# Patient Record
Sex: MALE | Race: WHITE | Employment: OTHER | URBAN - NONMETROPOLITAN AREA
[De-identification: names, ages, dates, MRNs, and addresses within clinical notes are randomized per-mention and may not be internally consistent; named-entity substitution may affect disease eponyms.]

---

## 2019-06-10 ENCOUNTER — HOSPITAL ENCOUNTER (OUTPATIENT)
Dept: INTERVENTIONAL RADIOLOGY/VASCULAR | Age: 59
Discharge: HOME | End: 2019-06-10
Payer: COMMERCIAL

## 2019-06-10 DIAGNOSIS — R60.9 SWELLING: ICD-10-CM

## 2019-06-10 PROCEDURE — 93971 EXTREMITY STUDY: CPT

## 2020-03-02 ENCOUNTER — HOSPITAL ENCOUNTER (INPATIENT)
Age: 60
LOS: 4 days | Discharge: HOME | DRG: 239 | End: 2020-03-06
Attending: PODIATRIST | Admitting: PODIATRIST
Payer: COMMERCIAL

## 2020-03-02 ENCOUNTER — APPOINTMENT (OUTPATIENT)
Dept: GENERAL RADIOLOGY | Age: 60
DRG: 239 | End: 2020-03-02
Attending: PODIATRIST
Payer: COMMERCIAL

## 2020-03-02 PROBLEM — I96 GANGRENE OF TOE OF LEFT FOOT (HCC): Status: ACTIVE | Noted: 2020-03-02

## 2020-03-02 LAB
ANION GAP SERPL CALCULATED.3IONS-SCNC: 12 MEQ/L (ref 8–16)
BASOPHILS # BLD: 0.8 %
BASOPHILS ABSOLUTE: 0.1 THOU/MM3 (ref 0–0.1)
BUN BLDV-MCNC: 18 MG/DL (ref 7–22)
CALCIUM SERPL-MCNC: 9.1 MG/DL (ref 8.5–10.5)
CHLORIDE BLD-SCNC: 100 MEQ/L (ref 98–111)
CO2: 22 MEQ/L (ref 23–33)
CREAT SERPL-MCNC: 1 MG/DL (ref 0.4–1.2)
EKG ATRIAL RATE: 83 BPM
EKG P AXIS: 71 DEGREES
EKG P-R INTERVAL: 188 MS
EKG Q-T INTERVAL: 382 MS
EKG QRS DURATION: 96 MS
EKG QTC CALCULATION (BAZETT): 448 MS
EKG R AXIS: 29 DEGREES
EKG T AXIS: 77 DEGREES
EKG VENTRICULAR RATE: 83 BPM
EOSINOPHIL # BLD: 6 %
EOSINOPHILS ABSOLUTE: 0.4 THOU/MM3 (ref 0–0.4)
ERYTHROCYTE [DISTWIDTH] IN BLOOD BY AUTOMATED COUNT: 15.4 % (ref 11.5–14.5)
ERYTHROCYTE [DISTWIDTH] IN BLOOD BY AUTOMATED COUNT: 47.8 FL (ref 35–45)
GFR SERPL CREATININE-BSD FRML MDRD: 77 ML/MIN/1.73M2
GLUCOSE BLD-MCNC: 120 MG/DL (ref 70–108)
GLUCOSE BLD-MCNC: 140 MG/DL (ref 70–108)
HCT VFR BLD CALC: 34.8 % (ref 42–52)
HEMOGLOBIN: 11.2 GM/DL (ref 14–18)
IMMATURE GRANS (ABS): 0.04 THOU/MM3 (ref 0–0.07)
IMMATURE GRANULOCYTES: 0.5 %
LYMPHOCYTES # BLD: 20.1 %
LYMPHOCYTES ABSOLUTE: 1.5 THOU/MM3 (ref 1–4.8)
MCH RBC QN AUTO: 27.5 PG (ref 26–33)
MCHC RBC AUTO-ENTMCNC: 32.2 GM/DL (ref 32.2–35.5)
MCV RBC AUTO: 85.5 FL (ref 80–94)
MONOCYTES # BLD: 11 %
MONOCYTES ABSOLUTE: 0.8 THOU/MM3 (ref 0.4–1.3)
NUCLEATED RED BLOOD CELLS: 0 /100 WBC
PLATELET # BLD: 198 THOU/MM3 (ref 130–400)
PMV BLD AUTO: 11.3 FL (ref 9.4–12.4)
POTASSIUM REFLEX MAGNESIUM: 3.9 MEQ/L (ref 3.5–5.2)
RBC # BLD: 4.07 MILL/MM3 (ref 4.7–6.1)
SEG NEUTROPHILS: 61.6 %
SEGMENTED NEUTROPHILS ABSOLUTE COUNT: 4.6 THOU/MM3 (ref 1.8–7.7)
SODIUM BLD-SCNC: 134 MEQ/L (ref 135–145)
WBC # BLD: 7.4 THOU/MM3 (ref 4.8–10.8)

## 2020-03-02 PROCEDURE — 82948 REAGENT STRIP/BLOOD GLUCOSE: CPT

## 2020-03-02 PROCEDURE — 6370000000 HC RX 637 (ALT 250 FOR IP): Performed by: STUDENT IN AN ORGANIZED HEALTH CARE EDUCATION/TRAINING PROGRAM

## 2020-03-02 PROCEDURE — 1200000000 HC SEMI PRIVATE

## 2020-03-02 PROCEDURE — 85025 COMPLETE CBC W/AUTO DIFF WBC: CPT

## 2020-03-02 PROCEDURE — 80048 BASIC METABOLIC PNL TOTAL CA: CPT

## 2020-03-02 PROCEDURE — 71045 X-RAY EXAM CHEST 1 VIEW: CPT

## 2020-03-02 PROCEDURE — 2580000003 HC RX 258: Performed by: STUDENT IN AN ORGANIZED HEALTH CARE EDUCATION/TRAINING PROGRAM

## 2020-03-02 PROCEDURE — 94760 N-INVAS EAR/PLS OXIMETRY 1: CPT

## 2020-03-02 PROCEDURE — 36415 COLL VENOUS BLD VENIPUNCTURE: CPT

## 2020-03-02 PROCEDURE — 93005 ELECTROCARDIOGRAM TRACING: CPT | Performed by: STUDENT IN AN ORGANIZED HEALTH CARE EDUCATION/TRAINING PROGRAM

## 2020-03-02 RX ORDER — ATORVASTATIN CALCIUM 40 MG/1
40 TABLET, FILM COATED ORAL NIGHTLY
Status: DISCONTINUED | OUTPATIENT
Start: 2020-03-02 | End: 2020-03-06 | Stop reason: HOSPADM

## 2020-03-02 RX ORDER — NICOTINE POLACRILEX 4 MG
15 LOZENGE BUCCAL PRN
Status: DISCONTINUED | OUTPATIENT
Start: 2020-03-02 | End: 2020-03-06 | Stop reason: HOSPADM

## 2020-03-02 RX ORDER — ONDANSETRON 2 MG/ML
4 INJECTION INTRAMUSCULAR; INTRAVENOUS EVERY 6 HOURS PRN
Status: DISCONTINUED | OUTPATIENT
Start: 2020-03-02 | End: 2020-03-06 | Stop reason: HOSPADM

## 2020-03-02 RX ORDER — LISINOPRIL 5 MG/1
5 TABLET ORAL DAILY
Status: DISCONTINUED | OUTPATIENT
Start: 2020-03-03 | End: 2020-03-06 | Stop reason: HOSPADM

## 2020-03-02 RX ORDER — DEXTROSE MONOHYDRATE 50 MG/ML
100 INJECTION, SOLUTION INTRAVENOUS PRN
Status: DISCONTINUED | OUTPATIENT
Start: 2020-03-02 | End: 2020-03-06 | Stop reason: HOSPADM

## 2020-03-02 RX ORDER — AMIODARONE HYDROCHLORIDE 200 MG/1
200 TABLET ORAL DAILY
Status: DISCONTINUED | OUTPATIENT
Start: 2020-03-03 | End: 2020-03-06 | Stop reason: HOSPADM

## 2020-03-02 RX ORDER — GABAPENTIN 100 MG/1
100 CAPSULE ORAL 3 TIMES DAILY
Status: DISCONTINUED | OUTPATIENT
Start: 2020-03-02 | End: 2020-03-06 | Stop reason: HOSPADM

## 2020-03-02 RX ORDER — ACETAMINOPHEN 650 MG/1
650 SUPPOSITORY RECTAL EVERY 6 HOURS PRN
Status: DISCONTINUED | OUTPATIENT
Start: 2020-03-02 | End: 2020-03-06 | Stop reason: HOSPADM

## 2020-03-02 RX ORDER — DEXTROSE MONOHYDRATE 25 G/50ML
12.5 INJECTION, SOLUTION INTRAVENOUS PRN
Status: DISCONTINUED | OUTPATIENT
Start: 2020-03-02 | End: 2020-03-06 | Stop reason: HOSPADM

## 2020-03-02 RX ORDER — SODIUM CHLORIDE 0.9 % (FLUSH) 0.9 %
10 SYRINGE (ML) INJECTION PRN
Status: DISCONTINUED | OUTPATIENT
Start: 2020-03-02 | End: 2020-03-06 | Stop reason: HOSPADM

## 2020-03-02 RX ORDER — HYDROCODONE BITARTRATE AND ACETAMINOPHEN 5; 325 MG/1; MG/1
1 TABLET ORAL EVERY 6 HOURS PRN
Status: DISCONTINUED | OUTPATIENT
Start: 2020-03-02 | End: 2020-03-06 | Stop reason: HOSPADM

## 2020-03-02 RX ORDER — SODIUM CHLORIDE 9 MG/ML
INJECTION, SOLUTION INTRAVENOUS CONTINUOUS
Status: DISCONTINUED | OUTPATIENT
Start: 2020-03-02 | End: 2020-03-06 | Stop reason: HOSPADM

## 2020-03-02 RX ORDER — SODIUM CHLORIDE 0.9 % (FLUSH) 0.9 %
10 SYRINGE (ML) INJECTION EVERY 12 HOURS SCHEDULED
Status: DISCONTINUED | OUTPATIENT
Start: 2020-03-02 | End: 2020-03-06 | Stop reason: HOSPADM

## 2020-03-02 RX ORDER — PROMETHAZINE HYDROCHLORIDE 25 MG/1
12.5 TABLET ORAL EVERY 6 HOURS PRN
Status: DISCONTINUED | OUTPATIENT
Start: 2020-03-02 | End: 2020-03-06 | Stop reason: HOSPADM

## 2020-03-02 RX ORDER — ACETAMINOPHEN 325 MG/1
650 TABLET ORAL EVERY 6 HOURS PRN
Status: DISCONTINUED | OUTPATIENT
Start: 2020-03-02 | End: 2020-03-06 | Stop reason: HOSPADM

## 2020-03-02 RX ORDER — POLYETHYLENE GLYCOL 3350 17 G/17G
17 POWDER, FOR SOLUTION ORAL DAILY PRN
Status: DISCONTINUED | OUTPATIENT
Start: 2020-03-02 | End: 2020-03-06 | Stop reason: HOSPADM

## 2020-03-02 RX ADMIN — METOPROLOL TARTRATE 25 MG: 25 TABLET ORAL at 22:10

## 2020-03-02 RX ADMIN — Medication 10 ML: at 22:25

## 2020-03-02 RX ADMIN — HYDROCODONE BITARTRATE AND ACETAMINOPHEN 1 TABLET: 5; 325 TABLET ORAL at 22:10

## 2020-03-02 RX ADMIN — GABAPENTIN 100 MG: 100 CAPSULE ORAL at 22:10

## 2020-03-02 RX ADMIN — SODIUM CHLORIDE: 9 INJECTION, SOLUTION INTRAVENOUS at 22:25

## 2020-03-02 RX ADMIN — ATORVASTATIN CALCIUM 40 MG: 40 TABLET, FILM COATED ORAL at 22:10

## 2020-03-02 ASSESSMENT — ENCOUNTER SYMPTOMS
COUGH: 0
NAUSEA: 0
SHORTNESS OF BREATH: 0
COLOR CHANGE: 1
DIARRHEA: 0

## 2020-03-02 ASSESSMENT — PAIN SCALES - GENERAL
PAINLEVEL_OUTOF10: 6

## 2020-03-02 ASSESSMENT — PAIN DESCRIPTION - PROGRESSION: CLINICAL_PROGRESSION: NOT CHANGED

## 2020-03-02 ASSESSMENT — PAIN DESCRIPTION - DESCRIPTORS: DESCRIPTORS: ACHING

## 2020-03-02 ASSESSMENT — PAIN DESCRIPTION - PAIN TYPE: TYPE: ACUTE PAIN

## 2020-03-02 ASSESSMENT — PAIN DESCRIPTION - ORIENTATION: ORIENTATION: LEFT

## 2020-03-02 ASSESSMENT — PAIN DESCRIPTION - LOCATION: LOCATION: FOOT

## 2020-03-02 ASSESSMENT — PAIN DESCRIPTION - ONSET: ONSET: ON-GOING

## 2020-03-02 ASSESSMENT — PAIN DESCRIPTION - FREQUENCY: FREQUENCY: CONTINUOUS

## 2020-03-02 ASSESSMENT — PAIN - FUNCTIONAL ASSESSMENT: PAIN_FUNCTIONAL_ASSESSMENT: PREVENTS OR INTERFERES SOME ACTIVE ACTIVITIES AND ADLS

## 2020-03-03 ENCOUNTER — ANESTHESIA (OUTPATIENT)
Dept: OPERATING ROOM | Age: 60
DRG: 239 | End: 2020-03-03
Payer: COMMERCIAL

## 2020-03-03 ENCOUNTER — ANESTHESIA EVENT (OUTPATIENT)
Dept: OPERATING ROOM | Age: 60
DRG: 239 | End: 2020-03-03
Payer: COMMERCIAL

## 2020-03-03 VITALS — DIASTOLIC BLOOD PRESSURE: 58 MMHG | SYSTOLIC BLOOD PRESSURE: 121 MMHG | TEMPERATURE: 98.6 F | OXYGEN SATURATION: 100 %

## 2020-03-03 PROBLEM — M14.671 CHARCOT'S JOINT, RIGHT ANKLE AND FOOT: Status: ACTIVE | Noted: 2020-03-03

## 2020-03-03 PROBLEM — S92.253K: Status: ACTIVE | Noted: 2020-03-03

## 2020-03-03 PROBLEM — I25.10 CAD (CORONARY ARTERY DISEASE): Status: ACTIVE | Noted: 2020-03-03

## 2020-03-03 PROBLEM — E11.9 DIABETES MELLITUS (HCC): Status: ACTIVE | Noted: 2020-03-03

## 2020-03-03 PROBLEM — I10 HYPERTENSION: Status: ACTIVE | Noted: 2020-03-03

## 2020-03-03 PROBLEM — E78.5 HYPERLIPIDEMIA: Status: ACTIVE | Noted: 2020-03-03

## 2020-03-03 LAB
GLUCOSE BLD-MCNC: 113 MG/DL (ref 70–108)
GLUCOSE BLD-MCNC: 124 MG/DL (ref 70–108)
GLUCOSE BLD-MCNC: 125 MG/DL (ref 70–108)
GLUCOSE BLD-MCNC: 143 MG/DL (ref 70–108)
GLUCOSE BLD-MCNC: 179 MG/DL (ref 70–108)

## 2020-03-03 PROCEDURE — 6360000002 HC RX W HCPCS: Performed by: REGISTERED NURSE

## 2020-03-03 PROCEDURE — 6370000000 HC RX 637 (ALT 250 FOR IP): Performed by: STUDENT IN AN ORGANIZED HEALTH CARE EDUCATION/TRAINING PROGRAM

## 2020-03-03 PROCEDURE — 88307 TISSUE EXAM BY PATHOLOGIST: CPT

## 2020-03-03 PROCEDURE — 2580000003 HC RX 258: Performed by: STUDENT IN AN ORGANIZED HEALTH CARE EDUCATION/TRAINING PROGRAM

## 2020-03-03 PROCEDURE — 6360000002 HC RX W HCPCS: Performed by: STUDENT IN AN ORGANIZED HEALTH CARE EDUCATION/TRAINING PROGRAM

## 2020-03-03 PROCEDURE — 3600000003 HC SURGERY LEVEL 3 BASE: Performed by: PODIATRIST

## 2020-03-03 PROCEDURE — 0Y6N0ZF DETACHMENT AT LEFT FOOT, PARTIAL 5TH RAY, OPEN APPROACH: ICD-10-PCS | Performed by: PODIATRIST

## 2020-03-03 PROCEDURE — 2709999900 HC NON-CHARGEABLE SUPPLY: Performed by: PODIATRIST

## 2020-03-03 PROCEDURE — 99253 IP/OBS CNSLTJ NEW/EST LOW 45: CPT | Performed by: INTERNAL MEDICINE

## 2020-03-03 PROCEDURE — 3700000000 HC ANESTHESIA ATTENDED CARE: Performed by: PODIATRIST

## 2020-03-03 PROCEDURE — 82948 REAGENT STRIP/BLOOD GLUCOSE: CPT

## 2020-03-03 PROCEDURE — 87205 SMEAR GRAM STAIN: CPT

## 2020-03-03 PROCEDURE — 87075 CULTR BACTERIA EXCEPT BLOOD: CPT

## 2020-03-03 PROCEDURE — 87186 SC STD MICRODIL/AGAR DIL: CPT

## 2020-03-03 PROCEDURE — 93010 ELECTROCARDIOGRAM REPORT: CPT | Performed by: NUCLEAR MEDICINE

## 2020-03-03 PROCEDURE — 87070 CULTURE OTHR SPECIMN AEROBIC: CPT

## 2020-03-03 PROCEDURE — 87077 CULTURE AEROBIC IDENTIFY: CPT

## 2020-03-03 PROCEDURE — 0Y6N0Z9 DETACHMENT AT LEFT FOOT, PARTIAL 1ST RAY, OPEN APPROACH: ICD-10-PCS | Performed by: PODIATRIST

## 2020-03-03 PROCEDURE — 1200000000 HC SEMI PRIVATE

## 2020-03-03 PROCEDURE — 0Y6N0ZD DETACHMENT AT LEFT FOOT, PARTIAL 4TH RAY, OPEN APPROACH: ICD-10-PCS | Performed by: PODIATRIST

## 2020-03-03 PROCEDURE — 0Y6N0ZC DETACHMENT AT LEFT FOOT, PARTIAL 3RD RAY, OPEN APPROACH: ICD-10-PCS | Performed by: PODIATRIST

## 2020-03-03 PROCEDURE — 3700000001 HC ADD 15 MINUTES (ANESTHESIA): Performed by: PODIATRIST

## 2020-03-03 PROCEDURE — 2500000003 HC RX 250 WO HCPCS: Performed by: PODIATRIST

## 2020-03-03 PROCEDURE — 87147 CULTURE TYPE IMMUNOLOGIC: CPT

## 2020-03-03 PROCEDURE — 0Y6N0ZB DETACHMENT AT LEFT FOOT, PARTIAL 2ND RAY, OPEN APPROACH: ICD-10-PCS | Performed by: PODIATRIST

## 2020-03-03 PROCEDURE — 3600000013 HC SURGERY LEVEL 3 ADDTL 15MIN: Performed by: PODIATRIST

## 2020-03-03 PROCEDURE — 6370000000 HC RX 637 (ALT 250 FOR IP): Performed by: INTERNAL MEDICINE

## 2020-03-03 RX ORDER — CLOPIDOGREL BISULFATE 75 MG/1
75 TABLET ORAL DAILY
Status: DISCONTINUED | OUTPATIENT
Start: 2020-03-04 | End: 2020-03-06 | Stop reason: HOSPADM

## 2020-03-03 RX ORDER — INSULIN GLARGINE 100 [IU]/ML
44 INJECTION, SOLUTION SUBCUTANEOUS DAILY
Status: DISCONTINUED | OUTPATIENT
Start: 2020-03-04 | End: 2020-03-06 | Stop reason: HOSPADM

## 2020-03-03 RX ORDER — PROPOFOL 10 MG/ML
INJECTION, EMULSION INTRAVENOUS PRN
Status: DISCONTINUED | OUTPATIENT
Start: 2020-03-03 | End: 2020-03-03 | Stop reason: SDUPTHER

## 2020-03-03 RX ORDER — ASPIRIN 81 MG/1
81 TABLET ORAL DAILY
Status: DISCONTINUED | OUTPATIENT
Start: 2020-03-03 | End: 2020-03-06 | Stop reason: HOSPADM

## 2020-03-03 RX ORDER — FENTANYL CITRATE 50 UG/ML
INJECTION, SOLUTION INTRAMUSCULAR; INTRAVENOUS PRN
Status: DISCONTINUED | OUTPATIENT
Start: 2020-03-03 | End: 2020-03-03 | Stop reason: SDUPTHER

## 2020-03-03 RX ORDER — KETOROLAC TROMETHAMINE 30 MG/ML
INJECTION, SOLUTION INTRAMUSCULAR; INTRAVENOUS PRN
Status: DISCONTINUED | OUTPATIENT
Start: 2020-03-03 | End: 2020-03-03 | Stop reason: SDUPTHER

## 2020-03-03 RX ORDER — BUPIVACAINE HYDROCHLORIDE 5 MG/ML
INJECTION, SOLUTION EPIDURAL; INTRACAUDAL PRN
Status: DISCONTINUED | OUTPATIENT
Start: 2020-03-03 | End: 2020-03-03 | Stop reason: HOSPADM

## 2020-03-03 RX ADMIN — GABAPENTIN 100 MG: 100 CAPSULE ORAL at 20:42

## 2020-03-03 RX ADMIN — PHENYLEPHRINE HYDROCHLORIDE 200 MCG: 10 INJECTION INTRAVENOUS at 18:42

## 2020-03-03 RX ADMIN — VANCOMYCIN HYDROCHLORIDE 1250 MG: 5 INJECTION, POWDER, LYOPHILIZED, FOR SOLUTION INTRAVENOUS at 15:56

## 2020-03-03 RX ADMIN — KETOROLAC TROMETHAMINE 30 MG: 30 INJECTION, SOLUTION INTRAMUSCULAR; INTRAVENOUS at 18:08

## 2020-03-03 RX ADMIN — HYDROCODONE BITARTRATE AND ACETAMINOPHEN 1 TABLET: 5; 325 TABLET ORAL at 04:34

## 2020-03-03 RX ADMIN — ASPIRIN 81 MG: 81 TABLET ORAL at 16:00

## 2020-03-03 RX ADMIN — METOPROLOL TARTRATE 25 MG: 25 TABLET ORAL at 20:42

## 2020-03-03 RX ADMIN — VANCOMYCIN HYDROCHLORIDE 1250 MG: 5 INJECTION, POWDER, LYOPHILIZED, FOR SOLUTION INTRAVENOUS at 00:07

## 2020-03-03 RX ADMIN — LISINOPRIL 5 MG: 5 TABLET ORAL at 08:55

## 2020-03-03 RX ADMIN — AMIODARONE HYDROCHLORIDE 200 MG: 200 TABLET ORAL at 08:55

## 2020-03-03 RX ADMIN — GABAPENTIN 100 MG: 100 CAPSULE ORAL at 08:55

## 2020-03-03 RX ADMIN — PHENYLEPHRINE HYDROCHLORIDE 200 MCG: 10 INJECTION INTRAVENOUS at 18:23

## 2020-03-03 RX ADMIN — HYDROCODONE BITARTRATE AND ACETAMINOPHEN 1 TABLET: 5; 325 TABLET ORAL at 20:42

## 2020-03-03 RX ADMIN — FENTANYL CITRATE 25 MCG: 50 INJECTION, SOLUTION INTRAMUSCULAR; INTRAVENOUS at 18:14

## 2020-03-03 RX ADMIN — VANCOMYCIN HYDROCHLORIDE 1250 MG: 5 INJECTION, POWDER, LYOPHILIZED, FOR SOLUTION INTRAVENOUS at 08:55

## 2020-03-03 RX ADMIN — PROPOFOL 250 MG: 10 INJECTION, EMULSION INTRAVENOUS at 18:07

## 2020-03-03 RX ADMIN — METOPROLOL TARTRATE 25 MG: 25 TABLET ORAL at 08:55

## 2020-03-03 RX ADMIN — PIPERACILLIN AND TAZOBACTAM 3.38 G: 3; .375 INJECTION, POWDER, FOR SOLUTION INTRAVENOUS at 19:07

## 2020-03-03 RX ADMIN — FENTANYL CITRATE 50 MCG: 50 INJECTION, SOLUTION INTRAMUSCULAR; INTRAVENOUS at 18:07

## 2020-03-03 RX ADMIN — PHENYLEPHRINE HYDROCHLORIDE 200 MCG: 10 INJECTION INTRAVENOUS at 18:28

## 2020-03-03 RX ADMIN — PHENYLEPHRINE HYDROCHLORIDE 200 MCG: 10 INJECTION INTRAVENOUS at 18:34

## 2020-03-03 RX ADMIN — SODIUM CHLORIDE: 9 INJECTION, SOLUTION INTRAVENOUS at 18:36

## 2020-03-03 RX ADMIN — PHENYLEPHRINE HYDROCHLORIDE 200 MCG: 10 INJECTION INTRAVENOUS at 18:33

## 2020-03-03 RX ADMIN — FENTANYL CITRATE 25 MCG: 50 INJECTION, SOLUTION INTRAMUSCULAR; INTRAVENOUS at 18:10

## 2020-03-03 RX ADMIN — ATORVASTATIN CALCIUM 40 MG: 40 TABLET, FILM COATED ORAL at 20:42

## 2020-03-03 ASSESSMENT — PAIN DESCRIPTION - LOCATION
LOCATION: FOOT

## 2020-03-03 ASSESSMENT — PAIN SCALES - GENERAL
PAINLEVEL_OUTOF10: 6
PAINLEVEL_OUTOF10: 5
PAINLEVEL_OUTOF10: 5
PAINLEVEL_OUTOF10: 6
PAINLEVEL_OUTOF10: 5
PAINLEVEL_OUTOF10: 6
PAINLEVEL_OUTOF10: 5

## 2020-03-03 ASSESSMENT — PULMONARY FUNCTION TESTS
PIF_VALUE: 0
PIF_VALUE: 1
PIF_VALUE: 0
PIF_VALUE: 1
PIF_VALUE: 0
PIF_VALUE: 0
PIF_VALUE: 1
PIF_VALUE: 0
PIF_VALUE: 1
PIF_VALUE: 2
PIF_VALUE: 0
PIF_VALUE: 0
PIF_VALUE: 1
PIF_VALUE: 0
PIF_VALUE: 1
PIF_VALUE: 1
PIF_VALUE: 0
PIF_VALUE: 1
PIF_VALUE: 0
PIF_VALUE: 1
PIF_VALUE: 0
PIF_VALUE: 1
PIF_VALUE: 0
PIF_VALUE: 1
PIF_VALUE: 0
PIF_VALUE: 0

## 2020-03-03 ASSESSMENT — PAIN DESCRIPTION - PAIN TYPE
TYPE: SURGICAL PAIN
TYPE: CHRONIC PAIN
TYPE: ACUTE PAIN
TYPE: CHRONIC PAIN
TYPE: CHRONIC PAIN
TYPE: ACUTE PAIN

## 2020-03-03 ASSESSMENT — PAIN DESCRIPTION - PROGRESSION
CLINICAL_PROGRESSION: NOT CHANGED

## 2020-03-03 ASSESSMENT — PAIN DESCRIPTION - DESCRIPTORS
DESCRIPTORS: ACHING

## 2020-03-03 ASSESSMENT — PAIN DESCRIPTION - ORIENTATION
ORIENTATION: LEFT

## 2020-03-03 ASSESSMENT — PAIN DESCRIPTION - FREQUENCY
FREQUENCY: CONTINUOUS

## 2020-03-03 ASSESSMENT — PAIN DESCRIPTION - ONSET
ONSET: ON-GOING

## 2020-03-03 ASSESSMENT — PAIN - FUNCTIONAL ASSESSMENT
PAIN_FUNCTIONAL_ASSESSMENT: PREVENTS OR INTERFERES SOME ACTIVE ACTIVITIES AND ADLS

## 2020-03-03 NOTE — CARE COORDINATION
3/3/20, 11:15 AM  DISCHARGE PLANNING EVALUATION:    Jose Juan Fairbanks       Admitted from: Direct from Dr Kathleen Ley Office 3/2/2020/ 2014 Hospital day: 1   Location: [address] [place] [address] Reason for admit: Gangrene of toe of left foot (HCC) [I96]  Gangrene of toe of left foot ([address] [place] Status: inpatient  Admit order signed?: routed to Dr Kathleen Ley  PMH:  has a past medical history of Arthritis, CAD (coronary artery disease), Diabetes mellitus ([address] Hyperlipidemia, Hypertension, and Liver disease. Procedure: Patient scheduled for surgery on 03.03.2020 for I&D with debridement of all non-viable soft tissue and bone of the left foot with amputation of the left 4th toe. Pertinent abnormal Imaging:no  Medications:  Scheduled Meds:   [START ON 3/4/2020] linagliptin  5 mg Oral Daily    [START ON 3/4/2020] insulin glargine  44 Units Subcutaneous Daily    aspirin  81 mg Oral Daily    [START ON 3/4/2020] clopidogrel  75 mg Oral Daily    amiodarone  200 mg Oral Daily    atorvastatin  40 mg Oral Nightly    gabapentin  100 mg Oral TID    lisinopril  5 mg Oral Daily    metoprolol tartrate  25 mg Oral BID    sodium chloride flush  10 mL Intravenous 2 times per day    [Held by provider] enoxaparin  40 mg Subcutaneous Daily    insulin lispro  0-12 Units Subcutaneous TID WC    insulin lispro  0-6 Units Subcutaneous Nightly    vancomycin (VANCOCIN) intermittent dosing (placeholder)   Other RX Placeholder    vancomycin  1,250 mg Intravenous Q8H     Continuous Infusions:   sodium chloride 50 mL/hr at 03/02/20 2225    dextrose        Pertinent Info/Orders/Treatment Plan:  N/V checks. Pain management. I&O. Dr Margoth Petersen for medical clearance and medical management. PT/OT: NWB to bilateral lower extremity, bedrest with bedside commode  ID consult for: Wet gangrene to the left foot, will have I&D and 4th toe amputation on 03.03.2020  Diet: Diet NPO Time Specified   Smoking status:  reports that he quit smoking about 12 years ago.  His

## 2020-03-03 NOTE — ANESTHESIA PRE PROCEDURE
Department of Anesthesiology  Preprocedure Note       Name:  Paula Kim   Age:  63 y.o.  :  1960                                          MRN:  [id number]         Date:  3/3/2020      Surgeon: Shanika Lee):  Johnson Zavala DPM    Procedure: I&D LEFT 4TH TOE AMP (Left Toes)    Medications prior to admission:   Prior to Admission medications    Medication Sig Start Date End Date Taking? Authorizing Provider   amiodarone (CORDARONE) 200 MG tablet Take 200 mg by mouth daily   Yes Historical Provider, MD   aspirin 81 MG tablet Take 81 mg by mouth daily   Yes Historical Provider, MD   atorvastatin (LIPITOR) 40 MG tablet Take 40 mg by mouth nightly   Yes Historical Provider, MD   clopidogrel (PLAVIX) 75 MG tablet Take 75 mg by mouth daily   Yes Historical Provider, MD   gabapentin (NEURONTIN) 100 MG capsule Take 100 mg by mouth 3 times daily. Yes Historical Provider, MD   lisinopril (PRINIVIL;ZESTRIL) 5 MG tablet Take 5 mg by mouth daily   Yes Historical Provider, MD   metoprolol tartrate (LOPRESSOR) 25 MG tablet Take 25 mg by mouth 2 times daily   Yes Historical Provider, MD   Multiple Vitamins-Minerals (MULTIVITAMIN PO) Take by mouth 2 times daily   Yes Historical Provider, MD   HYDROcodone-acetaminophen (NORCO) 5-325 MG per tablet Take 1 tablet by mouth every 6 hours as needed for Pain.    Yes Historical Provider, MD   linagliptin (TRADJENTA) 5 MG tablet Take 5 mg by mouth daily   Yes Historical Provider, MD   Insulin Detemir (LEVEMIR FLEXPEN SC) Inject 44 Units into the skin daily   Yes Historical Provider, MD   insulin lispro (HUMALOG) 100 UNIT/ML injection vial Inject into the skin 2 times daily as needed for High Blood Sugar Per sliding scale    Historical Provider, MD       Current medications:    Current Facility-Administered Medications   Medication Dose Route Frequency Provider Last Rate Last Dose    [START ON 3/4/2020] linagliptin (TRADJENTA) tablet 5 mg  5 mg Oral Daily Julisa George MD [Held by provider] enoxaparin (LOVENOX) injection 40 mg  40 mg Subcutaneous Daily Aylin Tutu, DPM        insulin lispro (HUMALOG) injection vial 0-12 Units  0-12 Units Subcutaneous TID WC Aylin Tutu, DPM   2 Units at 20 1233    insulin lispro (HUMALOG) injection vial 0-6 Units  0-6 Units Subcutaneous Nightly Aylin Tutu, DPM        glucose (GLUTOSE) 40 % oral gel 15 g  15 g Oral PRN Aylin Tutu, DPM        dextrose 50 % IV solution  12.5 g Intravenous PRN Aylin Tutu, DPM        glucagon (rDNA) injection 1 mg  1 mg Intramuscular PRN Aylin Tutu, DPM        dextrose 5 % solution  100 mL/hr Intravenous PRN Aylin Tutu, DPM        vancomycin [facility]) intermittent dosing (placeholder)   Other RX Placeholder Aylin Tutu, DPM        vancomycin (VANCOCIN) 1,250 mg in dextrose 5 % 250 mL IVPB  1,250 mg Intravenous Q8H Aylin Tutu, .7 mL/hr at 20 1556 1,250 mg at 20 1556       Allergies:  No Known Allergies    Problem List:    Patient Active Problem List   Diagnosis Code    Gangrene of toe of left foot ([facility] [address] I96    CAD (coronary artery disease) I25.10    Diabetes mellitus ([address] E11.9    Hyperlipidemia E78.5    Hypertension I10    Charcot's joint, right ankle and foot M14.671    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       Past Medical History:        Diagnosis Date    Arthritis     CAD (coronary artery disease)     Diabetes mellitus ([address]     Hyperlipidemia     Hypertension     Liver disease     HEPITITIS AS A CHILD       Past Surgical History:        Procedure Laterality Date    CARDIAC SURGERY  2019    triple bypass    CYST REMOVAL      RIGHT ANKLE     FOOT SURGERY Right     CYST REMOVED    TONSILLECTOMY         Social History:    Social History     Tobacco Use    Smoking status: Former Smoker     Types: Cigarettes     Last attempt to quit: 2008     Years since quittin.1    Smokeless tobacco: Never Used   Substance Use Topics    Alcohol use: Not Currently                                Counseling given: Not Answered      Vital Signs (Current):   Vitals:    03/03/20 0000 03/03/20 0430 03/03/20 0830 03/03/20 1645   BP: 135/70 124/75 (!) 132/98 (!) 138/96   Pulse: 88 73 73 72   Resp: 14 14 16 18   Temp: 98 °F (36.7 °C) 97.1 °F (36.2 °C) 97.2 °F (36.2 °C) 97.4 °F (36.3 °C)   TempSrc: Oral Oral Oral Oral   SpO2: 93% 98% 98% 98%   Weight:       Height:                                                  BP Readings from Last 3 Encounters:   03/03/20 (!) 138/96       NPO Status:                                                                                 BMI:   Wt Readings from Last 3 Encounters:   03/02/20 (!) 306 lb 1.6 oz (138.8 kg)     Body mass index is 38.26 kg/m².     CBC:   Lab Results   Component Value Date    WBC 7.4 03/02/2020    RBC 4.07 03/02/2020    HGB 11.2 03/02/2020    HCT 34.8 03/02/2020    MCV 85.5 03/02/2020     03/02/2020       CMP:   Lab Results   Component Value Date     03/02/2020    K 3.9 03/02/2020     03/02/2020    CO2 22 03/02/2020    BUN 18 03/02/2020    CREATININE 1.0 03/02/2020    LABGLOM 77 03/02/2020    GLUCOSE 140 03/02/2020    CALCIUM 9.1 03/02/2020       POC Tests:   Recent Labs     03/03/20  1626   POCGLU 125*       Coags: No results found for: PROTIME, INR, APTT    HCG (If Applicable): No results found for: PREGTESTUR, PREGSERUM, HCG, HCGQUANT     ABGs: No results found for: PHART, PO2ART, ARU5QAD, FSW3QVB, BEART, U5UCSIOC     Type & Screen (If Applicable):  No results found for: [facility]    Anesthesia Evaluation  Patient summary reviewed and Nursing notes reviewed no history of anesthetic complications:   Airway: Mallampati: II        Dental:    (+) upper dentures and lower dentures      Pulmonary:   (+) decreased breath sounds,                             Cardiovascular:  Exercise tolerance: poor (<4 METS),   (+) hypertension:, CAD:, CABG/stent:,       ECG reviewed  Rhythm: regular  Rate: normal                    Neuro/Psych:               GI/Hepatic/Renal:   (+) hepatitis:, liver disease:,           Endo/Other:    (+) Diabetespoorly controlled, , .                 Abdominal:   (+) obese,     Abdomen: soft. Vascular:                                        Anesthesia Plan      MAC     ASA 3       Induction: intravenous. Anesthetic plan and risks discussed with patient and spouse. Plan discussed with CRNA.                   [address] [facility],    3/3/2020

## 2020-03-03 NOTE — H&P
Department of Podiatric Surgery  History and Physical        CHIEF COMPLAINT:  Left foot infection    Reason for Admission:  Wet gangrene, left foot     History Obtained From:  patient    HISTORY OF PRESENT ILLNESS:      The patient is a 63 y.o. male with significant past medical history of diabetes, CAD, HTN and hyperlipidemia who presents after direct admission by Dr. Hunter Yancey. Patient has been following with Dr. Hunter Yancey and is scheduled to undergo reconstructive surgery for his right foot on Friday. Patient has neuropathy in his feet and states that he hit his left foot a few days ago and noticed color changes to the left 4th toe. Patient states that he noticed increased drainage and smell from the left foot today which prompted him to be seen in office. Patient states he had flu like symptoms recently and is unsure if it was related to the left foot infection. Patient currently denies any F/V/N/C. Past Medical History:        Diagnosis Date    Arthritis     CAD (coronary artery disease)     Diabetes mellitus ([facility]     Hyperlipidemia     Hypertension     Liver disease     HEPITITIS AS A CHILD       Past Surgical History:        Procedure Laterality Date    CARDIAC SURGERY  11/2019    triple bypass    CYST REMOVAL      RIGHT ANKLE     FOOT SURGERY Right     CYST REMOVED    TONSILLECTOMY                   Medications Prior to Admission:   Medications Prior to Admission: amiodarone (CORDARONE) 200 MG tablet, Take 200 mg by mouth daily  aspirin 81 MG tablet, Take 81 mg by mouth daily  atorvastatin (LIPITOR) 40 MG tablet, Take 40 mg by mouth nightly  clopidogrel (PLAVIX) 75 MG tablet, Take 75 mg by mouth daily  gabapentin (NEURONTIN) 100 MG capsule, Take 100 mg by mouth 3 times daily.   lisinopril (PRINIVIL;ZESTRIL) 5 MG tablet, Take 5 mg by mouth daily  metoprolol tartrate (LOPRESSOR) 25 MG tablet, Take 25 mg by mouth 2 times daily  Multiple Vitamins-Minerals (MULTIVITAMIN PO), Take by mouth 2 times hours.    Invalid input(s): CA  ABG: No results for input(s): PHART, OQH8BCP, PO2ART, CQP1CRU, F4USRNGC in the last 72 hours. LIVER PROFILE No results for input(s): AST, ALT, LIPASE, BILIDIR, BILITOT, ALKPHOS in the last 72 hours. Invalid input(s): AMYLASE,  ALB  INR No results for input(s): INR in the last 72 hours. PTT No results found for: APTT    ASSESSMENT:  Principle  Gangrene of toe of left foot     Chronic  Patient Active Problem List   Diagnosis    Gangrene of toe of left foot (HCC)       PLAN:    Gangrene of toe of left foot   -Patient seen and evaluated   -Labs ordered   -Dressings left intact at this time per patient request   -Patient scheduled for surgical intervention tomorrow   -Patient started on IV antibiotics, pharmacy to dose vancomycin   -ID consulted   -Patient to be NWB to bilateral lower extremity, bedrest with bedside commode   -Case management consulted for discharge planning   -Patient scheduled for surgery on 03.03.2020 around 1930, patient to be NPO after breakfast (no later than 0700), patient to have consent signed, hold any anticoagulants until after surgery     Diabetes  -medium correction sliding scale started   - hospitalist consulted     HTN/Hyperlipidemia   - home medications continued   - hospitalist consulted     CAD   - home medications continued  - hospitalist consulted       DISPO: Patient scheduled for surgery on 03.03.2020 for I&D with debridement of all non-viable soft tissue and bone of the left foot with amputation of the left 4th toe. Thanks for the opportunity to participate in this patient's care. Quique [facility]  3/2/2020 9:10 PM    I saw and evaluated the patient independently bedside. Please refer to resident physicians note for further details. Discussed with resident assessment and findings, I agree with plan as documented.      [address] [place] [address]   Electronically signed by Ana M Brunson DPM on 3/3/2020 at 5:52 PM

## 2020-03-03 NOTE — PROGRESS NOTES
Pharmacy Note  Vancomycin Consult    Zana Mahan is a 63 y.o. male started on Vancomycin for gangrene of toe of left foot; consult received from Dr. Kostas Major to manage therapy. Also receiving the following antibiotics: none at this time. Patient Active Problem List   Diagnosis    Gangrene of toe of left foot (HCC)       Allergies:  Patient has no known allergies. Temp max: 98.9    Recent Labs     03/02/20 2128   BUN 18       Recent Labs     03/02/20 2128   CREATININE 1.0       Recent Labs     03/02/20 2128   WBC 7.4       No intake or output data in the 24 hours ending 03/02/20 2259    Culture Date      Source                       Results  N/A    Ht Readings from Last 1 Encounters:   03/02/20 6' 3\" (1.905 m)        Wt Readings from Last 1 Encounters:   03/02/20 (!) 306 lb 1.6 oz (138.8 kg)         Body mass index is 38.26 kg/m². Estimated Creatinine Clearance: 121 mL/min (based on SCr of 1 mg/dL). Goal Trough Level: 15 mcg/mL    Assessment/Plan:  Will initiate vancomycin 1250 mg IV every 8 hours. Timing of trough level will be determined based on culture results, renal function, and clinical response. Will plan on checking a trough prior to the 4th dose. Not ordered at this time. ID has been consulted and they are also planning for surgical intervention tomorrow. Thank you for the consult. Will continue to follow and monitor closely.   Hillary Hodges, PharmD 3/2/2020 11:02 PM

## 2020-03-03 NOTE — PLAN OF CARE
Neurological  Goal: Maximum potential motor/sensory/cognitive function  Outcome: Ongoing  Note:   Oriented x4. Patient states numbness and tingling to bilateral lower legs as chronic      Problem: Cardiovascular  Goal: No DVT, peripheral vascular complications  Outcome: Ongoing  Note:   Vitals stable. Peripheral vascular within normal limits. Denies calf pain and tenderness. Problem: Respiratory  Goal: No pulmonary complications  Outcome: Ongoing  Note:   Lung sounds clear throughout. Oxygen saturation above 92% on room air. Problem: GI  Goal: No bowel complications  Outcome: Ongoing  Note:   Bowel sounds active. Abdomen soft and non-tender. Passing gas     Problem:   Goal: Adequate urinary output  Outcome: Ongoing  Note:   Patient due to void. Urinal within reach      Problem: Nutrition  Goal: Optimal nutrition therapy  Outcome: Ongoing  Note:   Patient on carb control diet. Blood glucose monitored and managed with insulin      Problem: Skin Integrity/Risk  Goal: No skin breakdown during hospitalization  Outcome: Ongoing  Note:   Left foot toe wounds, dressing in place. Cast to right lower leg. Wound to right great toe, cleaned with betadine and dressing applied. Patient educated on the importance of turning and repositioning every 2 hours to prevent skin breakdown. Head of bed lowered. Heels elevated off bed with pillow support. Low air loss mattress to bed. Problem: Musculor/Skeletal Functional Status  Goal: Highest potential functional level  Outcome: Ongoing  Note:   Patient has strong hand  bilaterally. Moderate pedal push and pull to left foot. Unable to assess pedal push and pull to right foot due to cast in place. Patient on bedrest.      Problem: Discharge Planning:  Goal: Discharged to appropriate level of care  Description  Discharged to appropriate level of care  Outcome: Ongoing  Note:   Discharge planning to home.   on care team      Problem: Serum Glucose Level -

## 2020-03-03 NOTE — CONSULTS
conjunctiva, unicteric sclera, moist oral mucosa. Chest:  Bilateral air entry  Cardiovascular:  RRR ,S1S2, no murmur or gallop. Abdomen:  Soft, non tender to palpation. Extremities: +edema, cast on the right foot  Dressed left foot, he has iodine soaked dressing with foul smelling drainge  Skin:  Warm and dry. CNS:oriented        LABS:     CBC:   Recent Labs     03/02/20 2128   WBC 7.4   HGB 11.2*        BMP:    Recent Labs     03/02/20 2128   *   K 3.9      CO2 22*   BUN 18   CREATININE 1.0   GLUCOSE 140*     Calcium:  Recent Labs     03/02/20 2128   CALCIUM 9.1      Recent Labs     03/02/20  2220 03/03/20  0607 03/03/20  1045   POCGLU 120* 124* 179*       CXR:          Problem list of patient      Patient Active Problem List   Diagnosis Code    Gangrene of toe of left foot ([facility] [address] I96    CAD (coronary artery disease) I25.10    Diabetes mellitus ([facility] [address] E11.9    Hyperlipidemia E78.5    Hypertension I10    Charcot's joint, right ankle and foot M14.671    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 He is on iv vancomycin, will add for gram negative and anerobic coverage     Thank you Carlito Rolle DPM for allowing me to participate in this patient's care.     Bridgette De La Garza MD,FACP 3/3/2020 2:07 PM
recognition software. It may contain minor errors which are inherent in voice recognition technology. **      Final report electronically signed by Dr. Pollo Dunn on 3/3/2020 12:23 AM             EKG:  I have reviewed the EKG with the following interpretation:    Sinus rhythm 85 bpm, no significant ST-T changes, nonspecific T wave abnormality with inversion in only aVL    DVT prophylaxis: Lovenox    Code Status: Full Code    PT/OT Eval Status: Active and ongoing      ASSESSMENT:    Active Hospital Problems    Diagnosis Date Noted    CAD (coronary artery disease) [I25.10] 03/03/2020    Diabetes mellitus (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 [address] Bia Britton 03/02/2020       PLAN:    Perioperative risk stratification: Revised cardiac index scoring    With patient's coronary artery disease and being on insulin for diabetes, moderate cardiac risk, two-point class III, 10% perioperative risk for cardiac MI, death, cardiac arrest, patient understands the risks and wants to go ahead with surgery  -Medically optimized, if possible continue cardiac medications especially antiplatelet agents and monitor for bleeding    Gangrene, with, left fourth toe: Foul-smelling, podiatry on board considering amputation on broad-spectrum antibiotics, vancomycin    Essential hypertension: No pill, metoprolol    Diabetes mellitus type 2, insulin-dependent, peripheral neuropathy, charcoaled arthropathy:  \No results found for: LABA1C  No results found for: EAG  Continue sliding scale insulin, resume Lantus 44 units in AM, takes levemir at home      Coronary artery disease status post CABG: Without angina: Resume aspirin, hold Plavix for today and resume tomorrow if okay with podiatry  Continue metoprolol, statin,

## 2020-03-04 LAB
ANION GAP SERPL CALCULATED.3IONS-SCNC: 15 MEQ/L (ref 8–16)
AVERAGE GLUCOSE: 165 MG/DL (ref 70–126)
BUN BLDV-MCNC: 14 MG/DL (ref 7–22)
CALCIUM SERPL-MCNC: 9.2 MG/DL (ref 8.5–10.5)
CHLORIDE BLD-SCNC: 103 MEQ/L (ref 98–111)
CO2: 22 MEQ/L (ref 23–33)
CREAT SERPL-MCNC: 1 MG/DL (ref 0.4–1.2)
ERYTHROCYTE [DISTWIDTH] IN BLOOD BY AUTOMATED COUNT: 15.1 % (ref 11.5–14.5)
ERYTHROCYTE [DISTWIDTH] IN BLOOD BY AUTOMATED COUNT: 46.7 FL (ref 35–45)
GFR SERPL CREATININE-BSD FRML MDRD: 77 ML/MIN/1.73M2
GLUCOSE BLD-MCNC: 120 MG/DL (ref 70–108)
GLUCOSE BLD-MCNC: 138 MG/DL (ref 70–108)
GLUCOSE BLD-MCNC: 140 MG/DL (ref 70–108)
GLUCOSE BLD-MCNC: 163 MG/DL (ref 70–108)
GLUCOSE BLD-MCNC: 192 MG/DL (ref 70–108)
HBA1C MFR BLD: 7.5 % (ref 4.4–6.4)
HCT VFR BLD CALC: 34.2 % (ref 42–52)
HEMOGLOBIN: 10.8 GM/DL (ref 14–18)
MCH RBC QN AUTO: 26.9 PG (ref 26–33)
MCHC RBC AUTO-ENTMCNC: 31.6 GM/DL (ref 32.2–35.5)
MCV RBC AUTO: 85.1 FL (ref 80–94)
PLATELET # BLD: 220 THOU/MM3 (ref 130–400)
PMV BLD AUTO: 10.2 FL (ref 9.4–12.4)
POTASSIUM SERPL-SCNC: 4.8 MEQ/L (ref 3.5–5.2)
RBC # BLD: 4.02 MILL/MM3 (ref 4.7–6.1)
SODIUM BLD-SCNC: 140 MEQ/L (ref 135–145)
VANCOMYCIN TROUGH: 15.7 UG/ML (ref 5–15)
WBC # BLD: 9.5 THOU/MM3 (ref 4.8–10.8)

## 2020-03-04 PROCEDURE — 82948 REAGENT STRIP/BLOOD GLUCOSE: CPT

## 2020-03-04 PROCEDURE — 6370000000 HC RX 637 (ALT 250 FOR IP): Performed by: STUDENT IN AN ORGANIZED HEALTH CARE EDUCATION/TRAINING PROGRAM

## 2020-03-04 PROCEDURE — 1200000000 HC SEMI PRIVATE

## 2020-03-04 PROCEDURE — 99232 SBSQ HOSP IP/OBS MODERATE 35: CPT | Performed by: PHYSICIAN ASSISTANT

## 2020-03-04 PROCEDURE — 2580000003 HC RX 258: Performed by: STUDENT IN AN ORGANIZED HEALTH CARE EDUCATION/TRAINING PROGRAM

## 2020-03-04 PROCEDURE — 36415 COLL VENOUS BLD VENIPUNCTURE: CPT

## 2020-03-04 PROCEDURE — 97110 THERAPEUTIC EXERCISES: CPT

## 2020-03-04 PROCEDURE — 97166 OT EVAL MOD COMPLEX 45 MIN: CPT

## 2020-03-04 PROCEDURE — 6360000002 HC RX W HCPCS: Performed by: STUDENT IN AN ORGANIZED HEALTH CARE EDUCATION/TRAINING PROGRAM

## 2020-03-04 PROCEDURE — 97162 PT EVAL MOD COMPLEX 30 MIN: CPT

## 2020-03-04 PROCEDURE — 85027 COMPLETE CBC AUTOMATED: CPT

## 2020-03-04 PROCEDURE — 83036 HEMOGLOBIN GLYCOSYLATED A1C: CPT

## 2020-03-04 PROCEDURE — 80202 ASSAY OF VANCOMYCIN: CPT

## 2020-03-04 PROCEDURE — 80048 BASIC METABOLIC PNL TOTAL CA: CPT

## 2020-03-04 RX ORDER — ALOGLIPTIN 25 MG/1
25 TABLET, FILM COATED ORAL DAILY
Status: DISCONTINUED | OUTPATIENT
Start: 2020-03-05 | End: 2020-03-06 | Stop reason: HOSPADM

## 2020-03-04 RX ADMIN — PIPERACILLIN AND TAZOBACTAM 3.38 G: 3; .375 INJECTION, POWDER, FOR SOLUTION INTRAVENOUS at 03:49

## 2020-03-04 RX ADMIN — VANCOMYCIN HYDROCHLORIDE 1250 MG: 5 INJECTION, POWDER, LYOPHILIZED, FOR SOLUTION INTRAVENOUS at 08:47

## 2020-03-04 RX ADMIN — LINAGLIPTIN 5 MG: 5 TABLET, FILM COATED ORAL at 08:46

## 2020-03-04 RX ADMIN — ASPIRIN 81 MG: 81 TABLET ORAL at 08:46

## 2020-03-04 RX ADMIN — POLYETHYLENE GLYCOL 3350 17 G: 17 POWDER, FOR SOLUTION ORAL at 08:47

## 2020-03-04 RX ADMIN — PIPERACILLIN AND TAZOBACTAM 3.38 G: 3; .375 INJECTION, POWDER, FOR SOLUTION INTRAVENOUS at 19:18

## 2020-03-04 RX ADMIN — PIPERACILLIN AND TAZOBACTAM 3.38 G: 3; .375 INJECTION, POWDER, FOR SOLUTION INTRAVENOUS at 13:07

## 2020-03-04 RX ADMIN — HYDROCODONE BITARTRATE AND ACETAMINOPHEN 1 TABLET: 5; 325 TABLET ORAL at 03:49

## 2020-03-04 RX ADMIN — AMIODARONE HYDROCHLORIDE 200 MG: 200 TABLET ORAL at 08:45

## 2020-03-04 RX ADMIN — GABAPENTIN 100 MG: 100 CAPSULE ORAL at 21:25

## 2020-03-04 RX ADMIN — ENOXAPARIN SODIUM 40 MG: 40 INJECTION SUBCUTANEOUS at 08:46

## 2020-03-04 RX ADMIN — ATORVASTATIN CALCIUM 40 MG: 40 TABLET, FILM COATED ORAL at 21:25

## 2020-03-04 RX ADMIN — LISINOPRIL 5 MG: 5 TABLET ORAL at 08:46

## 2020-03-04 RX ADMIN — INSULIN GLARGINE 44 UNITS: 100 INJECTION, SOLUTION SUBCUTANEOUS at 08:47

## 2020-03-04 RX ADMIN — VANCOMYCIN HYDROCHLORIDE 1250 MG: 5 INJECTION, POWDER, LYOPHILIZED, FOR SOLUTION INTRAVENOUS at 17:18

## 2020-03-04 RX ADMIN — GABAPENTIN 100 MG: 100 CAPSULE ORAL at 08:46

## 2020-03-04 RX ADMIN — METOPROLOL TARTRATE 25 MG: 25 TABLET ORAL at 21:25

## 2020-03-04 RX ADMIN — GABAPENTIN 100 MG: 100 CAPSULE ORAL at 15:34

## 2020-03-04 RX ADMIN — HYDROCODONE BITARTRATE AND ACETAMINOPHEN 1 TABLET: 5; 325 TABLET ORAL at 12:27

## 2020-03-04 RX ADMIN — METOPROLOL TARTRATE 25 MG: 25 TABLET ORAL at 08:46

## 2020-03-04 RX ADMIN — INSULIN LISPRO 1 UNITS: 100 INJECTION, SOLUTION INTRAVENOUS; SUBCUTANEOUS at 22:06

## 2020-03-04 RX ADMIN — VANCOMYCIN HYDROCHLORIDE 1250 MG: 5 INJECTION, POWDER, LYOPHILIZED, FOR SOLUTION INTRAVENOUS at 00:25

## 2020-03-04 RX ADMIN — CLOPIDOGREL BISULFATE 75 MG: 75 TABLET ORAL at 08:46

## 2020-03-04 ASSESSMENT — PAIN SCALES - GENERAL
PAINLEVEL_OUTOF10: 4
PAINLEVEL_OUTOF10: 4
PAINLEVEL_OUTOF10: 5
PAINLEVEL_OUTOF10: 6
PAINLEVEL_OUTOF10: 5
PAINLEVEL_OUTOF10: 4
PAINLEVEL_OUTOF10: 5
PAINLEVEL_OUTOF10: 4
PAINLEVEL_OUTOF10: 5
PAINLEVEL_OUTOF10: 4
PAINLEVEL_OUTOF10: 6
PAINLEVEL_OUTOF10: 6
PAINLEVEL_OUTOF10: 4

## 2020-03-04 ASSESSMENT — PAIN DESCRIPTION - PAIN TYPE
TYPE: SURGICAL PAIN

## 2020-03-04 ASSESSMENT — PAIN DESCRIPTION - DESCRIPTORS
DESCRIPTORS: ACHING

## 2020-03-04 ASSESSMENT — PAIN DESCRIPTION - LOCATION
LOCATION: FOOT

## 2020-03-04 ASSESSMENT — PAIN DESCRIPTION - ORIENTATION
ORIENTATION: LEFT

## 2020-03-04 ASSESSMENT — PAIN DESCRIPTION - ONSET
ONSET: ON-GOING

## 2020-03-04 ASSESSMENT — PAIN - FUNCTIONAL ASSESSMENT
PAIN_FUNCTIONAL_ASSESSMENT: PREVENTS OR INTERFERES SOME ACTIVE ACTIVITIES AND ADLS

## 2020-03-04 ASSESSMENT — PAIN DESCRIPTION - PROGRESSION
CLINICAL_PROGRESSION: NOT CHANGED

## 2020-03-04 ASSESSMENT — PAIN DESCRIPTION - FREQUENCY
FREQUENCY: CONTINUOUS

## 2020-03-04 NOTE — PROGRESS NOTES
 vancomycin  1,250 mg Intravenous Q8H     Continuous Infusions:   sodium chloride 50 mL/hr at 03/02/20 2225    dextrose       PRN Medications: HYDROcodone-acetaminophen, sodium chloride flush, acetaminophen **OR** acetaminophen, polyethylene glycol, promethazine **OR** ondansetron, glucose, dextrose, glucagon (rDNA), dextrose    Objective:   Vitals: /80   Pulse 71   Temp 97.5 °F (36.4 °C) (Oral)   Resp 16   Ht 6' 3\" (1.905 m)   Wt (!) 306 lb 1.6 oz (138.8 kg)   SpO2 98%   BMI 38.26 kg/m²   BMI: Body mass index is 38.26 kg/m². CBC:   Recent Labs     03/02/20 2128 03/04/20  0717   WBC 7.4 9.5   HGB 11.2* 10.8*    220     BMP:    Recent Labs     03/02/20 2128   *   K 3.9      CO2 22*   BUN 18   CREATININE 1.0   GLUCOSE 140*     Hepatic: No results for input(s): AST, ALT, ALB, BILITOT, ALKPHOS in the last 72 hours. Troponin: No results for input(s): TROPONINI in the last 72 hours. BNP: No results for input(s): BNP in the last 72 hours. Lipids: No results for input(s): CHOL, HDL in the last 72 hours. Invalid input(s): LDLCALCU  INR: No results for input(s): INR in the last 72 hours. Physical Exam:  General Appearance: Awake, alert, and oriented. In no acute distress. Well nourished. Resting in bed. HEENT: Atraumatic, normocephalic. Hearing grossly intact b/l. Pupils equal and round. Respiratory: Non-labored breathing. Neuro: Normal speech. No focal findings  Abdomen: Soft, non-tender, non-distended. LOWER EXTREMITY   Cast in place to the right lower extremity.      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

## 2020-03-04 NOTE — PLAN OF CARE
Problem: Pain:  Goal: Pain level will decrease  Description  Pain level will decrease  3/3/2020 2326 by Matt Daniel RN  Outcome: Ongoing  Note:   Pt report pain at 6/10 on scale to left foot. Pt states oral medication, rest, repositioning, and elevation helping to achieve pain goal of a 6/10 on scale. Problem: Pain:  Goal: Control of acute pain  Description  Control of acute pain  3/3/2020 2326 by Matt Daniel RN  Outcome: Ongoing  Note:   Pt report pain at 6/10 on scale to left foot. Pt states oral medication, rest, repositioning, and elevation helping to achieve pain goal of a 6/10 on scale. Problem: Pain:  Goal: Control of chronic pain  Description  Control of chronic pain  3/3/2020 2326 by Matt Daniel RN  Outcome: Ongoing  Note:   Pt report pain at 6/10 on scale to left foot. Pt states oral medication, rest, repositioning, and elevation helping to achieve pain goal of a 6/10 on scale. Problem: Falls - Risk of:  Goal: Will remain free from falls  Description  Will remain free from falls  3/3/2020 2326 by Matt Daniel RN  Outcome: Ongoing  Note:   Pt using call light appropriately to call for assistance with ambulation to the bathroom and to chair. Pt is also compliant with use of non-skid slippers. Pt reports understanding of fall prevention when discussed. Problem: Falls - Risk of:  Goal: Absence of physical injury  Description  Absence of physical injury  3/3/2020 2326 by Matt Daniel RN  Outcome: Ongoing  Note:   Pt using call light appropriately to call for assistance with ambulation to the bathroom and to chair. Pt is also compliant with use of non-skid slippers. Pt reports understanding of fall prevention when discussed.       Problem: Pain Control  Goal: Maintain pain level at or below patient's acceptable level (or 5 if patient is unable to determine acceptable level)  3/3/2020 2326 by Matt Daniel RN  Outcome: Ongoing  Flowsheets (Taken 3/3/2020 2326)  Patient's Stated Pain Goal: 6  Note:   Pt report pain at 6/10 on scale to left foot. Pt states oral medication, rest, repositioning, and elevation helping to achieve pain goal of a 6/10 on scale. Problem: Neurological  Goal: Maximum potential motor/sensory/cognitive function  3/3/2020 2326 by Yoandy Rios RN  Outcome: Ongoing  Note:   Patient states chronic numbness and tingling to bilateral lower legs. Oriented x4     Problem: Cardiovascular  Goal: No DVT, peripheral vascular complications  4/5/7797 2096 by Yoandy Rios RN  Outcome: Ongoing  Note:   Vitals stable. Peripheral vascular within normal limits. Denies calf pain and tenderness. Patient on lovenox      Problem: Respiratory  Goal: No pulmonary complications  2/1/5670 7972 by Yoandy Rios RN  Outcome: Ongoing  Note:   Lung sounds clear throughout. Oxygen saturation above 92% on room air. Encouraged to cough and deep breathe      Problem: GI  Goal: No bowel complications  3/6/3987 6294 by Yoandy Rios RN  Outcome: Ongoing  Note:   Bowel sounds hypoactive. Abdomen soft and non-tender. Patient tolerates stool softeners      Problem:   Goal: Adequate urinary output  3/3/2020 2326 by Yoandy Rios RN  Outcome: Ongoing  Note:   Adequate urine output. Urine is yellow and clear      Problem: Nutrition  Goal: Optimal nutrition therapy  3/3/2020 2326 by Yoandy Rios RN  Outcome: Ongoing  Note:   Patient on carb control diet. Blood glucose monitored and managed with insulin      Problem: Skin Integrity/Risk  Goal: No skin breakdown during hospitalization  3/3/2020 2326 by Yoandy Rios RN  Outcome: Ongoing  Note:   Surgical incision to left foot, splint and ace in place. no drainage noted. Cast to right lower leg. Wound to right great toe, dressing in place, no drainage noted. Scar to midline chest and left lower leg. Patient educated on the importance of turning and repositioning every 2 hours to prevent skin breakdown. bed lowered, heels elevated off bed with pillow support. Low air loss and alternating pressure relief mattress to bed. Care plan reviewed with patient. Patient  verbalize understanding of the plan of care and contribute to goal setting.

## 2020-03-04 NOTE — PROGRESS NOTES
Location: Foot  Pain Orientation: Left       Social/Functional History:    Lives With: Alone  Type of Home: House  Home Layout: One level  Home Access: Ramped entrance  Home Equipment: [employer], Rolling walker, Lift chair     Bathroom Equipment: Shower chair  Bathroom Accessibility: Accessible    Receives Help From: Friend(s)        Ambulation Assistance: Independent  Transfer Assistance: Independent       OBJECTIVE:  Range of Motion:  Bilateral Lower Extremity: WFL except shoaib ankles are immobilized    Strength:  Bilateral Lower Extremity: Impaired - grossly 4-/5    Balance:  Static Sitting Balance:  Modified Independent  Dynamic Sitting Balance: Stand By Assistance  Static Standing Balance: Contact Guard Assistance  Dynamic Standing Balance: Contact Guard Assistance    Bed Mobility:  Supine to Sit: Stand By Assistance    Transfers:  Sit to Stand: Contact Guard Assistance  Stand to [employer] Assistance    Ambulation:  Contact Guard Assistance  Distance: 5 ft  Surface: Level Tile  Device:Rolling Walker  Gait Deviations: Forward Flexed Posture, Decreased Step Length on Right, Decreased Gait Speed and Decreased Heel Strike on Right. Able to maintain NWB L LE partially - tended to be more TDWB    Exercise:  Patient was guided in 1 set(s) 10 reps of exercise to both lower extremities. Long arc quads and Seated hip flexion. Exercises were completed for increased independence with functional mobility. Functional Outcome Measures: Completed  AM-PAC Inpatient Mobility without Stair Climbing Raw Score : 16  AM-PAC Inpatient without Stair Climbing T-Scale Score : 45.54    ASSESSMENT:  Activity Tolerance:  Patient tolerance of  treatment: good. Treatment Initiated: Treatment and education initiated within context of evaluation.   Evaluation time included review of current medical information, gathering information related to past medical, social and functional history, completion of standardized testing, formal and informal observation of tasks, assessment of data and development of plan of care and goals. Treatment time included skilled education and facilitation of tasks to increase safety and independence with functional mobility for improved independence and quality of life. Assessment: Body structures, Functions, Activity limitations: Decreased functional mobility , Decreased balance, Decreased endurance, Decreased strength, Decreased safe awareness  Assessment: Pt is a 63 y.o. male that is s/p L transmetatarsal amputation and also has R foot casted. Pt is to be NWB L LE and WBAT on R LE in cast. Pt participated well with mobility. Pt is at [place] [address] with transfers and had some mild difficulty maintaining NWB L LE and was TDWB at times. Pt would benefit from continued skilled PT to address strengthening, gait training, and functional mobility. Prognosis: Good    REQUIRES PT FOLLOW UP: Yes    Discharge Recommendations:  Discharge Recommendations: Continue to assess pending progress, Patient would benefit from continued therapy after discharge    Patient Education:  PT Education: Goals, PT Role, Plan of Care, Precautions, Weight-bearing Education, Home Exercise Program    Equipment Recommendations:   Other: monitor for needs    Plan:  Times per week: 5x O  Times per day: Daily  Specific instructions for Next Treatment: ther ex, ther act, gait trng  Current Treatment Recommendations: Strengthening, Transfer Training, Endurance Training, Patient/Caregiver Education & Training, Balance Training, Gait Training, Functional Mobility Training, Safety Education & Training, Home Exercise Program    Goals:  Patient goals : go home  Short term goals  Time Frame for Short term goals: at discharge  Short term goal 1: Pt to be Mod I for supine <> sit to get in/out of bed  Short term goal 2: Pt to be Mod I for sit <> stand to get up to ambluate  Short term goal 3: Pt to be Supervision to ambulate > 20 ft with AD with

## 2020-03-04 NOTE — PROGRESS NOTES
Debra Blevins 61  INPATIENT OCCUPATIONAL THERAPY  [facility] ORTHOPEDICS 7K  EVALUATION    Time:    Time In: 5633  Time Out: 1534  Timed Code Treatment Minutes: 23 Minutes  Minutes: 38          Date: 3/4/2020  Patient Name: Aretha Jones,   Gender: male      MRN: [id number]  : 1960  (63 y.o.)  Referring Practitioner: August Davis DPM  Diagnosis: gangrene L foot  Additional Pertinent Hx:  Per Dr Kandace Rutledge H&P, patient is a 63 y.o. male with significant past medical history of diabetes, CAD, HTN and hyperlipidemia who presents after direct admission by Dr. Tej Maldonado. Patient has been following with Dr. Tej Maldonado and is scheduled to undergo reconstructive surgery for his right foot on Friday. Patient has neuropathy in his feet and states that he hit his left foot a few days ago and noticed color changes to the left 4th toe. Patient states that he noticed increased drainage and smell from the left foot today which prompted him to be seen in office. Patient states he had flu like symptoms recently and is unsure if it was related to the left foot infection.  Pt is now s/p L transmetatarsal amputation on 3/3/2020 by Dr Tej Maldonado DPM.  hx of CABG 2019    Restrictions/Precautions:  Restrictions/Precautions: Weight Bearing, General Precautions, Contact Precautions, Fall Risk  Right Lower Extremity Weight Bearing: Weight Bearing As Tolerated  Left Lower Extremity Weight Bearing: Non Weight Bearing  Required Braces or Orthoses  Right Lower Extremity Brace: (cast)    Subjective  Chart Reviewed: Yes, Orders, Progress Notes, History and Physical  Patient assessed for rehabilitation services?: Yes  Family / Caregiver Present: No    Subjective: cooperative, talkative    Pain:  Pain Assessment  Patient Currently in Pain: Yes(LLE)  Pain Level: 4    Social/Functional History:  Lives With: Alone  Type of Home: House  Home Layout: One level  Home Access: Ramped entrance  Home Equipment: [employer], Rolling walker, Lift chair   Bathroom Shower/Tub: Walk-in shower  Bathroom Toilet: Handicap height  Bathroom Equipment: Grab bars around toilet, Grab bars in shower, Shower chair  Bathroom Accessibility: Accessible    Receives Help From: Friend(s)  ADL Assistance: Independent  Homemaking Assistance: Needs assistance  Ambulation Assistance: Independent  Transfer Assistance: Independent          Additional Comments: Pt reports using no AD PLOF, has had RLE cast x 9 months. Pt reports he has lots of help lined up at home prn. Cognition/Orientation:     Overall Cognitive Status: WFL    ADL's:  LE Dressing: Maximum assistance(for donning R rubber shoe over cast)       Functional Mobility:  Bed mobility  Supine to Sit: Stand by assistance  Sit to Supine: Stand by assistance    Functional Mobility  Functional - Mobility Device: Rolling Walker  Activity: Other(2 side hops toward HOB)  Assist Level: Contact guard assistance  Functional Mobility Comments: education on technique for maintaining NWB LLE     Balance:  Balance  Sitting Balance: Independent  Standing Balance: Contact guard assistance    Transfers:  Sit to stand: Minimal assistance  Stand to sit: Contact guard assistance  Transfer Comments: education on technique/UE placement       Upper Extremity Assessment:   LUE AROM : WFL  RUE AROM : WFL    LUE Strength  Gross LUE Strength: WFL  RUE Strength  Gross RUE Strength: WFL    Sensation  Overall Sensation Status: WFL       Activity Tolerance: Patient limited by fatigue       Assessment:  Assessment: Pt demo decreased ADL & functional mobility status over PLOF s/p admission with L foot infection requiring transmetarsal amputation on 3/3/2020. Continued OT recommended to educate Pt on adaptative strategies for returning home with NWB status.    Performance deficits / Impairments: Decreased functional mobility , Decreased ADL status, Decreased endurance, Decreased balance, Decreased safe awareness  Prognosis: Good  REQUIRES OT FOLLOW UP: Yes  Decision Making: Medium Complexity  Safety Devices in place: Yes  Type of devices: All fall risk precautions in place, Call light within reach, Gait belt    Treatment Initiated: Treatment and education initiated within context of evaluation. Evaluation time included review of current medical information, gathering information related to past medical, social and functional history, completion of standardized testing, formal and informal observation of tasks, assessment of data and development of plan of care and goals. Treatment time included skilled education and facilitation of tasks to increase safety and independence with ADL's for improved functional independence and quality of life. Discharge Recommendations:  Home with assist PRN, Home with Home health OT    Patient Education:  OT Education: OT Role, Plan of Care, Precautions, ADL Adaptive Strategies, Transfer Training  Barriers to Learning: none    Equipment Recommendations: Other: Had needed AE at home. Plan:  Times per week: 5x  Current Treatment Recommendations: Balance Training, Self-Care / ADL, Functional Mobility Training, Endurance Training, Safety Education & Training    Goals:  Patient goals : go home  Short term goals  Time Frame for Short term goals: until discharge  Short term goal 1: Complete various sit-stand & stand-pivot t/fs with S & 0-1 vcs for safety  Short term goal 2: Complete toileting routine with S & 0-1 vcs for safe technique to maintain NWB status  Short term goal 3: Complete LE dressing with min A & LH AE prn  Long term goals  Time Frame for Long term goals : No LTG set d/t short ELOS  See long-term goal time frame for expected duration of plan of care. If no long-term goals established, a short length of stay is anticipated. Following session, patient left in safe position with all fall risk precautions in place.

## 2020-03-04 NOTE — PROGRESS NOTES
Hospitalist Progress Note      Patient:  Lyndon Abrams    Unit/Bed:7K-09/009-A  YOB: 1960  MRN: [id number]   Acct: [de-identified]   PCP: Sorin Gr MD  Date of Admission: 3/2/2020    Assessment/Plan:    1. IDDM, controlled: Hgb A1c 7.5, BS have been relatively controlled between 120-192. Continue insulin regimen. Due to infection and recent surgery, will continue to keep strict glucose control. 2. CAD s/p CABG: Awaiting records to see if Amio can be discontinued. Continue DAPT. 3. Essential HTN: Continue ACEI & BB.   4. HLD: Statin   5. Right ankle fracture, Charcot foot: Podiatry & ID decided patient will not have surgery on right foot until infection on left foot is clear. 6. Gangrene, left foot s/p TMA: Pt tolerated surgery well. ID consulted. Continue IV ABX. Chief Complaint: Foot Pain     Initial H and P:-    63 y.o. male who we are asked to see/evaluate by Carlito Rolle DPM for medical management of high blood pressure, diabetes and coronary artery disease and perioperative risk stratification.     Patient was admitted to the hospital directly from the office by podiatry for surgery. Approximately on 2/25/2020, patient was not feeling well and he stubb the left foot, fourth toe mainly and he also stated he fell. He did not notice a bruise-like discoloration on the left fourth toe and did dressing change but subsequently did not feel good for the last 4 days and did not do any dressing changes.   He did have upper respiratory symptoms and nausea and thought he had the flu but never got tested and never treated.     Yesterday he got up and noticed that there was foul smell from the wound and discharge and went to the podiatrist office and was admitted to the hospital.  Patient has a cast on the right ankle.      Patient apparently was supposed to have surgery for the right ankle in November and as a part of preop evaluation had 85   Temp 100.8 °F (38.2 °C) (Oral)   Resp 18   Ht 6' 3\" (1.905 m)   Wt (!) 306 lb 1.6 oz (138.8 kg)   SpO2 98%   BMI 38.26 kg/m²   General appearance: No apparent distress, appears stated age and cooperative. HEENT: Pupils equal, round, and reactive to light. Conjunctivae/corneas clear. Neck: Supple, with full range of motion. No jugular venous distention. Trachea midline. Respiratory:  Normal respiratory effort. Clear to auscultation, bilaterally without Rales/Wheezes/Rhonchi. Cardiovascular: Regular rate and rhythm with normal S1/S2 without murmurs, rubs or gallops. Abdomen: Soft, non-tender, non-distended with normal bowel sounds. Musculoskeletal: passive and active ROM x 4 extremities. Left foot cast with walking boot. Right foot wrapped after toe amputation. Skin: Skin color, texture, turgor normal.  No rashes or lesions. Neurologic:  Neurovascularly intact without any focal sensory/motor deficits. Cranial nerves: II-XII intact, grossly non-focal.  Psychiatric: Alert and oriented, thought content appropriate, normal insight  Capillary Refill: Brisk,< 3 seconds   Peripheral Pulses: +2 palpable, equal bilaterally     Labs:   Recent Labs     03/02/20 2128 03/04/20  0717   WBC 7.4 9.5   HGB 11.2* 10.8*   HCT 34.8* 34.2*    220     Recent Labs     03/02/20 2128 03/04/20  0717   * 140   K 3.9 4.8    103   CO2 22* 22*   BUN 18 14   CREATININE 1.0 1.0   CALCIUM 9.1 9.2     Microbiology:    Lab Results   Component Value Date    LABGRAM  03/03/2020     Rare segmented neutrophils observed. Rare epithelial cells observed. Many gram positive cocci occurring singly and in pairs. Moderate gram negative bacilli. Rare gram positive bacilli. Radiology:  XR CHEST PORTABLE   Final Result      No acute cardiopulmonary disease. Status post CABG surgery. **This report has been created using voice recognition software.  It may contain minor errors which are inherent in voice recognition

## 2020-03-04 NOTE — PLAN OF CARE
Problem: Pain:  Goal: Pain level will decrease  Description  Pain level will decrease  Outcome: Ongoing  Note:   Pt report pain at 5 on scale. Pt states oral medication helping to achieve pain goal of a 5 on scale. Problem: Pain:  Goal: Control of acute pain  Description  Control of acute pain  Outcome: Ongoing  Note:   Pt report pain at 5 on scale. Pt states oral medication helping to achieve pain goal of a 5 on scale. Problem: Pain:  Goal: Control of chronic pain  Description  Control of chronic pain  Outcome: Ongoing  Note:   Pt report pain at 5 on scale. Pt states oral medication helping to achieve pain goal of a 5 on scale. Problem: Falls - Risk of:  Goal: Will remain free from falls  Description  Will remain free from falls  Outcome: Ongoing  Note:    Pt reports understanding of fall prevention when discussed. Problem: Falls - Risk of:  Goal: Absence of physical injury  Description  Absence of physical injury  Outcome: Ongoing  Note:    Pt reports understanding of fall prevention when discussed. Problem: Pain Control  Goal: Maintain pain level at or below patient's acceptable level (or 5 if patient is unable to determine acceptable level)  Outcome: Ongoing  Note:   Pt report pain at 5 on scale. Pt states oral medication helping to achieve pain goal of a 5 on scale. Problem: Neurological  Goal: Maximum potential motor/sensory/cognitive function  Outcome: Ongoing  Note:   Denies numbness and tingling. Skin pink warm and dry. Pulses palpable. Neuro checks continue [id number]. Problem: Cardiovascular  Goal: No DVT, peripheral vascular complications  Outcome: Ongoing  Note:   Pt without s/s of DVT. Problem: Respiratory  Goal: No pulmonary complications  Outcome: Ongoing  Note:   Pt sats>90% on room air. No shortness of breath noted. Lungs clear, uses IS, and able to C&DB  as ordered.        Problem: GI  Goal: No bowel complications  Outcome: Ongoing  Note:   Pt with bowel sounds, passing flatus, and without nausea. Taking prescribed medications to assist with BM      Problem:   Goal: Adequate urinary output  Outcome: Ongoing  Note:   Pt voiding adequate amounts without difficulty. Problem: Nutrition  Goal: Optimal nutrition therapy  Outcome: Ongoing  Note:   Eating independently at meals and able to eat adequate amounts. Problem: Skin Integrity/Risk  Goal: No skin breakdown during hospitalization  Outcome: Ongoing  Note:   Pt understands the importance of frequent repositioning in order to prevent any skin breakdown. Problem: Musculor/Skeletal Functional Status  Goal: Highest potential functional level  Outcome: Ongoing  Note:   Pt remains on bed, bedrest. Turn and repositioned q6cwski. Problem: Discharge Planning:  Goal: Discharged to appropriate level of care  Description  Discharged to appropriate level of care  Outcome: Ongoing  Note:   Pt plans ? at discharge. Care manager and social working helping with discharge needs. Problem: Serum Glucose Level - Abnormal:  Goal: Ability to maintain appropriate glucose levels has stabilized  Description  Ability to maintain appropriate glucose levels has stabilized  Outcome: Ongoing  Note:   Chems checked achs. Insulin given as needed. Problem: Infection - Methicillin-Resistant Staphylococcus Aureus Infection:  Goal: Absence of methicillin-resistant Staphylococcus aureus infection  Description  Absence of methicillin-resistant Staphylococcus aureus infection  Outcome: Ongoing  Note:   Contact isolation continues/ maintained. Remains afebrile. Care plan reviewed with patient. Patient verbalize understanding of the plan of care and contribute to goal setting.

## 2020-03-04 NOTE — PROGRESS NOTES
Pt back from surgery via bed. Multiple family remain at pt side. Call light placed within reach. No drains or leslie noted. 0.9 infusing at 50 ml/hr with approx 500 ml to count. See flowsheet for further info.

## 2020-03-04 NOTE — CARE COORDINATION
3/4/20, 1:20 PM    DISCHARGE ONGOING EVALUATION:     Viktoriajay Anne day: 2  Location: -09/009-A Reason for admit: Gangrene of toe of left foot ([facility] [address] [I96]  Gangrene of toe of left foot ([facility] [address] [place]   Treatment Plan of Care: Podiatry, ID, hospitalist following. PT/OT. Cultures pending. Currently on IV vanc and IV zosyn. POD 1 TMA. Patient was interested in switching cardiologist to [facility] as his cardiologist at [facility] is retiring soon. Contact information for [facility] on 2K given to patient, added to AVS. He reports he is also in process of switching his PCP to Dr. Germaine Santos in [place]. Barriers to Discharge: Await cultures and medical clearance ( wet gangrene). Spoke with patient, he is agreeable to [place] and HIS. Prefers to use [facility]-referral called to [place] to check benefits.  Leaning towards [facility], will verify if needed and call referral.   PCP: Arnie Fay MD  Readmission Risk Score: 10%

## 2020-03-04 NOTE — PROGRESS NOTES
140, chemsticks 138-143, hemoglobin 10.8. Pt states he checks blood sugars twice/day before breakfast & dinner. He mentions sugars tend to run 120's in the morning & 140'2-150's before dinner. · Wound Type: (Traumatic wound left toe, (3/3/20) I & D Left Foot Transmetatarsal)  · Current Nutrition Therapies:  · Oral Diet Orders: (carb controlled)   · Oral Diet intake: %  · Oral Nutrition Supplement (ONS) Orders: Wound Healing Oral Supplement  · ONS intake:  new  · Anthropometric Measures:  · Ht: 6' 3\" (190.5 cm)   · Current Body Wt: 306 lb (138.8 kg)  · Admission Body Wt: 306 lb (138.8 kg)((3/2/20) no edema)  · Usual Body Wt: (no previous wts in EMR, pt states 330# ~ 5 months ago, wt. loss intentional)  · % Weight Change:  ,  7.3% wt. loss (24#) in 5 months per pt. report intentional per pt  · Ideal Body Wt: 196 lb (88.9 kg),   · BMI Classification: BMI 35.0 - 39.9 Obese Class II    Nutrition Interventions:   Continue current diet, Start ONS  Continued Inpatient Monitoring, Education declined, Coordination of Care(encouraged pt to order lean protein chocie with vitamin C source each meal to aid with wound healing )    Nutrition Evaluation:   · Evaluation: Goals set   · Goals: Pt. will consume 75% or more at meals durign LOS to aid with wound healing.      · Monitoring: Meal Intake, Supplement Intake, Skin Integrity, Wound Healing, Weight, Pertinent Labs, Monitor Bowel Function      Electronically signed by Jesus Barrientos RD, LD on 3/4/20 at 12:37 PM    Contact Number: [phone]

## 2020-03-04 NOTE — PLAN OF CARE
Problem: Nutrition  Goal: Optimal nutrition therapy  3/4/2020 1237 by Ismael Edouard RD, LD  Outcome: Ongoing   Nutrition Problem: Increased nutrient needs  Intervention: Food and/or Nutrient Delivery: Continue current diet, Start ONS  Nutritional Goals: Pt. will consume 75% or more at meals durign LOS to aid with wound healing.

## 2020-03-04 NOTE — OP NOTE
[address] [id number]                                OPERATIVE REPORT    PATIENT NAME: Peyton Duran                    :        1960  MED REC NO:   [id number]                           ROOM:       0009  ACCOUNT NO:   [de-identified]                           ADMIT DATE: 2020  PROVIDER:     Radha Hodges. Westley Islas D.P.M. DATE OF PROCEDURE:  2020    PREOPERATIVE DIAGNOSIS:  Gas gangrene, left forefoot. POSTOPERATIVE DIAGNOSIS:  Gas gangrene, left forefoot. OPERATION PERFORMED:  Left transmetatarsal amputation. SURGEON:  Radha Hodges. Westley Islas DPM    ASSISTANT:  Audrey Islas, PGY-2. ANESTHESIA:  MAC with addition of 20 mL of 2% lidocaine plain. HEMOSTASIS:  Pressure dressing. ESTIMATED BLOOD LOSS:  Less than 50 mL. MATERIALS USED:  None. INJECTABLES:  None. CONDITION:  Stable. COMPLICATIONS:  None. SPECIMEN:  Soft tissue/skin was obtained and sent to Pathology as well  as an aerobic and anaerobic culture for sensitivity to Microbiology. INDICATIONS FOR PROCEDURE:  The patient is a 61-year-old male who I have  been treating for a Charcot deformity of the right midfoot. We had been  attempting to get him cleared and had been actually scheduled for  surgical intervention back in November. He ended up being having a  triple bypass done. He was cleared by his cardiologist to proceed with  surgery and he was actually scheduled for surgical extraction of his  right midfoot on Friday this week. Unfortunately, he came to the office  yesterday with new-onset gas gangrene to his contralateral left  forefoot. The patient had exposed bone and liquefactive necrosis of the  fourth toe extending to the level of the metatarsophalangeal joint. The  patient was directly admitted, started on IV antibiotics and worked up  preoperatively. The patient was seen preoperatively tonight.   All  questions and

## 2020-03-05 LAB
GLUCOSE BLD-MCNC: 117 MG/DL (ref 70–108)
GLUCOSE BLD-MCNC: 133 MG/DL (ref 70–108)
GLUCOSE BLD-MCNC: 135 MG/DL (ref 70–108)
GLUCOSE BLD-MCNC: 159 MG/DL (ref 70–108)
VANCOMYCIN TROUGH: 22.8 UG/ML (ref 5–15)

## 2020-03-05 PROCEDURE — 6370000000 HC RX 637 (ALT 250 FOR IP): Performed by: STUDENT IN AN ORGANIZED HEALTH CARE EDUCATION/TRAINING PROGRAM

## 2020-03-05 PROCEDURE — 99232 SBSQ HOSP IP/OBS MODERATE 35: CPT | Performed by: PHYSICIAN ASSISTANT

## 2020-03-05 PROCEDURE — 36415 COLL VENOUS BLD VENIPUNCTURE: CPT

## 2020-03-05 PROCEDURE — 6360000002 HC RX W HCPCS: Performed by: STUDENT IN AN ORGANIZED HEALTH CARE EDUCATION/TRAINING PROGRAM

## 2020-03-05 PROCEDURE — 1200000000 HC SEMI PRIVATE

## 2020-03-05 PROCEDURE — 6360000002 HC RX W HCPCS: Performed by: INTERNAL MEDICINE

## 2020-03-05 PROCEDURE — 97535 SELF CARE MNGMENT TRAINING: CPT

## 2020-03-05 PROCEDURE — 80202 ASSAY OF VANCOMYCIN: CPT

## 2020-03-05 PROCEDURE — 94760 N-INVAS EAR/PLS OXIMETRY 1: CPT

## 2020-03-05 PROCEDURE — 2580000003 HC RX 258: Performed by: INTERNAL MEDICINE

## 2020-03-05 PROCEDURE — 2580000003 HC RX 258: Performed by: STUDENT IN AN ORGANIZED HEALTH CARE EDUCATION/TRAINING PROGRAM

## 2020-03-05 PROCEDURE — 6370000000 HC RX 637 (ALT 250 FOR IP): Performed by: INTERNAL MEDICINE

## 2020-03-05 PROCEDURE — 82948 REAGENT STRIP/BLOOD GLUCOSE: CPT

## 2020-03-05 PROCEDURE — 97110 THERAPEUTIC EXERCISES: CPT

## 2020-03-05 PROCEDURE — 97530 THERAPEUTIC ACTIVITIES: CPT

## 2020-03-05 RX ADMIN — GABAPENTIN 100 MG: 100 CAPSULE ORAL at 08:13

## 2020-03-05 RX ADMIN — ENOXAPARIN SODIUM 40 MG: 40 INJECTION SUBCUTANEOUS at 08:13

## 2020-03-05 RX ADMIN — POLYETHYLENE GLYCOL 3350 17 G: 17 POWDER, FOR SOLUTION ORAL at 08:09

## 2020-03-05 RX ADMIN — CLOPIDOGREL BISULFATE 75 MG: 75 TABLET ORAL at 08:13

## 2020-03-05 RX ADMIN — AMIODARONE HYDROCHLORIDE 200 MG: 200 TABLET ORAL at 08:13

## 2020-03-05 RX ADMIN — ACETAMINOPHEN 650 MG: 325 TABLET ORAL at 08:13

## 2020-03-05 RX ADMIN — ASPIRIN 81 MG: 81 TABLET ORAL at 08:13

## 2020-03-05 RX ADMIN — ALOGLIPTIN 25 MG: 25 TABLET, FILM COATED ORAL at 11:59

## 2020-03-05 RX ADMIN — METOPROLOL TARTRATE 25 MG: 25 TABLET ORAL at 21:27

## 2020-03-05 RX ADMIN — ATORVASTATIN CALCIUM 40 MG: 40 TABLET, FILM COATED ORAL at 21:27

## 2020-03-05 RX ADMIN — PIPERACILLIN AND TAZOBACTAM 3.38 G: 3; .375 INJECTION, POWDER, FOR SOLUTION INTRAVENOUS at 18:53

## 2020-03-05 RX ADMIN — SODIUM CHLORIDE: 9 INJECTION, SOLUTION INTRAVENOUS at 00:01

## 2020-03-05 RX ADMIN — LISINOPRIL 5 MG: 5 TABLET ORAL at 08:13

## 2020-03-05 RX ADMIN — PIPERACILLIN AND TAZOBACTAM 3.38 G: 3; .375 INJECTION, POWDER, FOR SOLUTION INTRAVENOUS at 02:48

## 2020-03-05 RX ADMIN — PIPERACILLIN AND TAZOBACTAM 3.38 G: 3; .375 INJECTION, POWDER, FOR SOLUTION INTRAVENOUS at 11:03

## 2020-03-05 RX ADMIN — METOPROLOL TARTRATE 25 MG: 25 TABLET ORAL at 08:13

## 2020-03-05 RX ADMIN — VANCOMYCIN HYDROCHLORIDE 1500 MG: 5 INJECTION, POWDER, LYOPHILIZED, FOR SOLUTION INTRAVENOUS at 15:37

## 2020-03-05 RX ADMIN — GABAPENTIN 100 MG: 100 CAPSULE ORAL at 15:37

## 2020-03-05 RX ADMIN — INSULIN GLARGINE 44 UNITS: 100 INJECTION, SOLUTION SUBCUTANEOUS at 07:46

## 2020-03-05 RX ADMIN — GABAPENTIN 100 MG: 100 CAPSULE ORAL at 21:27

## 2020-03-05 RX ADMIN — VANCOMYCIN HYDROCHLORIDE 1250 MG: 5 INJECTION, POWDER, LYOPHILIZED, FOR SOLUTION INTRAVENOUS at 00:00

## 2020-03-05 RX ADMIN — HYDROCODONE BITARTRATE AND ACETAMINOPHEN 1 TABLET: 5; 325 TABLET ORAL at 00:17

## 2020-03-05 ASSESSMENT — PAIN DESCRIPTION - LOCATION: LOCATION: FOOT

## 2020-03-05 ASSESSMENT — PAIN - FUNCTIONAL ASSESSMENT: PAIN_FUNCTIONAL_ASSESSMENT: PREVENTS OR INTERFERES SOME ACTIVE ACTIVITIES AND ADLS

## 2020-03-05 ASSESSMENT — PAIN SCALES - GENERAL
PAINLEVEL_OUTOF10: 3
PAINLEVEL_OUTOF10: 3
PAINLEVEL_OUTOF10: 4
PAINLEVEL_OUTOF10: 1
PAINLEVEL_OUTOF10: 2
PAINLEVEL_OUTOF10: 0

## 2020-03-05 ASSESSMENT — PAIN DESCRIPTION - DESCRIPTORS: DESCRIPTORS: ACHING

## 2020-03-05 ASSESSMENT — PAIN DESCRIPTION - PROGRESSION: CLINICAL_PROGRESSION: GRADUALLY WORSENING

## 2020-03-05 ASSESSMENT — PAIN DESCRIPTION - ONSET: ONSET: ON-GOING

## 2020-03-05 ASSESSMENT — PAIN DESCRIPTION - PAIN TYPE: TYPE: SURGICAL PAIN

## 2020-03-05 ASSESSMENT — PAIN DESCRIPTION - ORIENTATION: ORIENTATION: LEFT

## 2020-03-05 ASSESSMENT — PAIN DESCRIPTION - FREQUENCY: FREQUENCY: INTERMITTENT

## 2020-03-05 NOTE — PLAN OF CARE
Problem: Falls - Risk of:  Goal: Will remain free from falls  Description  Will remain free from falls  3/5/2020 2004 by Ruy Kat RN  Outcome: Met This Shift  Note:   Pt remained free from falls during shift. Call light remained within reach, bed kept in lowest position, and bed alarm turned on.   3/4/2020 2029 by Emma Sanabria RN  Outcome: Ongoing  Note:   Pt using call light appropriately to call for assistance with ambulation to the bathroom and to chair. Pt is also compliant with use of non-skid slippers. Pt reports understanding of fall prevention when discussed. Goal: Absence of physical injury  Description  Absence of physical injury  3/5/2020 6110 by Ruy Kat RN  Outcome: Met This Shift  3/4/2020 2029 by Emma Sanabria RN  Outcome: Ongoing  Note:   Pt using call light appropriately to call for assistance with ambulation to the bathroom and to chair. Pt is also compliant with use of non-skid slippers. Pt reports understanding of fall prevention when discussed. Problem: Pain Control  Goal: Maintain pain level at or below patient's acceptable level (or 5 if patient is unable to determine acceptable level)  3/5/2020 0633 by Ruy Kat RN  Outcome: Met This Shift  Flowsheets (Taken 3/5/2020 7249)  Patient's Stated Pain Goal: 4  Note:   Pt rating pain around a 3-4 overnight. 3/4/2020 2029 by Emma Sanabria RN  Outcome: Ongoing  Note:   Pt report pain at 5 on scale. Pt states oral medication helping to achieve pain goal of a 4 on scale. Problem:   Goal: Adequate urinary output  3/5/2020 0633 by Ruy Kat RN  Outcome: Met This Shift  Note:   Pt having an adequate output overnight. Meeting at least 30mL/hour of urine  3/4/2020 2029 by Emma Sanabria RN  Outcome: Ongoing  Note:   Pt voiding adequate amounts without difficulty.        Problem: Pain:  Goal: Pain level will decrease  Description  Pain level will decrease  3/5/2020 5124 by Ruy Kat therapy  3/4/2020 2029 by Tyrone Dodd RN  Outcome: Ongoing  Note:   Eating independently at meals and able to eat adequate amounts. Problem: Skin Integrity/Risk  Goal: No skin breakdown during hospitalization  3/5/2020 4076 by Anika Lucas RN  Outcome: Ongoing  Note:   Pt's skin integrity still compromised during shift. Wound on R toe seems to be darkening over time. 3/4/2020 2029 by Tyrone Dodd RN  Outcome: Ongoing  Note:   Pt's surgical incision healing. Dressing is dry and intact and not due to be changed today. No other skin impairments noted. Pt understands the importance of frequent repositioning in order to prevent any skin breakdown. Problem: Musculor/Skeletal Functional Status  Goal: Highest potential functional level  3/4/2020 2029 by Tyrone Dodd RN  Outcome: Ongoing  Note:   Up with 1 assist, walker, and gait belt. Requires assistance to mobilize out of bed, chair and to bathroom. Independent once set up for meals and bathing. Problem: Discharge Planning:  Goal: Discharged to appropriate level of care  Description  Discharged to appropriate level of care  3/4/2020 2029 by Tyrone Dodd RN  Outcome: Ongoing  Note:   Pt plans ? at discharge. Care manager and social working helping with discharge needs. Problem: Serum Glucose Level - Abnormal:  Goal: Ability to maintain appropriate glucose levels has stabilized  Description  Ability to maintain appropriate glucose levels has stabilized  3/4/2020 2029 by Tyrone Dodd RN  Outcome: Ongoing  Note:   Chems ordered achs. Insulin given as ordered. Problem: Infection - Methicillin-Resistant Staphylococcus Aureus Infection:  Goal: Absence of methicillin-resistant Staphylococcus aureus infection  Description  Absence of methicillin-resistant Staphylococcus aureus infection  3/4/2020 2029 by Tyrone Dodd RN  Outcome: Ongoing  Note:   Contact isolation maintained. No tachycardia, hypotension noted.  Pt denies any complaints

## 2020-03-05 NOTE — PROGRESS NOTES
Pharmacy Vancomycin Consult     Vancomycin Day: 3  Current Dosing: Vancomycin 1250mg q8h    Temp max:  100.4    Recent Labs     03/02/20 2128 03/04/20  0717   BUN 18 14       Recent Labs     03/02/20 2128 03/04/20  0717   CREATININE 1.0 1.0       Recent Labs     03/02/20 2128 03/04/20  0717   WBC 7.4 9.5         Intake/Output Summary (Last 24 hours) at 3/5/2020 0749  Last data filed at 3/5/2020 0449  Gross per 24 hour   Intake 2513.17 ml   Output 2350 ml   Net 163.17 ml       Cultures/Sensitivites  Date Source Result   3/3/2020 L foot swab GPC singly/prs, GNB, GPB         Ht Readings from Last 1 Encounters:   03/02/20 6' 3\" (1.905 m)        Wt Readings from Last 1 Encounters:   03/02/20 (!) 306 lb 1.6 oz (138.8 kg)         Body mass index is 38.26 kg/m². Estimated Creatinine Clearance: 121 mL/min (based on SCr of 1 mg/dL).     Trough: 22.8 mcg/mL (drawn ~ 7 hours after last dose)    Assessment/Plan:  Change vancomycin to 1500mg q12h with next dose 1200  UO adequate  Serum creatinine stable  S/p Left transmetatarsal amputation on 3/2/2020

## 2020-03-05 NOTE — PROGRESS NOTES
Progress Note  3/5/2020 8:27 AM  Subjective:   Admit Date: 3/2/2020  PCP: Ethan Oropeza MD    Interval History:   3.5.20  Patient is seen bedside on behalf of Dr. Katie Rosario. Patient is s/p left foot TMA (DOS: 03.03.2020). He relates that his pain is well controlled. He denies any f,c,n,v,sob. Discharge home with [facility] when cultures are final    03.04.20202  Patient seen at bedside on behalf of Dr. Katie Rosario. Patient is s/p left foot TMA (DOS: 03.03.2020). Patient reports he is doing well this AM and denies pain. Patient states he wants to go home as soon as possible because he has trouble sleeping in the hospital. Patient denies any F/V/N/C. No other pedal complaints at this time. 03.03.2020  TMA left foot    HISTORY OF PRESENT ILLNESS:     03.02.2020  The patient is a 63 y.o. male with significant past medical history of diabetes, CAD, HTN and hyperlipidemia who presents after direct admission by Dr. Katie Rosario. Patient has been following with Dr. Katie Rosario and is scheduled to undergo reconstructive surgery for his right foot on Friday. Patient has neuropathy in his feet and states that he hit his left foot a few days ago and noticed color changes to the left 4th toe. Patient states that he noticed increased drainage and smell from the left foot today which prompted him to be seen in office. Patient states he had flu like symptoms recently and is unsure if it was related to the left foot infection. Patient currently denies any F/V/N/C.      Diet: Dietary Nutrition Supplements: Wound Healing Oral Supplement  DIET CARB CONTROL; Cardiac  Medications:   Scheduled Meds:   vancomycin  1,500 mg Intravenous Q12H    alogliptin  25 mg Oral Daily    insulin glargine  44 Units Subcutaneous Daily    aspirin  81 mg Oral Daily    clopidogrel  75 mg Oral Daily    piperacillin-tazobactam  3.375 g Intravenous Q8H    enoxaparin  40 mg Subcutaneous Daily    amiodarone  200 mg Oral Daily    atorvastatin  40 mg Oral Nightly   

## 2020-03-05 NOTE — PROGRESS NOTES
Hospitalist Progress Note      Patient:  Juvenal Osler    Unit/Bed:7K-09/009-A  YOB: 1960  MRN: [id number]   Acct: [de-identified]   PCP: Clau Mendez MD  Date of Admission: 3/2/2020    Assessment/Plan:    1. IDDM, controlled: Hgb A1c 7.5, BS have been relatively controlled between 120-192. Continue insulin regimen. Due to infection and recent surgery, will continue to keep strict glucose control. BS have been under control more between 117-159.   2. CAD s/p CABG: Awaiting records to see if Amio can be discontinued. Continue DAPT. 3. Essential HTN: Continue ACEI & BB.   4. HLD: Statin   5. Right ankle fracture, Charcot foot: Podiatry & ID decided patient will not have surgery on right foot until infection on left foot is clear. 6. Gangrene, left foot s/p TMA: Pt tolerated surgery well. ID consulted. Continue IV ABX. Hospitalist team will sign off at this time. Please feel free to contact us with any questions or concerns. Chief Complaint: Foot Pain     Initial H and P:-    63 y.o. male who we are asked to see/evaluate by Estelle Mitchell DPM for medical management of high blood pressure, diabetes and coronary artery disease and perioperative risk stratification.     Patient was admitted to the hospital directly from the office by podiatry for surgery. Approximately on 2/25/2020, patient was not feeling well and he stubb the left foot, fourth toe mainly and he also stated he fell. He did not notice a bruise-like discoloration on the left fourth toe and did dressing change but subsequently did not feel good for the last 4 days and did not do any dressing changes.   He did have upper respiratory symptoms and nausea and thought he had the flu but never got tested and never treated.     Yesterday he got up and noticed that there was foul smell from the wound and discharge and went to the podiatrist office and was admitted to the hospital. Patient has a cast on the right ankle.      Patient apparently was supposed to have surgery for the right ankle in November and as a part of preop evaluation had abnormal stress test and subsequently cardiac cath and had bypass surgery in November 2019, I believe at [facility].  Will try to get medical records.     Currently denies any chest pain or shortness of breath, no palpitations, no nausea or vomiting, having regular bowel movements. 3/4 - Pt tolerated surgery well. Pt was up in the chair during evaluation. PT/OT. Awaiting cultures. Subjective (past 24 hours):   Pt states that he is feeling better and better everyday. Pt understands the plan. Pt plans to be discharged tomorrow. It was explained the importance to keep his DM under control. Past medical history, family history, social history and allergies reviewed again and is unchanged since admission. ROS Pt admits to foot pain.  Pt denies CP, SOB, HA, abd pain     Medications:  Reviewed    Infusion Medications    sodium chloride 50 mL/hr at 03/05/20 0001    dextrose       Scheduled Medications    vancomycin  1,500 mg Intravenous Q12H    alogliptin  25 mg Oral Daily    insulin glargine  44 Units Subcutaneous Daily    aspirin  81 mg Oral Daily    clopidogrel  75 mg Oral Daily    piperacillin-tazobactam  3.375 g Intravenous Q8H    enoxaparin  40 mg Subcutaneous Daily    amiodarone  200 mg Oral Daily    atorvastatin  40 mg Oral Nightly    gabapentin  100 mg Oral TID    lisinopril  5 mg Oral Daily    metoprolol tartrate  25 mg Oral BID    sodium chloride flush  10 mL Intravenous 2 times per day    insulin lispro  0-12 Units Subcutaneous TID WC    insulin lispro  0-6 Units Subcutaneous Nightly    vancomycin (VANCOCIN) intermittent dosing (placeholder)   Other RX Placeholder     PRN Meds: HYDROcodone-acetaminophen, sodium chloride flush, acetaminophen **OR** acetaminophen, polyethylene glycol, promethazine **OR** Rare epithelial cells observed. Many gram positive cocci occurring singly and in pairs. Moderate gram negative bacilli. Rare gram positive bacilli. Radiology:  XR CHEST PORTABLE   Final Result      No acute cardiopulmonary disease. Status post CABG surgery. **This report has been created using voice recognition software. It may contain minor errors which are inherent in voice recognition technology. **      Final report electronically signed by Dr. Efren Foster on 3/3/2020 12:23 AM        Electronically signed by XOCHILT Rico on 3/5/2020 at 4:56 PM

## 2020-03-05 NOTE — PLAN OF CARE
infection  Outcome: Ongoing  Note:   Contact isolation maintained. No tachycardia, hypotension noted. Pt denies any complaints    Care plan reviewed with patient. Patient verbalize understanding of the plan of care and contribute to goal setting.

## 2020-03-05 NOTE — FLOWSHEET NOTE
received an original copy of Ashwin's Advance Directive documents ([address]) from his RN to be scanned into his file, and copied for his Chart.  made a copy of the original document for his Paper Chart, as well as a copy for his Agent. The original document and copy was returned to Marcelline Bloch.  A copy of the Advance Directive ([facility]) document was faxed to Medical Records to scanned into his file. 03/05/20 1400   Encounter Summary   Services provided to: Patient   Referral/Consult From: Nurse   Continue Visiting Yes  (3/5/2020)   Complexity of Encounter Moderate   Length of Encounter 30 minutes   Advance Care Planning Yes   Spiritual/Islam   Type Spiritual support   Advance Directives (For Healthcare)   Healthcare Directive Yes, patient has an advance directive for healthcare treatment   Type of Healthcare Directive Durable power of  for health care   Copy in Chart Yes, copy in chart  (Placed a Hard Copy in Chart;  Faxed to Medical Records)

## 2020-03-05 NOTE — CARE COORDINATION
3/5/20, 9:50 AM    DISCHARGE ON GOING EVALUATION    Marcus Anne day: 3  Location: 7-09/009-A Reason for admit: Gangrene of toe of left foot ([facility] [address] [I96]  Gangrene of toe of left foot [facility]) [facility] [employer]   Procedure: 3/3/20 surgery by Dr Mary Cash I&D LEFT FOOT, TRANSMETATARSAL AMPUTATION  Treatment Plan of Care: Podiatry, ID, hospitalist following. PT/OT. NWB left leg. Cultures pending. Currently on IV vanc and IV zosyn. Patient was interested in switching cardiologist to [address] [place] [address] providers as his cardiologist at [facility] is retiring soon. Contact information for [address] [place] [address] cardio on 2K given to patient, added to AVS per GRZEGORZ Espinal, yesterday. He reports he is also in process of switching his PCP to Dr. Germaine Santos in [place]. Barriers to Discharge: Cx pending. IV antibiotics thru today. Home tomorrow on oral antibiotics per Dr Nicola Gonzáles. PCP: Arnie Fay MD  Readmission Risk Score: 10%  Patient Goals/Plan/Treatment Preferences: Planning home Friday with friend. Oral antibiotics.  Has home equipment: Wheelchair-manual, Rolling walker, Lift chair

## 2020-03-05 NOTE — PROGRESS NOTES
Pt tolerated night okay. Stated he was getting more sleep than he was yesterday. Pain tolerable and rating it around a 4/10. Pt did not ambulate overnight for RN. Did sit up a few times to use urinal however. Output is good. Blood sugar's were controllable by end of shift. Pt expressed multiple times he is ready to leave hospital. Pt's R toe weeping overnight. Dressed with dry gauze overnight.  Held swabbing in betadine so podiatry can assess better this AM.    Dannielle Galeana, RN

## 2020-03-05 NOTE — PROGRESS NOTES
Progress note: Infectious diseases    Patient - Flory Gilliam,  Age - 63 y.o.    - 1960      Room Number - 4T-40/118-D   N -  [id number]   Acct # - [de-identified]  Date of Admission -  3/2/2020  8:14 PM    SUBJECTIVE:   He had TMA for gangrene  OBJECTIVE   VITALS    height is 6' 3\" (1.905 m) and weight is 306 lb 1.6 oz (138.8 kg) (abnormal). His oral temperature is 98.6 °F (37 °C). His blood pressure is 138/79 and his pulse is 87. His respiration is 18 and oxygen saturation is 95%.        Wt Readings from Last 3 Encounters:   20 (!) 306 lb 1.6 oz (138.8 kg)       I/O (24 Hours)    Intake/Output Summary (Last 24 hours) at 3/5/2020 0817  Last data filed at 3/5/2020 0449  Gross per 24 hour   Intake 2513.17 ml   Output 2350 ml   Net 163.17 ml       General Appearance  Awake, alert, oriented,  not  In acute distress  HEENT - normocephalic, atraumatic, pink conjunctiva,  anicteric sclera  Neck - Supple, no mass  Lungs -  Bilateral good air entry, no rhonchi, no wheeze  Cardiovascular - Heart sounds are normal.     Abdomen - soft, not distended, nontender,   Neurologic -oriented  Skin - No bruising or bleeding  Extremities - No edema, no cyanosis, clubbing   Dressed foot wound  MEDICATIONS:      vancomycin  1,500 mg Intravenous Q12H    alogliptin  25 mg Oral Daily    insulin glargine  44 Units Subcutaneous Daily    aspirin  81 mg Oral Daily    clopidogrel  75 mg Oral Daily    piperacillin-tazobactam  3.375 g Intravenous Q8H    enoxaparin  40 mg Subcutaneous Daily    amiodarone  200 mg Oral Daily    atorvastatin  40 mg Oral Nightly    gabapentin  100 mg Oral TID    lisinopril  5 mg Oral Daily    metoprolol tartrate  25 mg Oral BID    sodium chloride flush  10 mL Intravenous 2 times per day    insulin lispro  0-12 Units Subcutaneous TID WC    insulin lispro  0-6 Units Subcutaneous Nightly    vancomycin (VANCOCIN) intermittent dosing (placeholder)   Other RX Placeholder      sodium chloride 50 mL/hr at 03/05/20 0001    dextrose       HYDROcodone-acetaminophen, sodium chloride flush, acetaminophen **OR** acetaminophen, polyethylene glycol, promethazine **OR** ondansetron, glucose, dextrose, glucagon (rDNA), dextrose      LABS:     CBC:   Recent Labs     03/02/20 2128 03/04/20  0717   WBC 7.4 9.5   HGB 11.2* 10.8*    220     BMP:    Recent Labs     03/02/20 2128 03/04/20  0717   * 140   K 3.9 4.8    103   CO2 22* 22*   BUN 18 14   CREATININE 1.0 1.0   GLUCOSE 140* 140*     Calcium:  Recent Labs     03/04/20  0717   CALCIUM 9.2      Recent Labs     03/04/20  1102 03/04/20  1549 03/04/20  2047   POCGLU 192* 120* 163*     HgbA1C:   Recent Labs     03/04/20  0717   LABA1C 7.5*        CULTURES:   UA: No results for input(s): Annabella Starch, COLORU, CLARITYU, MUCUS, PROTEINU, BLOODU, RBCUA, WBCUA, BACTERIA, NITRU, GLUCOSEU, BILIRUBINUR, UROBILINOGEN, KETUA, LABCAST, LABCASTTY, AMORPHOS in the last 72 hours.     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

## 2020-03-05 NOTE — PROGRESS NOTES
Weight Bearing: Weight Bearing As Tolerated  Left Lower Extremity Weight Bearing: Non Weight Bearing  Required Braces or Orthoses  Right Lower Extremity Brace: (cast)    SUBJECTIVE: RN approved session. Pt resting in bed upon arrival, pleasant and agreeable to therapy. PAIN: 0/10: Denies pain. OBJECTIVE:  Bed Mobility:  Rolling to Left: Independent   Supine to Sit: Independent  Sit to Supine: Independent     Transfers:  Sit to Stand: Contact Guard Assistance. From EOB. Cuing to push up from bed instead of pulling from AD. Compliant with non weight bearing L LE. Stand to [facility]. Pt quickly \"flopping\" back into chair. Max cuing to back all the way up to chair, reach back, and control decent of transfer for safety     Ambulation:  Contact Guard Assistance  Distance: 4 feet x1   Surface: Level Tile  Device:Rolling Walker  Gait Deviations:  Slow Allegra and Min instability but no LOB. Compliant with non weight bearing L LE. Exercise:  Patient was guided in 1 set(s) 15 reps of exercise to both lower extremities. Glut sets, Long arc quads, Hip abduction/adduction, Seated hip flexion, Seated hamstring curls and R toe curls. Exercises were completed for increased independence with functional mobility. Functional Outcome Measures: Completed  -PAC Inpatient Mobility without Stair Climbing Raw Score : 16  AM-PAC Inpatient without Stair Climbing T-Scale Score : 45.54    ASSESSMENT:  Assessment: Patient progressing toward established goals. and Pt tolerated session fairly well. Limited by non weight bearing L LE, decreased strength, decreased mobility. Would benefit from continued therapy at discharge. Activity Tolerance:  Patient tolerance of  treatment: good. Equipment Recommendations: Other: monitor for needs  Discharge Recommendations:  Continue to assess pending progress, Patient would benefit from continued therapy after discharge    Plan: Times per week: 5x O  Times per

## 2020-03-05 NOTE — PROGRESS NOTES
November and as a part of preop evaluation had abnormal stress test and subsequently cardiac cath and had bypass surgery in November 2019, I believe at [facility].  Will try to get medical records.     Currently denies any chest pain or shortness of breath, no palpitations, no nausea or vomiting, having regular bowel movements. 3/4 - Pt tolerated surgery well. Pt was up in the chair during evaluation. PT/OT. Awaiting cultures. Subjective (past 24 hours):   Patient states he is feeling good today. He notes the pain is mild-moderate but manageable. Past medical history, family history, social history and allergies reviewed again and is unchanged since admission. ROS Pt admits to foot pain. Pt denies CP, SOB, HA, abd pain, change in bowel or bladder.      Medications:  Reviewed    Infusion Medications    sodium chloride 50 mL/hr at 03/05/20 0001    dextrose       Scheduled Medications    vancomycin  1,500 mg Intravenous Q12H    alogliptin  25 mg Oral Daily    insulin glargine  44 Units Subcutaneous Daily    aspirin  81 mg Oral Daily    clopidogrel  75 mg Oral Daily    piperacillin-tazobactam  3.375 g Intravenous Q8H    enoxaparin  40 mg Subcutaneous Daily    amiodarone  200 mg Oral Daily    atorvastatin  40 mg Oral Nightly    gabapentin  100 mg Oral TID    lisinopril  5 mg Oral Daily    metoprolol tartrate  25 mg Oral BID    sodium chloride flush  10 mL Intravenous 2 times per day    insulin lispro  0-12 Units Subcutaneous TID WC    insulin lispro  0-6 Units Subcutaneous Nightly    vancomycin (VANCOCIN) intermittent dosing (placeholder)   Other RX Placeholder     PRN Meds: HYDROcodone-acetaminophen, sodium chloride flush, acetaminophen **OR** acetaminophen, polyethylene glycol, promethazine **OR** ondansetron, glucose, dextrose, glucagon (rDNA), dextrose      Intake/Output Summary (Last 24 hours) at 3/5/2020 7664  Last data filed at 3/5/2020 9648  Gross per 24 hour   Intake

## 2020-03-05 NOTE — PLAN OF CARE
Problem: Falls - Risk of:  Goal: Will remain free from falls  Description  Will remain free from falls  3/5/2020 1508 by Aaron Lewis RN  Outcome: Ongoing  Note:   No falls noted this shift, patient uses call light appropriately and waits for staff prior to transferring self. Patient able to voice needs appropriately. Gait is steady with walker / gait belt and staff assistance. Patient is limited / extensive assistance with most ADLs.     3/5/2020 0633 by Yocasta Benedict RN  Outcome: Met This Shift  Note:   Pt remained free from falls during shift. Call light remained within reach, bed kept in lowest position, and bed alarm turned on. Goal: Absence of physical injury  Description  Absence of physical injury  3/5/2020 1508 by Aaron Lewis RN  Outcome: Ongoing  Note:   No new physical injury noted this shift so far.     3/5/2020 0633 by Yocasta Benedict RN  Outcome: Met This Shift     Problem: Pain Control  Goal: Maintain pain level at or below patient's acceptable level (or 5 if patient is unable to determine acceptable level)  3/5/2020 1508 by Aaron Lewis RN  Outcome: Ongoing  Flowsheets (Taken 3/5/2020 0745)  Patient's Stated Pain Goal: 4  Note:   Patient verbalizes pain is able to be controlled with prn pain medications, pain goal of 4 / 10 is achieved. Receiving PO PRN Tylenol. Rest / reposition / ice is also effective for pain control. 3/5/2020 2307 by Yocasta Benedict RN  Outcome: Met This Shift  Flowsheets (Taken 3/5/2020 9914)  Patient's Stated Pain Goal: 4  Note:   Pt rating pain around a 3-4 overnight. Problem: Neurological  Goal: Maximum potential motor/sensory/cognitive function  Outcome: Ongoing  Note:   Patient is A+O x 4, denies numbness and tingling to all extremities, and is able to move all extremities equal and moderately strong.        Problem: Cardiovascular  Goal: No DVT, peripheral vascular complications  Outcome: Ongoing  Note:   Patient denies pain to

## 2020-03-05 NOTE — PROGRESS NOTES
vcs for safety with dynamic sitting balance edge of chair    BED MOBILITY:  Not Tested    TRANSFERS:  Sit to Stand:  Attempted to stand x 2 from recliner; Pt had max difficulty d/t low chair. Pt with tendency to put weight through LLE. Therapist recommended that Pt have +2 A for out of chair when he is ready to get back to bed. ASSESSMENT:     Activity Tolerance:  Patient tolerance of  treatment: fair. Discharge Recommendations: Home with assist PRN, Home with Home health OT  Equipment Recommendations: Other: Had needed AE at home. Plan: Times per week: 5x  Current Treatment Recommendations: Balance Training, Self-Care / ADL, Functional Mobility Training, Endurance Training, Safety Education & Training    Patient Education  Patient Education: see above    Goals  Short term goals  Time Frame for Short term goals: until discharge  Short term goal 1: Complete various sit-stand & stand-pivot t/fs with S & 0-1 vcs for safety  Short term goal 2: Complete toileting routine with S & 0-1 vcs for safe technique to maintain NWB status  Short term goal 3: Complete LE dressing with min A & LH AE prn  Long term goals  Time Frame for Long term goals : No LTG set d/t short ELOS    Following session, patient left in safe position with all fall risk precautions in place.

## 2020-03-06 VITALS
HEIGHT: 75 IN | HEART RATE: 83 BPM | DIASTOLIC BLOOD PRESSURE: 73 MMHG | TEMPERATURE: 97.6 F | WEIGHT: 306.1 LBS | OXYGEN SATURATION: 97 % | SYSTOLIC BLOOD PRESSURE: 149 MMHG | BODY MASS INDEX: 38.06 KG/M2 | RESPIRATION RATE: 16 BRPM

## 2020-03-06 LAB
GLUCOSE BLD-MCNC: 140 MG/DL (ref 70–108)
GLUCOSE BLD-MCNC: 179 MG/DL (ref 70–108)

## 2020-03-06 PROCEDURE — 6370000000 HC RX 637 (ALT 250 FOR IP): Performed by: INTERNAL MEDICINE

## 2020-03-06 PROCEDURE — 6370000000 HC RX 637 (ALT 250 FOR IP): Performed by: STUDENT IN AN ORGANIZED HEALTH CARE EDUCATION/TRAINING PROGRAM

## 2020-03-06 PROCEDURE — 6360000002 HC RX W HCPCS: Performed by: STUDENT IN AN ORGANIZED HEALTH CARE EDUCATION/TRAINING PROGRAM

## 2020-03-06 PROCEDURE — 6360000002 HC RX W HCPCS: Performed by: INTERNAL MEDICINE

## 2020-03-06 PROCEDURE — 2580000003 HC RX 258: Performed by: INTERNAL MEDICINE

## 2020-03-06 PROCEDURE — 2580000003 HC RX 258: Performed by: STUDENT IN AN ORGANIZED HEALTH CARE EDUCATION/TRAINING PROGRAM

## 2020-03-06 PROCEDURE — 82948 REAGENT STRIP/BLOOD GLUCOSE: CPT

## 2020-03-06 RX ORDER — AMOXICILLIN AND CLAVULANATE POTASSIUM 875; 125 MG/1; MG/1
1 TABLET, FILM COATED ORAL 2 TIMES DAILY
Qty: 20 TABLET | Refills: 0 | Status: SHIPPED | OUTPATIENT
Start: 2020-03-06 | End: 2020-03-16

## 2020-03-06 RX ADMIN — VANCOMYCIN HYDROCHLORIDE 1500 MG: 5 INJECTION, POWDER, LYOPHILIZED, FOR SOLUTION INTRAVENOUS at 03:15

## 2020-03-06 RX ADMIN — LISINOPRIL 5 MG: 5 TABLET ORAL at 09:16

## 2020-03-06 RX ADMIN — METOPROLOL TARTRATE 25 MG: 25 TABLET ORAL at 09:16

## 2020-03-06 RX ADMIN — ASPIRIN 81 MG: 81 TABLET ORAL at 09:16

## 2020-03-06 RX ADMIN — INSULIN GLARGINE 44 UNITS: 100 INJECTION, SOLUTION SUBCUTANEOUS at 09:16

## 2020-03-06 RX ADMIN — ALOGLIPTIN 25 MG: 25 TABLET, FILM COATED ORAL at 09:16

## 2020-03-06 RX ADMIN — AMIODARONE HYDROCHLORIDE 200 MG: 200 TABLET ORAL at 09:16

## 2020-03-06 RX ADMIN — CLOPIDOGREL BISULFATE 75 MG: 75 TABLET ORAL at 09:16

## 2020-03-06 RX ADMIN — POLYETHYLENE GLYCOL 3350 17 G: 17 POWDER, FOR SOLUTION ORAL at 09:16

## 2020-03-06 RX ADMIN — ENOXAPARIN SODIUM 40 MG: 40 INJECTION SUBCUTANEOUS at 09:16

## 2020-03-06 RX ADMIN — ACETAMINOPHEN 650 MG: 325 TABLET ORAL at 03:20

## 2020-03-06 RX ADMIN — PIPERACILLIN AND TAZOBACTAM 3.38 G: 3; .375 INJECTION, POWDER, FOR SOLUTION INTRAVENOUS at 06:23

## 2020-03-06 RX ADMIN — GABAPENTIN 100 MG: 100 CAPSULE ORAL at 09:16

## 2020-03-06 ASSESSMENT — PAIN DESCRIPTION - ORIENTATION: ORIENTATION: LEFT

## 2020-03-06 ASSESSMENT — PAIN - FUNCTIONAL ASSESSMENT: PAIN_FUNCTIONAL_ASSESSMENT: PREVENTS OR INTERFERES SOME ACTIVE ACTIVITIES AND ADLS

## 2020-03-06 ASSESSMENT — PAIN DESCRIPTION - ONSET: ONSET: ON-GOING

## 2020-03-06 ASSESSMENT — PAIN DESCRIPTION - PROGRESSION
CLINICAL_PROGRESSION: NOT CHANGED

## 2020-03-06 ASSESSMENT — PAIN SCALES - GENERAL
PAINLEVEL_OUTOF10: 4
PAINLEVEL_OUTOF10: 5
PAINLEVEL_OUTOF10: 0

## 2020-03-06 ASSESSMENT — PAIN DESCRIPTION - LOCATION: LOCATION: FOOT

## 2020-03-06 ASSESSMENT — PAIN DESCRIPTION - DESCRIPTORS: DESCRIPTORS: ACHING;DISCOMFORT

## 2020-03-06 ASSESSMENT — PAIN DESCRIPTION - FREQUENCY: FREQUENCY: INTERMITTENT

## 2020-03-06 ASSESSMENT — PAIN DESCRIPTION - PAIN TYPE: TYPE: SURGICAL PAIN

## 2020-03-06 NOTE — PROGRESS NOTES
glargine  44 Units Subcutaneous Daily    aspirin  81 mg Oral Daily    clopidogrel  75 mg Oral Daily    piperacillin-tazobactam  3.375 g Intravenous Q8H    enoxaparin  40 mg Subcutaneous Daily    amiodarone  200 mg Oral Daily    atorvastatin  40 mg Oral Nightly    gabapentin  100 mg Oral TID    lisinopril  5 mg Oral Daily    metoprolol tartrate  25 mg Oral BID    sodium chloride flush  10 mL Intravenous 2 times per day    insulin lispro  0-12 Units Subcutaneous TID WC    insulin lispro  0-6 Units Subcutaneous Nightly    vancomycin (VANCOCIN) intermittent dosing (placeholder)   Other RX Placeholder     Continuous Infusions:   sodium chloride 50 mL/hr at 03/05/20 0001    dextrose       PRN Medications: HYDROcodone-acetaminophen, sodium chloride flush, acetaminophen **OR** acetaminophen, polyethylene glycol, promethazine **OR** ondansetron, glucose, dextrose, glucagon (rDNA), dextrose    Objective:   Vitals: /74   Pulse 79   Temp 99.3 °F (37.4 °C) (Oral)   Resp 16   Ht 6' 3\" (1.905 m)   Wt (!) 306 lb 1.6 oz (138.8 kg)   SpO2 97%   BMI 38.26 kg/m²   BMI: Body mass index is 38.26 kg/m². CBC:   Recent Labs     03/04/20  0717   WBC 9.5   HGB 10.8*        BMP:    Recent Labs     03/04/20  0717      K 4.8      CO2 22*   BUN 14   CREATININE 1.0   GLUCOSE 140*     Hepatic: No results for input(s): AST, ALT, ALB, BILITOT, ALKPHOS in the last 72 hours. Troponin: No results for input(s): TROPONINI in the last 72 hours. BNP: No results for input(s): BNP in the last 72 hours. Lipids: No results for input(s): CHOL, HDL in the last 72 hours. Invalid input(s): LDLCALCU  INR: No results for input(s): INR in the last 72 hours. Physical Exam:  General Appearance: Awake, alert, and oriented. In no acute distress. Well nourished. Resting in bed. HEENT: Atraumatic, normocephalic. Hearing grossly intact b/l. Pupils equal and round. Respiratory: Non-labored breathing.   Neuro:

## 2020-03-06 NOTE — PROGRESS NOTES
Progress note: Infectious diseases    Patient - Estephanie Shepherd,  Age - 63 y.o.    - 1960      Room Number - 5K-99/187-J   N -  [id number]   [facility]  Date of Admission -  3/2/2020  8:14 PM    SUBJECTIVE:   No new issues. plan for discharge home today. OBJECTIVE   VITALS    height is 6' 3\" (1.905 m) and weight is 306 lb 1.6 oz (138.8 kg) (abnormal). His oral temperature is 97.6 °F (36.4 °C). His blood pressure is 149/73 (abnormal) and his pulse is 83. His respiration is 16 and oxygen saturation is 97%.        Wt Readings from Last 3 Encounters:   20 (!) 306 lb 1.6 oz (138.8 kg)       I/O (24 Hours)    Intake/Output Summary (Last 24 hours) at 3/6/2020 1051  Last data filed at 3/6/2020 0525  Gross per 24 hour   Intake 3330.41 ml   Output 2400 ml   Net 930.41 ml       General Appearance  Awake, alert, oriented,  not  In acute distress  HEENT - normocephalic, atraumatic, pink conjunctiva,  anicteric sclera  Neck - Supple, no mass  Lungs -  Bilateral good air entry, no rhonchi, no wheeze  Cardiovascular - Heart sounds are normal.     Abdomen - soft, not distended, nontender,   Neurologic -oriented  Skin - No bruising or bleeding  Extremities - No edema, no cyanosis, clubbing   Dressed foot wound  MEDICATIONS:      vancomycin  1,500 mg Intravenous Q12H    alogliptin  25 mg Oral Daily    insulin glargine  44 Units Subcutaneous Daily    aspirin  81 mg Oral Daily    clopidogrel  75 mg Oral Daily    piperacillin-tazobactam  3.375 g Intravenous Q8H    enoxaparin  40 mg Subcutaneous Daily    amiodarone  200 mg Oral Daily    atorvastatin  40 mg Oral Nightly    gabapentin  100 mg Oral TID    lisinopril  5 mg Oral Daily    metoprolol tartrate  25 mg Oral BID    sodium chloride flush  10 mL Intravenous 2 times per day    insulin lispro  0-12 Units Subcutaneous TID     insulin lispro  0-6 Units Subcutaneous Nightly    vancomycin (VANCOCIN) intermittent dosing (placeholder)   Other RX Placeholder      sodium chloride 50 mL/hr at 03/05/20 0001    dextrose       HYDROcodone-acetaminophen, sodium chloride flush, acetaminophen **OR** acetaminophen, polyethylene glycol, promethazine **OR** ondansetron, glucose, dextrose, glucagon (rDNA), dextrose      LABS:     CBC:   Recent Labs     03/04/20  0717   WBC 9.5   HGB 10.8*        BMP:    Recent Labs     03/04/20  0717      K 4.8      CO2 22*   BUN 14   CREATININE 1.0   GLUCOSE 140*     Calcium:  Recent Labs     03/04/20  0717   CALCIUM 9.2      Recent Labs     03/05/20  2122 03/06/20  0615 03/06/20  1038   POCGLU 135* 140* 179*     HgbA1C:   Recent Labs     03/04/20  0717   LABA1C 7.5*           Problem list of patient:     Patient Active Problem List   Diagnosis Code    Gangrene of toe of left foot ([facility] [address] I96    CAD (coronary artery disease) I25.10    Diabetes mellitus ([facility] [address] E11.9    Hyperlipidemia E78.5    Hypertension I10    Charcot's joint, right ankle and foot M14.671    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 one wk of oral antibiotic    Jacinto Warren MD, FACP 3/6/2020 10:51 AM

## 2020-03-06 NOTE — DISCHARGE INSTR - DIET

## 2020-03-06 NOTE — PLAN OF CARE
Problem: Falls - Risk of:  Goal: Will remain free from falls  Description  Will remain free from falls  3/6/2020 0119 by Burton Garcia RN  Outcome: Ongoing  Note:   Patient remained free of falls this shift. Fall precautions in place. Items within reach, call light within reach and side rails up x2. Bed alarm is on. Hourly rounding in place. Patient verbalizes understanding of using call light to ask for assistance as needed. Will monitor. Problem: Pain Control  Goal: Maintain pain level at or below patient's acceptable level (or 5 if patient is unable to determine acceptable level)  3/6/2020 0119 by Burton Garcia RN  Outcome: Ongoing  Note:   Patient last denied pain. Patient's pain goal is a 2/10. Rest, repositioning and elevation in use as nonpharmacologic pain relief methods. Will monitor. Problem: Neurological  Goal: Maximum potential motor/sensory/cognitive function  3/6/2020 0119 by Burton Garcia RN  Outcome: Ongoing  Note:   Patient has not been out of bed this shift, alert and oriented x4 and states numbness/tingling in BLE. Will monitor. Problem: Cardiovascular  Goal: No DVT, peripheral vascular complications  9/9/7330 5361 by Burton Garcia RN  Outcome: Ongoing  Note:   Pt without s/s of DVT. Pt able to take prescribed anticoagulants to help prevent development of DVT. Will monitor. Problem: Respiratory  Goal: No pulmonary complications  7/3/3501 1212 by Burton Garcia RN  Outcome: Ongoing  Note:   No supplemental oxygen in use. No adventitious lung sounds. Denies chest pain or SOB. Will monitor. Problem: GI  Goal: No bowel complications  1/3/5920 9353 by Burton Garcia RN  Outcome: Ongoing  Note:   Pt with hypoactive bowel sounds, passing flatus, and without nausea. Taking prescribed medications to assist with BM. Will monitor.       Problem:   Goal: Adequate urinary output  3/6/2020 0119 by Burton Garcia RN  Outcome: Ongoing  Note:   Patient is voiding adequately without difficulty. Will monitor. Problem: Nutrition  Goal: Optimal nutrition therapy  3/6/2020 0119 by Marnie Abraham RN  Outcome: Ongoing  Note:   Encouraged oral intake throughout shift. Will monitor. Problem: Skin Integrity/Risk  Goal: No skin breakdown during hospitalization  3/6/2020 0119 by Marnie Abraham RN  Outcome: Ongoing  Note:   No new skin breakdown noted. Patient able to turn and reposition self. Heels elevated off of bed. Skin check around ace wrap. Toes are pink and warm on right. Will monitor. Problem: Musculor/Skeletal Functional Status  Goal: Highest potential functional level  3/6/2020 0119 by Marnie Abraham RN  Outcome: Ongoing  Note:   Patient has not been up this shift. However, he is to be NWB to left leg and weight bearing as tolerated to right leg. Right leg must have shoe on cast when ambulating. Will monitor. Problem: Discharge Planning:  Goal: Discharged to appropriate level of care  Description  Discharged to appropriate level of care  3/6/2020 0119 by Marnie Abraham RN  Outcome: Ongoing  Note:   Patient is planning home at discharge. Will monitor. Problem: Serum Glucose Level - Abnormal:  Goal: Ability to maintain appropriate glucose levels has stabilized  Description  Ability to maintain appropriate glucose levels has stabilized  3/6/2020 0119 by Marnie Abraham RN  Outcome: Ongoing  Note:   CHEM ACHS. No supplemental insulin needed this evening. Will monitor. Problem: Infection - Methicillin-Resistant Staphylococcus Aureus Infection:  Goal: Absence of methicillin-resistant Staphylococcus aureus infection  Description  Absence of methicillin-resistant Staphylococcus aureus infection  3/6/2020 0119 by Marnie Abraham RN  Outcome: Ongoing  Note:   Remains in isolation for MRSA. Contact precautions in place. Patient being treated with antibiotics. Will monitor.       Problem: Risk for Impaired Skin Integrity  Goal: Tissue integrity - skin and mucous membranes  Description  Structural intactness and normal physiological function of skin and  mucous membranes. 3/6/2020 0119 by Itzel Ferguson RN  Outcome: Ongoing  Note:   No new skin issues noted. Will monitor. Care plan reviewed with patient. Patient  verbalize understanding of the plan of care and contribute to goal setting.

## 2020-03-07 LAB
AEROBIC CULTURE: ABNORMAL
AEROBIC CULTURE: ABNORMAL
ANAEROBIC CULTURE: ABNORMAL
GRAM STAIN RESULT: ABNORMAL
ORGANISM: ABNORMAL

## 2020-03-09 ENCOUNTER — HOSPITAL ENCOUNTER (OUTPATIENT)
Dept: WOUND CARE | Age: 60
Discharge: HOME | End: 2020-03-09
Payer: COMMERCIAL

## 2020-03-09 VITALS
HEART RATE: 77 BPM | RESPIRATION RATE: 18 BRPM | DIASTOLIC BLOOD PRESSURE: 67 MMHG | OXYGEN SATURATION: 97 % | SYSTOLIC BLOOD PRESSURE: 109 MMHG | TEMPERATURE: 97.8 F

## 2020-03-09 PROCEDURE — 2709999900 HC NON-CHARGEABLE SUPPLY

## 2020-03-09 PROCEDURE — 29580 STRAPPING UNNA BOOT: CPT

## 2020-03-09 RX ORDER — HYDROCODONE BITARTRATE AND ACETAMINOPHEN 5; 325 MG/1; MG/1
1 TABLET ORAL EVERY 4 HOURS PRN
Qty: 42 TABLET | Refills: 0 | Status: SHIPPED | OUTPATIENT
Start: 2020-03-09 | End: 2020-03-16

## 2020-03-09 NOTE — PROGRESS NOTES
atorvastatin (LIPITOR) 40 MG tablet Take 40 mg by mouth nightly      clopidogrel (PLAVIX) 75 MG tablet Take 75 mg by mouth daily      gabapentin (NEURONTIN) 100 MG capsule Take 100 mg by mouth 3 times daily.  lisinopril (PRINIVIL;ZESTRIL) 5 MG tablet Take 5 mg by mouth daily      metoprolol tartrate (LOPRESSOR) 25 MG tablet Take 25 mg by mouth 2 times daily      Multiple Vitamins-Minerals (MULTIVITAMIN PO) Take by mouth 2 times daily      HYDROcodone-acetaminophen (NORCO) 5-325 MG per tablet Take 1 tablet by mouth every 6 hours as needed for Pain.  linagliptin (TRADJENTA) 5 MG tablet Take 5 mg by mouth daily      Insulin Detemir (LEVEMIR FLEXPEN SC) Inject 44 Units into the skin daily      insulin lispro (HUMALOG) 100 UNIT/ML injection vial Inject into the skin 2 times daily as needed for High Blood Sugar Per sliding scale       No current facility-administered medications on file prior to encounter. REVIEW OF SYSTEMS    Pertinent items are noted in HPI. Objective:      /67   Pulse 77   Temp 97.8 °F (36.6 °C) (Oral)   Resp 18   SpO2 97%     Wt Readings from Last 3 Encounters:   03/02/20 (!) 306 lb 1.6 oz (138.8 kg)       PHYSICAL EXAM    General Appearance: alert and oriented to person, place and time, well-developed and well-nourished, in no acute distress    Derm:   Right hallux full thickness wound with granular base and macerated margins. No surrounding erythema. No purulence or probe to bone. Left TMA site well coapted with no signs of dehiscence. Staples and sutures intact. Surrounding skin is erythematous and edematous. No purulence noted. No malodor. Superficial bulla noted to dorsal aspect of left foot. MSK: left TMA. Right foot and ankle in rectus position    Vascular: CFT less than 5 seconds to digits right. Pedal pulses palpable left foot.  Edema noted to left foot     Neuro: light touch and protective sensation diminished                 Wound 03/02/20 Toe (Comment  which one) Right (Active)   Wound Traumatic 3/4/2020  9:15 PM   Dressing Status Clean;Dry; Intact; Changed 3/6/2020  9:00 AM   Dressing/Treatment Other (comment) 3/6/2020  9:00 AM   Drainage Amount None 3/6/2020  9:00 AM   Number of days: 6       LABS       CBC:   Lab Results   Component Value Date    WBC 9.5 03/04/2020    HGB 10.8 03/04/2020    HCT 34.2 03/04/2020    MCV 85.1 03/04/2020     03/04/2020     BMP:   Lab Results   Component Value Date     03/04/2020    K 4.8 03/04/2020    K 3.9 03/02/2020     03/04/2020    CO2 22 03/04/2020    BUN 14 03/04/2020    CREATININE 1.0 03/04/2020     PT/INR: No results found for: PROTIME, INR  Prealbumin: No results found for: PREALBUMIN  Albumin:No results found for: Elenor Genin  Sed Rate:No results found for: SEDRATE  CRP: No results found for: CRP  Micro: No results found for: BC   Hemoglobin A1C:   Lab Results   Component Value Date    LABA1C 7.5 03/04/2020       Assessment:     Ulcer Identification:  Ulcer Type: diabetic and pressure  Contributing Factors: diabetes, poor glucose control, chronic pressure and obesity    Wound: incision     Depth of Diabetic/Pressure/Non Pressure Ulcers or Wound:  Wound, full thickness    Patient Active Problem List   Diagnosis Code    Gangrene of toe of left foot ([facility] [address] I96    CAD (coronary artery disease) I25.10    Diabetes mellitus ([facility] [address] E11.9    Hyperlipidemia E78.5    Hypertension I10    Charcot's joint, right ankle and foot M14.671    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         Antibiotics: Yes    Follow up: 1 weeks    Please see attached Discharge Instructions    Written patient dismissal instructions given to patient and signed by patient or POA. Electronically signed by Lenora Zarco DPM on 3/9/2020 at 10:17 AM     I saw and evaluated the patient independently bedside. Please refer to resident physicians note for further details. Discussed with resident assessment and findings, I agree with plan as documented.      [address] [place] [address]   Electronically signed by Sonja Grimaldo DPM on 3/9/2020 at 11:35 AM

## 2020-03-09 NOTE — PLAN OF CARE
Problem: Wound:  Goal: Will show signs of wound healing; wound closure and no evidence of infection  Description: Will show signs of wound healing; wound closure and no evidence of infection  Outcome: Ongoing   Pt. Seen for left foot incision and right great toe wound see AVS for new orders. Follow up in 1 week. Care plan reviewed with patient. Patient verbalize understanding of the plan of care and contribute to goal setting.

## 2020-03-16 ENCOUNTER — HOSPITAL ENCOUNTER (OUTPATIENT)
Dept: WOUND CARE | Age: 60
Discharge: HOME | End: 2020-03-16
Payer: COMMERCIAL

## 2020-03-16 VITALS
SYSTOLIC BLOOD PRESSURE: 148 MMHG | HEART RATE: 73 BPM | RESPIRATION RATE: 16 BRPM | WEIGHT: 306 LBS | DIASTOLIC BLOOD PRESSURE: 76 MMHG | OXYGEN SATURATION: 98 % | TEMPERATURE: 98.3 F | BODY MASS INDEX: 38.05 KG/M2 | HEIGHT: 75 IN

## 2020-03-16 PROCEDURE — 29580 STRAPPING UNNA BOOT: CPT

## 2020-03-16 PROCEDURE — 2709999900 HC NON-CHARGEABLE SUPPLY

## 2020-03-16 RX ORDER — AMOXICILLIN AND CLAVULANATE POTASSIUM 875; 125 MG/1; MG/1
1 TABLET, FILM COATED ORAL 2 TIMES DAILY
Qty: 28 TABLET | Refills: 0 | Status: SHIPPED | OUTPATIENT
Start: 2020-03-16 | End: 2020-03-30

## 2020-03-16 ASSESSMENT — PAIN SCALES - GENERAL: PAINLEVEL_OUTOF10: 0

## 2020-03-16 NOTE — PROGRESS NOTES
[facility]         Consult and Procedure Note      Zana Mahan  MEDICAL RECORD NUMBER:  [id number]  AGE: 63 y.o. GENDER: male  : 1960  EPISODE DATE:  3/16/2020    Subjective:     Chief Complaint   Patient presents with    Wound Check     left foot and right toes         HISTORY of PRESENT ILLNESS HPI     Zana Mahan is a 63 y.o. male who has had a left TMA on 3.3.20. He has also developed a right hallux wound since being in a walking cast to the right foot. Amputation site appears maintained. Continue with unna boot,  to change weekly. Betadine with wet to dry dressing right hallux. follow up as scheduled. All questions/concerns otherwise addressed. PAST MEDICAL HISTORY        Diagnosis Date    Arthritis     CAD (coronary artery disease)     Diabetes mellitus ([facility] [address]     Hyperlipidemia     Hypertension     Liver disease     HEPITITIS AS A CHILD       PAST SURGICAL HISTORY    Past Surgical History:   Procedure Laterality Date    CARDIAC SURGERY  2019    triple bypass    CYST REMOVAL      RIGHT ANKLE     FOOT SURGERY Right     CYST REMOVED    TOE AMPUTATION Left 3/3/2020    I&D LEFT FOOT, TRANSMETATARSAL AMPUTATION performed by Hosea Kanner, DPM at [facility]    Family History   Problem Relation Age of Onset    Diabetes Mother     Heart Disease Father        SOCIAL HISTORY    Social History     Tobacco Use    Smoking status: Former Smoker     Types: Cigarettes     Last attempt to quit: 2008     Years since quittin.2    Smokeless tobacco: Never Used   Substance Use Topics    Alcohol use: Not Currently    Drug use: Never       ALLERGIES    No Known Allergies    MEDICATIONS    Current Outpatient Medications on File Prior to Encounter   Medication Sig Dispense Refill    HYDROcodone-acetaminophen (NORCO) 5-325 MG per tablet Take 1 tablet by mouth every 4 hours as needed for Pain for up to 7 days. INR  Prealbumin: No results found for: PREALBUMIN  Albumin:No results found for: Janice Plump  Sed Rate:No results found for: SEDRATE  CRP: No results found for: CRP  Micro: No results found for: BC   Hemoglobin A1C:   Lab Results   Component Value Date    LABA1C 7.5 03/04/2020       Assessment:     Ulcer Identification:  Ulcer Type: diabetic and pressure  Contributing Factors: diabetes, poor glucose control, chronic pressure and obesity    Wound: incision     Depth of Diabetic/Pressure/Non Pressure Ulcers or Wound:  Wound, full thickness    Patient Active Problem List   Diagnosis Code    Gangrene of toe of left foot ([facility] [address] I96    CAD (coronary artery disease) I25.10    Diabetes mellitus ([facility] [address] E11.9    Hyperlipidemia E78.5    Hypertension I10    Charcot's joint, right ankle and foot M14.671    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 Home health: [facility]- referred today    Wound Location: left foot and right great toe    Dressing orders:     1) Gather wound care supplies and arrange on clean table. 2) Wash your hands with soap and water or use alcohol based hand  for 20 seconds (sing \"Happy Birthday\" twice). 3) Cleanse wounds with normal saline or wound cleanser and gauze. Pat dry with clean gauze. 4) Left leg/foot- Remove old unna boot. Wash leg with warm soap and water. Rinse leg. Pat dry. Apply betadine to the left foot incision and wrap with unna boot and coban, cast padding, splint, ace wrap. Start at base of toes to 2 inches below bend of knee. Home health to change once weekly.     Right great toe- Apply iodoflex to the

## 2020-03-30 ENCOUNTER — HOSPITAL ENCOUNTER (OUTPATIENT)
Dept: WOUND CARE | Age: 60
Discharge: HOME | End: 2020-03-30
Payer: COMMERCIAL

## 2020-03-30 VITALS
TEMPERATURE: 98.4 F | RESPIRATION RATE: 18 BRPM | HEART RATE: 74 BPM | SYSTOLIC BLOOD PRESSURE: 175 MMHG | DIASTOLIC BLOOD PRESSURE: 85 MMHG | OXYGEN SATURATION: 99 %

## 2020-03-30 PROCEDURE — 2709999900 HC NON-CHARGEABLE SUPPLY

## 2020-03-30 PROCEDURE — 6370000000 HC RX 637 (ALT 250 FOR IP): Performed by: PODIATRIST

## 2020-03-30 PROCEDURE — 29580 STRAPPING UNNA BOOT: CPT

## 2020-03-30 PROCEDURE — 99213 OFFICE O/P EST LOW 20 MIN: CPT

## 2020-03-30 PROCEDURE — 17250 CHEM CAUT OF GRANLTJ TISSUE: CPT

## 2020-03-30 RX ORDER — HYDROCODONE BITARTRATE AND ACETAMINOPHEN 5; 325 MG/1; MG/1
1 TABLET ORAL EVERY 4 HOURS PRN
Qty: 42 TABLET | Refills: 0 | Status: SHIPPED | OUTPATIENT
Start: 2020-03-30 | End: 2020-04-06

## 2020-03-30 RX ADMIN — SILVER NITRATE APPLICATORS 2 EACH: 25; 75 STICK TOPICAL at 10:46

## 2020-03-30 NOTE — PLAN OF CARE
Problem: Wound:  Goal: Will show signs of wound healing; wound closure and no evidence of infection  Description: Will show signs of wound healing; wound closure and no evidence of infection  Outcome: Ongoing   Pt. Seen today for left foot and right great toe wound see AVS for new orders. Follow up in 1 week. Care plan reviewed with patient. Patient verbalize understanding of the plan of care and contribute to goal setting.

## 2020-03-30 NOTE — PROGRESS NOTES
Allergies    MEDICATIONS    Current Outpatient Medications on File Prior to Encounter   Medication Sig Dispense Refill    amoxicillin-clavulanate (AUGMENTIN) 875-125 MG per tablet Take 1 tablet by mouth 2 times daily for 14 days 28 tablet 0    amiodarone (CORDARONE) 200 MG tablet Take 200 mg by mouth daily      aspirin 81 MG tablet Take 81 mg by mouth daily      atorvastatin (LIPITOR) 40 MG tablet Take 40 mg by mouth nightly      clopidogrel (PLAVIX) 75 MG tablet Take 75 mg by mouth daily      gabapentin (NEURONTIN) 100 MG capsule Take 100 mg by mouth 3 times daily.  lisinopril (PRINIVIL;ZESTRIL) 5 MG tablet Take 5 mg by mouth daily      metoprolol tartrate (LOPRESSOR) 25 MG tablet Take 25 mg by mouth 2 times daily      Multiple Vitamins-Minerals (MULTIVITAMIN PO) Take by mouth 2 times daily      HYDROcodone-acetaminophen (NORCO) 5-325 MG per tablet Take 1 tablet by mouth every 6 hours as needed for Pain.  linagliptin (TRADJENTA) 5 MG tablet Take 5 mg by mouth daily      Insulin Detemir (LEVEMIR FLEXPEN SC) Inject 44 Units into the skin daily      insulin lispro (HUMALOG) 100 UNIT/ML injection vial Inject into the skin 2 times daily as needed for High Blood Sugar Per sliding scale       No current facility-administered medications on file prior to encounter. REVIEW OF SYSTEMS    A comprehensive review of systems was negative. Objective:      BP (!) 175/85   Pulse 74   Temp 98.4 °F (36.9 °C) (Oral)   Resp 18   SpO2 99%     Wt Readings from Last 3 Encounters:   03/16/20 (!) 306 lb (138.8 kg)   03/02/20 (!) 306 lb 1.6 oz (138.8 kg)       PHYSICAL EXAM    General Appearance: alert and oriented to person, place and time    Physical Exam:   Derm:   Right hallux full thickness wound with hypergranular base and hyperkeratotic borders. No surrounding erythema.  No purulence or probe to bone.      Left TMA site mostly coapted with few areas laterally that are slightly gapped but overall well LABALBU  Sed Rate:No results found for: SEDRATE  CRP: No results found for: CRP  Micro: No results found for: BC   Hemoglobin A1C:   Lab Results   Component Value Date    LABA1C 7.5 03/04/2020       Assessment:     Ulcer Identification:  Ulcer Type: diabetic and pressure  Contributing Factors: diabetes, shear force and obesity    Wound: External surgical dehiscence    Depth of Diabetic/Pressure/Non Pressure Ulcers or Wound:  Wound, full thickness    Patient Active Problem List   Diagnosis Code    Gangrene of toe of left foot ([facility] I96    CAD (coronary artery disease) I25.10    Diabetes mellitus ([facility] E11.9    Hyperlipidemia E78.5    Hypertension I10    Charcot's joint, right ankle and foot M14.671    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       Procedure Note  00647: unna boot compression (left)  85077: chemical cauterization of wound (right)  Chemical Cautery  Performed by: Sia Marinelli DPM  Consent obtained: Yes  Time out taken: Yes  Tissue Type: Hypergranulation  Pain Control:     Method: silver nitrate  Location of Chemical Cautery:   Wound/Ulcer #1     Wound 03/02/20 Toe (Comment  which one) Right (Active)   Wound Image   3/30/2020 10:27 AM   Wound Traumatic 3/4/2020  9:15 PM   Dressing Status Intact; Old drainage 3/30/2020 10:27 AM   Dressing Changed Changed/New 3/30/2020 10:27 AM   Wound Cleansed Rinsed/Irrigated with saline 3/30/2020 10:27 AM   Wound Length (cm) 1.8 cm 3/30/2020 10:27 AM   Wound Width (cm) 2 cm 3/30/2020 10:27 AM   Wound Depth (cm) 0 cm 3/30/2020 10:27 AM   Wound Surface Area (cm^2) 3.6 cm^2 3/30/2020 10:27 AM   Change in Wound Size % (l*w) 25 3/30/2020 10:27 AM   Wound Volume (cm^3) 0 cm^3 3/30/2020 10:27 AM   Wound Healing % 100 3/30/2020 10:27 AM   Wound Assessment Pink;Yellow 3/30/2020 10:27 AM   Drainage Amount Small 3/30/2020 10:27 AM   Drainage Description Serosanguinous 3/30/2020 10:27 AM   Odor None 3/30/2020 10:27 AM   Margins Attached edges 3/30/2020 orders:      1) Gather wound care supplies and arrange on clean table.      2) Wash your hands with soap and water or use alcohol based hand  for 20 seconds (sing \"Happy Birthday\" twice).    3) Cleanse wounds with normal saline or wound cleanser and gauze. Pat dry with clean gauze.    4) Left leg/foot- Remove old unna boot. Wash leg with warm soap and water. Rinse leg. Pat dry. Apply betadine to the left foot incision and wrap with unna boot and coban, cast padding, splint, ace wrap. Start at base of toes to 2 inches below bend of knee. change once weekly.     Right great toe- Apply iodoflex to the wound (remove one side of mesh and apply that side to the wound) cover with gauze and rafa. Change every other day      If unna boot becomes too tight- raise/elevate legs above the level of the heart for 15-20 minutes if swelling does not go down then carefully cut off unna boot and call clinic or go to local ER or family physician. If unna boot becomes wet or starts to roll down then carefully remove unna boot and call clinic.      Keep all dressings clean & dry.     Do not shower, take baths or get wound wet, unless otherwise instructed by your Wound Care doctor.      Follow up visit: 1 week 4/6/2020 at 11:30am      Keep next scheduled appointment. Please give 24 hour notice if unable to keep appointment. [phone]     If you experience any of the following, please call the Wound Care Service during business hours: Monday through Friday 8:00 am - 4:30 pm  ([phone]). *Increase in pain              *Temperature over 101              *Increase in drainage from your wound or a foul odor              *Uncontrolled swelling              *Need for compression bandage changes due to slippage, breakthrough drainage     If you need medical attention outside of business hours, please contact your Primary Care Doctor or go to the nearest emergency room.         Electronically signed by Balbina Barreto SMITH Padron on 3/30/2020 at 10:53 AM     I saw and evaluated the patient independently bedside. Please refer to resident physicians note for further details. Discussed with resident assessment and findings, I agree with plan as documented.      Chris Eric [facility]   Electronically signed by Estelle Mitchell DPM on 3/30/2020 at 11:08 AM

## 2020-03-30 NOTE — PROGRESS NOTES
[facility] Application   Below Knee    NAME:  Jose Fermin  YOB: 1960  MEDICAL RECORD NUMBER:  [id number]  DATE:  3/30/2020    Cydney Rowe boot: Appied primary and secondary dressing as ordered. Applied Unna roll from toes to knee overlapping each time. Applied ace wrap or coban from toes to below the knee. Secured with tape and/or metal clips covered with tape. Instructed patient/caregiver to keep dressing dry and intact. DO NOT REMOVE DRESSING. Instructed pt/family/caregiver to report excessive draining, loose bandage, wet dressing, severe pain or tingling in toes. Applied [facility] dressing below the knee to left lower leg. Unna Boot(s) were applied per  Guidelines.      Electronically signed by Griffin Rodgers RN on 3/30/2020 at 11:44 AM

## 2020-04-06 ENCOUNTER — HOSPITAL ENCOUNTER (OUTPATIENT)
Dept: WOUND CARE | Age: 60
Discharge: HOME | End: 2020-04-06
Payer: COMMERCIAL

## 2020-04-06 ENCOUNTER — TELEPHONE (OUTPATIENT)
Dept: WOUND CARE | Age: 60
End: 2020-04-06

## 2020-04-06 VITALS
BODY MASS INDEX: 38.77 KG/M2 | TEMPERATURE: 97.5 F | DIASTOLIC BLOOD PRESSURE: 86 MMHG | OXYGEN SATURATION: 99 % | WEIGHT: 310.2 LBS | RESPIRATION RATE: 18 BRPM | HEART RATE: 82 BPM | SYSTOLIC BLOOD PRESSURE: 167 MMHG

## 2020-04-06 PROCEDURE — 2709999900 HC NON-CHARGEABLE SUPPLY

## 2020-04-06 PROCEDURE — 17250 CHEM CAUT OF GRANLTJ TISSUE: CPT

## 2020-04-06 PROCEDURE — 29580 STRAPPING UNNA BOOT: CPT

## 2020-04-06 PROCEDURE — 6370000000 HC RX 637 (ALT 250 FOR IP): Performed by: PODIATRIST

## 2020-04-06 PROCEDURE — 97597 DBRDMT OPN WND 1ST 20 CM/<: CPT

## 2020-04-06 RX ADMIN — SILVER NITRATE APPLICATORS 1 EACH: 25; 75 STICK TOPICAL at 11:59

## 2020-04-06 NOTE — PROGRESS NOTES
results found for: LABALBU  Sed Rate:No results found for: SEDRATE  CRP: No results found for: CRP  Micro: No results found for: BC   Hemoglobin A1C:   Lab Results   Component Value Date    LABA1C 7.5 03/04/2020       Assessment:     Ulcer Identification:  Ulcer Type: diabetic and pressure  Contributing Factors: diabetes, shear force and obesity    Wound: External surgical dehiscence    Depth of Diabetic/Pressure/Non Pressure Ulcers or Wound:  Wound, full thickness    Patient Active Problem List   Diagnosis Code    Gangrene of toe of left foot ([facility] I96    CAD (coronary artery disease) I25.10    Diabetes mellitus ([facility] E11.9    Hyperlipidemia E78.5    Hypertension I10    Charcot's joint, right ankle and foot M14.671    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 T42.243L       Procedure Note  90477: unna boot compression (left)  48646: excisional debridement of wound, subq depth <20cm sq   49613: chemical cauterization of wound (right)  Chemical Cautery  Performed by: Maeve Estrada   Consent obtained: Yes  Time out taken: Yes  Tissue Type: Hypergranulation  Pain Control:     Method: silver nitrate  Location of Chemical Cautery:   Wound/Ulcer #1     Wound 03/02/20 Toe (Comment  which one) Right (Active)   Wound Image   4/6/2020 11:50 AM   Dressing Status Intact; Old drainage; Changed 4/6/2020 11:50 AM   Dressing Changed Changed/New 4/6/2020 11:50 AM   Wound Cleansed Rinsed/Irrigated with saline 4/6/2020 11:50 AM   Wound Length (cm) 1.2 cm 4/6/2020 11:50 AM   Wound Width (cm) 1.5 cm 4/6/2020 11:50 AM   Wound Depth (cm) 0.1 cm 4/6/2020 11:50 AM   Wound Surface Area (cm^2) 1.8 cm^2 4/6/2020 11:50 AM   Change in Wound Size % (l*w) 62.5 4/6/2020 11:50 AM   Wound Volume (cm^3) 0.18 cm^3 4/6/2020 11:50 AM   Wound Healing % 62 4/6/2020 11:50 AM   Wound Assessment Pink 4/6/2020 11:50 AM   Drainage Amount Small 4/6/2020 11:50 AM   Drainage Description Serosanguinous 4/6/2020 11:50 AM   Odor None 4/6/2020 11:50 AM Margins Attached edges 4/6/2020 11:50 AM   [place]-wound Assessment Dry;Calloused 4/6/2020 11:50 AM   [place]%Wound Bed 100 4/6/2020 11:50 AM   Red%Wound Bed 70 3/9/2020 12:17 PM   Yellow%Wound Bed 10 3/30/2020 10:27 AM   Number of days: 34     Incision 03/03/20 Foot Anterior; Left (Active)   Wound Image   4/6/2020 11:50 AM   Wound Assessment Intact 3/9/2020 10:04 AM   [place]-wound Assessment Dry;Maceration;Red; White 4/6/2020 11:50 AM   Wound Length (cm) 1.8 cm 4/6/2020 11:50 AM   Wound Width (cm) 14 cm 4/6/2020 11:50 AM   Wound Depth (cm) 1 cm 4/6/2020 11:50 AM   Wound Volume (cm^3) 25.2 cm^3 4/6/2020 11:50 AM   Wound Healing % -729 4/6/2020 11:50 AM   Closure [place]; Sutures 4/6/2020 11:50 AM   Drainage Amount Moderate 4/6/2020 11:50 AM   Drainage Description Serosanguinous; Yellow 4/6/2020 11:50 AM   Odor None 4/6/2020 11:50 AM   Dressing Changed Changed/New 4/6/2020 11:50 AM   Dressing Status Intact; Old drainage 4/6/2020 11:50 AM   Number of days: 33     Procedural Pain: 0  / 10   Post Procedural Pain: 0 / 10   Response to treatment: Well tolerated by patient. Debridement:Excisional Debridement  Using tissue nippers the wound(s) was/were sharply debrided down through and including the removal of epidermis, dermis and subcutaneous tissue. Devitalized Tissue Debrided:  fibrin, biofilm, slough, necrotic/eschar and callus  Pre Debridement Measurements:  Are located in the Wound Documentation Flow Sheet  Wound #: 1   Post  Debridement Measurements:  Wound 03/02/20 Toe (Comment  which one) Right (Active)   Wound Image   4/6/2020 11:50 AM   Dressing Status Intact; Old drainage; Changed 4/6/2020 11:50 AM   Dressing Changed Changed/New 4/6/2020 11:50 AM   Wound Cleansed Rinsed/Irrigated with saline 4/6/2020 11:50 AM   Wound Length (cm) 1.2 cm 4/6/2020 11:50 AM   Wound Width (cm) 1.5 cm 4/6/2020 11:50 AM   Wound Depth (cm) 0.1 cm 4/6/2020 11:50 AM   Wound Surface Area (cm^2) 1.8 cm^2 4/6/2020 11:50 AM   Change in Wound    2) Wash your hands with soap and water or use alcohol based hand  for 20 seconds (sing \"Happy Birthday\" twice).    3) Cleanse wounds with normal saline or wound cleanser and gauze. Pat dry with clean gauze.    4) Left leg/foot- Remove old unna boots to left  lower legs. Wash legs with warm soapy water. Pat dry. Cleanse wound with normal saline or wound cleanser and gauze. Pat dry with clean gauze. Apply silver alginate to wound bed. Apply unna boots then coban to left lower leg from base of toes to 1-2 inches below bend of knee. Home Health to change three times      Right great toe- Apply iodoflex to the wound (remove one side of mesh and apply that side to the wound) cover with gauze and rafa. Change every other day     If unna boot becomes too tight- raise/elevate legs above the level of the heart for 15-20 minutes if swelling does not go down then carefully cut off unna boot and call clinic or go to local ER or family physician. If unna boot becomes wet or starts to roll down then carefully remove unna boot and call clinic.      Keep all dressings clean & dry.     Do not shower, take baths or get wound wet, unless otherwise instructed by your Wound Care doctor.       Follow up visit: 1 week on Monday April 13th at 11:15 am      Keep next scheduled appointment. Please give 24 hour notice if unable to keep appointment. [phone]     If you experience any of the following, please call the Wound Care Service during business hours: Monday through Friday 8:00 am - 4:30 pm  ([phone]). *Increase in pain              *Temperature over 101              *Increase in drainage from your wound or a foul odor              *Uncontrolled swelling              *Need for compression bandage changes due to slippage, breakthrough drainage     If you need medical attention outside of business hours, please contact your Primary Care Doctor or go to the nearest emergency room.       I saw and

## 2020-04-08 ENCOUNTER — OFFICE VISIT (OUTPATIENT)
Dept: CARDIOLOGY CLINIC | Age: 60
End: 2020-04-08
Payer: COMMERCIAL

## 2020-04-08 VITALS
HEIGHT: 74 IN | HEART RATE: 87 BPM | SYSTOLIC BLOOD PRESSURE: 138 MMHG | WEIGHT: 310 LBS | BODY MASS INDEX: 39.78 KG/M2 | DIASTOLIC BLOOD PRESSURE: 80 MMHG

## 2020-04-08 PROCEDURE — 99244 OFF/OP CNSLTJ NEW/EST MOD 40: CPT | Performed by: INTERNAL MEDICINE

## 2020-04-08 PROCEDURE — 93000 ELECTROCARDIOGRAM COMPLETE: CPT | Performed by: INTERNAL MEDICINE

## 2020-04-08 NOTE — PROGRESS NOTES
apnea, cough  Gastrointestinal: Negative for blood in stool, constipation, diarrhea   Endocrine: Negative for cold intolerance, heat intolerance, polydipsia. Genitourinary: Negative for dysuria, enuresis, flank pain and hematuria. Musculoskeletal: Negative for arthralgias, joint swelling and neck pain. Neurological: Negative for numbness and headaches. Psychiatric/Behavioral: Negative for agitation, confusion, decreased concentration and dysphoric mood. Objective: There were no vitals taken for this visit. Wt Readings from Last 3 Encounters:   04/06/20 (!) 310 lb 3.2 oz (140.7 kg)   03/16/20 (!) 306 lb (138.8 kg)   03/02/20 (!) 306 lb 1.6 oz (138.8 kg)     BP Readings from Last 3 Encounters:   04/06/20 (!) 167/86   03/30/20 (!) 175/85   03/16/20 (!) 148/76       Nursing note and vitals reviewed. Physical Exam   Constitutional: Oriented to person, place, and time. Appears well-developed and well-nourished. ENT: Moist mucous membranes. No bleeding. Tongue is midline. Head: Normocephalic and atraumatic. Eyes: EOM are normal. Pupils are equal, round, and reactive to light. Neck: Normal range of motion. Neck supple. No JVD present. Cardiovascular: Normal rate, regular rhythm, murmur, no rubs, and diminished distal pulses. Pulmonary/Chest: Effort normal and breath sounds normal. No respiratory distress. No wheezes. No rales. Abdominal: Soft. Bowel sounds are normal. No distension. There is no tenderness. Musculoskeletal: Normal range of motion. No edema. Cast on right foot. Surgical dressing on left foot. Neurological: Alert and oriented to person, place, and time. No cranial nerve deficit. Coordination normal.   Skin: Skin is warm and dry. Psychiatric: Normal mood and affect.        No results found for: CKTOTAL, CKMB, CKMBINDEX    Lab Results   Component Value Date    WBC 9.5 03/04/2020    RBC 4.02 03/04/2020    HGB 10.8 03/04/2020    HCT 34.2 03/04/2020    MCV 85.1 03/04/2020

## 2020-04-09 ENCOUNTER — TELEPHONE (OUTPATIENT)
Dept: WOUND CARE | Age: 60
End: 2020-04-09

## 2020-04-09 RX ORDER — LISINOPRIL 5 MG/1
5 TABLET ORAL DAILY
Qty: 90 TABLET | Refills: 1 | Status: SHIPPED | OUTPATIENT
Start: 2020-04-09 | End: 2020-08-10 | Stop reason: SDUPTHER

## 2020-04-09 RX ORDER — CLOPIDOGREL BISULFATE 75 MG/1
75 TABLET ORAL DAILY
Qty: 90 TABLET | Refills: 1 | Status: SHIPPED | OUTPATIENT
Start: 2020-04-09 | End: 2021-01-27 | Stop reason: SDUPTHER

## 2020-04-09 RX ORDER — ATORVASTATIN CALCIUM 40 MG/1
40 TABLET, FILM COATED ORAL NIGHTLY
Qty: 90 TABLET | Refills: 1 | Status: SHIPPED | OUTPATIENT
Start: 2020-04-09 | End: 2020-08-10 | Stop reason: SDUPTHER

## 2020-04-09 RX ORDER — AMIODARONE HYDROCHLORIDE 200 MG/1
200 TABLET ORAL DAILY
Qty: 90 TABLET | Refills: 1 | Status: SHIPPED | OUTPATIENT
Start: 2020-04-09 | End: 2020-06-04 | Stop reason: SINTOL

## 2020-04-09 NOTE — TELEPHONE ENCOUNTER
[facility] called regarding patients visit and converting to a virtual visit. They will arrange for nurse to be there Monday between 11-12 am for visit. Patient called and updated and is okay with plan for virtual visit. No other concerns at this time.

## 2020-04-10 ENCOUNTER — HOSPITAL ENCOUNTER (OUTPATIENT)
Dept: INTERVENTIONAL RADIOLOGY/VASCULAR | Age: 60
Discharge: HOME | End: 2020-04-10
Payer: COMMERCIAL

## 2020-04-10 PROCEDURE — 93925 LOWER EXTREMITY STUDY: CPT

## 2020-04-13 ENCOUNTER — TELEPHONE (OUTPATIENT)
Dept: CARDIOLOGY CLINIC | Age: 60
End: 2020-04-13

## 2020-04-13 ENCOUNTER — HOSPITAL ENCOUNTER (OUTPATIENT)
Dept: WOUND CARE | Age: 60
Discharge: HOME | End: 2020-04-13
Payer: COMMERCIAL

## 2020-04-13 PROCEDURE — 99212 OFFICE O/P EST SF 10 MIN: CPT

## 2020-04-13 ASSESSMENT — PAIN SCALES - GENERAL: PAINLEVEL_OUTOF10: 0

## 2020-04-13 NOTE — PROGRESS NOTES
Noe Oneill is a 63 y.o. male being evaluated by a Virtual Visit (video visit) encounter to address concerns as mentioned above. A caregiver was present when appropriate. Due to this being a TeleHealth encounter (During BJB-61 public health emergency), evaluation of the following organ systems was limited: Vitals/Constitutional/EENT/Resp/CV/GI//MS/Neuro/Skin/Heme-Lymph-Imm. Pursuant to the emergency declaration under the [address] [place] [address] and the [employer] and [employer] Act, this Virtual Visit was conducted with patient's (and/or legal guardian's) consent, to reduce the patient's risk of exposure to COVID-19 and provide necessary medical care. The patient (and/or legal guardian) has also been advised to contact this office for worsening conditions or problems, and seek emergency medical treatment and/or call 911 if deemed necessary. Services were provided through a video synchronous discussion virtually to substitute for in-person clinic visit. --Fiona Armstrong RN on 4/13/2020 at 12:44 PM    An electronic signature was used to authenticate this note.
Virtual Visit Wound Care  Clinic Level of Care   NAME:  Leona Laughlin  YOB: 1960 GENDER: male  MEDICAL RECORD NUMBER:  [id number]   DATE:  4/13/2020     Patient Type Points   No documentation completed by nursing staff. []   0   Nursing staff documented in the navigator for an ESTABLISHED patient including Episode, Patient ID, Chief Complaint, Travel Screen, Allergies, Latex Allergy, Home Medication, History, Psychosocial Screen, C-SSRS Screen, Fall Risk, Nutritional Screen, Advanced Directive, Education and Plan of Care, and Discharge Instructions. The Functional Screening tab is only required if the patient's status changes. [x]   1   Nursing staff documented in the navigator for a NEW patient including Patient ID, Chief Complaint, Travel Screen, Allergies, Latex Allergy, Home Medication, History, Psychosocial Screen, C-SSRS Screen, Fall Risk, Nutritional Screen, Advanced Directive, Functional Screen, Education and Plan of Care, and Discharge Instructions. []   2   Nursing staff documented in the navigator for a CONSULT patient including Episode, Patient ID, Chief Complaint, Travel Screen, Allergies, Latex Allergy, Home Medication, History, Psychosocial Screen, C-SSRS Screen, Fall Risk, Nutritional Screen, Advanced Directive, Functional Screen, Education and Plan of Care, and Discharge Instructions. []   2     Wound Description Points   Unable to obtain image of Wound. For example, patient/caregiver is instructed not to remove dressing, is unable to correctly position smart phone, no smart phone is available, patient is unable to maintain connectivity or the patient's wound is healed. []   0   1-3 wound images annotated. Images of the wound(s) is obtained and annotated along with completed description in [address] [place] [address]. [x]   1   4-5 wound images annotated. Images of the wound(s) is obtained and annotated along with completed description in [address] [place] [address]. []   2   Greater than 6 wound images annotated.  Images
aspect right great toe  Charcot neuroarthropathy bilateral        Plan:  Please see attached Discharge Instructions  Based upon this virtual visit it is not required to have an in-person visit of this time. Indicates understanding  Written patient dismissal instructions available to Patient/JASON Díaz AGE: 63 y.o. GENDER: male  : 1960 being evaluated by a Virtual Visit (video visit) encounter to address concerns as mentioned above. A caregiver was present when appropriate. Due to this being a TeleHealth encounter (During UZS-43 public health emergency), evaluation of the following organ systems was limited: Vitals/Constitutional/EENT/Resp/CV/GI//MS/Neuro/Skin/Heme-Lymph-Imm. Pursuant to the emergency declaration under the [address] [place] [address] authority and the [employer] and [employer] Act, this Virtual Visit was conducted with patient's (and/or legal guardian's) consent, to reduce the patient's risk of exposure to COVID-19 and provide necessary medical care. The patient (and/or legal guardian) has also been advised to contact this office for worsening conditions or problems, and seek emergency medical treatment and/or call 911 if deemed necessary. Services were provided through a video synchronous discussion virtually to substitute for in-person clinic visit. Patient and provider were located at their individual homes.     Electronically signed by Lalitha Joseph DPM on 2020 at 12:09 PM    Chris Lao [address] [place] [address]   Electronically signed by Lalitha Joseph DPM on 2020 at 12:11 PM

## 2020-04-14 NOTE — TELEPHONE ENCOUNTER
Talked to [facility] in Vascular Lab and only the left side was scanned. She states they will call the patient to get the right side scheduled. Keep encounter open to track results for Dr. Gilson Robledo.

## 2020-04-17 ENCOUNTER — APPOINTMENT (OUTPATIENT)
Dept: MRI IMAGING | Age: 60
DRG: 854 | End: 2020-04-17
Payer: COMMERCIAL

## 2020-04-17 ENCOUNTER — APPOINTMENT (OUTPATIENT)
Dept: GENERAL RADIOLOGY | Age: 60
DRG: 854 | End: 2020-04-17
Payer: COMMERCIAL

## 2020-04-17 ENCOUNTER — APPOINTMENT (OUTPATIENT)
Dept: CT IMAGING | Age: 60
DRG: 854 | End: 2020-04-17
Payer: COMMERCIAL

## 2020-04-17 ENCOUNTER — HOSPITAL ENCOUNTER (INPATIENT)
Age: 60
LOS: 5 days | Discharge: NURSING, REHAB OR LONG-TERM CARE | DRG: 854 | End: 2020-04-22
Attending: EMERGENCY MEDICINE | Admitting: FAMILY MEDICINE
Payer: COMMERCIAL

## 2020-04-17 PROBLEM — A41.9 SEPSIS (HCC): Status: ACTIVE | Noted: 2020-04-17

## 2020-04-17 LAB
ACINETOBACTER BAUMANNII FILM ARRAY: NOT DETECTED
ALBUMIN SERPL-MCNC: 3.8 G/DL (ref 3.5–5.1)
ALP BLD-CCNC: 56 U/L (ref 38–126)
ALT SERPL-CCNC: 12 U/L (ref 11–66)
ANION GAP SERPL CALCULATED.3IONS-SCNC: 18 MEQ/L (ref 8–16)
APTT: 27.4 SECONDS (ref 22–38)
AST SERPL-CCNC: 21 U/L (ref 5–40)
AVERAGE GLUCOSE: 126 MG/DL (ref 70–126)
BACTERIA: ABNORMAL /HPF
BASOPHILS # BLD: 0.3 %
BASOPHILS ABSOLUTE: 0 THOU/MM3 (ref 0–0.1)
BILIRUB SERPL-MCNC: 0.7 MG/DL (ref 0.3–1.2)
BILIRUBIN URINE: NEGATIVE
BLOOD, URINE: NEGATIVE
BOTTLE TYPE: ABNORMAL
BUN BLDV-MCNC: 16 MG/DL (ref 7–22)
C-REACTIVE PROTEIN: 14.47 MG/DL (ref 0–1)
CALCIUM SERPL-MCNC: 9.1 MG/DL (ref 8.5–10.5)
CANDIDA ALBICANS FILM ARRAY: NOT DETECTED
CANDIDA GLABRATA FILM ARRAY: NOT DETECTED
CANDIDA KRUSEI FILM ARRAY: NOT DETECTED
CANDIDA PARAPSILOSIS FILM ARRAY: NOT DETECTED
CANDIDA TROPICALIS FILM ARRAY: NOT DETECTED
CARBAPENEM RESITANT FILM ARRAY: ABNORMAL
CASTS 2: ABNORMAL /LPF
CASTS UA: ABNORMAL /LPF
CHARACTER, URINE: CLEAR
CHLORIDE BLD-SCNC: 98 MEQ/L (ref 98–111)
CO2: 19 MEQ/L (ref 23–33)
COLOR: YELLOW
CREAT SERPL-MCNC: 0.9 MG/DL (ref 0.4–1.2)
CRYSTALS, UA: ABNORMAL
EKG ATRIAL RATE: 87 BPM
EKG P AXIS: 76 DEGREES
EKG P-R INTERVAL: 186 MS
EKG Q-T INTERVAL: 370 MS
EKG QRS DURATION: 102 MS
EKG QTC CALCULATION (BAZETT): 445 MS
EKG R AXIS: 68 DEGREES
EKG T AXIS: 75 DEGREES
EKG VENTRICULAR RATE: 87 BPM
ENTERBACTER CLOACAE FILM ARRAY: NOT DETECTED
ENTERBACTERIACEAE FILM ARRAY: NOT DETECTED
ENTEROCOCCUS FILM ARRAY: NOT DETECTED
EOSINOPHIL # BLD: 0.1 %
EOSINOPHILS ABSOLUTE: 0 THOU/MM3 (ref 0–0.4)
EPITHELIAL CELLS, UA: ABNORMAL /HPF
ERYTHROCYTE [DISTWIDTH] IN BLOOD BY AUTOMATED COUNT: 15.8 % (ref 11.5–14.5)
ERYTHROCYTE [DISTWIDTH] IN BLOOD BY AUTOMATED COUNT: 48.7 FL (ref 35–45)
ESCHERICHIA COLI FILM ARRAY: NOT DETECTED
GFR SERPL CREATININE-BSD FRML MDRD: 87 ML/MIN/1.73M2
GLUCOSE BLD-MCNC: 168 MG/DL (ref 70–108)
GLUCOSE BLD-MCNC: 182 MG/DL (ref 70–108)
GLUCOSE BLD-MCNC: 190 MG/DL (ref 70–108)
GLUCOSE BLD-MCNC: 213 MG/DL (ref 70–108)
GLUCOSE URINE: NEGATIVE MG/DL
HAEMOPHILUS INFLUENZA FILM ARRAY: NOT DETECTED
HBA1C MFR BLD: 6.2 % (ref 4.4–6.4)
HCT VFR BLD CALC: 34.2 % (ref 42–52)
HEMOGLOBIN: 11.1 GM/DL (ref 14–18)
IMMATURE GRANS (ABS): 0.07 THOU/MM3 (ref 0–0.07)
IMMATURE GRANULOCYTES: 0.5 %
INR BLD: 1.14 (ref 0.85–1.13)
KETONES, URINE: NEGATIVE
KLEBSIELLA OXYTOCA FILM ARRAY: NOT DETECTED
KLEBSIELLA PNEUMONIAE FILM ARRAY: NOT DETECTED
LACTIC ACID: 1.4 MMOL/L (ref 0.5–2.2)
LEUKOCYTE ESTERASE, URINE: NEGATIVE
LISTERIA MONOCYTOGENES FILM ARRAY: NOT DETECTED
LYMPHOCYTES # BLD: 5.2 %
LYMPHOCYTES ABSOLUTE: 0.8 THOU/MM3 (ref 1–4.8)
MCH RBC QN AUTO: 27.5 PG (ref 26–33)
MCHC RBC AUTO-ENTMCNC: 32.5 GM/DL (ref 32.2–35.5)
MCV RBC AUTO: 84.9 FL (ref 80–94)
METHICILLIN RESISTANT FILM ARRAY: NOT DETECTED
MISCELLANEOUS 2: ABNORMAL
MONOCYTES # BLD: 8.5 %
MONOCYTES ABSOLUTE: 1.3 THOU/MM3 (ref 0.4–1.3)
NEISSERIA MENIGITIDIS FILM ARRAY: NOT DETECTED
NITRITE, URINE: NEGATIVE
NUCLEATED RED BLOOD CELLS: 0 /100 WBC
OSMOLALITY CALCULATION: 277.6 MOSMOL/KG (ref 275–300)
PH UA: 6 (ref 5–9)
PLATELET # BLD: 203 THOU/MM3 (ref 130–400)
PMV BLD AUTO: 11.3 FL (ref 9.4–12.4)
POTASSIUM SERPL-SCNC: 4.4 MEQ/L (ref 3.5–5.2)
PROCALCITONIN: 0.13 NG/ML (ref 0.01–0.09)
PROTEIN UA: 30
PROTEUS FILM ARRAY: NOT DETECTED
PSEUDOMONAS AERUGINOSA FILM ARRAY: NOT DETECTED
RBC # BLD: 4.03 MILL/MM3 (ref 4.7–6.1)
RBC URINE: ABNORMAL /HPF
RENAL EPITHELIAL, UA: ABNORMAL
SEDIMENTATION RATE, ERYTHROCYTE: 130 MM/HR (ref 0–10)
SEG NEUTROPHILS: 85.4 %
SEGMENTED NEUTROPHILS ABSOLUTE COUNT: 12.7 THOU/MM3 (ref 1.8–7.7)
SERRATIA MARCESCENS FILM ARRAY: NOT DETECTED
SODIUM BLD-SCNC: 135 MEQ/L (ref 135–145)
SOURCE OF BLOOD CULTURE: ABNORMAL
SPECIFIC GRAVITY, URINE: > 1.03 (ref 1–1.03)
STAPH AUREUS FILM ARRAY: DETECTED
STAPHYLOCOCCUS FILM ARRAY: DETECTED
STREP AGALACTIAE FILM ARRAY: NOT DETECTED
STREP PNEUMONIAE FILM ARRAY: NOT DETECTED
STREP PYOCGENES FILM ARRAY: NOT DETECTED
STREPTOCOCCUS FILM ARRAY: NOT DETECTED
TOTAL CK: 47 U/L (ref 55–170)
TOTAL PROTEIN: 7 G/DL (ref 6.1–8)
TROPONIN T: < 0.01 NG/ML
UROBILINOGEN, URINE: 1 EU/DL (ref 0–1)
VANCOMYCIN RESISTANT FILM ARRAY: ABNORMAL
WBC # BLD: 14.9 THOU/MM3 (ref 4.8–10.8)
WBC UA: ABNORMAL /HPF
YEAST: ABNORMAL

## 2020-04-17 PROCEDURE — 6370000000 HC RX 637 (ALT 250 FOR IP): Performed by: EMERGENCY MEDICINE

## 2020-04-17 PROCEDURE — 6360000002 HC RX W HCPCS: Performed by: EMERGENCY MEDICINE

## 2020-04-17 PROCEDURE — 93005 ELECTROCARDIOGRAM TRACING: CPT | Performed by: EMERGENCY MEDICINE

## 2020-04-17 PROCEDURE — 99285 EMERGENCY DEPT VISIT HI MDM: CPT

## 2020-04-17 PROCEDURE — A9579 GAD-BASE MR CONTRAST NOS,1ML: HCPCS | Performed by: EMERGENCY MEDICINE

## 2020-04-17 PROCEDURE — 99223 1ST HOSP IP/OBS HIGH 75: CPT | Performed by: INTERNAL MEDICINE

## 2020-04-17 PROCEDURE — 87075 CULTR BACTERIA EXCEPT BLOOD: CPT

## 2020-04-17 PROCEDURE — 76376 3D RENDER W/INTRP POSTPROCES: CPT

## 2020-04-17 PROCEDURE — 74177 CT ABD & PELVIS W/CONTRAST: CPT

## 2020-04-17 PROCEDURE — 6360000002 HC RX W HCPCS: Performed by: INTERNAL MEDICINE

## 2020-04-17 PROCEDURE — 82550 ASSAY OF CK (CPK): CPT

## 2020-04-17 PROCEDURE — 2580000003 HC RX 258: Performed by: PHARMACIST

## 2020-04-17 PROCEDURE — 71045 X-RAY EXAM CHEST 1 VIEW: CPT

## 2020-04-17 PROCEDURE — 80053 COMPREHEN METABOLIC PANEL: CPT

## 2020-04-17 PROCEDURE — 72158 MRI LUMBAR SPINE W/O & W/DYE: CPT

## 2020-04-17 PROCEDURE — 96374 THER/PROPH/DIAG INJ IV PUSH: CPT

## 2020-04-17 PROCEDURE — 87205 SMEAR GRAM STAIN: CPT

## 2020-04-17 PROCEDURE — 6360000004 HC RX CONTRAST MEDICATION: Performed by: EMERGENCY MEDICINE

## 2020-04-17 PROCEDURE — 87801 DETECT AGNT MULT DNA AMPLI: CPT

## 2020-04-17 PROCEDURE — 85025 COMPLETE CBC W/AUTO DIFF WBC: CPT

## 2020-04-17 PROCEDURE — 85730 THROMBOPLASTIN TIME PARTIAL: CPT

## 2020-04-17 PROCEDURE — 36415 COLL VENOUS BLD VENIPUNCTURE: CPT

## 2020-04-17 PROCEDURE — 87040 BLOOD CULTURE FOR BACTERIA: CPT

## 2020-04-17 PROCEDURE — 96361 HYDRATE IV INFUSION ADD-ON: CPT

## 2020-04-17 PROCEDURE — 84484 ASSAY OF TROPONIN QUANT: CPT

## 2020-04-17 PROCEDURE — 2580000003 HC RX 258: Performed by: INTERNAL MEDICINE

## 2020-04-17 PROCEDURE — 83605 ASSAY OF LACTIC ACID: CPT

## 2020-04-17 PROCEDURE — 1200000000 HC SEMI PRIVATE

## 2020-04-17 PROCEDURE — 83036 HEMOGLOBIN GLYCOSYLATED A1C: CPT

## 2020-04-17 PROCEDURE — 87070 CULTURE OTHR SPECIMN AEROBIC: CPT

## 2020-04-17 PROCEDURE — 87077 CULTURE AEROBIC IDENTIFY: CPT

## 2020-04-17 PROCEDURE — 85651 RBC SED RATE NONAUTOMATED: CPT

## 2020-04-17 PROCEDURE — 87147 CULTURE TYPE IMMUNOLOGIC: CPT

## 2020-04-17 PROCEDURE — 96375 TX/PRO/DX INJ NEW DRUG ADDON: CPT

## 2020-04-17 PROCEDURE — 2580000003 HC RX 258: Performed by: EMERGENCY MEDICINE

## 2020-04-17 PROCEDURE — 84145 PROCALCITONIN (PCT): CPT

## 2020-04-17 PROCEDURE — 82948 REAGENT STRIP/BLOOD GLUCOSE: CPT

## 2020-04-17 PROCEDURE — 6370000000 HC RX 637 (ALT 250 FOR IP): Performed by: STUDENT IN AN ORGANIZED HEALTH CARE EDUCATION/TRAINING PROGRAM

## 2020-04-17 PROCEDURE — 6370000000 HC RX 637 (ALT 250 FOR IP): Performed by: INTERNAL MEDICINE

## 2020-04-17 PROCEDURE — 81001 URINALYSIS AUTO W/SCOPE: CPT

## 2020-04-17 PROCEDURE — 6360000002 HC RX W HCPCS: Performed by: PHARMACIST

## 2020-04-17 PROCEDURE — 85610 PROTHROMBIN TIME: CPT

## 2020-04-17 PROCEDURE — 2709999900 HC NON-CHARGEABLE SUPPLY

## 2020-04-17 PROCEDURE — 87186 SC STD MICRODIL/AGAR DIL: CPT

## 2020-04-17 PROCEDURE — 73630 X-RAY EXAM OF FOOT: CPT

## 2020-04-17 PROCEDURE — 86140 C-REACTIVE PROTEIN: CPT

## 2020-04-17 RX ORDER — MORPHINE SULFATE 4 MG/ML
3 INJECTION, SOLUTION INTRAMUSCULAR; INTRAVENOUS
Status: DISCONTINUED | OUTPATIENT
Start: 2020-04-17 | End: 2020-04-20

## 2020-04-17 RX ORDER — ONDANSETRON 2 MG/ML
4 INJECTION INTRAMUSCULAR; INTRAVENOUS ONCE
Status: COMPLETED | OUTPATIENT
Start: 2020-04-17 | End: 2020-04-17

## 2020-04-17 RX ORDER — NICOTINE POLACRILEX 4 MG
15 LOZENGE BUCCAL PRN
Status: DISCONTINUED | OUTPATIENT
Start: 2020-04-17 | End: 2020-04-22 | Stop reason: HOSPADM

## 2020-04-17 RX ORDER — INSULIN GLARGINE 100 [IU]/ML
30 INJECTION, SOLUTION SUBCUTANEOUS NIGHTLY
Status: DISCONTINUED | OUTPATIENT
Start: 2020-04-17 | End: 2020-04-18

## 2020-04-17 RX ORDER — HYDROCODONE BITARTRATE AND ACETAMINOPHEN 5; 325 MG/1; MG/1
1 TABLET ORAL EVERY 6 HOURS PRN
Status: DISCONTINUED | OUTPATIENT
Start: 2020-04-17 | End: 2020-04-18

## 2020-04-17 RX ORDER — ACETAMINOPHEN 650 MG/1
650 SUPPOSITORY RECTAL EVERY 6 HOURS PRN
Status: DISCONTINUED | OUTPATIENT
Start: 2020-04-17 | End: 2020-04-22 | Stop reason: HOSPADM

## 2020-04-17 RX ORDER — CLOPIDOGREL BISULFATE 75 MG/1
75 TABLET ORAL DAILY
Status: DISCONTINUED | OUTPATIENT
Start: 2020-04-17 | End: 2020-04-22 | Stop reason: HOSPADM

## 2020-04-17 RX ORDER — ACETAMINOPHEN 325 MG/1
650 TABLET ORAL ONCE
Status: COMPLETED | OUTPATIENT
Start: 2020-04-17 | End: 2020-04-17

## 2020-04-17 RX ORDER — OXYCODONE HYDROCHLORIDE AND ACETAMINOPHEN 5; 325 MG/1; MG/1
1 TABLET ORAL EVERY 6 HOURS PRN
Status: DISCONTINUED | OUTPATIENT
Start: 2020-04-17 | End: 2020-04-17

## 2020-04-17 RX ORDER — POLYETHYLENE GLYCOL 3350 17 G/17G
17 POWDER, FOR SOLUTION ORAL DAILY PRN
Status: DISCONTINUED | OUTPATIENT
Start: 2020-04-17 | End: 2020-04-20

## 2020-04-17 RX ORDER — SENNA AND DOCUSATE SODIUM 50; 8.6 MG/1; MG/1
1 TABLET, FILM COATED ORAL 2 TIMES DAILY
Status: DISCONTINUED | OUTPATIENT
Start: 2020-04-17 | End: 2020-04-22 | Stop reason: HOSPADM

## 2020-04-17 RX ORDER — ONDANSETRON 2 MG/ML
4 INJECTION INTRAMUSCULAR; INTRAVENOUS EVERY 6 HOURS PRN
Status: DISCONTINUED | OUTPATIENT
Start: 2020-04-17 | End: 2020-04-20 | Stop reason: SDUPTHER

## 2020-04-17 RX ORDER — SODIUM CHLORIDE 0.9 % (FLUSH) 0.9 %
10 SYRINGE (ML) INJECTION EVERY 12 HOURS SCHEDULED
Status: DISCONTINUED | OUTPATIENT
Start: 2020-04-17 | End: 2020-04-20 | Stop reason: SDUPTHER

## 2020-04-17 RX ORDER — LIDOCAINE 4 G/G
1 PATCH TOPICAL DAILY
Status: DISCONTINUED | OUTPATIENT
Start: 2020-04-17 | End: 2020-04-19

## 2020-04-17 RX ORDER — ATORVASTATIN CALCIUM 40 MG/1
40 TABLET, FILM COATED ORAL NIGHTLY
Status: DISCONTINUED | OUTPATIENT
Start: 2020-04-17 | End: 2020-04-22 | Stop reason: HOSPADM

## 2020-04-17 RX ORDER — DEXTROSE MONOHYDRATE 25 G/50ML
12.5 INJECTION, SOLUTION INTRAVENOUS PRN
Status: DISCONTINUED | OUTPATIENT
Start: 2020-04-17 | End: 2020-04-22 | Stop reason: HOSPADM

## 2020-04-17 RX ORDER — ALOGLIPTIN 12.5 MG/1
12.5 TABLET, FILM COATED ORAL DAILY
Status: DISCONTINUED | OUTPATIENT
Start: 2020-04-17 | End: 2020-04-22 | Stop reason: HOSPADM

## 2020-04-17 RX ORDER — LISINOPRIL 5 MG/1
5 TABLET ORAL DAILY
Status: DISCONTINUED | OUTPATIENT
Start: 2020-04-17 | End: 2020-04-22 | Stop reason: HOSPADM

## 2020-04-17 RX ORDER — GABAPENTIN 100 MG/1
200 CAPSULE ORAL 3 TIMES DAILY
Status: DISCONTINUED | OUTPATIENT
Start: 2020-04-17 | End: 2020-04-22 | Stop reason: HOSPADM

## 2020-04-17 RX ORDER — 0.9 % SODIUM CHLORIDE 0.9 %
1000 INTRAVENOUS SOLUTION INTRAVENOUS ONCE
Status: COMPLETED | OUTPATIENT
Start: 2020-04-17 | End: 2020-04-17

## 2020-04-17 RX ORDER — SODIUM CHLORIDE 0.9 % (FLUSH) 0.9 %
10 SYRINGE (ML) INJECTION PRN
Status: DISCONTINUED | OUTPATIENT
Start: 2020-04-17 | End: 2020-04-20 | Stop reason: SDUPTHER

## 2020-04-17 RX ORDER — SODIUM HYPOCHLORITE 5 MG/ML
SOLUTION TOPICAL 2 TIMES DAILY
Status: DISCONTINUED | OUTPATIENT
Start: 2020-04-17 | End: 2020-04-20

## 2020-04-17 RX ORDER — MORPHINE SULFATE 4 MG/ML
4 INJECTION, SOLUTION INTRAMUSCULAR; INTRAVENOUS ONCE
Status: COMPLETED | OUTPATIENT
Start: 2020-04-17 | End: 2020-04-17

## 2020-04-17 RX ORDER — GABAPENTIN 100 MG/1
100 CAPSULE ORAL 3 TIMES DAILY
Status: DISCONTINUED | OUTPATIENT
Start: 2020-04-17 | End: 2020-04-17

## 2020-04-17 RX ORDER — AMIODARONE HYDROCHLORIDE 200 MG/1
200 TABLET ORAL DAILY
Status: DISCONTINUED | OUTPATIENT
Start: 2020-04-17 | End: 2020-04-22 | Stop reason: HOSPADM

## 2020-04-17 RX ORDER — ACETAMINOPHEN 325 MG/1
650 TABLET ORAL EVERY 6 HOURS PRN
Status: DISCONTINUED | OUTPATIENT
Start: 2020-04-17 | End: 2020-04-22 | Stop reason: HOSPADM

## 2020-04-17 RX ORDER — DEXTROSE MONOHYDRATE 50 MG/ML
100 INJECTION, SOLUTION INTRAVENOUS PRN
Status: DISCONTINUED | OUTPATIENT
Start: 2020-04-17 | End: 2020-04-22 | Stop reason: HOSPADM

## 2020-04-17 RX ORDER — ASPIRIN 81 MG/1
81 TABLET, CHEWABLE ORAL DAILY
Status: DISCONTINUED | OUTPATIENT
Start: 2020-04-17 | End: 2020-04-22 | Stop reason: HOSPADM

## 2020-04-17 RX ORDER — PROMETHAZINE HYDROCHLORIDE 25 MG/1
12.5 TABLET ORAL EVERY 6 HOURS PRN
Status: DISCONTINUED | OUTPATIENT
Start: 2020-04-17 | End: 2020-04-20 | Stop reason: SDUPTHER

## 2020-04-17 RX ADMIN — METOPROLOL TARTRATE 25 MG: 25 TABLET ORAL at 20:36

## 2020-04-17 RX ADMIN — GABAPENTIN 200 MG: 100 CAPSULE ORAL at 10:39

## 2020-04-17 RX ADMIN — AMIODARONE HYDROCHLORIDE 200 MG: 200 TABLET ORAL at 10:34

## 2020-04-17 RX ADMIN — PIPERACILLIN AND TAZOBACTAM 3.38 G: 3; .375 INJECTION, POWDER, FOR SOLUTION INTRAVENOUS at 16:08

## 2020-04-17 RX ADMIN — SENNOSIDES AND DOCUSATE SODIUM 1 TABLET: 8.6; 5 TABLET ORAL at 10:34

## 2020-04-17 RX ADMIN — MORPHINE SULFATE 3 MG: 4 INJECTION, SOLUTION INTRAMUSCULAR; INTRAVENOUS at 20:35

## 2020-04-17 RX ADMIN — MORPHINE SULFATE 3 MG: 4 INJECTION, SOLUTION INTRAMUSCULAR; INTRAVENOUS at 23:57

## 2020-04-17 RX ADMIN — GADOTERIDOL 20 ML: 279.3 INJECTION, SOLUTION INTRAVENOUS at 12:10

## 2020-04-17 RX ADMIN — GABAPENTIN 200 MG: 100 CAPSULE ORAL at 20:36

## 2020-04-17 RX ADMIN — LISINOPRIL 5 MG: 5 TABLET ORAL at 10:34

## 2020-04-17 RX ADMIN — PIPERACILLIN AND TAZOBACTAM 3.38 G: 3; .375 INJECTION, POWDER, FOR SOLUTION INTRAVENOUS at 09:37

## 2020-04-17 RX ADMIN — VANCOMYCIN HYDROCHLORIDE 1250 MG: 5 INJECTION, POWDER, LYOPHILIZED, FOR SOLUTION INTRAVENOUS at 20:35

## 2020-04-17 RX ADMIN — Medication: at 12:47

## 2020-04-17 RX ADMIN — GABAPENTIN 200 MG: 100 CAPSULE ORAL at 13:04

## 2020-04-17 RX ADMIN — ACETAMINOPHEN 650 MG: 325 TABLET ORAL at 06:29

## 2020-04-17 RX ADMIN — ALOGLIPTIN 12.5 MG: 12.5 TABLET, FILM COATED ORAL at 10:34

## 2020-04-17 RX ADMIN — CLOPIDOGREL BISULFATE 75 MG: 75 TABLET ORAL at 10:34

## 2020-04-17 RX ADMIN — METOPROLOL TARTRATE 25 MG: 25 TABLET ORAL at 10:34

## 2020-04-17 RX ADMIN — ATORVASTATIN CALCIUM 40 MG: 40 TABLET, FILM COATED ORAL at 20:36

## 2020-04-17 RX ADMIN — ONDANSETRON 4 MG: 2 INJECTION INTRAMUSCULAR; INTRAVENOUS at 05:46

## 2020-04-17 RX ADMIN — IOPAMIDOL 80 ML: 755 INJECTION, SOLUTION INTRAVENOUS at 07:25

## 2020-04-17 RX ADMIN — INSULIN GLARGINE 30 UNITS: 100 INJECTION, SOLUTION SUBCUTANEOUS at 20:36

## 2020-04-17 RX ADMIN — SODIUM CHLORIDE, PRESERVATIVE FREE 10 ML: 5 INJECTION INTRAVENOUS at 09:36

## 2020-04-17 RX ADMIN — SENNOSIDES AND DOCUSATE SODIUM 1 TABLET: 8.6; 5 TABLET ORAL at 20:35

## 2020-04-17 RX ADMIN — MORPHINE SULFATE 3 MG: 4 INJECTION, SOLUTION INTRAMUSCULAR; INTRAVENOUS at 09:29

## 2020-04-17 RX ADMIN — INSULIN LISPRO 2 UNITS: 100 INJECTION, SOLUTION INTRAVENOUS; SUBCUTANEOUS at 17:23

## 2020-04-17 RX ADMIN — HYDROCODONE BITARTRATE AND ACETAMINOPHEN 1 TABLET: 5; 325 TABLET ORAL at 13:23

## 2020-04-17 RX ADMIN — SODIUM CHLORIDE 1000 ML: 9 INJECTION, SOLUTION INTRAVENOUS at 05:47

## 2020-04-17 RX ADMIN — INSULIN LISPRO 2 UNITS: 100 INJECTION, SOLUTION INTRAVENOUS; SUBCUTANEOUS at 12:57

## 2020-04-17 RX ADMIN — VANCOMYCIN HYDROCHLORIDE 1250 MG: 5 INJECTION, POWDER, LYOPHILIZED, FOR SOLUTION INTRAVENOUS at 12:47

## 2020-04-17 RX ADMIN — ENOXAPARIN SODIUM 40 MG: 40 INJECTION SUBCUTANEOUS at 10:40

## 2020-04-17 RX ADMIN — MORPHINE SULFATE 3 MG: 4 INJECTION, SOLUTION INTRAMUSCULAR; INTRAVENOUS at 17:29

## 2020-04-17 RX ADMIN — MORPHINE SULFATE 4 MG: 4 INJECTION, SOLUTION INTRAMUSCULAR; INTRAVENOUS at 05:47

## 2020-04-17 RX ADMIN — ASPIRIN 81 MG 81 MG: 81 TABLET ORAL at 10:34

## 2020-04-17 ASSESSMENT — ENCOUNTER SYMPTOMS
PHOTOPHOBIA: 0
EYE REDNESS: 0
SORE THROAT: 0
VOMITING: 0
SHORTNESS OF BREATH: 0
ABDOMINAL DISTENTION: 0
ABDOMINAL PAIN: 0
EYE PAIN: 0
WHEEZING: 0
BACK PAIN: 1
STRIDOR: 0
CONSTIPATION: 0
RHINORRHEA: 0
CHEST TIGHTNESS: 0
DIARRHEA: 0
NAUSEA: 0
COUGH: 0
EYE ITCHING: 0
EYE DISCHARGE: 0

## 2020-04-17 ASSESSMENT — PAIN SCALES - GENERAL
PAINLEVEL_OUTOF10: 0
PAINLEVEL_OUTOF10: 10
PAINLEVEL_OUTOF10: 6
PAINLEVEL_OUTOF10: 7
PAINLEVEL_OUTOF10: 4
PAINLEVEL_OUTOF10: 7
PAINLEVEL_OUTOF10: 5
PAINLEVEL_OUTOF10: 5
PAINLEVEL_OUTOF10: 7
PAINLEVEL_OUTOF10: 8
PAINLEVEL_OUTOF10: 10
PAINLEVEL_OUTOF10: 4
PAINLEVEL_OUTOF10: 7

## 2020-04-17 ASSESSMENT — PAIN DESCRIPTION - PAIN TYPE
TYPE: CHRONIC PAIN;ACUTE PAIN
TYPE: CHRONIC PAIN
TYPE: CHRONIC PAIN;ACUTE PAIN
TYPE: ACUTE PAIN

## 2020-04-17 ASSESSMENT — PAIN DESCRIPTION - PROGRESSION: CLINICAL_PROGRESSION: GRADUALLY WORSENING

## 2020-04-17 ASSESSMENT — PAIN - FUNCTIONAL ASSESSMENT: PAIN_FUNCTIONAL_ASSESSMENT: PREVENTS OR INTERFERES SOME ACTIVE ACTIVITIES AND ADLS

## 2020-04-17 ASSESSMENT — PAIN DESCRIPTION - LOCATION
LOCATION: BACK

## 2020-04-17 ASSESSMENT — PAIN DESCRIPTION - ORIENTATION
ORIENTATION: OTHER (COMMENT)
ORIENTATION: LOWER;LEFT
ORIENTATION: LOWER

## 2020-04-17 ASSESSMENT — PAIN DESCRIPTION - ONSET: ONSET: ON-GOING

## 2020-04-17 ASSESSMENT — PAIN DESCRIPTION - FREQUENCY
FREQUENCY: CONTINUOUS

## 2020-04-17 ASSESSMENT — PAIN DESCRIPTION - DESCRIPTORS
DESCRIPTORS: BURNING;NUMBNESS;TINGLING
DESCRIPTORS: ACHING
DESCRIPTORS: BURNING;NUMBNESS;TINGLING

## 2020-04-17 NOTE — CONSULTS
[facility] [address]156 RECORD NUMBER:  [id number]  AGE: 63 y.o. GENDER: male  : 1960  EPISODE DATE:  2020         Chief Complaint   Patient presents with    Back Pain         HISTORY of PRESENT ILLNESS HPI     Derral Leventhal is a 63 y.o. male who is well known to our practice. Patient has a significant past medical history of DM, CAD, HTN, liver disease, hyperlipidemia. Patient was admitted due to worsening back pain this Cristobal Gray has been following with our practice here in the wound care clinic for bilateral wounds. He currently has a right below knee cast intact and a right hallux wound. He had a left foot TMA on 3/3/2020 and the wound has dehisced. A/A culture taken today. Patient on Vancomycin and Zoysn. WBC 14.9 today. He has been putting betadine on his right hallux and silver alginate on his left TMA site. He denies any pain to bilateral feet and denies any f/c/n/v at this time. Back pain is his only complaint at this time. Will get arterial dopplers of LE's for baseline blood flow. May need surgical debridement of left TMA site.         PAST MEDICAL HISTORY        Diagnosis Date    Arthritis     CAD (coronary artery disease)     Diabetes mellitus ([facility]     Hyperlipidemia     Hypertension     Liver disease     HEPITITIS AS A CHILD       PAST SURGICAL HISTORY    Past Surgical History:   Procedure Laterality Date    CARDIAC SURGERY  2019    triple bypass    CYST REMOVAL      RIGHT ANKLE     FOOT SURGERY Right     CYST REMOVED    TOE AMPUTATION Left 3/3/2020    I&D LEFT FOOT, TRANSMETATARSAL AMPUTATION performed by Katherine Gupta DPM at [facility]    Family History   Problem Relation Age of Onset    Diabetes Mother     Heart Disease Father        SOCIAL HISTORY    Social History     Tobacco Use    Smoking status: Former Smoker     Types: Cigarettes     Last attempt to

## 2020-04-17 NOTE — ED PROVIDER NOTES
Neurological: Positive for dizziness and weakness. Negative for tremors, syncope and headaches. Psychiatric/Behavioral: Negative for agitation, behavioral problems, confusion, self-injury, sleep disturbance and suicidal ideas. PAST MEDICAL HISTORY    has a past medical history of Arthritis, CAD (coronary artery disease), Diabetes mellitus ([facility] [address] Hyperlipidemia, Hypertension, and Liver disease. SURGICAL HISTORY      has a past surgical history that includes Cardiac surgery (2019); Foot surgery (Right); Tonsillectomy; cyst removal; and Toe amputation (Left, 3/3/2020). CURRENT MEDICATIONS       Previous Medications    AMIODARONE (CORDARONE) 200 MG TABLET    Take 1 tablet by mouth daily    ASPIRIN 81 MG TABLET    Take 81 mg by mouth daily    ATORVASTATIN (LIPITOR) 40 MG TABLET    Take 1 tablet by mouth nightly    CLOPIDOGREL (PLAVIX) 75 MG TABLET    Take 1 tablet by mouth daily    GABAPENTIN (NEURONTIN) 100 MG CAPSULE    Take 100 mg by mouth 3 times daily. HYDROCODONE-ACETAMINOPHEN (NORCO) 5-325 MG PER TABLET    Take 1 tablet by mouth every 6 hours as needed for Pain. INSULIN DETEMIR (LEVEMIR FLEXPEN SC)    Inject 44 Units into the skin daily    INSULIN LISPRO (HUMALOG) 100 UNIT/ML INJECTION VIAL    Inject into the skin 2 times daily as needed for High Blood Sugar Per sliding scale    LINAGLIPTIN (TRADJENTA) 5 MG TABLET    Take 5 mg by mouth daily    LISINOPRIL (PRINIVIL;ZESTRIL) 5 MG TABLET    Take 1 tablet by mouth daily    METOPROLOL TARTRATE (LOPRESSOR) 25 MG TABLET    Take 1 tablet by mouth 2 times daily    MULTIPLE VITAMINS-MINERALS (MULTIVITAMIN PO)    Take by mouth 2 times daily       ALLERGIES     has No Known Allergies. FAMILY HISTORY     He indicated that his mother is alive. He indicated that his father is . family history includes Diabetes in his mother; Heart Disease in his father. SOCIAL HISTORY      reports that he quit smoking about 12 years ago.  His smoking use included cigarettes. He has never used smokeless tobacco. He reports previous alcohol use. He reports that he does not use drugs. PHYSICAL EXAM     INITIAL VITALS:  height is 6' 2\" (1.88 m) and weight is 300 lb (136.1 kg). His oral temperature is 100.4 °F (38 °C). His blood pressure is 157/87 (abnormal) and his pulse is 92. His respiration is 16 and oxygen saturation is 99%. Physical Exam  Vitals signs and nursing note reviewed. Constitutional:       Appearance: He is well-developed. He is not diaphoretic. HENT:      Head: Normocephalic and atraumatic. Nose: Nose normal.   Eyes:      General: No scleral icterus. Right eye: No discharge. Left eye: No discharge. Conjunctiva/sclera: Conjunctivae normal.      Pupils: Pupils are equal, round, and reactive to light. Neck:      Musculoskeletal: Normal range of motion and neck supple. Vascular: No JVD. Trachea: No tracheal deviation. Cardiovascular:      Rate and Rhythm: Normal rate and regular rhythm. Heart sounds: Normal heart sounds. No murmur. No friction rub. No gallop. Pulmonary:      Effort: Pulmonary effort is normal. No respiratory distress. Breath sounds: Normal breath sounds. No stridor. No wheezing or rales. Chest:      Chest wall: No tenderness. Abdominal:      General: Bowel sounds are normal. There is no distension. Palpations: Abdomen is soft. There is no mass. Tenderness: There is no abdominal tenderness. There is no guarding or rebound. Hernia: No hernia is present. Musculoskeletal:         General: Tenderness present. No deformity. Comments: RLE is short leg cast.   Left lower leg in ace wraps. Lower back tenderness with both side severe radicular pain during SLR tests. Lymphadenopathy:      Cervical: No cervical adenopathy. Skin:     General: Skin is warm and dry. Capillary Refill: Capillary refill takes less than 2 seconds.       Coloration: Skin is portions of this note were completed with a voice recognition program.  Efforts were made to edit the dictations but occasionally words aremis-transcribed.)    MD Shayna Chen MD  04/17/20 [id number]

## 2020-04-17 NOTE — ED NOTES
Pt arrives to the ED for severe 10/10 back pain that began when he woke up this morning. Pt states he did not fall, twist or move wrong. Pt states he did not do anything to exasperate the issue. Pt states he has never had chronic back pain. Pt right leg is in a cast bad pt left leg has wraps due to toe amputation. Pt is a diabetic. Pt has a low grade fever at this time. Pt vitals are stable. Pt respirations are even and unlabored. Pt has no other complaints of pain, SOB, urinary symptoms, headache, or n/v.       Iam Chaudhary RN  04/17/20 [id number]

## 2020-04-17 NOTE — CONSULTS
Oral, BID  sodium chloride flush 0.9 % injection 10 mL, 10 mL, Intravenous, 2 times per day  sodium chloride flush 0.9 % injection 10 mL, 10 mL, Intravenous, PRN  acetaminophen (TYLENOL) tablet 650 mg, 650 mg, Oral, Q6H PRN **OR** acetaminophen (TYLENOL) suppository 650 mg, 650 mg, Rectal, Q6H PRN  polyethylene glycol (GLYCOLAX) packet 17 g, 17 g, Oral, Daily PRN  promethazine (PHENERGAN) tablet 12.5 mg, 12.5 mg, Oral, Q6H PRN **OR** ondansetron (ZOFRAN) injection 4 mg, 4 mg, Intravenous, Q6H PRN  sennosides-docusate sodium (SENOKOT-S) 8.6-50 MG tablet 1 tablet, 1 tablet, Oral, BID  enoxaparin (LOVENOX) injection 40 mg, 40 mg, Subcutaneous, Q24H  lidocaine 4 % external patch 1 patch, 1 patch, Transdermal, Daily  morphine injection 3 mg, 3 mg, Intravenous, Q3H PRN  oxyCODONE-acetaminophen (PERCOCET) 5-325 MG per tablet 1 tablet, 1 tablet, Oral, Q6H PRN  insulin lispro (HUMALOG) injection vial 0-12 Units, 0-12 Units, Subcutaneous, TID WC  insulin lispro (HUMALOG) injection vial 0-6 Units, 0-6 Units, Subcutaneous, Nightly  gabapentin (NEURONTIN) capsule 200 mg, 200 mg, Oral, TID  cefTRIAXone (ROCEPHIN) 1 g IVPB in 50 mL D5W minibag, 1 g, Intravenous, Q24H  vancomycin (VANCOCIN) intermittent dosing (placeholder), , Other, RX Placeholder  vancomycin (VANCOCIN) 1,250 mg in dextrose 5 % 250 mL IVPB, 1,250 mg, Intravenous, Q8H  sodium hypochloriteth) (DAKINS) external solution, , Irrigation, BID    Allergies:  Patient has no known allergies. Social History:    reports that he quit smoking about 12 years ago. His smoking use included cigarettes. He has never used smokeless tobacco. He reports previous alcohol use. He reports that he does not use drugs. Family History:       Problem Relation Age of Onset    Diabetes Mother     Heart Disease Father        REVIEW OF SYSTEMS:    Constitutional: Negative for fever, chills, and sudden weight loss or gain.   HEENT: Negative for blurry/double vision, ears, nose, or throat pain.  Negative for mass. Respiratory: Negative for shortness of breath or hemoptysis. Cardiovascular: Negative for angina, palpitations, or lower extremity edema. Gastrointestinal: Negative for nausea, vomiting, diarrhea, constipation, or abdominal pain. Genitourinary: Negative for increased urination, burning with urination, or hematuria. Musculoskeletal: See above HPI. Integument: See above HPI. Hematology: Negative for easy bruising or easy bleeding. Physical Examination:  General: Awake, alert, and oriented. In no acute distress. Resting in bed. HEENT: Atraumatic, normocephalic. Respiratory: CTA. No rales, rhonchi, or wheezing. Cardiovascular: RRR. Normal S1, S2.  Abdomen: Soft, non-tender, non-distended. Bowel sounds present x4 quadrants. Focused Lower Extremity Examination:    Vitals:    BP (!) 176/89   Pulse 90   Temp 98.8 °F (37.1 °C) (Oral)   Resp 20   Ht 6' 2\" (1.88 m)   Wt (!) 309 lb 12.8 oz (140.5 kg)   SpO2 100%   BMI 39.78 kg/m²      Dermatologic: full thickness ulcer to the distal aspect of the right hallux with granular base and hyperkeratotic margins. No surrounding erythema or edema. No purulence or malodor. Wound does not probe to bone, track, or undermine. Surgical dehiscence noted to the left TMA site laterally and medially with fibrotic/necrotic base with macerated margins. Surrounding skin is erythematous. No purulence noted. Malodor noted. Wound probes to bone. Vascular: Dorsalis pedis and posterior tibial pulses are non palpable bilaterally. CFT brisk to TMA stump. Neurovascular: light touch and protective sensation absent to bilateral feet     Musculoskeletal: no pain to palpation. Left foot TMA. Right foot and ankle in rectus position. Cast intact.                    STUDIES:    CULTURES:    LABS:   Recent Labs     04/17/20  0600   WBC 14.9*   HGB 11.1*   HCT 34.2*           Recent Labs     04/17/20  0600      K 4.4   CL 98   CO2 19*

## 2020-04-17 NOTE — PROGRESS NOTES
Pharmacy Note  Vancomycin Consult    Jayden Eli is a 63 y.o. male started on Vancomycin for possible epidural abscess; consult received from Dr. Sumaya Martinez to manage therapy. Also receiving the following antibiotics: ceftriaxone. 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 Temp max: 100.4    Recent Labs     04/17/20  0600   BUN 16       Recent Labs     04/17/20  0600   CREATININE 0.9       Recent Labs     04/17/20  0600   WBC 14.9*         Intake/Output Summary (Last 24 hours) at 4/17/2020 1010  Last data filed at 4/17/2020 1005  Gross per 24 hour   Intake --   Output 800 ml   Net -800 ml       Culture Date Source Results   4/17/2020 Blood x 2  pending                         Ht Readings from Last 1 Encounters:   04/17/20 6' 2\" (1.88 m)        Wt Readings from Last 1 Encounters:   04/17/20 (!) 309 lb 12.8 oz (140.5 kg)         Body mass index is 39.78 kg/m². Estimated Creatinine Clearance: 134 mL/min (based on SCr of 0.9 mg/dL). Goal Trough Level: 15-20 mcg/mL    Assessment/Plan:  Will initiate Vancomycin 1250 mg IV every 8 hours. Timing of trough level will be determined based on culture results, renal function, and clinical response. Will plan to check trough level prior to the 4th dose tomorrow at 1145    Thank you for the consult. Will continue to follow.      Alysa Albright, PharmD, BCPS 4/17/2020 10:22 AM

## 2020-04-17 NOTE — H&P
was given morphine 4 mg IV and Tylenol which help with his pain. He did have a low-grade fever of 100.4. Blood cultures were obtained and he was given a one-time dose of Zosyn and vancomycin. Urinalysis was ordered but is pending at this time. CRP is elevated at 14.47, sed rate is 130 and he has a leukocytosis of 14.9, with increased segs. CT of the abdomen pelvis with IV contrast showed no acute abnormalities, CT of the lumbar spine showed severe left and moderate right foraminal stenosis at L5-S1 with diffusely bulging disc, severe spinal canal stenosis at L4-5 with bilateral foraminal stenosis, and moderate to severe spinal canal stenosis at L3-4 with bilateral foraminal stenosis. Past Medical History:          Diagnosis Date    Arthritis     CAD (coronary artery disease)     Diabetes mellitus ([facility] [address]     Hyperlipidemia     Hypertension     Liver disease     HEPITITIS AS A CHILD       Past Surgical History:          Procedure Laterality Date    CARDIAC SURGERY  11/2019    triple bypass    CYST REMOVAL      RIGHT ANKLE     FOOT SURGERY Right     CYST REMOVED    TOE AMPUTATION Left 3/3/2020    I&D LEFT FOOT, TRANSMETATARSAL AMPUTATION performed by Danielle Fernandez DPM at [facility]         Medications Prior to Admission:      Prior to Admission medications    Medication Sig Start Date End Date Taking?  Authorizing Provider   amiodarone (CORDARONE) 200 MG tablet Take 1 tablet by mouth daily 4/9/20  Yes Joselito Yang MD   atorvastatin (LIPITOR) 40 MG tablet Take 1 tablet by mouth nightly 4/9/20  Yes Joselito Yang MD   clopidogrel (PLAVIX) 75 MG tablet Take 1 tablet by mouth daily 4/9/20  Yes Joselito Yang MD   lisinopril (PRINIVIL;ZESTRIL) 5 MG tablet Take 1 tablet by mouth daily 4/9/20  Yes Joselito Yang MD   metoprolol tartrate (LOPRESSOR) 25 MG tablet Take 1 tablet by mouth 2 times daily 4/9/20  Yes Joselito Yang MD   aspirin 81 MG tablet Take 81 mg by mouth daily   Yes process. **This report has been created using voice recognition software. It may contain minor errors which are inherent in voice recognition technology. **      Final report electronically signed by Dr. Kathie Lee on 4/17/2020 8:29 AM      CT ABDOMEN PELVIS W IV CONTRAST Additional Contrast? None   Final Result    No evidence of an acute process in the abdomen or pelvis. **This report has been created using voice recognition software. It may contain minor errors which are inherent in voice recognition technology. **      Final report electronically signed by Dr. Kathie Lee on 4/17/2020 8:00 AM      CT LUMBAR RECONSTRUCTION WO POST PROCESS   Final Result       1. Severe left and moderate severity right foraminal stenosis at the L5-S1 level due to a diffusely bulging disc. 2. Severe spinal canal stenosis at the L4-5 level with bilateral foraminal stenosis. 3. Moderate to severe spinal canal stenosis at the L3-4 level with bilateral foraminal stenosis. **This report has been created using voice recognition software. It may contain minor errors which are inherent in voice recognition technology. **      Final report electronically signed by Dr. Kathie Lee on 4/17/2020 8:08 AM      MRI LUMBAR SPINE W [address]    (Results Pending)            DVT prophylaxis: [x] Lovenox                                 [] SCDs                                 [] SQ Heparin                                 [] Encourage ambulation           [] Already on Anticoagulation    Code Status: Full Code      PT/OT Eval Status: tbd    Disposition:    [] Home       [] TCU       [] Rehab       [] Psych       [] SNF       [] [place]       [] Other-      ASSESSMENT/PLAN:    Active Hospital Problems    Diagnosis Date Noted    Sepsis ([facility] [address] [A41.9] 04/17/2020         1. Sepsis: with leukocytosis, fever, and tachycardia.   Possible source prior foot wound (unable to view at this time) vs epidural abscess (back pain) vs UTI (burning with urination). Lactic acid is not elevated. There is no need for aggressive IVF resuscitation at this time. 1. Podiatry consulted for further recommendations on foot wounds  2. UA ordered, will follow  3. Vancomycin and Zosyn will be continued (received one-time Vanco and Zosyn in the ED) to cover for MRSA, GN and anaerobes (recent wet gangrene left foot s/p surgery). 4. Follow on MRI  5. Follow blood cultures. Wound cultures from left foot wound obtained. 2. Acute Low Back Pain / Herniated Disc: per Radiology report, \"1. Severe left and moderate severity right foraminal stenosis at the L5-S1 level due to a diffusely bulging disc. 2. Severe spinal canal stenosis at the L4-5 level with bilateral foraminal stenosis. 3. Moderate to severe spinal canal stenosis at the L3-4 level with bilateral foraminal stenosis. \" . There is no evidence of cauda-equina, strength is intact, and limited sensory exam is wnl.    1. Rule out abscess/infection with elevated inflammatory markers, low grade fever. Recent surgery on left foot and right toe wound are possible sources. 2. Contacted Ortho-Spine. Will obtain MRI w/wout contrast, they will follow on results. Conservative management likely if no infection and just herniated disc  3. Morphine IV and PO percocet prn. Lidoderm patches. Bowel regimen. Increase Neurontin to 200mg TID  4. Hold off on NSAIDs since he just got contrast  5. Neurochecks q4hr, monitor I/O as evidence of retention  3. Wound Right Hallux, Nonhealing / Recent Left TMA (3/3/20) for wet gangrene left foot. 1. Podiatry consult to assist  2. Continue Unna boot/cast per prior recommendations  3. Wound consult as well. 4. Chronic Problems  1. CAD s/p CABG x3v 11/2019. Continue ASA, plavix, statin, BB. If any surgery is needed, will need to consult cardiology to clear and be off of antiplatelets.    2. Carotid Stenosis (asymptomatic): per Cardiology notes, CEA was planned after CABG? Unable to find any records of CTA neck (apparently done at [facility]). 3. PAD: per arterial dopplers, about 50% stenosis of left Ant TA, however normal CHERYL. May need toe-brachial index vs CTA LE in future if ongoing concern. .   4. DM2: check A1C. Reduce home Lantus to 30 units  (takes Levemir 44 units every morning at home). Medium dose sliding scale, carb controlled diet and hypoglycemia protocol. 5. HTN: Continue home medications and will monitor. 6. HLD: cont statin  7. Charcot Arthropathy: noted        Thank you XOCHILT Christianson for the opportunity to be involved in this patient's care.     Electronically signed by Aashish Hoffman DO on 4/17/2020 at 7:58 AM

## 2020-04-18 ENCOUNTER — APPOINTMENT (OUTPATIENT)
Dept: INTERVENTIONAL RADIOLOGY/VASCULAR | Age: 60
DRG: 854 | End: 2020-04-18
Payer: COMMERCIAL

## 2020-04-18 PROBLEM — Z79.4 TYPE 2 DIABETES MELLITUS WITH INSULIN THERAPY (HCC): Status: ACTIVE | Noted: 2020-03-03

## 2020-04-18 LAB
ANION GAP SERPL CALCULATED.3IONS-SCNC: 13 MEQ/L (ref 8–16)
BUN BLDV-MCNC: 16 MG/DL (ref 7–22)
CALCIUM SERPL-MCNC: 9 MG/DL (ref 8.5–10.5)
CHLORIDE BLD-SCNC: 100 MEQ/L (ref 98–111)
CO2: 22 MEQ/L (ref 23–33)
CREAT SERPL-MCNC: 1 MG/DL (ref 0.4–1.2)
ERYTHROCYTE [DISTWIDTH] IN BLOOD BY AUTOMATED COUNT: 16 % (ref 11.5–14.5)
ERYTHROCYTE [DISTWIDTH] IN BLOOD BY AUTOMATED COUNT: 50.2 FL (ref 35–45)
GFR SERPL CREATININE-BSD FRML MDRD: 77 ML/MIN/1.73M2
GLUCOSE BLD-MCNC: 136 MG/DL (ref 70–108)
GLUCOSE BLD-MCNC: 145 MG/DL (ref 70–108)
GLUCOSE BLD-MCNC: 161 MG/DL (ref 70–108)
GLUCOSE BLD-MCNC: 207 MG/DL (ref 70–108)
GLUCOSE BLD-MCNC: 210 MG/DL (ref 70–108)
HCT VFR BLD CALC: 32.2 % (ref 42–52)
HEMOGLOBIN: 10.4 GM/DL (ref 14–18)
MCH RBC QN AUTO: 27.8 PG (ref 26–33)
MCHC RBC AUTO-ENTMCNC: 32.3 GM/DL (ref 32.2–35.5)
MCV RBC AUTO: 86.1 FL (ref 80–94)
PLATELET # BLD: 206 THOU/MM3 (ref 130–400)
PMV BLD AUTO: 11.2 FL (ref 9.4–12.4)
POTASSIUM REFLEX MAGNESIUM: 4.2 MEQ/L (ref 3.5–5.2)
RBC # BLD: 3.74 MILL/MM3 (ref 4.7–6.1)
SODIUM BLD-SCNC: 135 MEQ/L (ref 135–145)
VANCOMYCIN TROUGH: 13.6 UG/ML (ref 5–15)
WBC # BLD: 15.7 THOU/MM3 (ref 4.8–10.8)

## 2020-04-18 PROCEDURE — 2580000003 HC RX 258: Performed by: PHARMACIST

## 2020-04-18 PROCEDURE — 1200000000 HC SEMI PRIVATE

## 2020-04-18 PROCEDURE — 80202 ASSAY OF VANCOMYCIN: CPT

## 2020-04-18 PROCEDURE — 6360000002 HC RX W HCPCS: Performed by: INTERNAL MEDICINE

## 2020-04-18 PROCEDURE — 2580000003 HC RX 258: Performed by: INTERNAL MEDICINE

## 2020-04-18 PROCEDURE — 2709999900 HC NON-CHARGEABLE SUPPLY

## 2020-04-18 PROCEDURE — 94760 N-INVAS EAR/PLS OXIMETRY 1: CPT

## 2020-04-18 PROCEDURE — 6370000000 HC RX 637 (ALT 250 FOR IP): Performed by: FAMILY MEDICINE

## 2020-04-18 PROCEDURE — 93926 LOWER EXTREMITY STUDY: CPT

## 2020-04-18 PROCEDURE — 82948 REAGENT STRIP/BLOOD GLUCOSE: CPT

## 2020-04-18 PROCEDURE — 80048 BASIC METABOLIC PNL TOTAL CA: CPT

## 2020-04-18 PROCEDURE — 36415 COLL VENOUS BLD VENIPUNCTURE: CPT

## 2020-04-18 PROCEDURE — 6360000002 HC RX W HCPCS: Performed by: PHARMACIST

## 2020-04-18 PROCEDURE — 99233 SBSQ HOSP IP/OBS HIGH 50: CPT | Performed by: FAMILY MEDICINE

## 2020-04-18 PROCEDURE — 93010 ELECTROCARDIOGRAM REPORT: CPT | Performed by: INTERNAL MEDICINE

## 2020-04-18 PROCEDURE — 85027 COMPLETE CBC AUTOMATED: CPT

## 2020-04-18 PROCEDURE — 2580000003 HC RX 258: Performed by: FAMILY MEDICINE

## 2020-04-18 PROCEDURE — 6360000002 HC RX W HCPCS: Performed by: FAMILY MEDICINE

## 2020-04-18 PROCEDURE — 6370000000 HC RX 637 (ALT 250 FOR IP): Performed by: INTERNAL MEDICINE

## 2020-04-18 RX ORDER — CYCLOBENZAPRINE HCL 10 MG
10 TABLET ORAL 3 TIMES DAILY PRN
Status: DISCONTINUED | OUTPATIENT
Start: 2020-04-18 | End: 2020-04-22 | Stop reason: HOSPADM

## 2020-04-18 RX ORDER — HYDROCODONE BITARTRATE AND ACETAMINOPHEN 5; 325 MG/1; MG/1
1 TABLET ORAL EVERY 4 HOURS PRN
Status: DISCONTINUED | OUTPATIENT
Start: 2020-04-18 | End: 2020-04-21

## 2020-04-18 RX ORDER — INSULIN GLARGINE 100 [IU]/ML
44 INJECTION, SOLUTION SUBCUTANEOUS NIGHTLY
Status: DISCONTINUED | OUTPATIENT
Start: 2020-04-18 | End: 2020-04-22 | Stop reason: HOSPADM

## 2020-04-18 RX ORDER — HYDROCODONE BITARTRATE AND ACETAMINOPHEN 5; 325 MG/1; MG/1
2 TABLET ORAL EVERY 4 HOURS PRN
Status: DISCONTINUED | OUTPATIENT
Start: 2020-04-18 | End: 2020-04-21

## 2020-04-18 RX ORDER — CEFAZOLIN SODIUM 1 G/50ML
1 INJECTION, SOLUTION INTRAVENOUS EVERY 8 HOURS
Status: DISCONTINUED | OUTPATIENT
Start: 2020-04-18 | End: 2020-04-18

## 2020-04-18 RX ADMIN — GABAPENTIN 200 MG: 100 CAPSULE ORAL at 15:09

## 2020-04-18 RX ADMIN — ATORVASTATIN CALCIUM 40 MG: 40 TABLET, FILM COATED ORAL at 20:52

## 2020-04-18 RX ADMIN — ENOXAPARIN SODIUM 40 MG: 40 INJECTION SUBCUTANEOUS at 09:05

## 2020-04-18 RX ADMIN — DEXTROSE MONOHYDRATE 2 G: 50 INJECTION, SOLUTION INTRAVENOUS at 22:16

## 2020-04-18 RX ADMIN — ONDANSETRON 4 MG: 2 INJECTION INTRAMUSCULAR; INTRAVENOUS at 02:03

## 2020-04-18 RX ADMIN — MORPHINE SULFATE 3 MG: 4 INJECTION, SOLUTION INTRAMUSCULAR; INTRAVENOUS at 09:05

## 2020-04-18 RX ADMIN — ASPIRIN 81 MG 81 MG: 81 TABLET ORAL at 09:05

## 2020-04-18 RX ADMIN — ALOGLIPTIN 12.5 MG: 12.5 TABLET, FILM COATED ORAL at 09:05

## 2020-04-18 RX ADMIN — INSULIN LISPRO 4 UNITS: 100 INJECTION, SOLUTION INTRAVENOUS; SUBCUTANEOUS at 09:12

## 2020-04-18 RX ADMIN — CLOPIDOGREL BISULFATE 75 MG: 75 TABLET ORAL at 09:05

## 2020-04-18 RX ADMIN — PIPERACILLIN AND TAZOBACTAM 3.38 G: 3; .375 INJECTION, POWDER, FOR SOLUTION INTRAVENOUS at 00:49

## 2020-04-18 RX ADMIN — SENNOSIDES AND DOCUSATE SODIUM 1 TABLET: 8.6; 5 TABLET ORAL at 09:05

## 2020-04-18 RX ADMIN — HYDROCODONE BITARTRATE AND ACETAMINOPHEN 2 TABLET: 5; 325 TABLET ORAL at 22:17

## 2020-04-18 RX ADMIN — INSULIN LISPRO 2 UNITS: 100 INJECTION, SOLUTION INTRAVENOUS; SUBCUTANEOUS at 17:53

## 2020-04-18 RX ADMIN — METOPROLOL TARTRATE 25 MG: 25 TABLET ORAL at 09:06

## 2020-04-18 RX ADMIN — VANCOMYCIN HYDROCHLORIDE 1250 MG: 5 INJECTION, POWDER, LYOPHILIZED, FOR SOLUTION INTRAVENOUS at 04:37

## 2020-04-18 RX ADMIN — GABAPENTIN 200 MG: 100 CAPSULE ORAL at 20:52

## 2020-04-18 RX ADMIN — MORPHINE SULFATE 3 MG: 4 INJECTION, SOLUTION INTRAMUSCULAR; INTRAVENOUS at 13:48

## 2020-04-18 RX ADMIN — AMIODARONE HYDROCHLORIDE 200 MG: 200 TABLET ORAL at 09:05

## 2020-04-18 RX ADMIN — SODIUM CHLORIDE, PRESERVATIVE FREE 10 ML: 5 INJECTION INTRAVENOUS at 20:52

## 2020-04-18 RX ADMIN — HYDROCODONE BITARTRATE AND ACETAMINOPHEN 2 TABLET: 5; 325 TABLET ORAL at 11:28

## 2020-04-18 RX ADMIN — CEFAZOLIN SODIUM 1 G: 1 INJECTION, SOLUTION INTRAVENOUS at 13:49

## 2020-04-18 RX ADMIN — LISINOPRIL 5 MG: 5 TABLET ORAL at 09:05

## 2020-04-18 RX ADMIN — INSULIN GLARGINE 44 UNITS: 100 INJECTION, SOLUTION SUBCUTANEOUS at 20:53

## 2020-04-18 RX ADMIN — HYDROCODONE BITARTRATE AND ACETAMINOPHEN 2 TABLET: 5; 325 TABLET ORAL at 18:07

## 2020-04-18 RX ADMIN — PIPERACILLIN AND TAZOBACTAM 3.38 G: 3; .375 INJECTION, POWDER, FOR SOLUTION INTRAVENOUS at 09:33

## 2020-04-18 RX ADMIN — CYCLOBENZAPRINE 10 MG: 10 TABLET, FILM COATED ORAL at 09:05

## 2020-04-18 RX ADMIN — GABAPENTIN 200 MG: 100 CAPSULE ORAL at 09:05

## 2020-04-18 RX ADMIN — MORPHINE SULFATE 3 MG: 4 INJECTION, SOLUTION INTRAMUSCULAR; INTRAVENOUS at 20:37

## 2020-04-18 RX ADMIN — SODIUM CHLORIDE, PRESERVATIVE FREE 10 ML: 5 INJECTION INTRAVENOUS at 09:39

## 2020-04-18 RX ADMIN — MORPHINE SULFATE 3 MG: 4 INJECTION, SOLUTION INTRAMUSCULAR; INTRAVENOUS at 05:53

## 2020-04-18 RX ADMIN — MORPHINE SULFATE 3 MG: 4 INJECTION, SOLUTION INTRAMUSCULAR; INTRAVENOUS at 17:08

## 2020-04-18 RX ADMIN — INSULIN LISPRO 4 UNITS: 100 INJECTION, SOLUTION INTRAVENOUS; SUBCUTANEOUS at 12:46

## 2020-04-18 RX ADMIN — Medication: at 20:52

## 2020-04-18 RX ADMIN — METOPROLOL TARTRATE 25 MG: 25 TABLET ORAL at 22:22

## 2020-04-18 RX ADMIN — SENNOSIDES AND DOCUSATE SODIUM 1 TABLET: 8.6; 5 TABLET ORAL at 22:17

## 2020-04-18 RX ADMIN — CYCLOBENZAPRINE 10 MG: 10 TABLET, FILM COATED ORAL at 20:52

## 2020-04-18 ASSESSMENT — PAIN SCALES - GENERAL
PAINLEVEL_OUTOF10: 10
PAINLEVEL_OUTOF10: 0
PAINLEVEL_OUTOF10: 7
PAINLEVEL_OUTOF10: 7
PAINLEVEL_OUTOF10: 0
PAINLEVEL_OUTOF10: 4
PAINLEVEL_OUTOF10: 7
PAINLEVEL_OUTOF10: 8
PAINLEVEL_OUTOF10: 6
PAINLEVEL_OUTOF10: 5
PAINLEVEL_OUTOF10: 8

## 2020-04-18 NOTE — PROGRESS NOTES
**This report has been created using voice recognition software. It may contain minor errors which are inherent in voice recognition technology. **      Final report electronically signed by Dr. Clive Colunga on 4/17/2020 8:29 AM      CT ABDOMEN PELVIS W IV CONTRAST Additional Contrast? None   Final Result    No evidence of an acute process in the abdomen or pelvis. **This report has been created using voice recognition software. It may contain minor errors which are inherent in voice recognition technology. **      Final report electronically signed by Dr. Clive Colunga on 4/17/2020 8:00 AM      CT LUMBAR RECONSTRUCTION WO POST PROCESS   Final Result       1. Severe left and moderate severity right foraminal stenosis at the L5-S1 level due to a diffusely bulging disc. 2. Severe spinal canal stenosis at the L4-5 level with bilateral foraminal stenosis. 3. Moderate to severe spinal canal stenosis at the L3-4 level with bilateral foraminal stenosis. **This report has been created using voice recognition software. It may contain minor errors which are inherent in voice recognition technology. **      Final report electronically signed by Dr. Clive Colunga on 4/17/2020 8:08 AM      VL DUP LOWER EXTREMITY ARTERIES BILATERAL    (Results Pending)       Diet: DIET CARB CONTROL; Carb Control: 4 carb choices (60 gms)/meal    Beckett: no    Microbiology:  Blood cx 1 of 2 4/17/2020 = biofire with staph aureus likely MSSA    U/a 4/17 (-)    Left foot wound cx 4/17/2020 = heavy growth coag (+) staph    Tele review last 24 hrs: none    DVT prophylaxis: [x] Lovenox                                 [] SCDs                                 [] SQ Heparin                                 [] Encourage ambulation           [] Already on Anticoagulation     Disposition:    [x] Home??       [] TCU       [] Rehab       [] Psych       [] SNF       [] [place]       [] Other-    Code Status:

## 2020-04-19 LAB
ANION GAP SERPL CALCULATED.3IONS-SCNC: 11 MEQ/L (ref 8–16)
BASOPHILS # BLD: 0.5 %
BASOPHILS ABSOLUTE: 0.1 THOU/MM3 (ref 0–0.1)
BLOOD CULTURE, ROUTINE: ABNORMAL
BUN BLDV-MCNC: 21 MG/DL (ref 7–22)
CALCIUM SERPL-MCNC: 9 MG/DL (ref 8.5–10.5)
CHLORIDE BLD-SCNC: 98 MEQ/L (ref 98–111)
CO2: 24 MEQ/L (ref 23–33)
CREAT SERPL-MCNC: 1.1 MG/DL (ref 0.4–1.2)
EOSINOPHIL # BLD: 2 %
EOSINOPHILS ABSOLUTE: 0.2 THOU/MM3 (ref 0–0.4)
ERYTHROCYTE [DISTWIDTH] IN BLOOD BY AUTOMATED COUNT: 15.9 % (ref 11.5–14.5)
ERYTHROCYTE [DISTWIDTH] IN BLOOD BY AUTOMATED COUNT: 50.7 FL (ref 35–45)
GFR SERPL CREATININE-BSD FRML MDRD: 69 ML/MIN/1.73M2
GLUCOSE BLD-MCNC: 130 MG/DL (ref 70–108)
GLUCOSE BLD-MCNC: 132 MG/DL (ref 70–108)
GLUCOSE BLD-MCNC: 134 MG/DL (ref 70–108)
GLUCOSE BLD-MCNC: 154 MG/DL (ref 70–108)
GLUCOSE BLD-MCNC: 173 MG/DL (ref 70–108)
GLUCOSE BLD-MCNC: 178 MG/DL (ref 70–108)
HCT VFR BLD CALC: 31 % (ref 42–52)
HEMOGLOBIN: 9.7 GM/DL (ref 14–18)
IMMATURE GRANS (ABS): 0.07 THOU/MM3 (ref 0–0.07)
IMMATURE GRANULOCYTES: 0.6 %
LYMPHOCYTES # BLD: 8.9 %
LYMPHOCYTES ABSOLUTE: 1.1 THOU/MM3 (ref 1–4.8)
MCH RBC QN AUTO: 27.4 PG (ref 26–33)
MCHC RBC AUTO-ENTMCNC: 31.3 GM/DL (ref 32.2–35.5)
MCV RBC AUTO: 87.6 FL (ref 80–94)
MONOCYTES # BLD: 11.4 %
MONOCYTES ABSOLUTE: 1.4 THOU/MM3 (ref 0.4–1.3)
NUCLEATED RED BLOOD CELLS: 0 /100 WBC
ORGANISM: ABNORMAL
ORGANISM: ABNORMAL
PLATELET # BLD: 223 THOU/MM3 (ref 130–400)
PMV BLD AUTO: 11.1 FL (ref 9.4–12.4)
POTASSIUM SERPL-SCNC: 4.1 MEQ/L (ref 3.5–5.2)
RBC # BLD: 3.54 MILL/MM3 (ref 4.7–6.1)
SEG NEUTROPHILS: 76.6 %
SEGMENTED NEUTROPHILS ABSOLUTE COUNT: 9.3 THOU/MM3 (ref 1.8–7.7)
SODIUM BLD-SCNC: 133 MEQ/L (ref 135–145)
WBC # BLD: 12.1 THOU/MM3 (ref 4.8–10.8)

## 2020-04-19 PROCEDURE — 6360000002 HC RX W HCPCS: Performed by: FAMILY MEDICINE

## 2020-04-19 PROCEDURE — 1200000000 HC SEMI PRIVATE

## 2020-04-19 PROCEDURE — 6370000000 HC RX 637 (ALT 250 FOR IP): Performed by: INTERNAL MEDICINE

## 2020-04-19 PROCEDURE — 99232 SBSQ HOSP IP/OBS MODERATE 35: CPT | Performed by: FAMILY MEDICINE

## 2020-04-19 PROCEDURE — 6370000000 HC RX 637 (ALT 250 FOR IP): Performed by: FAMILY MEDICINE

## 2020-04-19 PROCEDURE — 6360000002 HC RX W HCPCS: Performed by: INTERNAL MEDICINE

## 2020-04-19 PROCEDURE — 2580000003 HC RX 258: Performed by: INTERNAL MEDICINE

## 2020-04-19 PROCEDURE — 80048 BASIC METABOLIC PNL TOTAL CA: CPT

## 2020-04-19 PROCEDURE — 2709999900 HC NON-CHARGEABLE SUPPLY

## 2020-04-19 PROCEDURE — 36415 COLL VENOUS BLD VENIPUNCTURE: CPT

## 2020-04-19 PROCEDURE — 2580000003 HC RX 258: Performed by: FAMILY MEDICINE

## 2020-04-19 PROCEDURE — 82948 REAGENT STRIP/BLOOD GLUCOSE: CPT

## 2020-04-19 PROCEDURE — 85025 COMPLETE CBC W/AUTO DIFF WBC: CPT

## 2020-04-19 RX ORDER — LIDOCAINE 4 G/G
3 PATCH TOPICAL DAILY
Status: DISCONTINUED | OUTPATIENT
Start: 2020-04-20 | End: 2020-04-22 | Stop reason: HOSPADM

## 2020-04-19 RX ORDER — DOCUSATE SODIUM 100 MG/1
100 CAPSULE, LIQUID FILLED ORAL 2 TIMES DAILY
Status: DISCONTINUED | OUTPATIENT
Start: 2020-04-19 | End: 2020-04-22 | Stop reason: HOSPADM

## 2020-04-19 RX ADMIN — CYCLOBENZAPRINE 10 MG: 10 TABLET, FILM COATED ORAL at 08:47

## 2020-04-19 RX ADMIN — DEXTROSE MONOHYDRATE 2 G: 50 INJECTION, SOLUTION INTRAVENOUS at 03:41

## 2020-04-19 RX ADMIN — DOCUSATE SODIUM 100 MG: 100 CAPSULE, LIQUID FILLED ORAL at 21:00

## 2020-04-19 RX ADMIN — GABAPENTIN 200 MG: 100 CAPSULE ORAL at 08:50

## 2020-04-19 RX ADMIN — SODIUM CHLORIDE, PRESERVATIVE FREE 10 ML: 5 INJECTION INTRAVENOUS at 20:50

## 2020-04-19 RX ADMIN — HYDROCODONE BITARTRATE AND ACETAMINOPHEN 2 TABLET: 5; 325 TABLET ORAL at 12:24

## 2020-04-19 RX ADMIN — MORPHINE SULFATE 3 MG: 4 INJECTION, SOLUTION INTRAMUSCULAR; INTRAVENOUS at 00:58

## 2020-04-19 RX ADMIN — GABAPENTIN 200 MG: 100 CAPSULE ORAL at 13:58

## 2020-04-19 RX ADMIN — HYDROCODONE BITARTRATE AND ACETAMINOPHEN 2 TABLET: 5; 325 TABLET ORAL at 08:47

## 2020-04-19 RX ADMIN — ALOGLIPTIN 12.5 MG: 12.5 TABLET, FILM COATED ORAL at 08:50

## 2020-04-19 RX ADMIN — METOPROLOL TARTRATE 25 MG: 25 TABLET ORAL at 08:50

## 2020-04-19 RX ADMIN — CYCLOBENZAPRINE 10 MG: 10 TABLET, FILM COATED ORAL at 17:00

## 2020-04-19 RX ADMIN — INSULIN GLARGINE 44 UNITS: 100 INJECTION, SOLUTION SUBCUTANEOUS at 21:01

## 2020-04-19 RX ADMIN — Medication: at 21:04

## 2020-04-19 RX ADMIN — METOPROLOL TARTRATE 25 MG: 25 TABLET ORAL at 21:00

## 2020-04-19 RX ADMIN — MORPHINE SULFATE 3 MG: 4 INJECTION, SOLUTION INTRAMUSCULAR; INTRAVENOUS at 19:31

## 2020-04-19 RX ADMIN — HYDROCODONE BITARTRATE AND ACETAMINOPHEN 2 TABLET: 5; 325 TABLET ORAL at 02:17

## 2020-04-19 RX ADMIN — ACETAMINOPHEN 650 MG: 325 TABLET ORAL at 20:59

## 2020-04-19 RX ADMIN — MORPHINE SULFATE 3 MG: 4 INJECTION, SOLUTION INTRAMUSCULAR; INTRAVENOUS at 07:37

## 2020-04-19 RX ADMIN — DOCUSATE SODIUM 100 MG: 100 CAPSULE, LIQUID FILLED ORAL at 08:50

## 2020-04-19 RX ADMIN — AMIODARONE HYDROCHLORIDE 200 MG: 200 TABLET ORAL at 08:50

## 2020-04-19 RX ADMIN — MORPHINE SULFATE 3 MG: 4 INJECTION, SOLUTION INTRAMUSCULAR; INTRAVENOUS at 13:58

## 2020-04-19 RX ADMIN — HYDROCODONE BITARTRATE AND ACETAMINOPHEN 2 TABLET: 5; 325 TABLET ORAL at 18:09

## 2020-04-19 RX ADMIN — DEXTROSE MONOHYDRATE 2 G: 50 INJECTION, SOLUTION INTRAVENOUS at 20:59

## 2020-04-19 RX ADMIN — DEXTROSE MONOHYDRATE 2 G: 50 INJECTION, SOLUTION INTRAVENOUS at 12:47

## 2020-04-19 RX ADMIN — HYDROCODONE BITARTRATE AND ACETAMINOPHEN 2 TABLET: 5; 325 TABLET ORAL at 23:47

## 2020-04-19 RX ADMIN — GABAPENTIN 200 MG: 100 CAPSULE ORAL at 21:00

## 2020-04-19 RX ADMIN — INSULIN LISPRO 2 UNITS: 100 INJECTION, SOLUTION INTRAVENOUS; SUBCUTANEOUS at 12:27

## 2020-04-19 RX ADMIN — ATORVASTATIN CALCIUM 40 MG: 40 TABLET, FILM COATED ORAL at 21:00

## 2020-04-19 RX ADMIN — MORPHINE SULFATE 3 MG: 4 INJECTION, SOLUTION INTRAMUSCULAR; INTRAVENOUS at 03:41

## 2020-04-19 RX ADMIN — LISINOPRIL 5 MG: 5 TABLET ORAL at 08:50

## 2020-04-19 RX ADMIN — ASPIRIN 81 MG 81 MG: 81 TABLET ORAL at 08:50

## 2020-04-19 RX ADMIN — SENNOSIDES AND DOCUSATE SODIUM 1 TABLET: 8.6; 5 TABLET ORAL at 08:50

## 2020-04-19 RX ADMIN — SODIUM CHLORIDE, PRESERVATIVE FREE 10 ML: 5 INJECTION INTRAVENOUS at 08:51

## 2020-04-19 RX ADMIN — SENNOSIDES AND DOCUSATE SODIUM 1 TABLET: 8.6; 5 TABLET ORAL at 21:00

## 2020-04-19 RX ADMIN — MORPHINE SULFATE 3 MG: 4 INJECTION, SOLUTION INTRAMUSCULAR; INTRAVENOUS at 16:49

## 2020-04-19 RX ADMIN — MORPHINE SULFATE 3 MG: 4 INJECTION, SOLUTION INTRAMUSCULAR; INTRAVENOUS at 10:38

## 2020-04-19 RX ADMIN — MORPHINE SULFATE 3 MG: 4 INJECTION, SOLUTION INTRAMUSCULAR; INTRAVENOUS at 22:42

## 2020-04-19 RX ADMIN — CLOPIDOGREL BISULFATE 75 MG: 75 TABLET ORAL at 08:50

## 2020-04-19 ASSESSMENT — PAIN - FUNCTIONAL ASSESSMENT
PAIN_FUNCTIONAL_ASSESSMENT: PREVENTS OR INTERFERES WITH MANY ACTIVE NOT PASSIVE ACTIVITIES
PAIN_FUNCTIONAL_ASSESSMENT: PREVENTS OR INTERFERES SOME ACTIVE ACTIVITIES AND ADLS

## 2020-04-19 ASSESSMENT — PAIN DESCRIPTION - PAIN TYPE
TYPE: ACUTE PAIN
TYPE: CHRONIC PAIN;ACUTE PAIN

## 2020-04-19 ASSESSMENT — PAIN SCALES - GENERAL
PAINLEVEL_OUTOF10: 9
PAINLEVEL_OUTOF10: 7
PAINLEVEL_OUTOF10: 7
PAINLEVEL_OUTOF10: 8
PAINLEVEL_OUTOF10: 10
PAINLEVEL_OUTOF10: 10
PAINLEVEL_OUTOF10: 7
PAINLEVEL_OUTOF10: 7
PAINLEVEL_OUTOF10: 10
PAINLEVEL_OUTOF10: 7
PAINLEVEL_OUTOF10: 7
PAINLEVEL_OUTOF10: 6
PAINLEVEL_OUTOF10: 7
PAINLEVEL_OUTOF10: 0
PAINLEVEL_OUTOF10: 5
PAINLEVEL_OUTOF10: 7
PAINLEVEL_OUTOF10: 7
PAINLEVEL_OUTOF10: 9
PAINLEVEL_OUTOF10: 0

## 2020-04-19 ASSESSMENT — PAIN DESCRIPTION - FREQUENCY
FREQUENCY: CONTINUOUS
FREQUENCY: CONTINUOUS

## 2020-04-19 ASSESSMENT — PAIN DESCRIPTION - LOCATION
LOCATION: BACK
LOCATION: BACK

## 2020-04-19 ASSESSMENT — PAIN DESCRIPTION - ONSET
ONSET: ON-GOING
ONSET: ON-GOING

## 2020-04-19 ASSESSMENT — PAIN DESCRIPTION - DESCRIPTORS
DESCRIPTORS: ACHING
DESCRIPTORS: ACHING

## 2020-04-19 ASSESSMENT — PAIN DESCRIPTION - DIRECTION: RADIATING_TOWARDS: LEFT

## 2020-04-19 ASSESSMENT — PAIN DESCRIPTION - ORIENTATION
ORIENTATION: LOWER
ORIENTATION: LOWER

## 2020-04-19 ASSESSMENT — PAIN DESCRIPTION - PROGRESSION
CLINICAL_PROGRESSION: NOT CHANGED
CLINICAL_PROGRESSION: NOT CHANGED

## 2020-04-19 NOTE — PLAN OF CARE
Problem: Falls - Risk of:  Goal: Will remain free from falls  Description: Will remain free from falls  Outcome: Ongoing  Note: No fall this shift, remains in bed with alarm on and call light in reach. Problem: Pain:  Goal: Pain level will decrease  Description: Pain level will decrease  Outcome: Ongoing  Note: C/O pain 7-8 in back, pain goal 5. Treating with heating pad, prn morphine, prn norco, and prn flexeril. Problem: Discharge Planning:  Goal: Patients continuum of care needs are met  Description: Patients continuum of care needs are met  Outcome: Ongoing  Note: Ongoing     Problem: Mobility - Impaired:  Goal: Mobility will improve  Description: Mobility will improve  Outcome: Ongoing  Note: PT/OT ordered to see. Problem: Blood Glucose:  Goal: Ability to maintain appropriate glucose levels will improve  Description: Ability to maintain appropriate glucose levels will improve  Outcome: Ongoing  Note: Levels WNL, only given insulin once today with lunch. Continues with tradjenta. Problem: Skin Integrity:  Goal: Will show no infection signs and symptoms  Description: Will show no infection signs and symptoms  Outcome: Ongoing  Note: No new skin issues assessed. Patient assists to turn self, requires help with boosting up in bed. Care plan reviewed with patient. Patient verbalizes understanding of the plan of care and contributes to goal setting.

## 2020-04-19 NOTE — PROGRESS NOTES
Hospitalist Progress Note      Patient:  Chris Scott      Unit/Bed:5K-23/023-A    YOB: 1960    MRN: [id number]       Acct: [de-identified]     PCP: [facility] [place]    Date of Admission: 4/17/2020    Assessment/Plan:    1. Sepsis with leukocytosis, fever, bacteremia with staph likely MSSA due to LLE wound infection also with MSSA and enterobacter -- POA -- c/s ID on Monday 4/20 --received 1L IVF in ER only and no further fluids since ER  -- Started on zosyn, vanc on admission 4/17 -> blood cx 4/17 2 of 2 with MSSA --> changed zosyn to ancef 4/18 and stopped vanc 4/18  -- WBC down to 12.1 on 4/19 after trending up 4/18 15.7 From 14.9 on 4/17 (normal 3/4/2020) --> cont monitor cx  -- LA normal on admission 4/17, PCT 0.13 4/17, CXR 4/17 (-), CT abd 4/17 (-) acute process  -- check echo 4/20/2020 with bacteremia and +murmur  --?debridement of left foot  2. Acute intractable lower back pain -- ortho spine c/s (p) since 4/17 -- CT lumbar spine 4/17/2020 = Severe left and moderate severity right foraminal stenosis at the L5-S1 level due to a diffusely bulging disc, Severe spinal canal stenosis at the L4-5 level with bilateral foraminal stenosis. Moderate to severe spinal canal stenosis at the L3-4 level with bilateral foraminal stenosis  -- MRI with and w/o contrast L-spine 4/17/2020 = up to moderate spinal canal stenosis at the L3-4 and L4-5 levels and up to moderate neural foraminal stenosis at the L3-4 and L5-S1 levels  -- c/s PT/OT but pt limited WB on BLE  -- cont prn norco -> increased to 1-2 tabs q 4 hrs prn 4/18; Cont prn morphine 3 mg IV q 3 hrs prn for severe pain;  Pt unable to take percocet;  lidoderm patch; cont neurontin 200 mg tid started 4/17  -- added prn flexeril 4/18, cont ice/heat prn  -- no signs of cauda equina or abscess per MRI  -- ??steroids but will avoid given current infection for now.    -- ?NSAID but will hold for now  -- ?pain mgmt c/s 4/20  3.  Left foot wound dehiscence units nightly 4/18 and BS improving -- cont medium SSI -- monitor and adjust prn  11. PAD -- CHERYL ordered per podiatry 4/17/2020 but just had one 4/10/2020 with Left RAFAL ~ 50% stenosis but with normal CHERYL -> ?able to do with cast/unna boot---> ??need CT angiogram --continue aspirin, Plavix, statin  12. Normocytic anemia -- chronic component - down to 9.7 on 4/19 from 10.4 on 4/18 from 11.1 on admission 4/17/2020 and was 10.8 on 3/4/2020 -- no signs of bleeding -- cont monitor closely  13. Bilateral carotid stenosis -- per [facility] records for CABG 12/2019 in [facility] pt with totally occluded ANNETTE and ~ 70% left ICA per u/s - ? CTA results noted to be done at [facility] -- ASX -- cont med mgmt - ASA, plavix, statin - avoid Hypotension - cont monitor neuro status  14. HLD  -- cont statin -- no recent lipids here  15. Charcot arthropathy -- due to DM - per podiatry  16. Chronic diabetic right great toe wound -- RLE in cast -- per podiatry  17. Debility-- limited mobility due to BLE issues - one in cast and one unna boot with s/p TMA -- c/s PT/OT 4/19  18. Morbid Obesity Body mass index is 39.8 kg/m². Dispo  -- 4/19 --> apprec consultants -- blood cx now 2 of 2 with MSSA -> c/s ID tomorrow for atbx recs given bacteremia and foot wound/infxn also s/p TMA for gangrene and OM. Cont ancef. Check echo tomorrow as +murmur. Still with uncontrolled back pain -> encouraged movement out of bed but pt is min WB on BLE - c/s PT/OT - ?ortho spine consulted -> will check if they have any recs for further pain control - would hold off steroids given infxn currently and DM2. No injections given bacteremia. C/s pain mgmt tomorrow if they are available. ?add NSAID - increase lidoderm patches and cont rest of regimen as above. Cont monitor lytes, BP, renal fxn, CBC and await podiatry recs if ?further debridement of left foot wound. Monitor for BM given pain meds - on senna, miralax, add colace.     -- 4/18 --> apprec consultants -> podiatry managing left foot wound and infxn with staph growing in cx and now also with (+) Blood Cx with probable MSSA -> changed atbx to ancef and stopped zosyn and vanc per [facility] results -- will need ID c/s Monday 4/20 - ??need IV atbx at d/c - ? OM 5th metatarsal per XR - ?need MRI; Await podiatry recs;  Back pain minimally improved -> no abscess on MRI -> ortho spine c/s (p) for any further recs for tx; Will add flexeril tid prn and heat/ice and PT/OT today. Increase norco to 1-2 tabs and cont prn morphine;  ?NSAID;  Cont neurontin and lidoderm patch. ?steroids but with bacteremia will hold for now. Increase to home lantus 44 units, cont SSI. Cont monitor neuro status, BP, lytes, renal fxn, BS, BP. Plan d/w pt and questions answered as able. Chief Complaint: back pain    Hospital Course: Mirna De Los Santos is a 63 y.o. male with CAD with hx 3 vessel CABG 11/2019, DM2, HTN, HLD, PAD with hx recent TMA left foot 3/3/2020 with wound dehiscence, charcot arthropathy, morbid obesity, former smoker presenting with acute onset lower/lower left back pain. Per H&P by Dr. Demarcus Lawrence 4/17/2020 states \"While in the ER, he was given morphine 4 mg IV and Tylenol which help with his pain. He did have a low-grade fever of 100.4. Blood cultures were obtained and he was given a one-time dose of Zosyn and vancomycin. Urinalysis was ordered but is pending at this time. CRP is elevated at 14.47, sed rate is 130 and he has a leukocytosis of 14.9, with increased segs. CT of the abdomen pelvis with IV contrast showed no acute abnormalities, CT of the lumbar spine showed severe left and moderate right foraminal stenosis at L5-S1 with diffusely bulging disc, severe spinal canal stenosis at L4-5 with bilateral foraminal stenosis, and moderate to severe spinal canal stenosis at L3-4 with bilateral foraminal stenosis. \"  -- started on zosyn and vanc for sepsis with source likely his foot --> blood cx done 4/17 2 of 2 with MSSA -> Final report electronically signed by Dr. Clive Colunga on 4/17/2020 8:29 AM      CT ABDOMEN PELVIS W IV CONTRAST Additional Contrast? None   Final Result    No evidence of an acute process in the abdomen or pelvis. **This report has been created using voice recognition software. It may contain minor errors which are inherent in voice recognition technology. **      Final report electronically signed by Dr. Clive Colunga on 4/17/2020 8:00 AM      CT LUMBAR RECONSTRUCTION WO POST PROCESS   Final Result       1. Severe left and moderate severity right foraminal stenosis at the L5-S1 level due to a diffusely bulging disc. 2. Severe spinal canal stenosis at the L4-5 level with bilateral foraminal stenosis. 3. Moderate to severe spinal canal stenosis at the L3-4 level with bilateral foraminal stenosis. **This report has been created using voice recognition software. It may contain minor errors which are inherent in voice recognition technology. **      Final report electronically signed by Dr. Clive Colunga on 4/17/2020 8:08 AM      VL DUP LOWER EXTREMITY ARTERIES RIGHT    (Results Pending)       Diet: DIET CARB CONTROL; Carb Control: 4 carb choices (60 gms)/meal    Beckett: no    Microbiology:  Blood cx 2 of 2 4/17 = MSSA    U/a 4/17 (-)    Left foot wound cx 4/17/2020 = MSSA and Enterococcus species    Tele review last 24 hrs: none    DVT prophylaxis: [x] Lovenox                                 [] SCDs                                 [] SQ Heparin                                 [] Encourage ambulation           [] Already on Anticoagulation     Disposition:    [x] Home??       [] TCU       [] Rehab       [] Psych       [] SNF       [] [facility]       [] Other-    Code Status: Full Code    PT/OT Eval Status: consulted 4/18      Electronically signed by Radha Cisneros MD on 4/19/2020 at 7:45 AM

## 2020-04-19 NOTE — PLAN OF CARE
Problem: Falls - Risk of:  Goal: Will remain free from falls  Description: Will remain free from falls  Outcome: Ongoing  Note: Patient bed in lowest position, wheels locked, 2/4 side rails up and alarm on. Call light and belongings within reach. Pathway clear. Nonskid footwear on. Patient rounded on hourly. Fall risk assessment complete. Patient remains free from falls this shift, will continue to monitor. Patient remained in bed this shift. Turned every 2 hours. Problem: Pain:  Goal: Pain level will decrease  Description: Pain level will decrease  Outcome: Ongoing  Note: Pain goal is 7/10. Pain stays at 10/10. PRN norco and morphine. Told patient it was better to take oral pain meds first but he prefers Iv morphine followed by norco. Pain located in back relatd to bulging discs. Patient utilizes heat and rest. Pain assessment ongoing. Problem: Discharge Planning:  Goal: Patients continuum of care needs are met  Description: Patients continuum of care needs are met  Outcome: Ongoing  Note: Patient plans to return home and follow with wound clinic. May require HH. Discharge planning ongoing. Problem: Physical Regulation:  Goal: Diagnostic test results will improve  Description: Diagnostic test results will improve  Outcome: Ongoing  Note: Blood cultures came back with infection. Patient started on new antibiotics. 2 doses of ancef given this shift. Patient afebrile. Problem: Mobility - Impaired:  Goal: Mobility will improve  Description: Mobility will improve  Outcome: Ongoing  Note: Lucille boot on LLE. Cast on RLE. PT/OT consulted. Patient has limited mobility and remained in bed this shift. Problem: Blood Glucose:  Goal: Ability to maintain appropriate glucose levels will improve  Description: Ability to maintain appropriate glucose levels will improve  Outcome: Ongoing  Note: CHEMS [facility]CHS. Humalog and lantus coverage. Carb control diet.       Problem: Skin Integrity:  Goal: Will show no

## 2020-04-19 NOTE — PROGRESS NOTES
Podiatric Progress Note  Molly Jaeger  Subjective :     [id number]   Patient seen bedside on behalf of Dr. Lana Hackett. He denies any pain to his foot but states his back is still very painful. Discussed plan for possible surgical debridement tomorrow pending the vascular studies. Discussed risks and benefits with patient. All questions and concerns addressed bedside. He has no other complaints at this time.       Current Medications:    Current Facility-Administered Medications: docusate sodium (COLACE) capsule 100 mg, 100 mg, Oral, BID  cyclobenzaprine (FLEXERIL) tablet 10 mg, 10 mg, Oral, TID PRN  HYDROcodone-acetaminophen (NORCO) 5-325 MG per tablet 1 tablet, 1 tablet, Oral, Q4H PRN **OR** HYDROcodone-acetaminophen (NORCO) 5-325 MG per tablet 2 tablet, 2 tablet, Oral, Q4H PRN  ceFAZolin (ANCEF) 2 g in dextrose 5 % 50 mL IVPB, 2 g, Intravenous, Q8H  insulin glargine (LANTUS) injection vial 44 Units, 44 Units, Subcutaneous, Nightly  amiodarone (CORDARONE) tablet 200 mg, 200 mg, Oral, Daily  aspirin chewable tablet 81 mg, 81 mg, Oral, Daily  atorvastatin (LIPITOR) tablet 40 mg, 40 mg, Oral, Nightly  clopidogrel (PLAVIX) tablet 75 mg, 75 mg, Oral, Daily  glucose (GLUTOSE) 40 % oral gel 15 g, 15 g, Oral, PRN  dextrose 50 % IV solution, 12.5 g, Intravenous, PRN  glucagon (rDNA) injection 1 mg, 1 mg, Intramuscular, PRN  dextrose 5 % solution, 100 mL/hr, Intravenous, PRN  alogliptin (NESINA) tablet 12.5 mg, 12.5 mg, Oral, Daily  lisinopril (PRINIVIL;ZESTRIL) tablet 5 mg, 5 mg, Oral, Daily  metoprolol tartrate (LOPRESSOR) tablet 25 mg, 25 mg, Oral, BID  sodium chloride flush 0.9 % injection 10 mL, 10 mL, Intravenous, 2 times per day  sodium chloride flush 0.9 % injection 10 mL, 10 mL, Intravenous, PRN  acetaminophen (TYLENOL) tablet 650 mg, 650 mg, Oral, Q6H PRN **OR** acetaminophen (TYLENOL) suppository 650 mg, 650 mg, Rectal, Q6H PRN  polyethylene glycol (GLYCOLAX) packet 17 g, 17 g, Oral, Daily PRN  promethazine (PHENERGAN) tablet 12.5 mg, 12.5 mg, Oral, Q6H PRN **OR** ondansetron (ZOFRAN) injection 4 mg, 4 mg, Intravenous, Q6H PRN  sennosides-docusate sodium (SENOKOT-S) 8.6-50 MG tablet 1 tablet, 1 tablet, Oral, BID  enoxaparin (LOVENOX) injection 40 mg, 40 mg, Subcutaneous, Q24H  lidocaine 4 % external patch 1 patch, 1 patch, Transdermal, Daily  morphine injection 3 mg, 3 mg, Intravenous, Q3H PRN  insulin lispro (HUMALOG) injection vial 0-12 Units, 0-12 Units, Subcutaneous, TID WC  insulin lispro (HUMALOG) injection vial 0-6 Units, 0-6 Units, Subcutaneous, Nightly  gabapentin (NEURONTIN) capsule 200 mg, 200 mg, Oral, TID  sodium hypochloriteth) (DAKINS) external solution, , Irrigation, BID    Objective     BP (!) 147/71   Pulse 75   Temp 98.5 °F (36.9 °C) (Oral)   Resp 20   Ht 6' 2\" (1.88 m)   Wt (!) 310 lb (140.6 kg)   SpO2 94%   BMI 39.80 kg/m²      I/O:    Intake/Output Summary (Last 24 hours) at 4/19/2020 0944  Last data filed at 4/19/2020 0851  Gross per 24 hour   Intake 1273 ml   Output 200 ml   Net 1073 ml              Wt Readings from Last 3 Encounters:   04/19/20 (!) 310 lb (140.6 kg)   04/08/20 (!) 310 lb (140.6 kg)   04/06/20 (!) 310 lb 3.2 oz (140.7 kg)       LABS:    Recent Labs     04/18/20  0419 04/19/20  0538   WBC 15.7* 12.1*   HGB 10.4* 9.7*   HCT 32.2* 31.0*    223        Recent Labs     04/19/20  0538   *   K 4.1   CL 98   CO2 24   BUN 21   CREATININE 1.1        Recent Labs     04/17/20  0600   PROT 7.0   INR 1.14*   APTT 27.4        Recent Labs     04/17/20  0600   CKTOTAL 47*         Dermatologic: full thickness ulcer to the distal aspect of the right hallux with granular base and hyperkeratotic margins. No surrounding erythema or edema. No purulence or malodor. Wound does not probe to bone, track, or undermine.      Surgical dehiscence noted to the left TMA site laterally and medially with fibrotic/necrotic base with macerated margins. Surrounding skin is erythematous.  No

## 2020-04-20 ENCOUNTER — TELEPHONE (OUTPATIENT)
Dept: WOUND CARE | Age: 60
End: 2020-04-20

## 2020-04-20 ENCOUNTER — APPOINTMENT (OUTPATIENT)
Dept: INTERVENTIONAL RADIOLOGY/VASCULAR | Age: 60
DRG: 854 | End: 2020-04-20
Payer: COMMERCIAL

## 2020-04-20 ENCOUNTER — ANESTHESIA (OUTPATIENT)
Dept: OPERATING ROOM | Age: 60
DRG: 854 | End: 2020-04-20
Payer: COMMERCIAL

## 2020-04-20 ENCOUNTER — ANESTHESIA EVENT (OUTPATIENT)
Dept: OPERATING ROOM | Age: 60
DRG: 854 | End: 2020-04-20
Payer: COMMERCIAL

## 2020-04-20 VITALS — OXYGEN SATURATION: 98 % | SYSTOLIC BLOOD PRESSURE: 165 MMHG | DIASTOLIC BLOOD PRESSURE: 87 MMHG

## 2020-04-20 LAB
ANION GAP SERPL CALCULATED.3IONS-SCNC: 14 MEQ/L (ref 8–16)
BASOPHILS # BLD: 0.5 %
BASOPHILS ABSOLUTE: 0.1 THOU/MM3 (ref 0–0.1)
BUN BLDV-MCNC: 17 MG/DL (ref 7–22)
CALCIUM SERPL-MCNC: 9.4 MG/DL (ref 8.5–10.5)
CHLORIDE BLD-SCNC: 96 MEQ/L (ref 98–111)
CO2: 24 MEQ/L (ref 23–33)
CREAT SERPL-MCNC: 1 MG/DL (ref 0.4–1.2)
EOSINOPHIL # BLD: 1.4 %
EOSINOPHILS ABSOLUTE: 0.2 THOU/MM3 (ref 0–0.4)
ERYTHROCYTE [DISTWIDTH] IN BLOOD BY AUTOMATED COUNT: 15.5 % (ref 11.5–14.5)
ERYTHROCYTE [DISTWIDTH] IN BLOOD BY AUTOMATED COUNT: 48.2 FL (ref 35–45)
GFR SERPL CREATININE-BSD FRML MDRD: 77 ML/MIN/1.73M2
GLUCOSE BLD-MCNC: 138 MG/DL (ref 70–108)
GLUCOSE BLD-MCNC: 138 MG/DL (ref 70–108)
GLUCOSE BLD-MCNC: 162 MG/DL (ref 70–108)
GLUCOSE BLD-MCNC: 184 MG/DL (ref 70–108)
GLUCOSE BLD-MCNC: 195 MG/DL (ref 70–108)
HCT VFR BLD CALC: 35.2 % (ref 42–52)
HEMOGLOBIN: 11.2 GM/DL (ref 14–18)
IMMATURE GRANS (ABS): 0.14 THOU/MM3 (ref 0–0.07)
IMMATURE GRANULOCYTES: 1.1 %
LYMPHOCYTES # BLD: 7.1 %
LYMPHOCYTES ABSOLUTE: 0.9 THOU/MM3 (ref 1–4.8)
MCH RBC QN AUTO: 27.4 PG (ref 26–33)
MCHC RBC AUTO-ENTMCNC: 31.8 GM/DL (ref 32.2–35.5)
MCV RBC AUTO: 86.1 FL (ref 80–94)
MONOCYTES # BLD: 9.7 %
MONOCYTES ABSOLUTE: 1.2 THOU/MM3 (ref 0.4–1.3)
NUCLEATED RED BLOOD CELLS: 0 /100 WBC
PLATELET # BLD: 260 THOU/MM3 (ref 130–400)
PMV BLD AUTO: 10.2 FL (ref 9.4–12.4)
POTASSIUM SERPL-SCNC: 3.4 MEQ/L (ref 3.5–5.2)
RBC # BLD: 4.09 MILL/MM3 (ref 4.7–6.1)
SEG NEUTROPHILS: 80.2 %
SEGMENTED NEUTROPHILS ABSOLUTE COUNT: 10.3 THOU/MM3 (ref 1.8–7.7)
SODIUM BLD-SCNC: 134 MEQ/L (ref 135–145)
TSH SERPL DL<=0.05 MIU/L-ACNC: 1.25 UIU/ML (ref 0.4–4.2)
WBC # BLD: 12.8 THOU/MM3 (ref 4.8–10.8)

## 2020-04-20 PROCEDURE — 2709999900 HC NON-CHARGEABLE SUPPLY: Performed by: PODIATRIST

## 2020-04-20 PROCEDURE — 6360000002 HC RX W HCPCS: Performed by: FAMILY MEDICINE

## 2020-04-20 PROCEDURE — 6370000000 HC RX 637 (ALT 250 FOR IP): Performed by: STUDENT IN AN ORGANIZED HEALTH CARE EDUCATION/TRAINING PROGRAM

## 2020-04-20 PROCEDURE — 94760 N-INVAS EAR/PLS OXIMETRY 1: CPT

## 2020-04-20 PROCEDURE — 6370000000 HC RX 637 (ALT 250 FOR IP): Performed by: INTERNAL MEDICINE

## 2020-04-20 PROCEDURE — 3600000014 HC SURGERY LEVEL 4 ADDTL 15MIN: Performed by: PODIATRIST

## 2020-04-20 PROCEDURE — 2580000003 HC RX 258: Performed by: FAMILY MEDICINE

## 2020-04-20 PROCEDURE — 99233 SBSQ HOSP IP/OBS HIGH 50: CPT | Performed by: FAMILY MEDICINE

## 2020-04-20 PROCEDURE — 3600000004 HC SURGERY LEVEL 4 BASE: Performed by: PODIATRIST

## 2020-04-20 PROCEDURE — 6370000000 HC RX 637 (ALT 250 FOR IP): Performed by: FAMILY MEDICINE

## 2020-04-20 PROCEDURE — 1200000000 HC SEMI PRIVATE

## 2020-04-20 PROCEDURE — 36415 COLL VENOUS BLD VENIPUNCTURE: CPT

## 2020-04-20 PROCEDURE — 2500000003 HC RX 250 WO HCPCS: Performed by: PODIATRIST

## 2020-04-20 PROCEDURE — 6360000002 HC RX W HCPCS: Performed by: NURSE ANESTHETIST, CERTIFIED REGISTERED

## 2020-04-20 PROCEDURE — 6360000002 HC RX W HCPCS: Performed by: STUDENT IN AN ORGANIZED HEALTH CARE EDUCATION/TRAINING PROGRAM

## 2020-04-20 PROCEDURE — 88307 TISSUE EXAM BY PATHOLOGIST: CPT

## 2020-04-20 PROCEDURE — 85025 COMPLETE CBC W/AUTO DIFF WBC: CPT

## 2020-04-20 PROCEDURE — 3700000000 HC ANESTHESIA ATTENDED CARE: Performed by: PODIATRIST

## 2020-04-20 PROCEDURE — 84443 ASSAY THYROID STIM HORMONE: CPT

## 2020-04-20 PROCEDURE — 2580000003 HC RX 258: Performed by: STUDENT IN AN ORGANIZED HEALTH CARE EDUCATION/TRAINING PROGRAM

## 2020-04-20 PROCEDURE — 2709999900 HC NON-CHARGEABLE SUPPLY

## 2020-04-20 PROCEDURE — 0Y6N0Z0 DETACHMENT AT LEFT FOOT, COMPLETE, OPEN APPROACH: ICD-10-PCS | Performed by: PODIATRIST

## 2020-04-20 PROCEDURE — 80048 BASIC METABOLIC PNL TOTAL CA: CPT

## 2020-04-20 PROCEDURE — 2580000003 HC RX 258: Performed by: INTERNAL MEDICINE

## 2020-04-20 PROCEDURE — 6360000002 HC RX W HCPCS: Performed by: INTERNAL MEDICINE

## 2020-04-20 PROCEDURE — 2580000003 HC RX 258: Performed by: NURSE ANESTHETIST, CERTIFIED REGISTERED

## 2020-04-20 PROCEDURE — 3700000001 HC ADD 15 MINUTES (ANESTHESIA): Performed by: PODIATRIST

## 2020-04-20 PROCEDURE — 82948 REAGENT STRIP/BLOOD GLUCOSE: CPT

## 2020-04-20 RX ORDER — POTASSIUM CHLORIDE 20 MEQ/1
40 TABLET, EXTENDED RELEASE ORAL PRN
Status: DISCONTINUED | OUTPATIENT
Start: 2020-04-20 | End: 2020-04-22 | Stop reason: HOSPADM

## 2020-04-20 RX ORDER — OXYCODONE HYDROCHLORIDE AND ACETAMINOPHEN 5; 325 MG/1; MG/1
1 TABLET ORAL EVERY 4 HOURS PRN
Status: DISCONTINUED | OUTPATIENT
Start: 2020-04-20 | End: 2020-04-22 | Stop reason: HOSPADM

## 2020-04-20 RX ORDER — KETOROLAC TROMETHAMINE 30 MG/ML
15 INJECTION, SOLUTION INTRAMUSCULAR; INTRAVENOUS EVERY 6 HOURS PRN
Status: DISCONTINUED | OUTPATIENT
Start: 2020-04-20 | End: 2020-04-22 | Stop reason: HOSPADM

## 2020-04-20 RX ORDER — FENTANYL CITRATE 50 UG/ML
INJECTION, SOLUTION INTRAMUSCULAR; INTRAVENOUS PRN
Status: DISCONTINUED | OUTPATIENT
Start: 2020-04-20 | End: 2020-04-20 | Stop reason: SDUPTHER

## 2020-04-20 RX ORDER — POTASSIUM CHLORIDE 7.45 MG/ML
10 INJECTION INTRAVENOUS PRN
Status: DISCONTINUED | OUTPATIENT
Start: 2020-04-20 | End: 2020-04-22 | Stop reason: HOSPADM

## 2020-04-20 RX ORDER — PROMETHAZINE HYDROCHLORIDE 25 MG/1
12.5 TABLET ORAL EVERY 6 HOURS PRN
Status: DISCONTINUED | OUTPATIENT
Start: 2020-04-20 | End: 2020-04-22 | Stop reason: HOSPADM

## 2020-04-20 RX ORDER — POLYETHYLENE GLYCOL 3350 17 G/17G
17 POWDER, FOR SOLUTION ORAL DAILY
Status: DISCONTINUED | OUTPATIENT
Start: 2020-04-20 | End: 2020-04-22 | Stop reason: HOSPADM

## 2020-04-20 RX ORDER — MIDAZOLAM HYDROCHLORIDE 1 MG/ML
INJECTION INTRAMUSCULAR; INTRAVENOUS PRN
Status: DISCONTINUED | OUTPATIENT
Start: 2020-04-20 | End: 2020-04-20 | Stop reason: SDUPTHER

## 2020-04-20 RX ORDER — BUPIVACAINE HYDROCHLORIDE 5 MG/ML
INJECTION, SOLUTION EPIDURAL; INTRACAUDAL PRN
Status: DISCONTINUED | OUTPATIENT
Start: 2020-04-20 | End: 2020-04-20 | Stop reason: HOSPADM

## 2020-04-20 RX ORDER — OXYCODONE HYDROCHLORIDE AND ACETAMINOPHEN 5; 325 MG/1; MG/1
2 TABLET ORAL EVERY 4 HOURS PRN
Status: DISCONTINUED | OUTPATIENT
Start: 2020-04-20 | End: 2020-04-22 | Stop reason: HOSPADM

## 2020-04-20 RX ORDER — ONDANSETRON 2 MG/ML
4 INJECTION INTRAMUSCULAR; INTRAVENOUS EVERY 6 HOURS PRN
Status: DISCONTINUED | OUTPATIENT
Start: 2020-04-20 | End: 2020-04-22 | Stop reason: HOSPADM

## 2020-04-20 RX ORDER — SODIUM CHLORIDE 9 MG/ML
INJECTION, SOLUTION INTRAVENOUS CONTINUOUS PRN
Status: DISCONTINUED | OUTPATIENT
Start: 2020-04-20 | End: 2020-04-20 | Stop reason: SDUPTHER

## 2020-04-20 RX ORDER — SODIUM CHLORIDE 0.9 % (FLUSH) 0.9 %
10 SYRINGE (ML) INJECTION PRN
Status: DISCONTINUED | OUTPATIENT
Start: 2020-04-20 | End: 2020-04-22 | Stop reason: SDUPTHER

## 2020-04-20 RX ORDER — SODIUM CHLORIDE 0.9 % (FLUSH) 0.9 %
10 SYRINGE (ML) INJECTION EVERY 12 HOURS SCHEDULED
Status: DISCONTINUED | OUTPATIENT
Start: 2020-04-20 | End: 2020-04-22 | Stop reason: SDUPTHER

## 2020-04-20 RX ORDER — PROPOFOL 10 MG/ML
INJECTION, EMULSION INTRAVENOUS PRN
Status: DISCONTINUED | OUTPATIENT
Start: 2020-04-20 | End: 2020-04-20 | Stop reason: SDUPTHER

## 2020-04-20 RX ADMIN — KETOROLAC TROMETHAMINE 15 MG: 30 INJECTION, SOLUTION INTRAMUSCULAR at 23:41

## 2020-04-20 RX ADMIN — DEXTROSE MONOHYDRATE 2 G: 50 INJECTION, SOLUTION INTRAVENOUS at 04:56

## 2020-04-20 RX ADMIN — FENTANYL CITRATE 50 MCG: 50 INJECTION, SOLUTION INTRAMUSCULAR; INTRAVENOUS at 13:48

## 2020-04-20 RX ADMIN — GABAPENTIN 200 MG: 100 CAPSULE ORAL at 20:45

## 2020-04-20 RX ADMIN — FENTANYL CITRATE 50 MCG: 50 INJECTION, SOLUTION INTRAMUSCULAR; INTRAVENOUS at 13:36

## 2020-04-20 RX ADMIN — DOCUSATE SODIUM 100 MG: 100 CAPSULE, LIQUID FILLED ORAL at 20:45

## 2020-04-20 RX ADMIN — ATORVASTATIN CALCIUM 40 MG: 40 TABLET, FILM COATED ORAL at 20:45

## 2020-04-20 RX ADMIN — SODIUM CHLORIDE, PRESERVATIVE FREE 10 ML: 5 INJECTION INTRAVENOUS at 11:12

## 2020-04-20 RX ADMIN — METOPROLOL TARTRATE 25 MG: 25 TABLET ORAL at 11:09

## 2020-04-20 RX ADMIN — HYDROCODONE BITARTRATE AND ACETAMINOPHEN 2 TABLET: 5; 325 TABLET ORAL at 04:57

## 2020-04-20 RX ADMIN — GABAPENTIN 200 MG: 100 CAPSULE ORAL at 11:09

## 2020-04-20 RX ADMIN — METOPROLOL TARTRATE 25 MG: 25 TABLET ORAL at 20:45

## 2020-04-20 RX ADMIN — CYCLOBENZAPRINE 10 MG: 10 TABLET, FILM COATED ORAL at 23:40

## 2020-04-20 RX ADMIN — PROPOFOL 30 MG: 10 INJECTION, EMULSION INTRAVENOUS at 13:36

## 2020-04-20 RX ADMIN — INSULIN GLARGINE 44 UNITS: 100 INJECTION, SOLUTION SUBCUTANEOUS at 20:45

## 2020-04-20 RX ADMIN — KETOROLAC TROMETHAMINE 15 MG: 30 INJECTION, SOLUTION INTRAMUSCULAR at 17:39

## 2020-04-20 RX ADMIN — Medication 10 ML: at 20:45

## 2020-04-20 RX ADMIN — HYDROCODONE BITARTRATE AND ACETAMINOPHEN 2 TABLET: 5; 325 TABLET ORAL at 15:00

## 2020-04-20 RX ADMIN — PROPOFOL 30 MG: 10 INJECTION, EMULSION INTRAVENOUS at 13:53

## 2020-04-20 RX ADMIN — LISINOPRIL 5 MG: 5 TABLET ORAL at 11:09

## 2020-04-20 RX ADMIN — SENNOSIDES AND DOCUSATE SODIUM 1 TABLET: 8.6; 5 TABLET ORAL at 20:45

## 2020-04-20 RX ADMIN — DEXTROSE MONOHYDRATE 2 G: 50 INJECTION, SOLUTION INTRAVENOUS at 13:39

## 2020-04-20 RX ADMIN — CYCLOBENZAPRINE 10 MG: 10 TABLET, FILM COATED ORAL at 15:00

## 2020-04-20 RX ADMIN — ASPIRIN 81 MG 81 MG: 81 TABLET ORAL at 14:59

## 2020-04-20 RX ADMIN — MIDAZOLAM HYDROCHLORIDE 2 MG: 1 INJECTION, SOLUTION INTRAMUSCULAR; INTRAVENOUS at 13:35

## 2020-04-20 RX ADMIN — MORPHINE SULFATE 3 MG: 4 INJECTION, SOLUTION INTRAMUSCULAR; INTRAVENOUS at 02:46

## 2020-04-20 RX ADMIN — MORPHINE SULFATE 3 MG: 4 INJECTION, SOLUTION INTRAMUSCULAR; INTRAVENOUS at 06:44

## 2020-04-20 RX ADMIN — KETOROLAC TROMETHAMINE 15 MG: 30 INJECTION, SOLUTION INTRAMUSCULAR at 10:07

## 2020-04-20 RX ADMIN — AMIODARONE HYDROCHLORIDE 200 MG: 200 TABLET ORAL at 11:09

## 2020-04-20 RX ADMIN — GABAPENTIN 200 MG: 100 CAPSULE ORAL at 14:59

## 2020-04-20 RX ADMIN — CYCLOBENZAPRINE 10 MG: 10 TABLET, FILM COATED ORAL at 05:23

## 2020-04-20 RX ADMIN — DEXTROSE MONOHYDRATE 2 G: 50 INJECTION, SOLUTION INTRAVENOUS at 20:45

## 2020-04-20 RX ADMIN — INSULIN LISPRO 2 UNITS: 100 INJECTION, SOLUTION INTRAVENOUS; SUBCUTANEOUS at 17:42

## 2020-04-20 RX ADMIN — SODIUM CHLORIDE: 9 INJECTION, SOLUTION INTRAVENOUS at 12:45

## 2020-04-20 RX ADMIN — Medication: at 11:11

## 2020-04-20 RX ADMIN — CLOPIDOGREL BISULFATE 75 MG: 75 TABLET ORAL at 15:00

## 2020-04-20 ASSESSMENT — PAIN SCALES - GENERAL
PAINLEVEL_OUTOF10: 6
PAINLEVEL_OUTOF10: 7
PAINLEVEL_OUTOF10: 10
PAINLEVEL_OUTOF10: 10
PAINLEVEL_OUTOF10: 7
PAINLEVEL_OUTOF10: 5
PAINLEVEL_OUTOF10: 8
PAINLEVEL_OUTOF10: 7

## 2020-04-20 ASSESSMENT — PULMONARY FUNCTION TESTS
PIF_VALUE: 0

## 2020-04-20 NOTE — CONSULTS
metoprolol tartrate  25 mg Oral BID    sodium chloride flush  10 mL Intravenous 2 times per day    sennosides-docusate sodium  1 tablet Oral BID    enoxaparin  40 mg Subcutaneous Q24H    insulin lispro  0-12 Units Subcutaneous TID WC    insulin lispro  0-6 Units Subcutaneous Nightly    gabapentin  200 mg Oral TID    sodium hypochloriteth)   Irrigation BID     Continuous Infusions:   dextrose       PRN Meds:potassium chloride **OR** potassium alternative oral replacement **OR** potassium chloride, ketorolac, HYDROmorphone, cyclobenzaprine, HYDROcodone 5 mg - acetaminophen **OR** HYDROcodone 5 mg - acetaminophen, glucose, dextrose, glucagon (rDNA), dextrose, sodium chloride flush, acetaminophen **OR** acetaminophen, promethazine **OR** ondansetron  Allergies:   ALLERGIES:    Patient has no known allergies. SOCIAL HISTORY:     TOBACCO:   reports that he quit smoking about 12 years ago. His smoking use included cigarettes. He has never used smokeless tobacco.     ETOH:   reports previous alcohol use. FAMILY HISTORY:         Problem Relation Age of Onset    Diabetes Mother     Heart Disease Father        REVIEW OF SYSTEMS:     Constitutional: no fever, no night sweats, + fatigue. Head: no head ache , no head injury, no migranes. Eye: no blurring of vision, no double vision.   Ears: no hearing difficulty, no tinnitus  Mouth/throat: no ulceration, dental caries , dysphagia  Lungs: no cough no chest pain  CVS: no palpitation, no chest pain, no shortness of breath  GI: no abdominal pain, no nausea , no vomiting, no constipation  JENNIFER: no dysuria, frequency and urgency, no hematuria, no kidney stones  Musculoskeletal: back pain as noted in HPI  Endocrine: no polyuria, polydipsia, no cold or heat intolerance  Hematology: no anemia, no easy brusing or bleeding, no hx of clotting disorder  Dermatology: no skin rash, no eczema, no pruritis,  Psychiatry: no depression, no anxiety,no panic attacks, no

## 2020-04-20 NOTE — PROGRESS NOTES
podiatry  18. Debility-- limited mobility due to BLE issues - one in cast and one unna boot with s/p TMA -- c/s PT/OT 4/19 19. Morbid Obesity Body mass index is 39.8 kg/m². Dispo  -- 4/20 --> apprec consultants -- c/s ID today for MSSA bacteremia and foot wound and recs for atbx need -- per podiatry plan for debridement of left foot wound today;  Back pain more uncontrolled this am -> very TTP over left SI joint but c/o bilateral foot numbness-> notify ortho spine but pt moving BLE w/o difficulty, no bowel/bladder incontinence, no saddle paresthesia -- added toradol for pain - changed morphine to dilaudid and cont norco (unable to take oxycodone due to intolerance) --  Pain mgmt NOT available for assistance - unable to do injection due to bacteremia - ?need steroids but will hold due to infection/healing issues but may need if ok with ID/podiatry. Cont monitor BP, lytes, renal fxn, h/h, BS, neuro status closely and await consultants recs. No BM since admission -> cont bowel meds and monitor. -- 4/19 --> apprec consultants -- blood cx now 2 of 2 with MSSA -> c/s ID tomorrow for atbx recs given bacteremia and foot wound/infxn also s/p TMA for gangrene and OM. Cont ancef. Check echo tomorrow as +murmur. Still with uncontrolled back pain -> encouraged movement out of bed but pt is min WB on BLE - c/s PT/OT - ?ortho spine consulted -> will check if they have any recs for further pain control - would hold off steroids given infxn currently and DM2. No injections given bacteremia. C/s pain mgmt tomorrow if they are available. ?add NSAID - increase lidoderm patches and cont rest of regimen as above. Cont monitor lytes, BP, renal fxn, CBC and await podiatry recs if ?further debridement of left foot wound. Monitor for BM given pain meds - on senna, miralax, add colace.     -- 4/18 --> apprec consultants -> podiatry managing left foot wound and infxn with staph growing in cx and now also with (+) Blood Cx with canal narrowing with superimposed degenerative changes with areas of up to moderate spinal canal stenosis at the L3-4 and L4-5 levels and up to moderate neural foraminal stenosis at the L3-4 and L5-S1 levels. **This report has been created using voice recognition software. It may contain minor errors which are inherent in voice recognition technology. **      Final report electronically signed by Dr. Matilde Gonzáles MD on 4/17/2020 12:30 PM      XR CHEST PORTABLE   Final Result   Stable radiographic appearance of the chest. No evidence of an acute process. **This report has been created using voice recognition software. It may contain minor errors which are inherent in voice recognition technology. **      Final report electronically signed by Dr. Nani Mendoza on 4/17/2020 8:29 AM      CT ABDOMEN PELVIS W IV CONTRAST Additional Contrast? None   Final Result    No evidence of an acute process in the abdomen or pelvis. **This report has been created using voice recognition software. It may contain minor errors which are inherent in voice recognition technology. **      Final report electronically signed by Dr. Nani Mendoza on 4/17/2020 8:00 AM      CT LUMBAR RECONSTRUCTION WO POST PROCESS   Final Result       1. Severe left and moderate severity right foraminal stenosis at the L5-S1 level due to a diffusely bulging disc. 2. Severe spinal canal stenosis at the L4-5 level with bilateral foraminal stenosis. 3. Moderate to severe spinal canal stenosis at the L3-4 level with bilateral foraminal stenosis. **This report has been created using voice recognition software. It may contain minor errors which are inherent in voice recognition technology. **      Final report electronically signed by Dr. Nani Mendoza on 4/17/2020 8:08 AM          Diet: Diet NPO, After Midnight    Beckett: no    Microbiology:  Blood cx 2 of 2 4/17 = MSSA    U/a 4/17 (-)    Left foot wound cx 4/17/2020 = MSSA and Enterococcus species    Tele review last 24 hrs: none    DVT prophylaxis: [x] Lovenox                                 [] SCDs                                 [] SQ Heparin                                 [] Encourage ambulation           [] Already on Anticoagulation     Disposition:    [x] Home??       [] TCU       [] Rehab       [] Psych       [] SNF       [] [facility]       [] Other-    Code Status: Full Code    PT/OT Eval Status: consulted 4/18      Electronically signed by Jaron Burgos MD on 4/20/2020 at 7:25 AM

## 2020-04-20 NOTE — PROGRESS NOTES
time    Dermatologic:   Full thickness ulcer to the distal aspect of the right hallux with granular base and hyperkeratotic margins. No surrounding erythema or edema. No purulence or malodor. Wound does not probe to bone, track, or undermine. Surgical dehiscence noted to the left TMA site laterally and medially with fibrotic/necrotic base with macerated margins. Surrounding skin is erythematous. No purulence noted. Malodor noted. Wound probes to bone.      Vascular:   Dorsalis pedis and posterior tibial pulses are non palpable bilaterally. CFT brisk to TMA stump.      Neurovascular:   Light touch and protective sensation absent to bilateral feet .     Musculoskeletal:   No pain to palpation. Left foot TMA. Right foot and ankle in rectus position. Cast intact.     Wound 03/02/20 Toe (Comment  which one) Right (Active)   Offloading for Diabetic Foot Ulcers Yes (type) 4/19/2020  2:26 AM   Dressing Status Other (Comment) 4/17/2020  9:15 AM   Dressing Changed Changed/New 4/6/2020 11:50 AM   Wound Cleansed Rinsed/Irrigated with saline 4/6/2020 11:50 AM   Wound Length (cm) 1.2 cm 4/6/2020 11:50 AM   Wound Width (cm) 1.5 cm 4/6/2020 11:50 AM   Wound Depth (cm) 0.1 cm 4/6/2020 11:50 AM   Wound Surface Area (cm^2) 1.8 cm^2 4/6/2020 11:50 AM   Change in Wound Size % (l*w) 62.5 4/6/2020 11:50 AM   Wound Volume (cm^3) 0.18 cm^3 4/6/2020 11:50 AM   Wound Healing % 62 4/6/2020 11:50 AM   Wound Assessment Pink 4/6/2020 11:50 AM   Drainage Amount Small 4/6/2020 11:50 AM   Drainage Description Serosanguinous 4/6/2020 11:50 AM   Odor None 4/6/2020 11:50 AM   Margins Attached edges 4/6/2020 11:50 AM   Sumaya-wound Assessment Dry;Calloused 4/6/2020 11:50 AM   [employer]%Wound Bed 100 4/6/2020 11:50 AM   Yellow%Wound Bed 10 3/30/2020 10:27 AM   Number of days: 48       LABS       CBC:   Lab Results   Component Value Date    WBC 12.8 04/20/2020    HGB 11.2 04/20/2020    HCT 35.2 04/20/2020    MCV 86.1 04/20/2020     04/20/2020     BMP: Lab Results   Component Value Date     04/20/2020    K 3.4 04/20/2020    K 4.2 04/18/2020    CL 96 04/20/2020    CO2 24 04/20/2020    BUN 17 04/20/2020    CREATININE 1.0 04/20/2020     PT/INR:   Lab Results   Component Value Date    INR 1.14 04/17/2020     Prealbumin: No results found for: PREALBUMIN  Albumin:  Lab Results   Component Value Date    LABALBU 3.8 04/17/2020     Sed Rate:  Lab Results   Component Value Date    SEDRATE 130 04/17/2020     CRP:   Lab Results   Component Value Date    CRP 14.47 04/17/2020     Micro:   Lab Results   Component Value Date    BC No growth-preliminary  04/17/2020    BC  04/17/2020     sensitivity done-previous specimen See blood culture [id number] drawn 4- at 0600 forsensitivity results. Hemoglobin A1C:   Lab Results   Component Value Date    LABA1C 6.2 04/17/2020       Assessment:     Wound: External surgical dehiscence    Depth of Diabetic/Pressure/Non Pressure Ulcers or Wound:  Diabetic ulcer, fat layer exposed     Arterial doppler ordered for evaluation of perfusion in LE's bilaterally.                   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    Sepsis ([facility] [address] A41.9    Acute left-sided low back pain with bilateral sciatica M54.42, M54.41    S/P transmetatarsal amputation of foot, left ([facility]) Z89.432    Leukocytosis D72.829    Wound dehiscence, surgical, initial encounter T81.31XA    Postoperative wound infection T79.30ZP    Metabolic acidosis H95.5    Hx of CABG Z95.1    Lumbar stenosis without neurogenic claudication M48.061    CKD (chronic kidney disease), stage II N18.2    Normocytic anemia D64.9    Morbid obesity ([facility]) E66.01   

## 2020-04-20 NOTE — ANESTHESIA POSTPROCEDURE EVALUATION
Department of Anesthesiology  Postprocedure Note    Patient: Diana Dunbar  MRN: [id number]  YOB: 1960  Date of evaluation: 4/20/2020  Time:  2:32 PM     Procedure Summary     Date:  04/20/20 Room / Location:  [address]    Anesthesia Start:  9027 Anesthesia Stop:  5631    Procedure:  LEFT TRANSMETATARSAL AMPUTATION  FOOT INCISION AND DRAINAGE (Left ) Diagnosis:  (WOUND INFECTION/DEHISSENCE)    Surgeon:  Joann Rodgers DPM Responsible Provider:  Donta Gruber MD    Anesthesia Type:  MAC ASA Status:  3          Anesthesia Type: MAC    Fernando Phase I:      Fernando Phase II:      Last vitals: Reviewed and per EMR flowsheets.        Anesthesia Post Evaluation    Patient location during evaluation: bedside  Patient participation: complete - patient participated  Level of consciousness: awake and alert  Airway patency: patent  Nausea & Vomiting: no nausea and no vomiting  Complications: no  Cardiovascular status: hemodynamically stable  Respiratory status: acceptable, room air and spontaneous ventilation  Hydration status: euvolemic

## 2020-04-20 NOTE — PROGRESS NOTES
Called pain management to notify of consult; informed by office staff that pain management is not taking inpatient consults at this time, discontinued the order per Dr. Yudith Ramon

## 2020-04-20 NOTE — PROGRESS NOTES
HYDROcodone-acetaminophen (NORCO) 5-325 MG per tablet 2 tablet, 2 tablet, Oral, Q4H PRN  ceFAZolin (ANCEF) 2 g in dextrose 5 % 50 mL IVPB, 2 g, Intravenous, Q8H  insulin glargine (LANTUS) injection vial 44 Units, 44 Units, Subcutaneous, Nightly  amiodarone (CORDARONE) tablet 200 mg, 200 mg, Oral, Daily  aspirin chewable tablet 81 mg, 81 mg, Oral, Daily  atorvastatin (LIPITOR) tablet 40 mg, 40 mg, Oral, Nightly  clopidogrel (PLAVIX) tablet 75 mg, 75 mg, Oral, Daily  glucose (GLUTOSE) 40 % oral gel 15 g, 15 g, Oral, PRN  dextrose 50 % IV solution, 12.5 g, Intravenous, PRN  glucagon (rDNA) injection 1 mg, 1 mg, Intramuscular, PRN  dextrose 5 % solution, 100 mL/hr, Intravenous, PRN  alogliptin (NESINA) tablet 12.5 mg, 12.5 mg, Oral, Daily  lisinopril (PRINIVIL;ZESTRIL) tablet 5 mg, 5 mg, Oral, Daily  metoprolol tartrate (LOPRESSOR) tablet 25 mg, 25 mg, Oral, BID  acetaminophen (TYLENOL) tablet 650 mg, 650 mg, Oral, Q6H PRN **OR** acetaminophen (TYLENOL) suppository 650 mg, 650 mg, Rectal, Q6H PRN  sennosides-docusate sodium (SENOKOT-S) 8.6-50 MG tablet 1 tablet, 1 tablet, Oral, BID  enoxaparin (LOVENOX) injection 40 mg, 40 mg, Subcutaneous, Q24H  insulin lispro (HUMALOG) injection vial 0-12 Units, 0-12 Units, Subcutaneous, TID WC  insulin lispro (HUMALOG) injection vial 0-6 Units, 0-6 Units, Subcutaneous, Nightly  gabapentin (NEURONTIN) capsule 200 mg, 200 mg, Oral, TID    Objective     BP (!) 175/87   Pulse 81   Temp 98.5 °F (36.9 °C) (Oral)   Resp 18   Ht 6' 2\" (1.88 m)   Wt (!) 310 lb (140.6 kg)   SpO2 96%   BMI 39.80 kg/m²      I/O:    Intake/Output Summary (Last 24 hours) at 4/20/2020 1603  Last data filed at 4/20/2020 1419  Gross per 24 hour   Intake 440 ml   Output 2915 ml   Net -2475 ml              Wt Readings from Last 3 Encounters:   04/19/20 (!) 310 lb (140.6 kg)   04/08/20 (!) 310 lb (140.6 kg)   04/06/20 (!) 310 lb 3.2 oz (140.7 kg)       LABS:    Recent Labs     04/19/20  0538 04/20/20  0539   WBC

## 2020-04-20 NOTE — PLAN OF CARE
Problem: Nutrition  Goal: Optimal nutrition therapy  Outcome: Ongoing   Nutrition Problem: Increased nutrient needs  Intervention: Food and/or Nutrient Delivery: (Advance diet when medically feasible. Will send Enzo BID when diet is advanced.  Recommend a Multivitamin w/minerals daily)  Nutritional Goals: Pt will consume 75% or more of meals duirng LOS to aid in wound healing

## 2020-04-20 NOTE — BRIEF OP NOTE
Brief Postoperative Note      Patient: Reyes Leiter  YOB: 1960  MRN: [id number]    Date of Procedure: 4/20/2020    Pre-Op Diagnosis: LEFT FOOT OSTEOMYELITIS AND WOUND INFECTION/DEHISCENCE    Post-Op Diagnosis: Same       Procedure(s): LEFT FOOT TRANSMETATARSAL AMPUTATION REVISION, INCISION AND DRAINAGE    Surgeon(s):  Cristopher Mays DPM    Assistant:  Resident: Marnie Landaverde DPM-PGY1    Anesthesia: MAC    Estimated Blood Loss (mL): 600     Injectables: 30 cc 1/2% Marcaine Plain    Suture: 0 Vicryl, 2-0 PDS, 3-0 Monocryl, Staples    Complications: None    Specimens:   ID Type Source Tests Collected by Time Destination   A : Left foot Tissue Foot SURGICAL PATHOLOGY Cristopher Mays DPM 4/20/2020 1410        Implants: None      Drains: None    Findings: Same as Pre-Op Diagnosis    Electronically signed by Marnie Landaverde DPM on 4/20/2020 at 2:43 PM

## 2020-04-20 NOTE — ANESTHESIA PRE PROCEDURE
solution  12.5 g Intravenous PRN Lori Carp, DO        glucagon (rDNA) injection 1 mg  1 mg Intramuscular PRN Lori Carp, DO        dextrose 5 % solution  100 mL/hr Intravenous PRN Lori Carp, DO        alogliptin (NESINA) tablet 12.5 mg  12.5 mg Oral Daily Lori Carp, DO   12.5 mg at 04/19/20 0850    lisinopril (PRINIVIL;ZESTRIL) tablet 5 mg  5 mg Oral Daily Lori Carp, DO   5 mg at 04/20/20 1109    metoprolol tartrate (LOPRESSOR) tablet 25 mg  25 mg Oral BID Lori Carp, DO   25 mg at 04/20/20 1109    sodium chloride flush 0.9 % injection 10 mL  10 mL Intravenous 2 times per day Lori Carp, DO   10 mL at 04/20/20 1112    sodium chloride flush 0.9 % injection 10 mL  10 mL Intravenous PRN Lori Carp, DO        acetaminophen (TYLENOL) tablet 650 mg  650 mg Oral Q6H PRN Lori Carp, DO   650 mg at 04/19/20 2059    Or    acetaminophen (TYLENOL) suppository 650 mg  650 mg Rectal Q6H PRN Lori Carp, DO        promethazine (PHENERGAN) tablet 12.5 mg  12.5 mg Oral Q6H PRN Lori Carp, DO        Or    ondansetron [facility]) injection 4 mg  4 mg Intravenous Q6H PRN Lori Carp, DO   4 mg at 04/18/20 0203    sennosides-docusate sodium (SENOKOT-S) 8.6-50 MG tablet 1 tablet  1 tablet Oral BID Lori Carp, DO   Stopped at 04/20/20 1105    enoxaparin (LOVENOX) injection 40 mg  40 mg Subcutaneous Q24H Lori Carp, DO   Stopped at 04/19/20 1038    insulin lispro (HUMALOG) injection vial 0-12 Units  0-12 Units Subcutaneous TID WC Lori Carp, DO   2 Units at 04/19/20 1227    insulin lispro (HUMALOG) injection vial 0-6 Units  0-6 Units Subcutaneous Nightly Lori Carp, DO   1 Units at 04/19/20 2100    gabapentin (NEURONTIN) capsule 200 mg  200 mg Oral TID Lori Carp, DO   200 mg at 04/20/20 1109    sodium hypochloriteth) (DAKINS) external solution   Irrigation BID Jocelyn Sigala DPM           Allergies:  No Known Allergies    Problem List:    Patient Active Problem List   Diagnosis Code    Gangrene of toe of soft.    Vascular:                                          Anesthesia Plan      MAC     ASA 3       Induction: intravenous. Anesthetic plan and risks discussed with patient and spouse.       Plan discussed with CRNA and surgical team.                  Jurgen Gonzalez MD   4/20/2020

## 2020-04-21 LAB
ALBUMIN SERPL-MCNC: 2.7 G/DL (ref 3.5–5.1)
ALP BLD-CCNC: 71 U/L (ref 38–126)
ALT SERPL-CCNC: 34 U/L (ref 11–66)
ANION GAP SERPL CALCULATED.3IONS-SCNC: 14 MEQ/L (ref 8–16)
AST SERPL-CCNC: 40 U/L (ref 5–40)
BASOPHILS # BLD: 0.4 %
BASOPHILS ABSOLUTE: 0 THOU/MM3 (ref 0–0.1)
BILIRUB SERPL-MCNC: 0.3 MG/DL (ref 0.3–1.2)
BUN BLDV-MCNC: 25 MG/DL (ref 7–22)
CALCIUM SERPL-MCNC: 8.8 MG/DL (ref 8.5–10.5)
CHLORIDE BLD-SCNC: 97 MEQ/L (ref 98–111)
CO2: 23 MEQ/L (ref 23–33)
CREAT SERPL-MCNC: 1 MG/DL (ref 0.4–1.2)
EOSINOPHIL # BLD: 1.6 %
EOSINOPHILS ABSOLUTE: 0.2 THOU/MM3 (ref 0–0.4)
ERYTHROCYTE [DISTWIDTH] IN BLOOD BY AUTOMATED COUNT: 15.5 % (ref 11.5–14.5)
ERYTHROCYTE [DISTWIDTH] IN BLOOD BY AUTOMATED COUNT: 47.9 FL (ref 35–45)
GFR SERPL CREATININE-BSD FRML MDRD: 77 ML/MIN/1.73M2
GLUCOSE BLD-MCNC: 137 MG/DL (ref 70–108)
GLUCOSE BLD-MCNC: 137 MG/DL (ref 70–108)
GLUCOSE BLD-MCNC: 169 MG/DL (ref 70–108)
GLUCOSE BLD-MCNC: 173 MG/DL (ref 70–108)
GLUCOSE BLD-MCNC: 183 MG/DL (ref 70–108)
HCT VFR BLD CALC: 29.9 % (ref 42–52)
HEMOGLOBIN: 9.6 GM/DL (ref 14–18)
IMMATURE GRANS (ABS): 0.15 THOU/MM3 (ref 0–0.07)
IMMATURE GRANULOCYTES: 1.3 %
LV EF: 50 %
LVEF MODALITY: NORMAL
LYMPHOCYTES # BLD: 7.8 %
LYMPHOCYTES ABSOLUTE: 0.9 THOU/MM3 (ref 1–4.8)
MAGNESIUM: 2.2 MG/DL (ref 1.6–2.4)
MCH RBC QN AUTO: 27.4 PG (ref 26–33)
MCHC RBC AUTO-ENTMCNC: 32.1 GM/DL (ref 32.2–35.5)
MCV RBC AUTO: 85.4 FL (ref 80–94)
MONOCYTES # BLD: 10.1 %
MONOCYTES ABSOLUTE: 1.2 THOU/MM3 (ref 0.4–1.3)
NUCLEATED RED BLOOD CELLS: 0 /100 WBC
PLATELET # BLD: 265 THOU/MM3 (ref 130–400)
PMV BLD AUTO: 10.6 FL (ref 9.4–12.4)
POTASSIUM SERPL-SCNC: 4.1 MEQ/L (ref 3.5–5.2)
RBC # BLD: 3.5 MILL/MM3 (ref 4.7–6.1)
SEG NEUTROPHILS: 78.8 %
SEGMENTED NEUTROPHILS ABSOLUTE COUNT: 9.5 THOU/MM3 (ref 1.8–7.7)
SODIUM BLD-SCNC: 134 MEQ/L (ref 135–145)
TOTAL PROTEIN: 6.8 G/DL (ref 6.1–8)
WBC # BLD: 12 THOU/MM3 (ref 4.8–10.8)

## 2020-04-21 PROCEDURE — 6370000000 HC RX 637 (ALT 250 FOR IP): Performed by: STUDENT IN AN ORGANIZED HEALTH CARE EDUCATION/TRAINING PROGRAM

## 2020-04-21 PROCEDURE — 97530 THERAPEUTIC ACTIVITIES: CPT

## 2020-04-21 PROCEDURE — 6360000002 HC RX W HCPCS: Performed by: STUDENT IN AN ORGANIZED HEALTH CARE EDUCATION/TRAINING PROGRAM

## 2020-04-21 PROCEDURE — 1200000000 HC SEMI PRIVATE

## 2020-04-21 PROCEDURE — 93320 DOPPLER ECHO COMPLETE: CPT

## 2020-04-21 PROCEDURE — 99232 SBSQ HOSP IP/OBS MODERATE 35: CPT | Performed by: FAMILY MEDICINE

## 2020-04-21 PROCEDURE — 85025 COMPLETE CBC W/AUTO DIFF WBC: CPT

## 2020-04-21 PROCEDURE — 93325 DOPPLER ECHO COLOR FLOW MAPG: CPT

## 2020-04-21 PROCEDURE — 97162 PT EVAL MOD COMPLEX 30 MIN: CPT

## 2020-04-21 PROCEDURE — 82948 REAGENT STRIP/BLOOD GLUCOSE: CPT

## 2020-04-21 PROCEDURE — 2580000003 HC RX 258: Performed by: STUDENT IN AN ORGANIZED HEALTH CARE EDUCATION/TRAINING PROGRAM

## 2020-04-21 PROCEDURE — 97167 OT EVAL HIGH COMPLEX 60 MIN: CPT

## 2020-04-21 PROCEDURE — 87040 BLOOD CULTURE FOR BACTERIA: CPT

## 2020-04-21 PROCEDURE — 93306 TTE W/DOPPLER COMPLETE: CPT

## 2020-04-21 PROCEDURE — 80053 COMPREHEN METABOLIC PANEL: CPT

## 2020-04-21 PROCEDURE — 83735 ASSAY OF MAGNESIUM: CPT

## 2020-04-21 PROCEDURE — 36415 COLL VENOUS BLD VENIPUNCTURE: CPT

## 2020-04-21 RX ADMIN — OXYCODONE HYDROCHLORIDE AND ACETAMINOPHEN 2 TABLET: 5; 325 TABLET ORAL at 17:03

## 2020-04-21 RX ADMIN — KETOROLAC TROMETHAMINE 15 MG: 30 INJECTION, SOLUTION INTRAMUSCULAR at 07:18

## 2020-04-21 RX ADMIN — KETOROLAC TROMETHAMINE 15 MG: 30 INJECTION, SOLUTION INTRAMUSCULAR at 23:04

## 2020-04-21 RX ADMIN — INSULIN LISPRO 2 UNITS: 100 INJECTION, SOLUTION INTRAVENOUS; SUBCUTANEOUS at 14:04

## 2020-04-21 RX ADMIN — ALOGLIPTIN 12.5 MG: 12.5 TABLET, FILM COATED ORAL at 10:08

## 2020-04-21 RX ADMIN — DOCUSATE SODIUM 100 MG: 100 CAPSULE, LIQUID FILLED ORAL at 22:00

## 2020-04-21 RX ADMIN — METOPROLOL TARTRATE 25 MG: 25 TABLET ORAL at 10:08

## 2020-04-21 RX ADMIN — GABAPENTIN 200 MG: 100 CAPSULE ORAL at 22:36

## 2020-04-21 RX ADMIN — POLYETHYLENE GLYCOL 3350 17 G: 17 POWDER, FOR SOLUTION ORAL at 10:08

## 2020-04-21 RX ADMIN — ENOXAPARIN SODIUM 40 MG: 40 INJECTION SUBCUTANEOUS at 10:13

## 2020-04-21 RX ADMIN — CYCLOBENZAPRINE 10 MG: 10 TABLET, FILM COATED ORAL at 10:09

## 2020-04-21 RX ADMIN — OXYCODONE HYDROCHLORIDE AND ACETAMINOPHEN 2 TABLET: 5; 325 TABLET ORAL at 22:35

## 2020-04-21 RX ADMIN — METOPROLOL TARTRATE 25 MG: 25 TABLET ORAL at 22:36

## 2020-04-21 RX ADMIN — CYCLOBENZAPRINE 10 MG: 10 TABLET, FILM COATED ORAL at 22:00

## 2020-04-21 RX ADMIN — AMIODARONE HYDROCHLORIDE 200 MG: 200 TABLET ORAL at 10:08

## 2020-04-21 RX ADMIN — Medication 10 ML: at 10:10

## 2020-04-21 RX ADMIN — OXYCODONE HYDROCHLORIDE AND ACETAMINOPHEN 2 TABLET: 5; 325 TABLET ORAL at 10:09

## 2020-04-21 RX ADMIN — CLOPIDOGREL BISULFATE 75 MG: 75 TABLET ORAL at 10:08

## 2020-04-21 RX ADMIN — INSULIN GLARGINE 44 UNITS: 100 INJECTION, SOLUTION SUBCUTANEOUS at 22:37

## 2020-04-21 RX ADMIN — DEXTROSE MONOHYDRATE 2 G: 50 INJECTION, SOLUTION INTRAVENOUS at 22:36

## 2020-04-21 RX ADMIN — ASPIRIN 81 MG 81 MG: 81 TABLET ORAL at 10:08

## 2020-04-21 RX ADMIN — DOCUSATE SODIUM 100 MG: 100 CAPSULE, LIQUID FILLED ORAL at 10:09

## 2020-04-21 RX ADMIN — HYDROCODONE BITARTRATE AND ACETAMINOPHEN 2 TABLET: 5; 325 TABLET ORAL at 03:56

## 2020-04-21 RX ADMIN — GABAPENTIN 200 MG: 100 CAPSULE ORAL at 17:02

## 2020-04-21 RX ADMIN — CYCLOBENZAPRINE 10 MG: 10 TABLET, FILM COATED ORAL at 18:06

## 2020-04-21 RX ADMIN — INSULIN LISPRO 2 UNITS: 100 INJECTION, SOLUTION INTRAVENOUS; SUBCUTANEOUS at 10:09

## 2020-04-21 RX ADMIN — SENNOSIDES AND DOCUSATE SODIUM 1 TABLET: 8.6; 5 TABLET ORAL at 10:08

## 2020-04-21 RX ADMIN — KETOROLAC TROMETHAMINE 15 MG: 30 INJECTION, SOLUTION INTRAMUSCULAR at 17:02

## 2020-04-21 RX ADMIN — LISINOPRIL 5 MG: 5 TABLET ORAL at 10:08

## 2020-04-21 RX ADMIN — DEXTROSE MONOHYDRATE 2 G: 50 INJECTION, SOLUTION INTRAVENOUS at 14:03

## 2020-04-21 RX ADMIN — DEXTROSE MONOHYDRATE 2 G: 50 INJECTION, SOLUTION INTRAVENOUS at 03:56

## 2020-04-21 RX ADMIN — GABAPENTIN 200 MG: 100 CAPSULE ORAL at 10:08

## 2020-04-21 RX ADMIN — ATORVASTATIN CALCIUM 40 MG: 40 TABLET, FILM COATED ORAL at 22:36

## 2020-04-21 ASSESSMENT — PAIN SCALES - GENERAL
PAINLEVEL_OUTOF10: 7
PAINLEVEL_OUTOF10: 7
PAINLEVEL_OUTOF10: 10
PAINLEVEL_OUTOF10: 10
PAINLEVEL_OUTOF10: 0
PAINLEVEL_OUTOF10: 8
PAINLEVEL_OUTOF10: 10
PAINLEVEL_OUTOF10: 9
PAINLEVEL_OUTOF10: 9

## 2020-04-21 ASSESSMENT — PAIN DESCRIPTION - LOCATION
LOCATION: BACK
LOCATION: BACK

## 2020-04-21 ASSESSMENT — PAIN - FUNCTIONAL ASSESSMENT: PAIN_FUNCTIONAL_ASSESSMENT: PREVENTS OR INTERFERES SOME ACTIVE ACTIVITIES AND ADLS

## 2020-04-21 ASSESSMENT — PAIN DESCRIPTION - PAIN TYPE
TYPE: ACUTE PAIN
TYPE: ACUTE PAIN

## 2020-04-21 ASSESSMENT — PAIN DESCRIPTION - ONSET: ONSET: ON-GOING

## 2020-04-21 ASSESSMENT — PAIN DESCRIPTION - PROGRESSION
CLINICAL_PROGRESSION: NOT CHANGED
CLINICAL_PROGRESSION: RAPIDLY WORSENING

## 2020-04-21 ASSESSMENT — PAIN DESCRIPTION - FREQUENCY
FREQUENCY: CONTINUOUS
FREQUENCY: CONTINUOUS

## 2020-04-21 ASSESSMENT — PAIN DESCRIPTION - DESCRIPTORS: DESCRIPTORS: SPASM;SHOOTING;SHARP

## 2020-04-21 NOTE — PROGRESS NOTES
Results   Component Value Date    BC No growth-preliminary  04/17/2020    [facility]  04/17/2020     sensitivity done-previous specimen See blood culture [id number] drawn 4- at 0600 forsensitivity results. Problem list of patient:     Patient Active Problem List   Diagnosis Code    Gangrene of toe of left foot ([facility] [address] I96    CAD (coronary artery disease) I25.10    Type 2 diabetes mellitus with insulin therapy (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 [address] A41.9    Acute left-sided low back pain with bilateral sciatica M54.42, M54.41    S/P transmetatarsal amputation of foot, left ([facility]) Z89.432    Leukocytosis D72.829    Wound dehiscence, surgical, initial encounter T81.31XA    Postoperative wound infection A83.36SN    Metabolic acidosis S11.6    Hx of CABG Z95.1    Lumbar stenosis without neurogenic claudication M48.061    CKD (chronic kidney disease), stage II N18.2    Normocytic anemia D64.9    Morbid obesity ([facility]) E66.01    Debility R53.81    Carotid stenosis, asymptomatic, bilateral I65.23    Hypokalemia E87.6         ASSESSMENT/PLAN   MSSA bacteremia originating from infected left foot  Dehiscence stump on his left foot status post debridement  Degenerative back disease with severe pain interfering with mobility.   We will repeat blood culture to make sure he is clearing the infection  He will need long-term IV antibiotic      Gali Duckworth MD, FACP 4/21/2020 8:43 AM

## 2020-04-21 NOTE — PROGRESS NOTES
[address] [place] [address]  INPATIENT PHYSICAL THERAPY  EVALUATION  [facility] ONC MED 5K - 5K-04/004-A    Time In: 9604  Time Out: 1226  Timed Code Treatment Minutes: 24 Minutes  Minutes: 37          Date: 2020  Patient Name: Isaias Treviño,  Gender:  male        MRN: [id number]  : 1960  (63 y.o.)      Referring Practitioner: Nicolette Goyal DPM  Diagnosis: Sepsis  Additional Pertinent Hx: Per H&P: Pt presented to [address] [place] [address] with sudden onset back pain starting yesterday. Of note he was recently hospitalized for left TMA by Dr. Jared Diaz  He also has a wound to the right great toe. CT of the lumbar spine showed severe left and moderate right foraminal stenosis at L5-S1 with diffusely bulging disc, severe spinal canal stenosis at L4-5 with bilateral foraminal stenosis, and moderate to severe spinal canal stenosis at L3-4 with bilateral foraminal stenosis (no intervention planned). Pt s/p  LEFT FOOT TRANSMETATARSAL AMPUTATION REVISION, INCISION AND DRAINAGE on  due to infection/wound DEHISCENCE. Restrictions/Precautions:  Restrictions/Precautions: Weight Bearing, General Precautions, Fall Risk  Right Lower Extremity Weight Bearing: Weight Bearing As Tolerated(with cast)  Left Lower Extremity Weight Bearing: Non Weight Bearing    Subjective:  Chart Reviewed: Yes  Patient assessed for rehabilitation services?: Yes  Family / Caregiver Present: No  Subjective: RN approved session, pt is supine in bed and agreeable. General:  Overall Orientation Status: Within Functional Limits  Follows Commands: Within Functional Limits    Vision: Within Functional Limits    Hearing: Within functional limits         Pain:   .      Pre Treatment Pain Screening  Pain at present: 5  Scale Used: Numeric Score  Intervention List: Patient able to continue with treatment    Social/Functional History:    Lives With: Alone  Type of Home: House  Home Layout: One level  Home Access: Ramped entrance  Home precautions in place.

## 2020-04-21 NOTE — TELEPHONE ENCOUNTER
Chart reviewed. The patient is currently admitted to the hospital and is undergoing treatment for Bacteremia and wound infection. Schedule office visit in the next few weeks to evaluate and discuss peripheral angiogram with the patient.

## 2020-04-21 NOTE — OP NOTE
[address] [place] [address]                                OPERATIVE REPORT    PATIENT NAME: Ian Valencia                    :        1960  MED REC NO:   [id number]                           ROOM:       0004  ACCOUNT NO:   [de-identified]                           ADMIT DATE: 2020  PROVIDER:     Marielena Carpenter D.P.M. DATE OF PROCEDURE:  2020    SURGEON:  Sumaya Valiente DPM    ASSISTANT:  Lynnette Pritchard, PGY-1    PREOPERATIVE DIAGNOSES:  Include type 2 diabetes; osteomyelitis, left  foot; as well as surgical wound dehiscence, left foot. POSTOPERATIVE DIAGNOSES:  Include type 2 diabetes; osteomyelitis, left  foot; as well as surgical wound dehiscence, left foot. PROCEDURES PERFORMED:  Revision of a left transmetatarsal amputation  into a left Lisfranc amputation. ANESTHESIA:  MAC with addition of 20 mL of 0.5% Marcaine plain in an  ankle block fashion. HEMOSTASIS:  Pressure and electrocautery. ESTIMATED BLOOD LOSS:  600 mL. MATERIALS USED:  None. INJECTABLES:  None. CONDITION:  Stable. COMPLICATIONS:  None. SPECIMENS:  Obtained, sent to Pathology. INDICATIONS FOR PROCEDURE:  The patient is a 81-year-old male who was  admitted for low back pain. We have been treating him for bilateral  lower extremity pathology. The patient had been scheduled back in  2019 to have a Charcot reconstruction of his right foot. Subsequently, he was meaning to have a triple bypass done, was cleared,  but then subsequently developed gas gangrene of the left forefoot  necessitating need for transmetatarsal amputation. The patient  subsequently ambulated on the operative site, which causes superficial  wound dehiscence with localized infection.   We have been attempting to  treat this conservatively, but due to the nature and location of the  wound as well as exposed bone, need for surgical revision of a  transmetatarsal amputation into a left Lisfranc mid foot disarticulation  was indicated. All questions and concerns regarding medical management  were otherwise addressed. The patient was seen preoperatively. Consent was reviewed and signed. Operative limb was marked. The patient was taken to the operating room,  placed in the supine position, and administered MAC anesthetic. The  patient's foot was prepped with Betadine surgical scrub and administered  Ancef as prophylactic antibiotic as well as MAC anesthetic. PROCEDURE IN DETAIL:  Left Lisfranc amputation/revision of left  transmetatarsal amputation. Attention was directed to the left  forefoot. Semi-elliptical incision was made at the tarsometatarsal  joint of the left forefoot maintaining a long plantar flap. Initial  incision was made full thickness with a 15-scalpel blade followed by  blunt dissection with Metzenbaum scissors and electrocautery Bovie. A  Quinonez periosteal elevator was used to create a full-thickness adequate  fascial flap dorsally, followed by TPS sagittal saw to resect the  metatarsal at the tarsometatarsal joints 1 through 5. Bone was then  disarticulated and placed on the back table, sent to Pathology. Long  plantar flap was then debrided and transposed dorsally and imbricated to  the dorsal flap using 2-0 PDS for retention stitches with 3-0 Vicryl  stitch. The subcutaneous tissue was then reapproximated with 3-0  Monocryl followed by skin stapler for skin. The patient tolerated the  procedure well and was transported to PACU in stable condition. All  questions and concerns regarding postop management were addressed. Nataliia Soto D.P.M.     D: 04/20/2020 14:29:39       T: 04/20/2020 15:33:06     [email or web address]  Job#: [id number]     Doc#: [id number]    CC:

## 2020-04-21 NOTE — PROGRESS NOTES
Hospitalist Progress Note      Patient:  Kinjal Mercado      Unit/Bed:5K-04/004-A    YOB: 1960    MRN: [id number]       Acct: [de-identified]     PCP: Cain Babinski, PA    Date of Admission: 4/17/2020    Assessment/Plan:    1. Sepsis with leukocytosis, fever, bacteremia with staph likely MSSA due to LLE wound infection also with MSSA and enterobacter -- POA -- c/s ID 4/20 --received 1L IVF in ER only and no further fluids since ER -- per ID \"will need long term IV antibiotic\" - per d/w Dr. Vinny Moreau 4/20 likely 4 weeks IV atbx  -- Started on zosyn, vanc on admission 4/17 --> blood cx 4/17 2 of 2 with MSSA --> changed zosyn to ancef 4/18 and stopped vanc 4/18  -- WBC down to 12.1 on 4/19 and stable at 12.0 on 4/21 after trending up 4/18 15.7 From 14.9 on 4/17 (normal 3/4/2020) --> cont monitor cx  -- LA normal on admission 4/17, PCT 0.13 4/17, CXR 4/17 (-), CT abd 4/17 (-) acute process  -- checked echo 4/20/2020 and still (p) as of 4/21/2020  with bacteremia and +murmur  --Per podiatry plan debridement of left foot 4/20  2. Acute intractable lower back pain -- ortho spine c/s (p) since 4/17 -> per [facility] note 4/19 states \"Maria D Janema on unit, updated about ortho surgery consult, stated they are aware of consult, patient is not a good candidate at this time for surgery at this time\"  -- CT lumbar spine 4/17/2020 = Severe left and moderate severity right foraminal stenosis at the L5-S1 level due to a diffusely bulging disc, Severe spinal canal stenosis at the L4-5 level with bilateral foraminal stenosis.  Moderate to severe spinal canal stenosis at the L3-4 level with bilateral foraminal stenosis  -- MRI with and w/o contrast L-spine 4/17/2020 = up to moderate spinal canal stenosis at the L3-4 and L4-5 levels and up to moderate neural foraminal stenosis at the L3-4 and L5-S1 levels  -- c/s PT/OT but pt limited WB on BLE  -- cont prn norco -> discussed percocet further 4/20 --> pt states has had nausea with it in past but is agreeable to try again 4/21 as pain is still NOT controlled 4/21   -- stopped morphine 4/20 due to not improving pain and added dilaudid 1 mg IV q 4 hrs prn  --  Cont lidoderm patches; cont neurontin 200 mg tid started 4/17 -- ?increase neurontin  -- pain uncontrolled 4/20 am -> added toradol 15 mg q 6 hrs prn 4/20 with some improvement in pain  -- added prn flexeril 4/18, cont ice/heat prn  -- no signs of cauda equina or abscess per MRI  -- ??steroids but will avoid given current infection for now.    -- UNABLE to c/s pain mgmt as not seeing inpt c/s  3. Left foot wound dehiscence with infection with MSSA and Enterococcus -- POA -- ?OM of 5th metatarsal on XR -- path from TMA 3/3/2020 with acute osteomyelitis -- apprec podiatry assist -- zosyn, vanc 4/17 --> changed to ancef 4/18 with bacteremia and stopped vanc 4/18   -- per podiatry 4/20 s/p Revision of a left transmetatarsal amputation into a left Lisfranc amputation, I&D  -- ??OM of 5th metatarsal -> CRP elevated 4/17 at 14.4,   -- c/s ID 4/20 apprec --> plan for ~ 4 weeks IV atbx  4. AG metabolic acidosis -- likely due to #1 -- LA however normal 1.4 on 4/17 -- improving and WNL since 4/19  -- monitor  5.  Moderate lumbar stenosis/moderate foraminal stenosis -- per MRI L-spine 4/17/2020 = moderate spinal canal stenosis at the L3-4 and L4-5 levels and up to moderate neural foraminal stenosis at the L3-4 and L5-S1 levels -- ortho spine c/s (p) but per [facility] note 4/19/2020 states XOCHILT Danielle on unit, updated about ortho surgery consult, stated they are aware of consult, patient is not a good candidate at this time for surgery at this time\"  -- c/s PT/OT 4/19 but pt limited WB on BLE  -- cont prn norco -> increased to 1-2 tabs q 4 hrs prn 4/18;   -- Stopped morphine IV 4/20 and changed to IV dilaudid as pain cont uncontrolled/worsened 4/20 and added prn toradol 15 mg q 6 hrs prn 4/20   --  Pt unable to take percocet; carotid stenosis -- per [facility] records for CABG 12/2019 in [place] pt with totally occluded ANNETTE and ~ 70% left ICA per u/s - ? CTA results noted to be done at [facility] -- ASX -- cont med mgmt - ASA, plavix, statin - avoid Hypotension - cont monitor neuro status  15. HLD  -- cont statin -- no recent lipids here  16. Charcot arthropathy -- due to DM - per podiatry -- casted RLE changed q 3 mo at [facility]. Chronic diabetic right great toe wound -- RLE in cast -- per podiatry  18. Debility-- limited mobility due to BLE issues - one in cast and one unna boot with s/p TMA -- c/s PT/OT 4/19  19. Morbid Obesity Body mass index is 39.8 kg/m². Dispo  -- 4/21 --> apprec consultants - pt's pain states is still not controlled - advised him he needs to [address] and get OOB. Pt moves BLE w/o difficulty. Discussed percocet and in past it only caused nausea -> willing to try thus stopped norco and pt to try percocet 1-2 q 4 hrs prn - cont flexeril, toradol prn. Try to avoid any further IV narcotics (hasn't taken in 24 hrs) thus will stop dilaudid. Cont monitor lytes, BP, HR, CBC, renal fxn. D/w SS and CM about d/c plan - will c/s [place] as pt will need \"long term\" atbx per Dr. Kendall uMrphy. Echo still (p). ?need PICC. Also needs wound care and aggressive therapy. Await consultants recs and d/c plan. Monitor for BM - no BM since admission - cont meds.     -- 4/20 --> apprec consultants -- c/s ID today for MSSA bacteremia and foot wound and recs for atbx need -- per podiatry plan for debridement of left foot wound today;  Back pain more uncontrolled this am -> very TTP over left SI joint but c/o bilateral foot numbness-> notify ortho spine but pt moving BLE w/o difficulty, no bowel/bladder incontinence, no saddle paresthesia -- added toradol for pain - changed morphine to dilaudid and cont norco (unable to take oxycodone due to intolerance) --  Pain mgmt NOT available for assistance - unable to do injection due to bacteremia - dysuria or urinary or fecal incontinence. Denies perineal numbness. Same normal leg numbness today - better than yesterday. Now after surgery yesterday his left foot is hurting more. No cp, sob. No abd pain, n/v.  NO BM since admission. Afeb. On RA. PMH, SURGICAL HX, FH, SOCIAL HX reviewed and updated as needed. Medications:  Reviewed    Infusion Medications    dextrose       Scheduled Medications    polyethylene glycol  17 g Oral Daily    sodium chloride flush  10 mL Intravenous 2 times per day    docusate sodium  100 mg Oral BID    lidocaine  3 patch Transdermal Daily    ceFAZolin  2 g Intravenous Q8H    insulin glargine  44 Units Subcutaneous Nightly    amiodarone  200 mg Oral Daily    aspirin  81 mg Oral Daily    atorvastatin  40 mg Oral Nightly    clopidogrel  75 mg Oral Daily    alogliptin  12.5 mg Oral Daily    lisinopril  5 mg Oral Daily    metoprolol tartrate  25 mg Oral BID    sennosides-docusate sodium  1 tablet Oral BID    enoxaparin  40 mg Subcutaneous Q24H    insulin lispro  0-12 Units Subcutaneous TID WC    insulin lispro  0-6 Units Subcutaneous Nightly    gabapentin  200 mg Oral TID     PRN Meds: potassium chloride **OR** potassium alternative oral replacement **OR** potassium chloride, ketorolac, HYDROmorphone, sodium chloride flush, promethazine **OR** ondansetron, oxyCODONE-acetaminophen **OR** oxyCODONE-acetaminophen, cyclobenzaprine, HYDROcodone 5 mg - acetaminophen **OR** HYDROcodone 5 mg - acetaminophen, glucose, dextrose, glucagon (rDNA), dextrose, acetaminophen **OR** acetaminophen      Intake/Output Summary (Last 24 hours) at 4/21/2020 0754  Last data filed at 4/21/2020 0500  Gross per 24 hour   Intake 960 ml   Output 975 ml   Net -15 ml       Diet:  DIET CARB CONTROL;     Exam:  BP (!) 157/75   Pulse 82   Temp 97.6 °F (36.4 °C) (Oral)   Resp 18   Ht 6' 2\" (1.88 m)   Wt (!) 310 lb (140.6 kg)   SpO2 94%   BMI 39.80 kg/m²     General appearance: No acute process. **This report has been created using voice recognition software. It may contain minor errors which are inherent in voice recognition technology. **      Final report electronically signed by Dr. Kathie Lee on 4/17/2020 8:29 AM      CT ABDOMEN PELVIS W IV CONTRAST Additional Contrast? None   Final Result    No evidence of an acute process in the abdomen or pelvis. **This report has been created using voice recognition software. It may contain minor errors which are inherent in voice recognition technology. **      Final report electronically signed by Dr. Kathie Lee on 4/17/2020 8:00 AM      CT LUMBAR RECONSTRUCTION WO POST PROCESS   Final Result       1. Severe left and moderate severity right foraminal stenosis at the L5-S1 level due to a diffusely bulging disc. 2. Severe spinal canal stenosis at the L4-5 level with bilateral foraminal stenosis. 3. Moderate to severe spinal canal stenosis at the L3-4 level with bilateral foraminal stenosis. **This report has been created using voice recognition software. It may contain minor errors which are inherent in voice recognition technology. **      Final report electronically signed by Dr. Kathie Lee on 4/17/2020 8:08 AM          Diet: DIET CARB CONTROL;     Beckett: no    Microbiology:  Blood cx 2 of 2 4/17 = MSSA    U/a 4/17 (-)    Left foot wound cx 4/17/2020 = MSSA and Enterococcus species    Tele review last 24 hrs: none    DVT prophylaxis: [x] Lovenox                                 [] SCDs                                 [] SQ Heparin                                 [] Encourage ambulation           [] Already on Anticoagulation     Disposition:    [x] Home??       [] TCU       [] Rehab       [] Psych       [] SNF       [] [place]       [] Other-    Code Status: Full Code    PT/OT Eval Status: consulted 4/18      Electronically signed by Jaron Burgos MD on 4/21/2020 at 7:54 AM

## 2020-04-22 ENCOUNTER — APPOINTMENT (OUTPATIENT)
Dept: GENERAL RADIOLOGY | Age: 60
DRG: 854 | End: 2020-04-22
Payer: COMMERCIAL

## 2020-04-22 VITALS
BODY MASS INDEX: 39.91 KG/M2 | OXYGEN SATURATION: 99 % | HEIGHT: 74 IN | WEIGHT: 311 LBS | DIASTOLIC BLOOD PRESSURE: 59 MMHG | HEART RATE: 82 BPM | TEMPERATURE: 98.1 F | SYSTOLIC BLOOD PRESSURE: 122 MMHG | RESPIRATION RATE: 18 BRPM

## 2020-04-22 LAB
AEROBIC CULTURE: ABNORMAL
AEROBIC CULTURE: ABNORMAL
ANAEROBIC CULTURE: ABNORMAL
ANION GAP SERPL CALCULATED.3IONS-SCNC: 10 MEQ/L (ref 8–16)
BASOPHILS # BLD: 0.8 %
BASOPHILS ABSOLUTE: 0.1 THOU/MM3 (ref 0–0.1)
BUN BLDV-MCNC: 32 MG/DL (ref 7–22)
CALCIUM SERPL-MCNC: 9 MG/DL (ref 8.5–10.5)
CHLORIDE BLD-SCNC: 100 MEQ/L (ref 98–111)
CO2: 27 MEQ/L (ref 23–33)
CREAT SERPL-MCNC: 1.3 MG/DL (ref 0.4–1.2)
EOSINOPHIL # BLD: 2.9 %
EOSINOPHILS ABSOLUTE: 0.3 THOU/MM3 (ref 0–0.4)
ERYTHROCYTE [DISTWIDTH] IN BLOOD BY AUTOMATED COUNT: 15.4 % (ref 11.5–14.5)
ERYTHROCYTE [DISTWIDTH] IN BLOOD BY AUTOMATED COUNT: 48.7 FL (ref 35–45)
GFR SERPL CREATININE-BSD FRML MDRD: 57 ML/MIN/1.73M2
GLUCOSE BLD-MCNC: 124 MG/DL (ref 70–108)
GLUCOSE BLD-MCNC: 126 MG/DL (ref 70–108)
GLUCOSE BLD-MCNC: 128 MG/DL (ref 70–108)
GLUCOSE BLD-MCNC: 179 MG/DL (ref 70–108)
GRAM STAIN RESULT: ABNORMAL
HCT VFR BLD CALC: 27 % (ref 42–52)
HEMOGLOBIN: 8.7 GM/DL (ref 14–18)
IMMATURE GRANS (ABS): 0.21 THOU/MM3 (ref 0–0.07)
IMMATURE GRANULOCYTES: 2 %
LYMPHOCYTES # BLD: 12.6 %
LYMPHOCYTES ABSOLUTE: 1.3 THOU/MM3 (ref 1–4.8)
MCH RBC QN AUTO: 27.9 PG (ref 26–33)
MCHC RBC AUTO-ENTMCNC: 32.2 GM/DL (ref 32.2–35.5)
MCV RBC AUTO: 86.5 FL (ref 80–94)
MONOCYTES # BLD: 11.3 %
MONOCYTES ABSOLUTE: 1.2 THOU/MM3 (ref 0.4–1.3)
NUCLEATED RED BLOOD CELLS: 0 /100 WBC
ORGANISM: ABNORMAL
PLATELET # BLD: 273 THOU/MM3 (ref 130–400)
PMV BLD AUTO: 10.8 FL (ref 9.4–12.4)
POTASSIUM SERPL-SCNC: 4.8 MEQ/L (ref 3.5–5.2)
RBC # BLD: 3.12 MILL/MM3 (ref 4.7–6.1)
SEG NEUTROPHILS: 70.4 %
SEGMENTED NEUTROPHILS ABSOLUTE COUNT: 7.4 THOU/MM3 (ref 1.8–7.7)
SODIUM BLD-SCNC: 137 MEQ/L (ref 135–145)
WBC # BLD: 10.5 THOU/MM3 (ref 4.8–10.8)

## 2020-04-22 PROCEDURE — 36569 INSJ PICC 5 YR+ W/O IMAGING: CPT

## 2020-04-22 PROCEDURE — 71045 X-RAY EXAM CHEST 1 VIEW: CPT

## 2020-04-22 PROCEDURE — 82948 REAGENT STRIP/BLOOD GLUCOSE: CPT

## 2020-04-22 PROCEDURE — 6370000000 HC RX 637 (ALT 250 FOR IP): Performed by: STUDENT IN AN ORGANIZED HEALTH CARE EDUCATION/TRAINING PROGRAM

## 2020-04-22 PROCEDURE — 99238 HOSP IP/OBS DSCHRG MGMT 30/<: CPT | Performed by: INTERNAL MEDICINE

## 2020-04-22 PROCEDURE — 97530 THERAPEUTIC ACTIVITIES: CPT

## 2020-04-22 PROCEDURE — 94760 N-INVAS EAR/PLS OXIMETRY 1: CPT

## 2020-04-22 PROCEDURE — 97110 THERAPEUTIC EXERCISES: CPT

## 2020-04-22 PROCEDURE — 2709999900 HC NON-CHARGEABLE SUPPLY

## 2020-04-22 PROCEDURE — 36415 COLL VENOUS BLD VENIPUNCTURE: CPT

## 2020-04-22 PROCEDURE — C1751 CATH, INF, PER/CENT/MIDLINE: HCPCS

## 2020-04-22 PROCEDURE — 76937 US GUIDE VASCULAR ACCESS: CPT

## 2020-04-22 PROCEDURE — 2580000003 HC RX 258: Performed by: STUDENT IN AN ORGANIZED HEALTH CARE EDUCATION/TRAINING PROGRAM

## 2020-04-22 PROCEDURE — 80048 BASIC METABOLIC PNL TOTAL CA: CPT

## 2020-04-22 PROCEDURE — 6360000002 HC RX W HCPCS: Performed by: STUDENT IN AN ORGANIZED HEALTH CARE EDUCATION/TRAINING PROGRAM

## 2020-04-22 PROCEDURE — 85025 COMPLETE CBC W/AUTO DIFF WBC: CPT

## 2020-04-22 RX ORDER — LIDOCAINE HYDROCHLORIDE 10 MG/ML
5 INJECTION, SOLUTION EPIDURAL; INFILTRATION; INTRACAUDAL; PERINEURAL ONCE
Status: DISCONTINUED | OUTPATIENT
Start: 2020-04-22 | End: 2020-04-22 | Stop reason: HOSPADM

## 2020-04-22 RX ORDER — SODIUM CHLORIDE 0.9 % (FLUSH) 0.9 %
10 SYRINGE (ML) INJECTION EVERY 12 HOURS SCHEDULED
Status: DISCONTINUED | OUTPATIENT
Start: 2020-04-22 | End: 2020-04-22 | Stop reason: HOSPADM

## 2020-04-22 RX ORDER — SODIUM CHLORIDE 0.9 % (FLUSH) 0.9 %
10 SYRINGE (ML) INJECTION PRN
Status: DISCONTINUED | OUTPATIENT
Start: 2020-04-22 | End: 2020-04-22 | Stop reason: HOSPADM

## 2020-04-22 RX ORDER — CYCLOBENZAPRINE HCL 10 MG
10 TABLET ORAL 3 TIMES DAILY PRN
DISCHARGE
Start: 2020-04-22 | End: 2020-05-02

## 2020-04-22 RX ORDER — PROMETHAZINE HYDROCHLORIDE 12.5 MG/1
12.5 TABLET ORAL EVERY 6 HOURS PRN
DISCHARGE
Start: 2020-04-22 | End: 2020-04-29

## 2020-04-22 RX ORDER — ALOGLIPTIN 12.5 MG/1
12.5 TABLET, FILM COATED ORAL DAILY
Qty: 60 TABLET | Status: ON HOLD | DISCHARGE
Start: 2020-04-23 | End: 2020-07-20

## 2020-04-22 RX ORDER — OXYCODONE HYDROCHLORIDE AND ACETAMINOPHEN 5; 325 MG/1; MG/1
1 TABLET ORAL EVERY 4 HOURS PRN
Refills: 0 | Status: SHIPPED | DISCHARGE
Start: 2020-04-22 | End: 2020-04-25

## 2020-04-22 RX ORDER — ASPIRIN 81 MG/1
81 TABLET, CHEWABLE ORAL DAILY
Qty: 30 TABLET | Refills: 3 | DISCHARGE
Start: 2020-04-23 | End: 2020-08-10 | Stop reason: SDUPTHER

## 2020-04-22 RX ORDER — POLYETHYLENE GLYCOL 3350 17 G/17G
17 POWDER, FOR SOLUTION ORAL DAILY
Qty: 527 G | Refills: 1 | DISCHARGE
Start: 2020-04-23 | End: 2020-05-23

## 2020-04-22 RX ORDER — INSULIN GLARGINE 100 [IU]/ML
44 INJECTION, SOLUTION SUBCUTANEOUS NIGHTLY
Qty: 1 VIAL | Refills: 3 | DISCHARGE
Start: 2020-04-22 | End: 2020-08-10

## 2020-04-22 RX ORDER — PSEUDOEPHEDRINE HCL 30 MG
100 TABLET ORAL 2 TIMES DAILY
DISCHARGE
Start: 2020-04-22 | End: 2020-08-10

## 2020-04-22 RX ORDER — POTASSIUM CHLORIDE 20 MEQ/1
40 TABLET, EXTENDED RELEASE ORAL PRN
Qty: 60 TABLET | Refills: 3 | Status: ON HOLD | DISCHARGE
Start: 2020-04-22 | End: 2020-07-20

## 2020-04-22 RX ORDER — SENNA AND DOCUSATE SODIUM 50; 8.6 MG/1; MG/1
1 TABLET, FILM COATED ORAL 2 TIMES DAILY
DISCHARGE
Start: 2020-04-22 | End: 2020-08-13

## 2020-04-22 RX ORDER — CEFAZOLIN SODIUM 1 G/3ML
2 INJECTION, POWDER, FOR SOLUTION INTRAMUSCULAR; INTRAVENOUS EVERY 8 HOURS
Qty: 1000 MG | Refills: 0 | Status: ON HOLD | DISCHARGE
Start: 2020-04-22 | End: 2020-07-20

## 2020-04-22 RX ORDER — LIDOCAINE 4 G/G
3 PATCH TOPICAL DAILY
Status: ON HOLD | DISCHARGE
Start: 2020-04-23 | End: 2020-07-20

## 2020-04-22 RX ADMIN — CYCLOBENZAPRINE 10 MG: 10 TABLET, FILM COATED ORAL at 14:16

## 2020-04-22 RX ADMIN — ENOXAPARIN SODIUM 40 MG: 40 INJECTION SUBCUTANEOUS at 10:12

## 2020-04-22 RX ADMIN — KETOROLAC TROMETHAMINE 15 MG: 30 INJECTION, SOLUTION INTRAMUSCULAR at 14:16

## 2020-04-22 RX ADMIN — SENNOSIDES AND DOCUSATE SODIUM 1 TABLET: 8.6; 5 TABLET ORAL at 10:12

## 2020-04-22 RX ADMIN — AMIODARONE HYDROCHLORIDE 200 MG: 200 TABLET ORAL at 10:05

## 2020-04-22 RX ADMIN — DEXTROSE MONOHYDRATE 2 G: 50 INJECTION, SOLUTION INTRAVENOUS at 14:23

## 2020-04-22 RX ADMIN — INSULIN LISPRO 2 UNITS: 100 INJECTION, SOLUTION INTRAVENOUS; SUBCUTANEOUS at 13:36

## 2020-04-22 RX ADMIN — POLYETHYLENE GLYCOL 3350 17 G: 17 POWDER, FOR SOLUTION ORAL at 10:12

## 2020-04-22 RX ADMIN — OXYCODONE HYDROCHLORIDE AND ACETAMINOPHEN 1 TABLET: 5; 325 TABLET ORAL at 07:54

## 2020-04-22 RX ADMIN — OXYCODONE HYDROCHLORIDE AND ACETAMINOPHEN 2 TABLET: 5; 325 TABLET ORAL at 16:13

## 2020-04-22 RX ADMIN — OXYCODONE HYDROCHLORIDE AND ACETAMINOPHEN 2 TABLET: 5; 325 TABLET ORAL at 11:58

## 2020-04-22 RX ADMIN — OXYCODONE HYDROCHLORIDE AND ACETAMINOPHEN 2 TABLET: 5; 325 TABLET ORAL at 02:19

## 2020-04-22 RX ADMIN — GABAPENTIN 200 MG: 100 CAPSULE ORAL at 10:04

## 2020-04-22 RX ADMIN — CLOPIDOGREL BISULFATE 75 MG: 75 TABLET ORAL at 10:12

## 2020-04-22 RX ADMIN — CYCLOBENZAPRINE 10 MG: 10 TABLET, FILM COATED ORAL at 06:34

## 2020-04-22 RX ADMIN — ASPIRIN 81 MG 81 MG: 81 TABLET ORAL at 10:12

## 2020-04-22 RX ADMIN — GABAPENTIN 200 MG: 100 CAPSULE ORAL at 14:24

## 2020-04-22 RX ADMIN — DOCUSATE SODIUM 100 MG: 100 CAPSULE, LIQUID FILLED ORAL at 10:12

## 2020-04-22 RX ADMIN — KETOROLAC TROMETHAMINE 15 MG: 30 INJECTION, SOLUTION INTRAMUSCULAR at 06:34

## 2020-04-22 RX ADMIN — DEXTROSE MONOHYDRATE 2 G: 50 INJECTION, SOLUTION INTRAVENOUS at 05:14

## 2020-04-22 RX ADMIN — ALOGLIPTIN 12.5 MG: 12.5 TABLET, FILM COATED ORAL at 10:05

## 2020-04-22 ASSESSMENT — PAIN DESCRIPTION - ORIENTATION: ORIENTATION: LOWER

## 2020-04-22 ASSESSMENT — PAIN SCALES - GENERAL
PAINLEVEL_OUTOF10: 0
PAINLEVEL_OUTOF10: 9
PAINLEVEL_OUTOF10: 7
PAINLEVEL_OUTOF10: 7
PAINLEVEL_OUTOF10: 9
PAINLEVEL_OUTOF10: 5
PAINLEVEL_OUTOF10: 8
PAINLEVEL_OUTOF10: 5

## 2020-04-22 ASSESSMENT — PAIN DESCRIPTION - PAIN TYPE: TYPE: CHRONIC PAIN

## 2020-04-22 ASSESSMENT — PAIN DESCRIPTION - LOCATION: LOCATION: BACK

## 2020-04-22 ASSESSMENT — PAIN DESCRIPTION - FREQUENCY: FREQUENCY: CONTINUOUS

## 2020-04-22 ASSESSMENT — PAIN - FUNCTIONAL ASSESSMENT: PAIN_FUNCTIONAL_ASSESSMENT: PREVENTS OR INTERFERES SOME ACTIVE ACTIVITIES AND ADLS

## 2020-04-22 ASSESSMENT — PAIN DESCRIPTION - DESCRIPTORS: DESCRIPTORS: ACHING

## 2020-04-22 ASSESSMENT — PAIN DESCRIPTION - ONSET: ONSET: ON-GOING

## 2020-04-22 ASSESSMENT — PAIN DESCRIPTION - PROGRESSION: CLINICAL_PROGRESSION: NOT CHANGED

## 2020-04-22 ASSESSMENT — PAIN DESCRIPTION - DIRECTION: RADIATING_TOWARDS: NO

## 2020-04-22 NOTE — PROGRESS NOTES
Patient updated about discharge to [place]. Report called to Matilda Smith RN at [place] all questions answered. All belongings packed.

## 2020-04-22 NOTE — PROGRESS NOTES
PICC Procedure Note    Nini Suarez   Admitted- 4/17/2020  5:05 AM  Admission diagnosis- Sepsis [facility]) [A41.9]      Attending Physician- Anais Cook MD  Ordering Physician-same  Indication for Insertion: Antibiotic Therapy    Catheter Insertion Date- 4/22/2020   Lot Number- [id number]  Gauge-5  Lumen-dual    Insertion Site- WALESKA Basilic  Vein Diameter- 3 mm  Catheter Length- 44 cm  Internal Length- 44 cm  Exposed Catheter Length- 0cm   Upper Arm Circumference- 31cm  Tip Confirmation System Bundle met- Yes  If X-ray required, Tip Location- YES-SVC  Radiologist-   [id number]    PICC insertion successful- Yes  Ultrasound- yes    Okay To Use PICC- Yes    Electronically signed by Sujit Ramires RN on 4/22/2020 at 3:09 PM

## 2020-04-22 NOTE — PROGRESS NOTES
5 mg Oral Daily    metoprolol tartrate  25 mg Oral BID    sennosides-docusate sodium  1 tablet Oral BID    enoxaparin  40 mg Subcutaneous Q24H    insulin lispro  0-12 Units Subcutaneous TID WC    insulin lispro  0-6 Units Subcutaneous Nightly    gabapentin  200 mg Oral TID      dextrose       sodium chloride flush, potassium chloride **OR** potassium alternative oral replacement **OR** potassium chloride, ketorolac, promethazine **OR** ondansetron, oxyCODONE-acetaminophen **OR** oxyCODONE-acetaminophen, cyclobenzaprine, glucose, dextrose, glucagon (rDNA), dextrose, acetaminophen **OR** acetaminophen      LABS:     CBC:   Recent Labs     04/20/20  0539 04/21/20  0508 04/22/20  0538   WBC 12.8* 12.0* 10.5   HGB 11.2* 9.6* 8.7*    265 273     BMP:    Recent Labs     04/20/20  0539 04/21/20  0508 04/22/20  0538   * 134* 137   K 3.4* 4.1 4.8   CL 96* 97* 100   CO2 24 23 27   BUN 17 25* 32*   CREATININE 1.0 1.0 1.3*   GLUCOSE 138* 137* 126*     Calcium:  Recent Labs     04/22/20  0538   CALCIUM 9.0     Ionized Calcium:No results for input(s): IONCA in the last 72 hours. Magnesium:  Recent Labs     04/21/20  0508   MG 2.2     Phosphorus:No results for input(s): PHOS in the last 72 hours. BNP:No results for input(s): BNP in the last 72 hours. Glucose:  Recent Labs     04/21/20 2059 04/22/20  0813 04/22/20  1208   POCGLU 183* 124* 179*     HgbA1C: No results for input(s): LABA1C in the last 72 hours. INR: No results for input(s): INR in the last 72 hours. Hepatic:   Recent Labs     04/21/20  0508   ALKPHOS 71   ALT 34   AST 40   PROT 6.8   BILITOT 0.3   LABALBU 2.7*        CULTURES:   UA: No results for input(s): SPECGRAV, PHUR, COLORU, CLARITYU, MUCUS, PROTEINU, BLOODU, RBCUA, WBCUA, BACTERIA, NITRU, GLUCOSEU, BILIRUBINUR, UROBILINOGEN, KETUA, LABCAST, LABCASTTY, AMORPHOS in the last 72 hours.     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    Sepsis ([facility] A41.9    Acute left-sided low back pain with bilateral sciatica M54.42, M54.41    S/P transmetatarsal amputation of foot, left ([facility]) Z89.432    Leukocytosis D72.829    Wound dehiscence, surgical, initial encounter T81.31XA    Postoperative wound infection F71.33CE    Metabolic acidosis A59.9    Hx of CABG Z95.1    Lumbar stenosis without neurogenic claudication M48.061    CKD (chronic kidney disease), stage II N18.2    Normocytic anemia D64.9    Morbid obesity ([facility]) E66.01    Debility R53.81    Carotid stenosis, asymptomatic, bilateral I65.23    Hypokalemia E87.6         ASSESSMENT/PLAN   MSSA bacteremia originating from infected left foot  Dehiscence stump on his left foot status post debridement  Degenerative back disease with severe pain interfering with mobility. We will repeat blood culture to make sure he is clearing the infection  Had PICC Line.       Armando Marcum MD, [address] 4/22/2020 4:17 PM

## 2020-04-22 NOTE — PROGRESS NOTES
[address]  Occupational Therapy  Daily Note  Time:   Time In: 8219  Time Out: 5808  Timed Code Treatment Minutes: 28 Minutes  Minutes: 28          Date: 2020  Patient Name: Colten Lorenzo,   Gender: male      Room: [facility]004-A  MRN: [id number]  : 1960  (63 y.o.)  Referring Practitioner: Todd Mclaughlin DPM  Diagnosis: sepsis  Additional Pertinent Hx: Per H&P: Pt presented to [facility] with sudden onset back pain starting yesterday. Of note he was recently hospitalized for left TMA by Dr. Jensen Dk  He also has a wound to the right great toe. CT of the lumbar spine showed severe left and moderate right foraminal stenosis at L5-S1 with diffusely bulging disc, severe spinal canal stenosis at L4-5 with bilateral foraminal stenosis, and moderate to severe spinal canal stenosis at L3-4 with bilateral foraminal stenosis (no intervention planned). Pt s/p  LEFT FOOT TRANSMETATARSAL AMPUTATION REVISION, INCISION AND DRAINAGE on  due to infection/wound DEHISCENCE. Restrictions/Precautions:  Restrictions/Precautions: Weight Bearing, General Precautions, Fall Risk  Right Lower Extremity Weight Bearing: Weight Bearing As Tolerated(with cast)  Left Lower Extremity Weight Bearing: Non Weight Bearing    SUBJECTIVE: RN okayed therapy. Pt lying in bed upon arrival. Pt agreeable to OT session    PAIN:Pain in B LE. Did not scale    COGNITION: WFL    ADL:   Feeding: with set-up. Jb Fernandez BALANCE:  Sitting Balance:  SBA. Sitting EOB with BUE support    BED MOBILITY:  Rolling to Left: Contact Guard Assistance, with head of bed raised, with increased time for completion, log rolling technique, used bedrails  Supine to Sit: [address], with head of bed raised, with increased time for completion, used bedrails    TRANSFERS:  Stand Pivot: Minimal Assistance, with increased time for completion, cues for hand placement.  from EOB to w/c on R side, compliant with NWB on

## 2020-04-22 NOTE — DISCHARGE SUMMARY
0-6 Units into the skin nightly             lidocaine 4 % external patch  Place 3 patches onto the skin daily             lisinopril (PRINIVIL;ZESTRIL) 5 MG tablet  Take 1 tablet by mouth daily             metoprolol tartrate (LOPRESSOR) 25 MG tablet  Take 1 tablet by mouth 2 times daily             Multiple Vitamins-Minerals (MULTIVITAMIN PO)  Take by mouth 2 times daily             oxyCODONE-acetaminophen (PERCOCET) 5-325 MG per tablet  Take 1 tablet by mouth every 4 hours as needed for Pain for up to 3 days. polyethylene glycol (GLYCOLAX) packet  Take 17 g by mouth daily             potassium bicarb-citric acid (EFFER-K) 20 MEQ TBEF effervescent tablet  Take 1 tablet by mouth as needed (Per Potassium Replacement Protocol)             potassium chloride (KLOR-CON M) 20 MEQ extended release tablet  Take 2 tablets by mouth as needed (Potassium Replacement)             promethazine (PHENERGAN) 12.5 MG tablet  Take 1 tablet by mouth every 6 hours as needed for Nausea             sennosides-docusate sodium (SENOKOT-S) 8.6-50 MG tablet  Take 1 tablet by mouth 2 times daily                 Time Spent on discharge is more than 30 minutes in the examination, evaluation, counseling and review of medications and discharge plan. Signed: Thank you XOCHILT Grullon for the opportunity to be involved in this patient's care.     Electronically signed by Katina Sanders MD on 4/22/2020 at 5:48 PM

## 2020-04-22 NOTE — PROGRESS NOTES
Assessment and Plan:        1. Osteomyelitis foot- discussed with Dr Jabier Wood- will order picc  2. ckd- stable  3. Diabetes mellitus- monitor; sugars reasonable so far  4. Cad- sp cabg- stable    CC:  Back pain  HPI:  Pt with dm, pad, adm with back pain and underwent foot surgery for infection. Had cabg 11. 19. ROS (12 point review of systems completed. Pertinent positives noted. Otherwise ROS is negative) :     PMH:  Per HPI  SHX:  Lives alone.   nonsmoker  FHX: heart, dm  Allergies: See above    Medications:     dextrose        lidocaine 1 % injection  5 mL Intradermal Once    sodium chloride flush  10 mL Intravenous 2 times per day    polyethylene glycol  17 g Oral Daily    sodium chloride flush  10 mL Intravenous 2 times per day    docusate sodium  100 mg Oral BID    lidocaine  3 patch Transdermal Daily    ceFAZolin  2 g Intravenous Q8H    insulin glargine  44 Units Subcutaneous Nightly    amiodarone  200 mg Oral Daily    aspirin  81 mg Oral Daily    atorvastatin  40 mg Oral Nightly    clopidogrel  75 mg Oral Daily    alogliptin  12.5 mg Oral Daily    lisinopril  5 mg Oral Daily    metoprolol tartrate  25 mg Oral BID    sennosides-docusate sodium  1 tablet Oral BID    enoxaparin  40 mg Subcutaneous Q24H    insulin lispro  0-12 Units Subcutaneous TID WC    insulin lispro  0-6 Units Subcutaneous Nightly    gabapentin  200 mg Oral TID       Vital Signs:   /71   Pulse 80   Temp 98.4 °F (36.9 °C) (Oral)   Resp 16   Ht 6' 2\" (1.88 m)   Wt (!) 311 lb (141.1 kg)   SpO2 96%   BMI 39.93 kg/m²      Intake/Output Summary (Last 24 hours) at 4/22/2020 0859  Last data filed at 4/22/2020 6250  Gross per 24 hour   Intake 1480 ml   Output 500 ml   Net 980 ml        Physical Examination: General appearance - chronically ill appearing  Mental status - alert, oriented to person, place, and time  Neck - supple, no significant adenopathy, no JVD, or carotid bruits  Chest - clear to auscultation, no wheezes, rales or rhonchi, symmetric air entry  Heart - normal rate, regular rhythm, normal S1, S2, no murmurs, rubs, clicks or gallops  Abdomen - soft, nontender, nondistended, no masses or organomegaly  Neurological - alert, oriented, normal speech, no focal findings or movement disorder noted  Musculoskeletal - legs wrapped  Extremities - no pedal edema noted  Skin - normal coloration and turgor, no rashes, no suspicious skin lesions noted    Data: (All radiographs, tracings, PFTs, and imaging are personally viewed and interpreted unless otherwise noted).     Reviewed labs      Electronically signed by Lorri Lima MD on 4/22/2020 at 8:59 AM

## 2020-04-23 NOTE — PROGRESS NOTES
Patient discharged to [facility] via bed with all belongings, AVS, copy of chart and last dose MAR. Chart placed in yellow d/c bin.

## 2020-04-23 NOTE — CARE COORDINATION
4/20/20, 3:36 PM EDT    DISCHARGE ON GOING [address] [place] [address] day: 3  Location: -04/004-A Reason for admit: Sepsis [facility]) [A41.9]   Procedure: 4/20/2020 LEFT TRANSMETATARSAL AMPUTATION  FOOT INCISION AND DRAINAGE (Left   Treatment Plan of Care: Na 134, K 3.4, WBC 12.8. Podiatry and ID following, Ortho consult for back pain with severe stenosis, SS, Dietician, PT/OT, Ancef, DM management, Lovenox, Colace, Glycolax, and Senokot scheduled, prn Tylenol, Flexeril, Norco, Dilaudid, Toradol, Percocet, Phenergan and Zofran, Potassium replacement protocol, CBC, CMP, and Magnesium level in a.m., echocardiogram, daily weights, incentive spirometry, strict I & O, up with assistance, wound care. Barriers to Discharge: Medical stability. PCP: XOCHILT Ross  Readmission Risk Score: 13%  Patient Goals/Plan/Treatment Preferences: Home with HH as PTA, per SS.
DISCHARGE/PLANNING EVALUATION  4/17/20, 12:33 PM EDT    Reason for Referral: current with [facility] (nursing only) SW spoke with Winsome Lopez with [facility] and advised her of patient admission, diagnosis and confirmed services (nursing only for wound care since 3/23/2020). Mental Status: alert and oriented  Decision Making: patient is able to make own decisions. Family/Social/Home Environment: Spoke with patient re: home situation and discharge plans. He states that he resides alone in a 1 story home with ramped entrance. He describes his home as \"well suited for handicapped\". Bathroom has a walk in shower with handrails. However, patient has been doing a sponge bath due to wound on foot. He states that he is able to prepare his own meals. He states that his mother will also assist him with meals and with housekeeping along with some of his friends. Friends also provide transportation. Patient states that he has a shop at his home and there are at least 2 employees there throughout the day who will assist him with anything he needs. His plan is to return home when discharged with continued home health services. Current Services including food security, transportation and housekeeping: no issues identified. Current Equipment: shower seat, elevated toilet seat with handrails, electric scooter, wheelchair, knee scooter. Payment Source: [facility]  Concerns or Barriers to Discharge: none indicated  Post acute provider list with quality measures, geographic area and applicable managed care information provided. Questions regarding selection process answered: N/A-already current    Teach Back Method used with patient regarding care plan   Patient verbalize understanding of the plan of care and contribute to goal setting. Patient goals, treatment preferences and discharge plan: patient will return home with continued services through [facility].      Electronically signed by MACEY Waldrop on 4/17/2020 at 12:33 PM
Giovani Engel was discharged to [place] last evening.   4/23/20, 10:15 AM EDT    Patient goals/plan/ treatment preferences discussed by  and . Patient goals/plan/ treatment preferences reviewed with patient/ family. Patient/ family verbalize understanding of discharge plan and are in agreement with goal/plan/treatment preferences. Understanding was demonstrated using the teach back method. AVS provided by RN at time of discharge, which includes all necessary medical information pertaining to the patients current course of illness, treatment, post-discharge goals of care, and treatment preferences.
Referral to [place] made, spoke to Hal Escalante.  She requests this writer to speak with patient since she is not coming to the hospital. Electronically signed by Richy Hunt RN on 4/21/20 at 12:05 PM EDT
This writer in to speak with Vivian Hermosillo in regards to [facility] referral. Vivian Hermosillo states he is okay for the referral. Will notify [facility] liaison, Carlos Short.  Electronically signed by Lee Garay RN on 4/21/20 at 12:40 PM EDT
Update: client moved from [phone] to [phone]; updated [address], [address] [place] [address]  Electronically signed by Corrine Gaming RN on 4/20/2020 at 10:04 AM
quit smoking about 12 years ago. His smoking use included cigarettes. He has never used smokeless tobacco.   PCP: XOCHILT Cruz  Readmission 30 days or less: none  Readmission Risk Score: 14%      Patient Goals/Plan/Treatment Preferences: spoke with client; denied needs as plans home alone with [facility] (nursing for left foot wound care) as PTA, right [place] continued, has walker, lift chair, WC, scooter  Transportation/Food Security/Housekeeping Addressed:  No issues identified.     Evaluation: yes

## 2020-04-26 LAB
BLOOD CULTURE, ROUTINE: NORMAL
BLOOD CULTURE, ROUTINE: NORMAL

## 2020-05-06 ENCOUNTER — TELEPHONE (OUTPATIENT)
Dept: CARDIOLOGY CLINIC | Age: 60
End: 2020-05-06

## 2020-05-11 LAB
ABSOLUTE BASO #: 0 /CMM (ref 0–200)
ABSOLUTE EOS #: 200 /CMM (ref 0–500)
ABSOLUTE LYMPH #: 800 /CMM (ref 1000–4800)
ABSOLUTE MONO #: 600 /CMM (ref 0–800)
ABSOLUTE NEUT #: 7800 /CMM (ref 1800–7700)
ANION GAP SERPL CALCULATED.3IONS-SCNC: 12 MMOL/L (ref 4–12)
BASOPHILS RELATIVE PERCENT: 0.1 % (ref 0–2)
BUN BLDV-MCNC: 17 MG/DL (ref 7–20)
CALCIUM SERPL-MCNC: 6.9 MG/DL (ref 8.8–10.5)
CHLORIDE BLD-SCNC: 100 MEQ/L (ref 101–111)
CO2: 24 MEQ/L (ref 21–32)
CREAT SERPL-MCNC: 0.89 MG/DL (ref 0.6–1.3)
CREATININE CLEARANCE: >60
EOSINOPHILS RELATIVE PERCENT: 2.2 % (ref 0–6)
GLUCOSE: 135 MG/DL (ref 70–110)
HCT VFR BLD CALC: 30.7 % (ref 40–49)
HEMOGLOBIN: 10.2 GM/DL (ref 13.5–16.5)
LYMPHOCYTES RELATIVE PERCENT: 8.4 % (ref 15–45)
MCH RBC QN AUTO: 27.6 PG (ref 27.5–33)
MCHC RBC AUTO-ENTMCNC: 33.2 GM/DL (ref 33–36)
MCV RBC AUTO: 83.1 CU MIC (ref 80–97)
MONOCYTES RELATIVE PERCENT: 6.8 % (ref 2–10)
NEUTROPHILS RELATIVE PERCENT: 82.5 % (ref 40–70)
NUCLEATED RBCS: 0.1 /100 WBC
PDW BLD-RTO: 16.9 % (ref 12–16)
PLATELET # BLD: 269 TH/CMM (ref 150–400)
POTASSIUM SERPL-SCNC: 5.7 MEQ/L (ref 3.6–5)
RBC # BLD: 3.7 MIL/CMM (ref 4.5–6)
SODIUM BLD-SCNC: 136 MEQ/L (ref 135–145)
WBC # BLD: 9.4 TH/CMM (ref 4.4–10.5)

## 2020-05-18 LAB
ABSOLUTE BASO #: 0 /CMM (ref 0–200)
ABSOLUTE EOS #: 1000 /CMM (ref 0–500)
ABSOLUTE LYMPH #: 1400 /CMM (ref 1000–4800)
ABSOLUTE MONO #: 800 /CMM (ref 0–800)
ABSOLUTE NEUT #: 6800 /CMM (ref 1800–7700)
ANION GAP SERPL CALCULATED.3IONS-SCNC: 9 MMOL/L (ref 4–12)
ANISOCYTOSIS: ABNORMAL
BAND NEUTROPHILS: 3 % (ref 2–6)
BUN BLDV-MCNC: 11 MG/DL (ref 7–20)
CALCIUM SERPL-MCNC: 8.3 MG/DL (ref 8.8–10.5)
CHLORIDE BLD-SCNC: 108 MEQ/L (ref 101–111)
CO2: 22 MEQ/L (ref 21–32)
CREAT SERPL-MCNC: 0.83 MG/DL (ref 0.6–1.3)
CREATININE CLEARANCE: >60
EOSINOPHIL # BLD: 10 % (ref 0–6)
GLUCOSE: 99 MG/DL (ref 70–110)
HCT VFR BLD CALC: 29.8 % (ref 40–49)
HEMOGLOBIN: 10.1 GM/DL (ref 13.5–16.5)
LYMPHOCYTES # BLD: 14 % (ref 15–45)
MCH RBC QN AUTO: 28.2 PG (ref 27.5–33)
MCHC RBC AUTO-ENTMCNC: 33.8 GM/DL (ref 33–36)
MCV RBC AUTO: 83.6 CU MIC (ref 80–97)
METAMYELOCYTES: 3 %
MONOCYTES: 8 % (ref 2–10)
NEUTROPHILS SEGMENTED: 62 % (ref 40–70)
PDW BLD-RTO: 16.5 % (ref 12–16)
PLATELET # BLD: 207 TH/CMM (ref 150–400)
POTASSIUM SERPL-SCNC: 3.9 MEQ/L (ref 3.6–5)
RBC # BLD: 3.57 MIL/CMM (ref 4.5–6)
SODIUM BLD-SCNC: 139 MEQ/L (ref 135–145)
WBC # BLD: 10 TH/CMM (ref 4.4–10.5)

## 2020-05-26 LAB
ABSOLUTE BASO #: 100 /CMM (ref 0–200)
ABSOLUTE EOS #: 700 /CMM (ref 0–500)
ABSOLUTE LYMPH #: 1400 /CMM (ref 1000–4800)
ABSOLUTE MONO #: 500 /CMM (ref 0–800)
ABSOLUTE NEUT #: 5800 /CMM (ref 1800–7700)
ANION GAP SERPL CALCULATED.3IONS-SCNC: 6 MMOL/L (ref 4–12)
BASOPHILS RELATIVE PERCENT: 1.4 % (ref 0–2)
BUN BLDV-MCNC: 16 MG/DL (ref 7–20)
CALCIUM SERPL-MCNC: 9.2 MG/DL (ref 8.8–10.5)
CHLORIDE BLD-SCNC: 105 MEQ/L (ref 101–111)
CO2: 29 MEQ/L (ref 21–32)
CREAT SERPL-MCNC: 0.99 MG/DL (ref 0.6–1.3)
CREATININE CLEARANCE: >60
EOSINOPHILS RELATIVE PERCENT: 7.9 % (ref 0–6)
GLUCOSE: 103 MG/DL (ref 70–110)
HCT VFR BLD CALC: 32.1 % (ref 40–49)
HEMOGLOBIN: 10.6 GM/DL (ref 13.5–16.5)
LYMPHOCYTES RELATIVE PERCENT: 16.3 % (ref 15–45)
MCH RBC QN AUTO: 28 PG (ref 27.5–33)
MCHC RBC AUTO-ENTMCNC: 33.1 GM/DL (ref 33–36)
MCV RBC AUTO: 84.5 CU MIC (ref 80–97)
MONOCYTES RELATIVE PERCENT: 5.8 % (ref 2–10)
NEUTROPHILS RELATIVE PERCENT: 68.6 % (ref 40–70)
NUCLEATED RBCS: 0 /100 WBC
PDW BLD-RTO: 17.7 % (ref 12–16)
PLATELET # BLD: 184 TH/CMM (ref 150–400)
POTASSIUM SERPL-SCNC: 4.4 MEQ/L (ref 3.6–5)
RBC # BLD: 3.8 MIL/CMM (ref 4.5–6)
SODIUM BLD-SCNC: 140 MEQ/L (ref 135–145)
WBC # BLD: 8.4 TH/CMM (ref 4.4–10.5)

## 2020-05-27 ENCOUNTER — HOSPITAL ENCOUNTER (OUTPATIENT)
Dept: WOUND CARE | Age: 60
Discharge: HOME | End: 2020-05-27
Payer: COMMERCIAL

## 2020-05-27 VITALS
SYSTOLIC BLOOD PRESSURE: 111 MMHG | OXYGEN SATURATION: 99 % | HEART RATE: 100 BPM | RESPIRATION RATE: 18 BRPM | TEMPERATURE: 98.2 F | DIASTOLIC BLOOD PRESSURE: 77 MMHG

## 2020-05-27 PROCEDURE — 99212 OFFICE O/P EST SF 10 MIN: CPT

## 2020-05-27 ASSESSMENT — PAIN SCALES - GENERAL: PAINLEVEL_OUTOF10: 0

## 2020-05-27 NOTE — PROGRESS NOTES
distended, nontender, no organomegally,  Neurologic -oriented  Skin - No bruising or bleeding  Extremities -chronic leg swelling he has cast on his right foot and compression therapy with Unna boot on his left foot he has left transmetatarsal amputation    Wound 03/02/20 Toe (Comment  which one) Right (Active)   Dressing Status Intact; Old drainage; Changed 4/6/2020 11:50 AM   Dressing Changed Changed/New 4/6/2020 11:50 AM   Wound Cleansed Rinsed/Irrigated with saline 4/6/2020 11:50 AM   Wound Length (cm) 1.2 cm 4/6/2020 11:50 AM   Wound Width (cm) 1.5 cm 4/6/2020 11:50 AM   Wound Depth (cm) 0.1 cm 4/6/2020 11:50 AM   Wound Surface Area (cm^2) 1.8 cm^2 4/6/2020 11:50 AM   Change in Wound Size % (l*w) 62.5 4/6/2020 11:50 AM   Wound Volume (cm^3) 0.18 cm^3 4/6/2020 11:50 AM   Wound Healing % 62 4/6/2020 11:50 AM   Wound Assessment Pink 4/6/2020 11:50 AM   Drainage Amount Small 4/6/2020 11:50 AM   Drainage Description Serosanguinous 4/6/2020 11:50 AM   Odor None 4/6/2020 11:50 AM   Margins Attached edges 4/6/2020 11:50 AM   [place]-wound Assessment Dry;Calloused 4/6/2020 11:50 AM   [place]%Wound Bed 100 4/6/2020 11:50 AM   Red%Wound Bed 70 3/9/2020 12:17 PM   Yellow%Wound Bed 10 3/30/2020 10:27 AM   Number of days: 85       LABS      Lab Results   Component Value Date    BC No growth-preliminary No growth  04/21/2020       Assessment:   Left foot osteomyelitis status post surgery  Sepsis due to MSSA on IV Ancef he will continue for 3 more days  Diabetes with neuropathy  Follow-up will be scheduled as needed.   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

## 2020-06-04 ENCOUNTER — OFFICE VISIT (OUTPATIENT)
Dept: CARDIOLOGY CLINIC | Age: 60
End: 2020-06-04
Payer: COMMERCIAL

## 2020-06-04 VITALS
DIASTOLIC BLOOD PRESSURE: 74 MMHG | BODY MASS INDEX: 37.22 KG/M2 | HEART RATE: 99 BPM | SYSTOLIC BLOOD PRESSURE: 130 MMHG | WEIGHT: 290 LBS | HEIGHT: 74 IN

## 2020-06-04 PROCEDURE — 93000 ELECTROCARDIOGRAM COMPLETE: CPT | Performed by: INTERNAL MEDICINE

## 2020-06-04 PROCEDURE — 99213 OFFICE O/P EST LOW 20 MIN: CPT | Performed by: INTERNAL MEDICINE

## 2020-06-04 NOTE — PROGRESS NOTES
[facility],[place] [address] [address] [place] [address]  Dept: [phone]  Dept Fax: [phone]  Loc: [phone]    Visit Date: 6/4/2020    Mr. Irma Rodriguez is a 63 y.o. male  who presented for:  CAD  CABG  PAD      HPI:   DARLENE Courtney is a pleasant 63year old male patient who  has a past medical history of Arthritis, CAD (coronary artery disease), CKD (chronic kidney disease), stage II, Diabetes mellitus ([facility] Hyperlipidemia, Hypertension, and Liver disease. He is s/p 3 vessel CABG on 11/2019 (Positive stress test, [facility] revealed MV CAD. 3v CABG 11/2019: SVG to OM1, SVG to Circumflex, LIMA to distal LAD with Dr. Luisa Hatchet at [facility]). CTA of his carotids was done at [facility] prior to CABG and per records CTA revealed 100% occlusion on the right and a 65% on the left. The recommendation was made for proceeding with his CABG and then an interval carotid endarterectomy on the left side once he is recovered from surgery. Other PMH includes known h/o diabetic neuropathy. He has family h/o CAD. The patient was previously admitted to the hospital for diabetic foot, wet gangrene. He underwent trans-metatarsal left foot amputation on 3/3/2020 and discharged home on antibiotics. The patient continued to FU at the wound clinic where left TMA wound dehiscence was noted for which debridement was recommended. He also has h/o a right big toe wound. Bilateral LE doppler US was done and revealed left RAFAL stenosis, right profunda disease. In 04/2020, he was admitted and seen by Podiatry and ID. He underwent revision of a transmetatarsal amputation. He was seen by Dr Minoo Andrade. Wound cultures grew Enterococcus and MSSA; blood cultures grew the MSSA. He was treated with Ancef. An echocardiogram showed no evidence of vegetations. Due to the anticipated need for long term(4-6 weeks) antibiotics [facility] The patient presents to office for follow up.  He Interpreting         Flavia Le MD                                   Physician     Procedure    Type of Study      TTE procedure:ECHOCARDIOGRAM COMPLETE 2D W DOPPLER W COLOR. Procedure Date  Date: 04/21/2020 Start: 08:54 AM    Study Location: Bedside  Technical Quality: Limited visualization due to body habitus. Indications:Coronary artery disease and S/P CABG. Additional Medical History:Ex smoker, diabetic, sepsis, CAD, CABG, chronic  kidney disease, anemia, hyperlipidemia, hypertension, arthritis    Patient Status: Routine    Height: 74.02 inches Weight: 308. 65 pounds BSA: 2.61 m^2 BMI: 39.61 kg/m^2    BP: 202/97 mmHg     Conclusions      Summary   Technically difficult examination. Technically limited study. Left ventricle size is normal.   Ejection fraction is visually estimated at 50%. Unable to determine wall motion abnormalities due to poor image quality. Abnormal (paradoxical) motion consistent with post-operative status. Doppler parameters were consistent with abnormal left ventricular   relaxation (grade 1 diastolic dysfunction). Thickened aortic valve leaflets noted. Aortic valve leaflets are Mildly calcified. Mild aortic stenosis is present. Signature      ----------------------------------------------------------------   Electronically signed by Flavia Le MD (Interpreting   physician) on 04/21/2020 at 11:53 AM   ----------------------------------------------------------------      Findings      Mitral Valve   Structurally normal mitral valve. Trace mitral regurgitation is present. Aortic Valve   Thickened aortic valve leaflets noted. Aortic valve leaflets are Mildly calcified. Mild aortic stenosis is present. Tricuspid Valve   Tricuspid valve is structurally normal.   Trace tricuspid regurgitation. Pulmonic Valve   The pulmonic valve was not well visualized . Left Atrium   Normal size left atrium.       Left Ventricle   Left ventricle size Deceleration     AV VTI: 36.7 cm   Time: 259 msec      AV Area                  TV Peak Gradient: 1.5 mmHg                       (Continuity):1.79 cm^2   TR Velocity:250 cm/s                                                TR Gradient:25 mmHg   MV E' Septal        LVOT VTI: 20.9 cm        PV Peak Velocity: 87.8 cm/s   Velocity: 6.4 cm/s                           PV Peak Gradient: 3.08 mmHg   MV A' Septal   Velocity: 9.2 cm/s   MV E' Lateral       AV DVI (VTI): 0.57AV DVI   Velocity: 4.9 cm/s  (Vmax):0.51   MV A' Lateral   Velocity: 8.5 cm/s   E/E' septal: 9.31   E/E' lateral: 12.16   MR Velocity: 377   cm/s     [email or web address] [id number]        Ekg:   EKG Interpretation:  normal sinus rhythm, nonspecific ST and T waves changes, anteroseptal infarct. Narrative   PROCEDURE: VL DUP LOWER EXTREMITY ARTERIES BILATERAL       CLINICAL INFORMATION: Non-healing wound of amputation stump (HCC)        COMPARISON: No prior study.       TECHNIQUE:  Transverse and longitudinal ultrasound images were obtained through the left lower extremity arterial vasculature. Peak systolic velocities were measured. Ankle-brachial indices were obtained.         FINDINGS:        There is triphasic waveform morphology demonstrated within the left common femoral artery. Peak systolic velocity measures 142 cm/s. No significant drop or elevation in peak systolic velocities demonstrated to suggest significant flow-limiting stenosis    within the left lower extremity arterial vasculature to the level of the popliteal artery. However, the anterior tibial artery demonstrates a relative drop in peak systolic velocity suggesting stenosis of possibly near 50%.  There is biphasic waveform    morphology noted within the left anterior tibial artery.       Ankle brachial indices are within normal limits.       LEFT ARTERY   (PSV cm/sec) no significant drop or elevation of peak systolic velocity. The remaining right lower extremity arterial vasculature demonstrates triphasic waveform    morphology with no significant drop or elevation in peak systolic velocity to suggest significant flow-limiting stenosis.         Impression   1. There is suggested stenosis of near 50% at the proximal aspect of the right profunda femoris artery. However, the remaining right lower extremity arterial vasculature otherwise demonstrates no sonographic evidence of significant flow-limiting    stenosis.             **This report has been created using voice recognition software.  It may contain minor errors which are inherent in voice recognition technology. **           AssessmentPlan:       Derral Leventhal is a pleasant 63year old male patient who  has a past medical history of Arthritis, CAD (coronary artery disease), CKD (chronic kidney disease), stage II, Diabetes mellitus ([address] Hyperlipidemia, Hypertension, and Liver disease. He is s/p 3 vessel CABG on 11/2019 (Positive stress test, [facility] revealed MV CAD. 3v CABG 11/2019: SVG to OM1, SVG to Circumflex, LIMA to distal LAD with Dr. Lauri Ahuja at [facility]). CTA of his carotids was done at [facility] prior to CABG and per records CTA revealed 100% occlusion on the right and a 65% on the left. The recommendation was made for proceeding with his CABG and then an interval carotid endarterectomy on the left side once he is recovered from surgery. Other PMH includes known h/o diabetic neuropathy. He has family h/o CAD. The patient was previously admitted to the hospital for diabetic foot, wet gangrene. He underwent trans-metatarsal left foot amputation on 3/3/2020 and discharged home on antibiotics. The patient continued to FU at the wound clinic where left TMA wound dehiscence was noted for which debridement was recommended. He also has h/o a right big toe wound.  Bilateral LE doppler US was done and revealed left RAFAL stenosis, right AF, on Amiodarone, denies palpitations, in [facility]  · Stop Amiodarone  · Will plan peripheral angiogram after right foot surgery, cast is removed       Above findings and plan of care were discussed with patient in details, patient's questions were answered.      Disposition:  RTC in 2 months             Electronically signed by Katarina An MD, [facility]    6/4/2020 at 12:47 PM EDT

## 2020-07-17 ENCOUNTER — HOSPITAL ENCOUNTER (OUTPATIENT)
Age: 60
Discharge: HOME | End: 2020-07-17
Payer: COMMERCIAL

## 2020-07-17 ENCOUNTER — HOSPITAL ENCOUNTER (OUTPATIENT)
Dept: GENERAL RADIOLOGY | Age: 60
Discharge: HOME | End: 2020-07-17
Payer: COMMERCIAL

## 2020-07-17 LAB
ANION GAP SERPL CALCULATED.3IONS-SCNC: 10 MEQ/L (ref 8–16)
BASOPHILS # BLD: 0.8 %
BASOPHILS ABSOLUTE: 0.1 THOU/MM3 (ref 0–0.1)
BUN BLDV-MCNC: 28 MG/DL (ref 7–22)
CALCIUM SERPL-MCNC: 9.5 MG/DL (ref 8.5–10.5)
CHLORIDE BLD-SCNC: 104 MEQ/L (ref 98–111)
CO2: 24 MEQ/L (ref 23–33)
CREAT SERPL-MCNC: 1.5 MG/DL (ref 0.4–1.2)
EKG ATRIAL RATE: 72 BPM
EKG P AXIS: 43 DEGREES
EKG P-R INTERVAL: 172 MS
EKG Q-T INTERVAL: 372 MS
EKG QRS DURATION: 94 MS
EKG QTC CALCULATION (BAZETT): 407 MS
EKG R AXIS: 56 DEGREES
EKG T AXIS: 44 DEGREES
EKG VENTRICULAR RATE: 72 BPM
EOSINOPHIL # BLD: 4.6 %
EOSINOPHILS ABSOLUTE: 0.5 THOU/MM3 (ref 0–0.4)
ERYTHROCYTE [DISTWIDTH] IN BLOOD BY AUTOMATED COUNT: 14.8 % (ref 11.5–14.5)
ERYTHROCYTE [DISTWIDTH] IN BLOOD BY AUTOMATED COUNT: 47.2 FL (ref 35–45)
GFR SERPL CREATININE-BSD FRML MDRD: 48 ML/MIN/1.73M2
GLUCOSE BLD-MCNC: 98 MG/DL (ref 70–108)
HCT VFR BLD CALC: 35.1 % (ref 42–52)
HEMOGLOBIN: 11.3 GM/DL (ref 14–18)
IMMATURE GRANS (ABS): 0.09 THOU/MM3 (ref 0–0.07)
IMMATURE GRANULOCYTES: 0.8 %
LYMPHOCYTES # BLD: 18.1 %
LYMPHOCYTES ABSOLUTE: 2 THOU/MM3 (ref 1–4.8)
MCH RBC QN AUTO: 28.3 PG (ref 26–33)
MCHC RBC AUTO-ENTMCNC: 32.2 GM/DL (ref 32.2–35.5)
MCV RBC AUTO: 87.8 FL (ref 80–94)
MONOCYTES # BLD: 8.1 %
MONOCYTES ABSOLUTE: 0.9 THOU/MM3 (ref 0.4–1.3)
NUCLEATED RED BLOOD CELLS: 0 /100 WBC
PLATELET # BLD: 231 THOU/MM3 (ref 130–400)
PMV BLD AUTO: 10.6 FL (ref 9.4–12.4)
POTASSIUM SERPL-SCNC: 5.1 MEQ/L (ref 3.5–5.2)
RBC # BLD: 4 MILL/MM3 (ref 4.7–6.1)
SEG NEUTROPHILS: 67.6 %
SEGMENTED NEUTROPHILS ABSOLUTE COUNT: 7.6 THOU/MM3 (ref 1.8–7.7)
SODIUM BLD-SCNC: 138 MEQ/L (ref 135–145)
WBC # BLD: 11.2 THOU/MM3 (ref 4.8–10.8)

## 2020-07-17 PROCEDURE — 93005 ELECTROCARDIOGRAM TRACING: CPT | Performed by: NURSE PRACTITIONER

## 2020-07-17 PROCEDURE — 85025 COMPLETE CBC W/AUTO DIFF WBC: CPT

## 2020-07-17 PROCEDURE — 71046 X-RAY EXAM CHEST 2 VIEWS: CPT

## 2020-07-17 PROCEDURE — 80048 BASIC METABOLIC PNL TOTAL CA: CPT

## 2020-07-17 PROCEDURE — U0002 COVID-19 LAB TEST NON-CDC: HCPCS

## 2020-07-17 PROCEDURE — 93010 ELECTROCARDIOGRAM REPORT: CPT | Performed by: NUCLEAR MEDICINE

## 2020-07-17 PROCEDURE — 36415 COLL VENOUS BLD VENIPUNCTURE: CPT

## 2020-07-20 ENCOUNTER — ANESTHESIA EVENT (OUTPATIENT)
Dept: OPERATING ROOM | Age: 60
End: 2020-07-20
Payer: COMMERCIAL

## 2020-07-20 ENCOUNTER — ANESTHESIA (OUTPATIENT)
Dept: OPERATING ROOM | Age: 60
End: 2020-07-20
Payer: COMMERCIAL

## 2020-07-20 ENCOUNTER — HOSPITAL ENCOUNTER (OUTPATIENT)
Age: 60
Setting detail: OBSERVATION
Discharge: HOME | End: 2020-07-21
Attending: PODIATRIST | Admitting: PODIATRIST
Payer: COMMERCIAL

## 2020-07-20 VITALS
RESPIRATION RATE: 15 BRPM | SYSTOLIC BLOOD PRESSURE: 94 MMHG | OXYGEN SATURATION: 100 % | DIASTOLIC BLOOD PRESSURE: 55 MMHG

## 2020-07-20 PROBLEM — T14.8XXA WOUND, OPEN: Status: ACTIVE | Noted: 2020-07-20

## 2020-07-20 PROBLEM — R58 BLEEDING: Status: ACTIVE | Noted: 2020-07-20

## 2020-07-20 LAB
GLUCOSE BLD-MCNC: 123 MG/DL (ref 70–108)
GLUCOSE BLD-MCNC: 174 MG/DL (ref 70–108)
GLUCOSE BLD-MCNC: 194 MG/DL (ref 70–108)
PERFORMING LAB: NORMAL
POTASSIUM SERPL-SCNC: 4.7 MEQ/L (ref 3.5–5.2)
REPORT: NORMAL
SARS-COV-2: NOT DETECTED

## 2020-07-20 PROCEDURE — 6360000002 HC RX W HCPCS: Performed by: NURSE ANESTHETIST, CERTIFIED REGISTERED

## 2020-07-20 PROCEDURE — 87070 CULTURE OTHR SPECIMN AEROBIC: CPT

## 2020-07-20 PROCEDURE — 6370000000 HC RX 637 (ALT 250 FOR IP): Performed by: PHYSICIAN ASSISTANT

## 2020-07-20 PROCEDURE — 3600000003 HC SURGERY LEVEL 3 BASE: Performed by: PODIATRIST

## 2020-07-20 PROCEDURE — 87075 CULTR BACTERIA EXCEPT BLOOD: CPT

## 2020-07-20 PROCEDURE — 3600000013 HC SURGERY LEVEL 3 ADDTL 15MIN: Performed by: PODIATRIST

## 2020-07-20 PROCEDURE — 3700000001 HC ADD 15 MINUTES (ANESTHESIA): Performed by: PODIATRIST

## 2020-07-20 PROCEDURE — 87077 CULTURE AEROBIC IDENTIFY: CPT

## 2020-07-20 PROCEDURE — 2580000003 HC RX 258: Performed by: PODIATRIST

## 2020-07-20 PROCEDURE — 36415 COLL VENOUS BLD VENIPUNCTURE: CPT

## 2020-07-20 PROCEDURE — G0378 HOSPITAL OBSERVATION PER HR: HCPCS

## 2020-07-20 PROCEDURE — 87205 SMEAR GRAM STAIN: CPT

## 2020-07-20 PROCEDURE — 7100000010 HC PHASE II RECOVERY - FIRST 15 MIN: Performed by: PODIATRIST

## 2020-07-20 PROCEDURE — 87186 SC STD MICRODIL/AGAR DIL: CPT

## 2020-07-20 PROCEDURE — 82948 REAGENT STRIP/BLOOD GLUCOSE: CPT

## 2020-07-20 PROCEDURE — 3700000000 HC ANESTHESIA ATTENDED CARE: Performed by: PODIATRIST

## 2020-07-20 PROCEDURE — 2709999900 HC NON-CHARGEABLE SUPPLY: Performed by: PODIATRIST

## 2020-07-20 PROCEDURE — 7100000011 HC PHASE II RECOVERY - ADDTL 15 MIN: Performed by: PODIATRIST

## 2020-07-20 PROCEDURE — 6360000002 HC RX W HCPCS: Performed by: STUDENT IN AN ORGANIZED HEALTH CARE EDUCATION/TRAINING PROGRAM

## 2020-07-20 PROCEDURE — 6360000002 HC RX W HCPCS: Performed by: PODIATRIST

## 2020-07-20 PROCEDURE — 84132 ASSAY OF SERUM POTASSIUM: CPT

## 2020-07-20 PROCEDURE — 87147 CULTURE TYPE IMMUNOLOGIC: CPT

## 2020-07-20 PROCEDURE — 2580000003 HC RX 258: Performed by: STUDENT IN AN ORGANIZED HEALTH CARE EDUCATION/TRAINING PROGRAM

## 2020-07-20 RX ORDER — SODIUM CHLORIDE 0.9 % (FLUSH) 0.9 %
10 SYRINGE (ML) INJECTION EVERY 12 HOURS SCHEDULED
Status: DISCONTINUED | OUTPATIENT
Start: 2020-07-20 | End: 2020-07-21 | Stop reason: HOSPADM

## 2020-07-20 RX ORDER — HYDROCODONE BITARTRATE AND ACETAMINOPHEN 5; 325 MG/1; MG/1
2 TABLET ORAL EVERY 6 HOURS PRN
Status: DISCONTINUED | OUTPATIENT
Start: 2020-07-20 | End: 2020-07-21 | Stop reason: HOSPADM

## 2020-07-20 RX ORDER — CYCLOBENZAPRINE HCL 5 MG
10 TABLET ORAL 3 TIMES DAILY PRN
Qty: 40 TABLET | Refills: 0 | Status: SHIPPED | OUTPATIENT
Start: 2020-07-20 | End: 2020-07-30

## 2020-07-20 RX ORDER — HYDROCODONE BITARTRATE AND ACETAMINOPHEN 5; 325 MG/1; MG/1
1 TABLET ORAL EVERY 4 HOURS PRN
Qty: 42 TABLET | Refills: 0 | Status: SHIPPED | OUTPATIENT
Start: 2020-07-20 | End: 2020-07-27

## 2020-07-20 RX ORDER — PROPOFOL 10 MG/ML
INJECTION, EMULSION INTRAVENOUS PRN
Status: DISCONTINUED | OUTPATIENT
Start: 2020-07-20 | End: 2020-07-20 | Stop reason: SDUPTHER

## 2020-07-20 RX ORDER — LISINOPRIL 5 MG/1
5 TABLET ORAL DAILY
Status: DISCONTINUED | OUTPATIENT
Start: 2020-07-21 | End: 2020-07-21 | Stop reason: HOSPADM

## 2020-07-20 RX ORDER — HYDROCODONE BITARTRATE AND ACETAMINOPHEN 5; 325 MG/1; MG/1
1 TABLET ORAL EVERY 6 HOURS PRN
Status: DISCONTINUED | OUTPATIENT
Start: 2020-07-20 | End: 2020-07-21 | Stop reason: HOSPADM

## 2020-07-20 RX ORDER — SODIUM CHLORIDE 0.9 % (FLUSH) 0.9 %
10 SYRINGE (ML) INJECTION EVERY 12 HOURS SCHEDULED
Status: DISCONTINUED | OUTPATIENT
Start: 2020-07-20 | End: 2020-07-20 | Stop reason: HOSPADM

## 2020-07-20 RX ORDER — SODIUM CHLORIDE 9 MG/ML
INJECTION, SOLUTION INTRAVENOUS CONTINUOUS
Status: DISCONTINUED | OUTPATIENT
Start: 2020-07-20 | End: 2020-07-21 | Stop reason: HOSPADM

## 2020-07-20 RX ORDER — GABAPENTIN 100 MG/1
100 CAPSULE ORAL 3 TIMES DAILY
Status: DISCONTINUED | OUTPATIENT
Start: 2020-07-20 | End: 2020-07-21 | Stop reason: HOSPADM

## 2020-07-20 RX ORDER — MIDAZOLAM HYDROCHLORIDE 1 MG/ML
INJECTION INTRAMUSCULAR; INTRAVENOUS PRN
Status: DISCONTINUED | OUTPATIENT
Start: 2020-07-20 | End: 2020-07-20 | Stop reason: SDUPTHER

## 2020-07-20 RX ORDER — INSULIN GLARGINE 100 [IU]/ML
44 INJECTION, SOLUTION SUBCUTANEOUS
Status: DISCONTINUED | OUTPATIENT
Start: 2020-07-21 | End: 2020-07-21 | Stop reason: HOSPADM

## 2020-07-20 RX ORDER — SODIUM CHLORIDE 0.9 % (FLUSH) 0.9 %
10 SYRINGE (ML) INJECTION PRN
Status: DISCONTINUED | OUTPATIENT
Start: 2020-07-20 | End: 2020-07-20 | Stop reason: HOSPADM

## 2020-07-20 RX ORDER — FENTANYL CITRATE 50 UG/ML
INJECTION, SOLUTION INTRAMUSCULAR; INTRAVENOUS PRN
Status: DISCONTINUED | OUTPATIENT
Start: 2020-07-20 | End: 2020-07-20 | Stop reason: SDUPTHER

## 2020-07-20 RX ORDER — SODIUM CHLORIDE 0.9 % (FLUSH) 0.9 %
10 SYRINGE (ML) INJECTION PRN
Status: DISCONTINUED | OUTPATIENT
Start: 2020-07-20 | End: 2020-07-21 | Stop reason: HOSPADM

## 2020-07-20 RX ORDER — ATORVASTATIN CALCIUM 40 MG/1
40 TABLET, FILM COATED ORAL NIGHTLY
Status: DISCONTINUED | OUTPATIENT
Start: 2020-07-20 | End: 2020-07-21 | Stop reason: HOSPADM

## 2020-07-20 RX ORDER — DOCUSATE SODIUM 100 MG/1
100 CAPSULE, LIQUID FILLED ORAL 2 TIMES DAILY
Status: DISCONTINUED | OUTPATIENT
Start: 2020-07-20 | End: 2020-07-21 | Stop reason: HOSPADM

## 2020-07-20 RX ORDER — CEFAZOLIN SODIUM 1 G/50ML
1 INJECTION, SOLUTION INTRAVENOUS EVERY 8 HOURS
Status: DISCONTINUED | OUTPATIENT
Start: 2020-07-20 | End: 2020-07-21 | Stop reason: HOSPADM

## 2020-07-20 RX ORDER — ALOGLIPTIN 25 MG/1
25 TABLET, FILM COATED ORAL DAILY
Status: DISCONTINUED | OUTPATIENT
Start: 2020-07-21 | End: 2020-07-21 | Stop reason: HOSPADM

## 2020-07-20 RX ORDER — DOXYCYCLINE HYCLATE 100 MG
100 TABLET ORAL 2 TIMES DAILY
Qty: 20 TABLET | Refills: 0 | Status: SHIPPED | OUTPATIENT
Start: 2020-07-20 | End: 2020-07-30

## 2020-07-20 RX ORDER — SENNA AND DOCUSATE SODIUM 50; 8.6 MG/1; MG/1
1 TABLET, FILM COATED ORAL 2 TIMES DAILY
Status: DISCONTINUED | OUTPATIENT
Start: 2020-07-20 | End: 2020-07-21 | Stop reason: HOSPADM

## 2020-07-20 RX ADMIN — ATORVASTATIN CALCIUM 40 MG: 40 TABLET, FILM COATED ORAL at 21:59

## 2020-07-20 RX ADMIN — SODIUM CHLORIDE: 9 INJECTION, SOLUTION INTRAVENOUS at 12:32

## 2020-07-20 RX ADMIN — METOPROLOL TARTRATE 25 MG: 25 TABLET ORAL at 21:59

## 2020-07-20 RX ADMIN — FENTANYL CITRATE 25 MCG: 50 INJECTION, SOLUTION INTRAMUSCULAR; INTRAVENOUS at 13:01

## 2020-07-20 RX ADMIN — PROPOFOL 40 MG: 10 INJECTION, EMULSION INTRAVENOUS at 13:03

## 2020-07-20 RX ADMIN — CEFAZOLIN SODIUM 1 G: 1 INJECTION, SOLUTION INTRAVENOUS at 21:58

## 2020-07-20 RX ADMIN — FENTANYL CITRATE 25 MCG: 50 INJECTION, SOLUTION INTRAMUSCULAR; INTRAVENOUS at 13:08

## 2020-07-20 RX ADMIN — DOCUSATE SODIUM 50 MG AND SENNOSIDES 8.6 MG 1 TABLET: 8.6; 5 TABLET, FILM COATED ORAL at 21:59

## 2020-07-20 RX ADMIN — FENTANYL CITRATE 50 MCG: 50 INJECTION, SOLUTION INTRAMUSCULAR; INTRAVENOUS at 12:53

## 2020-07-20 RX ADMIN — DOCUSATE SODIUM 100 MG: 100 CAPSULE, LIQUID FILLED ORAL at 21:59

## 2020-07-20 RX ADMIN — Medication 3 G: at 12:54

## 2020-07-20 RX ADMIN — GABAPENTIN 100 MG: 100 CAPSULE ORAL at 21:59

## 2020-07-20 RX ADMIN — PROPOFOL 20 MG: 10 INJECTION, EMULSION INTRAVENOUS at 13:18

## 2020-07-20 RX ADMIN — MIDAZOLAM HYDROCHLORIDE 1 MG: 1 INJECTION, SOLUTION INTRAMUSCULAR; INTRAVENOUS at 12:53

## 2020-07-20 RX ADMIN — SODIUM CHLORIDE: 9 INJECTION, SOLUTION INTRAVENOUS at 21:58

## 2020-07-20 RX ADMIN — MIDAZOLAM HYDROCHLORIDE 1 MG: 1 INJECTION, SOLUTION INTRAMUSCULAR; INTRAVENOUS at 12:56

## 2020-07-20 RX ADMIN — PROPOFOL 20 MG: 10 INJECTION, EMULSION INTRAVENOUS at 13:09

## 2020-07-20 ASSESSMENT — PULMONARY FUNCTION TESTS
PIF_VALUE: 0
PIF_VALUE: 1
PIF_VALUE: 0

## 2020-07-20 ASSESSMENT — PAIN SCALES - GENERAL
PAINLEVEL_OUTOF10: 0

## 2020-07-20 ASSESSMENT — PAIN - FUNCTIONAL ASSESSMENT: PAIN_FUNCTIONAL_ASSESSMENT: 0-10

## 2020-07-20 NOTE — PROGRESS NOTES
Pt returned to [facility] room 12. Vitals and assessment as charted. 0.9 infusing. Pt has tamar and pop. Pt verbalized understanding of discharge criteria and call light use. Call light in reach.

## 2020-07-20 NOTE — ANESTHESIA POSTPROCEDURE EVALUATION
Department of Anesthesiology  Postprocedure Note    Patient: Jb Lyles  MRN: [id number]  YOB: 1960  Date of evaluation: 7/20/2020  Time:  1:51 PM     Procedure Summary     Date:  07/20/20 Room / Location:  [address] [place]    Anesthesia Start:  0313 Anesthesia Stop:  5796    Procedure:  LEFT WOUND DEBRIDEMENT WITH WOUND VAC APPLICATION (Left ) Diagnosis:  (LEFT TMA WOUND DEHISCENCE)    Surgeon:  Robert Salazar DPM Responsible Provider:  Orville Chacon DO    Anesthesia Type:  MAC ASA Status:  3          Anesthesia Type: MAC    Fernando Phase I: Fernando Score: 10    Fernando Phase II: Fernando Score: 10    Last vitals: Reviewed and per EMR flowsheets.        Anesthesia Post Evaluation    Patient location during evaluation: bedside  Patient participation: complete - patient participated  Level of consciousness: awake  Airway patency: patent  Nausea & Vomiting: no nausea and no vomiting  Complications: no  Cardiovascular status: hemodynamically stable  Respiratory status: acceptable  Hydration status: stable

## 2020-07-20 NOTE — PROGRESS NOTES
Called Dr Vicenta Street unable to get ahold. Used perfect serve and left message  About patient output in container.

## 2020-07-20 NOTE — PROGRESS NOTES
[employer] called back said they will deliver extra containers here at hospital but won't arrive until 2-4 hours. It will be arriving by carrier.

## 2020-07-20 NOTE — PROGRESS NOTES
Oriented to sds         . pt was asked and agreed to first name and last initial being put on white boards. fall risks applied. SCD Applied to patient. Warming blanket applied to patient. Pt denies any abuse or thoughts of suicide.

## 2020-07-20 NOTE — PROGRESS NOTES
Dr Jeremy Babb was here and unwrapped and applied new dressing removed WOund Vac. Informed pt will be staying the night. To watch for bleeding.

## 2020-07-20 NOTE — OP NOTE
[facility] [address] [place] [address] [place]    Name Adalid Fragoso                                                             : 1960  MEDICAL RECORD NO. [id number]    DATE: 2020    Surgeon: Lizet Ellis DPM    Assist: Shabnam Herrera, PGY II    Pre-operative Diagnosis:    1. Left Foot ulcer   2. Left foot osteomyelitis    Post-operative Diagnosis:    Same    Procedure:    1. Left Foot wound excision down to and including bone 8x6cm   2. Left metatarsal bone biopsy   3. Application NPWT area <34OT sq    Anesthesia: mac    Hemostasis: Surgicel    Estimated Blood Loss: Less than 100 cc    Materials: Not applicable    Injectables: Not applicable    Condition: Stable    Complications: none     Specimens: Plantar soft tissue bone specimen obtained sent for microbiology aerobic anaerobic cultures. INDICATIONS:  The patient is a 63 y.o. male, well established with me in the outpatient office in surgical setting. Patient suffers from a Charcot breakdown of the right midfoot with concomitant postsurgical wound issues to the plantar lateral aspect left forefoot. We have been treating this with wound debridements in the office and Unna boot compression therapy. She continued to develop a small draining sinus to the 6 o'clock position on his foot. Based on nature location of wound as well as lack of improvement with outpatient intervention need for surgical intervention was indicated. All questions concerns regarding perioperative management including benefits risk complications and alternatives to procedure were discussed in detail. Patient was seen preoperatively consent was reviewed and signed out of the marked patient was taken the operative room placed in supine position administered MAC anesthetic. Patient received 3 g Ancef prophylactic antibiotic and foot was scrubbed with Betadine surgical scrub. Procedure In Detail  1.  Left Foot wound excision down to and including bone 8x6cm  Attention was directed to the plantar lateral aspect of the left forefoot. Using a 15 scalpel blade full excision circumferentially of the wound was performed down to and including the level of bone. Post debridement measurements were approximately 8 x 6 cm. Following excisional debridement of the wound a Versajet hydro-cautery unit was used to denude the hyper granular tissue along the distal lateral aspect of the forefoot. Good granular tissue was noted post debridement. 2. Left metatarsal bone biopsy  Following resection of the soft tissue wound including bone a clean rongeur was used to create a biopsy of exposed lesser metatarsal bone which was sent to microbiology for aerobic anaerobic culture and sensitivity. 3. Application NPWT area <74SP sq  Surgicel was used for localized hemostasis. Followed by use of a [employer] gray new foam kit which was cut to size and applied with transparent tape. Patient was noted to have approximately 125 mmHg continuous seal.  She was then placed in a Betadine wet-to-dry dressing with application short leg posterior splint. Patient was transferred to ambulatory surgery in stable condition. Patient to follow-up x1 week for wound VAC removal and evaluation.     Fantasma Gustafson, [address] [place] [address]   7/20/2020

## 2020-07-20 NOTE — H&P
Department of Surgery  H&P Interval Note  Outpatient History and Physical was reviewed pre-operatively, with no interval changes to note prior to scheduled surgical intervention. Patient was assessed, all questions/concerns regarding alpa-operative management were addressed to patient satisfaction. Operative site was marked prior to transportation to the operative room. Leena Anderson D.P.M.

## 2020-07-20 NOTE — ANESTHESIA PRE PROCEDURE
Department of Anesthesiology  Preprocedure Note       Name:  Sadiq Montes   Age:  63 y.o.  :  1960                                          MRN:  [id number]         Date:  2020      Surgeon: Angelique Craig):  Reji Rocha DPM    Procedure: Procedure(s):  LEFT WOUND DEBRIDEMENT WITH WOUND VAC APPLICATION    Medications prior to admission:   Prior to Admission medications    Medication Sig Start Date End Date Taking?  Authorizing Provider   aspirin 81 MG chewable tablet Take 1 tablet by mouth daily 20   Lydia Chun MD   enoxaparin (LOVENOX) 40 MG/0.4ML injection Inject 0.4 mLs into the skin every 24 hours 20   Lydia Chun MD   insulin glargine (LANTUS) 100 UNIT/ML injection vial Inject 44 Units into the skin nightly 20   Lydia Chun MD   insulin lispro (HUMALOG) 100 UNIT/ML injection vial Inject 0-12 Units into the skin 3 times daily (with meals) 20   Lydia Chun MD   insulin lispro (HUMALOG) 100 UNIT/ML injection vial Inject 0-6 Units into the skin nightly 20   Lydia Chun MD   alogliptin (NESINA) 12.5 MG TABS tablet Take 1 tablet by mouth daily 20   Lydia Chun MD   lidocaine 4 % external patch Place 3 patches onto the skin daily 20   Lydia Chun MD   glucagon, rDNA, 1 MG injection Inject 1 mg into the muscle as needed for Low blood sugar (Blood glucose less than 70 mg/dL and patient NOT ALERT or NPO and does not have IV access.) 20  Lydia Chun MD   docusate sodium (COLACE, DULCOLAX) 100 MG CAPS Take 100 mg by mouth 2 times daily 20   Lydia Chun MD   sennosides-docusate sodium (SENOKOT-S) 8.6-50 MG tablet Take 1 tablet by mouth 2 times daily 20   Lydia Chun MD   potassium chloride (KLOR-CON M) 20 MEQ extended release tablet Take 2 tablets by mouth as needed (Potassium Replacement) 20   Lydia Chun MD   potassium bicarb-citric acid (EFFER-K) 20 MEQ TBEF effervescent tablet Take 1 tablet by mouth as needed (Per Potassium Replacement Protocol) 4/22/20   Gisel Blair MD   ceFAZolin (ANCEF) 1 g injection Inject 2,000 mg into the muscle every 8 hours  Patient taking differently: Infuse 2 g intravenously every 8 hours  4/22/20   Gisel Blair MD   atorvastatin (LIPITOR) 40 MG tablet Take 1 tablet by mouth nightly 4/9/20   Michael Cates MD   clopidogrel (PLAVIX) 75 MG tablet Take 1 tablet by mouth daily 4/9/20   Michael Cates MD   lisinopril (PRINIVIL;ZESTRIL) 5 MG tablet Take 1 tablet by mouth daily 4/9/20   Michael Cates MD   metoprolol tartrate (LOPRESSOR) 25 MG tablet Take 1 tablet by mouth 2 times daily 4/9/20   Michael Cates MD   gabapentin (NEURONTIN) 100 MG capsule Take 100 mg by mouth 3 times daily.     Historical Provider, MD   Multiple Vitamins-Minerals (MULTIVITAMIN PO) Take by mouth 2 times daily    Historical Provider, MD       Current medications:    Current Facility-Administered Medications   Medication Dose Route Frequency Provider Last Rate Last Dose    sodium chloride flush 0.9 % injection 10 mL  10 mL Intravenous 2 times per day Paulo Hart DPM        sodium chloride flush 0.9 % injection 10 mL  10 mL Intravenous PRN Paulo Hart DPM        ceFAZolin (ANCEF) 3 g in dextrose 5 % 100 mL IVPB  3 g Intravenous On Call to [address] [place] [address], [facility]           Allergies:  No Known Allergies    Problem List:    Patient Active Problem List   Diagnosis Code    Gangrene of toe of left foot ([facility] I96    CAD (coronary artery disease) I25.10    Type 2 diabetes mellitus with insulin therapy ([facility] E11.9, Z79.4    Hyperlipidemia E78.5    Hypertension I10    Charcot's joint, right ankle and foot M14.671    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 Abnormal (paradoxical) motion consistent with post-operative status. Doppler parameters were consistent with abnormal left ventricular   relaxation (grade 1 diastolic dysfunction). Thickened aortic valve leaflets noted. Aortic valve leaflets are Mildly calcified. Mild aortic stenosis is present. Neuro/Psych:               GI/Hepatic/Renal:             Endo/Other:                     Abdominal:   (+) obese,         Vascular:                                        Anesthesia Plan      MAC     ASA 3       Induction: intravenous. Anesthetic plan and risks discussed with patient. Use of blood products discussed with patient whom. Plan discussed with CRNA.                   Cassaundra Kanner, DO   7/20/2020

## 2020-07-21 VITALS
DIASTOLIC BLOOD PRESSURE: 75 MMHG | WEIGHT: 285 LBS | RESPIRATION RATE: 16 BRPM | SYSTOLIC BLOOD PRESSURE: 163 MMHG | HEART RATE: 80 BPM | OXYGEN SATURATION: 96 % | TEMPERATURE: 97.8 F | BODY MASS INDEX: 36.57 KG/M2 | HEIGHT: 74 IN

## 2020-07-21 LAB
GLUCOSE BLD-MCNC: 116 MG/DL (ref 70–108)
GLUCOSE BLD-MCNC: 131 MG/DL (ref 70–108)
GLUCOSE BLD-MCNC: 140 MG/DL (ref 70–108)

## 2020-07-21 PROCEDURE — 6370000000 HC RX 637 (ALT 250 FOR IP): Performed by: PHYSICIAN ASSISTANT

## 2020-07-21 PROCEDURE — 82948 REAGENT STRIP/BLOOD GLUCOSE: CPT

## 2020-07-21 PROCEDURE — G0378 HOSPITAL OBSERVATION PER HR: HCPCS

## 2020-07-21 PROCEDURE — 6360000002 HC RX W HCPCS: Performed by: PODIATRIST

## 2020-07-21 RX ADMIN — ALOGLIPTIN 25 MG: 25 TABLET, FILM COATED ORAL at 10:47

## 2020-07-21 RX ADMIN — INSULIN GLARGINE 44 UNITS: 100 INJECTION, SOLUTION SUBCUTANEOUS at 10:47

## 2020-07-21 RX ADMIN — DOCUSATE SODIUM 100 MG: 100 CAPSULE, LIQUID FILLED ORAL at 10:47

## 2020-07-21 RX ADMIN — GABAPENTIN 100 MG: 100 CAPSULE ORAL at 10:47

## 2020-07-21 RX ADMIN — LISINOPRIL 5 MG: 5 TABLET ORAL at 10:47

## 2020-07-21 RX ADMIN — DOCUSATE SODIUM 50 MG AND SENNOSIDES 8.6 MG 1 TABLET: 8.6; 5 TABLET, FILM COATED ORAL at 10:47

## 2020-07-21 RX ADMIN — METOPROLOL TARTRATE 25 MG: 25 TABLET ORAL at 10:47

## 2020-07-21 RX ADMIN — CEFAZOLIN SODIUM 1 G: 1 INJECTION, SOLUTION INTRAVENOUS at 04:17

## 2020-07-21 ASSESSMENT — PAIN SCALES - GENERAL: PAINLEVEL_OUTOF10: 0

## 2020-07-21 NOTE — PROGRESS NOTES
[address] [place] [address]                                 PROGRESS NOTE    PATIENT NAME: Sandra Sosa                    :        1960  MED REC NO:   [id number]                           ROOM:       0004  ACCOUNT NO:   [de-identified]                           ADMIT DATE: 2020  PROVIDER:     Tiarra Lang. Antonieta Singleton D.P.M. CHIEF COMPLAINT:  Left foot wound. HISTORY OF PRESENT ILLNESS:  The patient is a pleasant male, who  underwent a surgical debridement of the left foot with application of  negative pressure wound therapy. I was consulted by Same-Day Surgery. The patient was noted to have a hematoma formation, which had caused a  blockage of his KCI wound VAC. The wound VAC was taken down and a bulky  stent dressing was applied to the left foot with reapplication of his  posterior splint. The patient will be admitted as an observation with  reapplication of KCI wound VAC tomorrow. The patient had failed to  discontinue his Plavix preoperatively and has some postop drainage. I  am going to continue to monitor. The patient otherwise is asymptomatic  and doing well. Pain under control. PHYSICAL EXAMINATION:  CONSTITUTIONAL:  The patient appears alert and oriented x3, in no acute  distress. VASCULAR:  States that DP/PT pulses are palpable. Skin temperature  within normal limits. SKIN:  The patient is noted to have some scant serous drainage from the  site, a large hematoma underlying the [facility] which was removed and replaced  with a dry sterile dressing. NEUROLOGIC:  Epicritic and protopathic sensations are grossly  diminished. MUSCULOSKELETAL:  The patient has a history of transmetatarsal  amputation left forefoot as well as status post excision and debridement  down the level of bone, plantar aspect left forefoot. ASSESSMENT:  Includes postop bleeding complication due to wound VAC.     PLAN:  The patient is a

## 2020-07-21 NOTE — PROGRESS NOTES
Wound ostomy called to room d/t home wound vac blockage alert. Primary nurse states that vac was placed earlier this AM per podiatry. States pt had bleeding last night when wound vac removed and dressing placed prior to podiatry reapplying this AM. Pt vac canister changed, still blockage alarm. Removed ACE wrap over splint to left lower extremity. Strike through bleeding noted to ACE wrap under splint. Took down ACE and kerlix. Large amount of bleeding noted under wound vac drape. Removed vac dressing. Pt has steady bleeding noted to about the 10 oclock location of wound. Held pressure for 2 minutes. Draining through gauze. Applied quick clot gauze, several 4x4 gauze, ABD pad, kerlix, ACE. Applied splint then ACE. Informed Dr. Josefina Armas from podiatry- he will speak with Dr. Jazzmine Ellis as far as patient plan. Will inform primary nurse with plan. Bed in low, call light in reach. Thank you for allowing us to participate in the care of your patient. TIME   Wound/ostomy individual minutes  Time In: 0840  Time Out: 0900  Minutes: 20  Time does not include documentation.

## 2020-07-21 NOTE — DISCHARGE SUMMARY
Physician Discharge Summary     Patient ID:  Devante Horvath  [id number]    Physician: Jeannie Helms DPM     Admission date: 7/20/2020    Discharge date: No discharge date for patient encounter. \    Date of Surgery: 7/20/2020    Admission Diagnoses: Bleeding [R58]  Wound, open [T14. 8XXA]    Discharge Diagnoses: Same    Procedures: 1. Left Foot wound excision down to and including bone 8x6cm              2. Left metatarsal bone biopsy              3. Application NPWT area <12MV sq    History, Physical Exam:     Podiatric Progress Note  Devante Horvath  Subjective :   7/21/20  Patient seen bedside today on behalf of Dr. Kesha Caballero. Patient appeared pleasant, was oriented to person, place and time and in no acute distress. Patient is to return home and has means to stay off of operative limb. Patient denies any N/V/F/C/SOB or CP. Patient has no further pedal concerns at this point in time.   Patient has a follow-up with Dr. Kesha Caballero Friday at [facility]     Current Medications:    Current Facility-Administered Medications: 0.9 % sodium chloride infusion, , Intravenous, Continuous  0.9 % sodium chloride infusion, , Intravenous, Continuous  sodium chloride flush 0.9 % injection 10 mL, 10 mL, Intravenous, 2 times per day  sodium chloride flush 0.9 % injection 10 mL, 10 mL, Intravenous, PRN  ceFAZolin (ANCEF) 1 g in dextrose 5 % 50 mL IVPB (premix), 1 g, Intravenous, Q8H  HYDROcodone-acetaminophen (NORCO) 5-325 MG per tablet 1 tablet, 1 tablet, Oral, Q6H PRN  HYDROcodone-acetaminophen (NORCO) 5-325 MG per tablet 2 tablet, 2 tablet, Oral, Q6H PRN  atorvastatin (LIPITOR) tablet 40 mg, 40 mg, Oral, Nightly  docusate sodium (COLACE) capsule 100 mg, 100 mg, Oral, BID  gabapentin (NEURONTIN) capsule 100 mg, 100 mg, Oral, TID  insulin glargine (LANTUS) injection vial 44 Units, 44 Units, Subcutaneous, QAM AC  lisinopril (PRINIVIL;ZESTRIL) tablet 5 mg, 5 mg, Oral, Daily  metoprolol tartrate (LOPRESSOR) tablet 25 mg, 25 mg, Oral, BID  sennosides-docusate sodium (SENOKOT-S) 8.6-50 MG tablet 1 tablet, 1 tablet, Oral, BID  insulin lispro (HUMALOG) injection vial 0-6 Units, 0-6 Units, Subcutaneous, TID WC  insulin lispro (HUMALOG) injection vial 0-3 Units, 0-3 Units, Subcutaneous, Nightly  alogliptin (NESINA) tablet 25 mg, 25 mg, Oral, Daily    Objective     /69   Pulse 76   Temp 98 °F (36.7 °C) (Oral)   Resp 16   Ht 6' 2\" (1.88 m)   Wt 285 lb (129.3 kg) Comment: unable to put weight on foot unable to stand on scale  SpO2 100%   BMI 36.59 kg/m²      I/O:    Intake/Output Summary (Last 24 hours) at 7/21/2020 0830  Last data filed at 7/21/2020 0418  Gross per 24 hour   Intake 2704.2 ml   Output 1800 ml   Net 904.2 ml              Wt Readings from Last 3 Encounters:   07/20/20 285 lb (129.3 kg)   06/04/20 290 lb (131.5 kg)   04/22/20 (!) 311 lb (141.1 kg)       LABS:  No results for input(s): WBC, HGB, HCT, PLT in the last 72 hours. Recent Labs     07/20/20  1247   K 4.7      No results for input(s): PROT, INR, APTT in the last 72 hours. No results for input(s): CKTOTAL, CKMB, CKMBINDEX, TROPONINI in the last 72 hours. Focused Lower Extremity Examination:    Vitals:    /69   Pulse 76   Temp 98 °F (36.7 °C) (Oral)   Resp 16   Ht 6' 2\" (1.88 m)   Wt 285 lb (129.3 kg) Comment: unable to put weight on foot unable to stand on scale  SpO2 100%   BMI 36.59 kg/m²      Vascular: CFT to stump brisk. Dermatologic: There is a large expansile plantar wound to the left plantar lateral foot. Currently the wound is covered in Surgicel. Wound edges are viable and slightly bleeding. No evidence of malodor or purulence. Neurovascular: Epicritic and protopathic sensation absent    Musculoskeletal: Muscle strength testing deferred. Rectus alignment of the ankle. No pain on palpation to left foot.     ASSESSMENT: Pt. is a 63 y.o. male with:  Principle  Postoperative state  Status post excisional debridement with wound VAC

## 2020-07-23 LAB
AEROBIC CULTURE: ABNORMAL
ANAEROBIC CULTURE: ABNORMAL
GRAM STAIN RESULT: ABNORMAL
ORGANISM: ABNORMAL

## 2020-08-10 ENCOUNTER — OFFICE VISIT (OUTPATIENT)
Dept: FAMILY MEDICINE CLINIC | Age: 60
End: 2020-08-10
Payer: COMMERCIAL

## 2020-08-10 VITALS
DIASTOLIC BLOOD PRESSURE: 88 MMHG | OXYGEN SATURATION: 99 % | HEART RATE: 85 BPM | SYSTOLIC BLOOD PRESSURE: 138 MMHG | TEMPERATURE: 98.1 F

## 2020-08-10 PROCEDURE — 99203 OFFICE O/P NEW LOW 30 MIN: CPT | Performed by: NURSE PRACTITIONER

## 2020-08-10 RX ORDER — INSULIN DETEMIR 100 [IU]/ML
44 INJECTION, SOLUTION SUBCUTANEOUS EVERY MORNING
COMMUNITY
End: 2020-08-10 | Stop reason: SDUPTHER

## 2020-08-10 RX ORDER — GABAPENTIN 300 MG/1
300 CAPSULE ORAL 3 TIMES DAILY
COMMUNITY
Start: 2020-06-10 | End: 2020-08-10 | Stop reason: SDUPTHER

## 2020-08-10 RX ORDER — LISINOPRIL 5 MG/1
5 TABLET ORAL DAILY
Qty: 90 TABLET | Refills: 1 | Status: SHIPPED | OUTPATIENT
Start: 2020-08-10 | End: 2020-12-02 | Stop reason: SDUPTHER

## 2020-08-10 RX ORDER — CYCLOBENZAPRINE HCL 5 MG
5 TABLET ORAL PRN
COMMUNITY
End: 2020-08-13

## 2020-08-10 RX ORDER — GABAPENTIN 300 MG/1
300 CAPSULE ORAL 3 TIMES DAILY
Qty: 90 CAPSULE | Refills: 2 | Status: SHIPPED | OUTPATIENT
Start: 2020-08-10 | End: 2021-05-03

## 2020-08-10 RX ORDER — ATORVASTATIN CALCIUM 40 MG/1
40 TABLET, FILM COATED ORAL NIGHTLY
Qty: 90 TABLET | Refills: 1 | Status: SHIPPED | OUTPATIENT
Start: 2020-08-10 | End: 2021-03-24 | Stop reason: SDUPTHER

## 2020-08-10 RX ORDER — ASPIRIN 81 MG/1
81 TABLET, CHEWABLE ORAL DAILY
Qty: 30 TABLET | Refills: 3 | Status: SHIPPED | OUTPATIENT
Start: 2020-08-10

## 2020-08-10 RX ORDER — HYDROCODONE BITARTRATE AND ACETAMINOPHEN 5; 325 MG/1; MG/1
TABLET ORAL PRN
Status: ON HOLD | COMMUNITY
Start: 2019-12-03 | End: 2020-11-25 | Stop reason: ALTCHOICE

## 2020-08-10 RX ORDER — INSULIN DETEMIR 100 [IU]/ML
44 INJECTION, SOLUTION SUBCUTANEOUS EVERY MORNING
Qty: 5 PEN | Refills: 5 | Status: SHIPPED | OUTPATIENT
Start: 2020-08-10 | End: 2021-02-05 | Stop reason: SDUPTHER

## 2020-08-10 ASSESSMENT — ENCOUNTER SYMPTOMS
EYE REDNESS: 0
TROUBLE SWALLOWING: 0
SHORTNESS OF BREATH: 0
EYE PAIN: 0
CHEST TIGHTNESS: 0
DIARRHEA: 0
SINUS PRESSURE: 0
SORE THROAT: 0
VISUAL CHANGE: 0
SINUS PAIN: 0
EYE ITCHING: 0
VOMITING: 0
BLURRED VISION: 0
ABDOMINAL PAIN: 0
EYE DISCHARGE: 0
ABDOMINAL DISTENTION: 0
RHINORRHEA: 0
NAUSEA: 0
COUGH: 0
CONSTIPATION: 0
BLOOD IN STOOL: 0

## 2020-08-10 ASSESSMENT — PATIENT HEALTH QUESTIONNAIRE - PHQ9
SUM OF ALL RESPONSES TO PHQ QUESTIONS 1-9: 0
SUM OF ALL RESPONSES TO PHQ9 QUESTIONS 1 & 2: 0
2. FEELING DOWN, DEPRESSED OR HOPELESS: 0
1. LITTLE INTEREST OR PLEASURE IN DOING THINGS: 0
SUM OF ALL RESPONSES TO PHQ QUESTIONS 1-9: 0

## 2020-08-10 NOTE — PROGRESS NOTES
Subjective:      Patient ID: Sid Ganser is a 63 y.o. male. Pt comes to the office today to establish primary care. Pt states that he was previously seen by [address]. Pt states that he has had all of his surgeries performed at [address] [place] [address] and would like to keep in the [facility]. Pt lives at home alone. Has many friends and family that comes and help him. Has a motorized wheelchair for around the house. Is able to perform his ADLs at his home. Pt fills his pill box every week and is compliant with medication. Has issues with constipation but is regular with Mirolax. Pt is a diabetic that is being well controlled by his medication. Has had multiple foot surgeries by Dr. Marj Vernon. Is currently being monitored for future surgeries. Pt follows up with Dr. Marj Vernon weekly. Has right foot in a cast and the left foot is dressed with a wound vac. Is going to have a skin graft on his left foot soon. Had a triple bypass in November 2019 by Dr. Deloris Moura. Pt states that he has been feeling much better after his surgery. He no longer has SOB and chest pain. Dr. Davi Barr is pt's cardiologist that he follows up with. Diabetes   He presents for his follow-up diabetic visit. He has type 2 diabetes mellitus. The initial diagnosis of diabetes was made 8 years ago. His disease course has been stable. Pertinent negatives for hypoglycemia include no confusion, dizziness, headaches or nervousness/anxiousness. (3-4 Months ago) Associated symptoms include fatigue, foot paresthesias and foot ulcerations. Pertinent negatives for diabetes include no blurred vision, no chest pain, no polydipsia, no polyphagia, no polyuria, no visual change, no weakness and no weight loss. There are no hypoglycemic complications. Symptoms are stable. Diabetic complications include autonomic neuropathy, heart disease, nephropathy and peripheral neuropathy.  Risk factors for coronary artery disease include diabetes mellitus, family history, obesity, male sex, sedentary lifestyle, tobacco exposure and stress. Current diabetic treatment includes insulin injections and oral agent (monotherapy). He is compliant with treatment most of the time. He is currently taking insulin pre-breakfast and at bedtime. Insulin injections are given by patient. Rotation sites for injection include the abdominal wall and buttocks. His weight is stable. He is following a diabetic diet. When asked about meal planning, he reported none. He has had a previous visit with a dietitian. He rarely participates in exercise. His breakfast blood glucose range is generally 110-130 mg/dl. His bedtime blood glucose range is generally 130-140 mg/dl. An ACE inhibitor/angiotensin II receptor blocker is being taken. He sees a podiatrist.Eye exam is not current. Review of Systems   Constitutional: Positive for fatigue. Negative for activity change, appetite change, chills, diaphoresis, fever, unexpected weight change and weight loss. HENT: Negative for congestion, ear discharge, ear pain, hearing loss, rhinorrhea, sinus pressure, sinus pain, sneezing, sore throat and trouble swallowing. Eyes: Negative for blurred vision, pain, discharge, redness, itching and visual disturbance. Respiratory: Negative for cough, chest tightness and shortness of breath. Cardiovascular: Negative for chest pain, palpitations and leg swelling. Gastrointestinal: Negative for abdominal distention, abdominal pain, blood in stool, constipation, diarrhea, nausea and vomiting. Endocrine: Negative for cold intolerance, heat intolerance, polydipsia, polyphagia and polyuria. Genitourinary: Negative for difficulty urinating, dysuria, flank pain and hematuria. Musculoskeletal: Positive for gait problem. Negative for myalgias. Skin: Positive for wound. Negative for rash. Allergic/Immunologic: Positive for environmental allergies. Negative for food allergies.    Neurological: Positive for numbness. Negative for dizziness, weakness, light-headedness and headaches. Hematological: Negative for adenopathy. Does not bruise/bleed easily. Psychiatric/Behavioral: Negative for agitation, behavioral problems and confusion. The patient is not nervous/anxious. Objective:   Physical Exam  Vitals signs and nursing note reviewed. Constitutional:       General: He is not in acute distress. Appearance: Normal appearance. He is obese. He is not ill-appearing. HENT:      Head: Normocephalic and atraumatic. Right Ear: Tympanic membrane, ear canal and external ear normal.      Left Ear: Tympanic membrane, ear canal and external ear normal.      Nose: Nose normal.      Mouth/Throat:      Mouth: Mucous membranes are moist.      Pharynx: Oropharynx is clear. Eyes:      General:         Right eye: No discharge. Left eye: No discharge. Conjunctiva/sclera: Conjunctivae normal.   Neck:      Musculoskeletal: Normal range of motion and neck supple. Vascular: No carotid bruit. Cardiovascular:      Rate and Rhythm: Normal rate and regular rhythm. Pulses: Normal pulses. Heart sounds: Normal heart sounds. Pulmonary:      Effort: Pulmonary effort is normal.      Breath sounds: Normal breath sounds. Abdominal:      General: Abdomen is flat. Bowel sounds are normal.      Palpations: Abdomen is soft. Musculoskeletal: Normal range of motion. Right lower leg: No edema. Left lower leg: No edema. Comments: Unable to visualize foot wounds due to casting/wound vac     Lymphadenopathy:      Cervical: No cervical adenopathy. Skin:     General: Skin is warm and dry. Capillary Refill: Capillary refill takes 2 to 3 seconds. Findings: No rash. Neurological:      General: No focal deficit present. Mental Status: He is alert and oriented to person, place, and time. Mental status is at baseline. Sensory: Sensory deficit present.       Gait: Gait abnormal.      Deep Tendon Reflexes: Reflexes abnormal.   Psychiatric:         Mood and Affect: Mood normal.         Behavior: Behavior normal.         Thought Content: Thought content normal.         Judgment: Judgment normal.         Assessment:     I have reviewed the patient's medical history in detail and updated the computerized patient record. HPI/ROS per the patient. Complex history with multiple complications. Non healing foot wounds. Lab Results   Component Value Date    LABA1C 6.2 04/17/2020     No results found for: EAG  Will hold the tradjenta for now due to last gfr was in the 40s. Continue to work with insulin for blood sugar control. The patient is advised to follow a low fat, low cholesterol diet, attempt to lose weight, reduce salt in diet and cooking, reduce exposure to stress, continue current medications and continue current healthy lifestyle patterns. 1. Coronary artery disease involving native coronary artery of native heart without angina pectoris    - aspirin 81 MG chewable tablet; Take 1 tablet by mouth daily  Dispense: 30 tablet; Refill: 3    2. Type 2 diabetes mellitus with insulin therapy ([facility])    - insulin detemir (LEVEMIR FLEXTOUCH) 100 UNIT/ML injection pen; Inject 44 Units into the skin every morning  Dispense: 5 pen; Refill: 5    3. Moderate mixed hyperlipidemia not requiring statin therapy    - atorvastatin (LIPITOR) 40 MG tablet; Take 1 tablet by mouth nightly  Dispense: 90 tablet; Refill: 1    4. Essential hypertension    - lisinopril (PRINIVIL;ZESTRIL) 5 MG tablet; Take 1 tablet by mouth daily  Dispense: 90 tablet; Refill: 1  - metoprolol tartrate (LOPRESSOR) 25 MG tablet; Take 1 tablet by mouth 2 times daily  Dispense: 180 tablet; Refill: 1    5. S/P transmetatarsal amputation of foot, left (HCC)    - HYDROcodone-acetaminophen (NORCO) 5-325 MG per tablet; Take by mouth as needed. - cyclobenzaprine (FLEXERIL) 5 MG tablet;  Take 5 mg by mouth as needed for Muscle spasms  - gabapentin (NEURONTIN) 300 MG capsule; Take 1 capsule by mouth 3 times daily for 90 days. Dispense: 90 capsule; Refill: 2    6. Hx of CABG              Plan:      Return in about 3 months (around 11/10/2020).           Chi Noland

## 2020-08-10 NOTE — PATIENT INSTRUCTIONS
Patient Education        Angina: Care Instructions  Your Care Instructions     You have a problem called angina. Angina happens when there is not enough blood flow to your heart muscle. Angina is a sign of coronary artery disease (CAD). CAD occurs when blood vessels that supply the heart become narrowed. Having CAD increases your risk of a heart attack. Chest pain or pressure is the most common symptom of angina. But some people have other symptoms, like:  · Pain, pressure, or a strange feeling in the back, neck, jaw, or upper belly, or in one or both shoulders or arms. · Shortness of breath. · Nausea or vomiting. · Lightheadedness or sudden weakness. · Fast or irregular heartbeat. Women are somewhat more likely than men to have angina symptoms like shortness of breath, nausea, and back or jaw pain. Angina can be dangerous. That's why it is important to pay attention to your symptoms. Know what is typical for you, learn how to control your symptoms, and understand when you need to get treatment. A change in your usual pattern of symptoms is an emergency. It may mean that you are having a heart attack. The doctor has checked you carefully, but problems can develop later. If you notice any problems or new symptoms, get medical treatment right away. Follow-up care is a key part of your treatment and safety. Be sure to make and go to all appointments, and call your doctor if you are having problems. It's also a good idea to know your test results and keep a list of the medicines you take. How can you care for yourself at home? Medicines  · If your doctor has given you nitroglycerin for angina symptoms, keep it with you at all times. If you have symptoms, sit down and rest, and take the first dose of nitroglycerin as directed. If your symptoms get worse or are not getting better within 5 minutes, call 911 right away. Stay on the phone. The emergency  will give you further instructions.   · If your doctor advises it, take 1 low-dose aspirin a day to prevent heart attack. · Be safe with medicines. Take your medicines exactly as prescribed. Call your doctor if you think you are having a problem with your medicine. You will get more details on the specific medicines your doctor prescribes. Lifestyle changes  · Do not smoke. If you need help quitting, talk to your doctor about stop-smoking programs and medicines. These can increase your chances of quitting for good. · Eat a heart-healthy diet that is low in saturated fat and salt, and is high in fiber. Talk to your doctor or a dietitian about healthy eating. · Stay at a healthy weight. Or lose weight if you need to. Activity  · Talk to your doctor about a level of activity that is safe for you. · If an activity causes angina symptoms, stop and rest.  When should you call for help? [id number] anytime you think you may need emergency care. For example, call if:  · You passed out (lost consciousness). · You have symptoms of a heart attack. These may include:  ? Chest pain or pressure, or a strange feeling in the chest.  ? Sweating. ? Shortness of breath. ? Nausea or vomiting. ? Pain, pressure, or a strange feeling in the back, neck, jaw, or upper belly or in one or both shoulders or arms. ? Lightheadedness or sudden weakness. ? A fast or irregular heartbeat. After you call 911, the  may tell you to chew 1 adult-strength or 2 to 4 low-dose aspirin. Wait for an ambulance. Do not try to drive yourself. · You have angina symptoms that do not go away with rest or are not getting better within 5 minutes after you take a dose of nitroglycerin. Call your doctor now if:  · Your angina symptoms seem worse but still follow your typical pattern. You can predict when symptoms will happen, but they may come on sooner, feel worse, or last longer. · You feel dizzy or lightheaded, or you feel like you may faint.   Watch closely for changes in your health, and be sure to contact your doctor if you have any problems. Where can you learn more? Go to https://chpepiceweb.Project Bionic. org and sign in to your [employer] account. Enter H129 in the [facility] box to learn more about \"Angina: Care Instructions. \"     If you do not have an account, please click on the \"Sign Up Now\" link. Current as of: December 16, 2019               Content Version: 12.5  © 2006-2020 Healthwise, [email or web address]. Care instructions adapted under license by [facility] ([facility]). If you have questions about a medical condition or this instruction, always ask your healthcare professional. [address] any warranty or liability for your use of this information. Patient Education        DASH Diet: Care Instructions  Your Care Instructions     The DASH diet is an eating plan that can help lower your blood pressure. DASH stands for Dietary Approaches to Stop Hypertension. Hypertension is high blood pressure. The DASH diet focuses on eating foods that are high in calcium, potassium, and magnesium. These nutrients can lower blood pressure. The foods that are highest in these nutrients are fruits, vegetables, low-fat dairy products, nuts, seeds, and legumes. But taking calcium, potassium, and magnesium supplements instead of eating foods that are high in those nutrients does not have the same effect. The DASH diet also includes whole grains, fish, and poultry. The DASH diet is one of several lifestyle changes your doctor may recommend to lower your high blood pressure. Your doctor may also want you to decrease the amount of sodium in your diet. Lowering sodium while following the DASH diet can lower blood pressure even further than just the DASH diet alone. Follow-up care is a key part of your treatment and safety. Be sure to make and go to all appointments, and call your doctor if you are having problems.  It's also a good idea to know your test results and keep a list of the medicines you take. How can you care for yourself at home? Following the DASH diet  · Eat 4 to 5 servings of fruit each day. A serving is 1 medium-sized piece of fruit, ½ cup chopped or canned fruit, 1/4 cup dried fruit, or 4 ounces (½ cup) of fruit juice. Choose fruit more often than fruit juice. · Eat 4 to 5 servings of vegetables each day. A serving is 1 cup of lettuce or raw leafy vegetables, ½ cup of chopped or cooked vegetables, or 4 ounces (½ cup) of vegetable juice. Choose vegetables more often than vegetable juice. · Get 2 to 3 servings of low-fat and fat-free dairy each day. A serving is 8 ounces of milk, 1 cup of yogurt, or 1 ½ ounces of cheese. · Eat 6 to 8 servings of grains each day. A serving is 1 slice of bread, 1 ounce of dry cereal, or ½ cup of cooked rice, pasta, or cooked cereal. Try to choose whole-grain products as much as possible. · Limit lean meat, poultry, and fish to 2 servings each day. A serving is 3 ounces, about the size of a deck of cards. · Eat 4 to 5 servings of nuts, seeds, and legumes (cooked dried beans, lentils, and split peas) each week. A serving is 1/3 cup of nuts, 2 tablespoons of seeds, or ½ cup of cooked beans or peas. · Limit fats and oils to 2 to 3 servings each day. A serving is 1 teaspoon of vegetable oil or 2 tablespoons of salad dressing. · Limit sweets and added sugars to 5 servings or less a week. A serving is 1 tablespoon jelly or jam, ½ cup sorbet, or 1 cup of lemonade. · Eat less than 2,300 milligrams (mg) of sodium a day. If you limit your sodium to 1,500 mg a day, you can lower your blood pressure even more. Tips for success  · Start small. Do not try to make dramatic changes to your diet all at once. You might feel that you are missing out on your favorite foods and then be more likely to not follow the plan. Make small changes, and stick with them. Once those changes become habit, add a few more changes. · Try some of the following:  ?  Make it a goal to eat a fruit or vegetable at every meal and at snacks. This will make it easy to get the recommended amount of fruits and vegetables each day. ? Try yogurt topped with fruit and nuts for a snack or healthy dessert. ? Add lettuce, tomato, cucumber, and onion to sandwiches. ? Combine a ready-made pizza crust with low-fat mozzarella cheese and lots of vegetable toppings. Try using tomatoes, squash, spinach, broccoli, carrots, cauliflower, and onions. ? Have a variety of cut-up vegetables with a low-fat dip as an appetizer instead of chips and dip. ? Sprinkle sunflower seeds or chopped almonds over salads. Or try adding chopped walnuts or almonds to cooked vegetables. ? Try some vegetarian meals using beans and peas. Add garbanzo or kidney beans to salads. Make burritos and tacos with mashed carson beans or black beans. Where can you learn more? Go to https://Laurus EnergypeLoyalzoo.Grafighters. org and sign in to your [employer] account. Enter B043 in the [employer] box to learn more about \"DASH Diet: Care Instructions. \"     If you do not have an account, please click on the \"Sign Up Now\" link. Current as of: December 16, 2019               Content Version: 12.5  © 9075-6866 [employer]. Care instructions adapted under license by [facility] . If you have questions about a medical condition or this instruction, always ask your healthcare professional. [place] [address] any warranty or liability for your use of this information. Patient Education        Learning About Diabetes Food Guidelines  Your Care Instructions     Meal planning is important to manage diabetes. It helps keep your blood sugar at a target level (which you set with your doctor). You don't have to eat special foods. You can eat what your family eats, including sweets once in a while. But you do have to pay attention to how often you eat and how much you eat of certain foods.   You may want to eat at a meal.  · Try to eat about the same amount of carbs at each meal. Do not \"save up\" your daily allowance of carbs to eat at one meal.  · Proteins have very little or no carbs per serving. Examples of proteins are beef, chicken, turkey, fish, eggs, tofu, cheese, cottage cheese, and peanut butter. A serving size of meat is 3 ounces, which is about the size of a deck of cards. Examples of meat substitute serving sizes (equal to 1 ounce of meat) are 1/4 cup of cottage cheese, 1 egg, 1 tablespoon of peanut butter, and ½ cup of tofu. How can you eat out and still eat healthy? · Learn to estimate the serving sizes of foods that have carbohydrate. If you measure food at home, it will be easier to estimate the amount in a serving of restaurant food. · If the meal you order has too much carbohydrate (such as potatoes, corn, or baked beans), ask to have a low-carbohydrate food instead. Ask for a salad or green vegetables. · If you use insulin, check your blood sugar before and after eating out to help you plan how much to eat in the future. · If you eat more carbohydrate at a meal than you had planned, take a walk or do other exercise. This will help lower your blood sugar. What else should you know? · Limit saturated fat, such as the fat from meat and dairy products. This is a healthy choice because people who have diabetes are at higher risk of heart disease. So choose lean cuts of meat and nonfat or low-fat dairy products. Use olive or canola oil instead of butter or shortening when cooking. · Don't skip meals. Your blood sugar may drop too low if you skip meals and take insulin or certain medicines for diabetes. · Check with your doctor before you drink alcohol. Alcohol can cause your blood sugar to drop too low. Alcohol can also cause a bad reaction if you take certain diabetes medicines. Follow-up care is a key part of your treatment and safety.  Be sure to make and go to all appointments, and call your doctor if you are having problems. It's also a good idea to know your test results and keep a list of the medicines you take. Where can you learn more? Go to https://chpewoodyeweb.Stat. org and sign in to your [employer] account. Enter J586 in the [facility] box to learn more about \"Learning About Diabetes Food Guidelines. \"     If you do not have an account, please click on the \"Sign Up Now\" link. Current as of: December 20, 2019               Content Version: 12.5  © 2843-0019 Healthwise, Incorporated. Care instructions adapted under license by [facility] ([facility]). If you have questions about a medical condition or this instruction, always ask your healthcare professional. [address] any warranty or liability for your use of this information. Patient Education        Learning About Meal Planning for Diabetes  Why plan your meals? Meal planning can be a key part of managing diabetes. Planning meals and snacks with the right balance of carbohydrate, protein, and fat can help you keep your blood sugar at the target level you set with your doctor. You don't have to eat special foods. You can eat what your family eats, including sweets once in a while. But you do have to pay attention to how often you eat and how much you eat of certain foods. You may want to work with a dietitian or a certified diabetes educator. He or she can give you tips and meal ideas and can answer your questions about meal planning. This health professional can also help you reach a healthy weight if that is one of your goals. What plan is right for you? Your dietitian or diabetes educator may suggest that you start with the plate format or carbohydrate counting. The plate format  The plate format is a simple way to help you manage how you eat. You plan meals by learning how much space each food should take on a plate.  Using the plate format helps you spread carbohydrate throughout the day. It can make it easier to keep your blood sugar level within your target range. It also helps you see if you're eating healthy portion sizes. To use the plate format, you put non-starchy vegetables on half your plate. Add meat or meat substitutes on one-quarter of the plate. Put a grain or starchy vegetable (such as brown rice or a potato) on the final quarter of the plate. You can add a small piece of fruit and some low-fat or fat-free milk or yogurt, depending on your carbohydrate goal for each meal.  Here are some tips for using the plate format:  · Make sure that you are not using an oversized plate. A 9-inch plate is best. Many restaurants use larger plates. · Get used to using the plate format at home. Then you can use it when you eat out. · Write down your questions about using the plate format. Talk to your doctor, a dietitian, or a diabetes educator about your concerns. Carbohydrate counting  With carbohydrate counting, you plan meals based on the amount of carbohydrate in each food. Carbohydrate raises blood sugar higher and more quickly than any other nutrient. It is found in desserts, breads and cereals, and fruit. It's also found in starchy vegetables such as potatoes and corn, grains such as rice and pasta, and milk and yogurt. Spreading carbohydrate throughout the day helps keep your blood sugar levels within your target range. Your daily amount depends on several things, including your weight, how active you are, which diabetes medicines you take, and what your goals are for your blood sugar levels. A registered dietitian or diabetes educator can help you plan how much carbohydrate to include in each meal and snack. A guideline for your daily amount of carbohydrate is:  · 45 to 60 grams at each meal. That's about the same as 3 to 4 carbohydrate servings. · 15 to 20 grams at each snack. That's about the same as 1 carbohydrate serving.   The Nutrition Facts label on packaged foods tells you how much carbohydrate is in a serving of the food. First, look at the serving size on the food label. Is that the amount you eat in a serving? All of the nutrition information on a food label is based on that serving size. So if you eat more or less than that, you'll need to adjust the other numbers. Total carbohydrate is the next thing you need to look for on the label. If you count carbohydrate servings, one serving of carbohydrate is 15 grams. For foods that don't come with labels, such as fresh fruits and vegetables, you'll need a guide that lists carbohydrate in these foods. Ask your doctor, dietitian, or diabetes educator about books or other nutrition guides you can use. If you take insulin, you need to know how many grams of carbohydrate are in a meal. This lets you know how much rapid-acting insulin to take before you eat. If you use an insulin pump, you get a constant rate of insulin during the day. So the pump must be programmed at meals to give you extra insulin to cover the rise in blood sugar after meals. When you know how much carbohydrate you will eat, you can take the right amount of insulin. Or, if you always use the same amount of insulin, you need to make sure that you eat the same amount of carbohydrate at meals. If you need more help to understand carbohydrate counting and food labels, ask your doctor, dietitian, or diabetes educator. How do you get started with meal planning? Here are some tips to get started:  · Plan your meals a week at a time. Don't forget to include snacks too. · Use cookbooks or online recipes to plan several main meals. Plan some quick meals for busy nights. You also can double some recipes that freeze well. Then you can save half for other busy nights when you don't have time to cook. · Make sure you have the ingredients you need for your recipes. If you're running low on basic items, put these items on your shopping list too.   · List foods that you use to make breakfasts, lunches, and snacks. List plenty of fruits and vegetables. · Post this list on the refrigerator. Add to it as you think of more things you need. · Take the list to the store to do your weekly shopping. Follow-up care is a key part of your treatment and safety. Be sure to make and go to all appointments, and call your doctor if you are having problems. It's also a good idea to know your test results and keep a list of the medicines you take. Where can you learn more? Go to https://Lloydgoff.compepiceweb.Seesearch. org and sign in to your [employer] account. Enter H023 in the [employer] box to learn more about \"Learning About Meal Planning for Diabetes. \"     If you do not have an account, please click on the \"Sign Up Now\" link. Current as of: December 20, 2019               Content Version: 12.5  © 3167-2025 Healthwise, Incorporated. Care instructions adapted under license by [facility] ([facility]). If you have questions about a medical condition or this instruction, always ask your healthcare professional. [place] [address] any warranty or liability for your use of this information.

## 2020-08-11 ENCOUNTER — HOSPITAL ENCOUNTER (OUTPATIENT)
Age: 60
Discharge: HOME | End: 2020-08-11
Payer: COMMERCIAL

## 2020-08-11 ENCOUNTER — HOSPITAL ENCOUNTER (OUTPATIENT)
Dept: GENERAL RADIOLOGY | Age: 60
Discharge: HOME | End: 2020-08-11
Payer: COMMERCIAL

## 2020-08-11 LAB
ANION GAP SERPL CALCULATED.3IONS-SCNC: 10 MEQ/L (ref 8–16)
BASOPHILS # BLD: 1.3 %
BASOPHILS ABSOLUTE: 0.1 THOU/MM3 (ref 0–0.1)
BUN BLDV-MCNC: 17 MG/DL (ref 7–22)
CALCIUM SERPL-MCNC: 9.4 MG/DL (ref 8.5–10.5)
CHLORIDE BLD-SCNC: 102 MEQ/L (ref 98–111)
CO2: 26 MEQ/L (ref 23–33)
CREAT SERPL-MCNC: 0.9 MG/DL (ref 0.4–1.2)
EKG ATRIAL RATE: 78 BPM
EKG P AXIS: 28 DEGREES
EKG P-R INTERVAL: 164 MS
EKG Q-T INTERVAL: 374 MS
EKG QRS DURATION: 94 MS
EKG QTC CALCULATION (BAZETT): 426 MS
EKG R AXIS: 53 DEGREES
EKG T AXIS: -29 DEGREES
EKG VENTRICULAR RATE: 78 BPM
EOSINOPHIL # BLD: 5.3 %
EOSINOPHILS ABSOLUTE: 0.4 THOU/MM3 (ref 0–0.4)
ERYTHROCYTE [DISTWIDTH] IN BLOOD BY AUTOMATED COUNT: 15.6 % (ref 11.5–14.5)
ERYTHROCYTE [DISTWIDTH] IN BLOOD BY AUTOMATED COUNT: 50.8 FL (ref 35–45)
GFR SERPL CREATININE-BSD FRML MDRD: 86 ML/MIN/1.73M2
GLUCOSE BLD-MCNC: 123 MG/DL (ref 70–108)
HCT VFR BLD CALC: 35.3 % (ref 42–52)
HEMOGLOBIN: 10.9 GM/DL (ref 14–18)
IMMATURE GRANS (ABS): 0.04 THOU/MM3 (ref 0–0.07)
IMMATURE GRANULOCYTES: 0.5 %
LYMPHOCYTES # BLD: 25.1 %
LYMPHOCYTES ABSOLUTE: 2.1 THOU/MM3 (ref 1–4.8)
MCH RBC QN AUTO: 27.7 PG (ref 26–33)
MCHC RBC AUTO-ENTMCNC: 30.9 GM/DL (ref 32.2–35.5)
MCV RBC AUTO: 89.6 FL (ref 80–94)
MONOCYTES # BLD: 8.7 %
MONOCYTES ABSOLUTE: 0.7 THOU/MM3 (ref 0.4–1.3)
NUCLEATED RED BLOOD CELLS: 0 /100 WBC
PLATELET # BLD: 233 THOU/MM3 (ref 130–400)
PMV BLD AUTO: 10.7 FL (ref 9.4–12.4)
POTASSIUM SERPL-SCNC: 4.1 MEQ/L (ref 3.5–5.2)
RBC # BLD: 3.94 MILL/MM3 (ref 4.7–6.1)
SEG NEUTROPHILS: 59.1 %
SEGMENTED NEUTROPHILS ABSOLUTE COUNT: 4.9 THOU/MM3 (ref 1.8–7.7)
SODIUM BLD-SCNC: 138 MEQ/L (ref 135–145)
WBC # BLD: 8.3 THOU/MM3 (ref 4.8–10.8)

## 2020-08-11 PROCEDURE — 80048 BASIC METABOLIC PNL TOTAL CA: CPT

## 2020-08-11 PROCEDURE — 93010 ELECTROCARDIOGRAM REPORT: CPT | Performed by: INTERNAL MEDICINE

## 2020-08-11 PROCEDURE — 36415 COLL VENOUS BLD VENIPUNCTURE: CPT

## 2020-08-11 PROCEDURE — U0002 COVID-19 LAB TEST NON-CDC: HCPCS

## 2020-08-11 PROCEDURE — 85025 COMPLETE CBC W/AUTO DIFF WBC: CPT

## 2020-08-11 PROCEDURE — 93005 ELECTROCARDIOGRAM TRACING: CPT | Performed by: PODIATRIST

## 2020-08-11 PROCEDURE — 71046 X-RAY EXAM CHEST 2 VIEWS: CPT

## 2020-08-12 ENCOUNTER — ANESTHESIA EVENT (OUTPATIENT)
Dept: OPERATING ROOM | Age: 60
End: 2020-08-12
Payer: COMMERCIAL

## 2020-08-12 LAB
PERFORMING LAB: NORMAL
REPORT: NORMAL
SARS-COV-2: NOT DETECTED

## 2020-08-12 NOTE — PROGRESS NOTES
Patient just here for surgery 7/20 instructions only reviewed  Following instructions given to patient, who states understanding:    NPO after midnight  [employer] and 's license  Wear comfortable clean clothing  Do not bring jewelry   Shower night before and morning of surgery with a liquid antibacterial soap  Bring medications in original bottles  Follow all instructions given by your physician   needed at discharge  Call [phone] for any questions  Report to [facility] on 2nd floor  If you would become ill prior to surgery, please call the surgeon  May have only 1 visitor accompany you for surgery  Please bring and wear mask  You will be receiving a phone call one or two days before surgery to review covid screening exposure

## 2020-08-12 NOTE — PROGRESS NOTES
In preparation for their surgical procedure above patient was screened for Obstructive Sleep Apnea (RILEY) using the STOP-Bang Questionnaire by the Pre-Admission Testing department. This is a pre-surgical screening tool for patient safety and serves as a recommendation, this WILL NOT cause cancellation of surgery. STOP-Bang Questionnaire  * Do you currently see a pulmonologist?  No     If yes STOP, do not complete. Patient follows with Dr.     1.  Do you snore loudly (able to be heard in the next room)? No    2. Do you often feel tired or sleepy during the daytime? No       3. Has anyone ever told you that you stop breathing during your sleep? No    4. Do you have or are you being treated for high blood pressure? Yes      5. BMI more than 35? BMI (Calculated): 36.7        Yes    6. Age over 48 years? 63 y.o. Yes    7. Neck Circumference greater than 17 inches for male or 16 inches for female? Measured           (visits only)            Not Applicable    8. Gender Male? Yes      TOTAL SCORE: 4    RILEY - Low Risk : Yes to 0 - 2 questions  RILEY - Intermediate Risk : Yes to 3 - 4 questions  RILEY - High Risk : Yes to 5 - 8 questions    Adapted from:   STOP Questionnaire: A Tool to Screen Patients for Obstructive Sleep Apnea   DENILSON Saha.PJustinC., July Michel M.B.B.S., Elder Eagle M.D., Deshawn Barrow. Hardy Murcia, Ph.D., Tarun Martines M.B.B.S., Hakeem Marcos M.Sc., Carline Peñaloza M.D., Ilia Pinedo. DENILSON Thomas.P.C.    Anesthesiology 2008; 506:115-97 Copyright 2008, the [address] of [employer]est[employer], [place].   ----------------------------------------------------------------------------------------------------------------

## 2020-08-13 ENCOUNTER — OFFICE VISIT (OUTPATIENT)
Dept: CARDIOLOGY CLINIC | Age: 60
End: 2020-08-13
Payer: COMMERCIAL

## 2020-08-13 VITALS
SYSTOLIC BLOOD PRESSURE: 138 MMHG | DIASTOLIC BLOOD PRESSURE: 80 MMHG | BODY MASS INDEX: 36.57 KG/M2 | WEIGHT: 285 LBS | HEART RATE: 88 BPM | HEIGHT: 74 IN

## 2020-08-13 PROCEDURE — 99214 OFFICE O/P EST MOD 30 MIN: CPT | Performed by: INTERNAL MEDICINE

## 2020-08-13 NOTE — PROGRESS NOTES
Patient contacted regarding COVID-19 screen. Following questions asked: In the last month, have you been in contact with someone who was confirmed or suspected to have Coronavirus/COVID-19:  Patient stated NO    Pt was informed only 1  visitor allowed. Please bring masks. Do you or family members have any of the following symptoms:  Cough-no   Muscle pain-no   Shortness of breath-no   Fever-no   Weakness-no  Severe headache-no   Sore throat-no   Respiratory symptoms-no    Have you traveled internationally in the last month?  No     Have you been to the emergency room recently-no

## 2020-08-13 NOTE — PROGRESS NOTES
anticipated need for long term(4-6 weeks) antibiotics he was transferred to [address]. The patient presents to office for follow up. He finished IV Abx Rx. The patient has h/o gout, right foot fracture, in cast for about one year per patient, right foot surgery was planned, continues to be delayed in the past for CAD, CABG, Cellulitis per patient. 07/2020, he noticed a wound on the sole of left foot. He has wound VAC. He finished Abx Rx. Tomorrow, the patient will undergo skin graft left foot. Patient denies chest pain, shortness of breath, dyspnea on exertion, orthopnea, paroxysmal nocturnal dyspnea, palpitations, dizziness, syncope, weight gain or leg swelling. Current Outpatient Medications:     Polyethylene Glycol 3350 (MIRALAX PO), Take by mouth as needed, Disp: , Rfl:     HYDROcodone-acetaminophen (NORCO) 5-325 MG per tablet, Take by mouth as needed. , Disp: , Rfl:     insulin detemir (LEVEMIR FLEXTOUCH) 100 UNIT/ML injection pen, Inject 44 Units into the skin every morning, Disp: 5 pen, Rfl: 5    gabapentin (NEURONTIN) 300 MG capsule, Take 1 capsule by mouth 3 times daily for 90 days. , Disp: 90 capsule, Rfl: 2    aspirin 81 MG chewable tablet, Take 1 tablet by mouth daily, Disp: 30 tablet, Rfl: 3    atorvastatin (LIPITOR) 40 MG tablet, Take 1 tablet by mouth nightly, Disp: 90 tablet, Rfl: 1    lisinopril (PRINIVIL;ZESTRIL) 5 MG tablet, Take 1 tablet by mouth daily, Disp: 90 tablet, Rfl: 1    metoprolol tartrate (LOPRESSOR) 25 MG tablet, Take 1 tablet by mouth 2 times daily, Disp: 180 tablet, Rfl: 1    insulin lispro (HUMALOG) 100 UNIT/ML injection vial, Inject 0-12 Units into the skin 3 times daily (with meals) (Patient taking differently: Inject 0-12 Units into the skin as needed ), Disp: 1 vial, Rfl: 3    clopidogrel (PLAVIX) 75 MG tablet, Take 1 tablet by mouth daily, Disp: 90 tablet, Rfl: 1    Multiple Vitamins-Minerals (MULTIVITAMIN PO), Take 1 tablet by mouth daily , Disp: , Rfl:     Past Objective:     /80   Pulse 88   Ht 6' 2\" (1.88 m)   Wt 285 lb (129.3 kg) Comment: pt states weight  BMI 36.59 kg/m²     Wt Readings from Last 3 Encounters:   08/12/20 285 lb (129.3 kg)   08/13/20 285 lb (129.3 kg)   07/20/20 285 lb (129.3 kg)     BP Readings from Last 3 Encounters:   08/13/20 138/80   08/10/20 138/88   07/21/20 (!) 163/75       Nursing note and vitals reviewed. Physical Exam   Constitutional: Oriented to person, place, and time. Appears well-developed and well-nourished. ENT: Moist mucous membranes. No bleeding. Tongue is midline. Head: Normocephalic and atraumatic. Eyes: EOM are normal. Pupils are equal, round, and reactive to light. Neck: Normal range of motion. Neck supple. No JVD present. Cardiovascular: Normal rate, regular rhythm,  murmur, no rubs, and intact distal pulses. Pulmonary/Chest: Effort normal and breath sounds normal. No respiratory distress. No wheezes. No rales. Abdominal: Soft. Bowel sounds are normal. No distension. There is no tenderness. Musculoskeletal: Normal range of motion. trace edema. Right leg/foot cast still in place. Left foot wound VAC noted    Neurological: Alert and oriented to person, place, and time. No cranial nerve deficit. Coordination normal.   Skin: Skin is warm and dry. Psychiatric: Normal mood and affect.        Lab Results   Component Value Date    CKTOTAL 47 04/17/2020       Lab Results   Component Value Date    WBC 8.3 08/11/2020    RBC 3.94 08/11/2020    HGB 10.9 08/11/2020    HCT 35.3 08/11/2020    MCV 89.6 08/11/2020    MCH 27.7 08/11/2020    MCHC 30.9 08/11/2020    RDW 17.7 05/25/2020     08/11/2020    MPV 10.7 08/11/2020       Lab Results   Component Value Date     08/11/2020    K 4.1 08/11/2020    K 4.2 04/18/2020     08/11/2020    CO2 26 08/11/2020    BUN 17 08/11/2020    LABALBU 2.7 04/21/2020    CREATININE 0.9 08/11/2020    CALCIUM 9.4 08/11/2020    LABGLOM 86 08/11/2020    GLUCOSE 123 08/11/2020    GLUCOSE 103 05/25/2020       Lab Results   Component Value Date    ALKPHOS 71 04/21/2020    ALT 34 04/21/2020    AST 40 04/21/2020    PROT 6.8 04/21/2020    BILITOT 0.3 04/21/2020    LABALBU 2.7 04/21/2020       Lab Results   Component Value Date    MG 2.2 04/21/2020       Lab Results   Component Value Date    INR 1.14 (H) 04/17/2020         Lab Results   Component Value Date    LABA1C 6.2 04/17/2020       No results found for: TRIG, HDL, LDLCALC, LDLDIRECT, LABVLDL    Lab Results   Component Value Date    TSH 1.250 04/20/2020         Testing Reviewed:      I have individually reviewed the cardiac test below:    ECHO:   Results for orders placed during the hospital encounter of 04/17/20   ECHO Complete 2D W Doppler W Color    Narrative Transthoracic Echocardiography Report (TTE)     Demographics      Patient Name    Cristela KENNY Gender               Male      MR #            [id number]       Race                                                       Ethnicity      Account #       [de-identified]       Room Number          0004      Accession       [id number]       Date of Study        04/21/2020   Number      Date of Birth   1960      Referring Physician  XOCHILT Carmichael MD      Age             63 year(s)      Sonographer          Brando Weiner RDCS                                      Interpreting         Jessica Suggs MD                                   Physician     Procedure    Type of Study      TTE procedure:ECHOCARDIOGRAM COMPLETE 2D W DOPPLER W COLOR. Procedure Date  Date: 04/21/2020 Start: 08:54 AM    Study Location: Bedside  Technical Quality: Limited visualization due to body habitus. Indications:Coronary artery disease and S/P CABG.     Additional Medical History:Ex smoker, diabetic, sepsis, CAD, CABG, chronic  kidney disease, anemia, hyperlipidemia, hypertension, arthritis    Patient Status: Routine    Height: 74.02 inches Weight: 308. 65 pounds BSA: 2.61 m^2 BMI: 39.61 kg/m^2    BP: 202/97 mmHg     Conclusions      Summary   Technically difficult examination. Technically limited study. Left ventricle size is normal.   Ejection fraction is visually estimated at 50%. Unable to determine wall motion abnormalities due to poor image quality. Abnormal (paradoxical) motion consistent with post-operative status. Doppler parameters were consistent with abnormal left ventricular   relaxation (grade 1 diastolic dysfunction). Thickened aortic valve leaflets noted. Aortic valve leaflets are Mildly calcified. Mild aortic stenosis is present. Signature      ----------------------------------------------------------------   Electronically signed by Lenora Walsh MD (Interpreting   physician) on 04/21/2020 at 11:53 AM   ----------------------------------------------------------------      Findings      Mitral Valve   Structurally normal mitral valve. Trace mitral regurgitation is present. Aortic Valve   Thickened aortic valve leaflets noted. Aortic valve leaflets are Mildly calcified. Mild aortic stenosis is present. Tricuspid Valve   Tricuspid valve is structurally normal.   Trace tricuspid regurgitation. Pulmonic Valve   The pulmonic valve was not well visualized . Left Atrium   Normal size left atrium. Left Ventricle   Left ventricle size is normal.   No asymmetrical left ventricular hypertrophy was seen. Unable to determine wall motion abnormalities due to poor image quality. Abnormal (paradoxical) motion consistent with post-operative status. Doppler parameters were consistent with abnormal left ventricular   relaxation (grade 1 diastolic dysfunction). Ejection fraction is visually estimated at 50%. Right Atrium   The right atrium is of normal size. Right Ventricle   Normal right ventricular size and function.       Pericardial Effusion   No evidence of any pericardial effusion. Pleural Effusion   No evidence of pleural effusion. Aorta / Great Vessels   The aorta is within normal limits. IVC normal.   Estimated right atrial pressure is 5 mmHg .      M-Mode/2D Measurements & Calculations      LV Diastolic    LV Systolic Dimension: 3.3   AV Cusp Separation: 1.3 cmLA   Dimension: 4.6  cm                           Dimension: 2 cmAO Root   cm              LV Volume Diastolic: 27.5 ml Dimension: 3.3 cm   LV FS:28.3 %    LV Volume Systolic: 95.7 ml   LV PW           LV EDV/LV EDV Index: 55.2   Diastolic: 0.9  AS/81 G^5UT ESV/LV ESV   cm              Index: 44.1 ml/17 m^2        RV Diastolic Dimension: 2.2   Septum          EF Calculated: 54.7 %        cm   Diastolic: 0.8   cm                                           LA/Aorta: 0.61                      LVOT: 2 cm     Doppler Measurements & Calculations      MV Peak E-Wave:     AV Peak Velocity: 223    LVOT Peak Velocity: 113 cm/s   59.6 cm/s           cm/s                     LVOT Mean Velocity: 77.8 cm/s   MV Peak A-Wave: 69  AV Peak Gradient: 19.89  LVOT Peak Gradient: 5   cm/s                mmHg                     mmHgLVOT Mean Gradient: 3   MV E/A Ratio: 0.86  AV Mean Velocity: 155    mmHg   MV Peak Gradient:   cm/s   1.42 mmHg           AV Mean Gradient: 11     TV Peak E-Wave: 61.3 cm/s                       mmHg                     TV Peak A-Wave: 91.7 cm/s   MV Deceleration     AV VTI: 36.7 cm   Time: 259 msec      AV Area                  TV Peak Gradient: 1.5 mmHg                       (Continuity):1.79 cm^2   TR Velocity:250 cm/s                                                TR Gradient:25 mmHg   MV E' Septal        LVOT VTI: 20.9 cm        PV Peak Velocity: 87.8 cm/s   Velocity: 6.4 cm/s                           PV Peak Gradient: 3.08 mmHg   MV A' Septal   Velocity: 9.2 cm/s   MV E' Lateral       AV DVI (VTI): 0.57AV DVI   Velocity: 4.9 cm/s  (Vmax):0.51   MV A' Lateral   Velocity: been created using voice recognition software.  It may contain minor errors which are inherent in voice recognition technology. **             AssessmentPlan:       Kristy Lopez is a pleasant 63year old male patient who  has a past medical history of Arthritis, CAD (coronary artery disease), CKD (chronic kidney disease), stage II, Diabetes mellitus ([address] Hyperlipidemia, Hypertension, and Liver disease. He is s/p 3 vessel CABG on 11/2019 (Positive stress test, C revealed MV CAD. 3v CABG 11/2019: SVG to OM1, SVG to Circumflex, LIMA to distal LAD with Dr. Nino at [facility] of his carotids was done at [facility] prior to CABG and per records CTA revealed 100% occlusion on the right and a 65% on the left. The recommendation was made for proceeding with his CABG and then an interval carotid endarterectomy on the left side once he is recovered from surgery. Other PMH includes known h/o diabetic neuropathy. He has family h/o CAD. The patient was previously admitted to the hospital for diabetic foot, wet gangrene. He underwent trans-metatarsal left foot amputation on 3/3/2020 and discharged home on antibiotics. The patient continued to FU at the wound clinic where left TMA wound dehiscence was noted for which debridement was recommended. He also has h/o a right big toe wound. Bilateral LE doppler US was done and revealed left RAFAL stenosis, right profunda disease. In 04/2020, he was admitted and seen by Podiatry and ID. [facility] underwent revision of a transmetatarsal amputation. He was seen by Dr Radha Lam.  Wound cultures grew Enterococcus and MSSA; blood cultures grew the [place] Sarah was treated with Ancef.  An echocardiogram showed no evidence of vegetations. Due to the anticipated need for long term(4-6 weeks) antibiotics he was transferred to [facility]. The patient presents to office for follow up. He finished IV Abx Rx.  The patient has h/o gout, right foot fracture, in cast for about one year per patient, right foot surgery was planned, continues to be delayed in the past for CAD, CABG, Cellulitis per patient. 07/2020, he noticed a wound on the sole of left foot. He has wound VAC. He finished Abx Rx. Tomorrow, the patient will undergo skin graft left foot. 1. S/P left TMA 3/3/2020, recurrent infection, s/p revision 04/2020, required IV Abx  2. 07/2020, left foot sole infectio/ulcer, pending graft surgery   3. Recurrent cellulitis   4. Right big toe nonhealing wound  5. Right foot fracture, in cast, surgery is planned per patient   6. PAD  7. Carotid stenosis (CTA 11/2019 revealed 100% occlusion on right, 65% stenosis on left - left CEA was planned after CABG)  8. CAD  9. 3 V CABG 11/2019 (Polly: SVG LCX, SVG OM1, LIMA to dLAD)  10. DM  11. HTN  12. Dyslipidemia   13. HFpEF      · Cont ASA, Plavix (held for planned foot surgery/graft. Resume as soon as possible post surgery)   · Denies chest pain or ischemic symptoms  · Hyperlipidemia: on statins, followed periodically. Patient need periodic lipid and liver profile. · BP is well controlled  · The patient was instructed to check the blood pressure at home, and record the readings. Patient will call office with blood pressure readings, will adjust patient's antihypertensive medications as needed accordingly   · Cont metoprolol   · B/L LE Arterial doppler US was noted  · Denies h/o stroke  · Continue medical management for carotid stenosis at this time  · Will plan to repeat neck CTA in 3-6 months   · ? perioperative AF, on Amiodarone, denies palpitations, in SR  · Stopped Amiodarone, in SR  · Patient needs peripheral angiogram, however he still has the right foot cast for foot fracture and surgery is still pending. Also, he has left foot wound VAC, Sx/Graft is planned in AM. I d/w patient the need for the angiogram. We decided to hold off until above issues are taken care of.  Patient will return post above Sx for re-evaluation     RTC in 3 months, sooner if above Sx is completed, patient will

## 2020-08-13 NOTE — PROGRESS NOTES
Pt here for 2 mo office visit    Pt denies chest pain, dizziness, sob, heart palpitations    Pt continues with swelling in bilateral legs

## 2020-08-14 ENCOUNTER — HOSPITAL ENCOUNTER (OUTPATIENT)
Age: 60
Setting detail: OUTPATIENT SURGERY
Discharge: HOME | End: 2020-08-14
Attending: PODIATRIST | Admitting: PODIATRIST
Payer: COMMERCIAL

## 2020-08-14 ENCOUNTER — ANESTHESIA (OUTPATIENT)
Dept: OPERATING ROOM | Age: 60
End: 2020-08-14
Payer: COMMERCIAL

## 2020-08-14 VITALS
BODY MASS INDEX: 36.57 KG/M2 | DIASTOLIC BLOOD PRESSURE: 91 MMHG | TEMPERATURE: 97.5 F | OXYGEN SATURATION: 98 % | WEIGHT: 285 LBS | HEART RATE: 73 BPM | SYSTOLIC BLOOD PRESSURE: 159 MMHG | RESPIRATION RATE: 18 BRPM | HEIGHT: 74 IN

## 2020-08-14 VITALS — OXYGEN SATURATION: 100 % | DIASTOLIC BLOOD PRESSURE: 76 MMHG | SYSTOLIC BLOOD PRESSURE: 125 MMHG

## 2020-08-14 LAB — GLUCOSE BLD-MCNC: 150 MG/DL (ref 70–108)

## 2020-08-14 PROCEDURE — 2500000003 HC RX 250 WO HCPCS: Performed by: PODIATRIST

## 2020-08-14 PROCEDURE — 2580000003 HC RX 258: Performed by: NURSE ANESTHETIST, CERTIFIED REGISTERED

## 2020-08-14 PROCEDURE — 2500000003 HC RX 250 WO HCPCS: Performed by: NURSE ANESTHETIST, CERTIFIED REGISTERED

## 2020-08-14 PROCEDURE — 82948 REAGENT STRIP/BLOOD GLUCOSE: CPT

## 2020-08-14 PROCEDURE — 6360000002 HC RX W HCPCS: Performed by: NURSE ANESTHETIST, CERTIFIED REGISTERED

## 2020-08-14 PROCEDURE — 2709999900 HC NON-CHARGEABLE SUPPLY: Performed by: PODIATRIST

## 2020-08-14 PROCEDURE — 6360000002 HC RX W HCPCS: Performed by: STUDENT IN AN ORGANIZED HEALTH CARE EDUCATION/TRAINING PROGRAM

## 2020-08-14 PROCEDURE — 3600000012 HC SURGERY LEVEL 2 ADDTL 15MIN: Performed by: PODIATRIST

## 2020-08-14 PROCEDURE — 3600000002 HC SURGERY LEVEL 2 BASE: Performed by: PODIATRIST

## 2020-08-14 PROCEDURE — 3700000000 HC ANESTHESIA ATTENDED CARE: Performed by: PODIATRIST

## 2020-08-14 PROCEDURE — 7100000011 HC PHASE II RECOVERY - ADDTL 15 MIN: Performed by: PODIATRIST

## 2020-08-14 PROCEDURE — 2580000003 HC RX 258: Performed by: STUDENT IN AN ORGANIZED HEALTH CARE EDUCATION/TRAINING PROGRAM

## 2020-08-14 PROCEDURE — 7100000010 HC PHASE II RECOVERY - FIRST 15 MIN: Performed by: PODIATRIST

## 2020-08-14 PROCEDURE — 3700000001 HC ADD 15 MINUTES (ANESTHESIA): Performed by: PODIATRIST

## 2020-08-14 RX ORDER — TRAMADOL HYDROCHLORIDE 50 MG/1
50 TABLET ORAL EVERY 6 HOURS PRN
Status: CANCELLED | OUTPATIENT
Start: 2020-08-14

## 2020-08-14 RX ORDER — PROPOFOL 10 MG/ML
INJECTION, EMULSION INTRAVENOUS PRN
Status: DISCONTINUED | OUTPATIENT
Start: 2020-08-14 | End: 2020-08-14 | Stop reason: SDUPTHER

## 2020-08-14 RX ORDER — CYCLOBENZAPRINE HCL 5 MG
10 TABLET ORAL 3 TIMES DAILY PRN
Qty: 40 TABLET | Refills: 0 | Status: SHIPPED | OUTPATIENT
Start: 2020-08-14 | End: 2020-08-24

## 2020-08-14 RX ORDER — SODIUM CHLORIDE 0.9 % (FLUSH) 0.9 %
10 SYRINGE (ML) INJECTION PRN
Status: CANCELLED | OUTPATIENT
Start: 2020-08-14

## 2020-08-14 RX ORDER — PROMETHAZINE HYDROCHLORIDE 25 MG/1
12.5 TABLET ORAL EVERY 6 HOURS PRN
Status: CANCELLED | OUTPATIENT
Start: 2020-08-14

## 2020-08-14 RX ORDER — SODIUM CHLORIDE 9 MG/ML
INJECTION, SOLUTION INTRAVENOUS CONTINUOUS PRN
Status: DISCONTINUED | OUTPATIENT
Start: 2020-08-14 | End: 2020-08-14 | Stop reason: SDUPTHER

## 2020-08-14 RX ORDER — FENTANYL CITRATE 50 UG/ML
INJECTION, SOLUTION INTRAMUSCULAR; INTRAVENOUS PRN
Status: DISCONTINUED | OUTPATIENT
Start: 2020-08-14 | End: 2020-08-14 | Stop reason: SDUPTHER

## 2020-08-14 RX ORDER — MIDAZOLAM HYDROCHLORIDE 1 MG/ML
INJECTION INTRAMUSCULAR; INTRAVENOUS PRN
Status: DISCONTINUED | OUTPATIENT
Start: 2020-08-14 | End: 2020-08-14 | Stop reason: SDUPTHER

## 2020-08-14 RX ORDER — HYDROCODONE BITARTRATE AND ACETAMINOPHEN 5; 325 MG/1; MG/1
1 TABLET ORAL EVERY 4 HOURS PRN
Qty: 42 TABLET | Refills: 0 | Status: SHIPPED | OUTPATIENT
Start: 2020-08-14 | End: 2020-08-21

## 2020-08-14 RX ORDER — SODIUM CHLORIDE 9 MG/ML
INJECTION, SOLUTION INTRAVENOUS CONTINUOUS
Status: DISCONTINUED | OUTPATIENT
Start: 2020-08-14 | End: 2020-08-14 | Stop reason: HOSPADM

## 2020-08-14 RX ORDER — SODIUM CHLORIDE 0.9 % (FLUSH) 0.9 %
10 SYRINGE (ML) INJECTION EVERY 12 HOURS SCHEDULED
Status: CANCELLED | OUTPATIENT
Start: 2020-08-14

## 2020-08-14 RX ORDER — SODIUM CHLORIDE 0.9 % (FLUSH) 0.9 %
10 SYRINGE (ML) INJECTION PRN
Status: DISCONTINUED | OUTPATIENT
Start: 2020-08-14 | End: 2020-08-14 | Stop reason: HOSPADM

## 2020-08-14 RX ORDER — TRAMADOL HYDROCHLORIDE 50 MG/1
100 TABLET ORAL EVERY 6 HOURS PRN
Status: CANCELLED | OUTPATIENT
Start: 2020-08-14

## 2020-08-14 RX ORDER — BUPIVACAINE HYDROCHLORIDE AND EPINEPHRINE 5; 5 MG/ML; UG/ML
INJECTION, SOLUTION EPIDURAL; INTRACAUDAL; PERINEURAL PRN
Status: DISCONTINUED | OUTPATIENT
Start: 2020-08-14 | End: 2020-08-14 | Stop reason: ALTCHOICE

## 2020-08-14 RX ORDER — SODIUM CHLORIDE 0.9 % (FLUSH) 0.9 %
10 SYRINGE (ML) INJECTION EVERY 12 HOURS SCHEDULED
Status: DISCONTINUED | OUTPATIENT
Start: 2020-08-14 | End: 2020-08-14 | Stop reason: HOSPADM

## 2020-08-14 RX ORDER — LIDOCAINE HCL/PF 100 MG/5ML
SYRINGE (ML) INJECTION PRN
Status: DISCONTINUED | OUTPATIENT
Start: 2020-08-14 | End: 2020-08-14 | Stop reason: SDUPTHER

## 2020-08-14 RX ORDER — DOXYCYCLINE HYCLATE 100 MG
100 TABLET ORAL 2 TIMES DAILY
Qty: 28 TABLET | Refills: 0 | Status: SHIPPED | OUTPATIENT
Start: 2020-08-14 | End: 2020-08-28

## 2020-08-14 RX ORDER — BUPIVACAINE HYDROCHLORIDE 5 MG/ML
INJECTION, SOLUTION EPIDURAL; INTRACAUDAL PRN
Status: DISCONTINUED | OUTPATIENT
Start: 2020-08-14 | End: 2020-08-14 | Stop reason: ALTCHOICE

## 2020-08-14 RX ORDER — ONDANSETRON 2 MG/ML
4 INJECTION INTRAMUSCULAR; INTRAVENOUS EVERY 6 HOURS PRN
Status: CANCELLED | OUTPATIENT
Start: 2020-08-14

## 2020-08-14 RX ADMIN — MIDAZOLAM HYDROCHLORIDE 0.5 MG: 1 INJECTION, SOLUTION INTRAMUSCULAR; INTRAVENOUS at 11:43

## 2020-08-14 RX ADMIN — SODIUM CHLORIDE: 9 INJECTION, SOLUTION INTRAVENOUS at 10:11

## 2020-08-14 RX ADMIN — MIDAZOLAM HYDROCHLORIDE 0.5 MG: 1 INJECTION, SOLUTION INTRAMUSCULAR; INTRAVENOUS at 11:40

## 2020-08-14 RX ADMIN — MIDAZOLAM HYDROCHLORIDE 1 MG: 1 INJECTION, SOLUTION INTRAMUSCULAR; INTRAVENOUS at 11:37

## 2020-08-14 RX ADMIN — Medication 3 G: at 11:35

## 2020-08-14 RX ADMIN — Medication 60 MG: at 11:37

## 2020-08-14 RX ADMIN — SODIUM CHLORIDE: 9 INJECTION, SOLUTION INTRAVENOUS at 10:01

## 2020-08-14 RX ADMIN — FENTANYL CITRATE 50 MCG: 50 INJECTION, SOLUTION INTRAMUSCULAR; INTRAVENOUS at 11:37

## 2020-08-14 RX ADMIN — PROPOFOL 50 MG: 10 INJECTION, EMULSION INTRAVENOUS at 11:46

## 2020-08-14 RX ADMIN — FENTANYL CITRATE 50 MCG: 50 INJECTION, SOLUTION INTRAMUSCULAR; INTRAVENOUS at 11:39

## 2020-08-14 ASSESSMENT — PULMONARY FUNCTION TESTS
PIF_VALUE: 0
PIF_VALUE: 0
PIF_VALUE: 1
PIF_VALUE: 1
PIF_VALUE: 0
PIF_VALUE: 0
PIF_VALUE: 1
PIF_VALUE: 0
PIF_VALUE: 1
PIF_VALUE: 0
PIF_VALUE: 1
PIF_VALUE: 0

## 2020-08-14 ASSESSMENT — PAIN SCALES - GENERAL
PAINLEVEL_OUTOF10: 0
PAINLEVEL_OUTOF10: 0

## 2020-08-14 NOTE — BRIEF OP NOTE
Brief Postoperative Note      Patient: Donnie Ramos  YOB: 1960  MRN: [id number]    Date of Procedure: 8/14/2020    Pre-Op Diagnosis: LEFT TIVA DELAYED WOUND CLOSURE, LEFT FOOT ABSCESS    Post-Op Diagnosis: Same       Procedure(s):  LEFT FOOT PREPARATION GRAFT SITE, HAVEST SPLIT THICKNESS SKIN GRAFT WITH APPLICATION, APPLICATION KCI WOUND VAC    Surgeon(s):  Rita Sanchez DPM    Assistant:  Kyle Bess PGY3; Carine Rodney PGY1    Anesthesia: Monitor Anesthesia Care    Estimated Blood Loss (mL): Minimal    Complications: None    Specimens:   * No specimens in log *    Implants:  * No implants in log *      Drains:   [REMOVED] Negative Pressure Wound Therapy Left;Plantar (Removed)   Wound Type Surgical 07/20/20 1414   Dressing Status Clean;Dry; Intact 07/20/20 1414       Findings: consistent with diagnosis     Electronically signed by Kyle Bess DPM on 8/14/2020 at 12:18 PM

## 2020-08-14 NOTE — H&P
Department of Surgery  H&P Interval Note  Outpatient History and Physical was reviewed pre-operatively, with no interval changes to note prior to scheduled surgical intervention. Patient was assessed, all questions/concerns regarding alpa-operative management were addressed to patient satisfaction. Operative site was marked prior to transportation to the operative room. Dallas Aylaa D.P.M.

## 2020-08-14 NOTE — ANESTHESIA POSTPROCEDURE EVALUATION
Department of Anesthesiology  Postprocedure Note    Patient: Sergio Cerrato  MRN: [id number]  YOB: 1960  Date of evaluation: 8/14/2020  Time:  1:00 PM     Procedure Summary     Date:  08/14/20 Room / Location:  [place] [facility]    Anesthesia Start:  8622 Anesthesia Stop:  5179    Procedure:  LEFT FOOT PREPARATION GRAFT SITE, HAVEST SPLIT THICKNESS SKIN GRAFT WITH APPLICATION, APPLICATION KCI WOUND VAC (Left Foot) Diagnosis:  (LEFT TIVA DELAYED WOUND CLOSURE, LEFT FOOT ABSCESS)    Surgeon:  Vidhya William DPM Responsible Provider:  Aleksandra Cheek DO    Anesthesia Type:  MAC ASA Status:  3          Anesthesia Type: MAC    Fernando Phase I: Fernando Score: 9    Fernando Phase II:      Last vitals: Reviewed and per EMR flowsheets. Anesthesia Post Evaluation    Comments: Debra Blevins 61  POST-ANESTHESIA NOTE       Name:  Sergio Cerrato                                         Age:  63 y.o. MRN:  [id number]      Last Vitals:  /81   Pulse 72   Temp 97.5 °F (36.4 °C)   Resp 18   Ht 6' 2\" (1.88 m)   Wt 285 lb (129.3 kg)   SpO2 97%   BMI 36.59 kg/m²   Patient Vitals in the past 4 hrs:  08/14/20 1229, BP:118/81, Temp:97.5 °F (36.4 °C), Pulse:72, Resp:18, SpO2:97 %  08/14/20 0941, BP:117/65, Temp:97.8 °F (36.6 °C), Temp src:Temporal, Pulse:76, Resp:16, SpO2:97 %, Height:6' 2\" (1.88 m), Weight:285 lb (129.3 kg)    Level of Consciousness:  Awake    Respiratory:  Stable    Oxygen Saturation:  Stable    Cardiovascular:  Stable    Hydration:  Adequate    PONV:  Stable    Post-op Pain:  Adequate analgesia    Post-op Assessment:  No apparent anesthetic complications    Additional Follow-Up / Treatment / Comment:  None    Shaneka Flores DO  August 14, 2020   1:00 PM

## 2020-08-14 NOTE — OP NOTE
[address], [address] [place] [address] [place]    Name William Hurd                                                             : 1960  MEDICAL RECORD NO. [id number]    DATE: 2020    Surgeon: Sara Gray DPM    Assist: Jesse Armstrong, PGY III    Pre-operative Diagnosis:   1. Left foot abscess  2. Type 2 diabetes with diabetic polyneuropathy  3. Complex delayed wound closure left    Post-operative Diagnosis:    Same    Procedure:   1. Preparation of graft site measuring 6 x 6 cm down to the level of subcu fat  2. [place] and application split-thickness skin graft to an area measuring 36 cm²  3. application negative pressure wound therapy to an area less than 50 cm²    Anesthesia: mac, 20cc 0.5% marcaine with epinephrine    Hemostasis: pressure    Estimated Blood Loss: < 50cc    Materials: n/a    Injectables: n/a    Condition: stable    Complications: none     Specimens: were not obtained    INDICATIONS:  The patient is a 63 y.o. male who suffers from type 2 diabetes with peripheral vascular disease. Patient undergone a previous transmetatarsal amputation. Patient had clinically healed his incision site but subsequently developed a neurotrophic ulcer to the plantar lateral aspect of his left foot with localized abscess. Patient was initially taken back for a I&D with excisional debridement application of wound VAC. Wound has continued to granulate in allowing us to proceed to secondary surgical intervention in the form of a split-thickness skin graft harvest with preparation of graft site. All questions concerns regarding perioperative management were addressed in detail. Patient was seen pre-operatively. Consent was reviewed and signed, operative limb marked. All questions and concerns regarding the case were addressed. The patient was then transferred to the operative room and place in a supine position. Time out taken, verifying patient and planned procedure.  Patient was administered mac anesthesia. Surgical site  was then prepped using betadine calf/ chlorhexadine thigh and draped using sterile technique. Procedure In Detail  1. Preparation of graft site measuring 6 x 6 cm down to the level of subcu fat  Tissue was brought to the plantar lateral aspect of the left forefoot. Using a combination of bone curette and rongeur all viable and nonviable tissue including biofilm epidermal dermal and subcu fat was excised from the wound down to level of hyper granular tissue. Several areas of localized bleeding were noted hemostasis obtained with the Bovie. 2.[place] and application split-thickness skin graft to an area measuring 36 cm²  Attention was then directed to the anterior lateral aspect of the left thigh. A grid make marking 6 x 6 cm was marked out in the site was infiltrated with 20 cc half percent Marcaine with epinephrine. A dermatome was then set at 0.18 inch thickness and a split-thickness skin graft was harvested. Graft was then placed to the mesher at a 1-1.5 ratio and applied to the wound site plantar lateral aspect left foot. Graft was fixated with a peripheral skin stapler. 3.application negative pressure wound therapy to an area less than 50 cm²  Following harvest and application of skin graft. A [employer] wound VAC was cut to size and applied noting 125 mmHg continuous. Patient tolerated procedure well. Patient was placed in a nonadherent dressing on the anterolateral aspect of the left thigh as well as a Betadine wet-to-dry dressing in general with application of a posterior splint.   Patient was transferred to recovery in stable condition all questions concerns apparent postop management were addressed with friend/family    Jovanna Guadalupe, [address] [place] [address]   8/14/2020

## 2020-08-14 NOTE — ANESTHESIA PRE PROCEDURE
Department of Anesthesiology  Preprocedure Note       Name:  Lauri Christian   Age:  63 y.o.  :  1960                                          MRN:  [id number]         Date:  2020      Surgeon: Gentry Morse):  Fantasma Gustafson DPM    Procedure: Procedure(s):  LEFT FOOT PREPARATION GRAFT SITE, HAVEST SPLIT THICKNESS SKIN GRAFT WITH APPLICATION, APPLICATION [facility]CI WOUND VAC    Medications prior to admission:   Prior to Admission medications    Medication Sig Start Date End Date Taking? Authorizing Provider   Polyethylene Glycol 3350 (MIRALAX PO) Take by mouth as needed   Yes Historical Provider, MD   HYDROcodone-acetaminophen (NORCO) 5-325 MG per tablet Take by mouth as needed. 12/3/19  Yes Historical Provider, MD   insulin detemir (LEVEMIR FLEXTOUCH) 100 UNIT/ML injection pen Inject 44 Units into the skin every morning 8/10/20  Yes EYAD Cottrell CNP   gabapentin (NEURONTIN) 300 MG capsule Take 1 capsule by mouth 3 times daily for 90 days.  8/10/20 11/8/20 Yes EYAD Cottrell CNP   aspirin 81 MG chewable tablet Take 1 tablet by mouth daily 8/10/20  Yes EYAD Cottrell CNP   atorvastatin (LIPITOR) 40 MG tablet Take 1 tablet by mouth nightly 8/10/20  Yes EYAD Cottrell CNP   lisinopril (PRINIVIL;ZESTRIL) 5 MG tablet Take 1 tablet by mouth daily 8/10/20  Yes EYAD Cottrell CNP   metoprolol tartrate (LOPRESSOR) 25 MG tablet Take 1 tablet by mouth 2 times daily 8/10/20  Yes EYAD Cottrell CNP   insulin lispro (HUMALOG) 100 UNIT/ML injection vial Inject 0-12 Units into the skin 3 times daily (with meals)  Patient taking differently: Inject 0-12 Units into the skin as needed  20  Yes Wayne Nair MD   clopidogrel (PLAVIX) 75 MG tablet Take 1 tablet by mouth daily 20  Yes Ella Yancey MD   Multiple Vitamins-Minerals (MULTIVITAMIN PO) Take 1 tablet by mouth daily    Yes Historical Provider, MD       Current medications:    Current Facility-Administered Medications   Medication Dose Route Frequency Provider Last Rate Last Dose    0.9 % sodium chloride infusion   Intravenous Continuous Silver Layne  mL/hr at 08/14/20 1011      sodium chloride flush 0.9 % injection 10 mL  10 mL Intravenous 2 times per day Silver Layne DPM        sodium chloride flush 0.9 % injection 10 mL  10 mL Intravenous PRN Silver Layne DPM        ceFAZolin (ANCEF) 3 g in dextrose 5 % 100 mL IVPB  3 g Intravenous On Call to Shona Bernard DPM           Allergies:  No Known Allergies    Problem List:    Patient Active Problem List   Diagnosis Code    [facility] toe of left foot ([facility] I96    CAD (coronary artery disease) I25.10    Type 2 diabetes mellitus with insulin therapy ([facility] E11.9, Z79.4    Hyperlipidemia E78.5    Hypertension I10    Charcot's joint, right ankle and foot M14.671    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) Z89.432    Leukocytosis D72.829    Wound dehiscence, surgical, initial encounter T81.31XA    Postoperative wound infection R50.38UV    Metabolic acidosis R48.2    Hx of CABG Z95.1    Lumbar stenosis without neurogenic claudication M48.061    CKD (chronic kidney disease), stage II N18.2    Normocytic anemia D64.9    Morbid obesity ([facility]) E66.01    Debility R53.81    Carotid stenosis, asymptomatic, bilateral I65.23    Hypokalemia E87.6    Nonhealing ulcer of left lower extremity ([facility] L97.929    Bleeding R58    Wound, open T14. 8XXA       Past Medical History:        Diagnosis Date    Arthritis     CAD (coronary artery disease)     CKD (chronic kidney disease), stage II     Diabetes mellitus ([facility]     Hyperlipidemia     Hypertension     Liver disease     HEPITITIS AS A CHILD       Past Surgical History: Procedure Laterality Date    CARDIAC SURGERY  2019    triple bypass    CYST REMOVAL      RIGHT ANKLE     FOOT DEBRIDEMENT Left 2020    LEFT TRANSMETATARSAL AMPUTATION  FOOT INCISION AND DRAINAGE performed by Rita Baker DPM at [address] [place] [address] Left 2020    LEFT WOUND DEBRIDEMENT WITH WOUND VAC APPLICATION performed by Rita Baker DPM at [place] [address] Right     CYST REMOVED    TOE AMPUTATION Left 3/3/2020    I&D LEFT FOOT, TRANSMETATARSAL AMPUTATION performed by Rita Baker DPM at [address] [place] [address] History:    Social History     Tobacco Use    Smoking status: Former Smoker     Types: Cigarettes     Last attempt to quit:      Years since quittin.6    Smokeless tobacco: Never Used   Substance Use Topics    Alcohol use: Not Currently                                Counseling given: Not Answered      Vital Signs (Current):   Vitals:    20 1400 20 0941   BP:  117/65   Pulse:  76   Resp:  16   Temp:  97.8 °F (36.6 °C)   TempSrc:  Temporal   SpO2:  97%   Weight: 285 lb (129.3 kg) 285 lb (129.3 kg)   Height: 6' 2\" (1.88 m) 6' 2\" (1.88 m)                                              BP Readings from Last 3 Encounters:   20 117/65   20 138/80   08/10/20 138/88       NPO Status: Time of last liquid consumption:                         Time of last solid consumption:                         Date of last liquid consumption: 20                        Date of last solid food consumption: 20    BMI:   Wt Readings from Last 3 Encounters:   20 285 lb (129.3 kg)   20 285 lb (129.3 kg)   20 285 lb (129.3 kg)     Body mass index is 36.59 kg/m².     CBC:   Lab Results   Component Value Date    WBC 8.3 2020    RBC 3.94 2020    HGB 10.9 2020    HCT 35.3 2020    MCV 89.6 2020    RDW 17.7 2020     2020       CMP:   Lab Results   Component Value Date     08/11/2020    K 4.1 08/11/2020    K 4.2 04/18/2020     08/11/2020    CO2 26 08/11/2020    BUN 17 08/11/2020    CREATININE 0.9 08/11/2020    LABGLOM 86 08/11/2020    GLUCOSE 123 08/11/2020    GLUCOSE 103 05/25/2020    PROT 6.8 04/21/2020    CALCIUM 9.4 08/11/2020    BILITOT 0.3 04/21/2020    ALKPHOS 71 04/21/2020    AST 40 04/21/2020    ALT 34 04/21/2020       POC Tests:   Recent Labs     08/14/20  1013   POCGLU 150*       Coags:   Lab Results   Component Value Date    INR 1.14 04/17/2020    APTT 27.4 04/17/2020       HCG (If Applicable): No results found for: PREGTESTUR, PREGSERUM, HCG, HCGQUANT     ABGs: No results found for: PHART, PO2ART, COQ1EYE, QKQ5GBH, BEART, K0SVITDG     Type & Screen (If Applicable):  No results found for: LABABO, LABRH    Drug/Infectious Status (If Applicable):  No results found for: HIV, HEPCAB    COVID-19 Screening (If Applicable):   Lab Results   Component Value Date    COVID19 NOT DETECTED 08/11/2020         Anesthesia Evaluation  Patient summary reviewed  Airway: Mallampati: III  TM distance: >3 FB   Neck ROM: full  Mouth opening: > = 3 FB Dental:          Pulmonary:                              Cardiovascular:    (+) hypertension:, CAD:, CABG/stent:,       ECG reviewed                        Neuro/Psych:               GI/Hepatic/Renal:   (+) liver disease:,           Endo/Other:    (+) Diabetes, . Abdominal:           Vascular:                                        Anesthesia Plan      MAC     ASA 3       Induction: intravenous. Anesthetic plan and risks discussed with patient. Plan discussed with NEETU. Loree Flores DO   8/14/2020

## 2020-08-14 NOTE — PROGRESS NOTES
Patient admitted to [facility] room 6. Bed in low position side rails up call light in reach. Patient denies questions at this time.

## 2020-11-11 NOTE — PROGRESS NOTES
[address] [place] [address],[place] [address] [address] [place] [address]  Dept: [phone]  Dept Fax: [phone]  Loc: [phone]    Visit Date: 11/12/2020    Mr. Alexandra Mendoza is a 62 y.o. male  who presented for:    CAD  CABG  PAD  Cellulitis  Nonhealing wound/ulcers   HFpEF     HPI:   DARLENE Hodges is a pleasant 62year old male patient who  has a past medical history of Arthritis, CAD (coronary artery disease), CKD (chronic kidney disease), stage II, Diabetes mellitus ([facility] Hyperlipidemia, Hypertension, and Liver disease. He is s/p 3 vessel CABG on 11/2019 (Positive stress test, [facility] revealed MV CAD. 3v CABG 11/2019: SVG to OM1, SVG to Circumflex, LIMA to distal LAD with Dr. Nino at [facility] of his carotids was done at [facility] prior to CABG and per records CTA revealed 100% occlusion on the right and a 65% on the left. The recommendation was made for proceeding with his CABG and then an interval carotid endarterectomy on the left side once he is recovered from surgery. Other PMH includes known h/o diabetic neuropathy. He has family h/o CAD. The patient was previously admitted to the hospital for diabetic foot, wet gangrene. He underwent trans-metatarsal left foot amputation on 3/3/2020 and discharged home on antibiotics. The patient continued to FU at the wound clinic where left TMA wound dehiscence was noted for which debridement was recommended. He also has h/o a right big toe wound. Bilateral LE doppler US was done and revealed left RAFAL stenosis, right profunda disease. In 04/2020, he was admitted and seen by Podiatry and ID. Meg Barrios underwent revision of a transmetatarsal amputation. He was seen by Dr Serenity Valencia.  Wound cultures grew Enterococcus and MSSA; blood cultures grew the Gonsales Connie was treated with Ancef.  An echocardiogram showed no evidence of vegetations.  Due to the anticipated need for long term(4-6 weeks) antibiotics he was transferred to [facility] patient presents to office for follow up. He finished IV Abx Rx. The patient has h/o gout, right foot fracture, in cast for about one year per patient, right foot surgery was planned, continues to be delayed in the past for CAD, CABG, Cellulitis per patient. 07/2020, he noticed a wound on the sole of left foot. He had VAC, Abx Rx. On 8/14/2020, the patient underwent skin graft left foot by Dr Cristopher Hong. The patient continues to FU with Podiatry, continued wound care. He denies fever, discharge. Patient denies chest pain, shortness of breath, dyspnea on exertion, orthopnea, paroxysmal nocturnal dyspnea, palpitations, dizziness, syncope, weight gain or leg swelling. Current Outpatient Medications:     Polyethylene Glycol 3350 (MIRALAX PO), Take by mouth as needed, Disp: , Rfl:     HYDROcodone-acetaminophen (NORCO) 5-325 MG per tablet, Take by mouth as needed. , Disp: , Rfl:     insulin detemir (LEVEMIR FLEXTOUCH) 100 UNIT/ML injection pen, Inject 44 Units into the skin every morning, Disp: 5 pen, Rfl: 5    gabapentin (NEURONTIN) 300 MG capsule, Take 1 capsule by mouth 3 times daily for 90 days. , Disp: 90 capsule, Rfl: 2    aspirin 81 MG chewable tablet, Take 1 tablet by mouth daily, Disp: 30 tablet, Rfl: 3    atorvastatin (LIPITOR) 40 MG tablet, Take 1 tablet by mouth nightly, Disp: 90 tablet, Rfl: 1    lisinopril (PRINIVIL;ZESTRIL) 5 MG tablet, Take 1 tablet by mouth daily, Disp: 90 tablet, Rfl: 1    metoprolol tartrate (LOPRESSOR) 25 MG tablet, Take 1 tablet by mouth 2 times daily, Disp: 180 tablet, Rfl: 1    insulin lispro (HUMALOG) 100 UNIT/ML injection vial, Inject 0-12 Units into the skin 3 times daily (with meals) (Patient taking differently: Inject 0-12 Units into the skin as needed ), Disp: 1 vial, Rfl: 3    clopidogrel (PLAVIX) 75 MG tablet, Take 1 tablet by mouth daily, Disp: 90 tablet, Rfl: 1    Multiple Vitamins-Minerals (MULTIVITAMIN PO), Take 1 tablet by mouth daily , Disp: , Rfl: Past Medical History  Gabriela Blackman  has a past medical history of Arthritis, CAD (coronary artery disease), CKD (chronic kidney disease), stage II, Diabetes mellitus ([address] Hyperlipidemia, Hypertension, and Liver disease. Social History  Gabriela Blackman  reports that he quit smoking about 12 years ago. His smoking use included cigarettes. He has never used smokeless tobacco. He reports previous alcohol use. He reports that he does not use drugs. Family History  Gabriela Blackman family history includes Alzheimer's Disease in his father; Diabetes in his mother; Heart Disease in his father; High Blood Pressure in his father and mother. Past Surgical History   Past Surgical History:   Procedure Laterality Date    CARDIAC SURGERY  11/2019    triple bypass    CYST REMOVAL      RIGHT ANKLE     FOOT DEBRIDEMENT Left 4/20/2020    LEFT TRANSMETATARSAL AMPUTATION  FOOT INCISION AND DRAINAGE performed by Fanny Yang DPM at [address] [place] [address] Left 7/20/2020    LEFT WOUND DEBRIDEMENT WITH WOUND VAC APPLICATION performed by Fanny Yang DPM at [place] [address] Right     CYST REMOVED    FOOT SURGERY Left 8/14/2020    LEFT FOOT PREPARATION GRAFT SITE, HAVEST SPLIT THICKNESS SKIN GRAFT WITH APPLICATION, APPLICATION [facility]CI WOUND VAC performed by Fanny Yang DPM at [facility] [address] Left 3/3/2020    I&D LEFT FOOT, TRANSMETATARSAL AMPUTATION performed by Fanny Yang DPM at [address] [place] [address]         Review of Systems   Constitutional: Negative for chills and fever  HENT: Negative for congestion, sinus pressure, sneezing and sore throat. Eyes: Negative for pain, discharge, redness and itching. Respiratory: Negative for apnea, cough  Gastrointestinal: Negative for blood in stool, constipation, diarrhea   Endocrine: Negative for cold intolerance, heat intolerance, polydipsia. Genitourinary: Negative for dysuria, enuresis, flank pain and hematuria.    Musculoskeletal: Negative for 08/11/2020    BUN 17 08/11/2020    LABALBU 2.7 04/21/2020    CREATININE 0.9 08/11/2020    CALCIUM 9.4 08/11/2020    LABGLOM 86 08/11/2020    GLUCOSE 123 08/11/2020    GLUCOSE 103 05/25/2020       Lab Results   Component Value Date    ALKPHOS 71 04/21/2020    ALT 34 04/21/2020    AST 40 04/21/2020    PROT 6.8 04/21/2020    BILITOT 0.3 04/21/2020    LABALBU 2.7 04/21/2020       Lab Results   Component Value Date    MG 2.2 04/21/2020       Lab Results   Component Value Date    INR 1.14 (H) 04/17/2020         Lab Results   Component Value Date    LABA1C 6.2 04/17/2020       No results found for: TRIG, HDL, LDLCALC, LDLDIRECT, LABVLDL    Lab Results   Component Value Date    TSH 1.250 04/20/2020         Testing Reviewed:      I have individually reviewed the cardiac test below:    ECHO:   Results for orders placed during the hospital encounter of 04/17/20   ECHO Complete 2D W Doppler W Color    Narrative Transthoracic Echocardiography Report (TTE)     Demographics      Patient Name    Cristela KENNY Gender               Male      MR #            [id number]       Race                                                       Ethnicity      Account #       [de-identified]       Room Number          0004      Accession       [id number]       Date of Study        04/21/2020   Number      Date of Birth   1960      Referring Physician  XOCHILT Baca MD      Age             63 year(s)      Sonographer          Deloris Riojas LINA                                      Interpreting         Jeana Malloy MD                                   Physician     Procedure    Type of Study      TTE procedure:ECHOCARDIOGRAM COMPLETE 2D W DOPPLER W COLOR. Procedure Date  Date: 04/21/2020 Start: 08:54 AM    Study Location: Bedside  Technical Quality: Limited visualization due to body habitus. Indications:Coronary artery disease and S/P CABG.     Additional Medical History:Ex smoker, diabetic, sepsis, CAD, CABG, chronic  kidney disease, anemia, hyperlipidemia, hypertension, arthritis    Patient Status: Routine    Height: 74.02 inches Weight: 308. 65 pounds BSA: 2.61 m^2 BMI: 39.61 kg/m^2    BP: 202/97 mmHg     Conclusions      Summary   Technically difficult examination. Technically limited study. Left ventricle size is normal.   Ejection fraction is visually estimated at 50%. Unable to determine wall motion abnormalities due to poor image quality. Abnormal (paradoxical) motion consistent with post-operative status. Doppler parameters were consistent with abnormal left ventricular   relaxation (grade 1 diastolic dysfunction). Thickened aortic valve leaflets noted. Aortic valve leaflets are Mildly calcified. Mild aortic stenosis is present. Signature      ----------------------------------------------------------------   Electronically signed by Eliane Lawrence MD (Interpreting   physician) on 04/21/2020 at 11:53 AM   ----------------------------------------------------------------      Findings      Mitral Valve   Structurally normal mitral valve. Trace mitral regurgitation is present. Aortic Valve   Thickened aortic valve leaflets noted. Aortic valve leaflets are Mildly calcified. Mild aortic stenosis is present. Tricuspid Valve   Tricuspid valve is structurally normal.   Trace tricuspid regurgitation. Pulmonic Valve   The pulmonic valve was not well visualized . Left Atrium   Normal size left atrium. Left Ventricle   Left ventricle size is normal.   No asymmetrical left ventricular hypertrophy was seen. Unable to determine wall motion abnormalities due to poor image quality. Abnormal (paradoxical) motion consistent with post-operative status. Doppler parameters were consistent with abnormal left ventricular   relaxation (grade 1 diastolic dysfunction). Ejection fraction is visually estimated at 50%.       Right Atrium   The right atrium is of normal size. Right Ventricle   Normal right ventricular size and function. Pericardial Effusion   No evidence of any pericardial effusion. Pleural Effusion   No evidence of pleural effusion. Aorta / Great Vessels   The aorta is within normal limits. IVC normal.   Estimated right atrial pressure is 5 mmHg .      M-Mode/2D Measurements & Calculations      LV Diastolic    LV Systolic Dimension: 3.3   AV Cusp Separation: 1.3 cmLA   Dimension: 4.6  cm                           Dimension: 2 cmAO Root   cm              LV Volume Diastolic: 87.6 ml Dimension: 3.3 cm   LV FS:28.3 %    LV Volume Systolic: 91.4 ml   LV PW           LV EDV/LV EDV Index: 38.5   Diastolic: 0.9  PK/91 P^7TH ESV/LV ESV   cm              Index: 44.1 ml/17 m^2        RV Diastolic Dimension: 2.2   Septum          EF Calculated: 54.7 %        cm   Diastolic: 0.8   cm                                           LA/Aorta: 0.61                      LVOT: 2 cm     Doppler Measurements & Calculations      MV Peak E-Wave:     AV Peak Velocity: 223    LVOT Peak Velocity: 113 cm/s   59.6 cm/s           cm/s                     LVOT Mean Velocity: 77.8 cm/s   MV Peak A-Wave: 69  AV Peak Gradient: 19.89  LVOT Peak Gradient: 5   cm/s                mmHg                     mmHgLVOT Mean Gradient: 3   MV E/A Ratio: 0.86  AV Mean Velocity: 155    mmHg   MV Peak Gradient:   cm/s   1.42 mmHg           AV Mean Gradient: 11     TV Peak E-Wave: 61.3 cm/s                       mmHg                     TV Peak A-Wave: 91.7 cm/s   MV Deceleration     AV VTI: 36.7 cm   Time: 259 msec      AV Area                  TV Peak Gradient: 1.5 mmHg                       (Continuity):1.79 cm^2   TR Velocity:250 cm/s                                                TR Gradient:25 mmHg   MV E' Septal        LVOT VTI: 20.9 cm        PV Peak Velocity: 87.8 cm/s   Velocity: 6.4 cm/s                           PV Peak Gradient: 3.08 mmHg   MV A' Septal   Velocity: 9.2 cm/s   MV E' Lateral       AV DVI (VTI): 0.57AV DVI   Velocity: 4.9 cm/s  (Vmax):0.51   MV A' Lateral   Velocity: 8.5 cm/s   E/E' septal: 9.31   E/E' lateral: 12.16   MR Velocity: 377   cm/s     [email or web address] [id number]        PROCEDURE: VL DUP LOWER EXTREMITY ARTERIES BILATERAL       CLINICAL INFORMATION: Non-healing wound of amputation stump (HCC)        COMPARISON: No prior study.       TECHNIQUE:  Transverse and longitudinal ultrasound images were obtained through the left lower extremity arterial vasculature. Peak systolic velocities were measured. Ankle-brachial indices were obtained.         FINDINGS:        There is triphasic waveform morphology demonstrated within the left common femoral artery. Peak systolic velocity measures 142 cm/s. No significant drop or elevation in peak systolic velocities demonstrated to suggest significant flow-limiting stenosis    within the left lower extremity arterial vasculature to the level of the popliteal artery. However, the anterior tibial artery demonstrates a relative drop in peak systolic velocity suggesting stenosis of possibly near 50%. There is biphasic waveform    morphology noted within the left anterior tibial artery.       Ankle brachial indices are within normal limits.       LEFT ARTERY   (PSV cm/sec)   ? ?????????????????????????   CFA ---------------> 142 PSV cm/sec   PROF -------------> 130 PSV cm/sec   SFA PROX ------->125 PSV cm/sec    SFA MID ---------->102 PSV cm/sec   SFA DIST -------->88 PSV cm/sec    POP A PROX --->66 PSV cm/sec    POP A DIST ---->55 PSV cm/sec    PTA --------------->88 PSV cm/sec    PERONEAL ----->68 PSV cm/sec    RAFAL ----------------> 27 PSV cm/sec           CHERYL    LEFT       RAFAL----->0.91   PTA----->1.05           Impression   1.  There is a relative drop in peak systolic velocity within the flow-limiting    stenosis.             **This report has been created using voice recognition software.  It may contain minor errors which are inherent in voice recognition technology. **              AssessmentPlan:   Sanam Brennan is a pleasant 62year old male patient who  has a past medical history of Arthritis, CAD (coronary artery disease), CKD (chronic kidney disease), stage II, Diabetes mellitus ([address] Hyperlipidemia, Hypertension, and Liver disease. He is s/p 3 vessel CABG on 11/2019 (Positive stress test, [facility] revealed MV CAD. 3v CABG 11/2019: SVG to OM1, SVG to Circumflex, LIMA to distal LAD with Dr. Nino at [facility] of his carotids was done at [facility] prior to CABG and per records CTA revealed 100% occlusion on the right and a 65% on the left. The recommendation was made for proceeding with his CABG and then an interval carotid endarterectomy on the left side once he is recovered from surgery. Other PMH includes known h/o diabetic neuropathy. He has family h/o CAD. The patient was previously admitted to the hospital for diabetic foot, wet gangrene. He underwent trans-metatarsal left foot amputation on 3/3/2020 and discharged home on antibiotics. The patient continued to FU at the wound clinic where left TMA wound dehiscence was noted for which debridement was recommended. He also has h/o a right big toe wound. Bilateral LE doppler US was done and revealed left RAFAL stenosis, right profunda disease. In 04/2020, he was admitted and seen by Podiatry and ID. [facility] underwent revision of a transmetatarsal amputation. He was seen by Dr Antonio Larson.  Wound cultures grew Enterococcus and MSSA; blood cultures grew the Virgil Bombard was treated with Ancef.  An echocardiogram showed no evidence of vegetations. Due to the anticipated need for long term(4-6 weeks) antibiotics he was transferred to [address] [place] [address] patient presents to office for follow up. He finished IV Abx Rx.  The patient has h/o gout, right foot fracture, in cast

## 2020-11-13 ENCOUNTER — OFFICE VISIT (OUTPATIENT)
Dept: CARDIOLOGY CLINIC | Age: 60
End: 2020-11-13
Payer: COMMERCIAL

## 2020-11-13 VITALS
SYSTOLIC BLOOD PRESSURE: 131 MMHG | WEIGHT: 290 LBS | HEART RATE: 85 BPM | DIASTOLIC BLOOD PRESSURE: 67 MMHG | HEIGHT: 74 IN | BODY MASS INDEX: 37.22 KG/M2

## 2020-11-13 PROCEDURE — 93000 ELECTROCARDIOGRAM COMPLETE: CPT | Performed by: INTERNAL MEDICINE

## 2020-11-13 PROCEDURE — 99214 OFFICE O/P EST MOD 30 MIN: CPT | Performed by: INTERNAL MEDICINE

## 2020-11-13 NOTE — PROGRESS NOTES
Pt here for a 3 month f/u    EKG done today    Pt denies cp, sob, dizziness, CIPRIANO and palpitations

## 2020-11-17 ENCOUNTER — HOSPITAL ENCOUNTER (OUTPATIENT)
Age: 60
Discharge: HOME | End: 2020-11-17
Payer: COMMERCIAL

## 2020-11-17 PROCEDURE — U0003 INFECTIOUS AGENT DETECTION BY NUCLEIC ACID (DNA OR RNA); SEVERE ACUTE RESPIRATORY SYNDROME CORONAVIRUS 2 (SARS-COV-2) (CORONAVIRUS DISEASE [COVID-19]), AMPLIFIED PROBE TECHNIQUE, MAKING USE OF HIGH THROUGHPUT TECHNOLOGIES AS DESCRIBED BY CMS-2020-01-R: HCPCS

## 2020-11-19 LAB — SARS-COV-2: NOT DETECTED

## 2020-11-24 ENCOUNTER — PREP FOR PROCEDURE (OUTPATIENT)
Dept: CARDIOLOGY | Age: 60
End: 2020-11-24

## 2020-11-24 RX ORDER — SODIUM CHLORIDE 0.9 % (FLUSH) 0.9 %
10 SYRINGE (ML) INJECTION PRN
Status: CANCELLED | OUTPATIENT
Start: 2020-11-24

## 2020-11-24 RX ORDER — ASPIRIN 325 MG
325 TABLET ORAL ONCE
Status: CANCELLED | OUTPATIENT
Start: 2020-11-24 | End: 2020-11-24

## 2020-11-24 RX ORDER — SODIUM CHLORIDE 0.9 % (FLUSH) 0.9 %
10 SYRINGE (ML) INJECTION EVERY 12 HOURS SCHEDULED
Status: CANCELLED | OUTPATIENT
Start: 2020-11-24

## 2020-11-24 RX ORDER — SODIUM CHLORIDE 9 MG/ML
50 INJECTION, SOLUTION INTRAVENOUS CONTINUOUS
Status: CANCELLED | OUTPATIENT
Start: 2020-11-24

## 2020-11-24 RX ORDER — DIPHENHYDRAMINE HYDROCHLORIDE 50 MG/ML
50 INJECTION INTRAMUSCULAR; INTRAVENOUS ONCE
Status: CANCELLED | OUTPATIENT
Start: 2020-11-24 | End: 2020-11-24

## 2020-11-25 ENCOUNTER — HOSPITAL ENCOUNTER (OUTPATIENT)
Dept: INTERVENTIONAL RADIOLOGY/VASCULAR | Age: 60
Discharge: HOME | End: 2020-11-25
Attending: INTERNAL MEDICINE | Admitting: INTERNAL MEDICINE
Payer: COMMERCIAL

## 2020-11-25 VITALS
SYSTOLIC BLOOD PRESSURE: 131 MMHG | HEART RATE: 80 BPM | OXYGEN SATURATION: 92 % | TEMPERATURE: 98.8 F | HEIGHT: 74 IN | DIASTOLIC BLOOD PRESSURE: 71 MMHG | BODY MASS INDEX: 37.22 KG/M2 | RESPIRATION RATE: 20 BRPM | WEIGHT: 290 LBS

## 2020-11-25 LAB
ABO: NORMAL
ALBUMIN SERPL-MCNC: 3.9 G/DL (ref 3.5–5.1)
ALP BLD-CCNC: 74 U/L (ref 38–126)
ALT SERPL-CCNC: 17 U/L (ref 11–66)
ANION GAP SERPL CALCULATED.3IONS-SCNC: 11 MEQ/L (ref 8–16)
ANTIBODY SCREEN: NORMAL
APTT: 31.7 SECONDS (ref 22–38)
AST SERPL-CCNC: 17 U/L (ref 5–40)
BILIRUB SERPL-MCNC: 0.4 MG/DL (ref 0.3–1.2)
BUN BLDV-MCNC: 12 MG/DL (ref 7–22)
CALCIUM SERPL-MCNC: 9.5 MG/DL (ref 8.5–10.5)
CHLORIDE BLD-SCNC: 106 MEQ/L (ref 98–111)
CHOLESTEROL, TOTAL: 129 MG/DL (ref 100–199)
CO2: 24 MEQ/L (ref 23–33)
CREAT SERPL-MCNC: 0.9 MG/DL (ref 0.4–1.2)
ERYTHROCYTE [DISTWIDTH] IN BLOOD BY AUTOMATED COUNT: 14.4 % (ref 11.5–14.5)
ERYTHROCYTE [DISTWIDTH] IN BLOOD BY AUTOMATED COUNT: 43.8 FL (ref 35–45)
GFR SERPL CREATININE-BSD FRML MDRD: 86 ML/MIN/1.73M2
GLUCOSE BLD-MCNC: 139 MG/DL (ref 70–108)
GLUCOSE BLD-MCNC: 99 MG/DL (ref 70–108)
HCT VFR BLD CALC: 40.7 % (ref 42–52)
HDLC SERPL-MCNC: 40 MG/DL
HEMOGLOBIN: 13.6 GM/DL (ref 14–18)
INR BLD: 1.02 (ref 0.85–1.13)
LDL CHOLESTEROL CALCULATED: 66 MG/DL
MCH RBC QN AUTO: 28.4 PG (ref 26–33)
MCHC RBC AUTO-ENTMCNC: 33.4 GM/DL (ref 32.2–35.5)
MCV RBC AUTO: 85 FL (ref 80–94)
PLATELET # BLD: 204 THOU/MM3 (ref 130–400)
PMV BLD AUTO: 11.4 FL (ref 9.4–12.4)
POTASSIUM REFLEX MAGNESIUM: 4.1 MEQ/L (ref 3.5–5.2)
RBC # BLD: 4.79 MILL/MM3 (ref 4.7–6.1)
RH FACTOR: NORMAL
SODIUM BLD-SCNC: 141 MEQ/L (ref 135–145)
TOTAL PROTEIN: 7 G/DL (ref 6.1–8)
TRIGL SERPL-MCNC: 114 MG/DL (ref 0–199)
WBC # BLD: 14.1 THOU/MM3 (ref 4.8–10.8)

## 2020-11-25 PROCEDURE — C1894 INTRO/SHEATH, NON-LASER: HCPCS

## 2020-11-25 PROCEDURE — 2580000003 HC RX 258: Performed by: NURSE PRACTITIONER

## 2020-11-25 PROCEDURE — 80053 COMPREHEN METABOLIC PANEL: CPT

## 2020-11-25 PROCEDURE — 6360000004 HC RX CONTRAST MEDICATION: Performed by: INTERNAL MEDICINE

## 2020-11-25 PROCEDURE — 86901 BLOOD TYPING SEROLOGIC RH(D): CPT

## 2020-11-25 PROCEDURE — 6360000002 HC RX W HCPCS

## 2020-11-25 PROCEDURE — 36246 INS CATH ABD/L-EXT ART 2ND: CPT | Performed by: INTERNAL MEDICINE

## 2020-11-25 PROCEDURE — 80061 LIPID PANEL: CPT

## 2020-11-25 PROCEDURE — 36246 INS CATH ABD/L-EXT ART 2ND: CPT

## 2020-11-25 PROCEDURE — 36415 COLL VENOUS BLD VENIPUNCTURE: CPT

## 2020-11-25 PROCEDURE — 75625 CONTRAST EXAM ABDOMINL AORTA: CPT | Performed by: INTERNAL MEDICINE

## 2020-11-25 PROCEDURE — 6360000002 HC RX W HCPCS: Performed by: INTERNAL MEDICINE

## 2020-11-25 PROCEDURE — 86850 RBC ANTIBODY SCREEN: CPT

## 2020-11-25 PROCEDURE — 85027 COMPLETE CBC AUTOMATED: CPT

## 2020-11-25 PROCEDURE — 75716 ARTERY X-RAYS ARMS/LEGS: CPT

## 2020-11-25 PROCEDURE — 2709999900 HC NON-CHARGEABLE SUPPLY

## 2020-11-25 PROCEDURE — C1769 GUIDE WIRE: HCPCS

## 2020-11-25 PROCEDURE — 85730 THROMBOPLASTIN TIME PARTIAL: CPT

## 2020-11-25 PROCEDURE — 75716 ARTERY X-RAYS ARMS/LEGS: CPT | Performed by: INTERNAL MEDICINE

## 2020-11-25 PROCEDURE — 2500000003 HC RX 250 WO HCPCS

## 2020-11-25 PROCEDURE — 85610 PROTHROMBIN TIME: CPT

## 2020-11-25 PROCEDURE — 86900 BLOOD TYPING SEROLOGIC ABO: CPT

## 2020-11-25 PROCEDURE — 82948 REAGENT STRIP/BLOOD GLUCOSE: CPT

## 2020-11-25 RX ORDER — FENTANYL CITRATE 50 UG/ML
50 INJECTION, SOLUTION INTRAMUSCULAR; INTRAVENOUS ONCE
Status: COMPLETED | OUTPATIENT
Start: 2020-11-25 | End: 2020-11-25

## 2020-11-25 RX ORDER — FENTANYL CITRATE 50 UG/ML
25 INJECTION, SOLUTION INTRAMUSCULAR; INTRAVENOUS ONCE
Status: COMPLETED | OUTPATIENT
Start: 2020-11-25 | End: 2020-11-25

## 2020-11-25 RX ORDER — SODIUM CHLORIDE 0.9 % (FLUSH) 0.9 %
10 SYRINGE (ML) INJECTION PRN
Status: DISCONTINUED | OUTPATIENT
Start: 2020-11-25 | End: 2020-11-25 | Stop reason: HOSPADM

## 2020-11-25 RX ORDER — MIDAZOLAM HYDROCHLORIDE 2 MG/2ML
1 INJECTION, SOLUTION INTRAMUSCULAR; INTRAVENOUS ONCE
Status: COMPLETED | OUTPATIENT
Start: 2020-11-25 | End: 2020-11-25

## 2020-11-25 RX ORDER — ASPIRIN 325 MG
325 TABLET ORAL ONCE
Status: DISCONTINUED | OUTPATIENT
Start: 2020-11-25 | End: 2020-11-25 | Stop reason: HOSPADM

## 2020-11-25 RX ORDER — DIPHENHYDRAMINE HYDROCHLORIDE 50 MG/ML
50 INJECTION INTRAMUSCULAR; INTRAVENOUS ONCE
Status: DISCONTINUED | OUTPATIENT
Start: 2020-11-25 | End: 2020-11-25 | Stop reason: HOSPADM

## 2020-11-25 RX ORDER — SODIUM CHLORIDE 9 MG/ML
50 INJECTION, SOLUTION INTRAVENOUS CONTINUOUS
Status: DISCONTINUED | OUTPATIENT
Start: 2020-11-25 | End: 2020-11-25 | Stop reason: HOSPADM

## 2020-11-25 RX ORDER — SODIUM CHLORIDE 0.9 % (FLUSH) 0.9 %
10 SYRINGE (ML) INJECTION EVERY 12 HOURS SCHEDULED
Status: DISCONTINUED | OUTPATIENT
Start: 2020-11-25 | End: 2020-11-25 | Stop reason: HOSPADM

## 2020-11-25 RX ORDER — ACETAMINOPHEN 325 MG/1
650 TABLET ORAL EVERY 4 HOURS PRN
Status: DISCONTINUED | OUTPATIENT
Start: 2020-11-25 | End: 2020-11-25 | Stop reason: HOSPADM

## 2020-11-25 RX ADMIN — SODIUM CHLORIDE 50 ML/HR: 9 INJECTION, SOLUTION INTRAVENOUS at 08:54

## 2020-11-25 RX ADMIN — FENTANYL CITRATE 50 MCG: 50 INJECTION, SOLUTION INTRAMUSCULAR; INTRAVENOUS at 11:21

## 2020-11-25 RX ADMIN — MIDAZOLAM 1 MG: 1 INJECTION INTRAMUSCULAR; INTRAVENOUS at 11:30

## 2020-11-25 RX ADMIN — IOPAMIDOL 90 ML: 612 INJECTION, SOLUTION INTRAVENOUS at 12:12

## 2020-11-25 RX ADMIN — FENTANYL CITRATE 25 MCG: 50 INJECTION, SOLUTION INTRAMUSCULAR; INTRAVENOUS at 11:31

## 2020-11-25 RX ADMIN — MIDAZOLAM 1 MG: 1 INJECTION INTRAMUSCULAR; INTRAVENOUS at 11:20

## 2020-11-25 ASSESSMENT — PAIN SCALES - GENERAL
PAINLEVEL_OUTOF10: 0
PAINLEVEL_OUTOF10: 0

## 2020-11-25 NOTE — PROGRESS NOTES
6137 Patient admitted to 2E08  Ambulatory for angiogram.  Patient NPO. Patient accompanied by self. Vital signs obtained. Assessment and data collection intiated. Oriented to room. Policies and procedures for 2E explained. All questions answered with no further questions at this time. Fall prevention and safety precautions discussed with patient. Patient states all toes are off on left foot, states wrapped with anat boot and zinc wrap. Splint to Right leg (knee high), states fracture. States skin graft  Taken from left upper thigh for foot repair. States small open area under anat boot, left foot, states area is healing. 2802 Dr Phil Peacre informed of abnormal labs and temp 99.4 orally. 1050 To radiology per bed, stable condition. 1244Care taken over from radiology, LEFT groin stable . Patient reinstructed on bedrest, instructed to keep legs straight, not to cross legs, not to lift head off of pillow, not to laugh hard, if coughs to guard site with hand, voices understanding, taking water without difficulty. 1715 Up in wheelchair,  tolerated activity well.    1700 Discharge instructions given, voices understanding. [phone] Discharged per wheelchair, stable condition.

## 2020-11-25 NOTE — BRIEF OP NOTE
[facility]  Sedation/Analgesia Post Sedation Record    Pt Name: Roxana Ontiveros  Account number: [de-identified]  MRN: [id number]  YOB: 1960  Procedure Performed By: Marii Hooper MD   Primary Care Physician: EYAD Parnell - NICCI  Date: 11/25/2020    POST-PROCEDURE    Physicians/Assistants: Marii Hooper MD     Procedure Performed:Peripheral Angiogram      Sedation/Anesthesia: Versed/ Fentanyl and 2% xylocaine local anesthesia. Estimated Blood Loss: < 50 ml. Specimens Removed: None         Disposition of Specimen: N/A        Complications: No Immediate Complications. Post-procedure Diagnosis/Findings:        Nonobstructive PAD     Recommendations:   Medical management      Above findings and plan of care were discussed with patient, questions were answered, agreeable with plan.        Marii Hooper MD, [place]em[place] Limon   Electronically signed 11/25/2020 at 4:33 PM  Interventional Cardiology

## 2020-11-25 NOTE — PRE SEDATION
patient had no further questions and wished to proceed; the consent form was signed. MEDICAL HISTORY  []ASHD/ANGINA/MI/CHF   []Hypertension  []Diabetes  []Hyperlipidemia  []Smoking  []Family Hx of ASHD  []Additional information:       has a past medical history of Arthritis, CAD (coronary artery disease), CKD (chronic kidney disease), stage II, Diabetes mellitus ([address] Hyperlipidemia, Hypertension, and Liver disease. SURGICAL HISTORY   has a past surgical history that includes Cardiac surgery (11/2019); Foot surgery (Right); Tonsillectomy; cyst removal; Toe amputation (Left, 3/3/2020); Foot Debridement (Left, 4/20/2020); Foot Debridement (Left, 7/20/2020); and Foot surgery (Left, 8/14/2020). Additional information:       ALLERGIES   Allergies as of 11/25/2020    (No Known Allergies)     Additional information:       MEDICATIONS   Aspirin  [x] 81 mg  [] 325 mg  [] None  Antiplatelet drug therapy use last 5 days  []No [x]Yes  Coumadin Use Last 5 Days [x]No []Yes  Other anticoagulant use last 5 days  [x]No []Yes    Current Facility-Administered Medications:     0.9 % sodium chloride infusion, 50 mL/hr, Intravenous, Continuous, EYAD Harrison - CNP, Last Rate: 50 mL/hr at 11/25/20 0854, 50 mL/hr at 11/25/20 0854    aspirin tablet 325 mg, 325 mg, Oral, Once, Leonora Hendrickson APRN - CNP    diphenhydrAMINE (BENADRYL) injection 50 mg, 50 mg, Intravenous, Once, Leonora Hendrickson APRN - CNP    hydrocortisone sodium succinate PF (SOLU-CORTEF) injection 200 mg, 200 mg, Intravenous, Once, EYAD Gomez - CNP    sodium chloride flush 0.9 % injection 10 mL, 10 mL, Intravenous, 2 times per day, EYAD Harrison - CNP    sodium chloride flush 0.9 % injection 10 mL, 10 mL, Intravenous, PRN, EYAD Harrison - NICCI  Prior to Admission medications    Medication Sig Start Date End Date Taking?  Authorizing Provider   Polyethylene Glycol 3350 (MIRALAX PO) Take by mouth as needed   Yes Historical Provider, MD   insulin detemir (LEVEMIR FLEXTOUCH) 100 UNIT/ML injection pen Inject 44 Units into the skin every morning 8/10/20  Yes EYAD Del Angel CNP   gabapentin (NEURONTIN) 300 MG capsule Take 1 capsule by mouth 3 times daily for 90 days. 8/10/20 11/25/20 Yes EYAD Del Angel CNP   aspirin 81 MG chewable tablet Take 1 tablet by mouth daily 8/10/20  Yes EYAD Del Angel CNP   atorvastatin (LIPITOR) 40 MG tablet Take 1 tablet by mouth nightly 8/10/20  Yes EYAD Del Angel CNP   lisinopril (PRINIVIL;ZESTRIL) 5 MG tablet Take 1 tablet by mouth daily 8/10/20  Yes EYAD Del Angel CNP   metoprolol tartrate (LOPRESSOR) 25 MG tablet Take 1 tablet by mouth 2 times daily 8/10/20  Yes EYAD Del Angel CNP   insulin lispro (HUMALOG) 100 UNIT/ML injection vial Inject 0-12 Units into the skin 3 times daily (with meals)  Patient taking differently: Inject 0-12 Units into the skin as needed  4/22/20  Yes Shana Barnes MD   clopidogrel (PLAVIX) 75 MG tablet Take 1 tablet by mouth daily 4/9/20  Yes Guille Cheung MD   Multiple Vitamins-Minerals (MULTIVITAMIN PO) Take 1 tablet by mouth daily    Yes Historical Provider, MD     Additional information:       VITAL SIGNS   There were no vitals filed for this visit. PHYSICAL:   General: No acute distress  HEENT:  Unremarkable for age  Neck: without increased JVD, carotid pulses 2+ bilaterally without bruits  Heart: RRR, S1 & S2 WNL. No murmurs    Lungs: Clear to auscultation    Abdomen: BS present, without HSM, masses, or tenderness    Extremities: Dressing on feet.    Mental Status: Alert & Oriented        PLANNED PROCEDURE   []Cath  []PCI                []Pacemaker/AICD  []ZELDA             []Cardioversion [x]Peripheral angiography/PTA  []Other:      SEDATION  Planned agent:[x]Midazolam []Meperidine []Sublimaze []Morphine  []Diazepam  []Other:     ASA Classification:  []1 [x]2 []3 []4 []5   Class 1: A normal healthy patient  Class 2: Pt with mild to moderate systemic disease  Class 3: Severe systemic disease or disturbance  Class 4: Severe systemic disorders that are already life threatening. Class 5: Moribund pt with little chances of survival, for more than 24 hours. Mallampati I Airway Classification:   []1 []2 [x]3 []4     [x]Pre-procedure diagnostic studies complete and results available. Comment:    [x]Previous sedation/anesthesia experiences assessed. Comment:    [x]The patient is an appropriate candidate to undergo the planned procedure sedation and anesthesia. (Refer to nursing sedation/analgesia documentation record)  [x]Formulation and discussion of sedation/procedure plan, risks, and expectations with patient and/or responsible adult completed. [x]Patient examined immediately prior to the procedure.  (Refer to nursing sedation/analgesia documentation record)      Jensen Marquez MD, Matias Sutherland    Electronically signed 11/25/2020 at 9:26 AM

## 2020-11-25 NOTE — PROGRESS NOTES
1054 Pt in specials radiology for lower extremity angiogram with possible intervention. Explained procedure to pt and pt verbalizes understanding. Consent signed. 1101 Moved to table and attached to monitor. 1104 Left groin prepped and draped. [address] [facility] [address] Gafsia Dr Redell Hodgkins here. 1136 Left leg angiogram complete. 1203 Pressure measurements taken-Ostial right SFA 99/58 and Right common femoral-110/59.  1204 Procedure complete. Prepping for sheath removal.  1210 Sheath in left femoral artery pulled and manual pressure applied per CHRISTINE Arteaga RT. No bleeding noted. 1225 Manual pressure removed. No bleeding noted. 4 x 4 with op-site drssing applied. 1231 Pt positioned in bed for comfort. 1233 Report called to [employer]. 1234 Transferred to  per bed. Stable condition.

## 2020-11-26 NOTE — PROCEDURES
[address] [id number]                            CARDIAC CATHETERIZATION    PATIENT NAME: Tate Reinoso                    :        1960  MED REC NO:   [id number]                           ROOM:       0008  ACCOUNT NO:   [de-identified]                           ADMIT DATE: 2020  PROVIDER:     Kingston Means MD    DATE OF PROCEDURE:  2020    PERIPHERAL ANGIOGRAM REPORT    INDICATIONS:  This is a pleasant 55-year-old male patient with past  medical history that includes history of coronary artery disease,  peripheral arterial disease and coronary artery bypass graft surgery. The patient has had recurrent infections involving his left lower  extremity with nonhealing ulcers. He ended up having recurrent  cellulitis and transmetatarsal amputation. He had abnormal vascular  study. PROCEDURES PERFORMED:  1. Abdominal aortography. 2.  Selective peripheral angiogram of the right and left lower  extremities. DESCRIPTION OF PROCEDURE:  After informed consent, the patient was  brought to the interventional radiology suite. He was prepped and  draped in a sterile fashion. 2% lidocaine was injected in the skin and  subcutaneous tissue overlying the left groin area. Under ultrasound and  fluoroscopy guidance, I was able to obtain access in the left common  femoral artery, 5-Gambian sheath was inserted. Left lower extremity  selective peripheral angiogram was performed. I then advanced the  5-Gambian VCF catheter over the 0.035 Glidewire to the distal abdominal  aorta. Abdominal aortography was performed. I then was able to cross  over to the right side. The VCF catheter was placed in the right common  femoral artery. Right lower extremity peripheral angiogram was  performed.   The catheter was then exchanged to a glide catheter which  was used for systolic blood pressure gradient measurement across the  ostial/proximal part of the right SFA. The systolic blood pressure  gradient was 9 mmHg. The equipment was then pulled back. The 5-Panamanian  sheath was removed with subsequent application of manual compression. Hemostasis was achieved. FINDINGS:  ABDOMINAL AORTOGRAM:  No aneurysmal dilation of the distal abdominal  aorta, which bifurcates into right and left common iliac arteries. Left  common iliac artery is patent with mild luminal irregularities. The  internal iliac and external iliac arteries are both patent on the left  side. The right common iliac artery has 10% stenosis. Right internal  and right external iliac arteries are patent. Left lower extremity selective peripheral angiogram:  Left common  femoral artery has 10%-20% mildly calcified stenosis, bifurcates into  profunda femoris, and SFA profunda femoris is patent. The left SFA is  patent in the proximal segment and has mild 20% stenosis in the mid  segment. Distal SFA is patent. Left popliteal artery is patent with no  significant stenotic lesions. TP trunk has mild diffuse disease. There  is a normal three-vessel runoff to the left foot; however, the dorsalis  pedis artery on the left foot has mild diffuse disease with normal flow. Right lower extremity selective peripheral angiogram:  Right common  femoral artery has 20% stenosis. Right profunda femoris has an ostial  50% stenosis. The ostial/very proximal part of right SFA has a 30%-50%  stenosis; however, there was no significant gradient upon pullback  across the lesion area. Mid SFA has 30%-40% stenosis. Distal SFA is  patent. Right popliteal artery has mild diffuse disease. There is a  normal three-vessel below-knee runoff. MEDICATIONS:  See MAR. COMPLICATIONS:  None. ESTIMATED BLOOD LOSS:  Less than 50 mL. ACCESS:  Left common femoral artery access. IMPRESSION:  Nonobstructive peripheral arterial disease.     RECOMMENDATIONS:  Medical management.         Garth Marin MD    D: 11/25/2020 16:48:17       T: 11/25/2020 16:54:50     DEEPA/CL_Carrie  Job#: [id number]     Doc#: [id number]    CC:

## 2020-11-27 NOTE — PROGRESS NOTES
175 [facility] pt for follow-up arteriogram-Pt states he is doing \"Fine. \" Denies any problems at arteriogram site. Offers no complaints.

## 2020-12-02 RX ORDER — LISINOPRIL 5 MG/1
5 TABLET ORAL DAILY
Qty: 90 TABLET | Refills: 1 | Status: SHIPPED | OUTPATIENT
Start: 2020-12-02 | End: 2021-06-10 | Stop reason: SDUPTHER

## 2020-12-02 NOTE — TELEPHONE ENCOUNTER
Gilma Negrete called requesting a refill on the following medications:  Requested Prescriptions     Pending Prescriptions Disp Refills    lisinopril (PRINIVIL;ZESTRIL) 5 MG tablet 90 tablet 1     Sig: Take 1 tablet by mouth daily     [facility] verified[facility]  . pv    11/25/20  Date of last visit:   Date of next visit (if applicable): Visit date not found

## 2020-12-23 ENCOUNTER — TELEPHONE (OUTPATIENT)
Dept: FAMILY MEDICINE CLINIC | Age: 60
End: 2020-12-23

## 2020-12-23 ENCOUNTER — VIRTUAL VISIT (OUTPATIENT)
Dept: FAMILY MEDICINE CLINIC | Age: 60
End: 2020-12-23
Payer: COMMERCIAL

## 2020-12-23 PROCEDURE — 99441 PR PHYS/QHP TELEPHONE EVALUATION 5-10 MIN: CPT | Performed by: NURSE PRACTITIONER

## 2020-12-23 RX ORDER — AMOXICILLIN AND CLAVULANATE POTASSIUM 875; 125 MG/1; MG/1
1 TABLET, FILM COATED ORAL 2 TIMES DAILY
Qty: 20 TABLET | Refills: 0 | Status: SHIPPED | OUTPATIENT
Start: 2020-12-23 | End: 2021-01-02

## 2020-12-23 RX ORDER — ALBUTEROL SULFATE 90 UG/1
2 AEROSOL, METERED RESPIRATORY (INHALATION) EVERY 6 HOURS PRN
Qty: 1 INHALER | Refills: 3 | Status: SHIPPED | OUTPATIENT
Start: 2020-12-23

## 2020-12-23 RX ORDER — GUAIFENESIN 600 MG/1
1200 TABLET, EXTENDED RELEASE ORAL 2 TIMES DAILY
Qty: 40 TABLET | Refills: 0 | Status: SHIPPED | OUTPATIENT
Start: 2020-12-23 | End: 2021-01-02

## 2020-12-23 RX ORDER — PREDNISONE 20 MG/1
20 TABLET ORAL 2 TIMES DAILY
Qty: 10 TABLET | Refills: 0 | Status: SHIPPED | OUTPATIENT
Start: 2020-12-23 | End: 2020-12-28

## 2020-12-23 RX ORDER — BENZONATATE 200 MG/1
200 CAPSULE ORAL 3 TIMES DAILY PRN
Qty: 21 CAPSULE | Refills: 0 | Status: SHIPPED | OUTPATIENT
Start: 2020-12-23 | End: 2020-12-30

## 2020-12-23 NOTE — PROGRESS NOTES
Madai Pool is a 62 y.o. male evaluated via telephone on 12/23/2020. Consent:  He and/or health care decision maker is aware that that he may receive a bill for this telephone service, depending on his insurance coverage, and has provided verbal consent to proceed: Yes      Documentation:  I communicated with the patient and/or health care decision maker about SUBJECTIVE:   Madai Pool is a 62 y.o. male who complains of congestion, post nasal drip, productive cough, cough described as harsh and productive of yellow and green sputum, bilateral sinus pain and hoarseness for 4 days. He denies a history of chills, dizziness, fatigue, fevers, weakness and wheezing and denies a history of asthma. Patient does not smoke cigarettes. ASSESSMENT:   viral upper respiratory illness; bronchitis    PLAN:  Symptomatic therapy suggested: push fluids, rest, gargle warm salt water, apply heat to sinuses prn, return office visit prn if symptoms persist or worsen. Call or return to clinic prn if these symptoms worsen or fail to improve as anticipated. .   Details of this discussion including any medical advice provided: Will start patient on augmentin and a steroid with inhaler for his bronchitis. Pt has significant heart hx and hx of bronchitis. The patient was advised to drink plenty of fluids. They may take Tylenol for fever or body aches. Take prescribed medication as directed. They may also take OTC antihistamines/ decongestant as needed. Pt is advised to go to ER if symptoms worsen, new symptoms develop, high fever >102, vomiting, breathing difficulty, lethargy, chest pain or shortness of breath Dial 911. The patient or patient's representative is agreeable to the treatment plan they're advised to follow-up with her primary care provider in one week for reevaluation. Return if symptoms worsen or fail to improve. I affirm this is a Patient Initiated Episode with a Patient who has not had a related appointment within my department in the past 7 days or scheduled within the next 24 hours. Patient identification was verified at the start of the visit: Yes    Total Time: minutes: 5-10 minutes  .   Note: not billable if this call serves to triage the patient into an appointment for the relevant concern      Farzana Barton

## 2020-12-23 NOTE — TELEPHONE ENCOUNTER
[facility] received a call from:    Name of Caller: Mio Olmstead    Relationship to patient: Patient    Organization name: N/A     Best contact number:  [phone]    Reason for call: Pt is requesting an appt or medication and states he does not have a smart device for Virtual Visit.  Pt states he is having the following symptoms:    COUGH  CHEST CONGESTION  SHORTNESS OF BREATH

## 2020-12-23 NOTE — TELEPHONE ENCOUNTER
Ann Navarrete CNP said that we can do a Same Day Phone Visit. I put the patient on the schedule for 9:00 am today.

## 2020-12-23 NOTE — PATIENT INSTRUCTIONS
Patient Education        Bronchitis: Care Instructions  Your Care Instructions     Bronchitis is inflammation of the bronchial tubes, which carry air to the lungs. The tubes swell and produce mucus, or phlegm. The mucus and inflamed bronchial tubes make you cough. You may have trouble breathing. Most cases of bronchitis are caused by viruses like those that cause colds. Antibiotics usually do not help and they may be harmful. Bronchitis usually develops rapidly and lasts about 2 to 3 weeks in otherwise healthy people. Follow-up care is a key part of your treatment and safety. Be sure to make and go to all appointments, and call your doctor if you are having problems. It's also a good idea to know your test results and keep a list of the medicines you take. How can you care for yourself at home? · Take all medicines exactly as prescribed. Call your doctor if you think you are having a problem with your medicine. · Get some extra rest.  · Take an over-the-counter pain medicine, such as acetaminophen (Tylenol), ibuprofen (Advil, Motrin), or naproxen (Aleve) to reduce fever and relieve body aches. Read and follow all instructions on the label. · Do not take two or more pain medicines at the same time unless the doctor told you to. Many pain medicines have acetaminophen, which is Tylenol. Too much acetaminophen (Tylenol) can be harmful. · Take an over-the-counter cough medicine that contains dextromethorphan to help quiet a dry, hacking cough so that you can sleep. Avoid cough medicines that have more than one active ingredient. Read and follow all instructions on the label. · Breathe moist air from a humidifier, hot shower, or sink filled with hot water. The heat and moisture will thin mucus so you can cough it out. · Do not smoke. Smoking can make bronchitis worse. If you need help quitting, talk to your doctor about stop-smoking programs and medicines. These can increase your chances of quitting for good. When should you call for help? Call 911 anytime you think you may need emergency care. For example, call if:    · You have severe trouble breathing. Call your doctor now or seek immediate medical care if:    · You have new or worse trouble breathing.     · You cough up dark brown or bloody mucus (sputum).     · You have a new or higher fever.     · You have a new rash. Watch closely for changes in your health, and be sure to contact your doctor if:    · You cough more deeply or more often, especially if you notice more mucus or a change in the color of your mucus.     · You are not getting better as expected. Where can you learn more? Go to [email or web address] org and sign in to your [facility] account. Enter H333 in the [employer] box to learn more about \"Bronchitis: Care Instructions. \"     If you do not have an account, please click on the \"Sign Up Now\" link. Current as of: February 24, 2020               Content Version: 12.6  © 2006-2020 [employer]. Care instructions adapted under license by [facility] ([facility]). If you have questions about a medical condition or this instruction, always ask your healthcare professional. [address] [place] [address] any warranty or liability for your use of this information.

## 2021-01-07 ENCOUNTER — HOSPITAL ENCOUNTER (OUTPATIENT)
Dept: CT IMAGING | Age: 61
Discharge: HOME | End: 2021-01-07
Payer: COMMERCIAL

## 2021-01-07 DIAGNOSIS — I65.23 BILATERAL CAROTID ARTERY STENOSIS: ICD-10-CM

## 2021-01-07 LAB — POC CREATININE WHOLE BLOOD: 1.1 MG/DL (ref 0.5–1.2)

## 2021-01-07 PROCEDURE — 82565 ASSAY OF CREATININE: CPT

## 2021-01-07 PROCEDURE — 70498 CT ANGIOGRAPHY NECK: CPT

## 2021-01-07 PROCEDURE — 6360000004 HC RX CONTRAST MEDICATION: Performed by: INTERNAL MEDICINE

## 2021-01-07 RX ADMIN — IOPAMIDOL 85 ML: 755 INJECTION, SOLUTION INTRAVENOUS at 13:11

## 2021-01-11 ENCOUNTER — TELEPHONE (OUTPATIENT)
Dept: CARDIOLOGY CLINIC | Age: 61
End: 2021-01-11

## 2021-01-11 NOTE — TELEPHONE ENCOUNTER
----- Message from Yi Mcclain MD sent at 1/11/2021  4:18 PM EST -----  Continue current medical management.   Will need repeat Neck CTA, will plan with next visit

## 2021-01-27 RX ORDER — CLOPIDOGREL BISULFATE 75 MG/1
75 TABLET ORAL DAILY
Qty: 90 TABLET | Refills: 0 | Status: SHIPPED | OUTPATIENT
Start: 2021-01-27 | End: 2021-04-29 | Stop reason: SDUPTHER

## 2021-01-27 NOTE — TELEPHONE ENCOUNTER
Marcelle Fisher called requesting a refill on the following medications:  Requested Prescriptions     Pending Prescriptions Disp Refills    clopidogrel (PLAVIX) 75 MG tablet 90 tablet 1     Sig: Take 1 tablet by mouth daily     [facility]  . pv      Date of last visit: 11/13/20  Date of next visit (if applicable): Visit date not found
Impaired

## 2021-02-01 ENCOUNTER — HOSPITAL ENCOUNTER (OUTPATIENT)
Dept: WOUND CARE | Age: 61
Discharge: HOME | End: 2021-02-01
Payer: COMMERCIAL

## 2021-02-01 VITALS
HEART RATE: 99 BPM | TEMPERATURE: 96.7 F | DIASTOLIC BLOOD PRESSURE: 79 MMHG | SYSTOLIC BLOOD PRESSURE: 129 MMHG | OXYGEN SATURATION: 96 % | RESPIRATION RATE: 18 BRPM

## 2021-02-01 PROCEDURE — 17250 CHEM CAUT OF GRANLTJ TISSUE: CPT

## 2021-02-01 PROCEDURE — 6370000000 HC RX 637 (ALT 250 FOR IP): Performed by: NURSE PRACTITIONER

## 2021-02-01 PROCEDURE — 97597 DBRDMT OPN WND 1ST 20 CM/<: CPT

## 2021-02-01 RX ADMIN — SILVER NITRATE APPLICATORS 3 EACH: 25; 75 STICK TOPICAL at 10:00

## 2021-02-01 NOTE — PLAN OF CARE
Problem: Wound:  Goal: Will show signs of wound healing; wound closure and no evidence of infection  Description: Will show signs of wound healing; wound closure and no evidence of infection  Outcome: Ongoing     Patient presents to wound clinic for left foot and left heel wound. No s/s of infection noted at this time. See AVS for discharge instructions. Follow up visit:   1 Week on Monday February 8th at 9:15 am     Care plan reviewed with patient . Patient verbalizes understanding of the plan of care and contribute to goal setting.

## 2021-02-01 NOTE — CONSULTS
[facility]         Consult and Procedure Note      Nelson Kwan  MEDICAL RECORD NUMBER:  [id number]  AGE: 62 y.o. GENDER: male  : 1960  EPISODE DATE:  2021    Subjective:     Chief Complaint   Patient presents with    Wound Check     left foot         HISTORY of PRESENT ILLNESS HPI     Nelson Kwan is a 62 y.o. male Established patient who is well-known to our office who is being seen today for an ongoing left TMA wound. Patient is a type II diabetic with severe peripheral vascular disease. Patient was previously being seen at our [facility] office for this ongoing wound. Previously, we were treating this wound with excisional debridements, silver nitrate, and Unna boot compression therapy. We had initially had success with this but over the last 2 months his wound has stalled out. Because of this, we are going to try and get him approved for skin substitute graft. Wound was again excisionally debrided in office today. After debridement, wound was treated with silver nitrate for chemical cauterization as well as wound promotion. Patient was placed back into Unna boot compression therapy. We will get patient prestarted for skin substitutes today. Patient also noted to have a small left posterior heel wound today. Wound does not probe to bone and there is no proximal streaking. Scant serous drainage with no malodor. Patient currently has a below-knee fiberglass cast to the right lower extremity. Patient  suffers from Charcot neuropathy that he follows up with Dr. Prashant Avila for. Patient's wound dressing today consist of collagen, alginate, and Unna boot. Patient to keep dressing clean, dry, intact until he is seen again by us next week. All other questions or concerns were otherwise addressed at today's visit. 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   Wound/Ulcer Pain Timing/Severity: none  Quality of pain: N/A  Severity:  0 / 10 Modifying Factors: None  Associated Signs/Symptoms: edema, erythema and drainage        PAST MEDICAL HISTORY        Diagnosis Date    Arthritis     CAD (coronary artery disease)     CKD (chronic kidney disease), stage II     Diabetes mellitus ([facility] [address]     Hyperlipidemia     Hypertension     Liver disease     HEPITITIS AS A CHILD       PAST SURGICAL HISTORY    Past Surgical History:   Procedure Laterality Date    CARDIAC SURGERY  2019    triple bypass    CYST REMOVAL      RIGHT ANKLE     FOOT DEBRIDEMENT Left 2020    LEFT TRANSMETATARSAL AMPUTATION  FOOT INCISION AND DRAINAGE performed by Satya Hudson DPM at [address] Left 2020    LEFT WOUND DEBRIDEMENT WITH WOUND VAC APPLICATION performed by Satya Hudson DPM at [facility] Right     CYST REMOVED    FOOT SURGERY Left 2020    LEFT FOOT PREPARATION GRAFT SITE, HAVEST SPLIT THICKNESS SKIN GRAFT WITH APPLICATION, APPLICATION [employer] WOUND VAC performed by Satya Hudson DPM at [address] [place] [address] Left 3/3/2020    I&D LEFT FOOT, TRANSMETATARSAL AMPUTATION performed by Satya Hudson DPM at [facility]    Family History   Problem Relation Age of Onset    Diabetes Mother     High Blood Pressure Mother     Alzheimer's Disease Father          of, 80or 80years old   [place] Heart Disease Father     High Blood Pressure Father     Cancer Neg Hx     Stroke Neg Hx        SOCIAL HISTORY    Social History     Tobacco Use    Smoking status: Former Smoker     Types: Cigarettes     Quit date:      Years since quittin.1    Smokeless tobacco: Never Used   Substance Use Topics    Alcohol use: Not Currently    Drug use: Never       ALLERGIES    No Known Allergies    MEDICATIONS    Current Outpatient Medications on File Prior to Encounter   Medication Sig Dispense Refill    clopidogrel (PLAVIX) 75 MG tablet Take 1 tablet by mouth daily 90 tablet 0    albuterol sulfate  (90 Base) MCG/ACT inhaler Inhale 2 puffs into the lungs every 6 hours as needed for Wheezing 1 Inhaler 3    lisinopril (PRINIVIL;ZESTRIL) 5 MG tablet Take 1 tablet by mouth daily 90 tablet 1    Polyethylene Glycol 3350 (MIRALAX PO) Take by mouth as needed      gabapentin (NEURONTIN) 300 MG capsule Take 1 capsule by mouth 3 times daily for 90 days. 90 capsule 2    aspirin 81 MG chewable tablet Take 1 tablet by mouth daily 30 tablet 3    atorvastatin (LIPITOR) 40 MG tablet Take 1 tablet by mouth nightly 90 tablet 1    metoprolol tartrate (LOPRESSOR) 25 MG tablet Take 1 tablet by mouth 2 times daily 180 tablet 1    insulin lispro (HUMALOG) 100 UNIT/ML injection vial Inject 0-12 Units into the skin 3 times daily (with meals) (Patient taking differently: Inject 0-12 Units into the skin as needed ) 1 vial 3    Multiple Vitamins-Minerals (MULTIVITAMIN PO) Take 1 tablet by mouth daily        No current facility-administered medications on file prior to encounter. REVIEW OF SYSTEMS    Pertinent items are noted in HPI. Objective:      /79   Pulse 99   Temp 96.7 °F (35.9 °C) (Infrared)   Resp 18   SpO2 96%     Wt Readings from Last 3 Encounters:   11/25/20 290 lb (131.5 kg)   11/13/20 290 lb (131.5 kg)   08/14/20 285 lb (129.3 kg)       PHYSICAL EXAM    General Appearance: Alert and oriented to person, place and time, well developed and well- nourished, in no acute distress. Vascular: DP and PT pulses are faintly palpable to the left lower extremity. Cap refill less than 5 seconds. Skin was warm to cool from proximal tibia to distal foot. Mild edema noted. Skin: Patient continues to have a neurotrophic ulcer to the plantar lateral aspect of the left foot. No probe to bone or proximal streaking noted. Scant serous drainage with no malodor. Patient has a previous TMA to the left lower extremity. Patient also noted to have a small left posterior heel wound.   Wound does not probe to bone and there is no proximal streaking. Scant serous drainage noted. No malodor. Neurovascular: Epicritic and protopathic sensations are grossly diminished to bilateral lower extremities. Wound 02/01/21 Foot Left (Active)   Wound Image   02/01/21 0919   Wound Etiology Non-Healing Surgical 02/01/21 0919   Dressing Status Intact; Old drainage noted;New dressing applied 02/01/21 0919   Wound Cleansed Cleansed with saline 02/01/21 0919   Dressing/Treatment Alginate;Collagen;ABD;Coban/self-adherent bandages; Roll gauze 02/01/21 0919   Offloading for Diabetic Foot Ulcers Other (comment) 02/01/21 0919   Wound Length (cm) 3.5 cm 02/01/21 0919   Wound Width (cm) 2.8 cm 02/01/21 0919   Wound Depth (cm) 0.4 cm 02/01/21 0919   Wound Surface Area (cm^2) 9.8 cm^2 02/01/21 0919   Wound Volume (cm^3) 3.92 cm^3 02/01/21 0919   Wound Assessment Pale granulation tissue;Slough 02/01/21 0919   Drainage Amount Moderate 02/01/21 0919   Drainage Description Serosanguinous; Yellow 02/01/21 0919   Odor None 02/01/21 0919   Sumaya-wound Assessment Blanchable erythema; Maceration 02/01/21 0919   Margins Attached edges 02/01/21 0919   Wound Thickness Description not for Pressure Injury Full thickness 02/01/21 0919   Number of days: 6       Wound 02/01/21 Heel Left (Active)   Wound Image   02/01/21 0919   Dressing Status Intact; Old drainage noted;New dressing applied 02/01/21 0919   Wound Cleansed Cleansed with saline 02/01/21 0919   Offloading for Diabetic Foot Ulcers Other (comment) 02/01/21 0919   Wound Length (cm) 0.6 cm 02/01/21 0919   Wound Width (cm) 0.5 cm 02/01/21 0919   Wound Depth (cm) 0.9 cm 02/01/21 0919   Wound Surface Area (cm^2) 0.3 cm^2 02/01/21 0919   Wound Volume (cm^3) 0.27 cm^3 02/01/21 0919   Wound Assessment Slough;Granulation tissue 02/01/21 0919   Drainage Amount Moderate 02/01/21 0919   Drainage Description Serosanguinous; Yellow 02/01/21 0919   Odor None 02/01/21 0919   Sumaya-wound Assessment Blanchable erythema;Dry/flaky 02/01/21 0919   Margins Attached edges 02/01/21 0919   Wound Thickness Description not for Pressure Injury Full thickness 02/01/21 0919   Number of days: 6       LABS       CBC:   Lab Results   Component Value Date    WBC 14.1 11/25/2020    HGB 13.6 11/25/2020    HCT 40.7 11/25/2020    MCV 85.0 11/25/2020     11/25/2020     BMP:   Lab Results   Component Value Date     11/25/2020    K 4.1 11/25/2020     11/25/2020    CO2 24 11/25/2020    BUN 12 11/25/2020    CREATININE 0.9 11/25/2020     PT/INR:   Lab Results   Component Value Date    INR 1.02 11/25/2020     Prealbumin: No results found for: PREALBUMIN  Albumin:  Lab Results   Component Value Date    LABALBU 3.9 11/25/2020     Sed Rate:  Lab Results   Component Value Date    SEDRATE 130 04/17/2020     CRP:   Lab Results   Component Value Date    CRP 14.47 04/17/2020     Micro:   Lab Results   Component Value Date    BC No growth-preliminary No growth  04/21/2020      Hemoglobin A1C:   Lab Results   Component Value Date    LABA1C 6.2 04/17/2020       Assessment:     Ulcer Identification:  Ulcer Type: venous  Contributing Factors: venous stasis    Wound: Dehiscence amputation stump    Depth of Diabetic/Pressure/Non Pressure Ulcers or Wound:  Wound, partial thickness    Patient Active Problem List   Diagnosis Code    Gangrene of toe of left foot ([facility] [address] I96    CAD (coronary artery disease) I25.10    Type 2 diabetes mellitus with insulin therapy ([facility] [address] E11.9, Z79.4    Hyperlipidemia E78.5    Hypertension I10    Charcot's joint, right ankle and foot M14.671    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    Sepsis ([facility] [address] A41.9    Acute left-sided low back pain with bilateral sciatica M54.42, M54.41    S/P transmetatarsal amputation of foot, left (HCC) Z89.432    Leukocytosis D72.829    Wound dehiscence, surgical, initial encounter T81.31XA    Postoperative wound infection T81.49XA    Metabolic acidosis Y37.1    Hx of CABG Z95.1    Lumbar stenosis without neurogenic claudication M48.061    CKD (chronic kidney disease), stage II N18.2    Normocytic anemia D64.9    Morbid obesity (HCC) E66.01    Debility R53.81    Carotid stenosis, asymptomatic, bilateral I65.23    Hypokalemia E87.6    Nonhealing ulcer of left lower extremity ([address] L97.929    Bleeding R58    Wound, open T14. 8XXA    SOB (shortness of breath) on exertion R06.02       Procedure Note  Indications:  Based on my examination of this patient's wound(s)/ulcer(s) today, debridement is required to promote healing and evaluate the extent healing. Performed by: EYAD Tirado CNP  Consent obtained: Yes  Time out taken:  Yes  Pain control: lidocaine 2%     Debridement:Excisional Debridement - 87591  Using curette and tissue nippers the wound(s)/ulcer(s) was/were sharply debrided down through and including the removal of epidermis, dermis and subcutaneous tissue. Devitalized Tissue Debrided:  fibrin, biofilm and slough    Pre Debridement Measurements:  Are located in the Wound/Ulcer Documentation Flow Sheet    Wound/Ulcer #: 1    Post Debridement Measurements:  Wound/Ulcer Descriptions are listed under Physical Exam above. Wound/Ulcer Descriptions are Pre Debridement except measurements  Percent of Wound/Ulcer Debrided: 100%  Total Surface Area Debrided:  9.8 sq cm   Bleeding:  Minimal  Hemostasis Achieved:  by pressure and by silver nitrate stick  Procedural Pain:  0  / 10   Post Procedural Pain:  0 / 10   Response to treatment:  Well tolerated by patient.          Chemical Cautery - [id number]    Performed by: EYAD Tirado CNP  Consent obtained: Yes  Time out taken: Yes  Tissue Type: Hypergranulation  PainControl: N/A  Method: silver nitrate    Location of Chemical Cautery:   Wound/Ulcer #1     Wound 02/01/21 Foot Left (Active)   Wound Image   02/01/21 0919   Wound Etiology Non-Healing Surgical 02/01/21 0919 Dressing Status Intact; Old drainage noted;New dressing applied 02/01/21 0919   Wound Cleansed Cleansed with saline 02/01/21 0919   Dressing/Treatment Alginate;Collagen;ABD;Coban/self-adherent bandages; Roll gauze 02/01/21 0919   Offloading for Diabetic Foot Ulcers Other (comment) 02/01/21 0919   Wound Length (cm) 3.5 cm 02/01/21 0919   Wound Width (cm) 2.8 cm 02/01/21 0919   Wound Depth (cm) 0.4 cm 02/01/21 0919   Wound Surface Area (cm^2) 9.8 cm^2 02/01/21 0919   Wound Volume (cm^3) 3.92 cm^3 02/01/21 0919   Wound Assessment Pale granulation tissue;Slough 02/01/21 0919   Drainage Amount Moderate 02/01/21 0919   Drainage Description Serosanguinous; Yellow 02/01/21 0919   Odor None 02/01/21 0919   Sumaya-wound Assessment Blanchable erythema; Maceration 02/01/21 0919   Margins Attached edges 02/01/21 0919   Wound Thickness Description not for Pressure Injury Full thickness 02/01/21 0919   Number of days: 6       Wound 02/01/21 Heel Left (Active)   Wound Image   02/01/21 0919   Dressing Status Intact; Old drainage noted;New dressing applied 02/01/21 0919   Wound Cleansed Cleansed with saline 02/01/21 0919   Offloading for Diabetic Foot Ulcers Other (comment) 02/01/21 0919   Wound Length (cm) 0.6 cm 02/01/21 0919   Wound Width (cm) 0.5 cm 02/01/21 0919   Wound Depth (cm) 0.9 cm 02/01/21 0919   Wound Surface Area (cm^2) 0.3 cm^2 02/01/21 0919   Wound Volume (cm^3) 0.27 cm^3 02/01/21 0919   Wound Assessment Slough;Granulation tissue 02/01/21 0919   Drainage Amount Moderate 02/01/21 0919   Drainage Description Serosanguinous; Yellow 02/01/21 0919   Odor None 02/01/21 0919   Sumaya-wound Assessment Blanchable erythema;Dry/flaky 02/01/21 0919   Margins Attached edges 02/01/21 0919   Wound Thickness Description not for Pressure Injury Full thickness 02/01/21 0919   Number of days: 6     Incision 07/20/20 Left;Plantar (Active)   Number of days: 203       Incision 08/14/20 Right (Active)   Number of days: 177     Procedural Pain: 0  / 10   Post Procedural Pain: 0 / 10   Response to treatment: Well tolerated by patient. Unna boot compression therapy, left. - 72071  A dual layer Unna boot consisting of a zinc oxide wrap followed by Coban tape was applied to the left lower extremity. Patient tolerated procedure well. Plan:     Patient examined and evaluated on behalf of Dr. Oquendo [place] today. Left TMA wound was excisionally debrided in office today. Wound was then treated with silver nitrate. Wound was dressed with collagen, alginate, and Unna boot. We will get patient precertified for skin substitute grafts today. Patient will follow back up with us again in 1 week. Treatment: No orders of the defined types were placed in this encounter. Antibiotics: No  Follow up: 1 week  Please see attached Discharge Instructions  Written patient dismissal instructions given to patient and signed by patient or POA. Discharge Instructions       Visit Discharge/Physician Orders:  - Will check into graft options for you. - Silver nitrate to left heel   - Debridement to left foot wound. Wound Location: left foot    Dressing orders:     1) Gather wound care supplies and arrange on clean table. 2) Wash your hands with soap and water or use alcohol based hand  for 20 seconds (sing \"Happy Birthday\" twice). 3) Cleanse wounds with normal saline or wound cleanser and gauze. Pat dry with clean gauze. 4) left foot/ left heel- Remove old unna boots to left lower legs. Wash legs with warm soapy water. Pat dry. Cleanse wound with normal saline or wound cleanser and gauze. Pat dry with clean gauze. Apply collagen the alginate to wound bed. Apply unna boots , ABD then kerlix and coban to left lower legs from base of toes to 1-2 inches below bend of knee. Change in wound clinic weekly.     If unna boot becomes too tight- raise/elevate legs above the level of the heart for 15-20 minutes if swelling does not go down then carefully cut off unna boot and call clinic or go to local ER or family physician. If unna boot becomes wet or starts to roll down then carefully remove unna boot and call clinic. Keep all dressings clean & dry. Do not shower, take baths or get wound wet, unless otherwise instructed by your Wound Care doctor. Follow up visit:   1 Week on Monday February 8th at 9:15 am     Keep next scheduled appointment. Please give 24 hour notice if unable to keep appointment. [phone]    If you experience any of the following, please call the Wound Care Service during business hours: Monday through Friday 8:00 am - 4:30 pm  ([phone]). *Increase in pain   *Temperature over 101   *Increase in drainage from your wound or a foul odor   *Uncontrolled swelling   *Need for compression bandage changes due to slippage, breakthrough drainage    If you need medical attention outside of business hours, please contact your Primary Care Doctor or go to the nearest emergency room.            Electronically signed by EYAD Bryan CNP on 2/8/2021 at 7:56 AM

## 2021-02-05 ENCOUNTER — TELEPHONE (OUTPATIENT)
Dept: WOUND CARE | Age: 61
End: 2021-02-05

## 2021-02-05 DIAGNOSIS — E11.9 TYPE 2 DIABETES MELLITUS WITH INSULIN THERAPY (HCC): ICD-10-CM

## 2021-02-05 DIAGNOSIS — Z79.4 TYPE 2 DIABETES MELLITUS WITH INSULIN THERAPY (HCC): ICD-10-CM

## 2021-02-05 RX ORDER — INSULIN DETEMIR 100 [IU]/ML
44 INJECTION, SOLUTION SUBCUTANEOUS EVERY MORNING
Qty: 5 PEN | Refills: 5 | Status: SHIPPED | OUTPATIENT
Start: 2021-02-05 | End: 2021-08-20 | Stop reason: SDUPTHER

## 2021-02-05 NOTE — TELEPHONE ENCOUNTER
Recent Travel Screening and Travel History documentation:     Travel Screening     Question   Response    In the last month, have you been in contact with someone who was confirmed or suspected to have Coronavirus / COVID-19? No / Unsure    Have you had a COVID-19 viral test in the last 14 days? No    Do you have any of the following new or worsening symptoms? None of these    Have you traveled internationally in the last month?   No      Travel History   Travel since 01/05/21     No documented travel since 01/05/21

## 2021-02-05 NOTE — TELEPHONE ENCOUNTER
Sharee Wyatt called requesting a refill on the following medications:  Requested Prescriptions     Pending Prescriptions Disp Refills    insulin detemir (LEVEMIR FLEXTOUCH) 100 UNIT/ML injection pen 5 pen 5     Sig: Inject 44 Units into the skin every morning     Pharmacy verified:  [email or web address]      Date of last visit: 12/23/20  Date of next visit (if applicable): Visit date not found

## 2021-02-05 NOTE — TELEPHONE ENCOUNTER
Lias Gomes needs refill of   Requested Prescriptions     Pending Prescriptions Disp Refills    insulin detemir (LEVEMIR FLEXTOUCH) 100 UNIT/ML injection pen 5 pen 5     Sig: Inject 44 Units into the skin every morning       Last Filled on:  08/10/20    Last Visit Date:  12/23/2020    Next Visit Date:    Visit date not found

## 2021-02-08 ENCOUNTER — HOSPITAL ENCOUNTER (OUTPATIENT)
Dept: WOUND CARE | Age: 61
Discharge: HOME | End: 2021-02-08
Payer: COMMERCIAL

## 2021-02-08 VITALS
OXYGEN SATURATION: 97 % | RESPIRATION RATE: 18 BRPM | DIASTOLIC BLOOD PRESSURE: 87 MMHG | HEART RATE: 83 BPM | TEMPERATURE: 97.3 F | SYSTOLIC BLOOD PRESSURE: 172 MMHG

## 2021-02-08 DIAGNOSIS — L97.922 NONHEALING ULCER OF LEFT LOWER EXTREMITY WITH FAT LAYER EXPOSED (HCC): Primary | ICD-10-CM

## 2021-02-08 PROCEDURE — 17250 CHEM CAUT OF GRANLTJ TISSUE: CPT

## 2021-02-08 PROCEDURE — 11042 DBRDMT SUBQ TIS 1ST 20SQCM/<: CPT

## 2021-02-08 PROCEDURE — 29580 STRAPPING UNNA BOOT: CPT

## 2021-02-08 PROCEDURE — 6370000000 HC RX 637 (ALT 250 FOR IP): Performed by: NURSE PRACTITIONER

## 2021-02-08 RX ADMIN — SILVER NITRATE APPLICATORS 2 EACH: 25; 75 STICK TOPICAL at 10:18

## 2021-02-08 NOTE — PROGRESS NOTES
[id number] [address]         Consult and Procedure Note      Joslyn Schroeder  MEDICAL RECORD NUMBER:  [id number]  AGE: 62 y.o. GENDER: male  : 1960  EPISODE DATE:  2021    Subjective:     Chief Complaint   Patient presents with    Wound Check     Left foot, Left heel         HISTORY of PRESENT ILLNESS HPI     Joslyn Schroeder is a 62 y.o. male Established patient who is well-known to our office who is being seen today for an ongoing left TMA wound. Patient is a type II diabetic with severe peripheral vascular disease. Patient was previously being seen at our [facility] office for this ongoing wound. After debridement, wound was treated with silver nitrate for chemical cauterization as well as wound promotion. Patient was placed back into Unna boot compression therapy. We are still waiting to here back from whether or not he is approved for skin substitute grafts. We are going to have home health come out once weekly on  for unna boot changes. Patient will F/U with us again in 1 week. Patient to keep dressing clean, dry, intact until he is seen again by us next week. All other questions or concerns were otherwise addressed at today's visit. 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   Wound/Ulcer Pain Timing/Severity: none  Quality of pain: N/A  Severity:  0/10   Modifying Factors: None  Associated Signs/Symptoms: edema, erythema and drainage        PAST MEDICAL HISTORY        Diagnosis Date    Arthritis     CAD (coronary artery disease)     CKD (chronic kidney disease), stage II     Diabetes mellitus ([facility] [address]     Hyperlipidemia     Hypertension     Liver disease     HEPITITIS AS A CHILD       PAST SURGICAL HISTORY    Past Surgical History:   Procedure Laterality Date    CARDIAC SURGERY  2019    triple bypass    CYST REMOVAL      RIGHT ANKLE     FOOT DEBRIDEMENT Left 2020    LEFT TRANSMETATARSAL AMPUTATION  FOOT INCISION AND DRAINAGE performed by Tu Kingsley DPM at [address] [facility] Left 2020    LEFT WOUND DEBRIDEMENT WITH WOUND VAC APPLICATION performed by Tu Kingsley DPM at [facility] Right     CYST REMOVED    FOOT SURGERY Left 2020    LEFT FOOT PREPARATION GRAFT SITE, HAVEST SPLIT THICKNESS SKIN GRAFT WITH APPLICATION, APPLICATION [employer] WOUND VAC performed by Tu Kingsley DPM at [address] TOE AMPUTATION Left 3/3/2020    I&D LEFT FOOT, TRANSMETATARSAL AMPUTATION performed by Tu Kingsley DPM at [facility]    Family History   Problem Relation Age of Onset    Diabetes Mother     High Blood Pressure Mother     Alzheimer's Disease Father          of, 80or 80years old   [facility] Heart Disease Father     High Blood Pressure Father     Cancer Neg Hx     Stroke Neg Hx        SOCIAL HISTORY    Social History     Tobacco Use    Smoking status: Former Smoker     Types: Cigarettes     Quit date:      Years since quittin.1    Smokeless tobacco: Never Used   Substance Use Topics    Alcohol use: Not Currently    Drug use: Never       ALLERGIES    No Known Allergies    MEDICATIONS    Current Outpatient Medications on File Prior to Encounter   Medication Sig Dispense Refill    insulin detemir (LEVEMIR FLEXTOUCH) 100 UNIT/ML injection pen Inject 44 Units into the skin every morning 5 pen 5    clopidogrel (PLAVIX) 75 MG tablet Take 1 tablet by mouth daily 90 tablet 0    lisinopril (PRINIVIL;ZESTRIL) 5 MG tablet Take 1 tablet by mouth daily 90 tablet 1    gabapentin (NEURONTIN) 300 MG capsule Take 1 capsule by mouth 3 times daily for 90 days.  90 capsule 2    aspirin 81 MG chewable tablet Take 1 tablet by mouth daily 30 tablet 3    atorvastatin (LIPITOR) 40 MG tablet Take 1 tablet by mouth nightly 90 tablet 1    metoprolol tartrate (LOPRESSOR) 25 MG tablet Take 1 tablet by mouth 2 times daily 180 tablet 1    insulin lispro (HUMALOG) 100 UNIT/ML injection vial Inject 0-12 Units into the skin 3 times daily (with meals) (Patient taking differently: Inject 0-12 Units into the skin as needed ) 1 vial 3    Multiple Vitamins-Minerals (MULTIVITAMIN PO) Take 1 tablet by mouth daily       albuterol sulfate  (90 Base) MCG/ACT inhaler Inhale 2 puffs into the lungs every 6 hours as needed for Wheezing 1 Inhaler 3    Polyethylene Glycol 3350 (MIRALAX PO) Take by mouth as needed       No current facility-administered medications on file prior to encounter. REVIEW OF SYSTEMS    Pertinent items are noted in HPI. Objective:      BP (!) 172/87   Pulse 83   Temp 97.3 °F (36.3 °C)   Resp 18   SpO2 97%     Wt Readings from Last 3 Encounters:   11/25/20 290 lb (131.5 kg)   11/13/20 290 lb (131.5 kg)   08/14/20 285 lb (129.3 kg)       PHYSICAL EXAM    General Appearance: Alert and oriented to person, place   and time, well developed and well- nourished, in no acute distress. Vascular: DP and PT pulses are faintly palpable to the left lower extremity. Cap refill less than 5 seconds. Skin was warm to cool from proximal tibia to distal foot. Mild edema noted. Skin: Patient continues to have a neurotrophic ulcer to the plantar lateral aspect of the left foot. No probe to bone or proximal streaking noted. Scant serous drainage with no malodor. Patient has a previous TMA to the left lower extremity. Patient also noted to have a small left posterior heel wound. Wound does not probe to bone and there is no proximal streaking. Scant serous drainage noted. No malodor. Neurovascular: Epicritic and protopathic sensations are grossly diminished to bilateral lower extremities. Wound 02/01/21 Foot Left (Active)   Wound Image   02/15/21 1020   Wound Etiology Non-Healing Surgical 02/15/21 1020   Dressing Status Intact; New dressing applied 02/15/21 1020   Wound Cleansed Cleansed with saline 02/15/21 1020   Dressing/Treatment Collagen;Alginate;ABD;Roll gauze;Coban/self-adherent bandages 02/15/21 1020   Offloading for Diabetic Foot Ulcers Other (comment) 02/01/21 0919   Wound Length (cm) 3 cm 02/15/21 1020   Wound Width (cm) 3 cm 02/15/21 1020   Wound Depth (cm) 0.3 cm 02/15/21 1020   Wound Surface Area (cm^2) 9 cm^2 02/15/21 1020   Change in Wound Size % (l*w) 8.16 02/15/21 1020   Wound Volume (cm^3) 2.7 cm^3 02/15/21 1020   Wound Healing % 31 02/15/21 1020   Wound Assessment Granulation tissue;Slough 02/15/21 1020   Drainage Amount Large 02/15/21 1020   Drainage Description Serosanguinous; Yellow 02/15/21 1020   Odor Mild 02/15/21 1020   Sumaya-wound Assessment Maceration; Hyperkeratosis (callous) 02/15/21 1020   Margins Attached edges 02/15/21 1020   Wound Thickness Description not for Pressure Injury Full thickness 02/15/21 1020   Number of days: 14       Wound 02/01/21 Heel Left (Active)   Wound Image   02/15/21 1020   Dressing Status Intact; New dressing applied 02/15/21 1020   Wound Cleansed Cleansed with saline 02/15/21 1020   Dressing/Treatment Packing;ABD 02/15/21 1020   Offloading for Diabetic Foot Ulcers Other (comment) 02/01/21 0919   Wound Length (cm) 0.5 cm 02/15/21 1020   Wound Width (cm) 0.5 cm 02/15/21 1020   Wound Depth (cm) 0.7 cm 02/15/21 1020   Wound Surface Area (cm^2) 0.25 cm^2 02/15/21 1020   Change in Wound Size % (l*w) 16.67 02/15/21 1020   Wound Volume (cm^3) 0.18 cm^3 02/15/21 1020   Wound Healing % 33 02/15/21 1020   Undermining Starts ___ O'Clock 12 02/15/21 1020   Undermining Ends___ O'Clock 12 02/15/21 1020   Undermining Maxium Distance (cm) 0.7 02/15/21 1020   Wound Assessment Granulation tissue;Slough 02/15/21 1020   Drainage Amount Moderate 02/15/21 1020   Drainage Description Serosanguinous; Yellow 02/15/21 1020   Odor None 02/15/21 1020   Sumaya-wound Assessment Dry/flaky 02/08/21 0925   Margins Unattached edges 02/15/21 1020   Wound Thickness Description not for Pressure Injury Full thickness 02/15/21 1020 Number of days: 14       LABS       CBC:   Lab Results   Component Value Date    WBC 14.1 11/25/2020    HGB 13.6 11/25/2020    HCT 40.7 11/25/2020    MCV 85.0 11/25/2020     11/25/2020     BMP:   Lab Results   Component Value Date     11/25/2020    K 4.1 11/25/2020     11/25/2020    CO2 24 11/25/2020    BUN 12 11/25/2020    CREATININE 0.9 11/25/2020     PT/INR:   Lab Results   Component Value Date    INR 1.02 11/25/2020     Prealbumin: No results found for: PREALBUMIN  Albumin:  Lab Results   Component Value Date    LABALBU 3.9 11/25/2020     Sed Rate:  Lab Results   Component Value Date    SEDRATE 130 04/17/2020     CRP:   Lab Results   Component Value Date    CRP 14.47 04/17/2020     Micro:   Lab Results   Component Value Date    BC No growth-preliminary No growth  04/21/2020      Hemoglobin A1C:   Lab Results   Component Value Date    LABA1C 6.2 04/17/2020       Assessment:     Ulcer Identification:  Ulcer Type: venous  Contributing Factors: venous stasis    Wound: Dehiscence amputation stump    Depth of Diabetic/Pressure/Non Pressure Ulcers or Wound:  Wound, partial thickness    Patient Active Problem List   Diagnosis Code    Gangrene of toe of left foot ([facility] [address] I96    CAD (coronary artery disease) I25.10    Type 2 diabetes mellitus with insulin therapy ([facility] [address] E11.9, Z79.4    Hyperlipidemia E78.5    Hypertension I10    Charcot's joint, right ankle and foot M14.671    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 [address] E66.01    Debility R53.81    Carotid stenosis, asymptomatic, bilateral I65.23    Hypokalemia E87.6    Nonhealing ulcer of left lower extremity ([facility] [address] L97.929    Bleeding R58    Wound, open T14. 8XXA    SOB (shortness of breath) on exertion R06.02       Procedure Note  Indications:  Based on my examination of this patient's wound(s)/ulcer(s) today, debridement is required to promote healing and evaluate the extent healing. Performed by: EYAD Wesley CNP  Consent obtained: Yes  Time out taken:  Yes  Pain control: lidocaine 2%     Debridement:Excisional Debridement - 16279  Using curette and tissue nippers the wound(s)/ulcer(s) was/were sharply debrided down through and including the removal of epidermis, dermis and subcutaneous tissue. Devitalized Tissue Debrided:  fibrin, biofilm and slough    Pre Debridement Measurements:  Are located in the Wound/Ulcer Documentation Flow Sheet    Wound/Ulcer #: 1    Post Debridement Measurements:  Wound/Ulcer Descriptions are listed under Physical Exam above. Wound/Ulcer Descriptions are Pre Debridement except measurements  Percent of Wound/Ulcer Debrided: 100%  Total Surface Area Debrided:  9 sq cm   Bleeding:  Minimal  Hemostasis Achieved:  by pressure and by silver nitrate stick  Procedural Pain:  0 /10   Post Procedural Pain:  0/10   Response to treatment:  Well tolerated by patient. Chemical Cautery - [id number]    Performed by: EYAD Wesley CNP  Consent obtained: Yes  Time out taken: Yes  Tissue Type: Hypergranulation  PainControl: N/A  Method: silver nitrate    Location of Chemical Cautery:   Wound/Ulcer #1     Wound 02/01/21 Foot Left (Active)   Wound Image   02/15/21 1020   Wound Etiology Non-Healing Surgical 02/15/21 1020   Dressing Status Intact; New dressing applied 02/15/21 1020   Wound Cleansed Cleansed with saline 02/15/21 1020   Dressing/Treatment Collagen;Alginate;ABD;Roll gauze;Coban/self-adherent bandages 02/15/21 1020   Offloading for Diabetic Foot Ulcers Other (comment) 02/01/21 0919   Wound Length (cm) 3 cm 02/15/21 1020   Wound Width (cm) 3 cm 02/15/21 1020   Wound Depth (cm) 0.3 cm 02/15/21 1020   Wound Surface Area (cm^2) 9 cm^2 02/15/21 1020   Change in Wound Size % (l*w) 8.16 02/15/21 1020   Wound Volume (cm^3) 2.7 cm^3 02/15/21 1020   Wound Healing % 31 02/15/21 1020   Wound Assessment Granulation tissue;Slough 02/15/21 1020   Drainage Amount Large 02/15/21 1020   Drainage Description Serosanguinous; Yellow 02/15/21 1020   Odor Mild 02/15/21 1020   Sumaya-wound Assessment Maceration; Hyperkeratosis (callous) 02/15/21 1020   Margins Attached edges 02/15/21 1020   Wound Thickness Description not for Pressure Injury Full thickness 02/15/21 1020   Number of days: 14       Wound 02/01/21 Heel Left (Active)   Wound Image   02/15/21 1020   Dressing Status Intact; New dressing applied 02/15/21 1020   Wound Cleansed Cleansed with saline 02/15/21 1020   Dressing/Treatment Packing;ABD 02/15/21 1020   Offloading for Diabetic Foot Ulcers Other (comment) 02/01/21 0919   Wound Length (cm) 0.5 cm 02/15/21 1020   Wound Width (cm) 0.5 cm 02/15/21 1020   Wound Depth (cm) 0.7 cm 02/15/21 1020   Wound Surface Area (cm^2) 0.25 cm^2 02/15/21 1020   Change in Wound Size % (l*w) 16.67 02/15/21 1020   Wound Volume (cm^3) 0.18 cm^3 02/15/21 1020   Wound Healing % 33 02/15/21 1020   Undermining Starts ___ O'Clock 12 02/15/21 1020   Undermining Ends___ O'Clock 12 02/15/21 1020   Undermining Maxium Distance (cm) 0.7 02/15/21 1020   Wound Assessment Granulation tissue;Slough 02/15/21 1020   Drainage Amount Moderate 02/15/21 1020   Drainage Description Serosanguinous; Yellow 02/15/21 1020   Odor None 02/15/21 1020   Sumaya-wound Assessment Dry/flaky 02/08/21 0925   Margins Unattached edges 02/15/21 1020   Wound Thickness Description not for Pressure Injury Full thickness 02/15/21 1020   Number of days: 14     Incision 07/20/20 Left;Plantar (Active)   Number of days: 211       Incision 08/14/20 Right (Active)   Number of days: 185     Procedural Pain: 0  / 10   Post Procedural Pain: 0 / 10   Response to treatment: Well tolerated by patient. Unna boot compression therapy, left. - 47731  A dual layer Unna boot consisting of a zinc oxide wrap followed by Coban tape was applied to the left lower extremity. Patient tolerated procedure well. Plan:     Patient examined and evaluated today. Left TMA wound was excisionally debrided in office again today. Wound was then treated with silver nitrate. Wound was dressed with collagen, alginate, and Unna boot. Heel wound was packed with collagen. Still waiting to hear back from the skin substitute grafts. Patient will follow back up with us again in 1 week. Treatment:   Orders Placed This Encounter   Procedures   [address] [place] [address]. [facility]uf[facility]'s     Referral Priority:   Routine     Referral Type:   Home Health Care     Referral Reason:   Specialty Services Required     Referral Location:   [facility]     Requested Specialty:   [address]     Number of Visits Requested:   1       Antibiotics: No  Follow up: 1 week  Please see attached Discharge Instructions  Written patient dismissal instructions given to patient and signed by patient or POA. Discharge Instructions       Visit Discharge/Physician Orders:  - Will check into graft options for you. - Silver nitrate to left heel   - Debridement to left foot wound. Wound Location: left foot    Dressing orders:     1) Gather wound care supplies and arrange on clean table. 2) Wash your hands with soap and water or use alcohol based hand  for 20 seconds (sing \"Happy Birthday\" twice). 3) Cleanse wounds with normal saline or wound cleanser and gauze. Pat dry with clean gauze. 4) left foot/ left heel- Remove old unna boots to left lower legs. Wash legs with warm soapy water. Pat dry.  Cleanse wound with normal saline or wound cleanser and gauze. Pat dry with clean gauze. Apply collagen the alginate to wound bed. Apply unna boots , ABD then kerlix and coban to left lower legs from base of toes to 1-2 inches below bend of knee. Change in wound clinic weekly. If unna boot becomes too tight- raise/elevate legs above the level of the heart for 15-20 minutes if swelling does not go down then carefully cut off unna boot and call clinic or go to local ER or family physician. If unna boot becomes wet or starts to roll down then carefully remove unna boot and call clinic. Keep all dressings clean & dry. Do not shower, take baths or get wound wet, unless otherwise instructed by your Wound Care doctor. Follow up visit:   1 Week on Monday February 8th at 9:15 am     Keep next scheduled appointment. Please give 24 hour notice if unable to keep appointment. [phone]    If you experience any of the following, please call the Wound Care Service during business hours: Monday through Friday 8:00 am - 4:30 pm  ([phone]). *Increase in pain   *Temperature over 101   *Increase in drainage from your wound or a foul odor   *Uncontrolled swelling   *Need for compression bandage changes due to slippage, breakthrough drainage    If you need medical attention outside of business hours, please contact your Primary Care Doctor or go to the nearest emergency room.          Electronically signed by EYAD Quinn CNP on 2/16/2021 at 8:09 AM

## 2021-02-08 NOTE — PROGRESS NOTES
Psychiatric Application   Below Knee    NAME:  Mansi Saucedo  YOB: 1960  MEDICAL RECORD NUMBER:  [id number]  DATE:  2/8/2021    Dennisbrenda Magaña boot: Appied primary and secondary dressing as ordered. Applied Unna roll from toes to knee overlapping each time. Applied ace wrap or coban from toes to below the knee. Secured with tape and/or metal clips covered with tape. Instructed patient/caregiver to keep dressing dry and intact. DO NOT REMOVE DRESSING. Instructed pt/family/caregiver to report excessive draining, loose bandage, wet dressing, severe pain or tingling in toes. Applied Psychiatric dressing below the knee to left lower leg. Unna Boot(s) were applied per  Guidelines.      Electronically signed by Daryl Scheuermann, RN on 2/8/2021 at 11:10 AM

## 2021-02-08 NOTE — PLAN OF CARE
Problem: Wound:  Goal: Will show signs of wound healing; wound closure and no evidence of infection  Description: Will show signs of wound healing; wound closure and no evidence of infection  Outcome: Ongoing   Patient presents to wound clinic for follow up of left heel and left foot wounds. Wounds measure smaller in size. No s/s of infection. Patient afebrile. Awaiting insurance coverage information for graft. Debridement to left foot wound and silver nitrate applied. [facility]erral to [facility] today, start of care for Thursday. Left foot/Left heel- Apply collagen then alginate to foot wound. Apply alginate to heel wound and cover with bordered foam dressing. Apply unna boots, ABD then kerlix and coban to left lower legs from base of toes to 1-2 inches below bend of knee. Home Health to change weekly on Thursdays and Appointments in [address] [place] [address] on Mondays. Follow up visit:  1 Week on Monday February 15th at 9:45 am.    Care plan reviewed with patient. Patient verbalize understanding of the plan of care and contribute to goal setting.

## 2021-02-09 NOTE — TELEPHONE ENCOUNTER
Patient is calling in regards to his medication refill for [facility] patient still have yet to get medication refilled at pharmacy please advise

## 2021-02-09 NOTE — TELEPHONE ENCOUNTER
Called [facility] and spoke with [facility]. The prescription is there. It is a refill to soon and can be picked up on Friday 2/12/2021. Notified patient. He voiced understanding.

## 2021-02-12 ENCOUNTER — TELEPHONE (OUTPATIENT)
Dept: WOUND CARE | Age: 61
End: 2021-02-12

## 2021-02-15 ENCOUNTER — HOSPITAL ENCOUNTER (OUTPATIENT)
Dept: WOUND CARE | Age: 61
Discharge: HOME | End: 2021-02-15
Payer: COMMERCIAL

## 2021-02-15 VITALS
HEART RATE: 83 BPM | DIASTOLIC BLOOD PRESSURE: 77 MMHG | TEMPERATURE: 97.6 F | OXYGEN SATURATION: 98 % | SYSTOLIC BLOOD PRESSURE: 140 MMHG | RESPIRATION RATE: 18 BRPM

## 2021-02-15 DIAGNOSIS — T14.8XXA WOUND, OPEN: Primary | ICD-10-CM

## 2021-02-15 DIAGNOSIS — L97.922 NONHEALING ULCER OF LEFT LOWER EXTREMITY WITH FAT LAYER EXPOSED (HCC): ICD-10-CM

## 2021-02-15 PROCEDURE — 11042 DBRDMT SUBQ TIS 1ST 20SQCM/<: CPT

## 2021-02-15 PROCEDURE — 6370000000 HC RX 637 (ALT 250 FOR IP): Performed by: NURSE PRACTITIONER

## 2021-02-15 PROCEDURE — 29580 STRAPPING UNNA BOOT: CPT

## 2021-02-15 PROCEDURE — 17250 CHEM CAUT OF GRANLTJ TISSUE: CPT

## 2021-02-15 RX ADMIN — SILVER NITRATE APPLICATORS 2 EACH: 25; 75 STICK TOPICAL at 10:58

## 2021-02-15 NOTE — PROGRESS NOTES
Laura Cam DPM at [address] Left 2020    LEFT WOUND DEBRIDEMENT WITH WOUND VAC APPLICATION performed by Yohana Beaulieu DPM at [facility] Right     CYST REMOVED    FOOT SURGERY Left 2020    LEFT FOOT PREPARATION GRAFT SITE, HAVEST SPLIT THICKNESS SKIN GRAFT WITH APPLICATION, APPLICATION [employer] WOUND VAC performed by Yohana Beaulieu DPM at [address] [facility] TOE AMPUTATION Left 3/3/2020    I&D LEFT FOOT, TRANSMETATARSAL AMPUTATION performed by Yohana Beaulieu DPM at [facility]    Family History   Problem Relation Age of Onset    Diabetes Mother     High Blood Pressure Mother     Alzheimer's Disease Father          of, 80or 80years old    Heart Disease Father     High Blood Pressure Father     Cancer Neg Hx     Stroke Neg Hx        SOCIAL HISTORY    Social History     Tobacco Use    Smoking status: Former Smoker     Types: Cigarettes     Quit date:      Years since quittin.1    Smokeless tobacco: Never Used   Substance Use Topics    Alcohol use: Not Currently    Drug use: Never       ALLERGIES    No Known Allergies    MEDICATIONS    Current Outpatient Medications on File Prior to Encounter   Medication Sig Dispense Refill    insulin detemir (LEVEMIR FLEXTOUCH) 100 UNIT/ML injection pen Inject 44 Units into the skin every morning 5 pen 5    clopidogrel (PLAVIX) 75 MG tablet Take 1 tablet by mouth daily 90 tablet 0    lisinopril (PRINIVIL;ZESTRIL) 5 MG tablet Take 1 tablet by mouth daily 90 tablet 1    aspirin 81 MG chewable tablet Take 1 tablet by mouth daily 30 tablet 3    atorvastatin (LIPITOR) 40 MG tablet Take 1 tablet by mouth nightly 90 tablet 1    metoprolol tartrate (LOPRESSOR) 25 MG tablet Take 1 tablet by mouth 2 times daily 180 tablet 1    insulin lispro (HUMALOG) 100 UNIT/ML injection vial Inject 0-12 Units into the skin 3 times daily (with meals) (Patient taking differently: Inject 0-12 Units into the skin as needed ) 1 vial 3    Multiple Vitamins-Minerals (MULTIVITAMIN PO) Take 1 tablet by mouth daily       albuterol sulfate  (90 Base) MCG/ACT inhaler Inhale 2 puffs into the lungs every 6 hours as needed for Wheezing 1 Inhaler 3    Polyethylene Glycol 3350 (MIRALAX PO) Take by mouth as needed      gabapentin (NEURONTIN) 300 MG capsule Take 1 capsule by mouth 3 times daily for 90 days. 90 capsule 2     No current facility-administered medications on file prior to encounter. REVIEW OF SYSTEMS    Pertinent items are noted in HPI. Objective:      BP (!) 140/77   Pulse 83   Temp 97.6 °F (36.4 °C) (Infrared)   Resp 18   SpO2 98%     Wt Readings from Last 3 Encounters:   11/25/20 290 lb (131.5 kg)   11/13/20 290 lb (131.5 kg)   08/14/20 285 lb (129.3 kg)       PHYSICAL EXAM    General Appearance: Alert and oriented to person, place   and time, well developed and well- nourished, in no acute distress. Vascular: DP and PT pulses are faintly palpable to the left lower extremity. Cap refill less than 5 seconds. Skin was warm to cool from proximal tibia to distal foot. Mild edema noted. Skin: Patient continues to have a neurotrophic ulcer to the plantar lateral aspect of the left foot. No probe to bone or proximal streaking noted. Scant serous drainage with no malodor. Patient has a previous TMA to the left lower extremity. Patient also continues to have a small left posterior heel wound. Wound does not probe to bone and there is no proximal streaking. Scant serous drainage noted. No malodor. Neurovascular: Epicritic and protopathic sensations are grossly diminished to bilateral lower extremities. Wound 02/01/21 Foot Left (Active)   Wound Image   02/15/21 1020   Wound Etiology Non-Healing Surgical 02/15/21 1020   Dressing Status Intact; New dressing applied 02/15/21 1020   Wound Cleansed Cleansed with saline 02/15/21 1020   Dressing/Treatment Collagen;Alginate;ABD;Roll gauze;Coban/self-adherent bandages 02/15/21 1020   Offloading for Diabetic Foot Ulcers Other (comment) 02/01/21 0919   Wound Length (cm) 3 cm 02/15/21 1020   Wound Width (cm) 3 cm 02/15/21 1020   Wound Depth (cm) 0.3 cm 02/15/21 1020   Wound Surface Area (cm^2) 9 cm^2 02/15/21 1020   Change in Wound Size % (l*w) 8.16 02/15/21 1020   Wound Volume (cm^3) 2.7 cm^3 02/15/21 1020   Wound Healing % 31 02/15/21 1020   Wound Assessment Granulation tissue;Slough 02/15/21 1020   Drainage Amount Large 02/15/21 1020   Drainage Description Serosanguinous; Yellow 02/15/21 1020   Odor Mild 02/15/21 1020   Sumaya-wound Assessment Maceration; Hyperkeratosis (callous) 02/15/21 1020   Margins Attached edges 02/15/21 1020   Wound Thickness Description not for Pressure Injury Full thickness 02/15/21 1020   Number of days: 20       Wound 02/01/21 Heel Left (Active)   Wound Image   02/15/21 1020   Dressing Status Intact; New dressing applied 02/15/21 1020   Wound Cleansed Cleansed with saline 02/15/21 1020   Dressing/Treatment Packing;ABD 02/15/21 1020   Offloading for Diabetic Foot Ulcers Other (comment) 02/01/21 0919   Wound Length (cm) 0.5 cm 02/15/21 1020   Wound Width (cm) 0.5 cm 02/15/21 1020   Wound Depth (cm) 0.7 cm 02/15/21 1020   Wound Surface Area (cm^2) 0.25 cm^2 02/15/21 1020   Change in Wound Size % (l*w) 16.67 02/15/21 1020   Wound Volume (cm^3) 0.18 cm^3 02/15/21 1020   Wound Healing % 33 02/15/21 1020   Undermining Starts ___ O'Clock 12 02/15/21 1020   Undermining Ends___ O'Clock 12 02/15/21 1020   Undermining Maxium Distance (cm) 0.7 02/15/21 1020   Wound Assessment Granulation tissue;Slough 02/15/21 1020   Drainage Amount Moderate 02/15/21 1020   Drainage Description Serosanguinous; Yellow 02/15/21 1020   Odor None 02/15/21 1020   Sumaya-wound Assessment Dry/flaky 02/08/21 0925   Margins Unattached edges 02/15/21 1020   Wound Thickness Description not for Pressure Injury Full thickness 02/15/21 1020 Number of days: 20       LABS       CBC:   Lab Results   Component Value Date    WBC 14.1 11/25/2020    HGB 13.6 11/25/2020    HCT 40.7 11/25/2020    MCV 85.0 11/25/2020     11/25/2020     BMP:   Lab Results   Component Value Date     11/25/2020    K 4.1 11/25/2020     11/25/2020    CO2 24 11/25/2020    BUN 12 11/25/2020    CREATININE 0.9 11/25/2020     PT/INR:   Lab Results   Component Value Date    INR 1.02 11/25/2020     Prealbumin: No results found for: PREALBUMIN  Albumin:  Lab Results   Component Value Date    LABALBU 3.9 11/25/2020     Sed Rate:  Lab Results   Component Value Date    SEDRATE 130 04/17/2020     CRP:   Lab Results   Component Value Date    CRP 14.47 04/17/2020     Micro:   Lab Results   Component Value Date    BC No growth-preliminary No growth  04/21/2020      Hemoglobin A1C:   Lab Results   Component Value Date    LABA1C 6.2 04/17/2020       Assessment:     Ulcer Identification:  Ulcer Type: venous  Contributing Factors: venous stasis    Wound: Dehiscence amputation stump    Depth of Diabetic/Pressure/Non Pressure Ulcers or Wound:  Wound, partial thickness    Patient Active Problem List   Diagnosis Code    Gangrene of toe of left foot ([facility] [address] I96    CAD (coronary artery disease) I25.10    Type 2 diabetes mellitus with insulin therapy ([facility] [address] E11.9, Z79.4    Hyperlipidemia E78.5    Hypertension I10    Charcot's joint, right ankle and foot M14.671    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    Sepsis ([facility] [address] A41.9    Acute left-sided low back pain with bilateral sciatica M54.42, M54.41    S/P transmetatarsal amputation of foot, left (HCC) Z89.432    Leukocytosis D72.829    Wound dehiscence, surgical, initial encounter T81.31XA    Postoperative wound infection Z72.83LO    Metabolic acidosis C34.7    Hx of CABG Z95.1    Lumbar stenosis without neurogenic claudication M48.061    CKD (chronic kidney disease), stage II N18.2    Normocytic anemia D64.9    Morbid obesity ([facility] [address] E66.01    Debility R53.81    Carotid stenosis, asymptomatic, bilateral I65.23    Hypokalemia E87.6    Nonhealing ulcer of left lower extremity ([facility] [address] L97.929    Bleeding R58    Wound, open T14. 8XXA    SOB (shortness of breath) on exertion R06.02       Procedure Note    Chemical Cautery - 96530    Performed by: EYAD Freitas CNP  Consent obtained: Yes  Time out taken: Yes  Tissue Type: Hypergranulation  PainControl: N/A  Method: silver nitrate    Location of Chemical Cautery:   Wound/Ulcer #1     Wound 02/01/21 Foot Left (Active)   Wound Image   02/15/21 1020   Wound Etiology Non-Healing Surgical 02/15/21 1020   Dressing Status Intact; New dressing applied 02/15/21 1020   Wound Cleansed Cleansed with saline 02/15/21 1020   Dressing/Treatment Collagen;Alginate;ABD;Roll gauze;Coban/self-adherent bandages 02/15/21 1020   Offloading for Diabetic Foot Ulcers Other (comment) 02/01/21 0919   Wound Length (cm) 3 cm 02/15/21 1020   Wound Width (cm) 3 cm 02/15/21 1020   Wound Depth (cm) 0.3 cm 02/15/21 1020   Wound Surface Area (cm^2) 9 cm^2 02/15/21 1020   Change in Wound Size % (l*w) 8.16 02/15/21 1020   Wound Volume (cm^3) 2.7 cm^3 02/15/21 1020   Wound Healing % 31 02/15/21 1020   Wound Assessment Granulation tissue;Slough 02/15/21 1020   Drainage Amount Large 02/15/21 1020   Drainage Description Serosanguinous; Yellow 02/15/21 1020   Odor Mild 02/15/21 1020   Sumaya-wound Assessment Maceration; Hyperkeratosis (callous) 02/15/21 1020   Margins Attached edges 02/15/21 1020   Wound Thickness Description not for Pressure Injury Full thickness 02/15/21 1020   Number of days: 20       Wound 02/01/21 Heel Left (Active)   Wound Image   02/15/21 1020   Dressing Status Intact; New dressing applied 02/15/21 1020   Wound Cleansed Cleansed with saline 02/15/21 1020   Dressing/Treatment Packing;ABD 02/15/21 1020   Offloading for Diabetic Foot Ulcers Other (comment) 02/01/21 0919   Wound Length (cm) 0.5 cm 02/15/21 1020   Wound Width (cm) 0.5 cm 02/15/21 1020   Wound Depth (cm) 0.7 cm 02/15/21 1020   Wound Surface Area (cm^2) 0.25 cm^2 02/15/21 1020   Change in Wound Size % (l*w) 16.67 02/15/21 1020   Wound Volume (cm^3) 0.18 cm^3 02/15/21 1020   Wound Healing % 33 02/15/21 1020   Undermining Starts ___ O'Clock 12 02/15/21 1020   Undermining Ends___ O'Clock 12 02/15/21 1020   Undermining Maxium Distance (cm) 0.7 02/15/21 1020   Wound Assessment Granulation tissue;Slough 02/15/21 1020   Drainage Amount Moderate 02/15/21 1020   Drainage Description Serosanguinous; Yellow 02/15/21 1020   Odor None 02/15/21 1020   Sumaya-wound Assessment Dry/flaky 02/08/21 0925   Margins Unattached edges 02/15/21 1020   Wound Thickness Description not for Pressure Injury Full thickness 02/15/21 1020   Number of days: 20     Incision 07/20/20 Left;Plantar (Active)   Number of days: 217       Incision 08/14/20 Right (Active)   Number of days: 191     Procedural Pain: 0  / 10   Post Procedural Pain: 0 / 10   Response to treatment: Well tolerated by patient. Unna boot compression therapy, left. - 16472  A dual layer Unna boot consisting of a zinc oxide wrap followed by Coban tape was applied to the left lower extremity. Patient tolerated procedure well. Plan:     Patient examined and evaluated today. Left TMA wound was again treated with silver nitrate. Wound was dressed with collagen, alginate, and Unna boot. Heel wound was packed with mesalt ribbon and covered with an ABD pad. Patient has been approved for Apligraf skin substitutes. Patient will have his right lower extremity cast exchanged tomorrow at [facility]. Patient will follow back up with us again in 1 week. Treatment:   No orders of the defined types were placed in this encounter. Antibiotics: No  Follow up: 1 week  Please see attached Discharge Instructions  Written patient dismissal instructions given to patient and signed by patient or POA. Discharge Instructions       Visit Discharge/Physician Orders:  - Will check into graft options for you. - Silver nitrate to left heel   - Debridement to left foot wound. Wound Location: left foot    Dressing orders:     1) Gather wound care supplies and arrange on clean table. 2) Wash your hands with soap and water or use alcohol based hand  for 20 seconds (sing \"Happy Birthday\" twice). 3) Cleanse wounds with normal saline or wound cleanser and gauze. Pat dry with clean gauze. 4) left foot/ left heel- Remove old unna boots to left lower legs. Wash legs with warm soapy water. Pat dry. Cleanse wound with normal saline or wound cleanser and gauze. Pat dry with clean gauze. Apply collagen the alginate to wound bed. Apply unna boots , ABD then kerlix and coban to left lower legs from base of toes to 1-2 inches below bend of knee. Change in wound clinic weekly. If unna boot becomes too tight- raise/elevate legs above the level of the heart for 15-20 minutes if swelling does not go down then carefully cut off unna boot and call clinic or go to local ER or family physician. If unna boot becomes wet or starts to roll down then carefully remove unna boot and call clinic. Keep all dressings clean & dry. Do not shower, take baths or get wound wet, unless otherwise instructed by your Wound Care doctor. Follow up visit:   1 Week on Monday February 8th at 9:15 am     Keep next scheduled appointment. Please give 24 hour notice if unable to keep appointment. [phone]    If you experience any of the following, please call the Wound Care Service during business hours: Monday through Friday 8:00 am - 4:30 pm  ([phone]).    *Increase in pain   *Temperature over 101   *Increase in drainage from your wound or a foul odor   *Uncontrolled swelling   *Need for compression bandage changes due to slippage, breakthrough drainage    If you need medical attention outside of business hours, please contact your Primary Care Doctor or go to the nearest emergency room.          Electronically signed by Georgeanne Libman, APRN - CNP on 2/22/2021 at 7:55 AM

## 2021-02-15 NOTE — PLAN OF CARE
Problem: Wound:  Goal: Will show signs of wound healing; wound closure and no evidence of infection  Description: Will show signs of wound healing; wound closure and no evidence of infection  Outcome: Ongoing   Patient presents to wound clinic for follow up of left foot and heel wounds. No s/s of infection. Patient afebrile. Debridement to left foot wound and silver nitrate applied today. Discharge instructions/dressing orders faxed to [facility]. Left foot/Left heel- Apply collagen then alginate to foot wound. Pack heel wound with mesalt ribbon and cover with ABD pad. Apply unna boots, ABD over foot wound, then kerlix and coban to left lower legs from base of toes to 1-2 inches below bend of knee. Home Health to change twice weekly on Wednesdays and Fridays and Appointments in [address] [place] [address] on Mondays. Follow up visit:  1 Week on Monday February 22nd at 8:30 am.    Care plan reviewed with patient. Patient verbalize understanding of the plan of care and contribute to goal setting.

## 2021-02-15 NOTE — PROGRESS NOTES
[facility] Application   Below Knee    NAME:  Garry Bolden  YOB: 1960  MEDICAL RECORD NUMBER:  [id number]  DATE:  2/15/2021    Aaliyah Baker boot: Appied primary and secondary dressing as ordered. Applied Unna roll from toes to knee overlapping each time. Applied ace wrap or coban from toes to below the knee. Secured with tape and/or metal clips covered with tape. Instructed patient/caregiver to keep dressing dry and intact. DO NOT REMOVE DRESSING. Instructed pt/family/caregiver to report excessive draining, loose bandage, wet dressing, severe pain or tingling in toes. Applied [facility] dressing below the knee to left lower leg. Unna Boot(s) were applied per  Guidelines.      Electronically signed by Lyla Ferguson RN on 2/15/2021 at 1:01 PM

## 2021-02-19 ENCOUNTER — TELEPHONE (OUTPATIENT)
Dept: WOUND CARE | Age: 61
End: 2021-02-19

## 2021-02-19 NOTE — TELEPHONE ENCOUNTER

## 2021-02-22 ENCOUNTER — HOSPITAL ENCOUNTER (OUTPATIENT)
Dept: WOUND CARE | Age: 61
Discharge: HOME | End: 2021-02-22
Payer: COMMERCIAL

## 2021-02-22 VITALS
SYSTOLIC BLOOD PRESSURE: 141 MMHG | TEMPERATURE: 97.6 F | RESPIRATION RATE: 18 BRPM | OXYGEN SATURATION: 98 % | HEART RATE: 79 BPM | DIASTOLIC BLOOD PRESSURE: 78 MMHG

## 2021-02-22 PROCEDURE — 6370000000 HC RX 637 (ALT 250 FOR IP): Performed by: NURSE PRACTITIONER

## 2021-02-22 PROCEDURE — 17250 CHEM CAUT OF GRANLTJ TISSUE: CPT

## 2021-02-22 PROCEDURE — 29580 STRAPPING UNNA BOOT: CPT

## 2021-02-22 PROCEDURE — 11042 DBRDMT SUBQ TIS 1ST 20SQCM/<: CPT

## 2021-02-22 RX ORDER — SODIUM HYPOCHLORITE 2.5 MG/ML
SOLUTION TOPICAL
Qty: 1 BOTTLE | Refills: 1 | Status: SHIPPED | OUTPATIENT
Start: 2021-02-22 | End: 2021-03-01

## 2021-02-22 RX ADMIN — SILVER NITRATE APPLICATORS 2 EACH: 25; 75 STICK TOPICAL at 09:06

## 2021-02-22 NOTE — PLAN OF CARE
Problem: Wound:  Goal: Will show signs of wound healing; wound closure and no evidence of infection  Description: Will show signs of wound healing; wound closure and no evidence of infection  2/22/2021 1323 by Gregorio Lundberg RN  Outcome: Ongoing  2/22/2021 1307 by Gregorio Lundberg RN  Outcome: Ongoing  Note: Follow up for 1 week for possible graft placement. Will order graft   Wounds debrided, follow up 1 week for graft placement will order graft. Care plan reviewed with patient. Patient verbalize understanding of the plan of care and contribute to goal setting.

## 2021-02-22 NOTE — PROGRESS NOTES
[address] [place] [id number]         Consult and Procedure Note      Diamante Jones  MEDICAL RECORD NUMBER:  [id number]  AGE: 62 y.o. GENDER: male  : 1960  EPISODE DATE:  2021    Subjective:     Chief Complaint   Patient presents with    Wound Check     Left foot, left heel         HISTORY of PRESENT ILLNESS HPI     Diamante Jones is a 62 y.o. male Established patient who is well-known to our office who is being seen today for an ongoing left TMA wound. Patient is a type II diabetic with severe peripheral vascular disease. Patient was previously being seen at our [facility] office for this ongoing wound. Left lower extremity wound was in office today. After debridement, wound was treated with silver nitrate. Patient tolerated procedures well. Patient was then placed back into Unna boot compression therapy. We will continue to have home health come out once weekly for Unna boot dressing change. We will get Apligraf skin substitutes ordered today. We will apply our first Apligraf skin substitute next week. Dakin solution was sent in for patient today. Patient to bring this with him next visit. Patient's left heel wound was again treated with Mesalt ribbon. Patient will F/U with us again in 1 week. Patient to keep dressing clean, dry, intact until he is seen again by us next week. All other questions or concerns were otherwise addressed at today's visit. 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   Wound/Ulcer Pain Timing/Severity: none  Quality of pain: N/A  Severity:  0/10   Modifying Factors: None  Associated Signs/Symptoms: edema, erythema and drainage        PAST MEDICAL HISTORY        Diagnosis Date    Arthritis     CAD (coronary artery disease)     CKD (chronic kidney disease), stage II     Diabetes mellitus ([facility] [address]     Hyperlipidemia     Hypertension     Liver disease     HEPITITIS AS A CHILD       PAST SURGICAL HISTORY    Past Surgical History: Procedure Laterality Date    CARDIAC SURGERY  2019    triple bypass    CYST REMOVAL      RIGHT ANKLE     FOOT DEBRIDEMENT Left 2020    LEFT TRANSMETATARSAL AMPUTATION  FOOT INCISION AND DRAINAGE [facility]    LEFT WOUND DEBRIDEMENT WITH WOUND VAC APPLICATION performed by Sheila Green DPM at [facility] Right     CYST REMOVED    FOOT SURGERY Left 2020    LEFT FOOT PREPARATION GRAFT SITE, HAVEST SPLIT THICKNESS SKIN GRAFT WITH APPLICATION, APPLICATION [employer] WOUND VAC performed by Sheila Green DPM at [address] [place] [address] TOE AMPUTATION Left 3/3/2020    I&D LEFT FOOT, TRANSMETATARSAL AMPUTATION performed by Sheila Green DPM at [facility]    Family History   Problem Relation Age of Onset    Diabetes Mother     High Blood Pressure Mother     Alzheimer's Disease Father          of, 80or 80years old   Rose Zaman Heart Disease Father     High Blood Pressure Father     Cancer Neg Hx     Stroke Neg Hx        SOCIAL HISTORY    Social History     Tobacco Use    Smoking status: Former Smoker     Types: Cigarettes     Quit date:      Years since quittin.1    Smokeless tobacco: Never Used   Substance Use Topics    Alcohol use: Not Currently    Drug use: Never       ALLERGIES    No Known Allergies    MEDICATIONS    Current Outpatient Medications on File Prior to Encounter   Medication Sig Dispense Refill    insulin detemir (LEVEMIR FLEXTOUCH) 100 UNIT/ML injection pen Inject 44 Units into the skin every morning 5 pen 5    clopidogrel (PLAVIX) 75 MG tablet Take 1 tablet by mouth daily 90 tablet 0    lisinopril (PRINIVIL;ZESTRIL) 5 MG tablet Take 1 tablet by mouth daily 90 tablet 1    Polyethylene Glycol 3350 (MIRALAX PO) Take by mouth as needed      gabapentin (NEURONTIN) 300 MG capsule Take 1 capsule by mouth 3 times daily for 90 days.  90 capsule 2    aspirin 81 MG chewable tablet Take 1 tablet by mouth daily 30 tablet 3    atorvastatin (LIPITOR) 40 MG tablet Take 1 tablet by mouth nightly 90 tablet 1    metoprolol tartrate (LOPRESSOR) 25 MG tablet Take 1 tablet by mouth 2 times daily 180 tablet 1    insulin lispro (HUMALOG) 100 UNIT/ML injection vial Inject 0-12 Units into the skin 3 times daily (with meals) (Patient taking differently: Inject 0-12 Units into the skin as needed ) 1 vial 3    Multiple Vitamins-Minerals (MULTIVITAMIN PO) Take 1 tablet by mouth daily       albuterol sulfate  (90 Base) MCG/ACT inhaler Inhale 2 puffs into the lungs every 6 hours as needed for Wheezing 1 Inhaler 3     No current facility-administered medications on file prior to encounter. REVIEW OF SYSTEMS    Pertinent items are noted in HPI. Objective:      BP (!) 141/78   Pulse 79   Temp 97.6 °F (36.4 °C) (Infrared)   Resp 18   SpO2 98%     Wt Readings from Last 3 Encounters:   11/25/20 290 lb (131.5 kg)   11/13/20 290 lb (131.5 kg)   08/14/20 285 lb (129.3 kg)       PHYSICAL EXAM    General Appearance: Alert and oriented to person, place   and time, well developed and well- nourished, in no acute distress. Vascular: DP and PT pulses are faintly palpable to the left lower extremity. Cap refill less than 5 seconds. Skin was warm to cool from proximal tibia to distal foot. Mild edema noted. Skin: Patient continues to have a neurotrophic ulcer to the plantar lateral aspect of the left foot. Wound was excisionally debrided in office. No probe to bone or proximal streaking noted. Scant serous drainage with no malodor. Patient has a previous TMA to the left lower extremity. Patient also continues to have a small left posterior heel wound. Wound does not probe to bone and there is no proximal streaking. Scant serous drainage noted. No malodor. Wound was again packed with Mesalt ribbon.     Neurovascular: Epicritic and protopathic sensations are grossly diminished to bilateral lower extremities. Wound 02/01/21 Foot Left (Active)   Wound Image   02/15/21 1020   Wound Etiology Non-Healing Surgical 02/15/21 1020   Dressing Status Intact; New dressing applied 02/15/21 1020   Wound Cleansed Cleansed with saline 02/15/21 1020   Dressing/Treatment Collagen;Alginate;ABD;Roll gauze;Coban/self-adherent bandages 02/15/21 1020   Offloading for Diabetic Foot Ulcers Other (comment) 02/01/21 0919   Wound Length (cm) 3 cm 02/15/21 1020   Wound Width (cm) 3 cm 02/15/21 1020   Wound Depth (cm) 0.3 cm 02/15/21 1020   Wound Surface Area (cm^2) 9 cm^2 02/15/21 1020   Change in Wound Size % (l*w) 8.16 02/15/21 1020   Wound Volume (cm^3) 2.7 cm^3 02/15/21 1020   Wound Healing % 31 02/15/21 1020   Wound Assessment Granulation tissue;Slough 02/15/21 1020   Drainage Amount Large 02/15/21 1020   Drainage Description Serosanguinous; Yellow 02/15/21 1020   Odor Mild 02/15/21 1020   Sumaya-wound Assessment Maceration; Hyperkeratosis (callous) 02/15/21 1020   Margins Attached edges 02/15/21 1020   Wound Thickness Description not for Pressure Injury Full thickness 02/15/21 1020   Number of days: 20       Wound 02/01/21 Heel Left (Active)   Wound Image   02/15/21 1020   Dressing Status Intact; New dressing applied 02/15/21 1020   Wound Cleansed Cleansed with saline 02/15/21 1020   Dressing/Treatment Packing;ABD 02/15/21 1020   Offloading for Diabetic Foot Ulcers Other (comment) 02/01/21 0919   Wound Length (cm) 0.5 cm 02/15/21 1020   Wound Width (cm) 0.5 cm 02/15/21 1020   Wound Depth (cm) 0.7 cm 02/15/21 1020   Wound Surface Area (cm^2) 0.25 cm^2 02/15/21 1020   Change in Wound Size % (l*w) 16.67 02/15/21 1020   Wound Volume (cm^3) 0.18 cm^3 02/15/21 1020   Wound Healing % 33 02/15/21 1020   Undermining Starts ___ O'Clock 12 02/15/21 1020   Undermining Ends___ O'Clock 12 02/15/21 1020   Undermining Maxium Distance (cm) 0.7 02/15/21 1020   Wound Assessment Granulation tissue;Slough 02/15/21 1020   Drainage Amount Moderate 02/15/21 1020   Drainage Description Serosanguinous; Yellow 02/15/21 1020   Odor None 02/15/21 1020   Sumaya-wound Assessment Dry/flaky 02/08/21 0925   Margins Unattached edges 02/15/21 1020   Wound Thickness Description not for Pressure Injury Full thickness 02/15/21 1020   Number of days: 20       LABS       CBC:   Lab Results   Component Value Date    WBC 14.1 11/25/2020    HGB 13.6 11/25/2020    HCT 40.7 11/25/2020    MCV 85.0 11/25/2020     11/25/2020     BMP:   Lab Results   Component Value Date     11/25/2020    K 4.1 11/25/2020     11/25/2020    CO2 24 11/25/2020    BUN 12 11/25/2020    CREATININE 0.9 11/25/2020     PT/INR:   Lab Results   Component Value Date    INR 1.02 11/25/2020     Prealbumin: No results found for: PREALBUMIN  Albumin:  Lab Results   Component Value Date    LABALBU 3.9 11/25/2020     Sed Rate:  Lab Results   Component Value Date    SEDRATE 130 04/17/2020     CRP:   Lab Results   Component Value Date    CRP 14.47 04/17/2020     Micro:   Lab Results   Component Value Date    BC No growth-preliminary No growth  04/21/2020      Hemoglobin A1C:   Lab Results   Component Value Date    LABA1C 6.2 04/17/2020       Assessment:     Ulcer Identification:  Ulcer Type: venous  Contributing Factors: venous stasis    Wound: Dehiscence amputation stump    Depth of Diabetic/Pressure/Non Pressure Ulcers or Wound:  Wound, partial thickness    Patient Active Problem List   Diagnosis Code    Gangrene of toe of left foot ([facility] [address] I96    CAD (coronary artery disease) I25.10    Type 2 diabetes mellitus with insulin therapy ([facility] [address] E11.9, Z79.4    Hyperlipidemia E78.5    Hypertension I10    Charcot's joint, right ankle and foot M14.671    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01    Debility R53.81    Carotid stenosis, asymptomatic, bilateral I65.23    Hypokalemia E87.6    Nonhealing ulcer of left lower extremity ([facility] [address] L97.929    Bleeding R58    Wound, open T14. 8XXA    SOB (shortness of breath) on exertion R06.02       Procedure Note    Procedure Note  Indications:  Based on my examination of this patient's wound(s)/ulcer(s) today, debridement is required to promote healing and evaluate the wound base. Performed by: Georgeanne Libman, APRN - CNP  Consent obtained:  Yes  Time out taken:  Yes  Pain Control: None    Debridement: Excisional Debridement - 19870    Using curette and tissue nippers the wound(s)/ulcer(s) was/were debrided down through and including the removal of epidermis, dermis and subcutaneous tissue. Devitalized Tissue Debrided:  fibrin, biofilm, slough and callus    Pre Debridement Measurements:  Are located in the [place]  Documentation Flow Sheet    Wound/Ulcer #: 1    Post Debridement Measurements:  Wound/Ulcer Descriptions are Pre Debridement except measurements:    Wound 02/01/21 Foot Left (Active)   Wound Image   02/22/21 0848   Wound Etiology Non-Healing Surgical 02/22/21 0848   Dressing Status Old drainage noted; Intact; New dressing applied 02/22/21 0848   Wound Cleansed Cleansed with saline 02/22/21 0848   Dressing/Treatment Collagen;Alginate;ABD;Roll gauze;Coban/self-adherent bandages 02/15/21 1020   Offloading for Diabetic Foot Ulcers Other (comment) 02/01/21 0919   Wound Length (cm) 2.7 cm 02/22/21 0848   Wound Width (cm) 3.5 cm 02/22/21 0848   Wound Depth (cm) 0.6 cm 02/22/21 0848   Wound Surface Area (cm^2) 9.45 cm^2 02/22/21 0848   Change in Wound Size % (l*w) 3.57 02/22/21 0848   Wound Volume (cm^3) 5.67 cm^3 02/22/21 0848   Wound Healing % -45 02/22/21 0848   Undermining Starts ___ O'Clock 9 02/22/21 0848   Undermining Ends___ O'Clock 12 02/22/21 0848   Undermining Maxium Distance (cm) 0.3 02/22/21 0848   Wound Assessment Granulation tissue;Pale granulation tissue; Epithelialization 02/22/21 0848   Drainage Amount Moderate 02/22/21 0848   Drainage Description Serosanguinous; Yellow 02/22/21 0848   Odor None 02/22/21 0848   Sumaya-wound Assessment Dry/flaky; Hyperkeratosis (callous) 02/22/21 0848   Margins Attached edges; Unattached edges 02/22/21 0848   Wound Thickness Description not for Pressure Injury Full thickness 02/22/21 0848   Number of days: 20       Wound 02/01/21 Heel Left (Active)   Wound Image   02/22/21 0848   Dressing Status Intact; Old drainage noted;New dressing applied 02/22/21 0848   Wound Cleansed Cleansed with saline 02/22/21 0848   Dressing/Treatment Packing;ABD 02/15/21 1020   Offloading for Diabetic Foot Ulcers Other (comment) 02/01/21 0919   Wound Length (cm) 0.3 cm 02/22/21 0848   Wound Width (cm) 0.3 cm 02/22/21 0848   Wound Depth (cm) 0.9 cm 02/22/21 0848   Wound Surface Area (cm^2) 0.09 cm^2 02/22/21 0848   Change in Wound Size % (l*w) 70 02/22/21 0848   Wound Volume (cm^3) 0.08 cm^3 02/22/21 0848   Wound Healing % 70 02/22/21 0848   Undermining Starts ___ O'Clock 12 02/22/21 0848   Undermining Ends___ O'Clock 12 02/22/21 0848   Undermining Maxium Distance (cm) 0.4 02/22/21 0848   Wound Assessment Granulation tissue 02/22/21 0848   Drainage Amount Small 02/22/21 0848   Drainage Description Serosanguinous 02/22/21 0848   Odor None 02/22/21 0848   Sumaya-wound Assessment Dry/flaky; Hyperkeratosis (callous) 02/22/21 0848   Margins Unattached edges 02/22/21 0848   Wound Thickness Description not for Pressure Injury Full thickness 02/22/21 0848   Number of days: 20     Incision 07/20/20 Left;Plantar (Active)   Number of days: 217       Incision 08/14/20 Right (Active)   Number of days: 191     Percent of Wound(s)/Ulcer(s) Debrided: 100%  Total Surface Area Debrided:  9.45 sq cm   Diabetic/Pressure/Non Pressure Ulcers only:  Ulcer: Diabetic ulcer, fat layer exposed   Estimated Blood Loss:  Minimal  Hemostasis Achieved:  by pressure and by silver nitrate stick  Procedural Pain:  0  / 10   Post Procedural Pain:  0 / 10   Response to treatment:  Well tolerated by patient. Chemical Cautery - [id number]    Performed by: EYAD Quinn CNP  Consent obtained: Yes  Time out taken: Yes  Tissue Type: Hypergranulation  PainControl: N/A  Method: silver nitrate    Location of Chemical Cautery:   Wound/Ulcer #1     Wound 02/01/21 Foot Left (Active)   Wound Image   02/15/21 1020   Wound Etiology Non-Healing Surgical 02/15/21 1020   Dressing Status Intact; New dressing applied 02/15/21 1020   Wound Cleansed Cleansed with saline 02/15/21 1020   Dressing/Treatment Collagen;Alginate;ABD;Roll gauze;Coban/self-adherent bandages 02/15/21 1020   Offloading for Diabetic Foot Ulcers Other (comment) 02/01/21 0919   Wound Length (cm) 3 cm 02/15/21 1020   Wound Width (cm) 3 cm 02/15/21 1020   Wound Depth (cm) 0.3 cm 02/15/21 1020   Wound Surface Area (cm^2) 9 cm^2 02/15/21 1020   Change in Wound Size % (l*w) 8.16 02/15/21 1020   Wound Volume (cm^3) 2.7 cm^3 02/15/21 1020   Wound Healing % 31 02/15/21 1020   Wound Assessment Granulation tissue;Slough 02/15/21 1020   Drainage Amount Large 02/15/21 1020   Drainage Description Serosanguinous; Yellow 02/15/21 1020   Odor Mild 02/15/21 1020   Sumaya-wound Assessment Maceration; Hyperkeratosis (callous) 02/15/21 1020   Margins Attached edges 02/15/21 1020   Wound Thickness Description not for Pressure Injury Full thickness 02/15/21 1020   Number of days: 20       Wound 02/01/21 Heel Left (Active)   Wound Image   02/15/21 1020   Dressing Status Intact; New dressing applied 02/15/21 1020   Wound Cleansed Cleansed with saline 02/15/21 1020   Dressing/Treatment Packing;ABD 02/15/21 1020   Offloading for Diabetic Foot Ulcers Other (comment) 02/01/21 0919   Wound Length (cm) 0.5 cm 02/15/21 1020   Wound Width (cm) 0.5 cm 02/15/21 1020   Wound Depth (cm) 0.7 cm 02/15/21 1020   Wound Surface Area (cm^2) 0.25 cm^2 02/15/21 1020   Change in Wound Size % (l*w) 16.67 02/15/21 1020   Wound Volume (cm^3) 0.18 cm^3 02/15/21 1020   Wound Healing % 33 02/15/21 1020   Undermining Starts ___ O'Clock 12 02/15/21 1020   Undermining Ends___ O'Clock 12 02/15/21 1020   Undermining Maxium Distance (cm) 0.7 02/15/21 1020   Wound Assessment Granulation tissue;Slough 02/15/21 1020   Drainage Amount Moderate 02/15/21 1020   Drainage Description Serosanguinous; Yellow 02/15/21 1020   Odor None 02/15/21 1020   Sumaya-wound Assessment Dry/flaky 02/08/21 0925   Margins Unattached edges 02/15/21 1020   Wound Thickness Description not for Pressure Injury Full thickness 02/15/21 1020   Number of days: 20     Incision 07/20/20 Left;Plantar (Active)   Number of days: 217       Incision 08/14/20 Right (Active)   Number of days: 191     Procedural Pain: 0 /10   Post Procedural Pain: 0/10   Response to treatment: Well tolerated by patient. Unna boot compression therapy, left. - 90851  A dual layer Unna boot consisting of a zinc oxide wrap followed by Coban tape was applied to the left lower extremity. Patient tolerated procedure well. Plan:     Patient examined and evaluated today. Left TMA wound was excisionally debrided in office and then was treated with silver nitrate. Wound was dressed with collagen, alginate, and Unna boot. Heel wound was packed with mesalt ribbon and covered with an ABD pad. We will order Apligraf skin substitutes today. Will apply our first Apligraf skin substitute graft next Monday. Dakin's solution was called in for patient today. Patient to bring this with him next visit.   Patient will follow back up with us again in 1 week. Treatment:   No orders of the defined types were placed in this encounter. Antibiotics: No  Follow up: 1 week  Please see attached Discharge Instructions  Written patient dismissal instructions given to patient and signed by patient or POA. Discharge Instructions       Visit Discharge/Physician Orders:  - Will check into graft options for you. - Silver nitrate to left heel   - Debridement to left foot wound. Wound Location: left foot    Dressing orders:     1) Gather wound care supplies and arrange on clean table. 2) Wash your hands with soap and water or use alcohol based hand  for 20 seconds (sing \"Happy Birthday\" twice). 3) Cleanse wounds with normal saline or wound cleanser and gauze. Pat dry with clean gauze. 4) left foot/ left heel- Remove old unna boots to left lower legs. Wash legs with warm soapy water. Pat dry. Cleanse wound with normal saline or wound cleanser and gauze. Pat dry with clean gauze. Apply collagen the alginate to wound bed. Apply unna boots , ABD then kerlix and coban to left lower legs from base of toes to 1-2 inches below bend of knee. Change in wound clinic weekly. If unna boot becomes too tight- raise/elevate legs above the level of the heart for 15-20 minutes if swelling does not go down then carefully cut off unna boot and call clinic or go to local ER or family physician. If unna boot becomes wet or starts to roll down then carefully remove unna boot and call clinic. Keep all dressings clean & dry. Do not shower, take baths or get wound wet, unless otherwise instructed by your Wound Care doctor. Follow up visit:   1 Week on Monday February 8th at 9:15 am     Keep next scheduled appointment. Please give 24 hour notice if unable to keep appointment.  [phone]    If you experience any of the following, please call the Wound Care Service during business hours: Monday through Friday 8:00 am - 4:30 pm  ([phone]). *Increase in pain   *Temperature over 101   *Increase in drainage from your wound or a foul odor   *Uncontrolled swelling   *Need for compression bandage changes due to slippage, breakthrough drainage    If you need medical attention outside of business hours, please contact your Primary Care Doctor or go to the nearest emergency room.          Electronically signed by EYAD Bryan CNP on 2/22/2021 at 8:47 AM

## 2021-02-22 NOTE — PROGRESS NOTES
[facility] Application   Below Knee    NAME:  Margarita Romero  YOB: 1960  MEDICAL RECORD NUMBER:  [id number]  DATE:  2/22/2021    Germaine Carvalho boot: Appied primary and secondary dressing as ordered. Applied Unna roll from toes to knee overlapping each time. Applied ace wrap or coban from toes to below the knee. Secured with tape and/or metal clips covered with tape. Instructed patient/caregiver to keep dressing dry and intact. DO NOT REMOVE DRESSING. Instructed pt/family/caregiver to report excessive draining, loose bandage, wet dressing, severe pain or tingling in toes. Applied [facility] dressing below the knee to left lower leg. Unna Boot(s) were applied per  Guidelines.      Electronically signed by Philly Olson RN on 2/22/2021 at 1:15 PM

## 2021-02-22 NOTE — PLAN OF CARE
Problem: Wound:  Goal: Will show signs of wound healing; wound closure and no evidence of infection  Description: Will show signs of wound healing; wound closure and no evidence of infection  Outcome: Ongoing  Note: Follow up for 1 week for possible graft placement. Will order graft   Patient wound debrided. Graft ordered for placement in 1 week. See AVS for discharge instructions.

## 2021-03-01 ENCOUNTER — HOSPITAL ENCOUNTER (OUTPATIENT)
Dept: WOUND CARE | Age: 61
Discharge: HOME | End: 2021-03-01
Payer: COMMERCIAL

## 2021-03-01 VITALS
TEMPERATURE: 96.3 F | HEART RATE: 91 BPM | SYSTOLIC BLOOD PRESSURE: 138 MMHG | RESPIRATION RATE: 16 BRPM | OXYGEN SATURATION: 97 % | DIASTOLIC BLOOD PRESSURE: 82 MMHG

## 2021-03-01 PROCEDURE — 97597 DBRDMT OPN WND 1ST 20 CM/<: CPT

## 2021-03-01 PROCEDURE — 15275 SKIN SUB GRAFT FACE/NK/HF/G: CPT

## 2021-03-01 ASSESSMENT — PAIN SCALES - GENERAL: PAINLEVEL_OUTOF10: 0

## 2021-03-01 NOTE — PLAN OF CARE
Problem: Wound:  Goal: Will show signs of wound healing; wound closure and no evidence of infection  Description: Will show signs of wound healing; wound closure and no evidence of infection  Outcome: Ongoing     Patient presents to wound clinic for foot wound. No s/s of infection noted. See AVS for discharge instructions. Apligraf #1 applied today. LOT# [id number] expiration 03-. Follow up visit:   1 Week on Monday March 8th at 0830 am     Care plan reviewed with patient. Patient verbalizes understanding of the plan of care and contribute to goal setting.

## 2021-03-01 NOTE — PROGRESS NOTES
triple bypass    CYST REMOVAL      RIGHT ANKLE     FOOT DEBRIDEMENT Left 2020    LEFT TRANSMETATARSAL AMPUTATION  FOOT INCISION AND DRAINAGE performed by Manjeet Leon DPM at [facility] 115 Left 2020    LEFT WOUND DEBRIDEMENT WITH WOUND VAC APPLICATION performed by Manjeet Leon DPM at [facility] 35 Right     CYST REMOVED    FOOT SURGERY Left 2020    LEFT FOOT PREPARATION GRAFT SITE, HAVEST SPLIT THICKNESS SKIN GRAFT WITH APPLICATION, APPLICATION [employer] WOUND VAC performed by Manjeet Leon DPM at [address] [place] [address] TOE AMPUTATION Left 3/3/2020    I&D LEFT FOOT, TRANSMETATARSAL AMPUTATION performed by Manjeet Leon DPM at [facility] 104    Family History   Problem Relation Age of Onset    Diabetes Mother     High Blood Pressure Mother     Alzheimer's Disease Father          of, 80or 80years old   [facility] Heart Disease Father     High Blood Pressure Father     Cancer Neg Hx     Stroke Neg Hx        SOCIAL HISTORY    Social History     Tobacco Use    Smoking status: Former Smoker     Types: Cigarettes     Quit date:      Years since quittin.1    Smokeless tobacco: Never Used   Substance Use Topics    Alcohol use: Not Currently    Drug use: Never       ALLERGIES    No Known Allergies    MEDICATIONS    Current Outpatient Medications on File Prior to Encounter   Medication Sig Dispense Refill    sodium hypochlorite (DAKINS) 0.25 % external solution Apply topically once.  1 Bottle 1    insulin detemir (LEVEMIR FLEXTOUCH) 100 UNIT/ML injection pen Inject 44 Units into the skin every morning 5 pen 5    clopidogrel (PLAVIX) 75 MG tablet Take 1 tablet by mouth daily 90 tablet 0    albuterol sulfate  (90 Base) MCG/ACT inhaler Inhale 2 puffs into the lungs every 6 hours as needed for Wheezing 1 Inhaler 3    lisinopril (PRINIVIL;ZESTRIL) 5 MG tablet Take 1 tablet by mouth daily 90 tablet 1    Polyethylene Glycol 3350 (MIRALAX PO) Take by mouth as needed      gabapentin (NEURONTIN) 300 MG capsule Take 1 capsule by mouth 3 times daily for 90 days. 90 capsule 2    aspirin 81 MG chewable tablet Take 1 tablet by mouth daily 30 tablet 3    atorvastatin (LIPITOR) 40 MG tablet Take 1 tablet by mouth nightly 90 tablet 1    metoprolol tartrate (LOPRESSOR) 25 MG tablet Take 1 tablet by mouth 2 times daily 180 tablet 1    insulin lispro (HUMALOG) 100 UNIT/ML injection vial Inject 0-12 Units into the skin 3 times daily (with meals) (Patient taking differently: Inject 0-12 Units into the skin as needed ) 1 vial 3    Multiple Vitamins-Minerals (MULTIVITAMIN PO) Take 1 tablet by mouth daily        No current facility-administered medications on file prior to encounter. REVIEW OF SYSTEMS    Pertinent items are noted in HPI. Objective:      /82   Pulse 91   Temp 96.3 °F (35.7 °C) (Infrared)   Resp 16   SpO2 97%     Wt Readings from Last 3 Encounters:   11/25/20 290 lb (131.5 kg)   11/13/20 290 lb (131.5 kg)   08/14/20 285 lb (129.3 kg)       PHYSICAL EXAM    General Appearance: Alert and oriented to person, place   and time, well developed and well- nourished, in no acute distress. Vascular: DP and PT pulses are faintly palpable to the left lower extremity. Cap refill less than 5 seconds. Skin was warm to cool from proximal tibia to distal foot. Mild edema noted. Skin: Patient continues to have a neurotrophic ulcer to the plantar lateral aspect of the left foot. Wound was excisionally debrided in office. No probe to bone or proximal streaking noted. Scant serous drainage with no malodor. Patient has a previous TMA to the left lower extremity. Patient also continues to have a small left posterior heel wound. Wound does not probe to bone and there is no proximal streaking. Scant serous drainage noted. No malodor.   Neurovascular: Epicritic and protopathic sensations are grossly diminished to bilateral lower extremities. Wound 02/01/21 Foot Left (Active)   Wound Image   03/01/21 0909   Wound Etiology Non-Healing Surgical 03/01/21 0909   Dressing Status Intact; Old drainage noted;New dressing applied 03/01/21 0909   Wound Cleansed Cleansed with saline 03/01/21 0909   Dressing/Treatment Collagen;Alginate;ABD;Roll gauze;Coban/self-adherent bandages 02/22/21 0848   Offloading for Diabetic Foot Ulcers Other (comment) 03/01/21 0909   Wound Length (cm) 2.7 cm 03/01/21 0909   Wound Width (cm) 2 cm 03/01/21 0909   Wound Depth (cm) 0.4 cm 03/01/21 0909   Wound Surface Area (cm^2) 5.4 cm^2 03/01/21 0909   Change in Wound Size % (l*w) 44.9 03/01/21 0909   Wound Volume (cm^3) 2.16 cm^3 03/01/21 0909   Wound Healing % 45 03/01/21 0909   Undermining Starts ___ O'Clock 6 03/01/21 0909   Undermining Ends___ O'Clock 10 03/01/21 0909   Undermining Maxium Distance (cm) 0.3 03/01/21 0909   Wound Assessment Granulation tissue;Pale granulation tissue 03/01/21 0909   Drainage Amount Moderate 03/01/21 0909   Drainage Description Serosanguinous 03/01/21 0909   Odor None 03/01/21 0909   Sumaya-wound Assessment Dry/flaky; Hyperkeratosis (callous); Maceration 03/01/21 0909   Margins Attached edges; Unattached edges 03/01/21 0909   Wound Thickness Description not for Pressure Injury Full thickness 02/22/21 0848   Number of days: 28       Wound 02/01/21 Heel Left (Active)   Wound Image   03/01/21 0909   Dressing Status Intact; Old drainage noted;New dressing applied 03/01/21 0909   Wound Cleansed Cleansed with saline 03/01/21 0909   Dressing/Treatment Packing;ABD 02/15/21 1020   Offloading for Diabetic Foot Ulcers Other (comment) 03/01/21 0909   Wound Length (cm) 0.4 cm 03/01/21 0909   Wound Width (cm) 0.3 cm 03/01/21 0909   Wound Depth (cm) 0.8 cm 03/01/21 0909   Wound Surface Area (cm^2) 0.12 cm^2 03/01/21 0909   Change in Wound Size % (l*w) 60 03/01/21 0909   Wound Volume (cm^3) 0.1 cm^3 03/01/21 0909   Wound Healing % 63 03/01/21 0909 Undermining Starts ___ O'Clock 12 03/01/21 0909   Undermining Ends___ O'Clock 12 03/01/21 0909   Undermining Maxium Distance (cm) 0.7 03/01/21 0909   Wound Assessment Granulation tissue;Slough 03/01/21 0909   Drainage Amount Moderate 03/01/21 0909   Drainage Description Green;Yellow 03/01/21 0909   Odor None 03/01/21 0909   Sumaya-wound Assessment Maceration 03/01/21 0909   Margins Unattached edges 03/01/21 0909   Wound Thickness Description not for Pressure Injury Full thickness 03/01/21 0909   Number of days: 28       LABS       CBC:   Lab Results   Component Value Date    WBC 14.1 11/25/2020    HGB 13.6 11/25/2020    HCT 40.7 11/25/2020    MCV 85.0 11/25/2020     11/25/2020     BMP:   Lab Results   Component Value Date     11/25/2020    K 4.1 11/25/2020     11/25/2020    CO2 24 11/25/2020    BUN 12 11/25/2020    CREATININE 0.9 11/25/2020     PT/INR:   Lab Results   Component Value Date    INR 1.02 11/25/2020     Prealbumin: No results found for: PREALBUMIN  Albumin:  Lab Results   Component Value Date    LABALBU 3.9 11/25/2020     Sed Rate:  Lab Results   Component Value Date    SEDRATE 130 04/17/2020     CRP:   Lab Results   Component Value Date    CRP 14.47 04/17/2020     Micro:   Lab Results   Component Value Date    BC No growth-preliminary No growth  04/21/2020      Hemoglobin A1C:   Lab Results   Component Value Date    LABA1C 6.2 04/17/2020       Assessment:     Ulcer Identification:  Ulcer Type: venous  Contributing Factors: venous stasis    Wound: Dehiscence amputation stump    Depth of Diabetic/Pressure/Non Pressure Ulcers or Wound:  Wound, partial thickness    Patient Active Problem List   Diagnosis Code    Gangrene of toe of left foot ([facility] I96    CAD (coronary artery disease) I25.10    Type 2 diabetes mellitus with insulin therapy ([facility] E11.9, Z79.4    Hyperlipidemia E78.5    Hypertension I10    Charcot's joint, right ankle and foot M14.671    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    Sepsis ([facility] A41.9    Acute left-sided low back pain with bilateral sciatica M54.42, M54.41    S/P transmetatarsal amputation of foot, left ([facility]) Z89.432    Leukocytosis D72.829    Wound dehiscence, surgical, initial encounter T81.31XA    Postoperative wound infection G44.17RN    Metabolic acidosis Y93.6    Hx of CABG Z95.1    Lumbar stenosis without neurogenic claudication M48.061    CKD (chronic kidney disease), stage II N18.2    Normocytic anemia D64.9    Morbid obesity ([facility]) E66.01    Debility R53.81    Carotid stenosis, asymptomatic, bilateral I65.23    Hypokalemia E87.6    Nonhealing ulcer of left lower extremity ([facility] L97.929    Bleeding R58    Wound, open T14. 8XXA    SOB (shortness of breath) on exertion R06.02       Procedure Note    Procedure: Skin Substitute Application - 20135    Performed by: EYAD Mtz CNP  Ulcer Type: venous, diabetic and non-healing surgical  Consent obtained: Yes  Time out taken: Yes    Product Utilized:  Apligraf 44 sq/cm  Fenestrated: Yes  Mesher Utilized: No  Instrument(s) curette    Skin Substitute was Applied to Ulcer Number(s):    Ulcer #: 1    Wound 02/01/21 Foot Left (Active)   Wound Image   03/01/21 0909   Wound Etiology Non-Healing Surgical 03/01/21 0909   Dressing Status Intact; Old drainage noted;New dressing applied 03/01/21 0909   Wound Cleansed Cleansed with saline 03/01/21 0909   Dressing/Treatment Collagen;Alginate;ABD;Roll gauze;Coban/self-adherent bandages 02/22/21 0848   Offloading for Diabetic Foot Ulcers Other (comment) 03/01/21 0909   Wound Length (cm) 2.7 cm 03/01/21 0909   Wound Width (cm) 2 cm 03/01/21 0909   Wound Depth (cm) 0.4 cm 03/01/21 0909   Wound Surface Area (cm^2) 5.4 cm^2 03/01/21 0909   Change in Wound Size % (l*w) 44.9 03/01/21 0909   Wound Volume (cm^3) 2.16 cm^3 03/01/21 0909   Wound Healing % 45 03/01/21 0909   Undermining Starts ___ O'Clock 6 03/01/21 0909   Undermining Ends___ O'Clock 10 03/01/21 0909   Undermining Maxium Distance (cm) 0.3 03/01/21 0909   Wound Assessment Granulation tissue;Pale granulation tissue 03/01/21 0909   Drainage Amount Moderate 03/01/21 0909   Drainage Description Serosanguinous 03/01/21 0909   Odor None 03/01/21 0909   Sumaya-wound Assessment Dry/flaky; Hyperkeratosis (callous); Maceration 03/01/21 0909   Margins Attached edges; Unattached edges 03/01/21 0909   Wound Thickness Description not for Pressure Injury Full thickness 02/22/21 0848   Number of days: 28       Wound 02/01/21 Heel Left (Active)   Wound Image   03/01/21 0909   Dressing Status Intact; Old drainage noted;New dressing applied 03/01/21 0909   Wound Cleansed Cleansed with saline 03/01/21 0909   Dressing/Treatment Packing;ABD 02/15/21 1020   Offloading for Diabetic Foot Ulcers Other (comment) 03/01/21 0909   Wound Length (cm) 0.4 cm 03/01/21 0909   Wound Width (cm) 0.3 cm 03/01/21 0909   Wound Depth (cm) 0.8 cm 03/01/21 0909   Wound Surface Area (cm^2) 0.12 cm^2 03/01/21 0909   Change in Wound Size % (l*w) 60 03/01/21 0909   Wound Volume (cm^3) 0.1 cm^3 03/01/21 0909   Wound Healing % 63 03/01/21 0909   Undermining Starts ___ O'Clock 12 03/01/21 0909   Undermining Ends___ O'Clock 12 03/01/21 0909   Undermining Maxium Distance (cm) 0.7 03/01/21 0909   Wound Assessment Granulation tissue;Slough 03/01/21 0909   Drainage Amount Moderate 03/01/21 0909   Drainage Description Green;Yellow 03/01/21 0909   Odor None 03/01/21 0909   Sumaya-wound Assessment Maceration 03/01/21 0909   Margins Unattached edges 03/01/21 0909   Wound Thickness Description not for Pressure Injury Full thickness 03/01/21 0909   Number of days: 28     Incision 07/20/20 Left;Plantar (Active)   Number of days: 224       Incision 08/14/20 Right (Active)   Number of days: 199     Diabetic/Pressure/Non Pressure Ulcers:  Ulcer:   Diabetic ulcer, fat layer exposed  Total Surface Area of Ulcer(s) Covered 5.4 sq/cm  Was the Product Layered  No Amount of Product Applied 6 sq/cm   Amount of Product Wasted 38 sq/cm   Reason for Waste: The size of the wound is smaller than the product utilized  Surgically Fixated: No  Secured With: Steri Strips and Silicone Dressing   Procedural Pain: 0/10   Post Procedural Pain: 0/10  Response to Treatment: Well tolerated by patient. Unna boot compression therapy, left. - 38325  A dual layer Unna boot consisting of a zinc oxide wrap followed by Coban tape was applied to the left lower extremity. Patient tolerated procedure well. Plan:     Patient examined and evaluated today. Apligraf skin substitute graft was applied to the left TMA wound in office today. Graft was secured with a wound veil and Steri-Strips. Alginate and 4 x 4 was also applied to the graft area. Patient was placed back in Unna boot compression therapy. Home health to continue to come out once weekly for dressing change. Home health not to change wound veil and Steri-Strips. We will order another Apligraf to be available for next week or the following week. Patient will follow back up with us again in 1 week. Treatment:   No orders of the defined types were placed in this encounter. Antibiotics: No  Follow up: 1 week  Please see attached Discharge Instructions  Written patient dismissal instructions given to patient and signed by patient or POA. Discharge Instructions       Visit Discharge/Physician Orders:  - Will check into graft options for you. - Silver nitrate to left heel   - Debridement to left foot wound. Wound Location: left foot    Dressing orders:     1) Gather wound care supplies and arrange on clean table. 2) Wash your hands with soap and water or use alcohol based hand  for 20 seconds (sing \"Happy Birthday\" twice). 3) Cleanse wounds with normal saline or wound cleanser and gauze. Pat dry with clean gauze. 4) left foot/ left heel- Remove old unna boots to left lower legs.  Wash legs with warm soapy water. Pat dry. Cleanse wound with normal saline or wound cleanser and gauze. Pat dry with clean gauze. Apply collagen the alginate to wound bed. Apply unna boots , ABD then kerlix and coban to left lower legs from base of toes to 1-2 inches below bend of knee. Change in wound clinic weekly. If unna boot becomes too tight- raise/elevate legs above the level of the heart for 15-20 minutes if swelling does not go down then carefully cut off unna boot and call clinic or go to local ER or family physician. If unna boot becomes wet or starts to roll down then carefully remove unna boot and call clinic. Keep all dressings clean & dry. Do not shower, take baths or get wound wet, unless otherwise instructed by your Wound Care doctor. Follow up visit:   1 Week on Monday February 8th at 9:15 am     Keep next scheduled appointment. Please give 24 hour notice if unable to keep appointment. [phone]    If you experience any of the following, please call the Wound Care Service during business hours: Monday through Friday 8:00 am - 4:30 pm  ([phone]). *Increase in pain   *Temperature over 101   *Increase in drainage from your wound or a foul odor   *Uncontrolled swelling   *Need for compression bandage changes due to slippage, breakthrough drainage    If you need medical attention outside of business hours, please contact your Primary Care Doctor or go to the nearest emergency room.          Electronically signed by EYAD Palacios CNP on 3/1/2021 at 11:22 AM

## 2021-03-08 ENCOUNTER — HOSPITAL ENCOUNTER (OUTPATIENT)
Dept: WOUND CARE | Age: 61
Discharge: HOME | End: 2021-03-08
Payer: COMMERCIAL

## 2021-03-08 VITALS
BODY MASS INDEX: 38.54 KG/M2 | OXYGEN SATURATION: 98 % | SYSTOLIC BLOOD PRESSURE: 134 MMHG | WEIGHT: 310 LBS | DIASTOLIC BLOOD PRESSURE: 78 MMHG | TEMPERATURE: 94.5 F | HEIGHT: 75 IN | RESPIRATION RATE: 20 BRPM | HEART RATE: 70 BPM

## 2021-03-08 DIAGNOSIS — T14.8XXA WOUND, OPEN: Primary | ICD-10-CM

## 2021-03-08 PROCEDURE — 6370000000 HC RX 637 (ALT 250 FOR IP): Performed by: NURSE PRACTITIONER

## 2021-03-08 PROCEDURE — 99213 OFFICE O/P EST LOW 20 MIN: CPT

## 2021-03-08 PROCEDURE — 29580 STRAPPING UNNA BOOT: CPT

## 2021-03-08 PROCEDURE — 17250 CHEM CAUT OF GRANLTJ TISSUE: CPT

## 2021-03-08 RX ADMIN — SILVER NITRATE APPLICATORS 1 EACH: 25; 75 STICK TOPICAL at 09:37

## 2021-03-08 NOTE — PROGRESS NOTES
[facility] Application   Below Knee    NAME:  Mansi Saucedo  YOB: 1960  MEDICAL RECORD NUMBER:  [id number]  DATE:  3/8/2021    Dennis Magaña boot: Applied moisturizing agent to dry skin as needed. Appied primary and secondary dressing as ordered. Applied Unna roll from toes to knee overlapping each time. Applied ace wrap or coban from toes to below the knee. Secured with tape and/or metal clips covered with tape. Instructed patient/caregiver to keep dressing dry and intact. DO NOT REMOVE DRESSING. Instructed pt/family/caregiver to report excessive draining, loose bandage, wet dressing, severe pain or tingling in toes. Applied [facility] dressing below the knee to left lower leg. Unna Boot(s) were applied per  Guidelines.      Electronically signed by Jesi Lora LPN on 2/0/2392 at 11:16 AM

## 2021-03-08 NOTE — DISCHARGE INSTR - COC
Continuity of Care Form    Patient Name: Liana Cabezas   :  1960  MRN:  [id number]    Admit date:  3/1/2021  Discharge date:  ***    Code Status Order: Prior   Advance Directives:   [facility] Documentation     Date/Time Healthcare Directive Type of Healthcare Directive Copy in [facility] [address] Agent's Name Healthcare Agent's Phone Number    [phone]  Yes, patient has an advance directive for healthcare treatment  Durable power of  for health care  No, copy requested from family  [facility] power of   --  --          Admitting Physician:  No admitting provider for patient encounter. PCP: EYAD Sawyer CNP    Discharging Nurse: [facility] Unit/Room#: No information available for this encounter.   Discharging Unit Phone Number: ***    Emergency Contact:   Extended Emergency Contact Information  Primary Emergency Contact: Kelli Mandel, [address] [address] Phone: [phone]  Mobile Phone: [phone]  Relation: Parent    Past Surgical History:  Past Surgical History:   Procedure Laterality Date    CARDIAC SURGERY  2019    triple bypass    CYST REMOVAL      RIGHT ANKLE     FOOT DEBRIDEMENT Left 2020    LEFT TRANSMETATARSAL AMPUTATION  FOOT INCISION AND DRAINAGE performed by Clive Fernandes DPM at [address] [place] [address] Left 2020    LEFT WOUND DEBRIDEMENT WITH WOUND VAC APPLICATION performed by Clive Fernandes DPM at [address] [place] [address] Right     CYST REMOVED    FOOT SURGERY Left 2020    LEFT FOOT PREPARATION GRAFT SITE, HAVEST SPLIT THICKNESS SKIN GRAFT WITH APPLICATION, APPLICATION [facility] WOUND VAC performed by Clive Fernandes DPM at [facility] Left 3/3/2020    I&D LEFT FOOT, TRANSMETATARSAL AMPUTATION performed by Clive Fernandes DPM at [place] [address]         Immunization History:   Immunization History   Administered Date(s) Administered    Influenza Virus Wt Readings from Last 1 Encounters:   03/08/21 (!) 310 lb (140.6 kg)     Mental Status:  {IP PT MENTAL STATUS:20030}    IV Access:  { COLTEN IV ACCESS:996047310}    Nursing Mobility/ADLs:  Walking   {CHP DME LGIR:600654771}  Transfer  {CHP DME SQCT:959874838}  Bathing  {CHP DME FNVF:062012036}  Dressing  {CHP DME GWQH:099059160}  Toileting  {CHP DME PSVA:853476488}  Feeding  {CHP DME DZDI:838929674}  Med Admin  {P DME DPMX:393735471}  Med Delivery   { COLTEN MED Delivery:512239445}    Wound Care Documentation and Therapy:  Wound 02/01/21 Foot Left (Active)   Wound Image   03/01/21 0909   Wound Etiology Non-Healing Surgical 03/01/21 0909   Dressing Status Intact; Old drainage noted 03/08/21 0853   Wound Cleansed Cleansed with saline 03/08/21 0853   Dressing/Treatment Alginate;ABD;Coban/self-adherent bandages; Roll gauze; Other (comment) 03/01/21 0909   Offloading for Diabetic Foot Ulcers Other (comment) 03/01/21 0909   Wound Length (cm) 2.8 cm 03/08/21 0853   Wound Width (cm) 2.4 cm 03/08/21 0853   Wound Depth (cm) 0.5 cm 03/08/21 0853   Wound Surface Area (cm^2) 6.72 cm^2 03/08/21 0853   Change in Wound Size % (l*w) 31.43 03/08/21 0853   Wound Volume (cm^3) 3.36 cm^3 03/08/21 0853   Wound Healing % 14 03/08/21 0853   Undermining Starts ___ O'Clock 7 03/08/21 0853   Undermining Ends___ O'Clock 9 03/08/21 0853   Undermining Maxium Distance (cm) 0.1 03/08/21 0853   Wound Assessment Granulation tissue 03/08/21 0853   Drainage Amount Moderate 03/08/21 0853   Drainage Description Serosanguinous 03/08/21 0853   Odor None 03/08/21 0853   Sumaya-wound Assessment Dry/flaky; Maceration 03/08/21 0853   Margins Attached edges; Unattached edges 03/08/21 0853   Wound Thickness Description not for Pressure Injury Full thickness 02/22/21 0848   Number of days: 34       Wound 02/01/21 Heel Left (Active)   Wound Image   03/01/21 0909   Dressing Status Intact; Old drainage noted;New dressing applied 03/01/21 0909   Wound Cleansed Cleansed with saline 03/01/21 0909   Dressing/Treatment Alginate;ABD;Coban/self-adherent bandages; Roll gauze 03/01/21 0909   Offloading for Diabetic Foot Ulcers Other (comment) 03/01/21 0909   Wound Length (cm) 0.4 cm 03/01/21 0909   Wound Width (cm) 0.3 cm 03/01/21 0909   Wound Depth (cm) 0.8 cm 03/01/21 0909   Wound Surface Area (cm^2) 0.12 cm^2 03/01/21 0909   Change in Wound Size % (l*w) 60 03/01/21 0909   Wound Volume (cm^3) 0.1 cm^3 03/01/21 0909   Wound Healing % 63 03/01/21 0909   Undermining Starts ___ O'Clock 12 03/01/21 0909   Undermining Ends___ O'Clock 12 03/01/21 0909   Undermining Maxium Distance (cm) 0.7 03/01/21 0909   Wound Assessment Granulation tissue;Slough 03/01/21 0909   Drainage Amount Moderate 03/01/21 0909   Drainage Description Green;Yellow 03/01/21 0909   Odor None 03/01/21 0909   Sumaya-wound Assessment Maceration 03/01/21 0909   Margins Unattached edges 03/01/21 0909   Wound Thickness Description not for Pressure Injury Full thickness 03/01/21 0909   Number of days: 34        Elimination:  Continence:   · Bowel: {YES / PV:57025}  · Bladder: {YES / FJ:41497}  Urinary Catheter: {Urinary Catheter:143221114}   Colostomy/Ileostomy/Ileal Conduit: {YES / QB:36948}       Date of Last BM: ***  No intake or output data in the 24 hours ending 03/08/21 0906  No intake/output data recorded.     Safety Concerns:     [address] [employer] Safety Concerns:616294227}    Impairments/Disabilities:      [address] [employer] Impairments/Disabilities:836094418}    Nutrition Therapy:  Current Nutrition Therapy:   [address] [employer] Diet List:438590190}    Routes of Feeding: {CHP DME Other Feedings:805748096}  Liquids: {Slp liquid thickness:35132}  Daily Fluid Restriction: {CHP DME Yes amt example:248439748}  Last Modified Barium Swallow with Video (Video Swallowing Test): {Done Not Done AAKM:924274439}    Treatments at the Time of Hospital Discharge:   Respiratory Treatments: ***  Oxygen Therapy:  {Therapy; copd oxygen:52849}  Ventilator:    { CC Vent HN:622973566}    Rehab Therapies: {THERAPEUTIC INTERVENTION:8468439646}  Weight Bearing Status/Restrictions: [address] English CC Weight Bearin}  Other Medical Equipment (for information only, NOT a DME order):  {EQUIPMENT:225086214}  Other Treatments: ***    Patient's personal belongings (please select all that are sent with patient):  {CHP DME Belongings:603195208}    RN SIGNATURE:  {Esignature:239556471}    CASE MANAGEMENT/SOCIAL WORK SECTION    Inpatient Status Date: ***    Readmission Risk Assessment Score:  Readmission Risk              Risk of Unplanned Readmission:        0           Discharging to Facility/ Agency   · Name:   · Address:  · Phone:  · Fax:    Dialysis Facility (if applicable)   · Name:  · Address:  · Dialysis Schedule:  · Phone:  · Fax:    / signature: {Esignature:006092425}    PHYSICIAN SECTION    Prognosis: {Prognosis:5854773513}    Condition at Discharge: [address] Patient Condition:661327461}    Rehab Potential (if transferring to Rehab): {Prognosis:9497030060}    Recommended Labs or Other Treatments After Discharge: ***    Physician Certification: I certify the above information and transfer of Nehemiah Solis  is necessary for the continuing treatment of the diagnosis listed and that he requires {Admit to Appropriate Level of Care:45105} for {GREATER/LESS:826797432} 30 days.      Update Admission H&P: {CHP DME Changes in RKEBV:687448311}    PHYSICIAN SIGNATURE:  {Esignature:785208301}

## 2021-03-08 NOTE — PROGRESS NOTES
[facility] MAURISIO has reviewed and agrees with Davey Butts LPN's shift   Assessment.     Naga Albright  3/8/2021

## 2021-03-08 NOTE — PLAN OF CARE
Problem: Wound:  Goal: Will show signs of wound healing; wound closure and no evidence of infection  Description: Will show signs of wound healing; wound closure and no evidence of infection  Outcome: Ongoing   Patient seen for left foot and heel wounds. Wounds are showing signs of proper closure and healing. No s/s of infection noted. Silver applied today to foot wound bed. Pataient tolerated well. See AVS fo order changes. Care plan reviewed with patient. Patient verbalize understanding of the plan of care and contribute to goal setting.

## 2021-03-12 ENCOUNTER — OFFICE VISIT (OUTPATIENT)
Dept: CARDIOLOGY CLINIC | Age: 61
End: 2021-03-12
Payer: COMMERCIAL

## 2021-03-12 VITALS — HEART RATE: 82 BPM | SYSTOLIC BLOOD PRESSURE: 119 MMHG | DIASTOLIC BLOOD PRESSURE: 65 MMHG

## 2021-03-12 DIAGNOSIS — I25.10 CAD IN NATIVE ARTERY: Primary | ICD-10-CM

## 2021-03-12 PROCEDURE — 99214 OFFICE O/P EST MOD 30 MIN: CPT | Performed by: INTERNAL MEDICINE

## 2021-03-12 NOTE — PROGRESS NOTES
[facility]  [address] [address]  Dept: [phone]  Dept Fax: [phone]: [phone]    Visit Date: 3/12/2021    Mr. Marielena Flores is a 62 y.o. male  who presented for:    CAD  CABG  PAD  Carotid stenosis   HFpEF     HPI:   DARLENE Bernardo Meagan is a pleasant 62year old male patient who  has a past medical history of Arthritis, CAD (coronary artery disease), CKD (chronic kidney disease), stage II, Diabetes mellitus ([address] Hyperlipidemia, Hypertension, and Liver disease. He is s/p 3 vessel CABG on 11/2019 (Positive stress test, [facility] revealed MV CAD. 3v CABG 11/2019: SVG to OM1, SVG to Circumflex, LIMA to distal LAD with Dr. Nino at [facility] of his carotids was done at [facility] prior to CABG and per records CTA revealed 100% occlusion on the right and a 65% on the left. The recommendation was made for proceeding with his CABG and then an interval carotid endarterectomy on the left side once he is recovered from surgery. He continues to follow up with Podiatry medicine. Peripheral angiogram 11/2020 was c/w nonobstructive PAD. Patient denies chest pain, shortness of breath, dyspnea on exertion, orthopnea, paroxysmal nocturnal dyspnea, palpitations, dizziness, syncope, weight gain or leg swelling. Repeat CTA 01/2021 revealed occluded right ICA and 75% stenosis of left ICA. He denies any prior h/o CVA.        Current Outpatient Medications:     insulin detemir (LEVEMIR FLEXTOUCH) 100 UNIT/ML injection pen, Inject 44 Units into the skin every morning, Disp: 5 pen, Rfl: 5    clopidogrel (PLAVIX) 75 MG tablet, Take 1 tablet by mouth daily, Disp: 90 tablet, Rfl: 0    albuterol sulfate  (90 Base) MCG/ACT inhaler, Inhale 2 puffs into the lungs every 6 hours as needed for Wheezing, Disp: 1 Inhaler, Rfl: 3    lisinopril (PRINIVIL;ZESTRIL) 5 MG tablet, Take 1 tablet by mouth daily, Disp: 90 tablet, Rfl: 1    Polyethylene Glycol 3350 (MIRALAX PO), Take by mouth as needed, Disp: , Rfl:     gabapentin (NEURONTIN) 300 MG capsule, Take 1 capsule by mouth 3 times daily for 90 days. , Disp: 90 capsule, Rfl: 2    aspirin 81 MG chewable tablet, Take 1 tablet by mouth daily, Disp: 30 tablet, Rfl: 3    atorvastatin (LIPITOR) 40 MG tablet, Take 1 tablet by mouth nightly, Disp: 90 tablet, Rfl: 1    metoprolol tartrate (LOPRESSOR) 25 MG tablet, Take 1 tablet by mouth 2 times daily, Disp: 180 tablet, Rfl: 1    insulin lispro (HUMALOG) 100 UNIT/ML injection vial, Inject 0-12 Units into the skin 3 times daily (with meals) (Patient taking differently: Inject 0-12 Units into the skin as needed ), Disp: 1 vial, Rfl: 3    Multiple Vitamins-Minerals (MULTIVITAMIN PO), Take 1 tablet by mouth daily , Disp: , Rfl:     Past Medical History  Esthela Dominguez  has a past medical history of Arthritis, CAD (coronary artery disease), CKD (chronic kidney disease), stage II, Diabetes mellitus ([facility] [address] Hyperlipidemia, Hypertension, and Liver disease. Social History  Esthela Dominguez  reports that he quit smoking about 13 years ago. His smoking use included cigarettes. He has never used smokeless tobacco. He reports previous alcohol use. He reports that he does not use drugs. Family History  Esthela Dominguez family history includes Alzheimer's Disease in his father; Diabetes in his mother; Heart Disease in his father; High Blood Pressure in his father and mother.     Past Surgical History   Past Surgical History:   Procedure Laterality Date    CARDIAC SURGERY  11/2019    triple bypass    CYST REMOVAL      RIGHT ANKLE     FOOT DEBRIDEMENT Left 4/20/2020    LEFT TRANSMETATARSAL AMPUTATION  FOOT INCISION AND DRAINAGE performed by Miladys FriSMITH headley at [address] Left 7/20/2020    LEFT WOUND DEBRIDEMENT WITH WOUND VAC APPLICATION performed by Miladys FridaySMITH at [address] Right     CYST REMOVED    FOOT SURGERY Left 8/14/2020    LEFT FOOT PREPARATION GRAFT [place] SPLIT THICKNESS SKIN GRAFT WITH APPLICATION, APPLICATION [employer]CI WOUND VAC performed by Judy Grady DPM at [facility] [address] Left 3/3/2020    I&D LEFT FOOT, TRANSMETATARSAL AMPUTATION performed by Judy Grady DPM at [facility]         Review of Systems   Constitutional: Negative for chills and fever  HENT: Negative for congestion, sinus pressure, sneezing and sore throat. Eyes: Negative for pain, discharge, redness and itching. Respiratory: Negative for apnea, cough  Gastrointestinal: Negative for blood in stool, constipation, diarrhea   Endocrine: Negative for cold intolerance, heat intolerance, polydipsia. Genitourinary: Negative for dysuria, enuresis, flank pain and hematuria. Musculoskeletal: Negative for arthralgias, joint swelling and neck pain. Neurological: Negative for numbness and headaches. Psychiatric/Behavioral: Negative for agitation, confusion, decreased concentration and dysphoric mood. Objective: There were no vitals taken for this visit. Wt Readings from Last 3 Encounters:   03/08/21 (!) 310 lb (140.6 kg)   11/25/20 290 lb (131.5 kg)   11/13/20 290 lb (131.5 kg)     BP Readings from Last 3 Encounters:   03/08/21 134/78   03/01/21 138/82   02/22/21 (!) 141/78       Nursing note and vitals reviewed. Physical Exam   Constitutional: Oriented to person, place, and time. Appears well-developed and well-nourished. ENT: Moist mucous membranes. No bleeding. Tongue is midline. Head: Normocephalic and atraumatic. Eyes: EOM are normal. Pupils are equal, round, and reactive to light. Neck: Normal range of motion. Neck supple. No JVD present. Cardiovascular: Normal rate, regular rhythm, murmur, no rubs, and intact distal pulses. Pulmonary/Chest: Effort normal and breath sounds normal. No respiratory distress. No wheezes. No rales. Abdominal: Soft. Bowel sounds are normal. No distension. There is no tenderness.    Musculoskeletal: Normal range of motion. No edema. Right foot still in cast. Left foot dressing/stocking    Neurological: Alert and oriented to person, place, and time. No cranial nerve deficit. Coordination normal.   Skin: Skin is warm and dry. Psychiatric: Normal mood and affect.        Lab Results   Component Value Date    CKTOTAL 47 04/17/2020       Lab Results   Component Value Date    WBC 14.1 11/25/2020    RBC 4.79 11/25/2020    HGB 13.6 11/25/2020    HCT 40.7 11/25/2020    MCV 85.0 11/25/2020    MCH 28.4 11/25/2020    MCHC 33.4 11/25/2020    RDW 17.7 05/25/2020     11/25/2020    MPV 11.4 11/25/2020       Lab Results   Component Value Date     11/25/2020    K 4.1 11/25/2020     11/25/2020    CO2 24 11/25/2020    BUN 12 11/25/2020    LABALBU 3.9 11/25/2020    CREATININE 0.9 11/25/2020    CALCIUM 9.5 11/25/2020    LABGLOM 86 11/25/2020    GLUCOSE 139 11/25/2020    GLUCOSE 103 05/25/2020       Lab Results   Component Value Date    ALKPHOS 74 11/25/2020    ALT 17 11/25/2020    AST 17 11/25/2020    PROT 7.0 11/25/2020    BILITOT 0.4 11/25/2020    LABALBU 3.9 11/25/2020       Lab Results   Component Value Date    MG 2.2 04/21/2020       Lab Results   Component Value Date    INR 1.02 11/25/2020    INR 1.14 (H) 04/17/2020         Lab Results   Component Value Date    LABA1C 6.2 04/17/2020       Lab Results   Component Value Date    TRIG 114 11/25/2020    HDL 40 11/25/2020    LDLCALC 66 11/25/2020       Lab Results   Component Value Date    TSH 1.250 04/20/2020         Testing Reviewed:      I have individually reviewed the cardiac test below:    ECHO:   Results for orders placed during the hospital encounter of 04/17/20   ECHO Complete 2D W Doppler W Color    Narrative Transthoracic Echocardiography Report (TTE)     Demographics      Patient Name    Cristela Navas T Gender               Male      MR #            [id number]       Race                                                       Ethnicity      Account # [id number]       Room Number          0004      Accession       [id number]       Date of Study        04/21/2020   Number      Date of Birth   1960      Referring Physician  Ivan Vivar MD                                                        [facility] [place], [place]      Age             63 year(s)      Sonographer          Kaur Corley MD                                   Physician     Procedure    Type of Study      TTE procedure:ECHOCARDIOGRAM COMPLETE 2D W DOPPLER W COLOR. Procedure Date  Date: 04/21/2020 Start: 08:54 AM    Study Location: Bedside  Technical Quality: Limited visualization due to body habitus. Indications:Coronary artery disease and S/P CABG. Additional Medical History:Ex smoker, diabetic, sepsis, CAD, CABG, chronic  kidney disease, anemia, hyperlipidemia, hypertension, arthritis    Patient Status: Routine    Height: 74.02 inches Weight: 308. 65 pounds BSA: 2.61 m^2 BMI: 39.61 kg/m^2    BP: 202/97 mmHg     Conclusions      Summary   Technically difficult examination. Technically limited study. Left ventricle size is normal.   Ejection fraction is visually estimated at 50%. Unable to determine wall motion abnormalities due to poor image quality. Abnormal (paradoxical) motion consistent with post-operative status. Doppler parameters were consistent with abnormal left ventricular   relaxation (grade 1 diastolic dysfunction). Thickened aortic valve leaflets noted. Aortic valve leaflets are Mildly calcified. Mild aortic stenosis is present. Signature      ----------------------------------------------------------------   Electronically signed by Liliana Berry MD (Interpreting   physician) on 04/21/2020 at 11:53 AM   ----------------------------------------------------------------      Findings      Mitral Valve   Structurally normal mitral valve. Trace mitral regurgitation is present. Aortic Valve   Thickened aortic valve leaflets noted. Aortic valve leaflets are Mildly calcified. Mild aortic stenosis is present. Tricuspid Valve   Tricuspid valve is structurally normal.   Trace tricuspid regurgitation. Pulmonic Valve   The pulmonic valve was not well visualized . Left Atrium   Normal size left atrium. Left Ventricle   Left ventricle size is normal.   No asymmetrical left ventricular hypertrophy was seen. Unable to determine wall motion abnormalities due to poor image quality. Abnormal (paradoxical) motion consistent with post-operative status. Doppler parameters were consistent with abnormal left ventricular   relaxation (grade 1 diastolic dysfunction). Ejection fraction is visually estimated at 50%. Right Atrium   The right atrium is of normal size. Right Ventricle   Normal right ventricular size and function. Pericardial Effusion   No evidence of any pericardial effusion. Pleural Effusion   No evidence of pleural effusion. Aorta / Great Vessels   The aorta is within normal limits. IVC normal.   Estimated right atrial pressure is 5 mmHg .      M-Mode/2D Measurements & Calculations      LV Diastolic    LV Systolic Dimension: 3.3   AV Cusp Separation: 1.3 cmLA   Dimension: 4.6  cm                           Dimension: 2 cmAO Root   cm              LV Volume Diastolic: 16.7 ml Dimension: 3.3 cm   LV FS:28.3 %    LV Volume Systolic: 70.7 ml   LV PW           LV EDV/LV EDV Index: 62.3   Diastolic: 0.9  JU/83 I^1KR ESV/LV ESV   cm              Index: 44.1 ml/17 m^2        RV Diastolic Dimension: 2.2   Septum          EF Calculated: 54.7 %        cm   Diastolic: 0.8   cm                                           LA/Aorta: 0.61                      LVOT: 2 cm     Doppler Measurements & Calculations      MV Peak E-Wave:     AV Peak Velocity: 223    LVOT Peak Velocity: 113 cm/s   59.6 cm/s           cm/s                     LVOT Mean Velocity: 77.8 cm/s   MV Peak A-Wave: 69  AV Peak Gradient: 19.89  LVOT Peak Gradient: 5   cm/s                mmHg                     mmHgLVOT Mean Gradient: 3   MV E/A Ratio: 0.86  AV Mean Velocity: 155    mmHg   MV Peak Gradient:   cm/s   1.42 mmHg           AV Mean Gradient: 11     TV Peak E-Wave: 61.3 cm/s                       mmHg                     TV Peak A-Wave: 91.7 cm/s   MV Deceleration     AV VTI: 36.7 cm   Time: 259 msec      AV Area                  TV Peak Gradient: 1.5 mmHg                       (Continuity):1.79 cm^2   TR Velocity:250 cm/s                                                TR Gradient:25 mmHg   MV E' Septal        LVOT VTI: 20.9 cm        PV Peak Velocity: 87.8 cm/s   Velocity: 6.4 cm/s                           PV Peak Gradient: 3.08 mmHg   MV A' Septal   Velocity: 9.2 cm/s   MV E' Lateral       AV DVI (VTI): 0.57AV DVI   Velocity: 4.9 cm/s  (Vmax):0.51   MV A' Lateral   Velocity: 8.5 cm/s   E/E' septal: 9.31   E/E' lateral: 12.16   MR Velocity: 377   cm/s     [id number]        PROCEDURE: VL DUP LOWER EXTREMITY ARTERIES BILATERAL       CLINICAL INFORMATION: Non-healing wound of amputation stump (HCC)        COMPARISON: No prior study.       TECHNIQUE:  Transverse and longitudinal ultrasound images were obtained through the left lower extremity arterial vasculature. Peak systolic velocities were measured. Ankle-brachial indices were obtained.         FINDINGS:        There is triphasic waveform morphology demonstrated within the left common femoral artery. Peak systolic velocity measures 142 cm/s. No significant drop or elevation in peak systolic velocities demonstrated to suggest significant flow-limiting stenosis    within the left lower extremity arterial vasculature to the level of the popliteal artery.  However, the anterior tibial artery demonstrates a relative drop in peak systolic velocity suggesting stenosis of possibly near 50%. There is biphasic waveform    morphology noted within the left anterior tibial artery.       Ankle brachial indices are within normal limits.       LEFT ARTERY   (PSV cm/sec)   ? ?????????????????????????   CFA ---------------> 142 PSV cm/sec   PROF -------------> 130 PSV cm/sec   SFA PROX ------->125 PSV cm/sec    SFA MID ---------->102 PSV cm/sec   SFA DIST -------->88 PSV cm/sec    POP A PROX --->66 PSV cm/sec    POP A DIST ---->55 PSV cm/sec    PTA --------------->88 PSV cm/sec    PERONEAL ----->68 PSV cm/sec    RAFAL ----------------> 27 PSV cm/sec           CHERYL    LEFT       RAFAL----->0.91   PTA----->1.05           Impression   1. There is a relative drop in peak systolic velocity within the left anterior tibial artery suggesting possible stenosis of near 50%. However, the ankle brachial indices are within normal limits. Further evaluation could be obtained with CT angiography    with runoff if clinically warranted.          Narrative   PROCEDURE: VL DUP LOWER EXTREMITY ARTERIES RIGHT       CLINICAL INFORMATION: Non-healing ulcer of lower extremity, left, with unspecified severity ([address]        COMPARISON: No prior study.       TECHNIQUE:  Grayscale and color flow ultrasound images were obtained through the right lower extremity arterial vasculature. Peak systolic velocities were measured.  Examination was limited due to presence of right lower extremity cast.       FINDINGS:        RIGHT ARTERY   (PSV cm/sec)       CFA ---------------> 134 PSV cm/sec   PROF -------------> 382 PSV cm/sec   SFA PROX ------->143 PSV cm/sec    SFA MID ---------->82 PSV cm/sec   SFA DIST -------->150 PSV cm/sec    POP A PROX --->77 PSV cm/sec    POP A DIST ---->80 PSV cm/sec    PTA --------------->78 PSV cm/sec    RAFAL ----------------> 106 PSV cm/sec           Right common femoral artery demonstrates triphasic waveform body with a peak systolic velocity of 525 cm second. There is elevated peak systolic velocity at the proximal aspect of the right profunda femoris artery which may suggest narrowing of near 50%    at this location. However, the right proximal SFA demonstrates triphasic waveform morphology and no significant drop or elevation of peak systolic velocity. The remaining right lower extremity arterial vasculature demonstrates triphasic waveform    morphology with no significant drop or elevation in peak systolic velocity to suggest significant flow-limiting stenosis.         Impression   1. There is suggested stenosis of near 50% at the proximal aspect of the right profunda femoris artery. However, the remaining right lower extremity arterial vasculature otherwise demonstrates no sonographic evidence of significant flow-limiting    stenosis.             **This report has been created using voice recognition software.  It may contain minor errors which are inherent in voice recognition technology. **            Peripheral angiogram 11/2020:  IMPRESSION:  Nonobstructive peripheral arterial disease.     RECOMMENDATIONS:  Medical management.       Narrative   PROCEDURE: CTA NECK W WO CONTRAST       CLINICAL INFORMATION: Bilateral carotid artery stenosis. Preop. Bilateral carotid stenosis. CABG in November 2020.       COMPARISON: No prior study.       TECHNIQUE: 1 mm axial images were obtained through the neck after the fast bolus administration of contrast. A noncontrast localizer was obtained. 3-D reconstructions were performed on a dedicated 3-D workstation. These include multiplanar MPR images,    multiplanar MIP images and centerline reconstructions. Isovue intravenous contrast was given.       All CT scans at this facility use dose modulation, iterative reconstruction, and/or weight-based dosing when appropriate to reduce radiation dose to as low as reasonably achievable.       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               Impression       1. Occluded right internal carotid artery. 2. 75% stenosis at the origin of the left internal carotid artery. 3. Moderate stenosis at the origin the right vertebral artery.                   **This report has been created using voice recognition software. It may contain minor errors which are inherent in voice recognition technology. **       Final report electronically signed by Dr. Isamar Wade on 1/7/2021 4:20 PM             AssessmentPlan:   Emelia Robin is a pleasant 62year old male patient who  has a past medical history of Arthritis, CAD (coronary artery disease), CKD (chronic kidney disease), stage II, Diabetes mellitus ([address] Hyperlipidemia, Hypertension, and Liver disease. He is s/p 3 vessel CABG on 11/2019 (Positive stress test, [facility] revealed MV CAD. 3v CABG 11/2019: SVG to OM1, SVG to Circumflex, LIMA to distal LAD with Dr. Nino at [facility] of his carotids was done at [facility] prior to CABG and per records CTA revealed 100% occlusion on the right and a 65% on the left. The recommendation was made for proceeding with his CABG and then an interval carotid endarterectomy on the left side once he is recovered from surgery. He continues to follow up with Podiatry medicine. Peripheral angiogram 11/2020 was c/w nonobstructive PAD. Patient denies chest pain, shortness of breath, dyspnea on exertion, orthopnea, paroxysmal nocturnal dyspnea, palpitations, dizziness, syncope, weight gain or leg swelling. Repeat CTA 01/2021 revealed occluded right ICA and 75% stenosis of left ICA. He denies any prior h/o CVA. 1. S/P left TMA 3/3/2020, recurrent infection, s/p revision 04/2020, required IV Abx/Wound VAC  2. PAD  3. Carotid stenosis (CTA 11/2019 revealed 100% occlusion on right, 65% stenosis on left - left CEA was planned after CABG)  4. Asymptomatic 75% left ICA stenosis noted on most recent CTA 01/2021  5. CAD  6. 3 V CABG 11/2019 ([facility]: SVG LCX, SVG OM1, LIMA to dLAD)  7. DM  8. HTN  9. Dyslipidemia   10. HFpEF      No significant PAD on peripheral angiogram   Followed by Podiatry    No signs of volume overload   Denies chest pain   Cont ASA, Plavix, Lipitor   75% stenosis at the origin of the left internal carotid artery   I discussed with patient the findings, progression and stroke risk     He remains asymptomatic at this time   He denies h/o CVA/TIA   Will need continued medical management for carotid stenosis with close surveillance. Will need reassessment in the next 3-6 months due to the noted progression, subsequent consideration for CEA Vs VICTOR MANUEL     Above findings and plan of care were discussed with patient in details, patient's questions were answered.      Disposition:  RTC in 3 months             Electronically signed by Nelia Montiel MD, [facility]    3/12/2021 at 9:19 AM EST

## 2021-03-15 ENCOUNTER — HOSPITAL ENCOUNTER (OUTPATIENT)
Dept: WOUND CARE | Age: 61
Discharge: HOME | End: 2021-03-15
Payer: COMMERCIAL

## 2021-03-15 VITALS
OXYGEN SATURATION: 99 % | BODY MASS INDEX: 38.54 KG/M2 | DIASTOLIC BLOOD PRESSURE: 66 MMHG | RESPIRATION RATE: 18 BRPM | SYSTOLIC BLOOD PRESSURE: 122 MMHG | HEART RATE: 82 BPM | WEIGHT: 310 LBS | HEIGHT: 75 IN | TEMPERATURE: 98.3 F

## 2021-03-15 PROCEDURE — 15271 SKIN SUB GRAFT TRNK/ARM/LEG: CPT

## 2021-03-15 NOTE — PROGRESS NOTES
[address] [address]         Consult and Procedure Note      Margarita Romero  MEDICAL RECORD NUMBER:  [id number]  AGE: 62 y.o. GENDER: male  : 1960  EPISODE DATE:  3/8/2021    Subjective:     Chief Complaint   Patient presents with    Wound Check     left foot         HISTORY of PRESENT ILLNESS HPI     Margarita Romero is a 62 y.o. male Established patient who is well-known to our office who is being seen today for an ongoing left TMA wound. Patient is a type II diabetic with severe peripheral vascular disease. Patient was previously being seen at our [facility] office for this ongoing wound. Due to failed treatment with excisional debridements, patient was approved for Apligraf skin substitute. Patient's first Apligraf skin substitute was applied last week. Graft appears to be well-maintained and intact at this time. No debridement was indicated today. Wound was treated with silver nitrate. We will plan for another Apligraf next week. This will be Apligraf 2/5. Patient was placed back into Unna boot compression therapy. Patient will follow back up with us again in 1 week. 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   Wound/Ulcer Pain Timing/Severity: none  Quality of pain: N/A  Severity:  0/10   Modifying Factors: None  Associated Signs/Symptoms: edema, erythema and drainage        PAST MEDICAL HISTORY        Diagnosis Date    Arthritis     CAD (coronary artery disease)     CKD (chronic kidney disease), stage II     Diabetes mellitus ([facility] [address]     Hyperlipidemia     Hypertension     Liver disease     HEPITITIS AS A CHILD       PAST SURGICAL HISTORY    Past Surgical History:   Procedure Laterality Date    CARDIAC SURGERY  2019    triple bypass    CYST REMOVAL      RIGHT ANKLE     FOOT DEBRIDEMENT Left 2020    LEFT TRANSMETATARSAL AMPUTATION  FOOT INCISION AND DRAINAGE performed by Radu Oseguera DPM at [address] Left 2020 LEFT WOUND DEBRIDEMENT WITH WOUND VAC APPLICATION performed by Clive Fernandes DPM at [facility] Right     CYST REMOVED    FOOT SURGERY Left 2020    LEFT FOOT PREPARATION GRAFT SITE, HAVEST SPLIT THICKNESS SKIN GRAFT WITH APPLICATION, APPLICATION [facility] WOUND VAC performed by Clive Fernandes DPM at  [facility] TOE AMPUTATION Left 3/3/2020    I&D LEFT FOOT, TRANSMETATARSAL AMPUTATION performed by Clive Fernandes DPM at [facility]    Family History   Problem Relation Age of Onset    Diabetes Mother     High Blood Pressure Mother     Alzheimer's Disease Father          of, 80or 80years old    Heart Disease Father     High Blood Pressure Father     Cancer Neg Hx     Stroke Neg Hx        SOCIAL HISTORY    Social History     Tobacco Use    Smoking status: Former Smoker     Types: Cigarettes     Quit date:      Years since quittin.2    Smokeless tobacco: Never Used   Substance Use Topics    Alcohol use: Not Currently    Drug use: Never       ALLERGIES    No Known Allergies    MEDICATIONS    Current Outpatient Medications on File Prior to Encounter   Medication Sig Dispense Refill    insulin detemir (LEVEMIR FLEXTOUCH) 100 UNIT/ML injection pen Inject 44 Units into the skin every morning 5 pen 5    clopidogrel (PLAVIX) 75 MG tablet Take 1 tablet by mouth daily 90 tablet 0    albuterol sulfate  (90 Base) MCG/ACT inhaler Inhale 2 puffs into the lungs every 6 hours as needed for Wheezing 1 Inhaler 3    lisinopril (PRINIVIL;ZESTRIL) 5 MG tablet Take 1 tablet by mouth daily 90 tablet 1    Polyethylene Glycol 3350 (MIRALAX PO) Take by mouth as needed      aspirin 81 MG chewable tablet Take 1 tablet by mouth daily 30 tablet 3    atorvastatin (LIPITOR) 40 MG tablet Take 1 tablet by mouth nightly 90 tablet 1    metoprolol tartrate (LOPRESSOR) 25 MG tablet Take 1 tablet by mouth 2 times daily 180 tablet 1    insulin lispro (HUMALOG) 03/08/21 0853   Wound Cleansed Cleansed with saline 03/08/21 0853   Dressing/Treatment ABD; Alginate;Collagen with Ag;Roll gauze;Coban/self-adherent bandages 03/08/21 0853   Offloading for Diabetic Foot Ulcers Other (comment) 03/01/21 0909   Wound Length (cm) 2.8 cm 03/08/21 0853   Wound Width (cm) 2.4 cm 03/08/21 0853   Wound Depth (cm) 0.5 cm 03/08/21 0853   Wound Surface Area (cm^2) 6.72 cm^2 03/08/21 0853   Change in Wound Size % (l*w) 31.43 03/08/21 0853   Wound Volume (cm^3) 3.36 cm^3 03/08/21 0853   Wound Healing % 14 03/08/21 0853   Undermining Starts ___ O'Clock 7 03/08/21 0853   Undermining Ends___ O'Clock 9 03/08/21 0853   Undermining Maxium Distance (cm) 0.1 03/08/21 0853   Wound Assessment Granulation tissue 03/08/21 0853   Drainage Amount Moderate 03/08/21 0853   Drainage Description Serosanguinous 03/08/21 0853   Odor None 03/08/21 0853   Sumaya-wound Assessment Dry/flaky; Maceration 03/08/21 0853   Margins Attached edges; Unattached edges 03/08/21 0853   Wound Thickness Description not for Pressure Injury Full thickness 02/22/21 0848   Number of days: 41       Wound 02/01/21 Heel Left (Active)   Wound Image   03/01/21 0909   Dressing Status Dry; Old drainage noted 03/08/21 0853   Wound Cleansed Cleansed with saline 03/08/21 0853   Dressing/Treatment Steri-strips;ABD;Coban/self-adherent bandages; Roll gauze 03/08/21 0853   Offloading for Diabetic Foot Ulcers Other (comment) 03/01/21 0909   Wound Length (cm) 0.4 cm 03/08/21 0853   Wound Width (cm) 0.3 cm 03/08/21 0853   Wound Depth (cm) 0.6 cm 03/08/21 0853   Wound Surface Area (cm^2) 0.12 cm^2 03/08/21 0853   Change in Wound Size % (l*w) 60 03/08/21 0853   Wound Volume (cm^3) 0.07 cm^3 03/08/21 0853   Wound Healing % 74 03/08/21 0853   Undermining Starts ___ O'Clock 12 03/01/21 0909   Undermining Ends___ O'Clock 12 03/01/21 0909   Undermining Maxium Distance (cm) 0.7 03/01/21 0909   Wound Assessment Graft 03/08/21 0853   Drainage Amount Scant 03/08/21 0853 dehiscence, surgical, initial encounter T81.31XA    Postoperative wound infection L83.33AE    Metabolic acidosis Z78.8    Hx of CABG Z95.1    Lumbar stenosis without neurogenic claudication M48.061    CKD (chronic kidney disease), stage II N18.2    Normocytic anemia D64.9    Morbid obesity (HCC) E66.01    Debility R53.81    Carotid stenosis, asymptomatic, bilateral I65.23    Hypokalemia E87.6    Nonhealing ulcer of left lower extremity ([address] L97.929    Bleeding R58    Wound, open T14. 8XXA    SOB (shortness of breath) on exertion R06.02       Procedure Note      Chemical Cautery - 47021    Performed by: EYAD Freitas CNP  Consent obtained: Yes  Time out taken: Yes  Tissue Type: Hypergranulation  Pain Control: N/A  Method: silver nitrate    Location of Chemical Cautery:   Wound/Ulcer #1     Wound 02/01/21 Foot Left (Active)   Wound Image   03/01/21 0909   Wound Etiology Non-Healing Surgical 03/01/21 0909   Dressing Status Intact; Old drainage noted 03/08/21 0853   Wound Cleansed Cleansed with saline 03/08/21 0853   Dressing/Treatment ABD; Alginate;Collagen with Ag;Roll gauze;Coban/self-adherent bandages 03/08/21 0853   Offloading for Diabetic Foot Ulcers Other (comment) 03/01/21 0909   Wound Length (cm) 2.8 cm 03/08/21 0853   Wound Width (cm) 2.4 cm 03/08/21 0853   Wound Depth (cm) 0.5 cm 03/08/21 0853   Wound Surface Area (cm^2) 6.72 cm^2 03/08/21 0853   Change in Wound Size % (l*w) 31.43 03/08/21 0853   Wound Volume (cm^3) 3.36 cm^3 03/08/21 0853   Wound Healing % 14 03/08/21 0853   Undermining Starts ___ O'Clock 7 03/08/21 0853   Undermining Ends___ O'Clock 9 03/08/21 0853   Undermining Maxium Distance (cm) 0.1 03/08/21 0853   Wound Assessment Granulation tissue 03/08/21 0853   Drainage Amount Moderate 03/08/21 0853   Drainage Description Serosanguinous 03/08/21 0853   Odor None 03/08/21 0853   Sumaya-wound Assessment Dry/flaky; Maceration 03/08/21 0853   Margins Attached edges; Unattached edges 03/08/21 0853   Wound Thickness Description not for Pressure Injury Full thickness 02/22/21 0848   Number of days: 41       Wound 02/01/21 Heel Left (Active)   Wound Image   03/01/21 0909   Dressing Status Dry; Old drainage noted 03/08/21 0853   Wound Cleansed Cleansed with saline 03/08/21 0853   Dressing/Treatment Steri-strips;ABD;Coban/self-adherent bandages; Roll gauze 03/08/21 0853   Offloading for Diabetic Foot Ulcers Other (comment) 03/01/21 0909   Wound Length (cm) 0.4 cm 03/08/21 0853   Wound Width (cm) 0.3 cm 03/08/21 0853   Wound Depth (cm) 0.6 cm 03/08/21 0853   Wound Surface Area (cm^2) 0.12 cm^2 03/08/21 0853   Change in Wound Size % (l*w) 60 03/08/21 0853   Wound Volume (cm^3) 0.07 cm^3 03/08/21 0853   Wound Healing % 74 03/08/21 0853   Undermining Starts ___ O'Clock 12 03/01/21 0909   Undermining Ends___ O'Clock 12 03/01/21 0909   Undermining Maxium Distance (cm) 0.7 03/01/21 0909   Wound Assessment Graft 03/08/21 0853   Drainage Amount Scant 03/08/21 0853   Drainage Description Serosanguinous 03/08/21 0853   Odor None 03/08/21 0853   Sumaya-wound Assessment Blanchable erythema; Intact;Dry/flaky 03/08/21 0853   Margins Attached edges 03/08/21 0853   Wound Thickness Description not for Pressure Injury Full thickness 03/08/21 0853   Number of days: 41     Incision 07/20/20 Left;Plantar (Active)   Number of days: 238       Incision 08/14/20 Right (Active)   Number of days: 212     Procedural Pain: 0  / 10   Post Procedural Pain: 0 / 10   Response to treatment: Well tolerated by patient. Unna boot compression therapy, left. - 37087  A dual layer Unna boot consisting of a zinc oxide wrap followed by Coban tape was applied to the left lower extremity. Patient tolerated procedure well. Plan:     Patient examined and evaluated today. Apligraf skin substitute appears well-maintained and intact to left TMA wound. Debridement was indicated today. Wound site was treated with silver nitrate.   Patient is placed back in Unna boot compression therapy. Patient will follow back up with us again in 1 week for reapplication of another Apligraf skin substitute graft which will be graft 2/5. Treatment:   No orders of the defined types were placed in this encounter. Antibiotics: No  Follow up: 1 week  Please see attached Discharge Instructions  Written patient dismissal instructions given to patient and signed by patient or POA. Discharge Instructions       Visit Discharge/Physician Orders:  - Will check into graft options for you. - Silver nitrate to left heel   - Debridement to left foot wound. Wound Location: left foot    Dressing orders:     1) Gather wound care supplies and arrange on clean table. 2) Wash your hands with soap and water or use alcohol based hand  for 20 seconds (sing \"Happy Birthday\" twice). 3) Cleanse wounds with normal saline or wound cleanser and gauze. Pat dry with clean gauze. 4) left foot/ left heel- Remove old unna boots to left lower legs. Wash legs with warm soapy water. Pat dry. Cleanse wound with normal saline or wound cleanser and gauze. Pat dry with clean gauze. Apply collagen the alginate to wound bed. Apply unna boots , ABD then kerlix and coban to left lower legs from base of toes to 1-2 inches below bend of knee. Change in wound clinic weekly. If unna boot becomes too tight- raise/elevate legs above the level of the heart for 15-20 minutes if swelling does not go down then carefully cut off unna boot and call clinic or go to local ER or family physician. If unna boot becomes wet or starts to roll down then carefully remove unna boot and call clinic. Keep all dressings clean & dry. Do not shower, take baths or get wound wet, unless otherwise instructed by your Wound Care doctor. Follow up visit:   1 Week on Monday March 15th. Keep next scheduled appointment. Please give 24 hour notice if unable to keep appointment. [phone]    If you experience any of the following, please call the Wound Care Service during business hours: Monday through Friday 8:00 am - 4:30 pm  ([phone]). *Increase in pain   *Temperature over 101   *Increase in drainage from your wound or a foul odor   *Uncontrolled swelling   *Need for compression bandage changes due to slippage, breakthrough drainage    If you need medical attention outside of business hours, please contact your Primary Care Doctor or go to the nearest emergency room.          Electronically signed by EYAD Alexander CNP on 3/15/2021 at 7:56 AM

## 2021-03-15 NOTE — PLAN OF CARE
Problem: Wound:  Goal: Will show signs of wound healing; wound closure and no evidence of infection  Description: Will show signs of wound healing; wound closure and no evidence of infection  Outcome: Ongoing   Patient seen for left foot and heel wounds. Wounds are showing signs of proepr closure and healing. No s/s of infection noted. Graft applied to planter wound. Apligraf Lot #[id number], Exp. 03/16/2021, [id number]. Patient tolerated graft application well. Follow up in 1 week. See AVS for order changes. Care plan reviewed with patient. Patient verbalize understanding of the plan of care and contribute to goal setting.

## 2021-03-15 NOTE — PROGRESS NOTES
Procedure:  Skin Substitute Application    Performed by: Jakob uRff LPN    Ulcer Type: non-healing surgical    Consent obtained: Yes    Time out taken: Yes    Product Utilized:    Apligraf 44 sq/cm     Fenestrated: Yes    Mesher Utilized: No    Instrument(s) mechanical    Skin Substitute was Applied to Ulcer Number(s):    Ulcer #: 1    Wound 02/01/21 Foot Left (Active)   Wound Image   03/15/21 0833   Wound Etiology Non-Healing Surgical 03/01/21 0909   Dressing Status Intact; Old drainage noted 03/15/21 0833   Wound Cleansed Cleansed with saline 03/15/21 0833   Dressing/Treatment ABD; Alginate;Collagen with Ag;Roll gauze;Coban/self-adherent bandages 03/08/21 0853   Offloading for Diabetic Foot Ulcers Other (comment) 03/01/21 0909   Wound Length (cm) 2.7 cm 03/15/21 0833   Wound Width (cm) 2.5 cm 03/15/21 0833   Wound Depth (cm) 0.4 cm 03/15/21 0833   Wound Surface Area (cm^2) 6.75 cm^2 03/15/21 0833   Change in Wound Size % (l*w) 31.12 03/15/21 0833   Wound Volume (cm^3) 2.7 cm^3 03/15/21 0833   Wound Healing % 31 03/15/21 0833   Undermining Starts ___ O'Clock 10 03/15/21 0833   Undermining Ends___ O'Clock 11 03/15/21 0833   Undermining Maxium Distance (cm) 0.4 03/15/21 0833   Wound Assessment Granulation tissue 03/15/21 0833   Drainage Amount Moderate 03/15/21 0833   Drainage Description Serosanguinous 03/15/21 0833   Odor None 03/15/21 0833   Sumaya-wound Assessment Dry/flaky; Maceration 03/15/21 0833   Margins Attached edges; Unattached edges 03/15/21 0833   Wound Thickness Description not for Pressure Injury Full thickness 02/22/21 0848   Number of days: 41       Wound 02/01/21 Heel Left (Active)   Wound Image   03/01/21 0909   Dressing Status Dry; Intact 03/15/21 0833   Wound Cleansed Cleansed with saline 03/15/21 0833   Dressing/Treatment Steri-strips;ABD;Coban/self-adherent bandages; Roll gauze 03/08/21 0853   Offloading for Diabetic Foot Ulcers Other (comment) 03/01/21 0909   Wound Length (cm) 0.4 cm 03/15/21 0918 Wound Width (cm) 0.4 cm 03/15/21 0833   Wound Depth (cm) 0 cm 03/15/21 0833   Wound Surface Area (cm^2) 0.16 cm^2 03/15/21 0833   Change in Wound Size % (l*w) 46.67 03/15/21 0833   Wound Volume (cm^3) 0 cm^3 03/15/21 0833   Wound Healing % 100 03/15/21 0833   Undermining Starts ___ O'Clock 12 03/01/21 0909   Undermining Ends___ O'Clock 12 03/01/21 0909   Undermining Maxium Distance (cm) 0.7 03/01/21 0909   Wound Assessment Graft 03/15/21 0833   Drainage Amount Scant 03/15/21 0833   Drainage Description Serosanguinous 03/15/21 0833   Odor None 03/15/21 0833   Sumaya-wound Assessment Blanchable erythema; Intact;Dry/flaky 03/15/21 0833   Margins Attached edges 03/15/21 0833   Wound Thickness Description not for Pressure Injury Full thickness 03/15/21 1078   Number of days: 41     Incision 07/20/20 Left;Plantar (Active)   Number of days: 238       Incision 08/14/20 Right (Active)   Number of days: 212       Diabetic/Pressure/Non Pressure Ulcers:  Ulcer:   Non-Pressure ulcer, fat layer exposed      Total Surface Area of Ulcer(s) Covered 6.75 sq/cm    Was the Product Layered  No     Amount of Product Applied *** sq/cm     Amount of Product Wasted *** sq/cm     Reason for Waste: {Reason for Waste Skin Sub:76334}     Surgically Fixated: {yes no:850687::\"Yes\"}    Secured With: {Secured DMED:83983}    Procedural Pain: {NUMBERS 0-10:61699}/10     Post Procedural Pain: {NUMBERS 0-10:02572} / 10    Response to Treatment: [address] [place], [place] [address] TOLERATED:173604}

## 2021-03-15 NOTE — PROGRESS NOTES
Parker FORDE has reviewed and agrees with Yassine [facility]'s shift   Assessment.     Carolyn Park  3/15/2021

## 2021-03-15 NOTE — PROGRESS NOTES
[facility]         Consult and Procedure Note      Tori Chavis  MEDICAL RECORD NUMBER:  [id number]  AGE: 62 y.o. GENDER: male  : 1960  EPISODE DATE:  3/15/2021    Subjective:     Chief Complaint   Patient presents with    Wound Check     left foot and heel         HISTORY of PRESENT ILLNESS HPI     Tori Chavis is a 62 y.o. male Established patient who is well-known to our office who is being seen today for an ongoing left TMA wound. Patient is a type II diabetic with severe peripheral vascular disease. Patient was previously being seen at our [facility] office for this ongoing wound. Due to failed treatment with excisional debridements, patient was approved for Apligraf skin substitute. Patient's first Apligraf skin substitute was applied 2 weeks ago. Apligraf skin substitute 2/5 was applied to the left TMA wound in office today. Graft was secured with a wound veil and Steri-Strips. Alginate and 4 x 4 was also applied to the wound. Patient was placed back into Unna boot compression therapy. We will continue to have home health come out once weekly for Unna boot dressing change. Home health is not to change the Steri-Strips or wound veil overlying the Apligraf. Patient will F/U with us again in 1 week. All other questions or concerns were otherwise addressed at today's visit. 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   Wound/Ulcer Pain Timing/Severity: none  Quality of pain: N/A  Severity:  0/10   Modifying Factors: None  Associated Signs/Symptoms: edema, erythema and drainage        PAST MEDICAL HISTORY        Diagnosis Date    Arthritis     CAD (coronary artery disease)     CKD (chronic kidney disease), stage II     Diabetes mellitus ([facility] [address]     Hyperlipidemia     Hypertension     Liver disease     HEPITITIS AS A CHILD       PAST SURGICAL HISTORY    Past Surgical History:   Procedure Laterality Date    CARDIAC SURGERY  2019    triple bypass    CYST REMOVAL      RIGHT ANKLE     FOOT DEBRIDEMENT Left 2020    LEFT TRANSMETATARSAL AMPUTATION  FOOT INCISION AND DRAINAGE performed by Miladys FridaySMITH at [address] Left 2020    LEFT WOUND DEBRIDEMENT WITH WOUND VAC APPLICATION performed by Miladys FridaySMITH at [facility] Right     CYST REMOVED    FOOT SURGERY Left 2020    LEFT FOOT PREPARATION GRAFT SITE, HAVEST SPLIT THICKNESS SKIN GRAFT WITH APPLICATION, APPLICATION [employer] WOUND VAC performed by Miladys FriSMITH headley at [address] [place] [address] TOE AMPUTATION Left 3/3/2020    I&D LEFT FOOT, TRANSMETATARSAL AMPUTATION performed by Miladys FridaySMITH at [facility]    Family History   Problem Relation Age of Onset    Diabetes Mother     High Blood Pressure Mother     Alzheimer's Disease Father          of, 80or 80years old   Mervat Carmelina Heart Disease Father     High Blood Pressure Father     Cancer Neg Hx     Stroke Neg Hx        SOCIAL HISTORY    Social History     Tobacco Use    Smoking status: Former Smoker     Types: Cigarettes     Quit date:      Years since quittin.2    Smokeless tobacco: Never Used   Substance Use Topics    Alcohol use: Not Currently    Drug use: Never       ALLERGIES    No Known Allergies    MEDICATIONS    Current Outpatient Medications on File Prior to Encounter   Medication Sig Dispense Refill    insulin detemir (LEVEMIR FLEXTOUCH) 100 UNIT/ML injection pen Inject 44 Units into the skin every morning 5 pen 5    clopidogrel (PLAVIX) 75 MG tablet Take 1 tablet by mouth daily 90 tablet 0    albuterol sulfate  (90 Base) MCG/ACT inhaler Inhale 2 puffs into the lungs every 6 hours as needed for Wheezing 1 Inhaler 3    lisinopril (PRINIVIL;ZESTRIL) 5 MG tablet Take 1 tablet by mouth daily 90 tablet 1    Polyethylene Glycol 3350 (MIRALAX PO) Take by mouth as needed      aspirin 81 MG chewable tablet Take 1 tablet by mouth daily 30 tablet 3    atorvastatin (LIPITOR) 40 MG tablet Take 1 tablet by mouth nightly 90 tablet 1    metoprolol tartrate (LOPRESSOR) 25 MG tablet Take 1 tablet by mouth 2 times daily 180 tablet 1    insulin lispro (HUMALOG) 100 UNIT/ML injection vial Inject 0-12 Units into the skin 3 times daily (with meals) (Patient taking differently: Inject 0-12 Units into the skin as needed ) 1 vial 3    Multiple Vitamins-Minerals (MULTIVITAMIN PO) Take 1 tablet by mouth daily       gabapentin (NEURONTIN) 300 MG capsule Take 1 capsule by mouth 3 times daily for 90 days. 90 capsule 2     No current facility-administered medications on file prior to encounter. REVIEW OF SYSTEMS    Pertinent items are noted in HPI. Objective:      /66   Pulse 82   Temp 98.3 °F (36.8 °C) (Infrared)   Resp 18   Ht 6' 3\" (1.905 m)   Wt (!) 310 lb (140.6 kg)   SpO2 99%   BMI 38.75 kg/m²     Wt Readings from Last 3 Encounters:   03/15/21 (!) 310 lb (140.6 kg)   03/08/21 (!) 310 lb (140.6 kg)   11/25/20 290 lb (131.5 kg)       PHYSICAL EXAM    General Appearance: Alert and oriented to person, place   and time, well developed and well- nourished, in no acute distress. Vascular: DP and PT pulses are faintly palpable to the left lower extremity. Cap refill less than 5 seconds. Skin was warm to cool from proximal tibia to distal foot. Mild edema noted. Skin: Patient continues to have a neurotrophic ulcer to the plantar lateral aspect of the left foot. Previous Apligraf skin substitute graft appears well-maintained and intact. Wound was not debrided today. No probe to bone or proximal streaking noted. Scant serous drainage with no malodor. Patient has a previous TMA to the left lower extremity. Patient also continues to have a small left posterior heel wound. Wound does not probe to bone and there is no proximal streaking. Scant serous drainage noted. No malodor.     Neurovascular: Epicritic and protopathic sensations are grossly diminished to bilateral lower extremities. Wound 02/01/21 Foot Left (Active)   Wound Image   03/15/21 0833   Wound Etiology Non-Healing Surgical 03/01/21 0909   Dressing Status Intact; Old drainage noted 03/15/21 0833   Wound Cleansed Cleansed with saline 03/15/21 0833   Dressing/Treatment ABD; Cast padding;Coban/self-adherent bandages; Roll gauze;Steri-strips 03/15/21 0833   Offloading for Diabetic Foot Ulcers No offloading required 03/15/21 0833   Wound Length (cm) 2.7 cm 03/15/21 0833   Wound Width (cm) 2.5 cm 03/15/21 0833   Wound Depth (cm) 0.4 cm 03/15/21 0833   Wound Surface Area (cm^2) 6.75 cm^2 03/15/21 0833   Change in Wound Size % (l*w) 31.12 03/15/21 0833   Wound Volume (cm^3) 2.7 cm^3 03/15/21 0833   Wound Healing % 31 03/15/21 0833   Undermining Starts ___ O'Clock 10 03/15/21 0833   Undermining Ends___ O'Clock 11 03/15/21 0833   Undermining Maxium Distance (cm) 0.4 03/15/21 0833   Wound Assessment Granulation tissue 03/15/21 0833   Drainage Amount Moderate 03/15/21 0833   Drainage Description Serosanguinous 03/15/21 0833   Odor None 03/15/21 0833   Sumaya-wound Assessment Dry/flaky; Maceration 03/15/21 0833   Margins Attached edges; Unattached edges 03/15/21 0833   Wound Thickness Description not for Pressure Injury Full thickness 02/22/21 0848   Number of days: 48       Wound 02/01/21 Heel Left (Active)   Wound Image   03/01/21 0909   Dressing Status Dry; Intact 03/15/21 0833   Wound Cleansed Cleansed with saline 03/15/21 8006   Dressing/Treatment Alginate;ABD;Cast padding;Coban/self-adherent bandages; Roll gauze 03/15/21 0833   Offloading for Diabetic Foot Ulcers No offloading required 03/15/21 0833   Wound Length (cm) 0.4 cm 03/15/21 0833   Wound Width (cm) 0.4 cm 03/15/21 0833   Wound Depth (cm) 0 cm 03/15/21 0833   Wound Surface Area (cm^2) 0.16 cm^2 03/15/21 0833   Change in Wound Size % (l*w) 46.67 03/15/21 0833   Wound Volume (cm^3) 0 cm^3 03/15/21 0833   Wound Healing % 100 03/15/21 0833   Undermining Starts ___ O'Clock 12 03/01/21 0909   Undermining Ends___ O'Clock 12 03/01/21 0909   Undermining Maxium Distance (cm) 0.7 03/01/21 0909   Wound Assessment Graft 03/15/21 0833   Drainage Amount Scant 03/15/21 0833   Drainage Description Serosanguinous 03/15/21 0833   Odor None 03/15/21 0833   Sumaya-wound Assessment Blanchable erythema; Intact;Dry/flaky 03/15/21 0833   Margins Attached edges 03/15/21 0833   Wound Thickness Description not for Pressure Injury Full thickness 03/15/21 5603   Number of days: 48       LABS       CBC:   Lab Results   Component Value Date    WBC 14.1 11/25/2020    HGB 13.6 11/25/2020    HCT 40.7 11/25/2020    MCV 85.0 11/25/2020     11/25/2020     BMP:   Lab Results   Component Value Date     11/25/2020    K 4.1 11/25/2020     11/25/2020    CO2 24 11/25/2020    BUN 12 11/25/2020    CREATININE 0.9 11/25/2020     PT/INR:   Lab Results   Component Value Date    INR 1.02 11/25/2020     Prealbumin: No results found for: PREALBUMIN  Albumin:  Lab Results   Component Value Date    LABALBU 3.9 11/25/2020     Sed Rate:  Lab Results   Component Value Date    SEDRATE 130 04/17/2020     CRP:   Lab Results   Component Value Date    CRP 14.47 04/17/2020     Micro:   Lab Results   Component Value Date    BC No growth-preliminary No growth  04/21/2020      Hemoglobin A1C:   Lab Results   Component Value Date    LABA1C 6.2 04/17/2020       Assessment:     Ulcer Identification:  Ulcer Type: venous  Contributing Factors: venous stasis    Wound: Dehiscence amputation stump    Depth of Diabetic/Pressure/Non Pressure Ulcers or Wound:  Wound, partial thickness    Patient Active Problem List   Diagnosis Code    Gangrene of toe of left foot ([facility] [address] I96    CAD (coronary artery disease) I25.10    Type 2 diabetes mellitus with insulin therapy ([facility] [address] E11.9, Z79.4    Hyperlipidemia E78.5    Hypertension I10    Charcot's joint, right ankle and foot M14.671    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    Sepsis ([facility] [address] A41.9    Acute left-sided low back pain with bilateral sciatica M54.42, M54.41    S/P transmetatarsal amputation of foot, left ([facility]) Z89.432    Leukocytosis D72.829    Wound dehiscence, surgical, initial encounter T81.31XA    Postoperative wound infection I73.59IL    Metabolic acidosis H98.8    Hx of CABG Z95.1    Lumbar stenosis without neurogenic claudication M48.061    CKD (chronic kidney disease), stage II N18.2    Normocytic anemia D64.9    Morbid obesity ([facility]) E66.01    Debility R53.81    Carotid stenosis, asymptomatic, bilateral I65.23    Hypokalemia E87.6    Nonhealing ulcer of left lower extremity ([facility] [address] L97.929    Bleeding R58    Wound, open T14. 8XXA    SOB (shortness of breath) on exertion R06.02       Procedure Note      Procedure:  Skin Substitute Application    Performed by: EYAD Bejarano CNP  Ulcer Type: venous, diabetic and non-healing surgical  Consent obtained: Yes  Time out taken: Yes    Product Utilized:  Apligraf 44 sq/cm  Fenestrated: Yes  Msher Utilized: No  Instrument(s) curette    Skin Substitute was Applied to Ulcer Number(s):    Ulcer #: 1    Wound 02/01/21 Foot Left (Active)   Wound Image   03/15/21 0833   Wound Etiology Non-Healing Surgical 03/01/21 0909   Dressing Status Intact; Old drainage noted 03/15/21 0833   Wound Cleansed Cleansed with saline 03/15/21 0833   Dressing/Treatment ABD; Cast padding;Coban/self-adherent bandages; Roll gauze;Steri-strips 03/15/21 0833   Offloading for Diabetic Foot Ulcers No offloading required 03/15/21 0833   Wound Length (cm) 2.7 cm 03/15/21 0833   Wound Width (cm) 2.5 cm 03/15/21 0833   Wound Depth (cm) 0.4 cm 03/15/21 0833   Wound Surface Area (cm^2) 6.75 cm^2 03/15/21 0833   Change in Wound Size % (l*w) 31.12 03/15/21 0833   Wound Volume (cm^3) 2.7 cm^3 03/15/21 0833   Wound Healing % 31 03/15/21 0833   Undermining Starts ___ O'Clock 10 03/15/21 5935

## 2021-03-22 ENCOUNTER — HOSPITAL ENCOUNTER (OUTPATIENT)
Dept: WOUND CARE | Age: 61
Discharge: HOME | End: 2021-03-22
Payer: COMMERCIAL

## 2021-03-22 VITALS
RESPIRATION RATE: 18 BRPM | SYSTOLIC BLOOD PRESSURE: 118 MMHG | OXYGEN SATURATION: 99 % | HEART RATE: 83 BPM | DIASTOLIC BLOOD PRESSURE: 63 MMHG | TEMPERATURE: 96.3 F

## 2021-03-22 PROCEDURE — 99213 OFFICE O/P EST LOW 20 MIN: CPT

## 2021-03-22 PROCEDURE — 29580 STRAPPING UNNA BOOT: CPT

## 2021-03-22 PROCEDURE — 97597 DBRDMT OPN WND 1ST 20 CM/<: CPT

## 2021-03-22 NOTE — PROGRESS NOTES
INCISION AND DRAINAGE performed by Felicity Godfrey DPM at [facility] Left 2020    LEFT WOUND DEBRIDEMENT WITH WOUND VAC APPLICATION performed by Felicity Godfrey DPM at [facility] Right     CYST REMOVED    FOOT SURGERY Left 2020    LEFT FOOT PREPARATION GRAFT SITE, HAVEST SPLIT THICKNESS SKIN GRAFT WITH APPLICATION, APPLICATION [employer] WOUND VAC performed by Felicity Godfrey DPM at [address] [place] [address] TOE AMPUTATION Left 3/3/2020    I&D LEFT FOOT, TRANSMETATARSAL AMPUTATION performed by Felicity Godfrey DPM at [place] [address]    Family History   Problem Relation Age of Onset    Diabetes Mother     High Blood Pressure Mother     Alzheimer's Disease Father          of, 80or 80years old    Heart Disease Father     High Blood Pressure Father     Cancer Neg Hx     Stroke Neg Hx        SOCIAL HISTORY    Social History     Tobacco Use    Smoking status: Former Smoker     Types: Cigarettes     Quit date:      Years since quittin.2    Smokeless tobacco: Never Used   Substance Use Topics    Alcohol use: Not Currently    Drug use: Never       ALLERGIES    No Known Allergies    MEDICATIONS    Current Outpatient Medications on File Prior to Encounter   Medication Sig Dispense Refill    insulin detemir (LEVEMIR FLEXTOUCH) 100 UNIT/ML injection pen Inject 44 Units into the skin every morning 5 pen 5    clopidogrel (PLAVIX) 75 MG tablet Take 1 tablet by mouth daily 90 tablet 0    albuterol sulfate  (90 Base) MCG/ACT inhaler Inhale 2 puffs into the lungs every 6 hours as needed for Wheezing 1 Inhaler 3    lisinopril (PRINIVIL;ZESTRIL) 5 MG tablet Take 1 tablet by mouth daily 90 tablet 1    Polyethylene Glycol 3350 (MIRALAX PO) Take by mouth as needed      aspirin 81 MG chewable tablet Take 1 tablet by mouth daily 30 tablet 3    atorvastatin (LIPITOR) 40 MG tablet Take 1 tablet by mouth nightly 90 tablet 1    metoprolol tartrate (LOPRESSOR) 25 MG tablet Take 1 tablet by mouth 2 times daily 180 tablet 1    insulin lispro (HUMALOG) 100 UNIT/ML injection vial Inject 0-12 Units into the skin 3 times daily (with meals) (Patient taking differently: Inject 0-12 Units into the skin as needed ) 1 vial 3    Multiple Vitamins-Minerals (MULTIVITAMIN PO) Take 1 tablet by mouth daily       gabapentin (NEURONTIN) 300 MG capsule Take 1 capsule by mouth 3 times daily for 90 days. 90 capsule 2     No current facility-administered medications on file prior to encounter. REVIEW OF SYSTEMS    Pertinent items are noted in HPI. Objective:      /63   Pulse 83   Temp 96.3 °F (35.7 °C) (Oral)   Resp 18   SpO2 99%     Wt Readings from Last 3 Encounters:   03/15/21 (!) 310 lb (140.6 kg)   03/08/21 (!) 310 lb (140.6 kg)   11/25/20 290 lb (131.5 kg)       PHYSICAL EXAM    General Appearance: Alert and oriented to person, place   and time, well developed and well- nourished, in no acute distress. Vascular: DP and PT pulses are faintly palpable to the left lower extremity. Cap refill less than 5 seconds. Skin was warm to cool from proximal tibia to distal foot. Mild edema noted. Skin: Patient continues to have a neurotrophic ulcer to the plantar lateral aspect of the left foot. Previous Apligraf skin substitute graft appears well-maintained and intact. Wound was not debrided today. No probe to bone or proximal streaking noted. Scant serous drainage with no malodor. Patient has a previous TMA to the left lower extremity. Patient also continues to have a small left posterior heel wound. Wound does not probe to bone and there is no proximal streaking. Scant serous drainage noted. No malodor. Neurovascular: Epicritic and protopathic sensations are grossly diminished to bilateral lower extremities.                     Wound 02/01/21 Foot Left (Active)   Wound Image   03/22/21 0840   Wound Etiology Non-Healing Surgical 03/01/21 0909   Dressing Status Intact; Old drainage noted 03/22/21 0840   Wound Cleansed Cleansed with saline 03/22/21 0840   Dressing/Treatment ABD; Cast padding;Coban/self-adherent bandages; Roll gauze;Steri-strips 03/15/21 0833   Offloading for Diabetic Foot Ulcers No offloading required 03/22/21 0840   Wound Length (cm) 1.9 cm 03/22/21 0840   Wound Width (cm) 2.4 cm 03/22/21 0840   Wound Depth (cm) 0.2 cm 03/22/21 0840   Wound Surface Area (cm^2) 4.56 cm^2 03/22/21 0840   Change in Wound Size % (l*w) 53.47 03/22/21 0840   Wound Volume (cm^3) 0.91 cm^3 03/22/21 0840   Wound Healing % 77 03/22/21 0840   Undermining Starts ___ O'Clock 8 03/22/21 0840   Undermining Ends___ O'Clock 9 03/22/21 0840   Undermining Maxium Distance (cm) 0.3 03/22/21 0840   Wound Assessment Granulation tissue;Pale granulation tissue 03/22/21 0840   Drainage Amount Moderate 03/22/21 0840   Drainage Description Serosanguinous; Yellow 03/22/21 0840   Odor None 03/22/21 0840   Sumaya-wound Assessment Intact; Maceration; Hyperkeratosis (callous) 03/22/21 0840   Margins Attached edges; Unattached edges 03/22/21 0840   Wound Thickness Description not for Pressure Injury Full thickness 03/22/21 0840   Number of days: 48       Wound 02/01/21 Heel Left (Active)   Wound Image   03/22/21 0840   Dressing Status Dry; Intact; Old drainage noted 03/22/21 0840   Wound Cleansed Cleansed with saline 03/22/21 0840   Dressing/Treatment Alginate;ABD;Cast padding;Coban/self-adherent bandages; Roll gauze 03/15/21 0833   Offloading for Diabetic Foot Ulcers No offloading required 03/22/21 0840   Wound Length (cm) 0.2 cm 03/22/21 0840   Wound Width (cm) 0.3 cm 03/22/21 0840   Wound Depth (cm) 1 cm 03/22/21 0840   Wound Surface Area (cm^2) 0.06 cm^2 03/22/21 0840   Change in Wound Size % (l*w) 80 03/22/21 0840   Wound Volume (cm^3) 0.06 cm^3 03/22/21 0840   Wound Healing % 78 03/22/21 0840   Undermining Starts ___ O'Clock 12 03/01/21 0909   Undermining Ends___ O'Clock 12 03/01/21 0909   Undermining Maxium Distance (cm) 0.7 03/01/21 0909   Wound Assessment Granulation tissue 03/22/21 0840   Drainage Amount Small 03/22/21 0840   Drainage Description Serosanguinous 03/22/21 0840   Odor None 03/22/21 0840   Sumaya-wound Assessment Dry/flaky; Hyperkeratosis (callous) 03/22/21 0840   Margins Attached edges 03/22/21 0840   Wound Thickness Description not for Pressure Injury Full thickness 03/22/21 0840   Number of days: 48       LABS       CBC:   Lab Results   Component Value Date    WBC 14.1 11/25/2020    HGB 13.6 11/25/2020    HCT 40.7 11/25/2020    MCV 85.0 11/25/2020     11/25/2020     BMP:   Lab Results   Component Value Date     11/25/2020    K 4.1 11/25/2020     11/25/2020    CO2 24 11/25/2020    BUN 12 11/25/2020    CREATININE 0.9 11/25/2020     PT/INR:   Lab Results   Component Value Date    INR 1.02 11/25/2020     Prealbumin: No results found for: PREALBUMIN  Albumin:  Lab Results   Component Value Date    LABALBU 3.9 11/25/2020     Sed Rate:  Lab Results   Component Value Date    SEDRATE 130 04/17/2020     CRP:   Lab Results   Component Value Date    CRP 14.47 04/17/2020     Micro:   Lab Results   Component Value Date    BC No growth-preliminary No growth  04/21/2020      Hemoglobin A1C:   Lab Results   Component Value Date    LABA1C 6.2 04/17/2020       Assessment:     Ulcer Identification:  Ulcer Type: venous  Contributing Factors: venous stasis    Wound: Dehiscence amputation stump    Depth of Diabetic/Pressure/Non Pressure Ulcers or Wound:  Wound, partial thickness    Patient Active Problem List   Diagnosis Code    Gangrene of toe of left foot ([facility] [address] I96    CAD (coronary artery disease) I25.10    Type 2 diabetes mellitus with insulin therapy ([facility] [address] E11.9, Z79.4    Hyperlipidemia E78.5    Hypertension I10    Charcot's joint, right ankle and foot M14.671    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 3) Cleanse wounds with normal saline or wound cleanser and gauze. Pat dry with clean gauze. 4) left foot/ left heel- Remove old unna boots to left lower legs. Wash legs with warm soapy water. Pat dry. Cleanse wound with normal saline or wound cleanser and gauze. Pat dry with clean gauze. Apply collagen the alginate to wound bed. Apply unna boots , ABD then kerlix and coban to left lower legs from base of toes to 1-2 inches below bend of knee. Change in wound clinic weekly. If unna boot becomes too tight- raise/elevate legs above the level of the heart for 15-20 minutes if swelling does not go down then carefully cut off unna boot and call clinic or go to local ER or family physician. If unna boot becomes wet or starts to roll down then carefully remove unna boot and call clinic. Keep all dressings clean & dry. Do not shower, take baths or get wound wet, unless otherwise instructed by your Wound Care doctor. Follow up visit:   1 Week on Monday March 15th. Keep next scheduled appointment. Please give 24 hour notice if unable to keep appointment. [phone]    If you experience any of the following, please call the Wound Care Service during business hours: Monday through Friday 8:00 am - 4:30 pm  ([phone]). *Increase in pain   *Temperature over 101   *Increase in drainage from your wound or a foul odor   *Uncontrolled swelling   *Need for compression bandage changes due to slippage, breakthrough drainage    If you need medical attention outside of business hours, please contact your Primary Care Doctor or go to the nearest emergency room.          Electronically signed by EYAD Ren CNP on 3/22/2021 at 9:31 AM

## 2021-03-22 NOTE — PLAN OF CARE
Problem: Wound:  Goal: Will show signs of wound healing; wound closure and no evidence of infection  Description: Will show signs of wound healing; wound closure and no evidence of infection  Outcome: Ongoing  Note: Callous left foot debrided today. Possible graft application in 2 weeks. See avs for discharge instructions. Follow up in 1 week. Patient to bring dakins to appointment    Care plan reviewed with patient. Patient verbalize understanding of the plan of care and contribute to goal setting.

## 2021-03-22 NOTE — PROGRESS NOTES
[employer] Application   Below Knee    NAME:  Margarita Romero  YOB: 1960  MEDICAL RECORD NUMBER:  [id number]  DATE:  3/22/2021    Germaine Carvalho boot: Appied primary and secondary dressing as ordered. Applied Unna roll from toes to knee overlapping each time. Applied ace wrap or coban from toes to below the knee. Secured with tape and/or metal clips covered with tape. Instructed patient/caregiver to keep dressing dry and intact. DO NOT REMOVE DRESSING. Instructed pt/family/caregiver to report excessive draining, loose bandage, wet dressing, severe pain or tingling in toes. Applied [employer] dressing below the knee to left lower leg. Unna Boot(s) were applied per  Guidelines.      Electronically signed by Philly Olson RN on 3/22/2021 at 9:45 AM

## 2021-03-22 NOTE — DISCHARGE INSTR - COC
Continuity of Care Form    Patient Name: Calin Gallardo   :  1960  MRN:  [id number]    Admit date:  3/22/2021  Discharge date:  ***    Code Status Order: Prior   Advance Directives:   [facility] Documentation     Date/Time Healthcare Directive Type of Healthcare Directive Copy in [address] [facility] [address] Agent's Name Healthcare Agent's Phone Number    [phone]  Yes, patient has an advance directive for healthcare treatment  Durable power of  for health care  No, copy requested from family  Healthcare power of   --  --          Admitting Physician:  No admitting provider for patient encounter. PCP: EYAD Mccarty CNP    Discharging Nurse: [facility] Unit/Room#: No information available for this encounter.   Discharging Unit Phone Number: ***    Emergency Contact:   Extended Emergency Contact Information  Primary Emergency Contact: Mesfin De La Torre, [address] [address] Phone: [phone]  Mobile Phone: [phone]  Relation: Parent    Past Surgical History:  Past Surgical History:   Procedure Laterality Date    CARDIAC SURGERY  2019    triple bypass    CYST REMOVAL      RIGHT ANKLE     FOOT DEBRIDEMENT Left 2020    LEFT TRANSMETATARSAL AMPUTATION  FOOT INCISION AND DRAINAGE [facility]    LEFT WOUND DEBRIDEMENT WITH WOUND VAC APPLICATION performed by Kristie Harper DPM at [facility] Right     CYST REMOVED    FOOT SURGERY Left 2020    LEFT FOOT PREPARATION GRAFT SITE, HAVEST SPLIT THICKNESS SKIN GRAFT WITH APPLICATION, APPLICATION [facility] WOUND VAC performed by Kristie Harper DPM at [address] Left 3/3/2020    I&D LEFT FOOT, TRANSMETATARSAL AMPUTATION performed by Kristie Harper DPM at [address] [facility] [place]         Immunization History:   Immunization History   Administered Date(s) Administered    Influenza Virus Vaccine 12/02/2019    Pneumococcal Polysaccharide ([id number]) 12/02/2019       Active Problems:  Patient Active Problem List   Diagnosis Code    Gangrene of toe of left foot ([facility] I96    CAD (coronary artery disease) I25.10    Type 2 diabetes mellitus with insulin therapy ([facility] E11.9, Z79.4    Hyperlipidemia E78.5    Hypertension I10    Charcot's joint, right ankle and foot M14.671    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    Sepsis ([facility] A41.9    Acute left-sided low back pain with bilateral sciatica M54.42, M54.41    S/P transmetatarsal amputation of foot, left ([facility]) Z89.432    Leukocytosis D72.829    Wound dehiscence, surgical, initial encounter T81.31XA    Postoperative wound infection K41.19JX    Metabolic acidosis O70.7    Hx of CABG Z95.1    Lumbar stenosis without neurogenic claudication M48.061    CKD (chronic kidney disease), stage II N18.2    Normocytic anemia D64.9    Morbid obesity ([facility]) E66.01    Debility R53.81    Carotid stenosis, asymptomatic, bilateral I65.23    Hypokalemia E87.6    Nonhealing ulcer of left lower extremity ([facility] L97.929    Bleeding R58    Wound, open T14. 8XXA    SOB (shortness of breath) on exertion R06.02       Isolation/Infection:   Isolation          No Isolation        Patient Infection Status     Infection Onset Added Last Indicated Last Indicated By Review Planned Expiration Resolved Resolved By    None active    Resolved    COVID-19 Rule Out 11/17/20 11/17/20 11/17/20 Covid-19 Ambulatory (Ordered)   11/19/20 Rule-Out Test Resulted    COVID-19 Rule Out 08/11/20 08/11/20 08/11/20 COVID-19 (Ordered)   08/12/20 Rule-Out Test Resulted    COVID-19 Rule Out 07/17/20 07/17/20 07/17/20 COVID-19 (Ordered)   07/20/20 Rule-Out Test Resulted          Nurse Assessment:  Last Vital Signs: /63   Pulse 83   Temp 96.3 °F (35.7 °C) (Oral)   Resp 18   SpO2 99%     Last documented pain score (0-10 scale):    Last Weight:   Wt Readings from Last 1 Encounters:   03/15/21 (!) 310 lb (140.6 kg)     Mental Status:  {IP PT MENTAL STATUS:}    IV Access:  { COLTEN IV ACCESS:382986706}    Nursing Mobility/ADLs:  Walking   {CHP DME HSJJ:518696702}  Transfer  {CHP DME JQZS:789044202}  Bathing  {CHP DME GPDW:136952144}  Dressing  {CHP DME BSKI:551099480}  Toileting  {CHP DME YSX}  Feeding  {CHP DME ABYS:515597403}  Med Admin  {P DME MMMB:051055208}  Med Delivery   { COLTEN MED Delivery:351611261}    Wound Care Documentation and Therapy:  Wound 21 Foot Left (Active)   Wound Image   03/15/21 0833   Wound Etiology Non-Healing Surgical 21 0909   Dressing Status Intact; Old drainage noted 03/15/21 0833   Wound Cleansed Cleansed with saline 03/15/21 0833   Dressing/Treatment ABD; Cast padding;Coban/self-adherent bandages; Roll gauze;Steri-strips 03/15/21 0833   Offloading for Diabetic Foot Ulcers No offloading required 03/15/21 0833   Wound Length (cm) 2.7 cm 03/15/21 0833   Wound Width (cm) 2.5 cm 03/15/21 0833   Wound Depth (cm) 0.4 cm 03/15/21 0833   Wound Surface Area (cm^2) 6.75 cm^2 03/15/21 0833   Change in Wound Size % (l*w) 31.12 03/15/21 0833   Wound Volume (cm^3) 2.7 cm^3 03/15/21 0833   Wound Healing % 31 03/15/21 0833   Undermining Starts ___ O'Clock 10 03/15/21 0833   Undermining Ends___ O'Clock 11 03/15/21 0833   Undermining Maxium Distance (cm) 0.4 03/15/21 0833   Wound Assessment Granulation tissue 03/15/21 0833   Drainage Amount Moderate 03/15/21 0833   Drainage Description Serosanguinous 03/15/21 0833   Odor None 03/15/21 0833   Sumaya-wound Assessment Dry/flaky; Maceration 03/15/21 0833   Margins Attached edges; Unattached edges 03/15/21 0833   Wound Thickness Description not for Pressure Injury Full thickness 21 0848   Number of days: 48       Wound 21 Heel Left (Active)   Wound Image   21 0909   Dressing Status Dry; Intact 03/15/21 0833   Wound Cleansed Cleansed with saline 03/15/21 4803 Dressing/Treatment Alginate;ABD;Cast padding;Coban/self-adherent bandages; Roll gauze 03/15/21 0833   Offloading for Diabetic Foot Ulcers No offloading required 03/15/21 0833   Wound Length (cm) 0.4 cm 03/15/21 0833   Wound Width (cm) 0.4 cm 03/15/21 0833   Wound Depth (cm) 0 cm 03/15/21 0833   Wound Surface Area (cm^2) 0.16 cm^2 03/15/21 0833   Change in Wound Size % (l*w) 46.67 03/15/21 0833   Wound Volume (cm^3) 0 cm^3 03/15/21 0833   Wound Healing % 100 03/15/21 0833   Undermining Starts ___ O'Clock 12 03/01/21 0909   Undermining Ends___ O'Clock 12 03/01/21 0909   Undermining Maxium Distance (cm) 0.7 03/01/21 0909   Wound Assessment Graft 03/15/21 0833   Drainage Amount Scant 03/15/21 0833   Drainage Description Serosanguinous 03/15/21 0833   Odor None 03/15/21 0833   Sumaya-wound Assessment Blanchable erythema; Intact;Dry/flaky 03/15/21 0833   Margins Attached edges 03/15/21 0833   Wound Thickness Description not for Pressure Injury Full thickness 03/15/21 0833   Number of days: 48        Elimination:  Continence:   · Bowel: {YES / DF:50852}  · Bladder: {YES / HW:32550}  Urinary Catheter: {Urinary Catheter:733548961}   Colostomy/Ileostomy/Ileal Conduit: {YES / NK:13140}       Date of Last BM: ***  No intake or output data in the 24 hours ending 03/22/21 0839  No intake/output data recorded.     Safety Concerns:     [address] Safety Concerns:167257795}    Impairments/Disabilities:      [address] Impairments/Disabilities:898307882}    Nutrition Therapy:  Current Nutrition Therapy:   [address] Diet List:349909056}    Routes of Feeding: {CHP DME Other Feedings:919051693}  Liquids: {Slp liquid thickness:31809}  Daily Fluid Restriction: {CHP DME Yes amt example:990908407}  Last Modified Barium Swallow with Video (Video Swallowing Test): {Done Not Done ZQKJ:236038900}    Treatments at the Time of Hospital Discharge:   Respiratory Treatments: ***  Oxygen Therapy:  {Therapy; copd oxygen:54759}  Ventilator:    { CC Vent FVEI:960522193}    Rehab Therapies: {THERAPEUTIC INTERVENTION:7059593617}  Weight Bearing Status/Restrictions: [facility] Weight Bearin}  Other Medical Equipment (for information only, NOT a DME order):  {EQUIPMENT:610974514}  Other Treatments: ***    Patient's personal belongings (please select all that are sent with patient):  {CHP DME Belongings:600746061}    RN SIGNATURE:  {Esignature:672073575}    CASE MANAGEMENT/SOCIAL WORK SECTION    Inpatient Status Date: ***    Readmission Risk Assessment Score:  Readmission Risk              Risk of Unplanned Readmission:        0           Discharging to Facility/ Agency   · Name:   · Address:  · Phone:  · Fax:    Dialysis Facility (if applicable)   · Name:  · Address:  · Dialysis Schedule:  · Phone:  · Fax:

## 2021-03-24 DIAGNOSIS — I10 ESSENTIAL HYPERTENSION: ICD-10-CM

## 2021-03-24 DIAGNOSIS — E78.2 MODERATE MIXED HYPERLIPIDEMIA NOT REQUIRING STATIN THERAPY: ICD-10-CM

## 2021-03-24 RX ORDER — ATORVASTATIN CALCIUM 40 MG/1
40 TABLET, FILM COATED ORAL NIGHTLY
Qty: 90 TABLET | Refills: 1 | Status: SHIPPED | OUTPATIENT
Start: 2021-03-24 | End: 2021-06-24

## 2021-03-24 NOTE — TELEPHONE ENCOUNTER
Lisa Gomes called requesting a refill on the following medications:  Requested Prescriptions     Pending Prescriptions Disp Refills    metoprolol tartrate (LOPRESSOR) 25 MG tablet 180 tablet 1     Sig: Take 1 tablet by mouth 2 times daily    atorvastatin (LIPITOR) 40 MG tablet 90 tablet 1     Sig: Take 1 tablet by mouth nightly     Pharmacy verified:  .[email or web address]      Date of last visit:12/23/20   Date of next visit (if applicable): Visit date not found

## 2021-03-25 NOTE — TELEPHONE ENCOUNTER
LM to r/s no show appt. No show letter sent via mail. Nostril Rim Text: The closure involved the nostril rim.

## 2021-03-29 ENCOUNTER — HOSPITAL ENCOUNTER (OUTPATIENT)
Dept: WOUND CARE | Age: 61
Discharge: HOME | End: 2021-03-29
Payer: COMMERCIAL

## 2021-03-29 VITALS
HEART RATE: 90 BPM | SYSTOLIC BLOOD PRESSURE: 143 MMHG | DIASTOLIC BLOOD PRESSURE: 83 MMHG | TEMPERATURE: 97.8 F | RESPIRATION RATE: 18 BRPM | OXYGEN SATURATION: 97 %

## 2021-03-29 DIAGNOSIS — Z79.4 TYPE 2 DIABETES MELLITUS WITH INSULIN THERAPY (HCC): ICD-10-CM

## 2021-03-29 DIAGNOSIS — L97.929 NON-HEALING ULCER OF LOWER EXTREMITY, LEFT, WITH UNSPECIFIED SEVERITY (HCC): Primary | ICD-10-CM

## 2021-03-29 DIAGNOSIS — Z89.432 S/P TRANSMETATARSAL AMPUTATION OF FOOT, LEFT (HCC): ICD-10-CM

## 2021-03-29 DIAGNOSIS — E11.9 TYPE 2 DIABETES MELLITUS WITH INSULIN THERAPY (HCC): ICD-10-CM

## 2021-03-29 PROCEDURE — 6370000000 HC RX 637 (ALT 250 FOR IP): Performed by: NURSE PRACTITIONER

## 2021-03-29 PROCEDURE — 29580 STRAPPING UNNA BOOT: CPT

## 2021-03-29 PROCEDURE — 97597 DBRDMT OPN WND 1ST 20 CM/<: CPT

## 2021-03-29 PROCEDURE — 17250 CHEM CAUT OF GRANLTJ TISSUE: CPT

## 2021-03-29 RX ORDER — CLOBETASOL PROPIONATE 0.5 MG/G
OINTMENT TOPICAL ONCE
Status: CANCELLED | OUTPATIENT
Start: 2021-03-29 | End: 2021-03-29

## 2021-03-29 RX ORDER — BACITRACIN, NEOMYCIN, POLYMYXIN B 400; 3.5; 5 [USP'U]/G; MG/G; [USP'U]/G
OINTMENT TOPICAL ONCE
Status: CANCELLED | OUTPATIENT
Start: 2021-03-29 | End: 2021-03-29

## 2021-03-29 RX ORDER — LIDOCAINE 50 MG/G
OINTMENT TOPICAL ONCE
Status: CANCELLED | OUTPATIENT
Start: 2021-03-29 | End: 2021-03-29

## 2021-03-29 RX ORDER — GENTAMICIN SULFATE 1 MG/G
OINTMENT TOPICAL ONCE
Status: CANCELLED | OUTPATIENT
Start: 2021-03-29 | End: 2021-03-29

## 2021-03-29 RX ORDER — LIDOCAINE 40 MG/G
CREAM TOPICAL ONCE
Status: CANCELLED | OUTPATIENT
Start: 2021-03-29 | End: 2021-03-29

## 2021-03-29 RX ORDER — LIDOCAINE HYDROCHLORIDE 20 MG/ML
JELLY TOPICAL ONCE
Status: CANCELLED | OUTPATIENT
Start: 2021-03-29 | End: 2021-03-29

## 2021-03-29 RX ORDER — BETAMETHASONE DIPROPIONATE 0.05 %
OINTMENT (GRAM) TOPICAL ONCE
Status: CANCELLED | OUTPATIENT
Start: 2021-03-29 | End: 2021-03-29

## 2021-03-29 RX ORDER — BACITRACIN ZINC AND POLYMYXIN B SULFATE 500; 1000 [USP'U]/G; [USP'U]/G
OINTMENT TOPICAL ONCE
Status: CANCELLED | OUTPATIENT
Start: 2021-03-29 | End: 2021-03-29

## 2021-03-29 RX ORDER — GINSENG 100 MG
CAPSULE ORAL ONCE
Status: CANCELLED | OUTPATIENT
Start: 2021-03-29 | End: 2021-03-29

## 2021-03-29 RX ORDER — LIDOCAINE HYDROCHLORIDE 40 MG/ML
SOLUTION TOPICAL ONCE
Status: CANCELLED | OUTPATIENT
Start: 2021-03-29 | End: 2021-03-29

## 2021-03-29 RX ADMIN — SILVER NITRATE APPLICATORS 2 EACH: 25; 75 STICK TOPICAL at 10:44

## 2021-03-29 NOTE — PROGRESS NOTES
[facility]         Consult and Procedure Note      Joslyn Schroeder  MEDICAL RECORD NUMBER:  [id number]  AGE: 62 y.o. GENDER: male  : 1960  EPISODE DATE:  3/29/2021    Subjective:     Chief Complaint   Patient presents with    Wound Check     left foot         HISTORY of PRESENT ILLNESS HPI     Joslyn Schroeder is a 62 y.o. male Established patient who is well-known to our office who is being seen today for an ongoing left TMA wound. Patient is a type II diabetic with severe peripheral vascular disease. Patient was previously being seen at our [facility] office for this ongoing wound. Due to failed treatment with excisional debridements, patient was approved for Apligraf skin substitute. Patient's first Apligraf skin substitute was applied 4 weeks ago and second Apligraf skin substitute was applied 2 weeks ago. Wound was excisionally debrided in office today. Patient tolerated debridement well. After debridement wound was treated with silver nitrate for chemical cauterization as well as wound promotion. Patient was placed back in Unna boot compression therapy. Patient continues to deny pain to the periwound area. We did order another Apligraf for patient today, this is graft 3/. Patient instructed to bring his Dakin's solution with him next visit. Patient still noted to have a small left heel wound that has scant serous drainage. We will continue to pack this wound with Mesalt and cover with alginate. Patient will follow back up with us again in 1 week. 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   Wound/Ulcer Pain Timing/Severity: none  Quality of pain: N/A  Severity:  0/10   Modifying Factors: None  Associated Signs/Symptoms: edema, erythema and drainage        PAST MEDICAL HISTORY        Diagnosis Date    Arthritis     CAD (coronary artery disease)     CKD (chronic kidney disease), stage II     Diabetes mellitus ([facility] [address]     Hyperlipidemia     Hypertension     Liver disease     HEPITITIS AS A CHILD       PAST SURGICAL HISTORY    Past Surgical History:   Procedure Laterality Date    CARDIAC SURGERY  2019    triple bypass    CYST REMOVAL      RIGHT ANKLE     FOOT DEBRIDEMENT Left 2020    LEFT TRANSMETATARSAL AMPUTATION  FOOT INCISION AND DRAINAGE performed by Terrence Jackson DPM at [address] Left 2020    LEFT WOUND DEBRIDEMENT WITH WOUND VAC APPLICATION performed by Terrence Jackson DPM at [facility] [address] Right     CYST REMOVED    FOOT SURGERY Left 2020    LEFT FOOT PREPARATION GRAFT SITE, HAVEST SPLIT THICKNESS SKIN GRAFT WITH APPLICATION, APPLICATION [employer] WOUND VAC performed by Terrence Jackson DPM at [address] [place] [address] TOE AMPUTATION Left 3/3/2020    I&D LEFT FOOT, TRANSMETATARSAL AMPUTATION performed by Terrence Jackson DPM at [facility]    Family History   Problem Relation Age of Onset    Diabetes Mother     High Blood Pressure Mother     Alzheimer's Disease Father          of, 80or 80years old   [facility] Heart Disease Father     High Blood Pressure Father     Cancer Neg Hx     Stroke Neg Hx        SOCIAL HISTORY    Social History     Tobacco Use    Smoking status: Former Smoker     Types: Cigarettes     Quit date:      Years since quittin.2    Smokeless tobacco: Never Used   Substance Use Topics    Alcohol use: Not Currently    Drug use: Never       ALLERGIES    No Known Allergies    MEDICATIONS    Current Outpatient Medications on File Prior to Encounter   Medication Sig Dispense Refill    metoprolol tartrate (LOPRESSOR) 25 MG tablet Take 1 tablet by mouth 2 times daily 180 tablet 1    atorvastatin (LIPITOR) 40 MG tablet Take 1 tablet by mouth nightly 90 tablet 1    insulin detemir (LEVEMIR FLEXTOUCH) 100 UNIT/ML injection pen Inject 44 Units into the skin every morning 5 pen 5    clopidogrel (PLAVIX) 75 MG tablet Take 1 tablet by mouth daily 90 tablet 0    albuterol sulfate  (90 Base) MCG/ACT inhaler Inhale 2 puffs into the lungs every 6 hours as needed for Wheezing 1 Inhaler 3    lisinopril (PRINIVIL;ZESTRIL) 5 MG tablet Take 1 tablet by mouth daily 90 tablet 1    Polyethylene Glycol 3350 (MIRALAX PO) Take by mouth as needed      aspirin 81 MG chewable tablet Take 1 tablet by mouth daily 30 tablet 3    insulin lispro (HUMALOG) 100 UNIT/ML injection vial Inject 0-12 Units into the skin 3 times daily (with meals) (Patient taking differently: Inject 0-12 Units into the skin as needed ) 1 vial 3    Multiple Vitamins-Minerals (MULTIVITAMIN PO) Take 1 tablet by mouth daily       gabapentin (NEURONTIN) 300 MG capsule Take 1 capsule by mouth 3 times daily for 90 days. 90 capsule 2     No current facility-administered medications on file prior to encounter. REVIEW OF SYSTEMS    Pertinent items are noted in HPI. Objective:      BP (!) 143/83   Pulse 90   Temp 97.8 °F (36.6 °C) (Infrared)   Resp 18   SpO2 97%     Wt Readings from Last 3 Encounters:   03/15/21 (!) 310 lb (140.6 kg)   03/08/21 (!) 310 lb (140.6 kg)   11/25/20 290 lb (131.5 kg)       PHYSICAL EXAM    General Appearance: Alert and oriented to person, place   and time, well developed and well- nourished, in no acute distress. Vascular: DP and PT pulses are faintly palpable to the left lower extremity. Cap refill less than 5 seconds. Skin was warm to cool from proximal tibia to distal foot. Mild edema noted. Skin: Patient continues to have a neurotrophic ulcer to the plantar lateral aspect of the left foot. Wound bed is hyper granular nature with minimal slough and callus buildup. Wound was excisionally debrided today. No probe to bone or proximal streaking noted. Scant serous drainage with no malodor. Patient has a previous TMA to the left lower extremity. Patient also continues to have a small left posterior heel wound.   Wound does not probe to bone and there is no proximal streaking. Scant serous drainage noted. No malodor. Neurovascular: Epicritic and protopathic sensations are grossly diminished to bilateral lower extremities. Wound 02/01/21 Foot Left (Active)   Wound Image   03/22/21 0840   Wound Etiology Non-Healing Surgical 03/29/21 1012   Dressing Status Intact; Old drainage noted 03/29/21 1012   Wound Cleansed Cleansed with saline 03/29/21 1012   Dressing/Treatment ABD; Alginate;Coban/self-adherent bandages; Roll gauze 03/22/21 0840   Offloading for Diabetic Foot Ulcers No offloading required 03/29/21 1012   Wound Length (cm) 1.8 cm 03/29/21 1012   Wound Width (cm) 2.3 cm 03/29/21 1012   Wound Depth (cm) 0.2 cm 03/29/21 1012   Wound Surface Area (cm^2) 4.14 cm^2 03/29/21 1012   Change in Wound Size % (l*w) 57.76 03/29/21 1012   Wound Volume (cm^3) 0.83 cm^3 03/29/21 1012   Wound Healing % 79 03/29/21 1012   Undermining Starts ___ O'Clock 8 03/29/21 1012   Undermining Ends___ O'Clock 10 03/29/21 1012   Undermining Maxium Distance (cm) .3 03/29/21 1012   Wound Assessment Granulation tissue 03/29/21 1012   Drainage Amount Moderate 03/29/21 1012   Drainage Description Serosanguinous 03/29/21 1012   Odor None 03/29/21 1012   Sumaya-wound Assessment Intact; Maceration;Dry/flaky 03/29/21 1012   Margins Attached edges; Unattached edges 03/29/21 1012   Wound Thickness Description not for Pressure Injury Full thickness 03/29/21 1012   Number of days: 56       Wound 02/01/21 Heel Left (Active)   Wound Image   03/29/21 1012   Wound Etiology Pressure Unstageable 03/29/21 1012   Dressing Status Dry; Intact; Old drainage noted 03/29/21 1012   Wound Cleansed Cleansed with saline 03/29/21 1012   Dressing/Treatment Alginate;ABD;Coban/self-adherent bandages; Roll gauze;Packing 03/22/21 0840   Offloading for Diabetic Foot Ulcers No offloading required 03/29/21 1012   Wound Length (cm) 0.5 cm 03/29/21 1012   Wound Width (cm) 0.3 cm 03/29/21 1012   Wound Depth (cm) 0.9 cm 03/29/21 1012   Wound Surface Area (cm^2) 0.15 cm^2 03/29/21 1012   Change in Wound Size % (l*w) 50 03/29/21 1012   Wound Volume (cm^3) 0.14 cm^3 03/29/21 1012   Wound Healing % 48 03/29/21 1012   Undermining Starts ___ O'Clock 12 03/29/21 1012   Undermining Ends___ O'Clock 12 03/29/21 1012   Undermining Maxium Distance (cm) .3 03/29/21 1012   Wound Assessment Granulation tissue 03/29/21 1012   Drainage Amount Small 03/29/21 1012   Drainage Description Yellow 03/29/21 1012   Odor None 03/29/21 1012   Sumaya-wound Assessment Hyperkeratosis (callous); Intact 03/29/21 1012   Margins Unattached edges 03/29/21 1012   Wound Thickness Description not for Pressure Injury Full thickness 03/29/21 1012   Number of days: 56       LABS       CBC:   Lab Results   Component Value Date    WBC 14.1 11/25/2020    HGB 13.6 11/25/2020    HCT 40.7 11/25/2020    MCV 85.0 11/25/2020     11/25/2020     BMP:   Lab Results   Component Value Date     11/25/2020    K 4.1 11/25/2020     11/25/2020    CO2 24 11/25/2020    BUN 12 11/25/2020    CREATININE 0.9 11/25/2020     PT/INR:   Lab Results   Component Value Date    INR 1.02 11/25/2020     Prealbumin: No results found for: PREALBUMIN  Albumin:  Lab Results   Component Value Date    LABALBU 3.9 11/25/2020     Sed Rate:  Lab Results   Component Value Date    SEDRATE 130 04/17/2020     CRP:   Lab Results   Component Value Date    CRP 14.47 04/17/2020     Micro:   Lab Results   Component Value Date    BC No growth-preliminary No growth  04/21/2020      Hemoglobin A1C:   Lab Results   Component Value Date    LABA1C 6.2 04/17/2020       Assessment:     Ulcer Identification:  Ulcer Type: venous  Contributing Factors: venous stasis    Wound: Dehiscence amputation stump    Depth of Diabetic/Pressure/Non Pressure Ulcers or Wound:  Wound, partial thickness    Patient Active Problem List   Diagnosis Code    Gangrene of toe of left foot ([address] I96    CAD (coronary artery disease) I25.10    Type 2 diabetes mellitus with insulin therapy ([facility] [address] E11.9, Z79.4    Hyperlipidemia E78.5    Hypertension I10    Charcot's joint, right ankle and foot M14.671    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    Sepsis ([facility] [address] A41.9    Acute left-sided low back pain with bilateral sciatica M54.42, M54.41    S/P transmetatarsal amputation of foot, left ([facility]) Z89.432    Leukocytosis D72.829    Wound dehiscence, surgical, initial encounter T81.31XA    Postoperative wound infection T82.60LZ    Metabolic acidosis U27.2    Hx of CABG Z95.1    Lumbar stenosis without neurogenic claudication M48.061    CKD (chronic kidney disease), stage II N18.2    Normocytic anemia D64.9    Morbid obesity ([facility]) E66.01    Debility R53.81    Carotid stenosis, asymptomatic, bilateral I65.23    Hypokalemia E87.6    Nonhealing ulcer of left lower extremity ([facility] [address] L97.929    Bleeding R58    Wound, open T14. 8XXA    SOB (shortness of breath) on exertion R06.02       Procedure Note    Indications:  Based on my examination of this patient's wound(s)/ulcer(s) today, debridement is required to promote healing and evaluate the wound base. Performed by: EYAD Mtz CNP  Consent obtained:  Yes  Time out taken:  Yes      Debridement: Excisional Debridement - 26202    Using curette and tissue nippers the wound(s)/ulcer(s) was/were debrided down through and including the removal of epidermis and dermis. Devitalized Tissue Debrided:  fibrin, slough and callus  Pre Debridement Measurements:  Are located in the [place]  Documentation Flow Sheet  Wound/Ulcer #: 1  Post Debridement Measurements:  Wound/Ulcer Descriptions are Pre Debridement except measurements:    Wound 02/01/21 Foot Left (Active)   Wound Image   03/22/21 0840   Wound Etiology Non-Healing Surgical 03/29/21 1012   Dressing Status Intact; Old drainage noted 03/29/21 1012   Wound Cleansed Cleansed with saline 03/29/21 1012 Dressing/Treatment ABD; Alginate;Coban/self-adherent bandages; Roll gauze 03/22/21 0840   Offloading for Diabetic Foot Ulcers No offloading required 03/29/21 1012   Wound Length (cm) 1.8 cm 03/29/21 1012   Wound Width (cm) 2.3 cm 03/29/21 1012   Wound Depth (cm) 0.2 cm 03/29/21 1012   Wound Surface Area (cm^2) 4.14 cm^2 03/29/21 1012   Change in Wound Size % (l*w) 57.76 03/29/21 1012   Wound Volume (cm^3) 0.83 cm^3 03/29/21 1012   Wound Healing % 79 03/29/21 1012   Undermining Starts ___ O'Clock 8 03/29/21 1012   Undermining Ends___ O'Clock 10 03/29/21 1012   Undermining Maxium Distance (cm) .3 03/29/21 1012   Wound Assessment Granulation tissue 03/29/21 1012   Drainage Amount Moderate 03/29/21 1012   Drainage Description Serosanguinous 03/29/21 1012   Odor None 03/29/21 1012   Sumaya-wound Assessment Intact; Maceration;Dry/flaky 03/29/21 1012   Margins Attached edges; Unattached edges 03/29/21 1012   Wound Thickness Description not for Pressure Injury Full thickness 03/29/21 1012   Number of days: 62       Wound 02/01/21 Heel Left (Active)   Wound Image   03/29/21 1012   Wound Etiology Pressure Unstageable 03/29/21 1012   Dressing Status Dry; Intact; Old drainage noted 03/29/21 1012   Wound Cleansed Cleansed with saline 03/29/21 1012   Dressing/Treatment Alginate;ABD;Coban/self-adherent bandages; Roll gauze;Packing 03/22/21 0840   Offloading for Diabetic Foot Ulcers No offloading required 03/29/21 1012   Wound Length (cm) 0.5 cm 03/29/21 1012   Wound Width (cm) 0.3 cm 03/29/21 1012   Wound Depth (cm) 0.9 cm 03/29/21 1012   Wound Surface Area (cm^2) 0.15 cm^2 03/29/21 1012   Change in Wound Size % (l*w) 50 03/29/21 1012   Wound Volume (cm^3) 0.14 cm^3 03/29/21 1012   Wound Healing % 48 03/29/21 1012   Undermining Starts ___ O'Clock 12 03/29/21 1012   Undermining Ends___ O'Clock 12 03/29/21 1012   Undermining Maxium Distance (cm) .3 03/29/21 1012   Wound Assessment Granulation tissue 03/29/21 1012   Drainage Amount Small 03/29/21 1012   Drainage Description Yellow 03/29/21 1012   Odor None 03/29/21 1012   Sumaya-wound Assessment Hyperkeratosis (callous); Intact 03/29/21 1012   Margins Unattached edges 03/29/21 1012   Wound Thickness Description not for Pressure Injury Full thickness 03/29/21 1012   Number of days: 62     Incision 07/20/20 Left;Plantar (Active)   Number of days: 259       Incision 08/14/20 Right (Active)   Number of days: 233     Percent of Wound(s)/Ulcer(s) Debrided: 100%  Total Surface Area Debrided:  57.76 sq cm   Diabetic/Pressure/Non Pressure Ulcers only:  Ulcer: Diabetic ulcer, fat layer exposed   Estimated Blood Loss:  Minimal  Hemostasis Achieved:  by pressure and by silver nitrate stick  Procedural Pain:  0  / 10   Post Procedural Pain:  0 / 10   Response to treatment:  Well tolerated by patient. Chemical Cautery - [id number]    Performed by: EYAD Cooper CNP  Consent obtained: Yes  Time out taken: Yes  Tissue Type: Hypergranulation  Method: silver nitrate    Location of Chemical Cautery:   Wound/Ulcer #1     Wound 02/01/21 Foot Left (Active)   Wound Image   03/22/21 0840   Wound Etiology Non-Healing Surgical 03/29/21 1012   Dressing Status Intact; Old drainage noted 03/29/21 1012   Wound Cleansed Cleansed with saline 03/29/21 1012   Dressing/Treatment ABD; Alginate;Coban/self-adherent bandages; Roll gauze 03/22/21 0840   Offloading for Diabetic Foot Ulcers No offloading required 03/29/21 1012   Wound Length (cm) 1.8 cm 03/29/21 1012   Wound Width (cm) 2.3 cm 03/29/21 1012   Wound Depth (cm) 0.2 cm 03/29/21 1012   Wound Surface Area (cm^2) 4.14 cm^2 03/29/21 1012   Change in Wound Size % (l*w) 57.76 03/29/21 1012   Wound Volume (cm^3) 0.83 cm^3 03/29/21 1012   Wound Healing % 79 03/29/21 1012   Undermining Starts ___ O'Clock 8 03/29/21 1012   Undermining Ends___ O'Clock 10 03/29/21 1012   Undermining Maxium Distance (cm) .3 03/29/21 1012   Wound Assessment Granulation tissue 03/29/21 1012   Drainage Amount Moderate 03/29/21 1012   Drainage Description Serosanguinous 03/29/21 1012   Odor None 03/29/21 1012   Sumaya-wound Assessment Intact; Maceration;Dry/flaky 03/29/21 1012   Margins Attached edges; Unattached edges 03/29/21 1012   Wound Thickness Description not for Pressure Injury Full thickness 03/29/21 1012   Number of days: 62       Wound 02/01/21 Heel Left (Active)   Wound Image   03/29/21 1012   Wound Etiology Pressure Unstageable 03/29/21 1012   Dressing Status Dry; Intact; Old drainage noted 03/29/21 1012   Wound Cleansed Cleansed with saline 03/29/21 1012   Dressing/Treatment Alginate;ABD;Coban/self-adherent bandages; Roll gauze;Packing 03/22/21 0840   Offloading for Diabetic Foot Ulcers No offloading required 03/29/21 1012   Wound Length (cm) 0.5 cm 03/29/21 1012   Wound Width (cm) 0.3 cm 03/29/21 1012   Wound Depth (cm) 0.9 cm 03/29/21 1012   Wound Surface Area (cm^2) 0.15 cm^2 03/29/21 1012   Change in Wound Size % (l*w) 50 03/29/21 1012   Wound Volume (cm^3) 0.14 cm^3 03/29/21 1012   Wound Healing % 48 03/29/21 1012   Undermining Starts ___ O'Clock 12 03/29/21 1012   Undermining Ends___ O'Clock 12 03/29/21 1012   Undermining Maxium Distance (cm) .3 03/29/21 1012   Wound Assessment Granulation tissue 03/29/21 1012   Drainage Amount Small 03/29/21 1012   Drainage Description Yellow 03/29/21 1012   Odor None 03/29/21 1012   Sumaya-wound Assessment Hyperkeratosis (callous); Intact 03/29/21 1012   Margins Unattached edges 03/29/21 1012   Wound Thickness Description not for Pressure Injury Full thickness 03/29/21 1012   Number of days: 62     Incision 07/20/20 Left;Plantar (Active)   Number of days: 259       Incision 08/14/20 Right (Active)   Number of days: 233     Procedural Pain: 0  / 10   Post Procedural Pain: 0 / 10   Response to treatment: Well tolerated by patient. Unna boot compression therapy, left.  - 79743  A dual layer Unna boot consisting of a zinc oxide wrap followed by Coban tape was applied to the left lower extremity. Patient tolerated procedure well. Plan:     Patient examined and evaluated today. Left TMA wound was excision debrided in office today. Silver nitrate was applied to the entire left TMA wound. Alginate and 4 x 4 was also applied to the graft area with Unna boot. Left heel wound was packed with Mesalt and covered with alginate. Home health to continue to change dressing once weekly. Patient encouraged to bring his Dakin's solution with him next visit. We did order another Apligraf for patient today, this is graft 3/5. Patient will follow back up with us again in 1 week. Treatment:   No orders of the defined types were placed in this encounter. Antibiotics: No  Follow up: 1 week  Please see attached Discharge Instructions  Written patient dismissal instructions given to patient and signed by patient or POA. Discharge Instructions       Visit Discharge/Physician Orders:  - Will check into graft options for you. - Silver nitrate to left heel   - Debridement to left foot wound. Wound Location: left foot    Dressing orders:     1) Gather wound care supplies and arrange on clean table. 2) Wash your hands with soap and water or use alcohol based hand  for 20 seconds (sing \"Happy Birthday\" twice). 3) Cleanse wounds with normal saline or wound cleanser and gauze. Pat dry with clean gauze. 4) left foot/ left heel- Remove old unna boots to left lower legs. Wash legs with warm soapy water. Pat dry. Cleanse wound with normal saline or wound cleanser and gauze. Pat dry with clean gauze. Apply collagen the alginate to wound bed. Apply unna boots , ABD then kerlix and coban to left lower legs from base of toes to 1-2 inches below bend of knee. Change in wound clinic weekly.     If unna boot becomes too tight- raise/elevate legs above the level of the heart for 15-20 minutes if swelling does not go down then carefully cut off unna boot and call clinic or go to local ER or family physician. If unna boot becomes wet or starts to roll down then carefully remove unna boot and call clinic. Keep all dressings clean & dry. Do not shower, take baths or get wound wet, unless otherwise instructed by your Wound Care doctor. Follow up visit:   1 Week on Monday March 15th. Keep next scheduled appointment. Please give 24 hour notice if unable to keep appointment. [phone]    If you experience any of the following, please call the Wound Care Service during business hours: Monday through Friday 8:00 am - 4:30 pm  ([phone]). *Increase in pain   *Temperature over 101   *Increase in drainage from your wound or a foul odor   *Uncontrolled swelling   *Need for compression bandage changes due to slippage, breakthrough drainage    If you need medical attention outside of business hours, please contact your Primary Care Doctor or go to the nearest emergency room.          Electronically signed by EYAD Bryan CNP on 3/29/2021 at 11:02 AM

## 2021-03-29 NOTE — PLAN OF CARE
Problem: Wound:  Goal: Will show signs of wound healing; wound closure and no evidence of infection  Description: Will show signs of wound healing; wound closure and no evidence of infection  Outcome: Ongoing   Pt. Seen today for left leg wounds see AVS for new orders. Follow up in 1 week and plan for graft application. Care plan reviewed with patient. Patient verbalize understanding of the plan of care and contribute to goal setting.

## 2021-03-29 NOTE — PROGRESS NOTES
[employer] Application   Below Knee    NAME:  Roxanna Mendiola  YOB: 1960  MEDICAL RECORD NUMBER:  [id number]  DATE:  3/29/2021    Lali Ventura boot: Appied primary and secondary dressing as ordered. Applied Unna roll from toes to knee overlapping each time. Applied ace wrap or coban from toes to below the knee. Secured with tape and/or metal clips covered with tape. Instructed patient/caregiver to keep dressing dry and intact. DO NOT REMOVE DRESSING. Instructed pt/family/caregiver to report excessive draining, loose bandage, wet dressing, severe pain or tingling in toes. Applied [employer] dressing below the knee to left lower leg. Unna Boot(s) were applied per  Guidelines.      Electronically signed by Antonella Cedillo RN on 3/29/2021 at 12:54 PM

## 2021-04-05 ENCOUNTER — HOSPITAL ENCOUNTER (OUTPATIENT)
Dept: WOUND CARE | Age: 61
Discharge: HOME | End: 2021-04-05
Payer: COMMERCIAL

## 2021-04-05 VITALS
SYSTOLIC BLOOD PRESSURE: 122 MMHG | RESPIRATION RATE: 16 BRPM | TEMPERATURE: 97.7 F | OXYGEN SATURATION: 97 % | HEART RATE: 76 BPM | DIASTOLIC BLOOD PRESSURE: 56 MMHG

## 2021-04-05 DIAGNOSIS — Z89.432 S/P TRANSMETATARSAL AMPUTATION OF FOOT, LEFT (HCC): ICD-10-CM

## 2021-04-05 DIAGNOSIS — Z79.4 TYPE 2 DIABETES MELLITUS WITH INSULIN THERAPY (HCC): ICD-10-CM

## 2021-04-05 DIAGNOSIS — E11.9 TYPE 2 DIABETES MELLITUS WITH INSULIN THERAPY (HCC): ICD-10-CM

## 2021-04-05 DIAGNOSIS — L97.929 NON-HEALING ULCER OF LOWER EXTREMITY, LEFT, WITH UNSPECIFIED SEVERITY (HCC): Primary | ICD-10-CM

## 2021-04-05 PROCEDURE — 15275 SKIN SUB GRAFT FACE/NK/HF/G: CPT

## 2021-04-05 PROCEDURE — 99213 OFFICE O/P EST LOW 20 MIN: CPT

## 2021-04-05 PROCEDURE — 15271 SKIN SUB GRAFT TRNK/ARM/LEG: CPT

## 2021-04-05 RX ORDER — GENTAMICIN SULFATE 1 MG/G
OINTMENT TOPICAL ONCE
Status: CANCELLED | OUTPATIENT
Start: 2021-04-05 | End: 2021-04-05

## 2021-04-05 RX ORDER — LIDOCAINE HYDROCHLORIDE 20 MG/ML
JELLY TOPICAL ONCE
Status: CANCELLED | OUTPATIENT
Start: 2021-04-05 | End: 2021-04-05

## 2021-04-05 RX ORDER — CLOBETASOL PROPIONATE 0.5 MG/G
OINTMENT TOPICAL ONCE
Status: CANCELLED | OUTPATIENT
Start: 2021-04-05 | End: 2021-04-05

## 2021-04-05 RX ORDER — LIDOCAINE 50 MG/G
OINTMENT TOPICAL ONCE
Status: CANCELLED | OUTPATIENT
Start: 2021-04-05 | End: 2021-04-05

## 2021-04-05 RX ORDER — LIDOCAINE HYDROCHLORIDE 40 MG/ML
SOLUTION TOPICAL ONCE
Status: CANCELLED | OUTPATIENT
Start: 2021-04-05 | End: 2021-04-05

## 2021-04-05 RX ORDER — LIDOCAINE 40 MG/G
CREAM TOPICAL ONCE
Status: CANCELLED | OUTPATIENT
Start: 2021-04-05 | End: 2021-04-05

## 2021-04-05 RX ORDER — BETAMETHASONE DIPROPIONATE 0.05 %
OINTMENT (GRAM) TOPICAL ONCE
Status: CANCELLED | OUTPATIENT
Start: 2021-04-05 | End: 2021-04-05

## 2021-04-05 RX ORDER — BACITRACIN, NEOMYCIN, POLYMYXIN B 400; 3.5; 5 [USP'U]/G; MG/G; [USP'U]/G
OINTMENT TOPICAL ONCE
Status: CANCELLED | OUTPATIENT
Start: 2021-04-05 | End: 2021-04-05

## 2021-04-05 RX ORDER — GINSENG 100 MG
CAPSULE ORAL ONCE
Status: CANCELLED | OUTPATIENT
Start: 2021-04-05 | End: 2021-04-05

## 2021-04-05 RX ORDER — BACITRACIN ZINC AND POLYMYXIN B SULFATE 500; 1000 [USP'U]/G; [USP'U]/G
OINTMENT TOPICAL ONCE
Status: CANCELLED | OUTPATIENT
Start: 2021-04-05 | End: 2021-04-05

## 2021-04-05 NOTE — PROGRESS NOTES
Procedure:  Skin Substitute Application    Performed by: Mseeret Colon RN    Ulcer Type: non-healing surgical    Consent obtained: Yes    Time out taken: Yes    Product Utilized:    Apligraf 44 sq/cm     Fenestrated: Yes    Mesher Utilized: No    Instrument(s) #15 blade scalpel and tissue nippers    Skin Substitute was Applied to Ulcer Number(s):    Ulcer #: 1    Wound 02/01/21 Foot Left (Active)   Wound Image   04/05/21 1100   Wound Etiology Non-Healing Surgical 04/05/21 1100   Dressing Status Intact; Old drainage noted 04/05/21 1100   Wound Cleansed Cleansed with saline 04/05/21 1100   Dressing/Treatment ABD; Alginate;Coban/self-adherent bandages; Roll gauze 03/22/21 0840   Offloading for Diabetic Foot Ulcers No offloading required 04/05/21 1100   Wound Length (cm) 2.3 cm 04/05/21 1100   Wound Width (cm) 2.5 cm 04/05/21 1100   Wound Depth (cm) 0.2 cm 04/05/21 1100   Wound Surface Area (cm^2) 5.75 cm^2 04/05/21 1100   Change in Wound Size % (l*w) 41.33 04/05/21 1100   Wound Volume (cm^3) 1.15 cm^3 04/05/21 1100   Wound Healing % 71 04/05/21 1100   Undermining Starts ___ O'Clock 8 04/05/21 1100   Undermining Ends___ O'Clock 10 04/05/21 1100   Undermining Maxium Distance (cm) 0.7 04/05/21 1100   Wound Assessment Granulation tissue;Slough 04/05/21 1100   Drainage Amount Moderate 04/05/21 1100   Drainage Description Serosanguinous;Brown 04/05/21 1100   Odor None 04/05/21 1100   Sumaya-wound Assessment Intact; Maceration;Dry/flaky 04/05/21 1100   Margins Attached edges; Unattached edges 04/05/21 1100   Wound Thickness Description not for Pressure Injury Full thickness 04/05/21 1100   Number of days: 63       Wound 02/01/21 Heel Left (Active)   Wound Image   04/05/21 1100   Wound Etiology Pressure Unstageable 04/05/21 1100   Dressing Status Dry; Intact; Old drainage noted 04/05/21 1100   Wound Cleansed Cleansed with saline 04/05/21 1100   Dressing/Treatment Alginate;ABD;Coban/self-adherent bandages; Roll gauze;Packing 03/22/21 0840   Offloading for Diabetic Foot Ulcers No offloading required 04/05/21 1100   Wound Length (cm) 0.5 cm 04/05/21 1100   Wound Width (cm) 0.5 cm 04/05/21 1100   Wound Depth (cm) 1.2 cm 04/05/21 1100   Wound Surface Area (cm^2) 0.25 cm^2 04/05/21 1100   Change in Wound Size % (l*w) 16.67 04/05/21 1100   Wound Volume (cm^3) 0.3 cm^3 04/05/21 1100   Wound Healing % -11 04/05/21 1100   Undermining Starts ___ O'Clock 12 03/29/21 1012   Undermining Ends___ O'Clock 12 03/29/21 1012   Undermining Maxium Distance (cm) .3 03/29/21 1012   Wound Assessment Slough 04/05/21 1100   Drainage Amount Small 04/05/21 1100   Drainage Description Yellow 04/05/21 1100   Odor None 04/05/21 1100   Sumaya-wound Assessment Blanchable erythema 04/05/21 1100   Margins Unattached edges 04/05/21 1100   Wound Thickness Description not for Pressure Injury Full thickness 04/05/21 1100   Number of days: 63     Incision 07/20/20 Left;Plantar (Active)   Number of days: 259       Incision 08/14/20 Right (Active)   Number of days: 026       Diabetic/Pressure/Non Pressure Ulcers:  Ulcer: Other: non healing surgical       Total Surface Area of Ulcer(s) Covered 5.75 sq/cm    Was the Product Layered  No     Amount of Product Applied 44 sq/cm     Amount of Product Wasted 0 sq/cm     Reason for Waste: N/A     Surgically Fixated: No:    Secured With: Steri Strips    Procedural Pain: 0/10     Post Procedural Pain: 0 / 10    Response to Treatment: Well tolerated by patient.

## 2021-04-05 NOTE — PROGRESS NOTES
[facility]         Consult and Procedure Note      Malorie Hall  MEDICAL RECORD NUMBER:  [id number]  AGE: 62 y.o. GENDER: male  : 1960  EPISODE DATE:  2021    Subjective:     Chief Complaint   Patient presents with    Wound Check         HISTORY of PRESENT ILLNESS HPI     Malorie Hall is a 62 y.o. male Established patient who is well-known to our office who is being seen today for an ongoing left TMA wound. Patient is a type II diabetic with severe peripheral vascular disease. Patient was previously being seen at our [address] [place] [address] office for this ongoing wound. Due to failed treatment with excisional debridements, patient was approved for Apligraf skin substitute. Patient's first Apligraf skin substitute was applied 5 weeks ago and second Apligraf skin substitute was applied 3 weeks ago. Third Apligraf skin substitute was applied in office today. Was secured with Steri-Strips and wound veil. Prior to application of skin graft wound was cleansed with Dakin's solution. Patient tolerated skin graft application well. Patient was placed back in Unna boot compression therapy. Patient continues to deny pain to the periwound area. Patient still noted to have a small left heel wound that has scant serous drainage. We will continue to pack this wound with Mesalt and cover with alginate. Patient will follow back up with us again in 1 week. 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   Wound/Ulcer Pain Timing/Severity: none  Quality of pain: N/A  Severity:  0/10   Modifying Factors: None  Associated Signs/Symptoms: edema, erythema and drainage        PAST MEDICAL HISTORY        Diagnosis Date    Arthritis     CAD (coronary artery disease)     CKD (chronic kidney disease), stage II     Diabetes mellitus ([facility] [address]     Hyperlipidemia     Hypertension     Liver disease     HEPITITIS AS A CHILD       PAST SURGICAL HISTORY    Past Surgical History: Procedure Laterality Date    CARDIAC SURGERY  2019    triple bypass    CYST REMOVAL      RIGHT ANKLE     FOOT DEBRIDEMENT Left 2020    LEFT TRANSMETATARSAL AMPUTATION  FOOT INCISION AND DRAINAGE performed by Godfrey Matias DPM at [address] [place] [address] Left 2020    LEFT WOUND DEBRIDEMENT WITH WOUND VAC APPLICATION performed by Godfrey Matias DPM at [place] [address] Right     CYST REMOVED    FOOT SURGERY Left 2020    LEFT FOOT PREPARATION GRAFT SITE, HAVEST SPLIT THICKNESS SKIN GRAFT WITH APPLICATION, APPLICATION [employer] WOUND VAC performed by Godfrey Matias DPM at [address] [place] [address] TOE AMPUTATION Left 3/3/2020    I&D LEFT FOOT, TRANSMETATARSAL AMPUTATION performed by Godfrey Matias DPM at [facility]    Family History   Problem Relation Age of Onset    Diabetes Mother     High Blood Pressure Mother     Alzheimer's Disease Father          of, 80or 80years old    Heart Disease Father     High Blood Pressure Father     Cancer Neg Hx     Stroke Neg Hx        SOCIAL HISTORY    Social History     Tobacco Use    Smoking status: Former Smoker     Types: Cigarettes     Quit date:      Years since quittin.2    Smokeless tobacco: Never Used   Substance Use Topics    Alcohol use: Not Currently    Drug use: Never       ALLERGIES    No Known Allergies    MEDICATIONS    Current Outpatient Medications on File Prior to Encounter   Medication Sig Dispense Refill    metoprolol tartrate (LOPRESSOR) 25 MG tablet Take 1 tablet by mouth 2 times daily 180 tablet 1    atorvastatin (LIPITOR) 40 MG tablet Take 1 tablet by mouth nightly 90 tablet 1    insulin detemir (LEVEMIR FLEXTOUCH) 100 UNIT/ML injection pen Inject 44 Units into the skin every morning 5 pen 5    clopidogrel (PLAVIX) 75 MG tablet Take 1 tablet by mouth daily 90 tablet 0    albuterol sulfate  (90 Base) MCG/ACT inhaler Inhale 2 puffs into the lungs every 6 hours as needed for Wheezing 1 Inhaler 3    lisinopril (PRINIVIL;ZESTRIL) 5 MG tablet Take 1 tablet by mouth daily 90 tablet 1    Polyethylene Glycol 3350 (MIRALAX PO) Take by mouth as needed      aspirin 81 MG chewable tablet Take 1 tablet by mouth daily 30 tablet 3    insulin lispro (HUMALOG) 100 UNIT/ML injection vial Inject 0-12 Units into the skin 3 times daily (with meals) (Patient taking differently: Inject 0-12 Units into the skin as needed ) 1 vial 3    Multiple Vitamins-Minerals (MULTIVITAMIN PO) Take 1 tablet by mouth daily       gabapentin (NEURONTIN) 300 MG capsule Take 1 capsule by mouth 3 times daily for 90 days. 90 capsule 2     No current facility-administered medications on file prior to encounter. REVIEW OF SYSTEMS    Pertinent items are noted in HPI. Objective:      BP (!) 122/56   Pulse 76   Temp 97.7 °F (36.5 °C) (Infrared)   Resp 16   SpO2 97%     Wt Readings from Last 3 Encounters:   03/15/21 (!) 310 lb (140.6 kg)   03/08/21 (!) 310 lb (140.6 kg)   11/25/20 290 lb (131.5 kg)       PHYSICAL EXAM    General Appearance: Alert and oriented to person, place   and time, well developed and well- nourished, in no acute distress. Vascular: DP and PT pulses are faintly palpable to the left lower extremity. Cap refill less than 5 seconds. Skin was warm to cool from proximal tibia to distal foot. Mild edema noted. Skin: Patient continues to have a neurotrophic ulcer to the plantar lateral aspect of the left foot. Wound bed is hyper granular nature with minimal slough and callus buildup. Wound was excisionally debrided today. No probe to bone or proximal streaking noted. Scant serous drainage with no malodor. Patient has a previous TMA to the left lower extremity. Patient also continues to have a small left posterior heel wound. Wound does not probe to bone and there is no proximal streaking. Scant serous drainage noted. No malodor.     Neurovascular: Epicritic and protopathic sensations are grossly diminished to bilateral lower extremities. Wound 02/01/21 Foot Left (Active)   Wound Image   03/22/21 0840   Wound Etiology Non-Healing Surgical 03/29/21 1012   Dressing Status Intact; Old drainage noted 03/29/21 1012   Wound Cleansed Cleansed with saline 03/29/21 1012   Dressing/Treatment ABD; Alginate;Coban/self-adherent bandages; Roll gauze 03/22/21 0840   Offloading for Diabetic Foot Ulcers No offloading required 03/29/21 1012   Wound Length (cm) 1.8 cm 03/29/21 1012   Wound Width (cm) 2.3 cm 03/29/21 1012   Wound Depth (cm) 0.2 cm 03/29/21 1012   Wound Surface Area (cm^2) 4.14 cm^2 03/29/21 1012   Change in Wound Size % (l*w) 57.76 03/29/21 1012   Wound Volume (cm^3) 0.83 cm^3 03/29/21 1012   Wound Healing % 79 03/29/21 1012   Undermining Starts ___ O'Clock 8 03/29/21 1012   Undermining Ends___ O'Clock 10 03/29/21 1012   Undermining Maxium Distance (cm) .3 03/29/21 1012   Wound Assessment Granulation tissue 03/29/21 1012   Drainage Amount Moderate 03/29/21 1012   Drainage Description Serosanguinous 03/29/21 1012   Odor None 03/29/21 1012   Sumaya-wound Assessment Intact; Maceration;Dry/flaky 03/29/21 1012   Margins Attached edges; Unattached edges 03/29/21 1012   Wound Thickness Description not for Pressure Injury Full thickness 03/29/21 1012   Number of days: 63       Wound 02/01/21 Heel Left (Active)   Wound Image   03/29/21 1012   Wound Etiology Pressure Unstageable 03/29/21 1012   Dressing Status Dry; Intact; Old drainage noted 03/29/21 1012   Wound Cleansed Cleansed with saline 03/29/21 1012   Dressing/Treatment Alginate;ABD;Coban/self-adherent bandages; Roll gauze;Packing 03/22/21 0840   Offloading for Diabetic Foot Ulcers No offloading required 03/29/21 1012   Wound Length (cm) 0.5 cm 03/29/21 1012   Wound Width (cm) 0.3 cm 03/29/21 1012   Wound Depth (cm) 0.9 cm 03/29/21 1012   Wound Surface Area (cm^2) 0.15 cm^2 03/29/21 1012   Change in Wound Size % (l*w) 50 03/29/21 1012   Wound Volume (cm^3) 0.14 cm^3 03/29/21 1012   Wound Healing % 48 03/29/21 1012   Undermining Starts ___ O'Clock 12 03/29/21 1012   Undermining Ends___ O'Clock 12 03/29/21 1012   Undermining Maxium Distance (cm) .3 03/29/21 1012   Wound Assessment Granulation tissue 03/29/21 1012   Drainage Amount Small 03/29/21 1012   Drainage Description Yellow 03/29/21 1012   Odor None 03/29/21 1012   Sumaya-wound Assessment Hyperkeratosis (callous); Intact 03/29/21 1012   Margins Unattached edges 03/29/21 1012   Wound Thickness Description not for Pressure Injury Full thickness 03/29/21 1012   Number of days: 63       LABS       CBC:   Lab Results   Component Value Date    WBC 14.1 11/25/2020    HGB 13.6 11/25/2020    HCT 40.7 11/25/2020    MCV 85.0 11/25/2020     11/25/2020     BMP:   Lab Results   Component Value Date     11/25/2020    K 4.1 11/25/2020     11/25/2020    CO2 24 11/25/2020    BUN 12 11/25/2020    CREATININE 0.9 11/25/2020     PT/INR:   Lab Results   Component Value Date    INR 1.02 11/25/2020     Prealbumin: No results found for: PREALBUMIN  Albumin:  Lab Results   Component Value Date    LABALBU 3.9 11/25/2020     Sed Rate:  Lab Results   Component Value Date    SEDRATE 130 04/17/2020     CRP:   Lab Results   Component Value Date    CRP 14.47 04/17/2020     Micro:   Lab Results   Component Value Date    BC No growth-preliminary No growth  04/21/2020      Hemoglobin A1C:   Lab Results   Component Value Date    LABA1C 6.2 04/17/2020       Assessment:     Ulcer Identification:  Ulcer Type: venous  Contributing Factors: venous stasis    Wound: Dehiscence amputation stump    Depth of Diabetic/Pressure/Non Pressure Ulcers or Wound:  Wound, partial thickness    Patient Active Problem List   Diagnosis Code    Gangrene of toe of left foot ([facility] I96    CAD (coronary artery disease) I25.10    Type 2 diabetes mellitus with insulin therapy ([facility] E11.9, Z79.4    Hyperlipidemia E78.5    Hypertension I10    Charcot's joint, right ankle and foot M14.671    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    Sepsis ([facility]) A41.9    Acute left-sided low back pain with bilateral sciatica M54.42, M54.41    S/P transmetatarsal amputation of foot, left ([facility]) Z89.432    Leukocytosis D72.829    Wound dehiscence, surgical, initial encounter T81.31XA    Postoperative wound infection S80.79JQ    Metabolic acidosis S63.1    Hx of CABG Z95.1    Lumbar stenosis without neurogenic claudication M48.061    CKD (chronic kidney disease), stage II N18.2    Normocytic anemia D64.9    Morbid obesity ([facility]) E66.01    Debility R53.81    Carotid stenosis, asymptomatic, bilateral I65.23    Hypokalemia E87.6    Nonhealing ulcer of left lower extremity ([address] L97.929    Bleeding R58    Wound, open T14. 8XXA    SOB (shortness of breath) on exertion R06.02       Procedure Note    Procedure:  Skin Substitute Application - 91337    Performed by: EYAD Joshi CNP  Ulcer Type: diabetic  Consent obtained: Yes  Time out taken: Yes    Product Utilized:  Apligraf 44 sq/cm  Fenestrated: Yes  Mesher Utilized: No  Instrument(s) curette and tissue nippers    Skin Substitute was Applied to Ulcer Number(s):    Ulcer #: 1    Wound 02/01/21 Foot Left (Active)   Wound Image   04/12/21 0853   Wound Etiology Non-Healing Surgical 04/12/21 0853   Dressing Status Intact; Old drainage noted 04/12/21 0853   Wound Cleansed Cleansed with saline 04/12/21 0853   Dressing/Treatment ABD;Collagen;Coban/self-adherent bandages; Alginate with Ag 04/12/21 0853   Offloading for Diabetic Foot Ulcers No offloading required 04/12/21 0853   Wound Length (cm) 2.4 cm 04/12/21 0853   Wound Width (cm) 2.5 cm 04/12/21 0853   Wound Depth (cm) 0.6 cm 04/12/21 0853   Wound Surface Area (cm^2) 6 cm^2 04/12/21 0853   Change in Wound Size % (l*w) 38.78 04/12/21 0853   Wound Volume (cm^3) 3.6 cm^3 04/12/21 0853   Wound Healing % 8 04/12/21 0853 Undermining Starts ___ O'Clock 9 04/12/21 0853   Undermining Ends___ O'Clock 12 04/12/21 0853   Undermining Maxium Distance (cm) .2 04/12/21 0853   Wound Assessment Granulation tissue 04/12/21 0853   Drainage Amount Moderate 04/12/21 0853   Drainage Description Serosanguinous 04/12/21 0853   Odor None 04/12/21 0853   Sumaya-wound Assessment Intact; Maceration;Dry/flaky 04/12/21 0853   Margins Attached edges; Unattached edges 04/12/21 0853   Wound Thickness Description not for Pressure Injury Full thickness 04/12/21 0853   Number of days: 74       Wound 02/01/21 Heel Left (Active)   Wound Image   04/12/21 0853   Wound Etiology Pressure Unstageable 04/12/21 0853   Dressing Status Intact; Old drainage noted 04/12/21 0853   Wound Cleansed Cleansed with saline 04/12/21 0853   Dressing/Treatment Alginate with Ag;Coban/self-adherent bandages; Collagen 04/12/21 0853   Offloading for Diabetic Foot Ulcers No offloading required 04/12/21 0853   Wound Length (cm) 0.3 cm 04/12/21 0853   Wound Width (cm) 0.3 cm 04/12/21 0853   Wound Depth (cm) 1.1 cm 04/12/21 0853   Wound Surface Area (cm^2) 0.09 cm^2 04/12/21 0853   Change in Wound Size % (l*w) 70 04/12/21 0853   Wound Volume (cm^3) 0.1 cm^3 04/12/21 0853   Wound Healing % 63 04/12/21 0853   Undermining Starts ___ O'Clock 0 04/12/21 0853   Undermining Ends___ O'Clock 0 04/12/21 0853   Undermining Maxium Distance (cm) 0 04/12/21 0853   Wound Assessment Slough 04/12/21 0853   Drainage Amount Small 04/12/21 0853   Drainage Description Serosanguinous 04/12/21 0853   Odor None 04/12/21 0853   Sumaya-wound Assessment Blanchable erythema; Intact;Dry/flaky 04/12/21 0853   Margins Attached edges 04/12/21 0853   Wound Thickness Description not for Pressure Injury Full thickness 04/12/21 0853   Number of days: 74     Incision 07/20/20 Left;Plantar (Active)   Number of days: 271       Incision 08/14/20 Right (Active)   Number of days: 245       Diabetic/Pressure/Non Pressure Ulcers:  Ulcer: Diabetic ulcer, fat layer exposed      Total Surface Area of Ulcer(s) Covered 5.75 sq/cm  Was the Product Layered  No   Amount of Product Applied 6 sq/cm   Amount of Product Wasted 38 sq/cm   Reason for Waste: The size of the wound is smaller than the product utilized  Surgically Fixated: No  Secured With: Steri Strips and Silicone Dressing   Procedural Pain: 0/10   Post Procedural Pain: 0 / 10  Response to Treatment: Well tolerated by patient. Unna boot compression therapy, left. - 96838  A dual layer Unna boot consisting of a zinc oxide wrap followed by Coban tape was applied to the left lower extremity. Patient tolerated procedure well. Plan:     Patient examined and evaluated today. Apligraf 3/5 was applied in office today. Graft was secured with Steri-Strips and wound veil. Wound was cleansed with Dakin solution prior to reapplication of skin graft. Patient was placed back into Unna boot compression therapy. Left heel wound was packed with Mesalt and covered with alginate. Home health to continue to change dressing once weekly. Patient will follow back up with us again in 1 week. Treatment:   No orders of the defined types were placed in this encounter. Antibiotics: No  Follow up: 1 week  Please see attached Discharge Instructions  Written patient dismissal instructions given to patient and signed by patient or POA. Discharge Instructions       Visit Discharge/Physician Orders:  - Will check into graft options for you. - Silver nitrate to left heel   - Debridement to left foot wound. Wound Location: left foot    Dressing orders:     1) Gather wound care supplies and arrange on clean table. 2) Wash your hands with soap and water or use alcohol based hand  for 20 seconds (sing \"Happy Birthday\" twice). 3) Cleanse wounds with normal saline or wound cleanser and gauze. Pat dry with clean gauze.     4) left foot/ left heel- Remove old unna boots to left lower legs. Wash legs with warm soapy water. Pat dry. Cleanse wound with normal saline or wound cleanser and gauze. Pat dry with clean gauze. Apply collagen the alginate to wound bed. Apply unna boots , ABD then kerlix and coban to left lower legs from base of toes to 1-2 inches below bend of knee. Change in wound clinic weekly. If unna boot becomes too tight- raise/elevate legs above the level of the heart for 15-20 minutes if swelling does not go down then carefully cut off unna boot and call clinic or go to local ER or family physician. If unna boot becomes wet or starts to roll down then carefully remove unna boot and call clinic. Keep all dressings clean & dry. Do not shower, take baths or get wound wet, unless otherwise instructed by your Wound Care doctor. Follow up visit:   1 Week on Monday March 15th. Keep next scheduled appointment. Please give 24 hour notice if unable to keep appointment. [phone]    If you experience any of the following, please call the Wound Care Service during business hours: Monday through Friday 8:00 am - 4:30 pm  ([phone]). *Increase in pain   *Temperature over 101   *Increase in drainage from your wound or a foul odor   *Uncontrolled swelling   *Need for compression bandage changes due to slippage, breakthrough drainage    If you need medical attention outside of business hours, please contact your Primary Care Doctor or go to the nearest emergency room.          Electronically signed by EYAD Sepulveda CNP on 4/5/2021 at 11:00 AM

## 2021-04-05 NOTE — PLAN OF CARE
Problem: Wound:  Goal: Will show signs of wound healing; wound closure and no evidence of infection  Description: Will show signs of wound healing; wound closure and no evidence of infection  Outcome: Ongoing  Note: Patient seen today for left foot wound and left heel wound. #3 Apligraf applied to left foot wound. Lot # [id number] Exp. 4/14/21  See AVS for discharge instructions. Follow up in 1 week. Appointment made. Care plan reviewed with patient. Patient verbalize understanding of the plan of care and contribute to goal setting.

## 2021-04-12 ENCOUNTER — HOSPITAL ENCOUNTER (OUTPATIENT)
Dept: WOUND CARE | Age: 61
Discharge: HOME | End: 2021-04-12
Payer: COMMERCIAL

## 2021-04-12 VITALS
RESPIRATION RATE: 16 BRPM | SYSTOLIC BLOOD PRESSURE: 142 MMHG | HEART RATE: 82 BPM | TEMPERATURE: 96.8 F | OXYGEN SATURATION: 97 % | DIASTOLIC BLOOD PRESSURE: 81 MMHG

## 2021-04-12 DIAGNOSIS — E11.9 TYPE 2 DIABETES MELLITUS WITH INSULIN THERAPY (HCC): ICD-10-CM

## 2021-04-12 DIAGNOSIS — L97.922 NONHEALING ULCER OF LEFT LOWER EXTREMITY WITH FAT LAYER EXPOSED (HCC): ICD-10-CM

## 2021-04-12 DIAGNOSIS — L97.929 NON-HEALING ULCER OF LOWER EXTREMITY, LEFT, WITH UNSPECIFIED SEVERITY (HCC): Primary | ICD-10-CM

## 2021-04-12 DIAGNOSIS — Z89.432 S/P TRANSMETATARSAL AMPUTATION OF FOOT, LEFT (HCC): ICD-10-CM

## 2021-04-12 DIAGNOSIS — Z79.4 TYPE 2 DIABETES MELLITUS WITH INSULIN THERAPY (HCC): ICD-10-CM

## 2021-04-12 PROCEDURE — 6370000000 HC RX 637 (ALT 250 FOR IP): Performed by: NURSE PRACTITIONER

## 2021-04-12 PROCEDURE — 17250 CHEM CAUT OF GRANLTJ TISSUE: CPT

## 2021-04-12 PROCEDURE — 29580 STRAPPING UNNA BOOT: CPT

## 2021-04-12 PROCEDURE — 97597 DBRDMT OPN WND 1ST 20 CM/<: CPT

## 2021-04-12 RX ORDER — LIDOCAINE HYDROCHLORIDE 20 MG/ML
JELLY TOPICAL ONCE
Status: CANCELLED | OUTPATIENT
Start: 2021-04-12 | End: 2021-04-12

## 2021-04-12 RX ORDER — LIDOCAINE 50 MG/G
OINTMENT TOPICAL ONCE
Status: CANCELLED | OUTPATIENT
Start: 2021-04-12 | End: 2021-04-12

## 2021-04-12 RX ORDER — GINSENG 100 MG
CAPSULE ORAL ONCE
Status: CANCELLED | OUTPATIENT
Start: 2021-04-12 | End: 2021-04-12

## 2021-04-12 RX ORDER — LIDOCAINE 40 MG/G
CREAM TOPICAL ONCE
Status: CANCELLED | OUTPATIENT
Start: 2021-04-12 | End: 2021-04-12

## 2021-04-12 RX ORDER — CLOBETASOL PROPIONATE 0.5 MG/G
OINTMENT TOPICAL ONCE
Status: CANCELLED | OUTPATIENT
Start: 2021-04-12 | End: 2021-04-12

## 2021-04-12 RX ORDER — GENTAMICIN SULFATE 1 MG/G
OINTMENT TOPICAL ONCE
Status: CANCELLED | OUTPATIENT
Start: 2021-04-12 | End: 2021-04-12

## 2021-04-12 RX ORDER — BACITRACIN, NEOMYCIN, POLYMYXIN B 400; 3.5; 5 [USP'U]/G; MG/G; [USP'U]/G
OINTMENT TOPICAL ONCE
Status: CANCELLED | OUTPATIENT
Start: 2021-04-12 | End: 2021-04-12

## 2021-04-12 RX ORDER — BETAMETHASONE DIPROPIONATE 0.05 %
OINTMENT (GRAM) TOPICAL ONCE
Status: CANCELLED | OUTPATIENT
Start: 2021-04-12 | End: 2021-04-12

## 2021-04-12 RX ORDER — BACITRACIN ZINC AND POLYMYXIN B SULFATE 500; 1000 [USP'U]/G; [USP'U]/G
OINTMENT TOPICAL ONCE
Status: CANCELLED | OUTPATIENT
Start: 2021-04-12 | End: 2021-04-12

## 2021-04-12 RX ORDER — LIDOCAINE HYDROCHLORIDE 40 MG/ML
SOLUTION TOPICAL ONCE
Status: CANCELLED | OUTPATIENT
Start: 2021-04-12 | End: 2021-04-12

## 2021-04-12 RX ADMIN — SILVER NITRATE APPLICATORS 2 EACH: 25; 75 STICK TOPICAL at 09:26

## 2021-04-12 ASSESSMENT — PAIN DESCRIPTION - LOCATION: LOCATION: EAR

## 2021-04-12 ASSESSMENT — PAIN DESCRIPTION - ORIENTATION: ORIENTATION: RIGHT

## 2021-04-12 NOTE — PROGRESS NOTES
[address] [place] [address]         Consult and Procedure Note      Jonh Leslie  MEDICAL RECORD NUMBER:  [id number]  AGE: 62 y.o. GENDER: male  : 1960  EPISODE DATE:  2021    Subjective:     Chief Complaint   Patient presents with    Wound Check     left foot         HISTORY of PRESENT ILLNESS HPI     Jonh Leslie is a 62 y.o. male Established patient who is well-known to our office who is being seen today for an ongoing left TMA wound. Patient is a type II diabetic with severe peripheral vascular disease. Patient was previously being seen at our [facility] office for this ongoing wound. Due to failed treatment with excisional debridements, patient was approved for Apligraf skin substitute. Patient's 3/5 Apligraf skin substitute was applied last week. Graft was noted to have slid down and is no longer covering the wound bed. Discussed with patient today that we will more than likely suture in the next graft to ensure it stays in place. Wound was excisionally debrided in office today. Patient tolerated debridement well. After debridement wound was treated with silver nitrate for chemical cauterization as well as wound promotion. Patient was placed back in Unna boot compression therapy. Patient continues to deny pain to the periwound area. We did order another Apligraf for patient today, this is graft 4/5. Patient instructed to bring his Dakin's solution with him next visit. Patient still noted to have a small left heel wound that has scant serous drainage. We will continue to pack this wound with Mesalt and cover with alginate. Patient will follow back up with us again in 1 week. 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   Wound/Ulcer Pain Timing/Severity: none  Quality of pain: N/A  Severity:  0/10   Modifying Factors: None  Associated Signs/Symptoms: edema, erythema and drainage        PAST MEDICAL HISTORY        Diagnosis Date    Arthritis     CAD (coronary artery disease)     CKD (chronic kidney disease), stage II     Diabetes mellitus ([facility] [address]     Hyperlipidemia     Hypertension     Liver disease     HEPITITIS AS A CHILD       PAST SURGICAL HISTORY    Past Surgical History:   Procedure Laterality Date    CARDIAC SURGERY  2019    triple bypass    CYST REMOVAL      RIGHT ANKLE     FOOT DEBRIDEMENT Left 2020    LEFT TRANSMETATARSAL AMPUTATION  FOOT INCISION AND DRAINAGE [facility]    LEFT WOUND DEBRIDEMENT WITH WOUND VAC APPLICATION performed by Josie Zee DPM at [facility] 35 Right     CYST REMOVED    FOOT SURGERY Left 2020    LEFT FOOT PREPARATION GRAFT SITE, HAVEST SPLIT THICKNESS SKIN GRAFT WITH APPLICATION, APPLICATION [employer]CI WOUND VAC performed by Josie Zee DPM at [address] [place] [address] TOE AMPUTATION Left 3/3/2020    I&D LEFT FOOT, TRANSMETATARSAL AMPUTATION performed by Josie Zee DPM at [facility] 104    Family History   Problem Relation Age of Onset    Diabetes Mother     High Blood Pressure Mother     Alzheimer's Disease Father          of, 80or 80years old   [place] Heart Disease Father     High Blood Pressure Father     Cancer Neg Hx     Stroke Neg Hx        SOCIAL HISTORY    Social History     Tobacco Use    Smoking status: Former Smoker     Types: Cigarettes     Quit date:      Years since quittin.3    Smokeless tobacco: Never Used   Substance Use Topics    Alcohol use: Not Currently    Drug use: Never       ALLERGIES    No Known Allergies    MEDICATIONS    Current Outpatient Medications on File Prior to Encounter   Medication Sig Dispense Refill    metoprolol tartrate (LOPRESSOR) 25 MG tablet Take 1 tablet by mouth 2 times daily 180 tablet 1    atorvastatin (LIPITOR) 40 MG tablet Take 1 tablet by mouth nightly 90 tablet 1    insulin detemir (LEVEMIR FLEXTOUCH) 100 UNIT/ML injection lower extremity. Patient also continues to have a small left posterior heel wound. Wound does not probe to bone and there is no proximal streaking. Scant serous drainage noted. No malodor. Neurovascular: Epicritic and protopathic sensations are grossly diminished to bilateral lower extremities. Wound 02/01/21 Foot Left (Active)   Wound Image   04/12/21 0853   Wound Etiology Non-Healing Surgical 04/12/21 0853   Dressing Status Intact; Old drainage noted 04/12/21 0853   Wound Cleansed Cleansed with saline 04/12/21 0853   Dressing/Treatment ABD;Collagen;Coban/self-adherent bandages; Alginate with Ag 04/12/21 0853   Offloading for Diabetic Foot Ulcers No offloading required 04/12/21 0853   Wound Length (cm) 2.4 cm 04/12/21 0853   Wound Width (cm) 2.5 cm 04/12/21 0853   Wound Depth (cm) 0.6 cm 04/12/21 0853   Wound Surface Area (cm^2) 6 cm^2 04/12/21 0853   Change in Wound Size % (l*w) 38.78 04/12/21 0853   Wound Volume (cm^3) 3.6 cm^3 04/12/21 0853   Wound Healing % 8 04/12/21 0853   Undermining Starts ___ O'Clock 9 04/12/21 0853   Undermining Ends___ O'Clock 12 04/12/21 0853   Undermining Maxium Distance (cm) .2 04/12/21 0853   Wound Assessment Granulation tissue 04/12/21 0853   Drainage Amount Moderate 04/12/21 0853   Drainage Description Serosanguinous 04/12/21 0853   Odor None 04/12/21 0853   Sumaya-wound Assessment Intact; Maceration;Dry/flaky 04/12/21 0853   Margins Attached edges; Unattached edges 04/12/21 0853   Wound Thickness Description not for Pressure Injury Full thickness 04/12/21 0853   Number of days: 76       Wound 02/01/21 Heel Left (Active)   Wound Image   04/12/21 0853   Wound Etiology Pressure Unstageable 04/12/21 0853   Dressing Status Intact; Old drainage noted 04/12/21 0853   Wound Cleansed Cleansed with saline 04/12/21 0853   Dressing/Treatment Alginate with Ag;Coban/self-adherent bandages; Collagen 04/12/21 0880   Offloading for Diabetic Foot Ulcers No offloading required 04/12/21 0853   Wound Length (cm) 0.3 cm 04/12/21 0853   Wound Width (cm) 0.3 cm 04/12/21 0853   Wound Depth (cm) 1.1 cm 04/12/21 0853   Wound Surface Area (cm^2) 0.09 cm^2 04/12/21 0853   Change in Wound Size % (l*w) 70 04/12/21 0853   Wound Volume (cm^3) 0.1 cm^3 04/12/21 0853   Wound Healing % 63 04/12/21 0853   Undermining Starts ___ O'Clock 0 04/12/21 0853   Undermining Ends___ O'Clock 0 04/12/21 0853   Undermining Maxium Distance (cm) 0 04/12/21 0853   Wound Assessment Slough 04/12/21 0853   Drainage Amount Small 04/12/21 0853   Drainage Description Serosanguinous 04/12/21 0853   Odor None 04/12/21 0853   Sumaya-wound Assessment Blanchable erythema; Intact;Dry/flaky 04/12/21 0853   Margins Attached edges 04/12/21 0853   Wound Thickness Description not for Pressure Injury Full thickness 04/12/21 0853   Number of days: 76       LABS       CBC:   Lab Results   Component Value Date    WBC 14.1 11/25/2020    HGB 13.6 11/25/2020    HCT 40.7 11/25/2020    MCV 85.0 11/25/2020     11/25/2020     BMP:   Lab Results   Component Value Date     11/25/2020    K 4.1 11/25/2020     11/25/2020    CO2 24 11/25/2020    BUN 12 11/25/2020    CREATININE 0.9 11/25/2020     PT/INR:   Lab Results   Component Value Date    INR 1.02 11/25/2020     Prealbumin: No results found for: PREALBUMIN  Albumin:  Lab Results   Component Value Date    LABALBU 3.9 11/25/2020     Sed Rate:  Lab Results   Component Value Date    SEDRATE 130 04/17/2020     CRP:   Lab Results   Component Value Date    CRP 14.47 04/17/2020     Micro:   Lab Results   Component Value Date    BC No growth-preliminary No growth  04/21/2020      Hemoglobin A1C:   Lab Results   Component Value Date    LABA1C 6.2 04/17/2020       Assessment:     Ulcer Identification:  Ulcer Type: venous  Contributing Factors: venous stasis    Wound: Dehiscence amputation stump    Depth of Diabetic/Pressure/Non Pressure Ulcers or Wound:  Wound, partial thickness    Patient Active Intact; Old drainage noted 04/12/21 0853   Wound Cleansed Cleansed with saline 04/12/21 0853   Dressing/Treatment ABD;Collagen;Coban/self-adherent bandages; Alginate with Ag 04/12/21 0853   Offloading for Diabetic Foot Ulcers No offloading required 04/12/21 0853   Wound Length (cm) 2.4 cm 04/12/21 0853   Wound Width (cm) 2.5 cm 04/12/21 0853   Wound Depth (cm) 0.6 cm 04/12/21 0853   Wound Surface Area (cm^2) 6 cm^2 04/12/21 0853   Change in Wound Size % (l*w) 38.78 04/12/21 0853   Wound Volume (cm^3) 3.6 cm^3 04/12/21 0853   Wound Healing % 8 04/12/21 0853   Undermining Starts ___ O'Clock 9 04/12/21 0853   Undermining Ends___ O'Clock 12 04/12/21 0853   Undermining Maxium Distance (cm) .2 04/12/21 0853   Wound Assessment Granulation tissue 04/12/21 0853   Drainage Amount Moderate 04/12/21 0853   Drainage Description Serosanguinous 04/12/21 0853   Odor None 04/12/21 0853   Sumaya-wound Assessment Intact; Maceration;Dry/flaky 04/12/21 0853   Margins Attached edges; Unattached edges 04/12/21 0853   Wound Thickness Description not for Pressure Injury Full thickness 04/12/21 0853   Number of days: 76       Wound 02/01/21 Heel Left (Active)   Wound Image   04/12/21 0853   Wound Etiology Pressure Unstageable 04/12/21 0853   Dressing Status Intact; Old drainage noted 04/12/21 0853   Wound Cleansed Cleansed with saline 04/12/21 0853   Dressing/Treatment Alginate with Ag;Coban/self-adherent bandages; Collagen 04/12/21 0853   Offloading for Diabetic Foot Ulcers No offloading required 04/12/21 0853   Wound Length (cm) 0.3 cm 04/12/21 0853   Wound Width (cm) 0.3 cm 04/12/21 0853   Wound Depth (cm) 1.1 cm 04/12/21 0853   Wound Surface Area (cm^2) 0.09 cm^2 04/12/21 0853   Change in Wound Size % (l*w) 70 04/12/21 0853   Wound Volume (cm^3) 0.1 cm^3 04/12/21 0853   Wound Healing % 63 04/12/21 0853   Undermining Starts ___ O'Clock 0 04/12/21 0853   Undermining Ends___ O'Clock 0 04/12/21 0853   Undermining Maxium Distance (cm) 0 04/12/21 4353 Wound Assessment [facility] 04/12/21 0853   Drainage Amount Small 04/12/21 0853   Drainage Description Serosanguinous 04/12/21 0853   Odor None 04/12/21 0853   Sumaya-wound Assessment Blanchable erythema; Intact;Dry/flaky 04/12/21 0853   Margins Attached edges 04/12/21 0853   Wound Thickness Description not for Pressure Injury Full thickness 04/12/21 0853   Number of days: 76     Incision 07/20/20 Left;Plantar (Active)   Number of days: 273       Incision 08/14/20 Right (Active)   Number of days: 247     Percent of Wound(s)/Ulcer(s) Debrided: 100%  Total Surface Area Debrided:  6 sq cm   Diabetic/Pressure/Non Pressure Ulcers only:  Ulcer: Diabetic ulcer, fat layer exposed   Estimated Blood Loss:  Minimal  Hemostasis Achieved:  by pressure and by silver nitrate stick  Procedural Pain:  0  / 10   Post Procedural Pain:  0 / 10   Response to treatment:  Well tolerated by patient. Chemical Cautery - [id number]    Performed by: EYAD Marmolejo CNP  Consent obtained: Yes  Time out taken: Yes  Tissue Type: Hypergranulation  Method: silver nitrate    Location of Chemical Cautery:   Wound/Ulcer #1     Wound 02/01/21 Foot Left (Active)   Wound Image   04/12/21 0853   Wound Etiology Non-Healing Surgical 04/12/21 0853   Dressing Status Intact; Old drainage noted 04/12/21 0853   Wound Cleansed Cleansed with saline 04/12/21 0853   Dressing/Treatment ABD;Collagen;Coban/self-adherent bandages; Alginate with Ag 04/12/21 0853   Offloading for Diabetic Foot Ulcers No offloading required 04/12/21 0853   Wound Length (cm) 2.4 cm 04/12/21 0853   Wound Width (cm) 2.5 cm 04/12/21 0853   Wound Depth (cm) 0.6 cm 04/12/21 0853   Wound Surface Area (cm^2) 6 cm^2 04/12/21 0853   Change in Wound Size % (l*w) 38.78 04/12/21 0853   Wound Volume (cm^3) 3.6 cm^3 04/12/21 0853   Wound Healing % 8 04/12/21 0853   Undermining Starts ___ O'Clock 9 04/12/21 0853   Undermining Ends___ O'Clock 12 04/12/21 0853   Undermining Maxium Distance (cm) .2 04/12/21 0572   Wound Assessment Granulation tissue 04/12/21 0853   Drainage Amount Moderate 04/12/21 0853   Drainage Description Serosanguinous 04/12/21 0853   Odor None 04/12/21 0853   Sumaya-wound Assessment Intact; Maceration;Dry/flaky 04/12/21 0853   Margins Attached edges; Unattached edges 04/12/21 0853   Wound Thickness Description not for Pressure Injury Full thickness 04/12/21 0853   Number of days: 76       Wound 02/01/21 Heel Left (Active)   Wound Image   04/12/21 0853   Wound Etiology Pressure Unstageable 04/12/21 0853   Dressing Status Intact; Old drainage noted 04/12/21 0853   Wound Cleansed Cleansed with saline 04/12/21 0853   Dressing/Treatment Alginate with Ag;Coban/self-adherent bandages; Collagen 04/12/21 0853   Offloading for Diabetic Foot Ulcers No offloading required 04/12/21 0853   Wound Length (cm) 0.3 cm 04/12/21 0853   Wound Width (cm) 0.3 cm 04/12/21 0853   Wound Depth (cm) 1.1 cm 04/12/21 0853   Wound Surface Area (cm^2) 0.09 cm^2 04/12/21 0853   Change in Wound Size % (l*w) 70 04/12/21 0853   Wound Volume (cm^3) 0.1 cm^3 04/12/21 0853   Wound Healing % 63 04/12/21 0853   Undermining Starts ___ O'Clock 0 04/12/21 0853   Undermining Ends___ O'Clock 0 04/12/21 0853   Undermining Maxium Distance (cm) 0 04/12/21 0853   Wound Assessment Slough 04/12/21 0853   Drainage Amount Small 04/12/21 0853   Drainage Description Serosanguinous 04/12/21 0853   Odor None 04/12/21 0853   Sumaya-wound Assessment Blanchable erythema; Intact;Dry/flaky 04/12/21 0853   Margins Attached edges 04/12/21 0853   Wound Thickness Description not for Pressure Injury Full thickness 04/12/21 0853   Number of days: 76     Incision 07/20/20 Left;Plantar (Active)   Number of days: 273       Incision 08/14/20 Right (Active)   Number of days: 247     Procedural Pain: 0  / 10   Post Procedural Pain: 0 / 10   Response to treatment: Well tolerated by patient. Unna boot compression therapy, left.  - 66237  A dual layer Unna boot consisting of a zinc oxide wrap followed by Coban tape was applied to the left lower extremity. Patient tolerated procedure well. Plan:     Patient examined and evaluated today. Left TMA wound was excision debrided in office today. Silver nitrate was applied to the entire left TMA wound. Alginate and 4 x 4 was also applied to the graft area with Unna boot. Left heel wound was packed with collagen and covered with alginate. Home health to continue to change dressing once weekly. Patient encouraged to bring his Dakin's solution with him next visit. We did order another Apligraf for patient today, this is graft 4/5. Patient will follow back up with us again in 1 week. Treatment:   Orders Placed This Encounter   Procedures    Misc nursing order (specify)     Scheduling Instructions:      Visit Discharge/Physician Orders:      - Application #1 applied 25/28/6113      - Application #2 applied 75/38/7098      - Application #3 applied 1/5/3686 Lot [id number] EXP 4/14/21      -left foot debrided today. Callous trimmed away      - bring Dakins to appointments      - will order a graft for next Monday and plan to suture in place             Home Care: [address] [place] [address]             Wound Location: left foot             Dressing orders:              1) Gather wound care supplies and arrange on clean table.              2) Wash your hands with soap and water or use alcohol based hand  for 20 seconds (sing \"Happy Birthday\" twice).        3) Cleanse wounds with normal saline or wound cleanser and gauze. Pat dry with clean gauze.        4) Wash legs with warm soapy water. Pat dry. Cleanse wound with normal saline or wound cleanser and gauze. Pat dry with clean gauze. Apply collagen then silver alginate to the wound bed and cover with an ABD pad. *Left heel- Pack with collagen  and cover with silver alginate.   Apply unna boot , then kerlix and coban to left lower legs from base of toes to normal saline or wound cleanser and gauze. Pat dry with clean gauze. 4) left foot/ left heel- Remove old unna boots to left lower legs. Wash legs with warm soapy water. Pat dry. Cleanse wound with normal saline or wound cleanser and gauze. Pat dry with clean gauze. Apply collagen the alginate to wound bed. Apply unna boots , ABD then kerlix and coban to left lower legs from base of toes to 1-2 inches below bend of knee. Change in wound clinic weekly. If unna boot becomes too tight- raise/elevate legs above the level of the heart for 15-20 minutes if swelling does not go down then carefully cut off unna boot and call clinic or go to local ER or family physician. If unna boot becomes wet or starts to roll down then carefully remove unna boot and call clinic. Keep all dressings clean & dry. Do not shower, take baths or get wound wet, unless otherwise instructed by your Wound Care doctor. Follow up visit:   1 Week on Monday March 15th. Keep next scheduled appointment. Please give 24 hour notice if unable to keep appointment. [phone]    If you experience any of the following, please call the Wound Care Service during business hours: Monday through Friday 8:00 am - 4:30 pm  ([phone]). *Increase in pain   *Temperature over 101   *Increase in drainage from your wound or a foul odor   *Uncontrolled swelling   *Need for compression bandage changes due to slippage, breakthrough drainage    If you need medical attention outside of business hours, please contact your Primary Care Doctor or go to the nearest emergency room.          Electronically signed by EYAD Akhtar CNP on 4/19/2021 at 8:18 AM

## 2021-04-13 ENCOUNTER — OFFICE VISIT (OUTPATIENT)
Dept: FAMILY MEDICINE CLINIC | Age: 61
End: 2021-04-13
Payer: COMMERCIAL

## 2021-04-13 VITALS — DIASTOLIC BLOOD PRESSURE: 82 MMHG | SYSTOLIC BLOOD PRESSURE: 136 MMHG | HEART RATE: 68 BPM | RESPIRATION RATE: 22 BRPM

## 2021-04-13 DIAGNOSIS — H60.391: Primary | ICD-10-CM

## 2021-04-13 PROCEDURE — 99213 OFFICE O/P EST LOW 20 MIN: CPT | Performed by: NURSE PRACTITIONER

## 2021-04-13 ASSESSMENT — PATIENT HEALTH QUESTIONNAIRE - PHQ9
SUM OF ALL RESPONSES TO PHQ QUESTIONS 1-9: 0
SUM OF ALL RESPONSES TO PHQ9 QUESTIONS 1 & 2: 0
SUM OF ALL RESPONSES TO PHQ QUESTIONS 1-9: 0
SUM OF ALL RESPONSES TO PHQ QUESTIONS 1-9: 0

## 2021-04-13 NOTE — PATIENT INSTRUCTIONS
Patient Education        Swimmer's Ear: Care Instructions  Your Care Instructions     Swimmer's ear (otitis externa) is inflammation or infection of the ear canal. This is the passage that leads from the outer ear to the eardrum. Any water, sand, or other debris that gets into the ear canal and stays there can cause swimmer's ear. Putting cotton swabs or other items in the ear to clean it can also cause this problem. Swimmer's ear can be very painful. But you can treat the pain and infection with medicines. You should feel better in a few days. Follow-up care is a key part of your treatment and safety. Be sure to make and go to all appointments, and call your doctor if you are having problems. It's also a good idea to know your test results and keep a list of the medicines you take. How can you care for yourself at home? Cleaning and care  · Use antibiotic drops as your doctor directs. · Do not insert ear drops (other than the antibiotic ear drops) or anything else into the ear unless your doctor has told you to. · Avoid getting water in the ear until the problem clears up. Use cotton lightly coated with petroleum jelly as an earplug. Do not use plastic earplugs. · Use a hair dryer set on low to carefully dry the ear after you shower. · To ease ear pain, hold a warm washcloth against your ear. · Take pain medicines exactly as directed. ? If the doctor gave you a prescription medicine for pain, take it as prescribed. ? If you are not taking a prescription pain medicine, ask your doctor if you can take an over-the-counter medicine. Inserting ear drops  · Warm the drops to body temperature by rolling the container in your hands. Or you can place it in a cup of warm water for a few minutes. · Lie down, with your ear facing up. · Place drops inside the ear. Follow your doctor's instructions (or the directions on the label) for how many drops to use.  Gently wiggle the outer ear or pull the ear up and back to help the drops get into the ear. · It's important to keep the liquid in the ear canal for 3 to 5 minutes. When should you call for help? Call your doctor now or seek immediate medical care if:    · You have a new or higher fever.     · You have new or worse pain, swelling, warmth, or redness around or behind your ear.     · You have new or increasing pus or blood draining from your ear. Watch closely for changes in your health, and be sure to contact your doctor if:    · You are not getting better after 2 days (48 hours). Where can you learn more? Go to [email or web address] and sign in to your [employer]est[employer] account. Enter C706 in the Epoxy box to learn more about \"Swimmer's Ear: Care Instructions. \"     If you do not have an account, please click on the \"Sign Up Now\" link. Current as of: December 2, 2020               Content Version: 12.8  © 2006-2021 [employer]wise, Incorporated. Care instructions adapted under license by [facility] ([facility]). If you have questions about a medical condition or this instruction, always ask your healthcare professional. [place] [address] any warranty or liability for your use of this information.

## 2021-04-13 NOTE — PROGRESS NOTES
Jovan Leger (:  1960) is a 62 y.o. male,Established patient, here for evaluation of the following chief complaint(s):  Otalgia (rt x 4 days )      ASSESSMENT/PLAN:  1. Acute bacterial inflammation of right external ear  - Acute  - OTC tylenol as needed  - neomycin-polymyxin-hydrocortisone (CORTISPORIN) 3.5-71939-7 otic solution; Place 3 drops into both ears 4 times daily for 7 days, Disp-1 Bottle, R-0Normal      Return if symptoms worsen or fail to improve. SUBJECTIVE/OBJECTIVE:  Otalgia   There is pain in the right ear. This is a new problem. The current episode started in the past 7 days. The problem occurs every few minutes. The problem has been unchanged. There has been no fever. The pain is moderate. Pertinent negatives include no ear discharge or hearing loss. There is no history of hearing loss. Review of Systems   Constitutional: Negative for fever. HENT: Positive for ear pain. Negative for ear discharge and hearing loss. Physical Exam  Vitals signs and nursing note reviewed. Constitutional:       Appearance: He is well-developed. HENT:      Head: Normocephalic and atraumatic. Right Ear: Hearing, tympanic membrane and external ear normal. Tenderness present. There is no impacted cerumen. Left Ear: Hearing, tympanic membrane, ear canal and external ear normal. There is no impacted cerumen. Nose:      Right Sinus: No maxillary sinus tenderness or frontal sinus tenderness. Left Sinus: No maxillary sinus tenderness or frontal sinus tenderness. Eyes:      General:         Right eye: No discharge. Left eye: No discharge. Conjunctiva/sclera: Conjunctivae normal.      Pupils: Pupils are equal, round, and reactive to light. Neck:      Musculoskeletal: Normal range of motion. Vascular: No JVD. Cardiovascular:      Rate and Rhythm: Normal rate and regular rhythm. Heart sounds: Normal heart sounds. No murmur.    Pulmonary:      Effort: Pulmonary effort is normal. No tachypnea. Breath sounds: Normal breath sounds. No stridor. No wheezing. Abdominal:      General: There is no distension. Tenderness: There is no abdominal tenderness. Musculoskeletal:         General: No deformity. Comments: ROM in all extremities WNL. No deformities. Lymphadenopathy:      Head:      Right side of head: No submental or submandibular adenopathy. Left side of head: No submental or submandibular adenopathy. Skin:     General: Skin is warm and dry. Capillary Refill: Capillary refill takes less than 2 seconds. Findings: No rash. Neurological:      Mental Status: He is alert and oriented to person, place, and time. Coordination: Coordination normal.   Psychiatric:         Mood and Affect: Mood normal.         Behavior: Behavior normal.         Thought Content: Thought content normal.         Judgment: Judgment normal.                 An electronic signature was used to authenticate this note.     --EYAD Myers - CNP

## 2021-04-26 ENCOUNTER — HOSPITAL ENCOUNTER (OUTPATIENT)
Dept: WOUND CARE | Age: 61
Discharge: HOME | End: 2021-04-26
Payer: COMMERCIAL

## 2021-04-26 VITALS
HEART RATE: 73 BPM | TEMPERATURE: 97.3 F | OXYGEN SATURATION: 96 % | RESPIRATION RATE: 18 BRPM | SYSTOLIC BLOOD PRESSURE: 168 MMHG | DIASTOLIC BLOOD PRESSURE: 87 MMHG

## 2021-04-26 DIAGNOSIS — L97.929 NON-HEALING ULCER OF LOWER EXTREMITY, LEFT, WITH UNSPECIFIED SEVERITY (HCC): Primary | ICD-10-CM

## 2021-04-26 DIAGNOSIS — L97.922 NONHEALING ULCER OF LEFT LOWER EXTREMITY WITH FAT LAYER EXPOSED (HCC): ICD-10-CM

## 2021-04-26 DIAGNOSIS — Z89.432 S/P TRANSMETATARSAL AMPUTATION OF FOOT, LEFT (HCC): ICD-10-CM

## 2021-04-26 DIAGNOSIS — E11.9 TYPE 2 DIABETES MELLITUS WITH INSULIN THERAPY (HCC): ICD-10-CM

## 2021-04-26 DIAGNOSIS — Z79.4 TYPE 2 DIABETES MELLITUS WITH INSULIN THERAPY (HCC): ICD-10-CM

## 2021-04-26 PROCEDURE — 29580 STRAPPING UNNA BOOT: CPT

## 2021-04-26 PROCEDURE — 15275 SKIN SUB GRAFT FACE/NK/HF/G: CPT

## 2021-04-26 RX ORDER — LIDOCAINE HYDROCHLORIDE 20 MG/ML
JELLY TOPICAL ONCE
Status: CANCELLED | OUTPATIENT
Start: 2021-04-26 | End: 2021-04-26

## 2021-04-26 RX ORDER — CLOBETASOL PROPIONATE 0.5 MG/G
OINTMENT TOPICAL ONCE
Status: CANCELLED | OUTPATIENT
Start: 2021-04-26 | End: 2021-04-26

## 2021-04-26 RX ORDER — BETAMETHASONE DIPROPIONATE 0.05 %
OINTMENT (GRAM) TOPICAL ONCE
Status: CANCELLED | OUTPATIENT
Start: 2021-04-26 | End: 2021-04-26

## 2021-04-26 RX ORDER — LIDOCAINE HYDROCHLORIDE 40 MG/ML
SOLUTION TOPICAL ONCE
Status: CANCELLED | OUTPATIENT
Start: 2021-04-26 | End: 2021-04-26

## 2021-04-26 RX ORDER — GINSENG 100 MG
CAPSULE ORAL ONCE
Status: CANCELLED | OUTPATIENT
Start: 2021-04-26 | End: 2021-04-26

## 2021-04-26 RX ORDER — BACITRACIN, NEOMYCIN, POLYMYXIN B 400; 3.5; 5 [USP'U]/G; MG/G; [USP'U]/G
OINTMENT TOPICAL ONCE
Status: CANCELLED | OUTPATIENT
Start: 2021-04-26 | End: 2021-04-26

## 2021-04-26 RX ORDER — GABAPENTIN 600 MG/1
600 TABLET ORAL 2 TIMES DAILY
Qty: 180 TABLET | Refills: 1 | Status: SHIPPED | OUTPATIENT
Start: 2021-04-26 | End: 2022-08-24 | Stop reason: SDUPTHER

## 2021-04-26 RX ORDER — LIDOCAINE 50 MG/G
OINTMENT TOPICAL ONCE
Status: CANCELLED | OUTPATIENT
Start: 2021-04-26 | End: 2021-04-26

## 2021-04-26 RX ORDER — GENTAMICIN SULFATE 1 MG/G
OINTMENT TOPICAL ONCE
Status: CANCELLED | OUTPATIENT
Start: 2021-04-26 | End: 2021-04-26

## 2021-04-26 RX ORDER — LIDOCAINE 40 MG/G
CREAM TOPICAL ONCE
Status: CANCELLED | OUTPATIENT
Start: 2021-04-26 | End: 2021-04-26

## 2021-04-26 RX ORDER — BACITRACIN ZINC AND POLYMYXIN B SULFATE 500; 1000 [USP'U]/G; [USP'U]/G
OINTMENT TOPICAL ONCE
Status: CANCELLED | OUTPATIENT
Start: 2021-04-26 | End: 2021-04-26

## 2021-04-26 ASSESSMENT — PAIN DESCRIPTION - LOCATION: LOCATION: FOOT

## 2021-04-26 ASSESSMENT — PAIN DESCRIPTION - PAIN TYPE: TYPE: CHRONIC PAIN

## 2021-04-26 NOTE — PLAN OF CARE
Problem: Wound:  Goal: Will show signs of wound healing; wound closure and no evidence of infection  Description: Will show signs of wound healing; wound closure and no evidence of infection  Outcome: Ongoing     Patient presents to wound clinic for follow up of left foot wound. Wound debrided and Apligraf application #4 applied today, 4/26/2021 per CNP. Lot# [id number], Expiration date: 4/29/2021. Left foot debrided today. Discharge instructions/dressing orders faxed to [facility]. Left foot- Leave graft, wound veil and steri strips intact. Cover with alginate. Wrap with unna boot, place ABD over wound area, then wrap with kerlix and coban to left lower legs from base of toes to 1-2 inches below bend of knee. Home Health to change three times weekly. Left heel- Pack with collagen and cover with silver alginate. Home Health to change three times weekly. Follow up visit: 1 Week on Monday 5/3/2021 at 8:30 am     Care plan reviewed with patient. Patient verbalize understanding of the plan of care and contribute to goal setting.

## 2021-04-26 NOTE — PROGRESS NOTES
[facility] Application   Below Knee    NAME:  Jovan Leger  YOB: 1960  MEDICAL RECORD NUMBER:  [id number]  DATE:  4/26/2021    Pavel Aquino boot: Appied primary and secondary dressing as ordered. Applied Unna roll from toes to knee overlapping each time. Applied ace wrap or coban from toes to below the knee. Secured with tape and/or metal clips covered with tape. Instructed patient/caregiver to keep dressing dry and intact. DO NOT REMOVE DRESSING. Instructed pt/family/caregiver to report excessive draining, loose bandage, wet dressing, severe pain or tingling in toes. Applied [facility] dressing below the knee to left lower leg. Unna Boot(s) were applied per  Guidelines.      Electronically signed by Freida Espinosa RN on 4/26/2021 at 10:36 AM

## 2021-04-26 NOTE — PROGRESS NOTES
extremities. Wound 02/01/21 Foot Left (Active)   Wound Image   04/26/21 0852   Wound Etiology Non-Healing Surgical 04/26/21 0852   Dressing Status Intact; Old drainage noted 04/26/21 0852   Wound Cleansed Cleansed with saline 04/26/21 0852   Dressing/Treatment Alginate;Roll gauze;ABD;Coban/self-adherent bandages 04/26/21 0852   Offloading for Diabetic Foot Ulcers Yes (type) 04/26/21 0852   Wound Length (cm) 2.3 cm 04/26/21 0852   Wound Width (cm) 2.5 cm 04/26/21 0852   Wound Depth (cm) 0.3 cm 04/26/21 0852   Wound Surface Area (cm^2) 5.75 cm^2 04/26/21 0852   Change in Wound Size % (l*w) 41.33 04/26/21 0852   Wound Volume (cm^3) 1.72 cm^3 04/26/21 0852   Wound Healing % 56 04/26/21 0852   Undermining Starts ___ O'Clock 4 04/26/21 0852   Undermining Ends___ O'Clock 12 04/26/21 0852   Undermining Maxium Distance (cm) 0.3 04/26/21 0852   Wound Assessment Granulation tissue;Slough 04/26/21 0852   Drainage Amount Moderate 04/26/21 0852   Drainage Description Serosanguinous; Yellow 04/26/21 0852   Odor None 04/26/21 0852   Sumaya-wound Assessment Intact; Maceration;Dry/flaky 04/26/21 9797   Margins Attached edges; Unattached edges 04/26/21 0852   Wound Thickness Description not for Pressure Injury Full thickness 04/26/21 0852   Number of days: 90       Wound 02/01/21 Heel Left (Active)   Wound Image   04/26/21 0852   Wound Etiology Pressure Unstageable 04/26/21 0852   Dressing Status Intact; Old drainage noted 04/26/21 0852   Wound Cleansed Cleansed with saline 04/26/21 0852   Dressing/Treatment Collagen;Alginate with Ag;Roll gauze;Coban/self-adherent bandages 04/26/21 0852   Offloading for Diabetic Foot Ulcers Yes (type) 04/26/21 0852   Wound Length (cm) 0.3 cm 04/26/21 0852   Wound Width (cm) 0.1 cm 04/26/21 0852   Wound Depth (cm) 0.7 cm 04/26/21 0852   Wound Surface Area (cm^2) 0.03 cm^2 04/26/21 0852   Change in Wound Size % (l*w) 90 04/26/21 0852   Wound Volume (cm^3) 0.02 cm^3 04/26/21 0852   Wound Healing % 93 04/26/21 0852   Undermining Starts ___ O'Clock 0 04/12/21 0853   Undermining Ends___ O'Clock 0 04/12/21 0853   Undermining Maxium Distance (cm) 0 04/12/21 0853   Wound Assessment Pink/red 04/26/21 0852   Drainage Amount Scant 04/26/21 0852   Drainage Description Yellow 04/26/21 0852   Odor None 04/26/21 0852   Sumaya-wound Assessment Maceration 04/26/21 0852   Margins Attached edges 04/26/21 0852   Wound Thickness Description not for Pressure Injury Full thickness 04/26/21 0852   Number of days: 90       LABS       CBC:   Lab Results   Component Value Date    WBC 14.1 11/25/2020    HGB 13.6 11/25/2020    HCT 40.7 11/25/2020    MCV 85.0 11/25/2020     11/25/2020     BMP:   Lab Results   Component Value Date     11/25/2020    K 4.1 11/25/2020     11/25/2020    CO2 24 11/25/2020    BUN 12 11/25/2020    CREATININE 0.9 11/25/2020     PT/INR:   Lab Results   Component Value Date    INR 1.02 11/25/2020     Prealbumin: No results found for: PREALBUMIN  Albumin:  Lab Results   Component Value Date    LABALBU 3.9 11/25/2020     Sed Rate:  Lab Results   Component Value Date    SEDRATE 130 04/17/2020     CRP:   Lab Results   Component Value Date    CRP 14.47 04/17/2020     Micro:   Lab Results   Component Value Date    BC No growth-preliminary No growth  04/21/2020      Hemoglobin A1C:   Lab Results   Component Value Date    LABA1C 6.2 04/17/2020       Assessment:     Ulcer Identification:  Ulcer Type: venous  Contributing Factors: venous stasis    Wound: Dehiscence amputation stump    Depth of Diabetic/Pressure/Non Pressure Ulcers or Wound:  Wound, partial thickness    Patient Active Problem List   Diagnosis Code    Gangrene of toe of left foot ([facility] I96    CAD (coronary artery disease) I25.10    Type 2 diabetes mellitus with insulin therapy ([facility] E11.9, Z79.4    Hyperlipidemia E78.5    Hypertension I10    Charcot's joint, right ankle and foot M14.671    Fracture of navicular bone of foot Distance (cm) 0.3 04/26/21 0852   Wound Assessment Granulation tissue;Slough 04/26/21 0852   Drainage Amount Moderate 04/26/21 0852   Drainage Description Serosanguinous; Yellow 04/26/21 0852   Odor None 04/26/21 0852   Sumaya-wound Assessment Intact; Maceration;Dry/flaky 04/26/21 5184   Margins Attached edges; Unattached edges 04/26/21 0852   Wound Thickness Description not for Pressure Injury Full thickness 04/26/21 0852   Number of days: 90       Wound 02/01/21 Heel Left (Active)   Wound Image   04/26/21 0852   Wound Etiology Pressure Unstageable 04/26/21 0852   Dressing Status Intact; Old drainage noted 04/26/21 0852   Wound Cleansed Cleansed with saline 04/26/21 0852   Dressing/Treatment Collagen;Alginate with Ag;Roll gauze;Coban/self-adherent bandages 04/26/21 0852   Offloading for Diabetic Foot Ulcers Yes (type) 04/26/21 0852   Wound Length (cm) 0.3 cm 04/26/21 0852   Wound Width (cm) 0.1 cm 04/26/21 0852   Wound Depth (cm) 0.7 cm 04/26/21 0852   Wound Surface Area (cm^2) 0.03 cm^2 04/26/21 0852   Change in Wound Size % (l*w) 90 04/26/21 0852   Wound Volume (cm^3) 0.02 cm^3 04/26/21 0852   Wound Healing % 93 04/26/21 0852   Undermining Starts ___ O'Clock 0 04/12/21 0853   Undermining Ends___ O'Clock 0 04/12/21 0853   Undermining Maxium Distance (cm) 0 04/12/21 0853   Wound Assessment Pink/red 04/26/21 0852   Drainage Amount Scant 04/26/21 0852   Drainage Description Yellow 04/26/21 0852   Odor None 04/26/21 0852   Sumaya-wound Assessment Maceration 04/26/21 0852   Margins Attached edges 04/26/21 0852   Wound Thickness Description not for Pressure Injury Full thickness 04/26/21 0852   Number of days: 90     Incision 07/20/20 Left;Plantar (Active)   Number of days: 287       Incision 08/14/20 Right (Active)   Number of days: 261       Diabetic/Pressure/Non Pressure Ulcers:  Ulcer:   Diabetic ulcer, fat layer exposed      Total Surface Area of Ulcer(s) Covered 5.75 sq/cm  Was the Product Layered  No   Amount of Product Applied 6 sq/cm   Amount of Product Wasted 38 sq/cm   Reason for Waste: The size of the wound is smaller than the product utilized  Surgically Fixated: Sutured in place  Secured With: Steri Strips and Silicone Dressing and sutures. Procedural Pain: 0/10   Post Procedural Pain: 0 / 10  Response to Treatment: Well tolerated by patient. Unna boot compression therapy, left. - 86339  A dual layer Unna boot consisting of a zinc oxide wrap followed by Coban tape was applied to the left lower extremity. Patient tolerated procedure well. Plan:     Patient examined and evaluated today. Apligraf 4/5 was applied in office today. Graft was secured with 3 sutures, Steri-Strips and wound veil. Wound was cleansed with Dakin solution prior to reapplication of skin graft. Patient was placed back into Unna boot compression therapy. Left heel wound was packed with Mesalt and covered with alginate. Home health to continue to change dressing once weekly. Gabapentin was refilled for patient today. Patient will follow back up with us again in 1 week. Treatment:   No orders of the defined types were placed in this encounter. Antibiotics: No  Follow up: 1 week  Please see attached Discharge Instructions  Written patient dismissal instructions given to patient and signed by patient or POA. Discharge Instructions       Visit Discharge/Physician Orders:  - Will check into graft options for you. - Silver nitrate to left heel   - Debridement to left foot wound. Wound Location: left foot    Dressing orders:     1) Gather wound care supplies and arrange on clean table. 2) Wash your hands with soap and water or use alcohol based hand  for 20 seconds (sing \"Happy Birthday\" twice). 3) Cleanse wounds with normal saline or wound cleanser and gauze. Pat dry with clean gauze. 4) left foot/ left heel- Remove old unna boots to left lower legs. Wash legs with warm soapy water. Pat dry.

## 2021-04-29 RX ORDER — CLOPIDOGREL BISULFATE 75 MG/1
75 TABLET ORAL DAILY
Qty: 90 TABLET | Refills: 2 | Status: SHIPPED | OUTPATIENT
Start: 2021-04-29 | End: 2021-09-13 | Stop reason: SDUPTHER

## 2021-04-29 NOTE — TELEPHONE ENCOUNTER
Kayla Blevins called requesting a refill on the following medications:  Requested Prescriptions     Pending Prescriptions Disp Refills    clopidogrel (PLAVIX) 75 MG tablet 90 tablet 0     Sig: Take 1 tablet by mouth daily     Pharmacy verified: [facility] in [address] [place] [address]  . pv      Date of last visit: 3/12/2021  Date of next visit (if applicable): Visit date not found

## 2021-05-03 ENCOUNTER — HOSPITAL ENCOUNTER (OUTPATIENT)
Dept: WOUND CARE | Age: 61
Discharge: HOME | End: 2021-05-03
Payer: COMMERCIAL

## 2021-05-03 VITALS
DIASTOLIC BLOOD PRESSURE: 95 MMHG | OXYGEN SATURATION: 96 % | RESPIRATION RATE: 18 BRPM | SYSTOLIC BLOOD PRESSURE: 166 MMHG | TEMPERATURE: 98.3 F | HEART RATE: 77 BPM

## 2021-05-03 DIAGNOSIS — Z79.4 TYPE 2 DIABETES MELLITUS WITH INSULIN THERAPY (HCC): ICD-10-CM

## 2021-05-03 DIAGNOSIS — L97.929 NON-HEALING ULCER OF LOWER EXTREMITY, LEFT, WITH UNSPECIFIED SEVERITY (HCC): ICD-10-CM

## 2021-05-03 DIAGNOSIS — L97.922 NONHEALING ULCER OF LEFT LOWER EXTREMITY WITH FAT LAYER EXPOSED (HCC): Primary | ICD-10-CM

## 2021-05-03 DIAGNOSIS — Z89.432 S/P TRANSMETATARSAL AMPUTATION OF FOOT, LEFT (HCC): ICD-10-CM

## 2021-05-03 DIAGNOSIS — E11.9 TYPE 2 DIABETES MELLITUS WITH INSULIN THERAPY (HCC): ICD-10-CM

## 2021-05-03 PROCEDURE — 87070 CULTURE OTHR SPECIMN AEROBIC: CPT

## 2021-05-03 PROCEDURE — 87205 SMEAR GRAM STAIN: CPT

## 2021-05-03 PROCEDURE — 29445 APPL RIGID TOT CNTC LEG CAST: CPT

## 2021-05-03 RX ORDER — LIDOCAINE HYDROCHLORIDE 20 MG/ML
JELLY TOPICAL ONCE
Status: CANCELLED | OUTPATIENT
Start: 2021-05-03 | End: 2021-05-03

## 2021-05-03 RX ORDER — LIDOCAINE HYDROCHLORIDE 40 MG/ML
SOLUTION TOPICAL ONCE
Status: CANCELLED | OUTPATIENT
Start: 2021-05-03 | End: 2021-05-03

## 2021-05-03 RX ORDER — LIDOCAINE 50 MG/G
OINTMENT TOPICAL ONCE
Status: CANCELLED | OUTPATIENT
Start: 2021-05-03 | End: 2021-05-03

## 2021-05-03 RX ORDER — GINSENG 100 MG
CAPSULE ORAL ONCE
Status: CANCELLED | OUTPATIENT
Start: 2021-05-03 | End: 2021-05-03

## 2021-05-03 RX ORDER — CLOBETASOL PROPIONATE 0.5 MG/G
OINTMENT TOPICAL ONCE
Status: CANCELLED | OUTPATIENT
Start: 2021-05-03 | End: 2021-05-03

## 2021-05-03 RX ORDER — BACITRACIN, NEOMYCIN, POLYMYXIN B 400; 3.5; 5 [USP'U]/G; MG/G; [USP'U]/G
OINTMENT TOPICAL ONCE
Status: CANCELLED | OUTPATIENT
Start: 2021-05-03 | End: 2021-05-03

## 2021-05-03 RX ORDER — LIDOCAINE 40 MG/G
CREAM TOPICAL ONCE
Status: CANCELLED | OUTPATIENT
Start: 2021-05-03 | End: 2021-05-03

## 2021-05-03 RX ORDER — GENTAMICIN SULFATE 1 MG/G
OINTMENT TOPICAL ONCE
Status: CANCELLED | OUTPATIENT
Start: 2021-05-03 | End: 2021-05-03

## 2021-05-03 RX ORDER — BETAMETHASONE DIPROPIONATE 0.05 %
OINTMENT (GRAM) TOPICAL ONCE
Status: CANCELLED | OUTPATIENT
Start: 2021-05-03 | End: 2021-05-03

## 2021-05-03 RX ORDER — BACITRACIN ZINC AND POLYMYXIN B SULFATE 500; 1000 [USP'U]/G; [USP'U]/G
OINTMENT TOPICAL ONCE
Status: CANCELLED | OUTPATIENT
Start: 2021-05-03 | End: 2021-05-03

## 2021-05-03 ASSESSMENT — PAIN SCALES - GENERAL: PAINLEVEL_OUTOF10: 0

## 2021-05-03 NOTE — PLAN OF CARE
Problem: Wound:  Goal: Will show signs of wound healing; wound closure and no evidence of infection  Description: Will show signs of wound healing; wound closure and no evidence of infection  Outcome: Ongoing   Patient presents to wound clinic for follow up of left heel and left foot wounds. Discharge instructions/dressing orders faxed to [address] [place] [address]- no visit this week, will re-evaluate at appt next week. Left foot- Covered with silver alginate, ABD pad, wrapped with kerlix then total contact cast applied. Leave intact until follow up appointment next week. Left heel- Packed with mesalt ribbon and covered with silver alginate then ABD pad, wrapped with kerlix then total contact applied. Leave intact until follow up appointment next week. Left heel wound culture taken today. Follow up visit: 1 Week on Monday 5/10/2021 at 8:15 am.    Care plan reviewed with patient. Patient verbalize understanding of the plan of care and contribute to goal setting.

## 2021-05-03 NOTE — PROGRESS NOTES
[address] [address]         Consult and Procedure Note      Olga Deng  MEDICAL RECORD NUMBER:  [id number]  AGE: 62 y.o. GENDER: male  : 1960  EPISODE DATE:  5/3/2021    Subjective:     No chief complaint on file. HISTORY of PRESENT ILLNESS HPI     Olga Deng is a 62 y.o. male Established patient who is well-known to our office who is being seen today for an ongoing left TMA wound. Patient is a type II diabetic with severe peripheral vascular disease. Patient was previously being seen at our [facility] office for this ongoing wound. Due to failed treatment with excisional debridements, patient was approved for Apligraf skin substitute.  Apligraf skin substitute was applied in office last week. Graft was secured with sutures, Steri-Strips and wound veil but wound graft was noted to be off the wound this morning. Patient states that when he sleeps he digs his feet into the couch resulting in friction shearing which more likely is causing his graft to continue to fall off. Because of this, we decided to put patient into a total contact cast to see if we can try to offload this wound in this fashion as well. Patient tolerated total contact casting well. Patient encouraged to continue to offload this is much as possible. Patient continues to deny pain to the periwound area. Patient still noted to have a small left heel wound that has scant serous drainage. We will continue to pack this wound with Mesalt and cover with alginate. Patient will follow back up with us again in 1 week. 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   Wound/Ulcer Pain Timing/Severity: none  Quality of pain: N/A  Severity:  0/10   Modifying Factors: None  Associated Signs/Symptoms: edema, erythema and drainage        PAST MEDICAL HISTORY        Diagnosis Date    Arthritis     CAD (coronary artery disease)     CKD (chronic kidney disease), stage II     Diabetes mellitus ([facility]     Hyperlipidemia     Hypertension     Liver disease     HEPITITIS AS A CHILD       PAST SURGICAL HISTORY    Past Surgical History:   Procedure Laterality Date    CARDIAC SURGERY  2019    triple bypass    CYST REMOVAL      RIGHT ANKLE     FOOT DEBRIDEMENT Left 2020    LEFT TRANSMETATARSAL AMPUTATION  FOOT INCISION AND DRAINAGE performed by Derian Amezcua DPM at [facility] Left 2020    LEFT WOUND DEBRIDEMENT WITH WOUND VAC APPLICATION performed by Derian Amezcua DPM at [address] Right     CYST REMOVED    FOOT SURGERY Left 2020    LEFT FOOT PREPARATION GRAFT SITE, HAVEST SPLIT THICKNESS SKIN GRAFT WITH APPLICATION, APPLICATION [facility] WOUND VAC performed by Derian Amezcua DPM at [address] TOE AMPUTATION Left 3/3/2020    I&D LEFT FOOT, TRANSMETATARSAL AMPUTATION performed by Derian Amezcua DPM at [facility]    Family History   Problem Relation Age of Onset    Diabetes Mother     High Blood Pressure Mother     Alzheimer's Disease Father          of, 80or 80years old   Pennie Lillie Heart Disease Father     High Blood Pressure Father     Cancer Neg Hx     Stroke Neg Hx        SOCIAL HISTORY    Social History     Tobacco Use    Smoking status: Former Smoker     Types: Cigarettes     Quit date:      Years since quittin.3    Smokeless tobacco: Never Used   Substance Use Topics    Alcohol use: Not Currently    Drug use: Never       ALLERGIES    No Known Allergies    MEDICATIONS    Current Outpatient Medications on File Prior to Encounter   Medication Sig Dispense Refill    clopidogrel (PLAVIX) 75 MG tablet Take 1 tablet by mouth daily 90 tablet 2    gabapentin (NEURONTIN) 600 MG tablet Take 1 tablet by mouth 2 times daily for 180 days.  180 tablet 1    metoprolol tartrate (LOPRESSOR) 25 MG tablet Take 1 tablet by mouth 2 times daily 180 tablet 1    atorvastatin (LIPITOR) 40 MG tablet Take 1 tablet by mouth nightly 90 tablet 1    insulin detemir (LEVEMIR FLEXTOUCH) 100 UNIT/ML injection pen Inject 44 Units into the skin every morning 5 pen 5    albuterol sulfate  (90 Base) MCG/ACT inhaler Inhale 2 puffs into the lungs every 6 hours as needed for Wheezing 1 Inhaler 3    lisinopril (PRINIVIL;ZESTRIL) 5 MG tablet Take 1 tablet by mouth daily 90 tablet 1    Polyethylene Glycol 3350 (MIRALAX PO) Take by mouth as needed      gabapentin (NEURONTIN) 300 MG capsule Take 1 capsule by mouth 3 times daily for 90 days. 90 capsule 2    aspirin 81 MG chewable tablet Take 1 tablet by mouth daily 30 tablet 3    insulin lispro (HUMALOG) 100 UNIT/ML injection vial Inject 0-12 Units into the skin 3 times daily (with meals) (Patient taking differently: Inject 0-12 Units into the skin as needed ) 1 vial 3    Multiple Vitamins-Minerals (MULTIVITAMIN PO) Take 1 tablet by mouth daily        No current facility-administered medications on file prior to encounter. REVIEW OF SYSTEMS    Pertinent items are noted in HPI. Objective: There were no vitals taken for this visit. Wt Readings from Last 3 Encounters:   03/15/21 (!) 310 lb (140.6 kg)   03/08/21 (!) 310 lb (140.6 kg)   11/25/20 290 lb (131.5 kg)       PHYSICAL EXAM    General Appearance: Alert and oriented to person, place   and time, well developed and well- nourished, in no acute distress. Vascular: DP and PT pulses are faintly palpable to the left lower extremity. Cap refill less than 5 seconds. Skin was warm to cool from proximal tibia to distal foot. Mild edema noted. Skin: Patient continues to have a neurotrophic ulcer to the plantar lateral aspect of the left foot. Wound bed is hyper granular nature with minimal slough and callus buildup. No probe to bone or proximal streaking noted. Scant serous drainage with no malodor. Patient has a previous TMA to the left lower extremity.   Patient also continues to have a small left posterior heel wound. Wound does not probe to bone and there is no proximal streaking. Scant serous drainage noted. No malodor. Neurovascular: Epicritic and protopathic sensations are grossly diminished to bilateral lower extremities. Wound 02/01/21 Foot Left (Active)   Wound Image   04/26/21 0852   Wound Etiology Non-Healing Surgical 04/26/21 0852   Dressing Status Intact; Old drainage noted 04/26/21 0852   Wound Cleansed Cleansed with saline 04/26/21 0852   Dressing/Treatment Alginate;Roll gauze;ABD;Coban/self-adherent bandages 04/26/21 0852   Offloading for Diabetic Foot Ulcers Yes (type) 04/26/21 0852   Wound Length (cm) 2.3 cm 04/26/21 0852   Wound Width (cm) 2.5 cm 04/26/21 0852   Wound Depth (cm) 0.3 cm 04/26/21 0852   Wound Surface Area (cm^2) 5.75 cm^2 04/26/21 0852   Change in Wound Size % (l*w) 41.33 04/26/21 0852   Wound Volume (cm^3) 1.72 cm^3 04/26/21 0852   Wound Healing % 56 04/26/21 0852   Undermining Starts ___ O'Clock 4 04/26/21 0852   Undermining Ends___ O'Clock 12 04/26/21 0852   Undermining Maxium Distance (cm) 0.3 04/26/21 0852   Wound Assessment Granulation tissue;Slough 04/26/21 0852   Drainage Amount Moderate 04/26/21 0852   Drainage Description Serosanguinous; Yellow 04/26/21 0852   Odor None 04/26/21 0852   Sumaya-wound Assessment Intact; Maceration;Dry/flaky 04/26/21 4458   Margins Attached edges; Unattached edges 04/26/21 0852   Wound Thickness Description not for Pressure Injury Full thickness 04/26/21 0852   Number of days: 90       Wound 02/01/21 Heel Left (Active)   Wound Image   04/26/21 0852   Wound Etiology Pressure Unstageable 04/26/21 0852   Dressing Status Intact; Old drainage noted 04/26/21 0852   Wound Cleansed Cleansed with saline 04/26/21 0852   Dressing/Treatment Collagen;Alginate with Ag;Roll gauze;Coban/self-adherent bandages 04/26/21 0852   Offloading for Diabetic Foot Ulcers Yes (type) 04/26/21 0852   Wound Length (cm) 0.3 cm 04/26/21 0852   Wound Width patient and signed by patient or POA. Discharge Instructions       Visit Discharge/Physician Orders:  - Will check into graft options for you. - Silver nitrate to left heel   - Debridement to left foot wound. Wound Location: left foot    Dressing orders:     1) Gather wound care supplies and arrange on clean table. 2) Wash your hands with soap and water or use alcohol based hand  for 20 seconds (sing \"Happy Birthday\" twice). 3) Cleanse wounds with normal saline or wound cleanser and gauze. Pat dry with clean gauze. 4) left foot/ left heel- Remove old unna boots to left lower legs. Wash legs with warm soapy water. Pat dry. Cleanse wound with normal saline or wound cleanser and gauze. Pat dry with clean gauze. Apply collagen the alginate to wound bed. Apply unna boots , ABD then kerlix and coban to left lower legs from base of toes to 1-2 inches below bend of knee. Change in wound clinic weekly. If unna boot becomes too tight- raise/elevate legs above the level of the heart for 15-20 minutes if swelling does not go down then carefully cut off unna boot and call clinic or go to local ER or family physician. If unna boot becomes wet or starts to roll down then carefully remove unna boot and call clinic. Keep all dressings clean & dry. Do not shower, take baths or get wound wet, unless otherwise instructed by your Wound Care doctor. Follow up visit:   1 Week on Monday March 15th. Keep next scheduled appointment. Please give 24 hour notice if unable to keep appointment. [phone]    If you experience any of the following, please call the Wound Care Service during business hours: Monday through Friday 8:00 am - 4:30 pm  ([phone]).    *Increase in pain   *Temperature over 101   *Increase in drainage from your wound or a foul odor   *Uncontrolled swelling   *Need for compression bandage changes due to slippage, breakthrough drainage    If you need medical attention outside of business hours, please contact your Primary Care Doctor or go to the nearest emergency room.          Electronically signed by EYAD Marmolejo CNP on 5/3/2021 at 8:28 AM

## 2021-05-04 LAB
AEROBIC CULTURE: NORMAL
GRAM STAIN RESULT: NORMAL

## 2021-05-10 ENCOUNTER — HOSPITAL ENCOUNTER (OUTPATIENT)
Dept: WOUND CARE | Age: 61
Discharge: HOME | End: 2021-05-10
Payer: COMMERCIAL

## 2021-05-10 VITALS
DIASTOLIC BLOOD PRESSURE: 77 MMHG | TEMPERATURE: 97.2 F | HEART RATE: 81 BPM | OXYGEN SATURATION: 98 % | SYSTOLIC BLOOD PRESSURE: 150 MMHG | RESPIRATION RATE: 18 BRPM

## 2021-05-10 PROCEDURE — 29445 APPL RIGID TOT CNTC LEG CAST: CPT

## 2021-05-10 PROCEDURE — 17250 CHEM CAUT OF GRANLTJ TISSUE: CPT

## 2021-05-10 PROCEDURE — 6370000000 HC RX 637 (ALT 250 FOR IP): Performed by: NURSE PRACTITIONER

## 2021-05-10 RX ORDER — DOXYCYCLINE HYCLATE 100 MG
100 TABLET ORAL 2 TIMES DAILY
Qty: 28 TABLET | Refills: 0 | Status: SHIPPED | OUTPATIENT
Start: 2021-05-10 | End: 2021-05-24

## 2021-05-10 RX ADMIN — SILVER NITRATE APPLICATORS 2 EACH: 25; 75 STICK TOPICAL at 08:48

## 2021-05-10 NOTE — PLAN OF CARE
Problem: Wound:  Goal: Will show signs of wound healing; wound closure and no evidence of infection  Description: Will show signs of wound healing; wound closure and no evidence of infection  Outcome: Ongoing   Pt. Seen today for left foot wound see AVS for orders. Follow up 5/13/21 for cast change and next Monday 5/17/21. Care plan reviewed with patient. Patient verbalize understanding of the plan of care and contribute to goal setting.

## 2021-05-10 NOTE — PROGRESS NOTES
[id number] [place] [address]         Consult and Procedure Note      Luis Nance  MEDICAL RECORD NUMBER:  [id number]  AGE: 62 y.o. GENDER: male  : 1960  EPISODE DATE:  5/10/2021    Subjective:     Chief Complaint   Patient presents with    Wound Check     left foot, heel         HISTORY of PRESENT ILLNESS HPI     Luis Nance is a 62 y.o. male Established patient who is well-known to our office who is being seen today for an ongoing left TMA wound. Patient is a type II diabetic with severe peripheral vascular disease. Patient was previously being seen at our [facility] office for this ongoing wound. Due to failed treatment with excisional debridements, patient was approved for Apligraf skin substitute.  Apligraf skin substitute was applied in office to weeks ago. Graft was secured with sutures, Steri-Strips and wound veil but wound graft was noted to be off the wound this morning. Patient states that when he sleeps he digs his feet into the couch resulting in friction shearing which more likely is causing his graft to continue to fall off. Because of this, we decided to put patient into into a total contact cast which he was in this previous week as well. Patient tolerated the casting well. Patient encouraged to continue to offload this is much as possible. Patient continues to deny pain to the periwound area. Patient still noted to have a small left heel wound that has scant serous drainage. Culture was taken of this wound last week which came back with a mixed growth but no sensitivity was performed. We will get patient started on doxycycline 1 mg twice daily for the next 2 weeks for broad-spectrum coverage. We will continue to pack this wound with Mesalt and cover with alginate. Patient will follow back up with us again in 1 week. 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   Wound/Ulcer Pain Timing/Severity: none  Quality of pain: N/A  Severity:  0/10 Modifying Factors: None  Associated Signs/Symptoms: edema, erythema and drainage        PAST MEDICAL HISTORY        Diagnosis Date    Arthritis     CAD (coronary artery disease)     CKD (chronic kidney disease), stage II     Diabetes mellitus ([facility]     Hyperlipidemia     Hypertension     Liver disease     HEPITITIS AS A CHILD       PAST SURGICAL HISTORY    Past Surgical History:   Procedure Laterality Date    CARDIAC SURGERY  2019    triple bypass    CYST REMOVAL      RIGHT ANKLE     FOOT DEBRIDEMENT Left 2020    LEFT TRANSMETATARSAL AMPUTATION  FOOT INCISION AND DRAINAGE performed by Alice Titus DPM at [address] Left 2020    LEFT WOUND DEBRIDEMENT WITH WOUND VAC APPLICATION performed by Alice Titus DPM at [address] [place] [address] Right     CYST REMOVED    FOOT SURGERY Left 2020    LEFT FOOT PREPARATION GRAFT SITE, HAVEST SPLIT THICKNESS SKIN GRAFT WITH APPLICATION, APPLICATION [employer] WOUND VAC performed by Alice Titus DPM at [address] Left 3/3/2020    I&D LEFT FOOT, TRANSMETATARSAL AMPUTATION performed by Alice Titus DPM at [address]  [facility]    Family History   Problem Relation Age of Onset    Diabetes Mother     High Blood Pressure Mother     Alzheimer's Disease Father          of, 80or 80years old   Patsynichelle [facility] Heart Disease Father     High Blood Pressure Father     Cancer Neg Hx     Stroke Neg Hx        SOCIAL HISTORY    Social History     Tobacco Use    Smoking status: Former Smoker     Types: Cigarettes     Quit date:      Years since quittin.3    Smokeless tobacco: Never Used   Substance Use Topics    Alcohol use: Not Currently    Drug use: Never       ALLERGIES    No Known Allergies    MEDICATIONS    Current Outpatient Medications on File Prior to Encounter   Medication Sig Dispense Refill    clopidogrel (PLAVIX) 75 MG tablet Take 1 tablet by mouth daily 90 tablet 2    gabapentin (NEURONTIN) 600 MG tablet Take 1 tablet by mouth 2 times daily for 180 days. 180 tablet 1    metoprolol tartrate (LOPRESSOR) 25 MG tablet Take 1 tablet by mouth 2 times daily 180 tablet 1    atorvastatin (LIPITOR) 40 MG tablet Take 1 tablet by mouth nightly 90 tablet 1    insulin detemir (LEVEMIR FLEXTOUCH) 100 UNIT/ML injection pen Inject 44 Units into the skin every morning 5 pen 5    albuterol sulfate  (90 Base) MCG/ACT inhaler Inhale 2 puffs into the lungs every 6 hours as needed for Wheezing 1 Inhaler 3    lisinopril (PRINIVIL;ZESTRIL) 5 MG tablet Take 1 tablet by mouth daily 90 tablet 1    Polyethylene Glycol 3350 (MIRALAX PO) Take by mouth as needed      aspirin 81 MG chewable tablet Take 1 tablet by mouth daily 30 tablet 3    insulin lispro (HUMALOG) 100 UNIT/ML injection vial Inject 0-12 Units into the skin 3 times daily (with meals) (Patient taking differently: Inject 0-12 Units into the skin as needed ) 1 vial 3    Multiple Vitamins-Minerals (MULTIVITAMIN PO) Take 1 tablet by mouth daily        No current facility-administered medications on file prior to encounter. REVIEW OF SYSTEMS    Pertinent items are noted in HPI. Objective:      BP (!) 150/77   Pulse 81   Temp 97.2 °F (36.2 °C) (Infrared)   Resp 18   SpO2 98%     Wt Readings from Last 3 Encounters:   03/15/21 (!) 310 lb (140.6 kg)   03/08/21 (!) 310 lb (140.6 kg)   11/25/20 290 lb (131.5 kg)       PHYSICAL EXAM    General Appearance: Alert and oriented to person, place   and time, well developed and well- nourished, in no acute distress. Vascular: DP and PT pulses are faintly palpable to the left lower extremity. Cap refill less than 5 seconds. Skin was warm to cool from proximal tibia to distal foot. Mild edema noted. Skin: Patient continues to have a neurotrophic ulcer to the plantar lateral aspect of the left foot. Wound bed is hyper granular nature with minimal slough and callus buildup.   No probe to bone or proximal streaking noted. Scant serous drainage with no malodor. Patient has a previous TMA to the left lower extremity. Patient also continues to have a small left posterior heel wound. Wound does not probe to bone and there is no proximal streaking. Scant serous drainage noted. No malodor. Neurovascular: Epicritic and protopathic sensations are grossly diminished to bilateral lower extremities. Wound 02/01/21 Foot Left (Active)   Wound Image   05/13/21 1418   Wound Etiology Non-Healing Surgical 05/13/21 1418   Dressing Status Intact; Old drainage noted 05/13/21 1418   Wound Cleansed Cleansed with saline 05/13/21 1418   Dressing/Treatment Alginate with Ag;ABD;Roll gauze; Cast padding;Cast 05/13/21 1418   Offloading for Diabetic Foot Ulcers Yes (type); Total contact cast 05/13/21 1418   Wound Length (cm) 2 cm 05/13/21 1418   Wound Width (cm) 2.5 cm 05/13/21 1418   Wound Depth (cm) 0.2 cm 05/13/21 1418   Wound Surface Area (cm^2) 5 cm^2 05/13/21 1418   Change in Wound Size % (l*w) 48.98 05/13/21 1418   Wound Volume (cm^3) 1 cm^3 05/13/21 1418   Wound Healing % 74 05/13/21 1418   Undermining Starts ___ O'Clock 0 05/10/21 0828   Undermining Ends___ O'Clock 0 05/10/21 0828   Undermining Maxium Distance (cm) 0 05/10/21 0828   Wound Assessment Slough;Granulation tissue 05/13/21 1418   Drainage Amount Large 05/13/21 1418   Drainage Description Green;Serosanguinous 05/13/21 1418   Odor None 05/13/21 1418   Sumaya-wound Assessment Maceration;Dry/flaky; Hyperkeratosis (callous) 05/13/21 1418   Margins Attached edges 05/13/21 1418   Wound Thickness Description not for Pressure Injury Full thickness 05/13/21 1418   Number of days: 101       Wound 02/01/21 Heel Left (Active)   Wound Image   05/13/21 1418   Wound Etiology Pressure Unstageable 05/13/21 1418   Dressing Status Intact; Old drainage noted 05/10/21 0828   Wound Cleansed Cleansed with saline 05/13/21 1418   Dressing/Treatment Alginate with Ag;ABD;Roll gauze; Cast padding;Cast 05/13/21 1418   Offloading for Diabetic Foot Ulcers Total contact cast;Yes (type) 05/13/21 1418   Wound Length (cm) 1.2 cm 05/13/21 1418   Wound Width (cm) 1 cm 05/13/21 1418   Wound Depth (cm) 0 cm 05/13/21 1418   Wound Surface Area (cm^2) 1.2 cm^2 05/13/21 1418   Change in Wound Size % (l*w) -300 05/13/21 1418   Wound Volume (cm^3) 0 cm^3 05/13/21 1418   Wound Healing % 100 05/13/21 1418   Undermining Starts ___ O'Clock 0 04/12/21 0853   Undermining Ends___ O'Clock 0 04/12/21 0853   Undermining Maxium Distance (cm) 0 04/12/21 0853   Wound Assessment Purple/maroon;Fluid filled blister 05/13/21 1418   Drainage Amount None 05/13/21 1418   Drainage Description Yellow; Thick 05/03/21 0837   Odor None 05/03/21 0837   Sumaya-wound Assessment Dry/flaky; Hyperkeratosis (callous); Blanchable erythema 05/03/21 0837   Margins Attached edges 05/03/21 0837   Wound Thickness Description not for Pressure Injury Full thickness 05/03/21 0837   Number of days: 101       LABS       CBC:   Lab Results   Component Value Date    WBC 14.1 11/25/2020    HGB 13.6 11/25/2020    HCT 40.7 11/25/2020    MCV 85.0 11/25/2020     11/25/2020     BMP:   Lab Results   Component Value Date     11/25/2020    K 4.1 11/25/2020     11/25/2020    CO2 24 11/25/2020    BUN 12 11/25/2020    CREATININE 0.9 11/25/2020     PT/INR:   Lab Results   Component Value Date    INR 1.02 11/25/2020     Prealbumin: No results found for: PREALBUMIN  Albumin:  Lab Results   Component Value Date    LABALBU 3.9 11/25/2020     Sed Rate:  Lab Results   Component Value Date    SEDRATE 130 04/17/2020     CRP:   Lab Results   Component Value Date    CRP 14.47 04/17/2020     Micro:   Lab Results   Component Value Date    BC No growth-preliminary No growth  04/21/2020      Hemoglobin A1C:   Lab Results   Component Value Date    LABA1C 6.2 04/17/2020       Assessment:     Ulcer Identification:  Ulcer Type: venous  Contributing Factors: venous stasis    Wound: Dehiscence amputation stump    Depth of Diabetic/Pressure/Non Pressure Ulcers or Wound:  Wound, partial thickness    Patient Active Problem List   Diagnosis Code    Gangrene of toe of left foot ([facility] I96    CAD (coronary artery disease) I25.10    Type 2 diabetes mellitus with insulin therapy ([facility] E11.9, Z79.4    Hyperlipidemia E78.5    Hypertension I10    Charcot's joint, right ankle and foot M14.671    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    Sepsis ([facility] A41.9    Acute left-sided low back pain with bilateral sciatica M54.42, M54.41    S/P transmetatarsal amputation of foot, left ([facility]) Z89.432    Leukocytosis D72.829    Wound dehiscence, surgical, initial encounter T81.31XA    Postoperative wound infection C48.54TR    Metabolic acidosis U37.0    Hx of CABG Z95.1    Lumbar stenosis without neurogenic claudication M48.061    CKD (chronic kidney disease), stage II N18.2    Normocytic anemia D64.9    Morbid obesity ([facility]) E66.01    Debility R53.81    Carotid stenosis, asymptomatic, bilateral I65.23    Hypokalemia E87.6    Nonhealing ulcer of left lower extremity ([facility] L97.929    Bleeding R58    Wound, open T14. 8XXA    SOB (shortness of breath) on exertion R06.02       Procedure Note    Chemical Cautery - 29714    Performed by: EYAD Durham CNP  Consent obtained: Yes  Time out taken: Yes  Tissue Type: Hypergranulation  Method: silver nitrate    Location of Chemical Cautery:   Wound/Ulcer #1     Wound 02/01/21 Foot Left (Active)   Wound Image   05/13/21 1418   Wound Etiology Non-Healing Surgical 05/13/21 1418   Dressing Status Intact; Old drainage noted 05/13/21 1418   Wound Cleansed Cleansed with saline 05/13/21 1418   Dressing/Treatment Alginate with Ag;ABD;Roll gauze; Cast padding;Cast 05/13/21 1418   Offloading for Diabetic Foot Ulcers Yes (type); Total contact cast 05/13/21 1418   Wound Length (cm) 2 cm 05/13/21 1418   Wound Width (cm) 2.5 cm 05/13/21 1418   Wound Depth (cm) 0.2 cm 05/13/21 1418   Wound Surface Area (cm^2) 5 cm^2 05/13/21 1418   Change in Wound Size % (l*w) 48.98 05/13/21 1418   Wound Volume (cm^3) 1 cm^3 05/13/21 1418   Wound Healing % 74 05/13/21 1418   Undermining Starts ___ O'Clock 0 05/10/21 0828   Undermining Ends___ O'Clock 0 05/10/21 0828   Undermining Maxium Distance (cm) 0 05/10/21 0828   Wound Assessment Slough;Granulation tissue 05/13/21 1418   Drainage Amount Large 05/13/21 1418   Drainage Description Green;Serosanguinous 05/13/21 1418   Odor None 05/13/21 1418   Sumaya-wound Assessment Maceration;Dry/flaky; Hyperkeratosis (callous) 05/13/21 1418   Margins Attached edges 05/13/21 1418   Wound Thickness Description not for Pressure Injury Full thickness 05/13/21 1418   Number of days: 101       Wound 02/01/21 Heel Left (Active)   Wound Image   05/13/21 1418   Wound Etiology Pressure Unstageable 05/13/21 1418   Dressing Status Intact; Old drainage noted 05/10/21 0828   Wound Cleansed Cleansed with saline 05/13/21 1418   Dressing/Treatment Alginate with Ag;ABD;Roll gauze; Cast padding;Cast 05/13/21 1418   Offloading for Diabetic Foot Ulcers Total contact cast;Yes (type) 05/13/21 1418   Wound Length (cm) 1.2 cm 05/13/21 1418   Wound Width (cm) 1 cm 05/13/21 1418   Wound Depth (cm) 0 cm 05/13/21 1418   Wound Surface Area (cm^2) 1.2 cm^2 05/13/21 1418   Change in Wound Size % (l*w) -300 05/13/21 1418   Wound Volume (cm^3) 0 cm^3 05/13/21 1418   Wound Healing % 100 05/13/21 1418   Undermining Starts ___ O'Clock 0 04/12/21 0853   Undermining Ends___ O'Clock 0 04/12/21 0853   Undermining Maxium Distance (cm) 0 04/12/21 0853   Wound Assessment Purple/maroon;Fluid filled blister 05/13/21 1418   Drainage Amount None 05/13/21 1418   Drainage Description Yellow; Thick 05/03/21 0837   Odor None 05/03/21 0837   Sumaya-wound Assessment Dry/flaky; Hyperkeratosis (callous); Blanchable erythema 05/03/21 0837   Margins Attached edges 05/03/21 0837   Wound Thickness Description not for Pressure Injury Full thickness 05/03/21 0837   Number of days: 101     Incision 07/20/20 Left;Plantar (Active)   Number of days: 297       Incision 08/14/20 Right (Active)   Number of days: 272     Procedural Pain: 0  / 10   Post Procedural Pain: 0 / 10   Response to treatment: Well tolerated by patient. Total Contact Cast - [id number]  Left lower extremity was well-padded and a stockinette was used to help hold padding in place. After the left lower extremity was properly padded a plaster style total contact cast was applied to the left lower extremity for offloading purposes. Patient tolerated casting well. Plan:     Patient examined and evaluated today. Wound was treated with silver nitrate today. Patient continues to have chronic pressure to this existing wound to the distal aspect of this left TMA site. We are going to hold off on graft at this point. Because of this chronic pressure and friction shearing to the wound patient was placed into a total contact cast for offloading purposes. Left heel wound was packed with Mesalt and covered with alginate. Patient's left heel culture from last week came back mixed growth consisting of Enterococcus species, Viridans streptococcus species, and swarming Proteus species, there is no sensitivity ran. Patient was started on doxycycline 100 g twice daily x14 days for broad spectrum coverage. Patient will follow back up with us again in 1 week. Treatment:   No orders of the defined types were placed in this encounter. Antibiotics: No  Follow up: 1 week  Please see attached Discharge Instructions  Written patient dismissal instructions given to patient and signed by patient or POA. Discharge Instructions       Visit Discharge/Physician Orders:  - Will check into graft options for you. - Silver nitrate to left heel   - Debridement to left foot wound.      Wound Location: left foot    Dressing orders:     1) Gather wound care supplies and arrange on clean table. 2) Wash your hands with soap and water or use alcohol based hand  for 20 seconds (sing \"Happy Birthday\" twice). 3) Cleanse wounds with normal saline or wound cleanser and gauze. Pat dry with clean gauze. 4) left foot/ left heel- Remove old unna boots to left lower legs. Wash legs with warm soapy water. Pat dry. Cleanse wound with normal saline or wound cleanser and gauze. Pat dry with clean gauze. Apply collagen the alginate to wound bed. Apply unna boots , ABD then kerlix and coban to left lower legs from base of toes to 1-2 inches below bend of knee. Change in wound clinic weekly. If unna boot becomes too tight- raise/elevate legs above the level of the heart for 15-20 minutes if swelling does not go down then carefully cut off unna boot and call clinic or go to local ER or family physician. If unna boot becomes wet or starts to roll down then carefully remove unna boot and call clinic. Keep all dressings clean & dry. Do not shower, take baths or get wound wet, unless otherwise instructed by your Wound Care doctor. Follow up visit:   1 Week on Monday March 15th. Keep next scheduled appointment. Please give 24 hour notice if unable to keep appointment. [phone]    If you experience any of the following, please call the Wound Care Service during business hours: Monday through Friday 8:00 am - 4:30 pm  ([phone]). *Increase in pain   *Temperature over 101   *Increase in drainage from your wound or a foul odor   *Uncontrolled swelling   *Need for compression bandage changes due to slippage, breakthrough drainage    If you need medical attention outside of business hours, please contact your Primary Care Doctor or go to the nearest emergency room.          Electronically signed by EYAD James CNP on 5/13/2021 at 3:16 PM

## 2021-05-13 ENCOUNTER — HOSPITAL ENCOUNTER (OUTPATIENT)
Dept: WOUND CARE | Age: 61
Discharge: HOME | End: 2021-05-13
Payer: COMMERCIAL

## 2021-05-13 VITALS
HEART RATE: 83 BPM | TEMPERATURE: 98.1 F | OXYGEN SATURATION: 98 % | RESPIRATION RATE: 18 BRPM | SYSTOLIC BLOOD PRESSURE: 134 MMHG | DIASTOLIC BLOOD PRESSURE: 65 MMHG

## 2021-05-13 PROCEDURE — 29445 APPL RIGID TOT CNTC LEG CAST: CPT

## 2021-05-13 NOTE — PLAN OF CARE
Problem: Wound:  Goal: Will show signs of wound healing; wound closure and no evidence of infection  Description: Will show signs of wound healing; wound closure and no evidence of infection  Outcome: Ongoing     Patient presents to wound clinic for left foot wound. No s/s of infection noted. See AVS for discharge instructions. Follow up visit: Monday 5/17/2021 at 8:45 am     Care plan reviewed with patient. Patient verbalize understanding of the plan of care and contribute to goal setting.

## 2021-05-13 NOTE — PROGRESS NOTES
[address] [place] [address]         Consult and Procedure Note      Yvonne Marsh  MEDICAL RECORD NUMBER:  [id number]  AGE: 62 y.o. GENDER: male  : 1960  EPISODE DATE:  2021    Subjective:     Chief Complaint   Patient presents with    Wound Check     left foot         HISTORY of PRESENT ILLNESS HPI     Yvonne Marsh is a 62 y.o. male Established patient who is well-known to our office who is being seen today for an ongoing left TMA wound. Patient is a type II diabetic with severe peripheral vascular disease. Patient was previously being seen at our [facility] office for this ongoing wound. Due to failed treatment with excisional debridements, patient was approved for Apligraf skin substitute. 4 of the 5 Apligraf's have been used thus far. Unfortunately, patient continues to have chronic pressure and rubbing to this site which she is essentially rubbed off all of the graft that we have put on previously. The last graft was sutured in place and still was ripped off within 1 week. Because of this, patient has been in a total contact cast for the last week and a half to try and help offload this wound. Upon assessment today wound remains hyper granular nature. Wound is measuring smaller than last week. There is very little serous drainage noted today. No malodor noted. Patient was started on doxycycline 2 days ago for ongoing drainage from this left heel wound. There was some mucopurulent drainage noted again at today's visit. Encourage patient to continue taking his doxycycline as prescribed for the next 2 weeks. Patient was placed back into a total contact cast at today's visit. Patient will follow back up with us again on Monday for a cast change. No medications were needed today. Patient was encouraged to try and offload this left lower extremity much as possible. Patient follow back up with us again this Monday.         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    Skin: Patient continues to have a neurotrophic ulcer to the plantar lateral aspect of the left foot. Wound bed is hyper granular nature with minimal slough and callus buildup. No probe to bone or proximal streaking noted. Scant serous drainage with no malodor. Patient has a previous TMA to the left lower extremity. Patient also continues to have a small left posterior heel wound. Wound does not probe to bone and there is no proximal streaking. There was some mucopurulent drainage noted today when wound was expressed. Patient is current on doxycycline which has been taking for approximate 1 day. Neurovascular: Epicritic and protopathic sensations are grossly diminished to bilateral lower extremities. Wound 02/01/21 Foot Left (Active)   Wound Image   05/13/21 1418   Wound Etiology Non-Healing Surgical 05/13/21 1418   Dressing Status Intact; Old drainage noted 05/13/21 1418   Wound Cleansed Cleansed with saline 05/13/21 1418   Dressing/Treatment Alginate with Ag;ABD;Roll gauze; Cast padding;Cast 05/13/21 1418   Offloading for Diabetic Foot Ulcers Yes (type); Total contact cast 05/13/21 1418   Wound Length (cm) 2 cm 05/13/21 1418   Wound Width (cm) 2.5 cm 05/13/21 1418   Wound Depth (cm) 0.2 cm 05/13/21 1418   Wound Surface Area (cm^2) 5 cm^2 05/13/21 1418   Change in Wound Size % (l*w) 48.98 05/13/21 1418   Wound Volume (cm^3) 1 cm^3 05/13/21 1418   Wound Healing % 74 05/13/21 1418   Undermining Starts ___ O'Clock 0 05/10/21 0828   Undermining Ends___ O'Clock 0 05/10/21 0828   Undermining Maxium Distance (cm) 0 05/10/21 0828   Wound Assessment Slough;Granulation tissue 05/13/21 1418   Drainage Amount Large 05/13/21 1418   Drainage Description Green;Serosanguinous 05/13/21 1418   Odor None 05/13/21 1418   Sumaya-wound Assessment Maceration;Dry/flaky; Hyperkeratosis (callous) 05/13/21 1418   Margins Attached edges 05/13/21 1418   Wound Thickness Description not for Pressure Injury Full thickness 05/13/21 1418   Number of days: 101       Wound 02/01/21 Heel Left (Active)   Wound Image   05/13/21 1418   Wound Etiology Pressure Unstageable 05/13/21 1418   Dressing Status Intact; Old drainage noted 05/10/21 0828   Wound Cleansed Cleansed with saline 05/13/21 1418   Dressing/Treatment Alginate with Ag;ABD;Roll gauze; Cast padding;Cast 05/13/21 1418   Offloading for Diabetic Foot Ulcers Total contact cast;Yes (type) 05/13/21 1418   Wound Length (cm) 1.2 cm 05/13/21 1418   Wound Width (cm) 1 cm 05/13/21 1418   Wound Depth (cm) 0 cm 05/13/21 1418   Wound Surface Area (cm^2) 1.2 cm^2 05/13/21 1418   Change in Wound Size % (l*w) -300 05/13/21 1418   Wound Volume (cm^3) 0 cm^3 05/13/21 1418   Wound Healing % 100 05/13/21 1418   Undermining Starts ___ O'Clock 0 04/12/21 0853   Undermining Ends___ O'Clock 0 04/12/21 0853   Undermining Maxium Distance (cm) 0 04/12/21 0853   Wound Assessment Purple/maroon;Fluid filled blister 05/13/21 1418   Drainage Amount None 05/13/21 1418   Drainage Description Yellow; Thick 05/03/21 0837   Odor None 05/03/21 0837   Sumaya-wound Assessment Dry/flaky; Hyperkeratosis (callous); Blanchable erythema 05/03/21 0837   Margins Attached edges 05/03/21 0837   Wound Thickness Description not for Pressure Injury Full thickness 05/03/21 0837   Number of days: 101       LABS       CBC:   Lab Results   Component Value Date    WBC 14.1 11/25/2020    HGB 13.6 11/25/2020    HCT 40.7 11/25/2020    MCV 85.0 11/25/2020     11/25/2020     BMP:   Lab Results   Component Value Date     11/25/2020    K 4.1 11/25/2020     11/25/2020    CO2 24 11/25/2020    BUN 12 11/25/2020    CREATININE 0.9 11/25/2020     PT/INR:   Lab Results   Component Value Date    INR 1.02 11/25/2020     Prealbumin: No results found for: PREALBUMIN  Albumin:  Lab Results   Component Value Date    LABALBU 3.9 11/25/2020     Sed Rate:  Lab Results   Component Value Date    SEDRATE 130 04/17/2020     CRP:   Lab Results Component Value Date    CRP 14.47 04/17/2020     Micro:   Lab Results   Component Value Date    BC No growth-preliminary No growth  04/21/2020      Hemoglobin A1C:   Lab Results   Component Value Date    LABA1C 6.2 04/17/2020       Assessment:     Ulcer Identification:  Ulcer Type: venous  Contributing Factors: venous stasis    Wound: Dehiscence amputation stump    Depth of Diabetic/Pressure/Non Pressure Ulcers or Wound:  Wound, partial thickness    Patient Active Problem List   Diagnosis Code    Gangrene of toe of left foot ([facility] I96    CAD (coronary artery disease) I25.10    Type 2 diabetes mellitus with insulin therapy ([facility] E11.9, Z79.4    Hyperlipidemia E78.5    Hypertension I10    Charcot's joint, right ankle and foot M14.671    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    Sepsis ([facility] A41.9    Acute left-sided low back pain with bilateral sciatica M54.42, M54.41    S/P transmetatarsal amputation of foot, left ([facility]) Z89.432    Leukocytosis D72.829    Wound dehiscence, surgical, initial encounter T81.31XA    Postoperative wound infection C61.88FU    Metabolic acidosis A05.6    Hx of CABG Z95.1    Lumbar stenosis without neurogenic claudication M48.061    CKD (chronic kidney disease), stage II N18.2    Normocytic anemia D64.9    Morbid obesity ([facility]) E66.01    Debility R53.81    Carotid stenosis, asymptomatic, bilateral I65.23    Hypokalemia E87.6    Nonhealing ulcer of left lower extremity ([facility] L97.929    Bleeding R58    Wound, open T14. 8XXA    SOB (shortness of breath) on exertion R06.02       Procedure Note    Total Contact Cast - [id number]  Left lower extremity was well-padded and a stockinette was used to help hold padding in place. After the left lower extremity was properly padded a plaster style total contact cast was applied to the left lower extremity for offloading purposes. Patient tolerated casting well.       Plan:     Patient examined and evaluated dry.    Do not shower, take baths or get wound wet, unless otherwise instructed by your Wound Care doctor. Follow up visit:   1 Week on Monday March 15th. Keep next scheduled appointment. Please give 24 hour notice if unable to keep appointment. [phone]    If you experience any of the following, please call the Wound Care Service during business hours: Monday through Friday 8:00 am - 4:30 pm  ([phone]). *Increase in pain   *Temperature over 101   *Increase in drainage from your wound or a foul odor   *Uncontrolled swelling   *Need for compression bandage changes due to slippage, breakthrough drainage    If you need medical attention outside of business hours, please contact your Primary Care Doctor or go to the nearest emergency room.          Electronically signed by EYAD Bui CNP on 5/13/2021 at 3:29 PM

## 2021-05-17 ENCOUNTER — HOSPITAL ENCOUNTER (OUTPATIENT)
Dept: WOUND CARE | Age: 61
Discharge: HOME | End: 2021-05-17
Payer: COMMERCIAL

## 2021-05-17 VITALS
WEIGHT: 310 LBS | TEMPERATURE: 96 F | BODY MASS INDEX: 38.54 KG/M2 | DIASTOLIC BLOOD PRESSURE: 83 MMHG | SYSTOLIC BLOOD PRESSURE: 146 MMHG | HEART RATE: 72 BPM | HEIGHT: 75 IN | OXYGEN SATURATION: 97 % | RESPIRATION RATE: 16 BRPM

## 2021-05-17 DIAGNOSIS — E11.9 TYPE 2 DIABETES MELLITUS WITH INSULIN THERAPY (HCC): ICD-10-CM

## 2021-05-17 DIAGNOSIS — Z79.4 TYPE 2 DIABETES MELLITUS WITH INSULIN THERAPY (HCC): ICD-10-CM

## 2021-05-17 DIAGNOSIS — Z89.432 S/P TRANSMETATARSAL AMPUTATION OF FOOT, LEFT (HCC): ICD-10-CM

## 2021-05-17 DIAGNOSIS — L97.929 NON-HEALING ULCER OF LOWER EXTREMITY, LEFT, WITH UNSPECIFIED SEVERITY (HCC): Primary | ICD-10-CM

## 2021-05-17 PROCEDURE — 11055 PARING/CUTG B9 HYPRKER LES 1: CPT

## 2021-05-17 PROCEDURE — 6370000000 HC RX 637 (ALT 250 FOR IP): Performed by: NURSE PRACTITIONER

## 2021-05-17 PROCEDURE — 29445 APPL RIGID TOT CNTC LEG CAST: CPT

## 2021-05-17 RX ORDER — GINSENG 100 MG
CAPSULE ORAL ONCE
Status: CANCELLED | OUTPATIENT
Start: 2021-05-17 | End: 2021-05-17

## 2021-05-17 RX ORDER — LIDOCAINE 40 MG/G
CREAM TOPICAL ONCE
Status: CANCELLED | OUTPATIENT
Start: 2021-05-17 | End: 2021-05-17

## 2021-05-17 RX ORDER — LIDOCAINE HYDROCHLORIDE 20 MG/ML
JELLY TOPICAL ONCE
Status: CANCELLED | OUTPATIENT
Start: 2021-05-17 | End: 2021-05-17

## 2021-05-17 RX ORDER — LIDOCAINE HYDROCHLORIDE 40 MG/ML
SOLUTION TOPICAL ONCE
Status: CANCELLED | OUTPATIENT
Start: 2021-05-17 | End: 2021-05-17

## 2021-05-17 RX ORDER — BETAMETHASONE DIPROPIONATE 0.05 %
OINTMENT (GRAM) TOPICAL ONCE
Status: CANCELLED | OUTPATIENT
Start: 2021-05-17 | End: 2021-05-17

## 2021-05-17 RX ORDER — BACITRACIN, NEOMYCIN, POLYMYXIN B 400; 3.5; 5 [USP'U]/G; MG/G; [USP'U]/G
OINTMENT TOPICAL ONCE
Status: CANCELLED | OUTPATIENT
Start: 2021-05-17 | End: 2021-05-17

## 2021-05-17 RX ORDER — LIDOCAINE 50 MG/G
OINTMENT TOPICAL ONCE
Status: CANCELLED | OUTPATIENT
Start: 2021-05-17 | End: 2021-05-17

## 2021-05-17 RX ORDER — BACITRACIN ZINC AND POLYMYXIN B SULFATE 500; 1000 [USP'U]/G; [USP'U]/G
OINTMENT TOPICAL ONCE
Status: CANCELLED | OUTPATIENT
Start: 2021-05-17 | End: 2021-05-17

## 2021-05-17 RX ORDER — CLOBETASOL PROPIONATE 0.5 MG/G
OINTMENT TOPICAL ONCE
Status: CANCELLED | OUTPATIENT
Start: 2021-05-17 | End: 2021-05-17

## 2021-05-17 RX ORDER — GENTAMICIN SULFATE 1 MG/G
OINTMENT TOPICAL ONCE
Status: CANCELLED | OUTPATIENT
Start: 2021-05-17 | End: 2021-05-17

## 2021-05-17 RX ADMIN — SILVER NITRATE APPLICATORS 1 EACH: 25; 75 STICK TOPICAL at 10:14

## 2021-05-17 NOTE — PROGRESS NOTES
[address] [place] [address]         Consult and Procedure Note      Chloe Kemp  MEDICAL RECORD NUMBER:  [id number]  AGE: 62 y.o. GENDER: male  : 1960  EPISODE DATE:  2021    Subjective:     No chief complaint on file. HISTORY of PRESENT ILLNESS HPI     Chloe Kemp is a 62 y.o. male Established patient who is well-known to our office who is being seen today for an ongoing left TMA wound. Patient is a type II diabetic with severe peripheral vascular disease. Patient was previously being seen at our [facility] office for this ongoing wound. Due to failed treatment with excisional debridements, patient was approved for Apligraf skin substitute. 4 of the 5 Apligraf's have been used thus far. Unfortunately, patient continues to have chronic pressure and rubbing to this site which she is essentially rubbed off all of the graft that we have put on previously. The last graft was sutured in place and still was ripped off within 1 week. Because of this, patient has been in a total contact cast for the last week 2 weeks to try and help offload this wound. Upon assessment today wound remains hyper granular nature. Wound is measuring smaller than last week. There is excessive callus buildup noted around the wound bed. Callus was removed in office today. Patient tolerated callus removal well. There is very little serous drainage noted today. No malodor noted. Patient is still taking doxycycline for this draining left heel wound. Left heel wound is improved from last week and there is very little drainage at this point. No malodor noted. Patient was placed back into a total contact cast at today's visit. Patient will follow back up with us again on Monday for a cast change. No medications were needed today. Patient was encouraged to try and offload this left lower extremity much as possible. 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   Wound/Ulcer Pain Timing/Severity: none  Quality of pain: N/A  Severity:  0/10   Modifying Factors: None  Associated Signs/Symptoms: edema, erythema and drainage        PAST MEDICAL HISTORY        Diagnosis Date    Arthritis     CAD (coronary artery disease)     CKD (chronic kidney disease), stage II     Diabetes mellitus ([facility]     Hyperlipidemia     Hypertension     Liver disease     HEPITITIS AS A CHILD       PAST SURGICAL HISTORY    Past Surgical History:   Procedure Laterality Date    CARDIAC SURGERY  2019    triple bypass    CYST REMOVAL      RIGHT ANKLE     FOOT DEBRIDEMENT Left 2020    LEFT TRANSMETATARSAL AMPUTATION  FOOT INCISION AND DRAINAGE performed by Tarah Baker DPM at [address] [place] [address] Left 2020    LEFT WOUND DEBRIDEMENT WITH WOUND VAC APPLICATION performed by Tarah Baker DPM at [address] [place] [address] Right     CYST REMOVED    FOOT SURGERY Left 2020    LEFT FOOT PREPARATION GRAFT SITE, HAVEST SPLIT THICKNESS SKIN GRAFT WITH APPLICATION, APPLICATION [facility]CI WOUND VAC performed by Tarah Baker DPM at [address] [place] [address] Left 3/3/2020    I&D LEFT FOOT, TRANSMETATARSAL AMPUTATION performed by Tarah Baker DPM at [place] [address]    Family History   Problem Relation Age of Onset    Diabetes Mother     High Blood Pressure Mother     Alzheimer's Disease Father          of, 80or 80years old    Heart Disease Father     High Blood Pressure Father     Cancer Neg Hx     Stroke Neg Hx        SOCIAL HISTORY    Social History     Tobacco Use    Smoking status: Former Smoker     Types: Cigarettes     Quit date:      Years since quittin.3    Smokeless tobacco: Never Used   Vaping Use    Vaping Use: Never used   Substance Use Topics    Alcohol use: Not Currently    Drug use: Never       ALLERGIES    No Known Allergies    MEDICATIONS    Current Outpatient Medications on File Prior to Encounter   Medication Sig Dispense Refill    doxycycline hyclate (VIBRA-TABS) 100 MG tablet Take 1 tablet by mouth 2 times daily for 14 days 28 tablet 0    clopidogrel (PLAVIX) 75 MG tablet Take 1 tablet by mouth daily 90 tablet 2    gabapentin (NEURONTIN) 600 MG tablet Take 1 tablet by mouth 2 times daily for 180 days. 180 tablet 1    metoprolol tartrate (LOPRESSOR) 25 MG tablet Take 1 tablet by mouth 2 times daily 180 tablet 1    atorvastatin (LIPITOR) 40 MG tablet Take 1 tablet by mouth nightly 90 tablet 1    insulin detemir (LEVEMIR FLEXTOUCH) 100 UNIT/ML injection pen Inject 44 Units into the skin every morning 5 pen 5    albuterol sulfate  (90 Base) MCG/ACT inhaler Inhale 2 puffs into the lungs every 6 hours as needed for Wheezing 1 Inhaler 3    lisinopril (PRINIVIL;ZESTRIL) 5 MG tablet Take 1 tablet by mouth daily 90 tablet 1    Polyethylene Glycol 3350 (MIRALAX PO) Take by mouth as needed      aspirin 81 MG chewable tablet Take 1 tablet by mouth daily 30 tablet 3    insulin lispro (HUMALOG) 100 UNIT/ML injection vial Inject 0-12 Units into the skin 3 times daily (with meals) (Patient taking differently: Inject 0-12 Units into the skin as needed ) 1 vial 3    Multiple Vitamins-Minerals (MULTIVITAMIN PO) Take 1 tablet by mouth daily        No current facility-administered medications on file prior to encounter. REVIEW OF SYSTEMS    Pertinent items are noted in HPI. Objective: There were no vitals taken for this visit. Wt Readings from Last 3 Encounters:   03/15/21 (!) 310 lb (140.6 kg)   03/08/21 (!) 310 lb (140.6 kg)   11/25/20 290 lb (131.5 kg)       PHYSICAL EXAM    General Appearance: Alert and oriented to person, place   and time, well developed and well- nourished, in no acute distress. Vascular: DP and PT pulses are faintly palpable to the left lower extremity. Cap refill less than 5 seconds. Skin was warm to cool from proximal tibia to distal foot. Mild edema noted.     Skin: Patient continues to have a neurotrophic ulcer to the plantar lateral aspect of the left foot. Wound bed is hyper granular nature with excessive callus buildup around the wound. Callus was removed in office today with a tissue nipper. No probe to bone or proximal streaking noted. Scant serous drainage with no malodor. Patient has a previous TMA to the left lower extremity. Patient also continues to have a small left posterior heel wound. Wound does not probe to bone and there is no proximal streaking. This wound is improved from last week and there is very little drainage at this point. No malodor noted. Patient is current on doxycycline which has been taking for approximate 1 week. Neurovascular: Epicritic and protopathic sensations are grossly diminished to bilateral lower extremities. Wound 02/01/21 Foot Left (Active)   Wound Image   05/13/21 1418   Wound Etiology Non-Healing Surgical 05/13/21 1418   Dressing Status Intact; Old drainage noted 05/13/21 1418   Wound Cleansed Cleansed with saline 05/13/21 1418   Dressing/Treatment Alginate with Ag;ABD;Roll gauze; Cast padding;Cast 05/13/21 1418   Offloading for Diabetic Foot Ulcers Yes (type); Total contact cast 05/13/21 1418   Wound Length (cm) 2 cm 05/13/21 1418   Wound Width (cm) 2.5 cm 05/13/21 1418   Wound Depth (cm) 0.2 cm 05/13/21 1418   Wound Surface Area (cm^2) 5 cm^2 05/13/21 1418   Change in Wound Size % (l*w) 48.98 05/13/21 1418   Wound Volume (cm^3) 1 cm^3 05/13/21 1418   Wound Healing % 74 05/13/21 1418   Undermining Starts ___ O'Clock 0 05/10/21 0828   Undermining Ends___ O'Clock 0 05/10/21 0828   Undermining Maxium Distance (cm) 0 05/10/21 0828   Wound Assessment Slough;Granulation tissue 05/13/21 1418   Drainage Amount Large 05/13/21 1418   Drainage Description Green;Serosanguinous 05/13/21 1418   Odor None 05/13/21 1418   Sumaya-wound Assessment Maceration;Dry/flaky; Hyperkeratosis (callous) 05/13/21 1418   Margins Attached edges properly padded a plaster style total contact cast was applied to the left lower extremity for offloading purposes. Patient tolerated casting well. Plan:     Patient examined and evaluated today. Callus was removed from left TMA wound site. Patient tolerated callus removal well. Wound was cleansed with Dakin's solution today. Total contact cast was removed and a new total contact cast was applied in office today. Patient is still taking doxycycline for a draining wound to the left heel. This wound overall appears improved from last week's visit. Very minimal drainage noted today. No new medications are needed today. He was encouraged to try and offload this left lower extremity much as possible. Patient will follow back up with us again this Monday for cast change. Treatment:   No orders of the defined types were placed in this encounter. Antibiotics: No  Follow up: 5/24/2021    Please see attached Discharge Instructions  Written patient dismissal instructions given to patient and signed by patient or POA. Discharge Instructions       Visit Discharge/Physician Orders:  - Will check into graft options for you. - Silver nitrate to left heel   - Debridement to left foot wound. Wound Location: left foot    Dressing orders:     1) Gather wound care supplies and arrange on clean table. 2) Wash your hands with soap and water or use alcohol based hand  for 20 seconds (sing \"Happy Birthday\" twice). 3) Cleanse wounds with normal saline or wound cleanser and gauze. Pat dry with clean gauze. 4) left foot/ left heel- Remove old unna boots to left lower legs. Wash legs with warm soapy water. Pat dry. Cleanse wound with normal saline or wound cleanser and gauze. Pat dry with clean gauze. Apply collagen the alginate to wound bed. Apply unna boots , ABD then kerlix and coban to left lower legs from base of toes to 1-2 inches below bend of knee. Change in wound clinic weekly.     If unna boot becomes too tight- raise/elevate legs above the level of the heart for 15-20 minutes if swelling does not go down then carefully cut off unna boot and call clinic or go to local ER or family physician. If unna boot becomes wet or starts to roll down then carefully remove unna boot and call clinic. Keep all dressings clean & dry. Do not shower, take baths or get wound wet, unless otherwise instructed by your Wound Care doctor. Follow up visit:   1 Week on Monday March 15th. Keep next scheduled appointment. Please give 24 hour notice if unable to keep appointment. [phone]    If you experience any of the following, please call the Wound Care Service during business hours: Monday through Friday 8:00 am - 4:30 pm  ([phone]). *Increase in pain   *Temperature over 101   *Increase in drainage from your wound or a foul odor   *Uncontrolled swelling   *Need for compression bandage changes due to slippage, breakthrough drainage    If you need medical attention outside of business hours, please contact your Primary Care Doctor or go to the nearest emergency room.          Electronically signed by EYAD Jones CNP on 5/17/2021 at 8:50 AM

## 2021-05-17 NOTE — PLAN OF CARE
Problem: Wound:  Goal: Will show signs of wound healing; wound closure and no evidence of infection  Description: Will show signs of wound healing; wound closure and no evidence of infection  Outcome: Ongoing   Patient seen for left leg wound. Wound shows signs of proper closure and healing. No s/s of infection noted. Cast applied today patient tolerated well. Follow up in 1 weeks. See AVS for order changes. Care plan reviewed with patient. Patient verbalize understanding of the plan of care and contribute to goal setting.

## 2021-05-17 NOTE — PROGRESS NOTES
Ross Engel R.N. has reviewed and agrees with Vicente Berger LPN's shift   Assessment.     Marilee Morales RN  5/17/2021

## 2021-05-24 ENCOUNTER — HOSPITAL ENCOUNTER (OUTPATIENT)
Dept: WOUND CARE | Age: 61
Discharge: HOME | End: 2021-05-24
Payer: COMMERCIAL

## 2021-05-24 VITALS
TEMPERATURE: 97.7 F | DIASTOLIC BLOOD PRESSURE: 87 MMHG | RESPIRATION RATE: 16 BRPM | HEART RATE: 77 BPM | OXYGEN SATURATION: 97 % | SYSTOLIC BLOOD PRESSURE: 149 MMHG

## 2021-05-24 DIAGNOSIS — Z89.432 S/P TRANSMETATARSAL AMPUTATION OF FOOT, LEFT (HCC): ICD-10-CM

## 2021-05-24 DIAGNOSIS — L97.929 NON-HEALING ULCER OF LOWER EXTREMITY, LEFT, WITH UNSPECIFIED SEVERITY (HCC): Primary | ICD-10-CM

## 2021-05-24 DIAGNOSIS — Z79.4 TYPE 2 DIABETES MELLITUS WITH INSULIN THERAPY (HCC): ICD-10-CM

## 2021-05-24 DIAGNOSIS — L97.922 NONHEALING ULCER OF LEFT LOWER EXTREMITY WITH FAT LAYER EXPOSED (HCC): ICD-10-CM

## 2021-05-24 DIAGNOSIS — T14.8XXA WOUND, OPEN: ICD-10-CM

## 2021-05-24 DIAGNOSIS — E11.9 TYPE 2 DIABETES MELLITUS WITH INSULIN THERAPY (HCC): ICD-10-CM

## 2021-05-24 PROCEDURE — 29445 APPL RIGID TOT CNTC LEG CAST: CPT

## 2021-05-24 PROCEDURE — 17250 CHEM CAUT OF GRANLTJ TISSUE: CPT

## 2021-05-24 PROCEDURE — 6370000000 HC RX 637 (ALT 250 FOR IP): Performed by: NURSE PRACTITIONER

## 2021-05-24 RX ORDER — LIDOCAINE HYDROCHLORIDE 20 MG/ML
JELLY TOPICAL ONCE
Status: CANCELLED | OUTPATIENT
Start: 2021-05-24 | End: 2021-05-24

## 2021-05-24 RX ORDER — CLOBETASOL PROPIONATE 0.5 MG/G
OINTMENT TOPICAL ONCE
Status: CANCELLED | OUTPATIENT
Start: 2021-05-24 | End: 2021-05-24

## 2021-05-24 RX ORDER — LIDOCAINE 50 MG/G
OINTMENT TOPICAL ONCE
Status: CANCELLED | OUTPATIENT
Start: 2021-05-24 | End: 2021-05-24

## 2021-05-24 RX ORDER — LIDOCAINE HYDROCHLORIDE 40 MG/ML
SOLUTION TOPICAL ONCE
Status: CANCELLED | OUTPATIENT
Start: 2021-05-24 | End: 2021-05-24

## 2021-05-24 RX ORDER — GENTAMICIN SULFATE 1 MG/G
OINTMENT TOPICAL ONCE
Status: CANCELLED | OUTPATIENT
Start: 2021-05-24 | End: 2021-05-24

## 2021-05-24 RX ORDER — BETAMETHASONE DIPROPIONATE 0.05 %
OINTMENT (GRAM) TOPICAL ONCE
Status: CANCELLED | OUTPATIENT
Start: 2021-05-24 | End: 2021-05-24

## 2021-05-24 RX ORDER — BACITRACIN, NEOMYCIN, POLYMYXIN B 400; 3.5; 5 [USP'U]/G; MG/G; [USP'U]/G
OINTMENT TOPICAL ONCE
Status: CANCELLED | OUTPATIENT
Start: 2021-05-24 | End: 2021-05-24

## 2021-05-24 RX ORDER — LIDOCAINE 40 MG/G
CREAM TOPICAL ONCE
Status: CANCELLED | OUTPATIENT
Start: 2021-05-24 | End: 2021-05-24

## 2021-05-24 RX ORDER — BACITRACIN ZINC AND POLYMYXIN B SULFATE 500; 1000 [USP'U]/G; [USP'U]/G
OINTMENT TOPICAL ONCE
Status: CANCELLED | OUTPATIENT
Start: 2021-05-24 | End: 2021-05-24

## 2021-05-24 RX ORDER — GINSENG 100 MG
CAPSULE ORAL ONCE
Status: CANCELLED | OUTPATIENT
Start: 2021-05-24 | End: 2021-05-24

## 2021-05-24 RX ADMIN — SILVER NITRATE APPLICATORS 1 EACH: 25; 75 STICK TOPICAL at 08:58

## 2021-05-24 ASSESSMENT — PAIN SCALES - GENERAL: PAINLEVEL_OUTOF10: 0

## 2021-05-24 NOTE — PLAN OF CARE
Problem: Wound:  Goal: Will show signs of wound healing; wound closure and no evidence of infection  Description: Will show signs of wound healing; wound closure and no evidence of infection  Outcome: Ongoing   Patient presents to wound clinic for follow up of left heel and foot wounds. Apligraf Application #1 applied 26/96/0452. Graft will be ordered for application in 2 weeks. HH on hold. Left foot- Collagen applied to wound. Covered with silver alginate, ABD pad, wrapped with kerlix then TCC-EZ cast applied. Leave intact until follow up appointment next week. Left heel- Covered with silver alginate then ABD pad, wrapped with kerlix then TCC-EZ cast applied. Leave intact until follow up appointment next week. Follow up visit: 1 week on Tuesday June 1st at [facility] will call patient with time for appointment. 2 weeks on Tuesday June 7th at 9:30 am.    Care plan reviewed with patient. Patient verbalize understanding of the plan of care and contribute to goal setting.

## 2021-05-24 NOTE — PROGRESS NOTES
[id number] [id number]         Consult and Procedure Note      Sriram Galeana  MEDICAL RECORD NUMBER:  [id number]  AGE: 62 y.o. GENDER: male  : 1960  EPISODE DATE:  2021    Subjective:     Chief Complaint   Patient presents with    Wound Check     Left leg/foot         HISTORY of PRESENT ILLNESS HPI     Sriram Galeana is a 62 y.o. male Established patient who is well-known to our office who is being seen today for an ongoing left TMA wound. Patient is a type II diabetic with severe peripheral vascular disease. Patient was previously being seen at our [facility] office for this ongoing wound. Due to failed treatment with excisional debridements, patient was approved for Apligraf skin substitute. 4 of the 5 Apligraf's have been used thus far. Unfortunately, patient continues to have chronic pressure and rubbing to this site which she is essentially rubbed off all of the graft that we have put on previously. The last graft was sutured in place and still was ripped off within 1 week. Because of this, patient has been in a total contact cast for the last week 3 weeks to try and help offload this wound. Upon assessment today wound remains hyper granular nature. Wound is measuring smaller than last week. Wound was cleansed with saline and a 4 x 4 gauze bed. After cleansing wound was treated with silver nitrate. Patient tolerated silver nitrate well. There is very little serous drainage noted today. No malodor noted. Patient is still taking doxycycline for this draining left heel wound. Left heel wound is improved from last week and there is very little drainage at this point. No malodor noted. Patient was placed back into a total contact cast at today's visit. Patient will follow back up with us again on Tuesday at [facility] due to the holiday on Monday. No medications were needed today. We are going to order patient's last Apligraf today.   We will hopefully apply this to patient in 2 weeks. Patient was encouraged to try and offload this left lower extremity much as possible. 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   Wound/Ulcer Pain Timing/Severity: none  Quality of pain: N/A  Severity:  0/10   Modifying Factors: None  Associated Signs/Symptoms: edema, erythema and drainage        PAST MEDICAL HISTORY        Diagnosis Date    Arthritis     CAD (coronary artery disease)     CKD (chronic kidney disease), stage II     Diabetes mellitus ([facility] [address]     Hyperlipidemia     Hypertension     Liver disease     HEPITITIS AS A CHILD       PAST SURGICAL HISTORY    Past Surgical History:   Procedure Laterality Date    CARDIAC SURGERY  2019    triple bypass    CYST REMOVAL      RIGHT ANKLE     FOOT DEBRIDEMENT Left 2020    LEFT TRANSMETATARSAL AMPUTATION  FOOT INCISION AND DRAINAGE performed by Francis Faye DPM at [address] Left 2020    LEFT WOUND DEBRIDEMENT WITH WOUND VAC APPLICATION performed by Francis Faye DPM at [facility] Right     CYST REMOVED    FOOT SURGERY Left 2020    LEFT FOOT PREPARATION GRAFT SITE, HAVEST SPLIT THICKNESS SKIN GRAFT WITH APPLICATION, APPLICATION [facility] WOUND VAC performed by Francis Faye DPM at [facility] Left 3/3/2020    I&D LEFT FOOT, TRANSMETATARSAL AMPUTATION performed by Francis Faye DPM at [facility]    Family History   Problem Relation Age of Onset    Diabetes Mother     High Blood Pressure Mother     Alzheimer's Disease Father          of, 80or 80years old    Heart Disease Father     High Blood Pressure Father     Cancer Neg Hx     Stroke Neg Hx        SOCIAL HISTORY    Social History     Tobacco Use    Smoking status: Former Smoker     Types: Cigarettes     Quit date:      Years since quittin.4    Smokeless tobacco: Never Used   Vaping Use    Vaping Use: Never used   Substance Use Topics    Alcohol use: Not Currently    Drug use: Never       ALLERGIES    No Known Allergies    MEDICATIONS    Current Outpatient Medications on File Prior to Encounter   Medication Sig Dispense Refill    doxycycline hyclate (VIBRA-TABS) 100 MG tablet Take 1 tablet by mouth 2 times daily for 14 days 28 tablet 0    clopidogrel (PLAVIX) 75 MG tablet Take 1 tablet by mouth daily 90 tablet 2    gabapentin (NEURONTIN) 600 MG tablet Take 1 tablet by mouth 2 times daily for 180 days. 180 tablet 1    metoprolol tartrate (LOPRESSOR) 25 MG tablet Take 1 tablet by mouth 2 times daily 180 tablet 1    atorvastatin (LIPITOR) 40 MG tablet Take 1 tablet by mouth nightly 90 tablet 1    insulin detemir (LEVEMIR FLEXTOUCH) 100 UNIT/ML injection pen Inject 44 Units into the skin every morning 5 pen 5    albuterol sulfate  (90 Base) MCG/ACT inhaler Inhale 2 puffs into the lungs every 6 hours as needed for Wheezing 1 Inhaler 3    Polyethylene Glycol 3350 (MIRALAX PO) Take by mouth as needed      aspirin 81 MG chewable tablet Take 1 tablet by mouth daily 30 tablet 3    insulin lispro (HUMALOG) 100 UNIT/ML injection vial Inject 0-12 Units into the skin 3 times daily (with meals) (Patient taking differently: Inject 0-12 Units into the skin as needed ) 1 vial 3    Multiple Vitamins-Minerals (MULTIVITAMIN PO) Take 1 tablet by mouth daily       lisinopril (PRINIVIL;ZESTRIL) 5 MG tablet Take 1 tablet by mouth daily 90 tablet 1     No current facility-administered medications on file prior to encounter. REVIEW OF SYSTEMS    Pertinent items are noted in HPI.     Objective:      BP (!) 149/87   Pulse 77   Temp 97.7 °F (36.5 °C) (Infrared)   Resp 16   SpO2 97%     Wt Readings from Last 3 Encounters:   05/17/21 (!) 310 lb (140.6 kg)   03/15/21 (!) 310 lb (140.6 kg)   03/08/21 (!) 310 lb (140.6 kg)       PHYSICAL EXAM    General Appearance: Alert and oriented to person, place   and time, well developed and well- nourished, in no acute distress. Vascular: DP and PT pulses are faintly palpable to the left lower extremity. Cap refill less than 5 seconds. Skin was warm to cool from proximal tibia to distal foot. Mild edema noted. Skin: Patient continues to have a neurotrophic ulcer to the plantar lateral aspect of the left foot. Wound bed is hyper granular nature with scant serous drainage noted. No malodor noted. Wound continues to measure smaller weekly. Previous undermining to this wound is no longer present. Patient has a previous TMA to the left lower extremity. Patient also continues to have a small left posterior heel wound. Wound does not probe to bone and there is no proximal streaking. This wound is improved from last week and there is very little drainage at this point. No malodor noted. Patient is current on doxycycline which has been taking for approximately 2 weeks. Neurovascular: Epicritic and protopathic sensations are grossly diminished to bilateral lower extremities. Wound 02/01/21 Foot Left (Active)   Wound Image   05/24/21 0840   Wound Etiology Non-Healing Surgical 05/24/21 0840   Dressing Status Intact; Old drainage noted 05/24/21 0840   Wound Cleansed Cleansed with saline 05/24/21 0840   Dressing/Treatment Collagen;Alginate with Ag;ABD;Roll gauze 05/24/21 0840   Offloading for Diabetic Foot Ulcers Total contact cast;Yes (type) 05/24/21 0840   Wound Length (cm) 2 cm 05/24/21 0840   Wound Width (cm) 2.5 cm 05/24/21 0840   Wound Depth (cm) 0.2 cm 05/24/21 0840   Wound Surface Area (cm^2) 5 cm^2 05/24/21 0840   Change in Wound Size % (l*w) 48.98 05/24/21 0840   Wound Volume (cm^3) 1 cm^3 05/24/21 0840   Wound Healing % 74 05/24/21 0840   Undermining Starts ___ O'Clock 7 05/17/21 0856   Undermining Ends___ O'Clock 11 05/17/21 0856   Undermining Maxium Distance (cm) 0.5 05/17/21 0856   Wound Assessment Granulation tissue 05/24/21 0840   Drainage Amount Large 05/24/21 0840   Drainage Prealbumin: No results found for: PREALBUMIN  Albumin:  Lab Results   Component Value Date    LABALBU 3.9 11/25/2020     Sed Rate:  Lab Results   Component Value Date    SEDRATE 130 04/17/2020     CRP:   Lab Results   Component Value Date    CRP 14.47 04/17/2020     Micro:   Lab Results   Component Value Date    BC No growth-preliminary No growth  04/21/2020      Hemoglobin A1C:   Lab Results   Component Value Date    LABA1C 6.2 04/17/2020       Assessment:     Ulcer Identification:  Ulcer Type: venous  Contributing Factors: venous stasis    Wound: Dehiscence amputation stump    Depth of Diabetic/Pressure/Non Pressure Ulcers or Wound:  Wound, partial thickness    Patient Active Problem List   Diagnosis Code    Gangrene of toe of left foot ([facility] [address] I96    CAD (coronary artery disease) I25.10    Type 2 diabetes mellitus with insulin therapy ([facility] [address] E11.9, Z79.4    Hyperlipidemia E78.5    Hypertension I10    Charcot's joint, right ankle and foot M14.671    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    Sepsis ([facility] [address] A41.9    Acute left-sided low back pain with bilateral sciatica M54.42, M54.41    S/P transmetatarsal amputation of foot, left ([facility]) Z89.432    Leukocytosis D72.829    Wound dehiscence, surgical, initial encounter T81.31XA    Postoperative wound infection R66.20ED    Metabolic acidosis D26.3    Hx of CABG Z95.1    Lumbar stenosis without neurogenic claudication M48.061    CKD (chronic kidney disease), stage II N18.2    Normocytic anemia D64.9    Morbid obesity ([facility]) E66.01    Debility R53.81    Carotid stenosis, asymptomatic, bilateral I65.23    Hypokalemia E87.6    Nonhealing ulcer of left lower extremity ([facility] [address] L97.929    Bleeding R58    Wound, open T14. 8XXA    SOB (shortness of breath) on exertion R06.02       Procedure Note    Chemical Cautery - 51796    Performed by: EYAD Luong CNP  Consent obtained: Yes  Time out taken: Yes  Tissue Type: Hypergranulation  Method: silver nitrate    Location of Chemical Cautery:   Wound/Ulcer #1     Wound 02/01/21 Foot Left (Active)   Wound Image   05/24/21 0840   Wound Etiology Non-Healing Surgical 05/24/21 0840   Dressing Status Intact; Old drainage noted 05/24/21 0840   Wound Cleansed Cleansed with saline 05/24/21 0840   Dressing/Treatment Collagen;Alginate with Ag;ABD;Roll gauze 05/24/21 0840   Offloading for Diabetic Foot Ulcers Total contact cast;Yes (type) 05/24/21 0840   Wound Length (cm) 2 cm 05/24/21 0840   Wound Width (cm) 2.5 cm 05/24/21 0840   Wound Depth (cm) 0.2 cm 05/24/21 0840   Wound Surface Area (cm^2) 5 cm^2 05/24/21 0840   Change in Wound Size % (l*w) 48.98 05/24/21 0840   Wound Volume (cm^3) 1 cm^3 05/24/21 0840   Wound Healing % 74 05/24/21 0840   Undermining Starts ___ O'Clock 7 05/17/21 0856   Undermining Ends___ O'Clock 11 05/17/21 0856   Undermining Maxium Distance (cm) 0.5 05/17/21 0856   Wound Assessment Granulation tissue 05/24/21 0840   Drainage Amount Large 05/24/21 0840   Drainage Description Serosanguinous;Brown 05/24/21 0840   Odor None 05/24/21 0840   Sumaya-wound Assessment Maceration;Dry/flaky 05/24/21 0840   Margins Attached edges 05/24/21 0840   Wound Thickness Description not for Pressure Injury Full thickness 05/24/21 0840   Number of days: 111       Wound 02/01/21 Heel Left (Active)   Wound Image   05/24/21 0840   Wound Etiology Pressure Unstageable 05/24/21 0840   Dressing Status Intact; Old drainage noted 05/24/21 0840   Wound Cleansed Cleansed with saline 05/24/21 0840   Dressing/Treatment Alginate with Ag;ABD;Roll gauze 05/24/21 0840   Offloading for Diabetic Foot Ulcers Yes (type); Total contact cast 05/24/21 0840   Wound Length (cm) 1.5 cm 05/24/21 0840   Wound Width (cm) 1.7 cm 05/24/21 0840   Wound Depth (cm) 0.2 cm 05/24/21 0840   Wound Surface Area (cm^2) 2.55 cm^2 05/24/21 0840   Change in Wound Size % (l*w) -750 05/24/21 0840   Wound Volume (cm^3) 0.51 cm^3 05/24/21 0840   Wound Healing % -89 05/24/21 0840   Undermining Starts ___ O'Clock 0 04/12/21 0853   Undermining Ends___ O'Clock 0 04/12/21 0853   Undermining Maxium Distance (cm) 0 04/12/21 0853   Wound Assessment Granulation tissue;Slough; Devitalized tissue 05/24/21 0840   Drainage Amount Moderate 05/24/21 0840   Drainage Description Serosanguinous 05/24/21 0840   Odor None 05/24/21 0840   Sumaya-wound Assessment Dry/flaky; Intact; Blanchable erythema 05/24/21 0840   Margins Attached edges 05/24/21 0840   Wound Thickness Description not for Pressure Injury Full thickness 05/24/21 0840   Number of days: 111     Incision 07/20/20 Left;Plantar (Active)   Number of days: 308       Incision 08/14/20 Right (Active)   Number of days: 282     Procedural Pain: 0  / 10   Post Procedural Pain: 0 / 10   Response to treatment: Well tolerated by patient. Total Contact Cast - [id number]  Left lower extremity was well-padded and a stockinette was used to help hold padding in place. After the left lower extremity was properly padded a plaster style total contact cast was applied to the left lower extremity for offloading purposes. Patient tolerated casting well. Plan:     Patient examined and evaluated today. Wound was cleansed with saline and a 4 x 4 gauze pad. Wound was then treated with silver nitrate for chemical cauterization as well as wound promotion. Patient tolerated silver nitrate well. A new total contact cast was reapplied in office today. Patient tolerated casting well. We are going to get patient's final Apligraf ordered for reapplication in 2 weeks. Patient states he is still taking his doxycycline and believes he has approximately 2 days left. No new medications are needed today. He was encouraged to try and offload this left lower extremity much as possible. Patient will follow back up with us again this Monday for cast change.     Treatment:   Orders Placed This Encounter   Procedures    Initiate Outpatient Wound Care Protocol     Cleanse wound with saline    If wound contains bioburden or contamination cleanse with wound cleanser or antimicrobial solution     For normal periwound tissue without irritation nor maceration, apply topical skin protectant    For periwound tissue with irritation and/or maceration, apply zinc based product, topical steroid cream/ointment, or equivalent     For wounds with dry firm black eschar and/or without exudate, apply betadine and leave open to air      For wounds with scant/small to no exudate or drainage, apply wound gel, hydrocolloid, polymer, or equivalent and cover with secondary dressing/foam      For wounds with moderate/large exudate or drainage, apply alginate, hydrofiber, polymer, or equivalent and cover with secondary dressing/foam    For wounds with nonviable tissue requiring removal, apply chemical or mechanical debrider and cover with secondary dressing/foam    For wounds with tunneling, dead space, or cavity, fill or pack with strip/gauze/kerlex to fit and cover with secondary dressing/foam    For wounds with adequate granulation or epithelization, apply wound gel, hydrocolloid, polymer, collagen, or transparent film, and cover secondary dry dressing/foam    For wounds that need additional secondary dressing to help pad or control additional drainage/exudates, add foam, absorbent pad or hydrocolloid    For wounds with suspected or known infection, apply antimicrobial mesh and/or antimicrobial alginate/hydrofiber, or antimicrobial solution moistened gauze/kerlex, or equivalent and cover with secondary dressing/foam    Compression Management needed for edema control, apply multilayer compression or tubular garment or equivalent    Offloading Management needed for pressure relief, apply offloading shoe/boot or equivalent     Standing Status:   Standing     Number of Occurrences:   1       Antibiotics: No  Follow up: 5/24/2021    Please see attached Discharge slippage, breakthrough drainage    If you need medical attention outside of business hours, please contact your Primary Care Doctor or go to the nearest emergency room.          Electronically signed by EYAD Salinas CNP on 5/24/2021 at 9:46 AM

## 2021-06-07 ENCOUNTER — HOSPITAL ENCOUNTER (OUTPATIENT)
Dept: WOUND CARE | Age: 61
Discharge: HOME | End: 2021-06-07
Payer: COMMERCIAL

## 2021-06-07 VITALS
RESPIRATION RATE: 18 BRPM | TEMPERATURE: 97.2 F | DIASTOLIC BLOOD PRESSURE: 76 MMHG | SYSTOLIC BLOOD PRESSURE: 122 MMHG | OXYGEN SATURATION: 97 % | HEART RATE: 75 BPM

## 2021-06-07 DIAGNOSIS — Z79.4 TYPE 2 DIABETES MELLITUS WITH INSULIN THERAPY (HCC): ICD-10-CM

## 2021-06-07 DIAGNOSIS — Z89.432 S/P TRANSMETATARSAL AMPUTATION OF FOOT, LEFT (HCC): ICD-10-CM

## 2021-06-07 DIAGNOSIS — L97.929 NON-HEALING ULCER OF LOWER EXTREMITY, LEFT, WITH UNSPECIFIED SEVERITY (HCC): Primary | ICD-10-CM

## 2021-06-07 DIAGNOSIS — E11.9 TYPE 2 DIABETES MELLITUS WITH INSULIN THERAPY (HCC): ICD-10-CM

## 2021-06-07 PROCEDURE — 15275 SKIN SUB GRAFT FACE/NK/HF/G: CPT

## 2021-06-07 PROCEDURE — 29445 APPL RIGID TOT CNTC LEG CAST: CPT

## 2021-06-07 RX ORDER — LIDOCAINE HYDROCHLORIDE 20 MG/ML
JELLY TOPICAL ONCE
Status: CANCELLED | OUTPATIENT
Start: 2021-06-07 | End: 2021-06-07

## 2021-06-07 RX ORDER — CLOBETASOL PROPIONATE 0.5 MG/G
OINTMENT TOPICAL ONCE
Status: CANCELLED | OUTPATIENT
Start: 2021-06-07 | End: 2021-06-07

## 2021-06-07 RX ORDER — BACITRACIN ZINC AND POLYMYXIN B SULFATE 500; 1000 [USP'U]/G; [USP'U]/G
OINTMENT TOPICAL ONCE
Status: CANCELLED | OUTPATIENT
Start: 2021-06-07 | End: 2021-06-07

## 2021-06-07 RX ORDER — GENTAMICIN SULFATE 1 MG/G
OINTMENT TOPICAL ONCE
Status: CANCELLED | OUTPATIENT
Start: 2021-06-07 | End: 2021-06-07

## 2021-06-07 RX ORDER — LIDOCAINE 50 MG/G
OINTMENT TOPICAL ONCE
Status: CANCELLED | OUTPATIENT
Start: 2021-06-07 | End: 2021-06-07

## 2021-06-07 RX ORDER — GINSENG 100 MG
CAPSULE ORAL ONCE
Status: CANCELLED | OUTPATIENT
Start: 2021-06-07 | End: 2021-06-07

## 2021-06-07 RX ORDER — BACITRACIN, NEOMYCIN, POLYMYXIN B 400; 3.5; 5 [USP'U]/G; MG/G; [USP'U]/G
OINTMENT TOPICAL ONCE
Status: CANCELLED | OUTPATIENT
Start: 2021-06-07 | End: 2021-06-07

## 2021-06-07 RX ORDER — BETAMETHASONE DIPROPIONATE 0.05 %
OINTMENT (GRAM) TOPICAL ONCE
Status: CANCELLED | OUTPATIENT
Start: 2021-06-07 | End: 2021-06-07

## 2021-06-07 RX ORDER — LIDOCAINE HYDROCHLORIDE 40 MG/ML
SOLUTION TOPICAL ONCE
Status: CANCELLED | OUTPATIENT
Start: 2021-06-07 | End: 2021-06-07

## 2021-06-07 RX ORDER — LIDOCAINE 40 MG/G
CREAM TOPICAL ONCE
Status: CANCELLED | OUTPATIENT
Start: 2021-06-07 | End: 2021-06-07

## 2021-06-07 NOTE — PROGRESS NOTES
[address] [address]         Consult and Procedure Note      Ernst Perez  MEDICAL RECORD NUMBER:  [id number]  AGE: 62 y.o. GENDER: male  : 1960  EPISODE DATE:  2021    Subjective:     Chief Complaint   Patient presents with    Wound Check         HISTORY of PRESENT ILLNESS HPI     Ernst Perez is a 62 y.o. male Established patient who is well-known to our office who is being seen today for an ongoing left TMA wound. Patient is a type II diabetic with severe peripheral vascular disease. Patient was previously being seen at our [facility] office for this ongoing wound. Due to failed treatment with excisional debridements, patient was approved for Apligraf skin substitute. Wound continues to measure smaller weekly. Patient still has an ongoing wound to that left heel as well. Apligraf 5/5 was applied at today's visit. Prior to application wound was cleansed with Betadine and a gauze pad. Graft was secured in place with wound veil and Steri-Strips. Alginate and a dry dressing was placed over wound gel. Patient was placed back into a total contact cast to help offload this wound and to keep graft in place. Patient encouraged to try and stay off this left lower extremity is much as possible. Patient will follow back up with us again in 1 week. 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   Wound/Ulcer Pain Timing/Severity: none  Quality of pain: N/A  Severity:  0/10   Modifying Factors: None  Associated Signs/Symptoms: edema, erythema and drainage        PAST MEDICAL HISTORY        Diagnosis Date    Arthritis     CAD (coronary artery disease)     CKD (chronic kidney disease), stage II     Diabetes mellitus ([facility] [address]     Hyperlipidemia     Hypertension     Liver disease     HEPITITIS AS A CHILD       PAST SURGICAL HISTORY    Past Surgical History:   Procedure Laterality Date    CARDIAC SURGERY  2019    triple bypass    CYST REMOVAL      RIGHT ANKLE  FOOT DEBRIDEMENT Left 2020    LEFT TRANSMETATARSAL AMPUTATION  FOOT INCISION AND DRAINAGE [facility]    LEFT WOUND DEBRIDEMENT WITH WOUND VAC APPLICATION performed by Lloyd Abreu DPM at [facility] Right     CYST REMOVED    FOOT SURGERY Left 2020    LEFT FOOT PREPARATION GRAFT SITE, HAVEST SPLIT THICKNESS SKIN GRAFT WITH APPLICATION, APPLICATION [employer] WOUND VAC performed by Lloyd Abreu DPM at [address] [place] [address] TOE AMPUTATION Left 3/3/2020    I&D LEFT FOOT, TRANSMETATARSAL AMPUTATION performed by Lloyd Abreu DPM at [facility]    Family History   Problem Relation Age of Onset    Diabetes Mother     High Blood Pressure Mother     Alzheimer's Disease Father          of, 80or 80years old   Briana Nakai Heart Disease Father     High Blood Pressure Father     Cancer Neg Hx     Stroke Neg Hx        SOCIAL HISTORY    Social History     Tobacco Use    Smoking status: Former Smoker     Types: Cigarettes     Quit date:      Years since quittin.4    Smokeless tobacco: Never Used   Vaping Use    Vaping Use: Never used   Substance Use Topics    Alcohol use: Not Currently    Drug use: Never       ALLERGIES    No Known Allergies    MEDICATIONS    Current Outpatient Medications on File Prior to Encounter   Medication Sig Dispense Refill    clopidogrel (PLAVIX) 75 MG tablet Take 1 tablet by mouth daily 90 tablet 2    gabapentin (NEURONTIN) 600 MG tablet Take 1 tablet by mouth 2 times daily for 180 days.  180 tablet 1    metoprolol tartrate (LOPRESSOR) 25 MG tablet Take 1 tablet by mouth 2 times daily 180 tablet 1    atorvastatin (LIPITOR) 40 MG tablet Take 1 tablet by mouth nightly 90 tablet 1    insulin detemir (LEVEMIR FLEXTOUCH) 100 UNIT/ML injection pen Inject 44 Units into the skin every morning 5 pen 5    albuterol sulfate  (90 Base) MCG/ACT inhaler Inhale 2 puffs into the lungs every 6 hours as needed for Wheezing 1 Inhaler 3    lisinopril (PRINIVIL;ZESTRIL) 5 MG tablet Take 1 tablet by mouth daily 90 tablet 1    Polyethylene Glycol 3350 (MIRALAX PO) Take by mouth as needed      aspirin 81 MG chewable tablet Take 1 tablet by mouth daily 30 tablet 3    insulin lispro (HUMALOG) 100 UNIT/ML injection vial Inject 0-12 Units into the skin 3 times daily (with meals) (Patient taking differently: Inject 0-12 Units into the skin as needed ) 1 vial 3    Multiple Vitamins-Minerals (MULTIVITAMIN PO) Take 1 tablet by mouth daily        No current facility-administered medications on file prior to encounter. REVIEW OF SYSTEMS    Pertinent items are noted in HPI. Objective:      /76   Pulse 75   Temp 97.2 °F (36.2 °C) (Infrared)   Resp 18   SpO2 97%     Wt Readings from Last 3 Encounters:   05/17/21 (!) 310 lb (140.6 kg)   03/15/21 (!) 310 lb (140.6 kg)   03/08/21 (!) 310 lb (140.6 kg)       PHYSICAL EXAM    General Appearance: Alert and oriented to person, place   and time, well developed and well- nourished, in no acute distress. Vascular: DP and PT pulses are faintly palpable to the left lower extremity. Cap refill less than 5 seconds. Skin was warm to cool from proximal tibia to distal foot. Mild edema noted. Skin: Patient continues to have a neurotrophic ulcer to the plantar lateral aspect of the left foot. Wound bed is hyper granular nature with scant serous drainage noted. No malodor noted. Wound continues to measure smaller weekly. Previous undermining to this wound is no longer present. Patient has a previous TMA to the left lower extremity. Patient also continues to have a small left posterior heel wound. Wound does not probe to bone and there is no proximal streaking. This wound is improved from last week and there is very little drainage at this point. No malodor noted.      Neurovascular: Epicritic and protopathic sensations are grossly diminished to bilateral lower extremities. Wound 02/01/21 Foot Left (Active)   Wound Image   06/07/21 0952   Wound Etiology Non-Healing Surgical 06/07/21 0952   Dressing Status Intact; Old drainage noted 06/07/21 0952   Wound Cleansed Cleansed with saline 06/07/21 0952   Dressing/Treatment Collagen;Alginate with Ag;ABD;Roll gauze 05/24/21 0840   Offloading for Diabetic Foot Ulcers Total contact cast;Yes (type) 06/07/21 0952   Wound Length (cm) 1.7 cm 06/07/21 0952   Wound Width (cm) 2 cm 06/07/21 0952   Wound Depth (cm) 0.1 cm 06/07/21 0952   Wound Surface Area (cm^2) 3.4 cm^2 06/07/21 0952   Change in Wound Size % (l*w) 65.31 06/07/21 0952   Wound Volume (cm^3) 0.34 cm^3 06/07/21 0952   Wound Healing % 91 06/07/21 0952   Undermining Starts ___ O'Clock 7 05/17/21 0856   Undermining Ends___ O'Clock 11 05/17/21 0856   Undermining Maxium Distance (cm) 0.5 05/17/21 0856   Wound Assessment Slough;Granulation tissue; Epithelialization 06/07/21 0952   Drainage Amount Moderate 06/07/21 0952   Drainage Description Serosanguinous; Yellow 06/07/21 0952   Odor None 06/07/21 0952   Sumaya-wound Assessment Maceration;Dry/flaky 06/07/21 0952   Margins Attached edges 06/07/21 0952   Wound Thickness Description not for Pressure Injury Full thickness 06/07/21 0952   Number of days: 126       Wound 02/01/21 Heel Left (Active)   Wound Image   06/07/21 0952   Wound Etiology Pressure Unstageable 06/07/21 0952   Dressing Status Intact; Old drainage noted 06/07/21 0952   Wound Cleansed Cleansed with saline 06/07/21 0952   Dressing/Treatment Alginate with Ag;ABD;Roll gauze 05/24/21 0840   Offloading for Diabetic Foot Ulcers Yes (type); Total contact cast 06/07/21 0952   Wound Length (cm) 0.3 cm 06/07/21 0952   Wound Width (cm) 0.3 cm 06/07/21 0952   Wound Depth (cm) 1 cm 06/07/21 0952   Wound Surface Area (cm^2) 0.09 cm^2 06/07/21 0952   Change in Wound Size % (l*w) 70 06/07/21 0952   Wound Volume (cm^3) 0.09 cm^3 06/07/21 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    Sepsis ([facility] [address] A41.9    Acute left-sided low back pain with bilateral sciatica M54.42, M54.41    S/P transmetatarsal amputation of foot, left ([facility]) Z89.432    Leukocytosis D72.829    Wound dehiscence, surgical, initial encounter T81.31XA    Postoperative wound infection M28.42ZU    Metabolic acidosis J77.1    Hx of CABG Z95.1    Lumbar stenosis without neurogenic claudication M48.061    CKD (chronic kidney disease), stage II N18.2    Normocytic anemia D64.9    Morbid obesity ([facility]) E66.01    Debility R53.81    Carotid stenosis, asymptomatic, bilateral I65.23    Hypokalemia E87.6    Nonhealing ulcer of left lower extremity ([facility] [address] L97.929    Bleeding R58    Wound, open T14. 8XXA    SOB (shortness of breath) on exertion R06.02       Procedure Note    Procedure:  Skin Substitute Application - 22479    Performed by: EYAD Lafleur CNP  Ulcer Type: diabetic and neuropathic  Consent obtained: Yes  Time out taken: Yes    Product Utilized:  Apligraf 44 sq/cm  Fenestrated: Yes  Mesher Utilized: No  Instrument(s) curette    Skin Substitute was Applied to Ulcer Number(s):    Ulcer #: 1    Wound 02/01/21 Foot Left (Active)   Wound Image   06/07/21 0952   Wound Etiology Non-Healing Surgical 06/07/21 0952   Dressing Status Intact; Old drainage noted 06/07/21 0952   Wound Cleansed Cleansed with saline 06/07/21 0952   Dressing/Treatment Collagen;Alginate with Ag;ABD;Roll gauze 05/24/21 0840   Offloading for Diabetic Foot Ulcers Total contact cast;Yes (type) 06/07/21 0952   Wound Length (cm) 1.7 cm 06/07/21 0952   Wound Width (cm) 2 cm 06/07/21 0952   Wound Depth (cm) 0.1 cm 06/07/21 0952   Wound Surface Area (cm^2) 3.4 cm^2 06/07/21 0952   Change in Wound Size % (l*w) 65.31 06/07/21 0952   Wound Volume (cm^3) 0.34 cm^3 06/07/21 0952   Wound Healing % 91 06/07/21 0952   Undermining Starts ___ O'Clock 7 05/17/21 0856   Undermining Ends___ O'Clock 11 05/17/21 0856 Undermining Maxium Distance (cm) 0.5 05/17/21 0856   Wound Assessment Slough;Granulation tissue; Epithelialization 06/07/21 0952   Drainage Amount Moderate 06/07/21 0952   Drainage Description Serosanguinous; Yellow 06/07/21 0952   Odor None 06/07/21 0952   Sumaya-wound Assessment Maceration;Dry/flaky 06/07/21 0952   Margins Attached edges 06/07/21 0952   Wound Thickness Description not for Pressure Injury Full thickness 06/07/21 0952   Number of days: 126       Wound 02/01/21 Heel Left (Active)   Wound Image   06/07/21 0952   Wound Etiology Pressure Unstageable 06/07/21 0952   Dressing Status Intact; Old drainage noted 06/07/21 0952   Wound Cleansed Cleansed with saline 06/07/21 0952   Dressing/Treatment Alginate with Ag;ABD;Roll gauze 05/24/21 0840   Offloading for Diabetic Foot Ulcers Yes (type); Total contact cast 06/07/21 0952   Wound Length (cm) 0.3 cm 06/07/21 0952   Wound Width (cm) 0.3 cm 06/07/21 0952   Wound Depth (cm) 1 cm 06/07/21 0952   Wound Surface Area (cm^2) 0.09 cm^2 06/07/21 0952   Change in Wound Size % (l*w) 70 06/07/21 0952   Wound Volume (cm^3) 0.09 cm^3 06/07/21 0952   Wound Healing % 67 06/07/21 0952   Undermining Starts ___ O'Clock 0 04/12/21 0853   Undermining Ends___ O'Clock 0 04/12/21 0853   Undermining Maxium Distance (cm) 0 04/12/21 0853   Wound Assessment Slough 06/07/21 0952   Drainage Amount Small 06/07/21 0952   Drainage Description Purulent 06/07/21 0952   Odor None 06/07/21 0952   Sumaya-wound Assessment Dry/flaky; Intact 06/07/21 0952   Margins Attached edges 06/07/21 0952   Wound Thickness Description not for Pressure Injury Full thickness 06/07/21 7259   Number of days: 126     Incision 07/20/20 Left;Plantar (Active)   Number of days: 322       Incision 08/14/20 Right (Active)   Number of days: 296     Diabetic/Pressure/Non Pressure Ulcers:  Ulcer:   Diabetic ulcer, limited to breakdown of skin  Total Surface Area of Ulcer(s) Covered 3.4 sq/cm  Was the Product Layered  No   Amount of Product Applied 5 sq/cm   Amount of Product Wasted 39 sq/cm   Reason for Waste: The size of the wound is smaller than the product utilized  Surgically Fixated: No: Secured with wound veil and Steri-Strips. Secured With: Steri Strips and Silicone Dressing   Procedural Pain: 0/10   Post Procedural Pain: 0 / 10  Response to Treatment: Well tolerated by patient. Total Contact Cast - [id number]  Left lower extremity was well-padded and a stockinette was used to help hold padding in place. After the left lower extremity was properly padded a plaster style total contact cast was applied to the left lower extremity for offloading purposes. Patient tolerated casting well. Plan:     Patient examined and evaluated today. .  Wound was cleansed with Dakin's today. After cleansing Apligraf 5/5 was applied to the left foot wound. Graft was secured in place with wound veil and Steri-Strips. Alginate and a dry dressing was placed over wound veil. A new total contact cast was reapplied in office today. Patient tolerated casting well. No new medications are needed today. He was encouraged to try and offload this left lower extremity much as possible. Patient will follow back up with us again in 1 week. Treatment:   No orders of the defined types were placed in this encounter. Antibiotics: No  Follow up: 1 week    Please see attached Discharge Instructions  Written patient dismissal instructions given to patient and signed by patient or POA. Discharge Instructions       Visit Discharge/Physician Orders:  - Will check into graft options for you. - Silver nitrate to left heel   - Debridement to left foot wound. Wound Location: left foot    Dressing orders:     1) Gather wound care supplies and arrange on clean table. 2) Wash your hands with soap and water or use alcohol based hand  for 20 seconds (sing \"Happy Birthday\" twice).     3) Cleanse wounds with normal saline or wound cleanser and gauze. Pat dry with clean gauze. 4) left foot/ left heel- Remove old unna boots to left lower legs. Wash legs with warm soapy water. Pat dry. Cleanse wound with normal saline or wound cleanser and gauze. Pat dry with clean gauze. Apply collagen the alginate to wound bed. Apply unna boots , ABD then kerlix and coban to left lower legs from base of toes to 1-2 inches below bend of knee. Change in wound clinic weekly. If unna boot becomes too tight- raise/elevate legs above the level of the heart for 15-20 minutes if swelling does not go down then carefully cut off unna boot and call clinic or go to local ER or family physician. If unna boot becomes wet or starts to roll down then carefully remove unna boot and call clinic. Keep all dressings clean & dry. Do not shower, take baths or get wound wet, unless otherwise instructed by your Wound Care doctor. Follow up visit:   1 Week on Monday March 15th. Keep next scheduled appointment. Please give 24 hour notice if unable to keep appointment. [phone]    If you experience any of the following, please call the Wound Care Service during business hours: Monday through Friday 8:00 am - 4:30 pm  ([phone]). *Increase in pain   *Temperature over 101   *Increase in drainage from your wound or a foul odor   *Uncontrolled swelling   *Need for compression bandage changes due to slippage, breakthrough drainage    If you need medical attention outside of business hours, please contact your Primary Care Doctor or go to the nearest emergency room.          Electronically signed by EYAD Hobbs CNP on 6/7/2021 at 11:45 AM

## 2021-06-07 NOTE — PROGRESS NOTES
Contact Cast    NAME:  Jovan Leger  YOB: 1960  MEDICAL RECORD NUMBER:  [id number]  DATE:  6/7/2021    Goal:  Patient will maintain integrity of cast, avoid mobility hazards, and report complications that may occur (foul odor, pain, numbness, cracked cast). Removed old contact cast if indicated and wash extremity with soap and water  Applied ordered dressing over wound(s)   Applied cast padding  Applied foam padding  Applied per [facility] CNP  Total Contact Cast was applied in the [address] [place] [address] Road to left lower leg  Applied cast material fiberglass  Allow cast to dry   Instructed patient to report to health care provider, including wound care center, any back pain, hip pain, or leg pain, numbness of toes, or any odor coming from the cast.   Instructed patient not to stick any foreign objects down into cast.  Instructed patient to utilize assistive devices(crutches, cane or walker) as ordered. Instructed patient to continue offloading as directed.      Applied cast per  Guidelines    Electronically signed by Liliana Lucio RN on 6/7/2021 at 2:51 PM

## 2021-06-07 NOTE — PLAN OF CARE
Problem: Wound:  Goal: Will show signs of wound healing; wound closure and no evidence of infection  Description: Will show signs of wound healing; wound closure and no evidence of infection  Outcome: Ongoing     Patient presents to wound clinic for left foot wound. No s/s of infection noted. See AVS for discharge instructions. Follow up visit: 1 week on Monday June 14th at 9:15 am    Care plan reviewed with patient . Patient verbalize understanding of the plan of care and contribute to goal setting.

## 2021-06-07 NOTE — PROGRESS NOTES
Apligraf Treatment Note    NAME:  Kae Oropeza  YOB: 1960  MEDICAL RECORD NUMBER:  [id number]  DATE:  6/7/2021    Goal: Patient will receive safe and proper application of skin substitute. Patient will comply with caring for dressing, offloading and reporting complications. Expiration date and pH of Apligraf checked immediately prior to use. Package intact prior to use and no damage noted. Transport temperature controlled and acceptable. Apligraf was removed from protective sterile packaging by physician and applied to prepared wound bed. Apligraf was applied to left foot and affixed with steri-strips by the provider. Apligraf was covered with non-adherent ulcer dressing. Applied steri strips over non-adherent. Applied dry gauze and/or roll gauze. Patient/caregiver was instructed not to remove dressing and to keep it clean and dry. Pt/family/caregiver was instructed on signs and symptoms of complications to report such as draining through dressing, dressing falling down/slipping, getting wet, or severe pain or tingling in toes   Pt/family/caregiver was instructed on need for offloading and elevation of affected extremity and on use of prescribed offloading device. Apligraf may be applied a total of 5 times per wound over a 12 week period. Date of first application of Apligraf for this current wound is March 1, 2021.        Guidelines followed    Electronically signed by Kam Greer RN on 6/7/2021 at 2:49 PM

## 2021-06-10 ENCOUNTER — HOSPITAL ENCOUNTER (OUTPATIENT)
Dept: GENERAL RADIOLOGY | Age: 61
Discharge: HOME | End: 2021-06-10
Payer: COMMERCIAL

## 2021-06-10 ENCOUNTER — HOSPITAL ENCOUNTER (OUTPATIENT)
Age: 61
Discharge: HOME | End: 2021-06-10
Payer: COMMERCIAL

## 2021-06-10 DIAGNOSIS — I10 ESSENTIAL HYPERTENSION: ICD-10-CM

## 2021-06-10 DIAGNOSIS — I73.9 PVD (PERIPHERAL VASCULAR DISEASE) (HCC): ICD-10-CM

## 2021-06-10 DIAGNOSIS — E11.610 CHARCOT FOOT DUE TO DIABETES MELLITUS (HCC): ICD-10-CM

## 2021-06-10 LAB
ANION GAP SERPL CALCULATED.3IONS-SCNC: 12 MEQ/L (ref 8–16)
BASOPHILS # BLD: 1 %
BASOPHILS ABSOLUTE: 0.1 THOU/MM3 (ref 0–0.1)
BUN BLDV-MCNC: 14 MG/DL (ref 7–22)
CALCIUM SERPL-MCNC: 9.5 MG/DL (ref 8.5–10.5)
CHLORIDE BLD-SCNC: 102 MEQ/L (ref 98–111)
CO2: 27 MEQ/L (ref 23–33)
CREAT SERPL-MCNC: 1.1 MG/DL (ref 0.4–1.2)
EKG ATRIAL RATE: 80 BPM
EKG P AXIS: 62 DEGREES
EKG P-R INTERVAL: 174 MS
EKG Q-T INTERVAL: 370 MS
EKG QRS DURATION: 96 MS
EKG QTC CALCULATION (BAZETT): 426 MS
EKG R AXIS: 50 DEGREES
EKG T AXIS: 54 DEGREES
EKG VENTRICULAR RATE: 80 BPM
EOSINOPHIL # BLD: 5.9 %
EOSINOPHILS ABSOLUTE: 0.5 THOU/MM3 (ref 0–0.4)
ERYTHROCYTE [DISTWIDTH] IN BLOOD BY AUTOMATED COUNT: 13.8 % (ref 11.5–14.5)
ERYTHROCYTE [DISTWIDTH] IN BLOOD BY AUTOMATED COUNT: 44.4 FL (ref 35–45)
GFR SERPL CREATININE-BSD FRML MDRD: 68 ML/MIN/1.73M2
GLUCOSE BLD-MCNC: 148 MG/DL (ref 70–108)
HCT VFR BLD CALC: 43.7 % (ref 42–52)
HEMOGLOBIN: 14.3 GM/DL (ref 14–18)
IMMATURE GRANS (ABS): 0.04 THOU/MM3 (ref 0–0.07)
IMMATURE GRANULOCYTES: 0.5 %
LYMPHOCYTES # BLD: 24.1 %
LYMPHOCYTES ABSOLUTE: 1.9 THOU/MM3 (ref 1–4.8)
MCH RBC QN AUTO: 29.5 PG (ref 26–33)
MCHC RBC AUTO-ENTMCNC: 32.7 GM/DL (ref 32.2–35.5)
MCV RBC AUTO: 90.1 FL (ref 80–94)
MONOCYTES # BLD: 8.1 %
MONOCYTES ABSOLUTE: 0.6 THOU/MM3 (ref 0.4–1.3)
NUCLEATED RED BLOOD CELLS: 0 /100 WBC
PLATELET # BLD: 183 THOU/MM3 (ref 130–400)
PMV BLD AUTO: 12 FL (ref 9.4–12.4)
POTASSIUM SERPL-SCNC: 3.9 MEQ/L (ref 3.5–5.2)
RBC # BLD: 4.85 MILL/MM3 (ref 4.7–6.1)
SEG NEUTROPHILS: 60.4 %
SEGMENTED NEUTROPHILS ABSOLUTE COUNT: 4.8 THOU/MM3 (ref 1.8–7.7)
SODIUM BLD-SCNC: 141 MEQ/L (ref 135–145)
WBC # BLD: 8 THOU/MM3 (ref 4.8–10.8)

## 2021-06-10 PROCEDURE — 71046 X-RAY EXAM CHEST 2 VIEWS: CPT

## 2021-06-10 PROCEDURE — 36415 COLL VENOUS BLD VENIPUNCTURE: CPT

## 2021-06-10 PROCEDURE — 80048 BASIC METABOLIC PNL TOTAL CA: CPT

## 2021-06-10 PROCEDURE — 85025 COMPLETE CBC W/AUTO DIFF WBC: CPT

## 2021-06-10 PROCEDURE — 93010 ELECTROCARDIOGRAM REPORT: CPT | Performed by: NUCLEAR MEDICINE

## 2021-06-10 PROCEDURE — 93005 ELECTROCARDIOGRAM TRACING: CPT | Performed by: PODIATRIST

## 2021-06-10 RX ORDER — LISINOPRIL 5 MG/1
5 TABLET ORAL DAILY
Qty: 90 TABLET | Refills: 3 | Status: SHIPPED | OUTPATIENT
Start: 2021-06-10 | End: 2021-06-24

## 2021-06-10 NOTE — TELEPHONE ENCOUNTER
Kade Chacko called requesting a refill on the following medications:  Requested Prescriptions     Pending Prescriptions Disp Refills    lisinopril (PRINIVIL;ZESTRIL) 5 MG tablet 90 tablet 1     Sig: Take 1 tablet by mouth daily     [facility] [address] [place] [address]    Date of last visit: 03/12/2021  Date of next visit (if applicable): 78/50/0549

## 2021-06-14 ENCOUNTER — HOSPITAL ENCOUNTER (OUTPATIENT)
Dept: WOUND CARE | Age: 61
Discharge: HOME | End: 2021-06-14
Payer: COMMERCIAL

## 2021-06-14 VITALS
OXYGEN SATURATION: 97 % | SYSTOLIC BLOOD PRESSURE: 136 MMHG | TEMPERATURE: 96.7 F | HEART RATE: 81 BPM | DIASTOLIC BLOOD PRESSURE: 77 MMHG | RESPIRATION RATE: 18 BRPM

## 2021-06-14 DIAGNOSIS — L97.929 NON-HEALING ULCER OF LOWER EXTREMITY, LEFT, WITH UNSPECIFIED SEVERITY (HCC): Primary | ICD-10-CM

## 2021-06-14 DIAGNOSIS — E11.9 TYPE 2 DIABETES MELLITUS WITH INSULIN THERAPY (HCC): ICD-10-CM

## 2021-06-14 DIAGNOSIS — Z89.432 S/P TRANSMETATARSAL AMPUTATION OF FOOT, LEFT (HCC): ICD-10-CM

## 2021-06-14 DIAGNOSIS — Z79.4 TYPE 2 DIABETES MELLITUS WITH INSULIN THERAPY (HCC): ICD-10-CM

## 2021-06-14 PROCEDURE — 29580 STRAPPING UNNA BOOT: CPT

## 2021-06-14 RX ORDER — LIDOCAINE HYDROCHLORIDE 40 MG/ML
SOLUTION TOPICAL ONCE
Status: CANCELLED | OUTPATIENT
Start: 2021-06-14 | End: 2021-06-14

## 2021-06-14 RX ORDER — BACITRACIN, NEOMYCIN, POLYMYXIN B 400; 3.5; 5 [USP'U]/G; MG/G; [USP'U]/G
OINTMENT TOPICAL ONCE
Status: CANCELLED | OUTPATIENT
Start: 2021-06-14 | End: 2021-06-14

## 2021-06-14 RX ORDER — GINSENG 100 MG
CAPSULE ORAL ONCE
Status: CANCELLED | OUTPATIENT
Start: 2021-06-14 | End: 2021-06-14

## 2021-06-14 RX ORDER — GENTAMICIN SULFATE 1 MG/G
OINTMENT TOPICAL ONCE
Status: CANCELLED | OUTPATIENT
Start: 2021-06-14 | End: 2021-06-14

## 2021-06-14 RX ORDER — LIDOCAINE HYDROCHLORIDE 20 MG/ML
JELLY TOPICAL ONCE
Status: CANCELLED | OUTPATIENT
Start: 2021-06-14 | End: 2021-06-14

## 2021-06-14 RX ORDER — CLOBETASOL PROPIONATE 0.5 MG/G
OINTMENT TOPICAL ONCE
Status: CANCELLED | OUTPATIENT
Start: 2021-06-14 | End: 2021-06-14

## 2021-06-14 RX ORDER — LIDOCAINE 40 MG/G
CREAM TOPICAL ONCE
Status: CANCELLED | OUTPATIENT
Start: 2021-06-14 | End: 2021-06-14

## 2021-06-14 RX ORDER — BETAMETHASONE DIPROPIONATE 0.05 %
OINTMENT (GRAM) TOPICAL ONCE
Status: CANCELLED | OUTPATIENT
Start: 2021-06-14 | End: 2021-06-14

## 2021-06-14 RX ORDER — LIDOCAINE 50 MG/G
OINTMENT TOPICAL ONCE
Status: CANCELLED | OUTPATIENT
Start: 2021-06-14 | End: 2021-06-14

## 2021-06-14 RX ORDER — BACITRACIN ZINC AND POLYMYXIN B SULFATE 500; 1000 [USP'U]/G; [USP'U]/G
OINTMENT TOPICAL ONCE
Status: CANCELLED | OUTPATIENT
Start: 2021-06-14 | End: 2021-06-14

## 2021-06-14 ASSESSMENT — PAIN SCALES - GENERAL: PAINLEVEL_OUTOF10: 0

## 2021-06-14 NOTE — PROGRESS NOTES
[facility] Application   Below Knee    NAME:  El Rios  YOB: 1960  MEDICAL RECORD NUMBER:  [id number]  DATE:  6/14/2021    Margoth Peeling boot: Appied primary and secondary dressing as ordered. Applied Unna roll from toes to knee overlapping each time. Applied ace wrap or coban from toes to below the knee. Secured with tape and/or metal clips covered with tape. Instructed patient/caregiver to keep dressing dry and intact. DO NOT REMOVE DRESSING. Instructed pt/family/caregiver to report excessive draining, loose bandage, wet dressing, severe pain or tingling in toes. Applied [facility] dressing below the knee to left lower leg. Unna Boot(s) were applied per  Guidelines.      Electronically signed by Eric Mandujano RN on 6/14/2021 at 11:09 AM

## 2021-06-14 NOTE — PROGRESS NOTES
[address] [address]         Consult and Procedure Note      Ernst Perez  MEDICAL RECORD NUMBER:  [id number]  AGE: 62 y.o. GENDER: male  : 1960  EPISODE DATE:  2021    Subjective:     Chief Complaint   Patient presents with    Wound Check     Left foot, Left heel         HISTORY of PRESENT ILLNESS HPI     Ernst Perez is a 62 y.o. male Established patient who is well-known to our office who is being seen today for an ongoing left TMA wound. Patient is a type II diabetic with severe peripheral vascular disease. Patient was previously being seen at our [facility] office for this ongoing wound. Due to failed treatment with excisional debridements, patient was approved for Apligraf skin substitute. Wound continues to measure smaller weekly. Patient still has an ongoing wound to that left heel as well. Apligraf 5/5 was applied at last week's visit. Graft appears well-maintained and intact today. Graft was scored and kept in place today. Patient is set to have surgery with Dr. Brooklynn Zendejas this Friday for a tendon transfer and reconstruction to this left lower extremity. Because of this, patient was placed into Unna boot compression therapy to control swelling to this left lower extremity prior to surgery. Patient continues to ambulate on the right lower extremity with fracture boot in place. Patient has a history of Charcot to the right lower extremity. Going forward, patient will follow up with us at [facility] for postop care. 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   Wound/Ulcer Pain Timing/Severity: none  Quality of pain: N/A  Severity:  0/10   Modifying Factors: None  Associated Signs/Symptoms: edema, erythema and drainage        PAST MEDICAL HISTORY        Diagnosis Date    Arthritis     CAD (coronary artery disease)     CKD (chronic kidney disease), stage II     Diabetes mellitus ([facility]     Hyperlipidemia     Hypertension     Liver disease HEPITITIS AS A CHILD       PAST SURGICAL HISTORY    Past Surgical History:   Procedure Laterality Date    CARDIAC SURGERY  2019    triple bypass    CYST REMOVAL      RIGHT ANKLE     FOOT DEBRIDEMENT Left 2020    LEFT TRANSMETATARSAL AMPUTATION  FOOT INCISION AND DRAINAGE performed by Cesia Lara DPM at [address] Left 2020    LEFT WOUND DEBRIDEMENT WITH WOUND VAC APPLICATION performed by Cesia Lara DPM at [address] Right     CYST REMOVED    FOOT SURGERY Left 2020    LEFT FOOT PREPARATION GRAFT SITE, HAVEST SPLIT THICKNESS SKIN GRAFT WITH APPLICATION, APPLICATION [employer] WOUND VAC performed by Cesia Lara DPM at [address] [place] [address] TOE AMPUTATION Left 3/3/2020    I&D LEFT FOOT, TRANSMETATARSAL AMPUTATION performed by Cesia Lara DPM at [facility]    Family History   Problem Relation Age of Onset    Diabetes Mother     High Blood Pressure Mother     Alzheimer's Disease Father          of, 80or 80years old   Sania Mons Heart Disease Father     High Blood Pressure Father     Cancer Neg Hx     Stroke Neg Hx        SOCIAL HISTORY    Social History     Tobacco Use    Smoking status: Former Smoker     Types: Cigarettes     Quit date:      Years since quittin.4    Smokeless tobacco: Never Used   Vaping Use    Vaping Use: Never used   Substance Use Topics    Alcohol use: Not Currently    Drug use: Never       ALLERGIES    No Known Allergies    MEDICATIONS    Current Outpatient Medications on File Prior to Encounter   Medication Sig Dispense Refill    lisinopril (PRINIVIL;ZESTRIL) 5 MG tablet Take 1 tablet by mouth daily 90 tablet 3    clopidogrel (PLAVIX) 75 MG tablet Take 1 tablet by mouth daily 90 tablet 2    gabapentin (NEURONTIN) 600 MG tablet Take 1 tablet by mouth 2 times daily for 180 days.  180 tablet 1    metoprolol tartrate (LOPRESSOR) 25 MG tablet Take 1 tablet by mouth 2 times daily 180 tablet 1    atorvastatin (LIPITOR) 40 MG tablet Take 1 tablet by mouth nightly 90 tablet 1    insulin detemir (LEVEMIR FLEXTOUCH) 100 UNIT/ML injection pen Inject 44 Units into the skin every morning 5 pen 5    albuterol sulfate  (90 Base) MCG/ACT inhaler Inhale 2 puffs into the lungs every 6 hours as needed for Wheezing 1 Inhaler 3    aspirin 81 MG chewable tablet Take 1 tablet by mouth daily 30 tablet 3    insulin lispro (HUMALOG) 100 UNIT/ML injection vial Inject 0-12 Units into the skin 3 times daily (with meals) (Patient taking differently: Inject 0-12 Units into the skin as needed ) 1 vial 3    Multiple Vitamins-Minerals (MULTIVITAMIN PO) Take 1 tablet by mouth daily       Polyethylene Glycol 3350 (MIRALAX PO) Take by mouth as needed       No current facility-administered medications on file prior to encounter. REVIEW OF SYSTEMS    Pertinent items are noted in HPI. Objective:      /77   Pulse 81   Temp 96.7 °F (35.9 °C) (Infrared)   Resp 18   SpO2 97%     Wt Readings from Last 3 Encounters:   05/17/21 (!) 310 lb (140.6 kg)   03/15/21 (!) 310 lb (140.6 kg)   03/08/21 (!) 310 lb (140.6 kg)       PHYSICAL EXAM    General Appearance: Alert and oriented to person, place   and time, well developed and well- nourished, in no acute distress. Vascular: DP and PT pulses are faintly palpable to the left lower extremity. Cap refill less than 5 seconds. Skin was warm to cool from proximal tibia to distal foot. Mild edema noted. Skin: Patient continues to have a neurotrophic ulcer to the plantar lateral aspect of the left foot. Wound bed is hyper granular nature with scant serous drainage noted. No malodor noted. Wound continues to measure smaller weekly. Apligraf that was a placed last week appears well-maintained and intact. Graft was scored today. Patient has a previous TMA to the left lower extremity. Patient also continues to have a small left posterior heel wound. Wound does not probe to bone and there is no proximal streaking. This wound is improved from last week and there is very little drainage at this point. No malodor noted. Neurovascular: Epicritic and protopathic sensations are grossly diminished to bilateral lower extremities. Wound 02/01/21 Foot Left (Active)   Wound Image   06/14/21 0929   Wound Etiology Non-Healing Surgical 06/14/21 0929   Dressing Status Intact; Old drainage noted 06/14/21 0929   Wound Cleansed Cleansed with saline 06/14/21 0929   Dressing/Treatment Alginate with Ag;ABD;Roll gauze;Coban/self-adherent bandages 06/14/21 0929   Offloading for Diabetic Foot Ulcers Yes (type); Other (comment) 06/14/21 0929   Wound Length (cm) 1.1 cm 06/14/21 0929   Wound Width (cm) 1.8 cm 06/14/21 0929   Wound Depth (cm) 0.1 cm 06/14/21 0929   Wound Surface Area (cm^2) 1.98 cm^2 06/14/21 0929   Change in Wound Size % (l*w) 79.8 06/14/21 0929   Wound Volume (cm^3) 0.2 cm^3 06/14/21 0929   Wound Healing % 95 06/14/21 0929   Undermining Starts ___ O'Clock 7 06/14/21 0929   Undermining Ends___ O'Clock 9 06/14/21 0929   Undermining Maxium Distance (cm) 0.3 06/14/21 0929   Wound Assessment Pale granulation tissue;Slough 06/14/21 0929   Drainage Amount Moderate 06/14/21 0929   Drainage Description Serosanguinous 06/14/21 0929   Odor None 06/14/21 0929   Sumaya-wound Assessment Maceration; Hyperkeratosis (callous) 06/14/21 0929   Margins Attached edges; Unattached edges 06/14/21 0929   Wound Thickness Description not for Pressure Injury Full thickness 06/14/21 0929   Number of days: 139       Wound 02/01/21 Heel Left (Active)   Wound Image   06/14/21 0929   Wound Etiology Pressure Unstageable 06/14/21 0929   Dressing Status Intact; Old drainage noted 06/14/21 0929   Wound Cleansed Cleansed with saline 06/14/21 0929   Dressing/Treatment Alginate with Ag;ABD;Roll gauze;Coban/self-adherent bandages 06/14/21 0929   Offloading for Diabetic Foot Ulcers Yes (type); Other (comment) 06/14/21 0929   Wound Length (cm) 0.2 cm 06/14/21 0929   Wound Width (cm) 0.2 cm 06/14/21 0929   Wound Depth (cm) 1.4 cm 06/14/21 0929   Wound Surface Area (cm^2) 0.04 cm^2 06/14/21 0929   Change in Wound Size % (l*w) 86.67 06/14/21 0929   Wound Volume (cm^3) 0.06 cm^3 06/14/21 0929   Wound Healing % 78 06/14/21 0929   Undermining Starts ___ O'Clock 0 04/12/21 0853   Undermining Ends___ O'Clock 0 04/12/21 0853   Undermining Maxium Distance (cm) 0 04/12/21 0853   Wound Assessment Pale granulation tissue 06/14/21 0929   Drainage Amount Small 06/14/21 0929   Drainage Description Purulent;Yellow;Green; Thick 06/14/21 0929   Odor None 06/14/21 0929   Sumaya-wound Assessment Dry/flaky; Intact; Hyperkeratosis (callous) 06/14/21 0929   Margins Attached edges 06/14/21 0929   Wound Thickness Description not for Pressure Injury Full thickness 06/14/21 0929   Number of days: 139       LABS       CBC:   Lab Results   Component Value Date    WBC 8.0 06/10/2021    HGB 14.3 06/10/2021    HCT 43.7 06/10/2021    MCV 90.1 06/10/2021     06/10/2021     BMP:   Lab Results   Component Value Date     06/10/2021    K 3.9 06/10/2021    K 4.1 11/25/2020     06/10/2021    CO2 27 06/10/2021    BUN 14 06/10/2021    CREATININE 1.1 06/10/2021     PT/INR:   Lab Results   Component Value Date    INR 1.02 11/25/2020     Prealbumin: No results found for: PREALBUMIN  Albumin:  Lab Results   Component Value Date    LABALBU 3.9 11/25/2020     Sed Rate:  Lab Results   Component Value Date    SEDRATE 130 04/17/2020     CRP:   Lab Results   Component Value Date    CRP 14.47 04/17/2020     Micro:   Lab Results   Component Value Date    BC No growth-preliminary No growth  04/21/2020      Hemoglobin A1C:   Lab Results   Component Value Date    LABA1C 6.2 04/17/2020       Assessment:     Ulcer Identification:  Ulcer Type: venous  Contributing Factors: venous stasis    Wound: Dehiscence amputation stump    Depth of Diabetic/Pressure/Non Pressure Ulcers or Wound:  Wound, partial thickness    Patient Active Problem List   Diagnosis Code    [facility] toe of left foot ([facility] I96    CAD (coronary artery disease) I25.10    Type 2 diabetes mellitus with insulin therapy ([facility] E11.9, Z79.4    Hyperlipidemia E78.5    Hypertension I10    Charcot's joint, right ankle and foot M14.671    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    Sepsis ([facility] [address] [facility] A41.9    Acute left-sided low back pain with bilateral sciatica M54.42, M54.41    S/P transmetatarsal amputation of foot, left ([facility]) Z89.432    Leukocytosis D72.829    Wound dehiscence, surgical, initial encounter T81.31XA    Postoperative wound infection P06.30RI    Metabolic acidosis Y35.0    Hx of CABG Z95.1    Lumbar stenosis without neurogenic claudication M48.061    CKD (chronic kidney disease), stage II N18.2    Normocytic anemia D64.9    Morbid obesity ([facility]) E66.01    Debility R53.81    Carotid stenosis, asymptomatic, bilateral I65.23    Hypokalemia E87.6    Nonhealing ulcer of left lower extremity ([facility] L97.929    Bleeding R58    Wound, open T14. 8XXA    SOB (shortness of breath) on exertion R06.02       Procedure Note  42570: Unna boot compression therapy, left. A dual layer Unna boot consisting of a zinc oxide wrap followed by Coban tape was applied to the left lower extremity. Patient tolerated procedure well. Plan:     Patient examined and evaluated today. Apligraf appears well-maintained intact at today's visit. Graft was scored and kept in place. Patient is set to have surgery with Dr. Epi Oliva this coming Friday. Because of that, patient was placed in Unna boot compression therapy to help control swelling prior to surgery. Patient to continue ambulating to the right lower extremity with fracture boot in place. Going forward, patient will follow up with us at [facility] for postop care.         Treatment:   No orders of the defined types were placed in this encounter. Antibiotics: No  Follow up: 1 week    Please see attached Discharge Instructions  Written patient dismissal instructions given to patient and signed by patient or POA. Discharge Instructions       Visit Discharge/Physician Orders:  - Will check into graft options for you. - Silver nitrate to left heel   - Debridement to left foot wound. Wound Location: left foot    Dressing orders:     1) Gather wound care supplies and arrange on clean table. 2) Wash your hands with soap and water or use alcohol based hand  for 20 seconds (sing \"Happy Birthday\" twice). 3) Cleanse wounds with normal saline or wound cleanser and gauze. Pat dry with clean gauze. 4) left foot/ left heel- Remove old unna boots to left lower legs. Wash legs with warm soapy water. Pat dry. Cleanse wound with normal saline or wound cleanser and gauze. Pat dry with clean gauze. Apply collagen the alginate to wound bed. Apply unna boots , ABD then kerlix and coban to left lower legs from base of toes to 1-2 inches below bend of knee. Change in wound clinic weekly. If unna boot becomes too tight- raise/elevate legs above the level of the heart for 15-20 minutes if swelling does not go down then carefully cut off unna boot and call clinic or go to local ER or family physician. If unna boot becomes wet or starts to roll down then carefully remove unna boot and call clinic. Keep all dressings clean & dry. Do not shower, take baths or get wound wet, unless otherwise instructed by your Wound Care doctor. Follow up visit:   At [facility] for postop care    Keep next scheduled appointment. Please give 24 hour notice if unable to keep appointment. [phone]    If you experience any of the following, please call the Wound Care Service during business hours: Monday through Friday 8:00 am - 4:30 pm  ([phone]).    *Increase in pain   *Temperature over 101   *Increase in drainage from your wound or a foul odor   *Uncontrolled swelling   *Need for compression bandage changes due to slippage, breakthrough drainage    If you need medical attention outside of business hours, please contact your Primary Care Doctor or go to the nearest emergency room.          Electronically signed by EYAD Bui CNP on 6/21/2021 at 7:57 AM

## 2021-06-21 ENCOUNTER — HOSPITAL ENCOUNTER (EMERGENCY)
Age: 61
Discharge: HOME | End: 2021-06-21
Attending: EMERGENCY MEDICINE
Payer: COMMERCIAL

## 2021-06-21 ENCOUNTER — APPOINTMENT (OUTPATIENT)
Dept: GENERAL RADIOLOGY | Age: 61
End: 2021-06-21
Payer: COMMERCIAL

## 2021-06-21 VITALS
WEIGHT: 310 LBS | OXYGEN SATURATION: 96 % | TEMPERATURE: 99.6 F | DIASTOLIC BLOOD PRESSURE: 73 MMHG | RESPIRATION RATE: 20 BRPM | BODY MASS INDEX: 38.54 KG/M2 | SYSTOLIC BLOOD PRESSURE: 140 MMHG | HEIGHT: 75 IN | HEART RATE: 73 BPM

## 2021-06-21 DIAGNOSIS — R07.9 CHEST PAIN, UNSPECIFIED TYPE: Primary | ICD-10-CM

## 2021-06-21 LAB
ALBUMIN SERPL-MCNC: 3.5 G/DL (ref 3.5–5.1)
ALP BLD-CCNC: 69 U/L (ref 38–126)
ALT SERPL-CCNC: 12 U/L (ref 11–66)
ANION GAP SERPL CALCULATED.3IONS-SCNC: 11 MEQ/L (ref 8–16)
AST SERPL-CCNC: 19 U/L (ref 5–40)
BASOPHILS # BLD: 0.4 %
BASOPHILS ABSOLUTE: 0 THOU/MM3 (ref 0–0.1)
BILIRUB SERPL-MCNC: 0.7 MG/DL (ref 0.3–1.2)
BILIRUBIN DIRECT: < 0.2 MG/DL (ref 0–0.3)
BUN BLDV-MCNC: 13 MG/DL (ref 7–22)
CALCIUM SERPL-MCNC: 9 MG/DL (ref 8.5–10.5)
CHLORIDE BLD-SCNC: 98 MEQ/L (ref 98–111)
CO2: 25 MEQ/L (ref 23–33)
CREAT SERPL-MCNC: 0.8 MG/DL (ref 0.4–1.2)
EOSINOPHIL # BLD: 3.4 %
EOSINOPHILS ABSOLUTE: 0.4 THOU/MM3 (ref 0–0.4)
ERYTHROCYTE [DISTWIDTH] IN BLOOD BY AUTOMATED COUNT: 13.6 % (ref 11.5–14.5)
ERYTHROCYTE [DISTWIDTH] IN BLOOD BY AUTOMATED COUNT: 43.7 FL (ref 35–45)
GFR SERPL CREATININE-BSD FRML MDRD: > 90 ML/MIN/1.73M2
GLUCOSE BLD-MCNC: 178 MG/DL (ref 70–108)
HCT VFR BLD CALC: 38.1 % (ref 42–52)
HEMOGLOBIN: 12.7 GM/DL (ref 14–18)
IMMATURE GRANS (ABS): 0.06 THOU/MM3 (ref 0–0.07)
IMMATURE GRANULOCYTES: 0.5 %
LIPASE: 15.3 U/L (ref 5.6–51.3)
LYMPHOCYTES # BLD: 14.5 %
LYMPHOCYTES ABSOLUTE: 1.7 THOU/MM3 (ref 1–4.8)
MCH RBC QN AUTO: 29.3 PG (ref 26–33)
MCHC RBC AUTO-ENTMCNC: 33.3 GM/DL (ref 32.2–35.5)
MCV RBC AUTO: 87.8 FL (ref 80–94)
MONOCYTES # BLD: 7.9 %
MONOCYTES ABSOLUTE: 0.9 THOU/MM3 (ref 0.4–1.3)
NUCLEATED RED BLOOD CELLS: 0 /100 WBC
OSMOLALITY CALCULATION: 272.8 MOSMOL/KG (ref 275–300)
PLATELET # BLD: 194 THOU/MM3 (ref 130–400)
PMV BLD AUTO: 11.2 FL (ref 9.4–12.4)
POTASSIUM REFLEX MAGNESIUM: 3.8 MEQ/L (ref 3.5–5.2)
PRO-BNP: 1644 PG/ML (ref 0–900)
RBC # BLD: 4.34 MILL/MM3 (ref 4.7–6.1)
SEG NEUTROPHILS: 73.3 %
SEGMENTED NEUTROPHILS ABSOLUTE COUNT: 8.6 THOU/MM3 (ref 1.8–7.7)
SODIUM BLD-SCNC: 134 MEQ/L (ref 135–145)
TOTAL PROTEIN: 6.6 G/DL (ref 6.1–8)
TROPONIN T: < 0.01 NG/ML
TROPONIN T: < 0.01 NG/ML
WBC # BLD: 11.8 THOU/MM3 (ref 4.8–10.8)

## 2021-06-21 PROCEDURE — 83690 ASSAY OF LIPASE: CPT

## 2021-06-21 PROCEDURE — 80076 HEPATIC FUNCTION PANEL: CPT

## 2021-06-21 PROCEDURE — 84484 ASSAY OF TROPONIN QUANT: CPT

## 2021-06-21 PROCEDURE — 6360000002 HC RX W HCPCS: Performed by: STUDENT IN AN ORGANIZED HEALTH CARE EDUCATION/TRAINING PROGRAM

## 2021-06-21 PROCEDURE — 6370000000 HC RX 637 (ALT 250 FOR IP): Performed by: STUDENT IN AN ORGANIZED HEALTH CARE EDUCATION/TRAINING PROGRAM

## 2021-06-21 PROCEDURE — 80048 BASIC METABOLIC PNL TOTAL CA: CPT

## 2021-06-21 PROCEDURE — 83880 ASSAY OF NATRIURETIC PEPTIDE: CPT

## 2021-06-21 PROCEDURE — 85025 COMPLETE CBC W/AUTO DIFF WBC: CPT

## 2021-06-21 PROCEDURE — 96374 THER/PROPH/DIAG INJ IV PUSH: CPT

## 2021-06-21 PROCEDURE — 2500000003 HC RX 250 WO HCPCS: Performed by: STUDENT IN AN ORGANIZED HEALTH CARE EDUCATION/TRAINING PROGRAM

## 2021-06-21 PROCEDURE — 93005 ELECTROCARDIOGRAM TRACING: CPT | Performed by: STUDENT IN AN ORGANIZED HEALTH CARE EDUCATION/TRAINING PROGRAM

## 2021-06-21 PROCEDURE — 99285 EMERGENCY DEPT VISIT HI MDM: CPT

## 2021-06-21 PROCEDURE — 36415 COLL VENOUS BLD VENIPUNCTURE: CPT

## 2021-06-21 PROCEDURE — 96375 TX/PRO/DX INJ NEW DRUG ADDON: CPT

## 2021-06-21 PROCEDURE — 71045 X-RAY EXAM CHEST 1 VIEW: CPT

## 2021-06-21 RX ORDER — ONDANSETRON 2 MG/ML
4 INJECTION INTRAMUSCULAR; INTRAVENOUS ONCE
Status: COMPLETED | OUTPATIENT
Start: 2021-06-21 | End: 2021-06-21

## 2021-06-21 RX ORDER — MORPHINE SULFATE 10 MG/ML
5 INJECTION, SOLUTION INTRAMUSCULAR; INTRAVENOUS ONCE
Status: COMPLETED | OUTPATIENT
Start: 2021-06-21 | End: 2021-06-21

## 2021-06-21 RX ORDER — ASPIRIN 81 MG/1
324 TABLET, CHEWABLE ORAL ONCE
Status: COMPLETED | OUTPATIENT
Start: 2021-06-21 | End: 2021-06-21

## 2021-06-21 RX ADMIN — LIDOCAINE HYDROCHLORIDE: 20 SOLUTION ORAL; TOPICAL at 17:52

## 2021-06-21 RX ADMIN — ASPIRIN 324 MG: 81 TABLET, CHEWABLE ORAL at 17:52

## 2021-06-21 RX ADMIN — ONDANSETRON 4 MG: 2 INJECTION INTRAMUSCULAR; INTRAVENOUS at 17:52

## 2021-06-21 RX ADMIN — FAMOTIDINE 20 MG: 10 INJECTION, SOLUTION INTRAVENOUS at 17:52

## 2021-06-21 RX ADMIN — MORPHINE SULFATE 5 MG: 10 INJECTION, SOLUTION INTRAMUSCULAR; INTRAVENOUS at 17:52

## 2021-06-21 ASSESSMENT — ENCOUNTER SYMPTOMS
VOMITING: 1
CHEST TIGHTNESS: 1
SORE THROAT: 0
COUGH: 0
CONSTIPATION: 0
WHEEZING: 0
PHOTOPHOBIA: 0
ABDOMINAL PAIN: 1
NAUSEA: 1
ABDOMINAL DISTENTION: 0
CHOKING: 0
RHINORRHEA: 0
DIARRHEA: 0
SHORTNESS OF BREATH: 1
STRIDOR: 0
BACK PAIN: 0

## 2021-06-21 ASSESSMENT — PAIN SCALES - GENERAL
PAINLEVEL_OUTOF10: 3
PAINLEVEL_OUTOF10: 1

## 2021-06-21 NOTE — ED NOTES
Pt. Resting in bed with even and unlabored respirations. Pt. States pain is a 1/10 at this time. Pt. Updated about plan of care and treatment. Family at bedside. Pt. Has no further concerns, questions or needs at this time. Call light within reach.         Marcelino Vasques RN  06/21/21 0331

## 2021-06-21 NOTE — ED PROVIDER NOTES
[place] ENCOUNTER          Pt Name: Osiel Clements  MRN: [id number]  Birthdate 1960  Date of evaluation: 6/21/2021  Treating Resident Physician: Keke Saldana MD  Supervising Physician: Nico Ya COMPLAINT       Chief Complaint   Patient presents with    Chest Pain    Abdominal Pain    Shortness of Breath     History obtained from the patient. HISTORY OF PRESENT ILLNESS    HPI  Osiel Clements is a 62 y.o. male who presents to the emergency department for evaluation of pain and shortness of breath. Patient his antiplatelet therapy due to procedure on left foot on Friday. Restarted his medication yesterday. Yesterday he began to have abdominal pain, nausea, nonbloody nonbilious emesis, pressure-like chest pain, shortness of breath. This morning he did not take his medications. Patient states that pain is currently a 2 out of 10, burning and pressure-like, associated with nausea, denying any shortness of breath except with exertion at this time. Pain is not worsened or improved with any exertion position changes or breathing. The patient has no other acute complaints at this time. REVIEW OF SYSTEMS   Review of Systems   Constitutional: Negative for chills, diaphoresis, fatigue and fever. HENT: Negative for nosebleeds, postnasal drip, rhinorrhea, sneezing, sore throat and tinnitus. Eyes: Negative for photophobia and visual disturbance. Respiratory: Positive for chest tightness and shortness of breath. Negative for cough, choking, wheezing and stridor. Cardiovascular: Positive for chest pain. Negative for palpitations and leg swelling. Gastrointestinal: Positive for abdominal pain, nausea and vomiting. Negative for abdominal distention, constipation and diarrhea. Genitourinary: Negative for dysuria, frequency, hematuria and urgency.    Musculoskeletal: Positive for gait problem (Recent transmetatarsal amputation). Negative for back pain and joint swelling. Neurological: Negative for dizziness, facial asymmetry and headaches. PAST MEDICAL AND SURGICAL HISTORY     Past Medical History:   Diagnosis Date    Arthritis     CAD (coronary artery disease)     CKD (chronic kidney disease), stage II     Diabetes mellitus ([facility]     Hyperlipidemia     Hypertension     Liver disease     HEPITITIS AS A CHILD     Past Surgical History:   Procedure Laterality Date    CARDIAC SURGERY  11/2019    triple bypass    CYST REMOVAL      RIGHT ANKLE     FOOT DEBRIDEMENT Left 4/20/2020    LEFT TRANSMETATARSAL AMPUTATION  FOOT INCISION AND DRAINAGE performed by Diandra Aguila DPM at [address] Left 7/20/2020    LEFT WOUND DEBRIDEMENT WITH WOUND VAC APPLICATION performed by Diandra Aguila DPM at [address] Right     CYST REMOVED    FOOT SURGERY Left 8/14/2020    LEFT FOOT PREPARATION GRAFT SITE, HAVEST SPLIT THICKNESS SKIN GRAFT WITH APPLICATION, APPLICATION [employer] WOUND VAC performed by Diandra Aguila DPM at [address] Left 3/3/2020    I&D LEFT FOOT, TRANSMETATARSAL AMPUTATION performed by Diandra Aguila DPM at [facility]   No current facility-administered medications for this encounter. Current Outpatient Medications:     lisinopril (PRINIVIL;ZESTRIL) 5 MG tablet, Take 1 tablet by mouth daily, Disp: 90 tablet, Rfl: 3    clopidogrel (PLAVIX) 75 MG tablet, Take 1 tablet by mouth daily, Disp: 90 tablet, Rfl: 2    gabapentin (NEURONTIN) 600 MG tablet, Take 1 tablet by mouth 2 times daily for 180 days. , Disp: 180 tablet, Rfl: 1    metoprolol tartrate (LOPRESSOR) 25 MG tablet, Take 1 tablet by mouth 2 times daily, Disp: 180 tablet, Rfl: 1    atorvastatin (LIPITOR) 40 MG tablet, Take 1 tablet by mouth nightly, Disp: 90 tablet, Rfl: 1    insulin detemir (LEVEMIR FLEXTOUCH) 100 UNIT/ML injection pen, Inject 44 Units into the 06/21/21 2100   BP: (!) 140/73   Pulse: 73   Resp: 20   Temp:    SpO2: 96%       Physical Exam  Constitutional:       Appearance: He is well-developed. He is obese. He is ill-appearing. HENT:      Head: Normocephalic and atraumatic. Mouth/Throat:      Pharynx: Oropharynx is clear. Eyes:      Extraocular Movements: Extraocular movements intact. Pupils: Pupils are equal, round, and reactive to light. Cardiovascular:      Rate and Rhythm: Normal rate and regular rhythm. Heart sounds: Normal heart sounds. No murmur heard. No gallop. Pulmonary:      Effort: Pulmonary effort is normal. No respiratory distress. Breath sounds: Normal breath sounds. No wheezing, rhonchi or rales. Chest:      Chest wall: No tenderness. Abdominal:      Tenderness: There is abdominal tenderness in the epigastric area. There is no right CVA tenderness, left CVA tenderness, guarding or rebound. Negative signs include Garcia's sign, Rovsing's sign, McBurney's sign, psoas sign and obturator sign. Skin:     General: Skin is warm and dry. Capillary Refill: Capillary refill takes less than 2 seconds. Coloration: Skin is not cyanotic or mottled. Findings: No erythema. Neurological:      General: No focal deficit present. Mental Status: He is alert and oriented to person, place, and time. MEDICAL DECISION MAKING   Initial Assessment:   3 50-year-old male, significant vasculopathy history, recently off his antiplatelet medications due to surgery on Friday, presenting with chest pain, shortness of breath, nausea, vomiting. Physical exam significant for hypertension, ill-appearing.    Plan:    CBC, CMP, troponin, EKG, lipase, BNP, chest x-ray, morphine, GI cocktail, Pepcid   Pain improved with morphine GI cocktail Pepcid   Labs significant for mild leukocytosis, elevated glucose, elevated BNP   Troponin x2 is negative   Discharged home with strict return precautions, follow-up with cardiology scheduled        ED RESULTS   Laboratory results:  Labs Reviewed   CBC WITH AUTO DIFFERENTIAL - Abnormal; Notable for the following components:       Result Value    WBC 11.8 (*)     RBC 4.34 (*)     Hemoglobin 12.7 (*)     Hematocrit 38.1 (*)     Segs Absolute 8.6 (*)     All other components within normal limits   BASIC METABOLIC PANEL W/ REFLEX TO MG FOR LOW K - Abnormal; Notable for the following components:    Sodium 134 (*)     Glucose 178 (*)     All other components within normal limits   BRAIN NATRIURETIC PEPTIDE - Abnormal; Notable for the following components:    Pro-BNP 1644.0 (*)     All other components within normal limits   OSMOLALITY - Abnormal; Notable for the following components:    Osmolality Calc 272.8 (*)     All other components within normal limits   HEPATIC FUNCTION PANEL   LIPASE   TROPONIN   ANION GAP   GLOMERULAR FILTRATION RATE, ESTIMATED   TROPONIN       Radiologic studies results:  XR CHEST PORTABLE   Final Result   Impression:   1. Normal heart and lungs. This document has been electronically signed by: Elaine Stokes MD on 06/21/2021 05:54 PM          ED Medications administered this visit:   Medications   morphine (PF) injection 5 mg (5 mg Intravenous Given 6/21/21 1752)   ondansetron (ZOFRAN) injection 4 mg (4 mg Intravenous Given 6/21/21 1752)   aspirin chewable tablet 324 mg (324 mg Oral Given 6/21/21 1752)   aluminum & magnesium hydroxide-simethicone (MAALOX) 30 mL, lidocaine viscous hcl (XYLOCAINE) 5 mL (GI COCKTAIL) ( Oral Given 6/21/21 1752)   famotidine (PEPCID) injection 20 mg (20 mg Intravenous Given 6/21/21 1752)         ED COURSE         Strict return precautions and follow up instructions were discussed with the patient prior to discharge, with which the patient agrees.       MEDICATION CHANGES     Discharge Medication List as of 6/21/2021 10:04 PM            FINAL DISPOSITION     Final diagnoses:   Chest pain, unspecified type Condition: condition: good  Dispo: Discharge to home      This transcription was electronically signed. Parts of this transcriptions may have been dictated by use of voice recognition software and electronically transcribed, and parts may have been transcribed with the assistance of an ED scribe. The transcription may contain errors not detected in proofreading. Please refer to my supervising physician's documentation if my documentation differs.     Electronically Signed: Lorenza Terry MD, 06/21/21, 10:50 PM          Lorenza Terry MD  Resident  06/21/21 0682

## 2021-06-22 NOTE — ED NOTES
Pt. Resting in bed with even and unlabored respirations. Pt. States pain is a 2/10 at this time. Pt. Updated about plan for repeat troponin. Family at bedside. Pt. Has no further concerns, questions or needs at this time. Call light within reach.         Eladio Garner RN  06/21/21 2125       Eladio Garner RN  06/21/21 2126

## 2021-06-22 NOTE — ED NOTES
Pt. Resting in bed with even and unlabored respirations. Pt. States pain is a 2/10 at this time. pt repositioned in bed. Pt. Updated about plan of care and treatment. Family at bedside. Pt. Has no further concerns, questions or needs at this time. Call light within reach.         Killian Laguna RN  06/21/21 2798

## 2021-06-22 NOTE — ED NOTES
Discharge instructions and new medications discussed and explained with Pt. Pt. Verbalized understanding and has no further questions or needs at this time.         Dennis Navarrete RN  06/21/21 3964

## 2021-06-23 LAB
EKG ATRIAL RATE: 84 BPM
EKG P AXIS: 77 DEGREES
EKG P-R INTERVAL: 164 MS
EKG Q-T INTERVAL: 362 MS
EKG QRS DURATION: 104 MS
EKG QTC CALCULATION (BAZETT): 427 MS
EKG R AXIS: 21 DEGREES
EKG T AXIS: 68 DEGREES
EKG VENTRICULAR RATE: 84 BPM

## 2021-06-23 PROCEDURE — 93010 ELECTROCARDIOGRAM REPORT: CPT | Performed by: INTERNAL MEDICINE

## 2021-06-24 ENCOUNTER — OFFICE VISIT (OUTPATIENT)
Dept: CARDIOLOGY CLINIC | Age: 61
End: 2021-06-24
Payer: COMMERCIAL

## 2021-06-24 VITALS
SYSTOLIC BLOOD PRESSURE: 168 MMHG | DIASTOLIC BLOOD PRESSURE: 88 MMHG | WEIGHT: 278 LBS | BODY MASS INDEX: 34.57 KG/M2 | HEIGHT: 75 IN | HEART RATE: 76 BPM

## 2021-06-24 DIAGNOSIS — I10 ESSENTIAL HYPERTENSION: ICD-10-CM

## 2021-06-24 PROCEDURE — 99214 OFFICE O/P EST MOD 30 MIN: CPT | Performed by: INTERNAL MEDICINE

## 2021-06-24 RX ORDER — HYDROCODONE BITARTRATE AND ACETAMINOPHEN 5; 325 MG/1; MG/1
1 TABLET ORAL EVERY 6 HOURS PRN
COMMUNITY

## 2021-06-24 RX ORDER — ATORVASTATIN CALCIUM 10 MG/1
10 TABLET, FILM COATED ORAL DAILY
COMMUNITY
End: 2021-07-23 | Stop reason: ALTCHOICE

## 2021-06-24 RX ORDER — LISINOPRIL 10 MG/1
10 TABLET ORAL DAILY
Qty: 60 TABLET | Refills: 3 | Status: SHIPPED | OUTPATIENT
Start: 2021-06-24 | End: 2021-09-13 | Stop reason: SDUPTHER

## 2021-06-24 RX ORDER — CEFADROXIL 500 MG/1
500 CAPSULE ORAL 2 TIMES DAILY
COMMUNITY
End: 2021-09-13 | Stop reason: ALTCHOICE

## 2021-06-24 RX ORDER — ASPIRIN/CALCIUM/MAG/ALUMINUM 325 MG
TABLET ORAL DAILY
Status: ON HOLD | COMMUNITY
End: 2022-07-01

## 2021-06-24 RX ORDER — CYCLOBENZAPRINE HCL 10 MG
10 TABLET ORAL 3 TIMES DAILY PRN
COMMUNITY
End: 2021-09-13 | Stop reason: ALTCHOICE

## 2021-06-24 NOTE — PROGRESS NOTES
[facility] [address] [facility] [address]  Dept: [phone]  Dept Fax: [phone]  Loc: [phone]    Visit Date: 6/24/2021    Mr. Preethi De La Rosa is a 62 y.o. male  who presented for:  Chief Complaint   Patient presents with    Check-Up    Coronary Artery Disease    Shortness of Breath       HPI:   63 yo c hx of CAD s/p CABG x 3 11/2019, HTN, HLD , CKD, % Rt Carotid stenosis is here chest pains. He recently underwent foot surgery last Friday. As per patient the chest pain was midsternal, pressure like, nonradiating, no aggravating or alleviating factors, associated shortness of breath. Trops x 2 negative, proBNP is 1644. Echo from 4/2020 showed EF 50%, mild AS, G1DD. EKG without significant findings. BP in the ER visit was high. Today his BP is 171/100. Current Outpatient Medications:     Aspirin Buf,UwOac-KnJhc-IqFrh, (BUFFERED ASPIRIN) 325 MG TABS, Take by mouth daily, Disp: , Rfl:     atorvastatin (LIPITOR) 10 MG tablet, Take 10 mg by mouth daily, Disp: , Rfl:     cefadroxil (DURICEF) 500 MG capsule, Take 500 mg by mouth 2 times daily, Disp: , Rfl:     cyclobenzaprine (FLEXERIL) 10 MG tablet, Take 10 mg by mouth 3 times daily as needed for Muscle spasms, Disp: , Rfl:     HYDROcodone-acetaminophen (NORCO) 5-325 MG per tablet, Take 1 tablet by mouth every 6 hours as needed for Pain., Disp: , Rfl:     lisinopril (PRINIVIL;ZESTRIL) 5 MG tablet, Take 1 tablet by mouth daily, Disp: 90 tablet, Rfl: 3    clopidogrel (PLAVIX) 75 MG tablet, Take 1 tablet by mouth daily, Disp: 90 tablet, Rfl: 2    gabapentin (NEURONTIN) 600 MG tablet, Take 1 tablet by mouth 2 times daily for 180 days. , Disp: 180 tablet, Rfl: 1    metoprolol tartrate (LOPRESSOR) 25 MG tablet, Take 1 tablet by mouth 2 times daily, Disp: 180 tablet, Rfl: 1    insulin detemir (LEVEMIR FLEXTOUCH) 100 UNIT/ML injection pen, Inject 44 Units into the skin every morning, Disp: 5 pen, Rfl: 5    albuterol sulfate  (90 Base) MCG/ACT inhaler, Inhale 2 puffs into the lungs every 6 hours as needed for Wheezing, Disp: 1 Inhaler, Rfl: 3    Polyethylene Glycol 3350 (MIRALAX PO), Take by mouth as needed, Disp: , Rfl:     insulin lispro (HUMALOG) 100 UNIT/ML injection vial, Inject 0-12 Units into the skin 3 times daily (with meals) (Patient taking differently: Inject 0-12 Units into the skin as needed ), Disp: 1 vial, Rfl: 3    Multiple Vitamins-Minerals (MULTIVITAMIN PO), Take 1 tablet by mouth daily , Disp: , Rfl:     aspirin 81 MG chewable tablet, Take 1 tablet by mouth daily (Patient not taking: Reported on 6/24/2021), Disp: 30 tablet, Rfl: 3    Past Medical History  Mariah King  has a past medical history of Arthritis, CAD (coronary artery disease), CKD (chronic kidney disease), stage II, Diabetes mellitus ([facility] [address] Hyperlipidemia, Hypertension, and Liver disease. Social History  Mariah King  reports that he quit smoking about 13 years ago. His smoking use included cigarettes. He has never used smokeless tobacco. He reports previous alcohol use. He reports that he does not use drugs. Family History  Mariah King family history includes Alzheimer's Disease in his father; Diabetes in his mother; Heart Disease in his father; High Blood Pressure in his father and mother.     Past Surgical History   Past Surgical History:   Procedure Laterality Date    CARDIAC SURGERY  11/2019    triple bypass    CYST REMOVAL      RIGHT ANKLE     FOOT DEBRIDEMENT Left 4/20/2020    LEFT TRANSMETATARSAL AMPUTATION  FOOT INCISION AND DRAINAGE performed by Lloyd Abreu DPM at [address] Left 7/20/2020    LEFT WOUND DEBRIDEMENT WITH WOUND VAC APPLICATION performed by Llyod Abreu DPM at [address] Right     CYST REMOVED    FOOT SURGERY Left 8/14/2020    LEFT FOOT PREPARATION GRAFT SITE, HAVEST SPLIT THICKNESS SKIN GRAFT WITH APPLICATION, APPLICATION KCI WOUND VAC performed by Ashleigh Cabezas DPM at [address] [place] [address] TOE AMPUTATION Left 3/3/2020    I&D LEFT FOOT, TRANSMETATARSAL AMPUTATION performed by Ashleigh Cabezas DPM at [facility]:     REVIEW OF SYSTEMS  Constitutional: denies sweats, chills and fever  HENT: denies  congestion, sinus pressure, sneezing and sore throat. Eyes: denies  pain, discharge, redness and itching. Respiratory: denies apnea, cough  Gastrointestinal: denies blood in stool, constipation, diarrhea   Endocrine: denies cold intolerance, heat intolerance, polydipsia. Genitourinary: denies dysuria, enuresis, flank pain and hematuria. Musculoskeletal: denies arthralgias, joint swelling and neck pain. Neurological: denies numbness and headaches. Psychiatric/Behavioral: denies agitation, confusion, decreased concentration and dysphoric mood    All others reviewed and are negative. Objective:     BP (!) 172/100   Pulse 76   Ht 6' 3\" (1.905 m)   Wt 278 lb (126.1 kg)   BMI 34.75 kg/m²     Wt Readings from Last 3 Encounters:   06/24/21 278 lb (126.1 kg)   06/21/21 (!) 310 lb (140.6 kg)   05/17/21 (!) 310 lb (140.6 kg)     BP Readings from Last 3 Encounters:   06/24/21 (!) 172/100   06/21/21 (!) 140/73   06/14/21 136/77       PHYSICAL EXAM  Constitutional: Oriented to person, place, and time. Appears well-developed and well-nourished. HENT:   Head: Normocephalic and atraumatic. Eyes: EOM are normal. Pupils are equal, round, and reactive to light. Neck: Normal range of motion. Neck supple. No JVD present. Cardiovascular: Normal rate , normal heart sounds and intact distal pulses. Pulmonary/Chest: Effort normal and breath sounds normal. No respiratory distress. No wheezes. No rales. Abdominal: Soft. Bowel sounds are normal. No distension. There is no tenderness. Musculoskeletal: Normal range of motion. No edema.    Neurological: Alert and oriented to person, place, and time. No cranial nerve deficit. Coordination normal.   Skin: Skin is warm and dry. Psychiatric: Normal mood and affect.        Lab Results   Component Value Date    CKTOTAL 47 04/17/2020       Lab Results   Component Value Date    WBC 11.8 06/21/2021    RBC 4.34 06/21/2021    HGB 12.7 06/21/2021    HCT 38.1 06/21/2021    MCV 87.8 06/21/2021    MCH 29.3 06/21/2021    MCHC 33.3 06/21/2021    RDW 17.7 05/25/2020     06/21/2021    MPV 11.2 06/21/2021       Lab Results   Component Value Date     06/21/2021    K 3.8 06/21/2021    CL 98 06/21/2021    CO2 25 06/21/2021    BUN 13 06/21/2021    LABALBU 3.5 06/21/2021    CREATININE 0.8 06/21/2021    CALCIUM 9.0 06/21/2021    LABGLOM >90 06/21/2021    GLUCOSE 178 06/21/2021    GLUCOSE 103 05/25/2020       Lab Results   Component Value Date    ALKPHOS 69 06/21/2021    ALT 12 06/21/2021    AST 19 06/21/2021    PROT 6.6 06/21/2021    BILITOT 0.7 06/21/2021    BILIDIR <0.2 06/21/2021    LABALBU 3.5 06/21/2021       Lab Results   Component Value Date    MG 2.2 04/21/2020       Lab Results   Component Value Date    INR 1.02 11/25/2020    INR 1.14 (H) 04/17/2020         Lab Results   Component Value Date    LABA1C 6.2 04/17/2020       Lab Results   Component Value Date    TRIG 114 11/25/2020    HDL 40 11/25/2020    LDLCALC 66 11/25/2020       Lab Results   Component Value Date    TSH 1.250 04/20/2020         Testing Reviewed:      I haveindividually reviewed the below cardiac tests    EKG:    ECHO: Results for orders placed during the hospital encounter of 04/17/20    ECHO Complete 2D W Doppler W Color    Narrative  Transthoracic Echocardiography Report (TTE)    Demographics    Patient Name    Cristela Navas T Gender               Male    MR #            [id number]       Race                     Ethnicity    Account #       [de-identified]       Room Number          0004    Accession       [id number]       Date of Study valve was not well visualized . Left Atrium  Normal size left atrium. Left Ventricle  Left ventricle size is normal.  No asymmetrical left ventricular hypertrophy was seen. Unable to determine wall motion abnormalities due to poor image quality. Abnormal (paradoxical) motion consistent with post-operative status. Doppler parameters were consistent with abnormal left ventricular  relaxation (grade 1 diastolic dysfunction). Ejection fraction is visually estimated at 50%. Right Atrium  The right atrium is of normal size. Right Ventricle  Normal right ventricular size and function. Pericardial Effusion  No evidence of any pericardial effusion. Pleural Effusion  No evidence of pleural effusion. Aorta / Great Vessels  The aorta is within normal limits. IVC normal.  Estimated right atrial pressure is 5 mmHg .     M-Mode/2D Measurements & Calculations    LV Diastolic    LV Systolic Dimension: 3.3   AV Cusp Separation: 1.3 cmLA  Dimension: 4.6  cm                           Dimension: 2 cmAO Root  cm              LV Volume Diastolic: 92.9 ml Dimension: 3.3 cm  LV FS:28.3 %    LV Volume Systolic: 46.7 ml  LV PW           LV EDV/LV EDV Index: 98.6  Diastolic: 0.9  DE/40 G^2DV ESV/LV ESV  cm              Index: 44.1 ml/17 m^2        RV Diastolic Dimension: 2.2  Septum          EF Calculated: 54.7 %        cm  Diastolic: 0.8  cm                                           LA/Aorta: 0.61    LVOT: 2 cm    Doppler Measurements & Calculations    MV Peak E-Wave:     AV Peak Velocity: 223    LVOT Peak Velocity: 113 cm/s  59.6 cm/s           cm/s                     LVOT Mean Velocity: 77.8 cm/s  MV Peak A-Wave: 69  AV Peak Gradient: 19.89  LVOT Peak Gradient: 5  cm/s                mmHg                     mmHgLVOT Mean Gradient: 3  MV E/A Ratio: 0.86  AV Mean Velocity: 155    mmHg  MV Peak Gradient:   cm/s  1.42 mmHg           AV Mean Gradient: 11     TV Peak E-Wave: 61.3 cm/s  mmHg                     TV Peak A-Wave: 91.7 cm/s  MV Deceleration     AV VTI: 36.7 cm  Time: 259 msec      AV Area                  TV Peak Gradient: 1.5 mmHg  (Continuity):1.79 cm^2   TR Velocity:250 cm/s  TR Gradient:25 mmHg  MV E' Septal        LVOT VTI: 20.9 cm        PV Peak Velocity: 87.8 cm/s  Velocity: 6.4 cm/s                           PV Peak Gradient: 3.08 mmHg  MV A' Septal  Velocity: 9.2 cm/s  MV E' Lateral       AV DVI (VTI): 0.57AV DVI  Velocity: 4.9 cm/s  (Vmax):0.51  MV A' Lateral  Velocity: 8.5 cm/s  E/E' septal: 9.31  E/E' lateral: 12.16  MR Velocity: 377  cm/s    [id number]      STRESS:    CATH:    Assessment/Plan       Diagnosis Orders   1. Essential hypertension         HTN--uncontrolled  DM  CAD s/p CABG 2019  HLD  Morbid obesity    Will increase lisinopril 10mg dialy  Continue Aspirin, statin, metoprolol  Monitor BP  Reviewed prior workup  He is asymptomatic  Follow up with Dr. Kendrick Esquivel in 4 weeks  The patient is asked to make an attempt to improve diet and exercise patterns to aid in medical management of this problem. Advised more plant based nutrition/meditarrean diet   Advised patient to call office or seek immediate medical attention if there is any new onset of  any chest pain, sob, palpitations, lightheadedness, dizziness, orthopnea, PND or pedal edema. All medication side effects were discussed in details. Thank youfor allowing me to participate in the care of this patient. Please do not hesitate to contact me for any further questions. Return if symptoms worsen or fail to improve, for Review testing, Regular follow up.        Electronically signed by Enedelia Kee MD [facility]  6/24/2021 at 10:20 AM EDT

## 2021-07-23 ENCOUNTER — OFFICE VISIT (OUTPATIENT)
Dept: CARDIOLOGY CLINIC | Age: 61
End: 2021-07-23
Payer: COMMERCIAL

## 2021-07-23 VITALS — HEART RATE: 91 BPM | SYSTOLIC BLOOD PRESSURE: 144 MMHG | DIASTOLIC BLOOD PRESSURE: 94 MMHG

## 2021-07-23 DIAGNOSIS — I73.9 PAD (PERIPHERAL ARTERY DISEASE) (HCC): ICD-10-CM

## 2021-07-23 DIAGNOSIS — I25.10 CAD IN NATIVE ARTERY: Primary | ICD-10-CM

## 2021-07-23 PROCEDURE — 99214 OFFICE O/P EST MOD 30 MIN: CPT | Performed by: INTERNAL MEDICINE

## 2021-07-23 RX ORDER — ATORVASTATIN CALCIUM 20 MG/1
20 TABLET, FILM COATED ORAL DAILY
Qty: 30 TABLET | Refills: 3 | Status: SHIPPED | OUTPATIENT
Start: 2021-07-23 | End: 2021-09-13 | Stop reason: SDUPTHER

## 2021-07-23 NOTE — PROGRESS NOTES
[facility]  [address] [address]  Dept: [phone]  Dept Fax: [phone]  Loc: [phone]    Visit Date: 7/23/2021    Mr. Suellen Sandhoff is a 62 y.o. male  who presented for:    SOB  HTN  CAD  CABG  PAD  Carotid stenosis   HFpEF     HPI:   HPI   Ned Barr is a pleasant 62year old male patient who  has a past medical history of Arthritis, CAD (coronary artery disease), CKD (chronic kidney disease), stage II, Diabetes mellitus ([address] Hyperlipidemia, Hypertension, and Liver disease. He is s/p 3 vessel CABG on 11/2019 (Positive stress test, [facility] revealed MV CAD. 3v CABG 11/2019: SVG to OM1, SVG to Circumflex, LIMA to distal LAD with Dr. Nino at [facility] of his carotids was done at [facility] prior to CABG and per records CTA revealed 100% occlusion on the right and a 65% on the left. The recommendation was made for proceeding with his CABG and then an interval carotid endarterectomy on the left side once he is recovered from surgery. Peripheral angiogram 11/2020 was c/w nonobstructive PAD. Repeat CTA 01/2021 revealed occluded right ICA and 75% stenosis of left ICA. He denies any prior h/o CVA. The patient was seen in office after an ER visit for SOB. HTN was noted to be inadequately controlled, Lisinopril dose was increased to 10 mg po daily. Patient denies chest pain, shortness of breath, dyspnea on exertion, orthopnea, paroxysmal nocturnal dyspnea, palpitations, dizziness, syncope, weight gain or leg swelling.        Current Outpatient Medications:     Aspirin Buf,DuRru-CeXjw-WpXos, (BUFFERED ASPIRIN) 325 MG TABS, Take by mouth daily, Disp: , Rfl:     atorvastatin (LIPITOR) 10 MG tablet, Take 10 mg by mouth daily, Disp: , Rfl:     cefadroxil (DURICEF) 500 MG capsule, Take 500 mg by mouth 2 times daily, Disp: , Rfl:     cyclobenzaprine (FLEXERIL) 10 MG tablet, Take 10 mg by mouth 3 times daily as needed for Muscle spasms, Disp: , Rfl:    HYDROcodone-acetaminophen (NORCO) 5-325 MG per tablet, Take 1 tablet by mouth every 6 hours as needed for Pain., Disp: , Rfl:     lisinopril (PRINIVIL;ZESTRIL) 10 MG tablet, Take 1 tablet by mouth daily, Disp: 60 tablet, Rfl: 3    clopidogrel (PLAVIX) 75 MG tablet, Take 1 tablet by mouth daily, Disp: 90 tablet, Rfl: 2    gabapentin (NEURONTIN) 600 MG tablet, Take 1 tablet by mouth 2 times daily for 180 days. , Disp: 180 tablet, Rfl: 1    metoprolol tartrate (LOPRESSOR) 25 MG tablet, Take 1 tablet by mouth 2 times daily, Disp: 180 tablet, Rfl: 1    insulin detemir (LEVEMIR FLEXTOUCH) 100 UNIT/ML injection pen, Inject 44 Units into the skin every morning, Disp: 5 pen, Rfl: 5    albuterol sulfate  (90 Base) MCG/ACT inhaler, Inhale 2 puffs into the lungs every 6 hours as needed for Wheezing, Disp: 1 Inhaler, Rfl: 3    Polyethylene Glycol 3350 (MIRALAX PO), Take by mouth as needed, Disp: , Rfl:     aspirin 81 MG chewable tablet, Take 1 tablet by mouth daily (Patient not taking: Reported on 6/24/2021), Disp: 30 tablet, Rfl: 3    insulin lispro (HUMALOG) 100 UNIT/ML injection vial, Inject 0-12 Units into the skin 3 times daily (with meals) (Patient taking differently: Inject 0-12 Units into the skin as needed ), Disp: 1 vial, Rfl: 3    Multiple Vitamins-Minerals (MULTIVITAMIN PO), Take 1 tablet by mouth daily , Disp: , Rfl:     Past Medical History  Tiffanie Mario  has a past medical history of Arthritis, CAD (coronary artery disease), CKD (chronic kidney disease), stage II, Diabetes mellitus ([facility] [address] Hyperlipidemia, Hypertension, and Liver disease. Social History  Tiffanie Mario  reports that he quit smoking about 13 years ago. His smoking use included cigarettes. He has never used smokeless tobacco. He reports previous alcohol use. He reports that he does not use drugs.     Family History  Tiffanie Mario family history includes Alzheimer's Disease in his father; Diabetes in his mother; Heart Disease in his father; High Blood Pressure in his father and mother. Past Surgical History   Past Surgical History:   Procedure Laterality Date    CARDIAC SURGERY  11/2019    triple bypass    CYST REMOVAL      RIGHT ANKLE     FOOT DEBRIDEMENT Left 4/20/2020    LEFT TRANSMETATARSAL AMPUTATION  FOOT INCISION AND DRAINAGE performed by Jonathon Lindsay DPM at [address] [place] [address] Left 7/20/2020    LEFT WOUND DEBRIDEMENT WITH WOUND VAC APPLICATION performed by Jonathon Lindsay DPM at [facility] Right     CYST REMOVED    FOOT SURGERY Left 8/14/2020    LEFT FOOT PREPARATION GRAFT SITE, HAVEST SPLIT THICKNESS SKIN GRAFT WITH APPLICATION, APPLICATION KCI WOUND VAC performed by Jonathon Lindsay DPM at [address] [place] [address] TOE AMPUTATION Left 3/3/2020    I&D LEFT FOOT, TRANSMETATARSAL AMPUTATION performed by Jonathon Lindsay DPM at [facility] [place]         Review of Systems   Constitutional: Negative for chills and fever  HENT: Negative for congestion, sinus pressure, sneezing and sore throat. Eyes: Negative for pain, discharge, redness and itching. Respiratory: Negative for apnea, cough  Gastrointestinal: Negative for blood in stool, constipation, diarrhea   Endocrine: Negative for cold intolerance, heat intolerance, polydipsia. Genitourinary: Negative for dysuria, enuresis, flank pain and hematuria. Musculoskeletal: Negative for arthralgias, joint swelling and neck pain. Neurological: Negative for numbness and headaches. Psychiatric/Behavioral: Negative for agitation, confusion, decreased concentration and dysphoric mood. Objective: There were no vitals taken for this visit. Wt Readings from Last 3 Encounters:   06/24/21 278 lb (126.1 kg)   06/21/21 (!) 310 lb (140.6 kg)   05/17/21 (!) 310 lb (140.6 kg)     BP Readings from Last 3 Encounters:   06/24/21 (!) 168/88   06/21/21 (!) 140/73   06/14/21 136/77       Nursing note and vitals reviewed.     Physical Exam   Constitutional: Oriented to person, place, and time. Appears well-developed and well-nourished. ENT: Moist mucous membranes. No bleeding. Tongue is midline. Head: Normocephalic and atraumatic. Eyes: EOM are normal. Pupils are equal, round, and reactive to light. Neck: Normal range of motion. Neck supple. No JVD present. Cardiovascular: Normal rate, regular rhythm, murmur, no rubs, and intact distal pulses. Pulmonary/Chest: Effort normal and breath sounds normal. No respiratory distress. No wheezes. No rales. Abdominal: Soft. Bowel sounds are normal. No distension. There is no tenderness. Musculoskeletal: Normal range of motion. no edema. Neurological: Alert and oriented to person, place, and time. No cranial nerve deficit. Coordination normal.   Skin: Skin is warm and dry. Psychiatric: Normal mood and affect.        Lab Results   Component Value Date    CKTOTAL 47 04/17/2020       Lab Results   Component Value Date    WBC 11.8 06/21/2021    RBC 4.34 06/21/2021    HGB 12.7 06/21/2021    HCT 38.1 06/21/2021    MCV 87.8 06/21/2021    MCH 29.3 06/21/2021    MCHC 33.3 06/21/2021    RDW 17.7 05/25/2020     06/21/2021    MPV 11.2 06/21/2021       Lab Results   Component Value Date     06/21/2021    K 3.8 06/21/2021    CL 98 06/21/2021    CO2 25 06/21/2021    BUN 13 06/21/2021    LABALBU 3.5 06/21/2021    CREATININE 0.8 06/21/2021    CALCIUM 9.0 06/21/2021    LABGLOM >90 06/21/2021    GLUCOSE 178 06/21/2021    GLUCOSE 103 05/25/2020       Lab Results   Component Value Date    ALKPHOS 69 06/21/2021    ALT 12 06/21/2021    AST 19 06/21/2021    PROT 6.6 06/21/2021    BILITOT 0.7 06/21/2021    BILIDIR <0.2 06/21/2021    LABALBU 3.5 06/21/2021       Lab Results   Component Value Date    MG 2.2 04/21/2020       Lab Results   Component Value Date    INR 1.02 11/25/2020    INR 1.14 (H) 04/17/2020         Lab Results   Component Value Date    LABA1C 6.2 04/17/2020       Lab Results   Component Value Date    TRIG 114 11/25/2020    HDL 40 11/25/2020    LDLCALC 66 11/25/2020       Lab Results   Component Value Date    TSH 1.250 04/20/2020         Testing Reviewed:      I have individually reviewed the cardiac test below:    ECHO: Results for orders placed during the hospital encounter of 04/17/20    ECHO Complete 2D W Doppler W Color    Narrative  Transthoracic Echocardiography Report (TTE)    Demographics    Patient Name    Rizwan KENNY Gender               Male    MR #            [id number]       Race                     Ethnicity    Account #       [de-identified]       Room Number          0004    Accession       [id number]       Date of Study        04/21/2020  Number    Date of Birth   1960      Referring Physician  XOCHILT Rubi MD    Age             63 year(s)      Sonographer          Luis Mccain MD  Physician    Procedure    Type of Study    TTE procedure:ECHOCARDIOGRAM COMPLETE 2D W DOPPLER W COLOR. Procedure Date  Date: 04/21/2020 Start: 08:54 AM    Study Location: Bedside  Technical Quality: Limited visualization due to body habitus. Indications:Coronary artery disease and S/P CABG. Additional Medical History:Ex smoker, diabetic, sepsis, CAD, CABG, chronic  kidney disease, anemia, hyperlipidemia, hypertension, arthritis    Patient Status: Routine    Height: 74.02 inches Weight: 308. 65 pounds BSA: 2.61 m^2 BMI: 39.61 kg/m^2    BP: 202/97 mmHg    Conclusions    Summary  Technically difficult examination. Technically limited study. Left ventricle size is normal.  Ejection fraction is visually estimated at 50%. Unable to determine wall motion abnormalities due to poor image quality. Abnormal (paradoxical) motion consistent with post-operative status. Doppler parameters were consistent with abnormal left ventricular  relaxation (grade 1 diastolic dysfunction). Thickened aortic valve leaflets noted.   Aortic valve leaflets are Mildly calcified. Mild aortic stenosis is present. Signature    ----------------------------------------------------------------  Electronically signed by Arslan Mack MD (Interpreting  physician) on 04/21/2020 at 11:53 AM  ----------------------------------------------------------------    Findings    Mitral Valve  Structurally normal mitral valve. Trace mitral regurgitation is present. Aortic Valve  Thickened aortic valve leaflets noted. Aortic valve leaflets are Mildly calcified. Mild aortic stenosis is present. Tricuspid Valve  Tricuspid valve is structurally normal.  Trace tricuspid regurgitation. Pulmonic Valve  The pulmonic valve was not well visualized . Left Atrium  Normal size left atrium. Left Ventricle  Left ventricle size is normal.  No asymmetrical left ventricular hypertrophy was seen. Unable to determine wall motion abnormalities due to poor image quality. Abnormal (paradoxical) motion consistent with post-operative status. Doppler parameters were consistent with abnormal left ventricular  relaxation (grade 1 diastolic dysfunction). Ejection fraction is visually estimated at 50%. Right Atrium  The right atrium is of normal size. Right Ventricle  Normal right ventricular size and function. Pericardial Effusion  No evidence of any pericardial effusion. Pleural Effusion  No evidence of pleural effusion. Aorta / Great Vessels  The aorta is within normal limits. IVC normal.  Estimated right atrial pressure is 5 mmHg .     M-Mode/2D Measurements & Calculations    LV Diastolic    LV Systolic Dimension: 3.3   AV Cusp Separation: 1.3 cmLA  Dimension: 4.6  cm                           Dimension: 2 cmAO Root  cm              LV Volume Diastolic: 28.4 ml Dimension: 3.3 cm  LV FS:28.3 %    LV Volume Systolic: 07.4 ml  LV PW           LV EDV/LV EDV Index: 51.9  Diastolic: 0.9  ZX/59 U^7ST ESV/LV ESV  cm              Index: 44.1 ml/17 m^2        RV Diastolic Dimension: 2.2  Septum          EF Calculated: 54.7 %        cm  Diastolic: 0.8  cm                                           LA/Aorta: 0.61    LVOT: 2 cm    Doppler Measurements & Calculations    MV Peak E-Wave:     AV Peak Velocity: 223    LVOT Peak Velocity: 113 cm/s  59.6 cm/s           cm/s                     LVOT Mean Velocity: 77.8 cm/s  MV Peak A-Wave: 69  AV Peak Gradient: 19.89  LVOT Peak Gradient: 5  cm/s                mmHg                     mmHgLVOT Mean Gradient: 3  MV E/A Ratio: 0.86  AV Mean Velocity: 155    mmHg  MV Peak Gradient:   cm/s  1.42 mmHg           AV Mean Gradient: 11     TV Peak E-Wave: 61.3 cm/s  mmHg                     TV Peak A-Wave: 91.7 cm/s  MV Deceleration     AV VTI: 36.7 cm  Time: 259 msec      AV Area                  TV Peak Gradient: 1.5 mmHg  (Continuity):1.79 cm^2   TR Velocity:250 cm/s  TR Gradient:25 mmHg  MV E' Septal        LVOT VTI: 20.9 cm        PV Peak Velocity: 87.8 cm/s  Velocity: 6.4 cm/s                           PV Peak Gradient: 3.08 mmHg  MV A' Septal  Velocity: 9.2 cm/s  MV E' Lateral       AV DVI (VTI): 0.57AV DVI  Velocity: 4.9 cm/s  (Vmax):0.51  MV A' Lateral  Velocity: 8.5 cm/s  E/E' septal: 9.31  E/E' lateral: 12.16  MR Velocity: 377  cm/s    [email or web address] [id number]       Peripheral angiogram 11/2020  IMPRESSION:  Nonobstructive peripheral arterial disease.     RECOMMENDATIONS:  Medical management. Narrative   PROCEDURE: CTA NECK W WO CONTRAST       CLINICAL INFORMATION: Bilateral carotid artery stenosis. Preop. Bilateral carotid stenosis. CABG in November 2020.       COMPARISON: No prior study.       TECHNIQUE: 1 mm axial images were obtained through the neck after the fast bolus administration of contrast. A noncontrast localizer was obtained. 3-D reconstructions were performed on a dedicated 3-D workstation.  These include multiplanar MPR images,    multiplanar MIP images and centerline reconstructions. Isovue intravenous contrast was given.       All CT scans at this facility use dose modulation, iterative reconstruction, and/or weight-based dosing when appropriate to reduce radiation dose to as low as reasonably achievable.       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 There is no stenosis at the origin innominate artery. There is no stenosis at the origin left common carotid artery. There is some atherosclerosis at the origin. There is no stenosis of    either subclavian artery. There is some atherosclerosis.       Right common carotid artery/ICA: The right common carotid artery is within acceptable limits. There is heavy calcified atherosclerosis of the right carotid bulb. The right carotid bulb is occluded. The right internal carotid artery is occluded. The right    external carotid artery origin is widely patent.       Left common carotid artery/ICA: The left common carotid artery is normal. There is calcified and noncalcified atherosclerosis of the level the left carotid bulb. There is stenosis at the origin of the left internal carotid artery. Using NASCET criteria    and the distal left internal carotid artery as the reference, there is a 75% stenosis at the origin of the left internal carotid artery. There is no distal stenosis. The vessel is tortuous and somewhat redundant.       Vertebral arteries: There is a moderate stenosis at the origin of the right vertebral artery. There is no stenosis of the left vertebral artery. The vertebral arteries are relatively codominant.       Intracranial cerebral circulation: The cavernous segment of the right internal carotid artery is occluded. There is heavy calcified atherosclerosis of the cavernous segment of the left internal carotid artery. There is a moderate stenosis in its    midportion. The vessel is patent.  The anterior cerebral arteries and exertion, orthopnea, paroxysmal nocturnal dyspnea, palpitations, dizziness, syncope, weight gain or leg swelling. 1. S/P left TMA 3/3/2020, recurrent infection, s/p revision 04/2020, required IV Abx/Wound VAC  2. PAD  3. Carotid stenosis (CTA 11/2019 revealed 100% occlusion on right, 65% stenosis on left - left CEA was planned after CABG)  4. Asymptomatic (progressive) 75% left ICA stenosis noted on most recent CTA 01/2021  5. CAD  6. 3 V CABG 11/2019 (Polly: SVG LCX, SVG OM1, LIMA to dLAD)  7. DM  8. HTN  9. Dyslipidemia   10. HFpEF       /94   ASA   Plavix   Lisinopril    Metoprolol    Lipitor 10 mg po daily   LDL 66 in 11/2020   Increase Lipitor to 20 mg po daily    Check lipid panel, LFT in 1-2 months    I discussed the findings of neck CTA with patient. CTA 11/2019 revealed 100% occlusion on right, 65% stenosis on left - left CEA was planned after CABG). Asymptomatic, no h/o CVA. Despite medical therapy, Carotid stenosis has progressed,  75% left ICA stenosis noted on most recent CTA 01/2021. D/w patient continued medical therapy with close surveillance Vs revascularization. Revascularization options, stenting Vs CEA, were d/w patient. Patient wishes [facility], he is scheduled to see on 8/17/2021    Above findings and plan of care were discussed with patient in details, patient's questions were answered.      Disposition:  RTC in 12 months, sooner as needed            Electronically signed by Tracee Stanley MD, [facility] [place]    7/23/2021 at 8:59 AM EDT

## 2021-08-18 ENCOUNTER — TELEPHONE (OUTPATIENT)
Dept: FAMILY MEDICINE CLINIC | Age: 61
End: 2021-08-18

## 2021-08-18 DIAGNOSIS — E11.9 TYPE 2 DIABETES MELLITUS WITH INSULIN THERAPY (HCC): ICD-10-CM

## 2021-08-18 DIAGNOSIS — I10 ESSENTIAL HYPERTENSION: Primary | ICD-10-CM

## 2021-08-18 DIAGNOSIS — Z79.4 TYPE 2 DIABETES MELLITUS WITH INSULIN THERAPY (HCC): ICD-10-CM

## 2021-08-18 NOTE — TELEPHONE ENCOUNTER
----- Message from Anthony Jimenez sent at 8/18/2021  3:10 PM EDT -----  Subject: Message to Provider    QUESTIONS  Information for Provider? Patient wanted to know pcp's thoughts on Covid   booster? Please call to advise.  ---------------------------------------------------------------------------  --------------  CALL BACK INFO  What is the best way for the office to contact you? OK to leave message on   voicemail  Preferred Call Back Phone Number? [phone]  ---------------------------------------------------------------------------  --------------  SCRIPT ANSWERS  Relationship to Patient?  Self

## 2021-08-20 RX ORDER — INSULIN DETEMIR 100 [IU]/ML
44 INJECTION, SOLUTION SUBCUTANEOUS EVERY MORNING
Qty: 5 PEN | Refills: 5 | Status: SHIPPED | OUTPATIENT
Start: 2021-08-20 | End: 2021-09-13 | Stop reason: SDUPTHER

## 2021-08-20 NOTE — TELEPHONE ENCOUNTER
I called and spoke to the patient. I let him know Theresa's response regarding the COVID booster. The patient asked how he gets one and if he needs a prescription for it. I told him I was not sure. [facility] so he is going to give them a call.

## 2021-08-20 NOTE — TELEPHONE ENCOUNTER
Pt would meet criteria for a booster shot with his chronic conditions. It is 8 months after the 2nd shot was administered.

## 2021-08-20 NOTE — TELEPHONE ENCOUNTER
I called and spoke to the patient. I let him know Theresa's response regarding his refill request.  The patient scheduled an annual exam with Harvinder Felton for 08- at 1:30 pm.  I let him know to have this labs done on Monday (08-), the lab order will be at the [facility] for him, and to fast 8-10 hours. The patient voiced understanding.

## 2021-08-26 ENCOUNTER — NURSE ONLY (OUTPATIENT)
Dept: LAB | Age: 61
End: 2021-08-26

## 2021-08-26 DIAGNOSIS — E11.9 TYPE 2 DIABETES MELLITUS WITH INSULIN THERAPY (HCC): ICD-10-CM

## 2021-08-26 DIAGNOSIS — Z79.4 TYPE 2 DIABETES MELLITUS WITH INSULIN THERAPY (HCC): ICD-10-CM

## 2021-08-26 DIAGNOSIS — I10 ESSENTIAL HYPERTENSION: ICD-10-CM

## 2021-08-26 LAB
AVERAGE GLUCOSE: 234 MG/DL (ref 70–126)
HBA1C MFR BLD: 9.8 % (ref 4.4–6.4)
TSH SERPL DL<=0.05 MIU/L-ACNC: 0.86 UIU/ML (ref 0.4–4.2)
VITAMIN D 25-HYDROXY: 32 NG/ML (ref 30–100)

## 2021-09-13 ENCOUNTER — OFFICE VISIT (OUTPATIENT)
Dept: FAMILY MEDICINE CLINIC | Age: 61
End: 2021-09-13
Payer: COMMERCIAL

## 2021-09-13 VITALS
HEART RATE: 84 BPM | SYSTOLIC BLOOD PRESSURE: 158 MMHG | OXYGEN SATURATION: 98 % | TEMPERATURE: 97.5 F | DIASTOLIC BLOOD PRESSURE: 92 MMHG

## 2021-09-13 DIAGNOSIS — E66.01 MORBID OBESITY (HCC): ICD-10-CM

## 2021-09-13 DIAGNOSIS — I10 ESSENTIAL HYPERTENSION: ICD-10-CM

## 2021-09-13 DIAGNOSIS — E11.9 TYPE 2 DIABETES MELLITUS WITH INSULIN THERAPY (HCC): ICD-10-CM

## 2021-09-13 DIAGNOSIS — N18.2 CKD (CHRONIC KIDNEY DISEASE), STAGE II: ICD-10-CM

## 2021-09-13 DIAGNOSIS — Z00.01 ENCOUNTER FOR WELL ADULT EXAM WITH ABNORMAL FINDINGS: Primary | ICD-10-CM

## 2021-09-13 DIAGNOSIS — Z79.4 TYPE 2 DIABETES MELLITUS WITH INSULIN THERAPY (HCC): ICD-10-CM

## 2021-09-13 DIAGNOSIS — I25.10 CORONARY ARTERY DISEASE INVOLVING NATIVE CORONARY ARTERY OF NATIVE HEART WITHOUT ANGINA PECTORIS: ICD-10-CM

## 2021-09-13 DIAGNOSIS — E78.2 MODERATE MIXED HYPERLIPIDEMIA NOT REQUIRING STATIN THERAPY: ICD-10-CM

## 2021-09-13 PROCEDURE — 99396 PREV VISIT EST AGE 40-64: CPT | Performed by: NURSE PRACTITIONER

## 2021-09-13 RX ORDER — CLOPIDOGREL BISULFATE 75 MG/1
75 TABLET ORAL DAILY
Qty: 90 TABLET | Refills: 3 | Status: SHIPPED | OUTPATIENT
Start: 2021-09-13 | End: 2022-08-24 | Stop reason: SDUPTHER

## 2021-09-13 RX ORDER — LISINOPRIL 10 MG/1
10 TABLET ORAL DAILY
Qty: 90 TABLET | Refills: 3 | Status: SHIPPED | OUTPATIENT
Start: 2021-09-13 | End: 2021-09-13

## 2021-09-13 RX ORDER — ATORVASTATIN CALCIUM 20 MG/1
20 TABLET, FILM COATED ORAL DAILY
Qty: 90 TABLET | Refills: 1 | Status: SHIPPED | OUTPATIENT
Start: 2021-09-13 | End: 2021-10-04

## 2021-09-13 RX ORDER — LISINOPRIL 20 MG/1
20 TABLET ORAL DAILY
Qty: 90 TABLET | Refills: 1 | Status: SHIPPED | OUTPATIENT
Start: 2021-09-13 | End: 2022-03-22 | Stop reason: SDUPTHER

## 2021-09-13 RX ORDER — INSULIN DETEMIR 100 [IU]/ML
50 INJECTION, SOLUTION SUBCUTANEOUS EVERY MORNING
Qty: 45 ML | Refills: 1 | Status: SHIPPED | OUTPATIENT
Start: 2021-09-13 | End: 2021-10-04

## 2021-09-13 ASSESSMENT — ENCOUNTER SYMPTOMS
EYE REDNESS: 0
SINUS PRESSURE: 0
SHORTNESS OF BREATH: 0
CONSTIPATION: 0
DIARRHEA: 0
SINUS PAIN: 0
VOMITING: 0
EYE ITCHING: 0
TROUBLE SWALLOWING: 0
CHEST TIGHTNESS: 0
ABDOMINAL PAIN: 0
ABDOMINAL DISTENTION: 0
COUGH: 0
BLOOD IN STOOL: 0
RHINORRHEA: 0
SORE THROAT: 0
NAUSEA: 0
EYE PAIN: 0
EYE DISCHARGE: 0

## 2021-09-13 NOTE — PATIENT INSTRUCTIONS
grains, fruits, milk and yogurt, and starchy vegetables like potatoes, beans, and corn. It also avoids foods and drinks that have added sugar. Instead, low-carb diets include foods that are high in protein and fat. Why might you follow a low-carb diet? Low-carb diets may be used for a variety of reasons, such as for weight loss. People who have diabetes may use a low-carb diet to help manage their blood sugar levels. What should you do before you start the diet? Talk to your doctor before you try any diet. This is even more important if you have health problems like kidney disease, heart disease, or diabetes. Your doctor may suggest that you meet with a registered dietitian. A dietitian can help you make an eating plan that works for you. What foods do you eat on a low-carb diet? On a low-carb diet, you choose foods that are high in protein and fat. Examples of these are:  · Meat, poultry, and fish. · Eggs. · Nuts, such as walnuts, pecans, almonds, and peanuts. · Peanut butter and other nut butters. · Tofu. · Avocado. · Eckert Coil. · Non-starchy vegetables like broccoli, cauliflower, green beans, mushrooms, peppers, lettuce, and spinach. · Unsweetened non-dairy milks like almond milk and coconut milk. · Cheese, cottage cheese, and cream cheese. Current as of: December 17, 2020               Content Version: 12.9  © 2006-2021 [facility]. Care instructions adapted under license by [facility] ([facility]). If you have questions about a medical condition or this instruction, always ask your healthcare professional. [place] [address] any warranty or liability for your use of this information.

## 2021-09-13 NOTE — PROGRESS NOTES
[address] [facility]  Dept: [phone]          Eunice Purcell (:  1960) is a 62 y.o. male,Established patient, here for evaluation of the following chief complaint(s): Annual Exam      ASSESSMENT/PLAN:  I have reviewed the patient's medical history in detail and updated the computerized patient record. HPI/ROS per the patient and caregiver. Overall non toxic in appearance. Answers questions appropriately. Conditions discussed and addressed this visit include:     Controlled substances monitoring: possible medication side effects, risk of tolerance and/or dependence, and alternative treatments discussed and OARRS report reviewed today- activity consistent with treatment plan. Discussed all clinical findings and labs wit the patient. a1c up to 9. Will increase the levemir to 50 units per day. Continue sliding scale every meal  Continue range of motion activities and use of bands to maintain strenght  Consider a PT referral once able to be ambulatory  The patient is advised to quit smoking, follow a low fat, low cholesterol diet, attempt to lose weight, reduce salt in diet and cooking, reduce exposure to stress, improve dietary compliance, continue current medications, continue current healthy lifestyle patterns and return for routine annual checkups. 1. Encounter for well adult exam with abnormal findings  2. Coronary artery disease involving native coronary artery of native heart without angina pectoris  3. Essential hypertension  -     lisinopril (PRINIVIL;ZESTRIL) 20 MG tablet; Take 1 tablet by mouth daily, Disp-90 tablet, R-1Cancel the 10mg order please. Normal  4. Type 2 diabetes mellitus with insulin therapy (HCC)  -     insulin detemir (LEVEMIR FLEXTOUCH) 100 UNIT/ML injection pen; Inject 50 Units into the skin every morning, Disp-45 mL, R-1Normal  -     Hemoglobin A1C; Future  5. CKD (chronic kidney disease), stage II  6.  Moderate mixed hyperlipidemia not requiring statin therapy  7. Morbid obesity ([facility] [address]      Return in about 3 months (around 12/13/2021) for Results review, Routine follow up. SUBJECTIVE/OBJECTIVE:  HPI   Here for annual physical   Overall doing well per his thoughts   a1c is up    muultiple left foot surgeries, now with transmetatarsal amputation   Has not been consistent with his diet, currently working towards correcting that.    Review of Systems   Constitutional: Positive for activity change, appetite change and fatigue. Negative for chills, diaphoresis, fever and unexpected weight change. HENT: Negative for congestion, ear discharge, ear pain, hearing loss, rhinorrhea, sinus pressure, sinus pain, sneezing, sore throat and trouble swallowing. Eyes: Negative for pain, discharge, redness, itching and visual disturbance. Respiratory: Negative for cough, chest tightness and shortness of breath. Cardiovascular: Positive for leg swelling. Negative for chest pain and palpitations. Gastrointestinal: Negative for abdominal distention, abdominal pain, blood in stool, constipation, diarrhea, nausea and vomiting. Endocrine: Negative for cold intolerance, heat intolerance, polydipsia, polyphagia and polyuria. Genitourinary: Negative for difficulty urinating, dysuria, flank pain and hematuria. Musculoskeletal: Positive for arthralgias, gait problem and myalgias. Skin: Positive for wound. Negative for rash. Allergic/Immunologic: Positive for environmental allergies. Negative for food allergies. Neurological: Positive for numbness. Negative for dizziness, weakness, light-headedness and headaches. Hematological: Negative for adenopathy. Does not bruise/bleed easily. Psychiatric/Behavioral: Negative for agitation, behavioral problems and confusion. The patient is not nervous/anxious. Physical Exam  Vitals and nursing note reviewed. Constitutional:       General: He is not in acute distress.      Appearance: Normal appearance. He is obese. He is not ill-appearing. HENT:      Head: Normocephalic and atraumatic. Right Ear: Tympanic membrane, ear canal and external ear normal.      Left Ear: Tympanic membrane, ear canal and external ear normal.      Nose: Nose normal.      Mouth/Throat:      Mouth: Mucous membranes are moist.      Pharynx: Oropharynx is clear. Eyes:      General:         Right eye: No discharge. Left eye: No discharge. Conjunctiva/sclera: Conjunctivae normal.   Neck:      Vascular: Carotid bruit present. Cardiovascular:      Rate and Rhythm: Normal rate and regular rhythm. Pulses: Normal pulses. Heart sounds: Normal heart sounds. Pulmonary:      Effort: Pulmonary effort is normal.      Breath sounds: Normal breath sounds. Abdominal:      General: Abdomen is flat. Bowel sounds are normal.      Palpations: Abdomen is soft. Musculoskeletal:         General: Deformity ( transmetarsal amputation left foot. ) present. Normal range of motion. Cervical back: Normal range of motion and neck supple. Right lower leg: No edema. Left lower leg: No edema. Comments: Unable to visualize foot wounds due to casting/wound vac     Lymphadenopathy:      Cervical: No cervical adenopathy. Skin:     General: Skin is warm and dry. Capillary Refill: Capillary refill takes 2 to 3 seconds. Findings: No rash. Neurological:      General: No focal deficit present. Mental Status: He is alert and oriented to person, place, and time. Mental status is at baseline. Sensory: Sensory deficit present. Deep Tendon Reflexes: Reflexes abnormal.   Psychiatric:         Mood and Affect: Mood normal.         Behavior: Behavior normal.         Thought Content: Thought content normal.         Judgment: Judgment normal.                 An electronic signature was used to authenticate this note.     --EYAD Morales - CNP

## 2022-01-21 ENCOUNTER — TELEPHONE (OUTPATIENT)
Dept: WOUND CARE | Age: 62
End: 2022-01-21

## 2022-03-22 DIAGNOSIS — I10 ESSENTIAL HYPERTENSION: ICD-10-CM

## 2022-03-23 RX ORDER — LISINOPRIL 20 MG/1
20 TABLET ORAL DAILY
Qty: 90 TABLET | Refills: 3 | Status: SHIPPED | OUTPATIENT
Start: 2022-03-23

## 2022-03-30 ENCOUNTER — TELEPHONE (OUTPATIENT)
Dept: FAMILY MEDICINE CLINIC | Age: 62
End: 2022-03-30

## 2022-03-30 DIAGNOSIS — J01.90 ACUTE SINUSITIS, RECURRENCE NOT SPECIFIED, UNSPECIFIED LOCATION: Primary | ICD-10-CM

## 2022-03-30 RX ORDER — METHYLPREDNISOLONE 4 MG/1
TABLET ORAL
Qty: 1 KIT | Refills: 0 | Status: SHIPPED | OUTPATIENT
Start: 2022-03-30 | End: 2022-04-04 | Stop reason: ALTCHOICE

## 2022-03-30 RX ORDER — CEFDINIR 300 MG/1
300 CAPSULE ORAL 2 TIMES DAILY
Qty: 14 CAPSULE | Refills: 0 | Status: SHIPPED | OUTPATIENT
Start: 2022-03-30 | End: 2022-04-04 | Stop reason: ALTCHOICE

## 2022-03-30 NOTE — TELEPHONE ENCOUNTER
I called and spoke to the patient. I let him know Theresa's response and that the prescriptions were sent to [facility] [address] [facility] in [facility]. The patient voiced understanding.

## 2022-03-30 NOTE — TELEPHONE ENCOUNTER
----- Message from Irena Neal sent at 3/30/2022  9:39 AM EDT -----  Subject: Message to Provider    QUESTIONS  Information for Provider? pt called to see if pcp could prescribe   antibiotics for having congestion, runny nose, yellow flehm and believe it   to be a sinus infection. please reach out to pt to advise.  ---------------------------------------------------------------------------  --------------  CALL BACK INFO  What is the best way for the office to contact you? OK to leave message on   voicemail  Preferred Call Back Phone Number? [phone]  ---------------------------------------------------------------------------  --------------  SCRIPT ANSWERS  Relationship to Patient?  Self

## 2022-04-04 ENCOUNTER — OFFICE VISIT (OUTPATIENT)
Dept: FAMILY MEDICINE CLINIC | Age: 62
End: 2022-04-04
Payer: COMMERCIAL

## 2022-04-04 ENCOUNTER — NURSE ONLY (OUTPATIENT)
Dept: LAB | Age: 62
End: 2022-04-04

## 2022-04-04 ENCOUNTER — TELEPHONE (OUTPATIENT)
Dept: FAMILY MEDICINE CLINIC | Age: 62
End: 2022-04-04

## 2022-04-04 VITALS
SYSTOLIC BLOOD PRESSURE: 118 MMHG | TEMPERATURE: 96.8 F | OXYGEN SATURATION: 99 % | BODY MASS INDEX: 38.92 KG/M2 | WEIGHT: 311.4 LBS | HEART RATE: 87 BPM | DIASTOLIC BLOOD PRESSURE: 78 MMHG

## 2022-04-04 DIAGNOSIS — E78.2 MODERATE MIXED HYPERLIPIDEMIA NOT REQUIRING STATIN THERAPY: ICD-10-CM

## 2022-04-04 DIAGNOSIS — Z79.4 TYPE 2 DIABETES MELLITUS WITH INSULIN THERAPY (HCC): ICD-10-CM

## 2022-04-04 DIAGNOSIS — N18.2 CKD (CHRONIC KIDNEY DISEASE), STAGE II: ICD-10-CM

## 2022-04-04 DIAGNOSIS — J40 BRONCHITIS: Primary | ICD-10-CM

## 2022-04-04 DIAGNOSIS — E11.9 TYPE 2 DIABETES MELLITUS WITH INSULIN THERAPY (HCC): ICD-10-CM

## 2022-04-04 LAB
ALBUMIN SERPL-MCNC: 3.7 G/DL (ref 3.5–5.1)
ALP BLD-CCNC: 95 U/L (ref 38–126)
ALT SERPL-CCNC: 18 U/L (ref 11–66)
ANION GAP SERPL CALCULATED.3IONS-SCNC: 15 MEQ/L (ref 8–16)
AST SERPL-CCNC: 19 U/L (ref 5–40)
AVERAGE GLUCOSE: 318 MG/DL (ref 70–126)
BILIRUB SERPL-MCNC: 0.4 MG/DL (ref 0.3–1.2)
BUN BLDV-MCNC: 18 MG/DL (ref 7–22)
CALCIUM SERPL-MCNC: 10 MG/DL (ref 8.5–10.5)
CHLORIDE BLD-SCNC: 98 MEQ/L (ref 98–111)
CHOLESTEROL, TOTAL: 193 MG/DL (ref 100–199)
CO2: 26 MEQ/L (ref 23–33)
CREAT SERPL-MCNC: 1.4 MG/DL (ref 0.4–1.2)
GFR SERPL CREATININE-BSD FRML MDRD: 52 ML/MIN/1.73M2
GLUCOSE BLD-MCNC: 233 MG/DL (ref 70–108)
HBA1C MFR BLD: 12.6 % (ref 4.4–6.4)
HDLC SERPL-MCNC: 45 MG/DL
LDL CHOLESTEROL CALCULATED: 95 MG/DL
POTASSIUM SERPL-SCNC: 4.1 MEQ/L (ref 3.5–5.2)
SODIUM BLD-SCNC: 139 MEQ/L (ref 135–145)
TOTAL PROTEIN: 6.8 G/DL (ref 6.1–8)
TRIGL SERPL-MCNC: 264 MG/DL (ref 0–199)

## 2022-04-04 PROCEDURE — 99214 OFFICE O/P EST MOD 30 MIN: CPT | Performed by: NURSE PRACTITIONER

## 2022-04-04 RX ORDER — SULFAMETHOXAZOLE AND TRIMETHOPRIM 800; 160 MG/1; MG/1
1 TABLET ORAL 2 TIMES DAILY
Qty: 20 TABLET | Refills: 0 | Status: SHIPPED | OUTPATIENT
Start: 2022-04-04 | End: 2022-04-14

## 2022-04-04 RX ORDER — DEXTROMETHORPHAN HYDROBROMIDE AND PROMETHAZINE HYDROCHLORIDE 15; 6.25 MG/5ML; MG/5ML
5 SYRUP ORAL 4 TIMES DAILY PRN
Qty: 150 ML | Refills: 0 | Status: SHIPPED | OUTPATIENT
Start: 2022-04-04 | End: 2022-04-11

## 2022-04-04 RX ORDER — METHYLPREDNISOLONE 4 MG/1
TABLET ORAL
Qty: 1 KIT | Refills: 0 | Status: SHIPPED | OUTPATIENT
Start: 2022-04-04 | End: 2022-04-10

## 2022-04-04 RX ORDER — ALBUTEROL SULFATE 90 UG/1
2 AEROSOL, METERED RESPIRATORY (INHALATION) 4 TIMES DAILY PRN
Qty: 54 G | Refills: 1 | Status: SHIPPED | OUTPATIENT
Start: 2022-04-04 | End: 2022-08-24 | Stop reason: SDUPTHER

## 2022-04-04 SDOH — ECONOMIC STABILITY: FOOD INSECURITY: WITHIN THE PAST 12 MONTHS, THE FOOD YOU BOUGHT JUST DIDN'T LAST AND YOU DIDN'T HAVE MONEY TO GET MORE.: NEVER TRUE

## 2022-04-04 SDOH — ECONOMIC STABILITY: FOOD INSECURITY: WITHIN THE PAST 12 MONTHS, YOU WORRIED THAT YOUR FOOD WOULD RUN OUT BEFORE YOU GOT MONEY TO BUY MORE.: NEVER TRUE

## 2022-04-04 ASSESSMENT — ENCOUNTER SYMPTOMS
CHOKING: 0
GASTROINTESTINAL NEGATIVE: 1
EYES NEGATIVE: 1
VOMITING: 0
NAUSEA: 0
CHEST TIGHTNESS: 0
SHORTNESS OF BREATH: 1
BACK PAIN: 1
TROUBLE SWALLOWING: 1
VOICE CHANGE: 1
SINUS PAIN: 0
WHEEZING: 0
VISUAL CHANGE: 0
COUGH: 1
SORE THROAT: 1
SWOLLEN GLANDS: 1
SINUS PRESSURE: 0
RHINORRHEA: 1

## 2022-04-04 ASSESSMENT — SOCIAL DETERMINANTS OF HEALTH (SDOH): HOW HARD IS IT FOR YOU TO PAY FOR THE VERY BASICS LIKE FOOD, HOUSING, MEDICAL CARE, AND HEATING?: NOT HARD AT ALL

## 2022-04-04 NOTE — TELEPHONE ENCOUNTER
I called and spoke to the patient. I let him know that we had an opening at 9:30 am today for him to be seen in the office.

## 2022-04-04 NOTE — TELEPHONE ENCOUNTER
----- Message from [address]  sent at 4/4/2022  7:45 AM EDT -----  Subject: Appointment Request    Reason for Call: Urgent Cough Cold    QUESTIONS  Type of Appointment? Established Patient  Reason for appointment request? Available appointments did not meet   patient need  Additional Information for Provider? Patient called to schedule an   appointment due to continued sore throat. Patient has been on antibiotics   for 5 days and has seen improvement with sinuses but is still experiencing   extreme sore throat. No appointments shown except virtual appointments and   patient does not have access to a camera option. Please contact patient   with all available appointment options.  ---------------------------------------------------------------------------  --------------  CALL BACK INFO  What is the best way for the office to contact you? OK to leave message on   voicemail  Preferred Call Back Phone Number? [phone]  ---------------------------------------------------------------------------  --------------  SCRIPT ANSWERS  Relationship to Patient? Self  Are you currently unable to finish sentences due to any difficulty   breathing? No  Are you unable to swallow liquids? No  Are you having fevers (100.4 or greater), chills, or sweats? No  Do you have COPD, asthma or a chronic lung condition? No  Have your symptoms been present for more than 5 days? Yes   Have you been diagnosed with, awaiting test results for, or told that you   are suspected of having COVID-19 (Coronavirus)? (If patient has tested   negative or was tested as a requirement for work, school, or travel and   not based on symptoms, answer no)? No  Within the past 10 days have you developed any of the following symptoms   (answer no if symptoms have been present longer than 10 days or began   more than 10 days ago)?  Fever or Chills, Cough, Shortness of breath or   difficulty breathing, Loss of taste or smell, Sore throat, Nasal   congestion, Sneezing or runny nose, Fatigue or generalized body aches   (answer no if pain is specific to a body part e.g. back pain), Diarrhea,   Headache?  Yes

## 2022-04-04 NOTE — PROGRESS NOTES
[address] [facility]  Dept: [phone]          Maureen Reaves (:  1960) is a 62 y.o. male,Established patient, here for evaluation of the following chief complaint(s):  Pharyngitis      ASSESSMENT/PLAN:  I have reviewed the patient's medical history in detail and updated the computerized patient record. HPI/ROS per the patient and caregiver. Overall non toxic in appearance. Answers questions appropriately. Conditions discussed and addressed this visit include:     Pt with acute bronchitis. 2 week hx of illness, no covid or flu test at this time. Continue to push fluids  Repeat steroid pack and change to bactrim due to bloody colored phlegm expectorated this morning   Afebrile, continue to monitor  Labs today for medication management  Sugars per pt have been stable in the 140s. Left foot wound slowly healing per pt and Ortho notes. See back in office by end of week if no better or go to ER for further evaluation. Pt agreeable to plan of care. 1. Bronchitis  -     methylPREDNISolone (MEDROL, MINE,) 4 MG tablet; Take by mouth., Disp-1 kit, R-0Normal  -     promethazine-dextromethorphan (PROMETHAZINE-DM) 6.25-15 MG/5ML syrup; Take 5 mLs by mouth 4 times daily as needed for Cough, Disp-150 mL, R-0Normal  2. Type [facility] mellitus with [facility]  -     Lipid Panel; Future  -     Comprehensive Metabolic Panel; Future  -     Hemoglobin A1C; Future  3. Moderate mixed hyperlipidemia not requiring statin therapy  -     Lipid Panel; Future  -     Comprehensive Metabolic Panel; Future  -     Hemoglobin A1C; Future  4. CKD (chronic kidney disease), stage II  -     Comprehensive Metabolic Panel; Future      Return in about 3 months (around 2022) for Medication evaluation, Results review, Routine follow up, Annual check up. SUBJECTIVE/OBJECTIVE:  Pt here for sore throat for 2 weeks now. Did take the cefdinir and medrol. This has not helped.    Has developed productive cough over the last 3 days  No fever  Some chills at night. No other family members have been ill. 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 Has left foot open ulcer. Pharyngitis  This is a recurrent problem. The current episode started 1 to 4 weeks ago. The problem occurs constantly. The problem has been gradually worsening. Associated symptoms include arthralgias, chills, congestion, coughing, fatigue, headaches, a sore throat and swollen glands. Pertinent negatives include no fever, joint swelling, myalgias, nausea, neck pain, vertigo, visual change or vomiting. The symptoms are aggravated by eating, drinking and swallowing. He has tried acetaminophen, drinking, NSAIDs, rest, sleep and relaxation for the symptoms. The treatment provided no relief.    Review of Systems   Constitutional: Positive for activity change, chills and fatigue. Negative for fever. HENT: Positive for congestion, postnasal drip, rhinorrhea, sore throat, trouble swallowing and voice change. Negative for sinus pressure and sinus pain. Eyes: Negative. Respiratory: Positive for cough and shortness of breath ( with exertion). Negative for choking, chest tightness and wheezing. Cardiovascular: Positive for leg swelling. Gastrointestinal: Negative. Negative for nausea and vomiting. Endocrine: Negative for cold intolerance and heat intolerance. Genitourinary: Negative. Musculoskeletal: Positive for arthralgias, back pain and gait problem. Negative for joint swelling, myalgias and neck pain. Skin: Positive for wound ( bilateral feet). Allergic/Immunologic: Positive for environmental allergies. Neurological: Positive for headaches. Negative for vertigo. Hematological: Negative for adenopathy. Does not bruise/bleed easily. Psychiatric/Behavioral: Negative. Physical Exam  Vitals and nursing note reviewed. Constitutional:       General: He is not in acute distress.      Appearance: Normal appearance. He is obese. He is ill-appearing. HENT:      Head: Normocephalic and atraumatic. Right Ear: Ear canal and external ear normal. Tympanic membrane is bulging. Left Ear: Ear canal and external ear normal. Tympanic membrane is injected, erythematous and bulging. Nose: Mucosal edema and congestion present. Right Turbinates: Swollen. Left Turbinates: Swollen. Mouth/Throat:      Lips: Pink. Mouth: Mucous membranes are dry. Pharynx: Oropharynx is clear. Posterior oropharyngeal erythema ( yellow green pnd present) present. No oropharyngeal exudate. Tonsils: No tonsillar exudate. Eyes:      General:         Right eye: No discharge. Left eye: No discharge. Conjunctiva/sclera: Conjunctivae normal.   Neck:      Vascular: No carotid bruit. Cardiovascular:      Rate and Rhythm: Normal rate and regular rhythm. Pulses: Normal pulses. Heart sounds: Normal heart sounds. Pulmonary:      Effort: Pulmonary effort is normal.      Breath sounds: Wheezing (anterio and posterior upper lobes) and rhonchi ( bilateral upper airway rhonchi) present. Abdominal:      General: Abdomen is flat. Bowel sounds are normal.      Palpations: Abdomen is soft. Musculoskeletal:         General: Deformity ( transmetarsal amputation left foot. ) present. Normal range of motion. Cervical back: Normal range of motion and neck supple. Tenderness present. Right lower leg: No edema. Left lower leg: No edema. Comments:      Lymphadenopathy:      Cervical: No cervical adenopathy. Skin:     General: Skin is warm and dry. Capillary Refill: Capillary refill takes 2 to 3 seconds. Findings: No rash. Neurological:      General: No focal deficit present. Mental Status: He is alert and oriented to person, place, and time. Mental status is at baseline.    Psychiatric:         Mood and Affect: Mood normal.         Behavior: Behavior normal. Thought Content: Thought content normal.         Judgment: Judgment normal.                 An electronic signature was used to authenticate this note.     --Todd Rubin, EYAD - CNP

## 2022-04-04 NOTE — PATIENT INSTRUCTIONS
Patient Education        Bronchitis: Care Instructions  Your Care Instructions     Bronchitis is inflammation of the bronchial tubes, which carry air to the lungs. The tubes swell and produce mucus, or phlegm. The mucus and inflamedbronchial tubes make you cough. You may have trouble breathing. Most cases of bronchitis are caused by viruses like those that cause colds. Antibiotics usually do not help and they may be harmful. Bronchitis usually develops rapidly and lasts about 2 to 3 weeks in otherwisehealthy people. Follow-up care is a key part of your treatment and safety. Be sure to make and go to all appointments, and call your doctor if you are having problems. It's also a good idea to know your test results and keep alist of the medicines you take. How can you care for yourself at home?  Take all medicines exactly as prescribed. Call your doctor if you think you are having a problem with your medicine.  Get some extra rest.   Take an over-the-counter pain medicine, such as acetaminophen (Tylenol), ibuprofen (Advil, Motrin), or naproxen (Aleve) to reduce fever and relieve body aches. Read and follow all instructions on the label.  Do not take two or more pain medicines at the same time unless the doctor told you to. Many pain medicines have acetaminophen, which is Tylenol. Too much acetaminophen (Tylenol) can be harmful.  Take an over-the-counter cough medicine to help quiet a dry, hacking cough so that you can sleep. Avoid cough medicines that have more than one active ingredient. Read and follow all instructions on the label.  Do not smoke. Smoking can make bronchitis worse. If you need help quitting, talk to your doctor about stop-smoking programs and medicines. These can increase your chances of quitting for good. When should you call for help? Call 911 anytime you think you may need emergency care. For example, call if:     You have severe trouble breathing.    Call your doctor now or seek immediate medical care if:     You have new or worse trouble breathing.      You cough up dark brown or bloody mucus (sputum).      You have a new or higher fever.      You have a new rash. Watch closely for changes in your health, and be sure to contact your doctor if:     You cough more deeply or more often, especially if you notice more mucus or a change in the color of your mucus.      You are not getting better as expected. Where can you learn more? Go to https://NeoPath Networks.Sumavision. org and sign in to your [employer] account. Enter H333 in the [employer] box to learn more about \"Bronchitis: Care Instructions. \"     If you do not have an account, please click on the \"Sign Up Now\" link. Current as of: July 6, 2021               Content Version: 13.2  © 2006-2022 [email or web address]. Care instructions adapted under license by [facility] ([facility]). If you have questions about a medical condition or this instruction, always ask your healthcare professional. [place] [address] any warranty or liability for your use of this information. Patient Education        Sinusitis: Care Instructions  Your Care Instructions     Sinusitis is an infection of the lining of the sinus cavities in your head. Sinusitis often follows a cold. It causes pain and pressure in your head andface. In most cases, sinusitis gets better on its own in 1 to 2 weeks. But some mildsymptoms may last for several weeks. Sometimes antibiotics are needed. Follow-up care is a key part of your treatment and safety. Be sure to make and go to all appointments, and call your doctor if you are having problems. It's also a good idea to know your test results and keep alist of the medicines you take. How can you care for yourself at home?  Take an over-the-counter pain medicine, such as acetaminophen (Tylenol), ibuprofen (Advil, Motrin), or naproxen (Aleve).  Read and follow all instructions on the label.   If the doctor prescribed antibiotics, take them as directed. Do not stop taking them just because you feel better. You need to take the full course of antibiotics.  Be careful when taking over-the-counter cold or flu medicines and Tylenol at the same time. Many of these medicines have acetaminophen, which is Tylenol. Read the labels to make sure that you are not taking more than the recommended dose. Too much acetaminophen (Tylenol) can be harmful.  Breathe warm, moist air from a steamy shower, a hot bath, or a sink filled with hot water. Avoid cold, dry air. Using a humidifier in your home may help. Follow the directions for cleaning the machine.  Use saline (saltwater) nasal washes. This can help keep your nasal passages open and wash out mucus and bacteria. You can buy saline nose drops at a grocery store or drugstore. Or you can make your own at home by adding 1 teaspoon of salt and 1 teaspoon of baking soda to 2 cups of distilled water. If you make your own, fill a bulb syringe with the solution, insert the tip into your nostril, and squeeze gently. [place] your nose.  Put a hot, wet towel or a warm gel pack on your face 3 or 4 times a day for 5 to 10 minutes each time.  Try a decongestant nasal spray like oxymetazoline (Afrin). Do not use it for more than 3 days in a row. Using it for more than 3 days can make your congestion worse. When should you call for help? Call your doctor now or seek immediate medical care if:     You have new or worse swelling or redness in your face or around your eyes.      You have a new or higher fever. Watch closely for changes in your health, and be sure to contact your doctor if:     You have new or worse facial pain.      The mucus from your nose becomes thicker (like pus) or has new blood in it.      You are not getting better as expected. Where can you learn more? Go to https://tita.Zerply. org and sign in to your TrackingPoint account. Enter U678 in the [facility] box to learn more about \"Sinusitis: Care Instructions. \"     If you do not have an account, please click on the \"Sign Up Now\" link. Current as of: September 8, 2021               Content Version: 13.2  © 2767-3539 Healthwise, Incorporated. Care instructions adapted under license by [facility] ([facility]). If you have questions about a medical condition or this instruction, always ask your healthcare professional. [address] any warranty or liability for your use of this information.

## 2022-04-05 ENCOUNTER — TELEPHONE (OUTPATIENT)
Dept: FAMILY MEDICINE CLINIC | Age: 62
End: 2022-04-05

## 2022-04-05 DIAGNOSIS — R73.09 HEMOGLOBIN A1C GREATER THAN 9%, INDICATING POOR DIABETIC CONTROL: Primary | ICD-10-CM

## 2022-04-05 DIAGNOSIS — Z79.4 TYPE 2 DIABETES MELLITUS WITH INSULIN THERAPY (HCC): ICD-10-CM

## 2022-04-05 DIAGNOSIS — E11.9 TYPE 2 DIABETES MELLITUS WITH INSULIN THERAPY (HCC): ICD-10-CM

## 2022-04-05 RX ORDER — INSULIN DETEMIR 100 [IU]/ML
INJECTION, SOLUTION SUBCUTANEOUS
Qty: 21 ML | Refills: 3 | Status: SHIPPED | OUTPATIENT
Start: 2022-04-05 | End: 2022-08-24 | Stop reason: SDUPTHER

## 2022-04-05 NOTE — TELEPHONE ENCOUNTER
----- Message from EYAD Bal CNP sent at 2022  9:12 AM EDT -----  A1c is not at goal. Up to 12. 6. increase levemir to 30 units in the morning, and 30 units in the evening. Increase sliding scale coverage to the followin-199- 5 units; 200-249- 7 units, 250-299- 10 units, 300-349 12 units, and > 350 15 units per meal.   Recheck labs in 3 months.

## 2022-04-05 NOTE — TELEPHONE ENCOUNTER
I called and spoke to the patient. I let him know Theresa's response regarding his lab results and that I will mail the lab orders to him. The patient wrote down the information.

## 2022-04-05 NOTE — TELEPHONE ENCOUNTER
I mailed the lab orders to the patient today written on top of the orders to have them done on or around 07- and hi-lited it.

## 2022-04-18 ENCOUNTER — TELEPHONE (OUTPATIENT)
Dept: FAMILY MEDICINE CLINIC | Age: 62
End: 2022-04-18

## 2022-04-18 DIAGNOSIS — E78.2 MODERATE MIXED HYPERLIPIDEMIA NOT REQUIRING STATIN THERAPY: ICD-10-CM

## 2022-04-18 RX ORDER — ATORVASTATIN CALCIUM 40 MG/1
40 TABLET, FILM COATED ORAL NIGHTLY
Qty: 90 TABLET | Refills: 1 | Status: SHIPPED | OUTPATIENT
Start: 2022-04-18 | End: 2022-08-19 | Stop reason: ALTCHOICE

## 2022-04-18 NOTE — TELEPHONE ENCOUNTER
The patient called to get a refill on his atorvastatin 40 mg, takes it nightly sent to [facility] [address] [facility] in [place], [place] with a 90 day supply.

## 2022-06-28 NOTE — PROGRESS NOTES
PAT call attempted patient unavailable left message with instructions. NPO after midnight  [employer] and drivers license  Wear clean comfortable clothing  Do not bring jewelry  Shower night before and morning of surgery with a liquid antibacterial soap. Bring list of medication with dosage and how often taken  Follow all instructions given by your physician   needed at discharge may stay in car only allowed once visitor at a time  Call [phone] for any questions.

## 2022-06-29 ENCOUNTER — HOSPITAL ENCOUNTER (OUTPATIENT)
Age: 62
Discharge: HOME | End: 2022-06-29
Payer: COMMERCIAL

## 2022-06-29 ENCOUNTER — HOSPITAL ENCOUNTER (OUTPATIENT)
Dept: GENERAL RADIOLOGY | Age: 62
Discharge: HOME | End: 2022-06-29
Payer: COMMERCIAL

## 2022-06-29 DIAGNOSIS — E11.42 TYPE 2 DIABETES MELLITUS WITH POLYNEUROPATHY (HCC): ICD-10-CM

## 2022-06-29 DIAGNOSIS — L02.612 ABSCESS OF LEFT FOOT: ICD-10-CM

## 2022-06-29 DIAGNOSIS — Z01.818 PREOP TESTING: ICD-10-CM

## 2022-06-29 LAB
ANION GAP SERPL CALCULATED.3IONS-SCNC: 15 MEQ/L (ref 8–16)
BASOPHILS # BLD: 0.8 %
BASOPHILS ABSOLUTE: 0.1 THOU/MM3 (ref 0–0.1)
BUN BLDV-MCNC: 29 MG/DL (ref 7–22)
CALCIUM SERPL-MCNC: 9.4 MG/DL (ref 8.5–10.5)
CHLORIDE BLD-SCNC: 96 MEQ/L (ref 98–111)
CO2: 24 MEQ/L (ref 23–33)
CREAT SERPL-MCNC: 1.5 MG/DL (ref 0.4–1.2)
EKG ATRIAL RATE: 90 BPM
EKG P AXIS: 34 DEGREES
EKG P-R INTERVAL: 150 MS
EKG Q-T INTERVAL: 340 MS
EKG QRS DURATION: 98 MS
EKG QTC CALCULATION (BAZETT): 415 MS
EKG R AXIS: 54 DEGREES
EKG T AXIS: -10 DEGREES
EKG VENTRICULAR RATE: 90 BPM
EOSINOPHIL # BLD: 6 %
EOSINOPHILS ABSOLUTE: 0.6 THOU/MM3 (ref 0–0.4)
ERYTHROCYTE [DISTWIDTH] IN BLOOD BY AUTOMATED COUNT: 13.9 % (ref 11.5–14.5)
ERYTHROCYTE [DISTWIDTH] IN BLOOD BY AUTOMATED COUNT: 44 FL (ref 35–45)
GFR SERPL CREATININE-BSD FRML MDRD: 48 ML/MIN/1.73M2
GLUCOSE BLD-MCNC: 383 MG/DL (ref 70–108)
HCT VFR BLD CALC: 33.9 % (ref 42–52)
HEMOGLOBIN: 10.8 GM/DL (ref 14–18)
IMMATURE GRANS (ABS): 0.08 THOU/MM3 (ref 0–0.07)
IMMATURE GRANULOCYTES: 0.8 %
LYMPHOCYTES # BLD: 15.6 %
LYMPHOCYTES ABSOLUTE: 1.5 THOU/MM3 (ref 1–4.8)
MCH RBC QN AUTO: 27.8 PG (ref 26–33)
MCHC RBC AUTO-ENTMCNC: 31.9 GM/DL (ref 32.2–35.5)
MCV RBC AUTO: 87.4 FL (ref 80–94)
MONOCYTES # BLD: 8.3 %
MONOCYTES ABSOLUTE: 0.8 THOU/MM3 (ref 0.4–1.3)
NUCLEATED RED BLOOD CELLS: 0 /100 WBC
PLATELET # BLD: 287 THOU/MM3 (ref 130–400)
PMV BLD AUTO: 11.2 FL (ref 9.4–12.4)
POTASSIUM SERPL-SCNC: 4.5 MEQ/L (ref 3.5–5.2)
RBC # BLD: 3.88 MILL/MM3 (ref 4.7–6.1)
SEG NEUTROPHILS: 68.5 %
SEGMENTED NEUTROPHILS ABSOLUTE COUNT: 6.6 THOU/MM3 (ref 1.8–7.7)
SODIUM BLD-SCNC: 135 MEQ/L (ref 135–145)
WBC # BLD: 9.7 THOU/MM3 (ref 4.8–10.8)

## 2022-06-29 PROCEDURE — 80048 BASIC METABOLIC PNL TOTAL CA: CPT

## 2022-06-29 PROCEDURE — 36415 COLL VENOUS BLD VENIPUNCTURE: CPT

## 2022-06-29 PROCEDURE — 85025 COMPLETE CBC W/AUTO DIFF WBC: CPT

## 2022-06-29 PROCEDURE — 93005 ELECTROCARDIOGRAM TRACING: CPT | Performed by: PODIATRIST

## 2022-06-29 PROCEDURE — 71046 X-RAY EXAM CHEST 2 VIEWS: CPT

## 2022-06-29 PROCEDURE — 93010 ELECTROCARDIOGRAM REPORT: CPT | Performed by: INTERNAL MEDICINE

## 2022-06-29 NOTE — PROGRESS NOTES
JULES with Francesco Frazier in Medical Records at [facility] to fax PAT the pre op testing, consent, h&p, and orders.

## 2022-07-01 ENCOUNTER — HOSPITAL ENCOUNTER (OUTPATIENT)
Age: 62
Setting detail: OUTPATIENT SURGERY
Discharge: HOME | End: 2022-07-01
Attending: PODIATRIST | Admitting: PODIATRIST
Payer: COMMERCIAL

## 2022-07-01 ENCOUNTER — ANESTHESIA (OUTPATIENT)
Dept: OPERATING ROOM | Age: 62
End: 2022-07-01
Payer: COMMERCIAL

## 2022-07-01 ENCOUNTER — ANESTHESIA EVENT (OUTPATIENT)
Dept: OPERATING ROOM | Age: 62
End: 2022-07-01
Payer: COMMERCIAL

## 2022-07-01 VITALS
HEIGHT: 75 IN | HEART RATE: 87 BPM | BODY MASS INDEX: 38.67 KG/M2 | OXYGEN SATURATION: 100 % | TEMPERATURE: 98.3 F | RESPIRATION RATE: 16 BRPM | DIASTOLIC BLOOD PRESSURE: 76 MMHG | WEIGHT: 311 LBS | SYSTOLIC BLOOD PRESSURE: 134 MMHG

## 2022-07-01 DIAGNOSIS — L02.612 ABSCESS OF LEFT FOOT: ICD-10-CM

## 2022-07-01 DIAGNOSIS — T14.8XXD DELAYED WOUND HEALING: ICD-10-CM

## 2022-07-01 LAB — GLUCOSE BLD-MCNC: 249 MG/DL (ref 70–108)

## 2022-07-01 PROCEDURE — 7100000011 HC PHASE II RECOVERY - ADDTL 15 MIN: Performed by: PODIATRIST

## 2022-07-01 PROCEDURE — 3600000013 HC SURGERY LEVEL 3 ADDTL 15MIN: Performed by: PODIATRIST

## 2022-07-01 PROCEDURE — 2709999900 HC NON-CHARGEABLE SUPPLY: Performed by: PODIATRIST

## 2022-07-01 PROCEDURE — 3700000000 HC ANESTHESIA ATTENDED CARE: Performed by: PODIATRIST

## 2022-07-01 PROCEDURE — 7100000010 HC PHASE II RECOVERY - FIRST 15 MIN: Performed by: PODIATRIST

## 2022-07-01 PROCEDURE — 82948 REAGENT STRIP/BLOOD GLUCOSE: CPT

## 2022-07-01 PROCEDURE — 6360000002 HC RX W HCPCS: Performed by: ANESTHESIOLOGY

## 2022-07-01 PROCEDURE — 88307 TISSUE EXAM BY PATHOLOGIST: CPT

## 2022-07-01 PROCEDURE — 88305 TISSUE EXAM BY PATHOLOGIST: CPT

## 2022-07-01 PROCEDURE — 6370000000 HC RX 637 (ALT 250 FOR IP)

## 2022-07-01 PROCEDURE — 2580000003 HC RX 258: Performed by: PODIATRIST

## 2022-07-01 PROCEDURE — 3600000003 HC SURGERY LEVEL 3 BASE: Performed by: PODIATRIST

## 2022-07-01 PROCEDURE — 3700000001 HC ADD 15 MINUTES (ANESTHESIA): Performed by: PODIATRIST

## 2022-07-01 PROCEDURE — 6360000002 HC RX W HCPCS: Performed by: PODIATRIST

## 2022-07-01 DEVICE — INTEGRA® MESHED BILAYER WOUND MATRIX 4 IN*5 IN (10 CM*12.5 CM)
Type: IMPLANTABLE DEVICE | Status: FUNCTIONAL
Brand: INTEGRA®

## 2022-07-01 DEVICE — INTEGRA® MATRIX WOUND DRESSING 4 IN X 5 IN (10 CM X 12.5 CM)
Type: IMPLANTABLE DEVICE | Status: FUNCTIONAL
Brand: INTEGRA® DERMAL REGENERATION TEMPLATE

## 2022-07-01 RX ORDER — SODIUM CHLORIDE 9 MG/ML
INJECTION, SOLUTION INTRAVENOUS CONTINUOUS
Status: DISCONTINUED | OUTPATIENT
Start: 2022-07-01 | End: 2022-07-01 | Stop reason: HOSPADM

## 2022-07-01 RX ORDER — LIDOCAINE HYDROCHLORIDE 20 MG/ML
INJECTION, SOLUTION INTRAVENOUS PRN
Status: DISCONTINUED | OUTPATIENT
Start: 2022-07-01 | End: 2022-07-01 | Stop reason: SDUPTHER

## 2022-07-01 RX ORDER — MIDAZOLAM HYDROCHLORIDE 1 MG/ML
INJECTION INTRAMUSCULAR; INTRAVENOUS PRN
Status: DISCONTINUED | OUTPATIENT
Start: 2022-07-01 | End: 2022-07-01 | Stop reason: SDUPTHER

## 2022-07-01 RX ORDER — SODIUM CHLORIDE 0.9 % (FLUSH) 0.9 %
5-40 SYRINGE (ML) INJECTION EVERY 12 HOURS SCHEDULED
Status: DISCONTINUED | OUTPATIENT
Start: 2022-07-01 | End: 2022-07-01 | Stop reason: HOSPADM

## 2022-07-01 RX ORDER — SODIUM CHLORIDE 0.9 % (FLUSH) 0.9 %
5-40 SYRINGE (ML) INJECTION EVERY 12 HOURS SCHEDULED
Status: CANCELLED | OUTPATIENT
Start: 2022-07-01

## 2022-07-01 RX ORDER — SODIUM CHLORIDE 0.9 % (FLUSH) 0.9 %
5-40 SYRINGE (ML) INJECTION PRN
Status: DISCONTINUED | OUTPATIENT
Start: 2022-07-01 | End: 2022-07-01 | Stop reason: HOSPADM

## 2022-07-01 RX ORDER — PROPOFOL 10 MG/ML
INJECTION, EMULSION INTRAVENOUS PRN
Status: DISCONTINUED | OUTPATIENT
Start: 2022-07-01 | End: 2022-07-01 | Stop reason: SDUPTHER

## 2022-07-01 RX ORDER — ONDANSETRON 2 MG/ML
4 INJECTION INTRAMUSCULAR; INTRAVENOUS EVERY 6 HOURS PRN
Status: CANCELLED | OUTPATIENT
Start: 2022-07-01

## 2022-07-01 RX ORDER — OXYCODONE HYDROCHLORIDE 5 MG/1
5 TABLET ORAL EVERY 4 HOURS PRN
Status: CANCELLED | OUTPATIENT
Start: 2022-07-01

## 2022-07-01 RX ORDER — SODIUM CHLORIDE 0.9 % (FLUSH) 0.9 %
5-40 SYRINGE (ML) INJECTION PRN
Status: CANCELLED | OUTPATIENT
Start: 2022-07-01

## 2022-07-01 RX ORDER — SODIUM CHLORIDE 9 MG/ML
INJECTION, SOLUTION INTRAVENOUS PRN
Status: CANCELLED | OUTPATIENT
Start: 2022-07-01

## 2022-07-01 RX ORDER — ONDANSETRON 4 MG/1
4 TABLET, ORALLY DISINTEGRATING ORAL EVERY 8 HOURS PRN
Status: CANCELLED | OUTPATIENT
Start: 2022-07-01

## 2022-07-01 RX ORDER — SODIUM CHLORIDE 9 MG/ML
INJECTION, SOLUTION INTRAVENOUS CONTINUOUS
Status: CANCELLED | OUTPATIENT
Start: 2022-07-01

## 2022-07-01 RX ORDER — SODIUM CHLORIDE 9 MG/ML
INJECTION, SOLUTION INTRAVENOUS PRN
Status: DISCONTINUED | OUTPATIENT
Start: 2022-07-01 | End: 2022-07-01 | Stop reason: HOSPADM

## 2022-07-01 RX ORDER — ACETAMINOPHEN 325 MG/1
650 TABLET ORAL EVERY 4 HOURS PRN
Status: CANCELLED | OUTPATIENT
Start: 2022-07-01

## 2022-07-01 RX ORDER — OXYCODONE HYDROCHLORIDE 5 MG/1
10 TABLET ORAL EVERY 4 HOURS PRN
Status: CANCELLED | OUTPATIENT
Start: 2022-07-01

## 2022-07-01 RX ADMIN — Medication 3000 MG: at 13:03

## 2022-07-01 RX ADMIN — SODIUM CHLORIDE: 9 INJECTION, SOLUTION INTRAVENOUS at 12:09

## 2022-07-01 RX ADMIN — PROPOFOL 40 MG: 10 INJECTION, EMULSION INTRAVENOUS at 13:11

## 2022-07-01 RX ADMIN — PROPOFOL 40 MG: 10 INJECTION, EMULSION INTRAVENOUS at 13:22

## 2022-07-01 RX ADMIN — LIDOCAINE HYDROCHLORIDE 100 MG: 20 INJECTION INTRAVENOUS at 12:51

## 2022-07-01 RX ADMIN — MIDAZOLAM 2 MG: 1 INJECTION INTRAMUSCULAR; INTRAVENOUS at 12:48

## 2022-07-01 RX ADMIN — Medication 4 UNITS: at 12:38

## 2022-07-01 RX ADMIN — PROPOFOL 40 MG: 10 INJECTION, EMULSION INTRAVENOUS at 12:57

## 2022-07-01 ASSESSMENT — PAIN SCALES - GENERAL
PAINLEVEL_OUTOF10: 0

## 2022-07-01 NOTE — ANESTHESIA PRE PROCEDURE
Department of Anesthesiology  Preprocedure Note       Name:  Arsalan Torres   Age:  62 y.o.  :  1960                                          MRN:  [id number]         Date:  2022      Surgeon: Emeli Salinas):  Elian Avendano DPM    Procedure: LEFT FOOT EXCISONAL WOUND DEBRIDEMENT, APPLICATION SKIN SUBSTITUTE GRAFT, APPLICATION KCI WOUND VAC (Left )    Medications prior to admission:   Prior to Admission medications    Medication Sig Start Date End Date Taking? Authorizing Provider   atorvastatin (LIPITOR) 40 MG tablet Take 1 tablet by mouth nightly 22   EYAD Whittington CNP   insulin detemir (LEVEMIR FLEXTOUCH) 100 UNIT/ML injection pen 30 units in the morning, and 30 units in the evening. 22   EYAD Whittington CNP   insulin lispro (HUMALOG) 100 UNIT/ML injection vial Inject 5-15 Units into the skin 3 times daily (with meals) 22   EYAD Whittington CNP   albuterol sulfate HFA (VENTOLIN HFA) 108 (90 Base) MCG/ACT inhaler Inhale 2 puffs into the lungs 4 times daily as needed for Wheezing 22   EYAD Whittington CNP   lisinopril (PRINIVIL;ZESTRIL) 20 MG tablet Take 1 tablet by mouth daily 3/23/22   Mynor Estrada MD   metoprolol tartrate (LOPRESSOR) 25 MG tablet Take 1 tablet by mouth twice daily 22   EYAD Whittington CNP   clopidogrel (PLAVIX) 75 MG tablet Take 1 tablet by mouth daily 21   EYAD Whittington CNP   HYDROcodone-acetaminophen (NORCO) 5-325 MG per tablet Take 1 tablet by mouth every 6 hours as needed for Pain. Historical Provider, MD   gabapentin (NEURONTIN) 600 MG tablet Take 1 tablet by mouth 2 times daily for 180 days.  21  EYAD Crenshaw CNP   albuterol sulfate  (90 Base) MCG/ACT inhaler Inhale 2 puffs into the lungs every 6 hours as needed for Wheezing 20   EYAD Whittington CNP   Polyethylene Glycol 3350 (MIRALAX PO) Take by mouth as needed    Historical Provider, MD   aspirin 81 MG chewable tablet Take 1 tablet by mouth daily 8/10/20   Hany Nail, APRN - CNP   Multiple Vitamins-Minerals (MULTIVITAMIN PO) Take 1 tablet by mouth daily     Historical Provider, MD       Current medications:    No current facility-administered medications for this visit. No current outpatient medications on file.      Facility-Administered Medications Ordered in Other Visits   Medication Dose Route Frequency Provider Last Rate Last Admin    0.9 % sodium chloride infusion   IntraVENous Continuous Arlester Needs,  mL/hr at 07/01/22 1209 New Bag at 07/01/22 1209    sodium chloride flush 0.9 % injection 5-40 mL  5-40 mL IntraVENous 2 times per day Arlester Needs, DPM        sodium chloride flush 0.9 % injection 5-40 mL  5-40 mL IntraVENous PRN Arlester Needs, DPM        0.9 % sodium chloride infusion   IntraVENous PRN Arlester Needs, DPM        ceFAZolin (ANCEF) 3000 mg in dextrose 5 % 100 mL IVPB  3,000 mg IntraVENous On Call to [facility], DPM        0.9 % sodium chloride infusion   IntraVENous Continuous Will Badder, DPM           Allergies:  No Known Allergies    Problem List:    Patient Active Problem List   Diagnosis Code    Gangrene of toe of left foot ([facility] I96    CAD (coronary artery disease) I25.10    Type 2 diabetes mellitus with insulin therapy ([facility] E11.9, Z79.4    Hyperlipidemia E78.5    Hypertension I10    Charcot's joint, right ankle and foot M14.671    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    Sepsis ([facility] A41.9    Acute left-sided low back pain with bilateral sciatica M54.42, M54.41    S/P transmetatarsal amputation of foot, left (HCC) Z89.432    Leukocytosis D72.829    Wound dehiscence, surgical, initial encounter T81.31XA    Postoperative wound infection H45.65OG    Metabolic acidosis G45.8    Hx of CABG Z95.1    Lumbar stenosis without neurogenic claudication M48.061    CKD (chronic kidney disease), stage II N18.2    Normocytic anemia D64.9    Morbid obesity (HCC) E66.01    Debility R53.81    Carotid stenosis, asymptomatic, bilateral I65.23    Hypokalemia E87.6    Nonhealing ulcer of left lower extremity ([facility] L97.929    Bleeding R58    Wound, open T14. 8XXA    SOB (shortness of breath) on exertion R06.02       Past Medical History:        Diagnosis Date    Arthritis     CAD (coronary artery disease)     CKD (chronic kidney disease), stage II     Diabetes mellitus ([facility]     Hyperlipidemia     Hypertension     Liver disease     HEPITITIS AS A CHILD       Past Surgical History:        Procedure Laterality Date    CARDIAC SURGERY  2019    triple bypass    CYST REMOVAL      RIGHT ANKLE     FOOT DEBRIDEMENT Left 2020    LEFT TRANSMETATARSAL AMPUTATION  FOOT INCISION AND DRAINAGE performed by Rody Martin DPM at [address] [place] [address] Road Left 2020    LEFT WOUND DEBRIDEMENT WITH WOUND VAC APPLICATION performed by Rody Martin DPM at [facility] Right     CYST REMOVED    FOOT SURGERY Left 2020    LEFT FOOT PREPARATION GRAFT SITE, HAVEST SPLIT THICKNESS SKIN GRAFT WITH APPLICATION, APPLICATION KCI WOUND VAC performed by Rody Martin DPM at [facility]    TOE AMPUTATION Left 3/3/2020    I&D LEFT FOOT, TRANSMETATARSAL AMPUTATION performed by Rody Martin DPM at [address] [place] [address] History:    Social History     Tobacco Use    Smoking status: Former Smoker     Types: Cigarettes     Quit date:      Years since quittin.5    Smokeless tobacco: Never Used   Substance Use Topics    Alcohol use: Not Currently                                Counseling given: Not Answered      Vital Signs (Current): There were no vitals filed for this visit.                                            BP Readings from Last 3 Encounters:   22 138/73   22 118/78   21 (!) 158/92       NPO Status:                                                                                 BMI:   Wt Readings from Last 3 Encounters:   07/01/22 (!) 311 lb (141.1 kg)   04/04/22 (!) 311 lb 6.4 oz (141.3 kg)   06/24/21 278 lb (126.1 kg)     There is no height or weight on file to calculate BMI.    CBC:   Lab Results   Component Value Date/Time    WBC 9.7 06/29/2022 01:09 PM    RBC 3.88 06/29/2022 01:09 PM    HGB 10.8 06/29/2022 01:09 PM    HCT 33.9 06/29/2022 01:09 PM    MCV 87.4 06/29/2022 01:09 PM    RDW 17.7 05/25/2020 01:45 PM     06/29/2022 01:09 PM       CMP:   Lab Results   Component Value Date/Time     06/29/2022 01:09 PM    K 4.5 06/29/2022 01:09 PM    K 3.8 06/21/2021 06:48 PM    CL 96 06/29/2022 01:09 PM    CO2 24 06/29/2022 01:09 PM    BUN 29 06/29/2022 01:09 PM    CREATININE 1.5 06/29/2022 01:09 PM    LABGLOM 48 06/29/2022 01:09 PM    GLUCOSE 383 06/29/2022 01:09 PM    GLUCOSE 103 05/25/2020 01:45 PM    PROT 6.8 04/04/2022 10:16 AM    CALCIUM 9.4 06/29/2022 01:09 PM    BILITOT 0.4 04/04/2022 10:16 AM    ALKPHOS 95 04/04/2022 10:16 AM    AST 19 04/04/2022 10:16 AM    ALT 18 04/04/2022 10:16 AM       POC Tests:   No results for input(s): POCGLU, POCNA, POCK, POCCL, POCBUN, POCHEMO, POCHCT in the last 72 hours.     Coags:   Lab Results   Component Value Date/Time    INR 1.02 11/25/2020 08:34 AM    APTT 31.7 11/25/2020 08:34 AM       HCG (If Applicable): No results found for: PREGTESTUR, PREGSERUM, HCG, HCGQUANT     ABGs: No results found for: PHART, PO2ART, PVO1NMD, TKC1JHK, BEART, X4CLKYJL     Type & Screen (If Applicable):  Lab Results   Component Value Date    [address] POS 11/25/2020       Anesthesia Evaluation  Patient summary reviewed and Nursing notes reviewed no history of anesthetic complications:   Airway: Mallampati: II  TM distance: >3 FB   Neck ROM: full  Mouth opening: > = 3 FB   Dental:    (+) upper dentures and lower dentures      Pulmonary:   (+) decreased breath sounds                            Cardiovascular:  Exercise tolerance: poor (<4 METS),   (+) hypertension:, CAD:, CABG/stent:,       ECG reviewed  Rhythm: regular  Rate: normal                    Neuro/Psych:               GI/Hepatic/Renal:   (+) hepatitis:, liver disease:,           Endo/Other:    (+) Diabetespoorly controlled, , .                 Abdominal:   (+) obese,           Vascular: Other Findings:             Anesthesia Plan      MAC     ASA 4       Induction: intravenous. MIPS: prophylactic pharmacologic antiemetic agents not administered perioperatively for documented reasons. Anesthetic plan and risks discussed with patient.       Plan discussed with CRNA and surgical team.                    Yadiel Florence MD   7/1/2022

## 2022-07-01 NOTE — H&P
Department of Surgery  H&P Interval Note  Outpatient History and Physical was reviewed pre-operatively. Patient noted to have low level fever preop, otherwise asymptomatic with no interval changes to note prior to scheduled surgical intervention. Patient was assessed, all questions/concerns regarding alpa-operative management were addressed to patient satisfaction. Operative site was marked prior to transportation to the operative room. Karen Hayden D.P.M.

## 2022-07-01 NOTE — PROGRESS NOTES
Discharge instructions given to patient. verbalized understanding.  Patient discharged via wheelchair

## 2022-07-01 NOTE — PROGRESS NOTES
Back to SDS from PACU Alert Jello and ice water given. Side rails up bed in low position.  Call light in reach

## 2022-07-01 NOTE — OP NOTE
[facility]    [place], [place]  RECORD OF OPERATION    Name Aki Ferrera                                                             : 1960  MEDICAL RECORD NO. [id number]    DATE: 2022    Surgeon: Christi Bonilla DPM, DPM    Assistant: Alisa Ruvalcaba, PGY I    Pre-operative Diagnosis: 1. Neurotrophic ulcer, left foot  2. Direct extension osteomyelitis left foot  3. Complex delayed wound closure left foot    Post-operative Diagnosis:    Same    Procedure:   1. Excisional debridement of wound down to and including the level of bone, 8 x 5.5 cm left foot  2. Partial tarsal excision bone left foot  3. Application skin substitute graft area measuring greater than 25 cm² (8x5.5), left foot  4. Application negative pressure wound therapy to an area less than 50 cm²     Anesthesia: MAC anesthetic, supplemented with ankle block consisting of 20 cc half percent bupivacaine plain    Hemostasis: Pressure dressing, hydrogen peroxide    Estimated Blood Loss: Less than 20 cc    Materials: 5 x 4 inch Integra bilayer collagen graft, 5 x 4 inch Integra single-layer collagen graft    Injectables: Not applicable    Condition: Stable    Complications: none     Specimens: Bone specimen obtained sent to her biology for aerobic anaerobic culture and sensitivity. INDICATIONS:  The patient is a 62 y.o. male, well-known to my practice. Patient had actually presented with general complaint of a Charcot deformity on his contralateral right forefoot. Patient has been scheduled for Charcot reconstruction subsequently suffered a massive MI and had a triple bypass performed. Patient in the interval had developed a neurotrophic ulcer on the left forefoot which we have been treating for over a year. Patient is undergone multiple wound debridements most recently a large excisional debridement due to digression of skin envelope.   Patient's wound has progressed over the last several weeks with interval use of a wound VAC.  Patient's wound is now stable enough to proceed with excisional debridement of wound with application skin substitute grafting. All questions concerns regarding perioperative management including benefits risks complications and alternatives to procedure were discussed in detail. Patient was seen pre-operatively. Consent was reviewed and signed, operative limb marked. All questions and concerns regarding the case were addressed. The patient was then transferred to the operative room and place in a supine position. Time out taken, verifying patient and planned procedure. Patient was administered MAC anesthesia. Surgical site  was then prepped using Betadine and draped using sterile technique. Procedure In Detail  1. Excisional debridement of wound down to and including the level of bone, 8 x 5.5 cm left foot  Attention was directed to the left lower extremity. Using a [employer] all viable and nonviable epidermal, dermal, subcu as well as exposed bone was excised from the plantar aspect of the foot with postdebridement measurements of 8 x 5.5 cm. Localized hemostasis obtained with pressure as well as hydrogen peroxide. 2. Partial tarsal excision bone left foot  Patient was noted to have visible remnants of likely what would be his cuboid bone on the plantar aspect of his foot. Using a fresh large rongeur bone specimen was obtained and debrided from the site to allow for plantar planing of the forefoot. Bone particulate was obtained and sent to microbiology for aerobic anaerobic culture and sensitivity. 3. Application skin substitute graft area measuring greater than 25 cm² (8x5.5), left foot  Following debridement of the wound bed to the level of bone and Integra bilayer graft consisting of a 4 x 5 inch mesh graft was obtained and fixated to the periphery using a skin stapler.   A secondary single layer of collagen was placed deep to this and secured to the wound borders using a 3-0 Monocryl stitch. Patient was noted to have adequate coverage of wound bed status post application skin substitute graft. 4. Application negative pressure wound therapy to an area less than 50 cm²   A large I wound VAC was cut to size and applied to the wound bed. A Adaptic petroleum interface was placed between the skin substitute graft and the wound VAC to prevent adherence. Patient is noted to have adequate suction at 125 mmHg continuous. Patient was then placed in a bulky dry sterile dressing with application short leg posterior splint. Patient tolerated procedure well was transferred back to same-day surgery in stable condition. All questions concerns regarding postop management addressed.   Patient will follow-up in our office Tuesday, July 5 at [facility] [address], DPM, [address] [facility]   7/1/2022

## 2022-07-01 NOTE — ANESTHESIA POSTPROCEDURE EVALUATION
Department of Anesthesiology  Postprocedure Note    Patient: Kianna Beck  MRN: [id number]  YOB: 1960  Date of evaluation: 7/1/2022      Procedure Summary     Date: 07/01/22 Room / Location: [address] [place]    Anesthesia Start: 9113 Anesthesia Stop: 0227    Procedure: LEFT FOOT [address], APPLICATION SKIN SUBSTITUTE GRAFT, APPLICATION KCI WOUND VAC (Left ) Diagnosis:       Abscess of left foot      Delayed wound healing      (Abscess of left foot [L02.612])      (Delayed wound healing [T14. 8XXD])    Surgeons: Estela Paulino DPM Responsible Provider: Shell Rudolph MD    Anesthesia Type: MAC ASA Status: 4          Anesthesia Type: No value filed. Fernando Phase I: Fernando Score: 10    Fernando Phase II:        Anesthesia Post Evaluation    Patient location during evaluation: bedside  Patient participation: complete - patient cannot participate  Level of consciousness: awake and alert  Airway patency: patent  Nausea & Vomiting: no nausea and no vomiting  Complications: no  Cardiovascular status: hemodynamically stable  Respiratory status: acceptable  Hydration status: euvolemic      [facility] [place]  POST-ANESTHESIA NOTE       Name:  Kianna Beck                                         Age:  62 y.o.   MRN:  [id number]      Last Vitals:  /73   Pulse 94   Temp (!) 100.7 °F (38.2 °C)   Resp 18   Ht 6' 3\" (1.905 m)   Wt (!) 311 lb (141.1 kg)   SpO2 98%   BMI 38.87 kg/m²   Patient Vitals for the past 4 hrs:   BP Temp Pulse Resp SpO2 Height Weight   07/01/22 1130      6' 3\" (1.905 m) (!) 311 lb (141.1 kg)   07/01/22 1127 138/73 (!) 100.7 °F (38.2 °C) 94 18 98 %         Level of Consciousness:  Awake    Respiratory:  Stable    Oxygen Saturation:  Stable    Cardiovascular:  Stable    Hydration:  Adequate    PONV:  Stable    Post-op Pain:  Adequate analgesia    Post-op Assessment:  No apparent anesthetic complications    Additional Follow-Up / Treatment /

## 2022-07-01 NOTE — PROGRESS NOTES
ADMITTED TO \[facility]\"" AND ORIENTED TO UNIT. SCDS ON. FALL  BANDS ON. PT VERBALIZED APPROVAL FOR FIRST NAME, LAST INITIAL AND PHYSICIAN NAME ON UNIT WHITEBOARD.

## 2022-07-01 NOTE — BRIEF OP NOTE
Brief Postoperative Note      Patient: Jeb Shipman  YOB: 1960  MRN: [id number]    Date of Procedure: 7/1/2022    Pre-Op Diagnosis: Abscess of left foot [L02.612]  Delayed wound healing [T14. 8XXD]    Post-Op Diagnosis: Same       Procedure(s):  LEFT FOOT EXCISONAL WOUND DEBRIDEMENT, APPLICATION SKIN SUBSTITUTE GRAFT, APPLICATION KCI WOUND VAC    Surgeon(s):  Celine Albarado DPM    Assistant:  * No surgical staff found *    Anesthesia: Monitor Anesthesia Care    Estimated Blood Loss (mL): Minimal    Complications: None    Specimens:   ID Type Source Tests Collected by Time Destination   A : Left Foot Bone Tissue Foot SURGICAL PATHOLOGY Celine Albarado DPM 7/1/2022 1337        Implants:  Implant Name Type Inv.  Item Serial No.  Lot No. LRB No. Used Action   DRESSING WND 4X5IN MTRX BOV CLLGN SGL - [id number]  DRESSING WND 4X5IN MTRX BOV CLLGN SGL  [employer] ARLENE[employer]WD [id number] Left 1 Implanted   DRESSING BIO [id number] BOV TEND CLLGN GLYCOSAMINOGLYCAN - [id number]  DRESSING BIO [id number] BOV TEND CLLGN GLYCOSAMINOGLYCAN  [employer]RLENE[employer]WD [id number] Left 1 Implanted         Drains: * No LDAs found *    Findings: Consistent with diagnosis    Hemostasis: None    Injectables: 20cc 0.5% marcaine plain    Materials: Stainless steel staples, 3-0 Monocryl    Electronically signed by Vivian Linder DPM on 7/1/2022 at 1:49 PM

## 2022-08-16 ENCOUNTER — TELEPHONE (OUTPATIENT)
Dept: FAMILY MEDICINE CLINIC | Age: 62
End: 2022-08-16

## 2022-08-16 DIAGNOSIS — I10 ESSENTIAL HYPERTENSION: ICD-10-CM

## 2022-08-16 NOTE — TELEPHONE ENCOUNTER
Patient called requesting refill for his Metoprolol 25 mg 1 PO BID.  Would like a 90 day supply if possible, and would like it to be [facility] into [facility] in [place]

## 2022-08-18 ENCOUNTER — NURSE ONLY (OUTPATIENT)
Dept: LAB | Age: 62
End: 2022-08-18

## 2022-08-18 DIAGNOSIS — R73.09 HEMOGLOBIN A1C GREATER THAN 9%, INDICATING POOR DIABETIC CONTROL: ICD-10-CM

## 2022-08-18 DIAGNOSIS — E11.9 TYPE 2 DIABETES MELLITUS WITH INSULIN THERAPY (HCC): ICD-10-CM

## 2022-08-18 DIAGNOSIS — Z79.4 TYPE 2 DIABETES MELLITUS WITH INSULIN THERAPY (HCC): ICD-10-CM

## 2022-08-18 LAB
ALBUMIN SERPL-MCNC: 3.7 G/DL (ref 3.5–5.1)
ALP BLD-CCNC: 89 U/L (ref 38–126)
ALT SERPL-CCNC: 12 U/L (ref 11–66)
ANION GAP SERPL CALCULATED.3IONS-SCNC: 14 MEQ/L (ref 8–16)
AST SERPL-CCNC: 14 U/L (ref 5–40)
AVERAGE GLUCOSE: 294 MG/DL (ref 70–126)
BILIRUB SERPL-MCNC: 0.4 MG/DL (ref 0.3–1.2)
BUN BLDV-MCNC: 20 MG/DL (ref 7–22)
CALCIUM SERPL-MCNC: 9.7 MG/DL (ref 8.5–10.5)
CHLORIDE BLD-SCNC: 100 MEQ/L (ref 98–111)
CO2: 24 MEQ/L (ref 23–33)
CREAT SERPL-MCNC: 1.1 MG/DL (ref 0.4–1.2)
GFR SERPL CREATININE-BSD FRML MDRD: 68 ML/MIN/1.73M2
GLUCOSE BLD-MCNC: 349 MG/DL (ref 70–108)
HBA1C MFR BLD: 11.8 % (ref 4.4–6.4)
POTASSIUM SERPL-SCNC: 4.8 MEQ/L (ref 3.5–5.2)
SODIUM BLD-SCNC: 138 MEQ/L (ref 135–145)
TOTAL PROTEIN: 6.4 G/DL (ref 6.1–8)

## 2022-08-19 ENCOUNTER — TELEPHONE (OUTPATIENT)
Dept: FAMILY MEDICINE CLINIC | Age: 62
End: 2022-08-19

## 2022-08-19 ENCOUNTER — OFFICE VISIT (OUTPATIENT)
Dept: CARDIOLOGY CLINIC | Age: 62
End: 2022-08-19
Payer: COMMERCIAL

## 2022-08-19 VITALS — DIASTOLIC BLOOD PRESSURE: 84 MMHG | HEART RATE: 84 BPM | SYSTOLIC BLOOD PRESSURE: 148 MMHG

## 2022-08-19 DIAGNOSIS — Z95.1 HX OF CABG: ICD-10-CM

## 2022-08-19 DIAGNOSIS — R06.09 DOE (DYSPNEA ON EXERTION): ICD-10-CM

## 2022-08-19 DIAGNOSIS — I25.10 CAD IN NATIVE ARTERY: ICD-10-CM

## 2022-08-19 DIAGNOSIS — I73.9 PAD (PERIPHERAL ARTERY DISEASE) (HCC): Primary | ICD-10-CM

## 2022-08-19 PROCEDURE — 99214 OFFICE O/P EST MOD 30 MIN: CPT | Performed by: INTERNAL MEDICINE

## 2022-08-19 RX ORDER — ATORVASTATIN CALCIUM 80 MG/1
80 TABLET, FILM COATED ORAL DAILY
Qty: 30 TABLET | Refills: 3 | Status: SHIPPED | OUTPATIENT
Start: 2022-08-19

## 2022-08-19 NOTE — TELEPHONE ENCOUNTER
----- Message from EYAD Martinez CNP sent at 8/19/2022 12:30 PM EDT -----  Blood work revealed elevated a1c. Down slightly from 12.6 to 11.8. Recommend follow up appt to address uncontrolled diabetes. Likely will need increase in levemir dosing.

## 2022-08-19 NOTE — PROGRESS NOTES
[facility]  [address] [address]  Dept: [phone]  Dept Fax: [phone]  Loc: [phone]    Visit Date: 8/19/2022    Mr. Gaston Su is a 62 y.o. male  who presented for:    HTN  CAD  CABG  PAD  Carotid stenosis   HFpEF   Left foot wound    HPI:   DARLENE Hartmann is a pleasant 62year old male patient who  has a past medical history of Arthritis, CAD (coronary artery disease), CKD (chronic kidney disease), stage II, Diabetes mellitus ([facility] [address] Hyperlipidemia, Hypertension, and Liver disease. He is s/p 3 vessel CABG on 11/2019 (Positive stress test, [facility] revealed MV CAD. 3v CABG 11/2019: SVG to OM1, SVG to Circumflex, LIMA to distal LAD with Dr. Earline De Los Santos at [facility]). CTA of his carotids was done at [facility] prior to CABG and per records CTA revealed 100% occlusion on the right and a 65% on the left. The recommendation was made for proceeding with his CABG and then an interval carotid endarterectomy on the left side once he is recovered from surgery. Peripheral angiogram 11/2020 was c/w nonobstructive PAD. Repeat CTA 01/2021 revealed occluded right ICA and 75% stenosis of left ICA. Per his wishes, the patient has been followed by vascular surgery at [facility] in regards of his carotid artery disease. On 7/2022, patient underwent left foot wound debridement by Dr Carmita Cerrato, has wound vac. Has some pain in his left foot. Uses scooter for ambulation. Has dyspnea on exertion. Patient denies chest pain, orthopnea, paroxysmal nocturnal dyspnea, palpitations, dizziness, syncope, weight gain or leg swelling.        Current Outpatient Medications:     metoprolol tartrate (LOPRESSOR) 25 MG tablet, Take 1 tablet by mouth twice daily, Disp: 180 tablet, Rfl: 1    atorvastatin (LIPITOR) 40 MG tablet, Take 1 tablet by mouth nightly, Disp: 90 tablet, Rfl: 1    insulin detemir (LEVEMIR FLEXTOUCH) 100 UNIT/ML injection pen, 30 units in the morning, and 30 units in the evening., Disp: 21 mL, Rfl: 3    insulin lispro (HUMALOG) 100 UNIT/ML injection vial, Inject 5-15 Units into the skin 3 times daily (with meals), Disp: 15 mL, Rfl: 3    albuterol sulfate HFA (VENTOLIN HFA) 108 (90 Base) MCG/ACT inhaler, Inhale 2 puffs into the lungs 4 times daily as needed for Wheezing, Disp: 54 g, Rfl: 1    lisinopril (PRINIVIL;ZESTRIL) 20 MG tablet, Take 1 tablet by mouth daily, Disp: 90 tablet, Rfl: 3    clopidogrel (PLAVIX) 75 MG tablet, Take 1 tablet by mouth daily, Disp: 90 tablet, Rfl: 3    HYDROcodone-acetaminophen (NORCO) 5-325 MG per tablet, Take 1 tablet by mouth every 6 hours as needed for Pain., Disp: , Rfl:     gabapentin (NEURONTIN) 600 MG tablet, Take 1 tablet by mouth 2 times daily for 180 days. , Disp: 180 tablet, Rfl: 1    albuterol sulfate  (90 Base) MCG/ACT inhaler, Inhale 2 puffs into the lungs every 6 hours as needed for Wheezing, Disp: 1 Inhaler, Rfl: 3    Polyethylene Glycol 3350 (MIRALAX PO), Take by mouth as needed, Disp: , Rfl:     aspirin 81 MG chewable tablet, Take 1 tablet by mouth daily, Disp: 30 tablet, Rfl: 3    Multiple Vitamins-Minerals (MULTIVITAMIN PO), Take 1 tablet by mouth daily , Disp: , Rfl:     Past Medical History  Zarina Shultz  has a past medical history of Arthritis, CAD (coronary artery disease), CKD (chronic kidney disease), stage II, Diabetes mellitus ([facility] [address] Hyperlipidemia, Hypertension, and Liver disease. Social History  Zarina Shultz  reports that he quit smoking about 14 years ago. His smoking use included cigarettes. He has never used smokeless tobacco. He reports that he does not currently use alcohol. He reports that he does not use drugs. Family History  Zarina Shultz family history includes Alzheimer's Disease in his father; Diabetes in his mother; Heart Disease in his father; High Blood Pressure in his father and mother.     Past Surgical History   Past Surgical History:   Procedure Laterality Date    CARDIAC SURGERY  11/2019    triple bypass    CYST REMOVAL      RIGHT ANKLE     FOOT DEBRIDEMENT Left 4/20/2020    LEFT TRANSMETATARSAL AMPUTATION  FOOT INCISION AND DRAINAGE performed by Sagrario Keller DPM at [address] [employer] Left 7/20/2020    LEFT WOUND DEBRIDEMENT WITH WOUND VAC APPLICATION performed by Sagrario Keller DPM at [address] [employer] Left 7/1/2022    LEFT FOOT [address], APPLICATION SKIN SUBSTITUTE GRAFT, APPLICATION [employer] WOUND VAC performed by Sagrario Keller DPM at [facility] Right     CYST REMOVED    FOOT SURGERY Left 8/14/2020    LEFT FOOT PREPARATION GRAFT SITE, HAVEST SPLIT THICKNESS SKIN GRAFT WITH APPLICATION, APPLICATION [employer] WOUND VAC performed by Sagrario Keller DPM at [address] [facility]    TOE AMPUTATION Left 3/3/2020    I&D LEFT FOOT, TRANSMETATARSAL AMPUTATION performed by Sagrario Keller DPM at [address] [facility]         Review of Systems   Constitutional: Negative for chills and fever  HENT: Negative for congestion, sinus pressure, sneezing and sore throat. Eyes: Negative for pain, discharge, redness and itching. Respiratory: Negative for apnea, cough  Gastrointestinal: Negative for blood in stool, constipation, diarrhea   Endocrine: Negative for cold intolerance, heat intolerance, polydipsia. Genitourinary: Negative for dysuria, enuresis, flank pain and hematuria. Musculoskeletal: Negative for arthralgias, joint swelling and neck pain. Neurological: Negative for numbness and headaches. Psychiatric/Behavioral: Negative for agitation, confusion, decreased concentration and dysphoric mood. Objective: There were no vitals taken for this visit. Wt Readings from Last 3 Encounters:   07/01/22 (!) 311 lb (141.1 kg)   04/04/22 (!) 311 lb 6.4 oz (141.3 kg)   06/24/21 278 lb (126.1 kg)     BP Readings from Last 3 Encounters:   07/01/22 134/76   04/04/22 118/78   09/13/21 (!) 158/92       Nursing note and vitals reviewed.     Physical Exam Constitutional: Oriented to person, place, and time. Appears well-developed and well-nourished. ENT: Moist mucous membranes. No bleeding. Tongue is midline. Head: Normocephalic and atraumatic. Eyes: EOM are normal. Pupils are equal, round, and reactive to light. Neck: Normal range of motion. Neck supple. No JVD present. Cardiovascular: Normal rate, regular rhythm, murmur, no rubs, and intact distal pulses. Pulmonary/Chest: Effort normal and breath sounds normal. No respiratory distress. No wheezes. No rales. Abdominal: Soft. Bowel sounds are normal. No distension. There is no tenderness. Musculoskeletal: Normal range of motion. no edema. S/p left TM amputation, wound  VAC  Neurological: Alert and oriented to person, place, and time. No cranial nerve deficit. Coordination normal.   Skin: Skin is warm and dry. Psychiatric: Normal mood and affect.        Lab Results   Component Value Date/Time    CKTOTAL 47 04/17/2020 06:00 AM       Lab Results   Component Value Date/Time    WBC 9.7 06/29/2022 01:09 PM    RBC 3.88 06/29/2022 01:09 PM    HGB 10.8 06/29/2022 01:09 PM    HCT 33.9 06/29/2022 01:09 PM    MCV 87.4 06/29/2022 01:09 PM    MCH 27.8 06/29/2022 01:09 PM    MCHC 31.9 06/29/2022 01:09 PM    RDW 17.7 05/25/2020 01:45 PM     06/29/2022 01:09 PM    MPV 11.2 06/29/2022 01:09 PM       Lab Results   Component Value Date/Time     08/18/2022 08:41 AM    K 4.8 08/18/2022 08:41 AM    K 3.8 06/21/2021 06:48 PM     08/18/2022 08:41 AM    CO2 24 08/18/2022 08:41 AM    BUN 20 08/18/2022 08:41 AM    LABALBU 3.7 08/18/2022 08:41 AM    CREATININE 1.1 08/18/2022 08:41 AM    CALCIUM 9.7 08/18/2022 08:41 AM    LABGLOM 68 08/18/2022 08:41 AM    GLUCOSE 349 08/18/2022 08:41 AM    GLUCOSE 103 05/25/2020 01:45 PM       Lab Results   Component Value Date/Time    ALKPHOS 89 08/18/2022 08:41 AM    ALT 12 08/18/2022 08:41 AM    AST 14 08/18/2022 08:41 AM    PROT 6.4 08/18/2022 08:41 AM    BILITOT 0.4 08/18/2022 08:41 AM    BILIDIR <0.2 06/21/2021 06:48 PM    LABALBU 3.7 08/18/2022 08:41 AM       Lab Results   Component Value Date/Time    MG 2.2 04/21/2020 05:08 AM       Lab Results   Component Value Date    INR 1.02 11/25/2020    INR 1.14 (H) 04/17/2020         Lab Results   Component Value Date/Time    LABA1C 11.8 08/18/2022 08:41 AM       Lab Results   Component Value Date/Time    TRIG 264 04/04/2022 10:16 AM    HDL 45 04/04/2022 10:16 AM    LDLCALC 95 04/04/2022 10:16 AM       Lab Results   Component Value Date/Time    TSH 0.862 08/26/2021 10:52 AM         Testing Reviewed:      I have individually reviewed the cardiac test below:    ECHO: Results for orders placed during the hospital encounter of 04/17/20    ECHO Complete 2D W Doppler W Color    Narrative  Transthoracic Echocardiography Report (TTE)    Demographics    Patient Name    Cristela Navas T Gender               Male    MR #            [id number]       Race                     Ethnicity    Account #       [de-identified]       Room Number          0004    Accession       [id number]       Date of Study        04/21/2020  Number    Date of Birth   1960      Referring Physician  David Hurtado MD  [facility]    Age             63 year(s)      Sonographer          [facility]    Interpreting         Gloria Barber MD  Physician    Procedure    Type of Study    TTE procedure:ECHOCARDIOGRAM COMPLETE 2D W DOPPLER W COLOR. Procedure Date  Date: 04/21/2020 Start: 08:54 AM    Study Location: Bedside  Technical Quality: Limited visualization due to body habitus. Indications:Coronary artery disease and S/P CABG. Additional Medical History:Ex smoker, diabetic, sepsis, CAD, CABG, chronic  kidney disease, anemia, hyperlipidemia, hypertension, arthritis    Patient Status: Routine    Height: 74.02 inches Weight: 308. 65 pounds BSA: 2.61 m^2 BMI: 39.61 kg/m^2    BP: 202/97 mmHg    Conclusions    Summary  Technically difficult examination. Technically limited study. Left ventricle size is normal.  Ejection fraction is visually estimated at 50%. Unable to determine wall motion abnormalities due to poor image quality. Abnormal (paradoxical) motion consistent with post-operative status. Doppler parameters were consistent with abnormal left ventricular  relaxation (grade 1 diastolic dysfunction). Thickened aortic valve leaflets noted. Aortic valve leaflets are Mildly calcified. Mild aortic stenosis is present. Signature    ----------------------------------------------------------------  Electronically signed by Ann Thacker MD (Interpreting  physician) on 04/21/2020 at 11:53 AM  ----------------------------------------------------------------    Findings    Mitral Valve  Structurally normal mitral valve. Trace mitral regurgitation is present. Aortic Valve  Thickened aortic valve leaflets noted. Aortic valve leaflets are Mildly calcified. Mild aortic stenosis is present. Tricuspid Valve  Tricuspid valve is structurally normal.  Trace tricuspid regurgitation. Pulmonic Valve  The pulmonic valve was not well visualized . Left Atrium  Normal size left atrium. Left Ventricle  Left ventricle size is normal.  No asymmetrical left ventricular hypertrophy was seen. Unable to determine wall motion abnormalities due to poor image quality. Abnormal (paradoxical) motion consistent with post-operative status. Doppler parameters were consistent with abnormal left ventricular  relaxation (grade 1 diastolic dysfunction). Ejection fraction is visually estimated at 50%. Right Atrium  The right atrium is of normal size. Right Ventricle  Normal right ventricular size and function. Pericardial Effusion  No evidence of any pericardial effusion. Pleural Effusion  No evidence of pleural effusion. Aorta / Great Vessels  The aorta is within normal limits.   IVC normal.  Estimated right atrial pressure is 5 mmHg .    M-Mode/2D Measurements & Calculations    LV Diastolic    LV Systolic Dimension: 3.3   AV Cusp Separation: 1.3 cmLA  Dimension: 4.6  cm                           Dimension: 2 cmAO Root  cm              LV Volume Diastolic: 47.4 ml Dimension: 3.3 cm  LV FS:28.3 %    LV Volume Systolic: 85.1 ml  LV PW           LV EDV/LV EDV Index: 59.6  Diastolic: 0.9  ZV/49 V^8RF ESV/LV ESV  cm              Index: 44.1 ml/17 m^2        RV Diastolic Dimension: 2.2  Septum          EF Calculated: 54.7 %        cm  Diastolic: 0.8  cm                                           LA/Aorta: 0.61    LVOT: 2 cm    Doppler Measurements & Calculations    MV Peak E-Wave:     AV Peak Velocity: 223    LVOT Peak Velocity: 113 cm/s  59.6 cm/s           cm/s                     LVOT Mean Velocity: 77.8 cm/s  MV Peak A-Wave: 69  AV Peak Gradient: 19.89  LVOT Peak Gradient: 5  cm/s                mmHg                     mmHgLVOT Mean Gradient: 3  MV E/A Ratio: 0.86  AV Mean Velocity: 155    mmHg  MV Peak Gradient:   cm/s  1.42 mmHg           AV Mean Gradient: 11     TV Peak E-Wave: 61.3 cm/s  mmHg                     TV Peak A-Wave: 91.7 cm/s  MV Deceleration     AV VTI: 36.7 cm  Time: 259 msec      AV Area                  TV Peak Gradient: 1.5 mmHg  (Continuity):1.79 cm^2   TR Velocity:250 cm/s  TR Gradient:25 mmHg  MV E' Septal        LVOT VTI: 20.9 cm        PV Peak Velocity: 87.8 cm/s  Velocity: 6.4 cm/s                           PV Peak Gradient: 3.08 mmHg  MV A' Septal  Velocity: 9.2 cm/s  MV E' Lateral       AV DVI (VTI): 0.57AV DVI  Velocity: 4.9 cm/s  (Vmax):0.51  MV A' Lateral  Velocity: 8.5 cm/s  E/E' septal: 9.31  E/E' lateral: 12.16  MR Velocity: 377  cm/s    [id number]     Peripheral angiogram 11/2020  IMPRESSION:  Nonobstructive peripheral arterial disease. RECOMMENDATIONS:  Medical management. Narrative   PROCEDURE: CTA NECK W WO CONTRAST       CLINICAL INFORMATION: Bilateral carotid artery stenosis. Preop. Bilateral carotid stenosis. CABG in November 2020. COMPARISON: No prior study. TECHNIQUE: 1 mm axial images were obtained through the neck after the fast bolus administration of contrast. A noncontrast localizer was obtained. 3-D reconstructions were performed on a dedicated 3-D workstation. These include multiplanar MPR images,   multiplanar MIP images and centerline reconstructions. Isovue intravenous contrast was given. All CT scans at this facility use dose modulation, iterative reconstruction, and/or weight-based dosing when appropriate to reduce radiation dose to as low as reasonably achievable. 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 There is no stenosis at the origin innominate artery. There is no stenosis at the origin left common carotid artery. There is some atherosclerosis at the origin. There is no stenosis of    either subclavian artery. There is some atherosclerosis. Right common carotid artery/ICA: The right common carotid artery is within acceptable limits. There is heavy calcified atherosclerosis of the right carotid bulb. The right carotid bulb is occluded. The right internal carotid artery is occluded. The right    external carotid artery origin is widely patent. Left common carotid artery/ICA: The left common carotid artery is normal. There is calcified and noncalcified atherosclerosis of the level the left carotid bulb. There is stenosis at the origin of the left internal carotid artery. Using NASCET criteria   and the distal left internal carotid artery as the reference, there is a 75% stenosis at the origin of the left internal carotid artery. There is no distal stenosis. The vessel is tortuous and somewhat redundant. Vertebral arteries:  There is a moderate stenosis at the origin of the right vertebral artery. There is no stenosis of the left vertebral artery. The vertebral arteries are relatively codominant. Intracranial cerebral circulation: The cavernous segment of the right internal carotid artery is occluded. There is heavy calcified atherosclerosis of the cavernous segment of the left internal carotid artery. There is a moderate stenosis in its   midportion. The vessel is patent. The anterior cerebral arteries and middle cerebral arteries are patent. The basilar artery is patent. Axial source data: There is no cervical adenopathy. The patient has had a prior median sternotomy. There are calcified left hilar lymph nodes. There are no suspicious findings in the lung apices. There are degenerative changes in the cervical spine. There is a defect in the membranous portion of the nasal septum. Impression       1. Occluded right internal carotid artery. 2. 75% stenosis at the origin of the left internal carotid artery. 3. Moderate stenosis at the origin the right vertebral artery. **This report has been created using voice recognition software. It may contain minor errors which are inherent in voice recognition technology. **       Final report electronically signed by Dr. Sophai Cali on 1/7/2021 4:20 PM           AssessmentPlan:   Mariel Vargas is a pleasant 62year old male patient who  has a past medical history of Arthritis, CAD (coronary artery disease), CKD (chronic kidney disease), stage II, Diabetes mellitus ([address] Hyperlipidemia, Hypertension, and Liver disease. He is s/p 3 vessel CABG on 11/2019 (Positive stress test, [facility] revealed MV CAD. 3v CABG 11/2019: SVG to OM1, SVG to Circumflex, LIMA to distal LAD with Dr. Vianey Jaramillo at [facility]). CTA of his carotids was done at [facility] prior to CABG and per records CTA revealed 100% occlusion on the right and a 65% on the left.  The recommendation was made for proceeding with his CABG and then an interval carotid endarterectomy on the left side once he is recovered from surgery. Peripheral angiogram 11/2020 was c/w nonobstructive PAD. Repeat CTA 01/2021 revealed occluded right ICA and 75% stenosis of left ICA. Per his wishes, the patient has been followed by vascular surgery at [facility] in regards of his carotid artery disease. On 7/2022, patient underwent left foot wound debridement by Dr Joel Matias, has wound vac. Has some pain in his left foot. Uses scooter for ambulation. Has dyspnea on exertion. Patient denies chest pain, orthopnea, paroxysmal nocturnal dyspnea, palpitations, dizziness, syncope, weight gain or leg swelling. CAD  3 V CABG 11/2019 [facility]: SVG LCX, SVG OM1, LIMA to dLAD)   Left LE wound. S/P left TMA 3/3/2020, recurrent infection, s/p revision 04/2020, required IV Abx/Wound VAC. Debridement 7/2022  PAD  Carotid stenosis (CTA 11/2019 revealed 100% occlusion on right, 65% stenosis on left - left CEA was planned after CABG)  Asymptomatic (progressive) 75% left ICA stenosis noted on most recent CTA 01/2021  DM  HTN  Dyslipidemia   HFpEF     Per his wishes, the patient has been followed by vascular surgery at [facility] in regards of his carotid artery disease. Remains asymptomatic. Cont medical management. Patient states he was seen lately, CTA was ordered  He is s/p 3 vessel CABG on 11/2019 (Positive stress test, [facility] revealed MV CAD. 3v CABG 11/2019: SVG to OM1, SVG to Circumflex, LIMA to distal LAD with Dr. Dennison Other at [facility])  No ischemic evaluation since surgery. Echo 4/2020 revealed an EF of 50%  Aggressive medical therapy and risk factor modification for CAD is indicated   METOPROLOL, LISINOPRIL  ASA  Plavix  On Lipitor 40 mg po daily   LDL 95 on 4/2022, not at goal   Increase lipitor to 80 mg po daily  Lipid panel in 3 months  The patient is advised to attempt to lose weight  Known PAD, left foot wound. Recently required debridement. Peripheral angiogram 11/2020 was c/w nonobstructive PAD. some leg pain.  Check CHERYL Has FERRERA  Abnormal EKG, TW changes   Had CABG, no chest pain prior to CABG  Based on patient's risk factors and clinical presentation, stress test is indicated to investigate for possible underlying ischemic heart disease. Patient  agrees with that work up and its risks and benefits and potential need for additional testing if stress test is abnormal such as cardiac catheterization    Above findings and plan of care were discussed with patient in details, patient's questions were answered.      Disposition:  RTC in 6 months             Electronically signed by Buckley Sacks, MD, [facility]    8/19/2022 at 7:54 AM EDT

## 2022-08-19 NOTE — TELEPHONE ENCOUNTER
I called and spoke to the patient. I asked him to schedule and appointment to see Shayy Lane in the office to go over his lab results.  He scheduled to see Shayy Lane on Wednesday, 08- at 9:00 am.

## 2022-08-24 ENCOUNTER — OFFICE VISIT (OUTPATIENT)
Dept: FAMILY MEDICINE CLINIC | Age: 62
End: 2022-08-24
Payer: COMMERCIAL

## 2022-08-24 VITALS
HEART RATE: 88 BPM | DIASTOLIC BLOOD PRESSURE: 72 MMHG | OXYGEN SATURATION: 99 % | WEIGHT: 303.2 LBS | SYSTOLIC BLOOD PRESSURE: 102 MMHG | BODY MASS INDEX: 37.9 KG/M2

## 2022-08-24 DIAGNOSIS — I10 PRIMARY HYPERTENSION: Primary | ICD-10-CM

## 2022-08-24 DIAGNOSIS — N18.2 CKD (CHRONIC KIDNEY DISEASE), STAGE II: ICD-10-CM

## 2022-08-24 DIAGNOSIS — E66.01 MORBID OBESITY (HCC): ICD-10-CM

## 2022-08-24 DIAGNOSIS — R73.09 HEMOGLOBIN A1C GREATER THAN 9%, INDICATING POOR DIABETIC CONTROL: ICD-10-CM

## 2022-08-24 DIAGNOSIS — Z79.4 TYPE 2 DIABETES MELLITUS WITH INSULIN THERAPY (HCC): ICD-10-CM

## 2022-08-24 DIAGNOSIS — E11.9 TYPE 2 DIABETES MELLITUS WITH INSULIN THERAPY (HCC): ICD-10-CM

## 2022-08-24 PROCEDURE — 3046F HEMOGLOBIN A1C LEVEL >9.0%: CPT | Performed by: NURSE PRACTITIONER

## 2022-08-24 PROCEDURE — 99214 OFFICE O/P EST MOD 30 MIN: CPT | Performed by: NURSE PRACTITIONER

## 2022-08-24 RX ORDER — ALBUTEROL SULFATE 90 UG/1
2 AEROSOL, METERED RESPIRATORY (INHALATION) 4 TIMES DAILY PRN
Qty: 54 G | Refills: 1 | Status: SHIPPED | OUTPATIENT
Start: 2022-08-24

## 2022-08-24 RX ORDER — METFORMIN HYDROCHLORIDE 750 MG/1
750 TABLET, EXTENDED RELEASE ORAL
Qty: 90 TABLET | Refills: 1 | Status: SHIPPED | OUTPATIENT
Start: 2022-08-24

## 2022-08-24 RX ORDER — INSULIN DETEMIR 100 [IU]/ML
INJECTION, SOLUTION SUBCUTANEOUS
Qty: 21 ML | Refills: 3 | Status: SHIPPED | OUTPATIENT
Start: 2022-08-24

## 2022-08-24 RX ORDER — GABAPENTIN 600 MG/1
600 TABLET ORAL 2 TIMES DAILY
Qty: 180 TABLET | Refills: 1 | Status: SHIPPED | OUTPATIENT
Start: 2022-08-24 | End: 2023-02-20

## 2022-08-24 RX ORDER — CLOPIDOGREL BISULFATE 75 MG/1
75 TABLET ORAL DAILY
Qty: 90 TABLET | Refills: 3 | Status: SHIPPED | OUTPATIENT
Start: 2022-08-24

## 2022-08-24 ASSESSMENT — ENCOUNTER SYMPTOMS
COUGH: 0
VOICE CHANGE: 0
BACK PAIN: 1
RHINORRHEA: 0
VOMITING: 0
SORE THROAT: 0
SINUS PRESSURE: 0
CHEST TIGHTNESS: 0
GASTROINTESTINAL NEGATIVE: 1
SHORTNESS OF BREATH: 1
NAUSEA: 0
WHEEZING: 0
EYES NEGATIVE: 1
TROUBLE SWALLOWING: 0
CHOKING: 0
SINUS PAIN: 0

## 2022-08-24 ASSESSMENT — PATIENT HEALTH QUESTIONNAIRE - PHQ9
SUM OF ALL RESPONSES TO PHQ QUESTIONS 1-9: 0
2. FEELING DOWN, DEPRESSED OR HOPELESS: 0
SUM OF ALL RESPONSES TO PHQ QUESTIONS 1-9: 0
SUM OF ALL RESPONSES TO PHQ QUESTIONS 1-9: 0
1. LITTLE INTEREST OR PLEASURE IN DOING THINGS: 0
SUM OF ALL RESPONSES TO PHQ QUESTIONS 1-9: 0
SUM OF ALL RESPONSES TO PHQ9 QUESTIONS 1 & 2: 0

## 2022-08-24 NOTE — PROGRESS NOTES
[address] [facility]  Dept: [phone]          Lavinia Meeks (:  1960) is a 62 y.o. male,Established patient, here for evaluation of the following chief complaint(s):  Diabetes      ASSESSMENT/PLAN:  I have reviewed the patient's medical history in detail and updated the computerized patient record. HPI/ROS per the patient and caregiver. Overall non toxic in appearance. Answers questions appropriately. Conditions discussed and addressed this visit include:   Overall unchanged  Diabetes not at goal.   Continue tid-qid humalog dosing based on blood sugar  Continue levemir  Add back metformin xr, and jardiance if pt can afford. May consider changing to lantus if no improvement in a1c in 3 months  Diabetes education provided today:    Metabolic syndrome: association of diabetes with dyslipidemia, HTN and obesity. Foot care: advised to wash feet daily, pat dry and apply lotion at night, avoiding between toes. Need to look at feet daily and report to a physician any signs of inflammation or skin damage. Discussed diabetes shoes and socks. Diabetic nephropathy: Kidney function, microalbumin as a sign of diabetic nephropathy. Stages of kidney disease. Carbs: good carbs and bad carbs, importance of carb counting, incorporation of protein with each meal to reduce Glycemic index, importance of portions, Carb/insulin ratio. Fats: Good fats and bad fats, meal planning and supplements. Different diabetic medications. Managing high and low sugar readings. 1. Primary hypertension  2. Type 2 diabetes mellitus with insulin therapy (HCC)  -     insulin detemir (LEVEMIR FLEXTOUCH) 100 UNIT/ML injection pen; 30 units in the morning, and 30 units in the evening., Disp-21 mL, R-3Normal  -     insulin lispro (HUMALOG) 100 UNIT/ML injection vial; Inject 5-15 Units into the skin 3 times daily (with meals), Disp-15 mL, R-3Normal  3. CKD (chronic kidney disease), stage II  4.  Morbid NURSING ADMISSION NOTE      Patient admitted via Ambulance  Oriented to room. Safety precautions initiated. Bed in low position. Call light in reach. Pt admission navigator completed. Pt updated on plan of care and verbalizes understanding.  Pt re obesity ([address]  5. Hemoglobin A1C greater than 9%, indicating poor diabetic control  -     insulin detemir (LEVEMIR FLEXTOUCH) 100 UNIT/ML injection pen; 30 units in the morning, and 30 units in the evening., Disp-21 mL, R-3Normal  -     insulin lispro (HUMALOG) 100 UNIT/ML injection vial; Inject 5-15 Units into the skin 3 times daily (with meals), Disp-15 mL, R-3Normal    Return in about 3 months (around 11/24/2022) for Results review, Routine follow up. SUBJECTIVE/OBJECTIVE:  HPI  Here for follow up on his diabetes  Labs completed last week  Recent cardiology visit, increase lipitor  A1c still greater than 9  Denies any hypoglycemia  Recent revision of left foot amputation  Has no new issues or concerns. No chest pain or sob  Review of Systems   Constitutional:  Positive for fatigue. Negative for activity change, chills and fever. HENT:  Negative for congestion, postnasal drip, rhinorrhea, sinus pressure, sinus pain, sore throat, trouble swallowing and voice change. Eyes: Negative. Respiratory:  Positive for shortness of breath (with exertion). Negative for cough, choking, chest tightness and wheezing. Cardiovascular:  Negative for leg swelling. Gastrointestinal: Negative. Negative for nausea and vomiting. Endocrine: Negative for cold intolerance and heat intolerance. Genitourinary: Negative. Musculoskeletal:  Positive for arthralgias, back pain, gait problem and myalgias. Negative for joint swelling and neck pain. Skin:  Positive for wound (left foot, wound vac in place). Allergic/Immunologic: Positive for environmental allergies. Neurological:  Negative for headaches. Hematological:  Negative for adenopathy. Does not bruise/bleed easily. Psychiatric/Behavioral: Negative. Physical Exam  Vitals and nursing note reviewed. Constitutional:       General: He is not in acute distress. Appearance: Normal appearance. He is obese. He is not ill-appearing.    HENT:      Head: Normocephalic and atraumatic. Right Ear: Ear canal and external ear normal. Tympanic membrane is bulging. Left Ear: Ear canal and external ear normal. Tympanic membrane is injected, erythematous and bulging. Nose: Mucosal edema present. No congestion. Right Turbinates: Swollen. Left Turbinates: Swollen. Mouth/Throat:      Lips: Pink. Mouth: Mucous membranes are dry. Pharynx: Oropharynx is clear. No oropharyngeal exudate or posterior oropharyngeal erythema. Tonsils: No tonsillar exudate. Eyes:      General:         Right eye: No discharge. Left eye: No discharge. Conjunctiva/sclera: Conjunctivae normal.   Neck:      Vascular: No carotid bruit. Cardiovascular:      Rate and Rhythm: Normal rate and regular rhythm. Pulses: Normal pulses. Heart sounds: Normal heart sounds. Pulmonary:      Effort: Pulmonary effort is normal.      Breath sounds: No wheezing or rhonchi. Abdominal:      General: Abdomen is flat. Bowel sounds are normal.      Palpations: Abdomen is soft. Musculoskeletal:         General: Deformity ( transmetarsal amputation left foot. ) present. Normal range of motion. Cervical back: Normal range of motion and neck supple. No tenderness. Right lower leg: No edema. Left lower leg: No edema. Comments:      Lymphadenopathy:      Cervical: No cervical adenopathy. Skin:     General: Skin is warm and dry. Capillary Refill: Capillary refill takes less than 2 seconds. Findings: No bruising, lesion or rash. Neurological:      General: No focal deficit present. Mental Status: He is alert and oriented to person, place, and time. Mental status is at baseline. Psychiatric:         Mood and Affect: Mood normal.         Behavior: Behavior normal.         Thought Content:  Thought content normal.         Judgment: Judgment normal.               An electronic signature was used to authenticate this note.    --Adela Flor, EYAD - CNP

## 2022-08-24 NOTE — PROGRESS NOTES
After obtaining consent, and per orders of Belkis Jimenez CNP, injection of Prevnar 13 given in Right deltoid by Klaus Rice (SARA). Patient instructed to remain in clinic for 20 minutes afterwards, and to report any adverse reaction to me immediately.

## 2022-08-29 ENCOUNTER — HOSPITAL ENCOUNTER (OUTPATIENT)
Dept: INTERVENTIONAL RADIOLOGY/VASCULAR | Age: 62
Discharge: HOME | End: 2022-08-29
Payer: COMMERCIAL

## 2022-08-29 ENCOUNTER — HOSPITAL ENCOUNTER (OUTPATIENT)
Dept: NON INVASIVE DIAGNOSTICS | Age: 62
Discharge: HOME | End: 2022-08-29
Payer: COMMERCIAL

## 2022-08-29 DIAGNOSIS — I73.9 PAD (PERIPHERAL ARTERY DISEASE) (HCC): ICD-10-CM

## 2022-08-29 DIAGNOSIS — I25.10 CAD IN NATIVE ARTERY: ICD-10-CM

## 2022-08-29 DIAGNOSIS — Z95.1 HX OF CABG: ICD-10-CM

## 2022-08-29 DIAGNOSIS — R06.09 DOE (DYSPNEA ON EXERTION): ICD-10-CM

## 2022-08-29 PROCEDURE — 3430000000 HC RX DIAGNOSTIC RADIOPHARMACEUTICAL: Performed by: INTERNAL MEDICINE

## 2022-08-29 PROCEDURE — 6360000002 HC RX W HCPCS

## 2022-08-29 PROCEDURE — 93922 UPR/L XTREMITY ART 2 LEVELS: CPT

## 2022-08-29 PROCEDURE — 78452 HT MUSCLE IMAGE SPECT MULT: CPT

## 2022-08-29 PROCEDURE — A9500 TC99M SESTAMIBI: HCPCS | Performed by: INTERNAL MEDICINE

## 2022-08-29 PROCEDURE — 93017 CV STRESS TEST TRACING ONLY: CPT | Performed by: INTERNAL MEDICINE

## 2022-08-29 RX ADMIN — Medication 8.7 MILLICURIE: at 09:35

## 2022-08-29 RX ADMIN — Medication 32.8 MILLICURIE: at 10:25

## 2022-08-30 ENCOUNTER — TELEPHONE (OUTPATIENT)
Dept: CARDIOLOGY CLINIC | Age: 62
End: 2022-08-30

## 2022-08-30 NOTE — TELEPHONE ENCOUNTER
----- Message from Madonna Zazueta MD sent at 8/30/2022  7:50 AM EDT -----  PAD \"circulation disease in the legs\"  Known diffuse disease, below knee disease from angiogram 2020  Known left foot wounds    Cont medical therapy  Cont to follow up with podiatry  No intervention

## 2022-08-30 NOTE — TELEPHONE ENCOUNTER
Pt notified. Pt also asked that this be faxed to Dr Mohan Dixon at [facility].

## 2022-10-20 ENCOUNTER — HOSPITAL ENCOUNTER (OUTPATIENT)
Age: 62
Discharge: HOME | End: 2022-10-20
Payer: COMMERCIAL

## 2022-10-20 LAB
ANION GAP SERPL CALCULATED.3IONS-SCNC: 14 MEQ/L (ref 8–16)
APTT: 32.5 SECONDS (ref 22–38)
AVERAGE GLUCOSE: 216 MG/DL (ref 70–126)
BASOPHILS # BLD: 1 %
BASOPHILS ABSOLUTE: 0.1 THOU/MM3 (ref 0–0.1)
BUN BLDV-MCNC: 19 MG/DL (ref 7–22)
CHLORIDE BLD-SCNC: 104 MEQ/L (ref 98–111)
CO2: 24 MEQ/L (ref 23–33)
CREAT SERPL-MCNC: 1.1 MG/DL (ref 0.4–1.2)
EKG ATRIAL RATE: 86 BPM
EKG P AXIS: 64 DEGREES
EKG P-R INTERVAL: 182 MS
EKG Q-T INTERVAL: 366 MS
EKG QRS DURATION: 98 MS
EKG QTC CALCULATION (BAZETT): 437 MS
EKG R AXIS: 36 DEGREES
EKG T AXIS: 73 DEGREES
EKG VENTRICULAR RATE: 86 BPM
EOSINOPHIL # BLD: 3.2 %
EOSINOPHILS ABSOLUTE: 0.3 THOU/MM3 (ref 0–0.4)
ERYTHROCYTE [DISTWIDTH] IN BLOOD BY AUTOMATED COUNT: 16.3 % (ref 11.5–14.5)
ERYTHROCYTE [DISTWIDTH] IN BLOOD BY AUTOMATED COUNT: 50.9 FL (ref 35–45)
GFR SERPL CREATININE-BSD FRML MDRD: > 60 ML/MIN/1.73M2
GLUCOSE BLD-MCNC: 257 MG/DL (ref 70–108)
HBA1C MFR BLD: 9.2 % (ref 4.4–6.4)
HCT VFR BLD CALC: 32.5 % (ref 42–52)
HEMOGLOBIN: 9.7 GM/DL (ref 14–18)
IMMATURE GRANS (ABS): 0.07 THOU/MM3 (ref 0–0.07)
IMMATURE GRANULOCYTES: 0.8 %
INR BLD: 1.01 (ref 0.85–1.13)
LYMPHOCYTES # BLD: 16.5 %
LYMPHOCYTES ABSOLUTE: 1.4 THOU/MM3 (ref 1–4.8)
MCH RBC QN AUTO: 25.9 PG (ref 26–33)
MCHC RBC AUTO-ENTMCNC: 29.8 GM/DL (ref 32.2–35.5)
MCV RBC AUTO: 86.7 FL (ref 80–94)
MONOCYTES # BLD: 8.3 %
MONOCYTES ABSOLUTE: 0.7 THOU/MM3 (ref 0.4–1.3)
NUCLEATED RED BLOOD CELLS: 0 /100 WBC
PLATELET # BLD: 254 THOU/MM3 (ref 130–400)
PMV BLD AUTO: 10.2 FL (ref 9.4–12.4)
POTASSIUM SERPL-SCNC: 4 MEQ/L (ref 3.5–5.2)
RBC # BLD: 3.75 MILL/MM3 (ref 4.7–6.1)
SEG NEUTROPHILS: 70.2 %
SEGMENTED NEUTROPHILS ABSOLUTE COUNT: 5.9 THOU/MM3 (ref 1.8–7.7)
SODIUM BLD-SCNC: 142 MEQ/L (ref 135–145)
WBC # BLD: 8.4 THOU/MM3 (ref 4.8–10.8)

## 2022-10-20 PROCEDURE — 80051 ELECTROLYTE PANEL: CPT

## 2022-10-20 PROCEDURE — 83036 HEMOGLOBIN GLYCOSYLATED A1C: CPT

## 2022-10-20 PROCEDURE — 82947 ASSAY GLUCOSE BLOOD QUANT: CPT

## 2022-10-20 PROCEDURE — 85610 PROTHROMBIN TIME: CPT

## 2022-10-20 PROCEDURE — 93005 ELECTROCARDIOGRAM TRACING: CPT | Performed by: SURGERY

## 2022-10-20 PROCEDURE — 85730 THROMBOPLASTIN TIME PARTIAL: CPT

## 2022-10-20 PROCEDURE — 84520 ASSAY OF UREA NITROGEN: CPT

## 2022-10-20 PROCEDURE — 36415 COLL VENOUS BLD VENIPUNCTURE: CPT

## 2022-10-20 PROCEDURE — 85025 COMPLETE CBC W/AUTO DIFF WBC: CPT

## 2022-10-20 PROCEDURE — 82565 ASSAY OF CREATININE: CPT

## 2022-10-20 PROCEDURE — 93010 ELECTROCARDIOGRAM REPORT: CPT | Performed by: INTERNAL MEDICINE

## 2022-11-08 ENCOUNTER — OFFICE VISIT (OUTPATIENT)
Dept: CARDIOLOGY CLINIC | Age: 62
End: 2022-11-08
Payer: COMMERCIAL

## 2022-11-08 VITALS
DIASTOLIC BLOOD PRESSURE: 72 MMHG | HEART RATE: 82 BPM | SYSTOLIC BLOOD PRESSURE: 134 MMHG | BODY MASS INDEX: 38.93 KG/M2 | HEIGHT: 74 IN

## 2022-11-08 DIAGNOSIS — I73.9 PAD (PERIPHERAL ARTERY DISEASE) (HCC): ICD-10-CM

## 2022-11-08 DIAGNOSIS — R06.02 SOB (SHORTNESS OF BREATH) ON EXERTION: ICD-10-CM

## 2022-11-08 DIAGNOSIS — I25.10 CAD IN NATIVE ARTERY: Primary | ICD-10-CM

## 2022-11-08 DIAGNOSIS — I65.23 BILATERAL CAROTID ARTERY STENOSIS: ICD-10-CM

## 2022-11-08 PROCEDURE — 99214 OFFICE O/P EST MOD 30 MIN: CPT | Performed by: INTERNAL MEDICINE

## 2022-11-08 PROCEDURE — 93000 ELECTROCARDIOGRAM COMPLETE: CPT | Performed by: INTERNAL MEDICINE

## 2022-11-08 PROCEDURE — 3078F DIAST BP <80 MM HG: CPT | Performed by: INTERNAL MEDICINE

## 2022-11-08 PROCEDURE — 3074F SYST BP LT 130 MM HG: CPT | Performed by: INTERNAL MEDICINE

## 2022-11-08 RX ORDER — CARVEDILOL 12.5 MG/1
12.5 TABLET ORAL 2 TIMES DAILY WITH MEALS
Status: ON HOLD | COMMUNITY
End: 2022-11-24 | Stop reason: HOSPADM

## 2022-11-08 NOTE — PROGRESS NOTES
Pt here for cardiac clearance - Dr. Duyen Huber - carotid surgery    EKG done 10-20-22    Pt denies all symptoms

## 2022-11-08 NOTE — PROGRESS NOTES
[address] [place] [address]  Dept: [phone]  Dept Fax: [phone]  Loc: [phone]    Visit Date: 11/8/2022    Mr. Jericho Daniels is a 65 y.o. male  who presented for:  CAD, CABG  PAD  CHF  Preoperative cardiac risk assessment   HPI:   DARLENE West is a pleasant 65year old male patient who  has a past medical history of Arthritis, CAD (coronary artery disease), CKD (chronic kidney disease), stage II, Diabetes mellitus ([facility] Hyperlipidemia, Hypertension, and Liver disease. He is s/p 3 vessel CABG on 11/2019 (Positive stress test, [facility] revealed MV CAD. 3v CABG 11/2019: SVG to OM1, SVG to Circumflex, LIMA to distal LAD with Dr. Yossi Golden at [facility]). CTA 01/2021 revealed occluded right ICA and 75% stenosis of left ICA (followed by vascular surgery at [facility]). Peripheral angiogram 11/2020 was c/w nonobstructive PAD. On 7/2022, patient underwent left foot wound debridement by Dr Segundo Villa, wound vac. Nuclear stress test on 8/30/2022 was negative for ischemia. On 11/2022, patient was admitted to OSH for planned CEA, but it was canceled after he was found to have LBBB, uncontrolled HTN. ACS was ruled out and Echo revealed a preserved EF. Patient denies chest pain. Current Outpatient Medications:     metFORMIN (GLUCOPHAGE XR) 750 MG extended release tablet, Take 1 tablet by mouth daily (with breakfast), Disp: 90 tablet, Rfl: 1    empagliflozin (JARDIANCE) 25 MG tablet, Take 1 tablet by mouth daily, Disp: 90 tablet, Rfl: 1    clopidogrel (PLAVIX) 75 MG tablet, Take 1 tablet by mouth daily, Disp: 90 tablet, Rfl: 3    gabapentin (NEURONTIN) 600 MG tablet, Take 1 tablet by mouth 2 times daily for 180 days. , Disp: 180 tablet, Rfl: 1    insulin detemir (LEVEMIR FLEXTOUCH) 100 UNIT/ML injection pen, 30 units in the morning, and 30 units in the evening., Disp: 21 mL, Rfl: 3    insulin lispro (HUMALOG) 100 UNIT/ML injection vial, Inject 5-15 Units into the skin 3 times daily (with meals), Disp: 15 mL, Rfl: 3    albuterol sulfate HFA (VENTOLIN HFA) 108 (90 Base) MCG/ACT inhaler, Inhale 2 puffs into the lungs 4 times daily as needed for Wheezing, Disp: 54 g, Rfl: 1    atorvastatin (LIPITOR) 80 MG tablet, Take 1 tablet by mouth daily, Disp: 30 tablet, Rfl: 3    metoprolol tartrate (LOPRESSOR) 25 MG tablet, Take 1 tablet by mouth twice daily, Disp: 180 tablet, Rfl: 1    lisinopril (PRINIVIL;ZESTRIL) 20 MG tablet, Take 1 tablet by mouth daily, Disp: 90 tablet, Rfl: 3    HYDROcodone-acetaminophen (NORCO) 5-325 MG per tablet, Take 1 tablet by mouth every 6 hours as needed for Pain., Disp: , Rfl:     albuterol sulfate  (90 Base) MCG/ACT inhaler, Inhale 2 puffs into the lungs every 6 hours as needed for Wheezing, Disp: 1 Inhaler, Rfl: 3    Polyethylene Glycol 3350 (MIRALAX PO), Take by mouth as needed, Disp: , Rfl:     aspirin 81 MG chewable tablet, Take 1 tablet by mouth daily, Disp: 30 tablet, Rfl: 3    Multiple Vitamins-Minerals (MULTIVITAMIN PO), Take 1 tablet by mouth daily , Disp: , Rfl:     Past Medical History  Karmen Álvarez  has a past medical history of Arthritis, CAD (coronary artery disease), CKD (chronic kidney disease), stage II, Diabetes mellitus ([facility] [address] Hyperlipidemia, Hypertension, and Liver disease. Social History  Karmen Álvarez  reports that he quit smoking about 14 years ago. His smoking use included cigarettes. He has never used smokeless tobacco. He reports that he does not currently use alcohol. He reports that he does not use drugs. Family History  Karmen Álvarez family history includes Alzheimer's Disease in his father; Diabetes in his mother; Heart Disease in his father; High Blood Pressure in his father and mother.     Past Surgical History   Past Surgical History:   Procedure Laterality Date    CARDIAC SURGERY  11/2019    triple bypass    CYST REMOVAL      RIGHT ANKLE     FOOT DEBRIDEMENT Left 4/20/2020    LEFT TRANSMETATARSAL AMPUTATION FOOT INCISION AND DRAINAGE performed by Tameak Hernandez DPM at [address] [employer] Left 7/20/2020    LEFT WOUND DEBRIDEMENT WITH WOUND VAC APPLICATION performed by Tameka Hernandez DPM at [address] [employer] Left 7/1/2022    LEFT FOOT [address], APPLICATION SKIN SUBSTITUTE GRAFT, APPLICATION [employer] WOUND VAC performed by Tameka Hernandez DPM at [facility] Right     CYST REMOVED    FOOT SURGERY Left 8/14/2020    LEFT FOOT PREPARATION GRAFT SITE, HAVEST SPLIT THICKNESS SKIN GRAFT WITH APPLICATION, APPLICATION [employer] WOUND VAC performed by Tameka Hernandez DPM at [address]    TOE AMPUTATION Left 3/3/2020    I&D LEFT FOOT, TRANSMETATARSAL AMPUTATION performed by Tameka Hernandez DPM at [address] [facility]         Review of Systems   Constitutional: Negative for chills and fever  HENT: Negative for congestion, sinus pressure, sneezing and sore throat. Eyes: Negative for pain, discharge, redness and itching. Respiratory: Negative for apnea, cough  Gastrointestinal: Negative for blood in stool, constipation, diarrhea   Endocrine: Negative for cold intolerance, heat intolerance, polydipsia. Genitourinary: Negative for dysuria, enuresis, flank pain and hematuria. Musculoskeletal: Negative for arthralgias, joint swelling and neck pain. Neurological: Negative for numbness and headaches. Psychiatric/Behavioral: Negative for agitation, confusion, decreased concentration and dysphoric mood. Objective: There were no vitals taken for this visit. Wt Readings from Last 3 Encounters:   08/24/22 (!) 303 lb 3.2 oz (137.5 kg)   07/01/22 (!) 311 lb (141.1 kg)   04/04/22 (!) 311 lb 6.4 oz (141.3 kg)     BP Readings from Last 3 Encounters:   08/24/22 102/72   08/19/22 (!) 148/84   07/01/22 134/76       Nursing note and vitals reviewed. Physical Exam   Constitutional: Oriented to person, place, and time.  Appears well-developed and well-nourished. ENT: Moist mucous membranes. No bleeding. Tongue is midline. Head: Normocephalic and atraumatic. Eyes: EOM are normal. Pupils are equal, round, and reactive to light. Neck: Normal range of motion. Neck supple. No JVD present. Cardiovascular: Normal rate, regular rhythm, no murmur, no rubs, and intact distal pulses. Pulmonary/Chest: Effort normal and breath sounds normal. No respiratory distress. No wheezes. No rales. Abdominal: Soft. Bowel sounds are normal. No distension. There is no tenderness. Musculoskeletal: Normal range of motion. no edema. Neurological: Alert and oriented to person, place, and time. No cranial nerve deficit. Coordination normal.   Skin: Skin is warm and dry. Psychiatric: Normal mood and affect.        Lab Results   Component Value Date/Time    CKTOTAL 47 04/17/2020 06:00 AM       Lab Results   Component Value Date/Time    WBC 8.4 10/20/2022 12:04 PM    RBC 3.75 10/20/2022 12:04 PM    HGB 9.7 10/20/2022 12:04 PM    HCT 32.5 10/20/2022 12:04 PM    MCV 86.7 10/20/2022 12:04 PM    MCH 25.9 10/20/2022 12:04 PM    MCHC 29.8 10/20/2022 12:04 PM    RDW 17.7 05/25/2020 01:45 PM     10/20/2022 12:04 PM    MPV 10.2 10/20/2022 12:04 PM       Lab Results   Component Value Date/Time     10/20/2022 12:04 PM    K 4.0 10/20/2022 12:04 PM    K 3.8 06/21/2021 06:48 PM     10/20/2022 12:04 PM    CO2 24 10/20/2022 12:04 PM    BUN 19 10/20/2022 12:04 PM    LABALBU 3.7 08/18/2022 08:41 AM    CREATININE 1.1 10/20/2022 12:04 PM    CALCIUM 9.7 08/18/2022 08:41 AM    LABGLOM >60 10/20/2022 12:04 PM    GLUCOSE 257 10/20/2022 12:04 PM    GLUCOSE 103 05/25/2020 01:45 PM       Lab Results   Component Value Date/Time    ALKPHOS 89 08/18/2022 08:41 AM    ALT 12 08/18/2022 08:41 AM    AST 14 08/18/2022 08:41 AM    PROT 6.4 08/18/2022 08:41 AM    BILITOT 0.4 08/18/2022 08:41 AM    BILIDIR <0.2 06/21/2021 06:48 PM    LABALBU 3.7 08/18/2022 08:41 AM       Lab Results Component Value Date/Time    MG 2.2 04/21/2020 05:08 AM       Lab Results   Component Value Date    INR 1.01 10/20/2022    INR 1.02 11/25/2020    INR 1.14 (H) 04/17/2020         Lab Results   Component Value Date/Time    LABA1C 9.2 10/20/2022 12:04 PM       Lab Results   Component Value Date/Time    TRIG 264 04/04/2022 10:16 AM    HDL 45 04/04/2022 10:16 AM    LDLCALC 95 04/04/2022 10:16 AM       Lab Results   Component Value Date/Time    TSH 0.862 08/26/2021 10:52 AM         Testing Reviewed:      I have individually reviewed the cardiac test below:    ECHO: No results found for this or any previous visit. Peripheral angiogram 11/2020  IMPRESSION:  Nonobstructive peripheral arterial disease. RECOMMENDATIONS:  Medical management. Narrative   PROCEDURE: CTA NECK W WO CONTRAST       CLINICAL INFORMATION: Bilateral carotid artery stenosis. Preop. Bilateral carotid stenosis. CABG in November 2020. COMPARISON: No prior study. TECHNIQUE: 1 mm axial images were obtained through the neck after the fast bolus administration of contrast. A noncontrast localizer was obtained. 3-D reconstructions were performed on a dedicated 3-D workstation. These include multiplanar MPR images,   multiplanar MIP images and centerline reconstructions. Isovue intravenous contrast was given. All CT scans at this facility use dose modulation, iterative reconstruction, and/or weight-based dosing when appropriate to reduce radiation dose to as low as reasonably achievable. 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 There is no stenosis at the origin innominate artery. There is no stenosis at the origin left common carotid artery. There is some atherosclerosis at the origin. There is no stenosis of    either subclavian artery. There is some atherosclerosis. Right common carotid artery/ICA: The right common carotid artery is within acceptable limits.  There is heavy calcified atherosclerosis of the right carotid bulb. The right carotid bulb is occluded. The right internal carotid artery is occluded. The right    external carotid artery origin is widely patent. Left common carotid artery/ICA: The left common carotid artery is normal. There is calcified and noncalcified atherosclerosis of the level the left carotid bulb. There is stenosis at the origin of the left internal carotid artery. Using NASCET criteria   and the distal left internal carotid artery as the reference, there is a 75% stenosis at the origin of the left internal carotid artery. There is no distal stenosis. The vessel is tortuous and somewhat redundant. Vertebral arteries: There is a moderate stenosis at the origin of the right vertebral artery. There is no stenosis of the left vertebral artery. The vertebral arteries are relatively codominant. Intracranial cerebral circulation: The cavernous segment of the right internal carotid artery is occluded. There is heavy calcified atherosclerosis of the cavernous segment of the left internal carotid artery. There is a moderate stenosis in its   midportion. The vessel is patent. The anterior cerebral arteries and middle cerebral arteries are patent. The basilar artery is patent. Axial source data: There is no cervical adenopathy. The patient has had a prior median sternotomy. There are calcified left hilar lymph nodes. There are no suspicious findings in the lung apices. There are degenerative changes in the cervical spine. There is a defect in the membranous portion of the nasal septum. Impression       1. Occluded right internal carotid artery. 2. 75% stenosis at the origin of the left internal carotid artery. 3. Moderate stenosis at the origin the right vertebral artery. **This report has been created using voice recognition software.  It may contain minor errors which are inherent in voice recognition technology. **       Final report electronically signed by Dr. Blake Fontaine on 1/7/2021 4:20 PM         Stress Test:8/30/2022   Summary   There is mild attenuation artifact noted in the inferolateral wall seems   to be related to bowel artifact. This study was negative for ischemia. Recommendation   Clinical correlation is recommended. Signatures      ----------------------------------------------------------------   Electronically signed by Darleen Villalpando MD (Interpreting   Cardiologist) on 08/30/2022 at 07:27   ----------------------------------------------------------------    AssessmentPlan:     Baljinder West is a pleasant 65year old male patient who  has a past medical history of Arthritis, CAD (coronary artery disease), CKD (chronic kidney disease), stage II, Diabetes mellitus ([address] Hyperlipidemia, Hypertension, and Liver disease. He is s/p 3 vessel CABG on 11/2019 (Positive stress test, [facility] revealed MV CAD. 3v CABG 11/2019: SVG to OM1, SVG to Circumflex, LIMA to distal LAD with Dr. Yossi Golden at [facility]). CTA 01/2021 revealed occluded right ICA and 75% stenosis of left ICA (followed by vascular surgery at [facility]). Peripheral angiogram 11/2020 was c/w nonobstructive PAD. On 7/2022, patient underwent left foot wound debridement by Dr Segundo Villa, wound vac. Nuclear stress test on 8/30/2022 was negative for ischemia. On 11/2022, patient was admitted to OS for planned CEA, but it was canceled after he was found to have LBBB, uncontrolled HTN. ACS was ruled out and Echo revealed a preserved EF. Patient denies chest pain. POST ADMISSION AT OSH  CAD. S/p 3 V CABG 11/2019 [facility]: SVG LCX, SVG OM1, LIMA to dLAD)   Left LE wound. S/P left TMA 3/3/2020, recurrent infection, s/p revision 04/2020, required IV Abx/Wound VAC.  Debridement 7/2022  PAD  Carotid stenosis - Asymptomatic, 75% left ICA stenosis noted on CTA 01/2021  DM  HTN  Dyslipidemia   Chronic HFpEF     CAD, h/o CABG  Aggressive medical therapy and risk factor modification for CAD is indicated   ASA  Plavix  No bleeding complaints   Lipitor   Denies chest pain   Nuclear stress test on 8/30/2022 was negative for ischemia   On 11/2022, patient was admitted to OSH with LBBB, uncontrolled HTN. ACS was ruled out and Echo revealed a preserved EF   BP is better controlled   The patient was instructed to check the blood pressure at home, and record the readings. Patient will call office with blood pressure readings, will adjust patient's antihypertensive medications as needed accordingly   Repeat EKG today  No chest pain  Patient wishes to change his vascular surgery to local surgeon in [facility]. He had his CABG with Dr Bayard Duverney and wishes to follow up with him, will refer     Above findings and plan of care were discussed with patient in details, patient's questions were answered.      Disposition:  RTC in 6 months             Electronically signed by Reji Colunga MD, [facility], [place]    11/7/2022 at 8:25 PM EST

## 2022-11-15 ENCOUNTER — TELEPHONE (OUTPATIENT)
Dept: FAMILY MEDICINE CLINIC | Age: 62
End: 2022-11-15

## 2022-11-15 NOTE — TELEPHONE ENCOUNTER
Roxann from [facility] for Dr. Jose [place] called stating the patient is not feeling to well, has SOB, fatigued, patient fell on Friday. Roxann wanted to know does the patient need seen?

## 2022-11-15 NOTE — TELEPHONE ENCOUNTER
Called patient to let him know that Maggie Koch said he can either come in to the office on 11/16/2022 or go to the urgent care. Patient wants to be seen in the office and made appointment to see Maggie Koch on 11/16/2022!

## 2022-11-16 ENCOUNTER — OFFICE VISIT (OUTPATIENT)
Dept: FAMILY MEDICINE CLINIC | Age: 62
End: 2022-11-16
Payer: COMMERCIAL

## 2022-11-16 ENCOUNTER — NURSE ONLY (OUTPATIENT)
Dept: LAB | Age: 62
End: 2022-11-16

## 2022-11-16 ENCOUNTER — TELEPHONE (OUTPATIENT)
Dept: FAMILY MEDICINE CLINIC | Age: 62
End: 2022-11-16

## 2022-11-16 VITALS
SYSTOLIC BLOOD PRESSURE: 144 MMHG | HEART RATE: 81 BPM | TEMPERATURE: 98.4 F | OXYGEN SATURATION: 100 % | DIASTOLIC BLOOD PRESSURE: 80 MMHG

## 2022-11-16 DIAGNOSIS — R53.83 MALAISE AND FATIGUE: ICD-10-CM

## 2022-11-16 DIAGNOSIS — I25.10 CAD IN NATIVE ARTERY: ICD-10-CM

## 2022-11-16 DIAGNOSIS — N18.2 CKD (CHRONIC KIDNEY DISEASE), STAGE II: ICD-10-CM

## 2022-11-16 DIAGNOSIS — Z95.1 HX OF CABG: ICD-10-CM

## 2022-11-16 DIAGNOSIS — I25.10 CORONARY ARTERY DISEASE INVOLVING NATIVE CORONARY ARTERY OF NATIVE HEART WITHOUT ANGINA PECTORIS: ICD-10-CM

## 2022-11-16 DIAGNOSIS — R53.81 MALAISE AND FATIGUE: ICD-10-CM

## 2022-11-16 DIAGNOSIS — Z91.89 AT RISK FOR FLUID VOLUME OVERLOAD: ICD-10-CM

## 2022-11-16 DIAGNOSIS — D64.9 ANEMIA, UNSPECIFIED TYPE: Primary | ICD-10-CM

## 2022-11-16 DIAGNOSIS — I10 PRIMARY HYPERTENSION: ICD-10-CM

## 2022-11-16 DIAGNOSIS — E78.2 MODERATE MIXED HYPERLIPIDEMIA NOT REQUIRING STATIN THERAPY: ICD-10-CM

## 2022-11-16 DIAGNOSIS — R06.09 DOE (DYSPNEA ON EXERTION): ICD-10-CM

## 2022-11-16 DIAGNOSIS — Z23 ENCOUNTER FOR IMMUNIZATION: ICD-10-CM

## 2022-11-16 DIAGNOSIS — E11.9 TYPE 2 DIABETES MELLITUS WITH INSULIN THERAPY (HCC): ICD-10-CM

## 2022-11-16 DIAGNOSIS — Z79.4 TYPE 2 DIABETES MELLITUS WITH INSULIN THERAPY (HCC): ICD-10-CM

## 2022-11-16 DIAGNOSIS — I10 PRIMARY HYPERTENSION: Primary | ICD-10-CM

## 2022-11-16 DIAGNOSIS — I73.9 PAD (PERIPHERAL ARTERY DISEASE) (HCC): ICD-10-CM

## 2022-11-16 LAB
ALBUMIN SERPL-MCNC: 2.6 G/DL (ref 3.5–5.1)
ALP BLD-CCNC: 120 U/L (ref 38–126)
ALT SERPL-CCNC: 84 U/L (ref 11–66)
ANION GAP SERPL CALCULATED.3IONS-SCNC: 12 MEQ/L (ref 8–16)
AST SERPL-CCNC: 62 U/L (ref 5–40)
AVERAGE GLUCOSE: 210 MG/DL (ref 70–126)
BACTERIA: ABNORMAL /HPF
BASOPHILS # BLD: 0.6 %
BASOPHILS ABSOLUTE: 0.1 THOU/MM3 (ref 0–0.1)
BILIRUB SERPL-MCNC: 0.2 MG/DL (ref 0.3–1.2)
BILIRUBIN URINE: NEGATIVE
BLOOD, URINE: ABNORMAL
BUN BLDV-MCNC: 13 MG/DL (ref 7–22)
CALCIUM SERPL-MCNC: 8.7 MG/DL (ref 8.5–10.5)
CASTS 2: ABNORMAL /LPF
CASTS UA: ABNORMAL /LPF
CHARACTER, URINE: CLEAR
CHLORIDE BLD-SCNC: 102 MEQ/L (ref 98–111)
CHOLESTEROL, TOTAL: 116 MG/DL (ref 100–199)
CO2: 26 MEQ/L (ref 23–33)
COLOR: YELLOW
CREAT SERPL-MCNC: 1.1 MG/DL (ref 0.4–1.2)
CRYSTALS, UA: ABNORMAL
EOSINOPHIL # BLD: 1.8 %
EOSINOPHILS ABSOLUTE: 0.2 THOU/MM3 (ref 0–0.4)
EPITHELIAL CELLS, UA: ABNORMAL /HPF
ERYTHROCYTE [DISTWIDTH] IN BLOOD BY AUTOMATED COUNT: 16.1 % (ref 11.5–14.5)
ERYTHROCYTE [DISTWIDTH] IN BLOOD BY AUTOMATED COUNT: 49.8 FL (ref 35–45)
GFR SERPL CREATININE-BSD FRML MDRD: > 60 ML/MIN/1.73M2
GLUCOSE BLD-MCNC: 65 MG/DL (ref 70–108)
GLUCOSE URINE: >= 1000 MG/DL
HBA1C MFR BLD: 9 % (ref 4.4–6.4)
HCT VFR BLD CALC: 27 % (ref 42–52)
HDLC SERPL-MCNC: 39 MG/DL
HEMOGLOBIN: 8 GM/DL (ref 14–18)
IMMATURE GRANS (ABS): 0.08 THOU/MM3 (ref 0–0.07)
IMMATURE GRANULOCYTES: 0.9 %
IRON SATURATION: 17 % (ref 20–50)
IRON: 25 UG/DL (ref 65–195)
IRON: 26 UG/DL (ref 65–195)
KETONES, URINE: NEGATIVE
LDL CHOLESTEROL CALCULATED: 67 MG/DL
LEUKOCYTE ESTERASE, URINE: NEGATIVE
LYMPHOCYTES # BLD: 12.2 %
LYMPHOCYTES ABSOLUTE: 1 THOU/MM3 (ref 1–4.8)
MAGNESIUM: 2.1 MG/DL (ref 1.6–2.4)
MCH RBC QN AUTO: 24.9 PG (ref 26–33)
MCHC RBC AUTO-ENTMCNC: 29.6 GM/DL (ref 32.2–35.5)
MCV RBC AUTO: 84.1 FL (ref 80–94)
MISCELLANEOUS 2: ABNORMAL
MONOCYTES # BLD: 9 %
MONOCYTES ABSOLUTE: 0.8 THOU/MM3 (ref 0.4–1.3)
NITRITE, URINE: NEGATIVE
NUCLEATED RED BLOOD CELLS: 0 /100 WBC
PH UA: 6.5 (ref 5–9)
PLATELET # BLD: 422 THOU/MM3 (ref 130–400)
PMV BLD AUTO: 10.6 FL (ref 9.4–12.4)
POTASSIUM SERPL-SCNC: 3.3 MEQ/L (ref 3.5–5.2)
PRO-BNP: 2178 PG/ML (ref 0–900)
PROTEIN UA: 300
RBC # BLD: 3.21 MILL/MM3 (ref 4.7–6.1)
RBC URINE: ABNORMAL /HPF
RENAL EPITHELIAL, UA: ABNORMAL
SEG NEUTROPHILS: 75.5 %
SEGMENTED NEUTROPHILS ABSOLUTE COUNT: 6.4 THOU/MM3 (ref 1.8–7.7)
SODIUM BLD-SCNC: 140 MEQ/L (ref 135–145)
SPECIFIC GRAVITY, URINE: 1.03 (ref 1–1.03)
TOTAL IRON BINDING CAPACITY: 146 UG/DL (ref 171–450)
TOTAL PROTEIN: 5.9 G/DL (ref 6.1–8)
TRIGL SERPL-MCNC: 51 MG/DL (ref 0–199)
TSH SERPL DL<=0.05 MIU/L-ACNC: 1.35 UIU/ML (ref 0.4–4.2)
UROBILINOGEN, URINE: 1 EU/DL (ref 0–1)
WBC # BLD: 8.5 THOU/MM3 (ref 4.8–10.8)
WBC UA: ABNORMAL /HPF
YEAST: ABNORMAL

## 2022-11-16 PROCEDURE — 3074F SYST BP LT 130 MM HG: CPT | Performed by: NURSE PRACTITIONER

## 2022-11-16 PROCEDURE — 3046F HEMOGLOBIN A1C LEVEL >9.0%: CPT | Performed by: NURSE PRACTITIONER

## 2022-11-16 PROCEDURE — 99214 OFFICE O/P EST MOD 30 MIN: CPT | Performed by: NURSE PRACTITIONER

## 2022-11-16 PROCEDURE — 90694 VACC AIIV4 NO PRSRV 0.5ML IM: CPT | Performed by: NURSE PRACTITIONER

## 2022-11-16 PROCEDURE — 90471 IMMUNIZATION ADMIN: CPT | Performed by: NURSE PRACTITIONER

## 2022-11-16 PROCEDURE — 3078F DIAST BP <80 MM HG: CPT | Performed by: NURSE PRACTITIONER

## 2022-11-16 RX ORDER — FUROSEMIDE 40 MG/1
40 TABLET ORAL DAILY
Qty: 90 TABLET | Refills: 1 | Status: SHIPPED | OUTPATIENT
Start: 2022-11-16

## 2022-11-16 RX ORDER — POTASSIUM CHLORIDE 20 MEQ/1
20 TABLET, EXTENDED RELEASE ORAL DAILY
Qty: 30 TABLET | Refills: 5 | Status: SHIPPED | OUTPATIENT
Start: 2022-11-16

## 2022-11-16 RX ORDER — FERROUS SULFATE 325(65) MG
325 TABLET ORAL 2 TIMES DAILY
Qty: 180 TABLET | Refills: 1 | Status: SHIPPED | OUTPATIENT
Start: 2022-11-16

## 2022-11-16 ASSESSMENT — ENCOUNTER SYMPTOMS
EYE ITCHING: 0
RHINORRHEA: 0
ORTHOPNEA: 1
SINUS PAIN: 0
SPUTUM PRODUCTION: 0
SORE THROAT: 0
GASTROINTESTINAL NEGATIVE: 1
TROUBLE SWALLOWING: 0
EYE REDNESS: 0
COUGH: 0
CHEST TIGHTNESS: 0
WHEEZING: 0
EYE PAIN: 0
VOICE CHANGE: 0
SHORTNESS OF BREATH: 1

## 2022-11-16 NOTE — PROGRESS NOTES
[address] [facility]  Dept: [phone]          Leonard Mariano (:  1960) is a 65 y.o. male,Established patient, here for evaluation of the following chief complaint(s):  Fatigue, Shortness of Breath, and Fall (Over the cat on Friday)      ASSESSMENT/PLAN:  I have reviewed the patient's medical history in detail and updated the computerized patient record. HPI/ROS per the patient and caregiver. Overall non toxic in appearance. Answers questions appropriately. Conditions discussed and addressed this visit include:   Here for increasing weakness and fatigue. Recently seen by cardiology no changes. Still dealing with foot/ looking at possible amputation lower leg.   + anemia, + low K at [facility]. Denies any signs of bleeding. Not on anticoagulation  Hemoglobin A1C   Date Value Ref Range Status   10/20/2022 9.2 (H) 4.4 - 6.4 % Final     Lab Results   Component Value Date    WBC 8.4 10/20/2022    HGB 9.7 (L) 10/20/2022    HCT 32.5 (L) 10/20/2022    MCV 86.7 10/20/2022     10/20/2022     Consider Kidney referral  Iron replacement  Consider lantus vs levemir  Consider trulicity  Did not tolerate the metformin. Labs today. May need further K replacement. 1. Primary hypertension  -     CBC with Auto Differential; Future  -     Comprehensive Metabolic Panel; Future  -     TSH with Reflex; Future  -     Iron; Future  -     Iron Binding Capacity; Future  -     Iron Saturation; Future  -     Urinalysis with Reflex to Culture; Future  2. Type 2 diabetes mellitus with insulin therapy ([facility] [address]  -     CBC with Auto Differential; Future  -     Comprehensive Metabolic Panel; Future  -     TSH with Reflex; Future  -     Iron; Future  -     Iron Binding Capacity; Future  -     Iron Saturation; Future  -     Urinalysis with Reflex to Culture; Future  -     Hemoglobin A1C; Future  3.  CKD (chronic kidney disease), stage II  -     CBC with Auto Differential; Future  -     Comprehensive Metabolic Panel; Future  -     TSH with Reflex; Future  -     Iron; Future  -     Iron Binding Capacity; Future  -     Iron Saturation; Future  -     Urinalysis with Reflex to Culture; Future  -     Magnesium; Future  -     Brain Natriuretic Peptide; Future  4. Moderate mixed hyperlipidemia not requiring statin therapy  -     CBC with Auto Differential; Future  -     Comprehensive Metabolic Panel; Future  -     TSH with Reflex; Future  -     Iron; Future  -     Iron Binding Capacity; Future  -     Iron Saturation; Future  -     Urinalysis with Reflex to Culture; Future  5. Coronary artery disease involving native coronary artery of native heart without angina pectoris  -     CBC with Auto Differential; Future  -     Comprehensive Metabolic Panel; Future  -     TSH with Reflex; Future  -     Iron; Future  -     Iron Binding Capacity; Future  -     Iron Saturation; Future  -     Urinalysis with Reflex to Culture; Future  -     Brain Natriuretic Peptide; Future  6. Malaise and fatigue  -     CBC with Auto Differential; Future  -     Comprehensive Metabolic Panel; Future  -     TSH with Reflex; Future  -     Iron; Future  -     Iron Binding Capacity; Future  -     Iron Saturation; Future  -     Urinalysis with Reflex to Culture; Future  -     Magnesium; Future  7. Encounter for immunization  -     Influenza, FLUAD, (age 72 y+), IM, Preservative Free, 0.5 mL    Return in about 3 months (around 2/16/2023) for Medication evaluation, Results review, Routine follow up. SUBJECTIVE/OBJECTIVE:  Fatigue  This is a new problem. The current episode started 1 to 4 weeks ago. The problem has been gradually worsening. Associated symptoms include arthralgias, fatigue, myalgias and weakness. Pertinent negatives include no anorexia, chest pain, chills, congestion, coughing, headaches, neck pain, numbness or sore throat. Nothing aggravates the symptoms. He has tried nothing for the symptoms. The treatment provided no relief. Shortness of Breath  This is a new problem. The current episode started 1 to 4 weeks ago. The problem occurs every few minutes. The problem has been unchanged. Associated symptoms include leg swelling and orthopnea. Pertinent negatives include no chest pain, coryza, headaches, neck pain, PND, rhinorrhea, sore throat, sputum production or wheezing. Fall  The accident occurred 3 to 5 days ago. The fall occurred while walking. He fell from a height of 1 to 2 ft. He landed on [facility]. There was no blood loss. The point of impact was the left shoulder, left knee and left hip (tripped over cat). The pain is present in the left knee and right shoulder. The pain is at a severity of 3/10. The pain is mild. The symptoms are aggravated by flexion, movement and extension. Pertinent negatives include no headaches, loss of consciousness, numbness or tingling. He has tried acetaminophen and rest for the symptoms. The treatment provided moderate relief. Here for acute fatigue, not feeling well  Charyl Decent over the cat last Friday. Denies fever or chills. No nausea, vomiting or diarrhea. Review of Systems   Constitutional:  Positive for activity change, appetite change and fatigue. Negative for chills and unexpected weight change. HENT:  Negative for congestion, postnasal drip, rhinorrhea, sinus pain, sore throat, trouble swallowing and voice change. Eyes:  Negative for pain, redness and itching. Respiratory:  Positive for shortness of breath. Negative for cough, sputum production, chest tightness and wheezing. Cardiovascular:  Positive for palpitations (at times.), orthopnea and leg swelling. Negative for chest pain and PND. Gastrointestinal: Negative. Negative for anorexia. Endocrine: Negative for cold intolerance and heat intolerance. Genitourinary: Negative. Musculoskeletal:  Positive for arthralgias and myalgias. Negative for neck pain and neck stiffness. Skin:  Positive for wound (left foot). Allergic/Immunologic: Positive for environmental allergies. Neurological:  Positive for weakness and light-headedness. Negative for dizziness, tingling, tremors, loss of consciousness, numbness and headaches. Hematological:  Negative for adenopathy. Does not bruise/bleed easily. Psychiatric/Behavioral: Negative. Physical Exam  Vitals and nursing note reviewed. Constitutional:       Appearance: Normal appearance. He is obese. He is ill-appearing. HENT:      Head: Normocephalic and atraumatic. Right Ear: Tympanic membrane, ear canal and external ear normal.      Left Ear: Tympanic membrane, ear canal and external ear normal.      Nose: No congestion. Mouth/Throat:      Mouth: Mucous membranes are dry. Pharynx: No posterior oropharyngeal erythema. Eyes:      General:         Right eye: No discharge. Left eye: No discharge. Conjunctiva/sclera: Conjunctivae normal.   Neck:      Vascular: Carotid bruit (left side) present. Cardiovascular:      Rate and Rhythm: Normal rate and regular rhythm. Pulses: Normal pulses. Heart sounds: Normal heart sounds. Pulmonary:      Effort: Pulmonary effort is normal.      Breath sounds: Normal breath sounds. No wheezing or rales. Abdominal:      General: Abdomen is flat. Bowel sounds are normal.      Palpations: Abdomen is soft. Musculoskeletal:         General: Swelling and tenderness present. Normal range of motion. Cervical back: No rigidity or tenderness. Right lower leg: Edema present. Left lower leg: Left lower leg edema: +1 pitting bilateral lower legs. Lymphadenopathy:      Cervical: No cervical adenopathy. Skin:     General: Skin is warm and dry. Capillary Refill: Capillary refill takes 2 to 3 seconds. Coloration: Skin is pale. Findings: No rash. Neurological:      General: No focal deficit present. Mental Status: He is alert and oriented to person, place, and time.  Mental status is at baseline. Motor: Weakness present. Coordination: Coordination normal.      Gait: Gait abnormal.   Psychiatric:         Mood and Affect: Mood normal.         Behavior: Behavior normal.         Thought Content: Thought content normal.               An electronic signature was used to authenticate this note.     --EYAD Smalls - CNP

## 2022-11-16 NOTE — TELEPHONE ENCOUNTER
I wrote on the patient's lab orders to have them done on or around 11- and hi lited it. I then gave it to Malorie to mail to the patient today.

## 2022-11-16 NOTE — TELEPHONE ENCOUNTER
I called and spoke to the patient. I let him know Theresa's response regarding his lab results, that the medication has been sent to the pharmacy. He voiced understanding and scheduled an appointment to see Judy Vazquez in the office on Wednesday, 11- at 1:30 pm.    Judy Vazquez, do you want the patient to have the labs done a day before he sees you or when he comes in for his appointment with you?

## 2022-11-16 NOTE — PROGRESS NOTES
After obtaining consent, and per orders of Granados Heal CNP, injection of Flu Vaccine given in Right Deltoid by Raina Chino ([facility]). Patient instructed to remain in clinic for 20 minutes afterwards, and to report any adverse reaction to me immediately.

## 2022-11-16 NOTE — TELEPHONE ENCOUNTER
----- Message from EYAD Valderrama CNP sent at 11/16/2022  3:01 PM EST -----  Labs show iron deficiency, and anemia. Will supplement with iron tablets twice per day for 6 months for now. Can use stool softener or miralax for constipation if that occurs. Also made referral to hematology/oncology for their recommendations as well due to possibility of upcoming leg surgery. Labs also show fluid volume overload. Will start 5 days of lasix to be taken once per day. Also added potassium to be taken every day for now. Labs again in 2 weeks. Office visit in 2 weeks.

## 2022-11-17 ENCOUNTER — TELEPHONE (OUTPATIENT)
Dept: FAMILY MEDICINE CLINIC | Age: 62
End: 2022-11-17

## 2022-11-17 ENCOUNTER — APPOINTMENT (OUTPATIENT)
Dept: GENERAL RADIOLOGY | Age: 62
DRG: 304 | End: 2022-11-17
Payer: COMMERCIAL

## 2022-11-17 ENCOUNTER — HOSPITAL ENCOUNTER (INPATIENT)
Age: 62
LOS: 4 days | Discharge: HOME | DRG: 304 | End: 2022-11-24
Attending: STUDENT IN AN ORGANIZED HEALTH CARE EDUCATION/TRAINING PROGRAM | Admitting: STUDENT IN AN ORGANIZED HEALTH CARE EDUCATION/TRAINING PROGRAM
Payer: COMMERCIAL

## 2022-11-17 DIAGNOSIS — I10 ESSENTIAL HYPERTENSION: ICD-10-CM

## 2022-11-17 DIAGNOSIS — I50.9 ACUTE ON CHRONIC CONGESTIVE HEART FAILURE, UNSPECIFIED HEART FAILURE TYPE (HCC): Primary | ICD-10-CM

## 2022-11-17 DIAGNOSIS — R77.8 ELEVATED TROPONIN: ICD-10-CM

## 2022-11-17 DIAGNOSIS — J96.01 ACUTE RESPIRATORY FAILURE WITH HYPOXIA (HCC): ICD-10-CM

## 2022-11-17 PROBLEM — I16.1 HYPERTENSIVE EMERGENCY: Status: ACTIVE | Noted: 2022-11-17

## 2022-11-17 LAB
ALBUMIN SERPL-MCNC: 2.6 G/DL (ref 3.5–5.1)
ALP BLD-CCNC: 105 U/L (ref 38–126)
ALT SERPL-CCNC: 63 U/L (ref 11–66)
ANION GAP SERPL CALCULATED.3IONS-SCNC: 11 MEQ/L (ref 8–16)
AST SERPL-CCNC: 39 U/L (ref 5–40)
BASOPHILS # BLD: 0.4 %
BASOPHILS ABSOLUTE: 0 THOU/MM3 (ref 0–0.1)
BILIRUB SERPL-MCNC: 0.2 MG/DL (ref 0.3–1.2)
BILIRUBIN DIRECT: < 0.2 MG/DL (ref 0–0.3)
BUN BLDV-MCNC: 11 MG/DL (ref 7–22)
CALCIUM SERPL-MCNC: 8.4 MG/DL (ref 8.5–10.5)
CHLORIDE BLD-SCNC: 102 MEQ/L (ref 98–111)
CO2: 26 MEQ/L (ref 23–33)
CREAT SERPL-MCNC: 1 MG/DL (ref 0.4–1.2)
EOSINOPHIL # BLD: 1.7 %
EOSINOPHILS ABSOLUTE: 0.2 THOU/MM3 (ref 0–0.4)
ERYTHROCYTE [DISTWIDTH] IN BLOOD BY AUTOMATED COUNT: 16.3 % (ref 11.5–14.5)
ERYTHROCYTE [DISTWIDTH] IN BLOOD BY AUTOMATED COUNT: 49.2 FL (ref 35–45)
GFR SERPL CREATININE-BSD FRML MDRD: > 60 ML/MIN/1.73M2
GLUCOSE BLD-MCNC: 209 MG/DL (ref 70–108)
HCT VFR BLD CALC: 25.6 % (ref 42–52)
HEMOGLOBIN: 7.7 GM/DL (ref 14–18)
IMMATURE GRANS (ABS): 0.11 THOU/MM3 (ref 0–0.07)
IMMATURE GRANULOCYTES: 1.1 %
LYMPHOCYTES # BLD: 10.7 %
LYMPHOCYTES ABSOLUTE: 1.1 THOU/MM3 (ref 1–4.8)
MCH RBC QN AUTO: 25 PG (ref 26–33)
MCHC RBC AUTO-ENTMCNC: 30.1 GM/DL (ref 32.2–35.5)
MCV RBC AUTO: 83.1 FL (ref 80–94)
MONOCYTES # BLD: 10.6 %
MONOCYTES ABSOLUTE: 1 THOU/MM3 (ref 0.4–1.3)
NUCLEATED RED BLOOD CELLS: 0 /100 WBC
OSMOLALITY CALCULATION: 283.1 MOSMOL/KG (ref 275–300)
PLATELET # BLD: 367 THOU/MM3 (ref 130–400)
PMV BLD AUTO: 9.8 FL (ref 9.4–12.4)
POTASSIUM SERPL-SCNC: 3.6 MEQ/L (ref 3.5–5.2)
PRO-BNP: 2665 PG/ML (ref 0–900)
RBC # BLD: 3.08 MILL/MM3 (ref 4.7–6.1)
SEG NEUTROPHILS: 75.5 %
SEGMENTED NEUTROPHILS ABSOLUTE COUNT: 7.5 THOU/MM3 (ref 1.8–7.7)
SODIUM BLD-SCNC: 139 MEQ/L (ref 135–145)
TOTAL PROTEIN: 6.3 G/DL (ref 6.1–8)
TROPONIN T: 0.04 NG/ML
WBC # BLD: 9.9 THOU/MM3 (ref 4.8–10.8)

## 2022-11-17 PROCEDURE — 99285 EMERGENCY DEPT VISIT HI MDM: CPT

## 2022-11-17 PROCEDURE — 6370000000 HC RX 637 (ALT 250 FOR IP): Performed by: STUDENT IN AN ORGANIZED HEALTH CARE EDUCATION/TRAINING PROGRAM

## 2022-11-17 PROCEDURE — 83880 ASSAY OF NATRIURETIC PEPTIDE: CPT

## 2022-11-17 PROCEDURE — 36415 COLL VENOUS BLD VENIPUNCTURE: CPT

## 2022-11-17 PROCEDURE — 71045 X-RAY EXAM CHEST 1 VIEW: CPT

## 2022-11-17 PROCEDURE — 94660 CPAP INITIATION&MGMT: CPT

## 2022-11-17 PROCEDURE — 82248 BILIRUBIN DIRECT: CPT

## 2022-11-17 PROCEDURE — G0378 HOSPITAL OBSERVATION PER HR: HCPCS

## 2022-11-17 PROCEDURE — 85025 COMPLETE CBC W/AUTO DIFF WBC: CPT

## 2022-11-17 PROCEDURE — 80053 COMPREHEN METABOLIC PANEL: CPT

## 2022-11-17 PROCEDURE — 2500000003 HC RX 250 WO HCPCS: Performed by: STUDENT IN AN ORGANIZED HEALTH CARE EDUCATION/TRAINING PROGRAM

## 2022-11-17 PROCEDURE — 99220 PR INITIAL OBSERVATION CARE/DAY 70 MINUTES: CPT | Performed by: STUDENT IN AN ORGANIZED HEALTH CARE EDUCATION/TRAINING PROGRAM

## 2022-11-17 PROCEDURE — 93005 ELECTROCARDIOGRAM TRACING: CPT | Performed by: STUDENT IN AN ORGANIZED HEALTH CARE EDUCATION/TRAINING PROGRAM

## 2022-11-17 PROCEDURE — 84484 ASSAY OF TROPONIN QUANT: CPT

## 2022-11-17 PROCEDURE — 96374 THER/PROPH/DIAG INJ IV PUSH: CPT

## 2022-11-17 RX ORDER — MAGNESIUM SULFATE IN WATER 40 MG/ML
2000 INJECTION, SOLUTION INTRAVENOUS PRN
Status: DISCONTINUED | OUTPATIENT
Start: 2022-11-17 | End: 2022-11-24 | Stop reason: HOSPADM

## 2022-11-17 RX ORDER — POTASSIUM CHLORIDE 20 MEQ/1
40 TABLET, EXTENDED RELEASE ORAL PRN
Status: DISCONTINUED | OUTPATIENT
Start: 2022-11-17 | End: 2022-11-24 | Stop reason: HOSPADM

## 2022-11-17 RX ORDER — SODIUM CHLORIDE 0.9 % (FLUSH) 0.9 %
5-40 SYRINGE (ML) INJECTION EVERY 12 HOURS SCHEDULED
Status: DISCONTINUED | OUTPATIENT
Start: 2022-11-17 | End: 2022-11-24 | Stop reason: HOSPADM

## 2022-11-17 RX ORDER — POLYETHYLENE GLYCOL 3350 17 G/17G
17 POWDER, FOR SOLUTION ORAL DAILY PRN
Status: DISCONTINUED | OUTPATIENT
Start: 2022-11-17 | End: 2022-11-24 | Stop reason: HOSPADM

## 2022-11-17 RX ORDER — POTASSIUM CHLORIDE 7.45 MG/ML
10 INJECTION INTRAVENOUS PRN
Status: DISCONTINUED | OUTPATIENT
Start: 2022-11-17 | End: 2022-11-24 | Stop reason: HOSPADM

## 2022-11-17 RX ORDER — NITROGLYCERIN 20 MG/100ML
5-200 INJECTION INTRAVENOUS CONTINUOUS
Status: DISCONTINUED | OUTPATIENT
Start: 2022-11-17 | End: 2022-11-20

## 2022-11-17 RX ORDER — ACETAMINOPHEN 650 MG/1
650 SUPPOSITORY RECTAL EVERY 6 HOURS PRN
Status: DISCONTINUED | OUTPATIENT
Start: 2022-11-17 | End: 2022-11-24 | Stop reason: HOSPADM

## 2022-11-17 RX ORDER — ONDANSETRON 4 MG/1
4 TABLET, ORALLY DISINTEGRATING ORAL EVERY 8 HOURS PRN
Status: DISCONTINUED | OUTPATIENT
Start: 2022-11-17 | End: 2022-11-24 | Stop reason: HOSPADM

## 2022-11-17 RX ORDER — SENNA AND DOCUSATE SODIUM 50; 8.6 MG/1; MG/1
1 TABLET, FILM COATED ORAL 2 TIMES DAILY
Status: DISCONTINUED | OUTPATIENT
Start: 2022-11-18 | End: 2022-11-24 | Stop reason: HOSPADM

## 2022-11-17 RX ORDER — ASPIRIN 81 MG/1
324 TABLET, CHEWABLE ORAL ONCE
Status: COMPLETED | OUTPATIENT
Start: 2022-11-17 | End: 2022-11-17

## 2022-11-17 RX ORDER — ENOXAPARIN SODIUM 100 MG/ML
40 INJECTION SUBCUTANEOUS DAILY
Status: DISCONTINUED | OUTPATIENT
Start: 2022-11-18 | End: 2022-11-24 | Stop reason: HOSPADM

## 2022-11-17 RX ORDER — ONDANSETRON 2 MG/ML
4 INJECTION INTRAMUSCULAR; INTRAVENOUS EVERY 6 HOURS PRN
Status: DISCONTINUED | OUTPATIENT
Start: 2022-11-17 | End: 2022-11-24 | Stop reason: HOSPADM

## 2022-11-17 RX ORDER — SODIUM CHLORIDE 9 MG/ML
INJECTION, SOLUTION INTRAVENOUS PRN
Status: DISCONTINUED | OUTPATIENT
Start: 2022-11-17 | End: 2022-11-24 | Stop reason: HOSPADM

## 2022-11-17 RX ORDER — SODIUM CHLORIDE 0.9 % (FLUSH) 0.9 %
5-40 SYRINGE (ML) INJECTION PRN
Status: DISCONTINUED | OUTPATIENT
Start: 2022-11-17 | End: 2022-11-24 | Stop reason: HOSPADM

## 2022-11-17 RX ORDER — ACETAMINOPHEN 325 MG/1
650 TABLET ORAL EVERY 6 HOURS PRN
Status: DISCONTINUED | OUTPATIENT
Start: 2022-11-17 | End: 2022-11-24 | Stop reason: HOSPADM

## 2022-11-17 RX ADMIN — NITROGLYCERIN 100 MCG/MIN: 20 INJECTION INTRAVENOUS at 19:48

## 2022-11-17 RX ADMIN — ASPIRIN 81 MG 243 MG: 81 TABLET ORAL at 19:44

## 2022-11-17 ASSESSMENT — PAIN - FUNCTIONAL ASSESSMENT: PAIN_FUNCTIONAL_ASSESSMENT: 0-10

## 2022-11-17 ASSESSMENT — PAIN DESCRIPTION - LOCATION: LOCATION: CHEST

## 2022-11-17 ASSESSMENT — PAIN DESCRIPTION - PAIN TYPE: TYPE: ACUTE PAIN

## 2022-11-17 ASSESSMENT — PAIN SCALES - GENERAL: PAINLEVEL_OUTOF10: 7

## 2022-11-17 ASSESSMENT — PAIN DESCRIPTION - DESCRIPTORS: DESCRIPTORS: TIGHTNESS

## 2022-11-17 NOTE — Clinical Note
Discharge Plan[de-identified] Other/[facility])   Telemetry/Cardiac Monitoring Required?: Yes

## 2022-11-17 NOTE — TELEPHONE ENCOUNTER
Patient called, was in the office yesterday 11/16/2022 and was told he is anemic. Patient states he believes he may have an ulcer and was up all night not feeling well, states he is having black stools, say's he has a little pain in the abdomen but it gets better after he eats. Would like to know what he should do?

## 2022-11-18 PROBLEM — I50.9 ACUTE ON CHRONIC CONGESTIVE HEART FAILURE (HCC): Status: ACTIVE | Noted: 2022-11-18

## 2022-11-18 LAB
ABO: NORMAL
ABSOLUTE RETIC #: 72 THOU/MM3 (ref 20–115)
ANION GAP SERPL CALCULATED.3IONS-SCNC: 10 MEQ/L (ref 8–16)
ANTIBODY SCREEN: NORMAL
BASOPHILS # BLD: 0.7 %
BASOPHILS ABSOLUTE: 0.1 THOU/MM3 (ref 0–0.1)
BUN BLDV-MCNC: 11 MG/DL (ref 7–22)
CALCIUM SERPL-MCNC: 8.3 MG/DL (ref 8.5–10.5)
CHLORIDE BLD-SCNC: 104 MEQ/L (ref 98–111)
CO2: 28 MEQ/L (ref 23–33)
CREAT SERPL-MCNC: 1.1 MG/DL (ref 0.4–1.2)
EKG ATRIAL RATE: 76 BPM
EKG ATRIAL RATE: 92 BPM
EKG P AXIS: 47 DEGREES
EKG P AXIS: 73 DEGREES
EKG P-R INTERVAL: 166 MS
EKG P-R INTERVAL: 186 MS
EKG Q-T INTERVAL: 412 MS
EKG Q-T INTERVAL: 446 MS
EKG QRS DURATION: 140 MS
EKG QRS DURATION: 142 MS
EKG QTC CALCULATION (BAZETT): 501 MS
EKG QTC CALCULATION (BAZETT): 509 MS
EKG R AXIS: 16 DEGREES
EKG R AXIS: 3 DEGREES
EKG T AXIS: 40 DEGREES
EKG T AXIS: 43 DEGREES
EKG VENTRICULAR RATE: 76 BPM
EKG VENTRICULAR RATE: 92 BPM
EOSINOPHIL # BLD: 2.5 %
EOSINOPHILS ABSOLUTE: 0.2 THOU/MM3 (ref 0–0.4)
ERYTHROCYTE [DISTWIDTH] IN BLOOD BY AUTOMATED COUNT: 16.3 % (ref 11.5–14.5)
ERYTHROCYTE [DISTWIDTH] IN BLOOD BY AUTOMATED COUNT: 49.2 FL (ref 35–45)
FERRITIN: 130 NG/ML (ref 22–322)
GFR SERPL CREATININE-BSD FRML MDRD: > 60 ML/MIN/1.73M2
GLUCOSE BLD-MCNC: 165 MG/DL (ref 70–108)
GLUCOSE BLD-MCNC: 174 MG/DL (ref 70–108)
GLUCOSE BLD-MCNC: 207 MG/DL (ref 70–108)
GLUCOSE BLD-MCNC: 220 MG/DL (ref 70–108)
GLUCOSE BLD-MCNC: 231 MG/DL (ref 70–108)
GLUCOSE BLD-MCNC: 268 MG/DL (ref 70–108)
HCT VFR BLD CALC: 23 % (ref 42–52)
HCT VFR BLD CALC: 24.3 % (ref 42–52)
HEMOGLOBIN: 6.7 GM/DL (ref 14–18)
HEMOGLOBIN: 7.2 GM/DL (ref 14–18)
IMMATURE GRANS (ABS): 0.06 THOU/MM3 (ref 0–0.07)
IMMATURE GRANULOCYTES: 0.8 %
IMMATURE RETIC FRACT: 34.7 % (ref 2.3–13.4)
LYMPHOCYTES # BLD: 12.9 %
LYMPHOCYTES ABSOLUTE: 1 THOU/MM3 (ref 1–4.8)
MAGNESIUM: 1.9 MG/DL (ref 1.6–2.4)
MCH RBC QN AUTO: 24.8 PG (ref 26–33)
MCHC RBC AUTO-ENTMCNC: 29.6 GM/DL (ref 32.2–35.5)
MCV RBC AUTO: 83.8 FL (ref 80–94)
MONOCYTES # BLD: 9.7 %
MONOCYTES ABSOLUTE: 0.7 THOU/MM3 (ref 0.4–1.3)
MRSA NASAL SCREEN RT-PCR: NEGATIVE
NUCLEATED RED BLOOD CELLS: 0 /100 WBC
PLATELET # BLD: 352 THOU/MM3 (ref 130–400)
PMV BLD AUTO: 10.2 FL (ref 9.4–12.4)
POTASSIUM SERPL-SCNC: 3.4 MEQ/L (ref 3.5–5.2)
PROCALCITONIN: 0.18 NG/ML (ref 0.01–0.09)
RBC # BLD: 2.9 MILL/MM3 (ref 4.7–6.1)
RETIC HEMOGLOBIN: 21.3 PG (ref 28.2–35.7)
RETICULOCYTE ABSOLUTE COUNT: 2.5 % (ref 0.5–2)
RH FACTOR: NORMAL
SEG NEUTROPHILS: 73.4 %
SEGMENTED NEUTROPHILS ABSOLUTE COUNT: 5.7 THOU/MM3 (ref 1.8–7.7)
SODIUM BLD-SCNC: 142 MEQ/L (ref 135–145)
STAPH AUREUS SCREEN RT-PCR: NEGATIVE
TROPONIN T: 0.04 NG/ML
WBC # BLD: 7.7 THOU/MM3 (ref 4.8–10.8)

## 2022-11-18 PROCEDURE — 82948 REAGENT STRIP/BLOOD GLUCOSE: CPT

## 2022-11-18 PROCEDURE — 99222 1ST HOSP IP/OBS MODERATE 55: CPT | Performed by: NUCLEAR MEDICINE

## 2022-11-18 PROCEDURE — 80048 BASIC METABOLIC PNL TOTAL CA: CPT

## 2022-11-18 PROCEDURE — 6370000000 HC RX 637 (ALT 250 FOR IP): Performed by: STUDENT IN AN ORGANIZED HEALTH CARE EDUCATION/TRAINING PROGRAM

## 2022-11-18 PROCEDURE — 83735 ASSAY OF MAGNESIUM: CPT

## 2022-11-18 PROCEDURE — P9016 RBC LEUKOCYTES REDUCED: HCPCS

## 2022-11-18 PROCEDURE — 85025 COMPLETE CBC W/AUTO DIFF WBC: CPT

## 2022-11-18 PROCEDURE — 96376 TX/PRO/DX INJ SAME DRUG ADON: CPT

## 2022-11-18 PROCEDURE — 96372 THER/PROPH/DIAG INJ SC/IM: CPT

## 2022-11-18 PROCEDURE — 85014 HEMATOCRIT: CPT

## 2022-11-18 PROCEDURE — 6370000000 HC RX 637 (ALT 250 FOR IP): Performed by: NURSE PRACTITIONER

## 2022-11-18 PROCEDURE — 6360000002 HC RX W HCPCS: Performed by: STUDENT IN AN ORGANIZED HEALTH CARE EDUCATION/TRAINING PROGRAM

## 2022-11-18 PROCEDURE — 84145 PROCALCITONIN (PCT): CPT

## 2022-11-18 PROCEDURE — 86923 COMPATIBILITY TEST ELECTRIC: CPT

## 2022-11-18 PROCEDURE — 86901 BLOOD TYPING SEROLOGIC RH(D): CPT

## 2022-11-18 PROCEDURE — 84484 ASSAY OF TROPONIN QUANT: CPT

## 2022-11-18 PROCEDURE — 6370000000 HC RX 637 (ALT 250 FOR IP)

## 2022-11-18 PROCEDURE — 82728 ASSAY OF FERRITIN: CPT

## 2022-11-18 PROCEDURE — 86850 RBC ANTIBODY SCREEN: CPT

## 2022-11-18 PROCEDURE — 86900 BLOOD TYPING SEROLOGIC ABO: CPT

## 2022-11-18 PROCEDURE — 99226 PR SBSQ OBSERVATION CARE/DAY 35 MINUTES: CPT | Performed by: NURSE PRACTITIONER

## 2022-11-18 PROCEDURE — 85018 HEMOGLOBIN: CPT

## 2022-11-18 PROCEDURE — 93005 ELECTROCARDIOGRAM TRACING: CPT | Performed by: STUDENT IN AN ORGANIZED HEALTH CARE EDUCATION/TRAINING PROGRAM

## 2022-11-18 PROCEDURE — G0378 HOSPITAL OBSERVATION PER HR: HCPCS

## 2022-11-18 PROCEDURE — 87640 STAPH A DNA AMP PROBE: CPT

## 2022-11-18 PROCEDURE — 85046 RETICYTE/HGB CONCENTRATE: CPT

## 2022-11-18 PROCEDURE — 87641 MR-STAPH DNA AMP PROBE: CPT

## 2022-11-18 PROCEDURE — 36415 COLL VENOUS BLD VENIPUNCTURE: CPT

## 2022-11-18 PROCEDURE — 96375 TX/PRO/DX INJ NEW DRUG ADDON: CPT

## 2022-11-18 PROCEDURE — 93010 ELECTROCARDIOGRAM REPORT: CPT | Performed by: INTERNAL MEDICINE

## 2022-11-18 PROCEDURE — 2500000003 HC RX 250 WO HCPCS: Performed by: STUDENT IN AN ORGANIZED HEALTH CARE EDUCATION/TRAINING PROGRAM

## 2022-11-18 PROCEDURE — 2580000003 HC RX 258: Performed by: STUDENT IN AN ORGANIZED HEALTH CARE EDUCATION/TRAINING PROGRAM

## 2022-11-18 RX ORDER — ASPIRIN 81 MG/1
81 TABLET, CHEWABLE ORAL DAILY
Status: DISCONTINUED | OUTPATIENT
Start: 2022-11-18 | End: 2022-11-24 | Stop reason: HOSPADM

## 2022-11-18 RX ORDER — AMLODIPINE BESYLATE 5 MG/1
5 TABLET ORAL DAILY
Status: DISCONTINUED | OUTPATIENT
Start: 2022-11-18 | End: 2022-11-20

## 2022-11-18 RX ORDER — ATORVASTATIN CALCIUM 80 MG/1
80 TABLET, FILM COATED ORAL DAILY
Status: DISCONTINUED | OUTPATIENT
Start: 2022-11-18 | End: 2022-11-24 | Stop reason: HOSPADM

## 2022-11-18 RX ORDER — MULTIVITAMIN WITH IRON
1 TABLET ORAL DAILY
Status: DISCONTINUED | OUTPATIENT
Start: 2022-11-18 | End: 2022-11-24 | Stop reason: HOSPADM

## 2022-11-18 RX ORDER — LORAZEPAM 2 MG/ML
0.5 INJECTION INTRAMUSCULAR EVERY 6 HOURS PRN
Status: DISCONTINUED | OUTPATIENT
Start: 2022-11-18 | End: 2022-11-24 | Stop reason: HOSPADM

## 2022-11-18 RX ORDER — VALSARTAN 320 MG/1
320 TABLET ORAL DAILY
Status: DISCONTINUED | OUTPATIENT
Start: 2022-11-19 | End: 2022-11-23

## 2022-11-18 RX ORDER — INSULIN LISPRO 100 [IU]/ML
0-4 INJECTION, SOLUTION INTRAVENOUS; SUBCUTANEOUS NIGHTLY
Status: DISCONTINUED | OUTPATIENT
Start: 2022-11-18 | End: 2022-11-24 | Stop reason: HOSPADM

## 2022-11-18 RX ORDER — CLOPIDOGREL BISULFATE 75 MG/1
75 TABLET ORAL DAILY
Status: DISCONTINUED | OUTPATIENT
Start: 2022-11-18 | End: 2022-11-24 | Stop reason: HOSPADM

## 2022-11-18 RX ORDER — DEXTROSE MONOHYDRATE 100 MG/ML
INJECTION, SOLUTION INTRAVENOUS CONTINUOUS PRN
Status: DISCONTINUED | OUTPATIENT
Start: 2022-11-18 | End: 2022-11-24 | Stop reason: HOSPADM

## 2022-11-18 RX ORDER — ALBUTEROL SULFATE 90 UG/1
2 AEROSOL, METERED RESPIRATORY (INHALATION) 4 TIMES DAILY PRN
Status: DISCONTINUED | OUTPATIENT
Start: 2022-11-18 | End: 2022-11-24 | Stop reason: HOSPADM

## 2022-11-18 RX ORDER — INSULIN GLARGINE 100 [IU]/ML
30 INJECTION, SOLUTION SUBCUTANEOUS ONCE
Status: COMPLETED | OUTPATIENT
Start: 2022-11-18 | End: 2022-11-18

## 2022-11-18 RX ORDER — GABAPENTIN 600 MG/1
600 TABLET ORAL 2 TIMES DAILY
Status: DISCONTINUED | OUTPATIENT
Start: 2022-11-18 | End: 2022-11-24 | Stop reason: HOSPADM

## 2022-11-18 RX ORDER — SODIUM CHLORIDE 9 MG/ML
INJECTION, SOLUTION INTRAVENOUS PRN
Status: DISCONTINUED | OUTPATIENT
Start: 2022-11-18 | End: 2022-11-19

## 2022-11-18 RX ORDER — FERROUS SULFATE 325(65) MG
325 TABLET ORAL EVERY OTHER DAY
Status: DISCONTINUED | OUTPATIENT
Start: 2022-11-18 | End: 2022-11-18

## 2022-11-18 RX ORDER — INSULIN GLARGINE 100 [IU]/ML
30 INJECTION, SOLUTION SUBCUTANEOUS DAILY
Status: DISCONTINUED | OUTPATIENT
Start: 2022-11-18 | End: 2022-11-22

## 2022-11-18 RX ORDER — FERROUS SULFATE 325(65) MG
325 TABLET ORAL 2 TIMES DAILY
Status: DISCONTINUED | OUTPATIENT
Start: 2022-11-18 | End: 2022-11-18

## 2022-11-18 RX ORDER — LISINOPRIL 20 MG/1
40 TABLET ORAL DAILY
Status: DISCONTINUED | OUTPATIENT
Start: 2022-11-18 | End: 2022-11-18

## 2022-11-18 RX ORDER — FERROUS SULFATE 325(65) MG
325 TABLET ORAL 2 TIMES DAILY WITH MEALS
Status: DISCONTINUED | OUTPATIENT
Start: 2022-11-18 | End: 2022-11-24 | Stop reason: HOSPADM

## 2022-11-18 RX ORDER — HYDRALAZINE HYDROCHLORIDE 25 MG/1
25 TABLET, FILM COATED ORAL EVERY 8 HOURS SCHEDULED
Status: DISCONTINUED | OUTPATIENT
Start: 2022-11-18 | End: 2022-11-19

## 2022-11-18 RX ORDER — INSULIN LISPRO 100 [IU]/ML
0-8 INJECTION, SOLUTION INTRAVENOUS; SUBCUTANEOUS
Status: DISCONTINUED | OUTPATIENT
Start: 2022-11-18 | End: 2022-11-24 | Stop reason: HOSPADM

## 2022-11-18 RX ORDER — FUROSEMIDE 40 MG/1
40 TABLET ORAL DAILY
Status: DISCONTINUED | OUTPATIENT
Start: 2022-11-18 | End: 2022-11-24 | Stop reason: HOSPADM

## 2022-11-18 RX ORDER — CARVEDILOL 6.25 MG/1
12.5 TABLET ORAL 2 TIMES DAILY WITH MEALS
Status: DISCONTINUED | OUTPATIENT
Start: 2022-11-18 | End: 2022-11-18

## 2022-11-18 RX ADMIN — SENNOSIDES AND DOCUSATE SODIUM 1 TABLET: 50; 8.6 TABLET ORAL at 20:21

## 2022-11-18 RX ADMIN — ENOXAPARIN SODIUM 40 MG: 100 INJECTION SUBCUTANEOUS at 11:51

## 2022-11-18 RX ADMIN — SODIUM CHLORIDE, PRESERVATIVE FREE 10 ML: 5 INJECTION INTRAVENOUS at 20:11

## 2022-11-18 RX ADMIN — INSULIN LISPRO 4 UNITS: 100 INJECTION, SOLUTION INTRAVENOUS; SUBCUTANEOUS at 11:52

## 2022-11-18 RX ADMIN — Medication 325 MG: at 08:37

## 2022-11-18 RX ADMIN — ONDANSETRON 4 MG: 2 INJECTION INTRAMUSCULAR; INTRAVENOUS at 04:52

## 2022-11-18 RX ADMIN — CLOPIDOGREL BISULFATE 75 MG: 75 TABLET, FILM COATED ORAL at 08:36

## 2022-11-18 RX ADMIN — NITROGLYCERIN 35 MCG/MIN: 20 INJECTION INTRAVENOUS at 18:01

## 2022-11-18 RX ADMIN — INSULIN GLARGINE 30 UNITS: 100 INJECTION, SOLUTION SUBCUTANEOUS at 11:52

## 2022-11-18 RX ADMIN — METOPROLOL TARTRATE 25 MG: 25 TABLET, FILM COATED ORAL at 20:09

## 2022-11-18 RX ADMIN — CARVEDILOL 12.5 MG: 6.25 TABLET, FILM COATED ORAL at 08:36

## 2022-11-18 RX ADMIN — GABAPENTIN 600 MG: 600 TABLET ORAL at 20:09

## 2022-11-18 RX ADMIN — INSULIN LISPRO 2 UNITS: 100 INJECTION, SOLUTION INTRAVENOUS; SUBCUTANEOUS at 17:28

## 2022-11-18 RX ADMIN — METOPROLOL TARTRATE 25 MG: 25 TABLET, FILM COATED ORAL at 14:07

## 2022-11-18 RX ADMIN — ASPIRIN 81 MG: 81 TABLET, CHEWABLE ORAL at 08:35

## 2022-11-18 RX ADMIN — ATORVASTATIN CALCIUM 80 MG: 80 TABLET, FILM COATED ORAL at 08:35

## 2022-11-18 RX ADMIN — INSULIN LISPRO 2 UNITS: 100 INJECTION, SOLUTION INTRAVENOUS; SUBCUTANEOUS at 08:42

## 2022-11-18 RX ADMIN — HYDRALAZINE HYDROCHLORIDE 25 MG: 25 TABLET, FILM COATED ORAL at 15:20

## 2022-11-18 RX ADMIN — LISINOPRIL 40 MG: 20 TABLET ORAL at 08:38

## 2022-11-18 RX ADMIN — SENNOSIDES AND DOCUSATE SODIUM 1 TABLET: 50; 8.6 TABLET ORAL at 08:39

## 2022-11-18 RX ADMIN — INSULIN GLARGINE 30 UNITS: 100 INJECTION, SOLUTION SUBCUTANEOUS at 04:56

## 2022-11-18 RX ADMIN — NITROGLYCERIN 80 MCG/MIN: 20 INJECTION INTRAVENOUS at 05:01

## 2022-11-18 RX ADMIN — Medication 1 TABLET: at 08:38

## 2022-11-18 RX ADMIN — AMLODIPINE BESYLATE 5 MG: 5 TABLET ORAL at 16:07

## 2022-11-18 RX ADMIN — GABAPENTIN 600 MG: 600 TABLET ORAL at 08:37

## 2022-11-18 RX ADMIN — FUROSEMIDE 40 MG: 40 TABLET ORAL at 08:37

## 2022-11-18 RX ADMIN — Medication 325 MG: at 17:41

## 2022-11-18 ASSESSMENT — PAIN SCALES - GENERAL
PAINLEVEL_OUTOF10: 0

## 2022-11-18 NOTE — RT PROTOCOL NOTE
RT Inhaler-Nebulizer Bronchodilator Protocol Note    There is a bronchodilator order in the chart from a provider indicating to follow the RT Bronchodilator Protocol and there is an Initiate RT Inhaler-Nebulizer Bronchodilator Protocol order as well (see protocol at bottom of note). CXR Findings:  XR CHEST PORTABLE    Result Date: 11/17/2022  Impression: 1. Interstitial prominence appear similar to prior. Mildly increased density at the right lung base could represent atelectasis or pneumonia. This document has been electronically signed by: Ailin Chinchilla MD on 11/17/2022 08:09 PM      The findings from the last RT Protocol Assessment were as follows:   History Pulmonary Disease: Smoker 15 pack years or more  Respiratory Pattern: Regular pattern and RR 12-20 bpm  Breath Sounds: Slightly diminished and/or crackles  Cough: Strong, spontaneous, non-productive  Indication for Bronchodilator Therapy: Decreased or absent breath sounds, On home bronchodilators  Bronchodilator Assessment Score: 3    Aerosolized bronchodilator medication orders have been revised according to the RT Inhaler-Nebulizer Bronchodilator Protocol below. Respiratory Therapist to perform RT Therapy Protocol Assessment initially then follow the protocol. Repeat RT Therapy Protocol Assessment PRN for score 0-3 or on second treatment, BID, and PRN for scores above 3. No Indications - adjust the frequency to every 6 hours PRN wheezing or bronchospasm, if no treatments needed after 48 hours then discontinue using Per Protocol order mode. If indication present, adjust the RT bronchodilator orders based on the Bronchodilator Assessment Score as indicated below.   Use Inhaler orders unless patient has one or more of the following: on home nebulizer, not able to hold breath for 10 seconds, is not alert and oriented, cannot activate and use MDI correctly, or respiratory rate 25 breaths per minute or more, then use the equivalent nebulizer order(s) with same Frequency and PRN reasons based on the score. If a patient is on this medication at home then do not decrease Frequency below that used at home. 0-3 - enter or revise RT bronchodilator order(s) to equivalent RT Bronchodilator order with Frequency of every 4 hours PRN for wheezing or increased work of breathing using Per Protocol order mode. 4-6 - enter or revise RT Bronchodilator order(s) to two equivalent RT bronchodilator orders with one order with BID Frequency and one order with Frequency of every 4 hours PRN wheezing or increased work of breathing using Per Protocol order mode. 7-10 - enter or revise RT Bronchodilator order(s) to two equivalent RT bronchodilator orders with one order with TID Frequency and one order with Frequency of every 4 hours PRN wheezing or increased work of breathing using Per Protocol order mode. 11-13 - enter or revise RT Bronchodilator order(s) to one equivalent RT bronchodilator order with QID Frequency and an Albuterol order with Frequency of every 4 hours PRN wheezing or increased work of breathing using Per Protocol order mode. Greater than 13 - enter or revise RT Bronchodilator order(s) to one equivalent RT bronchodilator order with every 4 hours Frequency and an Albuterol order with Frequency of every 2 hours PRN wheezing or increased work of breathing using Per Protocol order mode. RT to enter RT Home Evaluation for COPD & MDI Assessment order using Per Protocol order mode.     Electronically signed by Kiara Dumont RCP on 11/18/2022 at 9:29 AM

## 2022-11-18 NOTE — CARE COORDINATION
Case Management Assessment  Initial Evaluation    Date/Time of Evaluation: 11/18/2022 12:35 PM  Assessment Completed by: Benigno Morejon RN    If patient is discharged prior to next notation, then this note serves as note for discharge by case management. Patient Name: Danya Garza                   YOB: 1960  Diagnosis: Hypertensive emergency [I16.1]                   Date / Time: 11/17/2022  6:51 PM    Patient Admission Status: Observation     Current PCP: EYAD Birmingham CNP  PCP verified by CM? Yes    Chart Reviewed: Yes      Patient Orientation: Alert and Oriented    Patient Cognition: Alert  History Provided by: Patient    Hospitalization in the last 30 days (Readmission):  No    If yes, Readmission Assessment in  Navigator will be completed. Advance Directives:     Code Status: Full Code     Primary Decision Maker: Carmen Hernandez - Brother/Sister - [phone]    Discharge Planning  Patient lives with: Alone Type of Home: House   Primary Caregiver: Self  Patient Support Systems include: Family Members, Friends/Neighbors   Current Financial resources:    Current community resources:    Current services prior to admission: Durable Medical Equipment   Type of Home Care services:  None    ADLS  Prior functional level: Independent in ADLs/IADLs  Current functional level: Independent in ADLs/IADLs      Family can provide assistance at DC: Yes  Would you like Case Management to discuss the discharge plan with any other family members/significant others, and if so, who? No  Plans to Return to Present Housing: Yes  Other Identified Issues/Barriers to RETURNING to current housing: None  Potential Assistance needed at discharge:  Outpatient PT/OT  Patient expects to discharge to: House  Plan for transportation at discharge: Friends    Financial  Payor: BCBS / Plan: Chloe Cardona PPO / Product Type: *No Product type* /     Does insurance require precert for SNF: Yes    Potential assistance Purchasing Medications: No  Meds-to-Beds request: Yes      [address] La Rosa [address] [place] [address] [address] [facility]  [address] [place] [address]  Phone: [phone] Fax: [phone]      Factors facilitating achievement of predicted outcomes: Family support, Friend support, Motivated, Cooperative, and Pleasant    Barriers to discharge: Medication managment    Additional Case Management Notes: Pt admitted through ER with c/o CP and shortness of breath and worsening edema of bilat LE. NTG gtt-wean,  on room air, tele. Procedure: 11/17: CXR: Interstitial prominence appear similar to prior. Mildly increased density at the right lung base could represent atelectasis or pneumonia. The Plan for Transition of Care is related to the following treatment goals of Hypertensive emergency [I16.1]    Patient Goals/Plan/Treatment Preferences: Spoke with pt and sister. He lives home alone and has multiple DME from previous surgeries. Pt has strong support system with friends and family and plans to return home with their assistance for transportation, etc. Verified insurance and PCP. Will continue to follow for home going needs. Transportation/Food Security/Housekeeping Addressed: No issues identified.      Wing Rodriguez RN  Case Management Department

## 2022-11-18 NOTE — ED NOTES
Verbal order from Dr Soraida Smith to give an 800mcg bolus of Nitro at this time. Verified order with read back. Bolus pulled from Nitro bottle as IV pump was unable to be programmed to give that bolus. RT at bedside for BiPap placement. Pt remains A&Ox4, resps easy and unlabored. IV initiated with blood drawn and sent to lab. Monitor in place and VS as noted. Dr Latasha Nix at bedside with ultrasound.      Report given to Levy daley, 44 Kristal Diaz RN  11/17/22 2000

## 2022-11-18 NOTE — H&P
Hospitalist - History & Physical      Patient: Daria Pelaez    Unit/Bed:16/016A  YOB: 1960  MRN: [id number]   Acct: [de-identified]   PCP: EYAD Soto CNP    Date of Service: Pt seen/examined on 11/17/22  and Admitted to Outpatient with expected LOS less than two midnights due to medical therapy. Chief Complaint:  SOB     History Of Present Illness:      80-year-old gentleman who is severely obese with past medical history of type 2 diabetes, hypertension, HLD, CAD status post CABG in 11/2019, PAD, lupus? who presents with acute onset of progressively worsening dyspnea for 1 week duration. He also has associated productive cough with clear white sputum, chills, and pleuritic chest pain also for the past 1 week. Reports no chronic cough at baseline. Has chronic leg swelling which he reports is at his baseline with no acute worsening. Denies PND and orthopnea. Remote history of smoking 2 packs/day for 25 years, quit 15 years ago. Recent admission at [facility] for carotid artery stenosis, discharged on 11/3, where he presented admitted for new LBBB noted on EKG and also severely uncontrolled hypertension with blood pressure of 200s over 100s according to discharge summary. He was started on heparin infusion for ACS. They performed echo which showed EF of 55 to 60% but no regional wall motion abnormalities. His troponins were normal as well therefore no further ischemic work-up was pursued. The patient remained severely hypertensive during his admission with blood pressure in the 180s which was being managed, however eventually patient elected to leave AMA. ED Course:  Initial vitals in ED: 37.4, 199/93, 91, 22, 99% on room air    Lab work-up: BMP essentially normal with normal kidney function, creatinine at 1.0 which is at his baseline of 1.0-1.1. CBC showed hemoglobin of 7.7 with MCV of 83.1, WBC 9.9, platelets 503.     Cardiac work-up including troponin was elevated at 0.040 and proBNP elevated at 2600. EKG showed RBBB which may be new. CXR hows interstitial prominence and mildly increased density at the right lung base which could represent atelectasis or pneumonia. There were no recorded desaturations in the EMR however due to patient's increased work of breathing he was started on BiPAP in the ED. He was also administered aspirin 325mg for chest pain, started on nitro drip in ED for hypertensive emergency, and admitted to internal medicine for further management. Assessment and Plan:    Hypertensive emergency  -BP on arrival 199/93  -Home meds: lisinopril 20 mg daily    Plan:   -Cont nitro gtt, wean   -Increased home lisinopril to 40mg daily from 20mg, start in am.     Acute on chronic normocytic anemia  -Hemoglobin on admission 7.7 with MCV of 83.1 and baseline hemoglobin is 9.7-12. Last month in 10/2022 his hemoglobin was 9.7. Unclear etiology at this time. -Iron profile done on 11/16 by PCP showed iron level of 26, iron saturation 17, TIBC 146. Plan:   -Resume home iron supplementation, change dose to every other day  -Ferritin and retic ordered with am labs   -Cont to monitor for s/sx of bleeding and q12h Hb for now     Type 2 diabetes  -BG elevated at 209 on admission   -Home insulin regimen: detemir 30 units qam and humalog 5-15 units TIDAC, jardiance  -Cont home insulin regimen while inpatient  -Jardiance held for now, can resume in am.     CAD s/p CABG   PAD  Carotid Stenosis?    -Cont home asn/plavix and statin     Past Medical History:        Diagnosis Date    Arthritis     CAD (coronary artery disease)     CKD (chronic kidney disease), stage II     Diabetes mellitus ([facility] [address]     Hyperlipidemia     Hypertension     Liver disease     HEPITITIS AS A CHILD       Past Surgical History:        Procedure Laterality Date    CARDIAC SURGERY  11/2019    triple bypass    CYST REMOVAL      RIGHT ANKLE     FOOT DEBRIDEMENT Left 4/20/2020    LEFT TRANSMETATARSAL AMPUTATION  FOOT INCISION AND DRAINAGE performed by Eren Smith DPM at [address] Left 7/20/2020    LEFT WOUND DEBRIDEMENT WITH WOUND VAC APPLICATION performed by Eren Smith DPM at [address] Left 7/1/2022    LEFT FOOT [address], APPLICATION SKIN SUBSTITUTE GRAFT, APPLICATION [facility] WOUND VAC performed by Eren Smith DPM at [facility] Right     CYST REMOVED    FOOT SURGERY Left 8/14/2020    LEFT FOOT PREPARATION GRAFT SITE, HAVEST SPLIT THICKNESS SKIN GRAFT WITH APPLICATION, APPLICATION [employer]CI WOUND VAC performed by Eren Smith DPM at [address] [place] [address]    TOE AMPUTATION Left 3/3/2020    I&D LEFT FOOT, TRANSMETATARSAL AMPUTATION performed by Eren Smith DPM at [facility] Medications:   No current facility-administered medications on file prior to encounter. Current Outpatient Medications on File Prior to Encounter   Medication Sig Dispense Refill    ferrous sulfate (IRON 325) 325 (65 Fe) MG tablet Take 1 tablet by mouth 2 times daily 180 tablet 1    furosemide (LASIX) 40 MG tablet Take 1 tablet by mouth daily 90 tablet 1    potassium chloride (KLOR-CON M) 20 MEQ extended release tablet Take 1 tablet by mouth daily 30 tablet 5    carvedilol (COREG) 12.5 MG tablet Take 12.5 mg by mouth 2 times daily (with meals)      empagliflozin (JARDIANCE) 25 MG tablet Take 1 tablet by mouth daily 90 tablet 1    clopidogrel (PLAVIX) 75 MG tablet Take 1 tablet by mouth daily 90 tablet 3    gabapentin (NEURONTIN) 600 MG tablet Take 1 tablet by mouth 2 times daily for 180 days. 180 tablet 1    insulin detemir (LEVEMIR FLEXTOUCH) 100 UNIT/ML injection pen 30 units in the morning, and 30 units in the evening.  21 mL 3    insulin lispro (HUMALOG) 100 UNIT/ML injection vial Inject 5-15 Units into the skin 3 times daily (with meals) 15 mL 3    albuterol sulfate HFA (VENTOLIN HFA) 108 (90 Base) MCG/ACT inhaler Inhale 2 puffs into the lungs 4 times daily as needed for Wheezing 54 g 1    atorvastatin (LIPITOR) 80 MG tablet Take 1 tablet by mouth daily 30 tablet 3    metoprolol tartrate (LOPRESSOR) 25 MG tablet Take 1 tablet by mouth twice daily 180 tablet 1    lisinopril (PRINIVIL;ZESTRIL) 20 MG tablet Take 1 tablet by mouth daily 90 tablet 3    Polyethylene Glycol 3350 (MIRALAX PO) Take by mouth as needed      aspirin 81 MG chewable tablet Take 1 tablet by mouth daily 30 tablet 3    Multiple Vitamins-Minerals (MULTIVITAMIN PO) Take 1 tablet by mouth daily          Allergies:    Patient has no known allergies. Social History:    reports that he quit smoking about 14 years ago. His smoking use included cigarettes. He has never used smokeless tobacco. He reports that he does not currently use alcohol. He reports that he does not use drugs. Family History:       Problem Relation Age of Onset    Diabetes Mother     High Blood Pressure Mother     Alzheimer's Disease Father          of, 80or 80years old    Heart Disease Father     High Blood Pressure Father     Cancer Neg Hx     Stroke Neg Hx        Diet:  No diet orders on file    Review of systems:   Pertinent positives as noted in the HPI. All other systems reviewed and negative. PHYSICAL EXAM:  BP (!) 145/70   Pulse 80   Temp 99.3 °F (37.4 °C) (Oral)   Resp 21   Ht 6' 2\" (1.88 m)   Wt (!) 310 lb (140.6 kg)   SpO2 99%   BMI 39.80 kg/m²       General: obese gentleman laying in bed on his side with bipap on; awake and alert  HEENT: Pupils equal, round, and reactive to light; bipap mask in place over mouth and nose  Neck: Supple, with full range of motion.  Large neck circumference   Cardiac: Regular rate and rhythm, S1 and S2 auscultated; no murmurs, gallops, or rubs; peripheral pulses 2+   Respiratory: labored breathing on bipap; good effort; no wheezing, rhonchi, or crackles bilaterally   GI: Abdomen is protuberant but soft, non-tender, and non-distended; bowel sounds are difficult to hear due to body habitus  : No suprapubic or pelvic tenderness   Neuro: ANOx3; CN2-12 are grossly intact; no tremors; no FNDs  MSK: Normal muscle tone and bulk, no joint effusions   Skin: Skin color, texture, turgor normal.  No rashes or lesions. Psychiatric: Alert and oriented, thought content appropriate, normal insight    Labs:   Recent Labs     11/16/22 0928 11/17/22 1940   WBC 8.5 9.9   HGB 8.0* 7.7*   HCT 27.0* 25.6*   * 367     Recent Labs     11/16/22 0928 11/17/22 1940    139   K 3.3* 3.6    102   CO2 26 26   BUN 13 11   CREATININE 1.1 1.0   CALCIUM 8.7 8.4*     Recent Labs     11/16/22 0928 11/17/22 1940   AST 62* 39   ALT 84* 63   BILIDIR  --  <0.2   BILITOT 0.2* 0.2*   ALKPHOS 120 105     No results for input(s): INR in the last 72 hours. No results for input(s): Cleatrice Corona in the last 72 hours. Urinalysis:    Lab Results   Component Value Date/Time    NITRU NEGATIVE 11/16/2022 01:52 PM    WBCUA 0-2 11/16/2022 01:52 PM    BACTERIA NONE SEEN 11/16/2022 01:52 PM    RBCUA 15-25 11/16/2022 01:52 PM    BLOODU MODERATE 11/16/2022 01:52 PM    GLUCOSEU >= 1000 11/16/2022 01:52 PM       Radiology:   XR CHEST PORTABLE   Final Result   Impression:   1. Interstitial prominence appear similar to prior. Mildly increased    density at the right lung base could represent atelectasis or pneumonia. This document has been electronically signed by: Jono Mack MD on    11/17/2022 08:09 PM        XR CHEST PORTABLE    Result Date: 11/17/2022  Exam: 1 View of the chest Comparison: 6/29/2022 Findings: Prior median sternotomy Cardiac silhouette is not enlarged. No pneumothorax. No pleural fluid collection. Prominence to the interstitium appear similar to the prior exam. Mildly increased density at the right lung base could indicate atelectasis or pneumonia     Impression: 1.  Interstitial prominence appear similar to prior. Mildly increased density at the right lung base could represent atelectasis or pneumonia.  This document has been electronically signed by: Jono Mack MD on 11/17/2022 08:09 PM    Electronically signed by Alisson Guillory MD on 11/17/2022 at 10:52 PM

## 2022-11-18 NOTE — ED PROVIDER NOTES
[facility]    EMERGENCY MEDICINE     Pt Name: Sebastián Feliciano  MRN: [id number]  Birthdate 1960  Date of evaluation: 11/17/2022  Provider: Sixto Leal MD,     [address] [place] [address]       Chief Complaint   Patient presents with    Chest Pain    Shortness of Breath       HISTORY OF PRESENT ILLNESS    Sebastián Feliciano is a pleasant 65 y.o. male   Presents to the emergency department from home   With chief complaint of shortness of breath that has been gradually worsening over the past 24 to 36 hours associated with dyspnea on exertion and worsening bilateral lower extremity pitting edema. He denies chest pain. He reports orthopnea and a nonproductive cough. He denies fevers.         PAST MEDICAL HISTORY     Past Medical History:   Diagnosis Date    Arthritis     CAD (coronary artery disease)     CKD (chronic kidney disease), stage II     Diabetes mellitus ([facility] [address]     Hyperlipidemia     Hypertension     Liver disease     HEPITITIS AS A CHILD       SURGICAL HISTORY       Past Surgical History:   Procedure Laterality Date    CARDIAC SURGERY  11/2019    triple bypass    CYST REMOVAL      RIGHT ANKLE     FOOT DEBRIDEMENT Left 4/20/2020    LEFT TRANSMETATARSAL AMPUTATION  FOOT INCISION AND DRAINAGE performed by Ramona Snyder DPM at [facility] Left 7/20/2020    LEFT WOUND DEBRIDEMENT WITH WOUND VAC APPLICATION performed by Ramona Snyder DPM at [facility] HOMEOSTASIS LABS Left 7/1/2022    LEFT FOOT [facility], [address] [place] [address], [facility] WOUND VAC performed by Ramona Snyder DPM at [facility] Right     CYST REMOVED    FOOT SURGERY Left 8/14/2020    LEFT FOOT PREPARATION GRAFT SITE, HAVEST SPLIT THICKNESS SKIN GRAFT WITH APPLICATION, APPLICATION [employer]CI WOUND VAC performed by Ramona Snyder DPM at [address] [place], [place] [address]e E Left 3/3/2020    I&D LEFT FOOT, TRANSMETATARSAL AMPUTATION performed by Ramona Snyder DPM at [facility]       Current Discharge Medication List        CONTINUE these medications which have NOT CHANGED    Details   ferrous sulfate (IRON 325) 325 (65 Fe) MG tablet Take 1 tablet by mouth 2 times daily  Qty: 180 tablet, Refills: 1    Associated Diagnoses: Anemia, unspecified type      furosemide (LASIX) 40 MG tablet Take 1 tablet by mouth daily  Qty: 90 tablet, Refills: 1    Associated Diagnoses: At risk for fluid volume overload      potassium chloride (KLOR-CON M) 20 MEQ extended release tablet Take 1 tablet by mouth daily  Qty: 30 tablet, Refills: 5    Associated Diagnoses: At risk for fluid volume overload      carvedilol (COREG) 12.5 MG tablet Take 12.5 mg by mouth 2 times daily (with meals)      empagliflozin (JARDIANCE) 25 MG tablet Take 1 tablet by mouth daily  Qty: 90 tablet, Refills: 1      clopidogrel (PLAVIX) 75 MG tablet Take 1 tablet by mouth daily  Qty: 90 tablet, Refills: 3      gabapentin (NEURONTIN) 600 MG tablet Take 1 tablet by mouth 2 times daily for 180 days. Qty: 180 tablet, Refills: 1      insulin detemir (LEVEMIR FLEXTOUCH) 100 UNIT/ML injection pen 30 units in the morning, and 30 units in the evening. Qty: 21 mL, Refills: 3    Associated Diagnoses: Type 2 diabetes mellitus with insulin therapy ([facility] [address] Hemoglobin A1C greater than 9%, indicating poor diabetic control      insulin lispro (HUMALOG) 100 UNIT/ML injection vial Inject 5-15 Units into the skin 3 times daily (with meals)  Qty: 15 mL, Refills: 3    Associated Diagnoses: Type 2 diabetes mellitus with insulin therapy ([facility] [address] Hemoglobin A1C greater than 9%, indicating poor diabetic control      albuterol sulfate HFA (VENTOLIN HFA) 108 (90 Base) MCG/ACT inhaler Inhale 2 puffs into the lungs 4 times daily as needed for Wheezing  Qty: 54 g, Refills: 1    Comments:  Whichever albuterol the pt insurance will cover      atorvastatin (LIPITOR) 80 MG tablet Take 1 tablet by mouth daily  Qty: 30 tablet, Refills: 3      metoprolol tartrate (LOPRESSOR) 25 MG tablet Take 1 tablet by mouth twice daily  Qty: 180 tablet, Refills: 1    Associated Diagnoses: Essential hypertension      lisinopril (PRINIVIL;ZESTRIL) 20 MG tablet Take 1 tablet by mouth daily  Qty: 90 tablet, Refills: 3    Associated Diagnoses: Essential hypertension      Polyethylene Glycol 3350 (MIRALAX PO) Take by mouth as needed      aspirin 81 MG chewable tablet Take 1 tablet by mouth daily  Qty: 30 tablet, Refills: 3    Associated Diagnoses: Coronary artery disease involving native coronary artery of native heart without angina pectoris      Multiple Vitamins-Minerals (MULTIVITAMIN PO) Take 1 tablet by mouth daily              ALLERGIES     Patient has no known allergies. FAMILY HISTORY       Family History   Problem Relation Age of Onset    Diabetes Mother     High Blood Pressure Mother     Alzheimer's Disease Father          of, 80or 80years old    Heart Disease Father     High Blood Pressure Father     Cancer Neg Hx     Stroke Neg Hx         SOCIAL HISTORY       Social History     Socioeconomic History    Marital status: Single   Tobacco Use    Smoking status: Former     Types: Cigarettes     Quit date:      Years since quittin.8    Smokeless tobacco: Never   Vaping Use    Vaping Use: Never used   Substance and Sexual Activity    Alcohol use: Not Currently    Drug use: Never     Social Determinants of Health     Financial Resource Strain: Low Risk     Difficulty of Paying Living Expenses: Not hard at all   Food Insecurity: No Food Insecurity    Worried About Running Out of Food in the Last Year: Never true    Ran Out of Food in the Last Year: Never true       REVIEW OF SYSTEMS     Review of Systems  - CONSTITUTIONAL: Denies weight loss, fever and chills. - HEENT: Denies changes in vision and hearing.    -   RESPIRATORY: As per HPI      - CV:  As per HPI      - GI: Denies abdominal pain, nausea, vomiting and diarrhea.      - : Denies dysuria and urinary frequency. - MSK: Denies myalgia and joint pain. - SKIN: Denies rash and pruritus.    -   NEUROLOGICAL: Denies headache and syncope. - PSYCHIATRIC: Denies recent changes in mood. Denies anxiety and depression. Except as noted above the remainder of the review of systems was reviewed and is. PHYSICAL EXAM    (up to 7 for level 4, 8 or more for level 5)     ED Triage Vitals [11/17/22 1904]   BP Temp Temp Source Heart Rate Resp SpO2 Height Weight   (!) 199/93 99.3 °F (37.4 °C) Oral 91 22 99 % 6' 2\" (1.88 m) (!) 310 lb (140.6 kg)       Physical Exam  Constitutional: Respiratory distress  Eyes:  conjunctiva normal   HENT: No obvious JVD  Respiratory: Diffuse rales, increased work of breathing  Cardiovascular:  Normal rate, normal rhythm, no murmurs, no gallops, no rubs   GI:  Soft, nondistended, nontender  Musculoskeletal: 2+ bilateral lower extremity pitting edema  Integument:  Well hydrated, no rash   Neurologic:  Alert & oriented x 3,  no focal deficits noted   Psychiatric:  Speech and behavior appropriate  DIAGNOSTIC RESULTS     RADIOLOGY: (none if blank)   Interpretation per the Radiologistbelow, if available at the time of this note:    XR CHEST PORTABLE   Final Result   Impression:   1. Interstitial prominence appear similar to prior. Mildly increased    density at the right lung base could represent atelectasis or pneumonia.       This document has been electronically signed by: Vinay Loja MD on    11/17/2022 08:09 PM          LABS:  Labs Reviewed   CBC WITH AUTO DIFFERENTIAL - Abnormal; Notable for the following components:       Result Value    RBC 3.08 (*)     Hemoglobin 7.7 (*)     Hematocrit 25.6 (*)     MCH 25.0 (*)     MCHC 30.1 (*)     RDW-CV 16.3 (*)     RDW-SD 49.2 (*)     Immature Grans (Abs) 0.11 (*)     All other components within normal limits   BASIC METABOLIC PANEL - Abnormal; Notable for the following components:    Glucose 209 (*)     Calcium 8.4 (*)     All other components within normal limits   HEPATIC FUNCTION PANEL - Abnormal; Notable for the following components:    Albumin 2.6 (*)     Total Bilirubin 0.2 (*)     All other components within normal limits   TROPONIN - Abnormal; Notable for the following components:    Troponin T 0.040 (*)     All other components within normal limits   BRAIN NATRIURETIC PEPTIDE - Abnormal; Notable for the following components:    Pro-BNP 2665.0 (*)     All other components within normal limits   GLOMERULAR FILTRATION RATE, ESTIMATED   ANION GAP   OSMOLALITY   BASIC METABOLIC PANEL   MAGNESIUM       (Any cultures that may have been sent were not resulted at the time of this patient visit)    [address] [place] [address] and Medical Decision Making:     MDM  /     The Patient was seen and examined. Pertinent previous visit data was reviewed. Extensive Differential Diagnosis was formulated to include but not limited to: ACS, CHF, flash pulmonary edema, pulmonary embolism  Appropriate Diagnostic Testing was performed and results reviewed with the patient. Shared Decision-making was done with the patient/Family. Differential Diagnosis Includes (but not limited to):  ACS, CHF, flash pulmonary edema, pulmonary embolism, aortic dissection among others      My Imaging interpretation: (none if blank)      My EKG Interpretation: (none if blank) ACS, CHF, flash pulmonary edema, pulmonary embolism    Decision Rules/Clinical Scores utilized in MDM:       Shared Decision-Making: With Patient/Family     Goals of Care: Full code      Presentation consistent with acute on chronic CHF with pulmonary edema. B-lines noted on point-of-care ultrasound. Patient was initiated on BiPAP and started on a nitroglycerin gtt. at 100 mcg/min. 800 mcg bolus of nitroglycerin was given. Shortly after initiation of treatment the patient's condition turned around and improved significantly.   We dropped his blood pressure by around 20%. His work of breathing resolved and he was able to speak in full sentences. His laboratory eval is consistent with a CHF exacerbation. His troponin is slightly elevated for which she was given full dose aspirin. Suspect demand ischemia and no heparin was initiated at this time.   Admit to medicine      ED Medications administered this visit:    Medications   nitroGLYCERIN 50 mg in dextrose 5% 250 mL infusion (70 mcg/min IntraVENous Rate/Dose Change 11/18/22 0047)   sodium chloride flush 0.9 % injection 5-40 mL (has no administration in time range)   sodium chloride flush 0.9 % injection 5-40 mL (has no administration in time range)   0.9 % sodium chloride infusion (has no administration in time range)   ondansetron (ZOFRAN-ODT) disintegrating tablet 4 mg (has no administration in time range)     Or   ondansetron (ZOFRAN) injection 4 mg (has no administration in time range)   polyethylene glycol (GLYCOLAX) packet 17 g (has no administration in time range)   acetaminophen (TYLENOL) tablet 650 mg (has no administration in time range)     Or   acetaminophen (TYLENOL) suppository 650 mg (has no administration in time range)   enoxaparin (LOVENOX) injection 40 mg (has no administration in time range)   potassium chloride (KLOR-CON M) extended release tablet 40 mEq (has no administration in time range)     Or   potassium bicarb-citric acid (EFFER-K) effervescent tablet 40 mEq (has no administration in time range)     Or   potassium chloride 10 mEq/100 mL IVPB (Peripheral Line) (has no administration in time range)   potassium chloride 10 mEq/100 mL IVPB (Peripheral Line) (has no administration in time range)   magnesium sulfate 2000 mg in 50 mL IVPB premix (has no administration in time range)   sennosides-docusate sodium (SENOKOT-S) 8.6-50 MG tablet 1 tablet (has no administration in time range)   aspirin chewable tablet 324 mg (243 mg Oral Given 11/17/22 1944)         Procedures: (None if blank)    Critical Care Procedure Note  Authorized and Performed by: Sydney Horan MD  Total critical care time: Approximately 36 minutes  Due to a high probability of clinically significant, life threatening deterioration, the patient required my highest level of preparedness to intervene emergently and I personally spent this critical care time directly and personally managing the patient. This critical care time included obtaining a history; examining the patient; pulse oximetry; ordering and review of studies; arranging urgent treatment with development of a management plan; evaluation of patient's response to treatment; frequent reassessment; and, discussions with other providers. This critical care time was performed to assess and manage the high probability of imminent, life-threatening deterioration that could result in multi-organ failure. It was exclusive of separately billable procedures and treating other patients and teaching time. Please see MDM section and the rest of the note for further information on patient assessment and treatment. CLINICAL IMPRESSION     1. Acute on chronic congestive heart failure, unspecified heart failure type (HCC)    2. Elevated troponin    3. Acute respiratory failure with hypoxia [facility])          DISPOSITION/PLAN   DISPOSITION Admitted 11/17/2022 11:15:09 PM        (Portions of this note were completed with a voice recognition program.  Efforts were made to edit the dictations but occasionally words are mis-transcribed. )      Sydney Horan MD, electronically signed)  Attending Physician, Emergency Department         Sydney Horan MD  11/18/22 5243

## 2022-11-18 NOTE — CONSENT
Informed Consent for Blood Component Transfusion Note    I have discussed with the patient the rationale for blood component transfusion; its benefits in treating or preventing fatigue, organ damage, or death; and its risk which includes mild transfusion reactions, rare risk of blood borne infection, or more serious but rare reactions. I have discussed the alternatives to transfusion, including the risk and consequences of not receiving transfusion. The patient had an opportunity to ask questions and had agreed to proceed with transfusion of blood components.     Electronically signed by EYAD Oliver CNP on 11/18/22 at 1:10 PM EST

## 2022-11-18 NOTE — PLAN OF CARE
Problem: Discharge Planning  Goal: Discharge to home or other facility with appropriate resources  Outcome: Progressing  Flowsheets (Taken 11/18/2022 0835)  Discharge to home or other facility with appropriate resources: Identify barriers to discharge with patient and caregiver     Problem: Skin/Tissue Integrity  Goal: Absence of new skin breakdown  Description: 1. Monitor for areas of redness and/or skin breakdown  2. Assess vascular access sites hourly  3. Every 4-6 hours minimum:  Change oxygen saturation probe site  4. Every 4-6 hours:  If on nasal continuous positive airway pressure, respiratory therapy assess nares and determine need for appliance change or resting period.   Outcome: Progressing     Problem: Safety - Adult  Goal: Free from fall injury  Outcome: Progressing  Flowsheets (Taken 11/18/2022 0835)  Free From Fall Injury: Instruct family/caregiver on patient safety     Problem: Chronic Conditions and Co-morbidities  Goal: Patient's chronic conditions and co-morbidity symptoms are monitored and maintained or improved  Outcome: Progressing  Flowsheets (Taken 11/18/2022 0835)  Care Plan - Patient's Chronic Conditions and Co-Morbidity Symptoms are Monitored and Maintained or Improved:   Monitor and assess patient's chronic conditions and comorbid symptoms for stability, deterioration, or improvement   Collaborate with multidisciplinary team to address chronic and comorbid conditions and prevent exacerbation or deterioration   Update acute care plan with appropriate goals if chronic or comorbid symptoms are exacerbated and prevent overall improvement and discharge     Problem: Pain  Goal: Verbalizes/displays adequate comfort level or baseline comfort level  Outcome: Progressing

## 2022-11-18 NOTE — PROGRESS NOTES
Hospitalist Progress Note    Patient:  Kade Moore      Unit/Bed:8B-21/021-A    YOB: 1960    MRN: [id number]       Acct: [de-identified]     PCP: EYAD Terrell CNP    Date of Admission: 11/17/2022    Assessment/Plan:    Hypertensive urgency--on lisinopril 40 mg daily, will add Coreg 12.5 mg twice a day (home med) and attempt to wean off nitroglycerin drip; monitor closely  Acute on chronic normocytic anemia--hemoglobin down 0.8 g since November 16, 2022, no obvious signs of bleeding; monitor  Possible acute on chronic diastolic heart failure--echo from November 3, 2022 revealed an EF 55-60% with grade 2 diastolic dysfunction; resume beta-blocker, BNP at 2665  Hypokalemia--replace per protocol, monitor; magnesium at 1.9  Diabetes mellitus type 2, uncontrolled--on Lantus 30 units daily; change sliding scale to medium dose before meals and at bedtime; hemoglobin A1c on November 16, 2022 was noted to be 9.0; change diet to ADA  Mild troponin elevation--trending down slightly, lipids noted from November 16, 2022 with LDL at 67; no EKG changes; likely demand mismatch with #1 and #3  Essential hypertension, uncontrolled--on lisinopril; takes Coreg 12.5 mg twice a day at home and not resumed here so we will resume; monitor closely  PAD--statin, aspirin, Plavix  CAD status post CABG November 2019--on statin, aspirin, Plavix  Morbid obesity with BMI 42.81    Expected discharge date: Pending clinical course    Disposition:    [x] Home       [] TCU       [] Rehab       [] Psych       [] SNF       [] [facility]       [] Other-    Chief Complaint: Shortness of breath    Hospital Course: Per H&P done 11/17/2022: \"65year-old gentleman who is severely obese with past medical history of type 2 diabetes, hypertension, HLD, CAD status post CABG in 11/2019, PAD, lupus? who presents with acute onset of progressively worsening dyspnea for 1 week duration.   He also has associated productive cough with clear white sputum, chills, and pleuritic chest pain also for the past 1 week. Reports no chronic cough at baseline. Has chronic leg swelling which he reports is at his baseline with no acute worsening. Denies PND and orthopnea. Remote history of smoking 2 packs/day for 25 years, quit 15 years ago. Recent admission at [facility] for carotid artery stenosis, discharged on 11/3, where he presented admitted for new LBBB noted on EKG and also severely uncontrolled hypertension with blood pressure of 200s over 100s according to discharge summary. He was started on heparin infusion for ACS. They performed echo which showed EF of 55 to 60% but no regional wall motion abnormalities. His troponins were normal as well therefore no further ischemic work-up was pursued. The patient remained severely hypertensive during his admission with blood pressure in the 180s which was being managed, however eventually patient elected to leave AMA. ED Course:  Initial vitals in ED: 37.4, 199/93, 91, 22, 99% on room air     Lab work-up: BMP essentially normal with normal kidney function, creatinine at 1.0 which is at his baseline of 1.0-1.1. CBC showed hemoglobin of 7.7 with MCV of 83.1, WBC 9.9, platelets 821. Cardiac work-up including troponin was elevated at 0.040 and proBNP elevated at 2600. EKG showed RBBB which may be new. CXR hows interstitial prominence and mildly increased density at the right lung base which could represent atelectasis or pneumonia. There were no recorded desaturations in the EMR however due to patient's increased work of breathing he was started on BiPAP in the ED. He was also administered aspirin 325mg for chest pain, started on nitro drip in ED for hypertensive emergency, and admitted to internal medicine for further management. \"    11/18--> blood pressure was up to 182/86 this morning, adjusting medications, potassium a bit low, BNP at 2665, troponin T trending down     Subjective (past 24 hours): Patient relates to feeling much better, he denies any pain, denies any shortness of breath, states he is able to lay down and breathe better, he denies any change in his weight, he related to dyspnea on exertion, patient states for the last 4 to 5 days he has had short of breath, he lives alone, he does have an Unna boot to his left leg, he has toes of his left foot amputated, he does live alone    Medications:  Reviewed    Infusion Medications    dextrose      nitroGLYCERIN 75 mcg/min (11/18/22 0502)    sodium chloride       Scheduled Medications    aspirin  81 mg Oral Daily    atorvastatin  80 mg Oral Daily    clopidogrel  75 mg Oral Daily    [Held by provider] furosemide  40 mg Oral Daily    gabapentin  600 mg Oral BID    insulin lispro  5-15 Units SubCUTAneous TID WC    lisinopril  40 mg Oral Daily    multivitamin  1 tablet Oral Daily    [START ON 11/19/2022] insulin detemir  30 Units SubCUTAneous QAM    ferrous sulfate  325 mg Oral Every Other Day    sodium chloride flush  5-40 mL IntraVENous 2 times per day    enoxaparin  40 mg SubCUTAneous Daily    sennosides-docusate sodium  1 tablet Oral BID     PRN Meds: albuterol sulfate HFA, glucose, dextrose bolus **OR** dextrose bolus, glucagon (rDNA), dextrose, LORazepam, sodium chloride flush, sodium chloride, ondansetron **OR** ondansetron, polyethylene glycol, acetaminophen **OR** acetaminophen, potassium chloride **OR** potassium alternative oral replacement **OR** potassium chloride, potassium chloride, magnesium sulfate      Intake/Output Summary (Last 24 hours) at 11/18/2022 9983  Last data filed at 11/18/2022 0526  Gross per 24 hour   Intake --   Output 750 ml   Net -750 ml       Diet:  ADULT DIET; Regular;  Low Sodium (2 gm)    Exam:  BP (!) 144/73   Pulse 75   Temp 98.1 °F (36.7 °C) (Oral)   Resp 24   Ht 6' 2\" (1.88 m)   Wt (!) 333 lb 7 oz (151.2 kg)   SpO2 100%   BMI 42.81 kg/m²     General appearance: No apparent distress, appears stated age and cooperative. HEENT: Pupils equal, round, and reactive to light. Conjunctivae/corneas clear. Neck: Supple, with full range of motion. No jugular venous distention. Trachea midline. Respiratory:  Normal respiratory effort. Diminished to auscultation, bilaterally without Rales/Wheezes/Rhonchi. Cardiovascular: Regular rate and rhythm with normal S1/S2 without murmurs, rubs or gallops. Abdomen: Soft, non-tender, non-distended with normal bowel sounds. Obese  Musculoskeletal: passive and active ROM x 4 extremities. Skin: Skin color, texture, turgor normal.    Neurologic:  Neurovascularly intact without any focal; sensory/motor deficits. Cranial nerves: II-XII intact, grossly non-focal.  All 5 toes amputated from left foot, Unna boot noted as well  Psychiatric: Alert and oriented, thought content appropriate  Capillary Refill: Brisk,< 3 seconds   Peripheral Pulses: +2 palpable, equal bilaterally       Labs:   Recent Labs     11/16/22 0928 11/17/22 1940 11/18/22  0306   WBC 8.5 9.9 7.7   HGB 8.0* 7.7* 7.2*   HCT 27.0* 25.6* 24.3*   * 367 352     Recent Labs     11/16/22 0928 11/17/22 1940 11/18/22  0306    139 142   K 3.3* 3.6 3.4*    102 104   CO2 26 26 28   BUN 13 11 11   CREATININE 1.1 1.0 1.1   CALCIUM 8.7 8.4* 8.3*     Recent Labs     11/16/22 0928 11/17/22 1940   AST 62* 39   ALT 84* 63   BILIDIR  --  <0.2   BILITOT 0.2* 0.2*   ALKPHOS 120 105     Microbiology:    None    Urinalysis:      Lab Results   Component Value Date/Time    NITRU NEGATIVE 11/16/2022 01:52 PM    WBCUA 0-2 11/16/2022 01:52 PM    BACTERIA NONE SEEN 11/16/2022 01:52 PM    RBCUA 15-25 11/16/2022 01:52 PM    BLOODU MODERATE 11/16/2022 01:52 PM    GLUCOSEU >= 1000 11/16/2022 01:52 PM       Radiology:  XR CHEST PORTABLE    Result Date: 11/17/2022  Exam: 1 View of the chest Comparison: 6/29/2022 Findings: Prior median sternotomy Cardiac silhouette is not enlarged.  No pneumothorax. No pleural fluid collection. Prominence to the interstitium appear similar to the prior exam. Mildly increased density at the right lung base could indicate atelectasis or pneumonia     Impression: 1. Interstitial prominence appear similar to prior. Mildly increased density at the right lung base could represent atelectasis or pneumonia.  This document has been electronically signed by: Lilia Stringer MD on 11/17/2022 08:09 PM      DVT prophylaxis: [x] Lovenox                                 [] SCDs                                 [] SQ Heparin                                 [] Encourage ambulation           [] Already on Anticoagulation     Code Status: Full Code    PT/OT Eval Status: Monitor    Tele:   [x] Yes sinus rhythm heart rate 85             [] no    Active Hospital Problems    Diagnosis Date Noted    Hypertensive emergency [I16.1] 11/17/2022     Priority: Medium       Electronically signed by EYAD Keating CNP on 11/18/2022 at 6:52 AM

## 2022-11-18 NOTE — FLOWSHEET NOTE
11/18/22 0340   Treatment Team Notification   Reason for Communication Evaluate   Team Member Name Corie Prasad   Treatment Team Role Physician Assistant   Method of Communication Secure Message   Response Waiting for response   Patient pulling at bipap mask, and asking for something for anxiety. Corie LEMON notified for orders.

## 2022-11-18 NOTE — ED NOTES
ED to inpatient nurses report    Chief Complaint   Patient presents with    Chest Pain    Shortness of Breath      Present to ED from Home  LOC: alert and orientated to name, place, date  Vital signs   Vitals:    11/17/22 2135 11/17/22 2150 11/17/22 2205 11/17/22 2335   BP: (!) 142/68 (!) 146/67 (!) 145/70 (!) 162/88   Pulse: 83 79 80 76   Resp: 20 20 21 22   Temp:       TempSrc:       SpO2: 100% 100% 99% 100%   Weight:       Height:          Oxygen Baseline RA    Current needs required Bi-pap Bipap/Cpap Yes  LDAs:   Peripheral IV 11/17/22 Right Antecubital (Active)     Mobility: Independent  Pending ED orders: No  Present condition: Stable    C-SSRS Risk of Suicide: No Risk  Swallow Screening    Preferred Language: [place]     Electronically signed by Mandy Dunn RN on 11/17/2022 at 11:45 PM       [facility]  11/17/22 3421

## 2022-11-18 NOTE — CONSULTS
(downtrending, baseline Hgb around 10-11)    EK22 - NSR, RBBB (present on previous EKG 22)    ECHO: 20 - EF 47%, Grade 1 diastolic dysfunction    Cath: 20 - Nonobstructive PAD. Also had a previous CABG in 2019. Stress test 22 negative for acute ischemia.     All labs, EKG's, diagnostic testing and images as well as cardiac cath, stress testing were reviewed during this encounter    Past Medical History:   Diagnosis Date    Arthritis     CAD (coronary artery disease)     CKD (chronic kidney disease), stage II     Diabetes mellitus ([facility] [address]     Hyperlipidemia     Hypertension     Liver disease     HEPITITIS AS A CHILD     Past Surgical History:   Procedure Laterality Date    CARDIAC SURGERY  2019    triple bypass    CYST REMOVAL      RIGHT ANKLE     FOOT DEBRIDEMENT Left 2020    LEFT TRANSMETATARSAL AMPUTATION  FOOT INCISION AND DRAINAGE performed by Sunita Brooks DPM at [address] Left 2020    LEFT WOUND DEBRIDEMENT WITH WOUND VAC APPLICATION performed by Sunita Brooks DPM at [address] Left 2022    LEFT FOOT [address], [address], APPLICATION [address] performed by Sunita Brooks DPM at [address] [facility] Left 2020    LEFT FOOT PREPARATION GRAFT SITE, HAVEST SPLIT THICKNESS SKIN GRAFT WITH APPLICATION, APPLICATION [facility] WOUND VAC performed by Sunita Brooks DPM at [address] Left 3/3/2020    I&D LEFT FOOT, TRANSMETATARSAL AMPUTATION performed by Sunita Brooks DPM at [address] [facility] Facility-Administered Medications   Medication Dose Route Frequency Provider Last Rate Last Admin    albuterol sulfate HFA (PROVENTIL;VENTOLIN;PROAIR) 108 (90 Base) MCG/ACT inhaler 2 puff  2 puff Inhalation 4x Daily PRN Nano Burrows MD        aspirin chewable tablet 81 mg  81 mg Oral Daily Celine Pearson MD   81 mg at 11/18/22 0835    atorvastatin (LIPITOR) tablet 80 mg  80 mg Oral Daily Celine Pearson MD   80 mg at 11/18/22 0835    clopidogrel (PLAVIX) tablet 75 mg  75 mg Oral Daily Celine Pearson MD   75 mg at 11/18/22 0836    furosemide (LASIX) tablet 40 mg  40 mg Oral Daily Celine Pearson MD   40 mg at 11/18/22 1898    gabapentin (NEURONTIN) tablet 600 mg  600 mg Oral BID Celine Pearson MD   600 mg at 11/18/22 0837    lisinopril (PRINIVIL;ZESTRIL) tablet 40 mg  40 mg Oral Daily Celine Pearson MD   40 mg at 11/18/22 6383    multivitamin 1 tablet  1 tablet Oral Daily Celine Pearson MD   1 tablet at 11/18/22 9984    glucose chewable tablet 16 g  4 tablet Oral PRN Celine Pearson MD        dextrose bolus 10% 125 mL  125 mL IntraVENous PRN Celine Pearson MD        Or    dextrose bolus 10% 250 mL  250 mL IntraVENous PRN Celine Pearson MD        glucagon (rDNA) injection 1 mg  1 mg SubCUTAneous PRN Celine Pearson MD        dextrose 10 % infusion   IntraVENous Continuous PRN Celine Pearson MD        LORazepam (ATIVAN) injection 0.5 mg  0.5 mg IntraVENous Q6H PRN Negro Duarte PA-C        insulin lispro (HUMALOG) injection vial 0-8 Units  0-8 Units SubCUTAneous TID  EYAD Powers CNP   4 Units at 11/18/22 1152    insulin lispro (HUMALOG) injection vial 0-4 Units  0-4 Units SubCUTAneous Nightly EYAD Powers CNP        insulin glargine (LANTUS) injection vial 30 Units  30 Units SubCUTAneous Daily Celine Pearson MD   30 Units at 11/18/22 1152    ferrous sulfate (IRON 325) tablet 325 mg  325 mg Oral BID  EYAD Powers CNP        metoprolol tartrate (LOPRESSOR) tablet 25 mg  25 mg Oral BID Linward FormEYAD - CNP   25 mg at 11/18/22 1407    hydrALAZINE (APRESOLINE) tablet 25 mg  25 mg Oral 3 times per day Linward FormEYAD CNP        nitroGLYCERIN 50 mg in dextrose 5% 250 mL infusion  5-200 mcg/min IntraVENous Continuous Celine Pearson MD 22.5 mL/hr at 11/18/22 1156 75 mcg/min at 11/18/22 1156 sodium chloride flush 0.9 % injection 5-40 mL  5-40 mL IntraVENous 2 times per day Colton French MD        sodium chloride flush 0.9 % injection 5-40 mL  5-40 mL IntraVENous PRN Colton French MD        0.9 % sodium chloride infusion   IntraVENous PRN Colton French MD        ondansetron (ZOFRAN-ODT) disintegrating tablet 4 mg  4 mg Oral Q8H PRN Colton French MD        Or    ondansetron [facility]) injection 4 mg  4 mg IntraVENous Q6H PRN Colton French MD   4 mg at 11/18/22 0452    polyethylene glycol (GLYCOLAX) packet 17 g  17 g Oral Daily PRN Colton French MD        acetaminophen (TYLENOL) tablet 650 mg  650 mg Oral Q6H PRN Colton French MD        Or    acetaminophen (TYLENOL) suppository 650 mg  650 mg Rectal Q6H PRN Colton French MD        [facility] [place] by provider] enoxaparin (LOVENOX) injection 40 mg  40 mg SubCUTAneous Daily Colton French MD   40 mg at 11/18/22 1151    potassium chloride (KLOR-CON M) extended release tablet 40 mEq  40 mEq Oral PRN Colton French MD        Or    potassium bicarb-citric acid (EFFER-K) effervescent tablet 40 mEq  40 mEq Oral PRN Colton French MD        Or    potassium chloride 10 mEq/100 mL IVPB (Peripheral Line)  10 mEq IntraVENous PRN Colton French MD        potassium chloride 10 mEq/100 mL IVPB (Peripheral Line)  10 mEq IntraVENous PRN Colton French MD        magnesium sulfate 2000 mg in 50 mL IVPB premix  2,000 mg IntraVENous PRN Colton French MD        sennosides-docusate sodium (SENOKOT-S) 8.6-50 MG tablet 1 tablet  1 tablet Oral BID Colton French MD   1 tablet at 11/18/22 0978     Prior to Admission medications    Medication Sig Start Date End Date Taking?  Authorizing Provider   ferrous sulfate (IRON 325) 325 (65 Fe) MG tablet Take 1 tablet by mouth 2 times daily 11/16/22   EYAD Han - CNP   furosemide (LASIX) 40 MG tablet Take 1 tablet by mouth daily 11/16/22   EYAD Han CNP   potassium chloride (KLOR-CON M) 20 MEQ extended release tablet Take 1 tablet by mouth daily 11/16/22   [facility], APRN - CNP   carvedilol (COREG) 12.5 MG tablet Take 12.5 mg by mouth 2 times daily (with meals)    Historical Provider, MD   empagliflozin (JARDIANCE) 25 MG tablet Take 1 tablet by mouth daily 8/24/22   [facility], EYAD - CNP   clopidogrel (PLAVIX) 75 MG tablet Take 1 tablet by mouth daily 8/24/22   [facility], EYAD - CNP   gabapentin (NEURONTIN) 600 MG tablet Take 1 tablet by mouth 2 times daily for 180 days.  8/24/22 2/20/23  [facility]   insulin detemir (LEVEMIR FLEXTOUCH) 100 UNIT/ML injection pen 30 units in the morning, and 30 units in the evening. 8/24/22   [facility]   insulin lispro (HUMALOG) 100 UNIT/ML injection vial Inject 5-15 Units into the skin 3 times daily (with meals) 8/24/22   [facility], APRN - CNP   albuterol sulfate HFA (VENTOLIN HFA) 108 (90 Base) MCG/ACT inhaler Inhale 2 puffs into the lungs 4 times daily as needed for Wheezing 8/24/22   [facility], EYAD Hopkins CNP   atorvastatin (LIPITOR) 80 MG tablet Take 1 tablet by mouth daily 8/19/22   Jarred Talamantes MD   metoprolol tartrate (LOPRESSOR) 25 MG tablet Take 1 tablet by mouth twice daily 8/16/22   [facility], APRN - CNP   lisinopril (PRINIVIL;ZESTRIL) 20 MG tablet Take 1 tablet by mouth daily 3/23/22   Jarred Talamantes MD   Polyethylene Glycol 3350 (MIRALAX PO) Take by mouth as needed    Historical Provider, MD   aspirin 81 MG chewable tablet Take 1 tablet by mouth daily 8/10/20   [facility], APRN - CNP   Multiple Vitamins-Minerals (MULTIVITAMIN PO) Take 1 tablet by mouth daily     Historical Provider, MD   Scheduled Meds:   aspirin  81 mg Oral Daily    atorvastatin  80 mg Oral Daily    clopidogrel  75 mg Oral Daily    furosemide  40 mg Oral Daily    gabapentin  600 mg Oral BID    lisinopril  40 mg Oral Daily    multivitamin  1 tablet Oral Daily    insulin lispro  0-8 Units SubCUTAneous TID WC    insulin lispro  0-4 Units SubCUTAneous Nightly    insulin glargine  30 Units SubCUTAneous Daily    ferrous sulfate  325 mg Oral BID WC    metoprolol tartrate  25 mg Oral BID    hydrALAZINE  25 mg Oral 3 times per day    sodium chloride flush  5-40 mL IntraVENous 2 times per day    [Held by provider] enoxaparin  40 mg SubCUTAneous Daily    sennosides-docusate sodium  1 tablet Oral BID     Continuous Infusions:   dextrose      nitroGLYCERIN 75 mcg/min (22 1156)    sodium chloride       PRN Meds:.albuterol sulfate HFA, glucose, dextrose bolus **OR** dextrose bolus, glucagon (rDNA), dextrose, LORazepam, sodium chloride flush, sodium chloride, ondansetron **OR** ondansetron, polyethylene glycol, acetaminophen **OR** acetaminophen, potassium chloride **OR** potassium alternative oral replacement **OR** potassium chloride, potassium chloride, magnesium sulfate    No Known Allergies  Family History   Problem Relation Age of Onset    Diabetes Mother     High Blood Pressure Mother     Alzheimer's Disease Father          of, 80or 80years old    Heart Disease Father     High Blood Pressure Father     Cancer Neg Hx     Stroke Neg Hx      Social History     Socioeconomic History    Marital status: Single     Spouse name: Not on file    Number of children: Not on file    Years of education: Not on file    Highest education level: Not on file   Occupational History    Not on file   Tobacco Use    Smoking status: Former     Types: Cigarettes     Quit date:      Years since quittin.8    Smokeless tobacco: Never   Vaping Use    Vaping Use: Never used   Substance and Sexual Activity    Alcohol use: Not Currently    Drug use: Never    Sexual activity: Not on file   Other Topics Concern    Not on file   Social History Narrative    Not on file     Social Determinants of Health     Financial Resource Strain: Low Risk     Difficulty of Paying Living Expenses: Not hard at all   Food Insecurity: No Food Insecurity    Worried About Running Out of Food in the Last Year: Never true    Ran Out of Food in the Last Year: Never true   Transportation Needs: Not on file   Physical Activity: Not on file   Stress: Not on file   Social Connections: Not on file   Intimate Partner Violence: Not on file   Housing Stability: Not on file     ROS:   Constitutional: Denies any recent wt change. Eyes:  Denies any blurring or double vision, no glaucoma  Ears/Nose/Mouth/Throat:  Denies any chronic sinus/rhinitis, bleeding gums  Cardiovascular:  As described above. Respiratory:  Denies any frequent cough, wheezing or coughing up blood  Genitourinary:  Denies difficulty with urination and kidney stones  Gastrointestinal:  Denies any chronic problems with abdominal pain, nausea, vomiting or diarrhea  Musculoskeletal:  Reports chronic leg swelling. Denies any joint pain, back pain, or difficulty walking  Integumentary:  Denies any rash  Neurological:  Reports mild, improving headache. No numbness or tingling  Endocrine:  Denies any polydipsia. Hematologic/Lymphatic:  Denies any hemorrhage or lymphatic drainage problems. Labs:  CBC:   Recent Labs     11/16/22  0928 11/17/22 1940 11/18/22  0306   WBC 8.5 9.9 7.7   HGB 8.0* 7.7* 7.2*   HCT 27.0* 25.6* 24.3*   MCV 84.1 83.1 83.8   * 367 352     BMP:   Recent Labs     11/16/22  0928 11/17/22 1940 11/18/22  0306    139 142   K 3.3* 3.6 3.4*    102 104   CO2 26 26 28   BUN 13 11 11   CREATININE 1.1 1.0 1.1   MG 2.1  --  1.9     Accucheck Glucoses:   Recent Labs     11/18/22  0452 11/18/22  0837 11/18/22  1140   POCGLU 174* 231* 268*     Cardiac Enzymes: No results for input(s): CKTOTAL, CKMB, CKMBINDEX, TROPONINI in the last 72 hours. PT/INR: No results for input(s): PROTIME, INR in the last 72 hours. APTT: No results for input(s): APTT in the last 72 hours.   Liver Profile:  Lab Results   Component Value Date/Time    AST 39 11/17/2022 07:40 PM    ALT 63 11/17/2022 07:40 PM    BILIDIR <0.2 11/17/2022 07:40 PM    BILITOT 0.2 11/17/2022 07:40 PM    ALKPHOS 105 11/17/2022 07:40 PM     Lab Results   Component Value Date/Time    CHOL 116 11/16/2022 09:28 AM    HDL 39 11/16/2022 09:28 AM    TRIG 51 11/16/2022 09:28 AM     TSH:   Lab Results   Component Value Date/Time    TSH 1.350 11/16/2022 09:28 AM     UA:   Lab Results   Component Value Date/Time    COLORU YELLOW 11/16/2022 01:52 PM    PHUR 6.5 11/16/2022 01:52 PM    WBCUA 0-2 11/16/2022 01:52 PM    RBCUA 15-25 11/16/2022 01:52 PM    YEAST NONE SEEN 11/16/2022 01:52 PM    BACTERIA NONE SEEN 11/16/2022 01:52 PM    LEUKOCYTESUR NEGATIVE 11/16/2022 01:52 PM    UROBILINOGEN 1.0 11/16/2022 01:52 PM    BILIRUBINUR NEGATIVE 11/16/2022 01:52 PM    BLOODU MODERATE 11/16/2022 01:52 PM    GLUCOSEU >= 1000 11/16/2022 01:52 PM         Physical Exam:  Vitals:    11/18/22 1407   BP: (!) 145/90   Pulse: 90   Resp:    Temp:    SpO2:       Intake/Output Summary (Last 24 hours) at 11/18/2022 1416  Last data filed at 11/18/2022 1020  Gross per 24 hour   Intake --   Output 1500 ml   Net -1500 ml      General:  No acute distress  Neck: Supple, no JVD, no carotid bruits  Heart: Regular rhythm normal S1 and S2, no rubs, murmurs or gallops  Lungs: clear to ascultation no rales, wheezes, or rhonchi  Abdomen: positive bowel sounds, soft, non-tender, non-distended, no bruits, no masses  Extremities:no clubbing, cyanosis. 1+ pitting edema in bilateral LE's (chronic), feet are wrapped in bandages and protective boots  Neurologic: alert and oriented x 3, cranial nerves 2-12 grossly intact, motor and sensory intact, moving all extremities  Skin: No rashes  Psych: AO x 3, no depression/peyman, no pressured speech, normal affect  Lymph: No obvious LAD      Assessment:  Hypertensive Emergency, Improving but still elevated  Acute on Chronic Normocytic Anemia.  This is the suspected reason for his initial symptomatology, especially in the context of his previous CABG  Elevated Troponins 2/2 Demand Ischemia from uncontrolled HTN and anemia as described above  Acute HFpEF Exacerbation 2/2 anemia  CAD s/p CABG 11/2019  PAD with surgical removal of toes noted  HTN  HLD    Plan: Will change Lisinopril to Valsartan 320 mg daily  Will transition patient from IV nitro drip to oral amlodipine 5 mg daily  Anemia workup and management per discretion of primary team  Recommend GI consult for evaluation of anemia  Cardiac cath is currently not indicated until patient's anemia has stabilized. We will continue to follow. Thank you for allowing us to participate in the care of this patient. Please do not hesitate to call us with questions. Electronically signed by Ashley Rosales DO on 11/18/2022 at 2:16 PM    Interventional Cardiology - [facility]     Patient is seen and examined  Discussed with the team on rounds  Severe CAD and high risk patient   CHF is likely triggered by severe anemia   Needs work up for possible GI bleed as he is not going to be a candidate for any invasive evaluation until anemia is cleared  Medical RX for now  BP control   I am off service starting tomorrow  My team will be following   Thank you for allowing me to participate in the care of your patient. Please don't hesitate to contact me regarding any further issues related to the patient care.   Bard Edelmira MD

## 2022-11-19 LAB
ANION GAP SERPL CALCULATED.3IONS-SCNC: 11 MEQ/L (ref 8–16)
BASOPHILS # BLD: 0.4 %
BASOPHILS ABSOLUTE: 0 THOU/MM3 (ref 0–0.1)
BUN BLDV-MCNC: 12 MG/DL (ref 7–22)
CALCIUM SERPL-MCNC: 8.4 MG/DL (ref 8.5–10.5)
CHLORIDE BLD-SCNC: 102 MEQ/L (ref 98–111)
CO2: 27 MEQ/L (ref 23–33)
CREAT SERPL-MCNC: 1.2 MG/DL (ref 0.4–1.2)
EOSINOPHIL # BLD: 2.2 %
EOSINOPHILS ABSOLUTE: 0.2 THOU/MM3 (ref 0–0.4)
ERYTHROCYTE [DISTWIDTH] IN BLOOD BY AUTOMATED COUNT: 16.2 % (ref 11.5–14.5)
ERYTHROCYTE [DISTWIDTH] IN BLOOD BY AUTOMATED COUNT: 49.4 FL (ref 35–45)
GFR SERPL CREATININE-BSD FRML MDRD: > 60 ML/MIN/1.73M2
GLUCOSE BLD-MCNC: 133 MG/DL (ref 70–108)
GLUCOSE BLD-MCNC: 138 MG/DL (ref 70–108)
GLUCOSE BLD-MCNC: 148 MG/DL (ref 70–108)
GLUCOSE BLD-MCNC: 170 MG/DL (ref 70–108)
GLUCOSE BLD-MCNC: 202 MG/DL (ref 70–108)
HAV AB SERPL IA-ACNC: REACTIVE
HBV SURFACE AB TITR SER: NEGATIVE {TITER}
HCT VFR BLD CALC: 27.1 % (ref 42–52)
HEMOGLOBIN: 8.1 GM/DL (ref 14–18)
HEPATITIS B SURFACE ANTIGEN: NEGATIVE
HEPATITIS C ANTIBODY: NEGATIVE
IMMATURE GRANS (ABS): 0.08 THOU/MM3 (ref 0–0.07)
IMMATURE GRANULOCYTES: 0.9 %
LYMPHOCYTES # BLD: 13.6 %
LYMPHOCYTES ABSOLUTE: 1.2 THOU/MM3 (ref 1–4.8)
MCH RBC QN AUTO: 25.1 PG (ref 26–33)
MCHC RBC AUTO-ENTMCNC: 29.9 GM/DL (ref 32.2–35.5)
MCV RBC AUTO: 83.9 FL (ref 80–94)
MONOCYTES # BLD: 7.4 %
MONOCYTES ABSOLUTE: 0.7 THOU/MM3 (ref 0.4–1.3)
NUCLEATED RED BLOOD CELLS: 0 /100 WBC
PLATELET # BLD: 350 THOU/MM3 (ref 130–400)
PMV BLD AUTO: 10.2 FL (ref 9.4–12.4)
POTASSIUM SERPL-SCNC: 3.6 MEQ/L (ref 3.5–5.2)
RBC # BLD: 3.23 MILL/MM3 (ref 4.7–6.1)
SEG NEUTROPHILS: 75.5 %
SEGMENTED NEUTROPHILS ABSOLUTE COUNT: 6.9 THOU/MM3 (ref 1.8–7.7)
SODIUM BLD-SCNC: 140 MEQ/L (ref 135–145)
WBC # BLD: 9.1 THOU/MM3 (ref 4.8–10.8)

## 2022-11-19 PROCEDURE — 86706 HEP B SURFACE ANTIBODY: CPT

## 2022-11-19 PROCEDURE — 80048 BASIC METABOLIC PNL TOTAL CA: CPT

## 2022-11-19 PROCEDURE — G0378 HOSPITAL OBSERVATION PER HR: HCPCS

## 2022-11-19 PROCEDURE — 6360000002 HC RX W HCPCS: Performed by: NURSE PRACTITIONER

## 2022-11-19 PROCEDURE — 82948 REAGENT STRIP/BLOOD GLUCOSE: CPT

## 2022-11-19 PROCEDURE — 96375 TX/PRO/DX INJ NEW DRUG ADDON: CPT

## 2022-11-19 PROCEDURE — C9113 INJ PANTOPRAZOLE SODIUM, VIA: HCPCS | Performed by: NURSE PRACTITIONER

## 2022-11-19 PROCEDURE — 6370000000 HC RX 637 (ALT 250 FOR IP): Performed by: NURSE PRACTITIONER

## 2022-11-19 PROCEDURE — 6370000000 HC RX 637 (ALT 250 FOR IP)

## 2022-11-19 PROCEDURE — 2580000003 HC RX 258: Performed by: STUDENT IN AN ORGANIZED HEALTH CARE EDUCATION/TRAINING PROGRAM

## 2022-11-19 PROCEDURE — 99233 SBSQ HOSP IP/OBS HIGH 50: CPT | Performed by: NURSE PRACTITIONER

## 2022-11-19 PROCEDURE — 6370000000 HC RX 637 (ALT 250 FOR IP): Performed by: STUDENT IN AN ORGANIZED HEALTH CARE EDUCATION/TRAINING PROGRAM

## 2022-11-19 PROCEDURE — 87340 HEPATITIS B SURFACE AG IA: CPT

## 2022-11-19 PROCEDURE — 36415 COLL VENOUS BLD VENIPUNCTURE: CPT

## 2022-11-19 PROCEDURE — 86708 HEPATITIS A ANTIBODY: CPT

## 2022-11-19 PROCEDURE — 86709 HEPATITIS A IGM ANTIBODY: CPT

## 2022-11-19 PROCEDURE — 85025 COMPLETE CBC W/AUTO DIFF WBC: CPT

## 2022-11-19 PROCEDURE — 86803 HEPATITIS C AB TEST: CPT

## 2022-11-19 RX ORDER — HYDRALAZINE HYDROCHLORIDE 50 MG/1
50 TABLET, FILM COATED ORAL EVERY 8 HOURS SCHEDULED
Status: DISCONTINUED | OUTPATIENT
Start: 2022-11-19 | End: 2022-11-23

## 2022-11-19 RX ORDER — LANOLIN ALCOHOL/MO/W.PET/CERES
5 CREAM (GRAM) TOPICAL NIGHTLY PRN
Status: DISCONTINUED | OUTPATIENT
Start: 2022-11-19 | End: 2022-11-24 | Stop reason: HOSPADM

## 2022-11-19 RX ORDER — PANTOPRAZOLE SODIUM 40 MG/10ML
40 INJECTION, POWDER, LYOPHILIZED, FOR SOLUTION INTRAVENOUS 2 TIMES DAILY
Status: DISCONTINUED | OUTPATIENT
Start: 2022-11-19 | End: 2022-11-24 | Stop reason: HOSPADM

## 2022-11-19 RX ADMIN — METOPROLOL TARTRATE 25 MG: 25 TABLET, FILM COATED ORAL at 08:12

## 2022-11-19 RX ADMIN — PANTOPRAZOLE SODIUM 40 MG: 40 INJECTION, POWDER, FOR SOLUTION INTRAVENOUS at 09:57

## 2022-11-19 RX ADMIN — HYDRALAZINE HYDROCHLORIDE 25 MG: 25 TABLET, FILM COATED ORAL at 00:09

## 2022-11-19 RX ADMIN — VALSARTAN 320 MG: 320 TABLET, FILM COATED ORAL at 08:12

## 2022-11-19 RX ADMIN — HYDRALAZINE HYDROCHLORIDE 25 MG: 25 TABLET, FILM COATED ORAL at 06:51

## 2022-11-19 RX ADMIN — METOPROLOL TARTRATE 25 MG: 25 TABLET, FILM COATED ORAL at 20:21

## 2022-11-19 RX ADMIN — FUROSEMIDE 40 MG: 40 TABLET ORAL at 08:12

## 2022-11-19 RX ADMIN — ATORVASTATIN CALCIUM 80 MG: 80 TABLET, FILM COATED ORAL at 08:12

## 2022-11-19 RX ADMIN — Medication 10 ML: at 09:57

## 2022-11-19 RX ADMIN — SENNOSIDES AND DOCUSATE SODIUM 1 TABLET: 50; 8.6 TABLET ORAL at 08:12

## 2022-11-19 RX ADMIN — GABAPENTIN 600 MG: 600 TABLET ORAL at 20:21

## 2022-11-19 RX ADMIN — AMLODIPINE BESYLATE 5 MG: 5 TABLET ORAL at 08:12

## 2022-11-19 RX ADMIN — Medication 325 MG: at 08:12

## 2022-11-19 RX ADMIN — SODIUM CHLORIDE, PRESERVATIVE FREE 10 ML: 5 INJECTION INTRAVENOUS at 20:19

## 2022-11-19 RX ADMIN — INSULIN LISPRO 2 UNITS: 100 INJECTION, SOLUTION INTRAVENOUS; SUBCUTANEOUS at 17:56

## 2022-11-19 RX ADMIN — PANTOPRAZOLE SODIUM 40 MG: 40 INJECTION, POWDER, FOR SOLUTION INTRAVENOUS at 20:19

## 2022-11-19 RX ADMIN — Medication 325 MG: at 17:57

## 2022-11-19 RX ADMIN — HYDRALAZINE HYDROCHLORIDE 50 MG: 50 TABLET, FILM COATED ORAL at 12:15

## 2022-11-19 RX ADMIN — INSULIN GLARGINE 30 UNITS: 100 INJECTION, SOLUTION SUBCUTANEOUS at 09:56

## 2022-11-19 RX ADMIN — GABAPENTIN 600 MG: 600 TABLET ORAL at 08:12

## 2022-11-19 RX ADMIN — HYDRALAZINE HYDROCHLORIDE 50 MG: 50 TABLET, FILM COATED ORAL at 20:20

## 2022-11-19 RX ADMIN — Medication 1 TABLET: at 08:12

## 2022-11-19 RX ADMIN — SENNOSIDES AND DOCUSATE SODIUM 1 TABLET: 50; 8.6 TABLET ORAL at 20:20

## 2022-11-19 ASSESSMENT — PAIN SCALES - GENERAL: PAINLEVEL_OUTOF10: 0

## 2022-11-19 NOTE — PROGRESS NOTES
Hospitalist Progress Note    Patient:  Ced Godfrey      Unit/Bed:8B-21/021-A    YOB: 1960    MRN: [id number]       Acct: [de-identified]     PCP: EYAD Fernández CNP    Date of Admission: 11/17/2022    Assessment/Plan:    Hypertensive urgency--on lisinopril 40 mg daily, on Lopressor 25 mg twice a day (home med); etiology started Norvasc 5 mg daily, I added hydralazine 25 mg 3 times a day on 11/18, weaning off nitroglycerin drip; monitor closely  Acute on chronic normocytic anemia--hemoglobin down to 6.7 on 11/18 so was transfused with 1 unit packed red blood cells with improvement to 8.1, no obvious signs of bleeding; GI was consulted on 11/18; monitor  Possible acute on chronic diastolic heart failure--echo from November 3, 2022 revealed an EF 55-60% with grade 2 diastolic dysfunction; on beta-blocker, on Lasix 40 mg daily, BNP at 2665; voided 2 L past 24 hours  Hypokalemia--replace per protocol, monitor; magnesium at 1.9  Diabetes mellitus type 2, uncontrolled--on Lantus 30 units daily; sliding scale medium dose before meals and at bedtime; hemoglobin A1c on November 16, 2022 was noted to be 9.0; change diet to ADA  Mild troponin elevation--trending down slightly, lipids noted from November 16, 2022 with LDL at 67; no EKG changes; likely demand mismatch with #1 and #3; appreciate cardiology input  Essential hypertension, uncontrolled--see #1   PAD--statin, aspirin, Plavix  CAD status post CABG November 2019--on statin, aspirin, Plavix  Morbid obesity with BMI 42.81    Expected discharge date: Pending clinical course    Disposition:    [x] Home       [] TCU       [] Rehab       [] Psych       [] SNF       [] [place]       [] Other-    Chief Complaint: Shortness of breath    Hospital Course: Per H&P done 11/17/2022: \"65year-old gentleman who is severely obese with past medical history of type 2 diabetes, hypertension, HLD, CAD status post CABG in 11/2019, PAD, lupus? who presents with acute onset of progressively worsening dyspnea for 1 week duration. He also has associated productive cough with clear white sputum, chills, and pleuritic chest pain also for the past 1 week. Reports no chronic cough at baseline. Has chronic leg swelling which he reports is at his baseline with no acute worsening. Denies PND and orthopnea. Remote history of smoking 2 packs/day for 25 years, quit 15 years ago. Recent admission at [facility] for carotid artery stenosis, discharged on 11/3, where he presented admitted for new LBBB noted on EKG and also severely uncontrolled hypertension with blood pressure of 200s over 100s according to discharge summary. He was started on heparin infusion for ACS. They performed echo which showed EF of 55 to 60% but no regional wall motion abnormalities. His troponins were normal as well therefore no further ischemic work-up was pursued. The patient remained severely hypertensive during his admission with blood pressure in the 180s which was being managed, however eventually patient elected to leave AMA. ED Course:  Initial vitals in ED: 37.4, 199/93, 91, 22, 99% on room air     Lab work-up: BMP essentially normal with normal kidney function, creatinine at 1.0 which is at his baseline of 1.0-1.1. CBC showed hemoglobin of 7.7 with MCV of 83.1, WBC 9.9, platelets 442. Cardiac work-up including troponin was elevated at 0.040 and proBNP elevated at 2600. EKG showed RBBB which may be new. CXR hows interstitial prominence and mildly increased density at the right lung base which could represent atelectasis or pneumonia. There were no recorded desaturations in the EMR however due to patient's increased work of breathing he was started on BiPAP in the ED.  He was also administered aspirin 325mg for chest pain, started on nitro drip in ED for hypertensive emergency, and admitted to internal medicine for further management. \"    11/18--> blood pressure was up to 182/86 this morning, adjusting medications, potassium a bit low, BNP at 2665, troponin T trending down    11/19--> still hypertensive, hemoglobin dropped to 6.7 last night so received 1 unit packed red blood cells with improvement of his hemoglobin; appreciate cardiology input, nitroglycerin drip was weaned and was started on Norvasc, he was also started on hydralazine 25 mg every 8 hours, holding aspirin and Plavix with anemia; awaiting GI input     Subjective (past 24 hours): Patient relates to feeling much better, he denies any pain, has some slight shortness of breath morning, he lives alone, he does have an Unna boot to his left leg, he has toes of his left foot amputated, he does live alone    Medications:  Reviewed    Infusion Medications    dextrose      sodium chloride      nitroGLYCERIN 20 mcg/min (11/18/22 2001)    sodium chloride       Scheduled Medications    aspirin  81 mg Oral Daily    atorvastatin  80 mg Oral Daily    clopidogrel  75 mg Oral Daily    furosemide  40 mg Oral Daily    gabapentin  600 mg Oral BID    multivitamin  1 tablet Oral Daily    insulin lispro  0-8 Units SubCUTAneous TID WC    insulin lispro  0-4 Units SubCUTAneous Nightly    insulin glargine  30 Units SubCUTAneous Daily    ferrous sulfate  325 mg Oral BID WC    metoprolol tartrate  25 mg Oral BID    hydrALAZINE  25 mg Oral 3 times per day    amLODIPine  5 mg Oral Daily    valsartan  320 mg Oral Daily    sodium chloride flush  5-40 mL IntraVENous 2 times per day    [Held by provider] enoxaparin  40 mg SubCUTAneous Daily    sennosides-docusate sodium  1 tablet Oral BID     PRN Meds: melatonin, albuterol sulfate HFA, glucose, dextrose bolus **OR** dextrose bolus, glucagon (rDNA), dextrose, LORazepam, sodium chloride, sodium chloride flush, sodium chloride, ondansetron **OR** ondansetron, polyethylene glycol, acetaminophen **OR** acetaminophen, potassium chloride **OR** potassium alternative oral replacement **OR** potassium chloride, potassium chloride, magnesium sulfate      Intake/Output Summary (Last 24 hours) at 11/19/2022 9013  Last data filed at 11/19/2022 0507  Gross per 24 hour   Intake --   Output 2000 ml   Net -2000 ml         Diet:  ADULT DIET; Regular; 4 carb choices (60 gm/meal); Low Sodium (2 gm)    Exam:  BP (!) 174/82   Pulse 94   Temp 98.1 °F (36.7 °C) (Oral)   Resp 20   Ht 6' 2\" (1.88 m)   Wt (!) 333 lb 7 oz (151.2 kg)   SpO2 98%   BMI 42.81 kg/m²     General appearance: No apparent distress, appears stated age and cooperative. HEENT: Pupils equal, round, and reactive to light. Conjunctivae/corneas clear. Neck: Supple, with full range of motion. No jugular venous distention. Trachea midline. Respiratory:  Normal respiratory effort. Diminished to auscultation, bilaterally without Rales/Wheezes/Rhonchi. Up in chair, speaks in complete sentences  Cardiovascular: Regular rate and rhythm with normal S1/S2 without murmurs, rubs or gallops. Abdomen: Soft, non-tender, non-distended with normal bowel sounds. Obese  Musculoskeletal: passive and active ROM x 4 extremities. Skin: Skin color, texture, turgor normal.    Neurologic:  Neurovascularly intact without any focal; sensory/motor deficits.  Cranial nerves: II-XII intact, grossly non-focal.  All 5 toes amputated from left foot, Unna boot noted as well  Psychiatric: Alert and oriented, thought content appropriate  Capillary Refill: Brisk,< 3 seconds   Peripheral Pulses: +2 palpable, equal bilaterally       Labs:   Recent Labs     11/17/22 1940 11/18/22 0306 11/18/22  1849 11/19/22  0612   WBC 9.9 7.7  --  9.1   HGB 7.7* 7.2* 6.7* 8.1*   HCT 25.6* 24.3* 23.0* 27.1*    352  --  350       Recent Labs     11/17/22 1940 11/18/22 0306 11/19/22  0612    142 140   K 3.6 3.4* 3.6    104 102   CO2 26 28 27   BUN 11 11 12   CREATININE 1.0 1.1 1.2   CALCIUM 8.4* 8.3* 8.4*       Recent Labs 11/16/22 0928 11/17/22 1940   AST 62* 39   ALT 84* 63   BILIDIR  --  <0.2   BILITOT 0.2* 0.2*   ALKPHOS 120 105       Microbiology:    None    Urinalysis:      Lab Results   Component Value Date/Time    NITRU NEGATIVE 11/16/2022 01:52 PM    WBCUA 0-2 11/16/2022 01:52 PM    BACTERIA NONE SEEN 11/16/2022 01:52 PM    RBCUA 15-25 11/16/2022 01:52 PM    BLOODU MODERATE 11/16/2022 01:52 PM    GLUCOSEU >= 1000 11/16/2022 01:52 PM       Radiology:  XR CHEST PORTABLE    Result Date: 11/17/2022  Exam: 1 View of the chest Comparison: 6/29/2022 Findings: Prior median sternotomy Cardiac silhouette is not enlarged. No pneumothorax. No pleural fluid collection. Prominence to the interstitium appear similar to the prior exam. Mildly increased density at the right lung base could indicate atelectasis or pneumonia     Impression: 1. Interstitial prominence appear similar to prior. Mildly increased density at the right lung base could represent atelectasis or pneumonia.  This document has been electronically signed by: Basilio Malloy MD on 11/17/2022 08:09 PM      DVT prophylaxis: [] Lovenox                                 [x] SCDs                                 [] SQ Heparin                                 [] Encourage ambulation           [] Already on Anticoagulation     Code Status: Full Code    PT/OT Eval Status: Monitor    Tele:   [x] Yes sinus rhythm heart rate 80             [] no    Active Hospital Problems    Diagnosis Date Noted    Acute on chronic congestive heart failure ([facility] [address] [I50.9] 11/18/2022     Priority: High    Hypertensive emergency [I16.1] 11/17/2022     Priority: Medium       Electronically signed by EYAD Oliver CNP on 11/19/2022 at 8:07 AM

## 2022-11-19 NOTE — PROGRESS NOTES
2328 Vitals /68, HR 79, temp 98.1, RR 18, 02 93% Lung sounds diminished    Blood verified with two RNs Lexy Farr RN (primary) and Michelle Salamanca RN. Verena RN remained with patient for the first 15 minutes. Blood reached vein at 2358. Vitals /61, HR 82, Temp 98.1, RR 18, 02 91% Lung sounds diminished    0012 Vitals: /63, HR 80, Temp 97.8, RR16, 02 94% Lung sounds diminished  500mLs instilled. No signs of transfusion reaction.

## 2022-11-19 NOTE — PROGRESS NOTES
Nutrition Education    Educated on heart healthy and diabetic nutrition recommendations. Family present and demonstrate support for patient on his overall health and wellbeing. Discussed at length sodium, fat, carbohydrates and how to balance his diet to take care of his chronic conditions. Encouraged smaller portions that are balanced with macronutrients. Provided written education material and answered questions. Pt denied any changes in appetite and intake or sudden unexpected weight loss. Learners: Patient and Family  Readiness: Eager  Method: Explanation, Handout, and Teachback  Response: Verbalizes Understanding  Contact name and number provided.     Henrry Sutherland RD  Contact Number: [phone]

## 2022-11-19 NOTE — PLAN OF CARE
Problem: Discharge Planning  Goal: Discharge to home or other facility with appropriate resources  Outcome: Progressing  Flowsheets (Taken 11/18/2022 0835 by Beltran Nelson RN)  Discharge to home or other facility with appropriate resources: Identify barriers to discharge with patient and caregiver  Note: Plan to go home. Pending BP     Problem: Skin/Tissue Integrity  Goal: Absence of new skin breakdown  Description: 1. Monitor for areas of redness and/or skin breakdown  2. Assess vascular access sites hourly  3. Every 4-6 hours minimum:  Change oxygen saturation probe site  4. Every 4-6 hours:  If on nasal continuous positive airway pressure, respiratory therapy assess nares and determine need for appliance change or resting period. Outcome: Progressing  Note: See assessment     Problem: Safety - Adult  Goal: Free from fall injury  Outcome: Progressing  Flowsheets (Taken 11/18/2022 0835 by Beltran Nelson RN)  Free From Fall Injury: Instruct family/caregiver on patient safety  Note: Free from falls. Bed alarm is on. Problem: Chronic Conditions and Co-morbidities  Goal: Patient's chronic conditions and co-morbidity symptoms are monitored and maintained or improved  Outcome: Progressing  Flowsheets (Taken 11/18/2022 0835 by Beltran Nelson, RN)  Care Plan - Patient's Chronic Conditions and Co-Morbidity Symptoms are Monitored and Maintained or Improved:   Monitor and assess patient's chronic conditions and comorbid symptoms for stability, deterioration, or improvement   Collaborate with multidisciplinary team to address chronic and comorbid conditions and prevent exacerbation or deterioration   Update acute care plan with appropriate goals if chronic or comorbid symptoms are exacerbated and prevent overall improvement and discharge  Note: IV nitro.       Problem: Pain  Goal: Verbalizes/displays adequate comfort level or baseline comfort level  Outcome: Progressing  Flowsheets (Taken 11/19/2022 1032)  Verbalizes/displays adequate comfort level or baseline comfort level: Encourage patient to monitor pain and request assistance  Note: Denies pain  Care plan reviewed with patient. Patient verbalize understanding of the plan of care and contribute to goal setting.

## 2022-11-19 NOTE — CONSULTS
Chief Complaint:  Anemia, need for cardiac catheterization    History of present Illness: Gautam Diaz is a 65 y.o. male, new to the practice, admitted to [address] [place] [address] on 11/17/22 with complaints of shortness of breath. Admission labs noted severe anemia with Hgb 6.7. The patient received one unit of PRBC yesterday and Hgb this am was 8.1. The patient has never seen gastroenterology before; has never had EGD or colonoscopy. He reports \"stomach ache\" a week or two ago and utilized Pepto-Bismol with resolution of his stomach ache. He states his stools were \"dark\" with the use of Pepto but have been \"normal since. \"  He denies any bright red rectal bleeding. He denies GERD symptoms. Denies dysphagia or odynophagia. Denies early satiety. Denies unintentional weight loss. Describes \"slight\" change in bowel habits with occasional constipation. States his stools \"look nodular sometimes. \"  States he was experiencing diarrhea Roosvelt Creamer couple of months ago, but I stopped the metformin and the diarrhea stopped. \"  He reports his niece (sister's daughter) as having Crohn's disease. He denies family history of GI cancers. The patient has chronic cardiac co-morbidities with CAD; has had cardiac catheterizations with stents and CABG in the past.  He was taking Aspirin and Plavix prior to admission; these medications are currently on hold. Past Medical History:  has a past medical history of Arthritis, CAD (coronary artery disease), CKD (chronic kidney disease), stage II, Diabetes mellitus ([facility] [address] Hyperlipidemia, Hypertension, and Liver disease.     Past Surgical History:   Past Surgical History:   Procedure Laterality Date    CARDIAC SURGERY  11/2019    triple bypass    CYST REMOVAL      RIGHT ANKLE     FOOT DEBRIDEMENT Left 4/20/2020    LEFT TRANSMETATARSAL AMPUTATION  FOOT INCISION AND DRAINAGE performed by Eren Smith DPM at [address] Left 7/20/2020    LEFT WOUND DEBRIDEMENT WITH WOUND VAC APPLICATION performed by Eren Smith DPM at [facility] Left 7/1/2022    LEFT FOOT [address], APPLICATION SKIN SUBSTITUTE GRAFT, APPLICATION [facility] WOUND VAC performed by Eren Smith DPM at [facility] Right     CYST REMOVED    FOOT SURGERY Left 8/14/2020    LEFT FOOT PREPARATION GRAFT SITE, HAVEST SPLIT THICKNESS SKIN GRAFT WITH APPLICATION, APPLICATION [facility] WOUND VAC performed [facility] DPM [facility]    TOE AMPUTATION Left 3/3/2020    I&D LEFT FOOT, TRANSMETATARSAL AMPUTATION performed by Eren Smith DPM at [facility] History:  reports that he quit smoking about 14 years ago. His smoking use included cigarettes. He has never used smokeless tobacco. He reports that he does not currently use alcohol. He reports that he does not use drugs. Family History: family history includes Alzheimer's Disease in his father; Diabetes in his mother; Heart Disease in his father; High Blood Pressure in his father and mother.     Review of Systems:   -History obtained from chart review and the patient  General ROS: positive for  - chills, fatigue, and sleep disturbance  negative for - fever, hot flashes, malaise, night sweats, weight gain, or weight loss  Psychological ROS: negative  ENT ROS: negative  Hematological and Lymphatic ROS: positive for - blood transfusions, fatigue, and anemia  negative for - bleeding problems, blood clots, bruising, jaundice, night sweats, pallor, swollen lymph nodes, or weight loss  Endocrine ROS: positive for - DM  negative for - breast changes, galactorrhea, hair pattern changes, hot flashes, malaise/lethargy, mood swings, palpitations, polydipsia/polyuria, skin changes, temperature intolerance, or unexpected weight changes  Respiratory ROS: positive for - shortness of breath and wheezing  negative for - cough, hemoptysis, orthopnea, pleuritic pain, sputum changes, stridor, or tachypnea  Cardiovascular ROS: positive for - chest pain, dyspnea on exertion, edema, murmur, and shortness of breath  negative for - irregular heartbeat, orthopnea, palpitations, or rapid heart rate  Gastrointestinal ROS: positive for - change in bowel habits, constipation, and diarrhea  negative for - abdominal pain, appetite loss, blood in stools, gas/bloating, heartburn, hematemesis, melena, nausea/vomiting, stool incontinence, or swallowing difficulty/pain  Genito-Urinary ROS: negative  Musculoskeletal ROS: positive for - hx of left toes/metatarsal amputation  Neurological ROS: negative  Dermatological ROS: negative    Allergies: Patient has no known allergies. Home Meds:   Prior to Admission medications    Medication Sig Start Date End Date Taking? Authorizing Provider   ferrous sulfate (IRON 325) 325 (65 Fe) MG tablet Take 1 tablet by mouth 2 times daily 11/16/22   Mya Fuchs, APRN - CNP   furosemide (LASIX) 40 MG tablet Take 1 tablet by mouth daily 11/16/22   Mya , EYAD - CNP   potassium chloride (KLOR-CON M) 20 MEQ extended release tablet Take 1 tablet by mouth daily 11/16/22   Mya Most, APRN - CNP   carvedilol (COREG) 12.5 MG tablet Take 12.5 mg by mouth 2 times daily (with meals)    Historical Provider, MD   empagliflozin (JARDIANCE) 25 MG tablet Take 1 tablet by mouth daily 8/24/22   Myaponcho Fuchs, EYAD - CNP   clopidogrel (PLAVIX) 75 MG tablet Take 1 tablet by mouth daily 8/24/22   Cranks , EYAD - CNP   gabapentin (NEURONTIN) 600 MG tablet Take 1 tablet by mouth 2 times daily for 180 days.  8/24/22 2/20/23  EYAD Birmingham CNP   insulin detemir (LEVEMIR FLEXTOUCH) 100 UNIT/ML injection pen 30 units in the morning, and 30 units in the evening. 8/24/22   EYAD Birmingham - CNP   insulin lispro (HUMALOG) 100 UNIT/ML injection vial Inject 5-15 Units into the skin 3 times daily (with meals) 8/24/22 EYAD Espinoza CNP   albuterol sulfate HFA (VENTOLIN HFA) 108 (90 Base) MCG/ACT inhaler Inhale 2 puffs into the lungs 4 times daily as needed for Wheezing 8/24/22   EYAD Espinoza CNP   atorvastatin (LIPITOR) 80 MG tablet Take 1 tablet by mouth daily 8/19/22   Belle Mcgarry MD   metoprolol tartrate (LOPRESSOR) 25 MG tablet Take 1 tablet by mouth twice daily 8/16/22   EYAD Espinoza CNP   lisinopril (PRINIVIL;ZESTRIL) 20 MG tablet Take 1 tablet by mouth daily 3/23/22   Belle Mcgarry MD   Polyethylene Glycol 3350 (MIRALAX PO) Take by mouth as needed    Historical Provider, MD   aspirin 81 MG chewable tablet Take 1 tablet by mouth daily 8/10/20   EYAD Espinoza CNP   Multiple Vitamins-Minerals (MULTIVITAMIN PO) Take 1 tablet by mouth daily     Historical Provider, MD       Current Meds:  Current Facility-Administered Medications:     melatonin tablet 4.5 mg, 4.5 mg, Oral, Nightly PRN, Roxann Garcia PA-C    albuterol sulfate HFA (PROVENTIL;VENTOLIN;PROAIR) 108 (90 Base) MCG/ACT inhaler 2 puff, 2 puff, Inhalation, 4x Daily PRN, Sultana Manning MD    [facility] by provider] aspirin chewable tablet 81 mg, 81 mg, Oral, Daily, Sultana Manning MD, 81 mg at 11/18/22 0835    atorvastatin (LIPITOR) tablet 80 mg, 80 mg, Oral, Daily, Sultana Manning MD, 80 mg at 11/19/22 9723    [Held by provider] clopidogrel (PLAVIX) tablet 75 mg, 75 mg, Oral, Daily, Sultana Manning MD, 75 mg at 11/18/22 0836    furosemide (LASIX) tablet 40 mg, 40 mg, Oral, Daily, Sultana Manning MD, 40 mg at 11/19/22 3906    gabapentin (NEURONTIN) tablet 600 mg, 600 mg, Oral, BID, Sultana Manning MD, 600 mg at 11/19/22 0207    multivitamin 1 tablet, 1 tablet, Oral, Daily, Sultana Manning MD, 1 tablet at 11/19/22 0812    glucose chewable tablet 16 g, 4 tablet, Oral, PRN, Sultana Manning MD    dextrose bolus 10% 125 mL, 125 mL, IntraVENous, PRN **OR** dextrose bolus 10% 250 mL, 250 mL, IntraVENous, PRN, Sultana Manning MD    glucagon (rDNA) injection 1 mg, 1 mg, SubCUTAneous, PRN, Roshni Montoya MD    dextrose 10 % infusion, , IntraVENous, Continuous PRN, Roshni Montoya MD    LORazepam (ATIVAN) injection 0.5 mg, 0.5 mg, IntraVENous, Q6H PRN, Roxann Garcia PA-C    insulin lispro (HUMALOG) injection vial 0-8 Units, 0-8 Units, SubCUTAneous, TID WC, EYAD Powers - CNP, 2 Units at 11/18/22 1728    insulin lispro (HUMALOG) injection vial 0-4 Units, 0-4 Units, SubCUTAneous, Nightly, EYAD Poewrs - CNP    insulin glargine (LANTUS) injection vial 30 Units, 30 Units, SubCUTAneous, Daily, Roshni Montoya MD, 30 Units at 11/18/22 1152    ferrous sulfate (IRON 325) tablet 325 mg, 325 mg, Oral, BID , EYAD Powers - CNP, 325 mg at 11/19/22 9630    metoprolol tartrate (LOPRESSOR) tablet 25 mg, 25 mg, Oral, BID, Katia Crews APRN - CNP, 25 mg at 11/19/22 0035    hydrALAZINE (APRESOLINE) tablet 25 mg, 25 mg, Oral, 3 times per day, Irena Ngo APRN - CNP, 25 mg at 11/19/22 0651    amLODIPine (NORVASC) tablet 5 mg, 5 mg, Oral, Daily, Berta Gretchen, DO, 5 mg at 11/19/22 9868    valsartan (DIOVAN) tablet 320 mg, 320 mg, Oral, Daily, Berta Gretchen, DO, 320 mg at 11/19/22 0812    0.9 % sodium chloride infusion, , IntraVENous, PRN, Erna Newsome PA-C    nitroGLYCERIN 50 mg in dextrose 5% 250 mL infusion, 5-200 mcg/min, IntraVENous, Continuous, Roshni Montoya MD, Last Rate: 6 mL/hr at 11/18/22 2001, 20 mcg/min at 11/18/22 2001    sodium chloride flush 0.9 % injection 5-40 mL, 5-40 mL, IntraVENous, 2 times per day, Roshni Montoya MD, 10 mL at 11/18/22 2011    sodium chloride flush 0.9 % injection 5-40 mL, 5-40 mL, IntraVENous, PRN, Roshni Montoya MD    0.9 % sodium chloride infusion, , IntraVENous, PRN, Roshni Montoya MD    ondansetron (ZOFRAN-ODT) disintegrating tablet 4 mg, 4 mg, Oral, Q8H PRN **OR** ondansetron (ZOFRAN) injection 4 mg, 4 mg, IntraVENous, Q6H PRN, Roshni Montoya MD, 4 mg at 11/18/22 0452    polyethylene glycol (GLYCOLAX) packet 17 g, 17 g, Oral, Daily PRN, Enedelia Angel MD    acetaminophen (TYLENOL) tablet 650 mg, 650 mg, Oral, Q6H PRN **OR** acetaminophen (TYLENOL) suppository 650 mg, 650 mg, Rectal, Q6H PRN, Enedelia Angel MD    [Held by provider] enoxaparin (LOVENOX) injection 40 mg, 40 mg, SubCUTAneous, Daily, Enedelia Angel MD, 40 mg at 11/18/22 1151    potassium chloride (KLOR-CON M) extended release tablet 40 mEq, 40 mEq, Oral, PRN **OR** potassium bicarb-citric acid (EFFER-K) effervescent tablet 40 mEq, 40 mEq, Oral, PRN **OR** potassium chloride 10 mEq/100 mL IVPB (Peripheral Line), 10 mEq, IntraVENous, PRN, Enedelia Angel MD    potassium chloride 10 mEq/100 mL IVPB (Peripheral Line), 10 mEq, IntraVENous, PRN, Enedelia Angel MD    magnesium sulfate 2000 mg in 50 mL IVPB premix, 2,000 mg, IntraVENous, PRN, Enedelia Angel MD    sennosides-docusate sodium (SENOKOT-S) 8.6-50 MG tablet 1 tablet, 1 tablet, Oral, BID, Enedelia Angel MD, 1 tablet at 11/19/22 0342    Vital Signs:  Vitals:    11/19/22 0745   BP: (!) 174/82   Pulse: 94   Resp: 20   Temp: 98.1 °F (36.7 °C)   SpO2: 98%       Weight:  Wt Readings from Last 3 Encounters:   11/18/22 (!) 333 lb 7 oz (151.2 kg)   08/24/22 (!) 303 lb 3.2 oz (137.5 kg)   07/01/22 (!) 311 lb (141.1 kg)       Physical Exam:  General Appearance:  awake, alert, oriented, in no acute distress, well developed, well nourished, and obese  male. Pt is sitting comfortably in bedside chair. Mildly dyspneic. HEENT: Atraumatic, Pupils reactive, No pallor/icterus. Oral mucosa moist/No thrush  Neck: No thyroid enlargement, No cervical or supraclavicular lymphadenopathy  CVS: Regular rate and rhythm. Faint murmur. No rubs or gallops  RS: Good b/l air entry, Clear to auscultation b/l  Abd: soft, non-tender, non-distended, no visible veins, scars, No hepatosplenomegaly or palpable masses, bowel sounds active. Ext: No clubbing, cyanosis, edema. Ace wrap to left lower extremity.     CNS: alert, oriented, no gross focal motor deficits    Labs:   Lab Results   Component Value Date/Time    WBC 9.1 11/19/2022 06:12 AM    HGB 8.1 11/19/2022 06:12 AM    HCT 27.1 11/19/2022 06:12 AM    MCV 83.9 11/19/2022 06:12 AM     11/19/2022 06:12 AM     Lab Results   Component Value Date/Time     11/19/2022 06:12 AM    K 3.6 11/19/2022 06:12 AM    K 3.8 06/21/2021 06:48 PM     11/19/2022 06:12 AM    CO2 27 11/19/2022 06:12 AM    BUN 12 11/19/2022 06:12 AM    CREATININE 1.2 11/19/2022 06:12 AM    GLUCOSE 133 11/19/2022 06:12 AM    GLUCOSE 103 05/25/2020 01:45 PM    CALCIUM 8.4 11/19/2022 06:12 AM     Lab Results   Component Value Date/Time    ALKPHOS 105 11/17/2022 07:40 PM    ALT 63 11/17/2022 07:40 PM    AST 39 11/17/2022 07:40 PM    PROT 6.3 11/17/2022 07:40 PM    BILITOT 0.2 11/17/2022 07:40 PM    BILIDIR <0.2 11/17/2022 07:40 PM    LABALBU 2.6 11/17/2022 07:40 PM     Lab Results   Component Value Date/Time    LACTA 1.4 04/17/2020 06:00 AM     No results found for: AMYLASE  Lab Results   Component Value Date/Time    LIPASE 15.3 06/21/2021 06:48 PM     Lab Results   Component Value Date/Time    INR 1.01 10/20/2022 12:04 PM       Radiology:  Exam: 1 View of the chest       Comparison: 6/29/2022       Findings:   Prior median sternotomy   Cardiac silhouette is not enlarged. No pneumothorax. No pleural fluid collection. Prominence to the interstitium appear similar to the prior exam.   Mildly increased density at the right lung base could indicate atelectasis    or pneumonia           Impression   Impression:   1. Interstitial prominence appear similar to prior. Mildly increased    density at the right lung base could represent atelectasis or pneumonia. This document has been electronically signed by: Wali Conteh MD on    11/17/2022 08:09 PM       ASSESSMENT AND PLAN:   Anemia of unknown etiology, acute on chronic - obtain stool for occult blood. Start IV push PPI.   Trend H&H and transfuse prn to keep Hgb > 8.0 d/t cardiac co-morbidity. Plan for EGD, possible colonoscopy on Monday due to Endoscopy availability unless pt has hemodynamic instability or yuridia GI bleeding over the weekend. Hypertensive emergency with chest pain - NTG per Attending. Cardiology also following. Cardiology requiring GI clearance prior to cardiac catheterization. CAD s/p CABG - was on DAPT PTA - on hold d/t #1. Hx of DM  Hx of HLD  Hx of hepatitis as a child - ? HAV. LFTs not elevated on admission. Obtain serology with next lab draw. Thank You PIERRE Little  for allowing me to participate in the care of this patient. Assessment and POC were discussed with Dr Mihir Caban.     Time In Room:  0815  Time Out Room: 0830  Total Dictation Time:  25 min (including chart review)    EYAD Amaral CNP  11/19/2022  8:38 AM     Will need  EGD omn Monday , later colonoscopy     Patient is seen independently from the nurse practitioner and all  the pertinent data along with physical examination and assessment and plans are all obtained by my self and  Laboratory data, Radiology results, medications all are reviewed by my self and care is discussed extensively with the patient  and the patients nurse and all agree with plan and in addition see orders and plans    Electronically signed by Colten Moreno MD on 11/19/2022 at 1:52 PM

## 2022-11-19 NOTE — PROGRESS NOTES
Message sent to [employer]	Shackelford PA at 2023: Patient admitted on the 17th  for hypertension. Patient had a critical hemoglobin come back at 6.7. Vitals are 159/84(109) HR 68, 97% on RA. Patient is also requesting something for sleep. THank You      Message sent to [employer]	Shackelford PA at 0012: Patient requesting something for sleep. Patient states he takes a form of melatonin at home.

## 2022-11-20 PROBLEM — I16.0 HYPERTENSIVE URGENCY: Status: ACTIVE | Noted: 2022-11-20

## 2022-11-20 LAB
ANION GAP SERPL CALCULATED.3IONS-SCNC: 10 MEQ/L (ref 8–16)
ANION GAP SERPL CALCULATED.3IONS-SCNC: 11 MEQ/L (ref 8–16)
BASOPHILS # BLD: 0.5 %
BASOPHILS # BLD: 0.6 %
BASOPHILS ABSOLUTE: 0 THOU/MM3 (ref 0–0.1)
BASOPHILS ABSOLUTE: 0.1 THOU/MM3 (ref 0–0.1)
BUN BLDV-MCNC: 12 MG/DL (ref 7–22)
BUN BLDV-MCNC: 14 MG/DL (ref 7–22)
CALCIUM IONIZED: 1.12 MMOL/L (ref 1.12–1.32)
CALCIUM SERPL-MCNC: 8.1 MG/DL (ref 8.5–10.5)
CALCIUM SERPL-MCNC: 8.4 MG/DL (ref 8.5–10.5)
CHLORIDE BLD-SCNC: 101 MEQ/L (ref 98–111)
CHLORIDE BLD-SCNC: 102 MEQ/L (ref 98–111)
CO2: 27 MEQ/L (ref 23–33)
CO2: 27 MEQ/L (ref 23–33)
CREAT SERPL-MCNC: 1.1 MG/DL (ref 0.4–1.2)
CREAT SERPL-MCNC: 1.1 MG/DL (ref 0.4–1.2)
EOSINOPHIL # BLD: 2.5 %
EOSINOPHIL # BLD: 2.6 %
EOSINOPHILS ABSOLUTE: 0.2 THOU/MM3 (ref 0–0.4)
EOSINOPHILS ABSOLUTE: 0.2 THOU/MM3 (ref 0–0.4)
ERYTHROCYTE [DISTWIDTH] IN BLOOD BY AUTOMATED COUNT: 16.3 % (ref 11.5–14.5)
ERYTHROCYTE [DISTWIDTH] IN BLOOD BY AUTOMATED COUNT: 16.7 % (ref 11.5–14.5)
ERYTHROCYTE [DISTWIDTH] IN BLOOD BY AUTOMATED COUNT: 49.5 FL (ref 35–45)
ERYTHROCYTE [DISTWIDTH] IN BLOOD BY AUTOMATED COUNT: 50.8 FL (ref 35–45)
GFR SERPL CREATININE-BSD FRML MDRD: > 60 ML/MIN/1.73M2
GFR SERPL CREATININE-BSD FRML MDRD: > 60 ML/MIN/1.73M2
GLUCOSE BLD-MCNC: 119 MG/DL (ref 70–108)
GLUCOSE BLD-MCNC: 120 MG/DL (ref 70–108)
GLUCOSE BLD-MCNC: 143 MG/DL (ref 70–108)
GLUCOSE BLD-MCNC: 159 MG/DL (ref 70–108)
GLUCOSE BLD-MCNC: 163 MG/DL (ref 70–108)
GLUCOSE BLD-MCNC: 163 MG/DL (ref 70–108)
HAV IGM SER IA-ACNC: NEGATIVE
HCT VFR BLD CALC: 28.7 % (ref 42–52)
HCT VFR BLD CALC: 29.8 % (ref 42–52)
HEMOGLOBIN: 8.7 GM/DL (ref 14–18)
HEMOGLOBIN: 9 GM/DL (ref 14–18)
IMMATURE GRANS (ABS): 0.05 THOU/MM3 (ref 0–0.07)
IMMATURE GRANS (ABS): 0.08 THOU/MM3 (ref 0–0.07)
IMMATURE GRANULOCYTES: 0.6 %
IMMATURE GRANULOCYTES: 0.9 %
LYMPHOCYTES # BLD: 10.5 %
LYMPHOCYTES # BLD: 11.6 %
LYMPHOCYTES ABSOLUTE: 0.9 THOU/MM3 (ref 1–4.8)
LYMPHOCYTES ABSOLUTE: 1 THOU/MM3 (ref 1–4.8)
MAGNESIUM: 1.9 MG/DL (ref 1.6–2.4)
MCH RBC QN AUTO: 25.4 PG (ref 26–33)
MCH RBC QN AUTO: 25.4 PG (ref 26–33)
MCHC RBC AUTO-ENTMCNC: 30.2 GM/DL (ref 32.2–35.5)
MCHC RBC AUTO-ENTMCNC: 30.3 GM/DL (ref 32.2–35.5)
MCV RBC AUTO: 83.9 FL (ref 80–94)
MCV RBC AUTO: 83.9 FL (ref 80–94)
MONOCYTES # BLD: 7.2 %
MONOCYTES # BLD: 8.2 %
MONOCYTES ABSOLUTE: 0.6 THOU/MM3 (ref 0.4–1.3)
MONOCYTES ABSOLUTE: 0.7 THOU/MM3 (ref 0.4–1.3)
NUCLEATED RED BLOOD CELLS: 0 /100 WBC
NUCLEATED RED BLOOD CELLS: 0 /100 WBC
PHOSPHORUS: 3.9 MG/DL (ref 2.4–4.7)
PLATELET # BLD: 373 THOU/MM3 (ref 130–400)
PLATELET # BLD: 386 THOU/MM3 (ref 130–400)
PMV BLD AUTO: 10 FL (ref 9.4–12.4)
PMV BLD AUTO: 9.9 FL (ref 9.4–12.4)
POTASSIUM SERPL-SCNC: 3.7 MEQ/L (ref 3.5–5.2)
POTASSIUM SERPL-SCNC: 3.8 MEQ/L (ref 3.5–5.2)
RBC # BLD: 3.42 MILL/MM3 (ref 4.7–6.1)
RBC # BLD: 3.55 MILL/MM3 (ref 4.7–6.1)
SEG NEUTROPHILS: 77.2 %
SEG NEUTROPHILS: 77.6 %
SEGMENTED NEUTROPHILS ABSOLUTE COUNT: 6.5 THOU/MM3 (ref 1.8–7.7)
SEGMENTED NEUTROPHILS ABSOLUTE COUNT: 6.5 THOU/MM3 (ref 1.8–7.7)
SODIUM BLD-SCNC: 138 MEQ/L (ref 135–145)
SODIUM BLD-SCNC: 140 MEQ/L (ref 135–145)
TROPONIN T: 0.03 NG/ML
WBC # BLD: 8.4 THOU/MM3 (ref 4.8–10.8)
WBC # BLD: 8.4 THOU/MM3 (ref 4.8–10.8)

## 2022-11-20 PROCEDURE — 96375 TX/PRO/DX INJ NEW DRUG ADDON: CPT

## 2022-11-20 PROCEDURE — 6360000002 HC RX W HCPCS: Performed by: PHYSICIAN ASSISTANT

## 2022-11-20 PROCEDURE — 80048 BASIC METABOLIC PNL TOTAL CA: CPT

## 2022-11-20 PROCEDURE — 6370000000 HC RX 637 (ALT 250 FOR IP)

## 2022-11-20 PROCEDURE — 6360000002 HC RX W HCPCS: Performed by: NURSE PRACTITIONER

## 2022-11-20 PROCEDURE — 85025 COMPLETE CBC W/AUTO DIFF WBC: CPT

## 2022-11-20 PROCEDURE — 2580000003 HC RX 258: Performed by: STUDENT IN AN ORGANIZED HEALTH CARE EDUCATION/TRAINING PROGRAM

## 2022-11-20 PROCEDURE — 84100 ASSAY OF PHOSPHORUS: CPT

## 2022-11-20 PROCEDURE — 6370000000 HC RX 637 (ALT 250 FOR IP): Performed by: NURSE PRACTITIONER

## 2022-11-20 PROCEDURE — 93005 ELECTROCARDIOGRAM TRACING: CPT | Performed by: NUCLEAR MEDICINE

## 2022-11-20 PROCEDURE — 82948 REAGENT STRIP/BLOOD GLUCOSE: CPT

## 2022-11-20 PROCEDURE — 82330 ASSAY OF CALCIUM: CPT

## 2022-11-20 PROCEDURE — 96376 TX/PRO/DX INJ SAME DRUG ADON: CPT

## 2022-11-20 PROCEDURE — G0378 HOSPITAL OBSERVATION PER HR: HCPCS

## 2022-11-20 PROCEDURE — 6370000000 HC RX 637 (ALT 250 FOR IP): Performed by: PHYSICIAN ASSISTANT

## 2022-11-20 PROCEDURE — 6370000000 HC RX 637 (ALT 250 FOR IP): Performed by: STUDENT IN AN ORGANIZED HEALTH CARE EDUCATION/TRAINING PROGRAM

## 2022-11-20 PROCEDURE — C9113 INJ PANTOPRAZOLE SODIUM, VIA: HCPCS | Performed by: NURSE PRACTITIONER

## 2022-11-20 PROCEDURE — 83735 ASSAY OF MAGNESIUM: CPT

## 2022-11-20 PROCEDURE — 36415 COLL VENOUS BLD VENIPUNCTURE: CPT

## 2022-11-20 PROCEDURE — 84484 ASSAY OF TROPONIN QUANT: CPT

## 2022-11-20 PROCEDURE — 99233 SBSQ HOSP IP/OBS HIGH 50: CPT | Performed by: NURSE PRACTITIONER

## 2022-11-20 PROCEDURE — 1200000003 HC TELEMETRY R&B

## 2022-11-20 RX ORDER — METOPROLOL TARTRATE 50 MG/1
50 TABLET, FILM COATED ORAL 2 TIMES DAILY
Status: DISCONTINUED | OUTPATIENT
Start: 2022-11-20 | End: 2022-11-24 | Stop reason: HOSPADM

## 2022-11-20 RX ORDER — HYDRALAZINE HYDROCHLORIDE 20 MG/ML
10 INJECTION INTRAMUSCULAR; INTRAVENOUS EVERY 6 HOURS PRN
Status: DISCONTINUED | OUTPATIENT
Start: 2022-11-20 | End: 2022-11-24 | Stop reason: HOSPADM

## 2022-11-20 RX ORDER — AMLODIPINE BESYLATE 10 MG/1
10 TABLET ORAL DAILY
Status: DISCONTINUED | OUTPATIENT
Start: 2022-11-20 | End: 2022-11-24 | Stop reason: HOSPADM

## 2022-11-20 RX ADMIN — HYDRALAZINE HYDROCHLORIDE 50 MG: 50 TABLET, FILM COATED ORAL at 13:07

## 2022-11-20 RX ADMIN — LORAZEPAM 0.5 MG: 2 INJECTION INTRAMUSCULAR; INTRAVENOUS at 03:21

## 2022-11-20 RX ADMIN — PANTOPRAZOLE SODIUM 40 MG: 40 INJECTION, POWDER, FOR SOLUTION INTRAVENOUS at 20:36

## 2022-11-20 RX ADMIN — PANTOPRAZOLE SODIUM 40 MG: 40 INJECTION, POWDER, FOR SOLUTION INTRAVENOUS at 09:36

## 2022-11-20 RX ADMIN — HYDRALAZINE HYDROCHLORIDE 50 MG: 50 TABLET, FILM COATED ORAL at 03:21

## 2022-11-20 RX ADMIN — Medication 4.5 MG: at 02:23

## 2022-11-20 RX ADMIN — METOPROLOL TARTRATE 50 MG: 50 TABLET, FILM COATED ORAL at 09:28

## 2022-11-20 RX ADMIN — FUROSEMIDE 40 MG: 40 TABLET ORAL at 09:28

## 2022-11-20 RX ADMIN — Medication 325 MG: at 09:28

## 2022-11-20 RX ADMIN — SODIUM CHLORIDE, PRESERVATIVE FREE 10 ML: 5 INJECTION INTRAVENOUS at 09:33

## 2022-11-20 RX ADMIN — SENNOSIDES AND DOCUSATE SODIUM 1 TABLET: 50; 8.6 TABLET ORAL at 20:36

## 2022-11-20 RX ADMIN — GABAPENTIN 600 MG: 600 TABLET ORAL at 09:28

## 2022-11-20 RX ADMIN — SENNOSIDES AND DOCUSATE SODIUM 1 TABLET: 50; 8.6 TABLET ORAL at 09:28

## 2022-11-20 RX ADMIN — Medication 4.5 MG: at 20:36

## 2022-11-20 RX ADMIN — AMLODIPINE BESYLATE 10 MG: 10 TABLET ORAL at 09:32

## 2022-11-20 RX ADMIN — GABAPENTIN 600 MG: 600 TABLET ORAL at 20:37

## 2022-11-20 RX ADMIN — ACETAMINOPHEN 650 MG: 325 TABLET ORAL at 09:29

## 2022-11-20 RX ADMIN — Medication 1 TABLET: at 09:28

## 2022-11-20 RX ADMIN — LORAZEPAM 0.5 MG: 2 INJECTION INTRAMUSCULAR; INTRAVENOUS at 20:44

## 2022-11-20 RX ADMIN — HYDRALAZINE HYDROCHLORIDE 50 MG: 50 TABLET, FILM COATED ORAL at 20:40

## 2022-11-20 RX ADMIN — INSULIN GLARGINE 30 UNITS: 100 INJECTION, SOLUTION SUBCUTANEOUS at 09:32

## 2022-11-20 RX ADMIN — SODIUM CHLORIDE, PRESERVATIVE FREE 10 ML: 5 INJECTION INTRAVENOUS at 20:37

## 2022-11-20 RX ADMIN — VALSARTAN 320 MG: 320 TABLET, FILM COATED ORAL at 09:32

## 2022-11-20 RX ADMIN — METOPROLOL TARTRATE 50 MG: 50 TABLET, FILM COATED ORAL at 20:37

## 2022-11-20 RX ADMIN — Medication 325 MG: at 18:02

## 2022-11-20 RX ADMIN — ATORVASTATIN CALCIUM 80 MG: 80 TABLET, FILM COATED ORAL at 09:28

## 2022-11-20 ASSESSMENT — PAIN - FUNCTIONAL ASSESSMENT: PAIN_FUNCTIONAL_ASSESSMENT: ACTIVITIES ARE NOT PREVENTED

## 2022-11-20 ASSESSMENT — PAIN SCALES - GENERAL
PAINLEVEL_OUTOF10: 2
PAINLEVEL_OUTOF10: 0

## 2022-11-20 ASSESSMENT — PAIN DESCRIPTION - ORIENTATION: ORIENTATION: ANTERIOR

## 2022-11-20 ASSESSMENT — PAIN DESCRIPTION - LOCATION: LOCATION: HEAD

## 2022-11-20 ASSESSMENT — PAIN DESCRIPTION - DESCRIPTORS: DESCRIPTORS: ACHING

## 2022-11-20 NOTE — PROGRESS NOTES
Units SubCUTAneous Nightly    insulin glargine  30 Units SubCUTAneous Daily    ferrous sulfate  325 mg Oral BID     valsartan  320 mg Oral Daily    sodium chloride flush  5-40 mL IntraVENous 2 times per day    [Held by provider] enoxaparin  40 mg SubCUTAneous Daily    sennosides-docusate sodium  1 tablet Oral BID     Continuous Infusions:    dextrose      nitroGLYCERIN 20 mcg/min (11/18/22 2001)    sodium chloride         CBC:   Recent Labs     11/18/22  0306 11/18/22  1849 11/19/22  0612 11/20/22  0634   WBC 7.7  --  9.1 8.4   HGB 7.2* 6.7* 8.1* 8.7*     --  350 373     BMP:    Recent Labs     11/18/22  0306 11/19/22  0612 11/20/22  0634    140 140   K 3.4* 3.6 3.8    102 102   CO2 28 27 27   BUN 11 12 12   CREATININE 1.1 1.2 1.1   GLUCOSE 165* 133* 120*     Hepatic:   Recent Labs     11/17/22  1940   AST 39   ALT 63   BILITOT 0.2*   ALKPHOS 105     INR: No results for input(s): INR in the last 72 hours. Imaging:  No results found for this or any previous visit. Results for orders placed during the hospital encounter of 04/17/20    CT ABDOMEN PELVIS W IV CONTRAST Additional Contrast? None    Narrative  PROCEDURE: CT ABDOMEN PELVIS W IV CONTRAST    CLINICAL INFORMATION: Left lower back pain . Past medical history of diabetes and coronary artery disease. Morbid obesity. Diabetic feet. 12 hour history of left lower back pain which radiates to the left lower extremity. Low grade fever. COMPARISON: None. TECHNIQUE: Axial 5 mm CT images were obtained through the abdomen and pelvis after the administration of intravenous contrast. Coronal and sagittal reconstructions were obtained. All CT scans at this facility use dose modulation, iterative reconstruction, and/or weight-based dosing when appropriate to reduce radiation dose to as low as reasonably achievable. FINDINGS:      The visualized aspects of the lung bases are clear.    The base of the heart is within appropriate limits. The liver is normal. The spleen is normal. The adrenals and pancreas are normal. The gallbladder is normal.   There is fat stranding surrounding both kidneys. This is symmetric. There are no stones. No contour deforming renal masses are noted. There is  no hydronephrosis on either side. There are no stones along the course of either ureter. No abnormalities of the small bowel loops are noted. The IVC and aorta are of normal caliber. There is atherosclerosis of the aorta. There is no adenopathy. The urinary bladder is normal. There is no pelvic free fluid. There is some stool throughout the colon. There is no colonic inflammation. There is no adenopathy. There are scattered vascular calcifications in the pelvis. No suspicious osseous lesions  are identified. Impression  No evidence of an acute process in the abdomen or pelvis. **This report has been created using voice recognition software. It may contain minor errors which are inherent in voice recognition technology. **    Final report electronically signed by Dr. Deon Mujica on 4/17/2020 8:00 AM    No results found for this or any previous visit. No results found for this or any previous visit.       Endoscopy Finding:      Objective:   Vitals: BP (!) 149/75   Pulse 88   Temp 98.3 °F (36.8 °C) (Oral)   Resp 18   Ht 6' 2\" (1.88 m)   Wt (!) 333 lb 7 oz (151.2 kg)   SpO2 96%   BMI 42.81 kg/m²     Intake/Output Summary (Last 24 hours) at 11/20/2022 1140  Last data filed at 11/20/2022 1118  Gross per 24 hour   Intake 480 ml   Output 3385 ml   Net -2905 ml     General appearance: alert and cooperative with exam  Lungs: clear to auscultation bilaterally  Heart: regular rate and rhythm, S1, S2 normal, no murmur, click, rub or gallop  Abdomen: soft, non-tender; bowel sounds normal; no masses,  no organomegaly  Extremities: extremities normal, atraumatic, no cyanosis or edema    Assessment and Plan:     ASSESSMENT AND PLAN: Anemia of unknown etiology, acute on chronic -iron low started on oral replacement. IV push PPI. FOBT negative. Trend H&H and transfuse prn to keep Hgb > 8.0 d/t cardiac co-morbidity. Plan for EGD on hold due to cardiology   Hypertensive emergency with chest pain - NTG per Attending. Cardiology also following. Cardiology did not approve cardiac clearance EGD on hold. CAD s/p CABG - was on DAPT PTA - on hold d/t #1.     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     Sepsis ([address]     Acute left-sided low back pain with bilateral sciatica     S/P transmetatarsal amputation of foot, left (HCC)     Leukocytosis     Wound dehiscence, surgical, initial encounter     Postoperative wound infection     Metabolic acidosis     Hx of CABG     Lumbar stenosis without neurogenic claudication     CKD (chronic kidney disease), stage II     Normocytic anemia     Morbid obesity ([address]     Debility     Carotid stenosis, asymptomatic, bilateral     Hypokalemia     Nonhealing ulcer of left lower extremity (HCC)     Bleeding     Wound, open     SOB (shortness of breath) on exertion     Hemoglobin A1C greater than 9%, indicating poor diabetic control     Hypertensive emergency     Acute on chronic congestive heart failure ([address]     Hypertensive urgency      Electronically signed by Анна Fisher MD on 11/20/2022 at 11:40 AM

## 2022-11-20 NOTE — PROGRESS NOTES
Hospitalist Progress Note    Patient:  Alicia Gamez      Unit/Bed:8B-21/021-A    YOB: 1960    MRN: [id number]       Acct: [de-identified]     PCP: EYAD Hyde CNP    Date of Admission: 11/17/2022    Assessment/Plan:    Hypertensive urgency--on lisinopril 40 mg daily, on Lopressor 25 mg twice a day~increase to 50 mg today; cardiology started Norvasc 5 mg daily~increase to 10 mg today, added hydralazine 25 mg 3 times a day on 11/18~increase to 50 mg yesterday, also on Lasix 40 mg daily; weaning off nitroglycerin drip; monitor closely  Acute on chronic normocytic anemia--transfused with 1 unit packed red blood cells with improvement to 8.7 no obvious signs of bleeding; appreciate GI input; monitor  Possible acute on chronic diastolic heart failure--echo from November 3, 2022 revealed an EF 55-60% with grade 2 diastolic dysfunction; on beta-blocker, on Lasix 40 mg daily, BNP at 2665; voided 3.4L past 24 hours  Hypokalemia--replaced per protocol, monitor; magnesium at 1.9  Diabetes mellitus type 2, uncontrolled--on Lantus 30 units daily; sliding scale medium dose before meals and at bedtime; hemoglobin A1c on November 16, 2022 was noted to be 9.0; change diet to ADA  Mild troponin elevation--trending down slightly, lipids noted from November 16, 2022 with LDL at 67; no EKG changes; likely demand mismatch with #1 and #3; appreciate cardiology input  Essential hypertension, uncontrolled--see #1   PAD--statin, aspirin, Plavix  CAD status post CABG November 2019--on statin, aspirin, Plavix  Morbid obesity with BMI 42.81    Expected discharge date: Pending clinical course    Disposition:    [x] Home       [] TCU       [] Rehab       [] Psych       [] SNF       [] [place]       [] Other-    Chief Complaint: Shortness of breath    Hospital Course: Per H&P done 11/17/2022: \"65year-old gentleman who is severely obese with past medical history of type 2 diabetes, hypertension, HLD, CAD status post CABG in 11/2019, PAD, lupus? who presents with acute onset of progressively worsening dyspnea for 1 week duration. He also has associated productive cough with clear white sputum, chills, and pleuritic chest pain also for the past 1 week. Reports no chronic cough at baseline. Has chronic leg swelling which he reports is at his baseline with no acute worsening. Denies PND and orthopnea. Remote history of smoking 2 packs/day for 25 years, quit 15 years ago. Recent admission at [facility] for carotid artery stenosis, discharged on 11/3, where he presented admitted for new LBBB noted on EKG and also severely uncontrolled hypertension with blood pressure of 200s over 100s according to discharge summary. He was started on heparin infusion for ACS. They performed echo which showed EF of 55 to 60% but no regional wall motion abnormalities. His troponins were normal as well therefore no further ischemic work-up was pursued. The patient remained severely hypertensive during his admission with blood pressure in the 180s which was being managed, however eventually patient elected to leave A. ED Course:  Initial vitals in ED: 37.4, 199/93, 91, 22, 99% on room air     Lab work-up: BMP essentially normal with normal kidney function, creatinine at 1.0 which is at his baseline of 1.0-1.1. CBC showed hemoglobin of 7.7 with MCV of 83.1, WBC 9.9, platelets 714. Cardiac work-up including troponin was elevated at 0.040 and proBNP elevated at 2600. EKG showed RBBB which may be new. CXR hows interstitial prominence and mildly increased density at the right lung base which could represent atelectasis or pneumonia. There were no recorded desaturations in the EMR however due to patient's increased work of breathing he was started on BiPAP in the ED.  He was also administered aspirin 325mg for chest pain, started on nitro drip in ED for hypertensive emergency, and admitted to internal medicine for further management. \"    11/18--> blood pressure was up to 182/86 this morning, adjusting medications, potassium a bit low, BNP at 2665, troponin T trending down    11/19--> still hypertensive, hemoglobin dropped to 6.7 last night so received 1 unit packed red blood cells with improvement of his hemoglobin; appreciate cardiology input, nitroglycerin drip was weaned and was started on Norvasc, he was also started on hydralazine 25 mg every 8 hours, holding aspirin and Plavix with anemia; awaiting GI input    11/20--> still hypertensive so Norvasc, Lopressor and hydralazine were all increased; voiding well; still on the nitroglycerin drip at 20 mics per minute;      Subjective (past 24 hours): Patient relates to feeling much better, he denies any pain, states he has a nonproductive cough otherwise no complaints, states he had a normal bowel movement yesterday    Medications:  Reviewed    Infusion Medications    dextrose      nitroGLYCERIN 20 mcg/min (11/18/22 2001)    sodium chloride       Scheduled Medications    pantoprazole  40 mg IntraVENous BID    hydrALAZINE  50 mg Oral 3 times per day    [Held by provider] aspirin  81 mg Oral Daily    atorvastatin  80 mg Oral Daily    [Held by provider] clopidogrel  75 mg Oral Daily    furosemide  40 mg Oral Daily    gabapentin  600 mg Oral BID    multivitamin  1 tablet Oral Daily    insulin lispro  0-8 Units SubCUTAneous TID WC    insulin lispro  0-4 Units SubCUTAneous Nightly    insulin glargine  30 Units SubCUTAneous Daily    ferrous sulfate  325 mg Oral BID     metoprolol tartrate  25 mg Oral BID    amLODIPine  5 mg Oral Daily    valsartan  320 mg Oral Daily    sodium chloride flush  5-40 mL IntraVENous 2 times per day    [Held by provider] enoxaparin  40 mg SubCUTAneous Daily    sennosides-docusate sodium  1 tablet Oral BID     PRN Meds: melatonin, albuterol sulfate HFA, glucose, dextrose bolus **OR** dextrose bolus, glucagon (rDNA), dextrose, LORazepam, sodium chloride flush, sodium chloride, ondansetron **OR** ondansetron, polyethylene glycol, acetaminophen **OR** acetaminophen, potassium chloride **OR** potassium alternative oral replacement **OR** potassium chloride, potassium chloride, magnesium sulfate      Intake/Output Summary (Last 24 hours) at 11/20/2022 0754  Last data filed at 11/19/2022 2148  Gross per 24 hour   Intake 670 ml   Output 3385 ml   Net -2715 ml         Diet:  ADULT DIET; Regular; 4 carb choices (60 gm/meal); Low Sodium (2 gm)    Exam:  BP (!) 187/80   Pulse 88   Temp 97.9 °F (36.6 °C) (Oral)   Resp 20   Ht 6' 2\" (1.88 m)   Wt (!) 333 lb 7 oz (151.2 kg)   SpO2 95%   BMI 42.81 kg/m²     General appearance: No apparent distress, appears stated age and cooperative. HEENT: Pupils equal, round, and reactive to light. Conjunctivae/corneas clear. Neck: Supple, with full range of motion. No jugular venous distention. Trachea midline. Respiratory:  Normal respiratory effort. Diminished to auscultation, bilaterally without Rales/Wheezes/Rhonchi. Up in chair, speaks in complete sentences  Cardiovascular: Regular rate and rhythm with normal S1/S2 without murmurs, rubs or gallops. Abdomen: Soft, non-tender, non-distended with normal bowel sounds. Obese  Musculoskeletal: passive and active ROM x 4 extremities. Skin: Skin color, texture, turgor normal.    Neurologic:  Neurovascularly intact without any focal; sensory/motor deficits.  Cranial nerves: II-XII intact, grossly non-focal.  All 5 toes amputated from left foot, Unna boot noted as well  Psychiatric: Alert and oriented, thought content appropriate  Capillary Refill: Brisk,< 3 seconds   Peripheral Pulses: +2 palpable, equal bilaterally       Labs:   Recent Labs     11/18/22  0306 11/18/22  1849 11/19/22  0612 11/20/22  0634   WBC 7.7  --  9.1 8.4   HGB 7.2* 6.7* 8.1* 8.7*   HCT 24.3* 23.0* 27.1* 28.7*     --  350 373       Recent Labs     11/18/22  0306 11/19/22  0653 11/20/22  0634    140 140   K 3.4* 3.6 3.8    102 102   CO2 28 27 27   BUN 11 12 12   CREATININE 1.1 1.2 1.1   CALCIUM 8.3* 8.4* 8.4*       Recent Labs     11/17/22 1940   AST 39   ALT 63   BILIDIR <0.2   BILITOT 0.2*   ALKPHOS 105       Microbiology:    None    Urinalysis:      Lab Results   Component Value Date/Time    NITRU NEGATIVE 11/16/2022 01:52 PM    WBCUA 0-2 11/16/2022 01:52 PM    BACTERIA NONE SEEN 11/16/2022 01:52 PM    RBCUA 15-25 11/16/2022 01:52 PM    BLOODU MODERATE 11/16/2022 01:52 PM    GLUCOSEU >= 1000 11/16/2022 01:52 PM       Radiology:  XR CHEST PORTABLE    Result Date: 11/17/2022  Exam: 1 View of the chest Comparison: 6/29/2022 Findings: Prior median sternotomy Cardiac silhouette is not enlarged. No pneumothorax. No pleural fluid collection. Prominence to the interstitium appear similar to the prior exam. Mildly increased density at the right lung base could indicate atelectasis or pneumonia     Impression: 1. Interstitial prominence appear similar to prior. Mildly increased density at the right lung base could represent atelectasis or pneumonia.  This document has been electronically signed by: Jamey Young MD on 11/17/2022 08:09 PM      DVT prophylaxis: [] Lovenox                                 [x] SCDs                                 [] SQ Heparin                                 [] Encourage ambulation           [] Already on Anticoagulation     Code Status: Full Code    PT/OT Eval Status: Monitor    Tele:   [x] Yes sinus rhythm heart rate 87             [] no    Active Hospital Problems    Diagnosis Date Noted    Acute on chronic congestive heart failure ([facility] [address] [I50.9] 11/18/2022     Priority: High    Hypertensive emergency [I16.1] 11/17/2022     Priority: Medium       Electronically signed by EYAD Linares CNP on 11/20/2022 at 7:54 AM

## 2022-11-21 ENCOUNTER — APPOINTMENT (OUTPATIENT)
Dept: ULTRASOUND IMAGING | Age: 62
DRG: 304 | End: 2022-11-21
Payer: COMMERCIAL

## 2022-11-21 LAB
ANION GAP SERPL CALCULATED.3IONS-SCNC: 13 MEQ/L (ref 8–16)
BASOPHILS # BLD: 0.4 %
BASOPHILS ABSOLUTE: 0 THOU/MM3 (ref 0–0.1)
BUN BLDV-MCNC: 14 MG/DL (ref 7–22)
CALCIUM SERPL-MCNC: 8.8 MG/DL (ref 8.5–10.5)
CHLORIDE BLD-SCNC: 103 MEQ/L (ref 98–111)
CO2: 26 MEQ/L (ref 23–33)
CREAT SERPL-MCNC: 1.1 MG/DL (ref 0.4–1.2)
EKG ATRIAL RATE: 89 BPM
EKG ATRIAL RATE: 95 BPM
EKG P AXIS: 68 DEGREES
EKG P AXIS: 72 DEGREES
EKG P-R INTERVAL: 178 MS
EKG P-R INTERVAL: 184 MS
EKG Q-T INTERVAL: 412 MS
EKG Q-T INTERVAL: 430 MS
EKG QRS DURATION: 138 MS
EKG QRS DURATION: 144 MS
EKG QTC CALCULATION (BAZETT): 517 MS
EKG QTC CALCULATION (BAZETT): 523 MS
EKG R AXIS: 27 DEGREES
EKG R AXIS: 37 DEGREES
EKG T AXIS: 59 DEGREES
EKG T AXIS: 71 DEGREES
EKG VENTRICULAR RATE: 89 BPM
EKG VENTRICULAR RATE: 95 BPM
EOSINOPHIL # BLD: 2.6 %
EOSINOPHILS ABSOLUTE: 0.2 THOU/MM3 (ref 0–0.4)
ERYTHROCYTE [DISTWIDTH] IN BLOOD BY AUTOMATED COUNT: 16.6 % (ref 11.5–14.5)
ERYTHROCYTE [DISTWIDTH] IN BLOOD BY AUTOMATED COUNT: 50.1 FL (ref 35–45)
GFR SERPL CREATININE-BSD FRML MDRD: > 60 ML/MIN/1.73M2
GLUCOSE BLD-MCNC: 136 MG/DL (ref 70–108)
GLUCOSE BLD-MCNC: 150 MG/DL (ref 70–108)
GLUCOSE BLD-MCNC: 152 MG/DL (ref 70–108)
GLUCOSE BLD-MCNC: 237 MG/DL (ref 70–108)
GLUCOSE BLD-MCNC: 258 MG/DL (ref 70–108)
HCT VFR BLD CALC: 29.8 % (ref 42–52)
HEMOCCULT STL QL: NEGATIVE
HEMOGLOBIN: 8.8 GM/DL (ref 14–18)
IMMATURE GRANS (ABS): 0.05 THOU/MM3 (ref 0–0.07)
IMMATURE GRANULOCYTES: 0.7 %
LYMPHOCYTES # BLD: 8 %
LYMPHOCYTES ABSOLUTE: 0.6 THOU/MM3 (ref 1–4.8)
MCH RBC QN AUTO: 24.6 PG (ref 26–33)
MCHC RBC AUTO-ENTMCNC: 29.5 GM/DL (ref 32.2–35.5)
MCV RBC AUTO: 83.2 FL (ref 80–94)
MONOCYTES # BLD: 9.3 %
MONOCYTES ABSOLUTE: 0.7 THOU/MM3 (ref 0.4–1.3)
NUCLEATED RED BLOOD CELLS: 0 /100 WBC
PLATELET # BLD: 399 THOU/MM3 (ref 130–400)
PMV BLD AUTO: 10 FL (ref 9.4–12.4)
POTASSIUM SERPL-SCNC: 3.8 MEQ/L (ref 3.5–5.2)
RBC # BLD: 3.58 MILL/MM3 (ref 4.7–6.1)
SEG NEUTROPHILS: 79 %
SEGMENTED NEUTROPHILS ABSOLUTE COUNT: 6.1 THOU/MM3 (ref 1.8–7.7)
SODIUM BLD-SCNC: 142 MEQ/L (ref 135–145)
WBC # BLD: 7.7 THOU/MM3 (ref 4.8–10.8)

## 2022-11-21 PROCEDURE — 6370000000 HC RX 637 (ALT 250 FOR IP): Performed by: STUDENT IN AN ORGANIZED HEALTH CARE EDUCATION/TRAINING PROGRAM

## 2022-11-21 PROCEDURE — 93010 ELECTROCARDIOGRAM REPORT: CPT | Performed by: INTERNAL MEDICINE

## 2022-11-21 PROCEDURE — 99222 1ST HOSP IP/OBS MODERATE 55: CPT | Performed by: INTERNAL MEDICINE

## 2022-11-21 PROCEDURE — 93005 ELECTROCARDIOGRAM TRACING: CPT | Performed by: PHYSICIAN ASSISTANT

## 2022-11-21 PROCEDURE — 6360000002 HC RX W HCPCS

## 2022-11-21 PROCEDURE — 82272 OCCULT BLD FECES 1-3 TESTS: CPT

## 2022-11-21 PROCEDURE — 85025 COMPLETE CBC W/AUTO DIFF WBC: CPT

## 2022-11-21 PROCEDURE — 82948 REAGENT STRIP/BLOOD GLUCOSE: CPT

## 2022-11-21 PROCEDURE — 2580000003 HC RX 258: Performed by: PHYSICIAN ASSISTANT

## 2022-11-21 PROCEDURE — 6370000000 HC RX 637 (ALT 250 FOR IP): Performed by: PHYSICIAN ASSISTANT

## 2022-11-21 PROCEDURE — 6360000002 HC RX W HCPCS: Performed by: NURSE PRACTITIONER

## 2022-11-21 PROCEDURE — 76770 US EXAM ABDO BACK WALL COMP: CPT

## 2022-11-21 PROCEDURE — 6370000000 HC RX 637 (ALT 250 FOR IP)

## 2022-11-21 PROCEDURE — 6370000000 HC RX 637 (ALT 250 FOR IP): Performed by: NURSE PRACTITIONER

## 2022-11-21 PROCEDURE — 2580000003 HC RX 258: Performed by: STUDENT IN AN ORGANIZED HEALTH CARE EDUCATION/TRAINING PROGRAM

## 2022-11-21 PROCEDURE — 80048 BASIC METABOLIC PNL TOTAL CA: CPT

## 2022-11-21 PROCEDURE — 1200000003 HC TELEMETRY R&B

## 2022-11-21 PROCEDURE — C9113 INJ PANTOPRAZOLE SODIUM, VIA: HCPCS | Performed by: NURSE PRACTITIONER

## 2022-11-21 PROCEDURE — 99233 SBSQ HOSP IP/OBS HIGH 50: CPT | Performed by: PHYSICIAN ASSISTANT

## 2022-11-21 PROCEDURE — 36415 COLL VENOUS BLD VENIPUNCTURE: CPT

## 2022-11-21 PROCEDURE — 93975 VASCULAR STUDY: CPT

## 2022-11-21 RX ORDER — 0.9 % SODIUM CHLORIDE 0.9 %
500 INTRAVENOUS SOLUTION INTRAVENOUS ONCE
Status: COMPLETED | OUTPATIENT
Start: 2022-11-21 | End: 2022-11-22

## 2022-11-21 RX ORDER — MINOXIDIL 2.5 MG/1
5 TABLET ORAL DAILY
Status: DISCONTINUED | OUTPATIENT
Start: 2022-11-21 | End: 2022-11-23

## 2022-11-21 RX ADMIN — FUROSEMIDE 40 MG: 40 TABLET ORAL at 09:49

## 2022-11-21 RX ADMIN — HYDRALAZINE HYDROCHLORIDE 50 MG: 50 TABLET, FILM COATED ORAL at 05:43

## 2022-11-21 RX ADMIN — SENNOSIDES AND DOCUSATE SODIUM 1 TABLET: 50; 8.6 TABLET ORAL at 21:06

## 2022-11-21 RX ADMIN — Medication 1 TABLET: at 09:49

## 2022-11-21 RX ADMIN — ATORVASTATIN CALCIUM 80 MG: 80 TABLET, FILM COATED ORAL at 09:49

## 2022-11-21 RX ADMIN — SODIUM CHLORIDE, PRESERVATIVE FREE 10 ML: 5 INJECTION INTRAVENOUS at 09:51

## 2022-11-21 RX ADMIN — GABAPENTIN 600 MG: 600 TABLET ORAL at 09:49

## 2022-11-21 RX ADMIN — SODIUM CHLORIDE, PRESERVATIVE FREE 10 ML: 5 INJECTION INTRAVENOUS at 21:07

## 2022-11-21 RX ADMIN — INSULIN LISPRO 4 UNITS: 100 INJECTION, SOLUTION INTRAVENOUS; SUBCUTANEOUS at 17:26

## 2022-11-21 RX ADMIN — Medication 4.5 MG: at 21:06

## 2022-11-21 RX ADMIN — METOPROLOL TARTRATE 50 MG: 50 TABLET, FILM COATED ORAL at 09:49

## 2022-11-21 RX ADMIN — MINOXIDIL 5 MG: 2.5 TABLET ORAL at 13:09

## 2022-11-21 RX ADMIN — METOPROLOL TARTRATE 50 MG: 50 TABLET, FILM COATED ORAL at 21:08

## 2022-11-21 RX ADMIN — AMLODIPINE BESYLATE 10 MG: 10 TABLET ORAL at 09:49

## 2022-11-21 RX ADMIN — VALSARTAN 320 MG: 320 TABLET, FILM COATED ORAL at 09:49

## 2022-11-21 RX ADMIN — SODIUM CHLORIDE 500 ML: 9 INJECTION, SOLUTION INTRAVENOUS at 23:10

## 2022-11-21 RX ADMIN — HYDRALAZINE HYDROCHLORIDE 10 MG: 20 INJECTION INTRAMUSCULAR; INTRAVENOUS at 02:06

## 2022-11-21 RX ADMIN — Medication 325 MG: at 09:49

## 2022-11-21 RX ADMIN — GABAPENTIN 600 MG: 600 TABLET ORAL at 21:07

## 2022-11-21 RX ADMIN — ACETAMINOPHEN 650 MG: 325 TABLET ORAL at 21:03

## 2022-11-21 RX ADMIN — SENNOSIDES AND DOCUSATE SODIUM 1 TABLET: 50; 8.6 TABLET ORAL at 09:50

## 2022-11-21 RX ADMIN — PANTOPRAZOLE SODIUM 40 MG: 40 INJECTION, POWDER, FOR SOLUTION INTRAVENOUS at 21:12

## 2022-11-21 RX ADMIN — Medication 325 MG: at 17:27

## 2022-11-21 RX ADMIN — PANTOPRAZOLE SODIUM 40 MG: 40 INJECTION, POWDER, FOR SOLUTION INTRAVENOUS at 09:55

## 2022-11-21 RX ADMIN — HYDRALAZINE HYDROCHLORIDE 10 MG: 20 INJECTION INTRAMUSCULAR; INTRAVENOUS at 09:51

## 2022-11-21 ASSESSMENT — PAIN SCALES - GENERAL
PAINLEVEL_OUTOF10: 0

## 2022-11-21 NOTE — PROGRESS NOTES
Gastroenterology  Progress Note    11/21/2022 4:11 PM  Subjective:   Admit Date: 11/17/2022    Interval History:    Valentino Baptist is a 65 y.o. male, new to the practice, admitted to [facility] on 11/17/22 with complaints of shortness of breath. Admission labs noted severe anemia with Hgb 6.7. The patient received one unit of PRBC yesterday and Hgb this am was 8.1. The patient has never seen gastroenterology before; has never had EGD or colonoscopy. He reports \"stomach ache\" a week or two ago and utilized Pepto-Bismol with resolution of his stomach ache. He states his stools were \"dark\" with the use of Pepto but have been \"normal since. \"  He denies any bright red rectal bleeding. He denies GERD symptoms. Denies dysphagia or odynophagia. Denies early satiety. Denies unintentional weight loss. Describes \"slight\" change in bowel habits with occasional constipation. States his stools \"look nodular sometimes. \"  States he was experiencing diarrhea Spencer Fujita couple of months ago, but I stopped the metformin and the diarrhea stopped. \"  He reports his niece (sister's daughter) as having Crohn's disease. He denies family history of GI cancers. The patient has chronic cardiac co-morbidities with CAD; has had cardiac catheterizations with stents and CABG in the past.  He was taking Aspirin and Plavix prior to admission; these medications are currently on hold. 11/21/22: EGD planned today on hold per cardiology due to elevated troponin and ongoing HTN. Patient tolerating regular diet. FOBT negative. Iron levels are low. Patient started on oral iron. 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     Sepsis ([address] Acute left-sided low back pain with bilateral sciatica     S/P transmetatarsal amputation of foot, left (HCC)     Leukocytosis     Wound dehiscence, surgical, initial encounter     Postoperative wound infection     Metabolic acidosis     Hx of CABG     Lumbar stenosis without neurogenic claudication     CKD (chronic kidney disease), stage II     Normocytic anemia     Morbid obesity ([facility]     Debility     Carotid stenosis, asymptomatic, bilateral     Hypokalemia     Nonhealing ulcer of left lower extremity (HCC)     Bleeding     Wound, open     SOB (shortness of breath) on exertion     Hemoglobin A1C greater than 9%, indicating poor diabetic control     Hypertensive emergency     Acute on chronic congestive heart failure ([facility]     Hypertensive urgency        Medications:   Scheduled Meds:   minoxidil  5 mg Oral Daily    metoprolol tartrate  50 mg Oral BID    amLODIPine  10 mg Oral Daily    pantoprazole  40 mg IntraVENous BID    [Held by provider] hydrALAZINE  50 mg Oral 3 times per day    [Held by provider] aspirin  81 mg Oral Daily    atorvastatin  80 mg Oral Daily    [Held by provider] clopidogrel  75 mg Oral Daily    furosemide  40 mg Oral Daily    gabapentin  600 mg Oral BID    multivitamin  1 tablet Oral Daily    insulin lispro  0-8 Units SubCUTAneous TID WC    insulin lispro  0-4 Units SubCUTAneous Nightly    insulin glargine  30 Units SubCUTAneous Daily    ferrous sulfate  325 mg Oral BID WC    valsartan  320 mg Oral Daily    sodium chloride flush  5-40 mL IntraVENous 2 times per day    [Held by provider] enoxaparin  40 mg SubCUTAneous Daily    sennosides-docusate sodium  1 tablet Oral BID     Continuous Infusions:   dextrose      sodium chloride       CBC:   Recent Labs     11/20/22  0634 11/20/22  2312 11/21/22  0614   WBC 8.4 8.4 7.7   HGB 8.7* 9.0* 8.8*    386 399     BMP:    Recent Labs     11/20/22  0634 11/20/22  2312 11/21/22  0614    138 142   K 3.8 3.7 3.8    101 103   CO2 27 27 26   BUN 12 14 14   CREATININE 1.1 1.1 1.1   GLUCOSE 120* 163* 150*     Hepatic: No results for input(s): AST, ALT, ALB, BILITOT, ALKPHOS in the last 72 hours. INR: No results for input(s): INR in the last 72 hours. Xray:   Exam: 1 View of the chest       Comparison: 6/29/2022       Findings:   Prior median sternotomy   Cardiac silhouette is not enlarged. No pneumothorax. No pleural fluid collection. Prominence to the interstitium appear similar to the prior exam.   Mildly increased density at the right lung base could indicate atelectasis    or pneumonia           Impression   Impression:   1. Interstitial prominence appear similar to prior. Mildly increased    density at the right lung base could represent atelectasis or pneumonia.        This document has been electronically signed by: Vinay Loja MD on    11/17/2022 08:09 PM        Endoscopy Finding:  N/A    Objective:   Vitals: BP (!) 171/71   Pulse (!) 104   Temp 99 °F (37.2 °C) (Oral)   Resp 20   Ht 6' 2\" (1.88 m)   Wt (!) 333 lb 7 oz (151.2 kg)   SpO2 94%   BMI 42.81 kg/m²     Intake/Output Summary (Last 24 hours) at 11/21/2022 1611  Last data filed at 11/21/2022 0951  Gross per 24 hour   Intake 20 ml   Output 1000 ml   Net -980 ml     Weight:  Wt Readings from Last 3 Encounters:   11/18/22 (!) 333 lb 7 oz (151.2 kg)   08/24/22 (!) 303 lb 3.2 oz (137.5 kg)   07/01/22 (!) 311 lb (141.1 kg)     General appearance: alert and cooperative with exam  Lungs: clear to auscultation bilaterally  Heart: regular rate and rhythm, S1, S2 normal, no murmur, click, rub or gallop  Abdomen: soft, non-tender; bowel sounds normal; no masses,  no organomegaly  Extremities: extremities normal, atraumatic, no cyanosis or edema    Assessment and Plan:     IntraVENous, PRN, Margie Lance MD    ondansetron (ZOFRAN-ODT) disintegrating tablet 4 mg, 4 mg, Oral, Q8H PRN **OR** ondansetron (ZOFRAN) injection 4 mg, 4 mg, IntraVENous, Q6H PRN, Margie Lance MD, 4 mg at 11/18/22 0452    polyethylene glycol (GLYCOLAX) packet 17 g, 17 g, Oral, Daily PRN, Prosper Paulino MD    acetaminophen (TYLENOL) tablet 650 mg, 650 mg, Oral, Q6H PRN **OR** acetaminophen (TYLENOL) suppository 650 mg, 650 mg, Rectal, Q6H PRN, Prosper Paulino MD    [Held by provider] enoxaparin (LOVENOX) injection 40 mg, 40 mg, SubCUTAneous, Daily, Prosper Paulino MD, 40 mg at 11/18/22 1151    potassium chloride (KLOR-CON M) extended release tablet 40 mEq, 40 mEq, Oral, PRN **OR** potassium bicarb-citric acid (EFFER-K) effervescent tablet 40 mEq, 40 mEq, Oral, PRN **OR** potassium chloride 10 mEq/100 mL IVPB (Peripheral Line), 10 mEq, IntraVENous, PRN, Prosper Paulino MD    potassium chloride 10 mEq/100 mL IVPB (Peripheral Line), 10 mEq, IntraVENous, PRN, Prosper Paulino MD    magnesium sulfate 2000 mg in 50 mL IVPB premix, 2,000 mg, IntraVENous, PRN, Prosper Paulino MD    sennosides-docusate sodium (SENOKOT-S) 8.6-50 MG tablet 1 tablet, 1 tablet, Oral, BID, Prosper Paulino MD, 1 tablet at 11/19/22 0812        Vital Signs:      Vitals:     11/19/22 0745   BP: (!) 174/82   Pulse: 94   Resp: 20   Temp: 98.1 °F (36.7 °C)   SpO2: 98%         Weight:      Wt Readings from Last 3 Encounters:   11/18/22 (!) 333 lb 7 oz (151.2 kg)   08/24/22 (!) 303 lb 3.2 oz (137.5 kg)   07/01/22 (!) 311 lb (141.1 kg)         Physical Exam:  General Appearance:  awake, alert, oriented, in no acute distress, well developed, well nourished, and obese  Pt is sitting comfortably in bedside chair. Mildly dyspneic. HEENT: Atraumatic, Pupils reactive, No pallor/icterus. Oral mucosa moist/No thrush  Neck: No thyroid enlargement, No cervical or supraclavicular lymphadenopathy  CVS: Regular rate and rhythm. Faint murmur. No rubs or gallops  RS: Good b/l air entry, Clear to auscultation b/l  Abd: soft, non-tender, non-distended, no visible veins, scars, No hepatosplenomegaly or palpable masses, bowel sounds active. Ext: No clubbing, cyanosis, edema.   Ace wrap to left lower extremity. CNS: alert, oriented, no gross focal motor deficits     Labs:         Lab Results   Component Value Date/Time     WBC 9.1 11/19/2022 06:12 AM     HGB 8.1 11/19/2022 06:12 AM     HCT 27.1 11/19/2022 06:12 AM     MCV 83.9 11/19/2022 06:12 AM      11/19/2022 06:12 AM            Lab Results   Component Value Date/Time      11/19/2022 06:12 AM     K 3.6 11/19/2022 06:12 AM     K 3.8 06/21/2021 06:48 PM      11/19/2022 06:12 AM     CO2 27 11/19/2022 06:12 AM     BUN 12 11/19/2022 06:12 AM     CREATININE 1.2 11/19/2022 06:12 AM     GLUCOSE 133 11/19/2022 06:12 AM     GLUCOSE 103 05/25/2020 01:45 PM     CALCIUM 8.4 11/19/2022 06:12 AM            Lab Results   Component Value Date/Time     ALKPHOS 105 11/17/2022 07:40 PM     ALT 63 11/17/2022 07:40 PM     AST 39 11/17/2022 07:40 PM     PROT 6.3 11/17/2022 07:40 PM     BILITOT 0.2 11/17/2022 07:40 PM     BILIDIR <0.2 11/17/2022 07:40 PM     LABALBU 2.6 11/17/2022 07:40 PM            Lab Results   Component Value Date/Time     LACTA 1.4 04/17/2020 06:00 AM      No results found for: AMYLASE        Lab Results   Component Value Date/Time     LIPASE 15.3 06/21/2021 06:48 PM            Lab Results   Component Value Date/Time     INR 1.01 10/20/2022 12:04 PM         Radiology:  Exam: 1 View of the chest       Comparison: 6/29/2022       Findings:   Prior median sternotomy   Cardiac silhouette is not enlarged. No pneumothorax. No pleural fluid collection. Prominence to the interstitium appear similar to the prior exam.   Mildly increased density at the right lung base could indicate atelectasis    or pneumonia           Impression   Impression:   1. Interstitial prominence appear similar to prior. Mildly increased    density at the right lung base could represent atelectasis or pneumonia.        This document has been electronically signed by: Que Golden MD on    11/17/2022 08:09 PM         ASSESSMENT AND PLAN:   Anemia of unknown etiology, acute on chronic -iron low started on oral replacement. IV push PPI. FOBT negative. Trend H&H and transfuse prn to keep Hgb > 8.0 d/t cardiac co-morbidity. Plan for EGD on hold due to cardiology   Hypertensive emergency with chest pain - NTG per Attending. Cardiology also following. Cardiology did not approve cardiac clearance EGD on hold. CAD s/p CABG - was on DAPT PTA - on hold d/t #1.     Hx of DM  Hx of HLD  Hx of hepatitis as a child            Follow up in [facility] after discharge in 2 week(s)        Chapo Burgess, APRN - CNP  11/21/2022  4:11 PM     Patient is seen independently from the nurse practitioner and all  the pertinent data along with physical examination and assessment and plans are all obtained by my self and  Laboratory data, Radiology results, medications all are reviewed by my self and care is discussed extensively with the patient  and the patients nurse and all agree with plan and in addition see orders and plans    Electronically signed by Adrián Arellano MD

## 2022-11-21 NOTE — CONSULTS
Podiatric Surgery Consult    Reason for Consult:  Left foot wound  Requesting Physician:  Norah Hillman PA-C    CHIEF COMPLAINT:  HTN    HISTORY OF PRESENT ILLNESS:                The patient is a 65 y.o. male with significant past medical history of DM, CAD, left foot amputations who being seen at bedside on behalf of Dr. Lola Muñiz. Patient was admitted for Hypertensive urgency and is currently being worked up for cardiac arrhythmia. Patient states he has had wounds to his left foot for over 3 years, and has been following up regularly with Dr. Lola Muñiz in the office. He states he was last seen last week on Tuesday. He states he has been ambulating to his heel on his left side with an Unna boot and Posterior splint. He denies any pain to bilateral feet and denies any f/c/n/v at this time. He has no other complaints.     Past Medical History:        Diagnosis Date    Arthritis     CAD (coronary artery disease)     CKD (chronic kidney disease), stage II     Diabetes mellitus ([facility] [address]     Hyperlipidemia     Hypertension     Liver disease     HEPITITIS AS A CHILD     Past Surgical History:        Procedure Laterality Date    CARDIAC SURGERY  11/2019    triple bypass    CYST REMOVAL      RIGHT ANKLE     FOOT DEBRIDEMENT Left 4/20/2020    LEFT TRANSMETATARSAL AMPUTATION  FOOT INCISION AND DRAINAGE performed by Minerva Yang DPM at [address] Left 7/20/2020    LEFT WOUND DEBRIDEMENT WITH WOUND VAC APPLICATION performed by Minerva Yang DPM at [address] Left 7/1/2022    LEFT FOOT One [facility], [address] [facility], APPLICATION [facility] WOUND VAC performed by Minerva Yang DPM at [address] [place] [address] Right     CYST REMOVED    FOOT SURGERY Left 8/14/2020    LEFT FOOT PREPARATION GRAFT SITE, HAVEST SPLIT THICKNESS SKIN GRAFT WITH APPLICATION, APPLICATION [employer] WOUND VAC performed by Minerva Yang DPM at [address] Left 3/3/2020    I&D LEFT FOOT, TRANSMETATARSAL AMPUTATION performed by Kathy Brock DPM at [address] [facility]       Current Medications:    Current Facility-Administered Medications: minoxidil (LONITEN) tablet 5 mg, 5 mg, Oral, Daily  metoprolol tartrate (LOPRESSOR) tablet 50 mg, 50 mg, Oral, BID  amLODIPine (NORVASC) tablet 10 mg, 10 mg, Oral, Daily  hydrALAZINE (APRESOLINE) injection 10 mg, 10 mg, IntraVENous, Q6H PRN  melatonin tablet 4.5 mg, 4.5 mg, Oral, Nightly PRN  pantoprazole (PROTONIX) injection 40 mg, 40 mg, IntraVENous, BID  [Held by provider] hydrALAZINE (APRESOLINE) tablet 50 mg, 50 mg, Oral, 3 times per day  albuterol sulfate HFA (PROVENTIL;VENTOLIN;PROAIR) 108 (90 Base) MCG/ACT inhaler 2 puff, 2 puff, Inhalation, 4x Daily PRN  [Held by provider] aspirin chewable tablet 81 mg, 81 mg, Oral, Daily  atorvastatin (LIPITOR) tablet 80 mg, 80 mg, Oral, Daily  [Held by provider] clopidogrel (PLAVIX) tablet 75 mg, 75 mg, Oral, Daily  furosemide (LASIX) tablet 40 mg, 40 mg, Oral, Daily  gabapentin (NEURONTIN) tablet 600 mg, 600 mg, Oral, BID  multivitamin 1 tablet, 1 tablet, Oral, Daily  glucose chewable tablet 16 g, 4 tablet, Oral, PRN  dextrose bolus 10% 125 mL, 125 mL, IntraVENous, PRN **OR** dextrose bolus 10% 250 mL, 250 mL, IntraVENous, PRN  glucagon (rDNA) injection 1 mg, 1 mg, SubCUTAneous, PRN  dextrose 10 % infusion, , IntraVENous, Continuous PRN  LORazepam (ATIVAN) injection 0.5 mg, 0.5 mg, IntraVENous, Q6H PRN  insulin lispro (HUMALOG) injection vial 0-8 Units, 0-8 Units, SubCUTAneous, TID WC  insulin lispro (HUMALOG) injection vial 0-4 Units, 0-4 Units, SubCUTAneous, Nightly  insulin glargine (LANTUS) injection vial 30 Units, 30 Units, SubCUTAneous, Daily  ferrous sulfate (IRON 325) tablet 325 mg, 325 mg, Oral, BID WC  valsartan (DIOVAN) tablet 320 mg, 320 mg, Oral, Daily  sodium chloride flush 0.9 % injection 5-40 mL, 5-40 mL, IntraVENous, 2 times per day  sodium chloride flush 0.9 % injection 5-40 mL, 5-40 mL, IntraVENous, PRN  0.9 % sodium chloride infusion, , IntraVENous, PRN  ondansetron (ZOFRAN-ODT) disintegrating tablet 4 mg, 4 mg, Oral, Q8H PRN **OR** ondansetron (ZOFRAN) injection 4 mg, 4 mg, IntraVENous, Q6H PRN  polyethylene glycol (GLYCOLAX) packet 17 g, 17 g, Oral, Daily PRN  acetaminophen (TYLENOL) tablet 650 mg, 650 mg, Oral, Q6H PRN **OR** acetaminophen (TYLENOL) suppository 650 mg, 650 mg, Rectal, Q6H PRN  [Held by provider] enoxaparin (LOVENOX) injection 40 mg, 40 mg, SubCUTAneous, Daily  potassium chloride (KLOR-CON M) extended release tablet 40 mEq, 40 mEq, Oral, PRN **OR** potassium bicarb-citric acid (EFFER-K) effervescent tablet 40 mEq, 40 mEq, Oral, PRN **OR** potassium chloride 10 mEq/100 mL IVPB (Peripheral Line), 10 mEq, IntraVENous, PRN  potassium chloride 10 mEq/100 mL IVPB (Peripheral Line), 10 mEq, IntraVENous, PRN  magnesium sulfate 2000 mg in 50 mL IVPB premix, 2,000 mg, IntraVENous, PRN  sennosides-docusate sodium (SENOKOT-S) 8.6-50 MG tablet 1 tablet, 1 tablet, Oral, BID  Allergies:  Patient has no known allergies. Social History:    reports that he quit smoking about 12 years ago. His smoking use included cigarettes. He has never used smokeless tobacco. He reports previous alcohol use. He reports that he does not use drugs. Family History:       Problem Relation Age of Onset    Diabetes Mother     High Blood Pressure Mother     Alzheimer's Disease Father          of, 80or 80years old    Heart Disease Father     High Blood Pressure Father     Cancer Neg Hx     Stroke Neg Hx      REVIEW OF SYSTEMS:    Pertinent items are listed in HPI; all other systems reviewed are negative.    PHYSICAL EXAM:        Vitals:    BP (!) 185/67   Pulse 94   Temp 99.3 °F (37.4 °C) (Oral)   Resp 22   Ht 6' 2\" (1.88 m)   Wt (!) 333 lb 7 oz (151.2 kg)   SpO2 94%   BMI 42.81 kg/m²     Results for orders placed or performed during the hospital encounter of 22   CBC with Auto Differential   Result Value Ref Range    WBC 9.9 4.8 - 10.8 thou/mm3    RBC 3.08 (L) 4.70 - 6.10 mill/mm3    Hemoglobin 7.7 (L) 14.0 - 18.0 gm/dl    Hematocrit 25.6 (L) 42.0 - 52.0 %    MCV 83.1 80.0 - 94.0 fL    MCH 25.0 (L) 26.0 - 33.0 pg    MCHC 30.1 (L) 32.2 - 35.5 gm/dl    RDW-CV 16.3 (H) 11.5 - 14.5 %    RDW-SD 49.2 (H) 35.0 - 45.0 fL    Platelets 838 704 - 321 thou/mm3    MPV 9.8 9.4 - 12.4 fL    Seg Neutrophils 75.5 %    Lymphocytes 10.7 %    Monocytes 10.6 %    Eosinophils 1.7 %    Basophils 0.4 %    Immature Granulocytes 1.1 %    Segs Absolute 7.5 1.8 - 7.7 thou/mm3    Lymphocytes Absolute 1.1 1.0 - 4.8 thou/mm3    Monocytes Absolute 1.0 0.4 - 1.3 thou/mm3    Eosinophils Absolute 0.2 0.0 - 0.4 thou/mm3    Basophils Absolute 0.0 0.0 - 0.1 thou/mm3    Immature Grans (Abs) 0.11 (H) 0.00 - 0.07 thou/mm3    nRBC 0 /100 wbc   BMP   Result Value Ref Range    Sodium 139 135 - 145 meq/L    Potassium 3.6 3.5 - 5.2 meq/L    Chloride 102 98 - 111 meq/L    CO2 26 23 - 33 meq/L    Glucose 209 (H) 70 - 108 mg/dL    BUN 11 7 - 22 mg/dL    Creatinine 1.0 0.4 - 1.2 mg/dL    Calcium 8.4 (L) 8.5 - 10.5 mg/dL   Hepatic Function Panel   Result Value Ref Range    Albumin 2.6 (L) 3.5 - 5.1 g/dL    Total Bilirubin 0.2 (L) 0.3 - 1.2 mg/dL    Bilirubin, Direct <0.2 0.0 - 0.3 mg/dL    Alkaline Phosphatase 105 38 - 126 U/L    AST 39 5 - 40 U/L    ALT 63 11 - 66 U/L    Total Protein 6.3 6.1 - 8.0 g/dL   Troponin   Result Value Ref Range    Troponin T 0.040 (A) ng/ml   Brain Natriuretic Peptide   Result Value Ref Range    Pro-BNP 2665.0 (H) 0.0 - 900.0 pg/mL   Glomerular Filtration Rate, Estimated   Result Value Ref Range    Est, Glom Filt Rate >60 >60 ml/min/1.73m2   Anion Gap   Result Value Ref Range    Anion Gap 11.0 8.0 - 16.0 meq/L   Osmolality   Result Value Ref Range    Osmolality Calc 283.1 275.0 - 300.0 mOsmol/kg   Procalcitonin   Result Value Ref Range    Procalcitonin 0.18 (H) 0.01 - 0.09 ng/mL   Ferritin   Result 18.0 gm/dl    Hematocrit 23.0 (L) 42.0 - 52.0 %   Basic Metabolic Panel   Result Value Ref Range    Sodium 140 135 - 145 meq/L    Potassium 3.6 3.5 - 5.2 meq/L    Chloride 102 98 - 111 meq/L    CO2 27 23 - 33 meq/L    Glucose 133 (H) 70 - 108 mg/dL    BUN 12 7 - 22 mg/dL    Creatinine 1.2 0.4 - 1.2 mg/dL    Calcium 8.4 (L) 8.5 - 10.5 mg/dL   CBC with Auto Differential   Result Value Ref Range    WBC 9.1 4.8 - 10.8 thou/mm3    RBC 3.23 (L) 4.70 - 6.10 mill/mm3    Hemoglobin 8.1 (L) 14.0 - 18.0 gm/dl    Hematocrit 27.1 (L) 42.0 - 52.0 %    MCV 83.9 80.0 - 94.0 fL    MCH 25.1 (L) 26.0 - 33.0 pg    MCHC 29.9 (L) 32.2 - 35.5 gm/dl    RDW-CV 16.2 (H) 11.5 - 14.5 %    RDW-SD 49.4 (H) 35.0 - 45.0 fL    Platelets 051 194 - 593 thou/mm3    MPV 10.2 9.4 - 12.4 fL    Seg Neutrophils 75.5 %    Lymphocytes 13.6 %    Monocytes 7.4 %    Eosinophils 2.2 %    Basophils 0.4 %    Immature Granulocytes 0.9 %    Segs Absolute 6.9 1.8 - 7.7 thou/mm3    Lymphocytes Absolute 1.2 1.0 - 4.8 thou/mm3    Monocytes Absolute 0.7 0.4 - 1.3 thou/mm3    Eosinophils Absolute 0.2 0.0 - 0.4 thou/mm3    Basophils Absolute 0.0 0.0 - 0.1 thou/mm3    Immature Grans (Abs) 0.08 (H) 0.00 - 0.07 thou/mm3    nRBC 0 /100 wbc   Glomerular Filtration Rate, Estimated   Result Value Ref Range    Est, Glom Filt Rate >60 >60 ml/min/1.73m2   Anion Gap   Result Value Ref Range    Anion Gap 11.0 8.0 - 16.0 meq/L   Blood occult stool screen #1   Result Value Ref Range    Occult Blood Fecal Negative    Hepatitis B Surface Antibody   Result Value Ref Range    Hep B S Ab Negative    Hepatitis C Antibody   Result Value Ref Range    Hepatitis C Ab Negative    Hepatitis B Surface Antigen   Result Value Ref Range    Hepatitis B Surface Ag Negative    Hepatitis A Antibody, Total   Result Value Ref Range    Hep A Total Ab REACTIVE (A) NR   Basic Metabolic Panel   Result Value Ref Range    Sodium 140 135 - 145 meq/L    Potassium 3.8 3.5 - 5.2 meq/L    Chloride 102 98 - 111 meq/L CO2 27 23 - 33 meq/L    Glucose 120 (H) 70 - 108 mg/dL    BUN 12 7 - 22 mg/dL    Creatinine 1.1 0.4 - 1.2 mg/dL    Calcium 8.4 (L) 8.5 - 10.5 mg/dL   CBC with Auto Differential   Result Value Ref Range    WBC 8.4 4.8 - 10.8 thou/mm3    RBC 3.42 (L) 4.70 - 6.10 mill/mm3    Hemoglobin 8.7 (L) 14.0 - 18.0 gm/dl    Hematocrit 28.7 (L) 42.0 - 52.0 %    MCV 83.9 80.0 - 94.0 fL    MCH 25.4 (L) 26.0 - 33.0 pg    MCHC 30.3 (L) 32.2 - 35.5 gm/dl    RDW-CV 16.3 (H) 11.5 - 14.5 %    RDW-SD 49.5 (H) 35.0 - 45.0 fL    Platelets 287 302 - 657 thou/mm3    MPV 10.0 9.4 - 12.4 fL    Seg Neutrophils 77.2 %    Lymphocytes 11.6 %    Monocytes 7.2 %    Eosinophils 2.5 %    Basophils 0.6 %    Immature Granulocytes 0.9 %    Segs Absolute 6.5 1.8 - 7.7 thou/mm3    Lymphocytes Absolute 1.0 1.0 - 4.8 thou/mm3    Monocytes Absolute 0.6 0.4 - 1.3 thou/mm3    Eosinophils Absolute 0.2 0.0 - 0.4 thou/mm3    Basophils Absolute 0.1 0.0 - 0.1 thou/mm3    Immature Grans (Abs) 0.08 (H) 0.00 - 0.07 thou/mm3    nRBC 0 /100 wbc   Glomerular Filtration Rate, Estimated   Result Value Ref Range    Est, Glom Filt Rate >60 >60 ml/min/1.73m2   Anion Gap   Result Value Ref Range    Anion Gap 11.0 8.0 - 16.0 meq/L   Hepatitis A Antibody, IgM   Result Value Ref Range    Hep A IgM Negative    Basic Metabolic Panel   Result Value Ref Range    Sodium 142 135 - 145 meq/L    Potassium 3.8 3.5 - 5.2 meq/L    Chloride 103 98 - 111 meq/L    CO2 26 23 - 33 meq/L    Glucose 150 (H) 70 - 108 mg/dL    BUN 14 7 - 22 mg/dL    Creatinine 1.1 0.4 - 1.2 mg/dL    Calcium 8.8 8.5 - 10.5 mg/dL   CBC with Auto Differential   Result Value Ref Range    WBC 7.7 4.8 - 10.8 thou/mm3    RBC 3.58 (L) 4.70 - 6.10 mill/mm3    Hemoglobin 8.8 (L) 14.0 - 18.0 gm/dl    Hematocrit 29.8 (L) 42.0 - 52.0 %    MCV 83.2 80.0 - 94.0 fL    MCH 24.6 (L) 26.0 - 33.0 pg    MCHC 29.5 (L) 32.2 - 35.5 gm/dl    RDW-CV 16.6 (H) 11.5 - 14.5 %    RDW-SD 50.1 (H) 35.0 - 45.0 fL    Platelets 579 206 - 517 thou/mm3 MPV 10.0 9.4 - 12.4 fL    Seg Neutrophils 79.0 %    Lymphocytes 8.0 %    Monocytes 9.3 %    Eosinophils 2.6 %    Basophils 0.4 %    Immature Granulocytes 0.7 %    Segs Absolute 6.1 1.8 - 7.7 thou/mm3    Lymphocytes Absolute 0.6 (L) 1.0 - 4.8 thou/mm3    Monocytes Absolute 0.7 0.4 - 1.3 thou/mm3    Eosinophils Absolute 0.2 0.0 - 0.4 thou/mm3    Basophils Absolute 0.0 0.0 - 0.1 thou/mm3    Immature Grans (Abs) 0.05 0.00 - 0.07 thou/mm3    nRBC 0 /100 wbc   Glomerular Filtration Rate, Estimated   Result Value Ref Range    Est, Glom Filt Rate >60 >60 ml/min/1.73m2   Calcium, Ionized   Result Value Ref Range    Calcium, Ionized 1.12 1.12 - 1.32 mmol/L   Phosphorus   Result Value Ref Range    Phosphorus 3.9 2.4 - 4.7 mg/dL   Magnesium   Result Value Ref Range    Magnesium 1.9 1.6 - 2.4 mg/dL   Basic Metabolic Panel   Result Value Ref Range    Sodium 138 135 - 145 meq/L    Potassium 3.7 3.5 - 5.2 meq/L    Chloride 101 98 - 111 meq/L    CO2 27 23 - 33 meq/L    Glucose 163 (H) 70 - 108 mg/dL    BUN 14 7 - 22 mg/dL    Creatinine 1.1 0.4 - 1.2 mg/dL    Calcium 8.1 (L) 8.5 - 10.5 mg/dL   CBC with Auto Differential   Result Value Ref Range    WBC 8.4 4.8 - 10.8 thou/mm3    RBC 3.55 (L) 4.70 - 6.10 mill/mm3    Hemoglobin 9.0 (L) 14.0 - 18.0 gm/dl    Hematocrit 29.8 (L) 42.0 - 52.0 %    MCV 83.9 80.0 - 94.0 fL    MCH 25.4 (L) 26.0 - 33.0 pg    MCHC 30.2 (L) 32.2 - 35.5 gm/dl    RDW-CV 16.7 (H) 11.5 - 14.5 %    RDW-SD 50.8 (H) 35.0 - 45.0 fL    Platelets 244 328 - 743 thou/mm3    MPV 9.9 9.4 - 12.4 fL    Seg Neutrophils 77.6 %    Lymphocytes 10.5 %    Monocytes 8.2 %    Eosinophils 2.6 %    Basophils 0.5 %    Immature Granulocytes 0.6 %    Segs Absolute 6.5 1.8 - 7.7 thou/mm3    Lymphocytes Absolute 0.9 (L) 1.0 - 4.8 thou/mm3    Monocytes Absolute 0.7 0.4 - 1.3 thou/mm3    Eosinophils Absolute 0.2 0.0 - 0.4 thou/mm3    Basophils Absolute 0.0 0.0 - 0.1 thou/mm3    Immature Grans (Abs) 0.05 0.00 - 0.07 thou/mm3    nRBC 0 /100 wbc Glomerular Filtration Rate, Estimated   Result Value Ref Range    Est, Glom Filt Rate >60 >60 ml/min/1.73m2   Troponin   Result Value Ref Range    Troponin T 0.031 (A) ng/ml   Anion Gap   Result Value Ref Range    Anion Gap 10.0 8.0 - 16.0 meq/L   Anion Gap   Result Value Ref Range    Anion Gap 13.0 8.0 - 16.0 meq/L   POCT glucose   Result Value Ref Range    POC Glucose 174 (H) 70 - 108 mg/dl   POCT glucose   Result Value Ref Range    POC Glucose 231 (H) 70 - 108 mg/dl   POCT glucose   Result Value Ref Range    POC Glucose 268 (H) 70 - 108 mg/dl   POCT glucose   Result Value Ref Range    POC Glucose 207 (H) 70 - 108 mg/dl   POCT glucose   Result Value Ref Range    POC Glucose 220 (H) 70 - 108 mg/dl   POCT glucose   Result Value Ref Range    POC Glucose 138 (H) 70 - 108 mg/dl   POCT glucose   Result Value Ref Range    POC Glucose 170 (H) 70 - 108 mg/dl   POCT glucose   Result Value Ref Range    POC Glucose 202 (H) 70 - 108 mg/dl   POCT glucose   Result Value Ref Range    POC Glucose 148 (H) 70 - 108 mg/dl   POCT glucose   Result Value Ref Range    POC Glucose 119 (H) 70 - 108 mg/dl   POCT glucose   Result Value Ref Range    POC Glucose 163 (H) 70 - 108 mg/dl   POCT glucose   Result Value Ref Range    POC Glucose 143 (H) 70 - 108 mg/dl   POCT glucose   Result Value Ref Range    POC Glucose 159 (H) 70 - 108 mg/dl   POCT glucose   Result Value Ref Range    POC Glucose 152 (H) 70 - 108 mg/dl   POCT glucose   Result Value Ref Range    POC Glucose 136 (H) 70 - 108 mg/dl   EKG 12 Lead   Result Value Ref Range    Ventricular Rate 92 BPM    Atrial Rate 92 BPM    P-R Interval 186 ms    QRS Duration 140 ms    Q-T Interval 412 ms    QTc Calculation (Bazett) 509 ms    P Axis 73 degrees    R Axis 16 degrees    T Axis 43 degrees   EKG 12 Lead   Result Value Ref Range    Ventricular Rate 76 BPM    Atrial Rate 76 BPM    P-R Interval 166 ms    QRS Duration 142 ms    Q-T Interval 446 ms    QTc Calculation (Bazett) 501 ms    P Axis 47 degrees    R Axis 3 degrees    T Axis 40 degrees   EKG 12 Lead   Result Value Ref Range    Ventricular Rate 89 BPM    Atrial Rate 89 BPM    P-R Interval 184 ms    QRS Duration 144 ms    Q-T Interval 430 ms    QTc Calculation (Bazett) 523 ms    P Axis 68 degrees    R Axis 37 degrees    T Axis 71 degrees   EKG 12 Lead   Result Value Ref Range    Ventricular Rate 95 BPM    Atrial Rate 95 BPM    P-R Interval 178 ms    QRS Duration 138 ms    Q-T Interval 412 ms    QTc Calculation (Bazett) 517 ms    P Axis 72 degrees    R Axis 27 degrees    T Axis 59 degrees   TYPE AND SCREEN   Result Value Ref Range    ABO O     Rh Factor POS     Antibody Screen NEG        Exam:   Posterior splint dressing soiled and dirty to LLE. Dermatologic: full thickness ulcer to the plantar aspect of left foot. Base is fibro-granular with some slough and a hyperkeratotic rim noted. No surrounding erythema or edema. No purulence or malodor. Wound does not probe to bone, track, or undermine. Vascular: Dorsalis pedis and posterior tibial pulses are non palpable bilaterally. CFT brisk to TMA stump. Neurovascular: light touch and protective sensation absent to bilateral feet      Musculoskeletal: no pain to palpation. Left foot TMA. Right foot and ankle in rectus position. XRAY:  XR CHEST PORTABLE    Result Date: 11/17/2022  Exam: 1 View of the chest Comparison: 6/29/2022 Findings: Prior median sternotomy Cardiac silhouette is not enlarged. No pneumothorax. No pleural fluid collection. Prominence to the interstitium appear similar to the prior exam. Mildly increased density at the right lung base could indicate atelectasis or pneumonia     Impression: 1. Interstitial prominence appear similar to prior. Mildly increased density at the right lung base could represent atelectasis or pneumonia.  This document has been electronically signed by: Aaliyah Curry MD on 11/17/2022 08:09 PM     LABS:   Results for orders placed or performed during the hospital encounter of 11/17/22   CBC with Auto Differential   Result Value Ref Range    WBC 9.9 4.8 - 10.8 thou/mm3    RBC 3.08 (L) 4.70 - 6.10 mill/mm3    Hemoglobin 7.7 (L) 14.0 - 18.0 gm/dl    Hematocrit 25.6 (L) 42.0 - 52.0 %    MCV 83.1 80.0 - 94.0 fL    MCH 25.0 (L) 26.0 - 33.0 pg    MCHC 30.1 (L) 32.2 - 35.5 gm/dl    RDW-CV 16.3 (H) 11.5 - 14.5 %    RDW-SD 49.2 (H) 35.0 - 45.0 fL    Platelets 514 637 - 659 thou/mm3    MPV 9.8 9.4 - 12.4 fL    Seg Neutrophils 75.5 %    Lymphocytes 10.7 %    Monocytes 10.6 %    Eosinophils 1.7 %    Basophils 0.4 %    Immature Granulocytes 1.1 %    Segs Absolute 7.5 1.8 - 7.7 thou/mm3    Lymphocytes Absolute 1.1 1.0 - 4.8 thou/mm3    Monocytes Absolute 1.0 0.4 - 1.3 thou/mm3    Eosinophils Absolute 0.2 0.0 - 0.4 thou/mm3    Basophils Absolute 0.0 0.0 - 0.1 thou/mm3    Immature Grans (Abs) 0.11 (H) 0.00 - 0.07 thou/mm3    nRBC 0 /100 wbc   BMP   Result Value Ref Range    Sodium 139 135 - 145 meq/L    Potassium 3.6 3.5 - 5.2 meq/L    Chloride 102 98 - 111 meq/L    CO2 26 23 - 33 meq/L    Glucose 209 (H) 70 - 108 mg/dL    BUN 11 7 - 22 mg/dL    Creatinine 1.0 0.4 - 1.2 mg/dL    Calcium 8.4 (L) 8.5 - 10.5 mg/dL   Hepatic Function Panel   Result Value Ref Range    Albumin 2.6 (L) 3.5 - 5.1 g/dL    Total Bilirubin 0.2 (L) 0.3 - 1.2 mg/dL    Bilirubin, Direct <0.2 0.0 - 0.3 mg/dL    Alkaline Phosphatase 105 38 - 126 U/L    AST 39 5 - 40 U/L    ALT 63 11 - 66 U/L    Total Protein 6.3 6.1 - 8.0 g/dL   Troponin   Result Value Ref Range    Troponin T 0.040 (A) ng/ml   Brain Natriuretic Peptide   Result Value Ref Range    Pro-BNP 2665.0 (H) 0.0 - 900.0 pg/mL   Glomerular Filtration Rate, Estimated   Result Value Ref Range    Est, Glom Filt Rate >60 >60 ml/min/1.73m2   Anion Gap   Result Value Ref Range    Anion Gap 11.0 8.0 - 16.0 meq/L   Osmolality   Result Value Ref Range    Osmolality Calc 283.1 275.0 - 300.0 mOsmol/kg   Procalcitonin   Result Value Ref Range Procalcitonin 0.18 (H) 0.01 - 0.09 ng/mL   Ferritin   Result Value Ref Range    Ferritin 130 22 - 322 ng/mL   Reticulocytes   Result Value Ref Range    Retic Ct Abs 2.5 (H) 0.5 - 2.0 %    Absolute Retic # 72.0 20.0 - 115.0 thou/mm3    Immature Retic Fract 34.7 (H) 2.3 - 13.4 %    Retic Hemoglobin 21.3 (L) 28.2 - 35.7 pg   CBC with Auto Differential   Result Value Ref Range    WBC 7.7 4.8 - 10.8 thou/mm3    RBC 2.90 (L) 4.70 - 6.10 mill/mm3    Hemoglobin 7.2 (L) 14.0 - 18.0 gm/dl    Hematocrit 24.3 (L) 42.0 - 52.0 %    MCV 83.8 80.0 - 94.0 fL    MCH 24.8 (L) 26.0 - 33.0 pg    MCHC 29.6 (L) 32.2 - 35.5 gm/dl    RDW-CV 16.3 (H) 11.5 - 14.5 %    RDW-SD 49.2 (H) 35.0 - 45.0 fL    Platelets 653 967 - 272 thou/mm3    MPV 10.2 9.4 - 12.4 fL    Seg Neutrophils 73.4 %    Lymphocytes 12.9 %    Monocytes 9.7 %    Eosinophils 2.5 %    Basophils 0.7 %    Immature Granulocytes 0.8 %    Segs Absolute 5.7 1.8 - 7.7 thou/mm3    Lymphocytes Absolute 1.0 1.0 - 4.8 thou/mm3    Monocytes Absolute 0.7 0.4 - 1.3 thou/mm3    Eosinophils Absolute 0.2 0.0 - 0.4 thou/mm3    Basophils Absolute 0.1 0.0 - 0.1 thou/mm3    Immature Grans (Abs) 0.06 0.00 - 0.07 thou/mm3    nRBC 0 /100 wbc   Troponin   Result Value Ref Range    Troponin T 0.035 (A) ng/ml   Magnesium   Result Value Ref Range    Magnesium 1.9 1.6 - 2.4 mg/dL   Basic Metabolic Panel   Result Value Ref Range    Sodium 142 135 - 145 meq/L    Potassium 3.4 (L) 3.5 - 5.2 meq/L    Chloride 104 98 - 111 meq/L    CO2 28 23 - 33 meq/L    Glucose 165 (H) 70 - 108 mg/dL    BUN 11 7 - 22 mg/dL    Creatinine 1.1 0.4 - 1.2 mg/dL    Calcium 8.3 (L) 8.5 - 10.5 mg/dL   Glomerular Filtration Rate, Estimated   Result Value Ref Range    Est, Glom Filt Rate >60 >60 ml/min/1.73m2   Anion Gap   Result Value Ref Range    Anion Gap 10.0 8.0 - 16.0 meq/L   Nasal Complete Screen RT-PCR   Result Value Ref Range    MRSA Nasal Screen RT-PCR NEGATIVE     Staph Aureus Screen RT-PCR NEGATIVE    Hemoglobin and Hematocrit   Result Value Ref Range    Hemoglobin 6.7 (LL) 14.0 - 18.0 gm/dl    Hematocrit 23.0 (L) 42.0 - 52.0 %   Basic Metabolic Panel   Result Value Ref Range    Sodium 140 135 - 145 meq/L    Potassium 3.6 3.5 - 5.2 meq/L    Chloride 102 98 - 111 meq/L    CO2 27 23 - 33 meq/L    Glucose 133 (H) 70 - 108 mg/dL    BUN 12 7 - 22 mg/dL    Creatinine 1.2 0.4 - 1.2 mg/dL    Calcium 8.4 (L) 8.5 - 10.5 mg/dL   CBC with Auto Differential   Result Value Ref Range    WBC 9.1 4.8 - 10.8 thou/mm3    RBC 3.23 (L) 4.70 - 6.10 mill/mm3    Hemoglobin 8.1 (L) 14.0 - 18.0 gm/dl    Hematocrit 27.1 (L) 42.0 - 52.0 %    MCV 83.9 80.0 - 94.0 fL    MCH 25.1 (L) 26.0 - 33.0 pg    MCHC 29.9 (L) 32.2 - 35.5 gm/dl    RDW-CV 16.2 (H) 11.5 - 14.5 %    RDW-SD 49.4 (H) 35.0 - 45.0 fL    Platelets 101 412 - 574 thou/mm3    MPV 10.2 9.4 - 12.4 fL    Seg Neutrophils 75.5 %    Lymphocytes 13.6 %    Monocytes 7.4 %    Eosinophils 2.2 %    Basophils 0.4 %    Immature Granulocytes 0.9 %    Segs Absolute 6.9 1.8 - 7.7 thou/mm3    Lymphocytes Absolute 1.2 1.0 - 4.8 thou/mm3    Monocytes Absolute 0.7 0.4 - 1.3 thou/mm3    Eosinophils Absolute 0.2 0.0 - 0.4 thou/mm3    Basophils Absolute 0.0 0.0 - 0.1 thou/mm3    Immature Grans (Abs) 0.08 (H) 0.00 - 0.07 thou/mm3    nRBC 0 /100 wbc   Glomerular Filtration Rate, Estimated   Result Value Ref Range    Est, Glom Filt Rate >60 >60 ml/min/1.73m2   Anion Gap   Result Value Ref Range    Anion Gap 11.0 8.0 - 16.0 meq/L   Blood occult stool screen #1   Result Value Ref Range    Occult Blood Fecal Negative    Hepatitis B Surface Antibody   Result Value Ref Range    Hep B S Ab Negative    Hepatitis C Antibody   Result Value Ref Range    Hepatitis C Ab Negative    Hepatitis B Surface Antigen   Result Value Ref Range    Hepatitis B Surface Ag Negative    Hepatitis A Antibody, Total   Result Value Ref Range    Hep A Total Ab REACTIVE (A) NR   Basic Metabolic Panel   Result Value Ref Range    Sodium 140 135 - 145 meq/L Potassium 3.8 3.5 - 5.2 meq/L    Chloride 102 98 - 111 meq/L    CO2 27 23 - 33 meq/L    Glucose 120 (H) 70 - 108 mg/dL    BUN 12 7 - 22 mg/dL    Creatinine 1.1 0.4 - 1.2 mg/dL    Calcium 8.4 (L) 8.5 - 10.5 mg/dL   CBC with Auto Differential   Result Value Ref Range    WBC 8.4 4.8 - 10.8 thou/mm3    RBC 3.42 (L) 4.70 - 6.10 mill/mm3    Hemoglobin 8.7 (L) 14.0 - 18.0 gm/dl    Hematocrit 28.7 (L) 42.0 - 52.0 %    MCV 83.9 80.0 - 94.0 fL    MCH 25.4 (L) 26.0 - 33.0 pg    MCHC 30.3 (L) 32.2 - 35.5 gm/dl    RDW-CV 16.3 (H) 11.5 - 14.5 %    RDW-SD 49.5 (H) 35.0 - 45.0 fL    Platelets 998 640 - 116 thou/mm3    MPV 10.0 9.4 - 12.4 fL    Seg Neutrophils 77.2 %    Lymphocytes 11.6 %    Monocytes 7.2 %    Eosinophils 2.5 %    Basophils 0.6 %    Immature Granulocytes 0.9 %    Segs Absolute 6.5 1.8 - 7.7 thou/mm3    Lymphocytes Absolute 1.0 1.0 - 4.8 thou/mm3    Monocytes Absolute 0.6 0.4 - 1.3 thou/mm3    Eosinophils Absolute 0.2 0.0 - 0.4 thou/mm3    Basophils Absolute 0.1 0.0 - 0.1 thou/mm3    Immature Grans (Abs) 0.08 (H) 0.00 - 0.07 thou/mm3    nRBC 0 /100 wbc   Glomerular Filtration Rate, Estimated   Result Value Ref Range    Est, Glom Filt Rate >60 >60 ml/min/1.73m2   Anion Gap   Result Value Ref Range    Anion Gap 11.0 8.0 - 16.0 meq/L   Hepatitis A Antibody, IgM   Result Value Ref Range    Hep A IgM Negative    Basic Metabolic Panel   Result Value Ref Range    Sodium 142 135 - 145 meq/L    Potassium 3.8 3.5 - 5.2 meq/L    Chloride 103 98 - 111 meq/L    CO2 26 23 - 33 meq/L    Glucose 150 (H) 70 - 108 mg/dL    BUN 14 7 - 22 mg/dL    Creatinine 1.1 0.4 - 1.2 mg/dL    Calcium 8.8 8.5 - 10.5 mg/dL   CBC with Auto Differential   Result Value Ref Range    WBC 7.7 4.8 - 10.8 thou/mm3    RBC 3.58 (L) 4.70 - 6.10 mill/mm3    Hemoglobin 8.8 (L) 14.0 - 18.0 gm/dl    Hematocrit 29.8 (L) 42.0 - 52.0 %    MCV 83.2 80.0 - 94.0 fL    MCH 24.6 (L) 26.0 - 33.0 pg    MCHC 29.5 (L) 32.2 - 35.5 gm/dl    RDW-CV 16.6 (H) 11.5 - 14.5 % Immature Grans (Abs) 0.05 0.00 - 0.07 thou/mm3    nRBC 0 /100 wbc   Glomerular Filtration Rate, Estimated   Result Value Ref Range    Est, Glom Filt Rate >60 >60 ml/min/1.73m2   Troponin   Result Value Ref Range    Troponin T 0.031 (A) ng/ml   Anion Gap   Result Value Ref Range    Anion Gap 10.0 8.0 - 16.0 meq/L   Anion Gap   Result Value Ref Range    Anion Gap 13.0 8.0 - 16.0 meq/L   POCT glucose   Result Value Ref Range    POC Glucose 174 (H) 70 - 108 mg/dl   POCT glucose   Result Value Ref Range    POC Glucose 231 (H) 70 - 108 mg/dl   POCT glucose   Result Value Ref Range    POC Glucose 268 (H) 70 - 108 mg/dl   POCT glucose   Result Value Ref Range    POC Glucose 207 (H) 70 - 108 mg/dl   POCT glucose   Result Value Ref Range    POC Glucose 220 (H) 70 - 108 mg/dl   POCT glucose   Result Value Ref Range    POC Glucose 138 (H) 70 - 108 mg/dl   POCT glucose   Result Value Ref Range    POC Glucose 170 (H) 70 - 108 mg/dl   POCT glucose   Result Value Ref Range    POC Glucose 202 (H) 70 - 108 mg/dl   POCT glucose   Result Value Ref Range    POC Glucose 148 (H) 70 - 108 mg/dl   POCT glucose   Result Value Ref Range    POC Glucose 119 (H) 70 - 108 mg/dl   POCT glucose   Result Value Ref Range    POC Glucose 163 (H) 70 - 108 mg/dl   POCT glucose   Result Value Ref Range    POC Glucose 143 (H) 70 - 108 mg/dl   POCT glucose   Result Value Ref Range    POC Glucose 159 (H) 70 - 108 mg/dl   POCT glucose   Result Value Ref Range    POC Glucose 152 (H) 70 - 108 mg/dl   POCT glucose   Result Value Ref Range    POC Glucose 136 (H) 70 - 108 mg/dl   EKG 12 Lead   Result Value Ref Range    Ventricular Rate 92 BPM    Atrial Rate 92 BPM    P-R Interval 186 ms    QRS Duration 140 ms    Q-T Interval 412 ms    QTc Calculation (Bazett) 509 ms    P Axis 73 degrees    R Axis 16 degrees    T Axis 43 degrees   EKG 12 Lead   Result Value Ref Range    Ventricular Rate 76 BPM    Atrial Rate 76 BPM    P-R Interval 166 ms    QRS Duration 142 ms Q-T Interval 446 ms    QTc Calculation (Bazett) 501 ms    P Axis 47 degrees    R Axis 3 degrees    T Axis 40 degrees   EKG 12 Lead   Result Value Ref Range    Ventricular Rate 89 BPM    Atrial Rate 89 BPM    P-R Interval 184 ms    QRS Duration 144 ms    Q-T Interval 430 ms    QTc Calculation (Bazett) 523 ms    P Axis 68 degrees    R Axis 37 degrees    T Axis 71 degrees   EKG 12 Lead   Result Value Ref Range    Ventricular Rate 95 BPM    Atrial Rate 95 BPM    P-R Interval 178 ms    QRS Duration 138 ms    Q-T Interval 412 ms    QTc Calculation (Bazett) 517 ms    P Axis 72 degrees    R Axis 27 degrees    T Axis 59 degrees   TYPE AND SCREEN   Result Value Ref Range    ABO O     Rh Factor POS     Antibody Screen NEG            Assessment  Principle  Non-healing wound, LLE  S/P Left TMA      Plan  Patient initially examined and evaluated  WBC 7.7; patient afebrile  Wound care provided with betadine, gauze, ABD pads, Kerlix to LLE  Instructed patient to bear weight as tolerated to LLE on heel only  No antibiotic therapy indicated at this time  Instructed patient to keep LLE elevated whenever possible  Podiatry will continue to follow patient    DISPO: Continue conservative wound care    Dr. Padmini Ngo thank you for the consultation, allowing podiatry to assist in the medical welfare of this patient. Podiatry will continue to follow this patient throughout the duration of hospitalization.    HAFSA [facility]  11/21/2022 3:22 PM

## 2022-11-21 NOTE — PROGRESS NOTES
Echo canceled, patient had one at [address] [place] [address] on 11-3-22. Report is under care everywhere.

## 2022-11-21 NOTE — PROGRESS NOTES
Cardiology Progress Note      Patient:  Vamshi Caceres  YOB: 1960  MRN: [id number]   Acct: [de-identified]  Admit Date:  11/17/2022  Primary Cardiologist: Pee Rich MD  Per Dr Calin Fisher note:  \" CHIEF COMPLAINT: Uncontrolled HTN     HPI: This is a pleasant 65 y.o. male presents with uncontrolled HTN. PMH is significant for CAD s/p CABG in 11/2019, PAD with multiple toes amputated, HFpEF, Type 2 DM, HTN, HLD, carotid artery stenosis. Cardiology was consulted for uncontrolled HTN and newly elevated troponin     The patient states that he first noticed severely elevated BP with SBP > 200 while he was at [address] [place] [address] about 1.5-2 weeks ago, where he was supposed to get surgery for carotid artery stenosis. The surgery was delayed because of his severely elevated BP. No ischemic workup was performed at [address] [place] [address] due to negative troponins, although he left AMA with SBP in the 180s before his HTN could be fully treated. He presented to [facility] for this severe HTN and also because of progressively worsening SOB for 1 week which was associated with clear white sputum, chills, and pleuritic chest pain. This SOB and chest pain improved significantly with 12 hours of BIPAP, after which he denies any chest pain or SOB at this time. The patient states that he has a mild headache that has improved with control of his BP. He denies any current chest pain, SOB, nausea, vomiting, dizziness, worsening of his chronic leg swelling, or any other symptoms. He insists that he has been compliant with all of his medications. He quit smoking 15 years ago, though he admits to smoking 2-3 PPD for 25 years. He denies drinking or illicit drug use. \"    Subjective (Events in last 24 hours):   Pt awake, alert. NAD. Some chest tightness occ. Some sob at times, usually in the morning. Usually resolves after lasix dosing. C/o some intermittent fatigue at times.      Was alerted by nursing staff to resistant HTN, still running 180s and to now having bradycardia episodes. Telemetry was reviewed and appears to be 2nd degree type 2 AVB. Telemetry reviewed with Cecile Carrillo, who agrees.      Objective:   BP (!) 185/67   Pulse 94   Temp 99.3 °F (37.4 °C) (Oral)   Resp 22   Ht 6' 2\" (1.88 m)   Wt (!) 333 lb 7 oz (151.2 kg)   SpO2 94%   BMI 42.81 kg/m²      vss  TELEMETRY: nsr with intermittent 2nd degree AVB     Physical Exam:  General Appearance: alert and oriented to person, place and time, in no acute distress  Cardiovascular: normal rate, irregular rhythm, normal S1 and S2, no murmurs, rubs, clicks, or gallops, distal pulses intact, no carotid bruits, no JVD  Pulmonary/Chest: clear to auscultation bilaterally- no wheezes, rales or rhonchi, normal air movement, no respiratory distress  Abdomen: soft, non-tender, non-distended, normal bowel sounds, no masses   Extremities: no cyanosis, clubbing, mild bilat LE edema, pulse   Skin: warm and dry, no rash or erythema  Neurological: alert, oriented, normal speech, no focal findings or movement disorder noted    Medications:    minoxidil  5 mg Oral Daily    [Held by provider] metoprolol tartrate  50 mg Oral BID    amLODIPine  10 mg Oral Daily    pantoprazole  40 mg IntraVENous BID    [Held by provider] hydrALAZINE  50 mg Oral 3 times per day    [Held by provider] aspirin  81 mg Oral Daily    atorvastatin  80 mg Oral Daily    [Held by provider] clopidogrel  75 mg Oral Daily    furosemide  40 mg Oral Daily    gabapentin  600 mg Oral BID    multivitamin  1 tablet Oral Daily    insulin lispro  0-8 Units SubCUTAneous TID     insulin lispro  0-4 Units SubCUTAneous Nightly    insulin glargine  30 Units SubCUTAneous Daily    ferrous sulfate  325 mg Oral BID WC    valsartan  320 mg Oral Daily    sodium chloride flush  5-40 mL IntraVENous 2 times per day    [Held by provider] enoxaparin  40 mg SubCUTAneous Daily    sennosides-docusate sodium  1 tablet Oral BID      dextrose      sodium chloride       hydrALAZINE, 10 mg, Q6H PRN  melatonin, 4.5 mg, Nightly PRN  albuterol sulfate HFA, 2 puff, 4x Daily PRN  glucose, 4 tablet, PRN  dextrose bolus, 125 mL, PRN   Or  dextrose bolus, 250 mL, PRN  glucagon (rDNA), 1 mg, PRN  dextrose, , Continuous PRN  LORazepam, 0.5 mg, Q6H PRN  sodium chloride flush, 5-40 mL, PRN  sodium chloride, , PRN  ondansetron, 4 mg, Q8H PRN   Or  ondansetron, 4 mg, Q6H PRN  polyethylene glycol, 17 g, Daily PRN  acetaminophen, 650 mg, Q6H PRN   Or  acetaminophen, 650 mg, Q6H PRN  potassium chloride, 40 mEq, PRN   Or  potassium alternative oral replacement, 40 mEq, PRN   Or  potassium chloride, 10 mEq, PRN  potassium chloride, 10 mEq, PRN  magnesium sulfate, 2,000 mg, PRN        Diagnostics:  EKG:     Echo:     Stress:   Imaging Results:Calculated gated LVEF 47 %. The T.I.D. ratio was 0.89 . There is mild attenuation artifact noted in the inferolateral wall seems to  be related to bowel artifact. This study was negative for ischemia. Conclusions      Summary   There is mild attenuation artifact noted in the inferolateral wall seems   to be related to bowel artifact. This study was negative for ischemia. Recommendation   Clinical correlation is recommended. Signatures      ----------------------------------------------------------------   Electronically signed by Sharmaine Oropeza MD (Interpreting   Cardiologist) on 08/30/2022   Left Heart Cath:     Lab Data:    Cardiac Enzymes:  No results for input(s): CKTOTAL, CKMB, CKMBINDEX, TROPONINI in the last 72 hours.     CBC:   Lab Results   Component Value Date/Time    WBC 7.7 11/21/2022 06:14 AM    RBC 3.58 11/21/2022 06:14 AM    HGB 8.8 11/21/2022 06:14 AM    HCT 29.8 11/21/2022 06:14 AM     11/21/2022 06:14 AM       CMP:    Lab Results   Component Value Date/Time     11/21/2022 06:14 AM    K 3.8 11/21/2022 06:14 AM    K 3.8 06/21/2021 06:48 PM     11/21/2022 06:14 AM    CO2 26 11/21/2022 06:14 AM    BUN 14 11/21/2022 06:14 AM    CREATININE 1.1 11/21/2022 06:14 AM    LABGLOM >60 11/20/2022 10:20 PM    GLUCOSE 150 11/21/2022 06:14 AM    GLUCOSE 103 05/25/2020 01:45 PM    CALCIUM 8.8 11/21/2022 06:14 AM       Hepatic Function Panel:    Lab Results   Component Value Date/Time    ALKPHOS 105 11/17/2022 07:40 PM    ALT 63 11/17/2022 07:40 PM    AST 39 11/17/2022 07:40 PM    PROT 6.3 11/17/2022 07:40 PM    BILITOT 0.2 11/17/2022 07:40 PM    BILIDIR <0.2 11/17/2022 07:40 PM    LABALBU 2.6 11/17/2022 07:40 PM       Magnesium:    Lab Results   Component Value Date/Time    MG 1.9 11/20/2022 11:12 PM       PT/INR:    Lab Results   Component Value Date/Time    INR 1.01 10/20/2022 12:04 PM       HgBA1c:    Lab Results   Component Value Date/Time    LABA1C 9.0 11/16/2022 09:28 AM       FLP:    Lab Results   Component Value Date/Time    TRIG 51 11/16/2022 09:28 AM    HDL 39 11/16/2022 09:28 AM    LDLCALC 67 11/16/2022 09:28 AM       TSH:    Lab Results   Component Value Date/Time    TSH 1.350 11/16/2022 09:28 AM         Assessment:  HTN urgency-ongoing, still running high  2nd degree heart block, type 2 most likely--EP consult  Hx of CABG 2019  Negative stress test 08/2022  Anemia  Elevated trop-likely HTN/anemia and demand related  PAD s/p toe amputation  HLD    Plan:  Patient has been on norvasc 10 mg daily, metoprolol 50 mg BID, lasix 40 daily, valsartan 320 daily. Hydralazine being held for trial of minoxidil. Can consider clonidine and/or imdur as alternatives. Agree with renal US to check for CARY. Also, given patient's new intermittent 2nd degree heart block, hold metoprolol. EP consult for consideration/need for a possible pacemaker. This appears to be new for the patient, not documented. Patient has recent negative stress test. No need for further ischemia workup. Case was discussed with DR Juan Henderson. Follow for EP recommendations and HTN management.       Electronically signed by Radu Benson PA-C on 11/21/2022 at 1:54 PM

## 2022-11-21 NOTE — PLAN OF CARE
Problem: Discharge Planning  Goal: Discharge to home or other facility with appropriate resources  Outcome: Progressing  Note: Discharge to home when BP stable under control      Problem: Skin/Tissue Integrity  Goal: Absence of new skin breakdown  Description: 1. Monitor for areas of redness and/or skin breakdown  2. Assess vascular access sites hourly  3. Every 4-6 hours minimum:  Change oxygen saturation probe site  4. Every 4-6 hours:  If on nasal continuous positive airway pressure, respiratory therapy assess nares and determine need for appliance change or resting period. Outcome: Progressing  Note: Skin intact     Problem: Safety - Adult  Goal: Free from fall injury  Outcome: Progressing  Note: Call light is with in reach      Problem: Chronic Conditions and Co-morbidities  Goal: Patient's chronic conditions and co-morbidity symptoms are monitored and maintained or improved  Outcome: Progressing  Note: ACHS  BP medications increased     Problem: Pain  Goal: Verbalizes/displays adequate comfort level or baseline comfort level  Outcome: Progressing  Note: Denies pain     Care plan reviewed with patient. Patient verbalize understanding of the plan of care and contribute to goal setting.

## 2022-11-21 NOTE — CARE COORDINATION
11/21/22, 2:57 PM EST    DISCHARGE ON GOING EVALUATION    Jose Luna day: 1  Location: -21/021-A Reason for admit: Hypertensive emergency [I16.1]  Hypertensive urgency [I16.0]   Procedure: 11/17: CXR: Interstitial prominence appear similar to prior. Mildly increased density at the right lung base could represent atelectasis or pneumonia. Barriers to Discharge: Hgb today 8.8. EGD this afternoon. T 99.3 late morning. BP elevated in 180's today. Norvasc and Lopressor, Diovan. PCP: EYAD Soto CNP  Readmission Risk Score: 16.4%  Patient Goals/Plan/Treatment Preferences: Plans return home alone with supportive family.

## 2022-11-21 NOTE — CONSULTS
[address] ([place]) [id number] [facility]  Dept: [phone]  Cardiac Electrophysiology: Inpatient Consultation Note  Patient's demographics:  Date:   11/21/2022  Patient name:              Aaron Gonsales  YOB: 1960  Sex:    male   MRN:   [id number]    Primary Care Physician:  Liliana Ospina APRN - CNP    Referring Physician:  Andrez Marroquin MD    Reason for Consultation:  Arrhythmia. Clinical Summary:  62/M presented to the emergency room on 11/18/2022 with 1 day history of progressive shortness of Breath and chest tightness. He was noted to have expiratory hypertension (199/93 mmHg), mild troponin elevation (flat) and worsening anemia (Hb 8.1). He was started on IV nitroglycerine, antihypertensive optimized and received IV lasix. Incidentally, he was noted to have short pauses on telemetry. Feeling better. Chest pain and shortness of breath has resolved. Blood pressure has improved. No syncope or dysrhythmia in past. Medical Hx: CAD/CABG x3 (2019), chronic bifascicular block (RBBB and LPFB), carotid artery stenosis planned to have left carotid endarterectomy, HTN, HPL, obesity-III, complicated DM2, chronic normocytic anemia, hx left foot injury and toe amputations and DJD. Review of systems:  Constitutional: Negative for chills and fever  HENT: Negative for congestion, sinus pressure, sneezing and sore throat. Eyes: Negative for pain, discharge, redness and itching. Respiratory: Negative for apnea, cough  Gastrointestinal: Negative for blood in stool, constipation, diarrhea   Endocrine: Negative for cold intolerance, heat intolerance, polydipsia. Genitourinary: Negative for dysuria, enuresis, flank pain and hematuria. Musculoskeletal: Negative for arthralgias, joint swelling and neck pain. Neurological: Negative for numbness and headaches.    Psychiatric/Behavioral: Negative for agitation, confusion, decreased concentration and dysphoric mood.       Past Medical History[de-identified]  Past Medical History:   Diagnosis Date    Arthritis     CAD (coronary artery disease)     CKD (chronic kidney disease), stage II     Diabetes mellitus ([facility] [address]     Hyperlipidemia     Hypertension     Liver disease     HEPITITIS AS A CHILD       Past Surgical History:  Past Surgical History:   Procedure Laterality Date    CARDIAC SURGERY  2019    triple bypass    CYST REMOVAL      RIGHT ANKLE     FOOT DEBRIDEMENT Left 2020    LEFT TRANSMETATARSAL AMPUTATION  FOOT INCISION AND DRAINAGE performed by Krihsan Cobian DPM at [address] [address] Left 2020    LEFT WOUND DEBRIDEMENT WITH WOUND VAC APPLICATION performed by Krishan Cobian DPM at [address] Left 2022    LEFT FOOT [address], APPLICATION SKIN SUBSTITUTE GRAFT, APPLICATION [facility] WOUND VAC performed by Krishan Cobian DPM at [facility] Right     CYST REMOVED    FOOT SURGERY Left 2020    LEFT FOOT PREPARATION GRAFT SITE, HAVEST SPLIT THICKNESS SKIN GRAFT WITH APPLICATION, APPLICATION [facility] WOUND VAC performed by Krishan Cobian DPM at [address]    TOE AMPUTATION Left 3/3/2020    I&D LEFT FOOT, TRANSMETATARSAL AMPUTATION performed by Krishan Cobian DPM at [address]         Family History:  Family History   Problem Relation Age of Onset    Diabetes Mother     High Blood Pressure Mother     Alzheimer's Disease Father          of, 80or 80years old    Heart Disease Father     High Blood Pressure Father     Cancer Neg Hx     Stroke Neg Hx         Social History:  Social History     Socioeconomic History    Marital status: Single   Tobacco Use    Smoking status: Former     Types: Cigarettes     Quit date:      Years since quittin.8    Smokeless tobacco: Never   Vaping Use    Vaping Use: Never used   Substance and Sexual Activity    Alcohol use: Not Currently    Drug use: Never     Social Determinants of Health     Financial Resource Strain: Low Risk     Difficulty of Paying Living Expenses: Not hard at all   Food Insecurity: No Food Insecurity    Worried About [facility] in the Last Year: Never true    [facility] in the Last Year: Never true        Allergies:  No Known Allergies     Medications:  Current Facility-Administered Medications   Medication Dose Route Frequency Provider Last Rate Last Admin    minoxidil (LONITEN) tablet 5 mg  5 mg Oral Daily Becca Mitchell PA-C   5 mg at 11/21/22 1309    [Held by provider] metoprolol tartrate (LOPRESSOR) tablet 50 mg  50 mg Oral BID Delsvetlanaa Mandeep, APRN - CNP   50 mg at 11/21/22 0949    amLODIPine (NORVASC) tablet 10 mg  10 mg Oral Daily Delsvetlanaa Mandeep, APRN - CNP   10 mg at 11/21/22 0949    hydrALAZINE (APRESOLINE) injection 10 mg  10 mg IntraVENous Q6H PRN Katiuska Zaman APRN - CNP   10 mg at 11/21/22 0360    melatonin tablet 4.5 mg  4.5 mg Oral Nightly PRN Brady Atkins PA-C   4.5 mg at 11/20/22 2036    pantoprazole (PROTONIX) injection 40 mg  40 mg IntraVENous BID Mike Armstrong, APRN - CNP   40 mg at 11/21/22 0955    [Held by provider] hydrALAZINE (APRESOLINE) tablet 50 mg  50 mg Oral 3 times per day Jonathana Mandeep, APRN - CNP   50 mg at 11/21/22 0543    albuterol sulfate HFA (PROVENTIL;VENTOLIN;PROAIR) 108 (90 Base) MCG/ACT inhaler 2 puff  2 puff Inhalation 4x Daily PRN Gaetano Carlos MD        [facility]] aspirin chewable tablet 81 mg  81 mg Oral Daily Gaetano Carlos MD   81 mg at 11/18/22 0835    atorvastatin (LIPITOR) tablet 80 mg  80 mg Oral Daily Gaetano Carlos MD   80 mg at 11/21/22 0949    [Held by provider] clopidogrel (PLAVIX) tablet 75 mg  75 mg Oral Daily Gaetano Carlos MD   75 mg at 11/18/22 0836    furosemide (LASIX) tablet 40 mg  40 mg Oral Daily Gaetano Carlos MD   40 mg at 11/21/22 0949    gabapentin (NEURONTIN) tablet 600 mg  600 mg Oral BID Gaetano Carlos MD   600 mg at 11/21/22 8642 multivitamin 1 tablet  1 tablet Oral Daily Jacky Zamorano MD   1 tablet at 11/21/22 0949    glucose chewable tablet 16 g  4 tablet Oral PRN Jacky Zamorano MD        dextrose bolus 10% 125 mL  125 mL IntraVENous PRN Jacky Zamorano MD        Or    dextrose bolus 10% 250 mL  250 mL IntraVENous PRN Jacky Zamorano MD        glucagon (rDNA) injection 1 mg  1 mg SubCUTAneous PRN Jacky Zamorano MD        dextrose 10 % infusion   IntraVENous Continuous PRN Jacky Zamorano MD        LORazepam (ATIVAN) injection 0.5 mg  0.5 mg IntraVENous Q6H PRN Antonette Mason PA-C   0.5 mg at 11/20/22 2044    insulin lispro (HUMALOG) injection vial 0-8 Units  0-8 Units SubCUTAneous TID  EYAD Powers CNP   2 Units at 11/19/22 1756    insulin lispro (HUMALOG) injection vial 0-4 Units  0-4 Units SubCUTAneous Nightly EYAD Powers CNP        insulin glargine (LANTUS) injection vial 30 Units  30 Units SubCUTAneous Daily Jacky Zamorano MD   30 Units at 11/20/22 0932    ferrous sulfate (IRON 325) tablet 325 mg  325 mg Oral BID  EYAD Powers CNP   325 mg at 11/21/22 0949    valsartan (DIOVAN) tablet 320 mg  320 mg Oral Daily Opal Mejía DO   320 mg at 11/21/22 0949    sodium chloride flush 0.9 % injection 5-40 mL  5-40 mL IntraVENous 2 times per day Jacky Zamorano MD   10 mL at 11/21/22 0951    sodium chloride flush 0.9 % injection 5-40 mL  5-40 mL IntraVENous PRN Jacky Zamorano MD   10 mL at 11/19/22 0957    0.9 % sodium chloride infusion   IntraVENous PRN Jacky Zamorano MD        ondansetron (ZOFRAN-ODT) disintegrating tablet 4 mg  4 mg Oral Q8H PRN Jacky Zamorano MD        Or    ondansetron [facility]) injection 4 mg  4 mg IntraVENous Q6H PRN Jacky Zamorano MD   4 mg at 11/18/22 0452    polyethylene glycol (GLYCOLAX) packet 17 g  17 g Oral Daily PRN Jacky Zamorano MD        acetaminophen (TYLENOL) tablet 650 mg  650 mg Oral Q6H PRN Jacky Zamorano MD   650 mg at 11/20/22 4716    Or    acetaminophen (TYLENOL) suppository 650 mg  650 mg Rectal Q6H PRN Osman Hampton MD        [facility]] enoxaparin (LOVENOX) injection 40 mg  40 mg SubCUTAneous Daily Osman Hampton MD   40 mg at 11/18/22 1151    potassium chloride (KLOR-CON M) extended release tablet 40 mEq  40 mEq Oral PRN Osman Hampton MD        Or    potassium bicarb-citric acid (EFFER-K) effervescent tablet 40 mEq  40 mEq Oral PRN Osman Hampton MD        Or    potassium chloride 10 mEq/100 mL IVPB (Peripheral Line)  10 mEq IntraVENous PRN Osman Hampton MD        potassium chloride 10 mEq/100 mL IVPB (Peripheral Line)  10 mEq IntraVENous PRN Osman Hampton MD        magnesium sulfate 2000 mg in 50 mL IVPB premix  2,000 mg IntraVENous PRN Osman Hampton MD        sennosides-docusate sodium (SENOKOT-S) 8.6-50 MG tablet 1 tablet  1 tablet Oral BID Osman Hampton MD   1 tablet at 11/21/22 0950       Physical Examination:  BP (!) 185/67   Pulse 94   Temp 99.3 °F (37.4 °C) (Oral)   Resp 22   Ht 6' 2\" (1.88 m)   Wt (!) 333 lb 7 oz (151.2 kg)   SpO2 94%   BMI 42.81 kg/m²     Intake/Output Summary (Last 24 hours) at 11/21/2022 1347  Last data filed at 11/21/2022 0951  Gross per 24 hour   Intake 220 ml   Output 1000 ml   Net -780 ml     Patient Vitals for the past 96 hrs (Last 3 readings):   Weight   11/18/22 0016 (!) 333 lb 7 oz (151.2 kg)   11/17/22 1904 (!) 310 lb (140.6 kg)     GENERAL: Alert and oriented. No distress. EYES: No pallor or icterus. ENT: No cyanosis. No thyromegaly or cervical LAP. VESSELS: No jugular venous distension or carotid bruits. HEART: Normal S1/S2. No murmur, rub or gallop. LUNGS: Clear to auscultation. ABDOMEN: Soft and non-tender. EXTREMITIES: No lower extremity edema. Feet are warm.    NEUROLOGICAL: Grossly normal.     Laboratory And Diagnostic Data  I have personally reviewed and interpreted the results of the following diagnostic testing    Lab Results   Component Value Date    WBC 7.7 11/21/2022    HGB 8.8 (L) 11/21/2022    HCT 29.8 (L) 11/21/2022     11/21/2022    CHOL 116 11/16/2022    TRIG 51 11/16/2022    HDL 39 11/16/2022    ALT 63 11/17/2022    AST 39 11/17/2022     11/21/2022    K 3.8 11/21/2022     11/21/2022    CREATININE 1.1 11/21/2022    BUN 14 11/21/2022    CO2 26 11/21/2022    TSH 1.350 11/16/2022    INR 1.01 10/20/2022    LABA1C 9.0 (H) 11/16/2022     Echocardiogram 4/17/22: LVEF 50%, LVWT 0.9 cm, LAD/JAKE NA. No significant valvular abnormalities. ECG Sinus rhythm and bifascicular block (RBBB and LPFB). Coronary angiogram NA  MPI stress test 6/29/22: Mild inferolateral attenuation (? Bowel defect). Telemetry : Sinus rhythm frequent non-conducted PACs and runs of atrial tachycardia. Impression:  Non conducted PACs. .  Chronic bifascicular block (RBBB and LPFB). Runs of atrial tachycardia. Accelerated HTN. Chest pain and troponin elevation. CAD/CABG x3 (2019). On DAPT (held). Anemia under evaluation. HPL, obesity and complicated DM2. Assessment And Recommendations:  Restart metoprolol 50 mg bid. 30 day event monitor at discharge. Follow up in EP clinic in 2 weeks after discharge. Thank you for allowing me to participate in the care of your patient. Please call me if you have any questions. **This report has been created using voice recognition software. It may contain minor errors which are inherent in voice recognition technology. **       Electronically signed by Florencia Hussein MD, [facility], Hetal Pagan on 11/21/2022 at 1:47 PM

## 2022-11-21 NOTE — PROGRESS NOTES
Hospitalist Progress Note    Patient:  Leonard Mariano      Unit/Bed:8B-21/021-A    YOB: 1960    MRN: [id number]       Acct: [de-identified]     PCP: EYAD Breen CNP    Date of Admission: 11/17/2022    Assessment/Plan:    Hypertensive urgency--  on Lopressor 25 mg twice a day~increased to 50 mg cardiology   started Norvasc 5 mg daily~increase to 10 mg 11/20   also on Lasix 40 mg daily, hydralazine (currently on hold)  Last echo 11/2- unremarkable for any contributing cause   Order renal artery US   Stress test 8/19/2022 negative   Cardiology following- changed lisinopril to valsartan 320mg- appreciate further recs  Trial of minoxidil- start 5mg once daily and can titrate - will hold hydralazine during this trial as Do not want to over vasodilate  Acute on chronic normocytic anemia--  transfused with 1 unit packed red blood cells with improvement to 8.7 no obvious signs of bleeding  Currently stable in 8s   GI following and planning for  EGD for today -  Possible acute on chronic diastolic heart failure-   on beta-blocker, on Lasix 40 mg daily, BNP at 2665;   Output of 2715 11/21  Last echo 11/3 with normal EF - done at [facility]   Hypokalemia- resolved -  Diabetes mellitus type 2, uncontrolled--on Lantus 30 units daily; sliding scale medium dose before meals and at bedtime; hemoglobin A1c on  11/16/2022 was noted to be 9.0; change diet to ADA  Mild troponin elevation--trending down slightly, lipids noted from November 16, 2022 with LDL at 67; no EKG changes; likely demand mismatch with #1 and #3; Cardiology following- no ischemic workup at this time   Essential hypertension, uncontrolled--see #1   PAD--statin, aspirin, Plavix  CAD status post CABG November 2019--on statin, aspirin, Plavix  Morbid obesity with BMI 42.81  Chronic left foot wound-  Notified Dr Gonzales Drivers of admission- due for wound dressing change tomorrow     Expected discharge date: Pending clinical course    Disposition:    [x] Home       [] TCU       [] Rehab       [] Psych       [] SNF       [] [place]       [] Other-    Chief Complaint: Shortness of breath    Hospital Course: Per H&P done 11/17/2022: \"65year-old gentleman who is severely obese with past medical history of type 2 diabetes, hypertension, HLD, CAD status post CABG in 11/2019, PAD, lupus? who presents with acute onset of progressively worsening dyspnea for 1 week duration. He also has associated productive cough with clear white sputum, chills, and pleuritic chest pain also for the past 1 week. Reports no chronic cough at baseline. Has chronic leg swelling which he reports is at his baseline with no acute worsening. Denies PND and orthopnea. Remote history of smoking 2 packs/day for 25 years, quit 15 years ago. Recent admission at [facility] for carotid artery stenosis, discharged on 11/3, where he presented admitted for new LBBB noted on EKG and also severely uncontrolled hypertension with blood pressure of 200s over 100s according to discharge summary. He was started on heparin infusion for ACS. They performed echo which showed EF of 55 to 60% but no regional wall motion abnormalities. His troponins were normal as well therefore no further ischemic work-up was pursued. The patient remained severely hypertensive during his admission with blood pressure in the 180s which was being managed, however eventually patient elected to leave A. ED Course:  Initial vitals in ED: 37.4, 199/93, 91, 22, 99% on room air     Lab work-up: BMP essentially normal with normal kidney function, creatinine at 1.0 which is at his baseline of 1.0-1.1. CBC showed hemoglobin of 7.7 with MCV of 83.1, WBC 9.9, platelets 412. Cardiac work-up including troponin was elevated at 0.040 and proBNP elevated at 2600. EKG showed RBBB which may be new.    CXR hows interstitial prominence and mildly increased density at the right lung base which could represent atelectasis or pneumonia. There were no recorded desaturations in the EMR however due to patient's increased work of breathing he was started on BiPAP in the ED. He was also administered aspirin 325mg for chest pain, started on nitro drip in ED for hypertensive emergency, and admitted to internal medicine for further management. \"    11/18--> blood pressure was up to 182/86 this morning, adjusting medications, potassium a bit low, BNP at 2665, troponin T trending down    11/19--> still hypertensive, hemoglobin dropped to 6.7 last night so received 1 unit packed red blood cells with improvement of his hemoglobin; appreciate cardiology input, nitroglycerin drip was weaned and was started on Norvasc, he was also started on hydralazine 25 mg every 8 hours, holding aspirin and Plavix with anemia; awaiting GI input    11/20--> still hypertensive so Norvasc, Lopressor and hydralazine were all increased; voiding well; still on the nitroglycerin drip at 20 mics per minute;     11/21- Off nitro drip. BP still in 180s/80s. Order Renal artery doppler and renal US. GI completing EGD today. Trial of minoxidil today- hydralazine on hold        Subjective (past 24 hours): Patient states that his chest felt somewhat tight and he had a headache when his blood pressure spiked into 200s earlier today. States it has resolved with improvement in BP.  Denies any back pain, visual changes, issues urinating     Medications:  Reviewed    Infusion Medications    dextrose      sodium chloride       Scheduled Medications    metoprolol tartrate  50 mg Oral BID    amLODIPine  10 mg Oral Daily    pantoprazole  40 mg IntraVENous BID    hydrALAZINE  50 mg Oral 3 times per day    [Held by provider] aspirin  81 mg Oral Daily    atorvastatin  80 mg Oral Daily    [Held by provider] clopidogrel  75 mg Oral Daily    furosemide  40 mg Oral Daily    gabapentin  600 mg Oral BID    multivitamin  1 tablet Oral Daily    insulin lispro  0-8 Units SubCUTAneous TID     insulin lispro  0-4 Units SubCUTAneous Nightly    insulin glargine  30 Units SubCUTAneous Daily    ferrous sulfate  325 mg Oral BID     valsartan  320 mg Oral Daily    sodium chloride flush  5-40 mL IntraVENous 2 times per day    [Held by provider] enoxaparin  40 mg SubCUTAneous Daily    sennosides-docusate sodium  1 tablet Oral BID     PRN Meds: hydrALAZINE, melatonin, albuterol sulfate HFA, glucose, dextrose bolus **OR** dextrose bolus, glucagon (rDNA), dextrose, LORazepam, sodium chloride flush, sodium chloride, ondansetron **OR** ondansetron, polyethylene glycol, acetaminophen **OR** acetaminophen, potassium chloride **OR** potassium alternative oral replacement **OR** potassium chloride, potassium chloride, magnesium sulfate      Intake/Output Summary (Last 24 hours) at 11/21/2022 0931  Last data filed at 11/21/2022 0236  Gross per 24 hour   Intake 440 ml   Output 1750 ml   Net -1310 ml         Diet:  Diet NPO    Exam:  BP (!) 187/84   Pulse 86   Temp 98.3 °F (36.8 °C) (Oral)   Resp 22   Ht 6' 2\" (1.88 m)   Wt (!) 333 lb 7 oz (151.2 kg)   SpO2 93%   BMI 42.81 kg/m²     General appearance: No apparent distress, appears stated age and cooperative. Morbidly obese  HEENT: Pupils equal, round, and reactive to light. Conjunctivae/corneas clear. Neck: Supple, with full range of motion. No jugular venous distention. Trachea midline. Respiratory:  Normal respiratory effort. Diminished to auscultation, bilaterally without Rales/Wheezes/Rhonchi. Cardiovascular: Regular rate and with irregular rhythm- frequent pauses noted on Tele. with normal S1/S2 without murmurs, rubs or gallops. 2+ edema bilaterally   Abdomen: Soft, non-tender, non-distended with normal bowel sounds. Obese  Musculoskeletal: passive and active ROM x 4 extremities. Skin: Skin color, texture, turgor normal.    Neurologic:  Neurovascularly intact without any focal; sensory/motor deficits.  Cranial nerves: II-XII intact, grossly non-focal.  All 5 toes amputated from left foot, Unna boot noted as well Charcot Rekha tooth deformity right foot   Psychiatric: Alert and oriented, thought content appropriate  Capillary Refill: Brisk,< 3 seconds   Peripheral Pulses: +2 palpable, equal bilaterally       Labs:   Recent Labs     11/20/22  0634 11/20/22  2312 11/21/22  0614   WBC 8.4 8.4 7.7   HGB 8.7* 9.0* 8.8*   HCT 28.7* 29.8* 29.8*    386 399       Recent Labs     11/20/22  0634 11/20/22  2312 11/21/22  0614    138 142   K 3.8 3.7 3.8    101 103   CO2 27 27 26   BUN 12 14 14   CREATININE 1.1 1.1 1.1   CALCIUM 8.4* 8.1* 8.8   PHOS  --  3.9  --        No results for input(s): AST, ALT, BILIDIR, BILITOT, ALKPHOS in the last 72 hours. Microbiology:    None    Urinalysis:      Lab Results   Component Value Date/Time    NITRU NEGATIVE 11/16/2022 01:52 PM    [address] 11/16/2022 01:52 PM    BACTERIA NONE SEEN 11/16/2022 01:52 PM    RBCUA 15-25 11/16/2022 01:52 PM    BLOODU MODERATE 11/16/2022 01:52 PM    GLUCOSEU >= 1000 11/16/2022 01:52 PM       Radiology:  XR CHEST PORTABLE    Result Date: 11/17/2022  Exam: 1 View of the chest Comparison: 6/29/2022 Findings: Prior median sternotomy Cardiac silhouette is not enlarged. No pneumothorax. No pleural fluid collection. Prominence to the interstitium appear similar to the prior exam. Mildly increased density at the right lung base could indicate atelectasis or pneumonia     Impression: 1. Interstitial prominence appear similar to prior. Mildly increased density at the right lung base could represent atelectasis or pneumonia.  This document has been electronically signed by: Magdaleno Short MD on 11/17/2022 08:09 PM      DVT prophylaxis: [] Lovenox                                 [x] SCDs                                 [] SQ Heparin                                 [] Encourage ambulation           [] Already on Anticoagulation     Code Status: Full Code    PT/OT Eval Status: Monitor    Tele:   [x] Liliana Peoples             [] no    Active Hospital Problems    Diagnosis Date Noted    [facility] [I50.9] 11/18/2022     Priority: High    Hypertensive urgency [I16.0] 11/20/2022     Priority: Medium    Hypertensive emergency [I16.1] 11/17/2022     Priority: Medium       Electronically signed by Ignacio Donato PA-C on 11/21/2022 at 9:31 AM

## 2022-11-22 ENCOUNTER — APPOINTMENT (OUTPATIENT)
Dept: GENERAL RADIOLOGY | Age: 62
DRG: 304 | End: 2022-11-22
Payer: COMMERCIAL

## 2022-11-22 LAB
ANION GAP SERPL CALCULATED.3IONS-SCNC: 12 MEQ/L (ref 8–16)
BUN BLDV-MCNC: 16 MG/DL (ref 7–22)
CALCIUM SERPL-MCNC: 7.8 MG/DL (ref 8.5–10.5)
CHLORIDE BLD-SCNC: 98 MEQ/L (ref 98–111)
CO2: 24 MEQ/L (ref 23–33)
CREAT SERPL-MCNC: 1.3 MG/DL (ref 0.4–1.2)
ERYTHROCYTE [DISTWIDTH] IN BLOOD BY AUTOMATED COUNT: 17.2 % (ref 11.5–14.5)
ERYTHROCYTE [DISTWIDTH] IN BLOOD BY AUTOMATED COUNT: 52.4 FL (ref 35–45)
GFR SERPL CREATININE-BSD FRML MDRD: > 60 ML/MIN/1.73M2
GLUCOSE BLD-MCNC: 158 MG/DL (ref 70–108)
GLUCOSE BLD-MCNC: 176 MG/DL (ref 70–108)
GLUCOSE BLD-MCNC: 179 MG/DL (ref 70–108)
GLUCOSE BLD-MCNC: 221 MG/DL (ref 70–108)
GLUCOSE BLD-MCNC: 239 MG/DL (ref 70–108)
GLUCOSE BLD-MCNC: 249 MG/DL (ref 70–108)
HCT VFR BLD CALC: 28.3 % (ref 42–52)
HEMOGLOBIN: 8.5 GM/DL (ref 14–18)
MCH RBC QN AUTO: 25.2 PG (ref 26–33)
MCHC RBC AUTO-ENTMCNC: 30 GM/DL (ref 32.2–35.5)
MCV RBC AUTO: 84 FL (ref 80–94)
PLATELET # BLD: 338 THOU/MM3 (ref 130–400)
PMV BLD AUTO: 10.1 FL (ref 9.4–12.4)
POTASSIUM SERPL-SCNC: 3.9 MEQ/L (ref 3.5–5.2)
RBC # BLD: 3.37 MILL/MM3 (ref 4.7–6.1)
SODIUM BLD-SCNC: 134 MEQ/L (ref 135–145)
WBC # BLD: 4.3 THOU/MM3 (ref 4.8–10.8)

## 2022-11-22 PROCEDURE — 6360000002 HC RX W HCPCS: Performed by: NURSE PRACTITIONER

## 2022-11-22 PROCEDURE — 80048 BASIC METABOLIC PNL TOTAL CA: CPT

## 2022-11-22 PROCEDURE — 6370000000 HC RX 637 (ALT 250 FOR IP)

## 2022-11-22 PROCEDURE — C9113 INJ PANTOPRAZOLE SODIUM, VIA: HCPCS | Performed by: NURSE PRACTITIONER

## 2022-11-22 PROCEDURE — 2580000003 HC RX 258

## 2022-11-22 PROCEDURE — 1200000003 HC TELEMETRY R&B

## 2022-11-22 PROCEDURE — 99233 SBSQ HOSP IP/OBS HIGH 50: CPT | Performed by: PHYSICIAN ASSISTANT

## 2022-11-22 PROCEDURE — 82948 REAGENT STRIP/BLOOD GLUCOSE: CPT

## 2022-11-22 PROCEDURE — 99232 SBSQ HOSP IP/OBS MODERATE 35: CPT | Performed by: NURSE PRACTITIONER

## 2022-11-22 PROCEDURE — 2580000003 HC RX 258: Performed by: STUDENT IN AN ORGANIZED HEALTH CARE EDUCATION/TRAINING PROGRAM

## 2022-11-22 PROCEDURE — 6370000000 HC RX 637 (ALT 250 FOR IP): Performed by: STUDENT IN AN ORGANIZED HEALTH CARE EDUCATION/TRAINING PROGRAM

## 2022-11-22 PROCEDURE — 85027 COMPLETE CBC AUTOMATED: CPT

## 2022-11-22 PROCEDURE — 6370000000 HC RX 637 (ALT 250 FOR IP): Performed by: PHYSICIAN ASSISTANT

## 2022-11-22 PROCEDURE — 87040 BLOOD CULTURE FOR BACTERIA: CPT

## 2022-11-22 PROCEDURE — 36415 COLL VENOUS BLD VENIPUNCTURE: CPT

## 2022-11-22 PROCEDURE — 73630 X-RAY EXAM OF FOOT: CPT

## 2022-11-22 PROCEDURE — 6370000000 HC RX 637 (ALT 250 FOR IP): Performed by: NURSE PRACTITIONER

## 2022-11-22 RX ORDER — SODIUM CHLORIDE 9 MG/ML
25 INJECTION, SOLUTION INTRAVENOUS PRN
Status: DISCONTINUED | OUTPATIENT
Start: 2022-11-22 | End: 2022-11-24 | Stop reason: HOSPADM

## 2022-11-22 RX ORDER — SODIUM CHLORIDE 0.9 % (FLUSH) 0.9 %
5-40 SYRINGE (ML) INJECTION PRN
Status: DISCONTINUED | OUTPATIENT
Start: 2022-11-22 | End: 2022-11-24 | Stop reason: HOSPADM

## 2022-11-22 RX ORDER — INSULIN GLARGINE 100 [IU]/ML
34 INJECTION, SOLUTION SUBCUTANEOUS DAILY
Status: DISCONTINUED | OUTPATIENT
Start: 2022-11-23 | End: 2022-11-24 | Stop reason: HOSPADM

## 2022-11-22 RX ORDER — MINOXIDIL 2.5 MG/1
5 TABLET ORAL ONCE
Status: COMPLETED | OUTPATIENT
Start: 2022-11-22 | End: 2022-11-22

## 2022-11-22 RX ORDER — SODIUM CHLORIDE 0.9 % (FLUSH) 0.9 %
5-40 SYRINGE (ML) INJECTION EVERY 12 HOURS SCHEDULED
Status: DISCONTINUED | OUTPATIENT
Start: 2022-11-22 | End: 2022-11-24 | Stop reason: HOSPADM

## 2022-11-22 RX ADMIN — SENNOSIDES AND DOCUSATE SODIUM 1 TABLET: 50; 8.6 TABLET ORAL at 21:13

## 2022-11-22 RX ADMIN — GABAPENTIN 600 MG: 600 TABLET ORAL at 21:13

## 2022-11-22 RX ADMIN — FUROSEMIDE 40 MG: 40 TABLET ORAL at 08:46

## 2022-11-22 RX ADMIN — SODIUM CHLORIDE, PRESERVATIVE FREE 10 ML: 5 INJECTION INTRAVENOUS at 21:13

## 2022-11-22 RX ADMIN — INSULIN GLARGINE 30 UNITS: 100 INJECTION, SOLUTION SUBCUTANEOUS at 08:47

## 2022-11-22 RX ADMIN — GABAPENTIN 600 MG: 600 TABLET ORAL at 08:46

## 2022-11-22 RX ADMIN — AMLODIPINE BESYLATE 10 MG: 10 TABLET ORAL at 08:45

## 2022-11-22 RX ADMIN — METOPROLOL TARTRATE 50 MG: 50 TABLET, FILM COATED ORAL at 08:45

## 2022-11-22 RX ADMIN — PANTOPRAZOLE SODIUM 40 MG: 40 INJECTION, POWDER, FOR SOLUTION INTRAVENOUS at 21:13

## 2022-11-22 RX ADMIN — SODIUM CHLORIDE, PRESERVATIVE FREE 10 ML: 5 INJECTION INTRAVENOUS at 08:45

## 2022-11-22 RX ADMIN — ACETAMINOPHEN 650 MG: 325 TABLET ORAL at 03:21

## 2022-11-22 RX ADMIN — ACETAMINOPHEN 650 MG: 325 TABLET ORAL at 21:13

## 2022-11-22 RX ADMIN — ATORVASTATIN CALCIUM 80 MG: 80 TABLET, FILM COATED ORAL at 08:46

## 2022-11-22 RX ADMIN — VALSARTAN 320 MG: 320 TABLET, FILM COATED ORAL at 08:45

## 2022-11-22 RX ADMIN — SENNOSIDES AND DOCUSATE SODIUM 1 TABLET: 50; 8.6 TABLET ORAL at 08:45

## 2022-11-22 RX ADMIN — Medication 1 TABLET: at 08:46

## 2022-11-22 RX ADMIN — MINOXIDIL 5 MG: 2.5 TABLET ORAL at 13:09

## 2022-11-22 RX ADMIN — INSULIN LISPRO 2 UNITS: 100 INJECTION, SOLUTION INTRAVENOUS; SUBCUTANEOUS at 08:48

## 2022-11-22 RX ADMIN — ACETAMINOPHEN 650 MG: 325 TABLET ORAL at 13:08

## 2022-11-22 RX ADMIN — MINOXIDIL 5 MG: 2.5 TABLET ORAL at 08:45

## 2022-11-22 RX ADMIN — PANTOPRAZOLE SODIUM 40 MG: 40 INJECTION, POWDER, FOR SOLUTION INTRAVENOUS at 08:45

## 2022-11-22 RX ADMIN — Medication 325 MG: at 08:46

## 2022-11-22 ASSESSMENT — PAIN SCALES - GENERAL
PAINLEVEL_OUTOF10: 0
PAINLEVEL_OUTOF10: 0
PAINLEVEL_OUTOF10: 2

## 2022-11-22 ASSESSMENT — PAIN DESCRIPTION - ORIENTATION: ORIENTATION: LOWER

## 2022-11-22 ASSESSMENT — PAIN DESCRIPTION - LOCATION: LOCATION: BACK

## 2022-11-22 ASSESSMENT — PAIN DESCRIPTION - DESCRIPTORS: DESCRIPTORS: ACHING

## 2022-11-22 ASSESSMENT — PAIN - FUNCTIONAL ASSESSMENT: PAIN_FUNCTIONAL_ASSESSMENT: ACTIVITIES ARE NOT PREVENTED

## 2022-11-22 NOTE — PROGRESS NOTES
Hospitalist Progress Note    Patient:   Cancer      Unit/Bed:8B-21/021-A    YOB: 1960    MRN: [id number]       Acct: [de-identified]     PCP: EYAD Berry - CNP    Date of Admission: 11/17/2022    Assessment/Plan:    Hypertensive urgency--  on Lopressor 25 mg twice a day~increased to 50 mg by cardiology   started Norvasc 5 mg daily~increase to 10 mg 11/20   also on Lasix 40 mg daily  Last echo 11/2- unremarkable for any contributing cause   Renal artery US negative for CARY  Stress test 8/19/2022 negative   Cardiology following- changed lisinopril to valsartan 320mg- appreciate further recs  Trial of minoxidil- start 5mg once daily 11/21- BPS are improved. Additional 5mg later today if SBP > 170- recommend increase scheduled dosage tomorrow to 10mg if indicated. Acute on chronic normocytic anemia--  transfused with 1 unit packed red blood cells with improvement to 8.7 no obvious signs of bleeding  Currently stable in 8s   GI following- EGD was planned for 11/21 but was cancelled given new 2nd degree heart block   Non conducted [facility] with Mickeal Bridjay Estrada with recommendations for 30 day event monitor on discharge and follow up 2 weeks as OP   Chronic diastolic heart failure-   on beta-blocker, on Lasix 40 mg daily, BNP at 2665;  Net output of 6490 for admission  Last echo 11/3 with normal EF - done at [facility]   Hypokalemia- resolved -  Diabetes mellitus type 2, uncontrolled--  on Lantus 30 units daily- increased to 34 units 11/22 as Fbg elevated.     sliding scale medium dose before meals and at bedtime;   hemoglobin A1c on  11/16/2022 was noted to be 9.0; change diet to ADA  Mild troponin elevation--trending down slightly, lipids noted from November 16, 2022 with LDL at 67; no EKG changes; likely demand mismatch with #1 and #3; Cardiology following- no ischemic workup at this time   Essential hypertension, uncontrolled--see #1   PAD--statin, aspirin, Plavix  CAD status post CABG November 2019--on statin, aspirin, Plavix  Morbid obesity with BMI 42.81  Chronic left foot wound-  Notified Dr Lola Muñiz of admission- podiatry team is following  Xrays ordered with concerns for chronic osteomyelitis - appreciate recs from podiatry   ID consulted   Blood cultures ordered    Expected discharge date: Pending clinical course    Disposition:    [x] Home       [] TCU       [] Rehab       [] Psych       [] SNF       [] [place]       [] Other-    Chief Complaint: Shortness of breath    Hospital Course: Per H&P done 11/17/2022: \"65year-old gentleman who is severely obese with past medical history of type 2 diabetes, hypertension, HLD, CAD status post CABG in 11/2019, PAD, lupus? who presents with acute onset of progressively worsening dyspnea for 1 week duration. He also has associated productive cough with clear white sputum, chills, and pleuritic chest pain also for the past 1 week. Reports no chronic cough at baseline. Has chronic leg swelling which he reports is at his baseline with no acute worsening. Denies PND and orthopnea. Remote history of smoking 2 packs/day for 25 years, quit 15 years ago. Recent admission at [address] [place] [address] for carotid artery stenosis, discharged on 11/3, where he presented admitted for new LBBB noted on EKG and also severely uncontrolled hypertension with blood pressure of 200s over 100s according to discharge summary. He was started on heparin infusion for ACS. They performed echo which showed EF of 55 to 60% but no regional wall motion abnormalities. His troponins were normal as well therefore no further ischemic work-up was pursued. The patient remained severely hypertensive during his admission with blood pressure in the 180s which was being managed, however eventually patient elected to leave A.       ED Course:  Initial vitals in ED: 37.4, 199/93, 91, 22, 99% on room air     Lab work-up: BMP essentially normal with chronic osteomyelitis. Podiatry following and ID consulted. Subjective (past 24 hours): Patient states he is feeling better today. Can tell his BP has improved. He states he did feel funny yesterday when his heart rate was all over the place. Denies any abdominal pain or issues defecating or urinating.    Denies any visual changes, chest pain, or shortness of breath   Medications:  Reviewed    Infusion Medications    dextrose      sodium chloride       Scheduled Medications    minoxidil  5 mg Oral Daily    metoprolol tartrate  50 mg Oral BID    amLODIPine  10 mg Oral Daily    pantoprazole  40 mg IntraVENous BID    [Held by provider] hydrALAZINE  50 mg Oral 3 times per day    [Held by provider] aspirin  81 mg Oral Daily    atorvastatin  80 mg Oral Daily    [Held by provider] clopidogrel  75 mg Oral Daily    furosemide  40 mg Oral Daily    gabapentin  600 mg Oral BID    multivitamin  1 tablet Oral Daily    insulin lispro  0-8 Units SubCUTAneous TID WC    insulin lispro  0-4 Units SubCUTAneous Nightly    insulin glargine  30 Units SubCUTAneous Daily    ferrous sulfate  325 mg Oral BID WC    valsartan  320 mg Oral Daily    sodium chloride flush  5-40 mL IntraVENous 2 times per day    [Held by provider] enoxaparin  40 mg SubCUTAneous Daily    sennosides-docusate sodium  1 tablet Oral BID     PRN Meds: hydrALAZINE, melatonin, albuterol sulfate HFA, glucose, dextrose bolus **OR** dextrose bolus, glucagon (rDNA), dextrose, LORazepam, sodium chloride flush, sodium chloride, ondansetron **OR** ondansetron, polyethylene glycol, acetaminophen **OR** acetaminophen, potassium chloride **OR** potassium alternative oral replacement **OR** potassium chloride, potassium chloride, magnesium sulfate      Intake/Output Summary (Last 24 hours) at 11/22/2022 0916  Last data filed at 11/22/2022 0845  Gross per 24 hour   Intake 1710 ml   Output 1425 ml   Net 285 ml         Diet:  Diet NPO    Exam:  BP (!) 172/97   Pulse 85 MODERATE 11/16/2022 01:52 PM    GLUCOSEU >= 1000 11/16/2022 01:52 PM       Radiology:  XR CHEST PORTABLE    Result Date: 11/17/2022  Exam: 1 View of the chest Comparison: 6/29/2022 Findings: Prior median sternotomy Cardiac silhouette is not enlarged. No pneumothorax. No pleural fluid collection. Prominence to the interstitium appear similar to the prior exam. Mildly increased density at the right lung base could indicate atelectasis or pneumonia     Impression: 1. Interstitial prominence appear similar to prior. Mildly increased density at the right lung base could represent atelectasis or pneumonia.  This document has been electronically signed by: Barber Longoria MD on 11/17/2022 08:09 PM      DVT prophylaxis: [] Lovenox                                 [x] SCDs                                 [] SQ Heparin                                 [] Encourage ambulation           [] Already on Anticoagulation     Code Status: Full Code    PT/OT Eval Status: Monitor    Tele:   [x] Yes             [] no    Active Hospital Problems    Diagnosis Date Noted    Acute on chronic congestive heart failure ([facility] [address] [I50.9] 11/18/2022     Priority: High    Hypertensive urgency [I16.0] 11/20/2022     Priority: Medium    Hypertensive emergency [I16.1] 11/17/2022     Priority: Medium       Electronically signed by Sherren Lands, PA-C on 11/22/2022 at 9:16 AM

## 2022-11-22 NOTE — PLAN OF CARE
Problem: Discharge Planning  Goal: Discharge to home or other facility with appropriate resources  Outcome: Progressing  Flowsheets (Taken 11/21/2022 2045)  Discharge to home or other facility with appropriate resources: Identify barriers to discharge with patient and caregiver     Problem: Skin/Tissue Integrity  Goal: Absence of new skin breakdown  Description: 1. Monitor for areas of redness and/or skin breakdown  2. Assess vascular access sites hourly  3. Every 4-6 hours minimum:  Change oxygen saturation probe site  4. Every 4-6 hours:  If on nasal continuous positive airway pressure, respiratory therapy assess nares and determine need for appliance change or resting period. Outcome: Progressing     Problem: Safety - Adult  Goal: Free from fall injury  Outcome: Progressing     Problem: Chronic Conditions and Co-morbidities  Goal: Patient's chronic conditions and co-morbidity symptoms are monitored and maintained or improved  Outcome: Progressing  Flowsheets (Taken 11/21/2022 2045)  Care Plan - Patient's Chronic Conditions and Co-Morbidity Symptoms are Monitored and Maintained or Improved: Monitor and assess patient's chronic conditions and comorbid symptoms for stability, deterioration, or improvement     Problem: Pain  Goal: Verbalizes/displays adequate comfort level or baseline comfort level  Outcome: Progressing  Flowsheets (Taken 11/21/2022 2045)  Verbalizes/displays adequate comfort level or baseline comfort level: Encourage patient to monitor pain and request assistance     Care plan reviewed with patient. Patient verbalize understanding of the plan of care and contribute to goal setting.

## 2022-11-22 NOTE — FLOWSHEET NOTE
Transport here to get patient for EGD. Transporter went to get patient up to transfer to [facility]. Patient unable to sit up on his own and fell back. Transporter got nurse. Nurse went to assess patient. Patient was very pale, diaphoretic, unable to sit up with assistance. Primary nurse got another nurse to help out. Vitals obtained. See MAR. On tele patient's HR dropped to 39 and appeared to be in a heart block. Chem checked. Patient was made comfortable in bed and was more alert and talking. Attending called. EGD on hold. Metoprolol parameters placed. Dr. Eduard Juarez. Cardiology messaged. Tele strips sent to Cardiology per resource nurse. Patient resting in bed alert and oriented. HR 60 SR at this time.

## 2022-11-22 NOTE — PLAN OF CARE
Problem: Discharge Planning  Goal: Discharge to home or other facility with appropriate resources  11/22/2022 0918 by Elena Ball RN  Outcome: [address] (Taken 11/21/2022 2045 by Jordy Meza, RN)  Discharge to home or other facility with appropriate resources: Identify barriers to discharge with patient and caregiver  Note: Discharge to home pending     Problem: Skin/Tissue Integrity  Goal: Absence of new skin breakdown  Description: 1. Monitor for areas of redness and/or skin breakdown  2. Assess vascular access sites hourly  3. Every 4-6 hours minimum:  Change oxygen saturation probe site  4. Every 4-6 hours:  If on nasal continuous positive airway pressure, respiratory therapy assess nares and determine need for appliance change or resting period. 11/22/2022 0918 by Elena Ball RN  Outcome: Progressing  Note: See assessment     Problem: Safety - Adult  Goal: Free from fall injury  11/22/2022 0918 by Elena Ball RN  Outcome: Progressing  Note: Bed alarm is on. Call light is with in reach. Problem: Chronic Conditions and Co-morbidities  Goal: Patient's chronic conditions and co-morbidity symptoms are monitored and maintained or improved  11/22/2022 0918 by Elena Ball RN  Outcome: [address] (Taken 11/21/2022 2045 by Jordy Meza, RN)  Care Plan - Patient's Chronic Conditions and Co-Morbidity Symptoms are Monitored and Maintained or Improved: Monitor and assess patient's chronic conditions and comorbid symptoms for stability, deterioration, or improvement  Note: EGD today 330   Care plan reviewed with patient. Patient verbalize understanding of the plan of care and contribute to goal setting.

## 2022-11-22 NOTE — PROGRESS NOTES
Cardiology Progress Note      Patient:  Dedrick Kirby  YOB: 1960  MRN: [id number]   Acct: [de-identified]  Admit Date:  11/17/2022  Primary Cardiologist:Primary Cardiologist: Shima Atkins MD  Per Dr Sukhjinder Summers note:  \" CHIEF COMPLAINT: Uncontrolled HTN     HPI: This is a pleasant 65 y.o. male presents with uncontrolled HTN. PMH is significant for CAD s/p CABG in 11/2019, PAD with multiple toes amputated, HFpEF, Type 2 DM, HTN, HLD, carotid artery stenosis. Cardiology was consulted for uncontrolled HTN and newly elevated troponin     The patient states that he first noticed severely elevated BP with SBP > 200 while he was at [address] [place] [address] about 1.5-2 weeks ago, where he was supposed to get surgery for carotid artery stenosis. The surgery was delayed because of his severely elevated BP. No ischemic workup was performed at [address] [place] [address] due to negative troponins, although he left AMA with SBP in the 180s before his HTN could be fully treated. He presented to [address] [place] [address] for this severe HTN and also because of progressively worsening SOB for 1 week which was associated with clear white sputum, chills, and pleuritic chest pain. This SOB and chest pain improved significantly with 12 hours of BIPAP, after which he denies any chest pain or SOB at this time. The patient states that he has a mild headache that has improved with control of his BP. He denies any current chest pain, SOB, nausea, vomiting, dizziness, worsening of his chronic leg swelling, or any other symptoms. He insists that he has been compliant with all of his medications. He quit smoking 15 years ago, though he admits to smoking 2-3 PPD for 25 years. He denies drinking or illicit drug use. \"    Subjective (Events in last 24 hours):   Pt up in chair eating lunch   He has no cardiac c/o     Renal US pending     Tele w/out ectopy or arrhythmia     Objective:   BP (!) 172/97   Pulse 85   Temp 98.5 °F (36.9 °C) (Oral)   Resp 21   Ht 6' 2\" (1.88 m)   Wt (!) 334 lb 9.6 oz (151.8 kg)   SpO2 93%   BMI 42.96 kg/m²        TELEMETRY: SR no ectopy     Physical Exam:  General Appearance: alert and oriented to person, place and time, in no acute distress  Cardiovascular: normal rate, regular rhythm, normal S1 and S2, no murmurs, rubs, clicks, or gallops, distal pulses intact,   Pulmonary/Chest: clear to auscultation bilaterally- no wheezes, rales or rhonchi, normal air movement, no respiratory distress  Abdomen: soft, non-tender, non-distended, normal bowel sounds, no masses Extremities: no cyanosis, clubbing or edema, pulses faint  Skin: warm and dry, no rash or erythema  Musculoskeletal: normal range of motion, no joint swelling, deformity or tenderness  Neurological: alert, oriented, normal speech, no focal findings or movement disorder noted    Medications:    [START ON 11/23/2022] insulin glargine  34 Units SubCUTAneous Daily    minoxidil  5 mg Oral Daily    metoprolol tartrate  50 mg Oral BID    amLODIPine  10 mg Oral Daily    pantoprazole  40 mg IntraVENous BID    [Held by provider] hydrALAZINE  50 mg Oral 3 times per day    [Held by provider] aspirin  81 mg Oral Daily    atorvastatin  80 mg Oral Daily    [Held by provider] clopidogrel  75 mg Oral Daily    furosemide  40 mg Oral Daily    gabapentin  600 mg Oral BID    multivitamin  1 tablet Oral Daily    insulin lispro  0-8 Units SubCUTAneous TID WC    insulin lispro  0-4 Units SubCUTAneous Nightly    ferrous sulfate  325 mg Oral BID WC    valsartan  320 mg Oral Daily    sodium chloride flush  5-40 mL IntraVENous 2 times per day    [Held by provider] enoxaparin  40 mg SubCUTAneous Daily    sennosides-docusate sodium  1 tablet Oral BID      dextrose      sodium chloride       hydrALAZINE, 10 mg, Q6H PRN  melatonin, 4.5 mg, Nightly PRN  albuterol sulfate HFA, 2 puff, 4x Daily PRN  glucose, 4 tablet, PRN  dextrose bolus, 125 mL, PRN   Or  dextrose bolus, 250 mL, PRN  glucagon (rDNA), 1 mg, PRN  dextrose, , Continuous PRN  LORazepam, 0.5 mg, Q6H PRN  sodium chloride flush, 5-40 mL, PRN  sodium chloride, , PRN  ondansetron, 4 mg, Q8H PRN   Or  ondansetron, 4 mg, Q6H PRN  polyethylene glycol, 17 g, Daily PRN  acetaminophen, 650 mg, Q6H PRN   Or  acetaminophen, 650 mg, Q6H PRN  potassium chloride, 40 mEq, PRN   Or  potassium alternative oral replacement, 40 mEq, PRN   Or  potassium chloride, 10 mEq, PRN  potassium chloride, 10 mEq, PRN  magnesium sulfate, 2,000 mg, PRN        Diagnostics:      stress   Conclusions      Summary   There is mild attenuation artifact noted in the inferolateral wall seems   to be related to bowel artifact. This study was negative for ischemia. Recommendation   Clinical correlation is recommended. Signatures      ----------------------------------------------------------------   Electronically signed by Sam Camargo MD (Interpreting   Cardiologist) on 08/30/2022 at 07:27   ----------------------------------------------------------------       Echo   osu  Left Ventricle   Chamber size is enlarged. Increased wall thickness. Concentric remodeling is present. Normal global wall motion. Regional wall motion is normal. Abnormal septal motion consistent with post-operative state. Ejection fraction is normal (55 - 60%). Diastolic function is consistent with pseudonormalization (grade II). Right Ventricle   Chamber size is normal. Systolic function is normal. Fractional area change equals 34.4%. Left Atrium   Chamber size is mildly enlarged. Right Atrium   Chamber size is normal.     IVC/SVC   The inferior vena cava structure has a diameter <21 mm and decreases >50% during inspiration. Mitral Valve   Normal appearing leaflets. Leaflet mobility is normal. Mild anterior and posterior annular calcification. Trace regurgitation. No valve stenosis. Tricuspid Valve   Normal leaflets. Leaflet mobility is normal. Trace regurgitation. No stenosis.  Pulmonary artery systolic pressure (PASP) is unable to be estimated. Aortic Valve   Trileaflet valve. Mild leaflet calcification of the non-coronary cusp ([place]). Leaflet excursion is mildly decreased. Trace regurgitation. Mild stenosis. Mean gradient: 18 mmHg. Dimensionless Index by VTI: 0.43. Valve area continuity VMAX: 1.70 cm2. Pulmonic Valve   Normal structure. No regurgitation. No stenosis. Pericardium   No pericardial effusion. Septum   The atrial septum is normal.     Aorta   No dilation to extent seen. SOV: 3.52 cm. STJ: 3.25 cm. Ascending: 3.38 cm. Left Heart Cath:     Lab Data:    Cardiac Enzymes:  No results for input(s): CKTOTAL, CKMB, CKMBINDEX, TROPONINI in the last 72 hours.     CBC:   Lab Results   Component Value Date/Time    WBC 4.3 11/22/2022 05:39 AM    RBC 3.37 11/22/2022 05:39 AM    HGB 8.5 11/22/2022 05:39 AM    HCT 28.3 11/22/2022 05:39 AM     11/22/2022 05:39 AM       CMP:    Lab Results   Component Value Date/Time     11/22/2022 05:39 AM    K 3.9 11/22/2022 05:39 AM    K 3.8 06/21/2021 06:48 PM    CL 98 11/22/2022 05:39 AM    CO2 24 11/22/2022 05:39 AM    BUN 16 11/22/2022 05:39 AM    CREATININE 1.3 11/22/2022 05:39 AM    LABGLOM >60 11/21/2022 06:02 PM    GLUCOSE 249 11/22/2022 05:39 AM    GLUCOSE 103 05/25/2020 01:45 PM    CALCIUM 7.8 11/22/2022 05:39 AM       Hepatic Function Panel:    Lab Results   Component Value Date/Time    ALKPHOS 105 11/17/2022 07:40 PM    ALT 63 11/17/2022 07:40 PM    AST 39 11/17/2022 07:40 PM    PROT 6.3 11/17/2022 07:40 PM    BILITOT 0.2 11/17/2022 07:40 PM    BILIDIR <0.2 11/17/2022 07:40 PM    LABALBU 2.6 11/17/2022 07:40 PM       Magnesium:    Lab Results   Component Value Date/Time    MG 1.9 11/20/2022 11:12 PM       PT/INR:    Lab Results   Component Value Date/Time    INR 1.01 10/20/2022 12:04 PM       HgBA1c:    Lab Results   Component Value Date/Time    LABA1C 9.0 11/16/2022 09:28 AM       FLP:    Lab Results   Component Value Date/Time    TRIG 51 11/16/2022 09:28 AM    HDL 39 11/16/2022 09:28 AM    LDLCALC 67 11/16/2022 09:28 AM       TSH:    Lab Results   Component Value Date/Time    TSH 1.350 11/16/2022 09:28 AM         Assessment:    Uncontrolled HTN -- chronic     Possible 2 degree HB-- followed by EP - on metoprolol - 30 day event at [facility]     Nonobstructive peripheral arterial disease by angiogram 11/2020 / hx toe amputation-- per pt needs AKA    Hx carotid stenosis - needs surgery      Hx CABG 11/2019 [facility] ([facility])    DM II  HLP    Acute anemia -- followed by GI   Sp PRBC       Plan:  Going for EGD today   Continue medication changes   Awaiting US renal          Electronically signed by EYAD Santizo CNP on 11/22/2022 at 11:07 AM

## 2022-11-22 NOTE — CARE COORDINATION
11/22/22, 8:48 AM EST    DISCHARGE ON GOING EVALUATION    Germania Orourke day: 2  Location: -21/021-A Reason for admit: Hypertensive emergency [I16.1]  Hypertensive urgency [I16.0]   Procedure: 11/17: CXR: Interstitial prominence appear similar to prior. Mildly increased density at the right lung base could represent atelectasis or pneumonia. Barriers to Discharge: Ran temp last night up to 102.5. This am 98.5. Elevated 's-180's. Oral iron replacement. Hgb 8.5 today. Planning eventual EGD but on hold at present due to 2nd degree block noted. Cardiology following. Podiatry following for left foot wound. Betadine, gauze ABD, Kerlix. PCP: EYAD Can CNP  Readmission Risk Score: 19.7%  Patient Goals/Plan/Treatment Preferences: Plans return home alone with supportive family.

## 2022-11-22 NOTE — PROGRESS NOTES
11/22/22 1330   Encounter Summary   Encounter Overview/Reason  Initial Encounter   Service Provided For: Patient and family together   Referral/Consult From: [address] Parent; Family members  (sister)   Last Encounter  11/22/22   Complexity of Encounter Moderate   Begin Time 1330   End Time  1347   Total Time Calculated 17 min   Encounter    Type Initial Screen/Assessment   Spiritual/Emotional needs   Type Spiritual Support   Assessment/Intervention/Outcome   Assessment Calm   Intervention Active listening;Nurtured Hope;Prayer (assurance of)/[place]   Outcome Acceptance   [facility] PROGRESS NOTE      Patient: Brittany Kemp  Room #: 4Y-23/162-X            YOB: 1960  Age: 65 y.o. Gender: male            Admit Date & Time: 11/17/2022  6:51 PM    Assessment:  \"Bill\" as he likes to be called is in bed awaiting tests for heart and other stuff. His mother and his sister are in the room with him. He is open to having a  come and see him. We had conversations about people we both know. Interventions: Active listening, in our conversation there was anxiety as to what the tests will reveal or not reveal.  Prayer for wisdom and communication with our staff was given. Words of encouragement and comfort also given. Outcomes:  Christy Tadeo and his family are grateful for the spiritual care contact. Plan: 1. Care Plan:  Continue spiritual and emotional care for patient and family. Including prayers.       Electronically signed by Clement Ngo, on 11/22/2022 at 4:33 PM.  [facility]  [phone]

## 2022-11-23 ENCOUNTER — ANESTHESIA (OUTPATIENT)
Dept: ENDOSCOPY | Age: 62
DRG: 304 | End: 2022-11-23
Payer: COMMERCIAL

## 2022-11-23 ENCOUNTER — ANESTHESIA EVENT (OUTPATIENT)
Dept: ENDOSCOPY | Age: 62
DRG: 304 | End: 2022-11-23
Payer: COMMERCIAL

## 2022-11-23 LAB
ANION GAP SERPL CALCULATED.3IONS-SCNC: 13 MEQ/L (ref 8–16)
BUN BLDV-MCNC: 21 MG/DL (ref 7–22)
CALCIUM SERPL-MCNC: 8.1 MG/DL (ref 8.5–10.5)
CHLORIDE BLD-SCNC: 99 MEQ/L (ref 98–111)
CO2: 24 MEQ/L (ref 23–33)
CREAT SERPL-MCNC: 1.8 MG/DL (ref 0.4–1.2)
ERYTHROCYTE [DISTWIDTH] IN BLOOD BY AUTOMATED COUNT: 17 % (ref 11.5–14.5)
ERYTHROCYTE [DISTWIDTH] IN BLOOD BY AUTOMATED COUNT: 52.5 FL (ref 35–45)
GFR SERPL CREATININE-BSD FRML MDRD: 42 ML/MIN/1.73M2
GLUCOSE BLD-MCNC: 151 MG/DL (ref 70–108)
GLUCOSE BLD-MCNC: 173 MG/DL (ref 70–108)
GLUCOSE BLD-MCNC: 195 MG/DL (ref 70–108)
GLUCOSE BLD-MCNC: 199 MG/DL (ref 70–108)
GLUCOSE BLD-MCNC: 209 MG/DL (ref 70–108)
HCT VFR BLD CALC: 28.5 % (ref 42–52)
HEMOGLOBIN: 8.3 GM/DL (ref 14–18)
MCH RBC QN AUTO: 24.6 PG (ref 26–33)
MCHC RBC AUTO-ENTMCNC: 29.1 GM/DL (ref 32.2–35.5)
MCV RBC AUTO: 84.3 FL (ref 80–94)
PLATELET # BLD: 316 THOU/MM3 (ref 130–400)
PMV BLD AUTO: 10.3 FL (ref 9.4–12.4)
POTASSIUM SERPL-SCNC: 4.1 MEQ/L (ref 3.5–5.2)
RBC # BLD: 3.38 MILL/MM3 (ref 4.7–6.1)
SODIUM BLD-SCNC: 136 MEQ/L (ref 135–145)
WBC # BLD: 4.6 THOU/MM3 (ref 4.8–10.8)

## 2022-11-23 PROCEDURE — 2500000003 HC RX 250 WO HCPCS: Performed by: NURSE ANESTHETIST, CERTIFIED REGISTERED

## 2022-11-23 PROCEDURE — 0W3P8ZZ CONTROL BLEEDING IN GASTROINTESTINAL TRACT, VIA NATURAL OR ARTIFICIAL OPENING ENDOSCOPIC: ICD-10-PCS | Performed by: INTERNAL MEDICINE

## 2022-11-23 PROCEDURE — 3700000001 HC ADD 15 MINUTES (ANESTHESIA): Performed by: INTERNAL MEDICINE

## 2022-11-23 PROCEDURE — 36415 COLL VENOUS BLD VENIPUNCTURE: CPT

## 2022-11-23 PROCEDURE — 6370000000 HC RX 637 (ALT 250 FOR IP): Performed by: NURSE PRACTITIONER

## 2022-11-23 PROCEDURE — 2720000010 HC SURG SUPPLY STERILE: Performed by: INTERNAL MEDICINE

## 2022-11-23 PROCEDURE — 2580000003 HC RX 258: Performed by: PHYSICIAN ASSISTANT

## 2022-11-23 PROCEDURE — C9113 INJ PANTOPRAZOLE SODIUM, VIA: HCPCS | Performed by: NURSE PRACTITIONER

## 2022-11-23 PROCEDURE — 6370000000 HC RX 637 (ALT 250 FOR IP)

## 2022-11-23 PROCEDURE — 1200000000 HC SEMI PRIVATE

## 2022-11-23 PROCEDURE — 80048 BASIC METABOLIC PNL TOTAL CA: CPT

## 2022-11-23 PROCEDURE — 99232 SBSQ HOSP IP/OBS MODERATE 35: CPT | Performed by: PHYSICIAN ASSISTANT

## 2022-11-23 PROCEDURE — 2580000003 HC RX 258

## 2022-11-23 PROCEDURE — 82948 REAGENT STRIP/BLOOD GLUCOSE: CPT

## 2022-11-23 PROCEDURE — 6370000000 HC RX 637 (ALT 250 FOR IP): Performed by: PHYSICIAN ASSISTANT

## 2022-11-23 PROCEDURE — 3700000000 HC ANESTHESIA ATTENDED CARE: Performed by: INTERNAL MEDICINE

## 2022-11-23 PROCEDURE — 6360000002 HC RX W HCPCS: Performed by: NURSE ANESTHETIST, CERTIFIED REGISTERED

## 2022-11-23 PROCEDURE — 7100000010 HC PHASE II RECOVERY - FIRST 15 MIN: Performed by: INTERNAL MEDICINE

## 2022-11-23 PROCEDURE — 3609017100 HC EGD: Performed by: INTERNAL MEDICINE

## 2022-11-23 PROCEDURE — 6360000002 HC RX W HCPCS: Performed by: NURSE PRACTITIONER

## 2022-11-23 PROCEDURE — 99232 SBSQ HOSP IP/OBS MODERATE 35: CPT | Performed by: NURSE PRACTITIONER

## 2022-11-23 PROCEDURE — 85027 COMPLETE CBC AUTOMATED: CPT

## 2022-11-23 RX ORDER — LIDOCAINE HYDROCHLORIDE 20 MG/ML
INJECTION, SOLUTION EPIDURAL; INFILTRATION; INTRACAUDAL; PERINEURAL PRN
Status: DISCONTINUED | OUTPATIENT
Start: 2022-11-23 | End: 2022-11-23 | Stop reason: SDUPTHER

## 2022-11-23 RX ORDER — MINOXIDIL 2.5 MG/1
10 TABLET ORAL DAILY
Status: DISCONTINUED | OUTPATIENT
Start: 2022-11-24 | End: 2022-11-24 | Stop reason: HOSPADM

## 2022-11-23 RX ORDER — SODIUM CHLORIDE, SODIUM LACTATE, POTASSIUM CHLORIDE, CALCIUM CHLORIDE 600; 310; 30; 20 MG/100ML; MG/100ML; MG/100ML; MG/100ML
INJECTION, SOLUTION INTRAVENOUS CONTINUOUS
Status: DISCONTINUED | OUTPATIENT
Start: 2022-11-23 | End: 2022-11-24 | Stop reason: HOSPADM

## 2022-11-23 RX ORDER — VALSARTAN 160 MG/1
160 TABLET ORAL DAILY
Status: DISCONTINUED | OUTPATIENT
Start: 2022-11-24 | End: 2022-11-24 | Stop reason: HOSPADM

## 2022-11-23 RX ORDER — PROPOFOL 10 MG/ML
INJECTION, EMULSION INTRAVENOUS PRN
Status: DISCONTINUED | OUTPATIENT
Start: 2022-11-23 | End: 2022-11-23 | Stop reason: SDUPTHER

## 2022-11-23 RX ADMIN — MINOXIDIL 5 MG: 2.5 TABLET ORAL at 08:02

## 2022-11-23 RX ADMIN — GABAPENTIN 600 MG: 600 TABLET ORAL at 08:06

## 2022-11-23 RX ADMIN — SODIUM CHLORIDE, PRESERVATIVE FREE 10 ML: 5 INJECTION INTRAVENOUS at 20:29

## 2022-11-23 RX ADMIN — PROPOFOL 100 MG: 10 INJECTION, EMULSION INTRAVENOUS at 15:46

## 2022-11-23 RX ADMIN — ATORVASTATIN CALCIUM 80 MG: 80 TABLET, FILM COATED ORAL at 08:07

## 2022-11-23 RX ADMIN — Medication 325 MG: at 16:47

## 2022-11-23 RX ADMIN — LIDOCAINE HYDROCHLORIDE 100 MG: 20 INJECTION, SOLUTION EPIDURAL; INFILTRATION; INTRACAUDAL; PERINEURAL at 15:46

## 2022-11-23 RX ADMIN — METOPROLOL TARTRATE 50 MG: 50 TABLET, FILM COATED ORAL at 20:28

## 2022-11-23 RX ADMIN — GABAPENTIN 600 MG: 600 TABLET ORAL at 23:49

## 2022-11-23 RX ADMIN — AMLODIPINE BESYLATE 10 MG: 10 TABLET ORAL at 08:00

## 2022-11-23 RX ADMIN — PANTOPRAZOLE SODIUM 40 MG: 40 INJECTION, POWDER, FOR SOLUTION INTRAVENOUS at 07:59

## 2022-11-23 RX ADMIN — VALSARTAN 320 MG: 320 TABLET, FILM COATED ORAL at 08:02

## 2022-11-23 RX ADMIN — SODIUM CHLORIDE, PRESERVATIVE FREE 10 ML: 5 INJECTION INTRAVENOUS at 07:59

## 2022-11-23 RX ADMIN — PANTOPRAZOLE SODIUM 40 MG: 40 INJECTION, POWDER, FOR SOLUTION INTRAVENOUS at 20:29

## 2022-11-23 RX ADMIN — Medication 1 TABLET: at 08:07

## 2022-11-23 RX ADMIN — SODIUM CHLORIDE, POTASSIUM CHLORIDE, SODIUM LACTATE AND CALCIUM CHLORIDE: 600; 310; 30; 20 INJECTION, SOLUTION INTRAVENOUS at 13:54

## 2022-11-23 RX ADMIN — Medication 325 MG: at 08:06

## 2022-11-23 ASSESSMENT — PAIN SCALES - GENERAL
PAINLEVEL_OUTOF10: 0

## 2022-11-23 ASSESSMENT — ENCOUNTER SYMPTOMS: SHORTNESS OF BREATH: 1

## 2022-11-23 NOTE — PROGRESS NOTES
Pt in phase 2 of recovery. Arouses to name. On 4 L of 02. Report called to 8B nurse. Dr. Medardo Burgos in to speak with patient and family at bedside.

## 2022-11-23 NOTE — PROGRESS NOTES
EGD completed, Pictures taken. APC stomach settings used. Grounding pad applied and removed, skin intact. Pt tolerated well.        Scope  used

## 2022-11-23 NOTE — ANESTHESIA PRE PROCEDURE
Department of Anesthesiology  Preprocedure Note       Name:  Tenisha Strauss   Age:  65 y.o.  :  1960                                          MRN:  [id number]         Date:  2022      Surgeon: Mayda Tan):  Homa Rivera MD    Procedure: EGD    Medications prior to admission:   Prior to Admission medications    Medication Sig Start Date End Date Taking? Authorizing Provider   ferrous sulfate (IRON 325) 325 (65 Fe) MG tablet Take 1 tablet by mouth 2 times daily 22   EYAD Cummings CNP   furosemide (LASIX) 40 MG tablet Take 1 tablet by mouth daily 22   EYAD Cummings CNP   potassium chloride (KLOR-CON M) 20 MEQ extended release tablet Take 1 tablet by mouth daily 22   EYAD Cummings CNP   carvedilol (COREG) 12.5 MG tablet Take 12.5 mg by mouth 2 times daily (with meals)    Historical Provider, MD   empagliflozin (JARDIANCE) 25 MG tablet Take 1 tablet by mouth daily 22   EYAD Cummings CNP   clopidogrel (PLAVIX) 75 MG tablet Take 1 tablet by mouth daily 22   EYAD Cummings CNP   gabapentin (NEURONTIN) 600 MG tablet Take 1 tablet by mouth 2 times daily for 180 days.  22  EYAD Cummings CNP   insulin detemir (LEVEMIR FLEXTOUCH) 100 UNIT/ML injection pen 30 units in the morning, and 30 units in the evening. 22   EYAD Cummings CNP   insulin lispro (HUMALOG) 100 UNIT/ML injection vial Inject 5-15 Units into the skin 3 times daily (with meals) 22   EYAD Cummings CNP   albuterol sulfate HFA (VENTOLIN HFA) 108 (90 Base) MCG/ACT inhaler Inhale 2 puffs into the lungs 4 times daily as needed for Wheezing 22   EYAD Cummings CNP   atorvastatin (LIPITOR) 80 MG tablet Take 1 tablet by mouth daily 22   Halina Garsia MD   metoprolol tartrate (LOPRESSOR) 25 MG tablet Take 1 tablet by mouth twice daily 22 EYAD Finnegan CNP   lisinopril (PRINIVIL;ZESTRIL) 20 MG tablet Take 1 tablet by mouth daily 3/23/22   Rogelio Blakely MD   Polyethylene Glycol 3350 (MIRALAX PO) Take by mouth as needed    Historical Provider, MD   aspirin 81 MG chewable tablet Take 1 tablet by mouth daily 8/10/20   EYAD Finnegan CNP   Multiple Vitamins-Minerals (MULTIVITAMIN PO) Take 1 tablet by mouth daily     Historical Provider, MD       Current medications:    No current facility-administered medications for this visit. No current outpatient medications on file.      Facility-Administered Medications Ordered in Other Visits   Medication Dose Route Frequency Provider Last Rate Last Admin    [START ON 11/24/2022] valsartan (DIOVAN) tablet 160 mg  160 mg Oral Daily EYAD Ge CNP        [START ON 11/24/2022] minoxidil (LONITEN) tablet 10 mg  10 mg Oral Daily EYAD Ge - CNP        lactated ringers infusion   IntraVENous Continuous XOCHILT Lion 50 mL/hr at 11/23/22 1354 New Bag at 11/23/22 1354    sodium chloride flush 0.9 % injection 5-40 mL  5-40 mL IntraVENous 2 times per day MarixaEYAD Patel - CNP   10 mL at 11/23/22 0759    sodium chloride flush 0.9 % injection 5-40 mL  5-40 mL IntraVENous PRN EYAD Lozada - CNP        0.9 % sodium chloride infusion  25 mL IntraVENous PRN Surprise EYAD Johnson - CNP        insulin glargine (LANTUS) injection vial 34 Units  34 Units SubCUTAneous Daily Becca Mitchell PA-C        metoprolol tartrate (LOPRESSOR) tablet 50 mg  50 mg Oral BID Becca Mitchell PA-C   50 mg at 11/22/22 0845    amLODIPine (NORVASC) tablet 10 mg  10 mg Oral Daily EYAD Gonzáles CNP   10 mg at 11/23/22 0800    [Held by provider] hydrALAZINE (APRESOLINE) injection 10 mg  10 mg IntraVENous Q6H PRN EYAD Lou CNP   10 mg at 11/21/22 0951    melatonin tablet 4.5 mg  4.5 mg Oral Nightly PRN Roxann Garcia PA-C   4.5 mg at 11/21/22 2106    pantoprazole (PROTONIX) injection 40 mg  40 mg IntraVENous BID EYAD Miller CNP   40 mg at 11/23/22 0759    albuterol sulfate HFA (PROVENTIL;VENTOLIN;PROAIR) 108 (90 Base) MCG/ACT inhaler 2 puff  2 puff Inhalation 4x Daily PRN MD [facility]] aspirin chewable tablet 81 mg  81 mg Oral Daily Binta Hayward MD   81 mg at 11/18/22 0835    atorvastatin (LIPITOR) tablet 80 mg  80 mg Oral Daily Binta Hayward MD   80 mg at 11/23/22 1428    [Held by provider] clopidogrel (PLAVIX) tablet 75 mg  75 mg Oral Daily Binta Hayward MD   75 mg at 11/18/22 0836    [Held by provider] furosemide (LASIX) tablet 40 mg  40 mg Oral Daily Binta Hayward MD   40 mg at 11/22/22 0846    gabapentin (NEURONTIN) tablet 600 mg  600 mg Oral BID Binta Hayward MD   600 mg at 11/23/22 5471    multivitamin 1 tablet  1 tablet Oral Daily Binta Hayward MD   1 tablet at 11/23/22 7451    glucose chewable tablet 16 g  4 tablet Oral PRN Binta Hayward MD        dextrose bolus 10% 125 mL  125 mL IntraVENous PRN Binta Hayward MD        Or    dextrose bolus 10% 250 mL  250 mL IntraVENous PRN Binta Hayward MD        glucagon (rDNA) injection 1 mg  1 mg SubCUTAneous PRN Binta Hayward MD        dextrose 10 % infusion   IntraVENous Continuous PRN Binta Hayward MD        LORazepam (ATIVAN) injection 0.5 mg  0.5 mg IntraVENous Q6H PRN Carlos Duke PA-C   0.5 mg at 11/20/22 2044    insulin lispro (HUMALOG) injection vial 0-8 Units  0-8 Units SubCUTAneous TID  EYAD Powers CNP   2 Units at 11/22/22 0848    insulin lispro (HUMALOG) injection vial 0-4 Units  0-4 Units SubCUTAneous Nightly EYAD Powers CNP        ferrous sulfate (IRON 325) tablet 325 mg  325 mg Oral BID  EYAD Caraballo CNP   325 mg at 11/23/22 0806    sodium chloride flush 0.9 % injection 5-40 mL  5-40 mL IntraVENous 2 times per day Binta Hayward MD   10 mL at 11/22/22 0845    sodium chloride flush 0.9 % injection 5-40 mL 5-40 mL IntraVENous PRN Honey Aguillon MD   10 mL at 11/19/22 0957    0.9 % sodium chloride infusion   IntraVENous PRN Honey Aguillon MD        ondansetron (ZOFRAN-ODT) disintegrating tablet 4 mg  4 mg Oral Q8H PRN Honey Aguillon MD        Or    ondansetron [facility]) injection 4 mg  4 mg IntraVENous Q6H PRN Honey Aguillon MD   4 mg at 11/18/22 1701    polyethylene glycol (GLYCOLAX) packet 17 g  17 g Oral Daily PRN Honey Aguillon MD        acetaminophen (TYLENOL) tablet 650 mg  650 mg Oral Q6H PRN Honey Aguillon MD   650 mg at 11/22/22 2113    Or    acetaminophen (TYLENOL) suppository 650 mg  650 mg Rectal Q6H PRN Honey Aguillon MD        [Held by provider] enoxaparin (LOVENOX) injection 40 mg  40 mg SubCUTAneous Daily Honey Aguillon MD   40 mg at 11/18/22 1151    potassium chloride (KLOR-CON M) extended release tablet 40 mEq  40 mEq Oral PRN Honey Aguillon MD        Or    potassium bicarb-citric acid (EFFER-K) effervescent tablet 40 mEq  40 mEq Oral PRN Honey Aguillon MD        Or    potassium chloride 10 mEq/100 mL IVPB (Peripheral Line)  10 mEq IntraVENous PRN Honey Aguillon MD        potassium chloride 10 mEq/100 mL IVPB (Peripheral Line)  10 mEq IntraVENous PRN Honey Aguillon MD        magnesium sulfate 2000 mg in 50 mL IVPB premix  2,000 mg IntraVENous PRN Honey Aguillon MD        sennosides-docusate sodium (SENOKOT-S) 8.6-50 MG tablet 1 tablet  1 tablet Oral BID Honey Aguillon MD   1 tablet at 11/22/22 2113       Allergies:  No Known Allergies    Problem List:    Patient Active Problem List   Diagnosis Code    [facility] I96    CAD (coronary artery disease) I25.10    Type 2 diabetes mellitus with insulin therapy ([facility] E11.9, Z79.4    Hyperlipidemia E78.5    Hypertension I10    Charcot's joint, right ankle and foot M14.671    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    Sepsis ([facility] A41.9    Acute left-sided low back pain with bilateral sciatica M54.42, M54.41    S/P transmetatarsal amputation of foot, left ([facility]) Z89.432    Leukocytosis D72.829    Wound dehiscence, surgical, initial encounter T81.31XA    Postoperative wound infection N09.96AB    Metabolic acidosis R84.97    Hx of CABG Z95.1    Lumbar stenosis without neurogenic claudication M48.061    CKD (chronic kidney disease), stage II N18.2    Normocytic anemia D64.9    Morbid obesity ([facility]) E66.01    Debility R53.81    Carotid stenosis, asymptomatic, bilateral I65.23    Hypokalemia E87.6    Nonhealing ulcer of left lower extremity ([facility] L97.929    Bleeding R58    Wound, open T14. 8XXA    SOB (shortness of breath) on exertion R06.02    Hemoglobin A1C greater than 9%, indicating poor diabetic control R73.09    Hypertensive emergency I16.1    Acute on chronic congestive heart failure ([facility]) I50.9    Hypertensive urgency I16.0       Past Medical History:        Diagnosis Date    Arthritis     CAD (coronary artery disease)     CKD (chronic kidney disease), stage II     Diabetes mellitus ([facility]     Hyperlipidemia     Hypertension     Liver disease     HEPITITIS AS A CHILD       Past Surgical History:        Procedure Laterality Date    CARDIAC SURGERY  11/2019    triple bypass    CYST REMOVAL      RIGHT ANKLE     FOOT DEBRIDEMENT Left 4/20/2020    LEFT TRANSMETATARSAL AMPUTATION  FOOT INCISION AND DRAINAGE performed by Kathy Brock DPM at [address] Left 7/20/2020    LEFT WOUND DEBRIDEMENT WITH WOUND VAC APPLICATION performed by Kathy Brock DPM at [address] Left 7/1/2022    LEFT FOOT [facility], [address] [place] [address], APPLICATION [employer] WOUND VAC performed by Kathy Brock DPM at [place] [address] Right     CYST REMOVED    FOOT SURGERY Left 8/14/2020    LEFT FOOT PREPARATION GRAFT SITE, HAVEST SPLIT THICKNESS SKIN GRAFT WITH APPLICATION, APPLICATION [employer] WOUND VAC performed by Kathy Brock DPM at [place] [address]  TOE AMPUTATION Left 3/3/2020    I&D LEFT FOOT, TRANSMETATARSAL AMPUTATION performed by Ronaldo Rodríguez DPM at [address] [facility] History:    Social History     Tobacco Use    Smoking status: Former     Types: Cigarettes     Quit date:      Years since quittin.9    Smokeless tobacco: Never   Substance Use Topics    Alcohol use: Not Currently                                Counseling given: Not Answered      Vital Signs (Current): There were no vitals filed for this visit.                                            BP Readings from Last 3 Encounters:   22 (!) 137/47   22 (!) 144/80   22 134/72       NPO Status:                                                                                 BMI:   Wt Readings from Last 3 Encounters:   22 (!) 329 lb (149.2 kg)   22 (!) 303 lb 3.2 oz (137.5 kg)   22 (!) 311 lb (141.1 kg)     There is no height or weight on file to calculate BMI.    CBC:   Lab Results   Component Value Date/Time    WBC 4.6 2022 06:22 AM    RBC 3.38 2022 06:22 AM    HGB 8.3 2022 06:22 AM    HCT 28.5 2022 06:22 AM    MCV 84.3 2022 06:22 AM    RDW 17.7 2020 01:45 PM     2022 06:22 AM       CMP:   Lab Results   Component Value Date/Time     2022 06:22 AM    K 4.1 2022 06:22 AM    K 3.8 2021 06:48 PM    CL 99 2022 06:22 AM    CO2 24 2022 06:22 AM    BUN 21 2022 06:22 AM    CREATININE 1.8 2022 06:22 AM    LABGLOM 42 2022 06:02 PM    GLUCOSE 209 2022 06:22 AM    GLUCOSE 103 2020 01:45 PM    PROT 6.3 2022 07:40 PM    CALCIUM 8.1 2022 06:22 AM    BILITOT 0.2 2022 07:40 PM    ALKPHOS 105 2022 07:40 PM    AST 39 2022 07:40 PM    ALT 63 2022 07:40 PM       POC Tests:   Recent Labs     22  1223   POCGLU 173*       Coags:   Lab Results   Component Value Date/Time    INR 1.01 10/20/2022

## 2022-11-23 NOTE — ANESTHESIA POSTPROCEDURE EVALUATION
Department of Anesthesiology  Postprocedure Note    Patient: Gautam Diaz  MRN: [id number]  YOB: 1960  Date of evaluation: 11/23/2022      Procedure Summary     Date: 11/23/22 Room / Location: [address] [place] [address]    Anesthesia Start: 5089 Anesthesia Stop: 0673    Procedure: EGD Diagnosis:       Anemia, unspecified type      Gastrointestinal hemorrhage, unspecified gastrointestinal hemorrhage type      (Shortness of breath [R06.02])    Surgeons: Adrián Arellano MD Responsible Provider: Cheryl Frank MD    Anesthesia Type: MAC ASA Status: 4          Anesthesia Type: No value filed.     Fernando Phase I: Fernando Score: 10    Fernando Phase II:        Anesthesia Post Evaluation    Patient location during evaluation: bedside  Patient participation: complete - patient participated  Level of consciousness: awake and alert  Airway patency: patent  Nausea & Vomiting: no nausea and no vomiting  Complications: no  Cardiovascular status: hemodynamically stable  Respiratory status: spontaneous ventilation and acceptable  Hydration status: euvolemic

## 2022-11-23 NOTE — PROGRESS NOTES
Cardiology Progress Note      Patient:  Kirsten Blackburn  YOB: 1960  MRN: [id number]   Acct: [de-identified]  Admit Date:  11/17/2022  Primary Cardiologist:Primary Cardiologist: Mireille Hyde MD  Per Dr Claudell Ha note:  \" CHIEF COMPLAINT: Uncontrolled HTN     HPI: This is a pleasant 65 y.o. male presents with uncontrolled HTN. PMH is significant for CAD s/p CABG in 11/2019, PAD with multiple toes amputated, HFpEF, Type 2 DM, HTN, HLD, carotid artery stenosis. Cardiology was consulted for uncontrolled HTN and newly elevated troponin     The patient states that he first noticed severely elevated BP with SBP > 200 while he was at [address] [place] [address] about 1.5-2 weeks ago, where he was supposed to get surgery for carotid artery stenosis. The surgery was delayed because of his severely elevated BP. No ischemic workup was performed at [address] [place] [address] due to negative troponins, although he left AMA with SBP in the 180s before his HTN could be fully treated. He presented to [facility] for this severe HTN and also because of progressively worsening SOB for 1 week which was associated with clear white sputum, chills, and pleuritic chest pain. This SOB and chest pain improved significantly with 12 hours of BIPAP, after which he denies any chest pain or SOB at this time. The patient states that he has a mild headache that has improved with control of his BP. He denies any current chest pain, SOB, nausea, vomiting, dizziness, worsening of his chronic leg swelling, or any other symptoms. He insists that he has been compliant with all of his medications. He quit smoking 15 years ago, though he admits to smoking 2-3 PPD for 25 years. He denies drinking or illicit drug use. \"    Subjective (Events in last 24 hours):   Pt up in chair eating lunch   He has no cardiac c/o     Renal US pending     Tele w/out ectopy or arrhythmia       11/23/22  Pt in bed   No cardiac c/o    For EGD today     No noted AVB on tele review - noted SR with PACs  BP improved - pt without headaches or dizziness         Objective:   BP (!) 134/91   Pulse 61   Temp 98.8 °F (37.1 °C) (Oral)   Resp 18   Ht 6' 2\" (1.88 m)   Wt (!) 329 lb (149.2 kg)   SpO2 92%   BMI 42.24 kg/m²        TELEMETRY: SR - noted PACs    Physical Exam:  General Appearance: alert and oriented to person, place and time, in no acute distress  Cardiovascular: normal rate, regular rhythm, normal S1 and S2, no murmurs, rubs, clicks, or gallops, distal pulses intact,   Pulmonary/Chest: clear to auscultation bilaterally- no wheezes, rales or rhonchi, normal air movement, no respiratory distress  Abdomen: soft, non-tender, non-distended, normal bowel sounds, no masses Extremities: no cyanosis, clubbing or edema, pulses faint  Skin: warm and dry, no rash or erythema  Musculoskeletal: normal range of motion, no joint swelling, deformity or tenderness  Neurological: alert, oriented, normal speech, no focal findings or movement disorder noted    Medications:    sodium chloride flush  5-40 mL IntraVENous 2 times per day    insulin glargine  34 Units SubCUTAneous Daily    minoxidil  5 mg Oral Daily    metoprolol tartrate  50 mg Oral BID    amLODIPine  10 mg Oral Daily    pantoprazole  40 mg IntraVENous BID    [Held by provider] hydrALAZINE  50 mg Oral 3 times per day    [Held by provider] aspirin  81 mg Oral Daily    atorvastatin  80 mg Oral Daily    [Held by provider] clopidogrel  75 mg Oral Daily    furosemide  40 mg Oral Daily    gabapentin  600 mg Oral BID    multivitamin  1 tablet Oral Daily    insulin lispro  0-8 Units SubCUTAneous TID     insulin lispro  0-4 Units SubCUTAneous Nightly    ferrous sulfate  325 mg Oral BID     valsartan  320 mg Oral Daily    sodium chloride flush  5-40 mL IntraVENous 2 times per day    [Held by provider] enoxaparin  40 mg SubCUTAneous Daily    sennosides-docusate sodium  1 tablet Oral BID      sodium chloride      dextrose      sodium chloride       sodium chloride flush, 5-40 mL, PRN  sodium chloride, 25 mL, PRN  [Held by provider] hydrALAZINE, 10 mg, Q6H PRN  melatonin, 4.5 mg, Nightly PRN  albuterol sulfate HFA, 2 puff, 4x Daily PRN  glucose, 4 tablet, PRN  dextrose bolus, 125 mL, PRN   Or  dextrose bolus, 250 mL, PRN  glucagon (rDNA), 1 mg, PRN  dextrose, , Continuous PRN  LORazepam, 0.5 mg, Q6H PRN  sodium chloride flush, 5-40 mL, PRN  sodium chloride, , PRN  ondansetron, 4 mg, Q8H PRN   Or  ondansetron, 4 mg, Q6H PRN  polyethylene glycol, 17 g, Daily PRN  acetaminophen, 650 mg, Q6H PRN   Or  acetaminophen, 650 mg, Q6H PRN  potassium chloride, 40 mEq, PRN   Or  potassium alternative oral replacement, 40 mEq, PRN   Or  potassium chloride, 10 mEq, PRN  potassium chloride, 10 mEq, PRN  magnesium sulfate, 2,000 mg, PRN      Diagnostics:      stress   Conclusions      Summary   There is mild attenuation artifact noted in the inferolateral wall seems   to be related to bowel artifact. This study was negative for ischemia. Recommendation   Clinical correlation is recommended. Signatures      ----------------------------------------------------------------   Electronically signed by Angie Jacobo MD (Interpreting   Cardiologist) on 08/30/2022 at 07:27   ----------------------------------------------------------------       Echo   osu  Left Ventricle   Chamber size is enlarged. Increased wall thickness. Concentric remodeling is present. Normal global wall motion. Regional wall motion is normal. Abnormal septal motion consistent with post-operative state. Ejection fraction is normal (55 - 60%). Diastolic function is consistent with pseudonormalization (grade II). Right Ventricle   Chamber size is normal. Systolic function is normal. Fractional area change equals 34.4%. Left Atrium   Chamber size is mildly enlarged. Right Atrium   Chamber size is normal.     IVC/SVC   The inferior vena cava structure has a diameter <21 mm and decreases >50% during inspiration.      Mitral Valve   Normal appearing leaflets. Leaflet mobility is normal. Mild anterior and posterior annular calcification. Trace regurgitation. No valve stenosis. Tricuspid Valve   Normal leaflets. Leaflet mobility is normal. Trace regurgitation. No stenosis. Pulmonary artery systolic pressure (PASP) is unable to be estimated. Aortic Valve   Trileaflet valve. Mild leaflet calcification of the non-coronary cusp ([place]). Leaflet excursion is mildly decreased. Trace regurgitation. Mild stenosis. Mean gradient: 18 mmHg. Dimensionless Index by VTI: 0.43. Valve area continuity VMAX: 1.70 cm2. Pulmonic Valve   Normal structure. No regurgitation. No stenosis. Pericardium   No pericardial effusion. Septum   The atrial septum is normal.     Aorta   No dilation to extent seen. SOV: 3.52 cm. STJ: 3.25 cm. Ascending: 3.38 cm. Left Heart Cath:     Lab Data:    Cardiac Enzymes:  No results for input(s): CKTOTAL, CKMB, CKMBINDEX, TROPONINI in the last 72 hours.     CBC:   Lab Results   Component Value Date/Time    WBC 4.6 11/23/2022 06:22 AM    RBC 3.38 11/23/2022 06:22 AM    HGB 8.3 11/23/2022 06:22 AM    HCT 28.5 11/23/2022 06:22 AM     11/23/2022 06:22 AM       CMP:    Lab Results   Component Value Date/Time     11/23/2022 06:22 AM    K 4.1 11/23/2022 06:22 AM    K 3.8 06/21/2021 06:48 PM    CL 99 11/23/2022 06:22 AM    CO2 24 11/23/2022 06:22 AM    BUN 21 11/23/2022 06:22 AM    CREATININE 1.8 11/23/2022 06:22 AM    LABGLOM 42 11/22/2022 06:02 PM    GLUCOSE 209 11/23/2022 06:22 AM    GLUCOSE 103 05/25/2020 01:45 PM    CALCIUM 8.1 11/23/2022 06:22 AM       Hepatic Function Panel:    Lab Results   Component Value Date/Time    ALKPHOS 105 11/17/2022 07:40 PM    ALT 63 11/17/2022 07:40 PM    AST 39 11/17/2022 07:40 PM    PROT 6.3 11/17/2022 07:40 PM    BILITOT 0.2 11/17/2022 07:40 PM    BILIDIR <0.2 11/17/2022 07:40 PM    LABALBU 2.6 11/17/2022 07:40 PM       Magnesium:    Lab Results   Component Value Date/Time    MG 1.9 11/20/2022 11:12 PM       PT/INR:    Lab Results   Component Value Date/Time    INR 1.01 10/20/2022 12:04 PM       HgBA1c:    Lab Results   Component Value Date/Time    LABA1C 9.0 11/16/2022 09:28 AM       FLP:    Lab Results   Component Value Date/Time    TRIG 51 11/16/2022 09:28 AM    HDL 39 11/16/2022 09:28 AM    LDLCALC 67 11/16/2022 09:28 AM       TSH:    Lab Results   Component Value Date/Time    TSH 1.350 11/16/2022 09:28 AM         Assessment:    Uncontrolled HTN -- improving     Renal US negative       Possible 2 degree HB - none noted last 48 hours -- followed by EP - on metoprolol - 30 day event at [facility]     Nonobstructive peripheral arterial disease by angiogram 11/2020 / hx toe amputation-- per pt needs AKA    Hx carotid stenosis - needs surgery      Hx CABG 11/2019 [facility]    DM II  HLP    Acute anemia -- followed by GI   Sp PRBC       Plan:  Going for EGD today   BP  improving -- creat creeping up - diovan decreased - minoxidil increased   Follow as OP         Electronically signed by EYAD Babin - CNP on 11/23/2022 at 9:49 AM

## 2022-11-23 NOTE — BRIEF OP NOTE
Brief Postoperative Note      Patient: Margi Anderson  YOB: 1960  MRN: [id number]    Date of Procedure: 11/23/2022    Pre-Op Diagnosis: Shortness of breath [R06.02], stool positive for occult blood anemia    Post-Op Diagnosis: Gastritis in the fundus, AVM in the body treated with argon plasma coagulator       Procedure(s):  EGD    Surgeon(s):  Анна Fisher MD    Assistant:  * No surgical staff found *    Anesthesia: Monitor Anesthesia Care    Estimated Blood Loss (mL): none    Complications: None    Specimens:   * No specimens in log *    Implants:  * No implants in log *      Drains: * No LDAs found *    Findings:  Gastritis in the fundus, AVM in the body treated with argon plasma coagulator    Electronically signed by Анна Fisher MD on 11/23/2022 at 4:03 PM

## 2022-11-23 NOTE — CONSULTS
CONSULTATION NOTE :ID       Patient - Baljinder West,  Age - 65 y.o.    - 1960      Room Number - 8B-21/021-A   N -  [id number]   [facility]  Date of Admission -  2022  6:51 PM  Patient's PCP: EYAD Tafoya CNP     Requesting Physician: XOCHILT Carlson    REASON FOR CONSULTATION   Chronic osteomyelitis of left foot  CHIEF COMPLAINT   Left foot chronic ulcer    HISTORY OF PRESENT ILLNESS       This is a very pleasant 65 y.o. male, past medical history of coronary artery disease CABG peripheral artery disease, uncontrolled diabetes mellitus, poorly controlled hypertension, hypertensive urgency, obesity, anemia who was admitted to the hospital for hypertensive urgency. Patient has long history of peripheral vascular disease including left side TMA and left side nonhealing chronic wound for 3 years. Patient's wounds ulcer chronic will is lasting for 3 years, podiatry following and discussed with the patient regarding higher amputation such as BKA. Currently patient denies fever chills headaches dizziness lightheadedness productive cough chest pain abdominal pain dysuria. Patient complains of mild dull pain on the left foot which is his baseline and no change in the pain intensity or duration. Lab updates, blood cultures 1 and 2 sent. Noted moderate anemia and decreasing white blood count.     PAST MEDICAL  HISTORY       Past Medical History:   Diagnosis Date    Arthritis     CAD (coronary artery disease)     CKD (chronic kidney disease), stage II     Diabetes mellitus ([facility] [address]     Hyperlipidemia     Hypertension     Liver disease     HEPITITIS AS A CHILD       PAST SURGICAL HISTORY     Past Surgical History:   Procedure Laterality Date    CARDIAC SURGERY  2019    triple bypass    CYST REMOVAL      RIGHT ANKLE     FOOT DEBRIDEMENT Left 2020    LEFT TRANSMETATARSAL AMPUTATION  FOOT INCISION AND DRAINAGE performed by Mariel KENNY Chayito Franks at [address] [place] [address] Left 7/20/2020    LEFT WOUND DEBRIDEMENT WITH WOUND VAC APPLICATION performed by Tiffanie Caballero DPM at [address] [place] [address] Left 7/1/2022    LEFT FOOT EXCISONAL WOUND DEBRIDEMENT, APPLICATION SKIN SUBSTITUTE GRAFT, APPLICATION [facility]CI WOUND VAC performed by Tiffanie Caballero DPM at [facility] Right     CYST REMOVED    FOOT SURGERY Left 8/14/2020    LEFT FOOT PREPARATION GRAFT SITE, HAVEST SPLIT THICKNESS SKIN GRAFT WITH APPLICATION, APPLICATION [facility]CI WOUND VAC performed by Tiffanie Caballero DPM at [address] Left 3/3/2020    I&D LEFT FOOT, TRANSMETATARSAL AMPUTATION performed by Tiffanie Caballero DPM at [address]:       Scheduled Meds:   sodium chloride flush  5-40 mL IntraVENous 2 times per day    insulin glargine  34 Units SubCUTAneous Daily    minoxidil  5 mg Oral Daily    metoprolol tartrate  50 mg Oral BID    amLODIPine  10 mg Oral Daily    pantoprazole  40 mg IntraVENous BID    [Held by provider] hydrALAZINE  50 mg Oral 3 times per day    [Held by provider] aspirin  81 mg Oral Daily    atorvastatin  80 mg Oral Daily    [Held by provider] clopidogrel  75 mg Oral Daily    furosemide  40 mg Oral Daily    gabapentin  600 mg Oral BID    multivitamin  1 tablet Oral Daily    insulin lispro  0-8 Units SubCUTAneous TID WC    insulin lispro  0-4 Units SubCUTAneous Nightly    ferrous sulfate  325 mg Oral BID WC    valsartan  320 mg Oral Daily    sodium chloride flush  5-40 mL IntraVENous 2 times per day    [Held by provider] enoxaparin  40 mg SubCUTAneous Daily    sennosides-docusate sodium  1 tablet Oral BID     Continuous Infusions:   sodium chloride      dextrose      sodium chloride       PRN Meds:sodium chloride flush, sodium chloride, [Held by provider] hydrALAZINE, melatonin, albuterol sulfate HFA, glucose, dextrose bolus **OR** dextrose bolus, glucagon (rDNA), dextrose, LORazepam, sodium chloride flush, sodium chloride, ondansetron **OR** ondansetron, polyethylene glycol, acetaminophen **OR** acetaminophen, potassium chloride **OR** potassium alternative oral replacement **OR** potassium chloride, potassium chloride, magnesium sulfate  Allergies:   ALLERGIES:    Patient has no known allergies. SOCIAL HISTORY:     TOBACCO:   reports that he quit smoking about 14 years ago. His smoking use included cigarettes. He has never used smokeless tobacco.     ETOH:   reports that he does not currently use alcohol. Patient currently lives alone      FAMILY HISTORY:         Problem Relation Age of Onset    Diabetes Mother     High Blood Pressure Mother     Alzheimer's Disease Father          of, 80or 80years old    Heart Disease Father     High Blood Pressure Father     Cancer Neg Hx     Stroke Neg Hx        REVIEW OF SYSTEMS:     Constitutional: no fever, no night sweats, no fatigue, no weight loss. Head: no head ache , no head injury, no migranes. Eye: no eye discharge, blurring of vision, no double vision,no eye pain. Ears: no hearing difficulty, no tinnitus  Mouth/throat: no ulceration, dental caries , dysphagia, no hoarseness and voice change  Respiratory: no cough no chest pain,no shortness of breath,no wheezing. CVS: no palpitation, no chest pain,   GI: no abdominal pain, no nausea , no vomiting, no constipation,no diarrhea. JENNIFER: no dysuria, frequency and urgency, no hematuria, no kidney stones  Musculoskeletal: no joint pain, swelling , stiffness. Endocrine: no polyuria, polydipsia, no cold or heat intolerance  Hematology: no anemia, no easy brusing or bleeding, no hx of clotting disorder  Neurological:no headaches,no dizziness, no seizure, no numbness.   No gaze palsy, no facial drop noted  Psychiatry: no depression, no anxiety,no panic attacks, no suicide ideation    PHYSICAL EXAM:     BP (!) 134/91   Pulse 61   Temp 98.8 °F (37.1 °C) (Oral)   Resp 18   Ht 6' 2\" (1.88 m)   Wt (!) 329 lb (149.2 kg)   SpO2 92%   BMI 42.24 kg/m²   Limited exam due to patient's lack of cooperation and morbid obesity    General apperance:  Awake, alert, not in distress. Chronically ill looking, obese appearance  HEENT: pink conjunctiva, unicteric sclera, moist oral mucosa. Chest:  On chest noted midline well-healed old surgical scar for CABG. Breath sounds bilaterally diminished bibasilar without wheezing/crackles  Cardiovascular:  RRR , diminished S1-S2 with a distant heart sounds, systolic murmur of 3/6 on all heart points  Abdomen:  Soft, non tender to palpation. Extremities: Left side foot is dressed that, dressing is dry no bleeding/discharges noted, TMA. Skin:  Warm and dry. CNS: No facial drop noted no gaze palsy noted alert and oriented to time person and place          LABS:     CBC:   Recent Labs     11/21/22  0614 11/22/22  0539 11/23/22  0622   WBC 7.7 4.3* 4.6*   HGB 8.8* 8.5* 8.3*    338 316     BMP:    Recent Labs     11/21/22  0614 11/22/22  0539 11/23/22  0622    134* 136   K 3.8 3.9 4.1    98 99   CO2 26 24 24   BUN 14 16 21   CREATININE 1.1 1.3* 1.8*   GLUCOSE 150* 249* 209*     Calcium:  Recent Labs     11/23/22  0622   CALCIUM 8.1*     Ionized Calcium:No results for input(s): IONCA in the last 72 hours. Magnesium:  Recent Labs     11/20/22  2312   MG 1.9     Phosphorus:  Recent Labs     11/20/22  2312   PHOS 3.9     BNP:No results for input(s): BNP in the last 72 hours.   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    Sepsis ([facility] [address] A41.9    Acute left-sided low back pain with bilateral sciatica M54.42, M54.41    S/P transmetatarsal amputation of foot, left ([facility]) Z89.432    Leukocytosis D72.829    Wound dehiscence, surgical, initial encounter T81.31XA    Postoperative wound infection W64.94QP    Metabolic acidosis S11.82    Hx of CABG Z95.1    Lumbar stenosis without neurogenic claudication M48.061    CKD (chronic kidney disease), stage II N18.2    Normocytic anemia D64.9    Morbid obesity ([facility]) E66.01    Debility R53.81    Carotid stenosis, asymptomatic, bilateral I65.23    Hypokalemia E87.6    Nonhealing ulcer of left lower extremity ([facility] [address] L97.929    Bleeding R58    Wound, open T14. 8XXA    SOB (shortness of breath) on exertion R06.02    Hemoglobin A1C greater than 9%, indicating poor diabetic control R73.09    Hypertensive emergency I16.1    Acute on chronic congestive heart failure (HCC) I50.9    Hypertensive urgency I16.0           Impression and Recommendation:   Lower left leg chronic nonhealing wound, s/p TMA. Due to dressing, limited physical exam, and discussed with the podiatry and the patient at same time in patient's room. Per primary podiatry service, BKA's plan at and wound dressing is scheduled to change once or weekly, last time yesterday wound clinic, changed the dressing.   -no signs of  systemic infectious disease  -Podiatry on the board  -Recommend aggressive risk modification including control of diabetes mellitus and hypertension and statins high intensity. -Awaiting blood cultures 1 and 2.  -We will sign off for now and will follow-up as needed  Primary hypertension poorly controlled. Hypertensive urgency on the POA. CAD. History of CABG  CHF HFpEF  Cardiac arrhythmia. Bifascicular block with a PAC  Moderate normocytic anemia  Uncontrolled diabetes mellitus  PAD  Obesity.   BMI 43  Suspected acute kidney injury    Infection Control:   Universal precautions  Discharge Planning (Coordination of out patient care: Thank you XOCHILT Kim for allowing me to participate in this patient's care. Xin Markham DO, PGY-2 11/23/2022 8:17 AM Case discussed with Dr. Eugenio Frank MD  Patient was seen and examined face-to-face by me  The chart, progress notes, labs and radiographs were reviewed. Case discussed with the staff Questions and concerns were addressed. I agree with the progress note.

## 2022-11-23 NOTE — PROGRESS NOTES
Podiatric Progress Note  Rip Settler  Subjective :   11/23/2022  Patient seen at bedside this morning on behalf of Dr. Ryder Portillo. Patient appeared pleasant, was oriented to person, place and time and in no acute distress. Patient currently denies any pain to the left foot. He also denies any N/V/F/C/SOB or CP. Patient has no further pedal concerns at this point in time. HISTORY OF PRESENT ILLNESS:    The patient is a 65 y.o. male with significant past medical history of DM, CAD, left foot amputations who being seen at bedside on behalf of Dr. Ryder Portillo. Patient was admitted for Hypertensive urgency and is currently being worked up for cardiac arrhythmia. Patient states he has had wounds to his left foot for over 3 years, and has been following up regularly with Dr. Ryder Portillo in the office. He states he was last seen last week on Tuesday. He states he has been ambulating to his heel on his left side with an Unna boot and Posterior splint. He denies any pain to bilateral feet and denies any f/c/n/v at this time. He has no other complaints.     Current Medications:    Current Facility-Administered Medications: sodium chloride flush 0.9 % injection 5-40 mL, 5-40 mL, IntraVENous, 2 times per day  sodium chloride flush 0.9 % injection 5-40 mL, 5-40 mL, IntraVENous, PRN  0.9 % sodium chloride infusion, 25 mL, IntraVENous, PRN  insulin glargine (LANTUS) injection vial 34 Units, 34 Units, SubCUTAneous, Daily  minoxidil (LONITEN) tablet 5 mg, 5 mg, Oral, Daily  metoprolol tartrate (LOPRESSOR) tablet 50 mg, 50 mg, Oral, BID  amLODIPine (NORVASC) tablet 10 mg, 10 mg, Oral, Daily  [Held by provider] hydrALAZINE (APRESOLINE) injection 10 mg, 10 mg, IntraVENous, Q6H PRN  melatonin tablet 4.5 mg, 4.5 mg, Oral, Nightly PRN  pantoprazole (PROTONIX) injection 40 mg, 40 mg, IntraVENous, BID  [Held by provider] hydrALAZINE (APRESOLINE) tablet 50 mg, 50 mg, Oral, 3 times per day  albuterol sulfate HFA (PROVENTIL;VENTOLIN;PROAIR) 108 (90 Base) MCG/ACT inhaler 2 puff, 2 puff, Inhalation, 4x Daily PRN  [Held by provider] aspirin chewable tablet 81 mg, 81 mg, Oral, Daily  atorvastatin (LIPITOR) tablet 80 mg, 80 mg, Oral, Daily  [Held by provider] clopidogrel (PLAVIX) tablet 75 mg, 75 mg, Oral, Daily  furosemide (LASIX) tablet 40 mg, 40 mg, Oral, Daily  gabapentin (NEURONTIN) tablet 600 mg, 600 mg, Oral, BID  multivitamin 1 tablet, 1 tablet, Oral, Daily  glucose chewable tablet 16 g, 4 tablet, Oral, PRN  dextrose bolus 10% 125 mL, 125 mL, IntraVENous, PRN **OR** dextrose bolus 10% 250 mL, 250 mL, IntraVENous, PRN  glucagon (rDNA) injection 1 mg, 1 mg, SubCUTAneous, PRN  dextrose 10 % infusion, , IntraVENous, Continuous PRN  LORazepam (ATIVAN) injection 0.5 mg, 0.5 mg, IntraVENous, Q6H PRN  insulin lispro (HUMALOG) injection vial 0-8 Units, 0-8 Units, SubCUTAneous, TID WC  insulin lispro (HUMALOG) injection vial 0-4 Units, 0-4 Units, SubCUTAneous, Nightly  ferrous sulfate (IRON 325) tablet 325 mg, 325 mg, Oral, BID WC  valsartan (DIOVAN) tablet 320 mg, 320 mg, Oral, Daily  sodium chloride flush 0.9 % injection 5-40 mL, 5-40 mL, IntraVENous, 2 times per day  sodium chloride flush 0.9 % injection 5-40 mL, 5-40 mL, IntraVENous, PRN  0.9 % sodium chloride infusion, , IntraVENous, PRN  ondansetron (ZOFRAN-ODT) disintegrating tablet 4 mg, 4 mg, Oral, Q8H PRN **OR** ondansetron (ZOFRAN) injection 4 mg, 4 mg, IntraVENous, Q6H PRN  polyethylene glycol (GLYCOLAX) packet 17 g, 17 g, Oral, Daily PRN  acetaminophen (TYLENOL) tablet 650 mg, 650 mg, Oral, Q6H PRN **OR** acetaminophen (TYLENOL) suppository 650 mg, 650 mg, Rectal, Q6H PRN  [Held by provider] enoxaparin (LOVENOX) injection 40 mg, 40 mg, SubCUTAneous, Daily  potassium chloride (KLOR-CON M) extended release tablet 40 mEq, 40 mEq, Oral, PRN **OR** potassium bicarb-citric acid (EFFER-K) effervescent tablet 40 mEq, 40 mEq, Oral, PRN **OR** potassium chloride 10 mEq/100 mL IVPB (Peripheral Line), 10 mEq, IntraVENous, PRN  potassium chloride 10 mEq/100 mL IVPB (Peripheral Line), 10 mEq, IntraVENous, PRN  magnesium sulfate 2000 mg in 50 mL IVPB premix, 2,000 mg, IntraVENous, PRN  sennosides-docusate sodium (SENOKOT-S) 8.6-50 MG tablet 1 tablet, 1 tablet, Oral, BID    Objective     BP (!) 134/91   Pulse 61   Temp 98.8 °F (37.1 °C) (Oral)   Resp 18   Ht 6' 2\" (1.88 m)   Wt (!) 329 lb (149.2 kg)   SpO2 92%   BMI 42.24 kg/m²      I/O:  Intake/Output Summary (Last 24 hours) at 11/23/2022 0927  Last data filed at 11/23/2022 0600  Gross per 24 hour   Intake 1840 ml   Output 900 ml   Net 940 ml              Wt Readings from Last 3 Encounters:   11/23/22 (!) 329 lb (149.2 kg)   08/24/22 (!) 303 lb 3.2 oz (137.5 kg)   07/01/22 (!) 311 lb (141.1 kg)       LABS:    Recent Labs     11/22/22  0539 11/23/22  0622   WBC 4.3* 4.6*   HGB 8.5* 8.3*   HCT 28.3* 28.5*    316        Recent Labs     11/20/22  2312 11/21/22  0614 11/23/22  0622      < > 136   K 3.7   < > 4.1      < > 99   CO2 27   < > 24   PHOS 3.9  --   --    BUN 14   < > 21   CREATININE 1.1   < > 1.8*    < > = values in this interval not displayed. No results for input(s): PROT, INR, APTT in the last 72 hours. No results for input(s): CKTOTAL, CKMB, CKMBINDEX, TROPONINI in the last 72 hours. Focused Lower Extremity Examination:    Vitals:    BP (!) 134/91   Pulse 61   Temp 98.8 °F (37.1 °C) (Oral)   Resp 18   Ht 6' 2\" (1.88 m)   Wt (!) 329 lb (149.2 kg)   SpO2 92%   BMI 42.24 kg/m²      Dermatologic: Dressings (Unna boot, posterior splint, and Coban wrap) to LLE clean, dry, and intact. Dressings left intact during today's visit. Physical exam findings from 11/21/2022: Full thickness ulcer to the plantar aspect of left foot. Base is fibro-granular with some slough and a hyperkeratotic rim noted. No surrounding erythema or edema. No purulence or malodor. Wound does not probe to bone, track, or undermine.          Vascular: Dorsalis pedis and posterior tibial pulses are non palpable bilaterally. CFT brisk to TMA stump. Neurovascular: light touch and protective sensation absent to bilateral feet      Musculoskeletal: no pain to palpation. Left foot TMA. Right foot and ankle in rectus position. ASSESSMENT: Pt. is a 65 y.o. 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    Sepsis ([address]    Acute left-sided low back pain with bilateral sciatica    S/P transmetatarsal amputation of foot, left (HCC)    Leukocytosis    Wound dehiscence, surgical, initial encounter    Postoperative wound infection    Metabolic acidosis    Hx of CABG    Lumbar stenosis without neurogenic claudication    CKD (chronic kidney disease), stage II    Normocytic anemia    Morbid obesity ([address]    Debility    Carotid stenosis, asymptomatic, bilateral    Hypokalemia    Nonhealing ulcer of left lower extremity (HCC)    Bleeding    Wound, open    SOB (shortness of breath) on exertion    Hemoglobin A1C greater than 9%, indicating poor diabetic control    Hypertensive emergency    Acute on chronic congestive heart failure ([address]    Hypertensive urgency       PLAN:   Non-healing wound, LLE  S/P Left TMA  -Patient examined and evaluated  -WBC 4.6; patient afebrile  -Reviewed labs  -Dressings to LLE kept intact at today's visit  -Instructed patient to bear weight as tolerated to LLE on heel only  -No antibiotic therapy indicated at this time  -Instructed patient to keep LLE elevated whenever possible  -Discussed with patient at length the options for the left wound (conservative versus surgical).   Patient relates that since this wound has been nonhealing for approximately 3 years, he would prefer to go forward with a LLE BKA as soon as he is cleared from a cardiac standpoint.  -Podiatry will continue to follow patient    Felicity Estes DPM, PGY-3  Podiatric Surgical Resident  11/23/2022   9:27 AM

## 2022-11-23 NOTE — PROGRESS NOTES
Hospitalist Progress Note      Patient:  Jordy Galaviz    Unit/Bed:[facility] ENDO POOL RM/NONE  YOB: 1960  MRN: [id number]   Acct: [de-identified]   PCP: EYAD Hoffman CNP  Date of Admission: 11/17/2022    Assessment/Plan:    Hypertensive urgency: BP controlled. Cardiology was following; Valsartan decreased and increased Minoxidil due to GISELA. Continue BP medications- Norvasc, BB. Acute on chronic normocytic anemia--  Transfused with 1 unit packed red blood cells with improvement to 8.7 no obvious signs of bleeding  Currently stable in 8s   GI following- EGD showed \"Gastritis in the fundus, AVM in the body treated with argon plasma coagulator\"   GISELA: Creatinine today 1.8, baseline ~1. Cardiology lowered Valsartan due to GISELA. BMP in the AM. IVF. Non conducted [facility] with Nanda Jay with recommendations for 30 day event monitor on discharge and follow up 2 weeks as OP   Chronic diastolic heart failure-  Beta-blocker, on Lasix 40 mg daily, BNP at 2665;  Net output of 6490 for admission  Last echo 11/3 with normal EF - done at [facility]   Hypokalemia- resolved -  Diabetes mellitus type 2, uncontrolled--  on Lantus 30 units daily- increased to 34 units 11/22 as Fbg elevated. sliding scale medium dose before meals and at bedtime;   hemoglobin A1c on  11/16/2022 was noted to be 9.0; change diet to ADA  Mild troponin elevation--trending down slightly, lipids noted from November 16, 2022 with LDL at 67; no EKG changes; likely demand mismatch with #1 and #3; Cardiology following- no ischemic workup at this time   Essential hypertension, uncontrolled--see #1   PAD--statin, aspirin, Plavix  CAD status post CABG November 2019--on statin, aspirin, Plavix- holding due to EGD.    Morbid obesity with BMI 42.81  Chronic left foot wound-  Notified Dr He Calles of admission- podiatry team is following  Xrays ordered with concerns for chronic osteomyelitis - appreciate recs from podiatry - pt will need AKA. ID consulted   Blood cultures ordered    Chief Complaint: Shortness of breath    Initial H and P:-    Per H&P done 11/17/2022: \"65year-old gentleman who is severely obese with past medical history of type 2 diabetes, hypertension, HLD, CAD status post CABG in 11/2019, PAD, lupus? who presents with acute onset of progressively worsening dyspnea for 1 week duration. He also has associated productive cough with clear white sputum, chills, and pleuritic chest pain also for the past 1 week. Reports no chronic cough at baseline. Has chronic leg swelling which he reports is at his baseline with no acute worsening. Denies PND and orthopnea. Remote history of smoking 2 packs/day for 25 years, quit 15 years ago. Recent admission at [facility] for carotid artery stenosis, discharged on 11/3, where he presented admitted for new LBBB noted on EKG and also severely uncontrolled hypertension with blood pressure of 200s over 100s according to discharge summary. He was started on heparin infusion for ACS. They performed echo which showed EF of 55 to 60% but no regional wall motion abnormalities. His troponins were normal as well therefore no further ischemic work-up was pursued. The patient remained severely hypertensive during his admission with blood pressure in the 180s which was being managed, however eventually patient elected to leave AMA. ED Course:  Initial vitals in ED: 37.4, 199/93, 91, 22, 99% on room air     Lab work-up: BMP essentially normal with normal kidney function, creatinine at 1.0 which is at his baseline of 1.0-1.1. CBC showed hemoglobin of 7.7 with MCV of 83.1, WBC 9.9, platelets 149. Cardiac work-up including troponin was elevated at 0.040 and proBNP elevated at 2600. EKG showed RBBB which may be new.    CXR hows interstitial prominence and mildly increased density at the right lung base which could represent atelectasis or pneumonia. There were no recorded desaturations in the EMR however due to patient's increased work of breathing he was started on BiPAP in the ED. He was also administered aspirin 325mg for chest pain, started on nitro drip in ED for hypertensive emergency, and admitted to internal medicine for further management. \"     11/18--> blood pressure was up to 182/86 this morning, adjusting medications, potassium a bit low, BNP at 2665, troponin T trending down     11/19--> still hypertensive, hemoglobin dropped to 6.7 last night so received 1 unit packed red blood cells with improvement of his hemoglobin; appreciate cardiology input, nitroglycerin drip was weaned and was started on Norvasc, he was also started on hydralazine 25 mg every 8 hours, holding aspirin and Plavix with anemia; awaiting GI input     11/20--> still hypertensive so Norvasc, Lopressor and hydralazine were all increased; voiding well; still on the nitroglycerin drip at 20 mics per minute;      11/21- Off nitro drip. BP still in 180s/80s. Order Renal artery doppler and renal US. GI completing EGD today. Trial of minoxidil today- hydralazine on hold      11/22- Bps improved with minoxidil- continue. Renal US negative for any contributing findings. EGD was cancelled yesterday due to heart rate changes. Dr Trudi Ascencio consulted and recommends continuing with current medications and 30 days holter on discharge. Patient with unexplained fevers overnight- afebrile since- no significant change in WBC . Blood cultures ordered. Xrays of left foot with concerns for chronic osteomyelitis. Podiatry following and ID consulted. Subjective (past 24 hours):   No acute events overnight. Pt will have EGD today. Pt not complaining of pain. No new issues. Past medical history, family history, social history and allergies reviewed again and is unchanged since admission. ROS (All review of systems completed. Pertinent positives noted.  Otherwise All other systems reviewed and negative.)     Medications:  Reviewed    Infusion Medications    lactated ringers 50 mL/hr at 11/23/22 1354    sodium chloride      dextrose      sodium chloride       Scheduled Medications    [START ON 11/24/2022] valsartan  160 mg Oral Daily    [START ON 11/24/2022] minoxidil  10 mg Oral Daily    sodium chloride flush  5-40 mL IntraVENous 2 times per day    insulin glargine  34 Units SubCUTAneous Daily    metoprolol tartrate  50 mg Oral BID    amLODIPine  10 mg Oral Daily    pantoprazole  40 mg IntraVENous BID    [Held by provider] aspirin  81 mg Oral Daily    atorvastatin  80 mg Oral Daily    [Held by provider] clopidogrel  75 mg Oral Daily    [Held by provider] furosemide  40 mg Oral Daily    gabapentin  600 mg Oral BID    multivitamin  1 tablet Oral Daily    insulin lispro  0-8 Units SubCUTAneous TID WC    insulin lispro  0-4 Units SubCUTAneous Nightly    ferrous sulfate  325 mg Oral BID WC    sodium chloride flush  5-40 mL IntraVENous 2 times per day    [Held by provider] enoxaparin  40 mg SubCUTAneous Daily    sennosides-docusate sodium  1 tablet Oral BID     PRN Meds: sodium chloride flush, sodium chloride, [Held by provider] hydrALAZINE, melatonin, albuterol sulfate HFA, glucose, dextrose bolus **OR** dextrose bolus, glucagon (rDNA), dextrose, LORazepam, sodium chloride flush, sodium chloride, ondansetron **OR** ondansetron, polyethylene glycol, acetaminophen **OR** acetaminophen, potassium chloride **OR** potassium alternative oral replacement **OR** potassium chloride, potassium chloride, magnesium sulfate      Intake/Output Summary (Last 24 hours) at 11/23/2022 1615  Last data filed at 11/23/2022 1559  Gross per 24 hour   Intake 1680 ml   Output 650 ml   Net 1030 ml       Diet:  ADULT DIET;  Regular; Low Fat/Low Chol/High Fiber/AYAH    Physical Exam:  BP (!) 140/65   Pulse (!) 47   Temp 98.5 °F (36.9 °C) (Oral)   Resp 20   Ht 6' 2\" (1.88 m)   Wt (!) 329 lb (149.2 kg)   SpO2 93%   BMI 42.24 kg/m²   General appearance: No apparent distress, appears stated age and cooperative. HEENT: Pupils equal, round, and reactive to light. Conjunctivae/corneas clear. Neck: Supple, with full range of motion. No jugular venous distention. Trachea midline. Respiratory:  Normal respiratory effort. Clear to auscultation, bilaterally without Rales/Wheezes/Rhonchi. Cardiovascular: Regular rate and rhythm with normal S1/S2   Abdomen: Soft, non-tender, non-distended with normal bowel sounds. Musculoskeletal: passive and active ROM x 4 extremities. LLE wrapped ACE. Skin: Skin color, texture, turgor normal.  No rashes or lesions. Neurologic:  Neurovascularly intact without any focal sensory/motor deficits. Cranial nerves: II-XII intact, grossly non-focal.  Psychiatric: Alert and oriented, thought content appropriate, normal insight  Capillary Refill: Brisk,< 3 seconds   Peripheral Pulses: +2 palpable, equal bilaterally     Labs:   Recent Labs     11/21/22  0614 11/22/22  0539 11/23/22  0622   WBC 7.7 4.3* 4.6*   HGB 8.8* 8.5* 8.3*   HCT 29.8* 28.3* 28.5*    338 316     Recent Labs     11/20/22  2312 11/21/22  0614 11/22/22  0539 11/23/22  0622    142 134* 136   K 3.7 3.8 3.9 4.1    103 98 99   CO2 27 26 24 24   BUN 14 14 16 21   CREATININE 1.1 1.1 1.3* 1.8*   CALCIUM 8.1* 8.8 7.8* 8.1*   PHOS 3.9  --   --   --      No results for input(s): AST, ALT, BILIDIR, BILITOT, ALKPHOS in the last 72 hours. No results for input(s): INR in the last 72 hours. No results for input(s): Edward Franklyn in the last 72 hours.     Microbiology:    Blood culture #1:   Lab Results   Component Value Date/Time    BC No growth-preliminary No growth 04/21/2020 09:15 AM       Blood culture #2:No results found for: Yadi Ramon    Organism:  Lab Results   Component Value Date/Time    ORG Enterococcus faecalis - (Group D) 07/20/2020 01:30 PM         Lab Results   Component Value Date/Time    LABGRAM 05/03/2021 08:57 AM     Rare segmented neutrophils observed. No epithelial cells observed. Few gram positive cocci occurring singly and in pairs. MRSA culture only:No results found for: [facility]    Urine culture: No results found for: LABURIN    Respiratory culture: No results found for: CULTRESP    Aerobic and Anaerobic :  Lab Results   Component Value Date/Time    LABAERO  05/03/2021 08:57 AM     Culture yielded heavy mixed growth consisting of Enterococcus species, Viridans streptococcus species, and swarming Proteus species. If a true mixed infection is suspected, then broad spectrum empiric antibiotic therapy is indicated. Lab Results   Component Value Date/Time    LABANAE  07/20/2020 01:30 PM     Culture yielded moderate mixed growth which included anaerobic gram positive cocci. If a true mixed aerobic and anaerobic infection is suspected, then broad spectrum empiric antibiotic therapy is indicated and should include coverage for anaerobic organisms. Urinalysis:      Lab Results   Component Value Date/Time    NITRU NEGATIVE 11/16/2022 01:52 PM    WBCUA 0-2 11/16/2022 01:52 PM    BACTERIA NONE SEEN 11/16/2022 01:52 PM    RBCUA 15-25 11/16/2022 01:52 PM    BLOODU MODERATE 11/16/2022 01:52 PM    GLUCOSEU >= 1000 11/16/2022 01:52 PM       Radiology:  XR FOOT LEFT (MIN 3 VIEWS)   Final Result   Marked irregularity of the cortex of the remaining cuboid and lateral and middle cuneiforms adjacent to the area of soft tissue ulceration concerning for chronic osteomyelitis. **This report has been created using voice recognition software. It may contain minor errors which are inherent in voice recognition technology. **      Final report electronically signed by Dr. Gabriel Duarte on 11/22/2022 3:20 PM      US RENAL COMPLETE   Final Result   Impression:   1. Normal-appearing kidneys demonstrating intrinsic blood flow.  Resistive    indices and renal artery velocities are within the range of normal. This document has been electronically signed by: Jorje Cushing, MD on    11/21/2022 09:41 PM      US DUP ABD PEL RETRO [address]   Final Result   1. Renal aortic ratios are within normal limits. No evidence of renal artery stenosis. 2. There are no suspicious findings involving the kidneys. **This report has been created using voice recognition software. It may contain minor errors which are inherent in voice recognition technology. **      Final report electronically signed by Dr Crow Jain on 11/23/2022 8:29 AM      XR CHEST PORTABLE   Final Result   Impression:   1. Interstitial prominence appear similar to prior. Mildly increased    density at the right lung base could represent atelectasis or pneumonia.       This document has been electronically signed by: Wali Conteh MD on    11/17/2022 08:09 PM        Electronically signed by XOCHILT Cisneros on 11/23/2022 at 4:15 PM

## 2022-11-23 NOTE — ANESTHESIA POSTPROCEDURE EVALUATION
Department of Anesthesiology  Postprocedure Note    Patient: Roger Pierce  MRN: [id number]  YOB: 1960  Date of evaluation: 11/23/2022      Procedure Summary     Date: 11/23/22 Room / Location: [address] [place] [address] 11 / 138 [facility]    Anesthesia Start:  Anesthesia Stop:     Procedure: EGD Diagnosis:       Anemia, unspecified type      Gastrointestinal hemorrhage, unspecified gastrointestinal hemorrhage type      (Shortness of breath [R06.02])    Surgeons: Sheila Ganser, MD Responsible Provider:     Anesthesia Type: MAC ASA Status: 4          Anesthesia Type: No value filed.     Fernando Phase I: Fernando Score: 10    Fernando Phase II:        Anesthesia Post Evaluation    Patient location during evaluation: bedside  Patient participation: complete - patient participated  Level of consciousness: awake  Pain score: 0  Airway patency: patent  Nausea & Vomiting: no nausea and no vomiting  Complications: no  Cardiovascular status: blood pressure returned to baseline  Respiratory status: acceptable  Hydration status: euvolemic

## 2022-11-23 NOTE — PRE SEDATION
[address] [address]  Sedation/Analgesia History & Physical    Patient: Ismael Hollow: 1960  Med Rec#: [id number] Acc#: [id number]   Provider Performing Procedure: Richard Rice MD  Primary Care Physician: EYAD Tafoya CNP    PRE-PROCEDURE   Full CODE [x]Yes  DNR-CCA/DNR-CC []Yes   Brief History/Pre-Procedure Diagnosis:none cardiac chest pain, FOBT positive anemia         MEDICAL HISTORY  []CAD/Valve  []Liver Disease  []Lung Disease []Diabetes  []Hypertension []Renal Disease  []Additional information:       has a past medical history of Arthritis, CAD (coronary artery disease), CKD (chronic kidney disease), stage II, Diabetes mellitus ([facility] [address] Hyperlipidemia, Hypertension, and Liver disease. SURGICAL HISTORY   has a past surgical history that includes Cardiac surgery (11/2019); Foot surgery (Right); Tonsillectomy; cyst removal; Toe amputation (Left, 3/3/2020); Foot Debridement (Left, 4/20/2020); Foot Debridement (Left, 7/20/2020); Foot surgery (Left, 8/14/2020); Foot surgery; and Foot Debridement (Left, 7/1/2022).   Additional information:       ALLERGIES   Allergies as of 11/17/2022    (No Known Allergies)     Additional information:       MEDICATIONS   Coumadin Use Last 7 Days [x]No []Yes  Antiplatelet drug therapy use last 7 days  [x]No []Yes  Other anticoagulant use last 7 days  [x]No []Yes    Current Facility-Administered Medications:     [START ON 11/24/2022] valsartan (DIOVAN) tablet 160 mg, 160 mg, Oral, Daily, EYAD Gomez CNP    [START ON 11/24/2022] minoxidil (LONITEN) tablet 10 mg, 10 mg, Oral, Daily, EYAD Grimaldo CNP    lactated ringers infusion, , IntraVENous, Continuous, Anthony Lino, Last Rate: 50 mL/hr at 11/23/22 1354, New Bag at 11/23/22 1354    sodium chloride flush 0.9 % injection 5-40 mL, 5-40 mL, IntraVENous, 2 times per day, EYAD Valenzuela CNP, 10 mL at 11/23/22 0759    sodium chloride flush 0.9 % injection 5-40 mL, 5-40 mL, IntraVENous, PRN, Chapo Burgess, APRN - CNP    0.9 % sodium chloride infusion, 25 mL, IntraVENous, PRN, [facility], APRN - CNP    insulin glargine (LANTUS) injection vial 34 Units, 34 Units, SubCUTAneous, Daily, Becca Mitchell PA-C    metoprolol tartrate (LOPRESSOR) tablet 50 mg, 50 mg, Oral, BID, Becca Mitchell PA-C, 50 mg at 11/22/22 0845    amLODIPine (NORVASC) tablet 10 mg, 10 mg, Oral, Daily, Katia Crews, APRN - CNP, 10 mg at 11/23/22 0800    [Held by provider] hydrALAZINE (APRESOLINE) injection 10 mg, 10 mg, IntraVENous, Q6H PRN, Janes Danielson APRN - CNP, 10 mg at 11/21/22 0951    melatonin tablet 4.5 mg, 4.5 mg, Oral, Nightly PRN, [employer], PA-C, 4.5 mg at 11/21/22 2106    pantoprazole (PROTONIX) injection 40 mg, 40 mg, IntraVENous, BID, Mahoganyaltaf Parks, APRN - CNP, 40 mg at 11/23/22 0759    albuterol sulfate HFA (PROVENTIL;VENTOLIN;PROAIR) 108 (90 Base) MCG/ACT inhaler 2 puff, 2 puff, Inhalation, 4x Daily PRN, Chacha Turner MD    [facility] by provider] aspirin chewable tablet 81 mg, 81 mg, Oral, Daily, Chacha Turner MD, 81 mg at 11/18/22 0835    atorvastatin (LIPITOR) tablet 80 mg, 80 mg, Oral, Daily, Chacha Turner MD, 80 mg at 11/23/22 8485    [Held by provider] clopidogrel (PLAVIX) tablet 75 mg, 75 mg, Oral, Daily, Chacha Turner MD, 75 mg at 11/18/22 0836    [Held by provider] furosemide (LASIX) tablet 40 mg, 40 mg, Oral, Daily, Chacha Turner MD, 40 mg at 11/22/22 0846    gabapentin (NEURONTIN) tablet 600 mg, 600 mg, Oral, BID, Chacha Turner MD, 600 mg at 11/23/22 1683    multivitamin 1 tablet, 1 tablet, Oral, Daily, Chacha Turner MD, 1 tablet at 11/23/22 0807    glucose chewable tablet 16 g, 4 tablet, Oral, PRN, Chacha Turner MD    dextrose bolus 10% 125 mL, 125 mL, IntraVENous, PRN **OR** dextrose bolus 10% 250 mL, 250 mL, IntraVENous, PRN, Chacha Turner MD    glucagon (rDNA) injection 1 mg, 1 mg, SubCUTAneous, PRN, Chacha Turner MD    dextrose 10 % infusion, , IntraVENous, Continuous PRN, Sultana Manning MD    LORazepam (ATIVAN) injection 0.5 mg, 0.5 mg, IntraVENous, Q6H PRN, Jania Morgan PA-C, 0.5 mg at 11/20/22 2044    insulin lispro (HUMALOG) injection vial 0-8 Units, 0-8 Units, SubCUTAneous, TID , EYAD Powers - CNP, 2 Units at 11/22/22 0848    insulin lispro (HUMALOG) injection vial 0-4 Units, 0-4 Units, SubCUTAneous, Nightly, Katia Crews APRN - CNP    ferrous sulfate (IRON 325) tablet 325 mg, 325 mg, Oral, BID , EYAD Powers CNP, 325 mg at 11/23/22 0806    sodium chloride flush 0.9 % injection 5-40 mL, 5-40 mL, IntraVENous, 2 times per day, Sultana Manning MD, 10 mL at 11/22/22 0845    sodium chloride flush 0.9 % injection 5-40 mL, 5-40 mL, IntraVENous, PRN, Sultana Manning MD, 10 mL at 11/19/22 0957    0.9 % sodium chloride infusion, , IntraVENous, PRN, Sultana Manning MD    ondansetron (ZOFRAN-ODT) disintegrating tablet 4 mg, 4 mg, Oral, Q8H PRN **OR** ondansetron (ZOFRAN) injection 4 mg, 4 mg, IntraVENous, Q6H PRN, Sultana Manning MD, 4 mg at 11/18/22 0452    polyethylene glycol (GLYCOLAX) packet 17 g, 17 g, Oral, Daily PRN, Sultana Manning MD    acetaminophen (TYLENOL) tablet 650 mg, 650 mg, Oral, Q6H PRN, 650 mg at 11/22/22 2113 **OR** acetaminophen (TYLENOL) suppository 650 mg, 650 mg, Rectal, Q6H PRN, Sultana Manning MD    [Held by provider] enoxaparin (LOVENOX) injection 40 mg, 40 mg, SubCUTAneous, Daily, Sultana Manning MD, 40 mg at 11/18/22 1151    potassium chloride (KLOR-CON M) extended release tablet 40 mEq, 40 mEq, Oral, PRN **OR** potassium bicarb-citric acid (EFFER-K) effervescent tablet 40 mEq, 40 mEq, Oral, PRN **OR** potassium chloride 10 mEq/100 mL IVPB (Peripheral Line), 10 mEq, IntraVENous, PRN, Sultana Manning MD    potassium chloride 10 mEq/100 mL IVPB (Peripheral Line), 10 mEq, IntraVENous, PRN, Sultana Manning MD    magnesium sulfate 2000 mg in 50 mL IVPB premix, 2,000 mg, IntraVENous, PRN, Sultana Manning MD    sennosides-docusate sodium (SENOKOT-S) 8.6-50 MG tablet 1 tablet, 1 tablet, Oral, BID, Osman Hampton MD, 1 tablet at 11/22/22 2113  Prior to Admission medications    Medication Sig Start Date End Date Taking? Authorizing Provider   ferrous sulfate (IRON 325) 325 (65 Fe) MG tablet Take 1 tablet by mouth 2 times daily 11/16/22   Ozzy Nadia, APRN - CNP   furosemide (LASIX) 40 MG tablet Take 1 tablet by mouth daily 11/16/22   Ozzy Nadia, APRN - CNP   potassium chloride (KLOR-CON M) 20 MEQ extended release tablet Take 1 tablet by mouth daily 11/16/22   Ozzy Nadia, APRN - CNP   carvedilol (COREG) 12.5 MG tablet Take 12.5 mg by mouth 2 times daily (with meals)    Historical Provider, MD   empagliflozin (JARDIANCE) 25 MG tablet Take 1 tablet by mouth daily 8/24/22   Ozzy Nadia, APRN - CNP   clopidogrel (PLAVIX) 75 MG tablet Take 1 tablet by mouth daily 8/24/22   Ozzy Nadia APRN - CNP   gabapentin (NEURONTIN) 600 MG tablet Take 1 tablet by mouth 2 times daily for 180 days.  8/24/22 2/20/23  Ozzy Nadia, APRN - CNP   insulin detemir (LEVEMIR FLEXTOUCH) 100 UNIT/ML injection pen 30 units in the morning, and 30 units in the evening. 8/24/22   Ozzy Nadia, APRN - CNP   insulin lispro (HUMALOG) 100 UNIT/ML injection vial Inject 5-15 Units into the skin 3 times daily (with meals) 8/24/22   Ozzy Nadia, APRN - CNP   albuterol sulfate HFA (VENTOLIN HFA) 108 (90 Base) MCG/ACT inhaler Inhale 2 puffs into the lungs 4 times daily as needed for Wheezing 8/24/22   Ozzy Nadia, APRN - CNP   atorvastatin (LIPITOR) 80 MG tablet Take 1 tablet by mouth daily 8/19/22   Reji Colunga MD   metoprolol tartrate (LOPRESSOR) 25 MG tablet Take 1 tablet by mouth twice daily 8/16/22   Ozzy Nadia, APRN - CNP   lisinopril (PRINIVIL;ZESTRIL) 20 MG tablet Take 1 tablet by mouth daily 3/23/22   Reji Colunga MD   Polyethylene Glycol 3350 (MIRALAX PO) Take by mouth as needed    Historical Provider, MD   aspirin 81 MG chewable tablet Take 1 tablet by mouth daily 8/10/20   Granados William, APRN - CNP   Multiple Vitamins-Minerals (MULTIVITAMIN PO) Take 1 tablet by mouth daily     Historical Provider, MD     Additional information:       PHYSICAL:   Heart:  [x]Regular rate and rhythm  []Other:    Lungs:  [x]Clear    []Other:    Abdomen: [x]Soft    []Other:    Mental Status: [x]Alert & Oriented  []Other:      VITAL SIGNS   Patient Vitals for the past 24 hrs:   BP Temp Temp src Pulse Resp SpO2 Weight   11/23/22 1437 (!) 169/74 -- -- 63 -- 91 % --   11/23/22 1111 (!) 137/47 98.5 °F (36.9 °C) Oral (!) 107 19 92 % --   11/23/22 0759 -- -- -- 61 -- -- --   11/23/22 0757 (!) 134/91 98.8 °F (37.1 °C) Oral 87 18 92 % --   11/23/22 0600 -- -- -- -- -- -- (!) 329 lb (149.2 kg)   11/23/22 0330 (!) 154/60 98.8 °F (37.1 °C) Oral 97 18 92 % --   11/22/22 2330 (!) 106/90 99.5 °F (37.5 °C) Oral 73 18 92 % --   11/22/22 2115 -- -- -- 51 -- -- --   11/22/22 2038 (!) 184/70 99.4 °F (37.4 °C) Oral 51 16 92 % --   11/22/22 1628 123/82 97.9 °F (36.6 °C) Oral 58 20 94 % --       PLANNED PROCEDURE   [x]EGD  []Colonoscopy []Flex Sigmoid  []ERCP []EUS   []Cystoscopy  [] CATH [] BRONCH   Consent: I have discussed with the patient and/or the patient representative the indication, alternatives, and the possible risks and/or complications of the planned procedure and the anesthesia methods. The patient and/or patient representative appear to understand and agree to proceed.   SEDATION  Planned agent:[x]Midazolam []Meperidine [x]Sublimaze []Morphine  []Diazepam  []Other:     ASA Classification: Class 3 - A patient with severe systemic disease that limits activity but is not incapacitating    Airway Assessment: Mallampati Class II - (soft palate, fauces & uvula are visible)    Monitoring and Safety: The patient will be placed on a cardiac monitor and vital signs, pulse oximetry and level of consciousness will be continuously evaluated throughout the procedure. The patient will be closely monitored until recovery from the medications is complete and the patient has returned to baseline status. Respiratory therapy will be on standby during the procedure. [x]Pre-procedure diagnostic studies complete and results available. Comment:    [x]Previous sedation/anesthesia experiences assessed. Comment:    [x]The patient is an appropriate candidate to undergo the planned procedure sedation and anesthesia. (Refer to nursing sedation/analgesia documentation record)  [x]Formulation and discussion of sedation/procedure plan, risks, and expectations with patient and/or responsible adult completed. [x]Patient examined immediately prior to the procedure.  (Refer to nursing sedation/analgesia documentation record)    Colten Moreno MD, MD   Electronically signed 11/23/2022 at 3:29 PM

## 2022-11-23 NOTE — DISCHARGE INSTRUCTIONS
Follow with Dr. Alesha Hess 2-4 weeks   30 day event at DC     Follow with Dr. Heaven Ramírez 6-8 weeks - HTN

## 2022-11-24 VITALS
RESPIRATION RATE: 20 BRPM | WEIGHT: 315 LBS | HEART RATE: 76 BPM | HEIGHT: 74 IN | OXYGEN SATURATION: 97 % | SYSTOLIC BLOOD PRESSURE: 124 MMHG | TEMPERATURE: 98.7 F | BODY MASS INDEX: 40.43 KG/M2 | DIASTOLIC BLOOD PRESSURE: 77 MMHG

## 2022-11-24 LAB
ANION GAP SERPL CALCULATED.3IONS-SCNC: 10 MEQ/L (ref 8–16)
BUN BLDV-MCNC: 20 MG/DL (ref 7–22)
CALCIUM SERPL-MCNC: 7.9 MG/DL (ref 8.5–10.5)
CHLORIDE BLD-SCNC: 100 MEQ/L (ref 98–111)
CO2: 25 MEQ/L (ref 23–33)
CREAT SERPL-MCNC: 1.5 MG/DL (ref 0.4–1.2)
ERYTHROCYTE [DISTWIDTH] IN BLOOD BY AUTOMATED COUNT: 16.9 % (ref 11.5–14.5)
ERYTHROCYTE [DISTWIDTH] IN BLOOD BY AUTOMATED COUNT: 52.1 FL (ref 35–45)
GFR SERPL CREATININE-BSD FRML MDRD: 53 ML/MIN/1.73M2
GLUCOSE BLD-MCNC: 202 MG/DL (ref 70–108)
GLUCOSE BLD-MCNC: 225 MG/DL (ref 70–108)
GLUCOSE BLD-MCNC: 258 MG/DL (ref 70–108)
HCT VFR BLD CALC: 27.5 % (ref 42–52)
HEMOGLOBIN: 8.1 GM/DL (ref 14–18)
MCH RBC QN AUTO: 24.8 PG (ref 26–33)
MCHC RBC AUTO-ENTMCNC: 29.5 GM/DL (ref 32.2–35.5)
MCV RBC AUTO: 84.4 FL (ref 80–94)
PLATELET # BLD: 271 THOU/MM3 (ref 130–400)
PMV BLD AUTO: 10.1 FL (ref 9.4–12.4)
POTASSIUM SERPL-SCNC: 3.9 MEQ/L (ref 3.5–5.2)
RBC # BLD: 3.26 MILL/MM3 (ref 4.7–6.1)
SODIUM BLD-SCNC: 135 MEQ/L (ref 135–145)
WBC # BLD: 3.7 THOU/MM3 (ref 4.8–10.8)

## 2022-11-24 PROCEDURE — 6370000000 HC RX 637 (ALT 250 FOR IP): Performed by: PHYSICIAN ASSISTANT

## 2022-11-24 PROCEDURE — 6370000000 HC RX 637 (ALT 250 FOR IP): Performed by: NURSE PRACTITIONER

## 2022-11-24 PROCEDURE — 99239 HOSP IP/OBS DSCHRG MGMT >30: CPT | Performed by: PHYSICIAN ASSISTANT

## 2022-11-24 PROCEDURE — 85027 COMPLETE CBC AUTOMATED: CPT

## 2022-11-24 PROCEDURE — 36415 COLL VENOUS BLD VENIPUNCTURE: CPT

## 2022-11-24 PROCEDURE — 2580000003 HC RX 258: Performed by: PHYSICIAN ASSISTANT

## 2022-11-24 PROCEDURE — C9113 INJ PANTOPRAZOLE SODIUM, VIA: HCPCS | Performed by: NURSE PRACTITIONER

## 2022-11-24 PROCEDURE — 93270 REMOTE 30 DAY ECG REV/REPORT: CPT

## 2022-11-24 PROCEDURE — 6360000002 HC RX W HCPCS: Performed by: NURSE PRACTITIONER

## 2022-11-24 PROCEDURE — 82948 REAGENT STRIP/BLOOD GLUCOSE: CPT

## 2022-11-24 PROCEDURE — 80048 BASIC METABOLIC PNL TOTAL CA: CPT

## 2022-11-24 RX ORDER — VALSARTAN 160 MG/1
160 TABLET ORAL DAILY
Qty: 30 TABLET | Refills: 3 | Status: SHIPPED | OUTPATIENT
Start: 2022-11-25

## 2022-11-24 RX ORDER — MINOXIDIL 10 MG/1
10 TABLET ORAL DAILY
Qty: 30 TABLET | Refills: 3 | Status: SHIPPED | OUTPATIENT
Start: 2022-11-25

## 2022-11-24 RX ORDER — AMLODIPINE BESYLATE 10 MG/1
10 TABLET ORAL DAILY
Qty: 30 TABLET | Refills: 3 | Status: SHIPPED | OUTPATIENT
Start: 2022-11-25

## 2022-11-24 RX ORDER — METOPROLOL TARTRATE 50 MG/1
50 TABLET, FILM COATED ORAL 2 TIMES DAILY
Qty: 60 TABLET | Refills: 3 | Status: SHIPPED | OUTPATIENT
Start: 2022-11-24

## 2022-11-24 RX ORDER — MINOXIDIL 10 MG/1
10 TABLET ORAL DAILY
Qty: 30 TABLET | Refills: 3 | Status: SHIPPED | OUTPATIENT
Start: 2022-11-25 | End: 2022-11-24 | Stop reason: SDUPTHER

## 2022-11-24 RX ORDER — AMLODIPINE BESYLATE 10 MG/1
10 TABLET ORAL DAILY
Qty: 30 TABLET | Refills: 3 | Status: SHIPPED | OUTPATIENT
Start: 2022-11-25 | End: 2022-11-24 | Stop reason: SDUPTHER

## 2022-11-24 RX ORDER — VALSARTAN 160 MG/1
160 TABLET ORAL DAILY
Qty: 30 TABLET | Refills: 3 | Status: SHIPPED | OUTPATIENT
Start: 2022-11-25 | End: 2022-11-24 | Stop reason: SDUPTHER

## 2022-11-24 RX ADMIN — INSULIN LISPRO 4 UNITS: 100 INJECTION, SOLUTION INTRAVENOUS; SUBCUTANEOUS at 09:18

## 2022-11-24 RX ADMIN — SODIUM CHLORIDE, POTASSIUM CHLORIDE, SODIUM LACTATE AND CALCIUM CHLORIDE: 600; 310; 30; 20 INJECTION, SOLUTION INTRAVENOUS at 04:36

## 2022-11-24 RX ADMIN — INSULIN LISPRO 2 UNITS: 100 INJECTION, SOLUTION INTRAVENOUS; SUBCUTANEOUS at 13:09

## 2022-11-24 RX ADMIN — PANTOPRAZOLE SODIUM 40 MG: 40 INJECTION, POWDER, FOR SOLUTION INTRAVENOUS at 09:30

## 2022-11-24 RX ADMIN — AMLODIPINE BESYLATE 10 MG: 10 TABLET ORAL at 09:27

## 2022-11-24 RX ADMIN — MINOXIDIL 10 MG: 2.5 TABLET ORAL at 09:23

## 2022-11-24 RX ADMIN — VALSARTAN 160 MG: 160 TABLET, FILM COATED ORAL at 09:24

## 2022-11-24 RX ADMIN — INSULIN GLARGINE 34 UNITS: 100 INJECTION, SOLUTION SUBCUTANEOUS at 09:20

## 2022-11-24 NOTE — PROGRESS NOTES
Discharge teaching and instructions for diagnosis/procedure of hypertensive emergency completed with patient using teachback method. AVS reviewed. Prescriptions sent to pharmacy. Patient voiced understanding regarding prescriptions, follow up appointments, and care of self at home.  Discharged in a wheelchair to  home with support per family

## 2022-11-24 NOTE — PROCEDURES
30 Day Cardiac Event Monitor was applied to patient. Instructions were given and skin/monitor prep and application was demonstrated. Patient was instructed to remove monitor on 12/24 and mail back to [employer].

## 2022-11-24 NOTE — PLAN OF CARE
Problem: Skin/Tissue Integrity  Goal: Absence of new skin breakdown  Description: 1. Monitor for areas of redness and/or skin breakdown  2. Assess vascular access sites hourly  3. Every 4-6 hours minimum:  Change oxygen saturation probe site  4. Every 4-6 hours:  If on nasal continuous positive airway pressure, respiratory therapy assess nares and determine need for appliance change or resting period.   Outcome: Not Progressing  Note: Pt needs a left amputation but unable to have this done until he is able to get a left carotid endarectomy

## 2022-11-24 NOTE — PLAN OF CARE
Problem: Discharge Planning  Goal: Discharge to home or other facility with appropriate resources  11/24/2022 1048 by Jazzmine Magaña RN  Outcome: Completed  Flowsheets (Taken 11/24/2022 1048)  Discharge to home or other facility with appropriate resources: Identify barriers to discharge with patient and caregiver  Note: Patient expected to be discharged to home     Problem: Skin/Tissue Integrity  Goal: Absence of new skin breakdown  Description: 1. Monitor for areas of redness and/or skin breakdown  2. Assess vascular access sites hourly  3. Every 4-6 hours minimum:  Change oxygen saturation probe site  4. Every 4-6 hours:  If on nasal continuous positive airway pressure, respiratory therapy assess nares and determine need for appliance change or resting period. 11/24/2022 1048 by Jazzmine Magaña RN  Outcome: Completed  Note: No new skin breakdown noted at this time. Patient repositions self in bed. Problem: Safety - Adult  Goal: Free from fall injury  11/24/2022 1048 by Jazzmine Magaña RN  Outcome: Completed  Flowsheets (Taken 11/24/2022 1048)  Free From Fall Injury: Instruct family/caregiver on patient safety  Note: Educated on fall prevention. Call light in reach. Fall sign posted. Bed wheels locked. Bed alarm on. Patient voices understanding to education provided. Problem: Chronic Conditions and Co-morbidities  Goal: Patient's chronic conditions and co-morbidity symptoms are monitored and maintained or improved  11/24/2022 1048 by Jazzmine Magaña RN  Outcome: Completed  Flowsheets (Taken 11/24/2022 1048)  Care Plan - Patient's Chronic Conditions and Co-Morbidity Symptoms are Monitored and Maintained or Improved: Monitor and assess patient's chronic conditions and comorbid symptoms for stability, deterioration, or improvement  Note: Monitoring patient's chronic conditions. New or worsening s/s will be reported to provider.       Problem: Pain  Goal: Verbalizes/displays adequate comfort level or baseline comfort level  11/24/2022 1048 by Danyelle Kaye RN  Outcome: Completed  Flowsheets (Taken 11/24/2022 1048)  Verbalizes/displays adequate comfort level or baseline comfort level:   Encourage patient to monitor pain and request assistance   Assess pain using appropriate pain scale  Note: Educated on pain rating scale. Patient denies pain at this time. Pain goal is to have no pain. Problem: ABCDS Injury Assessment  Goal: Absence of physical injury  11/24/2022 1048 by Danyelle Kaye RN  Outcome: Completed  Flowsheets (Taken 11/24/2022 1048)  Absence of Physical Injury: Implement safety measures based on patient assessment  Note: No injuries noted at this time. Call light in reach. Fall sign posted. Bed wheels locked. Bed alarm on. Patient voices understanding to education provided. Care plan reviewed with patient. Patient verbalize understanding of the plan of care and contribute to goal setting.

## 2022-11-24 NOTE — PLAN OF CARE
Problem: Discharge Planning  Goal: Discharge to home or other facility with appropriate resources  11/24/2022 1012 by Bryanna Juarez RN  Outcome: Adequate for Discharge  11/24/2022 0043 by Adriel Piña RN  Outcome: Progressing  Flowsheets (Taken 11/23/2022 2015)  Discharge to home or other facility with appropriate resources:   Identify barriers to discharge with patient and caregiver   Arrange for needed discharge resources and transportation as appropriate     Problem: Skin/Tissue Integrity  Goal: Absence of new skin breakdown  Description: 1. Monitor for areas of redness and/or skin breakdown  2. Assess vascular access sites hourly  3. Every 4-6 hours minimum:  Change oxygen saturation probe site  4. Every 4-6 hours:  If on nasal continuous positive airway pressure, respiratory therapy assess nares and determine need for appliance change or resting period.   11/24/2022 1012 by Bryanna Juarez RN  Outcome: Adequate for Discharge  11/24/2022 0043 by Adriel Piña RN  Outcome: Not Progressing  Note: Pt needs a left amputation but unable to have this done until he is able to get a left carotid endarectomy     Problem: Safety - Adult  Goal: Free from fall injury  11/24/2022 1012 by Bryanna Juarez RN  Outcome: Adequate for Discharge  11/24/2022 0043 by Adriel Piña RN  Outcome: Progressing  [address] (Taken 11/23/2022 2015)  Free From Fall Injury: Instruct family/caregiver on patient safety     Problem: Chronic Conditions and Co-morbidities  Goal: Patient's chronic conditions and co-morbidity symptoms are monitored and maintained or improved  11/24/2022 1012 by Bryanna Juarez RN  Outcome: Adequate for Discharge  11/24/2022 0043 by Adriel Piña RN  Outcome: Progressing  [address] (Taken 11/23/2022 2015)  Care Plan - Patient's Chronic Conditions and Co-Morbidity Symptoms are Monitored and Maintained or Improved: Monitor and assess patient's chronic conditions and comorbid symptoms

## 2022-11-24 NOTE — OP NOTE
[address] [place] [address]                                OPERATIVE REPORT    PATIENT NAME: Danna Nelson                    :        1960  MED REC NO:   [id number]                           ROOM:       0021  ACCOUNT NO:   [de-identified]                           ADMIT DATE: 2022  PROVIDER:     DENIS Montoya Rhted OF PROCEDURE:  2022    INDICATIONS:  The patient with history anemia, abdominal  discomfort, chest discomfort, possibly noncardiac. Plan today for EGD  to evaluate. ASA CLASSIFICATION:  IV.    SURGEON:  Arianna Gaitan MD    ESTIMATED BLOOD LOSS:  None    DESCRIPTION OF PROCEDURE:  The patient was brought to the GI lab. Consent was obtained. Risks involved with the procedure were explained  to the patient. Informed consent was obtained. The patient was  monitored during the procedure with pulse oximetry, blood pressure  monitoring, and oxygen by nasal cannula. Sedation by incremental doses  of IV propofol given by the anesthesia service to achieve monitored  anesthesia care. For ASA classification and medications given during  the procedure, please see Anesthesia note. PROCEDURE PERFORMED:  EGD to control the bleeding using argon plasma  coagulator. Standard video upper scope 190 Olympus advanced under direct vision from  the oral cavity to the duodenum. Esophagus, no erosions or ulcers seen. Scope was advanced to the stomach. Significant gastritis the fundus,  localized area, could be related to nausea, vomiting and trauma. Significant in the body. There was active bleeding, treated with  argon plasma coagulator to control the bleeding. The antrum appeared  normal.  Pylorus appeared normal.  Duodenum appeared normal.  Scope was  withdrawn with no immediate complication. IMPRESSION:  1. Significant gastritis of the fundus, upper part of the body.   2.  AVM in the body, treated with argon plasma coagulator. PLAN:  1. Continue PPI. 2.  Avoid NSAID. 3.  Elective colonoscopy as an outpatient.       Melony Adams M.D.    D: 11/23/2022 16:17:42       T: 11/23/2022 21:31:28     [email or web address]  Job#: [id number]     Doc#: [id number]    CC:

## 2022-11-24 NOTE — DISCHARGE SUMMARY
Hospitalist Discharge Summary        Patient: Gavi Alvarado  YOB: 1960  MRN: [id number]   Acct: [de-identified]    Primary Care Physician: EYAD Epstein - CNP    Admit date  11/17/2022    Discharge date: 11/24/2022    Chief Complaint on presentation :-  Shortness of breath    Discharge Assessment and Plan:-   Hypertensive urgency: BP controlled. Cardiology was following; Valsartan decreased and increased Minoxidil due to GISELA. Continue BP medications- Norvasc, BB. All new BP medications were prescribed as an OP and sent to [facility] [address] due to holiday and it being open. Acute on chronic normocytic anemia--  Transfused with 1 unit packed red blood cells with improvement to 8.7 no obvious signs of bleeding  Currently stable in 8s   GI following- EGD showed \"Gastritis in the fundus, AVM in the body treated with argon plasma coagulator\"   GISELA: Creatinine trended down from 1.8 to 1.5, baseline ~1.2. Cardiology lowered Valsartan due to GISELA. Non conducted [facility] with Franco Hess with recommendations for 30 day event monitor on discharge and follow up 2 weeks as OP   Chronic diastolic heart failure-  Beta-blocker, on Lasix 40 mg daily, BNP at 2665;  Net output of 6136 for admission  Last echo 11/3 with normal EF - done at [facility]   Hypokalemia- resolved -  Diabetes mellitus type 2, uncontrolled--  on Lantus 30 units daily- increased to 34 units 11/22 as Fbg elevated. sliding scale medium dose before meals and at bedtime;   hemoglobin A1c on  11/16/2022 was noted to be 9.0; change diet to ADA  Mild troponin elevation--trending down slightly, lipids noted from November 16, 2022 with LDL at 67; no EKG changes; likely demand mismatch with #1 and #3; Cardiology recommended- no ischemic workup at this time   Essential hypertension, uncontrolled--see #1   PAD--statin, aspirin, Plavix  CAD status post CABG November 2019--on statin, aspirin, Plavix- holding due to EGD. Morbid obesity with BMI 42.81  Chronic left foot wound-  Notified Dr Rob Zapata of admission- podiatry team is following. Spoke with podiatry about DC plan; pt will follow up with Dr. Barger Amen office. Initial H and P and Hospital course:-  Per H&P done 11/17/2022: \"65year-old gentleman who is severely obese with past medical history of type 2 diabetes, hypertension, HLD, CAD status post CABG in 11/2019, PAD, lupus? who presents with acute onset of progressively worsening dyspnea for 1 week duration. He also has associated productive cough with clear white sputum, chills, and pleuritic chest pain also for the past 1 week. Reports no chronic cough at baseline. Has chronic leg swelling which he reports is at his baseline with no acute worsening. Denies PND and orthopnea. Remote history of smoking 2 packs/day for 25 years, quit 15 years ago. Recent admission at [facility] for carotid artery stenosis, discharged on 11/3, where he presented admitted for new LBBB noted on EKG and also severely uncontrolled hypertension with blood pressure of 200s over 100s according to discharge summary. He was started on heparin infusion for ACS. They performed echo which showed EF of 55 to 60% but no regional wall motion abnormalities. His troponins were normal as well therefore no further ischemic work-up was pursued. The patient remained severely hypertensive during his admission with blood pressure in the 180s which was being managed, however eventually patient elected to leave AMA. ED Course:  Initial vitals in ED: 37.4, 199/93, 91, 22, 99% on room air     Lab work-up: BMP essentially normal with normal kidney function, creatinine at 1.0 which is at his baseline of 1.0-1.1. CBC showed hemoglobin of 7.7 with MCV of 83.1, WBC 9.9, platelets 843. Cardiac work-up including troponin was elevated at 0.040 and proBNP elevated at 2600. EKG showed RBBB which may be new.    CXR hows interstitial prominence and mildly increased density at the right lung base which could represent atelectasis or pneumonia. There were no recorded desaturations in the EMR however due to patient's increased work of breathing he was started on BiPAP in the ED. He was also administered aspirin 325mg for chest pain, started on nitro drip in ED for hypertensive emergency, and admitted to internal medicine for further management. \"     11/18--> blood pressure was up to 182/86 this morning, adjusting medications, potassium a bit low, BNP at 2665, troponin T trending down     11/19--> still hypertensive, hemoglobin dropped to 6.7 last night so received 1 unit packed red blood cells with improvement of his hemoglobin; appreciate cardiology input, nitroglycerin drip was weaned and was started on Norvasc, he was also started on hydralazine 25 mg every 8 hours, holding aspirin and Plavix with anemia; awaiting GI input     11/20--> still hypertensive so Norvasc, Lopressor and hydralazine were all increased; voiding well; still on the nitroglycerin drip at 20 mics per minute;      11/21- Off nitro drip. BP still in 180s/80s. Order Renal artery doppler and renal US. GI completing EGD today. Trial of minoxidil today- hydralazine on hold      11/22- Bps improved with minoxidil- continue. Renal US negative for any contributing findings. EGD was cancelled yesterday due to heart rate changes. Dr Leah Clarke consulted and recommends continuing with current medications and 30 days holter on discharge. Patient with unexplained fevers overnight- afebrile since- no significant change in WBC . Blood cultures ordered. Xrays of left foot with concerns for chronic osteomyelitis. Podiatry following and ID consulted. 11/23: EGD completed; pt was found to have AVM treated with argon plasma coagulator. Pt tolerated it well.      On the day of discharge, it was explained to the patient that it was very important to follow up with his PCP and GI, Cardiology to have continued care. Appointments were made and information was given. Physical Exam:-  General appearance: No apparent distress, appears stated age and cooperative. HEENT: Pupils equal, round, and reactive to light. Conjunctivae/corneas clear. Neck: Supple, with full range of motion. No jugular venous distention. Trachea midline. Respiratory:  Normal respiratory effort. Clear to auscultation, bilaterally without Rales/Wheezes/Rhonchi. Cardiovascular: Regular rate and rhythm with normal S1/S2   Abdomen: Soft, non-tender, non-distended with normal bowel sounds. Musculoskeletal: passive and active ROM x 4 extremities. LLE wrapped ACE. Skin: Skin color, texture, turgor normal.  No rashes or lesions. Neurologic:  Neurovascularly intact without any focal sensory/motor deficits.  Cranial nerves: II-XII intact, grossly non-focal.  Psychiatric: Alert and oriented, thought content appropriate, normal insight  Capillary Refill: Brisk,< 3 seconds   Peripheral Pulses: +2 palpable, equal bilaterally     Vitals:   Patient Vitals for the past 24 hrs:   BP Temp Temp src Pulse Resp SpO2   11/24/22 0914 124/77 -- -- 76 -- --   11/24/22 0830 124/77 98.7 °F (37.1 °C) Oral 76 20 97 %   11/24/22 0429 -- -- -- -- -- 94 %   11/24/22 0428 (!) 159/65 98.6 °F (37 °C) Oral 74 20 91 %   11/23/22 2319 (!) 164/64 98.2 °F (36.8 °C) Oral (!) 48 20 96 %   11/23/22 2015 (!) 151/57 98.1 °F (36.7 °C) Oral 95 21 95 %   11/23/22 1643 (!) 130/59 98.5 °F (36.9 °C) Oral 50 20 98 %   11/23/22 1602 (!) 140/65 -- -- (!) 47 20 93 %   11/23/22 1437 (!) 169/74 -- -- 63 -- 91 %     Weight:   Weight: (!) 329 lb (149.2 kg)   24 hour intake/output:     Intake/Output Summary (Last 24 hours) at 11/24/2022 1155  Last data filed at 11/24/2022 0630  Gross per 24 hour   Intake 663.31 ml   Output 900 ml   Net -236.69 ml     Discharge Medications:-      Medication List        START taking these medications      amLODIPine 10 MG tablet  Commonly known as: NORVASC  Take 1 tablet by mouth daily  Start taking on: November 25, 2022     minoxidil 10 MG tablet  Commonly known as: LONITEN  Take 1 tablet by mouth daily  Start taking on: November 25, 2022     valsartan 160 MG tablet  Commonly known as: DIOVAN  Take 1 tablet by mouth daily  Start taking on: November 25, 2022            Garrett Mejía taking these medications      albuterol sulfate  (90 Base) MCG/ACT inhaler  Commonly known as: Ventolin HFA  Inhale 2 puffs into the lungs 4 times daily as needed for Wheezing     aspirin 81 MG chewable tablet  Take 1 tablet by mouth daily     atorvastatin 80 MG tablet  Commonly known as: Lipitor  Take 1 tablet by mouth daily     clopidogrel 75 MG tablet  Commonly known as: PLAVIX  Take 1 tablet by mouth daily     empagliflozin 25 MG tablet  Commonly known as: Jardiance  Take 1 tablet by mouth daily     ferrous sulfate 325 (65 Fe) MG tablet  Commonly known as: IRON 325  Take 1 tablet by mouth 2 times daily     furosemide 40 MG tablet  Commonly known as: LASIX  Take 1 tablet by mouth daily     gabapentin 600 MG tablet  Commonly known as: Neurontin  Take 1 tablet by mouth 2 times daily for 180 days. insulin lispro 100 UNIT/ML injection vial  Commonly known as: HUMALOG  Inject 5-15 Units into the skin 3 times daily (with meals)     Levemir FlexTouch 100 UNIT/ML injection pen  Generic drug: insulin detemir  30 units in the morning, and 30 units in the evening.      metoprolol tartrate 25 MG tablet  Commonly known as: LOPRESSOR  Take 1 tablet by mouth twice daily     MIRALAX PO     MULTIVITAMIN PO     potassium chloride 20 MEQ extended release tablet  Commonly known as: KLOR-CON M  Take 1 tablet by mouth daily            STOP taking these medications      carvedilol 12.5 MG tablet  Commonly known as: COREG     lisinopril 20 MG tablet  Commonly known as: PRINIVIL;ZESTRIL               Where to Get Your Medications        These medications were sent to [place] [id number] - [facility], [address] [place] [address],[address] - P [phone] - F [phone]  [address] [place] [address]      Phone: [phone]   amLODIPine 10 MG tablet  minoxidil 10 MG tablet  valsartan 160 MG tablet          Labs :-  Recent Results (from the past 72 hour(s))   EKG 12 Lead    Collection Time: 11/21/22 12:47 PM   Result Value Ref Range    Ventricular Rate 95 BPM    Atrial Rate 95 BPM    P-R Interval 178 ms    QRS Duration 138 ms    Q-T Interval 412 ms    QTc Calculation (Bazett) 517 ms    P Axis 72 degrees    R Axis 27 degrees    T Axis 59 degrees   POCT glucose    Collection Time: 11/21/22  4:38 PM   Result Value Ref Range    POC Glucose 258 (H) 70 - 108 mg/dl   Glomerular Filtration Rate, Estimated    Collection Time: 11/21/22  6:02 PM   Result Value Ref Range    Est, Glom Filt Rate >60 >60 ml/min/1.73m2   POCT glucose    Collection Time: 11/21/22  7:32 PM   Result Value Ref Range    POC Glucose 237 (H) 70 - 108 mg/dl   Basic Metabolic Panel    Collection Time: 11/22/22  5:39 AM   Result Value Ref Range    Sodium 134 (L) 135 - 145 meq/L    Potassium 3.9 3.5 - 5.2 meq/L    Chloride 98 98 - 111 meq/L    CO2 24 23 - 33 meq/L    Glucose 249 (H) 70 - 108 mg/dL    BUN 16 7 - 22 mg/dL    Creatinine 1.3 (H) 0.4 - 1.2 mg/dL    Calcium 7.8 (L) 8.5 - 10.5 mg/dL   CBC    Collection Time: 11/22/22  5:39 AM   Result Value Ref Range    WBC 4.3 (L) 4.8 - 10.8 thou/mm3    RBC 3.37 (L) 4.70 - 6.10 mill/mm3    Hemoglobin 8.5 (L) 14.0 - 18.0 gm/dl    Hematocrit 28.3 (L) 42.0 - 52.0 %    MCV 84.0 80.0 - 94.0 fL    MCH 25.2 (L) 26.0 - 33.0 pg    MCHC 30.0 (L) 32.2 - 35.5 gm/dl    RDW-CV 17.2 (H) 11.5 - 14.5 %    RDW-SD 52.4 (H) 35.0 - 45.0 fL    Platelets 759 097 - 139 thou/mm3    MPV 10.1 9.4 - 12.4 fL   Anion Gap    Collection Time: 11/22/22  5:39 AM   Result Value Ref Range    Anion Gap 12.0 8.0 - 16.0 meq/L   POCT glucose    Collection Time: 11/22/22  7:50 AM   Result Value Ref Range    POC Glucose 239 (H) 70 - 108 mg/dl   POCT glucose    Collection Time: 11/22/22 12:28 PM   Result Value Ref Range    POC Glucose 179 (H) 70 - 108 mg/dl   POCT glucose    Collection Time: 11/22/22  2:42 PM   Result Value Ref Range    POC Glucose 176 (H) 70 - 108 mg/dl   POCT Glucose    Collection Time: 11/22/22  4:31 PM   Result Value Ref Range    POC Glucose 158 (H) 70 - 108 mg/dl   Culture, Blood 1    Collection Time: 11/22/22  5:11 PM    Specimen: Blood   Result Value Ref Range    Blood Culture, Routine No growth 24 hours. Culture, Blood 2    Collection Time: 11/22/22  5:12 PM    Specimen: Blood   Result Value Ref Range    Blood Culture, Routine No growth 24 hours.     Glomerular Filtration Rate, Estimated    Collection Time: 11/22/22  6:02 PM   Result Value Ref Range    Est, Glom Filt Rate 42 (A) >60 ml/min/1.73m2   POCT glucose    Collection Time: 11/22/22  7:29 PM   Result Value Ref Range    POC Glucose 221 (H) 70 - 108 mg/dl   CBC    Collection Time: 11/23/22  6:22 AM   Result Value Ref Range    WBC 4.6 (L) 4.8 - 10.8 thou/mm3    RBC 3.38 (L) 4.70 - 6.10 mill/mm3    Hemoglobin 8.3 (L) 14.0 - 18.0 gm/dl    Hematocrit 28.5 (L) 42.0 - 52.0 %    MCV 84.3 80.0 - 94.0 fL    MCH 24.6 (L) 26.0 - 33.0 pg    MCHC 29.1 (L) 32.2 - 35.5 gm/dl    RDW-CV 17.0 (H) 11.5 - 14.5 %    RDW-SD 52.5 (H) 35.0 - 45.0 fL    Platelets 403 006 - 874 thou/mm3    MPV 10.3 9.4 - 12.4 fL   Basic Metabolic Panel    Collection Time: 11/23/22  6:22 AM   Result Value Ref Range    Sodium 136 135 - 145 meq/L    Potassium 4.1 3.5 - 5.2 meq/L    Chloride 99 98 - 111 meq/L    CO2 24 23 - 33 meq/L    Glucose 209 (H) 70 - 108 mg/dL    BUN 21 7 - 22 mg/dL    Creatinine 1.8 (H) 0.4 - 1.2 mg/dL    Calcium 8.1 (L) 8.5 - 10.5 mg/dL   Anion Gap    Collection Time: 11/23/22  6:22 AM   Result Value Ref Range    Anion Gap 13.0 8.0 - 16.0 meq/L   POCT glucose    Collection Time: 11/23/22  8:19 AM   Result Value Ref Range    POC Glucose 195 (H) 70 - 108 mg/dl   POCT glucose    Collection Time: 11/23/22 12:23 PM   Result Value Ref Range    POC Glucose 173 (H) 70 - 108 mg/dl   POCT glucose    Collection Time: 11/23/22  4:45 PM   Result Value Ref Range    POC Glucose 151 (H) 70 - 108 mg/dl   Glomerular Filtration Rate, Estimated    Collection Time: 11/23/22  6:02 PM   Result Value Ref Range    Est, Glom Filt Rate 53 (A) >60 ml/min/1.73m2   POCT glucose    Collection Time: 11/23/22  8:27 PM   Result Value Ref Range    POC Glucose 199 (H) 70 - 108 mg/dl   CBC    Collection Time: 11/24/22  5:52 AM   Result Value Ref Range    WBC 3.7 (L) 4.8 - 10.8 thou/mm3    RBC 3.26 (L) 4.70 - 6.10 mill/mm3    Hemoglobin 8.1 (L) 14.0 - 18.0 gm/dl    Hematocrit 27.5 (L) 42.0 - 52.0 %    MCV 84.4 80.0 - 94.0 fL    MCH 24.8 (L) 26.0 - 33.0 pg    MCHC 29.5 (L) 32.2 - 35.5 gm/dl    RDW-CV 16.9 (H) 11.5 - 14.5 %    RDW-SD 52.1 (H) 35.0 - 45.0 fL    Platelets 349 720 - 885 thou/mm3    MPV 10.1 9.4 - 12.4 fL   Basic Metabolic Panel    Collection Time: 11/24/22  5:52 AM   Result Value Ref Range    Sodium 135 135 - 145 meq/L    Potassium 3.9 3.5 - 5.2 meq/L    Chloride 100 98 - 111 meq/L    CO2 25 23 - 33 meq/L    Glucose 225 (H) 70 - 108 mg/dL    BUN 20 7 - 22 mg/dL    Creatinine 1.5 (H) 0.4 - 1.2 mg/dL    Calcium 7.9 (L) 8.5 - 10.5 mg/dL   Anion Gap    Collection Time: 11/24/22  5:52 AM   Result Value Ref Range    Anion Gap 10.0 8.0 - 16.0 meq/L   POCT glucose    Collection Time: 11/24/22  7:51 AM   Result Value Ref Range    POC Glucose 258 (H) 70 - 108 mg/dl        Microbiology:    Blood culture #1:   Lab Results   Component Value Date/Time    BC No growth 24 hours. 11/22/2022 05:12 PM       Blood culture #2:No results found for: BLOODCULT2    Organism:    Lab Results   Component Value Date/Time    LABGRAM  05/03/2021 08:57 AM     Rare segmented neutrophils observed. No epithelial cells observed. Few gram positive cocci occurring singly and in pairs.        MRSA culture only:No results found for: [facility]    Urine culture: No results found for: Jimenez Burks  Lab Results Component Value Date/Time    ORG Enterococcus faecalis - (Group D) 07/20/2020 01:30 PM        Respiratory culture: No results found for: CULTRESP    Aerobic and Anaerobic :  Lab Results   Component Value Date/Time    LABAERO  05/03/2021 08:57 AM     Culture yielded heavy mixed growth consisting of Enterococcus species, Viridans streptococcus species, and swarming Proteus species. If a true mixed infection is suspected, then broad spectrum empiric antibiotic therapy is indicated. Lab Results   Component Value Date/Time    LABANAE  07/20/2020 01:30 PM     Culture yielded moderate mixed growth which included anaerobic gram positive cocci. If a true mixed aerobic and anaerobic infection is suspected, then broad spectrum empiric antibiotic therapy is indicated and should include coverage for anaerobic organisms. Urinalysis:      Lab Results   Component Value Date/Time    NITRU NEGATIVE 11/16/2022 01:52 PM    WBCUA 0-2 11/16/2022 01:52 PM    BACTERIA NONE SEEN 11/16/2022 01:52 PM    RBCUA 15-25 11/16/2022 01:52 PM    BLOODU MODERATE 11/16/2022 01:52 PM    GLUCOSEU >= 1000 11/16/2022 01:52 PM       Radiology:-  XR CHEST PORTABLE    Result Date: 11/17/2022  Exam: 1 View of the chest Comparison: 6/29/2022 Findings: Prior median sternotomy Cardiac silhouette is not enlarged. No pneumothorax. No pleural fluid collection. Prominence to the interstitium appear similar to the prior exam. Mildly increased density at the right lung base could indicate atelectasis or pneumonia     Impression: 1. Interstitial prominence appear similar to prior. Mildly increased density at the right lung base could represent atelectasis or pneumonia.  This document has been electronically signed by: Fabio Yanes MD on 11/17/2022 08:09 PM       Follow-up scheduled after discharge :-    in the next few days with EYAD Head - CNP  in the next few weeks with Cardiology     Consultations during this hospital stay:-  [] NONE [x] Cardiology  [] Nephrology  [] Hemo onco   [] GI   [] ID  [] Endocrine  [] Pulm    [] Neuro    [] Psych   [] Urology  [] ENT   [] G SURGERY   []Ortho    []CV surg    [] Palliative  [] Hospice [] Pain management   []    []TCU   [] PT/OT  OTHERS:-    Disposition: home  Condition at Discharge: Stable    Time Spent:- 40 minutes    Electronically signed by XOCHILT Hsu on 11/24/22 at 11:55 AM EST   Discharging Hospitalist

## 2022-11-25 ENCOUNTER — CARE COORDINATION (OUTPATIENT)
Dept: CASE MANAGEMENT | Age: 62
End: 2022-11-25

## 2022-11-25 ENCOUNTER — APPOINTMENT (OUTPATIENT)
Dept: GENERAL RADIOLOGY | Age: 62
End: 2022-11-25
Payer: COMMERCIAL

## 2022-11-25 ENCOUNTER — HOSPITAL ENCOUNTER (EMERGENCY)
Age: 62
Discharge: HOME | End: 2022-11-25
Attending: EMERGENCY MEDICINE
Payer: COMMERCIAL

## 2022-11-25 VITALS
RESPIRATION RATE: 19 BRPM | WEIGHT: 315 LBS | HEART RATE: 97 BPM | HEIGHT: 74 IN | SYSTOLIC BLOOD PRESSURE: 157 MMHG | DIASTOLIC BLOOD PRESSURE: 88 MMHG | BODY MASS INDEX: 40.43 KG/M2 | OXYGEN SATURATION: 97 % | TEMPERATURE: 98.8 F

## 2022-11-25 DIAGNOSIS — R06.02 SHORTNESS OF BREATH: Primary | ICD-10-CM

## 2022-11-25 DIAGNOSIS — U07.1 COVID-19: ICD-10-CM

## 2022-11-25 DIAGNOSIS — U07.1 COVID-19 VIRUS INFECTION: ICD-10-CM

## 2022-11-25 DIAGNOSIS — I16.1 HYPERTENSIVE EMERGENCY: Primary | ICD-10-CM

## 2022-11-25 DIAGNOSIS — R00.2 PALPITATIONS: ICD-10-CM

## 2022-11-25 LAB
ALBUMIN SERPL-MCNC: 2.6 G/DL (ref 3.5–5.1)
ALLEN TEST: POSITIVE
ALP BLD-CCNC: 87 U/L (ref 38–126)
ALT SERPL-CCNC: 59 U/L (ref 11–66)
ANION GAP SERPL CALCULATED.3IONS-SCNC: 13 MEQ/L (ref 8–16)
APTT: 34.1 SECONDS (ref 22–38)
AST SERPL-CCNC: 75 U/L (ref 5–40)
BASE EXCESS (CALCULATED): -0.6 MMOL/L (ref -2.5–2.5)
BASOPHILS # BLD: 0.1 %
BASOPHILS ABSOLUTE: 0 THOU/MM3 (ref 0–0.1)
BILIRUB SERPL-MCNC: 0.4 MG/DL (ref 0.3–1.2)
BILIRUBIN DIRECT: < 0.2 MG/DL (ref 0–0.3)
BUN BLDV-MCNC: 19 MG/DL (ref 7–22)
CALCIUM SERPL-MCNC: 8.3 MG/DL (ref 8.5–10.5)
CHLORIDE BLD-SCNC: 98 MEQ/L (ref 98–111)
CO2: 23 MEQ/L (ref 23–33)
COLLECTED BY:: NORMAL
CREAT SERPL-MCNC: 1.3 MG/DL (ref 0.4–1.2)
DEVICE: NORMAL
EOSINOPHIL # BLD: 0.9 %
EOSINOPHILS ABSOLUTE: 0.1 THOU/MM3 (ref 0–0.4)
ERYTHROCYTE [DISTWIDTH] IN BLOOD BY AUTOMATED COUNT: 16.5 % (ref 11.5–14.5)
ERYTHROCYTE [DISTWIDTH] IN BLOOD BY AUTOMATED COUNT: 49.8 FL (ref 35–45)
GFR SERPL CREATININE-BSD FRML MDRD: > 60 ML/MIN/1.73M2
GLUCOSE BLD-MCNC: 234 MG/DL (ref 70–108)
HCO3: 24 MMOL/L (ref 23–28)
HCT VFR BLD CALC: 30.9 % (ref 42–52)
HEMOGLOBIN: 9.2 GM/DL (ref 14–18)
IFIO2: 2
IMMATURE GRANS (ABS): 0.02 THOU/MM3 (ref 0–0.07)
IMMATURE GRANULOCYTES: 0.3 %
INFLUENZA A: NOT DETECTED
INFLUENZA B: NOT DETECTED
INR BLD: 1.03 (ref 0.85–1.13)
LYMPHOCYTES # BLD: 8.5 %
LYMPHOCYTES ABSOLUTE: 0.6 THOU/MM3 (ref 1–4.8)
MAGNESIUM: 1.9 MG/DL (ref 1.6–2.4)
MCH RBC QN AUTO: 24.5 PG (ref 26–33)
MCHC RBC AUTO-ENTMCNC: 29.8 GM/DL (ref 32.2–35.5)
MCV RBC AUTO: 82.4 FL (ref 80–94)
MONOCYTES # BLD: 6.2 %
MONOCYTES ABSOLUTE: 0.5 THOU/MM3 (ref 0.4–1.3)
NUCLEATED RED BLOOD CELLS: 0 /100 WBC
O2 SATURATION: 97 %
OSMOLALITY CALCULATION: 278 MOSMOL/KG (ref 275–300)
PCO2: 35 MMHG (ref 35–45)
PH BLOOD GAS: 7.43 (ref 7.35–7.45)
PLATELET # BLD: 300 THOU/MM3 (ref 130–400)
PMV BLD AUTO: 10.2 FL (ref 9.4–12.4)
PO2: 84 MMHG (ref 71–104)
POTASSIUM SERPL-SCNC: 4 MEQ/L (ref 3.5–5.2)
PRO-BNP: 887.1 PG/ML (ref 0–900)
PROCALCITONIN: 0.1 NG/ML (ref 0.01–0.09)
RBC # BLD: 3.75 MILL/MM3 (ref 4.7–6.1)
SARS-COV-2 RNA, RT PCR: DETECTED
SEG NEUTROPHILS: 84 %
SEGMENTED NEUTROPHILS ABSOLUTE COUNT: 6.3 THOU/MM3 (ref 1.8–7.7)
SODIUM BLD-SCNC: 134 MEQ/L (ref 135–145)
SOURCE, BLOOD GAS: NORMAL
TOTAL PROTEIN: 6.4 G/DL (ref 6.1–8)
TROPONIN T: 0.02 NG/ML
TROPONIN T: 0.04 NG/ML
WBC # BLD: 7.5 THOU/MM3 (ref 4.8–10.8)

## 2022-11-25 PROCEDURE — 94761 N-INVAS EAR/PLS OXIMETRY MLT: CPT

## 2022-11-25 PROCEDURE — 85610 PROTHROMBIN TIME: CPT

## 2022-11-25 PROCEDURE — 87636 SARSCOV2 & INF A&B AMP PRB: CPT

## 2022-11-25 PROCEDURE — 6360000002 HC RX W HCPCS: Performed by: EMERGENCY MEDICINE

## 2022-11-25 PROCEDURE — 83735 ASSAY OF MAGNESIUM: CPT

## 2022-11-25 PROCEDURE — 93005 ELECTROCARDIOGRAM TRACING: CPT | Performed by: EMERGENCY MEDICINE

## 2022-11-25 PROCEDURE — 99285 EMERGENCY DEPT VISIT HI MDM: CPT

## 2022-11-25 PROCEDURE — 85025 COMPLETE CBC W/AUTO DIFF WBC: CPT

## 2022-11-25 PROCEDURE — 2700000000 HC OXYGEN THERAPY PER DAY

## 2022-11-25 PROCEDURE — 83880 ASSAY OF NATRIURETIC PEPTIDE: CPT

## 2022-11-25 PROCEDURE — 36600 WITHDRAWAL OF ARTERIAL BLOOD: CPT

## 2022-11-25 PROCEDURE — 36415 COLL VENOUS BLD VENIPUNCTURE: CPT

## 2022-11-25 PROCEDURE — 80053 COMPREHEN METABOLIC PANEL: CPT

## 2022-11-25 PROCEDURE — 84484 ASSAY OF TROPONIN QUANT: CPT

## 2022-11-25 PROCEDURE — 71045 X-RAY EXAM CHEST 1 VIEW: CPT

## 2022-11-25 PROCEDURE — 82248 BILIRUBIN DIRECT: CPT

## 2022-11-25 PROCEDURE — 96365 THER/PROPH/DIAG IV INF INIT: CPT

## 2022-11-25 PROCEDURE — 82803 BLOOD GASES ANY COMBINATION: CPT

## 2022-11-25 PROCEDURE — 84145 PROCALCITONIN (PCT): CPT

## 2022-11-25 PROCEDURE — 85730 THROMBOPLASTIN TIME PARTIAL: CPT

## 2022-11-25 RX ORDER — NIRMATRELVIR AND RITONAVIR 150-100 MG
KIT ORAL
Qty: 20 TABLET | Refills: 0 | Status: SHIPPED | OUTPATIENT
Start: 2022-11-25 | End: 2022-11-30 | Stop reason: ALTCHOICE

## 2022-11-25 RX ORDER — MAGNESIUM SULFATE IN WATER 40 MG/ML
2000 INJECTION, SOLUTION INTRAVENOUS ONCE
Status: COMPLETED | OUTPATIENT
Start: 2022-11-25 | End: 2022-11-25

## 2022-11-25 RX ADMIN — MAGNESIUM SULFATE HEPTAHYDRATE 2000 MG: 40 INJECTION, SOLUTION INTRAVENOUS at 02:29

## 2022-11-25 ASSESSMENT — PAIN - FUNCTIONAL ASSESSMENT
PAIN_FUNCTIONAL_ASSESSMENT: NONE - DENIES PAIN
PAIN_FUNCTIONAL_ASSESSMENT: 0-10
PAIN_FUNCTIONAL_ASSESSMENT: 0-10

## 2022-11-25 ASSESSMENT — PAIN DESCRIPTION - DESCRIPTORS: DESCRIPTORS: ACHING

## 2022-11-25 ASSESSMENT — PAIN SCALES - GENERAL
PAINLEVEL_OUTOF10: 3
PAINLEVEL_OUTOF10: 3

## 2022-11-25 ASSESSMENT — PAIN DESCRIPTION - LOCATION
LOCATION: CHEST
LOCATION: CHEST

## 2022-11-25 NOTE — ED PROVIDER NOTES
[address] ENCOUNTER      PATIENT NAME: Km Ayala  MRN: [id number]  : 1960  FERRERA: 2022  PROVIDER: Zohaib Jimenez MD      CHIEF COMPLAINT       Chief Complaint   Patient presents with    Hypertension     Pt feels like his heart is racing with some sob. Started 3 hours PTA       Patient is seen and evaluated in a timely fashion. Nurses Notes are reviewed and I agree except as noted in the HPI. HISTORY OF PRESENT ILLNESS    Km Ayala is a 65 y.o. male who presents to Emergency Department with Hypertension (Pt feels like his heart is racing with some sob. Started 3 hours PTA)     Patient is brought in for evaluation of palpitation and shortness of breath over last 3 hours duration. Past medical history remarkable for hypertensive urgency, acute on chronic normocytic anemia, GISELA, nonconducted PAC with bifascicular block (now on 30-day event monitor per EP), chronic diastolic heart failure, hypokalemia, uncontrolled diabetes type 2, mild troponin elevation, CAD status post CABG in 2019, morbid obesity, and chronic left foot wound. He was discharged from [facility] yesterday after 7 days of hospitalization (Admitted on 22 due to acute on chronic CHF exacerbation with pulm edema). He has no fever or chills. He currently rates his chest pain as at 3/10. Chest pain is from retrosternal area. Pain does not radiate. He is not on home oxygen. He has no nausea or vomiting. No diaphoresis. No diarrhea. No urinary symptoms. He has chronic bilateral lower extremity 2+ pitting edema. He was administered aspirin 324 mg En route by EMS. Lives alone at home. This HPI was provided by patient.      REVIEW OF SYSTEMS   Ten-point review of systems is negative except those documented in above HPI including constitutional, HEENT, respiratory, cardiovascular, gastrointestinal, genitourinary, musculoskeletal, skin, neurological, hematological and behavioral.      PAST MEDICAL HISTORY    has a past medical history of Arthritis, CAD (coronary artery disease), CKD (chronic kidney disease), stage II, Diabetes mellitus ([facility] [address] Hyperlipidemia, Hypertension, and Liver disease. 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 CURRENT MEDICATIONS       Previous Medications    ALBUTEROL SULFATE HFA (VENTOLIN HFA) 108 (90 BASE) MCG/ACT INHALER    Inhale 2 puffs into the lungs 4 times daily as needed for Wheezing    AMLODIPINE (NORVASC) 10 MG TABLET    Take 1 tablet by mouth daily    ASPIRIN 81 MG CHEWABLE TABLET    Take 1 tablet by mouth daily    ATORVASTATIN (LIPITOR) 80 MG TABLET    Take 1 tablet by mouth daily    CLOPIDOGREL (PLAVIX) 75 MG TABLET    Take 1 tablet by mouth daily    EMPAGLIFLOZIN (JARDIANCE) 25 MG TABLET    Take 1 tablet by mouth daily    FERROUS SULFATE (IRON 325) 325 (65 FE) MG TABLET    Take 1 tablet by mouth 2 times daily    FUROSEMIDE (LASIX) 40 MG TABLET    Take 1 tablet by mouth daily    GABAPENTIN (NEURONTIN) 600 MG TABLET    Take 1 tablet by mouth 2 times daily for 180 days. INSULIN DETEMIR (LEVEMIR FLEXTOUCH) 100 UNIT/ML INJECTION PEN    30 units in the morning, and 30 units in the evening.     INSULIN LISPRO (HUMALOG) 100 UNIT/ML INJECTION VIAL    Inject 5-15 Units into the skin 3 times daily (with meals)    METOPROLOL TARTRATE (LOPRESSOR) 50 MG TABLET    Take 1 tablet by mouth 2 times daily    MINOXIDIL (LONITEN) 10 MG TABLET    Take 1 tablet by mouth daily    MULTIPLE VITAMINS-MINERALS (MULTIVITAMIN PO)    Take 1 tablet by mouth daily     POLYETHYLENE GLYCOL 3350 (MIRALAX PO)    Take by mouth as needed    POTASSIUM CHLORIDE (KLOR-CON M) 20 MEQ EXTENDED RELEASE TABLET    Take 1 tablet by mouth daily    VALSARTAN (DIOVAN) 160 MG TABLET    Take 1 tablet by mouth daily       ALLERGIES     has No Known Allergies. FAMILY HISTORY     He indicated that his mother is alive. He indicated that his father is . He indicated that both of his sisters are alive. He indicated that his brother is alive. He indicated that the status of his neg hx is unknown.   family history includes Alzheimer's Disease in his father; Diabetes in his mother; Heart Disease in his father; High Blood Pressure in his father and mother. SOCIAL HISTORY      reports that he quit smoking about 14 years ago. His smoking use included cigarettes. He has never used smokeless tobacco. He reports that he does not currently use alcohol. He reports that he does not use drugs. PHYSICAL EXAM      height is 6' 2\" (1.88 m) and weight is 320 lb (145.2 kg) (abnormal). His oral temperature is 98.8 °F (37.1 °C). His blood pressure is 157/88 (abnormal) and his pulse is 97. His respiration is 19 and oxygen saturation is 97%. Physical Exam  Vitals and nursing note reviewed. Constitutional:       Appearance: He is well-developed. He is not diaphoretic. HENT:      Head: Normocephalic and atraumatic. Nose: Nose normal.   Eyes:      General: No scleral icterus. Right eye: No discharge. Left eye: No discharge. Conjunctiva/sclera: Conjunctivae normal.      Pupils: Pupils are equal, round, and reactive to light. Neck:      Vascular: No JVD. Trachea: No tracheal deviation. Cardiovascular:      Rate and Rhythm: Normal rate and regular rhythm. Heart sounds: Normal heart sounds. No murmur heard. No friction rub. No gallop. Pulmonary:      Effort: No respiratory distress. Breath sounds: No stridor. Rhonchi and rales present. No wheezing. Comments: He has tachypnea. He has crackles on both basilar area. Chest:      Chest wall: No tenderness.    Abdominal: General: Bowel sounds are normal. There is no distension. Palpations: Abdomen is soft. There is no mass. Tenderness: There is no abdominal tenderness. There is no guarding or rebound. Hernia: No hernia is present. Musculoskeletal:         General: No tenderness or deformity. Cervical back: Normal range of motion and neck supple. Right lower leg: Edema present. Left lower leg: Edema present. Comments: Left foot status post TMA changes in Ace wraps. Bilateral lower extremity 2+ pitting edema. Lymphadenopathy:      Cervical: No cervical adenopathy. Skin:     General: Skin is warm and dry. Capillary Refill: Capillary refill takes less than 2 seconds. Coloration: Skin is not pale. Findings: No erythema or rash. Neurological:      Mental Status: He is alert and oriented to person, place, and time. Cranial Nerves: No cranial nerve deficit. Sensory: No sensory deficit. Motor: No abnormal muscle tone. Coordination: Coordination normal.      Deep Tendon Reflexes: Reflexes normal.   Psychiatric:         Behavior: Behavior normal.         Thought Content: Thought content normal.         Judgment: Judgment normal.     ANCILLARY TEST RESULTS   EKG: Interpreted by me  Normal sinus rhythm, ventricular rate 99 bpm, AR interval 178 ms, QRS duration 112 ms,  ms, no ST elevation or QT wave, prolonged QT    Repeat EKG at 4:21 shows normal sinus rhythm, ventricular rate 83 bpm, AR interval 166 ms, QRS duration 136 ms,  ms, no ST elevation or acute T wave.   QTc prolongation resolved    LAB RESULTS:  Results for orders placed or performed during the hospital encounter of 11/25/22   COVID-19 & Influenza Combo    Specimen: Nasopharyngeal Swab   Result Value Ref Range    SARS-CoV-2 RNA, RT PCR DETECTED (AA) NOT DETECTED    INFLUENZA A NOT DETECTED NOT DETECTED    INFLUENZA B NOT DETECTED NOT DETECTED   CBC with Auto Differential   Result Value Ref Range    WBC 7.5 4.8 - 10.8 thou/mm3    RBC 3.75 (L) 4.70 - 6.10 mill/mm3    Hemoglobin 9.2 (L) 14.0 - 18.0 gm/dl    Hematocrit 30.9 (L) 42.0 - 52.0 %    MCV 82.4 80.0 - 94.0 fL    MCH 24.5 (L) 26.0 - 33.0 pg    MCHC 29.8 (L) 32.2 - 35.5 gm/dl    RDW-CV 16.5 (H) 11.5 - 14.5 %    RDW-SD 49.8 (H) 35.0 - 45.0 fL    Platelets 336 003 - 306 thou/mm3    MPV 10.2 9.4 - 12.4 fL    Seg Neutrophils 84.0 %    Lymphocytes 8.5 %    Monocytes 6.2 %    Eosinophils 0.9 %    Basophils 0.1 %    Immature Granulocytes 0.3 %    Segs Absolute 6.3 1.8 - 7.7 thou/mm3    Lymphocytes Absolute 0.6 (L) 1.0 - 4.8 thou/mm3    Monocytes Absolute 0.5 0.4 - 1.3 thou/mm3    Eosinophils Absolute 0.1 0.0 - 0.4 thou/mm3    Basophils Absolute 0.0 0.0 - 0.1 thou/mm3    Immature Grans (Abs) 0.02 0.00 - 0.07 thou/mm3    nRBC 0 /100 wbc   Hepatic Function Panel   Result Value Ref Range    Albumin 2.6 (L) 3.5 - 5.1 g/dL    Total Bilirubin 0.4 0.3 - 1.2 mg/dL    Bilirubin, Direct <0.2 0.0 - 0.3 mg/dL    Alkaline Phosphatase 87 38 - 126 U/L    AST 75 (H) 5 - 40 U/L    ALT 59 11 - 66 U/L    Total Protein 6.4 6.1 - 8.0 g/dL   Basic Metabolic Panel   Result Value Ref Range    Sodium 134 (L) 135 - 145 meq/L    Potassium 4.0 3.5 - 5.2 meq/L    Chloride 98 98 - 111 meq/L    CO2 23 23 - 33 meq/L    Glucose 234 (H) 70 - 108 mg/dL    BUN 19 7 - 22 mg/dL    Creatinine 1.3 (H) 0.4 - 1.2 mg/dL    Calcium 8.3 (L) 8.5 - 10.5 mg/dL   Brain Natriuretic Peptide   Result Value Ref Range    Pro-.1 0.0 - 900.0 pg/mL   Troponin   Result Value Ref Range    Troponin T 0.035 (A) ng/ml   Magnesium   Result Value Ref Range    Magnesium 1.9 1.6 - 2.4 mg/dL   APTT   Result Value Ref Range    aPTT 34.1 22.0 - 38.0 seconds   Protime-INR   Result Value Ref Range    INR 1.03 0.85 - 1.13   Procalcitonin   Result Value Ref Range    Procalcitonin 0.10 (H) 0.01 - 0.09 ng/mL   Blood Gas, Arterial   Result Value Ref Range    pH, Blood Gas 7.43 7.35 - 7.45    PCO2 35 35 - 45 mmhg    PO2 84 71 - 104 mmhg    HCO3 24 23 - 28 mmol/l    Base Excess (Calculated) -0.6 -2.5 - 2.5 mmol/l    O2 Sat 97 %    IFIO2 2     DEVICE Cannula     Leon Test Positive     Source: L Radial     COLLECTED BY: [id number]    Glomerular Filtration Rate, Estimated   Result Value Ref Range    Est, Glom Filt Rate >60 >60 ml/min/1.73m2   Anion Gap   Result Value Ref Range    Anion Gap 13.0 8.0 - 16.0 meq/L   Osmolality   Result Value Ref Range    Osmolality Calc 278.0 275.0 - 300.0 mOsmol/kg   Troponin   Result Value Ref Range    Troponin T 0.023 (A) ng/ml   EKG 12 Lead   Result Value Ref Range    Ventricular Rate 99 BPM    Atrial Rate 99 BPM    P-R Interval 178 ms    QRS Duration 112 ms    Q-T Interval 380 ms    QTc Calculation (Bazett) 487 ms    P Axis 69 degrees    R Axis 21 degrees    T Axis 53 degrees   EKG Emergency   Result Value Ref Range    Ventricular Rate 83 BPM    Atrial Rate 83 BPM    P-R Interval 166 ms    QRS Duration 136 ms    Q-T Interval 440 ms    QTc Calculation (Bazett) 517 ms    P Axis 48 degrees    R Axis 15 degrees    T Axis 50 degrees       RADIOLOGY REPORTS  XR CHEST PORTABLE   Final Result   Impression:   Minimal bilateral atelectasis. This document has been electronically signed by: Kiran Johnson MD on    11/25/2022 04:38 AM          ED COURSE AND MEDICAL DECISION MAKING (MDM)     DDx: Includes but not limited to: Pneumonia, bronchitis, CHF, PE, COPD, asthma, pulmonary edema, pleural effusion, AMI, URI, influenza, sinusitis, allergies, anxiety    PLAN: Large-bore IV, basic labs, chest x-ray,    ED Course as of 11/25/22 0616   Fri Nov 25, 2022   0138 Patient was seen and evaluated. Screening test orders are placed. [CARMELITA]   0214 ABG RA is normal.  [CARMELITA]   0606 SaO2 90% RA during ambulation. Discharged home. [CARMELITA]      ED Course User Index  [CARMELITA] Maria E Johnson MD     MDM:     Patient was noticed to be slightly hypertensive with /78. Otherwise vital signs stable. No fever.   Patient's lab results are at his baselines: Patient has chronic troponin elevation, 2 sets troponin in ED trending down from 0.035 to 0.02s. ABG is normal.  Hemoglobin 9.2 at his baseline. WBC 7.5. Glucose 234, patient longtime has poorly controlled diabetes. Patient initial EKG shows slight prolonged QT, magnesium 2 g IV are given. Repeat EKG shows no prolonged QT. Chest x-ray is no acute abnormality. Patient has no clinical findings testing hypertensive emergency. Patient's ZAZIT-83 is positive, but he has no hypoxemia room air in ED, oxygen saturation during ambulation 90% (patient states he rarely walks). I do not feel patient requires admission at this point. He is discussed all ED work-ups. I discussed with him nursing home placement but he declined. Paxilovid is prescribed. Discharged in stable conditions. Medications   magnesium sulfate 2000 mg in 50 mL IVPB premix (0 mg IntraVENous Stopped 11/25/22 0300)       Vitals:    11/25/22 0210 11/25/22 0302 11/25/22 0412 11/25/22 0534   BP:  (!) 160/81 (!) 157/88    Pulse:  86 86 97   Resp:    19   Temp:       TempSrc:       SpO2: 96% 96% 97% 97%   Weight:       Height:           PROCEDURE   None    CONSULT   None    CRITICAL CARE TIME   None    FINAL IMPRESSION AND DISPOSITION      1. Shortness of breath    2. Palpitations    3. COVID-19 virus infection        DISPOSITION Decision To Discharge 11/25/2022 05:45:13 AM    PATIENT REFERRED TO:  Bouchra Carmichael, EYAD - [facility]  [address] [phone]    In 3 days  ED discharge follow up  DISCHARGE MEDICATIONS:  New Prescriptions    NIRMATRELVIR/RITONAVIR (PAXLOVID, 150/100,) 10 X 150 MG & 10 X 100MG TBPK    Take 2 tablets (one 150 mg nirmatrelvir and one 100 mg ritonavir tablets) by mouth every 12 hours for 5 days.      (Please note that portions of this note were completed with a voice recognition program.  Efforts were made to edit the dictations but occasionally words aremis-transcribed.)  Cirilo Zamudio, MD Cesar Mars MD  11/25/22 7519

## 2022-11-25 NOTE — CARE COORDINATION
[facility] Care Transitions Initial Follow Up Call    Call within 2 business days of discharge: Yes    Care Transition Nurse contacted the patient by telephone to perform post hospital discharge assessment. Verified name and  with patient as identifiers. Provided introduction to self, and explanation of the Care Transition Nurse role. Patient: Aaron Gonsales Patient : 1960   MRN: [id number]  Reason for Admission: HTN/Covid-19  Discharge Date: 22 RARS: Readmission Risk Score: 18.9      Last Discharge [address]       Date Complaint Diagnosis Description Type Department Provider    22 Hypertension Shortness of breath . .. ED (DISCHARGE) [facility] ED Lyndsay Barnes MD              Challenges to be reviewed by the provider   Additional needs identified to be addressed with provider: No  none               Method of communication with provider: none. Spoke with Michael Styles, said he is ok, went to ED late last night and found he has Covid. He speaks very fast but in complete sentences. Does endorse FERRERA. Denies fever, chills, chest pain, cough, n/v/d. Reviewed medications/changes, new meds will be ready today and he has someone to  for him. He monitors his wt and FBS, no wt gain,  this morning. He has a chronic left foot wound and will have a BKA in the future. Appetite and fluid intake is good. Encouraged him to do deep breathing exercises every hr to help his lungs. Said he is mostly sitting on the couch today. B&B normal.  Was discharged with a 30 day monitor but was taken off when in ED, hasn't put back on but will sometime today. Needs to see Dr Lou Corona and Dr Honey Arcos, office is to call him for appts. Also needs to f/u with Dr Delmar Barrett but hasn't made appt yet. Denies any needs. No other questions or concerns at this time. Will continue to follow. Care Transition Nurse reviewed discharge instructions, medical action plan, and red flags with patient who verbalized understanding.  The patient was given an opportunity to ask questions and does not have any further questions or concerns at this time. Were discharge instructions available to patient? Yes. Reviewed appropriate site of care based on symptoms and resources available to patient including: PCP  Specialist  Urgent care clinics  When to call 911. The patient agrees to contact the PCP office for questions related to their healthcare. Advance Care Planning:   Does patient have an Advance Directive: reviewed and current. Medication reconciliation was performed with patient, who verbalizes understanding of administration of home medications.  Medications reviewed, 1111F entered: yes    Was patient discharged with a pulse oximeter? no    Non-face-to-face services provided:  Scheduled appointment with PCP-11/30  Scheduled appointment with Specialist-12/8, cardio office to call pt  Obtained and reviewed discharge summary and/or continuity of care documents    Offered patient enrollment in the Remote Patient Monitoring (RPM) program for in-home monitoring: NA.    Care Transitions 24 Hour Call    Schedule Follow Up Appointment with PCP: Completed  Do you have a copy of your discharge instructions?: Yes  Do you have all of your prescriptions and are they filled?: No  Have you been contacted by a Yaron Ramey Pharmacist?: No  Have you scheduled your follow up appointment?: Yes  How are you going to get to your appointment?: Car - family or friend to transport  Do you feel like you have everything you need to keep you well at home?: Yes  Care Transitions Interventions     Transportation Support: Declined            Follow Up  Future Appointments   Date Time Provider Marissa Alvarez   11/30/2022  1:30 PM EYAD Douglass CNP [facility] [address] [place] [address] Road   12/8/2022  2:00 PM EYAD Delgadillo CNP N Oncology [facility] - [address]   5/26/2023 11:45 AM Frederick Dozier MD [address] Transition Nurse provided contact information. Plan for follow-up call in 3-5 days based on severity of symptoms and risk factors. Plan for next call: symptom management-Covid  follow-up appointment-cardio call for f/u? Dr Anabel Holliday 2-4 wks & Dr Eleazar Hilario 4 wks, Dr Hao England f/u? ?     Geena Bower RN

## 2022-11-25 NOTE — ED NOTES
Pt feels like his heart is racing with some sob.  Started 3 hours PTA     April Banda, [address] [facility]  11/25/22 6960

## 2022-11-26 LAB
EKG ATRIAL RATE: 83 BPM
EKG ATRIAL RATE: 99 BPM
EKG P AXIS: 48 DEGREES
EKG P AXIS: 69 DEGREES
EKG P-R INTERVAL: 166 MS
EKG P-R INTERVAL: 178 MS
EKG Q-T INTERVAL: 380 MS
EKG Q-T INTERVAL: 440 MS
EKG QRS DURATION: 112 MS
EKG QRS DURATION: 136 MS
EKG QTC CALCULATION (BAZETT): 487 MS
EKG QTC CALCULATION (BAZETT): 517 MS
EKG R AXIS: 15 DEGREES
EKG R AXIS: 21 DEGREES
EKG T AXIS: 50 DEGREES
EKG T AXIS: 53 DEGREES
EKG VENTRICULAR RATE: 83 BPM
EKG VENTRICULAR RATE: 99 BPM

## 2022-11-26 PROCEDURE — 93010 ELECTROCARDIOGRAM REPORT: CPT | Performed by: INTERNAL MEDICINE

## 2022-11-27 LAB
BLOOD CULTURE, ROUTINE: NORMAL
BLOOD CULTURE, ROUTINE: NORMAL

## 2022-11-28 ENCOUNTER — TELEPHONE (OUTPATIENT)
Dept: FAMILY MEDICINE CLINIC | Age: 62
End: 2022-11-28

## 2022-11-29 ENCOUNTER — CARE COORDINATION (OUTPATIENT)
Dept: CASE MANAGEMENT | Age: 62
End: 2022-11-29

## 2022-11-29 NOTE — CARE COORDINATION
[facility] Care Transitions Follow Up Call    Care Transition Nurse contacted the patient by telephone to follow up after admission on 22; ED 22. Verified name and  with patient as identifiers. Patient: Danya Garza  Patient : 1960   MRN: [id number]  Reason for Admission: Acute CHF, COVID-19  Discharge Date: 22 RARS: Readmission Risk Score: 18.9      Needs to be reviewed by the provider   Additional needs identified to be addressed with provider: Yes  Holter monitor not working, needs appt with Dr Landy Mota of communication with provider: phone. Spoke to Gerhardt Pu for transitions f/u call. Pt stated he is improving since ED visit on 22 Pt was diagnosed with COVID-19. Stated first few days home were \"rough\", couldn't think or see straight. Pt is monitoring weight, BP, sugars. Did not provide exact numbers but stated vitals are wnl. Pt is completing final dose of Paxlovid today. Stated he has not been wearing holter monitor, is supposed to wear 30 days. Stated he couldn't get it to work properly so he put it back in box. Has not scheduled appt with Dr Trudi Ascencio yet, has not heard from [facility] office. Writer called Dr Trudi Ascencio office kareem number for holter lab. Scheduled appt with Dr Trudi Ascencio for 23 at 8 am. Informed pt is to cancel if he does not wear monitor as instructed. Stated he still as occ wheezing, FERRERA. Advised deep breathing exercises, ambulation, monitor sats, return to ED for severe symptoms. Writer called pt back, gave contact number for holter lab and appt information. Pt will charge monitor up tonight and call in the morning. Pt has transportation to PCP appt tomorrow. Addressed changes since last contact:  DME-provided holter lab number for asst.  Discussed follow-up appointments.      Follow Up  Future Appointments   Date Time Provider Marissa Alvarez   2022  1:30 PM Mya Fuchs, APRN - CNP [facility] [place]   12/8/2022  2:00 PM Freedom Loredo, APRN - CNP N Oncology P - [place]   1/18/2023  8:00  [address], MD N SRPX [facility]   5/26/2023 11:45 AM Nader Michael MD N [address] Transition Nurse reviewed discharge instructions with patient and discussed any barriers to care and/or understanding of plan of care after discharge. Discussed appropriate site of care based on symptoms and resources available to patient including: PCP  Specialist  When to call [address] The patient agrees to contact the PCP office for questions related to their healthcare. Patients top risk factors for readmission: medical condition-CHF, DM2, COVID-19  Interventions to address risk factors: Scheduled appointment with Specialist-Dr Montoya and Obtained and reviewed discharge summary and/or continuity of care documents    Offered patient enrollment in the Remote Patient Monitoring (RPM) program for in-home monitoring: NA.     Care Transitions Subsequent and Final Call    Subsequent and Final Calls  Do you have any ongoing symptoms?: No  Have your medications changed?: No  Do you have any questions related to your medications?: No  Do you currently have any active services?: No  Do you have any needs or concerns that I can assist you with?: No  Identified Barriers: None  Care Transitions Interventions     Transportation Support: Declined   Other Interventions:             Care Transition Nurse provided contact information for future needs. Plan for follow-up call in 5-7 days based on severity of symptoms and risk factors. Plan for next call: symptom management-holter monitor on?  Weight, BP, sugars, sats  follow-up appointment-review PCP appt from 11/30    Marci Lubin RN

## 2022-11-30 ENCOUNTER — OFFICE VISIT (OUTPATIENT)
Dept: FAMILY MEDICINE CLINIC | Age: 62
End: 2022-11-30
Payer: COMMERCIAL

## 2022-11-30 VITALS
HEART RATE: 88 BPM | DIASTOLIC BLOOD PRESSURE: 98 MMHG | TEMPERATURE: 97.6 F | SYSTOLIC BLOOD PRESSURE: 180 MMHG | OXYGEN SATURATION: 99 %

## 2022-11-30 DIAGNOSIS — U07.1 COVID-19: ICD-10-CM

## 2022-11-30 DIAGNOSIS — J45.40 MODERATE PERSISTENT ASTHMA, UNSPECIFIED WHETHER COMPLICATED: ICD-10-CM

## 2022-11-30 DIAGNOSIS — R73.09 HEMOGLOBIN A1C GREATER THAN 9%, INDICATING POOR DIABETIC CONTROL: ICD-10-CM

## 2022-11-30 DIAGNOSIS — I16.1 HYPERTENSIVE EMERGENCY: ICD-10-CM

## 2022-11-30 DIAGNOSIS — Z09 HOSPITAL DISCHARGE FOLLOW-UP: ICD-10-CM

## 2022-11-30 DIAGNOSIS — I50.33 ACUTE ON CHRONIC DIASTOLIC CONGESTIVE HEART FAILURE (HCC): Primary | ICD-10-CM

## 2022-11-30 DIAGNOSIS — I25.10 CORONARY ARTERY DISEASE INVOLVING NATIVE CORONARY ARTERY OF NATIVE HEART, UNSPECIFIED WHETHER ANGINA PRESENT: ICD-10-CM

## 2022-11-30 PROCEDURE — 3074F SYST BP LT 130 MM HG: CPT | Performed by: NURSE PRACTITIONER

## 2022-11-30 PROCEDURE — 3078F DIAST BP <80 MM HG: CPT | Performed by: NURSE PRACTITIONER

## 2022-11-30 PROCEDURE — 1111F DSCHRG MED/CURRENT MED MERGE: CPT | Performed by: NURSE PRACTITIONER

## 2022-11-30 PROCEDURE — 99214 OFFICE O/P EST MOD 30 MIN: CPT | Performed by: NURSE PRACTITIONER

## 2022-11-30 RX ORDER — PREDNISONE 20 MG/1
20 TABLET ORAL 2 TIMES DAILY
Qty: 10 TABLET | Refills: 0 | Status: SHIPPED | OUTPATIENT
Start: 2022-11-30 | End: 2022-12-05

## 2022-11-30 RX ORDER — AZITHROMYCIN 250 MG/1
250 TABLET, FILM COATED ORAL SEE ADMIN INSTRUCTIONS
Qty: 6 TABLET | Refills: 0 | Status: SHIPPED | OUTPATIENT
Start: 2022-11-30 | End: 2022-12-05

## 2022-11-30 RX ORDER — BENZONATATE 200 MG/1
200 CAPSULE ORAL 3 TIMES DAILY PRN
Qty: 30 CAPSULE | Refills: 0 | Status: SHIPPED | OUTPATIENT
Start: 2022-11-30

## 2022-11-30 RX ORDER — CLONIDINE HYDROCHLORIDE 0.1 MG/1
0.1 TABLET ORAL 2 TIMES DAILY
Qty: 60 TABLET | Refills: 3 | Status: SHIPPED | OUTPATIENT
Start: 2022-11-30

## 2022-11-30 ASSESSMENT — ENCOUNTER SYMPTOMS
SHORTNESS OF BREATH: 1
CHEST TIGHTNESS: 1
SORE THROAT: 0
EYE REDNESS: 0
SINUS PAIN: 0
WHEEZING: 0
COUGH: 1
TROUBLE SWALLOWING: 0
EYE ITCHING: 0
VOICE CHANGE: 0
RHINORRHEA: 0
EYE PAIN: 0
GASTROINTESTINAL NEGATIVE: 1

## 2022-11-30 NOTE — PROGRESS NOTES
[address]  Dept: [phone]          Kirsten Blackburn (:  1960) is a 65 y.o. male,Established patient, here for evaluation of the following chief complaint(s):  Hypertension and Follow-Up from SUSI CASEY ([facility] Follow Up)      ASSESSMENT/PLAN:  I have reviewed the patient's medical history in detail and updated the computerized patient record. HPI/ROS per the patient and caregiver. Overall non toxic in appearance. Answers questions appropriately. Conditions discussed and addressed this visit include:   Here for hospital follow up  BP did not improve during visit  Appears quite uncomfortable. + edema, + orthopnea  + rales bll  Last chest xray shows infiltrate RLL  Attempted to contact cardiology office x 2; unable to speak with staff. Pt has been instructed to go to ER if condition deteriorates. Start clonidine 0.1 mg bid for now  Continue other meds as ordered  Continue furosemide  Discussed the r/b/se of adding steroid with fluid overload  Add spiriva, prednisone and azithromycin  No salt diet  Continue to monitor weights   Go to ER if condition deteriorates. 1. Acute on chronic diastolic congestive heart failure ([facility] [address]  2. Hypertensive emergency  3. Coronary artery disease involving native coronary artery of native heart, unspecified whether angina present  4. COVID-19  -     tiotropium (SPIRIVA RESPIMAT) 2.5 MCG/ACT AERS inhaler; Inhale 2 puffs into the lungs daily, Disp-4 g, R-0Normal  -     predniSONE (DELTASONE) 20 MG tablet; Take 1 tablet by mouth 2 times daily for 5 days, Disp-10 tablet, R-0Normal  -     benzonatate (TESSALON) 200 MG capsule; Take 1 capsule by mouth 3 times daily as needed for Cough, Disp-30 capsule, R-0Normal  -     azithromycin (ZITHROMAX) 250 MG tablet; Take 1 tablet by mouth See Admin Instructions for 5 days 500mg on day 1 followed by 250mg on days 2 - 5, Disp-6 tablet, R-0Normal  5.  Hemoglobin A1C greater than 9%, indicating poor diabetic control  6. Hospital discharge follow-up  -     [place] DISCHARGE MEDS RECONCILED W/ CURRENT OUTPATIENT MED LIST  7. Moderate persistent asthma, unspecified whether complicated    Return in about 3 months (around 2/28/2023) for Results review, Routine follow up. SUBJECTIVE/OBJECTIVE:  HPI  Here for hospital and ER follow up  SUSI CASEY 11/17-11/24 and then returned to Er on 11/25 due to severe sob, extreme fatigue and very poor activity tolerance  Uncontrolled BP  Anemia of unknown source at this time  BP remains high  He is very uncomfortable  Arms and legs are with 2+ edema  Sob at rest.   Has been using albuterol with some relief  No fever  Tolerating fluids  Has not checked sugar  Has been unable to complete his event monitor due to sob/fatigue    Review of Systems   Constitutional:  Positive for activity change, appetite change and fatigue. Negative for chills and unexpected weight change. HENT:  Negative for congestion, postnasal drip, rhinorrhea, sinus pain, sore throat, trouble swallowing and voice change. Eyes:  Negative for pain, redness and itching. Respiratory:  Positive for cough, chest tightness and shortness of breath. Negative for wheezing. Cardiovascular:  Positive for palpitations (at times.) and leg swelling. Negative for chest pain. Gastrointestinal: Negative. Endocrine: Negative for cold intolerance and heat intolerance. Genitourinary: Negative. Musculoskeletal:  Positive for arthralgias, gait problem and myalgias. Negative for neck pain and neck stiffness. Skin:  Positive for wound (left foot). Allergic/Immunologic: Positive for environmental allergies. Neurological:  Positive for weakness and light-headedness. Negative for dizziness, tremors, numbness and headaches. Hematological:  Negative for adenopathy. Does not bruise/bleed easily. Psychiatric/Behavioral: Negative. Physical Exam  Vitals and nursing note reviewed. Constitutional:       Appearance: Normal appearance. He is obese. He is ill-appearing. HENT:      Head: Normocephalic and atraumatic. Right Ear: Tympanic membrane, ear canal and external ear normal.      Left Ear: Tympanic membrane, ear canal and external ear normal.      Nose: No congestion. Mouth/Throat:      Mouth: Mucous membranes are dry. Pharynx: No posterior oropharyngeal erythema. Eyes:      General:         Right eye: No discharge. Left eye: No discharge. Conjunctiva/sclera: Conjunctivae normal.   Neck:      Vascular: Carotid bruit (left side) present. Cardiovascular:      Rate and Rhythm: Regular rhythm. Tachycardia present. Pulses: Normal pulses. Heart sounds: Normal heart sounds. No murmur heard. Comments: Unable to assess for JVD  Pulmonary:      Effort: Pulmonary effort is normal.      Breath sounds: Rhonchi (right upper lobe) and rales (bilateral lower lobes. ) present. No wheezing. Abdominal:      General: Abdomen is flat. Bowel sounds are normal.      Palpations: Abdomen is soft. Musculoskeletal:         General: Swelling (upper extremity edema to mid arms) and tenderness present. Normal range of motion. Cervical back: No rigidity or tenderness. Right lower leg: Edema present. Left lower leg: Edema (+1 pitting bilateral lower legs) present. Lymphadenopathy:      Cervical: No cervical adenopathy. Skin:     General: Skin is warm and dry. Capillary Refill: Capillary refill takes 2 to 3 seconds. Coloration: Skin is pale. Findings: No rash. Neurological:      General: No focal deficit present. Mental Status: He is alert and oriented to person, place, and time. Mental status is at baseline. Motor: Weakness present. Coordination: Coordination normal.      Gait: Gait abnormal.   Psychiatric:         Mood and Affect: Mood normal.         Behavior: Behavior normal.         Thought Content:  Thought content normal.               An electronic signature was used to authenticate this note.     --Kristopher Clark, APRN - CNP

## 2022-11-30 NOTE — Clinical Note
Dr Audelia Sequeira, could you please see patient in office? Not responding to current BP meds, severely sob with any exertion. Has been unable to complete event monitor from last hospital visit.

## 2022-12-05 ENCOUNTER — TELEPHONE (OUTPATIENT)
Dept: CARDIOLOGY CLINIC | Age: 62
End: 2022-12-05

## 2022-12-05 NOTE — PROGRESS NOTES
Sure, will be glad to see patient      Cardiology office team copied to the this message: Please call patient, get an update on his symptoms, ask him to record BP daily at home and keep a log to bring to office at time of visit. Schedule office visit.

## 2022-12-05 NOTE — TELEPHONE ENCOUNTER
Patient denies any symptoms. Patient will keep track of BP and bring log to appointment on 12-7-22. Patient states he also see's Dr. Gita Lopez at [facility] on 12-7-22 in the morning.

## 2022-12-05 NOTE — TELEPHONE ENCOUNTER
Renee Taveras MD at 12/5/2022  1:33 PM    Status: Signed   Sure, will be glad to see patient        Cardiology office team copied to the this message: Please call patient, get an update on his symptoms, ask him to record BP daily at home and keep a log to bring to office at time of visit. Schedule office visit.

## 2022-12-05 NOTE — TELEPHONE ENCOUNTER
----- Message from Tommie Ochoa MD sent at 12/5/2022  1:33 PM EST -----        ----- Message -----  From: EYAD Santos - CNP  Sent: 11/30/2022   2:08 PM EST  To: MD Dr Rene Luis, could you please see patient in office? Not responding to current BP meds, severely sob with any exertion. Has been unable to complete event monitor from last hospital visit.

## 2022-12-06 ENCOUNTER — CARE COORDINATION (OUTPATIENT)
Dept: CASE MANAGEMENT | Age: 62
End: 2022-12-06

## 2022-12-06 NOTE — CARE COORDINATION
Care Transitions Outreach Attempt-1st attempt    Call within 2 business days of discharge: Yes   Attempted to reach patient for subsequent transitional call. VM left to return call to [phone]. Patient: Kade Moore Patient : 1960 MRN: [id number]    Last Discharge  [address]       Date Complaint Diagnosis Description Type Department Provider    22 Hypertension Shortness of breath . ..  ED (DISCHARGE) CHLOE Merchant MD              Noted following upcoming appointments from discharge chart review:   [facility] follow up appointment(s):   Future Appointments   Date Time Provider Marissa Alvarez   2022  2:15 Shayne Fernandes MD N [facility] [address]   2022  2:00 PM EYAD Hurtado - CNP N Oncology [facility] [place]   2023  8:00 AM Janel Snyder MD N [facility] [address] [facility]   2023 11:45 AM Francisca Carl MD N [facility] - [address] [place] [address]     Non-[facility] follow up appointment(s): na

## 2022-12-07 ENCOUNTER — CARE COORDINATION (OUTPATIENT)
Dept: CASE MANAGEMENT | Age: 62
End: 2022-12-07

## 2022-12-07 NOTE — CARE COORDINATION
Care Transitions Outreach Attempt-2nd attempt    Call within 2 business days of discharge: Yes   Attempted to reach patient for subsequent transitional call. VM left to return call to [phone]. If no return call, CTN will sign off-2nd attempt. Unable to reach letter not sent, end of episode. Patient: Susana Reynaga Patient : 1960 MRN: [id number]    Last Discharge [address]       Date Complaint Diagnosis Description Type Department Provider    22 Hypertension Shortness of breath . ..  ED (DISCHARGE) CHLOE ED Erasto Buchanan MD              Noted following upcoming appointments from discharge chart review:   [facility] follow up appointment(s):   Future Appointments   Date Time Provider Marissa Alvarez   2022  1:45 PM STR OUT PT ONC CMA CHLOE  [address] [facility]   2022  2:00 PM EYAD Paz - CNP N Oncology [facility] - [place]   2023  8:00 AM Lillie Reinoso MD N SRPX Heart [facility] - [place] [address] [facility] [address]   2023 11:45 AM Armando Blanco MD N SONIA HEAR P - [place] [address] [place] [address]     Non-[facility] follow up appointment(s): ash

## 2022-12-08 ENCOUNTER — HOSPITAL ENCOUNTER (OUTPATIENT)
Dept: INFUSION THERAPY | Age: 62
Discharge: HOME | End: 2022-12-08
Payer: COMMERCIAL

## 2022-12-08 ENCOUNTER — OFFICE VISIT (OUTPATIENT)
Dept: ONCOLOGY | Age: 62
End: 2022-12-08
Payer: COMMERCIAL

## 2022-12-08 VITALS
TEMPERATURE: 98.5 F | HEIGHT: 74 IN | HEART RATE: 79 BPM | RESPIRATION RATE: 22 BRPM | OXYGEN SATURATION: 98 % | WEIGHT: 310 LBS | BODY MASS INDEX: 39.78 KG/M2 | DIASTOLIC BLOOD PRESSURE: 73 MMHG | SYSTOLIC BLOOD PRESSURE: 142 MMHG

## 2022-12-08 VITALS
HEIGHT: 74 IN | HEART RATE: 79 BPM | BODY MASS INDEX: 39.78 KG/M2 | WEIGHT: 310 LBS | TEMPERATURE: 98.5 F | DIASTOLIC BLOOD PRESSURE: 73 MMHG | RESPIRATION RATE: 22 BRPM | SYSTOLIC BLOOD PRESSURE: 142 MMHG | OXYGEN SATURATION: 98 %

## 2022-12-08 DIAGNOSIS — D64.9 ANEMIA, UNSPECIFIED TYPE: ICD-10-CM

## 2022-12-08 DIAGNOSIS — D64.9 ANEMIA, UNSPECIFIED TYPE: Primary | ICD-10-CM

## 2022-12-08 LAB
ABSOLUTE IMMATURE GRANULOCYTE: 0.14 THOU/MM3 (ref 0–0.07)
ABSOLUTE RETIC #: 104 THOU/MM3 (ref 20–115)
ALBUMIN SERPL-MCNC: 2.9 G/DL (ref 3.5–5.1)
ALP BLD-CCNC: 81 U/L (ref 38–126)
ALT SERPL-CCNC: 36 U/L (ref 11–66)
ANION GAP SERPL CALCULATED.3IONS-SCNC: 14 MEQ/L (ref 8–16)
AST SERPL-CCNC: 28 U/L (ref 5–40)
BASINOPHIL, AUTOMATED: 1 % (ref 0–3)
BASOPHILS ABSOLUTE: 0.1 THOU/MM3 (ref 0–0.1)
BILIRUB SERPL-MCNC: 0.3 MG/DL (ref 0.3–1.2)
BUN BLDV-MCNC: 31 MG/DL (ref 7–22)
C-REACTIVE PROTEIN: 11.85 MG/DL (ref 0–1)
CALCIUM SERPL-MCNC: 9.3 MG/DL (ref 8.5–10.5)
CHLORIDE BLD-SCNC: 96 MEQ/L (ref 98–111)
CO2: 27 MEQ/L (ref 23–33)
CREAT SERPL-MCNC: 1.4 MG/DL (ref 0.4–1.2)
EOSINOPHILS ABSOLUTE: 0.2 THOU/MM3 (ref 0–0.4)
EOSINOPHILS RELATIVE PERCENT: 2 % (ref 0–4)
FERRITIN: 209 NG/ML (ref 22–322)
FOLATE: 16.2 NG/ML (ref 4.8–24.2)
GFR SERPL CREATININE-BSD FRML MDRD: 57 ML/MIN/1.73M2
GLUCOSE BLD-MCNC: 269 MG/DL (ref 70–108)
HCT VFR BLD CALC: 30.4 % (ref 42–52)
HEMOGLOBIN: 8.9 GM/DL (ref 14–18)
IMMATURE GRANULOCYTES: 2 %
IMMATURE RETIC FRACT: 28.1 % (ref 2.3–13.4)
IRON SATURATION: 15 % (ref 20–50)
IRON: 26 UG/DL (ref 65–195)
LD: 189 U/L (ref 100–190)
LYMPHOCYTES # BLD: 16 % (ref 15–47)
LYMPHOCYTES ABSOLUTE: 1.4 THOU/MM3 (ref 1–4.8)
MCH RBC QN AUTO: 24.3 PG (ref 26–33)
MCHC RBC AUTO-ENTMCNC: 29.3 GM/DL (ref 32.2–35.5)
MCV RBC AUTO: 83 FL (ref 80–94)
MONOCYTES ABSOLUTE: 0.8 THOU/MM3 (ref 0.4–1.3)
MONOCYTES: 9 % (ref 0–12)
PDW BLD-RTO: 17.4 % (ref 11.5–14.5)
PLATELET # BLD: 377 THOU/MM3 (ref 130–400)
PMV BLD AUTO: 9.8 FL (ref 9.4–12.4)
POTASSIUM SERPL-SCNC: 4.5 MEQ/L (ref 3.5–5.2)
RBC # BLD: 3.67 MILL/MM3 (ref 4.7–6.1)
RETIC HEMOGLOBIN: 25.9 PG (ref 28.2–35.7)
RETICULOCYTE ABSOLUTE COUNT: 2.9 % (ref 0.5–2)
SEDIMENTATION RATE, ERYTHROCYTE: 130 MM/HR (ref 0–10)
SEG NEUTROPHILS: 71 % (ref 43–75)
SEGMENTED NEUTROPHILS ABSOLUTE COUNT: 6.2 THOU/MM3 (ref 1.8–7.7)
SODIUM BLD-SCNC: 137 MEQ/L (ref 135–145)
TOTAL IRON BINDING CAPACITY: 177 UG/DL (ref 171–450)
TOTAL PROTEIN: 7.2 G/DL (ref 6.1–8)
VITAMIN B-12: 854 PG/ML (ref 211–911)
WBC # BLD: 8.7 THOU/MM3 (ref 4.8–10.8)

## 2022-12-08 PROCEDURE — 83540 ASSAY OF IRON: CPT

## 2022-12-08 PROCEDURE — 99215 OFFICE O/P EST HI 40 MIN: CPT | Performed by: NURSE PRACTITIONER

## 2022-12-08 PROCEDURE — 3078F DIAST BP <80 MM HG: CPT | Performed by: NURSE PRACTITIONER

## 2022-12-08 PROCEDURE — 86140 C-REACTIVE PROTEIN: CPT

## 2022-12-08 PROCEDURE — 85651 RBC SED RATE NONAUTOMATED: CPT

## 2022-12-08 PROCEDURE — 36415 COLL VENOUS BLD VENIPUNCTURE: CPT

## 2022-12-08 PROCEDURE — 99211 OFF/OP EST MAY X REQ PHY/QHP: CPT

## 2022-12-08 PROCEDURE — 83615 LACTATE (LD) (LDH) ENZYME: CPT

## 2022-12-08 PROCEDURE — 83010 ASSAY OF HAPTOGLOBIN QUANT: CPT

## 2022-12-08 PROCEDURE — 82746 ASSAY OF FOLIC ACID SERUM: CPT

## 2022-12-08 PROCEDURE — 3074F SYST BP LT 130 MM HG: CPT | Performed by: NURSE PRACTITIONER

## 2022-12-08 PROCEDURE — 83550 IRON BINDING TEST: CPT

## 2022-12-08 PROCEDURE — 82728 ASSAY OF FERRITIN: CPT

## 2022-12-08 PROCEDURE — 85025 COMPLETE CBC W/AUTO DIFF WBC: CPT

## 2022-12-08 PROCEDURE — 82607 VITAMIN B-12: CPT

## 2022-12-08 PROCEDURE — 85046 RETICYTE/HGB CONCENTRATE: CPT

## 2022-12-08 PROCEDURE — 80053 COMPREHEN METABOLIC PANEL: CPT

## 2022-12-08 NOTE — PROGRESS NOTES
[facility], [address] [address],[address]  [address] [address]  Dept: [phone]  Dept Fax: [phone]: [phone]      Visit Date:12/8/2022     Valentino Baptist is a 65 y.o. male who presents today for:   Chief Complaint   Patient presents with    New Patient     Anemia        HPI:   Valentino Baptist is a 65 y.o. male referred to our office by Bouchra Zambrano CNP for evaluation of anemia. PMH includes CHF, HTN, CKD, asthma, CAD, CM2, hyperlipidemia, sepsis, lumbar stenosis. Hx anemia since at least March 2020 with baseline Hgb in 10s. He had acute drop in Hgb on 11/17/2022 with confirmatory COVID-19 testing on 11/25/2022. He was admitted to hospital on 11/17/2022 with SOB, hypertensive urgency. GI saw and EGD (+) gastritis in fundus, AVM treated with argon plasma coagulator. He was discharged on 11/24/2022 and re-admitted on 11/25/2022 with complaints of heart palpitations, SOB,  (+) COVID test.  No history of liver disease. No history of gastric bypass surgery or hypersplenism. No history of rheumatologic or immune disorders. No hypothyroidism. Chronic inflammation with arthritis. No family history of blood disorders. He includes meat in his diet. No endemic travel. He reports FERRERA, heart palpitations, urinary frequency and on a diuretic. He denies headaches, dizziness, cough, diarrhea, RLS, PICA symptoms, s/s bleeding, dark urine, jaundice, history of gallstones. He started oral iron daily about 1-2 weeks ago. Chronic BLE edema reported & noted. Hx hepatitis when he was 7years old. PMH, SH, and FH:  I reviewed the patient's medication and allergy lists as noted on the electronic medical record. The PMH, SH, and FH were also reviewed as noted on the EMR.         Past Medical History:   Diagnosis Date    Arthritis     CAD (coronary artery disease)     CKD (chronic kidney disease), stage II     Diabetes mellitus ([facility] [address]     Hyperlipidemia     Hypertension Liver disease     HEPITITIS AS A CHILD      Past Surgical History:   Procedure Laterality Date    CARDIAC SURGERY  2019    triple bypass    CYST REMOVAL      RIGHT ANKLE     FOOT DEBRIDEMENT Left 2020    LEFT TRANSMETATARSAL AMPUTATION  FOOT INCISION AND DRAINAGE performed by Sunita Brooks DPM at [address] [place] [address] Left 2020    LEFT WOUND DEBRIDEMENT WITH WOUND VAC APPLICATION performed by Sunita Brooks DPM at [address] [place] [address] Left 2022    LEFT FOOT EXCISONAL WOUND DEBRIDEMENT, APPLICATION SKIN SUBSTITUTE GRAFT, APPLICATION KCI WOUND VAC performed by Sunita Brooks DPM at [facility] Right     CYST REMOVED    FOOT SURGERY Left 2020    LEFT FOOT PREPARATION GRAFT SITE, HAVEST SPLIT THICKNESS SKIN GRAFT WITH APPLICATION, APPLICATION KCI WOUND VAC performed by Sunita Brooks DPM at [address] [facility]    TOE AMPUTATION Left 3/3/2020    I&D LEFT FOOT, TRANSMETATARSAL AMPUTATION performed by Sunita Brooks DPM at [address] N/A 2022    EGD performed by Yoli Berger MD at [facility]      Family History   Problem Relation Age of Onset    Diabetes Mother     High Blood Pressure Mother     Alzheimer's Disease Father          of, 80or 80years old    Heart Disease Father     High Blood Pressure Father     Cancer Neg Hx     Stroke Neg Hx       Social History     Tobacco Use    Smoking status: Former     Types: Cigarettes     Quit date:      Years since quittin.9    Smokeless tobacco: Never   Substance Use Topics    Alcohol use: Not Currently      Current Outpatient Medications   Medication Sig Dispense Refill    cloNIDine (CATAPRES) 0.1 MG tablet Take 1 tablet by mouth 2 times daily 60 tablet 3    tiotropium (SPIRIVA RESPIMAT) 2.5 MCG/ACT AERS inhaler Inhale 2 puffs into the lungs daily 4 g 0    benzonatate (TESSALON) 200 MG capsule Take 1 capsule by mouth 3 times daily as needed for Cough 30 capsule 0    amLODIPine (NORVASC) 10 MG tablet Take 1 tablet by mouth daily 30 tablet 3    metoprolol tartrate (LOPRESSOR) 50 MG tablet Take 1 tablet by mouth 2 times daily 60 tablet 3    valsartan (DIOVAN) 160 MG tablet Take 1 tablet by mouth daily 30 tablet 3    minoxidil (LONITEN) 10 MG tablet Take 1 tablet by mouth daily 30 tablet 3    ferrous sulfate (IRON 325) 325 (65 Fe) MG tablet Take 1 tablet by mouth 2 times daily 180 tablet 1    furosemide (LASIX) 40 MG tablet Take 1 tablet by mouth daily 90 tablet 1    potassium chloride (KLOR-CON M) 20 MEQ extended release tablet Take 1 tablet by mouth daily 30 tablet 5    empagliflozin (JARDIANCE) 25 MG tablet Take 1 tablet by mouth daily 90 tablet 1    clopidogrel (PLAVIX) 75 MG tablet Take 1 tablet by mouth daily 90 tablet 3    gabapentin (NEURONTIN) 600 MG tablet Take 1 tablet by mouth 2 times daily for 180 days. 180 tablet 1    insulin detemir (LEVEMIR FLEXTOUCH) 100 UNIT/ML injection pen 30 units in the morning, and 30 units in the evening. 21 mL 3    insulin lispro (HUMALOG) 100 UNIT/ML injection vial Inject 5-15 Units into the skin 3 times daily (with meals) 15 mL 3    albuterol sulfate HFA (VENTOLIN HFA) 108 (90 Base) MCG/ACT inhaler Inhale 2 puffs into the lungs 4 times daily as needed for Wheezing 54 g 1    atorvastatin (LIPITOR) 80 MG tablet Take 1 tablet by mouth daily 30 tablet 3    Polyethylene Glycol 3350 (MIRALAX PO) Take by mouth as needed      aspirin 81 MG chewable tablet Take 1 tablet by mouth daily 30 tablet 3    Multiple Vitamins-Minerals (MULTIVITAMIN PO) Take 1 tablet by mouth daily        No current facility-administered medications for this visit. No Known Allergies       Review of Systems:   Review of Systems   Pertinent review of systems noted in HPI, all other ROS negative.    Objective:   Physical Exam   BP (!) 142/73 (Site: Right Upper Arm, Position: Sitting, Cuff Size: Medium Adult)   Pulse 79   Temp 98.5 °F (36.9 °C) (Oral)   Resp 22   Ht 6' 2\" (1.88 m)   Wt (!) 310 lb (140.6 kg)   SpO2 98%   BMI 39.80 kg/m²    General appearance: No apparent distress, calm and cooperative. Obese. HEENT: Pupils equal, round, and reactive to light. Conjunctivae/corneas clear. Oral mucosa moist.  Neck: Supple, with full range of motion. Trachea midline. Respiratory:  Normal respiratory effort. Clear to auscultation all lung fields. Cardiovascular: RRR, S1/S2  Abdomen: Soft, non-tender, non-distended with active BS  Musculoskeletal: No clubbing, cyanosis bilaterally. BLE edema noted. Skin: Skin color, texture, turgor normal.  No visible rashes or lesions. Neurologic:  Neurovascularly intact without any focal sensory/motor deficits. Cranial nerves: II-XII intact, grossly non-focal.  Psychiatric: Alert and oriented x 3, thought content appropriate, normal insight  Capillary Refill: < 3 seconds   Peripheral Pulses: +2 palpable      Imaging Studies and Labs:   CBC:   Lab Results   Component Value Date    WBC 8.7 12/08/2022    HGB 8.9 (L) 12/08/2022    HCT 30.4 (L) 12/08/2022    MCV 83 12/08/2022     12/08/2022     BMP:   Lab Results   Component Value Date/Time     12/08/2022 02:44 PM    K 4.5 12/08/2022 02:44 PM    K 3.8 06/21/2021 06:48 PM    CL 96 12/08/2022 02:44 PM    CO2 27 12/08/2022 02:44 PM    BUN 31 12/08/2022 02:44 PM    CREATININE 1.4 12/08/2022 02:44 PM    GLUCOSE 269 12/08/2022 02:44 PM    GLUCOSE 103 05/25/2020 01:45 PM    CALCIUM 9.3 12/08/2022 02:44 PM      LFT:   Lab Results   Component Value Date    ALT 36 12/08/2022    AST 28 12/08/2022    ALKPHOS 81 12/08/2022    BILITOT 0.3 12/08/2022         Assessment and Plan:     1. Anemia, unspecified type  Hx anemia since at least March 2020 with baseline Hgb in 10s. Acute drop in Hgb on 11/17/2022 with confirmatory COVID-19 testing on 11/25/2022. He was admitted to hospital on 11/17/2022 with SOB, hypertensive urgency.   GI saw and EGD (+) gastritis in fundus, AVM treated with argon plasma coagulator. He was discharged on 11/24/2022 and re-admitted on 11/25/2022 with complaints of heart palpitations, SOB,  (+) COVID test.  Likely related to recent GI bleed + recent (+) COVID-19 infection + iron deficiency. - CBC with Auto Differential; Future  - Comprehensive Metabolic Panel; Future  - Lactate Dehydrogenase; Future  - Haptoglobin; Future  - Reticulocytes; Future  - Vitamin B12 & Folate; Future  - Iron; Future  - Iron Binding Capacity; Future  - IRON SATURATION; Future  - Ferritin; Future  - Sedimentation Rate; Future  - C-Reactive Protein; Future    No follow-ups on file. Will call with results. All patient questions answered. Pt voiced understanding. Patient agreed with treatment plan. Follow up as directed. Patient instructed to call for questions or concerns. Electronically signed by   EYAD Cuadra CNP       I spent a total of 52 minutes on the day of the visit. Addendum:  Called pt to discuss labs. Iron panel low, he just started oral iron 2 weeks ago. Continue oral iron BID. Likely related to recent GI bleed + COVID-19 infection + iron deficiency related to bleeding. Will trend in 2 mos. Pt verbalizes understanding of plan of care.     Electronically signed by EYAD Cuadra CNP on 12/13/2022 at 2:45 PM

## 2022-12-09 NOTE — PROGRESS NOTES
Physician Progress Note      PATIENT:               Mayte Hooper  [facility] #:                  [id number]  :                       1960  ADMIT DATE:       2022 6:51 PM  [address] [place] DATE:        2022 3:20 PM  RESPONDING  PROVIDER #:        Tay LEMON          QUERY TEXT:    Patient admitted with hypertensive emergency. Noted documentation of   possible acute on chronic diastolic CHF in H&P and PN  - and chronic   diastolic CHF in PN /- DC summary. If possible, please document in   progress notes and discharge summary if you are evaluating and /or treating   any of the following: The medical record reflects the following:    Risk Factors: hx of CHF, HTN  Clinical Indicators: echo from November 3, 2022 revealed an EF 55-60% with   grade 2 diastolic dysfunction, BNP at 2665  Treatment:  PO Lasix, Metoprolol, Minoxidil, Valsartan, Lisinopril    Thank you! Deangeol Hooker Asp, CRCR  RN Clinical   P: [phone]  Options provided:  -- Acute diastolic CHF ruled out after study and chronic diastolic CHF   confirmed  -- Acute on chronic diastolic CHF confirmed  -- Other - I will add my own diagnosis  -- Disagree - Not applicable / Not valid  -- Disagree - Clinically unable to determine / Unknown  -- Refer to Clinical Documentation Reviewer    PROVIDER RESPONSE TEXT:    After study, acute diastolic CHF ruled out and chronic diastolic CHF   confirmed.     Query created by: Nash Esposito on 2022 11:37 AM      Electronically signed by:  Venessa LEMON 2022 5:10 PM

## 2022-12-11 LAB — HAPTOGLOBIN: 568 MG/DL (ref 30–200)

## 2022-12-27 NOTE — PROCEDURES
[address] [place] [address]                                 EVENT MONITOR    PATIENT NAME: Roxann Kelly                    :        1960  MED REC NO:   [id number]                           ROOM:  ACCOUNT NO:   [de-identified]                           ADMIT DATE: 2022  PROVIDER:     Hemant Barry M.D.    TEST TYPE:  Event monitor. ENROLLMENT PERIOD:  2022 to 2022. INDICATIONS:  Hypertension and palpitations. Two automatically triggered tracings received and revealed. Significant  artifacts noted, triggering events. Otherwise, the rhythm is sinus  throughout the recoding, with an average heart rate of 79 beats per  minute. Isolated premature atrial complexes, low burden. No  ventricular ectopy. No atrial fibrillation or significant pauses.         Radha Salvador M.D.    D: 2022 12:16:04       T: 2022 12:58:52     [email or web address]  Job#: [id number]     Doc#: [id number]    CC:

## 2023-01-03 ENCOUNTER — TELEPHONE (OUTPATIENT)
Dept: FAMILY MEDICINE CLINIC | Age: 63
End: 2023-01-03

## 2023-01-03 NOTE — TELEPHONE ENCOUNTER
I printed and e-mailed the current medication list we have on file for the patient at [email or web address] and printed a confirmation sheet.

## 2023-01-03 NOTE — TELEPHONE ENCOUNTER
The patient called and said that he can not get out of his wheelchair because he is too weak. Per Theresa's orders she wants him to call the squad.

## 2023-01-03 NOTE — TELEPHONE ENCOUNTER
----- Message from Harrison Andrews sent at 1/3/2023  9:21 AM EST -----  Subject: Message to Provider    QUESTIONS  Information for Provider? pt called to ask for a copy of his medications. pt would like it to be emailed to him if possible -   [email or web address] if not emailed pt did say he can come pick it up   so please call pt to confirm. ---------------------------------------------------------------------------  --------------  Aki SMART  [phone]; OK to leave message on voicemail  ---------------------------------------------------------------------------  --------------  SCRIPT ANSWERS  Relationship to Patient?  Self

## 2023-01-09 ENCOUNTER — TELEPHONE (OUTPATIENT)
Dept: FAMILY MEDICINE CLINIC | Age: 63
End: 2023-01-09

## 2023-01-09 NOTE — TELEPHONE ENCOUNTER
Tiffany from [facility] called requesting a current medication list for patient to be faxed to her. Fax # [phone]. Tiffany    I printed off a current medication list and faxed it to [address] [place] [address].

## 2023-01-10 ENCOUNTER — ANESTHESIA EVENT (OUTPATIENT)
Dept: OPERATING ROOM | Age: 63
End: 2023-01-10
Payer: COMMERCIAL

## 2023-01-10 ENCOUNTER — APPOINTMENT (OUTPATIENT)
Dept: GENERAL RADIOLOGY | Age: 63
DRG: 474 | End: 2023-01-10
Attending: PODIATRIST
Payer: COMMERCIAL

## 2023-01-10 ENCOUNTER — HOSPITAL ENCOUNTER (INPATIENT)
Age: 63
LOS: 15 days | Discharge: NURSING, REHAB OR LONG-TERM CARE | DRG: 474 | End: 2023-01-25
Attending: PODIATRIST | Admitting: PODIATRIST
Payer: COMMERCIAL

## 2023-01-10 ENCOUNTER — ANESTHESIA (OUTPATIENT)
Dept: OPERATING ROOM | Age: 63
End: 2023-01-10
Payer: COMMERCIAL

## 2023-01-10 DIAGNOSIS — L02.416 ABSCESS OF LEFT LOWER EXTREMITY: ICD-10-CM

## 2023-01-10 DIAGNOSIS — S82.899K: ICD-10-CM

## 2023-01-10 DIAGNOSIS — Z91.89 AT RISK FOR FLUID VOLUME OVERLOAD: ICD-10-CM

## 2023-01-10 PROBLEM — M00.9: Status: ACTIVE | Noted: 2023-01-10

## 2023-01-10 LAB
ALBUMIN SERPL-MCNC: 2.4 G/DL (ref 3.5–5.1)
ALP BLD-CCNC: 114 U/L (ref 38–126)
ALT SERPL-CCNC: 49 U/L (ref 11–66)
ANION GAP SERPL CALCULATED.3IONS-SCNC: 12 MEQ/L (ref 8–16)
AST SERPL-CCNC: 44 U/L (ref 5–40)
AVERAGE GLUCOSE: 189 MG/DL (ref 70–126)
BASOPHILS # BLD: 0.3 %
BASOPHILS ABSOLUTE: 0 THOU/MM3 (ref 0–0.1)
BILIRUB SERPL-MCNC: 0.5 MG/DL (ref 0.3–1.2)
BILIRUBIN DIRECT: < 0.2 MG/DL (ref 0–0.3)
BUN BLDV-MCNC: 14 MG/DL (ref 7–22)
CALCIUM SERPL-MCNC: 9 MG/DL (ref 8.5–10.5)
CHLORIDE BLD-SCNC: 98 MEQ/L (ref 98–111)
CO2: 22 MEQ/L (ref 23–33)
CREAT SERPL-MCNC: 0.8 MG/DL (ref 0.4–1.2)
EKG ATRIAL RATE: 84 BPM
EKG P AXIS: 1 DEGREES
EKG P-R INTERVAL: 158 MS
EKG Q-T INTERVAL: 396 MS
EKG QRS DURATION: 142 MS
EKG QTC CALCULATION (BAZETT): 467 MS
EKG R AXIS: -30 DEGREES
EKG T AXIS: 44 DEGREES
EKG VENTRICULAR RATE: 84 BPM
EOSINOPHIL # BLD: 0.4 %
EOSINOPHILS ABSOLUTE: 0 THOU/MM3 (ref 0–0.4)
ERYTHROCYTE [DISTWIDTH] IN BLOOD BY AUTOMATED COUNT: 19.4 % (ref 11.5–14.5)
ERYTHROCYTE [DISTWIDTH] IN BLOOD BY AUTOMATED COUNT: 54.9 FL (ref 35–45)
GFR SERPL CREATININE-BSD FRML MDRD: > 60 ML/MIN/1.73M2
GLUCOSE BLD-MCNC: 204 MG/DL (ref 70–108)
GLUCOSE BLD-MCNC: 221 MG/DL (ref 70–108)
GLUCOSE BLD-MCNC: 241 MG/DL (ref 70–108)
HBA1C MFR BLD: 8.3 % (ref 4.4–6.4)
HCT VFR BLD CALC: 24.8 % (ref 42–52)
HEMOGLOBIN: 7.3 GM/DL (ref 14–18)
IMMATURE GRANS (ABS): 0.23 THOU/MM3 (ref 0–0.07)
IMMATURE GRANULOCYTES: 2 %
LYMPHOCYTES # BLD: 11.6 %
LYMPHOCYTES ABSOLUTE: 1.3 THOU/MM3 (ref 1–4.8)
MAGNESIUM: 2 MG/DL (ref 1.6–2.4)
MCH RBC QN AUTO: 23.2 PG (ref 26–33)
MCHC RBC AUTO-ENTMCNC: 29.4 GM/DL (ref 32.2–35.5)
MCV RBC AUTO: 78.7 FL (ref 80–94)
MONOCYTES # BLD: 6.2 %
MONOCYTES ABSOLUTE: 0.7 THOU/MM3 (ref 0.4–1.3)
NUCLEATED RED BLOOD CELLS: 0 /100 WBC
PLATELET # BLD: 497 THOU/MM3 (ref 130–400)
PMV BLD AUTO: 9.2 FL (ref 9.4–12.4)
POTASSIUM REFLEX MAGNESIUM: 3.8 MEQ/L (ref 3.5–5.2)
POTASSIUM SERPL-SCNC: 3.8 MEQ/L (ref 3.5–5.2)
PRO-BNP: 2168 PG/ML (ref 0–124)
RBC # BLD: 3.15 MILL/MM3 (ref 4.7–6.1)
SEG NEUTROPHILS: 79.5 %
SEGMENTED NEUTROPHILS ABSOLUTE COUNT: 9.1 THOU/MM3 (ref 1.8–7.7)
SODIUM BLD-SCNC: 132 MEQ/L (ref 135–145)
TOTAL PROTEIN: 6.7 G/DL (ref 6.1–8)
TROPONIN T: 0.07 NG/ML
WBC # BLD: 11.5 THOU/MM3 (ref 4.8–10.8)

## 2023-01-10 PROCEDURE — 6360000002 HC RX W HCPCS

## 2023-01-10 PROCEDURE — 88307 TISSUE EXAM BY PATHOLOGIST: CPT

## 2023-01-10 PROCEDURE — 6370000000 HC RX 637 (ALT 250 FOR IP): Performed by: PODIATRIST

## 2023-01-10 PROCEDURE — 82948 REAGENT STRIP/BLOOD GLUCOSE: CPT

## 2023-01-10 PROCEDURE — 2500000003 HC RX 250 WO HCPCS: Performed by: NURSE ANESTHETIST, CERTIFIED REGISTERED

## 2023-01-10 PROCEDURE — 71045 X-RAY EXAM CHEST 1 VIEW: CPT

## 2023-01-10 PROCEDURE — 2580000003 HC RX 258

## 2023-01-10 PROCEDURE — 6370000000 HC RX 637 (ALT 250 FOR IP)

## 2023-01-10 PROCEDURE — 83880 ASSAY OF NATRIURETIC PEPTIDE: CPT

## 2023-01-10 PROCEDURE — 73610 X-RAY EXAM OF ANKLE: CPT

## 2023-01-10 PROCEDURE — 87077 CULTURE AEROBIC IDENTIFY: CPT

## 2023-01-10 PROCEDURE — 93010 ELECTROCARDIOGRAM REPORT: CPT | Performed by: NUCLEAR MEDICINE

## 2023-01-10 PROCEDURE — 88305 TISSUE EXAM BY PATHOLOGIST: CPT

## 2023-01-10 PROCEDURE — 2709999900 HC NON-CHARGEABLE SUPPLY: Performed by: PODIATRIST

## 2023-01-10 PROCEDURE — 83735 ASSAY OF MAGNESIUM: CPT

## 2023-01-10 PROCEDURE — 1200000000 HC SEMI PRIVATE

## 2023-01-10 PROCEDURE — 36415 COLL VENOUS BLD VENIPUNCTURE: CPT

## 2023-01-10 PROCEDURE — 7100000000 HC PACU RECOVERY - FIRST 15 MIN: Performed by: PODIATRIST

## 2023-01-10 PROCEDURE — 0Y6J0Z3 DETACHMENT AT LEFT LOWER LEG, LOW, OPEN APPROACH: ICD-10-PCS | Performed by: PODIATRIST

## 2023-01-10 PROCEDURE — 83036 HEMOGLOBIN GLYCOSYLATED A1C: CPT

## 2023-01-10 PROCEDURE — 93005 ELECTROCARDIOGRAM TRACING: CPT

## 2023-01-10 PROCEDURE — 87075 CULTR BACTERIA EXCEPT BLOOD: CPT

## 2023-01-10 PROCEDURE — 99223 1ST HOSP IP/OBS HIGH 75: CPT | Performed by: INTERNAL MEDICINE

## 2023-01-10 PROCEDURE — 87205 SMEAR GRAM STAIN: CPT

## 2023-01-10 PROCEDURE — 0Y6N0Z0 DETACHMENT AT LEFT FOOT, COMPLETE, OPEN APPROACH: ICD-10-PCS | Performed by: PODIATRIST

## 2023-01-10 PROCEDURE — 6360000002 HC RX W HCPCS: Performed by: NURSE ANESTHETIST, CERTIFIED REGISTERED

## 2023-01-10 PROCEDURE — 3700000001 HC ADD 15 MINUTES (ANESTHESIA): Performed by: PODIATRIST

## 2023-01-10 PROCEDURE — 84484 ASSAY OF TROPONIN QUANT: CPT

## 2023-01-10 PROCEDURE — 3700000000 HC ANESTHESIA ATTENDED CARE: Performed by: PODIATRIST

## 2023-01-10 PROCEDURE — 85025 COMPLETE CBC W/AUTO DIFF WBC: CPT

## 2023-01-10 PROCEDURE — 87040 BLOOD CULTURE FOR BACTERIA: CPT

## 2023-01-10 PROCEDURE — 3600000013 HC SURGERY LEVEL 3 ADDTL 15MIN: Performed by: PODIATRIST

## 2023-01-10 PROCEDURE — 87186 SC STD MICRODIL/AGAR DIL: CPT

## 2023-01-10 PROCEDURE — 6370000000 HC RX 637 (ALT 250 FOR IP): Performed by: INTERNAL MEDICINE

## 2023-01-10 PROCEDURE — 7100000001 HC PACU RECOVERY - ADDTL 15 MIN: Performed by: PODIATRIST

## 2023-01-10 PROCEDURE — 82248 BILIRUBIN DIRECT: CPT

## 2023-01-10 PROCEDURE — 87070 CULTURE OTHR SPECIMN AEROBIC: CPT

## 2023-01-10 PROCEDURE — 80053 COMPREHEN METABOLIC PANEL: CPT

## 2023-01-10 PROCEDURE — 3600000003 HC SURGERY LEVEL 3 BASE: Performed by: PODIATRIST

## 2023-01-10 PROCEDURE — 2500000003 HC RX 250 WO HCPCS: Performed by: INTERNAL MEDICINE

## 2023-01-10 PROCEDURE — 1200000003 HC TELEMETRY R&B

## 2023-01-10 RX ORDER — GABAPENTIN 600 MG/1
600 TABLET ORAL 2 TIMES DAILY
Status: DISCONTINUED | OUTPATIENT
Start: 2023-01-10 | End: 2023-01-25 | Stop reason: HOSPADM

## 2023-01-10 RX ORDER — CLOPIDOGREL BISULFATE 75 MG/1
75 TABLET ORAL DAILY
Status: DISCONTINUED | OUTPATIENT
Start: 2023-01-10 | End: 2023-01-25 | Stop reason: HOSPADM

## 2023-01-10 RX ORDER — ALBUTEROL SULFATE 90 UG/1
2 AEROSOL, METERED RESPIRATORY (INHALATION) 4 TIMES DAILY PRN
Status: DISCONTINUED | OUTPATIENT
Start: 2023-01-10 | End: 2023-01-25 | Stop reason: HOSPADM

## 2023-01-10 RX ORDER — ATORVASTATIN CALCIUM 80 MG/1
80 TABLET, FILM COATED ORAL DAILY
Status: DISCONTINUED | OUTPATIENT
Start: 2023-01-11 | End: 2023-01-25 | Stop reason: HOSPADM

## 2023-01-10 RX ORDER — VALSARTAN 160 MG/1
160 TABLET ORAL DAILY
Status: DISCONTINUED | OUTPATIENT
Start: 2023-01-10 | End: 2023-01-25 | Stop reason: HOSPADM

## 2023-01-10 RX ORDER — DEXTROSE, SODIUM CHLORIDE, SODIUM LACTATE, POTASSIUM CHLORIDE, AND CALCIUM CHLORIDE 5; .6; .31; .03; .02 G/100ML; G/100ML; G/100ML; G/100ML; G/100ML
INJECTION, SOLUTION INTRAVENOUS CONTINUOUS
Status: DISCONTINUED | OUTPATIENT
Start: 2023-01-11 | End: 2023-01-11

## 2023-01-10 RX ORDER — FENTANYL CITRATE 50 UG/ML
INJECTION, SOLUTION INTRAMUSCULAR; INTRAVENOUS PRN
Status: DISCONTINUED | OUTPATIENT
Start: 2023-01-10 | End: 2023-01-10 | Stop reason: SDUPTHER

## 2023-01-10 RX ORDER — ACETAMINOPHEN 650 MG/1
650 SUPPOSITORY RECTAL EVERY 6 HOURS PRN
Status: DISCONTINUED | OUTPATIENT
Start: 2023-01-10 | End: 2023-01-25 | Stop reason: HOSPADM

## 2023-01-10 RX ORDER — METOPROLOL TARTRATE 50 MG/1
50 TABLET, FILM COATED ORAL 2 TIMES DAILY
Status: DISCONTINUED | OUTPATIENT
Start: 2023-01-10 | End: 2023-01-10

## 2023-01-10 RX ORDER — OXYCODONE HYDROCHLORIDE 5 MG/1
10 TABLET ORAL EVERY 4 HOURS PRN
Status: DISCONTINUED | OUTPATIENT
Start: 2023-01-10 | End: 2023-01-10 | Stop reason: SDUPTHER

## 2023-01-10 RX ORDER — INSULIN LISPRO 100 [IU]/ML
0-8 INJECTION, SOLUTION INTRAVENOUS; SUBCUTANEOUS
Status: DISCONTINUED | OUTPATIENT
Start: 2023-01-11 | End: 2023-01-12

## 2023-01-10 RX ORDER — POLYETHYLENE GLYCOL 3350 17 G/17G
17 POWDER, FOR SOLUTION ORAL DAILY PRN
Status: DISCONTINUED | OUTPATIENT
Start: 2023-01-10 | End: 2023-01-16

## 2023-01-10 RX ORDER — PROPOFOL 10 MG/ML
INJECTION, EMULSION INTRAVENOUS PRN
Status: DISCONTINUED | OUTPATIENT
Start: 2023-01-10 | End: 2023-01-10 | Stop reason: SDUPTHER

## 2023-01-10 RX ORDER — SODIUM CHLORIDE 0.9 % (FLUSH) 0.9 %
5-40 SYRINGE (ML) INJECTION EVERY 12 HOURS SCHEDULED
Status: DISCONTINUED | OUTPATIENT
Start: 2023-01-10 | End: 2023-01-10 | Stop reason: SDUPTHER

## 2023-01-10 RX ORDER — CLONIDINE HYDROCHLORIDE 0.1 MG/1
0.1 TABLET ORAL 2 TIMES DAILY
Status: DISCONTINUED | OUTPATIENT
Start: 2023-01-10 | End: 2023-01-10

## 2023-01-10 RX ORDER — SODIUM CHLORIDE 9 MG/ML
INJECTION, SOLUTION INTRAVENOUS PRN
Status: DISCONTINUED | OUTPATIENT
Start: 2023-01-10 | End: 2023-01-10 | Stop reason: SDUPTHER

## 2023-01-10 RX ORDER — OXYCODONE HYDROCHLORIDE 5 MG/1
5 TABLET ORAL EVERY 4 HOURS PRN
Status: DISCONTINUED | OUTPATIENT
Start: 2023-01-10 | End: 2023-01-10 | Stop reason: SDUPTHER

## 2023-01-10 RX ORDER — ASPIRIN 81 MG/1
81 TABLET, CHEWABLE ORAL DAILY
Status: DISCONTINUED | OUTPATIENT
Start: 2023-01-10 | End: 2023-01-25 | Stop reason: HOSPADM

## 2023-01-10 RX ORDER — DEXTROSE MONOHYDRATE 100 MG/ML
INJECTION, SOLUTION INTRAVENOUS CONTINUOUS PRN
Status: DISCONTINUED | OUTPATIENT
Start: 2023-01-10 | End: 2023-01-25 | Stop reason: HOSPADM

## 2023-01-10 RX ORDER — HYDROCODONE BITARTRATE AND ACETAMINOPHEN 5; 325 MG/1; MG/1
1 TABLET ORAL EVERY 4 HOURS PRN
COMMUNITY
Start: 2022-12-13

## 2023-01-10 RX ORDER — CLINDAMYCIN PHOSPHATE 600 MG/50ML
600 INJECTION INTRAVENOUS EVERY 8 HOURS
Status: DISCONTINUED | OUTPATIENT
Start: 2023-01-10 | End: 2023-01-13

## 2023-01-10 RX ORDER — POTASSIUM CHLORIDE 20 MEQ/1
20 TABLET, EXTENDED RELEASE ORAL DAILY
Status: DISCONTINUED | OUTPATIENT
Start: 2023-01-10 | End: 2023-01-25 | Stop reason: HOSPADM

## 2023-01-10 RX ORDER — AMLODIPINE BESYLATE 10 MG/1
10 TABLET ORAL DAILY
Status: DISCONTINUED | OUTPATIENT
Start: 2023-01-11 | End: 2023-01-25 | Stop reason: HOSPADM

## 2023-01-10 RX ORDER — INSULIN LISPRO 100 [IU]/ML
0-4 INJECTION, SOLUTION INTRAVENOUS; SUBCUTANEOUS
Status: DISCONTINUED | OUTPATIENT
Start: 2023-01-10 | End: 2023-01-10

## 2023-01-10 RX ORDER — MORPHINE SULFATE 4 MG/ML
4 INJECTION, SOLUTION INTRAMUSCULAR; INTRAVENOUS
Status: DISCONTINUED | OUTPATIENT
Start: 2023-01-10 | End: 2023-01-25 | Stop reason: HOSPADM

## 2023-01-10 RX ORDER — METOPROLOL TARTRATE 50 MG/1
50 TABLET, FILM COATED ORAL 2 TIMES DAILY
Status: DISCONTINUED | OUTPATIENT
Start: 2023-01-10 | End: 2023-01-25 | Stop reason: HOSPADM

## 2023-01-10 RX ORDER — CLONIDINE HYDROCHLORIDE 0.1 MG/1
0.1 TABLET ORAL 2 TIMES DAILY
Status: DISCONTINUED | OUTPATIENT
Start: 2023-01-10 | End: 2023-01-25 | Stop reason: HOSPADM

## 2023-01-10 RX ORDER — SUCCINYLCHOLINE/SOD CL,ISO/PF 100 MG/5ML
SYRINGE (ML) INTRAVENOUS PRN
Status: DISCONTINUED | OUTPATIENT
Start: 2023-01-10 | End: 2023-01-10 | Stop reason: SDUPTHER

## 2023-01-10 RX ORDER — ONDANSETRON 2 MG/ML
4 INJECTION INTRAMUSCULAR; INTRAVENOUS EVERY 6 HOURS PRN
Status: DISCONTINUED | OUTPATIENT
Start: 2023-01-10 | End: 2023-01-25 | Stop reason: HOSPADM

## 2023-01-10 RX ORDER — SODIUM CHLORIDE 9 MG/ML
INJECTION, SOLUTION INTRAVENOUS CONTINUOUS
Status: DISCONTINUED | OUTPATIENT
Start: 2023-01-10 | End: 2023-01-11

## 2023-01-10 RX ORDER — ONDANSETRON 4 MG/1
4 TABLET, ORALLY DISINTEGRATING ORAL EVERY 8 HOURS PRN
Status: DISCONTINUED | OUTPATIENT
Start: 2023-01-10 | End: 2023-01-10 | Stop reason: SDUPTHER

## 2023-01-10 RX ORDER — FERROUS SULFATE 325(65) MG
325 TABLET ORAL 2 TIMES DAILY
Status: DISCONTINUED | OUTPATIENT
Start: 2023-01-10 | End: 2023-01-25 | Stop reason: HOSPADM

## 2023-01-10 RX ORDER — OXYCODONE HYDROCHLORIDE 5 MG/1
5 TABLET ORAL EVERY 4 HOURS PRN
Status: DISCONTINUED | OUTPATIENT
Start: 2023-01-10 | End: 2023-01-25 | Stop reason: HOSPADM

## 2023-01-10 RX ORDER — ACETAMINOPHEN 160 MG
TABLET,DISINTEGRATING ORAL PRN
Status: DISCONTINUED | OUTPATIENT
Start: 2023-01-10 | End: 2023-01-10 | Stop reason: ALTCHOICE

## 2023-01-10 RX ORDER — MORPHINE SULFATE 4 MG/ML
4 INJECTION, SOLUTION INTRAMUSCULAR; INTRAVENOUS
Status: DISCONTINUED | OUTPATIENT
Start: 2023-01-10 | End: 2023-01-10 | Stop reason: SDUPTHER

## 2023-01-10 RX ORDER — SODIUM CHLORIDE 9 MG/ML
INJECTION, SOLUTION INTRAVENOUS PRN
Status: DISCONTINUED | OUTPATIENT
Start: 2023-01-10 | End: 2023-01-25 | Stop reason: HOSPADM

## 2023-01-10 RX ORDER — SODIUM CHLORIDE 0.9 % (FLUSH) 0.9 %
5-40 SYRINGE (ML) INJECTION EVERY 12 HOURS SCHEDULED
Status: DISCONTINUED | OUTPATIENT
Start: 2023-01-10 | End: 2023-01-25 | Stop reason: HOSPADM

## 2023-01-10 RX ORDER — ONDANSETRON 2 MG/ML
4 INJECTION INTRAMUSCULAR; INTRAVENOUS EVERY 6 HOURS PRN
Status: DISCONTINUED | OUTPATIENT
Start: 2023-01-10 | End: 2023-01-10 | Stop reason: SDUPTHER

## 2023-01-10 RX ORDER — FUROSEMIDE 40 MG/1
40 TABLET ORAL DAILY
Status: DISCONTINUED | OUTPATIENT
Start: 2023-01-10 | End: 2023-01-25 | Stop reason: HOSPADM

## 2023-01-10 RX ORDER — MORPHINE SULFATE 2 MG/ML
2 INJECTION, SOLUTION INTRAMUSCULAR; INTRAVENOUS
Status: DISCONTINUED | OUTPATIENT
Start: 2023-01-10 | End: 2023-01-10 | Stop reason: SDUPTHER

## 2023-01-10 RX ORDER — OXYCODONE HYDROCHLORIDE 5 MG/1
10 TABLET ORAL EVERY 4 HOURS PRN
Status: DISCONTINUED | OUTPATIENT
Start: 2023-01-10 | End: 2023-01-25 | Stop reason: HOSPADM

## 2023-01-10 RX ORDER — LIDOCAINE HYDROCHLORIDE 20 MG/ML
INJECTION, SOLUTION INTRAVENOUS PRN
Status: DISCONTINUED | OUTPATIENT
Start: 2023-01-10 | End: 2023-01-10 | Stop reason: SDUPTHER

## 2023-01-10 RX ORDER — MINOXIDIL 2.5 MG/1
10 TABLET ORAL DAILY
Status: DISCONTINUED | OUTPATIENT
Start: 2023-01-10 | End: 2023-01-25 | Stop reason: HOSPADM

## 2023-01-10 RX ORDER — ROCURONIUM BROMIDE 10 MG/ML
INJECTION, SOLUTION INTRAVENOUS PRN
Status: DISCONTINUED | OUTPATIENT
Start: 2023-01-10 | End: 2023-01-10 | Stop reason: SDUPTHER

## 2023-01-10 RX ORDER — INSULIN GLARGINE 100 [IU]/ML
30 INJECTION, SOLUTION SUBCUTANEOUS 2 TIMES DAILY
Status: DISPENSED | OUTPATIENT
Start: 2023-01-10 | End: 2023-01-11

## 2023-01-10 RX ORDER — DEXAMETHASONE SODIUM PHOSPHATE 10 MG/ML
INJECTION, EMULSION INTRAMUSCULAR; INTRAVENOUS PRN
Status: DISCONTINUED | OUTPATIENT
Start: 2023-01-10 | End: 2023-01-10 | Stop reason: SDUPTHER

## 2023-01-10 RX ORDER — INSULIN GLARGINE 100 [IU]/ML
30 INJECTION, SOLUTION SUBCUTANEOUS 2 TIMES DAILY
Status: DISCONTINUED | OUTPATIENT
Start: 2023-01-11 | End: 2023-01-12

## 2023-01-10 RX ORDER — INSULIN GLARGINE 100 [IU]/ML
15 INJECTION, SOLUTION SUBCUTANEOUS EVERY MORNING
Status: COMPLETED | OUTPATIENT
Start: 2023-01-11 | End: 2023-01-11

## 2023-01-10 RX ORDER — SODIUM CHLORIDE 9 MG/ML
INJECTION, SOLUTION INTRAVENOUS CONTINUOUS
Status: DISCONTINUED | OUTPATIENT
Start: 2023-01-10 | End: 2023-01-10

## 2023-01-10 RX ORDER — MORPHINE SULFATE 2 MG/ML
2 INJECTION, SOLUTION INTRAMUSCULAR; INTRAVENOUS
Status: DISCONTINUED | OUTPATIENT
Start: 2023-01-10 | End: 2023-01-25 | Stop reason: HOSPADM

## 2023-01-10 RX ORDER — SODIUM CHLORIDE 0.9 % (FLUSH) 0.9 %
5-40 SYRINGE (ML) INJECTION PRN
Status: DISCONTINUED | OUTPATIENT
Start: 2023-01-10 | End: 2023-01-25 | Stop reason: HOSPADM

## 2023-01-10 RX ORDER — MULTIVITAMIN WITH IRON
1 TABLET ORAL DAILY
Status: DISCONTINUED | OUTPATIENT
Start: 2023-01-10 | End: 2023-01-25 | Stop reason: HOSPADM

## 2023-01-10 RX ORDER — ONDANSETRON 4 MG/1
4 TABLET, ORALLY DISINTEGRATING ORAL EVERY 8 HOURS PRN
Status: DISCONTINUED | OUTPATIENT
Start: 2023-01-10 | End: 2023-01-25 | Stop reason: HOSPADM

## 2023-01-10 RX ORDER — SODIUM CHLORIDE 0.9 % (FLUSH) 0.9 %
5-40 SYRINGE (ML) INJECTION PRN
Status: DISCONTINUED | OUTPATIENT
Start: 2023-01-10 | End: 2023-01-10 | Stop reason: SDUPTHER

## 2023-01-10 RX ORDER — ACETAMINOPHEN 325 MG/1
650 TABLET ORAL EVERY 6 HOURS PRN
Status: DISCONTINUED | OUTPATIENT
Start: 2023-01-10 | End: 2023-01-25 | Stop reason: HOSPADM

## 2023-01-10 RX ORDER — INSULIN LISPRO 100 [IU]/ML
0-4 INJECTION, SOLUTION INTRAVENOUS; SUBCUTANEOUS NIGHTLY
Status: DISCONTINUED | OUTPATIENT
Start: 2023-01-10 | End: 2023-01-12

## 2023-01-10 RX ORDER — INSULIN LISPRO 100 [IU]/ML
0-4 INJECTION, SOLUTION INTRAVENOUS; SUBCUTANEOUS NIGHTLY
Status: DISCONTINUED | OUTPATIENT
Start: 2023-01-10 | End: 2023-01-10

## 2023-01-10 RX ADMIN — OXYCODONE 10 MG: 5 TABLET ORAL at 14:47

## 2023-01-10 RX ADMIN — OXYCODONE 5 MG: 5 TABLET ORAL at 22:52

## 2023-01-10 RX ADMIN — Medication 100 MG: at 19:52

## 2023-01-10 RX ADMIN — Medication 140 MG: at 19:52

## 2023-01-10 RX ADMIN — CLONIDINE HYDROCHLORIDE 0.1 MG: 0.1 TABLET ORAL at 17:00

## 2023-01-10 RX ADMIN — PHENYLEPHRINE HYDROCHLORIDE 100 MCG: 10 INJECTION INTRAVENOUS at 20:55

## 2023-01-10 RX ADMIN — VANCOMYCIN HYDROCHLORIDE 1750 MG: 5 INJECTION, POWDER, LYOPHILIZED, FOR SOLUTION INTRAVENOUS at 14:53

## 2023-01-10 RX ADMIN — DEXAMETHASONE SODIUM PHOSPHATE 10 MG: 10 INJECTION, EMULSION INTRAMUSCULAR; INTRAVENOUS at 19:52

## 2023-01-10 RX ADMIN — PIPERACILLIN AND TAZOBACTAM 4500 MG: 4; .5 INJECTION, POWDER, FOR SOLUTION INTRAVENOUS at 17:03

## 2023-01-10 RX ADMIN — PHENYLEPHRINE HYDROCHLORIDE 200 MCG: 10 INJECTION INTRAVENOUS at 19:55

## 2023-01-10 RX ADMIN — SODIUM CHLORIDE, PRESERVATIVE FREE 10 ML: 5 INJECTION INTRAVENOUS at 22:36

## 2023-01-10 RX ADMIN — GABAPENTIN 600 MG: 600 TABLET, FILM COATED ORAL at 22:54

## 2023-01-10 RX ADMIN — SODIUM CHLORIDE, PRESERVATIVE FREE 10 ML: 5 INJECTION INTRAVENOUS at 22:55

## 2023-01-10 RX ADMIN — FENTANYL CITRATE 50 MCG: 50 INJECTION, SOLUTION INTRAMUSCULAR; INTRAVENOUS at 20:25

## 2023-01-10 RX ADMIN — METOPROLOL TARTRATE 50 MG: 50 TABLET, FILM COATED ORAL at 17:00

## 2023-01-10 RX ADMIN — SUGAMMADEX 200 MG: 100 INJECTION, SOLUTION INTRAVENOUS at 20:49

## 2023-01-10 RX ADMIN — PHENYLEPHRINE HYDROCHLORIDE 200 MCG: 10 INJECTION INTRAVENOUS at 20:06

## 2023-01-10 RX ADMIN — PHENYLEPHRINE HYDROCHLORIDE 200 MCG: 10 INJECTION INTRAVENOUS at 20:17

## 2023-01-10 RX ADMIN — ROCURONIUM BROMIDE 20 MG: 100 INJECTION, SOLUTION INTRAVENOUS at 20:14

## 2023-01-10 RX ADMIN — PHENYLEPHRINE HYDROCHLORIDE 100 MCG: 10 INJECTION INTRAVENOUS at 20:49

## 2023-01-10 RX ADMIN — SODIUM CHLORIDE: 9 INJECTION, SOLUTION INTRAVENOUS at 14:50

## 2023-01-10 RX ADMIN — SODIUM CHLORIDE: 9 INJECTION, SOLUTION INTRAVENOUS at 19:46

## 2023-01-10 RX ADMIN — ONDANSETRON 4 MG: 2 INJECTION INTRAMUSCULAR; INTRAVENOUS at 19:52

## 2023-01-10 RX ADMIN — FERROUS SULFATE TAB 325 MG (65 MG ELEMENTAL FE) 325 MG: 325 (65 FE) TAB at 22:36

## 2023-01-10 RX ADMIN — INSULIN GLARGINE 30 UNITS: 100 INJECTION, SOLUTION SUBCUTANEOUS at 23:00

## 2023-01-10 RX ADMIN — FENTANYL CITRATE 50 MCG: 50 INJECTION, SOLUTION INTRAMUSCULAR; INTRAVENOUS at 20:39

## 2023-01-10 RX ADMIN — CLINDAMYCIN PHOSPHATE 600 MG: 600 INJECTION, SOLUTION INTRAVENOUS at 17:36

## 2023-01-10 RX ADMIN — PROPOFOL 150 MG: 10 INJECTION, EMULSION INTRAVENOUS at 19:52

## 2023-01-10 ASSESSMENT — LIFESTYLE VARIABLES
HOW OFTEN DO YOU HAVE A DRINK CONTAINING ALCOHOL: NEVER
HOW MANY STANDARD DRINKS CONTAINING ALCOHOL DO YOU HAVE ON A TYPICAL DAY: PATIENT DOES NOT DRINK

## 2023-01-10 ASSESSMENT — PAIN SCALES - GENERAL
PAINLEVEL_OUTOF10: 8
PAINLEVEL_OUTOF10: 2
PAINLEVEL_OUTOF10: 7
PAINLEVEL_OUTOF10: 7
PAINLEVEL_OUTOF10: 3

## 2023-01-10 ASSESSMENT — PAIN DESCRIPTION - ORIENTATION: ORIENTATION: LEFT

## 2023-01-10 ASSESSMENT — PAIN - FUNCTIONAL ASSESSMENT
PAIN_FUNCTIONAL_ASSESSMENT: PREVENTS OR INTERFERES SOME ACTIVE ACTIVITIES AND ADLS
PAIN_FUNCTIONAL_ASSESSMENT: 0-10

## 2023-01-10 ASSESSMENT — PAIN DESCRIPTION - DESCRIPTORS: DESCRIPTORS: ACHING

## 2023-01-10 ASSESSMENT — PAIN DESCRIPTION - LOCATION: LOCATION: LEG

## 2023-01-10 NOTE — CONSULTS
CONSULTATION NOTE :ID       Patient - Priyank iKser,  Age - 65 y.o.    - 1960      Room Number - 7K-04/004-A   MRN -  [id number]   [facility]  Date of Admission -  1/10/2023 12:28 PM  Patient's PCP: EYAD Blakely - CNP     Requesting Physician: Sid Murcia DPM    REASON FOR CONSULTATION   Necrotizing wound infection left foot  CHIEF COMPLAINT   Fouls smelling foot wound    HISTORY OF PRESENT ILLNESS       This is a very pleasant 65 y.o. male who was admitted to the hospital with a chief complaints of worsening foot infection. he had hx of diabetic foot ulcer , had hx of left TMA. He was following with podiatry for his non healing wound. He fell around 2 wks ago and broke his foot. He was seen at [facility] and was sent home. Today he was seen by podiatry and was noted a necrotic wound with exposed bone with pathologic fracture and necrotic wound. He was directly admitted for surgery. I was asked to see patient .  He has hx of poorly controlled diabetes, HTN and CAD    PAST MEDICAL  HISTORY       Past Medical History:   Diagnosis Date    Arthritis     CAD (coronary artery disease)     CKD (chronic kidney disease), stage II     Diabetes mellitus ([facility] [address]     Hyperlipidemia     Hypertension     Liver disease     HEPITITIS AS A CHILD       PAST SURGICAL HISTORY     Past Surgical History:   Procedure Laterality Date    CARDIAC SURGERY  2019    triple bypass    CYST REMOVAL      RIGHT ANKLE     FOOT DEBRIDEMENT Left 2020    LEFT TRANSMETATARSAL AMPUTATION  FOOT INCISION AND DRAINAGE performed by Sid Murcia DPM at [facility] Left 2020    LEFT WOUND DEBRIDEMENT WITH WOUND VAC APPLICATION performed by Sid Murcia DPM at [facility] Left 2022    LEFT FOOT EXCISONAL WOUND DEBRIDEMENT, [facility], APPLICATION KCI WOUND VAC [facility] Right     CYST REMOVED    FOOT SURGERY Left 8/14/2020    LEFT FOOT PREPARATION GRAFT SITE, HAVEST SPLIT THICKNESS SKIN GRAFT WITH APPLICATION, APPLICATION KCI WOUND VAC performed by Bandar Gomez DPM at [address] Left 3/3/2020    I&D LEFT FOOT, TRANSMETATARSAL AMPUTATION performed by Bandar Gomez DPM at [address] [facility] 11/23/2022    EGD performed by Bakari Jerez MD at [facility]         MEDICATIONS:       Scheduled Meds:   sodium chloride flush  5-40 mL IntraVENous 2 times per day    [START ON 1/11/2023] amLODIPine  10 mg Oral Daily    [START ON 1/11/2023] atorvastatin  80 mg Oral Daily    cloNIDine  0.1 mg Oral BID    ferrous sulfate  325 mg Oral BID    furosemide  40 mg Oral Daily    gabapentin  600 mg Oral BID    insulin glargine  30 Units SubCUTAneous BID    metoprolol tartrate  50 mg Oral BID    minoxidil  10 mg Oral Daily    multivitamin  1 tablet Oral Daily    potassium chloride  20 mEq Oral Daily    valsartan  160 mg Oral Daily    vancomycin  1,750 mg IntraVENous Once    piperacillin-tazobactam  4,500 mg IntraVENous Once    vancomycin (VANCOCIN) intermittent dosing (placeholder)   Other RX Placeholder    Sodium Hypochlorite   Irrigation Daily    insulin lispro  0-4 Units SubCUTAneous TID WC    insulin lispro  0-4 Units SubCUTAneous Nightly    piperacillin-tazobactam  3,375 mg IntraVENous q8h     Continuous Infusions:   sodium chloride 75 mL/hr at 01/10/23 1450    sodium chloride      dextrose       PRN Meds:sodium chloride flush, sodium chloride, ondansetron **OR** ondansetron, polyethylene glycol, acetaminophen **OR** acetaminophen, albuterol sulfate HFA, oxyCODONE **OR** oxyCODONE, morphine **OR** morphine, glucose, dextrose bolus **OR** dextrose bolus, glucagon (rDNA), dextrose  Allergies:   ALLERGIES:    Patient has no known allergies.         SOCIAL HISTORY:     TOBACCO:   reports that he quit smoking about 15 years ago. His smoking use included cigarettes. He has never used smokeless tobacco.     ETOH:   reports that he does not currently use alcohol. Patient currently lives alone      FAMILY HISTORY:         Problem Relation Age of Onset    Diabetes Mother     High Blood Pressure Mother     Alzheimer's Disease Father          of, 80or 80years old    Heart Disease Father     High Blood Pressure Father     Cancer Neg Hx     Stroke Neg Hx        REVIEW OF SYSTEMS:     Constitutional: +fever, no night sweats, no fatigue, no weight loss. Head: no head ache , no head injury, no migranes. Eye: no eye discharge, blurring of vision, no double vision,no eye pain. Ears: no hearing difficulty, no tinnitus  Mouth/throat: no ulceration, dental caries , dysphagia, no hoarseness and voice change  Respiratory: no cough no chest pain,no shortness of breath,no wheezing  CVS: no palpitation, no chest pain,   GI: no abdominal pain, no nausea , no vomiting, no constipation,no diarrhea. JENNIFER: no dysuria, frequency and urgency, no hematuria, no kidney stones  Musculoskeletal: as noted in HPI  Endocrine: had episodes of hypoglycemia with falls  Hematology: no anemia, no easy brusing or bleeding, no hx of clotting disorder  Dermatology: no skin rash, no skin lesions, no pruritis,  Neurological:no headaches,no dizziness, no seizure, no numbness. PHYSICAL EXAM:     /62   Pulse 87   Temp 98.6 °F (37 °C) (Oral)   Resp 18   Ht 6' 2\" (1.88 m)   Wt 283 lb 14.4 oz (128.8 kg)   SpO2 98%   BMI 36.45 kg/m²   General apperance:  Awake, alert, sick looking  HEENT: pink conjunctiva, unicteric sclera, moist oral mucosa. Chest:     Cardiovascular:  RRR ,S1S2, no murmur or gallop. Abdomen:  Soft, non tender to palpation. Extremities: dressed foul smelling left foot, there is necrotic tissue with exposed bone,edema both lower legs  Skin:  Warm and dry.   CNS:oriented                LABS:         Micro:   Lab Results   Component Value Date/Time    [facility]  11/22/2022 05:12 PM     No growth 24 hours. No growth 48 hours. 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    Sepsis ([facility] [address] A41.9    Acute left-sided low back pain with bilateral sciatica M54.42, M54.41    S/P transmetatarsal amputation of foot, left ([facility]) Z89.432    Leukocytosis D72.829    Wound dehiscence, surgical, initial encounter T81.31XA    Postoperative wound infection Q69.10SP    Metabolic acidosis A45.81    Hx of CABG Z95.1    Lumbar stenosis without neurogenic claudication M48.061    CKD (chronic kidney disease), stage II N18.2    Normocytic anemia D64.9    Morbid obesity ([facility]) E66.01    Debility R53.81    Carotid stenosis, asymptomatic, bilateral I65.23    Hypokalemia E87.6    Nonhealing ulcer of left lower extremity ([facility] [address] L97.929    Bleeding R58    Wound, open T14. 8XXA    SOB (shortness of breath) on exertion R06.02    Hemoglobin A1C greater than 9%, indicating poor diabetic control R73.09    Hypertensive emergency I16.1    Acute on chronic congestive heart failure ([facility]) I50.9    Hypertensive urgency I16.0    Moderate persistent asthma J45.40    Septic arthritis of left ankle, due to unspecified organism ([facility]) M00.9           Impression and Recommendation:   Necrotizing wound infection left foot with pathologic fracture: he needs urgent surgical intervention: I.e amputation  Poorly controlled diabetes  CAD s/p CABG  HTN  Continue iv zosyn vancomycin and add clindamycin. Thank you Ramirez Bajwa DPM for allowing me to participate in this patient's care.     Yoan Hernandez MD, MD,FACP 1/10/2023 3:28 PM

## 2023-01-10 NOTE — PLAN OF CARE
Problem: Discharge Planning  Goal: Discharge to home or other facility with appropriate resources  Outcome: Progressing  Flowsheets (Taken 1/10/2023 1823)  Discharge to home or other facility with appropriate resources:   Identify barriers to discharge with patient and caregiver   Identify discharge learning needs (meds, wound care, etc)   Refer to discharge planning if patient needs post-hospital services based on physician order or complex needs related to functional status, cognitive ability or social support system   Arrange for needed discharge resources and transportation as appropriate     Problem: Pain  Goal: Verbalizes/displays adequate comfort level or baseline comfort level  Outcome: Progressing  Flowsheets (Taken 1/10/2023 1823)  Verbalizes/displays adequate comfort level or baseline comfort level:   Encourage patient to monitor pain and request assistance   Administer analgesics based on type and severity of pain and evaluate response   Consider cultural and social influences on pain and pain management   Assess pain using appropriate pain scale   Implement non-pharmacological measures as appropriate and evaluate response   Notify Licensed Independent Practitioner if interventions unsuccessful or patient reports new pain     Problem: ABCDS Injury Assessment  Goal: Absence of physical injury  Outcome: Progressing  Flowsheets (Taken 1/10/2023 1823)  Absence of Physical Injury: Implement safety measures based on patient assessment     Problem: Infection - Adult  Goal: Absence of infection during hospitalization  Outcome: Progressing  Flowsheets (Taken 1/10/2023 1823)  Absence of infection during hospitalization:   Assess and monitor for signs and symptoms of infection   Monitor lab/diagnostic results   Monitor all insertion sites i.e., indwelling lines, tubes and drains   Administer medications as ordered   Instruct and encourage patient and family to use good hand hygiene technique     Problem: Safety - Adult  Goal: Free from fall injury  Outcome: Adequate for Discharge  Flowsheets (Taken 1/10/2023 5411)  Free From Fall Injury: Instruct family/caregiver on patient safety     Care plan reviewed with patient. Patient verbalizes understanding of plan of care and contributes to goal setting.

## 2023-01-10 NOTE — CONSULTS
Hospitalist -Consult      Patient: Dada Fee    Unit/Bed:7K-04/004-A  YOB: 1960  MRN: [id number]   Acct: [de-identified]   PCP: EYAD Sexton - CNP    Date of Service: Pt seen/examined on 01/10/23  Chief Complaint: Left septic ankle with abscess and open pathologic fracture    Assessment and Plan:-    Type 2 diabetes mellitus: Uncontrolled. Hemoglobin A1c 9.0% 11/16/2022, patient has history of diabetic foot ulcers, with history of transmetatarsal amputation on the left side. Goal blood glucose while inpatient 140-180.    -Hold home meds   -Medium dose sliding scale  -Lantus 30 units BID   -Hypoglycemic protocol  -Accu-Cheks  -Diabetic and cardiac diet when able   -Continue Neurontin    HTN: Recent hypertensive emergency in which she presented to [facility] November 2022 with chest pain and new left bundle branch block. Patient subsequently left AMA before treatment. On arrival to [facility] patient's blood pressure was 131/62.   - Recommend cardiology consult for further restratification prior to procedure.  -Continue Norvasc, clonidine, Lasix, Lopressor twice daily, and minoxidil with holding parameters. Chronic diastolic heart failure: Last echocardiogram 11/03/22 completed at [facility] showed concentric remodeling with normal regional wall motion, abnormal septal motion consistent with postoperative state, EF of 55 to 60% and grade 2 diastolic dysfunction.   -Lasix 40 mg daily               -Strict I & O                -Daily standing weights               -Low sodium diet     Hyperlipidemia: Last lipid panel 11/16/2022   -Continue Lipitor 80 mg daily    CAD s/p CABG: November 2019.  Echo 4/21/20 EF 50%,    -Hold aspirin and Plavix   -Continue home amlodipine, clonidine, lopressor, valsartan and minoxidil    -SCDs for DVT prophylaxis    Moderate persistent asthma: No PFTs noted in chart   -Continue home Spiriva   -Pulmonary hygiene    Hx of PAD: Hold aspirin and Plavix continue statin therapy    Lupus: Does not appear that patient is on any anticoagulation however he is on dual antiplatelet therapy.   -Hold aspirin and Plavix due to upcoming procedure.   -Note the patient is at elevated risk for clotting due to history of lupus  . Chronic normocytic anemia: Baseline hemoglobin 10s. -Continue home p.o. iron   -Nursing to monitor for any blood in stool   -Monitor with CBC daily    Septic left ankle with pathologic fracture of left tibia and abscess of the left ankle: Patient fell about 2 weeks ago and broke his foot. He was following with podiatry for nonhealing wound. He was also seen at [facility] and sent home. Today seen by podiatry and was noted to have necrotic wound with exposed bone and pathologic fracture with necrotic wound. Patient was subsequent directly admitted by podiatry for surgical Dr. Lange Members planning to take patient to surgery as soon as patient is cleared by cardiology.   -Continue Zosyn, vancomycin and clindamycin   -Infectious disease consulted   -Urgent procedure, recommend cardiology evaluation however due to necessity of procedure in a timely fashion patient will likely need this surgery to be completed no later than tomorrow. History Of Present Illness:      73-year-old male was admitted to [facility] by podiatry for urgent/emergent management of septic left ankle with pathologic fracture of tibia and abscess of left ankle. Initially podiatry was planning to take patient to the OR tonight or tomorrow and consulted hospital team for risk stratification.       Patient has significant past medical history of diabetes mellitus type 2, uncontrolled, hypertension, CAD status post CABG, CKD, chronic normocytic anemia, lupus, moderate persistent asthma, PAD, grade 2 diastolic dysfunction, and hyperlipidemia,   It is important note the patient was recently hospitalized at [facility] 11/[facility]/2022 for hypertensive emergency in which she was experiencing chest pain and a new left bundle branch block was found. Patient subsequently left AMA despite having blood pressure 200s/100s. Upon arrival to [facility] blood cultures were obtained, patient was started on vancomycin and Zosyn. Chest x-ray obtained that demonstrated evidence of prior CABG as well as hyperinflated lungs suggestive of COPD. Patient also had evidence of healed granulomatous disease. With questionable mild interstitial fibrosis. X-ray of left ankle was obtained that showed gas density and soft tissue extending to the lower left leg. Complete dislocation and rotated talus relative to the tibia. Also there was abnormal periosteal reaction and distal tibia and fibula which indicate chronic osteomyelitis. There is also cortical erosion of distal fibula. EKG was also completed that demonstrated Normal sinus rhythm, Left axis deviation; Right bundle branch block, Inferior infarct , age undetermined. Abnormal ECG When compared with ECG of 25-NOV-2022 04:21,Right bundle branch block has replaced Intraventricular conduction delay. During my examination patient denied chest pain, shortness of breath, headache, lightheadedness, abdominal pain. Patient did have foot pain that was relieved with pain medication. Stat CBC, BMP, hepatic function panel as well as magnesium were ordered.         Past Medical History:        Diagnosis Date    Arthritis     CAD (coronary artery disease)     CKD (chronic kidney disease), stage II     Diabetes mellitus ([facility] [address]     Hyperlipidemia     Hypertension     Liver disease     HEPITITIS AS A CHILD       Past Surgical History:        Procedure Laterality Date    CARDIAC SURGERY  11/2019    triple bypass    CYST REMOVAL      RIGHT ANKLE     FOOT DEBRIDEMENT Left 4/20/2020    LEFT TRANSMETATARSAL AMPUTATION  FOOT INCISION AND DRAINAGE performed by Buffy Flor DPM at [address] Left 7/20/2020    LEFT WOUND DEBRIDEMENT WITH WOUND VAC APPLICATION performed by Jacinto Bowling DPM at [address] Left 7/1/2022    LEFT FOOT [address], APPLICATION SKIN SUBSTITUTE GRAFT, APPLICATION KCI WOUND VAC performed by Jacinto Bowling DPM at [facility] Right     CYST REMOVED    FOOT SURGERY Left 8/14/2020    LEFT FOOT PREPARATION GRAFT SITE, HAVEST SPLIT THICKNESS SKIN GRAFT WITH APPLICATION, APPLICATION KCI WOUND VAC performed by Jacinto Bowling DPM at [address] Left 3/3/2020    I&D LEFT FOOT, TRANSMETATARSAL AMPUTATION performed by Jacinto Bowling DPM at [facility] 11/23/2022    EGD performed by Mike Colin MD at [facility]       Home Medications:   No current facility-administered medications on file prior to encounter. Current Outpatient Medications on File Prior to Encounter   Medication Sig Dispense Refill    HYDROcodone-acetaminophen (NORCO) 5-325 MG per tablet Take 1 tablet by mouth every 4 hours as needed.       cloNIDine (CATAPRES) 0.1 MG tablet Take 1 tablet by mouth 2 times daily 60 tablet 3    tiotropium (SPIRIVA RESPIMAT) 2.5 MCG/ACT AERS inhaler Inhale 2 puffs into the lungs daily 4 g 0    benzonatate (TESSALON) 200 MG capsule Take 1 capsule by mouth 3 times daily as needed for Cough (Patient not taking: Reported on 1/10/2023) 30 capsule 0    amLODIPine (NORVASC) 10 MG tablet Take 1 tablet by mouth daily 30 tablet 3    metoprolol tartrate (LOPRESSOR) 50 MG tablet Take 1 tablet by mouth 2 times daily 60 tablet 3    valsartan (DIOVAN) 160 MG tablet Take 1 tablet by mouth daily 30 tablet 3    minoxidil (LONITEN) 10 MG tablet Take 1 tablet by mouth daily 30 tablet 3    ferrous sulfate (IRON 325) 325 (65 Fe) MG tablet Take 1 tablet by mouth 2 times daily 180 tablet 1    furosemide (LASIX) 40 MG tablet Take 1 tablet by mouth daily 90 tablet 1    potassium chloride (KLOR-CON M) 20 MEQ extended release tablet Take 1 tablet by mouth daily 30 tablet 5    empagliflozin (JARDIANCE) 25 MG tablet Take 1 tablet by mouth daily 90 tablet 1    clopidogrel (PLAVIX) 75 MG tablet Take 1 tablet by mouth daily 90 tablet 3    gabapentin (NEURONTIN) 600 MG tablet Take 1 tablet by mouth 2 times daily for 180 days. 180 tablet 1    insulin detemir (LEVEMIR FLEXTOUCH) 100 UNIT/ML injection pen 30 units in the morning, and 30 units in the evening. 21 mL 3    insulin lispro (HUMALOG) 100 UNIT/ML injection vial Inject 5-15 Units into the skin 3 times daily (with meals) 15 mL 3    albuterol sulfate HFA (VENTOLIN HFA) 108 (90 Base) MCG/ACT inhaler Inhale 2 puffs into the lungs 4 times daily as needed for Wheezing 54 g 1    atorvastatin (LIPITOR) 80 MG tablet Take 1 tablet by mouth daily 30 tablet 3    Polyethylene Glycol 3350 (MIRALAX PO) Take by mouth as needed      aspirin 81 MG chewable tablet Take 1 tablet by mouth daily 30 tablet 3    Multiple Vitamins-Minerals (MULTIVITAMIN PO) Take 1 tablet by mouth daily          Allergies:    Patient has no known allergies. Social History:    reports that he quit smoking about 15 years ago. His smoking use included cigarettes. He has never used smokeless tobacco. He reports that he does not currently use alcohol. He reports that he does not use drugs. Family History:       Problem Relation Age of Onset    Diabetes Mother     High Blood Pressure Mother     Alzheimer's Disease Father          of, 80or 80years old    Heart Disease Father     High Blood Pressure Father     Cancer Neg Hx     Stroke Neg Hx        Diet:  Diet NPO    Review of systems:   Pertinent positives as noted in the HPI. All other systems reviewed and negative.     PHYSICAL EXAM:  /62   Pulse 87   Temp 98.6 °F (37 °C) (Oral)   Resp 18   Ht 6' 2\" (1.88 m)   Wt 283 lb 14.4 oz (128.8 kg)   SpO2 98%   BMI 36.45 kg/m²   General appearance: No apparent distress, appears stated age and cooperative. Purulent odor  HEENT: Normal cephalic, atraumatic without obvious deformity. Neck: No jugular venous distention. Trachea midline. Respiratory:  Normal respiratory effort. Diminished breath sounds bilaterally however clear to auscultation clear to auscultation   Cardiovascular: Regular rate and rhythm with normal S1/S2   Abdomen: Hypoactive bowel sounds, abdomen is soft and rounded, nontender to palpation   Musculoskeletal:  Right lower extremity +2 edema, left lower leg and foot wrapped in dressing. Skin: Warm and dry  Neurologic:  No focal deficits, bilateral numbness and tingling of right lower extremity as well as left lower extremity, follows commands  Psychiatric: Alert and oriented, thought content appropriate, normal insight  Labs:   No results for input(s): WBC, HGB, HCT, PLT in the last 72 hours. No results for input(s): NA, K, CL, CO2, BUN, CREATININE, CALCIUM, PHOS in the last 72 hours. Invalid input(s): MAGNES  No results for input(s): AST, ALT, BILIDIR, BILITOT, ALKPHOS in the last 72 hours. No results for input(s): INR in the last 72 hours. No results for input(s): Tea Parisian in the last 72 hours. Urinalysis:    Lab Results   Component Value Date/Time    NITRU NEGATIVE 11/16/2022 01:52 PM    WBCUA 0-2 11/16/2022 01:52 PM    BACTERIA NONE SEEN 11/16/2022 01:52 PM    RBCUA 15-25 11/16/2022 01:52 PM    BLOODU MODERATE 11/16/2022 01:52 PM    GLUCOSEU >= 1000 11/16/2022 01:52 PM       Radiology:   XR CHEST PORTABLE   Final Result   1. Normal heart size. Metallic sternotomy sutures from prior surgery. Lungs somewhat hyperinflated, suggesting possible COPD. 2. Evidence for old, healed granulomatous disease. Question mild interstitial fibrosis/pneumonitis both lung bases. No effusion is seen. **This report has been created using voice recognition software. It may contain minor errors which are inherent in voice recognition technology. **      Final report electronically signed by Dr. Jacinto Juarez on 1/10/2023 2:42 PM      XR ANKLE LEFT (MIN 3 VIEWS)    (Results Pending)     XR CHEST PORTABLE    Result Date: 1/10/2023  PROCEDURE: XR CHEST PORTABLE CLINICAL INFORMATION: pre op COMPARISON: 11/25/2022 TECHNIQUE: A single mobile view of the chest was obtained. 1. Normal heart size. Metallic sternotomy sutures from prior surgery. Lungs somewhat hyperinflated, suggesting possible COPD. 2. Evidence for old, healed granulomatous disease. Question mild interstitial fibrosis/pneumonitis both lung bases. No effusion is seen. **This report has been created using voice recognition software. It may contain minor errors which are inherent in voice recognition technology. ** Final report electronically signed by Dr. Jacinto Juarez on 1/10/2023 2:42 PM        EKG: Normal sinus rhythm, Left axis deviation, Right bundle branch block, Inferior infarct , age undetermined Abnormal ECG.  When compared with ECG of 25-NOV-2022 04:21,  Right bundle branch block has replaced Intraventricular conduction delay    Electronically signed by Kristopher Can DO on 1/10/2023 at 2:51 PM            .

## 2023-01-10 NOTE — PROGRESS NOTES
Pt arrived in [address] [place] [address] in a wheelchair and via direct admit. Complaints: left foot pain.     No IV in place    The best day to schedule a follow up Dr appointment is:  any day of the week, afternoons best.      The patient is interested in [facility]. [facility]s meds to beds program?:  Yes

## 2023-01-10 NOTE — PROGRESS NOTES
Per [employer] approved formulary policy. SGLT2's are only formulary with the indication of CKD or CHF therefore:    Please note that Empagliflozin (Jardiance) is non-formulary with indications of type 2 diabetes and has been discontinued while inpatient. If you feel the patient needs to continue their home therapy during the inpatient stay, the patient may bring their medication bottle for verification and administration pursuant to our home medication use policy. Please contact the pharmacy with any questions or concerns. Thank you.   Kia Castellanos, [address] [place] [address], misti/PharmMARIE 1/10/2023 2:35 PM

## 2023-01-10 NOTE — H&P
Department of Podiatric Surgery  History and Physical        Patient [facility] RECORD NUMBER: [id number]  AGE: 65 y.o. GENDER: male  : 1960  EPISODE DATE:  1/10/2023    CHIEF COMPLAINT:  Sepsis ([facility] [address]    Reason for Admission:  left septic ankle with abscess and open pathologic fracture    History Obtained From:  patient      HISTORY OF PRESENT ILLNESS:                The patient is a 65 y.o. male with significant past medical history of CAD, CKD, diabetes, hyperlipidemia, and hypertension who is being seen at bedside on behalf of Dr. Lorna Monte. Patient relates that he has had ongoing problems with his left foot and ankle over the course of the past few years. 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 He relates that he follows up and sees Dr. Lorna Monte in clinic weekly. He relates that he missed his appointment last week and did not have the dressing changed. The patient states that the previous week he had x-rays taken in clinic that showed a pathologic fracture of the left tibia. He relates that he has not been walking on his left lower extremity. He relates that he has applied some pressure when using the restroom on that leg. He states that amputation has been in discussion at his previous clinic encounters. The patient relates that he last a last evening at 8 or 9 PM.  He relates that he drank Crystal light tea around 8 or 9 AM this morning. He relates that he took Plavix this morning at 6 AM.  The patient denies any other concerns to his right lower extremity. The patient denies nausea, vomiting, fever, chills, shortness of breath or chest pain.       Past Medical History:    Past Medical History:   Diagnosis Date    Arthritis     CAD (coronary artery disease)     CKD (chronic kidney disease), stage II     Diabetes mellitus ([facility] [address]     Hyperlipidemia     Hypertension     Liver disease     HEPITITIS AS A CHILD        Past Surgical History: Past Surgical History:   Procedure Laterality Date    CARDIAC SURGERY  11/2019    triple bypass    CYST REMOVAL      RIGHT ANKLE     FOOT DEBRIDEMENT Left 4/20/2020    LEFT TRANSMETATARSAL AMPUTATION  FOOT INCISION AND DRAINAGE performed by Chaz Cordero DPM at [id number] [address] Left 7/20/2020    LEFT WOUND DEBRIDEMENT WITH WOUND VAC APPLICATION performed by Chaz Cordero DPM at [id number] Left 7/1/2022    LEFT FOOT [address], APPLICATION SKIN SUBSTITUTE GRAFT, APPLICATION [facility] WOUND VAC performed by Chaz Cordero DPM at [facility] Right     CYST REMOVED    FOOT SURGERY Left 8/14/2020    LEFT FOOT PREPARATION GRAFT SITE, HAVEST SPLIT THICKNESS SKIN GRAFT WITH APPLICATION, APPLICATION [facility] WOUND VAC performed by Chaz Cordero DPM at [address] Left 3/3/2020    I&D LEFT FOOT, TRANSMETATARSAL AMPUTATION performed by Chaz Cordero DPM at [address] [place] [address] 11/23/2022    EGD performed by Onur Mckay MD at [facility]        Current Medications:    Current Facility-Administered Medications: 0.9 % sodium chloride infusion, , IntraVENous, Continuous  sodium chloride flush 0.9 % injection 5-40 mL, 5-40 mL, IntraVENous, 2 times per day  sodium chloride flush 0.9 % injection 5-40 mL, 5-40 mL, IntraVENous, PRN  0.9 % sodium chloride infusion, , IntraVENous, PRN  ondansetron (ZOFRAN-ODT) disintegrating tablet 4 mg, 4 mg, Oral, Q8H PRN **OR** ondansetron (ZOFRAN) injection 4 mg, 4 mg, IntraVENous, Q6H PRN  polyethylene glycol (GLYCOLAX) packet 17 g, 17 g, Oral, Daily PRN  acetaminophen (TYLENOL) tablet 650 mg, 650 mg, Oral, Q6H PRN **OR** acetaminophen (TYLENOL) suppository 650 mg, 650 mg, Rectal, Q6H PRN  [START ON 1/11/2023] amLODIPine (NORVASC) tablet 10 mg, 10 mg, Oral, Daily  [START ON 1/11/2023] atorvastatin (LIPITOR) tablet 80 mg, 80 mg, Oral, Daily  albuterol sulfate HFA (PROVENTIL;VENTOLIN;PROAIR) 108 (90 Base) MCG/ACT inhaler 2 puff, 2 puff, Inhalation, 4x Daily PRN  cloNIDine (CATAPRES) tablet 0.1 mg, 0.1 mg, Oral, BID  empagliflozin (JARDIANCE) tablet 25 mg, 25 mg, Oral, Daily  ferrous sulfate (IRON 325) tablet 325 mg, 325 mg, Oral, BID  furosemide (LASIX) tablet 40 mg, 40 mg, Oral, Daily  gabapentin (NEURONTIN) tablet 600 mg, 600 mg, Oral, BID  insulin detemir (LEVEMIR) injection pen 30 Units, 30 Units, SubCUTAneous, BID  insulin lispro (HUMALOG) injection vial 5-15 Units, 5-15 Units, SubCUTAneous, TID WC  metoprolol tartrate (LOPRESSOR) tablet 50 mg, 50 mg, Oral, BID  minoxidil (LONITEN) tablet 10 mg, 10 mg, Oral, Daily  Multivitamin LIQD 1 tablet, 1 tablet, Oral, Daily  potassium chloride (KLOR-CON M) extended release tablet 20 mEq, 20 mEq, Oral, Daily  valsartan (DIOVAN) tablet 160 mg, 160 mg, Oral, Daily  vancomycin (VANCOCIN) 1,750 mg in dextrose 5 % 500 mL IVPB, 1,750 mg, IntraVENous, Once  piperacillin-tazobactam (ZOSYN) 4,500 mg in dextrose 5 % 100 mL IVPB (mini-bag), 4,500 mg, IntraVENous, Once  vancomycin (VANCOCIN) intermittent dosing (placeholder), , Other, RX Placeholder  oxyCODONE (ROXICODONE) immediate release tablet 5 mg, 5 mg, Oral, Q4H PRN **OR** oxyCODONE (ROXICODONE) immediate release tablet 10 mg, 10 mg, Oral, Q4H PRN  morphine (PF) injection 2 mg, 2 mg, IntraVENous, Q2H PRN **OR** morphine injection 4 mg, 4 mg, IntraVENous, Q2H PRN    Allergies:  Patient has no known allergies.     Social History:    Social History     Socioeconomic History    Marital status: Single   Tobacco Use    Smoking status: Former     Types: Cigarettes     Quit date: 2008     Years since quitting: 15.0    Smokeless tobacco: Never   Vaping Use    Vaping Use: Never used   Substance and Sexual Activity    Alcohol use: Not Currently    Drug use: Never     Social Determinants of Health     Financial Resource Strain: Low Risk     Difficulty of Paying Living Expenses: Not hard at all   Food Insecurity: No Food Insecurity    Worried About Running Out of Food in the Last Year: Never true    Ran Out of Food in the Last Year: Never true       Family History:   family history includes Alzheimer's Disease in his father; Diabetes in his mother; Heart Disease in his father; High Blood Pressure in his father and mother. REVIEW OF SYSTEMS:    General appearance:  No apparent distress, appears stated age and cooperative. Psychiatric:  Alert and oriented, thought content appropriate, normal insight    PHYSICAL EXAM:      Vitals:    /62   Pulse 87   Temp 98.6 °F (37 °C) (Oral)   Resp 18   Ht 6' 2\" (1.88 m)   Wt 283 lb 14.4 oz (128.8 kg)   SpO2 98%   BMI 36.45 kg/m²     Exam:     Vascular: Dorsalis pedis and posterior tibial pulses are nonpalpable bilaterally. Skin temperature is warm to warm from proximal tibial tuberosity to distal digits. Edema significant to left ankle and foot. Hair growth negative. Dermatologic: The left foot has a large necrotic wound located to the dorsal lateral aspect. There is significant surrounding edema with severe erythema present. There is seropurulent drainage with fibrotic slough present. The talus is exposed in the wound bed and protruding from a wound; positive probe to bone test.  There is a significant malodor from the wound. There is a wound present to the plantar aspect of the left foot to the level of subcutaneous tissue. There is seropurulent drainage from wound with maceration noted to wound periphery. Neurovascular: Light touch sensation diminished bilaterally. Musculoskeletal: Deferred muscle strength test to LLE secondary to pathologic fracture and pain. Presence of previous TMA of left foot. Pain with palpation of LLE. The left foot is in a varus position in relation to the lower leg. IMAGING    No results found.      LABS: No results for input(s): WBC, HGB, HCT, PLT in the last 72 hours. No results for input(s): NA, K, CL, CO2, PHOS, BUN, CREATININE, CA in the last 72 hours. No results for input(s): PROT, INR, APTT in the last 72 hours. No results for input(s): CKTOTAL, CKMB, CKMBINDEX, TROPONINI in the last 72 hours. Treatment:   Orders Placed This Encounter   Procedures    Culture, Anaerobic and Aerobic     Swab from left foot wound, exposed bone. Standing Status:   Standing     Number of Occurrences:   1    Culture, Blood 1     Standing Status:   Standing     Number of Occurrences:   1    Culture, Blood 2     Standing Status:   Standing     Number of Occurrences:   1    XR CHEST PORTABLE     Standing Status:   Standing     Number of Occurrences:   1     Order Specific Question:   Reason for exam:     Answer:   pre op    XR ANKLE LEFT (MIN 3 VIEWS)     Standing Status:   Standing     Number of Occurrences:   1     Order Specific Question:   Reason for exam:     Answer:   bone exposed; fracture    CBC with Auto Differential     Standing Status:   Standing     Number of Occurrences:   1    Basic Metabolic Panel w/ Reflex to MG     Standing Status:   Standing     Number of Occurrences:   1    Diet NPO     Standing Status:   Standing     Number of Occurrences:   1    Nursing communication     Please complete med rec and restart home medications     Standing Status:   Standing     Number of Occurrences:   1    No Anticoagulants     Standing Status:   Standing     Number of Occurrences:   1    Dressing check     Please reinforce dressing if strikethrough is noted or dressing becomes loosened.      Standing Status:   Standing     Number of Occurrences:   1    Vital signs per unit routine     Standing Status:   Standing     Number of Occurrences:   1    Notify physician     Notify physician for pulse less than 50 or greater than 120, respiratory rate less than 12 or greater than 25, oral temperature greater than 101.3 F (07.9 C), systolic BP less than 90 or greater than 180, diastolic BP less than 50 or greater than 100. Standing Status:   Standing     Number of Occurrences:   1    Up with assistance     Standing Status:   Standing     Number of Occurrences:   [address]    Bedrest with bathroom privileges     Standing Status:   Standing     Number of Occurrences:   1    Elevate legs     Standing Status:   Standing     Number of Occurrences:   1    Non weight bearing     Non weight bearing to Left leg. Standing Status:   Standing     Number of Occurrences:   1    Procedure Consent     Procedure name including laterality(Right, Left, Bilateral), if applicable, with no abbreviations: LEFT open ankle disarticulation vs below the knee amputation     Standing Status:   Standing     Number of Occurrences:   1    Full Code     Standing Status:   Standing     Number of Occurrences:   1    Pharmacy to Dose: vancomycin     Standing Status:   Standing     Number of Occurrences:   1     Order Specific Question:   Which medication do you need help dosing? Answer:   vancomycin    Pharmacy to Dose: Zosyn     Standing Status:   Standing     Number of Occurrences:   1     Order Specific Question:   Which medication do you need help dosing? Answer:   Zosyn    Inpatient consult to Hospitalist     Standing Status:   Standing     Number of Occurrences:   1     Order Specific Question:   Reason for Consult? Answer:   pre op risk stratification for emergent surgery     Order Specific Question:   Provider Contacted? Answer:   Yes    Inpatient consult to Case Management     Discharge planning     Standing Status:   Standing     Number of Occurrences:   1     Order Specific Question:   Reason for Consult? Answer: Other (Comment)    Inpatient consult to Social Work     Standing Status:   Standing     Number of Occurrences:   1     Order Specific Question:   Reason for Consult?      Answer:   discharge planning    Inpatient consult to Infectious Diseases     Standing Status:   Standing Number of Occurrences:   1     Order Specific Question:   Reason for Consult? Answer:   septic ankle wiht abscess     Order Specific Question:   Provider Contacted? Answer:   Yes    OT eval and treat     Standing Status:   Standing     Number of Occurrences:   1    PT eval and treat     Standing Status:   Standing     Number of Occurrences:   1    Initiate Oxygen Therapy Protocol     Initiate oxygen therapy if the patient has 1) SpO2 is less than 90%, 2) Cyanosis, Chest Pain, Dyspnea, or Altered level of consciousness AND SpO2 checked and it is less than 90%, or 3) patient on Home oxygen. To initiate oxygen therapy: nurse or RT enters Nasal cannula oxygen order using Per Protocol without Cosign order mode (if indication Home Oxygen then change L/min to same amount at home and change Wean to Room Air to No). Notify provider if initiate oxygen therapy unless already on Home Oxygen. Standing Status:   Standing     Number of Occurrences:   45971    Initiate RT Inhaler-Nebulizer Bronchodilator Protocol     RT Inhaler-Nebulizer Bronchodilator Protocol:    Respiratory Therapist to perform RT Therapy Protocol Assessment initially then follow the protocol. Repeat RT Therapy Protocol Assessment PRN for score 0-3 or on second treatment, BID, and PRN for scores above 3. No Indications - adjust the frequency to every 6 hours PRN wheezing or bronchospasm, if no treatments needed after 48 hours then discontinue using Per Protocol order mode. If indication present, adjust the RT bronchodilator orders based on the Bronchodilator Assessment Score as indicated below. Use Inhaler orders unless patient has one or more of the following: on home nebulizer, not able to hold breath for 10 seconds, is not alert and oriented, cannot activate and use MDI correctly, or respiratory rate 25 breaths per minute or more, then use the equivalent nebulizer order(s) with same Frequency and PRN reasons based on the score.   If a patient is on this medication at home then do not decrease Frequency below that used at home. 0-3 - enter or revise RT bronchodilator order(s) to equivalent RT Bronchodilator order with Frequency of every 4 hours PRN for wheezing or increased work of breathing using Per Protocol order mode. 4-6 - enter or revise RT Bronchodilator order(s) to two equivalent RT bronchodilator orders with one order with BID Frequency and one order with Frequency of every 4 hours PRN wheezing or increased work of breathing using Per Protocol order mode. 7-10 - enter or revise RT Bronchodilator order(s) to two equivalent RT bronchodilator orders with one order with TID Frequency and one order with Frequency of every 4 hours PRN wheezing or increased work of breathing using Per Protocol order mode. 11-13 - enter or revise RT Bronchodilator order(s) to one equivalent RT bronchodilator order with QID Frequency and an Albuterol order with Frequency of every 4 hours PRN wheezing or increased work of breathing using Per Protocol order mode. Greater than 13 - enter or revise RT Bronchodilator order(s) to one equivalent RT bronchodilator order with every 4 hours Frequency and an Albuterol order with Frequency of every 2 hours PRN wheezing or increased work of breathing using Per Protocol order mode. For patients with COPD, RT to enter RT Home Evaluation for COPD & MDI Assessment order using Per Protocol order mode.        Standing Status:   Standing     Number of Occurrences:   7    EKG 12 Lead     Standing Status:   Standing     Number of Occurrences:   1     Order Specific Question:   Reason for Exam?     Answer:   Pre-op    ADMIT TO INPATIENT     Standing Status:   Standing     Number of Occurrences:   1     Order Specific Question:   Discharge Plan:     Answer:   Home with Office Follow-up    ADMIT TO INPATIENT     Standing Status:   Standing     Number of Occurrences:   1     Order Specific Question: Discharge Plan:     Answer:   Extended [facility] (e.g. Adult Home, Nursing Home, etc.)     Order Specific Question:   Telemetry/Cardiac Monitoring Required? Answer:   No       Assessment and Plan  Principle  Septic arthritis; left ankle  Pathologic fracture; left tibia  Abscess; left ankle    -Patient initially examined and evaluated  -WBC white blood cell count; Afebrile  -CBC & BMP ordered; daily  -Pharmacy to dose vancomycin and Zosyn  -Culture anaerobic and aerobic; pending  -Blood cultures; pending  -X-ray left ankle ordered; pending  -Applied Betadine soaked gauze, 4 x 4, ABD, Kerlix and loose Ace wrap   -Dakins ordered  -Dressing to be reinforced by nursing; order placed  -Med rec to be completed and medications to be restarted by nursing; order placed  -NWB to LLE  -Consult placed to hospitalist; preop restratification and management of comorbidities  -Consult placed to infectious disease; appreciate antibiotic recommendation  -Consult placed to social work and case management for discharge planning  -Consult placed to PT and OT for evaluation and treatment  -Order placed for EKG and CXR for preop  -Preop orders placed: N.p.o., no anticoagulants, treatment consent  -Seen patient and discussed plan in regards to surgical planning. The patient understands. Patient is aware that he will either have a open ankle disarticulation versus below the knee amputation of the left lower extremity.   -Consult placed to cardiology; patient left AMA from [address] [place] [address] in November 2022 with HTN emergency and left bundle branch block with PMH significant for CAD and CaBG; needs further risk assessment prior to surgery.    - Podiatry will continue to follow patient    Chronic    Diabetes mellitus  -Continue homes medication  -Hospitalist consulted    CKD, Stage 3  -Continue homes medication  -Hospitalist consulted    CAD  -Continue homes medication  -Hospitalist consulted  -Cardiology consulted    Hyperlipidemia   -Continue homes medication  -Hospitalist consulted    Hypertension  -Continue homes medication  -Hospitalist consulted  -Cardiology consulted       DISPO: Open ankle disarticulation versus below the knee amputation of the left lower extremity . Pending cardiology consult, pending response to IV abx, pending WBC trend and culture. Judy Avalos [id number]  Foot and Ankle Surgical Resident  1/10/2023 2:27 PM    I saw and evaluated the patient independently bedside accompanied by family. Please refer to resident physicians note for further details. Discussed with patient assessment and findings, I agree with plan as documented. Due to the extensive soft tissue emphysema, need for emergent amputation of the lower extremity indicated. All questions/concerns were addressed.      Akhil Hickey, [facility]   Electronically signed by Akhil Hickey DPM on 1/10/2023 at 7:43 PM

## 2023-01-10 NOTE — PROGRESS NOTES
Pharmacy Consult      Yesy Olivier is a 65 y.o. male for whom pharmacy has been consulted to dose Zosyn and vancomycin. 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    Sepsis ([facility] [address]    Acute left-sided low back pain with bilateral sciatica    S/P transmetatarsal amputation of foot, left (HCC)    Leukocytosis    Wound dehiscence, surgical, initial encounter    Postoperative wound infection    Metabolic acidosis    Hx of CABG    Lumbar stenosis without neurogenic claudication    CKD (chronic kidney disease), stage II    Normocytic anemia    Morbid obesity ([facility] [address]    Debility    Carotid stenosis, asymptomatic, bilateral    Hypokalemia    Nonhealing ulcer of left lower extremity (HCC)    Bleeding    Wound, open    SOB (shortness of breath) on exertion    Hemoglobin A1C greater than 9%, indicating poor diabetic control    Hypertensive emergency    Acute on chronic congestive heart failure (HCC)    Hypertensive urgency    Moderate persistent asthma    Septic arthritis of left ankle, due to unspecified organism [facility])     Allergies:  Patient has no known allergies. No results for input(s): CREATININE in the last 72 hours. Ht/Wt:   Ht Readings from Last 1 Encounters:   01/10/23 6' 2\" (1.88 m)        Wt Readings from Last 1 Encounters:   01/10/23 283 lb 14.4 oz (128.8 kg)       CrCl cannot be calculated (Patient's most recent lab result is older than the maximum 30 days allowed. ). Assessment/Plan:  Will initiate Zosyn 4500 mg IV once over 30 minutes followed Zosyn IV 3375mg Q8H extended infusion over 4 hours to start 6 hours after initial loading dose of Zosyn 4500 mg IV once over 30 minutes. Will monitor scr when it results if any adjustments are needed.      [facility] [address] - Vancomycin      Shilpa Morse is a 65 y.o. male starting on vancomycin therapy for foot infection. Pharmacy consulted by Que Jimenez for monitoring and adjustment. Target Concentration: Goal AUC/TARIQ 400-600 mg*hr/L     Additional Antimicrobials: Zosyn and clindamycin      Pertinent Laboratory Values: Wt Readings from Last 1 Encounters:   01/10/23 283 lb 14.4 oz (128.8 kg)          Temp Readings from Last 1 Encounters:   01/10/23 98.6 °F (37 °C) (Oral)      CrCl cannot be calculated (Patient's most recent lab result is older than the maximum 30 days allowed. ). No results for input(s): CREATININE, WBC in the last 72 hours. Invalid input(s):  BUN  Procalcitonin: n/a     Pertinent Cultures:  Culture Date Source Results   1/10/2023 Foot Sent   1/10/2023 BC x2 Sent   MRSA Nasal Swab: N/A. Non-respiratory infection. Plan:  Dosing recommendations based on Bayesian software  Start vancomycin 1750 mg x1  Waiting on scr and labs to result before dosing further   Last serum creatinine was 1.4 on 12/8/2022  Renal labs as indicated   Vancomycin concentration not yet ordered at this time  Pharmacy will continue to monitor patient and adjust therapy as indicated     Thank you for the consult. Will continue to follow and monitor closely.    Sierra Koyanagi, PharmD 1/10/2023 3:54 PM

## 2023-01-11 PROBLEM — S82.899K: Status: ACTIVE | Noted: 2023-01-11

## 2023-01-11 LAB
ABO: NORMAL
ANION GAP SERPL CALCULATED.3IONS-SCNC: 13 MEQ/L (ref 8–16)
ANTIBODY SCREEN: NORMAL
BUN BLDV-MCNC: 26 MG/DL (ref 7–22)
CALCIUM SERPL-MCNC: 8 MG/DL (ref 8.5–10.5)
CHLORIDE BLD-SCNC: 95 MEQ/L (ref 98–111)
CO2: 22 MEQ/L (ref 23–33)
CREAT SERPL-MCNC: 1.6 MG/DL (ref 0.4–1.2)
ERYTHROCYTE [DISTWIDTH] IN BLOOD BY AUTOMATED COUNT: 19.2 % (ref 11.5–14.5)
ERYTHROCYTE [DISTWIDTH] IN BLOOD BY AUTOMATED COUNT: 55.3 FL (ref 35–45)
GFR SERPL CREATININE-BSD FRML MDRD: 49 ML/MIN/1.73M2
GLUCOSE BLD-MCNC: 261 MG/DL (ref 70–108)
GLUCOSE BLD-MCNC: 286 MG/DL (ref 70–108)
GLUCOSE BLD-MCNC: 324 MG/DL (ref 70–108)
GLUCOSE BLD-MCNC: 365 MG/DL (ref 70–108)
GLUCOSE BLD-MCNC: 410 MG/DL (ref 70–108)
GLUCOSE BLD-MCNC: 477 MG/DL (ref 70–108)
GLUCOSE BLD-MCNC: 492 MG/DL (ref 70–108)
HCT VFR BLD CALC: 23.6 % (ref 42–52)
HCT VFR BLD CALC: 25.1 % (ref 42–52)
HEMOGLOBIN: 6.8 GM/DL (ref 14–18)
HEMOGLOBIN: 7.2 GM/DL (ref 14–18)
LACTIC ACID: 1.6 MMOL/L (ref 0.5–2)
MCH RBC QN AUTO: 23 PG (ref 26–33)
MCHC RBC AUTO-ENTMCNC: 28.8 GM/DL (ref 32.2–35.5)
MCV RBC AUTO: 79.7 FL (ref 80–94)
PATHOLOGIST REVIEW: ABNORMAL
PLATELET # BLD: 504 THOU/MM3 (ref 130–400)
PMV BLD AUTO: 9.2 FL (ref 9.4–12.4)
POTASSIUM REFLEX MAGNESIUM: 4.8 MEQ/L (ref 3.5–5.2)
RBC # BLD: 2.96 MILL/MM3 (ref 4.7–6.1)
RH FACTOR: NORMAL
SCAN OF BLOOD SMEAR: NORMAL
SODIUM BLD-SCNC: 130 MEQ/L (ref 135–145)
TROPONIN T: 0.06 NG/ML
WBC # BLD: 13.2 THOU/MM3 (ref 4.8–10.8)

## 2023-01-11 PROCEDURE — 86850 RBC ANTIBODY SCREEN: CPT

## 2023-01-11 PROCEDURE — 94762 N-INVAS EAR/PLS OXIMTRY CONT: CPT

## 2023-01-11 PROCEDURE — P9016 RBC LEUKOCYTES REDUCED: HCPCS

## 2023-01-11 PROCEDURE — 86900 BLOOD TYPING SEROLOGIC ABO: CPT

## 2023-01-11 PROCEDURE — 84484 ASSAY OF TROPONIN QUANT: CPT

## 2023-01-11 PROCEDURE — 6370000000 HC RX 637 (ALT 250 FOR IP): Performed by: STUDENT IN AN ORGANIZED HEALTH CARE EDUCATION/TRAINING PROGRAM

## 2023-01-11 PROCEDURE — 94640 AIRWAY INHALATION TREATMENT: CPT

## 2023-01-11 PROCEDURE — 2500000003 HC RX 250 WO HCPCS

## 2023-01-11 PROCEDURE — 80048 BASIC METABOLIC PNL TOTAL CA: CPT

## 2023-01-11 PROCEDURE — 99233 SBSQ HOSP IP/OBS HIGH 50: CPT | Performed by: PHYSICIAN ASSISTANT

## 2023-01-11 PROCEDURE — 36430 TRANSFUSION BLD/BLD COMPNT: CPT

## 2023-01-11 PROCEDURE — 83605 ASSAY OF LACTIC ACID: CPT

## 2023-01-11 PROCEDURE — 97162 PT EVAL MOD COMPLEX 30 MIN: CPT

## 2023-01-11 PROCEDURE — 93005 ELECTROCARDIOGRAM TRACING: CPT | Performed by: INTERNAL MEDICINE

## 2023-01-11 PROCEDURE — 6360000002 HC RX W HCPCS

## 2023-01-11 PROCEDURE — 36415 COLL VENOUS BLD VENIPUNCTURE: CPT

## 2023-01-11 PROCEDURE — 97535 SELF CARE MNGMENT TRAINING: CPT

## 2023-01-11 PROCEDURE — 82948 REAGENT STRIP/BLOOD GLUCOSE: CPT

## 2023-01-11 PROCEDURE — 97530 THERAPEUTIC ACTIVITIES: CPT

## 2023-01-11 PROCEDURE — 99222 1ST HOSP IP/OBS MODERATE 55: CPT | Performed by: NURSE PRACTITIONER

## 2023-01-11 PROCEDURE — 6370000000 HC RX 637 (ALT 250 FOR IP)

## 2023-01-11 PROCEDURE — 6370000000 HC RX 637 (ALT 250 FOR IP): Performed by: PHYSICIAN ASSISTANT

## 2023-01-11 PROCEDURE — 97166 OT EVAL MOD COMPLEX 45 MIN: CPT

## 2023-01-11 PROCEDURE — 85014 HEMATOCRIT: CPT

## 2023-01-11 PROCEDURE — 93010 ELECTROCARDIOGRAM REPORT: CPT | Performed by: NUCLEAR MEDICINE

## 2023-01-11 PROCEDURE — 86901 BLOOD TYPING SEROLOGIC RH(D): CPT

## 2023-01-11 PROCEDURE — 2580000003 HC RX 258

## 2023-01-11 PROCEDURE — 1200000000 HC SEMI PRIVATE

## 2023-01-11 PROCEDURE — 86923 COMPATIBILITY TEST ELECTRIC: CPT

## 2023-01-11 PROCEDURE — 85018 HEMOGLOBIN: CPT

## 2023-01-11 PROCEDURE — 85027 COMPLETE CBC AUTOMATED: CPT

## 2023-01-11 PROCEDURE — 1200000003 HC TELEMETRY R&B

## 2023-01-11 RX ORDER — INSULIN LISPRO 100 [IU]/ML
15 INJECTION, SOLUTION INTRAVENOUS; SUBCUTANEOUS ONCE
Status: COMPLETED | OUTPATIENT
Start: 2023-01-11 | End: 2023-01-11

## 2023-01-11 RX ORDER — SODIUM CHLORIDE 9 MG/ML
INJECTION, SOLUTION INTRAVENOUS PRN
Status: DISCONTINUED | OUTPATIENT
Start: 2023-01-11 | End: 2023-01-12

## 2023-01-11 RX ADMIN — SODIUM CHLORIDE, PRESERVATIVE FREE 10 ML: 5 INJECTION INTRAVENOUS at 08:12

## 2023-01-11 RX ADMIN — METOPROLOL TARTRATE 50 MG: 50 TABLET, FILM COATED ORAL at 06:49

## 2023-01-11 RX ADMIN — INSULIN GLARGINE 30 UNITS: 100 INJECTION, SOLUTION SUBCUTANEOUS at 21:56

## 2023-01-11 RX ADMIN — CLINDAMYCIN PHOSPHATE 600 MG: 600 INJECTION, SOLUTION INTRAVENOUS at 08:15

## 2023-01-11 RX ADMIN — Medication 1 TABLET: at 10:51

## 2023-01-11 RX ADMIN — CLINDAMYCIN PHOSPHATE 600 MG: 600 INJECTION, SOLUTION INTRAVENOUS at 00:50

## 2023-01-11 RX ADMIN — INSULIN LISPRO 15 UNITS: 100 INJECTION, SOLUTION INTRAVENOUS; SUBCUTANEOUS at 17:00

## 2023-01-11 RX ADMIN — FUROSEMIDE 40 MG: 40 TABLET ORAL at 08:12

## 2023-01-11 RX ADMIN — GABAPENTIN 600 MG: 600 TABLET, FILM COATED ORAL at 08:12

## 2023-01-11 RX ADMIN — AMLODIPINE BESYLATE 10 MG: 10 TABLET ORAL at 08:11

## 2023-01-11 RX ADMIN — CLOPIDOGREL BISULFATE 75 MG: 75 TABLET ORAL at 08:11

## 2023-01-11 RX ADMIN — FERROUS SULFATE TAB 325 MG (65 MG ELEMENTAL FE) 325 MG: 325 (65 FE) TAB at 08:11

## 2023-01-11 RX ADMIN — CLINDAMYCIN PHOSPHATE 600 MG: 600 INJECTION, SOLUTION INTRAVENOUS at 16:43

## 2023-01-11 RX ADMIN — ACETAMINOPHEN 650 MG: 325 TABLET ORAL at 20:29

## 2023-01-11 RX ADMIN — INSULIN LISPRO 4 UNITS: 100 INJECTION, SOLUTION INTRAVENOUS; SUBCUTANEOUS at 08:14

## 2023-01-11 RX ADMIN — SODIUM CHLORIDE: 9 INJECTION, SOLUTION INTRAVENOUS at 00:22

## 2023-01-11 RX ADMIN — MORPHINE SULFATE 4 MG: 4 INJECTION, SOLUTION INTRAMUSCULAR; INTRAVENOUS at 00:50

## 2023-01-11 RX ADMIN — Medication 1250 MG: at 02:00

## 2023-01-11 RX ADMIN — PIPERACILLIN AND TAZOBACTAM 3375 MG: 3; .375 INJECTION, POWDER, FOR SOLUTION INTRAVENOUS at 18:09

## 2023-01-11 RX ADMIN — OXYCODONE 10 MG: 5 TABLET ORAL at 04:47

## 2023-01-11 RX ADMIN — ASPIRIN 81 MG 81 MG: 81 TABLET ORAL at 08:11

## 2023-01-11 RX ADMIN — INSULIN LISPRO 8 UNITS: 100 INJECTION, SOLUTION INTRAVENOUS; SUBCUTANEOUS at 10:51

## 2023-01-11 RX ADMIN — PIPERACILLIN AND TAZOBACTAM 3375 MG: 3; .375 INJECTION, POWDER, FOR SOLUTION INTRAVENOUS at 00:52

## 2023-01-11 RX ADMIN — TIOTROPIUM BROMIDE INHALATION SPRAY 2 PUFF: 3.12 SPRAY, METERED RESPIRATORY (INHALATION) at 07:54

## 2023-01-11 RX ADMIN — INSULIN LISPRO 15 UNITS: 100 INJECTION, SOLUTION INTRAVENOUS; SUBCUTANEOUS at 20:32

## 2023-01-11 RX ADMIN — CLONIDINE HYDROCHLORIDE 0.1 MG: 0.1 TABLET ORAL at 06:49

## 2023-01-11 RX ADMIN — PIPERACILLIN AND TAZOBACTAM 3375 MG: 3; .375 INJECTION, POWDER, FOR SOLUTION INTRAVENOUS at 10:31

## 2023-01-11 RX ADMIN — INSULIN GLARGINE 15 UNITS: 100 INJECTION, SOLUTION SUBCUTANEOUS at 08:14

## 2023-01-11 RX ADMIN — ATORVASTATIN CALCIUM 80 MG: 80 TABLET, FILM COATED ORAL at 08:11

## 2023-01-11 RX ADMIN — FERROUS SULFATE TAB 325 MG (65 MG ELEMENTAL FE) 325 MG: 325 (65 FE) TAB at 20:36

## 2023-01-11 RX ADMIN — Medication 1250 MG: at 14:51

## 2023-01-11 RX ADMIN — GABAPENTIN 600 MG: 600 TABLET, FILM COATED ORAL at 20:36

## 2023-01-11 RX ADMIN — POTASSIUM CHLORIDE 20 MEQ: 1500 TABLET, EXTENDED RELEASE ORAL at 08:11

## 2023-01-11 RX ADMIN — OXYCODONE 10 MG: 5 TABLET ORAL at 10:46

## 2023-01-11 ASSESSMENT — PAIN SCALES - GENERAL
PAINLEVEL_OUTOF10: 8
PAINLEVEL_OUTOF10: 7
PAINLEVEL_OUTOF10: 8
PAINLEVEL_OUTOF10: 7
PAINLEVEL_OUTOF10: 8
PAINLEVEL_OUTOF10: 4

## 2023-01-11 ASSESSMENT — PAIN - FUNCTIONAL ASSESSMENT
PAIN_FUNCTIONAL_ASSESSMENT: ACTIVITIES ARE NOT PREVENTED
PAIN_FUNCTIONAL_ASSESSMENT: 0-10

## 2023-01-11 ASSESSMENT — PAIN DESCRIPTION - LOCATION: LOCATION: LEG

## 2023-01-11 ASSESSMENT — PAIN DESCRIPTION - DESCRIPTORS: DESCRIPTORS: SHARP

## 2023-01-11 NOTE — PLAN OF CARE
Problem: Skin/Tissue Integrity  Goal: Absence of new skin breakdown  Description: 1. Monitor for areas of redness and/or skin breakdown  2. Assess vascular access sites hourly  3. Every 4-6 hours minimum:  Change oxygen saturation probe site  4. Every 4-6 hours:  If on nasal continuous positive airway pressure, respiratory therapy assess nares and determine need for appliance change or resting period.   Outcome: Progressing     Problem: Infection - Adult  Goal: Absence of infection during hospitalization  1/11/2023 0100 by Arnol Silva RN  Outcome: Progressing  1/10/2023 1823 by Milana Burns RN  Outcome: Progressing  Flowsheets (Taken 1/10/2023 1823)  Absence of infection during hospitalization:   Assess and monitor for signs and symptoms of infection   Monitor lab/diagnostic results   Monitor all insertion sites i.e., indwelling lines, tubes and drains   Administer medications as ordered   Instruct and encourage patient and family to use good hand hygiene technique     Problem: ABCDS Injury Assessment  Goal: Absence of physical injury  1/11/2023 0100 by Arnol Silva RN  Outcome: Progressing  1/10/2023 1823 by Milana Burns RN  Outcome: Progressing  Flowsheets (Taken 1/10/2023 1823)  Absence of Physical Injury: Implement safety measures based on patient assessment     Problem: Safety - Adult  Goal: Free from fall injury  1/11/2023 0100 by Arnol Silva RN  Outcome: Progressing  1/10/2023 1823 by Milana Burns RN  Outcome: Adequate for Discharge  Flowsheets (Taken 1/10/2023 1823)  Free From Fall Injury: Instruct family/caregiver on patient safety     Problem: Pain  Goal: Verbalizes/displays adequate comfort level or baseline comfort level  1/11/2023 0100 by Arnol Silva RN  Outcome: Progressing  1/10/2023 1823 by Milana Burns RN  Outcome: Progressing  Flowsheets (Taken 1/10/2023 1823)  Verbalizes/displays adequate comfort level or baseline comfort level:   Encourage patient to monitor pain and request assistance   Administer analgesics based on type and severity of pain and evaluate response   Consider cultural and social influences on pain and pain management   Assess pain using appropriate pain scale   Implement non-pharmacological measures as appropriate and evaluate response   Notify Licensed Independent Practitioner if interventions unsuccessful or patient reports new pain     Problem: Discharge Planning  Goal: Discharge to home or other facility with appropriate resources  1/11/2023 0100 by Henri Sanabria RN  Outcome: Progressing  1/10/2023 1823 by Ash Marr RN  Outcome: Progressing  Flowsheets (Taken 1/10/2023 1823)  Discharge to home or other facility with appropriate resources:   Identify barriers to discharge with patient and caregiver   Identify discharge learning needs (meds, wound care, etc)   Refer to discharge planning if patient needs post-hospital services based on physician order or complex needs related to functional status, cognitive ability or social support system   Arrange for needed discharge resources and transportation as appropriate

## 2023-01-11 NOTE — CARE COORDINATION
DISCHARGE PLANNING EVALUATION  1/11/23, 9:28 AM EST    Reason for Referral: discharge plan  Mental Status: alert, oriented   Decision Making:  makes own decisions   Family/Social/Home Environment:  patient lives at home alone. He has good family support. He is considering rehab before returning home. Current Services including food security, transportation and housekeeping:  no current services, no food security, transport or housekeeping concerns prior to admission, will need assistance at discharge during recovery   Current Equipment:   Payment Source: [employer]   Concerns or Barriers to Discharge:  requesting ecf   Post-acute [facility]) provider list was provided to patient. Patient was informed of their freedom to choose [facility] provider. Discussed and offered to show the patient the relevant [facility] Providers quality and resource use measures on Medicare Compare web site via computer based on patient's goals of care and treatment preferences. Questions regarding selection process were answered. Teach Back Method used with patient regarding care plan and discharge plan  Patient  verbalizes understanding of the plan of care and contributes to goal setting. Patient goals, treatment preferences and discharge plan:  spoke with patient about discharge plan. He is requesting [facility]. Will need precert for placement. PT/OT recommending inpt rehab. Referral made to [facility], no beds open at this time.     Electronically signed by MACEY Bolaños on 1/11/2023 at 9:28 AM

## 2023-01-11 NOTE — CONSENT
Informed Consent for Blood Component Transfusion Note    I have discussed with the patient the rationale for blood component transfusion; its benefits in treating or preventing fatigue, organ damage, or death; and its risk which includes mild transfusion reactions, rare risk of blood borne infection, or more serious but rare reactions. I have discussed the alternatives to transfusion, including the risk and consequences of not receiving transfusion. The patient had an opportunity to ask questions and had agreed to proceed with transfusion of blood components.     Electronically signed by Tasneem Campuzano PA-C on 1/11/23 at 1:24 PM EST

## 2023-01-11 NOTE — CONSULTS
[facility] [address] [place] [address]  Cardiology Consult        Patient:  Jono Chambers  YOB: 1960  MRN: [id number]     Acct: [de-identified]    PCP: EYAD Aguirre CNP    Date of Admission: 1/10/2023      Reason for Consultation:  Preop risk stratification      History Of Present Illness:    65 y.o. pleasant male c hx of DM, HTN, CABG, CKD, Lupus, PAD, HLD who is scheduled to undergo foot surgery on the left foot. Patient is evaluation by podiatry for necrotizing wound infection of the left foot. He is also evaluated by ID and is currently on zosyn, vancomycin and clindamycin. Cardiology was consulted for preop risk stratification for left leg surgery. He is hx of CABG in 2019, asymptomatic carotid stenosis. He denies any anginal or heart failure symptoms. EKG is Sr, RBBB. All labs, EKG's, diagnostic testing and images as well as cardiac cath, stress testing were reviewed during this encounter.     Past Medical History:          Diagnosis Date    Arthritis     CAD (coronary artery disease)     CKD (chronic kidney disease), stage II     Diabetes mellitus ([facility] [address]     Hyperlipidemia     Hypertension     Liver disease     HEPITITIS AS A CHILD       Past Surgical History:          Procedure Laterality Date    CARDIAC SURGERY  11/2019    triple bypass    CYST REMOVAL      RIGHT ANKLE     FOOT DEBRIDEMENT Left 4/20/2020    LEFT TRANSMETATARSAL AMPUTATION  FOOT INCISION AND DRAINAGE performed by Diana Escamilla DPM at [address] [place] [address] Left 7/20/2020    LEFT WOUND DEBRIDEMENT WITH WOUND VAC APPLICATION performed by Diana Escamilla DPM at [address] [place] [address] Drive Left 7/1/2022    LEFT FOOT [facility], [address] [place] [address], APPLICATION [employer] WOUND VAC performed by Diana Escamilla DPM at [facility] Right     CYST REMOVED    FOOT SURGERY Left 8/14/2020    LEFT FOOT PREPARATION GRAFT SITE, HAVEST SPLIT THICKNESS SKIN GRAFT WITH APPLICATION, APPLICATION [employer] WOUND VAC performed by King Coughlin DPM at [address] Left 3/3/2020    I&D LEFT FOOT, TRANSMETATARSAL AMPUTATION performed by King Coughlin DPM at [facility] 11/23/2022    EGD performed by Roopa Mensah MD at [facility]       Medications Prior to Admission:      Prior to Admission medications    Medication Sig Start Date End Date Taking? Authorizing Provider   HYDROcodone-acetaminophen (NORCO) 5-325 MG per tablet Take 1 tablet by mouth every 4 hours as needed.  12/13/22   Historical Provider, MD   cloNIDine (CATAPRES) 0.1 MG tablet Take 1 tablet by mouth 2 times daily 11/30/22   EYAD Gilman CNP   tiotropium (SPIRIVA RESPIMAT) 2.5 MCG/ACT AERS inhaler Inhale 2 puffs into the lungs daily 11/30/22   EYAD Gilman CNP   benzonatate (TESSALON) 200 MG capsule Take 1 capsule by mouth 3 times daily as needed for Cough  Patient not taking: Reported on 1/10/2023 11/30/22   EYAD Gilman CNP   amLODIPine (NORVASC) 10 MG tablet Take 1 tablet by mouth daily 11/25/22   La Oseguera MD   metoprolol tartrate (LOPRESSOR) 50 MG tablet Take 1 tablet by mouth 2 times daily 11/24/22   La Oseguera MD   valsartan (DIOVAN) 160 MG tablet Take 1 tablet by mouth daily 11/25/22   La Oseguera MD   minoxidil (LONITEN) 10 MG tablet Take 1 tablet by mouth daily 11/25/22   La Oseguera MD   ferrous sulfate (IRON 325) 325 (65 Fe) MG tablet Take 1 tablet by mouth 2 times daily 11/16/22   EYAD Gilman CNP   furosemide (LASIX) 40 MG tablet Take 1 tablet by mouth daily 11/16/22   EYAD Gilman CNP   potassium chloride (KLOR-CON M) 20 MEQ extended release tablet Take 1 tablet by mouth daily 11/16/22   EYAD Gilman CNP   empagliflozin (JARDIANCE) 25 MG tablet Take 1 tablet by mouth daily 8/24/22 EYAD Gotti CNP   clopidogrel (PLAVIX) 75 MG tablet Take 1 tablet by mouth daily 8/24/22   Phyllisma EYAD Carlos CNP   gabapentin (NEURONTIN) 600 MG tablet Take 1 tablet by mouth 2 times daily for 180 days.  8/24/22 2/20/23  EYAD Gotti CNP   insulin detemir (LEVEMIR FLEXTOUCH) 100 UNIT/ML injection pen 30 units in the morning, and 30 units in the evening. 8/24/22   Phyllisma EYAD Carlos CNP   insulin lispro (HUMALOG) 100 UNIT/ML injection vial Inject 5-15 Units into the skin 3 times daily (with meals) 8/24/22   Phyllisma EYAD Carlos CNP   albuterol sulfate HFA (VENTOLIN HFA) 108 (90 Base) MCG/ACT inhaler Inhale 2 puffs into the lungs 4 times daily as needed for Wheezing 8/24/22   Phyllisma EYAD Carlos CNP   atorvastatin (LIPITOR) 80 MG tablet Take 1 tablet by mouth daily 8/19/22   Loida Oliver MD   Polyethylene Glycol 3350 (MIRALAX PO) Take by mouth as needed    Historical Provider, MD   aspirin 81 MG chewable tablet Take 1 tablet by mouth daily 8/10/20   Phyllisma EYAD Carlos CNP   Multiple Vitamins-Minerals (MULTIVITAMIN PO) Take 1 tablet by mouth daily     Historical Provider, MD       Current Facility-Administered Medications   Medication Dose Route Frequency Provider Last Rate Last Admin    sodium chloride flush 0.9 % injection 5-40 mL  5-40 mL IntraVENous 2 times per day Judy Back, DPM        sodium chloride flush 0.9 % injection 5-40 mL  5-40 mL IntraVENous PRN Judy Back, DPM        0.9 % sodium chloride infusion   IntraVENous PRN Judy Back, DPM        ondansetron (ZOFRAN-ODT) disintegrating tablet 4 mg  4 mg Oral Q8H PRN Judy Back, DPM        Or    ondansetron (ZOFRAN) injection 4 mg  4 mg IntraVENous Q6H PRN Judy Back, DPM        polyethylene glycol (GLYCOLAX) packet 17 g  17 g Oral Daily PRN Judy Back, DPM        acetaminophen (TYLENOL) tablet 650 mg  650 mg Oral Q6H PRN Judy Back, DPM Or    acetaminophen (TYLENOL) suppository 650 mg  650 mg Rectal Q6H PRN Shayna Ricketts, DPM        [START ON 1/11/2023] amLODIPine (NORVASC) tablet 10 mg  10 mg Oral Daily Janet Thompson DO        [START ON 1/11/2023] atorvastatin (LIPITOR) tablet 80 mg  80 mg Oral Daily Shayna Ricketts, DPM        albuterol sulfate HFA (PROVENTIL;VENTOLIN;PROAIR) 108 (90 Base) MCG/ACT inhaler 2 puff  2 puff Inhalation 4x Daily PRN Shayna Ricketts, DPM        ferrous sulfate (IRON 325) tablet 325 mg  325 mg Oral BID Shayna Ricketts, SMTIH        furosemide (LASIX) tablet 40 mg  40 mg Oral Daily Shayna Ricketts DPM        gabapentin (NEURONTIN) tablet 600 mg  600 mg Oral BID Shayna Ricketts, DPM        insulin glargine (LANTUS) injection vial 30 Units  30 Units SubCUTAneous BID Sherin Kwan MD        minoxidil (LONITEN) tablet 10 mg  10 mg Oral Daily Sherin Kwan MD        multivitamin 1 tablet  1 tablet Oral Daily Shayna Ricketts DPM        potassium chloride (KLOR-CON M) extended release tablet 20 mEq  20 mEq Oral Daily Shayna Ricketts DPM        valsartan (DIOVAN) tablet 160 mg  160 mg Oral Daily Sherin Kwan MD        [facility]) intermittent dosing (placeholder)   Other RX Placeholder Shayna Ricketts DPM        oxyCODONE (ROXICODONE) immediate release tablet 5 mg  5 mg Oral Q4H PRN HUNTER HillmanM        Or    oxyCODONE (ROXICODONE) immediate release tablet 10 mg  10 mg Oral Q4H PRN Shayna Ricketts DPM   10 mg at 01/10/23 1447    morphine (PF) injection 2 mg  2 mg IntraVENous Q2H PRN Shayna Ricketts DPM        Or    morphine injection 4 mg  4 mg IntraVENous Q2H PRN Shayna Ricketts, DPM        Sodium Hypochlorite (DAKINS) 0.25 % external solution   Irrigation Daily HUNTER HillmanM        glucose chewable tablet 16 g  4 tablet Oral PRN Janet Thompson DO        dextrose bolus 10% 125 mL  125 mL IntraVENous PRN Janet Jay, DO        Or    dextrose bolus 10% 250 mL  250 mL IntraVENous PRN Janet Jay, DO glucagon (rDNA) injection 1 mg  1 mg SubCUTAneous PRN Nolan Huggins DO        dextrose 10 % infusion   IntraVENous Continuous PRN Nolan Huggins DO        piperacillin-tazobactam (ZOSYN) 3,375 mg in dextrose 5 % 50 mL IVPB (mini-bag)  3,375 mg IntraVENous q8h Manny Garza DPM        [Held by provider] aspirin chewable tablet 81 mg  81 mg Oral Daily Nolan Huggins DO        [Held by provider] clopidogrel (PLAVIX) tablet 75 mg  75 mg Oral Daily Nolan Huggins DO        tiotropium (SPIRIVA RESPIMAT) 2.5 MCG/ACT inhaler 2 puff  2 puff Inhalation Daily Nolan Huggins DO        clindamycin (CLEOCIN) 600 mg in dextrose 5 % 50 mL IVPB  600 mg IntraVENous Q8H Timothy Farr MD   Stopped at 01/10/23 1810    [START ON 2023] dextrose 5 % in lactated ringers infusion   IntraVENous Continuous Jose Walters MD        cloNIDine (CATAPRES) tablet 0.1 mg  0.1 mg Oral BID Jose Walters MD   0.1 mg at 01/10/23 1700    metoprolol tartrate (LOPRESSOR) tablet 50 mg  50 mg Oral BID Jose Walters MD   50 mg at 01/10/23 1700    [START ON 2023] insulin glargine (LANTUS) injection vial 15 Units  15 Units SubCUTAneous QAM Jose Walters MD        [START ON 2023] insulin glargine (LANTUS) injection vial 30 Units  30 Units SubCUTAneous BID Jose Walters MD        insulin lispro (HUMALOG) injection vial 0-8 Units  0-8 Units SubCUTAneous TID WC Nolan Huggins DO        insulin lispro (HUMALOG) injection vial 0-4 Units  0-4 Units SubCUTAneous Nightly Nolan Huggins DO           Allergies:  Patient has no known allergies. Social History:    TOBACCO:   reports that he quit smoking about 15 years ago. His smoking use included cigarettes. He has never used smokeless tobacco.  ETOH:   reports that he does not currently use alcohol.       Family History:        Problem Relation Age of Onset    Diabetes Mother     High Blood Pressure Mother     Alzheimer's Disease Father          of, 80 or 80years old    Heart Disease Father     High Blood Pressure Father     Cancer Neg Hx     Stroke Neg Hx          Review of Systems -   All others reviewed and are negative.        /72   Pulse 85   Temp 98.5 °F (36.9 °C) (Oral)   Resp 16   Ht 6' 2\" (1.88 m)   Wt 283 lb 14.4 oz (128.8 kg)   SpO2 95%   BMI 36.45 kg/m²       Physical Examination:   General appearance - alert, in no distress  Mental status - alert, oriented to person, place, and time  Neck - supple, no significant adenopathy, no JVD, or carotid bruits  Chest - clear to auscultation, no wheezes, rales or rhonchi, symmetric air entry  Heart - normal rate, regular rhythm, normal S1, S2, no murmurs, rubs, clicks or gallops  Abdomen - soft, nontender, nondistended, no masses or organomegaly  Neurological - alert, oriented, normal speech, no focal findings or movement disorder noted  Musculoskeletal - no joint tenderness, deformity or swelling  Extremities - left foot in dressing  Skin - normal coloration and turgor, no rashes, no suspicious skin lesions noted      LABS:    Recent Labs     01/10/23  1614   TROPONINT 0.068*     CBC:   Lab Results   Component Value Date/Time    WBC 11.5 01/10/2023 04:14 PM    RBC 3.15 01/10/2023 04:14 PM    HGB 7.3 01/10/2023 04:14 PM    HCT 24.8 01/10/2023 04:14 PM    MCV 78.7 01/10/2023 04:14 PM    MCH 23.2 01/10/2023 04:14 PM    MCHC 29.4 01/10/2023 04:14 PM    RDW 17.4 12/08/2022 02:45 PM     01/10/2023 04:14 PM    MPV 9.2 01/10/2023 04:14 PM     BMP:    Lab Results   Component Value Date/Time     01/10/2023 04:14 PM    K 3.8 01/10/2023 04:14 PM    K 3.8 01/10/2023 04:14 PM    CL 98 01/10/2023 04:14 PM    CO2 22 01/10/2023 04:14 PM    BUN 14 01/10/2023 04:14 PM    LABALBU 2.4 01/10/2023 04:14 PM    CREATININE 0.8 01/10/2023 04:14 PM    CALCIUM 9.0 01/10/2023 04:14 PM    LABGLOM >60 01/10/2023 04:14 PM    GLUCOSE 204 01/10/2023 04:14 PM    GLUCOSE 103 05/25/2020 01:45 PM     Hepatic Function Panel: Lab Results   Component Value Date/Time    [facility] SOUTH 114 01/10/2023 04:14 PM    ALT 49 01/10/2023 04:14 PM    AST 44 01/10/2023 04:14 PM    PROT 6.7 01/10/2023 04:14 PM    BILITOT 0.5 01/10/2023 04:14 PM    BILIDIR <0.2 01/10/2023 04:14 PM    LABALBU 2.4 01/10/2023 04:14 PM     Magnesium:    Lab Results   Component Value Date/Time    MG 2.0 01/10/2023 04:14 PM     Warfarin PT/INR:  No components found for: PTPATWAR, PTINRWAR  HgBA1c:    Lab Results   Component Value Date/Time    LABA1C 8.3 01/10/2023 04:14 PM     FLP:    Lab Results   Component Value Date/Time    TRIG 51 11/16/2022 09:28 AM    HDL 39 11/16/2022 09:28 AM    LDLCALC 67 11/16/2022 09:28 AM     TSH:    Lab Results   Component Value Date/Time    TSH 1.350 11/16/2022 09:28 AM     BNP: No components found for: PRO-BNP      Assessment/Plan:    Patient Active Problem List   Diagnosis    Gangrene of toe of left foot (HCC)    CAD (coronary artery disease)    Type 2 diabetes mellitus with insulin therapy ([address]    Hyperlipidemia    Hypertension    Charcot's joint, right ankle and foot    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    Sepsis ([address]    Acute left-sided low back pain with bilateral sciatica    S/P transmetatarsal amputation of foot, left (HCC)    Leukocytosis    Wound dehiscence, surgical, initial encounter    Postoperative wound infection    Metabolic acidosis    Hx of CABG    Lumbar stenosis without neurogenic claudication    CKD (chronic kidney disease), stage II    Normocytic anemia    Morbid obesity ([address]    Debility    Carotid stenosis, asymptomatic, bilateral    Hypokalemia    Nonhealing ulcer of left lower extremity (HCC)    Bleeding    Wound, open    SOB (shortness of breath) on exertion    Hemoglobin A1C greater than 9%, indicating poor diabetic control    Hypertensive emergency    Acute on chronic congestive heart failure (HCC)    Hypertensive urgency    Moderate persistent asthma    Septic arthritis of left ankle, due to unspecified organism [facility])     Preop risk stratification for L foot for necrotizing infection    Reviewed patient prior testing  No ischemia on recent stress test  No anginal symptoms  Due to his prior cardiac hx he is at a moderate risk  Discussed risks benefits and alternatives. Patient and family wants to proceed    Please do note hesitate to contact me for any further questions. Thank you for the opportunity to be involved in this patient's care.     Code Status: Full Code    Electronically signed by Caprice Davis MD on 1/10/2023 at 7:12 PM

## 2023-01-11 NOTE — ANESTHESIA POSTPROCEDURE EVALUATION
Department of Anesthesiology  Postprocedure Note    Patient: Kourtney Romero  MRN: [id number]  YOB: 1960  Date of evaluation: 1/10/2023      Procedure Summary     Date: 01/10/23 [facility] / [facility] [place] [address] [facility]    Anesthesia Start:  Anesthesia Stop:     Procedure: LEFT BELOW KNEE AMPUTATION (Left: Leg Lower) Diagnosis:       Closed fracture of ankle with nonunion, unspecified laterality, subsequent encounter      Abscess of left lower extremity      (Closed fracture of ankle with nonunion, unspecified laterality, subsequent encounter [S82.899K])      (Abscess of left lower extremity [L02.416])    Surgeons: rGecia Bernal DPM Responsible Provider: Isaiah Ferraro MD    Anesthesia Type: general ASA Status: 4 - Emergent          Anesthesia Type: No value filed.     Fernando Phase I: Fernando Score: 8    Fernando Phase II:        Anesthesia Post Evaluation    Patient location during evaluation: PACU  Patient participation: complete - patient participated  Level of consciousness: awake and alert  Airway patency: patent  Nausea & Vomiting: no nausea  Complications: no  Cardiovascular status: blood pressure returned to baseline and hemodynamically stable  Respiratory status: acceptable and spontaneous ventilation  Hydration status: euvolemic

## 2023-01-11 NOTE — PLAN OF CARE
Problem: Respiratory - Adult  Goal: Clear lung sounds  Description: Clear lung sounds  Outcome: Progressing   Patient agreed on goals

## 2023-01-11 NOTE — PROGRESS NOTES
Debra Blevins 61  INPATIENT OCCUPATIONAL THERAPY  [facility] ORTHOPEDICS 7K  EVALUATION    Time:    Time In: 1620  Time Out: 3062  Timed Code Treatment Minutes: 45 Minutes  Minutes: 47          Date: 2023  Patient Name: Cody Altamirano,   Gender: male      MRN: [id number]  : 1960  (65 y.o.)  Referring Practitioner: Del Squires DPM  Diagnosis: Sepsis  Additional Pertinent Hx: The patient is a 65 y.o. male with significant past medical history of CAD, CKD, diabetes, hyperlipidemia, and hypertension who is being seen at bedside on behalf of Dr. Hanley Scheuermann. Patient relates that he has had ongoing problems with his left foot and ankle over the course of the past few years. 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 He relates that he follows up and sees Dr. Hanley Scheuermann in clinic weekly. He relates that he missed his appointment last week and did not have the dressing changed. The patient states that the previous week he had x-rays taken in clinic that showed a pathologic fracture of the left tibia. He relates that he has not been walking on his left lower extremity. He relates that he has applied some pressure when using the restroom on that leg. He states that amputation has been in discussion at his previous clinic encounters. The patient relates that he last a last evening at 8 or 9 PM.  He relates that he drank Crystal light tea around 8 or 9 AM this morning. He relates that he took Plavix this morning at 6 AM.  The patient denies any other concerns to his right lower extremity. The patient denies nausea, vomiting, fever, chills, shortness of breath or chest pain.     Restrictions/Precautions:  Restrictions/Precautions: General Precautions, Weight Bearing, Fall Risk  Left Lower Extremity Weight Bearing: Non Weight Bearing (BKA)  Position Activity Restriction  Other position/activity restrictions: L BKA    Subjective  Chart Reviewed: Yes, Orders, Progress Notes  Patient assessed for rehabilitation services?: Yes  Family / Caregiver Present: No    Subjective: RN okayed session. Pt was resting in bed upon arrival, pleasant and agreeable to OT. Pain: 3/10    Vitals: Vitals not assessed per clinical judgement, see nursing flowsheet    Social/Functional History:  Lives With: Alone  Type of Home: House  Home Layout: One level  Home Access: Ramped entrance  Home Equipment: Electric scooter, [employer]   Bathroom Shower/Tub: Walk-in shower, Shower chair with back  Bathroom Toilet: Handicap height  Bathroom Equipment: Grab bars in shower, Grab bars around toilet  IADL Comments: He has a robert who works for him - worker's wife makes meals from that he only has to heat up in microwave and assists with cleaning tasks       ADL Assistance: [address] [place] [address] Avenue: Needs assistance  Homemaking Responsibilities: Yes  Ambulation Assistance: Independent  Transfer Assistance: Independent    Active : No  Patient's  Info: [employer] [facility]     Additional Comments: Pt reported that he has a very supportive family who can be available intermittently throughout the day upon discharge home. Pt reports he was amb very little, using his scooter or w/c most of the time    VISION:Corrected    HEARING:  WFL    COGNITION: Decreased Safety Awareness    RANGE OF MOTION:  Bilateral Upper Extremity:  WFL    STRENGTH:  Bilateral Upper Extremity:  grossly 4/5, pt reports feeling significantly weaker in BUE    SENSATION:   WFL  -discussed phantom limb pain and treatments that can assist in limb desensitization. Pt reported he not currently experiencing any residual limb pain or phantom pain at this time. ADL:   Lower Extremity Dressing: Moderate Assistance. For donning shorts while supine in bed. Pt able to roll side to side to assist with managing around hips. Footwear Management: Dependent. Donning slipper sock .     BALANCE:  Sitting Balance:  Stand By Assistance. On EOB in prep for t/fs. Pt sat EOB x10 min in prep for activity. BED MOBILITY:  Rolling to Left: Minimal Assistance with increased time d/t stump pain  Rolling to Right: Minimal Assistance    Supine to Sit: Minimal Assistance with VC for technique and safety  Scooting: Minimal Assistance      TRANSFERS:  Sit to Stand:  Attempted to completed at sit to stand t/f from EOB this date with bed elevated and standard walker for balance. Pt unable to clear bottom from bed with heavy Max A x1. Pt reported he just does not have the strength in him lately as he hasnt been up much prior to sx. Sliding Board: Minimal Assistance. From EOB to w/c. Pt required Min A for placement of slideboard while seated EOB. Pt able to scoot bottom backwards for safer position on slideboard. Pt required min VC for hand placement and technique. Increased time to achieve appropriate position in chair. Pt tolerated well with good safety awareness throughout. FUNCTIONAL MOBILITY:  Unable to assess    Activity Tolerance:  Patient tolerance of  treatment: good. Assessment:  Assessment: Pt presented with the listed deficits s/p admission with BERTHA GILBERT. Pt with decreased strength, endurance, and activity tolerance and currently requires increased A for ADLs and IADLs. Pt would benefit from continued OT to restore PLOF maximize pt's indep with ADLs and IADLs in prep for a safe return home at max level of indep. Performance deficits / Impairments: Decreased functional mobility , Decreased endurance, Decreased ADL status, Decreased posture, Decreased balance, Decreased strength, Decreased safe awareness, Decreased high-level IADLs  Prognosis: Good  REQUIRES OT FOLLOW-UP: Yes  Decision Making: Medium Complexity    Treatment Initiated: Treatment and education initiated within context of evaluation.   Evaluation time included review of current medical information, gathering information related to past medical, social and functional history, completion of standardized testing, formal and informal observation of tasks, assessment of data and development of plan of care and goals. Treatment time included skilled education and facilitation of tasks to increase safety and independence with ADL's for improved functional independence and quality of life. Discharge Recommendations:  Continue to assess pending progress, IP Rehab    Patient Education:     Patient Education  Education Given To: Patient  Education Provided: Role of Therapy, Plan of Care, Precautions, ADL Adaptive Strategies, Transfer Training, Energy Conservation, IADL Safety  Education Method: Demonstration  Barriers to Learning: None  Education Outcome: Verbalized understanding, Demonstrated understanding    Equipment Recommendations:  Equipment Needed: Yes  Other: TTB, slideboard, drop arm commode    Plan:  Times Per Week: 6x  Current Treatment Recommendations: Strengthening, Balance training, Functional mobility training, Endurance training, Patient/Caregiver education & training, Safety education & training, Self-Care / ADL, Home management training. See long-term goal time frame for expected duration of plan of care. If no long-term goals established, a short length of stay is anticipated.     Goals:  Patient goals : return home, regain indep  Short Term Goals  Time Frame for Short Term Goals: by discharge  Short Term Goal 1: Pt will complete grooming routine while seated EOB with Supervision and no VC for technique to incrase indep with self care  Short Term Goal 2: Pt will complete slideboard t/f with consistent CGA and min VC for placement of board to increase indep with toileting routine  Short Term Goal 3: Pt will demo indep with BUE strengthening HEP to increase overall UB strength/endurance for ease with t/fs  Short Term Goal 4: OTR to assess sit to stand/stand-pivot/and functional mobility when appropriate         Following session, patient left in safe position with all fall risk precautions in place.

## 2023-01-11 NOTE — PROGRESS NOTES
[address] [address]  INPATIENT PHYSICAL THERAPY  EVALUATION  [facility] ORTHOPEDICS 7K - 7K-04/004-A    Time In: 0118  Time Out: 8542  Timed Code Treatment Minutes: 11 Minutes  Minutes: 20          Date: 2023  Patient Name: Andrew Chirinos,  Gender:  male        MRN: [id number]  : 1960  (65 y.o.)      Referring Practitioner: Trena Britton DPM  Diagnosis: sepsis  Additional Pertinent Hx: per EMR \"The patient is a 65 y.o. male with significant past medical history of CAD, CKD, diabetes, hyperlipidemia, and hypertension who is being seen at bedside on behalf of Dr. Vivian Garay. Patient relates that he has had ongoing problems with his left foot and ankle over the course of the past few years. 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 He relates that he follows up and sees Dr. Vivian Garay in clinic weekly. He relates that he missed his appointment last week and did not have the dressing changed. The patient states that the previous week he had x-rays taken in clinic that showed a pathologic fracture of the left tibia. He relates that he has not been walking on his left lower extremity. He relates that he has applied some pressure when using the restroom on that leg. He states that amputation has been in discussion at his previous clinic encounters. The patient relates that he last a last evening at 8 or 9 PM.  He relates that he drank Crystal light tea around 8 or 9 AM this morning. He relates that he took Plavix this morning at 6 AM.  The patient denies any other concerns to his right lower extremity. The patient denies nausea, vomiting, fever, chills, shortness of breath or chest pain. \" Pt is s/p L BKA on 1/10/23 completed by Dr. Vivian Garay.      Restrictions/Precautions:  Restrictions/Precautions: General Precautions, Weight Bearing, Fall Risk  Left Lower Extremity Weight Bearing: Non Weight Bearing (BKA)  Position Activity Restriction  Other position/activity restrictions: L BKA    Subjective:  Chart Reviewed: Yes  Patient assessed for rehabilitation services?: Yes  Family / Caregiver Present: No  Subjective: Nursing in with pt when PT arrived, requesting to get back to bed. PT educated staff and pt on proper placement of w/c and transfering towards pt's R side. General:  Overall Orientation Status: Within Normal Limits  Orientation Level: Oriented X4  Vision: Impaired  Vision Exceptions: Wears glasses for reading  Hearing: Within functional limits       Pain: mild pain reported, did not quantify    Vitals: Vitals not assessed per clinical judgement, see nursing flowsheet    Social/Functional History:    Lives With: Alone  Type of Home: House  Home Layout: One level  Home Access: Ramped entrance  Home Equipment: Electric scooter, [employer]     Bathroom Shower/Tub: Walk-in shower, Shower chair with back  Bathroom Toilet: Handicap height  Bathroom Equipment: Grab bars in shower, Grab bars around toilet  IADL Comments: He has a robert who works for him - worker's wife makes meals from that he only has to heat up in microwave and assists with cleaning tasks    Receives Help From: Friend(s)  ADL Assistance: Independent  Homemaking Assistance: Needs assistance  Homemaking Responsibilities: Yes  Ambulation Assistance: Independent  Transfer Assistance: Independent    Active : No  Occupation: Full time employment  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  Pt reports he was amb very little, using his scooter or w/c most of the time    OBJECTIVE:  Range of Motion:  Right Lower Extremity: Impaired - deconditioned  Left Lower Extremity: Impaired - L BKA    Strength:  Bilateral Lower Extremity: Impaired - grossly deconditioned    Balance:  Static Sitting Balance:  Supervision, Stand By Assistance  Dynamic Sitting Balance: Stand By Assistance  Seated on EOB, required assist from PT to remove slide board following transfer  Bed Mobility:  Sit to Supine: Minimal Assistance, X 1   Scooting: Stand By Assistance  For B LE to place back in bed  Transfers:  Slide Board: Moderate Assistance, X 2, with increased time for completion, cues for hand placement, with verbal cues  Pt was postioning towards RLE to complete, staff educated. Pt required assist from PT for placement of slide board, cues for technique, required increased assist as pt was going up due to lower height of w/c and increased bed height, 3-4 scoots completed  Ambulation:  Not Tested  Not appropriate at this time    Functional Outcome Measures: Not completed  AM-PAC Inpatient Mobility without Stair Climbing Raw Score : 9  AM-PAC Inpatient without Stair Climbing T-Scale Score : 32.44    ASSESSMENT:  Activity Tolerance:  Patient tolerance of  treatment: fair. Reports pain, increased overall weakness      Treatment Initiated: Treatment and education initiated within context of evaluation. Evaluation time included review of current medical information, gathering information related to past medical, social and functional history, completion of standardized testing, formal and informal observation of tasks, assessment of data and development of plan of care and goals. Treatment time included skilled education and facilitation of tasks to increase safety and independence with functional mobility for improved independence and quality of life. Assessment: Body Structures, Functions, Activity Limitations Requiring Skilled Therapeutic Intervention: Decreased functional mobility , Decreased endurance, Increased pain, Decreased balance, Decreased strength, Decreased tolerance to work activity, Decreased posture  Assessment: Mendy Ritchie is a 65 y.o. male who presents with the deficits stated previously.  Pt requires 1-2 person assist for functional tasks with slide board transfer completed. Pt cont to require skilled PT services to increase IND with functional tasks and progress towards PLOF to return to home environment safely. Therapy Prognosis: Good    Requires PT Follow-Up: Yes    Discharge Recommendations:  Discharge Recommendations: IP Rehab    Patient Education:      . Patient Education  Education Given To: Patient, Staff  Education Provided: Role of Therapy, Plan of Care, Transfer Training, Equipment  Education Provided Comments: d/c planning, rehab  Education Method: Verbal  Education Outcome: Verbalized understanding, Demonstrated understanding, Continued education needed       Equipment Recommendations:  Equipment Needed: No    Plan:  Current Treatment Recommendations: Strengthening, Balance training, Functional mobility training, Transfer training, Endurance training, Equipment evaluation, education, & procurement, Patient/Caregiver education & training, Neuromuscular re-education, Safety education & training, Home exercise program, Therapeutic activities  General Plan:  (6x O)    Goals:  Patient Goals : \"be able to get in and out bed and w/c with less assist\"  Short Term Goals  Time Frame for Short Term Goals: by discharge  Short Term Goal 1: Pt will demo supine to and from sit transfers with SBA and modifications as needed to progress with mobility. Short Term Goal 2: Pt will demo slide board transfers to and from w/c with mod Ax1 to progress with mobility. Short Term Goal 3: Pt will tolerate 10-20 reps of ther ex to increase overall mobility. Short Term Goal 4: Pt will demo S for w/c mobility for 100 feet to increase IND with mobility. Short Term Goal 5: PT to assess sit to/from stand transfers when appropriate. Long Term Goals  Time Frame for Long Term Goals : NA due to short ELOS    Following session, patient left in safe position with all fall risk precautions in place.  Pt in bedside chair following session, all needs and call light in reach, alarm on.

## 2023-01-11 NOTE — CARE COORDINATION
Case Management Assessment  Initial Evaluation    Date/Time of Evaluation: 1/11/2023 1:29 PM  Assessment Completed by: Heydi Cabezas RN    If patient is discharged prior to next notation, then this note serves as note for discharge by case management. Patient Name: Kailyn Anton                   YOB: 1960  Diagnosis: Sepsis ([facility] [address] [A41.9]  Septic arthritis of left ankle, due to unspecified organism [facility]) [M00.9]                   Date / Time: 1/10/2023 12:28 PM  [facility]: [facility]     Patient Admission Status: Inpatient   Readmission Risk (Low < 19, Mod (19-27), High > 27): Readmission Risk Score: 25.6    Current PCP: EYAD Bruner CNP  PCP verified by CM? Yes    Chart Reviewed: Yes      History Provided by: Patient  Patient Orientation: Alert and Oriented    Patient Cognition: Alert    Hospitalization in the last 30 days (Readmission):  No    If yes, Readmission Assessment in CM Navigator will be completed. Advance Directives:      Code Status: Full Code   Patient's Primary Decision Maker is: Named in [address] [place] [address]    Primary Decision Maker: Maryjane Odd - Brother/Sister - [phone]    Discharge Planning:    Patient lives with: Alone   Type of Home: [facility]  Primary Care Giver: Self  Patient Support Systems include: Family Members   Current Financial resources: Other (Comment) (BCBS commercial)  Current community resources: Other (Comment) Marisela Sam[employer] for transportation)  Current services prior to admission: None  Current DME:  has wheelchair, ramped entrance, shower chair, grab bars, electric scooter  Type of Home Care services:  None    ADLS  Prior functional level:  Independent in ADLs/IADLs  Current functional level: Assistance with the following:, Mobility    Family can provide assistance at DC: No  Would you like Case Management to discuss the discharge plan with any other family members/significant others, and if so, who? No  Plans to Return to Present Housing: Unknown at present  Other Identified Issues/Barriers to RETURNING to current housing: n  Potential Assistance needed at discharge: [address]  Potential DME: Other (Comment) (slider board)  Patient expects to discharge to: [address] for transportation at discharge: Friends    Financial    Payor: Bill Quiles / Plan: [address] [facility] PPO / Product Type: *No Product type* /     Does insurance require precert for SNF: Yes    Potential assistance Purchasing Medications: No  Meds-to-Beds request: Yes      [address], [address]  [address]  [address] [facility]  Phone: [phone] Fax: [phone] [id number] [place], [address] [facility] [phone] Portia Postin [phone]  Chris Rasta Mccarthy [address] [place] [id number]  Phone: [phone] Fax: [phone]      Notes:    Factors facilitating achievement of predicted outcomes: Cooperative and Has needed Durable Medical Equipment at home    Barriers to discharge: Pain, Limited family support, Impulsivity, Limited safety awareness, Limited insight into deficits, Unrealistic expectations, Communication deficit, Decreased endurance, and Medical complications    Additional Case Management Notes: direct admit, left foot wound. He fell around 2 wks ago and broke his foot. ID consulted, podiatry primary, Hgb today 7.2, IV antibiotics    Procedure:   1/10 left foot xray:  Gas density in the soft tissues extending into the lower left leg. Completely dislocated and rotated talus relative to the tibia. Abnormal periosteal reaction in the distal tibia and fibula which can indicate chronic osteomyelitis. Cortical erosion of the distal fibula.    1/10 Left Proximal symes/distal transtibial amputation with  mL by Dr Garner Stands    POD 1, IVF, Cleocin, Ancef, and Zosyn IV antibiotics, Plavix and ASA, pain control, ID follows, Vancomycin IV,  follow labs, Na 130, creat 1.6. DM management. Hgb 6.8 transfuse one unit PRBC. PT and OT evals. Wound care by podiatry. The Plan for Transition of Care is related to the following treatment goals of Sepsis [facility]) [A41.9]  Septic arthritis of left ankle, due to unspecified organism [facility]) [M00.9]    Patient Goals/Plan/Treatment Preferences: Met with Bill; he lives home alone in [facility]. He has friends who work for him (no close care givers/family) who can assist him. He has some DME, PCP, insured. He requested to go to [facility] since his mother is there in assisted living area. Will follow therapy recs along with podiatry recs. Transportation/Food Security/Housekeeping Addressed: No issues identified.      Dash Keller RN  Case Management Department

## 2023-01-11 NOTE — PROGRESS NOTES
2123- Pt to PACU. Drowsy, opens eyes briefly drifts back to sleep. Drsg C/D/I. VSS on 4LNC    2130- Denies pain and nausea. VSS on 2LNC. Voided 850ml per urinal. No acute distress noted. 2140- Resting with eyes closed.      2153- Pt met PACU discharge criteria, called report to   [address]

## 2023-01-11 NOTE — PROGRESS NOTES
Hospitalist -Progress Note      Patient: Delmy Marroquin    Unit/Bed:7K-04/004-A  YOB: 1960  MRN: [id number]   Acct: [de-identified]   PCP: EYAD [facility] - [facility]    Date of Service: Pt seen/examined on 01/11/23  Chief Complaint: Left septic ankle with abscess and open pathologic fracture    Assessment and Plan:-    Septic left ankle with pathologic fracture of left tibia and abscess of the left ankle:  POD #1 day s/p Proximal Symes amputation/distal transtibial amputation, left (DOS 1/10)    -Continue Zosyn, vancomycin and clindamycin   -Infectious disease consulted   - continue with management by primary    Acute respiratory failure  Started post operatively suspect fluid overload and anemia contributing   BNP elevated in 2000s   Fluids stopped today. Consider one time IV diuresis tomorrow if persistent. Also suspect patient has RILEY that is undiagnosed. Nocturnal pulse oximetry ordered    Currently satting 93% on 2L- continue to wean as tolerated   Lactic acid normal    Type 2 diabetes mellitus: Uncontrolled. A1c 8.3   patient has history of diabetic foot ulcers, with history of transmetatarsal amputation on the left side. Goal blood glucose while inpatient 140-180.    -Hold home meds   -Medium dose sliding scale  -Lantus 30 units BID   -Hypoglycemic protocol  -Accu-Cheks  -Diabetic and cardiac diet    -Continue Neurontin  - 1/11- BG elevated today- patient was placed on D5LR preoperatively which has continued to run- STOPPED     HTN: Recent hypertensive emergency in which he presented to [facility] November 2022 with chest pain and new RBBB. Patient subsequently left AMA before treatment. On arrival to [facility] patient's blood pressure was 131/62.   - Cardiology consulted - no further interventions recommended at this time  -Continue Norvasc, clonidine, Lasix, Lopressor twice daily, and minoxidil with holding parameters.     Chronic diastolic heart failure: Last echocardiogram 11/03/22 completed at [facility] showed concentric remodeling with normal regional wall motion, abnormal septal motion consistent with postoperative state, EF of 55 to 60% and grade 2 diastolic dysfunction.   -Lasix 40 mg daily               -Strict I & O                -Daily standing weights               -Low sodium diet   - 1/11 concerns for fluid overload given elevated BNP 1/10 and rales on exam- fluids stopped   - assess tomorrow if additional diuresis needed. Hyperlipidemia: Last lipid panel 11/16/2022   -Continue Lipitor 80 mg daily    CAD s/p CABG: November 2019. Echo 4/21/20 EF 50%,    -Hold aspirin and Plavix  -Continue home amlodipine, clonidine, lopressor, valsartan and minoxidil    -SCDs for DVT prophylaxis    Moderate persistent asthma: No PFTs noted in chart   -Continue home Spiriva   -Pulmonary hygiene    Hx of PAD: Hold aspirin and Plavix continue statin therapy    Lupus:   Does not appear that patient is on any anticoagulation however he is on dual antiplatelet therapy.   -Hold aspirin and Plavix due to upcoming procedure.   -Note the patient is at elevated risk for clotting due to history of lupus  . Acute blood loss anemia on Chronic normocytic anemia: Baseline hemoglobin 10s.    - 1/11 Hgb dropped to 6.8- transfuse 1unit PRBC    -Continue home p.o. iron   -Nursing to monitor for any blood in stool   -Monitor with CBC daily         History Of Present Illness:      58-year-old male was admitted to [facility] by podiatry for urgent/emergent management of septic left ankle with pathologic fracture of tibia and abscess of left ankle. Initially podiatry was planning to take patient to the OR tonight or tomorrow and consulted hospital team for risk stratification.       Patient has significant past medical history of diabetes mellitus type 2, uncontrolled, hypertension, CAD status post CABG, CKD, chronic normocytic anemia, lupus, moderate persistent asthma, PAD, grade 2 diastolic dysfunction, and hyperlipidemia,   It is important note the patient was recently hospitalized at [facility] 11/2/2022 for hypertensive emergency in which she was experiencing chest pain and a new left bundle branch block was found. Patient subsequently left AMA despite having blood pressure 200s/100s. Upon arrival to [facility] blood cultures were obtained, patient was started on vancomycin and Zosyn. Chest x-ray obtained that demonstrated evidence of prior CABG as well as hyperinflated lungs suggestive of COPD. Patient also had evidence of healed granulomatous disease. With questionable mild interstitial fibrosis. X-ray of left ankle was obtained that showed gas density and soft tissue extending to the lower left leg. Complete dislocation and rotated talus relative to the tibia. Also there was abnormal periosteal reaction and distal tibia and fibula which indicate chronic osteomyelitis. There is also cortical erosion of distal fibula. EKG was also completed that demonstrated Normal sinus rhythm, Left axis deviation; Right bundle branch block, Inferior infarct , age undetermined. Abnormal ECG When compared with ECG of 25-NOV-2022 04:21,Right bundle branch block has replaced Intraventricular conduction delay. During my examination patient denied chest pain, shortness of breath, headache, lightheadedness, abdominal pain. Patient did have foot pain that was relieved with pain medication. Stat CBC, BMP, hepatic function panel as well as magnesium were ordered. 1/11/2023  Patient postop day 1. Hemoglobin dropped to 6.8. Will transfuse 1 unit PRBCs. Patient requiring O2 postoperatively and was weaned to room air today however dipped back into the mid 80s. Currently satting 93% on 2 L. Subjective:  Patient states he is actually feeling fine today. Reports pain in foot but that it is tolerable. States he is a little tired today but not overly weak.         Past Medical History:        Diagnosis Date    Arthritis     CAD (coronary artery disease)     CKD (chronic kidney disease), stage II     Diabetes mellitus ([facility] [address]     Hyperlipidemia     Hypertension     Liver disease     HEPITITIS AS A CHILD       Past Surgical History:        Procedure Laterality Date    CARDIAC SURGERY  11/2019    triple bypass    CYST REMOVAL      RIGHT ANKLE     FOOT AMPUTATION Left 1/10/2023    LEFT BELOW KNEE AMPUTATION performed by Stephanie Romeo DPM at [address] [place] [address] Left 4/20/2020    LEFT TRANSMETATARSAL AMPUTATION  FOOT INCISION AND DRAINAGE performed by Stephanie Rmoeo DPM at [address] [place] [address] Left 7/20/2020    LEFT WOUND DEBRIDEMENT WITH WOUND VAC APPLICATION performed by Stephanie Romeo DPM at [address] [place] [address] Left 7/1/2022    LEFT FOOT [address], [address] [facility], APPLICATION [facility] WOUND VAC performed by Stephanie Romeo DPM at [address] [facility] Left 8/14/2020    LEFT FOOT PREPARATION GRAFT SITE, HAVEST SPLIT THICKNESS SKIN GRAFT WITH APPLICATION, APPLICATION [facility] WOUND VAC performed by Stephanie Romeo DPM at [address] [facility] [address] Left 3/3/2020    I&D LEFT FOOT, TRANSMETATARSAL AMPUTATION performed by Stephanie Romeo DPM at [address] [facility] 11/23/2022    EGD performed by Sang Darling MD at [address] [facility]       Home Medications:   No current facility-administered medications on file prior to encounter. Current Outpatient Medications on File Prior to Encounter   Medication Sig Dispense Refill    HYDROcodone-acetaminophen (NORCO) 5-325 MG per tablet Take 1 tablet by mouth every 4 hours as needed.       cloNIDine (CATAPRES) 0.1 MG tablet Take 1 tablet by mouth 2 times daily 60 tablet 3    tiotropium (SPIRIVA RESPIMAT) 2.5 MCG/ACT AERS inhaler Inhale 2 puffs into the lungs daily 4 g 0    benzonatate (TESSALON) 200 MG capsule Take 1 capsule by mouth 3 times daily as needed for Cough (Patient not taking: Reported on 1/10/2023) 30 capsule 0    amLODIPine (NORVASC) 10 MG tablet Take 1 tablet by mouth daily 30 tablet 3    metoprolol tartrate (LOPRESSOR) 50 MG tablet Take 1 tablet by mouth 2 times daily 60 tablet 3    valsartan (DIOVAN) 160 MG tablet Take 1 tablet by mouth daily 30 tablet 3    minoxidil (LONITEN) 10 MG tablet Take 1 tablet by mouth daily 30 tablet 3    ferrous sulfate (IRON 325) 325 (65 Fe) MG tablet Take 1 tablet by mouth 2 times daily 180 tablet 1    furosemide (LASIX) 40 MG tablet Take 1 tablet by mouth daily 90 tablet 1    potassium chloride (KLOR-CON M) 20 MEQ extended release tablet Take 1 tablet by mouth daily 30 tablet 5    empagliflozin (JARDIANCE) 25 MG tablet Take 1 tablet by mouth daily 90 tablet 1    clopidogrel (PLAVIX) 75 MG tablet Take 1 tablet by mouth daily 90 tablet 3    gabapentin (NEURONTIN) 600 MG tablet Take 1 tablet by mouth 2 times daily for 180 days. 180 tablet 1    insulin detemir (LEVEMIR FLEXTOUCH) 100 UNIT/ML injection pen 30 units in the morning, and 30 units in the evening. 21 mL 3    insulin lispro (HUMALOG) 100 UNIT/ML injection vial Inject 5-15 Units into the skin 3 times daily (with meals) 15 mL 3    albuterol sulfate HFA (VENTOLIN HFA) 108 (90 Base) MCG/ACT inhaler Inhale 2 puffs into the lungs 4 times daily as needed for Wheezing 54 g 1    atorvastatin (LIPITOR) 80 MG tablet Take 1 tablet by mouth daily 30 tablet 3    Polyethylene Glycol 3350 (MIRALAX PO) Take by mouth as needed      aspirin 81 MG chewable tablet Take 1 tablet by mouth daily 30 tablet 3    Multiple Vitamins-Minerals (MULTIVITAMIN PO) Take 1 tablet by mouth daily          Allergies:    Patient has no known allergies. Social History:    reports that he quit smoking about 15 years ago. His smoking use included cigarettes.  He has never used smokeless tobacco. He reports that he does not currently use alcohol. He reports that he does not use drugs. Family History:       Problem Relation Age of Onset    Diabetes Mother     High Blood Pressure Mother     Alzheimer's Disease Father          of, 80or 80years old    Heart Disease Father     High Blood Pressure Father     Cancer Neg Hx     Stroke Neg Hx        Diet:  ADULT DIET; Regular    Review of systems:   Pertinent positives as noted in the HPI. All other systems reviewed and negative. PHYSICAL EXAM:  /62   Pulse 88   Temp 97.3 °F (36.3 °C) (Oral)   Resp 16   Ht 6' 2\" (1.88 m)   Wt 283 lb 14.4 oz (128.8 kg)   SpO2 93%   BMI 36.45 kg/m²   General appearance: No apparent distress, appears stated age and cooperative. Obese   Purulent odor  HEENT: Normal cephalic, atraumatic without obvious deformity. Pallor noted of face and conjunctiva  Neck: No jugular venous distention. Trachea midline. Respiratory:  Normal respiratory effort. Rales bilateral lower lobes. Cardiovascular: Regular rate and rhythm with normal S1/S2   Abdomen: Hypoactive bowel sounds, abdomen is soft and rounded, nontender to palpation   Musculoskeletal:  Right lower extremity +2 edema, left lower leg  with amputation- wrapped  Skin: Warm and dry  Neurologic:  No focal deficits, bilateral numbness and tingling of right lower extremity as well as left lower extremity, follows commands  Psychiatric: Alert and oriented, thought content appropriate, normal insight  Labs:   Recent Labs     01/10/23  1614 23  0703   WBC 11.5*  --    HGB 7.3* 7.2*   HCT 24.8* 25.1*   *  --      Recent Labs     01/10/23  1614   *   K 3.8  3.8   CL 98   CO2 22*   BUN 14   CREATININE 0.8   CALCIUM 9.0     Recent Labs     01/10/23  1614   AST 44*   ALT 49   BILIDIR <0.2   BILITOT 0.5   ALKPHOS 114     No results for input(s): INR in the last 72 hours. No results for input(s): Ardelle Chalk in the last 72 hours.     Urinalysis:    Lab Results   Component Value Date/Time NITRU NEGATIVE 11/16/2022 01:52 PM    WBCUA 0-2 11/16/2022 01:52 PM    BACTERIA NONE SEEN 11/16/2022 01:52 PM    RBCUA 15-25 11/16/2022 01:52 PM    BLOODU MODERATE 11/16/2022 01:52 PM    GLUCOSEU >= 1000 11/16/2022 01:52 PM       Radiology:   XR ANKLE LEFT (MIN 3 VIEWS)   Final Result         1. Gas density in the soft tissues extending into the lower left leg. 2. Completely dislocated and rotated talus relative to the tibia. 3. Abnormal periosteal reaction in the distal tibia and fibula which can indicate chronic osteomyelitis. 4. Cortical erosion of the distal fibula. **This report has been created using voice recognition software. It may contain minor errors which are inherent in voice recognition technology. **      Final report electronically signed by Dr. Adrianna Gillis on 1/10/2023 3:46 PM      XR CHEST PORTABLE   Final Result   1. Normal heart size. Metallic sternotomy sutures from prior surgery. Lungs somewhat hyperinflated, suggesting possible COPD. 2. Evidence for old, healed granulomatous disease. Question mild interstitial fibrosis/pneumonitis both lung bases. No effusion is seen. **This report has been created using voice recognition software. It may contain minor errors which are inherent in voice recognition technology. **      Final report electronically signed by Dr. Melanie Mccormack on 1/10/2023 2:42 PM        XR CHEST PORTABLE    Result Date: 1/10/2023  PROCEDURE: XR CHEST PORTABLE CLINICAL INFORMATION: pre op COMPARISON: 11/25/2022 TECHNIQUE: A single mobile view of the chest was obtained. 1. Normal heart size. Metallic sternotomy sutures from prior surgery. Lungs somewhat hyperinflated, suggesting possible COPD. 2. Evidence for old, healed granulomatous disease. Question mild interstitial fibrosis/pneumonitis both lung bases. No effusion is seen. **This report has been created using voice recognition software.   It may contain minor errors which are inherent in voice recognition technology. ** Final report electronically signed by Dr. Sarahy Hudson on 1/10/2023 2:42 PM        EKG: Normal sinus rhythm, Left axis deviation, Right bundle branch block, Inferior infarct , age undetermined Abnormal ECG.  When compared with ECG of 25-NOV-2022 04:21,  Right bundle branch block has replaced Intraventricular conduction delay    Electronically signed by Roberta Matamoros PA-C on 1/11/2023 at 11:30 AM            .

## 2023-01-11 NOTE — PLAN OF CARE
Problem: Discharge Planning  Goal: Discharge to home or other facility with appropriate resources  Outcome: Progressing  Flowsheets (Taken 1/11/2023 1729)  Discharge to home or other facility with appropriate resources:   Identify barriers to discharge with patient and caregiver   Identify discharge learning needs (meds, wound care, etc)   Refer to discharge planning if patient needs post-hospital services based on physician order or complex needs related to functional status, cognitive ability or social support system   Arrange for needed discharge resources and transportation as appropriate     Problem: Pain  Goal: Verbalizes/displays adequate comfort level or baseline comfort level  Outcome: Progressing  Flowsheets (Taken 1/11/2023 1729)  Verbalizes/displays adequate comfort level or baseline comfort level:   Encourage patient to monitor pain and request assistance   Administer analgesics based on type and severity of pain and evaluate response   Consider cultural and social influences on pain and pain management   Assess pain using appropriate pain scale   Implement non-pharmacological measures as appropriate and evaluate response   Notify Licensed Independent Practitioner if interventions unsuccessful or patient reports new pain     Problem: Infection - Adult  Goal: Absence of infection during hospitalization  Outcome: Progressing  Flowsheets (Taken 1/11/2023 1729)  Absence of infection during hospitalization:   Assess and monitor for signs and symptoms of infection   Monitor lab/diagnostic results   Monitor all insertion sites i.e., indwelling lines, tubes and drains   Administer medications as ordered   Instruct and encourage patient and family to use good hand hygiene technique     Problem: Skin/Tissue Integrity  Goal: Absence of new skin breakdown  Description: 1. Monitor for areas of redness and/or skin breakdown  2. Assess vascular access sites hourly  3.   Every 4-6 hours minimum:  Change oxygen saturation probe site  4. Every 4-6 hours:  If on nasal continuous positive airway pressure, respiratory therapy assess nares and determine need for appliance change or resting period. Outcome: Progressing     Problem: Safety - Adult  Goal: Free from fall injury  Outcome: Adequate for Discharge  Flowsheets (Taken 1/11/2023 1729)  Free From Fall Injury: Instruct family/caregiver on patient safety     Problem: ABCDS Injury Assessment  Goal: Absence of physical injury  Outcome: Adequate for Discharge  Flowsheets (Taken 1/11/2023 1729)  Absence of Physical Injury: Implement safety measures based on patient assessment     Care plan reviewed with patient. Patient verbalizes understanding of plan of care and contributes to goal setting.

## 2023-01-11 NOTE — CONSULTS
Physical Medicine & Rehabilitation   Consult Note      Admitting Physician: Bibiana Avila DPM    Primary Care Provider: Emma Grider, EYAD - CNP     Reason for Consult:  left BKA. Rehab needs     History of Present Illness:  Mari Chang is a 65 y.o. male admitted to [address] [place] [address] on 1/10/2023. Patient  has a past medical history of Arthritis, CAD (coronary artery disease), CKD (chronic kidney disease), stage II, Diabetes mellitus ([address] Hyperlipidemia, Hypertension, and Liver disease. Patient presented to [facility] for direct admit for emergent management of septic left ankle. Upon arrival to [facility] blood cultures were obtained, patient was started on vancomycin and Zosyn. Chest x-ray obtained that demonstrated evidence of prior CABG as well as hyperinflated lungs suggestive of COPD. Patient also had evidence of healed granulomatous disease. With questionable mild interstitial fibrosis. 1/10/23 left foot xray showed gas density in the soft tissues extending into the lower left leg. Completely dislocated and rotated talus relative to the tibia. Abnormal periosteal reaction in the distal tibia and fibula which can indicate chronic osteomyelitis and Cortical erosion of the distal fibula. Patient underwent Left Proximal symes/distal transtibial amputation with  mL by Dr Jolley Neither on 1/10/23. Hospitalist following for pre and post op medical management. Dr Silvia Cushing consulted and following, added Clindamycin. Patient seen today, resting in bed. Patient feels he is doing well for POD #1. Discussed how therapy went, patient again feels this went well. Patient had originally wanted to look into SNF where his mom is in [place] but no beds available. Case reviewed, discussed appropriateness for IPR. Patient understanding and would like to pursue this option. On assessment patient with weakness noted on EF of the right arm.  Atrophy noted in right bicep, assisted patient to feel difference as well. 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 Current Rehabilitation Assessments:  PT:    Range of Motion:  Right Lower Extremity: Impaired - deconditioned  Left Lower Extremity: Impaired - L BKA  Strength:  Bilateral Lower Extremity: Impaired - grossly deconditioned  Balance:  Static Sitting Balance:  Supervision, Stand By Assistance  Dynamic Sitting Balance: Stand By Assistance  Seated on EOB, required assist from PT to remove slide board following transfer  Bed Mobility:  Sit to Supine: Minimal Assistance, X 1   Scooting: Stand By Assistance  For B LE to place back in bed  Transfers:  Slide Board: Moderate Assistance, X 2, with increased time for completion, cues for hand placement, with verbal cues  Pt was postioning towards [facility] to complete, staff educated. Pt required assist from PT for placement of slide board, cues for technique, required increased assist as pt was going up due to lower height of w/c and increased bed height, 3-4 scoots completed    OT:    RANGE OF MOTION:  Bilateral Upper Extremity:  WFL  STRENGTH:  Bilateral Upper Extremity:  grossly 4/5, pt reports feeling significantly weaker in BUE  SENSATION:   WFL  -discussed phantom limb pain and treatments that can assist in limb desensitization. Pt reported he not currently experiencing any residual limb pain or phantom pain at this time. ADL:   Lower Extremity Dressing: Moderate Assistance. For donning shorts while supine in bed. Pt able to roll side to side to assist with managing around hips. Footwear Management: Dependent. Donning slipper sock . BALANCE:  Sitting Balance:  Stand By Assistance. On EOB in prep for t/fs. Pt sat EOB x10 min in prep for activity.   BED MOBILITY:  Rolling to Left: Minimal Assistance with increased time d/t stump pain  Rolling to Right: Minimal Assistance    Supine to Sit: Minimal Assistance with VC for technique and safety  Scooting: Minimal Assistance    TRANSFERS:  Sit to Stand:  Attempted to completed at sit to stand t/f from EOB this date with bed elevated and standard walker for balance. Pt unable to clear bottom from bed with heavy Max A x1. Pt reported he just does not have the strength in him lately as he hasnt been up much prior to sx. Sliding Board: Minimal Assistance. From EOB to w/c. Pt required Min A for placement of slideboard while seated EOB. Pt able to scoot bottom backwards for safer position on slideboard. Pt required min VC for hand placement and technique. Increased time to achieve appropriate position in chair. Pt tolerated well with good safety awareness throughout.          Past Medical History:        Diagnosis Date    Arthritis     CAD (coronary artery disease)     CKD (chronic kidney disease), stage II     Diabetes mellitus ([facility] [address]     Hyperlipidemia     Hypertension     Liver disease     HEPITITIS AS A CHILD       Past Surgical History:        Procedure Laterality Date    CARDIAC SURGERY  11/2019    triple bypass    CYST REMOVAL      RIGHT ANKLE     FOOT AMPUTATION Left 1/10/2023    LEFT BELOW KNEE AMPUTATION performed by Bin Méndez DPM at [address] [place] [address] Left 4/20/2020    LEFT TRANSMETATARSAL AMPUTATION  FOOT INCISION AND DRAINAGE performed by Bin Méndez DPM at [address] [place] [address] Left 7/20/2020    LEFT WOUND DEBRIDEMENT WITH WOUND VAC APPLICATION performed by Bin Méndez DPM at [address] [place] [address] Left 7/1/2022    LEFT FOOT [facility], [address] [place] [address], APPLICATION [employer] WOUND VAC performed by Bin Méndez DPM at [facility] Right     CYST REMOVED    FOOT SURGERY Left 8/14/2020    LEFT FOOT PREPARATION GRAFT SITE, HAVEST SPLIT THICKNESS SKIN GRAFT WITH APPLICATION, APPLICATION [employer] WOUND VAC performed by Bin Méndez DPM at [address] Left 3/3/2020    I&D LEFT FOOT, TRANSMETATARSAL AMPUTATION performed by Romulo Colon DPM at [facility]. [place] [address] N/A 11/23/2022    EGD performed by Brad Ponce MD at [facility]       Allergies:    No Known Allergies     Current Medications:   Current Facility-Administered Medications: 0.9 % sodium chloride infusion, , IntraVENous, PRN  polyethylene glycol (GLYCOLAX) packet 17 g, 17 g, Oral, Daily PRN  acetaminophen (TYLENOL) tablet 650 mg, 650 mg, Oral, Q6H PRN **OR** acetaminophen (TYLENOL) suppository 650 mg, 650 mg, Rectal, Q6H PRN  amLODIPine (NORVASC) tablet 10 mg, 10 mg, Oral, Daily  atorvastatin (LIPITOR) tablet 80 mg, 80 mg, Oral, Daily  albuterol sulfate HFA (PROVENTIL;VENTOLIN;PROAIR) 108 (90 Base) MCG/ACT inhaler 2 puff, 2 puff, Inhalation, 4x Daily PRN  ferrous sulfate (IRON 325) tablet 325 mg, 325 mg, Oral, BID  furosemide (LASIX) tablet 40 mg, 40 mg, Oral, Daily  gabapentin (NEURONTIN) tablet 600 mg, 600 mg, Oral, BID  minoxidil (LONITEN) tablet 10 mg, 10 mg, Oral, Daily  multivitamin 1 tablet, 1 tablet, Oral, Daily  potassium chloride (KLOR-CON M) extended release tablet 20 mEq, 20 mEq, Oral, Daily  valsartan (DIOVAN) tablet 160 mg, 160 mg, Oral, Daily  vancomycin (VANCOCIN) intermittent dosing (placeholder), , Other, RX Placeholder  Sodium Hypochlorite (DAKINS) 0.25 % external solution, , Irrigation, Daily  glucose chewable tablet 16 g, 4 tablet, Oral, PRN  dextrose bolus 10% 125 mL, 125 mL, IntraVENous, PRN **OR** dextrose bolus 10% 250 mL, 250 mL, IntraVENous, PRN  glucagon (rDNA) injection 1 mg, 1 mg, SubCUTAneous, PRN  dextrose 10 % infusion, , IntraVENous, Continuous PRN  piperacillin-tazobactam (ZOSYN) 3,375 mg in dextrose 5 % 50 mL IVPB (mini-bag), 3,375 mg, IntraVENous, q8h  aspirin chewable tablet 81 mg, 81 mg, Oral, Daily  clopidogrel (PLAVIX) tablet 75 mg, 75 mg, Oral, Daily  tiotropium (SPIRIVA RESPIMAT) 2.5 MCG/ACT inhaler 2 puff, 2 puff, Inhalation, Daily  clindamycin (CLEOCIN) 600 mg in dextrose 5 % 50 mL IVPB, 600 mg, IntraVENous, Q8H  cloNIDine (CATAPRES) tablet 0.1 mg, 0.1 mg, Oral, BID  metoprolol tartrate (LOPRESSOR) tablet 50 mg, 50 mg, Oral, BID  insulin glargine (LANTUS) injection vial 30 Units, 30 Units, SubCUTAneous, BID  insulin lispro (HUMALOG) injection vial 0-8 Units, 0-8 Units, SubCUTAneous, TID WC  insulin lispro (HUMALOG) injection vial 0-4 Units, 0-4 Units, SubCUTAneous, Nightly  vancomycin (VANCOCIN) 1250 mg in dextrose 5 % 250 mL IVPB, 1,250 mg, IntraVENous, Q12H  sodium chloride flush 0.9 % injection 5-40 mL, 5-40 mL, IntraVENous, 2 times per day  sodium chloride flush 0.9 % injection 5-40 mL, 5-40 mL, IntraVENous, PRN  0.9 % sodium chloride infusion, , IntraVENous, PRN  ondansetron (ZOFRAN-ODT) disintegrating tablet 4 mg, 4 mg, Oral, Q8H PRN **OR** ondansetron (ZOFRAN) injection 4 mg, 4 mg, IntraVENous, Q6H PRN  oxyCODONE (ROXICODONE) immediate release tablet 5 mg, 5 mg, Oral, Q4H PRN **OR** oxyCODONE (ROXICODONE) immediate release tablet 10 mg, 10 mg, Oral, Q4H PRN  morphine (PF) injection 2 mg, 2 mg, IntraVENous, Q2H PRN **OR** morphine injection 4 mg, 4 mg, IntraVENous, Q2H PRN    Social History:  Social History     Socioeconomic History    Marital status: Single     Spouse name: Not on file    Number of children: Not on file    Years of education: Not on file    Highest education level: Not on file   Occupational History    Not on file   Tobacco Use    Smoking status: Former     Types: Cigarettes     Quit date: 2008     Years since quitting: 15.0    Smokeless tobacco: Never   Vaping Use    Vaping Use: Never used   Substance and Sexual Activity    Alcohol use: Not Currently    Drug use: Never    Sexual activity: Not on file   Other Topics Concern    Not on file   Social History Narrative    Not on file     Social Determinants of Health     Financial Resource Strain: Low Risk     Difficulty of Paying Living Expenses: Not hard at all   Food Insecurity: No Food Insecurity    Worried About Running Out of Food in the Last Year: Never true    Ran Out of Food in the Last Year: Never true   Transportation Needs: Not on file   Physical Activity: Not on file   Stress: Not on file   Social Connections: Not on file   Intimate Partner Violence: Not on file   Housing Stability: Not on file     Lives With: Alone  Type of Home: [address]: One level  Home Access: [address]: Electric scooter, [address] [facility] Shower/Tub: Walk-in shower, Shower chair with back  Bathroom Toilet: Handicap height  Bathroom Equipment: Grab bars in shower, Grab bars around toilet  IADL Comments: He has a robert who works for him - worker's wife makes meals from that he only has to heat up in microwave and assists with cleaning tasks     ADL Assistance: [address]: Needs assistance  Homemaking Responsibilities: Yes  Ambulation Assistance: Independent  Transfer Assistance: Independent     Active : No  Patient's  Info: [employer] transport  Additional Comments: Pt reported that he has a very supportive family who can be available intermittently throughout the day upon discharge home.  Pt reports he was amb very little, using his scooter or w/c most of the time    Family History:       Problem Relation Age of Onset    Diabetes Mother     High Blood Pressure Mother     Alzheimer's Disease Father          of, 80or 80years old    Heart Disease Father     High Blood Pressure Father     Cancer Neg Hx     Stroke Neg Hx        Review of Systems:  CONSTITUTIONAL:  positive for  fatigue  EYES:  negative for  double vision, blurred vision, blind spots, and visual disturbance  HEENT:  negative for  hearing loss  RESPIRATORY:  negative for  dry cough, dyspnea, and wheezing  CARDIOVASCULAR:  negative for  chest pain, dyspnea, palpitations  GASTROINTESTINAL:  positive for constipation  GENITOURINARY: negative  SKIN:  abrasions. Left BKA incision  HEMATOLOGIC/LYMPHATIC:  positive for swelling/edema  MUSCULOSKELETAL:  positive for  muscle weakness  NEUROLOGICAL:  positive for gait problems, weakness, and pain  BEHAVIOR/PSYCH:  negative  System review otherwise negative    Physical Exam:  /62   Pulse 88   Temp 97.3 °F (36.3 °C) (Oral)   Resp 16   Ht 6' 2\" (1.88 m)   Wt 283 lb 14.4 oz (128.8 kg)   SpO2 93%   BMI 36.45 kg/m²   awake  Orientation:   person, place, time  Mood: within normal limits  Affect: spontaneous  General appearance: Patient is well nourished, well developed, well groomed and in no acute distress, obese, appearing older than stated age    Memory:   normal,   Attention/Concentration: normal  Language:  normal    Cranial Nerves:  cranial nerves II-XII are grossly intact  ROM:  abnormal - left leg  Motor Exam:  Motor exam is 5 out of 5 all extremities with the exception of LLE not tested and right EF 3+ out of 5  Tone: normal  Muscle bulk: abnormal decreased right bicep  Sensory:  Sensory intact except decreased sensation from distal rght knee to foot, absent sensation of the right toes  Coordination:   normal BUE    Heart: normal rate, regular rhythm, normal S1, S2, no rubs, clicks or gallops. +Murmur  Lungs: clear to auscultation without wheezes or rales  Abdomen: soft, non-tender, non-distended, normal bowel sounds, no masses or organomegaly    Skin: warm and dry, no rash or erythema and scattered abrasions noted. Left BKA incision with ACE wrap in place. Healed Skin grafting site to left thigh  Peripheral vascular: Pulses: Normal upper and lower extremity pulses excluding left leg; Edema: 1+ right foot      Diagnostics:  Recent Results (from the past 24 hour(s))   Culture, Anaerobic and Aerobic    Collection Time: 01/10/23  2:27 PM    Specimen: Foot   Result Value Ref Range    Gram Stain Result       Few segmented neutrophils observed. No epithelial cells observed.  Many gram positive cocci occurring singly and in pairs. Many gram negative bacilli.     Organism Proteus species (A)     Aerobic Culture heavy growth    EKG 12 Lead    Collection Time: 01/10/23  2:27 PM   Result Value Ref Range    Ventricular Rate 84 BPM    Atrial Rate 84 BPM    P-R Interval 158 ms    QRS Duration 142 ms    Q-T Interval 396 ms    QTc Calculation (Bazett) 467 ms    P Axis 1 degrees    R Axis -30 degrees    T Axis 44 degrees   CBC with Auto Differential    Collection Time: 01/10/23  4:14 PM   Result Value Ref Range    WBC 11.5 (H) 4.8 - 10.8 thou/mm3    RBC 3.15 (L) 4.70 - 6.10 mill/mm3    Hemoglobin 7.3 (L) 14.0 - 18.0 gm/dl    Hematocrit 24.8 (L) 42.0 - 52.0 %    MCV 78.7 (L) 80.0 - 94.0 fL    MCH 23.2 (L) 26.0 - 33.0 pg    MCHC 29.4 (L) 32.2 - 35.5 gm/dl    RDW-CV 19.4 (H) 11.5 - 14.5 %    RDW-SD 54.9 (H) 35.0 - 45.0 fL    Platelets 520 (H) 343 - 400 thou/mm3    MPV 9.2 (L) 9.4 - 12.4 fL    Seg Neutrophils 79.5 %    Lymphocytes 11.6 %    Monocytes 6.2 %    Eosinophils 0.4 %    Basophils 0.3 %    Immature Granulocytes 2.0 %    Segs Absolute 9.1 (H) 1.8 - 7.7 thou/mm3    Lymphocytes Absolute 1.3 1.0 - 4.8 thou/mm3    Monocytes Absolute 0.7 0.4 - 1.3 thou/mm3    Eosinophils Absolute 0.0 0.0 - 0.4 thou/mm3    Basophils Absolute 0.0 0.0 - 0.1 thou/mm3    Immature Grans (Abs) 0.23 (H) 0.00 - 0.07 thou/mm3    nRBC 0 /100 wbc   Basic Metabolic Panel    Collection Time: 01/10/23  4:14 PM   Result Value Ref Range    Sodium 132 (L) 135 - 145 meq/L    Potassium 3.8 3.5 - 5.2 meq/L    Chloride 98 98 - 111 meq/L    CO2 22 (L) 23 - 33 meq/L    Glucose 204 (H) 70 - 108 mg/dL    BUN 14 7 - 22 mg/dL    Creatinine 0.8 0.4 - 1.2 mg/dL    Calcium 9.0 8.5 - 10.5 mg/dL   Hepatic Function Panel    Collection Time: 01/10/23  4:14 PM   Result Value Ref Range    Albumin 2.4 (L) 3.5 - 5.1 g/dL    Total Bilirubin 0.5 0.3 - 1.2 mg/dL    Bilirubin, Direct <0.2 0.0 - 0.3 mg/dL    Alkaline Phosphatase 114 38 - 126 U/L    AST 44 (H) 5 - 40 U/L    ALT 49 11 - 66 U/L    Total Protein 6.7 6.1 - 8.0 g/dL   Magnesium    Collection Time: 01/10/23  4:14 PM   Result Value Ref Range    Magnesium 2.0 1.6 - 2.4 mg/dL   Basic Metabolic Panel w/ Reflex to MG    Collection Time: 01/10/23  4:14 PM   Result Value Ref Range    Potassium reflex Magnesium 3.8 3.5 - 5.2 meq/L   Hemoglobin A1C    Collection Time: 01/10/23  4:14 PM   Result Value Ref Range    Hemoglobin A1C 8.3 (H) 4.4 - 6.4 %    AVERAGE GLUCOSE 189 (H) 70 - 126 mg/dL   Brain Natriuretic Peptide    Collection Time: 01/10/23  4:14 PM   Result Value Ref Range    Pro-BNP 2168.0 (H) 0.0 - 124.0 pg/mL   Troponin    Collection Time: 01/10/23  4:14 PM   Result Value Ref Range    Troponin T 0.068 (A) ng/ml   Anion Gap    Collection Time: 01/10/23  4:14 PM   Result Value Ref Range    Anion Gap 12.0 8.0 - 16.0 meq/L   Glomerular Filtration Rate, Estimated    Collection Time: 01/10/23  4:14 PM   Result Value Ref Range    Est, Glom Filt Rate >60 >60 ml/min/1.73m2   POCT glucose    Collection Time: 01/10/23  4:37 PM   Result Value Ref Range    POC Glucose 221 (H) 70 - 108 mg/dl   Culture, Anaerobic and Aerobic    Collection Time: 01/10/23  8:49 PM    Specimen: Leg; Tissue   Result Value Ref Range    Aerobic Culture No growth-preliminary     Gram Stain Result       No segmented neutrophils observed. No epithelial cells observed. No bacteria seen.    POCT glucose    Collection Time: 01/10/23 10:51 PM   Result Value Ref Range    POC Glucose 241 (H) 70 - 108 mg/dl   POCT Glucose    Collection Time: 01/11/23  6:28 AM   Result Value Ref Range    POC Glucose 261 (H) 70 - 108 mg/dl   EKG 12 Lead    Collection Time: 01/11/23  7:02 AM   Result Value Ref Range    Ventricular Rate 84 BPM    Atrial Rate 84 BPM    P-R Interval 184 ms    QRS Duration 148 ms    Q-T Interval 418 ms    QTc Calculation (Bazett) 493 ms    P Axis 64 degrees    R Axis -24 degrees    T Axis 46 degrees   Hemoglobin and Hematocrit    Collection Time: 01/11/23  7:03 AM Result Value Ref Range    Hemoglobin 7.2 (L) 14.0 - 18.0 gm/dl    Hematocrit 25.1 (L) 42.0 - 52.0 %   Troponin    Collection Time: 01/11/23  7:03 AM   Result Value Ref Range    Troponin T 0.060 (A) ng/ml   POCT Glucose    Collection Time: 01/11/23 10:48 AM   Result Value Ref Range    POC Glucose 365 (H) 70 - 108 mg/dl   Basic Metabolic Panel w/ Reflex to MG    Collection Time: 01/11/23 12:18 PM   Result Value Ref Range    Sodium 130 (L) 135 - 145 meq/L    Potassium reflex Magnesium 4.8 3.5 - 5.2 meq/L    Chloride 95 (L) 98 - 111 meq/L    CO2 22 (L) 23 - 33 meq/L    Glucose 324 (H) 70 - 108 mg/dL    BUN 26 (H) 7 - 22 mg/dL    Creatinine 1.6 (H) 0.4 - 1.2 mg/dL    Calcium 8.0 (L) 8.5 - 10.5 mg/dL   CBC    Collection Time: 01/11/23 12:18 PM   Result Value Ref Range    WBC 13.2 (H) 4.8 - 10.8 thou/mm3    RBC 2.96 (L) 4.70 - 6.10 mill/mm3    Hemoglobin 6.8 (LL) 14.0 - 18.0 gm/dl    Hematocrit 23.6 (L) 42.0 - 52.0 %    MCV 79.7 (L) 80.0 - 94.0 fL    MCH 23.0 (L) 26.0 - 33.0 pg    MCHC 28.8 (L) 32.2 - 35.5 gm/dl    RDW-CV 19.2 (H) 11.5 - 14.5 %    RDW-SD 55.3 (H) 35.0 - 45.0 fL    Platelets 237 (H) 368 - 400 thou/mm3    MPV 9.2 (L) 9.4 - 12.4 fL   Anion Gap    Collection Time: 01/11/23 12:18 PM   Result Value Ref Range    Anion Gap 13.0 8.0 - 16.0 meq/L   Glomerular Filtration Rate, Estimated    Collection Time: 01/11/23 12:18 PM   Result Value Ref Range    Est, Glom Filt Rate 49 (A) >60 ml/min/1.73m2       Left foot XR 1/10/23  Impression           1. Gas density in the soft tissues extending into the lower left leg. 2. Completely dislocated and rotated talus relative to the tibia. 3. Abnormal periosteal reaction in the distal tibia and fibula which can indicate chronic osteomyelitis. 4. Cortical erosion of the distal fibula.                   Impression:  Necrotizing wound infection left foot with pathologic fracture of left tibia and abscess of the left ankle  S/p Left Proximal symes/distal transtibial amputation with  mL by Dr Georges Walker on 1/10/23  Previous Left TMA in 2021  Type 2 diabetes mellitus: Uncontrolled  HTN  Chronic diastolic heart failure  Hyperlipidemia  CAD s/p CABG  Moderate persistent asthma  Hx of PAD  Lupus  Chronic normocytic anemia  Right bicep atrophy, ?d/t hyperextension injury to right arm 2022. Recommendations:  Continue current therapies  POD #1. Hgb 6.8 today, requiring blood transfusion  On IV ATB, length insurance coverage for home? Will monitor POD #2 therapy notes and hgb in anticipation of starting precert for IPR  Patient is appropriate for IPR for the diagnosis of Left BKA  Patient require active and ongoing intervention of multiple therapy disciplines of PT, OT  Patient can participate in and does require an intensive rehabilitation therapy program, generally consisting of 3 hours of therapy per day at least 5 days per week  Patient is expected to actively participate in, and benefit significantly from, the intensive rehabilitation therapy program (the patients condition and functional status are such that the patient can reasonably be expected to make measurable improvement, expected to be made within a prescribed period of time and as a result of the intensive rehabilitation therapy program, that will be of practical value to improve the patients functional capacity or adaptation to impairments)  Patient requires physician supervision by a rehabilitation physician, with face-to-face visits at least 3 days per week to assess the patient both medically and functionally and to modify the course of treatment as needed. Medical conditions to include for management include : post op anemia with transfusion needs, IV ATB, wound care, CHF, HLD, asthma, uncontrolled DM type 2. Require an intensive and coordinated interdisciplinary team approach to the delivery of rehabilitative care. Will follow     It was my pleasure to evaluate Elvis Chapman today.   Please call with questions.     Aram French, APRN - CNP

## 2023-01-11 NOTE — BRIEF OP NOTE
Brief Postoperative Note      Patient: Romana Knudsen  YOB: 1960  MRN: [id number]    Date of Procedure: 1/10/2023    Pre-Op Diagnosis:   Open Ankle Dislocation  Left Foot/ankle abscess  Left necrotizing fasciitis    Post-Op Diagnosis: Same       Procedure(s):  Left Proximal symes/distal transtibial amputation     Surgeon(s):  Brittany Ramos DPM    Assistant:  Hola Naranjo PGY II    Anesthesia: General    Estimated Blood Loss (mL): 079    Complications: None    Specimens:   ID Type Source Tests Collected by Time Destination   A : left lower leg amputation Tissue Leg SURGICAL PATHOLOGY Brittany Ramos DPM 1/10/2023 2040    B : left foot and ankle  Tissue Leg SURGICAL PATHOLOGY, CULTURE, ANAEROBIC AND AEROBIC Brittany Ramos DPM 1/10/2023 2049        Implants:  * No implants in log *      Drains: * No LDAs found *    Findings: necrotizing fasciitis, left ankle    Electronically signed by Brittany Ramos DPM on 1/10/2023 at 9:47 PM

## 2023-01-11 NOTE — H&P
Department of Surgery  H&P Interval Note  Admission History and Physical was reviewed pre-operatively. Xrays depict extensive soft tissue emphysema of the lower extremity. Need for emergent surgical intervention indicated. Both hospitalist and cardiology consulted for medical clearance due to extensive CAD PMH. Patient was assessed, all questions/concerns regarding alpa-operative management were addressed to patient satisfaction. Operative site was marked prior to transportation to the operative room. Shannon Mason D.P.M.

## 2023-01-11 NOTE — PROGRESS NOTES
Podiatric Progress Note  Lesly Hartmann  Subjective :     1/11/23  Patient seen bedside today on behalf of Dr. Carmita Cerrato. Patient is 1 day s/p Proximal Symes amputation/distal transtibial amputation, left (DOS 1/10). Patient appeared pleasant, was oriented to person, place and time and in no acute distress. Patient relates that he has some pain, which is controlled with pain medication. He relates that he did not eat dinner last evening due to him not returning to his room late after surgery. He relates that he plans to go to [facility] at time of discharge for rehab. Patient denies any N/V/F/C/SOB or CP. Patient has no further pedal concerns at this point in time. 1/10/23  HISTORY OF PRESENT ILLNESS:                 The patient is a 65 y.o. male with significant past medical history of CAD, CKD, diabetes, hyperlipidemia, and hypertension who is being seen at bedside on behalf of Dr. Carmita Cerrato. Patient relates that he has had ongoing problems with his left foot and ankle over the course of the past few years. 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 He relates that he follows up and sees Dr. Carmita Cerrato in clinic weekly. He relates that he missed his appointment last week and did not have the dressing changed. The patient states that the previous week he had x-rays taken in clinic that showed a pathologic fracture of the left tibia. He relates that he has not been walking on his left lower extremity. He relates that he has applied some pressure when using the restroom on that leg. He states that amputation has been in discussion at his previous clinic encounters. The patient relates that he last a last evening at 8 or 9 PM.  He relates that he drank Crystal light tea around 8 or 9 AM this morning. He relates that he took Plavix this morning at 6 AM.  The patient denies any other concerns to his right lower extremity.   The patient denies nausea, vomiting, fever, chills, shortness of breath or chest pain.         Current Medications:    Current Facility-Administered Medications: polyethylene glycol (GLYCOLAX) packet 17 g, 17 g, Oral, Daily PRN  acetaminophen (TYLENOL) tablet 650 mg, 650 mg, Oral, Q6H PRN **OR** acetaminophen (TYLENOL) suppository 650 mg, 650 mg, Rectal, Q6H PRN  amLODIPine (NORVASC) tablet 10 mg, 10 mg, Oral, Daily  atorvastatin (LIPITOR) tablet 80 mg, 80 mg, Oral, Daily  albuterol sulfate HFA (PROVENTIL;VENTOLIN;PROAIR) 108 (90 Base) MCG/ACT inhaler 2 puff, 2 puff, Inhalation, 4x Daily PRN  ferrous sulfate (IRON 325) tablet 325 mg, 325 mg, Oral, BID  furosemide (LASIX) tablet 40 mg, 40 mg, Oral, Daily  gabapentin (NEURONTIN) tablet 600 mg, 600 mg, Oral, BID  insulin glargine (LANTUS) injection vial 30 Units, 30 Units, SubCUTAneous, BID  minoxidil (LONITEN) tablet 10 mg, 10 mg, Oral, Daily  multivitamin 1 tablet, 1 tablet, Oral, Daily  potassium chloride (KLOR-CON M) extended release tablet 20 mEq, 20 mEq, Oral, Daily  valsartan (DIOVAN) tablet 160 mg, 160 mg, Oral, Daily  vancomycin (VANCOCIN) intermittent dosing (placeholder), , Other, RX Placeholder  Sodium Hypochlorite (DAKINS) 0.25 % external solution, , Irrigation, Daily  glucose chewable tablet 16 g, 4 tablet, Oral, PRN  dextrose bolus 10% 125 mL, 125 mL, IntraVENous, PRN **OR** dextrose bolus 10% 250 mL, 250 mL, IntraVENous, PRN  glucagon (rDNA) injection 1 mg, 1 mg, SubCUTAneous, PRN  dextrose 10 % infusion, , IntraVENous, Continuous PRN  piperacillin-tazobactam (ZOSYN) 3,375 mg in dextrose 5 % 50 mL IVPB (mini-bag), 3,375 mg, IntraVENous, q8h  [Held by provider] aspirin chewable tablet 81 mg, 81 mg, Oral, Daily  [Held by provider] clopidogrel (PLAVIX) tablet 75 mg, 75 mg, Oral, Daily  tiotropium (SPIRIVA RESPIMAT) 2.5 MCG/ACT inhaler 2 puff, 2 puff, Inhalation, Daily  clindamycin (CLEOCIN) 600 mg in dextrose 5 % 50 mL IVPB, 600 mg, IntraVENous, Q8H  dextrose 5 % in lactated ringers infusion, , IntraVENous, Continuous  cloNIDine (CATAPRES) tablet 0.1 mg, 0.1 mg, Oral, BID  metoprolol tartrate (LOPRESSOR) tablet 50 mg, 50 mg, Oral, BID  insulin glargine (LANTUS) injection vial 15 Units, 15 Units, SubCUTAneous, QAM  insulin glargine (LANTUS) injection vial 30 Units, 30 Units, SubCUTAneous, BID  insulin lispro (HUMALOG) injection vial 0-8 Units, 0-8 Units, SubCUTAneous, TID WC  insulin lispro (HUMALOG) injection vial 0-4 Units, 0-4 Units, SubCUTAneous, Nightly  vancomycin (VANCOCIN) 1250 mg in dextrose 5 % 250 mL IVPB, 1,250 mg, IntraVENous, Q12H  0.9 % sodium chloride infusion, , IntraVENous, Continuous  sodium chloride flush 0.9 % injection 5-40 mL, 5-40 mL, IntraVENous, 2 times per day  sodium chloride flush 0.9 % injection 5-40 mL, 5-40 mL, IntraVENous, PRN  0.9 % sodium chloride infusion, , IntraVENous, PRN  ondansetron (ZOFRAN-ODT) disintegrating tablet 4 mg, 4 mg, Oral, Q8H PRN **OR** ondansetron (ZOFRAN) injection 4 mg, 4 mg, IntraVENous, Q6H PRN  oxyCODONE (ROXICODONE) immediate release tablet 5 mg, 5 mg, Oral, Q4H PRN **OR** oxyCODONE (ROXICODONE) immediate release tablet 10 mg, 10 mg, Oral, Q4H PRN  morphine (PF) injection 2 mg, 2 mg, IntraVENous, Q2H PRN **OR** morphine injection 4 mg, 4 mg, IntraVENous, Q2H PRN    Objective     /74   Pulse 78   Temp 97.5 °F (36.4 °C) (Oral)   Resp 16   Ht 6' 2\" (1.88 m)   Wt 283 lb 14.4 oz (128.8 kg)   SpO2 97%   BMI 36.45 kg/m²      I/O:  Intake/Output Summary (Last 24 hours) at 1/11/2023 0613  Last data filed at 1/11/2023 0346  Gross per 24 hour   Intake 600 ml   Output 1850 ml   Net -1250 ml              Wt Readings from Last 3 Encounters:   01/11/23 283 lb 14.4 oz (128.8 kg)   12/08/22 (!) 310 lb (140.6 kg)   12/08/22 (!) 310 lb (140.6 kg)       LABS:    Recent Labs     01/10/23  1614   WBC 11.5*   HGB 7.3*   HCT 24.8*   *        Recent Labs     01/10/23  1614   *   K 3.8  3.8   CL 98   CO2 22*   BUN 14   CREATININE 0.8 Recent Labs     01/10/23  1614   PROT 6.7      No results for input(s): CKTOTAL, CKMB, CKMBINDEX, TROPONINI in the last 72 hours. Focused Lower Extremity Examination:    Vitals:    /74   Pulse 78   Temp 97.5 °F (36.4 °C) (Oral)   Resp 16   Ht 6' 2\" (1.88 m)   Wt 283 lb 14.4 oz (128.8 kg)   SpO2 97%   BMI 36.45 kg/m²      Dressing was left intact at today's encounter. No strikethrough present. Vascular: Edema present to the LLE. Limited evaluation due to dressing remaining intact. Leg and thigh are warm. Dermatologic: Limited evaluation due to dressing remaining intact. Neurovascular: Light touch sensation diminished bilaterally. Musculoskeletal: Deferred muscle strength test secondary to s/p surgical intervention with dressing remaining intact to LLE. Images  XR ANKLE LEFT (MIN 3 VIEWS)   Final Result         1. Gas density in the soft tissues extending into the lower left leg. 2. Completely dislocated and rotated talus relative to the tibia. 3. Abnormal periosteal reaction in the distal tibia and fibula which can indicate chronic osteomyelitis. 4. Cortical erosion of the distal fibula. **This report has been created using voice recognition software. It may contain minor errors which are inherent in voice recognition technology. **      Final report electronically signed by Dr. Magda Moreno on 1/10/2023 3:46 PM      XR CHEST PORTABLE   Final Result   1. Normal heart size. Metallic sternotomy sutures from prior surgery. Lungs somewhat hyperinflated, suggesting possible COPD. 2. Evidence for old, healed granulomatous disease. Question mild interstitial fibrosis/pneumonitis both lung bases. No effusion is seen. **This report has been created using voice recognition software. It may contain minor errors which are inherent in voice recognition technology. **      Final report electronically signed by Dr. Worley Dakin on 1/10/2023 2:42 PM ASSESSMENT: Pt. is a 65 y.o. male with:  Principle  Septic arthritis; left ankle  Pathologic fracture; left tibia  Abscess; left ankle    Chronic  Patient Active Problem List   Diagnosis    Gangrene of toe of left foot ([facility])    CAD (coronary artery disease)    Type 2 diabetes mellitus with insulin therapy (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    Sepsis ([address]    Acute left-sided low back pain with bilateral sciatica    S/P transmetatarsal amputation of foot, left (HCC)    Leukocytosis    Wound dehiscence, surgical, initial encounter    Postoperative wound infection    Metabolic acidosis    Hx of CABG    Lumbar stenosis without neurogenic claudication    CKD (chronic kidney disease), stage II    Normocytic anemia    Morbid obesity ([address]    Debility    Carotid stenosis, asymptomatic, bilateral    Hypokalemia    Nonhealing ulcer of left lower extremity (HCC)    Bleeding    Wound, open    SOB (shortness of breath) on exertion    Hemoglobin A1C greater than 9%, indicating poor diabetic control    Hypertensive emergency    Acute on chronic congestive heart failure (HCC)    Hypertensive urgency    Moderate persistent asthma    Septic arthritis of left ankle, due to unspecified organism ([address]       PLAN:     -Patient initially examined and evaluated  -WBC 11.5 (1/10);  Afebrile  -CBC & BMP ordered daily  -Pharmacy to dose vancomycin and Zosyn and Clindamycin  -Culture anaerobic and aerobic; pending  -Blood cultures; pending  -X-ray left ankle ordered; see read above  -Dressing remained intact at today's encounter.   -Dressing to be reinforced by nursing; order placed  -Med rec completed and medications restarted by nursing  -NWB to LLE  -Hospitalist following; preop restratification completed and management of comorbidities   -Infectious disease following; appreciate antibiotic recommendation  -Consult placed to social work and case management for discharge planning; Patient relates that he would like to go to [facility] since that is where his mother lives. -Consult placed to PT and OT for evaluation and treatment  -Cardiology following; patient has extensive cardiac history  -Anticoagulants (Plavix and Aspirin 81mg) restarted 1/11 AM.   -All of patient's questions and concerns addressed at today's encounter. Related to patient that Case Management will help with the discharge process and that the pre-certification process can take some time. Patient understands and is in agreement. Chronic     Diabetes mellitus  -Continue homes medication  -Hospitalist consulted     CKD, Stage 3  -Continue homes medication  -Hospitalist consulted     CAD  -Continue homes medication  -Hospitalist consulted  -Cardiology consulted     Hyperlipidemia   -Continue homes medication  -Hospitalist consulted     Hypertension  -Continue homes medication  -Hospitalist consulted  -Cardiology consulted         DISPO: Pending response to IV abx, pending WBC trend and culture results. Pending Case Management consult for discharge planning- [facility].       Cristian Corley DPM-PGY2  1/11/2023   6:13 AM

## 2023-01-11 NOTE — OP NOTE
[facility] [address]    Name Yudi Sadler                                                             : 1960  MEDICAL RECORD NO. [id number]    DATE: 1/10/2023    Surgeon: Will Schmid DPM    Assistant: Herbert Wright, PGY 2    Pre-operative Diagnosis:   1. Left open ankle dislocation  2. left foot/ankle abscess  3. left necrotizing fasciitis lower extremity    Post-operative Diagnosis:    Same    Procedure:   1. Proximal Symes amputation/distal transtibial amputation, left    Anesthesia: General    Hemostasis: A well padded pneumatic thigh tourniquet at 250 mmHG for 20 minutes    Estimated Blood Loss: 150cc    Materials: None    Injectables: None    Condition: Stable    Complications: none     Specimens: Lower extremity specimen sent to pathology, post flush soft tissue sent to microbiology for aerobic anaerobic culture and sensitivity    INDICATIONS: Patient is a pleasant 73-year-old male well-known to my practice patient has a longstanding history of uncontrolled type 2 diabetes as well as Charcot neuroarthropathy. Patient has a longstanding nonhealing wound to the plantar lateral aspect of his left foot. Showed intermittent improvement with nonweightbearing as well as routine wound care in the form of excisional debridements in the office with application of an Unna boot compression therapy. Patient had fallen approximately 2 weeks ago and developed a pathologic fracture of the left ankle. Patient was most recently seen on 2022 at which point an Unna boot compression therapy was applied. Patient was scheduled to follow-up at the 1 week denilson. Patient states that he was unable to obtain transportation and the dressing was left intact until today's follow-up. Patient presented to clinic very lethargic with significant malodorous wound and drainage from the foot.   Upon further clinical exam it appeared the patient had an open dislocation of his ankle joint. Patient suffers from severe peripheral neuropathy and states that he been ambulating on the leg at home and was noticing some increased pain. Based on nature location of deformity patient was direct admitted to [facility] for preoperative clearance, IV antibiotic therapy and need for excisional debridement of wound versus amputation of the lower extremity. Upon admission patient was found to have significant soft tissue emphysema/gas gangrene on plain film x-rays. We have placed a consultation to hospitalist for clearance at which point he was found to have checked out AMA while severely hypotensive when admitted at [facility] and hospitalist had asked for cardiology clearance prior to moving forward with surgical intervention. Patient was seen by cardiology deemed relatively stable based on the emergent nature of the case was cleared from a surgical standpoint and patient was scheduled for surgical intervention of the left lower extremity. All questions concerns regarding perioperative management including benefits risk complications and alternatives to procedure were discussed in detail. Patient was seen pre-operatively. Consent was reviewed and signed, operative limb marked. All questions and concerns regarding the case were addressed. The patient was then transferred to the operative room and place in a supine position. Time out taken, verifying patient and planned procedure. A well padded thigh tourniquet was applied to the operative leg. Patient was administered general anesthesia. Surgical site  was then prepped using Betadine and draped using sterile technique. Procedure In Detail  1. Proximal Symes amputation/distal transtibial amputation, left  Attention was then directed to the left lower extremity, an Esmarch was used to exsanguinate the limb followed by elevation of a thigh tourniquet to 250 mmHg.   A skin marker was used to denilson out the anterior transverse incision which was made approximately 18 cm distal to the tibial tuberosity/14 cm proximal from the ankle joint. Placement of the incision was based on the findings of necrotizing fasciitis with mucopurulent skin changes along the distal metadiaphysis of the tibia. Transverse incision was made across the anterior aspect of the tibia approximately two thirds circumferential nature of the calf. Longitudinal arms were then carried out distally along the posterior medial and posterior lateral aspects of the calf. Anterior based incision was then sequentially dissected using a combination of 15 scalpel blade and electrocautery Bovie cutting through the skin as well as underlying subcutaneous fat down the level of the anterior fascial compartment. The fascial compartment was then released allowing for visualization of the anterior tibialis artery which was then clamped using a hemostat. We then used a combination of Quinonez periosteal elevator to gain access between the [place] ligament of the tib-fib and a Kal periosteal elevator to protect the posterior neurovascular bundle at which point we created a beveled cut of both the tibia and fibula approximately 4 cm distal to the transverse skin incision. We then placed a bump along the posterior aspect of the calf and the amputation site was then rotated at 90 degrees to allow for visualization of the posterior musculature. Electrocautery Bovie was used to test the muscles patient was noted to have adequate color as well as contractility of the muscle within the posterior superficial group peroneal's as well as anterior muscular group. Patient was noted to have what appeared to be some slow muscle contraction/graying of the muscle component within the posterior deep muscle group. The posterior skin paddle was then carried out distally and transversely cut followed by disarticulation of the left ankle and foot. This was subsequently sent to pathology for specimen.   We then allowed the tourniquet to be released at approximately 20 minutes into the surgical procedure at which point were able to identify the respective posterior tibial, anterior tibial and peroneal nerve artery trunks which were then clamped using hemostat and tied off using a 2-0 Vicryl stay stitch. Operative site was irrigated with saline and we had then obtained soft tissue cultures consisting of the deep posterior muscle group which were sent for microbiology for aerobic anaerobic culture and sensitivity. Operative site was then irrigated with Aricept antimicrobial wash followed by hydroperoxide for local hemostasis. The superficial posterior muscle consisting of the gastrocnemius and soleus was then rotated around to create a hammock over the exposed tibia and fibula was imbricated anterior proximal using an 0 Vicryl stitch to the anterior fascia. This was then per strong circumferentially using a running 2-0 Vicryl stitch allowing for coverage of the tib-fib. We then rotated the posterior skin paddle which was then imbricated anteriorly using a combination of 0 PDS retention sutures and 3-0 Monocryl for subcutaneous skin closure. Primary skin closure was then performed using a skin stapler. Patient was then placed in a Betadine nonadherent dressing consisting of Adaptic 4 x 4's ABD pads and a compression therapy of Coban and Ace compression therapy. Patient tolerated procedure well was transferred to PACU in stable condition all questions concerns regarding postop management addressed with family.     Romulo Colon DPM, [address] [place] [address]   1/10/2023

## 2023-01-11 NOTE — PROGRESS NOTES
Progress note: Infectious diseases    Patient - Mendy Ritchie,  Age - 65 y.o.    - 1960      Room Number - 7K-04/004-A   MRN -  [id number]   [facility]  Date of Admission -  1/10/2023 12:28 PM    SUBJECTIVE:   He had amputation last night  resting  OBJECTIVE   VITALS    height is 6' 2\" (1.88 m) and weight is 283 lb 14.4 oz (128.8 kg). His oral temperature is 97.3 °F (36.3 °C). His blood pressure is 132/62 and his pulse is 88. His respiration is 16 and oxygen saturation is 93%.        Wt Readings from Last 3 Encounters:   23 283 lb 14.4 oz (128.8 kg)   22 (!) 310 lb (140.6 kg)   22 (!) 310 lb (140.6 kg)       I/O (24 Hours)    Intake/Output Summary (Last 24 hours) at 2023 1247  Last data filed at 2023 1129  Gross per 24 hour   Intake 1200 ml   Output 2500 ml   Net -1300 ml       General Appearance  not in distress  HEENT - normocephalic, atraumatic, pale conjunctiva,  anicteric sclera  Neck - Supple, no mass  Lungs -  Bilateral   air entry, no rhonchi, no wheeze  Cardiovascular - Heart sounds are normal.     Abdomen - soft, not distended, nontender,   Neurologic -oriented  Skin - No bruising or bleeding  Extremities - dressed left BKA    MEDICATIONS:      amLODIPine  10 mg Oral Daily    atorvastatin  80 mg Oral Daily    ferrous sulfate  325 mg Oral BID    furosemide  40 mg Oral Daily    gabapentin  600 mg Oral BID    minoxidil  10 mg Oral Daily    multivitamin  1 tablet Oral Daily    potassium chloride  20 mEq Oral Daily    valsartan  160 mg Oral Daily    vancomycin (VANCOCIN) intermittent dosing (placeholder)   Other RX Placeholder    Sodium Hypochlorite   Irrigation Daily    piperacillin-tazobactam  3,375 mg IntraVENous q8h    aspirin  81 mg Oral Daily    clopidogrel  75 mg Oral Daily    tiotropium  2 puff Inhalation Daily    clindamycin (CLEOCIN) IV  600 mg IntraVENous Q8H cloNIDine  0.1 mg Oral BID    metoprolol tartrate  50 mg Oral BID    insulin glargine  30 Units SubCUTAneous BID    insulin lispro  0-8 Units SubCUTAneous TID WC    insulin lispro  0-4 Units SubCUTAneous Nightly    vancomycin  1,250 mg IntraVENous Q12H    sodium chloride flush  5-40 mL IntraVENous 2 times per day      dextrose      sodium chloride 100 mL/hr at 01/11/23 1201    sodium chloride       polyethylene glycol, acetaminophen **OR** acetaminophen, albuterol sulfate HFA, glucose, dextrose bolus **OR** dextrose bolus, glucagon (rDNA), dextrose, sodium chloride flush, sodium chloride, ondansetron **OR** ondansetron, oxyCODONE **OR** oxyCODONE, morphine **OR** morphine      LABS:     CBC:   Recent Labs     01/10/23  1614 01/11/23  0703   WBC 11.5*  --    HGB 7.3* 7.2*   *  --      BMP:    Recent Labs     01/10/23  1614   *   K 3.8  3.8   CL 98   CO2 22*   BUN 14   CREATININE 0.8   GLUCOSE 204*     Calcium:  Recent Labs     01/10/23  1614   CALCIUM 9.0     Ionized Calcium:No results for input(s): IONCA in the last 72 hours. Magnesium:  Recent Labs     01/10/23  1614   MG 2.0     Phosphorus:No results for input(s): PHOS in the last 72 hours. BNP:No results for input(s): BNP in the last 72 hours. Glucose:  Recent Labs     01/10/23  2251 01/11/23  0628 01/11/23  1048   POCGLU 241* 261* 365*     HgbA1C:   Recent Labs     01/10/23  1614   LABA1C 8.3*     INR: No results for input(s): INR in the last 72 hours. Hepatic:   Recent Labs     01/10/23  1614   ALKPHOS 114   ALT 49   AST 44*   PROT 6.7   BILITOT 0.5   BILIDIR <0.2   LABALBU 2.4*     Amylase and Lipase:No results for input(s): LACTA, AMYLASE in the last 72 hours. Lactic Acid: No results for input(s): LACTA in the last 72 hours. Troponin: No results for input(s): CKTOTAL, CKMB, TROPONINI in the last 72 hours. BNP: No results for input(s): BNP in the last 72 hours.     CULTURES:   UA: No results for input(s): SPECGRAV, PHUR, COLORU, CLARITYU, MUCUS, PROTEINU, BLOODU, RBCUA, WBCUA, BACTERIA, NITRU, GLUCOSEU, BILIRUBINUR, UROBILINOGEN, KETUA, LABCAST, LABCASTTY, AMORPHOS in the last 72 hours. Invalid input(s): CRYSTALS  Micro:   Lab Results   Component Value Date/Time    [facility]  11/22/2022 05:12 PM     No growth 24 hours. No growth 48 hours. 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    Sepsis ([facility] A41.9    Acute left-sided low back pain with bilateral sciatica M54.42, M54.41    S/P transmetatarsal amputation of foot, left (formerly [facility]) Z89.432    Leukocytosis D72.829    Wound dehiscence, surgical, initial encounter T81.31XA    Postoperative wound infection X63.72LW    Metabolic acidosis Z43.01    Hx of CABG Z95.1    Lumbar stenosis without neurogenic claudication M48.061    CKD (chronic kidney disease), stage II N18.2    Normocytic anemia D64.9    Morbid obesity ([facility]) E66.01    Debility R53.81    Carotid stenosis, asymptomatic, bilateral I65.23    Hypokalemia E87.6    Nonhealing ulcer of left lower extremity ([facility] L97.929    Bleeding R58    Wound, open T14. 8XXA    SOB (shortness of breath) on exertion R06.02    Hemoglobin A1C greater than 9%, indicating poor diabetic control R73.09    Hypertensive emergency I16.1    Acute on chronic congestive heart failure (formerly [facility]) I50.9    Hypertensive urgency I16.0    Moderate persistent asthma J45.40    Septic arthritis of left ankle, due to unspecified organism (formerly [facility]) M00.9         ASSESSMENT/PLAN   Necrotizing left foot/ankle infection: had urgent BKA  Poorly controlled diabetes  CAD  Continue current antibiotics  Discussed with Podiatry      Zaynab Bishop MD, MD, FACP 1/11/2023 12:47 PM

## 2023-01-11 NOTE — ANESTHESIA PRE PROCEDURE
Department of Anesthesiology  Preprocedure Note       Name:  Sushant Meeks   Age:  65 y.o.  :  1960                                          MRN:  [id number]         Date:  1/10/2023      Surgeon: Chivo Duarte):  Nohemy Richmond DPM    Procedure: Procedure(s):  LEFT ANKLE DISARTICULATION VS LEFT BELOW KNEE AMPUTATION    Medications prior to admission:   Prior to Admission medications    Medication Sig Start Date End Date Taking? Authorizing Provider   HYDROcodone-acetaminophen (NORCO) 5-325 MG per tablet Take 1 tablet by mouth every 4 hours as needed.  22   Historical Provider, MD   cloNIDine (CATAPRES) 0.1 MG tablet Take 1 tablet by mouth 2 times daily 22   EYAD Orantes CNP   tiotropium (SPIRIVA RESPIMAT) 2.5 MCG/ACT AERS inhaler Inhale 2 puffs into the lungs daily 22   EYAD Orantes CNP   benzonatate (TESSALON) 200 MG capsule Take 1 capsule by mouth 3 times daily as needed for Cough  Patient not taking: Reported on 1/10/2023 11/30/22   EYAD Orantes CNP   amLODIPine (NORVASC) 10 MG tablet Take 1 tablet by mouth daily 22   Lashae Ya MD   metoprolol tartrate (LOPRESSOR) 50 MG tablet Take 1 tablet by mouth 2 times daily 22   Lashae Ya MD   valsartan (DIOVAN) 160 MG tablet Take 1 tablet by mouth daily 22   Lashae Ya MD   minoxidil (LONITEN) 10 MG tablet Take 1 tablet by mouth daily 22   Lashae Ya MD   ferrous sulfate (IRON 325) 325 (65 Fe) MG tablet Take 1 tablet by mouth 2 times daily 22   EYAD Orantes CNP   furosemide (LASIX) 40 MG tablet Take 1 tablet by mouth daily 22   EYAD Orantes CNP   potassium chloride (KLOR-CON M) 20 MEQ extended release tablet Take 1 tablet by mouth daily 22   EYAD Orantes CNP   empagliflozin (JARDIANCE) 25 MG tablet Take 1 tablet by mouth daily 22   EYAD Orantes CNP clopidogrel (PLAVIX) 75 MG tablet Take 1 tablet by mouth daily 8/24/22   Euell Fees, APRN - CNP   gabapentin (NEURONTIN) 600 MG tablet Take 1 tablet by mouth 2 times daily for 180 days.  8/24/22 2/20/23  Euell Fees, APRN - CNP   insulin detemir (LEVEMIR FLEXTOUCH) 100 UNIT/ML injection pen 30 units in the morning, and 30 units in the evening. 8/24/22   Euell [facility], APRN - CNP   insulin lispro (HUMALOG) 100 UNIT/ML injection vial Inject 5-15 Units into the skin 3 times daily (with meals) 8/24/22   Euell Fees, APRN - CNP   albuterol sulfate HFA (VENTOLIN HFA) 108 (90 Base) MCG/ACT inhaler Inhale 2 puffs into the lungs 4 times daily as needed for Wheezing 8/24/22   Euell Fees, APRN - CNP   atorvastatin (LIPITOR) 80 MG tablet Take 1 tablet by mouth daily 8/19/22   Clau Hull MD   Polyethylene Glycol 3350 (MIRALAX PO) Take by mouth as needed    Historical Provider, MD   aspirin 81 MG chewable tablet Take 1 tablet by mouth daily 8/10/20   Euell [facility], APRN - CNP   Multiple Vitamins-Minerals (MULTIVITAMIN PO) Take 1 tablet by mouth daily     Historical Provider, MD       Current medications:    Current Facility-Administered Medications   Medication Dose Route Frequency Provider Last Rate Last Admin    sodium chloride flush 0.9 % injection 5-40 mL  5-40 mL IntraVENous 2 times per day Murphy Mix, DPM        sodium chloride flush 0.9 % injection 5-40 mL  5-40 mL IntraVENous PRN Murphy Mix, DPM        0.9 % sodium chloride infusion   IntraVENous PRN Murphy Mix, DPM        ondansetron (ZOFRAN-ODT) disintegrating tablet 4 mg  4 mg Oral Q8H PRN Murphy Mix, DPM        Or    ondansetron (ZOFRAN) injection 4 mg  4 mg IntraVENous Q6H PRN Murphy Mix, DPM        polyethylene glycol (GLYCOLAX) packet 17 g  17 g Oral Daily PRN Murphy Mix, DPM        acetaminophen (TYLENOL) tablet 650 mg  650 mg Oral Q6H PRN Murphy Mix, DPM Or    acetaminophen (TYLENOL) suppository 650 mg  650 mg Rectal Q6H PRN Rashid Slain, DPM        [START ON 1/11/2023] amLODIPine (NORVASC) tablet 10 mg  10 mg Oral Daily Clois Body, DO        [START ON 1/11/2023] atorvastatin (LIPITOR) tablet 80 mg  80 mg Oral Daily Rashid Slain, DPM        albuterol sulfate HFA (PROVENTIL;VENTOLIN;PROAIR) 108 (90 Base) MCG/ACT inhaler 2 puff  2 puff Inhalation 4x Daily PRN Rashid Slain, DPM        ferrous sulfate (IRON 325) tablet 325 mg  325 mg Oral BID Rashid Slain, DPM        furosemide (LASIX) tablet 40 mg  40 mg Oral Daily Rashid Slain, DPM        gabapentin (NEURONTIN) tablet 600 mg  600 mg Oral BID Rashid Slain, DPM        insulin glargine (LANTUS) injection vial 30 Units  30 Units SubCUTAneous BID Margy Valencia MD        minoxidil (LONITEN) tablet 10 mg  10 mg Oral Daily Margy Valencia MD        multivitamin 1 tablet  1 tablet Oral Daily Rashid Slain, DPM        potassium chloride (KLOR-CON M) extended release tablet 20 mEq  20 mEq Oral Daily Rashid Slain, DPM        valsartan (DIOVAN) tablet 160 mg  160 mg Oral Daily Margy Valencia MD        [facility]) intermittent dosing (placeholder)   Other RX Placeholder Rashid Slain, DPM        oxyCODONE (ROXICODONE) immediate release tablet 5 mg  5 mg Oral Q4H PRN Rashid Slain, DPM        Or    oxyCODONE (ROXICODONE) immediate release tablet 10 mg  10 mg Oral Q4H PRN Rashid Slain, DPM   10 mg at 01/10/23 1447    morphine (PF) injection 2 mg  2 mg IntraVENous Q2H PRN Rashid Slain, DPM        Or    morphine injection 4 mg  4 mg IntraVENous Q2H PRN Rashid Slain, DPM        Sodium Hypochlorite (DAKINS) 0.25 % external solution   Irrigation Daily Rashid Slain, DPM        glucose chewable tablet 16 g  4 tablet Oral PRN Clois Body, DO        dextrose bolus 10% 125 mL  125 mL IntraVENous PRN Clois Body, DO        Or    dextrose bolus 10% 250 mL  250 mL IntraVENous PRN Gerardo Crowder, DO        glucagon (rDNA) injection 1 mg  1 mg SubCUTAneous PRN Gerardo Crowder, DO        dextrose 10 % infusion   IntraVENous Continuous PRN Gerardo Crowder, DO        piperacillin-tazobactam (ZOSYN) 3,375 mg in dextrose 5 % 50 mL IVPB (mini-bag)  3,375 mg IntraVENous q8h Damien Lui DPM        [Held by provider] aspirin chewable tablet 81 mg  81 mg Oral Daily Gerardo Crowder, DO        [Held by provider] clopidogrel (PLAVIX) tablet 75 mg  75 mg Oral Daily Gerardo Crowder, DO        tiotropium (SPIRIVA RESPIMAT) 2.5 MCG/ACT inhaler 2 puff  2 puff Inhalation Daily Gerardo Crowder, DO        clindamycin (CLEOCIN) 600 mg in dextrose 5 % 50 mL IVPB  600 mg IntraVENous Q8H Desean Leary MD   Stopped at 01/10/23 1810    [START ON 1/11/2023] dextrose 5 % in lactated ringers infusion   IntraVENous Continuous Robert Dupont MD        cloNIDine (CATAPRES) tablet 0.1 mg  0.1 mg Oral BID Robert Dupont MD   0.1 mg at 01/10/23 1700    metoprolol tartrate (LOPRESSOR) tablet 50 mg  50 mg Oral BID Robert Dupont MD   50 mg at 01/10/23 1700    [START ON 1/11/2023] insulin glargine (LANTUS) injection vial 15 Units  15 Units SubCUTAneous QAM Robert Dupont MD        [START ON 1/11/2023] insulin glargine (LANTUS) injection vial 30 Units  30 Units SubCUTAneous BID Robert Dupont MD        insulin lispro (HUMALOG) injection vial 0-8 Units  0-8 Units SubCUTAneous TID WC Gerardo Crowder, DO        insulin lispro (HUMALOG) injection vial 0-4 Units  0-4 Units SubCUTAneous Nightly Gerardo Crowder, DO           Allergies:  No Known Allergies    Problem List:    Patient Active Problem List   Diagnosis Code    [facility] I96    CAD (coronary artery disease) I25.10    Type 2 diabetes mellitus with insulin therapy ([facility] E11.9, Z79.4    Hyperlipidemia E78.5    Hypertension I10    Charcot's joint, right ankle and foot M14.671    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    Sepsis ([address] A41.9    Acute left-sided low back pain with bilateral sciatica M54.42, M54.41    S/P transmetatarsal amputation of foot, left ([facility]) Z89.432    Leukocytosis D72.829    Wound dehiscence, surgical, initial encounter T81.31XA    Postoperative wound infection C17.03IE    Metabolic acidosis Z11.44    Hx of CABG Z95.1    Lumbar stenosis without neurogenic claudication M48.061    CKD (chronic kidney disease), stage II N18.2    Normocytic anemia D64.9    Morbid obesity ([facility]) E66.01    Debility R53.81    Carotid stenosis, asymptomatic, bilateral I65.23    Hypokalemia E87.6    Nonhealing ulcer of left lower extremity ([address] L97.929    Bleeding R58    Wound, open T14. 8XXA    SOB (shortness of breath) on exertion R06.02    Hemoglobin A1C greater than 9%, indicating poor diabetic control R73.09    Hypertensive emergency I16.1    Acute on chronic congestive heart failure ([facility]) I50.9    Hypertensive urgency I16.0    Moderate persistent asthma J45.40    Septic arthritis of left ankle, due to unspecified organism ([facility] M00.9       Past Medical History:        Diagnosis Date    Arthritis     CAD (coronary artery disease)     CKD (chronic kidney disease), stage II     Diabetes mellitus ([address]     Hyperlipidemia     Hypertension     Liver disease     HEPITITIS AS A CHILD       Past Surgical History:        Procedure Laterality Date    CARDIAC SURGERY  11/2019    triple bypass    CYST REMOVAL      RIGHT ANKLE     FOOT DEBRIDEMENT Left 4/20/2020    LEFT TRANSMETATARSAL AMPUTATION  FOOT INCISION AND DRAINAGE performed by Melissa Brantley DPM at [address] Left 7/20/2020    LEFT WOUND DEBRIDEMENT WITH WOUND VAC APPLICATION performed by Melissa Brantley DPM at [address] Left 7/1/2022    LEFT FOOT EXCISONAL WOUND DEBRIDEMENT, APPLICATION SKIN SUBSTITUTE GRAFT, APPLICATION [facility] WOUND VAC performed by Melissa Brantley DPM at [facility]    FOOT SURGERY Right     CYST REMOVED    FOOT SURGERY Left 8/14/2020    LEFT FOOT PREPARATION GRAFT SITE, HAVEST SPLIT THICKNESS SKIN GRAFT WITH APPLICATION, APPLICATION [facility] WOUND VAC performed by Davon Duong DPM at [address] [place] [address] TOE AMPUTATION Left 3/3/2020    I&D LEFT FOOT, TRANSMETATARSAL AMPUTATION performed by Davon Duong DPM at [facility] ENDOSCOPY N/A 11/23/2022    EGD performed by Sarah Chaidez MD at [address] [facility]       Social History:    Social History     Tobacco Use    Smoking status: Former     Types: Cigarettes     Quit date: 2008     Years since quitting: 15.0    Smokeless tobacco: Never   Substance Use Topics    Alcohol use: Not Currently                                Counseling given: Not Answered      Vital Signs (Current):   Vitals:    01/10/23 1300 01/10/23 1657   BP: 131/62 138/72   Pulse: 87 85   Resp: 18 16   Temp: 98.6 °F (37 °C) 98.5 °F (36.9 °C)   TempSrc: Oral Oral   SpO2: 98% 95%   Weight: 283 lb 14.4 oz (128.8 kg)    Height: 6' 2\" (1.88 m)                                               BP Readings from Last 3 Encounters:   01/10/23 138/72   12/08/22 (!) 142/73   12/08/22 (!) 142/73       NPO Status:                                                                                 BMI:   Wt Readings from Last 3 Encounters:   01/10/23 283 lb 14.4 oz (128.8 kg)   12/08/22 (!) 310 lb (140.6 kg)   12/08/22 (!) 310 lb (140.6 kg)     Body mass index is 36.45 kg/m².     CBC:   Lab Results   Component Value Date/Time    WBC 11.5 01/10/2023 04:14 PM    RBC 3.15 01/10/2023 04:14 PM    HGB 7.3 01/10/2023 04:14 PM    HCT 24.8 01/10/2023 04:14 PM    MCV 78.7 01/10/2023 04:14 PM    RDW 17.4 12/08/2022 02:45 PM     01/10/2023 04:14 PM       CMP:   Lab Results   Component Value Date/Time     01/10/2023 04:14 PM    K 3.8 01/10/2023 04:14 PM    K 3.8 01/10/2023 04:14 PM    CL 98 01/10/2023 04:14 PM    CO2 22 01/10/2023 04:14 PM    BUN 14 01/10/2023 04:14 PM    CREATININE 0.8 01/10/2023 04:14 PM    LABGLOM >60 01/10/2023 04:14 PM    GLUCOSE 204 01/10/2023 04:14 PM    GLUCOSE 103 05/25/2020 01:45 PM    PROT 6.7 01/10/2023 04:14 PM    CALCIUM 9.0 01/10/2023 04:14 PM    BILITOT 0.5 01/10/2023 04:14 PM    ALKPHOS 114 01/10/2023 04:14 PM    AST 44 01/10/2023 04:14 PM    ALT 49 01/10/2023 04:14 PM       POC Tests:   Recent Labs     01/10/23  1637   POCGLU 221*       Coags:   Lab Results   Component Value Date/Time    INR 1.03 11/25/2022 01:38 AM    APTT 34.1 11/25/2022 01:38 AM       HCG (If Applicable): No results found for: PREGTESTUR, PREGSERUM, HCG, HCGQUANT     ABGs: No results found for: PHART, PO2ART, AFL1YWW, MYO1NEN, BEART, V9DFRATS     Type & Screen (If Applicable):  Lab Results   Component Value Date    LABRH POS 11/18/2022       Drug/Infectious Status (If Applicable):  Lab Results   Component Value Date/Time    HEPCAB Negative 11/19/2022 09:32 AM       COVID-19 Screening (If Applicable):   Lab Results   Component Value Date/Time    COVID19 DETECTED 11/25/2022 01:40 AM           Anesthesia Evaluation   no history of anesthetic complications:   Airway: Mallampati: II  TM distance: >3 FB   Neck ROM: full  Mouth opening: > = 3 FB   Dental:          Pulmonary:normal exam    (+) asthma:                            Cardiovascular:  Exercise tolerance: poor (<4 METS),   (+) hypertension:, CAD:, CHF: systolic,       ECG reviewed      Echocardiogram reviewed  Stress test reviewed                Neuro/Psych:   Negative Neuro/Psych ROS              GI/Hepatic/Renal:   (+) morbid obesity          Endo/Other:    (+) Diabetes, . Pt had no PAT visit       Abdominal:   (+) obese,           Vascular: Other Findings:           Anesthesia Plan      general     ASA 4 - emergent       Induction: intravenous. MIPS: Postoperative opioids intended and Prophylactic antiemetics administered.   Anesthetic plan and risks discussed with patient. Plan discussed with CRNA.                     Chaitanya Dunn MD   1/10/2023

## 2023-01-12 PROBLEM — Z89.512 S/P BKA (BELOW KNEE AMPUTATION), LEFT (HCC): Status: ACTIVE | Noted: 2023-01-12

## 2023-01-12 LAB
ANION GAP SERPL CALCULATED.3IONS-SCNC: 11 MEQ/L (ref 8–16)
BUN BLDV-MCNC: 33 MG/DL (ref 7–22)
CALCIUM SERPL-MCNC: 7.7 MG/DL (ref 8.5–10.5)
CHLORIDE BLD-SCNC: 98 MEQ/L (ref 98–111)
CO2: 23 MEQ/L (ref 23–33)
CREAT SERPL-MCNC: 1.8 MG/DL (ref 0.4–1.2)
EKG ATRIAL RATE: 84 BPM
EKG P AXIS: 64 DEGREES
EKG P-R INTERVAL: 184 MS
EKG Q-T INTERVAL: 418 MS
EKG QRS DURATION: 148 MS
EKG QTC CALCULATION (BAZETT): 493 MS
EKG R AXIS: -24 DEGREES
EKG T AXIS: 46 DEGREES
EKG VENTRICULAR RATE: 84 BPM
ERYTHROCYTE [DISTWIDTH] IN BLOOD BY AUTOMATED COUNT: 18.9 % (ref 11.5–14.5)
ERYTHROCYTE [DISTWIDTH] IN BLOOD BY AUTOMATED COUNT: 55.5 FL (ref 35–45)
GFR SERPL CREATININE-BSD FRML MDRD: 42 ML/MIN/1.73M2
GLUCOSE BLD-MCNC: 181 MG/DL (ref 70–108)
GLUCOSE BLD-MCNC: 189 MG/DL (ref 70–108)
GLUCOSE BLD-MCNC: 239 MG/DL (ref 70–108)
GLUCOSE BLD-MCNC: 244 MG/DL (ref 70–108)
GLUCOSE BLD-MCNC: 246 MG/DL (ref 70–108)
GLUCOSE BLD-MCNC: 252 MG/DL (ref 70–108)
GLUCOSE BLD-MCNC: 304 MG/DL (ref 70–108)
HCT VFR BLD CALC: 24 % (ref 42–52)
HCT VFR BLD CALC: 24.2 % (ref 42–52)
HEMOGLOBIN: 7 GM/DL (ref 14–18)
HEMOGLOBIN: 7.3 GM/DL (ref 14–18)
MCH RBC QN AUTO: 24 PG (ref 26–33)
MCHC RBC AUTO-ENTMCNC: 29.2 GM/DL (ref 32.2–35.5)
MCV RBC AUTO: 82.2 FL (ref 80–94)
PLATELET # BLD: 446 THOU/MM3 (ref 130–400)
PMV BLD AUTO: 9.4 FL (ref 9.4–12.4)
POTASSIUM REFLEX MAGNESIUM: 4.6 MEQ/L (ref 3.5–5.2)
RBC # BLD: 2.92 MILL/MM3 (ref 4.7–6.1)
SODIUM BLD-SCNC: 132 MEQ/L (ref 135–145)
VANCOMYCIN RANDOM: 18.6 UG/ML (ref 0.1–39.9)
WBC # BLD: 12 THOU/MM3 (ref 4.8–10.8)

## 2023-01-12 PROCEDURE — 6370000000 HC RX 637 (ALT 250 FOR IP): Performed by: STUDENT IN AN ORGANIZED HEALTH CARE EDUCATION/TRAINING PROGRAM

## 2023-01-12 PROCEDURE — 94761 N-INVAS EAR/PLS OXIMETRY MLT: CPT

## 2023-01-12 PROCEDURE — 2580000003 HC RX 258

## 2023-01-12 PROCEDURE — 97110 THERAPEUTIC EXERCISES: CPT

## 2023-01-12 PROCEDURE — 85027 COMPLETE CBC AUTOMATED: CPT

## 2023-01-12 PROCEDURE — 97530 THERAPEUTIC ACTIVITIES: CPT

## 2023-01-12 PROCEDURE — 6370000000 HC RX 637 (ALT 250 FOR IP)

## 2023-01-12 PROCEDURE — 80048 BASIC METABOLIC PNL TOTAL CA: CPT

## 2023-01-12 PROCEDURE — 82948 REAGENT STRIP/BLOOD GLUCOSE: CPT

## 2023-01-12 PROCEDURE — 2500000003 HC RX 250 WO HCPCS

## 2023-01-12 PROCEDURE — 1200000003 HC TELEMETRY R&B

## 2023-01-12 PROCEDURE — 99233 SBSQ HOSP IP/OBS HIGH 50: CPT | Performed by: PHYSICIAN ASSISTANT

## 2023-01-12 PROCEDURE — 94640 AIRWAY INHALATION TREATMENT: CPT

## 2023-01-12 PROCEDURE — 6360000002 HC RX W HCPCS

## 2023-01-12 PROCEDURE — 85018 HEMOGLOBIN: CPT

## 2023-01-12 PROCEDURE — 85014 HEMATOCRIT: CPT

## 2023-01-12 PROCEDURE — 6370000000 HC RX 637 (ALT 250 FOR IP): Performed by: PHYSICIAN ASSISTANT

## 2023-01-12 PROCEDURE — 99231 SBSQ HOSP IP/OBS SF/LOW 25: CPT | Performed by: NURSE PRACTITIONER

## 2023-01-12 PROCEDURE — 1200000000 HC SEMI PRIVATE

## 2023-01-12 PROCEDURE — 36415 COLL VENOUS BLD VENIPUNCTURE: CPT

## 2023-01-12 PROCEDURE — 97535 SELF CARE MNGMENT TRAINING: CPT

## 2023-01-12 PROCEDURE — 80202 ASSAY OF VANCOMYCIN: CPT

## 2023-01-12 RX ORDER — INSULIN LISPRO 100 [IU]/ML
0-4 INJECTION, SOLUTION INTRAVENOUS; SUBCUTANEOUS NIGHTLY
Status: DISCONTINUED | OUTPATIENT
Start: 2023-01-12 | End: 2023-01-25 | Stop reason: HOSPADM

## 2023-01-12 RX ORDER — INSULIN GLARGINE 100 [IU]/ML
33 INJECTION, SOLUTION SUBCUTANEOUS 2 TIMES DAILY
Status: DISCONTINUED | OUTPATIENT
Start: 2023-01-12 | End: 2023-01-14

## 2023-01-12 RX ORDER — INSULIN LISPRO 100 [IU]/ML
0-16 INJECTION, SOLUTION INTRAVENOUS; SUBCUTANEOUS
Status: DISCONTINUED | OUTPATIENT
Start: 2023-01-12 | End: 2023-01-25 | Stop reason: HOSPADM

## 2023-01-12 RX ADMIN — FERROUS SULFATE TAB 325 MG (65 MG ELEMENTAL FE) 325 MG: 325 (65 FE) TAB at 21:23

## 2023-01-12 RX ADMIN — OXYCODONE 10 MG: 5 TABLET ORAL at 16:57

## 2023-01-12 RX ADMIN — CLINDAMYCIN PHOSPHATE 600 MG: 600 INJECTION, SOLUTION INTRAVENOUS at 00:57

## 2023-01-12 RX ADMIN — CLINDAMYCIN PHOSPHATE 600 MG: 600 INJECTION, SOLUTION INTRAVENOUS at 09:27

## 2023-01-12 RX ADMIN — GABAPENTIN 600 MG: 600 TABLET, FILM COATED ORAL at 09:19

## 2023-01-12 RX ADMIN — INSULIN GLARGINE 33 UNITS: 100 INJECTION, SOLUTION SUBCUTANEOUS at 21:25

## 2023-01-12 RX ADMIN — SODIUM CHLORIDE, PRESERVATIVE FREE 10 ML: 5 INJECTION INTRAVENOUS at 09:23

## 2023-01-12 RX ADMIN — OXYCODONE 10 MG: 5 TABLET ORAL at 21:23

## 2023-01-12 RX ADMIN — HYOSCYAMINE SULFATE: 16 SOLUTION at 11:41

## 2023-01-12 RX ADMIN — VALSARTAN 160 MG: 160 TABLET ORAL at 09:17

## 2023-01-12 RX ADMIN — PIPERACILLIN AND TAZOBACTAM 3375 MG: 3; .375 INJECTION, POWDER, FOR SOLUTION INTRAVENOUS at 17:00

## 2023-01-12 RX ADMIN — VANCOMYCIN HYDROCHLORIDE 1250 MG: 5 INJECTION, POWDER, LYOPHILIZED, FOR SOLUTION INTRAVENOUS at 12:18

## 2023-01-12 RX ADMIN — OXYCODONE 10 MG: 5 TABLET ORAL at 12:26

## 2023-01-12 RX ADMIN — CLONIDINE HYDROCHLORIDE 0.1 MG: 0.1 TABLET ORAL at 21:23

## 2023-01-12 RX ADMIN — INSULIN GLARGINE 33 UNITS: 100 INJECTION, SOLUTION SUBCUTANEOUS at 12:19

## 2023-01-12 RX ADMIN — FUROSEMIDE 40 MG: 40 TABLET ORAL at 09:20

## 2023-01-12 RX ADMIN — Medication 1 TABLET: at 09:18

## 2023-01-12 RX ADMIN — CLOPIDOGREL BISULFATE 75 MG: 75 TABLET ORAL at 09:19

## 2023-01-12 RX ADMIN — ASPIRIN 81 MG 81 MG: 81 TABLET ORAL at 09:18

## 2023-01-12 RX ADMIN — PIPERACILLIN AND TAZOBACTAM 3375 MG: 3; .375 INJECTION, POWDER, FOR SOLUTION INTRAVENOUS at 01:37

## 2023-01-12 RX ADMIN — MINOXIDIL 10 MG: 2.5 TABLET ORAL at 21:23

## 2023-01-12 RX ADMIN — OXYCODONE 5 MG: 5 TABLET ORAL at 06:50

## 2023-01-12 RX ADMIN — ATORVASTATIN CALCIUM 80 MG: 80 TABLET, FILM COATED ORAL at 09:17

## 2023-01-12 RX ADMIN — POTASSIUM CHLORIDE 20 MEQ: 1500 TABLET, EXTENDED RELEASE ORAL at 09:18

## 2023-01-12 RX ADMIN — INSULIN LISPRO 12 UNITS: 100 INJECTION, SOLUTION INTRAVENOUS; SUBCUTANEOUS at 09:35

## 2023-01-12 RX ADMIN — INSULIN LISPRO 4 UNITS: 100 INJECTION, SOLUTION INTRAVENOUS; SUBCUTANEOUS at 12:20

## 2023-01-12 RX ADMIN — GABAPENTIN 600 MG: 600 TABLET, FILM COATED ORAL at 21:23

## 2023-01-12 RX ADMIN — PIPERACILLIN AND TAZOBACTAM 3375 MG: 3; .375 INJECTION, POWDER, FOR SOLUTION INTRAVENOUS at 09:31

## 2023-01-12 RX ADMIN — CLINDAMYCIN PHOSPHATE 600 MG: 600 INJECTION, SOLUTION INTRAVENOUS at 16:59

## 2023-01-12 RX ADMIN — FERROUS SULFATE TAB 325 MG (65 MG ELEMENTAL FE) 325 MG: 325 (65 FE) TAB at 09:20

## 2023-01-12 RX ADMIN — METOPROLOL TARTRATE 50 MG: 50 TABLET, FILM COATED ORAL at 21:23

## 2023-01-12 RX ADMIN — TIOTROPIUM BROMIDE INHALATION SPRAY 2 PUFF: 3.12 SPRAY, METERED RESPIRATORY (INHALATION) at 06:08

## 2023-01-12 ASSESSMENT — PAIN DESCRIPTION - DESCRIPTORS
DESCRIPTORS: ACHING;BURNING
DESCRIPTORS: ACHING
DESCRIPTORS: ACHING

## 2023-01-12 ASSESSMENT — PAIN DESCRIPTION - ORIENTATION
ORIENTATION: LEFT

## 2023-01-12 ASSESSMENT — PAIN DESCRIPTION - LOCATION
LOCATION: LEG

## 2023-01-12 ASSESSMENT — PAIN - FUNCTIONAL ASSESSMENT
PAIN_FUNCTIONAL_ASSESSMENT: PREVENTS OR INTERFERES SOME ACTIVE ACTIVITIES AND ADLS
PAIN_FUNCTIONAL_ASSESSMENT: PREVENTS OR INTERFERES SOME ACTIVE ACTIVITIES AND ADLS

## 2023-01-12 ASSESSMENT — PAIN DESCRIPTION - PAIN TYPE
TYPE: SURGICAL PAIN
TYPE: SURGICAL PAIN

## 2023-01-12 ASSESSMENT — PAIN SCALES - GENERAL
PAINLEVEL_OUTOF10: 8
PAINLEVEL_OUTOF10: 7
PAINLEVEL_OUTOF10: 8
PAINLEVEL_OUTOF10: 7
PAINLEVEL_OUTOF10: 6
PAINLEVEL_OUTOF10: 7
PAINLEVEL_OUTOF10: 4

## 2023-01-12 ASSESSMENT — PAIN DESCRIPTION - ONSET
ONSET: ON-GOING
ONSET: ON-GOING

## 2023-01-12 ASSESSMENT — PAIN DESCRIPTION - FREQUENCY
FREQUENCY: INTERMITTENT
FREQUENCY: INTERMITTENT

## 2023-01-12 NOTE — PROGRESS NOTES
Hospitalist -Progress Note      Patient: Dada Fee    Unit/Bed:7K-04/004-A  YOB: 1960  MRN: [id number]   Acct: [de-identified]   PCP: EYAD Sexton - CNP    Date of Service: Pt seen/examined on 01/12/23  Chief Complaint: Left septic ankle with abscess and open pathologic fracture    Assessment and Plan:-    Septic left ankle with pathologic fracture of left tibia and abscess of the left ankle:  POD #2 day s/p Proximal Symes amputation/distal transtibial amputation, left (DOS 1/10)    -Continue Zosyn, vancomycin and clindamycin   -Infectious disease following   - continue with management by primary    GISELA    - suspect medication induced from antibiotics and possible cardiorenal   Cr baseline 1.0-1.2   Currently 1.8   BMP daily    - if further increases, consult nephro   - fluids held given concerns for fluid overload     Acute respiratory failure- resolved  Started post operatively suspect fluid overload and anemia contributing   BNP elevated in 2000s- fluids stopped 1/11    Nocturnal hypoxia- suspect RILEY   Nocturnal pulse oximetry and completed   Will need oxygen at night long term   Referral for formal sleep study on DC     Type 2 diabetes mellitus: Uncontrolled. A1c 8.3   Goal blood glucose while inpatient 140-180.    -Hold home meds   -1/12 increased SSI to high dose   -Lantus 30 units BID - 1/12 increased to 33 units BID   -Hypoglycemic protocol-Accu-Cheks-Diabetic and cardiac diet    -Continue Neurontin  - consider scheduled lispro prior to meals in addition to sliding scale if BG elevation continues    HTN: Recent hypertensive emergency in which he presented to [facility] November 2022 with chest pain and new RBBB. Patient subsequently left AMA before treatment.   On arrival to [facility] patient's blood pressure was 131/62.   - Cardiology consulted - no further interventions recommended at this time  -Continue Norvasc, clonidine, Lasix, Lopressor twice daily, and minoxidil with holding parameters. Chronic diastolic heart failure: Last echocardiogram 11/03/22 completed at [facility] showed concentric remodeling with normal regional wall motion, abnormal septal motion consistent with postoperative state, EF of 55 to 60% and grade 2 diastolic dysfunction.   -Lasix 40 mg daily               -Strict I & O                -Daily standing weights               -Low sodium diet   - 1/11 concerns for fluid overload given elevated BNP 1/10 and rales on exam- fluids stopped  -1/12 lungs clear- will hold off on additional diuresis right now      Hyperlipidemia: Last lipid panel 11/16/2022   -Continue Lipitor 80 mg daily    CAD s/p CABG: November 2019. Echo 4/21/20 EF 50%,    -aspirin and Plavix  -Continue home amlodipine, clonidine, lopressor, valsartan and minoxidil       Moderate persistent asthma: No PFTs noted in chart   -Continue home Spiriva   -Pulmonary hygiene    Hx of PAD: Continue aspirin and Plavix, statin therapy    Lupus:  Does not appear that patient is on any anticoagulation however he is on dual antiplatelet therapy.   -Hold aspirin and Plavix due to upcoming procedure.   -Note the patient is at elevated risk for clotting due to history of lupus  . Acute blood loss anemia on Chronic normocytic anemia: Baseline hemoglobin 10s.    - 1/11 Hgb dropped to 6.8- transfuse 1unit PRBC     Hgb currently 7 after transfusion- repeat hgb in 6 hours    -Continue home p.o. iron   -Nursing to monitor for any blood in stool   -Monitor with CBC daily        Disposition:- IPR pending precertification         History Of Present Illness:      71-year-old male was admitted to [facility] by podiatry for urgent/emergent management of septic left ankle with pathologic fracture of tibia and abscess of left ankle. Initially podiatry was planning to take patient to the OR tonight or tomorrow and consulted hospital team for risk stratification.       Patient has significant past medical history of diabetes mellitus type 2, uncontrolled, hypertension, CAD status post CABG, CKD, chronic normocytic anemia, lupus, moderate persistent asthma, PAD, grade 2 diastolic dysfunction, and hyperlipidemia,   It is important note the patient was recently hospitalized at [facility] 11/2/2022 for hypertensive emergency in which she was experiencing chest pain and a new left bundle branch block was found. Patient subsequently left AMA despite having blood pressure 200s/100s. Upon arrival to [facility] blood cultures were obtained, patient was started on vancomycin and Zosyn. Chest x-ray obtained that demonstrated evidence of prior CABG as well as hyperinflated lungs suggestive of COPD. Patient also had evidence of healed granulomatous disease. With questionable mild interstitial fibrosis. X-ray of left ankle was obtained that showed gas density and soft tissue extending to the lower left leg. Complete dislocation and rotated talus relative to the tibia. Also there was abnormal periosteal reaction and distal tibia and fibula which indicate chronic osteomyelitis. There is also cortical erosion of distal fibula. EKG was also completed that demonstrated Normal sinus rhythm, Left axis deviation; Right bundle branch block, Inferior infarct , age undetermined. Abnormal ECG When compared with ECG of 25-NOV-2022 04:21,Right bundle branch block has replaced Intraventricular conduction delay. During my examination patient denied chest pain, shortness of breath, headache, lightheadedness, abdominal pain. Patient did have foot pain that was relieved with pain medication. Stat CBC, BMP, hepatic function panel as well as magnesium were ordered. 1/11/2023  Patient postop day 1. Hemoglobin dropped to 6.8. Will transfuse 1 unit PRBCs. Patient requiring O2 postoperatively and was weaned to room air today however dipped back into the mid 80s.   Currently satting 93% on 2 L.    1/12/2023  Hemoglobin 7 after transfusion. No longer requiring O2 at this time. Nocturnal pulse oximetry pending. WBC downtrending    Subjective:  Patient states he is feeling more like himself today. States he has more energy today and pain is less. Past Medical History:        Diagnosis Date    Arthritis     CAD (coronary artery disease)     CKD (chronic kidney disease), stage II     Diabetes mellitus ([facility] [address]     Hyperlipidemia     Hypertension     Liver disease     HEPITITIS AS A CHILD       Past Surgical History:        Procedure Laterality Date    CARDIAC SURGERY  11/2019    triple bypass    CYST REMOVAL      RIGHT ANKLE     FOOT AMPUTATION Left 1/10/2023    LEFT BELOW KNEE AMPUTATION performed by Bandar Gomez DPM at [address] Left 4/20/2020    LEFT TRANSMETATARSAL AMPUTATION  FOOT INCISION AND DRAINAGE performed by Bandar Gomez DPM at [address] Left 7/20/2020    LEFT WOUND DEBRIDEMENT WITH WOUND VAC APPLICATION performed by Bandar Gomez DPM at [address] Left 7/1/2022    LEFT FOOT [address], [address] [facility], APPLICATION [employer] WOUND VAC performed by Bandar Gomez DPM at [address] [facility] Right     CYST REMOVED    FOOT SURGERY Left 8/14/2020    LEFT FOOT PREPARATION GRAFT SITE, HAVEST SPLIT THICKNESS SKIN GRAFT WITH APPLICATION, APPLICATION [employer] WOUND VAC performed by Bandar Gomez DPM at [address] Left 3/3/2020    I&D LEFT FOOT, TRANSMETATARSAL AMPUTATION performed by Bandar Gomez DPM at [address] [facility] 11/23/2022    EGD performed by Bakari Jerez MD at [facility]       Home Medications:   No current facility-administered medications on file prior to encounter.      Current Outpatient Medications on File Prior to Encounter   Medication Sig Dispense Refill    HYDROcodone-acetaminophen (NORCO) 5-325 MG per tablet Take 1 tablet by mouth every 4 hours as needed. cloNIDine (CATAPRES) 0.1 MG tablet Take 1 tablet by mouth 2 times daily 60 tablet 3    tiotropium (SPIRIVA RESPIMAT) 2.5 MCG/ACT AERS inhaler Inhale 2 puffs into the lungs daily 4 g 0    benzonatate (TESSALON) 200 MG capsule Take 1 capsule by mouth 3 times daily as needed for Cough (Patient not taking: Reported on 1/10/2023) 30 capsule 0    amLODIPine (NORVASC) 10 MG tablet Take 1 tablet by mouth daily 30 tablet 3    metoprolol tartrate (LOPRESSOR) 50 MG tablet Take 1 tablet by mouth 2 times daily 60 tablet 3    valsartan (DIOVAN) 160 MG tablet Take 1 tablet by mouth daily 30 tablet 3    minoxidil (LONITEN) 10 MG tablet Take 1 tablet by mouth daily 30 tablet 3    ferrous sulfate (IRON 325) 325 (65 Fe) MG tablet Take 1 tablet by mouth 2 times daily 180 tablet 1    furosemide (LASIX) 40 MG tablet Take 1 tablet by mouth daily 90 tablet 1    potassium chloride (KLOR-CON M) 20 MEQ extended release tablet Take 1 tablet by mouth daily 30 tablet 5    empagliflozin (JARDIANCE) 25 MG tablet Take 1 tablet by mouth daily 90 tablet 1    clopidogrel (PLAVIX) 75 MG tablet Take 1 tablet by mouth daily 90 tablet 3    gabapentin (NEURONTIN) 600 MG tablet Take 1 tablet by mouth 2 times daily for 180 days. 180 tablet 1    insulin detemir (LEVEMIR FLEXTOUCH) 100 UNIT/ML injection pen 30 units in the morning, and 30 units in the evening.  21 mL 3    insulin lispro (HUMALOG) 100 UNIT/ML injection vial Inject 5-15 Units into the skin 3 times daily (with meals) 15 mL 3    albuterol sulfate HFA (VENTOLIN HFA) 108 (90 Base) MCG/ACT inhaler Inhale 2 puffs into the lungs 4 times daily as needed for Wheezing 54 g 1    atorvastatin (LIPITOR) 80 MG tablet Take 1 tablet by mouth daily 30 tablet 3    Polyethylene Glycol 3350 (MIRALAX PO) Take by mouth as needed      aspirin 81 MG chewable tablet Take 1 tablet by mouth daily 30 tablet 3    Multiple Vitamins-Minerals (MULTIVITAMIN PO) Take 1 tablet by mouth daily          Allergies:    Patient has no known allergies. Social History:    reports that he quit smoking about 15 years ago. His smoking use included cigarettes. He has never used smokeless tobacco. He reports that he does not currently use alcohol. He reports that he does not use drugs. Family History:       Problem Relation Age of Onset    Diabetes Mother     High Blood Pressure Mother     Alzheimer's Disease Father          of, 80or 80years old    Heart Disease Father     High Blood Pressure Father     Cancer Neg Hx     Stroke Neg Hx        Diet:  ADULT DIET; Regular; 3 carb choices (45 gm/meal); Low Fat/Low Chol/High Fiber/2 gm Na    Review of systems:   Pertinent positives as noted in the HPI. All other systems reviewed and negative. PHYSICAL EXAM:  BP (!) 104/58   Pulse 78   Temp 97.4 °F (36.3 °C) (Oral)   Resp 16   Ht 6' 2\" (1.88 m)   Wt 283 lb 14.4 oz (128.8 kg)   SpO2 90%   BMI 36.45 kg/m²   General appearance: No apparent distress, appears stated age and cooperative. Obese    HEENT: Normal cephalic, atraumatic without obvious deformity. Pallor improved from   Neck: No jugular venous distention. Trachea midline. Respiratory:  Normal respiratory effort.   Lungs clear to auscultation bilaterally-  Cardiovascular: Regular rate and rhythm with normal S1/S2   Abdomen: Hypoactive bowel sounds, abdomen is soft and rounded, nontender to palpation   Musculoskeletal:  Right lower extremity +2 edema, left lower leg  with amputation- wrapped  Skin: Warm and dry  Neurologic:  No focal deficits, bilateral numbness and tingling of right lower extremity as well as left lower extremity, follows commands  Psychiatric: Alert and oriented, thought content appropriate, normal insight  Labs:   Recent Labs     01/10/23  1614 23  0703 23  1218 23  0612   WBC 11.5*  --  13.2* 12.0*   HGB 7.3* 7.2* 6.8* 7.0*   HCT 24.8* 25.1* 23.6* 24.0*   *  --  504* 446* Recent Labs     01/10/23  1614 01/11/23  1218 01/12/23  0612   * 130* 132*   K 3.8  3.8 4.8 4.6   CL 98 95* 98   CO2 22* 22* 23   BUN 14 26* 33*   CREATININE 0.8 1.6* 1.8*   CALCIUM 9.0 8.0* 7.7*       Recent Labs     01/10/23  1614   AST 44*   ALT 49   BILIDIR <0.2   BILITOT 0.5   ALKPHOS 114       No results for input(s): INR in the last 72 hours. No results for input(s): Rhae Childes in the last 72 hours. Urinalysis:    Lab Results   Component Value Date/Time    NITRU NEGATIVE 11/16/2022 01:52 PM    WBCUA 0-2 11/16/2022 01:52 PM    BACTERIA NONE SEEN 11/16/2022 01:52 PM    RBCUA 15-25 11/16/2022 01:52 PM    BLOODU MODERATE 11/16/2022 01:52 PM    GLUCOSEU >= 1000 11/16/2022 01:52 PM       Radiology:   XR ANKLE LEFT (MIN 3 VIEWS)   Final Result         1. Gas density in the soft tissues extending into the lower left leg. 2. Completely dislocated and rotated talus relative to the tibia. 3. Abnormal periosteal reaction in the distal tibia and fibula which can indicate chronic osteomyelitis. 4. Cortical erosion of the distal fibula. **This report has been created using voice recognition software. It may contain minor errors which are inherent in voice recognition technology. **      Final report electronically signed by Dr. Reshma Bautista on 1/10/2023 3:46 PM      XR CHEST PORTABLE   Final Result   1. Normal heart size. Metallic sternotomy sutures from prior surgery. Lungs somewhat hyperinflated, suggesting possible COPD. 2. Evidence for old, healed granulomatous disease. Question mild interstitial fibrosis/pneumonitis both lung bases. No effusion is seen. **This report has been created using voice recognition software. It may contain minor errors which are inherent in voice recognition technology. **      Final report electronically signed by Dr. Brittany Galeana on 1/10/2023 2:42 PM        XR CHEST PORTABLE    Result Date: 1/10/2023  PROCEDURE: XR CHEST PORTABLE CLINICAL INFORMATION: pre op COMPARISON: 11/25/2022 TECHNIQUE: A single mobile view of the chest was obtained. 1. Normal heart size. Metallic sternotomy sutures from prior surgery. Lungs somewhat hyperinflated, suggesting possible COPD. 2. Evidence for old, healed granulomatous disease. Question mild interstitial fibrosis/pneumonitis both lung bases. No effusion is seen. **This report has been created using voice recognition software. It may contain minor errors which are inherent in voice recognition technology. ** Final report electronically signed by Dr. Keyonna Self on 1/10/2023 2:42 PM        EKG: Normal sinus rhythm, Left axis deviation, Right bundle branch block, Inferior infarct , age undetermined Abnormal ECG.  When compared with ECG of 25-NOV-2022 04:21,  Right bundle branch block has replaced Intraventricular conduction delay    Electronically signed by Rhoda Palacio PA-C on 1/12/2023 at 7:40 AM            .

## 2023-01-12 NOTE — PROGRESS NOTES
Physical Medicine & Rehabilitation   Progress Note    Chief Complaint:  Left BKA    Subjective:  Patient seen today, resting in bed. Family present including mother. Discussed IPR, precert with insurance. Encouraged extension with knee and hip throughout the day. Patient with ongoing anemia, 7.0 today. Will monitor. Rehabilitation:  PT: Reviewed. OT: Reviewed. Review of Systems:  CONSTITUTIONAL:  positive for  fatigue  EYES:  negative for  double vision, blurred vision, blind spots, and visual disturbance  HEENT:  negative for  hearing loss  RESPIRATORY:  negative for  dry cough, dyspnea, and wheezing  CARDIOVASCULAR:  negative for  chest pain, dyspnea, palpitations  GASTROINTESTINAL:  positive for constipation  GENITOURINARY:  negative  SKIN:  abrasions.  Left BKA incision  HEMATOLOGIC/LYMPHATIC:  positive for swelling/edema  MUSCULOSKELETAL:  positive for  muscle weakness  NEUROLOGICAL:  positive for gait problems, weakness, and pain  BEHAVIOR/PSYCH:  negative  System review otherwise negative      Objective:  /66   Pulse 84   Temp 98 °F (36.7 °C) (Oral)   Resp 18   Ht 6' 2\" (1.88 m)   Wt 283 lb 14.4 oz (128.8 kg)   SpO2 95%   BMI 36.45 kg/m²   awake  Orientation:   person, place, time  Mood: within normal limits  Affect: spontaneous  General appearance: Patient is well nourished, well developed, well groomed and in no acute distress, obese, appearing older than stated age     Memory:   normal,   Attention/Concentration: normal  Language:  normal     Cranial Nerves:  cranial nerves II-XII are grossly intact  ROM:  abnormal - left leg  Motor Exam:  Motor exam is 5 out of 5 all extremities with the exception of LLE not tested and right EF 3+ out of 5  Tone: normal  Muscle bulk: abnormal decreased right bicep  Sensory:  Sensory intact except decreased sensation from distal rght knee to foot, absent sensation of the right toes  Coordination:   normal BUE    Skin: warm and dry, no rash or erythema and scattered abrasions noted. Left BKA incision with ACE wrap in place. Healed Skin grafting site to left thigh  Peripheral vascular: Pulses: Normal upper and lower extremity pulses excluding left leg; Edema: 1+ right foot      Diagnostics:   Recent Results (from the past 24 hour(s))   Basic Metabolic Panel w/ Reflex to MG    Collection Time: 01/11/23 12:18 PM   Result Value Ref Range    Sodium 130 (L) 135 - 145 meq/L    Potassium reflex Magnesium 4.8 3.5 - 5.2 meq/L    Chloride 95 (L) 98 - 111 meq/L    CO2 22 (L) 23 - 33 meq/L    Glucose 324 (H) 70 - 108 mg/dL    BUN 26 (H) 7 - 22 mg/dL    Creatinine 1.6 (H) 0.4 - 1.2 mg/dL    Calcium 8.0 (L) 8.5 - 10.5 mg/dL   CBC    Collection Time: 01/11/23 12:18 PM   Result Value Ref Range    WBC 13.2 (H) 4.8 - 10.8 thou/mm3    RBC 2.96 (L) 4.70 - 6.10 mill/mm3    Hemoglobin 6.8 (LL) 14.0 - 18.0 gm/dl    Hematocrit 23.6 (L) 42.0 - 52.0 %    MCV 79.7 (L) 80.0 - 94.0 fL    MCH 23.0 (L) 26.0 - 33.0 pg    MCHC 28.8 (L) 32.2 - 35.5 gm/dl    RDW-CV 19.2 (H) 11.5 - 14.5 %    RDW-SD 55.3 (H) 35.0 - 45.0 fL    Platelets 351 (H) 460 - 400 thou/mm3    MPV 9.2 (L) 9.4 - 12.4 fL    Pathologist Review DENIS MALIK     Anion Gap    Collection Time: 01/11/23 12:18 PM   Result Value Ref Range    Anion Gap 13.0 8.0 - 16.0 meq/L   Glomerular Filtration Rate, Estimated    Collection Time: 01/11/23 12:18 PM   Result Value Ref Range    Est, Glom Filt Rate 49 (A) >60 ml/min/1.73m2   Scan of Blood Smear    Collection Time: 01/11/23 12:18 PM   Result Value Ref Range    SCAN OF BLOOD SMEAR see below    TYPE AND SCREEN    Collection Time: 01/11/23  1:44 PM   Result Value Ref Range    ABO O     Rh Factor POS     Antibody Screen NEG    Lactic Acid    Collection Time: 01/11/23  2:34 PM   Result Value Ref Range    Lactic Acid 1.6 0.5 - 2.0 mmol/L   POCT glucose    Collection Time: 01/11/23  4:00 PM   Result Value Ref Range    POC Glucose 492 (H) 70 - 108 mg/dl   POCT Glucose    Collection Time: 01/11/23  6:10 PM   Result Value Ref Range    POC Glucose 477 (H) 70 - 108 mg/dl   POCT glucose    Collection Time: 01/11/23  7:28 PM   Result Value Ref Range    POC Glucose 410 (H) 70 - 108 mg/dl   POCT Glucose    Collection Time: 01/11/23  9:20 PM   Result Value Ref Range    POC Glucose 286 (H) 70 - 108 mg/dl   POCT glucose    Collection Time: 01/12/23 12:12 AM   Result Value Ref Range    POC Glucose 239 (H) 70 - 108 mg/dl   POCT glucose    Collection Time: 01/12/23  3:27 AM   Result Value Ref Range    POC Glucose 244 (H) 70 - 108 mg/dl   POCT Glucose    Collection Time: 01/12/23  5:59 AM   Result Value Ref Range    POC Glucose 304 (H) 70 - 108 mg/dl   Basic Metabolic Panel w/ Reflex to MG    Collection Time: 01/12/23  6:12 AM   Result Value Ref Range    Sodium 132 (L) 135 - 145 meq/L    Potassium reflex Magnesium 4.6 3.5 - 5.2 meq/L    Chloride 98 98 - 111 meq/L    CO2 23 23 - 33 meq/L    Glucose 252 (H) 70 - 108 mg/dL    BUN 33 (H) 7 - 22 mg/dL    Creatinine 1.8 (H) 0.4 - 1.2 mg/dL    Calcium 7.7 (L) 8.5 - 10.5 mg/dL   CBC    Collection Time: 01/12/23  6:12 AM   Result Value Ref Range    WBC 12.0 (H) 4.8 - 10.8 thou/mm3    RBC 2.92 (L) 4.70 - 6.10 mill/mm3    Hemoglobin 7.0 (LL) 14.0 - 18.0 gm/dl    Hematocrit 24.0 (L) 42.0 - 52.0 %    MCV 82.2 80.0 - 94.0 fL    MCH 24.0 (L) 26.0 - 33.0 pg    MCHC 29.2 (L) 32.2 - 35.5 gm/dl    RDW-CV 18.9 (H) 11.5 - 14.5 %    RDW-SD 55.5 (H) 35.0 - 45.0 fL    Platelets 431 (H) 764 - 400 thou/mm3    MPV 9.4 9.4 - 12.4 fL   Vancomycin Level, Random    Collection Time: 01/12/23  6:12 AM   Result Value Ref Range    Vancomycin Rm 18.6 0.1 - 39.9 ug/mL   Anion Gap    Collection Time: 01/12/23  6:12 AM   Result Value Ref Range    Anion Gap 11.0 8.0 - 16.0 meq/L   Glomerular Filtration Rate, Estimated    Collection Time: 01/12/23  6:12 AM   Result Value Ref Range    Est, Glom Filt Rate 42 (A) >60 ml/min/1.73m2   POCT Glucose    Collection Time: 01/12/23 11:05 AM   Result Value Ref Range POC Glucose 246 (H) 70 - 108 mg/dl       Impression:  Necrotizing wound infection left foot with pathologic fracture of left tibia and abscess of the left ankle  S/p Left Proximal symes/distal transtibial amputation with  mL by Dr Sundeep Contreras on 1/10/23  Previous Left TMA in 2021  Type 2 diabetes mellitus: Uncontrolled  HTN  Chronic diastolic heart failure  Hyperlipidemia  CAD s/p CABG  Moderate persistent asthma  Hx of PAD  Lupus  Chronic normocytic anemia  Right bicep atrophy, ?d/t hyperextension injury to right arm 2022. Plan:  Continue current therapies  POD #2. Hgb 7.0 today, requiring blood transfusion  On IV ATB, length insurance coverage for home? Ok to start precert for IPR  Patient is appropriate for IPR for the diagnosis of Left BKA  Patient require active and ongoing intervention of multiple therapy disciplines of PT, OT  Patient can participate in and does require an intensive rehabilitation therapy program, generally consisting of 3 hours of therapy per day at least 5 days per week  Patient is expected to actively participate in, and benefit significantly from, the intensive rehabilitation therapy program (the patients condition and functional status are such that the patient can reasonably be expected to make measurable improvement, expected to be made within a prescribed period of time and as a result of the intensive rehabilitation therapy program, that will be of practical value to improve the patients functional capacity or adaptation to impairments)  Patient requires physician supervision by a rehabilitation physician, with face-to-face visits at least 3 days per week to assess the patient both medically and functionally and to modify the course of treatment as needed. Medical conditions to include for management include : post op anemia with transfusion needs, IV ATB, wound care, CHF, HLD, asthma, uncontrolled DM type 2.    Require an intensive and coordinated interdisciplinary team approach to the delivery of rehabilitative care.     Missed Therapy Time:  None    Omer York, APRN - CNP

## 2023-01-12 NOTE — PROGRESS NOTES
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 Pt reports he was amb very little, using his scooter or w/c most of the time    What is treatment plan? Planning antibiotic plan with ID. Disciplines Required upon Admission to Inpatient Rehabilitation: Physical Therapy and Occupational Therapy  Post operative: Yes  Fall: No  Dialysis: No  Diet: ADULT DIET; Regular; 3 carb choices (45 gm/meal); Low Fat/Low Chol/High Fiber/2 gm Na  Discussed patient with  and PM&R provider: Await medical work up completion. Confirming antibiotic plan with ID. Update:  1227  Per Dr. Sakina Hamilton, per Jonn Hou CM no plans for long term antibiotics switch to p.o at discharge.

## 2023-01-12 NOTE — PLAN OF CARE
Problem: Discharge Planning  Goal: Discharge to home or other facility with appropriate resources  1/12/2023 0639 by Erin Halsted, RN  Outcome: Progressing  1/11/2023 1729 by Estelle Joshi RN  Outcome: Progressing  Flowsheets (Taken 1/11/2023 1729)  Discharge to home or other facility with appropriate resources:   Identify barriers to discharge with patient and caregiver   Identify discharge learning needs (meds, wound care, etc)   Refer to discharge planning if patient needs post-hospital services based on physician order or complex needs related to functional status, cognitive ability or social support system   Arrange for needed discharge resources and transportation as appropriate     Problem: Pain  Goal: Verbalizes/displays adequate comfort level or baseline comfort level  1/12/2023 0639 by Erin Halsted, RN  Outcome: Progressing  1/11/2023 1729 by Estelle Joshi RN  Outcome: Progressing  Flowsheets (Taken 1/11/2023 1729)  Verbalizes/displays adequate comfort level or baseline comfort level:   Encourage patient to monitor pain and request assistance   Administer analgesics based on type and severity of pain and evaluate response   Consider cultural and social influences on pain and pain management   Assess pain using appropriate pain scale   Implement non-pharmacological measures as appropriate and evaluate response   Notify Licensed Independent Practitioner if interventions unsuccessful or patient reports new pain     Problem: Safety - Adult  Goal: Free from fall injury  1/12/2023 0639 by Erin Halsted, RN  Outcome: Progressing  1/11/2023 1729 by Estelle Joshi RN  Outcome: Adequate for Discharge  Flowsheets (Taken 1/11/2023 1729)  Free From Fall Injury: Instruct family/caregiver on patient safety     Problem: ABCDS Injury Assessment  Goal: Absence of physical injury  1/12/2023 0639 by Erin Halsted, RN  Outcome: Progressing  1/11/2023 1729 by Estelle Joshi RN  Outcome: Adequate for Discharge  Flowsheets (Taken 1/11/2023 1729)  Absence of Physical Injury: Implement safety measures based on patient assessment     Problem: Infection - Adult  Goal: Absence of infection during hospitalization  1/12/2023 0639 by Nabeel Chaudhary RN  Outcome: Progressing  1/11/2023 1729 by Hilton Honeycutt RN  Outcome: Progressing  Flowsheets (Taken 1/11/2023 1729)  Absence of infection during hospitalization:   Assess and monitor for signs and symptoms of infection   Monitor lab/diagnostic results   Monitor all insertion sites i.e., indwelling lines, tubes and drains   Administer medications as ordered   Instruct and encourage patient and family to use good hand hygiene technique     Problem: Skin/Tissue Integrity  Goal: Absence of new skin breakdown  Description: 1. Monitor for areas of redness and/or skin breakdown  2. Assess vascular access sites hourly  3. Every 4-6 hours minimum:  Change oxygen saturation probe site  4. Every 4-6 hours:  If on nasal continuous positive airway pressure, respiratory therapy assess nares and determine need for appliance change or resting period.   1/12/2023 0639 by Nabeel Chaudhary RN  Outcome: Progressing  1/11/2023 1729 by Hilton Honeycutt RN  Outcome: Progressing

## 2023-01-12 NOTE — PROGRESS NOTES
Progress note: Infectious diseases    Patient - Jono Chambers,  Age - 65 y.o.    - 1960      Room Number - 7K-04/004-A   MRN -  [id number]   Acct # - [de-identified]  Date of Admission -  1/10/2023 12:28 PM    SUBJECTIVE:   He feels much better, denies any fever or chills  OBJECTIVE   VITALS    height is 6' 2\" (1.88 m) and weight is 283 lb 14.4 oz (128.8 kg). His oral temperature is 97.8 °F (36.6 °C). His blood pressure is 120/63 and his pulse is 88. His respiration is 18 and oxygen saturation is 94%. Wt Readings from Last 3 Encounters:   23 283 lb 14.4 oz (128.8 kg)   22 (!) 310 lb (140.6 kg)   22 (!) 310 lb (140.6 kg)       I/O (24 Hours)    Intake/Output Summary (Last 24 hours) at 2023 1336  Last data filed at 2023 1329  Gross per 24 hour   Intake 1800 ml   Output 2150 ml   Net -350 ml         General Appearance: awake and oriented  HEENT - normocephalic, atraumatic, pale conjunctiva,  anicteric sclera  Neck - Supple, no mass  Lungs -  Bilateral   air entry, no rhonchi, no wheeze. Cardiovascular - Heart sounds are normal.     Abdomen - soft, not distended, nontender,   Neurologic -oriented  Skin - No bruising or bleeding  Extremities - dressed left BKA. no redness, no draiange    MEDICATIONS:      insulin glargine  33 Units SubCUTAneous BID    insulin lispro  0-16 Units SubCUTAneous TID     insulin lispro  0-4 Units SubCUTAneous Nightly    vancomycin  1,250 mg IntraVENous Once    amLODIPine  10 mg Oral Daily    atorvastatin  80 mg Oral Daily    ferrous sulfate  325 mg Oral BID    furosemide  40 mg Oral Daily    gabapentin  600 mg Oral BID    minoxidil  10 mg Oral Daily    multivitamin  1 tablet Oral Daily    potassium chloride  20 mEq Oral Daily    valsartan  160 mg Oral Daily    vancomycin (VANCOCIN) intermittent dosing (placeholder)   Other RX Placeholder    Sodium Hypochlorite Irrigation Daily    piperacillin-tazobactam  3,375 mg IntraVENous q8h    aspirin  81 mg Oral Daily    clopidogrel  75 mg Oral Daily    tiotropium  2 puff Inhalation Daily    clindamycin (CLEOCIN) IV  600 mg IntraVENous Q8H    cloNIDine  0.1 mg Oral BID    metoprolol tartrate  50 mg Oral BID    sodium chloride flush  5-40 mL IntraVENous 2 times per day      dextrose      sodium chloride       polyethylene glycol, acetaminophen **OR** acetaminophen, albuterol sulfate HFA, glucose, dextrose bolus **OR** dextrose bolus, glucagon (rDNA), dextrose, sodium chloride flush, sodium chloride, ondansetron **OR** ondansetron, oxyCODONE **OR** oxyCODONE, morphine **OR** morphine      LABS:     CBC:   Recent Labs     01/10/23  1614 01/11/23  0703 01/11/23  1218 01/12/23  0612 01/12/23  1214   WBC 11.5*  --  13.2* 12.0*  --    HGB 7.3*   < > 6.8* 7.0* 7.3*   *  --  504* 446*  --     < > = values in this interval not displayed. BMP:    Recent Labs     01/10/23  1614 01/11/23  1218 01/12/23  0612   * 130* 132*   K 3.8  3.8 4.8 4.6   CL 98 95* 98   CO2 22* 22* 23   BUN 14 26* 33*   CREATININE 0.8 1.6* 1.8*   GLUCOSE 204* 324* 252*       Calcium:  Recent Labs     01/12/23  0612   CALCIUM 7.7*       Ionized Calcium:No results for input(s): IONCA in the last 72 hours. Magnesium:  Recent Labs     01/10/23  1614   MG 2.0       Phosphorus:No results for input(s): PHOS in the last 72 hours. BNP:No results for input(s): BNP in the last 72 hours. Glucose:  Recent Labs     01/12/23  0327 01/12/23  0559 01/12/23  1105   POCGLU 244* 304* 246*       HgbA1C:   Recent Labs     01/10/23  1614   LABA1C 8.3*       INR: No results for input(s): INR in the last 72 hours.   Hepatic:   Recent Labs     01/10/23  1614   ALKPHOS 114   ALT 49   AST 44*   PROT 6.7   BILITOT 0.5   BILIDIR <0.2   LABALBU 2.4*       Amylase and Lipase:  Recent Labs     01/11/23  1434   LACTA 1.6     Lactic Acid:   Recent Labs     01/11/23  1434   LACTA 1.6 Troponin: No results for input(s): CKTOTAL, CKMB, TROPONINI in the last 72 hours. BNP: No results for input(s): BNP in the last 72 hours. CULTURES:   UA: No results for input(s): SPECGRAV, PHUR, COLORU, CLARITYU, MUCUS, PROTEINU, BLOODU, RBCUA, WBCUA, BACTERIA, NITRU, GLUCOSEU, BILIRUBINUR, UROBILINOGEN, KETUA, LABCAST, LABCASTTY, AMORPHOS in the last 72 hours. Invalid input(s): CRYSTALS  Micro:   Lab Results   Component Value Date/Time    BC No growth 24 hours. 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    Sepsis ([facility] [address] A41.9    Acute left-sided low back pain with bilateral sciatica M54.42, M54.41    S/P transmetatarsal amputation of foot, left ([facility]) Z89.432    Leukocytosis D72.829    Wound dehiscence, surgical, initial encounter T81.31XA    Postoperative wound infection E59.06QZ    Metabolic acidosis L60.39    Hx of CABG Z95.1    Lumbar stenosis without neurogenic claudication M48.061    CKD (chronic kidney disease), stage II N18.2    Normocytic anemia D64.9    Morbid obesity ([facility]) E66.01    Debility R53.81    Carotid stenosis, asymptomatic, bilateral I65.23    Hypokalemia E87.6    Nonhealing ulcer of left lower extremity ([facility] [address] L97.929    Bleeding R58    Wound, open T14. 8XXA    SOB (shortness of breath) on exertion R06.02    Hemoglobin A1C greater than 9%, indicating poor diabetic control R73.09    Hypertensive emergency I16.1    Acute on chronic congestive heart failure ([facility]) I50.9    Hypertensive urgency I16.0    Moderate persistent asthma J45.40    Septic arthritis of left ankle, due to unspecified organism (HCC) M00.9    Closed fracture of ankle with nonunion S82.899K    S/P BKA (below knee amputation), left ([facility]) M79.345         ASSESSMENT/PLAN   Necrotizing left foot/ankle infection: had urgent BKA. He is feeling better.   Poorly controlled diabetes  CAD  Continue current antibiotics         Eden Jin MD, MD, FACP 1/12/2023 1:36 PM

## 2023-01-12 NOTE — PROGRESS NOTES
[facility]  Occupational Therapy  Daily Note  Time:   Time In: 0804  Time Out: 9483  Timed Code Treatment Minutes: 48 Minutes  Minutes: 53          Date: 2023  Patient Name: Zelphia Buerger,   Gender: male      Room: [facility]-A  MRN: [id number]  : 1960  (65 y.o.)  Referring Practitioner: Speedy Solorzano DPM  Diagnosis: Sepsis  Additional Pertinent Hx: The patient is a 65 y.o. male with significant past medical history of CAD, CKD, diabetes, hyperlipidemia, and hypertension who is being seen at bedside on behalf of Dr. Cristobal Mittal. Patient relates that he has had ongoing problems with his left foot and ankle over the course of the past few years. 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 He relates that he follows up and sees Dr. Cristobal Mittal in clinic weekly. He relates that he missed his appointment last week and did not have the dressing changed. The patient states that the previous week he had x-rays taken in clinic that showed a pathologic fracture of the left tibia. He relates that he has not been walking on his left lower extremity. He relates that he has applied some pressure when using the restroom on that leg. He states that amputation has been in discussion at his previous clinic encounters. The patient relates that he last a last evening at 8 or 9 PM.  He relates that he drank Crystal light tea around 8 or 9 AM this morning. He relates that he took Plavix this morning at 6 AM.  The patient denies any other concerns to his right lower extremity. The patient denies nausea, vomiting, fever, chills, shortness of breath or chest pain.     Restrictions/Precautions:  Restrictions/Precautions: General Precautions, Weight Bearing, Fall Risk  Left Lower Extremity Weight Bearing: Non Weight Bearing (BKA)  Position Activity Restriction  Other position/activity restrictions: L BKA     SUBJECTIVE: Patient supine in bed upon arrival; agreeable to therapy this date. Patient initially declining OOB as patient notes I need to work on my upper body strength which will make me stronger to get out of bed. Patient provided minimal encouragement up to w/c with education regarding importance of OOB activity, patient agreeable. Nursing ok'd session    PAIN: 7/10:     Vitals: Blood pressure: 129/66  Heart rate: 86bpm    COGNITION: Decreased Problem Solving and Decreased Safety Awareness    ADL:   Grooming: Minimal Assistance. Wash/brush hair  Bathing: Stand By Assistance, with set-up, and with increased time for completion. UB seated w/c  Upper Extremity Dressing: Minimal Assistance. Lower Extremity Dressing: Minimal Assistance. Clothing management . BALANCE:  Sitting Balance:  Supervision. BED MOBILITY:  Supine to Sit: Stand By Assistance, X 1, with head of bed raised, with rail, with increased time for completion    Scooting: Stand By Assistance, X 1, with rail, with increased time for completion      TRANSFERS:  Sliding Board: Stand By Assistance, [employer] Assistance, X 2, with increased time for completion, cues for hand placement, with verbal cues, EOB to w/c transferring to R side, Max A for placement of board. FUNCTIONAL MOBILITY:  Assistive Device: Wheelchair  Assist Level:  Stand By Assistance, Maximum Assistance, X 1, and with increased time for completion. Distance:  within hallway with 1 rest break requiring assistance in order to return to room    ASSESSMENT:     Activity Tolerance:  Patient tolerance of  treatment: good.        Discharge Recommendations: Inpatient Rehabilitation  Equipment Recommendations: Equipment Needed: Yes  Other: TTB, slideboard, drop arm commode  Plan: Times Per Week: 6x  Current Treatment Recommendations: Strengthening, Balance training, Functional mobility training, Endurance training, Patient/Caregiver education & training, Safety education & training, Self-Care / ADL, Home management training    Patient Education  Patient Education: Role of OT, Plan of Care, ADL's, IADL's, Energy Conservation, Home Exercise Program, Precautions, Equipment Education, Home Safety, and Importance of Increasing Activity    Goals  Short Term Goals  Time Frame for Short Term Goals: by discharge  Short Term Goal 1: Pt will complete grooming routine while seated EOB with Supervision and no VC for technique to incrase indep with self care  Short Term Goal 2: Pt will complete slideboard t/f with consistent CGA and min VC for placement of board to increase indep with toileting routine  Short Term Goal 3: Pt will demo indep with BUE strengthening HEP to increase overall UB strength/endurance for ease with t/fs  Short Term Goal 4: OTR to assess sit to stand/stand-pivot/and functional mobility when appropriate    Following session, patient left in safe position with all fall risk precautions in place.

## 2023-01-12 NOTE — PROGRESS NOTES
Podiatric Progress Note  Lorna Ellsworth  Subjective :     1/12/23  Patient seen bedside today on behalf of Dr. Roman Burdick. Patient is 2 days s/p Proximal Symes amputation/distal transtibial amputation, left (DOS 1/10). Patient appeared pleasant, was oriented to person, place and time and in no acute distress. Patient relates that his pain is a 6/7 compared to a 8/9 from yesterday. He relates that the pain is well controlled with pain medication. He relates that he plans to go to  Rehab at time of discharge due to [facility] not having a bed available. Patient denies any N/V/F/C/SOB or CP. Patient has no further pedal concerns at this point in time. 1/11/23  Patient seen bedside today on behalf of Dr. Roman Burdick. Patient is 1 day s/p Proximal Symes amputation/distal transtibial amputation, left (DOS 1/10). Patient appeared pleasant, was oriented to person, place and time and in no acute distress. Patient relates that he has some pain, which is controlled with pain medication. He relates that he did not eat dinner last evening due to him not returning to his room late after surgery. He relates that he plans to go to [facility] at time of discharge for rehab. Patient denies any N/V/F/C/SOB or CP. Patient has no further pedal concerns at this point in time. 1/10/23  HISTORY OF PRESENT ILLNESS:                 The patient is a 65 y.o. male with significant past medical history of CAD, CKD, diabetes, hyperlipidemia, and hypertension who is being seen at bedside on behalf of Dr. Roman Burdick. Patient relates that he has had ongoing problems with his left foot and ankle over the course of the past few years. 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 He relates that he follows up and sees Dr. Roman Burdick in clinic weekly. He relates that he missed his appointment last week and did not have the dressing changed.   The patient states that the previous week he had x-rays taken in clinic that showed a pathologic fracture of the left tibia. He relates that he has not been walking on his left lower extremity. He relates that he has applied some pressure when using the restroom on that leg. He states that amputation has been in discussion at his previous clinic encounters. The patient relates that he last a last evening at 8 or 9 PM.  He relates that he drank Crystal light tea around 8 or 9 AM this morning. He relates that he took Plavix this morning at 6 AM.  The patient denies any other concerns to his right lower extremity. The patient denies nausea, vomiting, fever, chills, shortness of breath or chest pain.         Current Medications:    Current Facility-Administered Medications: 0.9 % sodium chloride infusion, , IntraVENous, PRN  polyethylene glycol (GLYCOLAX) packet 17 g, 17 g, Oral, Daily PRN  acetaminophen (TYLENOL) tablet 650 mg, 650 mg, Oral, Q6H PRN **OR** acetaminophen (TYLENOL) suppository 650 mg, 650 mg, Rectal, Q6H PRN  amLODIPine (NORVASC) tablet 10 mg, 10 mg, Oral, Daily  atorvastatin (LIPITOR) tablet 80 mg, 80 mg, Oral, Daily  albuterol sulfate HFA (PROVENTIL;VENTOLIN;PROAIR) 108 (90 Base) MCG/ACT inhaler 2 puff, 2 puff, Inhalation, 4x Daily PRN  ferrous sulfate (IRON 325) tablet 325 mg, 325 mg, Oral, BID  furosemide (LASIX) tablet 40 mg, 40 mg, Oral, Daily  gabapentin (NEURONTIN) tablet 600 mg, 600 mg, Oral, BID  minoxidil (LONITEN) tablet 10 mg, 10 mg, Oral, Daily  multivitamin 1 tablet, 1 tablet, Oral, Daily  potassium chloride (KLOR-CON M) extended release tablet 20 mEq, 20 mEq, Oral, Daily  valsartan (DIOVAN) tablet 160 mg, 160 mg, Oral, Daily  vancomycin (VANCOCIN) intermittent dosing (placeholder), , Other, RX Placeholder  Sodium Hypochlorite (DAKINS) 0.25 % external solution, , Irrigation, Daily  glucose chewable tablet 16 g, 4 tablet, Oral, PRN  dextrose bolus 10% 125 mL, 125 mL, IntraVENous, PRN **OR** dextrose bolus 10% 250 mL, 250 mL, IntraVENous, PRN  glucagon (rDNA) injection 1 mg, 1 mg, SubCUTAneous, PRN  dextrose 10 % infusion, , IntraVENous, Continuous PRN  piperacillin-tazobactam (ZOSYN) 3,375 mg in dextrose 5 % 50 mL IVPB (mini-bag), 3,375 mg, IntraVENous, q8h  aspirin chewable tablet 81 mg, 81 mg, Oral, Daily  clopidogrel (PLAVIX) tablet 75 mg, 75 mg, Oral, Daily  tiotropium (SPIRIVA RESPIMAT) 2.5 MCG/ACT inhaler 2 puff, 2 puff, Inhalation, Daily  clindamycin (CLEOCIN) 600 mg in dextrose 5 % 50 mL IVPB, 600 mg, IntraVENous, Q8H  cloNIDine (CATAPRES) tablet 0.1 mg, 0.1 mg, Oral, BID  metoprolol tartrate (LOPRESSOR) tablet 50 mg, 50 mg, Oral, BID  insulin glargine (LANTUS) injection vial 30 Units, 30 Units, SubCUTAneous, BID  insulin lispro (HUMALOG) injection vial 0-8 Units, 0-8 Units, SubCUTAneous, TID WC  insulin lispro (HUMALOG) injection vial 0-4 Units, 0-4 Units, SubCUTAneous, Nightly  [Held by provider] vancomycin (VANCOCIN) 1250 mg in dextrose 5 % 250 mL IVPB, 1,250 mg, IntraVENous, Q12H  sodium chloride flush 0.9 % injection 5-40 mL, 5-40 mL, IntraVENous, 2 times per day  sodium chloride flush 0.9 % injection 5-40 mL, 5-40 mL, IntraVENous, PRN  0.9 % sodium chloride infusion, , IntraVENous, PRN  ondansetron (ZOFRAN-ODT) disintegrating tablet 4 mg, 4 mg, Oral, Q8H PRN **OR** ondansetron (ZOFRAN) injection 4 mg, 4 mg, IntraVENous, Q6H PRN  oxyCODONE (ROXICODONE) immediate release tablet 5 mg, 5 mg, Oral, Q4H PRN **OR** oxyCODONE (ROXICODONE) immediate release tablet 10 mg, 10 mg, Oral, Q4H PRN  morphine (PF) injection 2 mg, 2 mg, IntraVENous, Q2H PRN **OR** morphine injection 4 mg, 4 mg, IntraVENous, Q2H PRN    Objective     BP (!) 104/58   Pulse 78   Temp 97.4 °F (36.3 °C) (Oral)   Resp 16   Ht 6' 2\" (1.88 m)   Wt 283 lb 14.4 oz (128.8 kg)   SpO2 90%   BMI 36.45 kg/m²      I/O:  Intake/Output Summary (Last 24 hours) at 1/12/2023 0636  Last data filed at 1/12/2023 0501  Gross per 24 hour   Intake 1480 ml   Output 2200 ml   Net -720 ml                Wt Readings from Last 3 Encounters:   01/11/23 283 lb 14.4 oz (128.8 kg)   12/08/22 (!) 310 lb (140.6 kg)   12/08/22 (!) 310 lb (140.6 kg)       LABS:    Recent Labs     01/10/23  1614 01/11/23  0703 01/11/23  1218   WBC 11.5*  --  13.2*   HGB 7.3* 7.2* 6.8*   HCT 24.8* 25.1* 23.6*   *  --  504*          Recent Labs     01/11/23  1218   *   K 4.8   CL 95*   CO2 22*   BUN 26*   CREATININE 1.6*          Recent Labs     01/10/23  1614   PROT 6.7        No results for input(s): CKTOTAL, CKMB, CKMBINDEX, TROPONINI in the last 72 hours.    Focused Lower Extremity Examination:    Vitals:    BP (!) 104/58   Pulse 78   Temp 97.4 °F (36.3 °C) (Oral)   Resp 16   Ht 6' 2\" (1.88 m)   Wt 283 lb 14.4 oz (128.8 kg)   SpO2 90%   BMI 36.45 kg/m²        Vascular: CFT WNL to left amputation stump. Edema present to the Left stump which is consistent with post op state. Leg and thigh are warm.      Dermatologic: The Proximal Symes amputation/distal transtibial amputation site of the LLE is well coapted with sutures and staples. There is no sign of dehiscence. There is minimal to no sanguinous drainage. Negative for necrosis or ecchymosis.      Neurovascular: Light touch sensation diminished bilaterally.      Musculoskeletal: s/p Proximal Symes amputation/distal transtibial amputation of LLE. Tender to touch of LLE.                      Images  XR ANKLE LEFT (MIN 3 VIEWS)   Final Result         1. Gas density in the soft tissues extending into the lower left leg.   2. Completely dislocated and rotated talus relative to the tibia.   3. Abnormal periosteal reaction in the distal tibia and fibula which can indicate chronic osteomyelitis.   4. Cortical erosion of the distal fibula.               **This report has been created using voice recognition software. It may contain minor errors which are inherent in voice recognition technology.**      Final report electronically signed by Dr. Barbi Soto  on 1/10/2023 3:46 PM      XR CHEST PORTABLE   Final Result   1. Normal heart size. Metallic sternotomy sutures from prior surgery. Lungs somewhat hyperinflated, suggesting possible COPD. 2. Evidence for old, healed granulomatous disease. Question mild interstitial fibrosis/pneumonitis both lung bases. No effusion is seen. **This report has been created using voice recognition software. It may contain minor errors which are inherent in voice recognition technology. **      Final report electronically signed by Dr. Jose Arora on 1/10/2023 2:42 PM          ASSESSMENT: Pt. is a 65 y.o. 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    Sepsis ([address]    Acute left-sided low back pain with bilateral sciatica    S/P transmetatarsal amputation of foot, left ([facility])    Leukocytosis    Wound dehiscence, surgical, initial encounter    Postoperative wound infection    Metabolic acidosis    Hx of CABG    Lumbar stenosis without neurogenic claudication    CKD (chronic kidney disease), stage II    Normocytic anemia    Morbid obesity ([address]    Debility    Carotid stenosis, asymptomatic, bilateral    Hypokalemia    Nonhealing ulcer of left lower extremity (HCC)    Bleeding    Wound, open    SOB (shortness of breath) on exertion    Hemoglobin A1C greater than 9%, indicating poor diabetic control    Hypertensive emergency    Acute on chronic congestive heart failure (HCC)    Hypertensive urgency    Moderate persistent asthma    Septic arthritis of left ankle, due to unspecified organism ([facility])    Closed fracture of ankle with nonunion       PLAN:     -Patient initially examined and evaluated  -WBC 13.2 (1/11); Afebrile  -CBC & BMP ordered daily  -Pharmacy to dose vancomycin and Zosyn and Clindamycin; per ID  -Culture anaerobic and aerobic; pending  -Blood cultures; preliminary: no growth  -X-ray left ankle ordered; see read above  -Changed dressing today and incision was evaluated; applied betadine, gauze, ABD, Kerlix, and ACE wraps  -Dressing to be reinforced by nursing; order placed  -NWB to LLE  -Hospitalist following; preop restratification completed and management of comorbidities   -Infectious disease following; appreciate antibiotic recommendation  -Consult placed to social work and case management for discharge planning; Patient planning on going to [address] [place] [address] placed to PT and OT for evaluation and treatment  -Cardiology following; patient has extensive cardiac history  -All of patient's questions and concerns addressed at today's encounter.   -Discussed with patient that his amputation incision site is coapting well. Related to patient that we are waiting for pre-cert to IP Rehab. Patient understands and is in agreement with the plan. Chronic     Diabetes mellitus  -Continue homes medication  -Hospitalist consulted     CKD, Stage 3  -Continue homes medication  -Hospitalist consulted     CAD  -Continue homes medication  -Hospitalist consulted  -Cardiology consulted     Hyperlipidemia   -Continue homes medication  -Hospitalist consulted     Hypertension  -Continue homes medication  -Hospitalist consulted  -Cardiology consulted         DISPO: Pending response to IV abx, pending WBC trend and culture results. Pending discharge to IP Rehab.      David Ramon [id number]  1/12/2023   6:36 AM

## 2023-01-12 NOTE — PROGRESS NOTES
[facility]  INPATIENT REHABILITATION UNIT  PRECERTIFICATION TEMPLATE    Date of Admission: 1/10/2023 12:28 PM    Patient Name: Jeb Shipman      MRN: [id number]    : 1960  (65 y.o.)  Gender: male     Payor Source: Payor: [employer] / Plan: BCBS - [place] PPO / Product Type: *No Product type* /   Secondary Payor Source:      Isolation Status: No active isolations    Precert initiated for Inpatient Rehab Admission at [address] [place] [address].     Reference Number: Transaction ID: [id number] ID: [id number] Date: 2023  Tamie Steven Patient  Member ID  [id number]    Date of Birth      Gender  NA    Eligibility Status  A    Group Number  [id number]    Plan / Coverage Date  2022 -     Transaction Type  Inpatient Authorization    Organization  [address] logo    Route of Precertification Transaction: [employer]

## 2023-01-12 NOTE — PLAN OF CARE
Problem: Discharge Planning  Goal: Discharge to home or other facility with appropriate resources  1/12/2023 1517 by Viky Coleman RN  Outcome: Progressing  Flowsheets (Taken 1/12/2023 1517)  Discharge to home or other facility with appropriate resources: Identify barriers to discharge with patient and caregiver  Note: Patient discharge planning is in progress, pt plans to go to inpatient rehab vs home     Problem: Pain  Goal: Verbalizes/displays adequate comfort level or baseline comfort level  1/12/2023 1517 by Viky Coleman RN  Outcome: Progressing  Flowsheets (Taken 1/12/2023 1517)  Verbalizes/displays adequate comfort level or baseline comfort level:   Encourage patient to monitor pain and request assistance   Assess pain using appropriate pain scale  Note: Patient taking pain medication on MAR as needed to control pain. Use of non-pharmacologic pain management including repositioning. Patient pain goal is 2/10. Problem: Safety - Adult  Goal: Free from fall injury  1/12/2023 1517 by Viky Coleman RN  Outcome: Progressing  Flowsheets  Taken 1/12/2023 1517  Free From Fall Injury: Instruct family/caregiver on patient safety  Taken 1/12/2023 1509  Free From Fall Injury: Instruct family/caregiver on patient safety  Note: Patient up with staff assistance. Able to use call light. Patient has remained free of falls during this shift. Appropriate fall prevention measures in place. Problem: ABCDS Injury Assessment  Goal: Absence of physical injury  1/12/2023 1517 by Viky Coleman RN  Outcome: Progressing  Flowsheets (Taken 1/12/2023 1517)  Absence of Physical Injury: Implement safety measures based on patient assessment  Note: Patient has remained free of physical injury during this shift. Patient has call light within reach, bed alarm on.        Problem: Infection - Adult  Goal: Absence of infection during hospitalization  1/12/2023 1517 by Viky Coleman RN  Outcome: Progressing  Flowsheets (Taken 1/12/2023 1517)  Absence of infection during hospitalization: Assess and monitor for signs and symptoms of infection  Note: Patient receiving Iv antibiotics around the clock for infection. No s/s of infection at this time     Problem: Skin/Tissue Integrity  Goal: Absence of new skin breakdown  Description: 1. Monitor for areas of redness and/or skin breakdown  2. Assess vascular access sites hourly  3. Every 4-6 hours minimum:  Change oxygen saturation probe site  4. Every 4-6 hours:  If on nasal continuous positive airway pressure, respiratory therapy assess nares and determine need for appliance change or resting period. 1/12/2023 1517 by Calvin Dunn RN  Outcome: Progressing  Note: Patient had left BKA, intact dry dressing. Patient repositions self in bed, waffle cushion in wheelchair. Problem: Chronic Conditions and Co-morbidities  Goal: Patient's chronic conditions and co-morbidity symptoms are monitored and maintained or improved  Outcome: Progressing  Flowsheets (Taken 1/12/2023 1517)  Care Plan - Patient's Chronic Conditions and Co-Morbidity Symptoms are Monitored and Maintained or Improved: Monitor and assess patient's chronic conditions and comorbid symptoms for stability, deterioration, or improvement  Note: Patient stable at this time, BS being managed with insulin coverage    Care plan reviewed with patient. Patient verbalize understanding of the plan of care and contribute to goal setting.

## 2023-01-12 NOTE — PROGRESS NOTES
[facility]  INPATIENT PHYSICAL THERAPY  DAILY NOTE  [facility] ORTHOPEDICS 7K - 7K-04/004-A    Time In: 0752  Time Out: 1104  Timed Code Treatment Minutes: 35 Minutes  Minutes: 35          Date: 2023  Patient Name: Liu Bowling,  Gender:  male        MRN: [id number]  : 1960  (65 y.o.)     Referring Practitioner: Priscilla Corrigan DPM  Diagnosis: sepsis  Additional Pertinent Hx: per EMR \"The patient is a 65 y.o. male with significant past medical history of CAD, CKD, diabetes, hyperlipidemia, and hypertension who is being seen at bedside on behalf of Dr. Lawanda Taveras. Patient relates that he has had ongoing problems with his left foot and ankle over the course of the past few years. 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 He relates that he follows up and sees Dr. Lawanda Taveras in clinic weekly. He relates that he missed his appointment last week and did not have the dressing changed. The patient states that the previous week he had x-rays taken in clinic that showed a pathologic fracture of the left tibia. He relates that he has not been walking on his left lower extremity. He relates that he has applied some pressure when using the restroom on that leg. He states that amputation has been in discussion at his previous clinic encounters. The patient relates that he last a last evening at 8 or 9 PM.  He relates that he drank Crystal light tea around 8 or 9 AM this morning. He relates that he took Plavix this morning at 6 AM.  The patient denies any other concerns to his right lower extremity. The patient denies nausea, vomiting, fever, chills, shortness of breath or chest pain. \" Pt is s/p L BKA on 1/10/23 completed by Dr. Lawanda Taveras.      Prior Level of Function:  Lives With: Alone  Type of Home: House  Home Layout: One level  Home Access: Ramped entrance  Home Equipment: Electric scooter, [address] Shower/Tub: Walk-in shower, Shower chair with back  Bathroom Toilet: Handicap height  Bathroom Equipment: Grab bars in shower, Grab bars around toilet    Receives Help From: Friend(s)  ADL Assistance: Independent  Homemaking Assistance: Needs assistance  Homemaking Responsibilities: Yes  Ambulation Assistance: Independent  Transfer Assistance: Independent  Active : No  IADL Comments: He has a robert who works for him - worker's wife makes meals from that he only has to heat up in microwave and assists with cleaning tasks  Additional Comments: Pt reported that he has a very supportive family who can be available intermittently throughout the day upon discharge home. Pt reports he was amb very little, using his scooter or w/c most of the time    Restrictions/Precautions:  Restrictions/Precautions: General Precautions, Weight Bearing, Fall Risk  Left Lower Extremity Weight Bearing: Non Weight Bearing (BKA)  Position Activity Restriction  Other position/activity restrictions: L BKA     SUBJECTIVE: RN approved session. Pt in [facility] upon arrival and agrees to therapy. Pt initially irritable due to sitting in WC longer than he wanted to, pt reporting his buttocks is sore from sitting     PAIN: 7/10: L stump     Vitals: Oxygen: 87-95% pursed lip breathing cues provided with SOB   Heart Rate: 88    OBJECTIVE:  Bed Mobility:  Rolling to Left: Contact Guard Assistance, with head of bed flat, with rail, with verbal cues , with increased time for completion   Rolling to Right: Contact Guard Assistance, with rail, with verbal cues , with increased time for completion   Sit to Supine: Moderate Assistance, X 2, with head of bed flat, with rail, with verbal cues , with increased time for completion   Scooting: Dependent, to boost to HO using hercules sheet    Transfers:  Slide Board:Contact Guard Assistance, Minimal Assistance, X 2, with increased time for completion, cues for hand placement, with verbal cues, WC>EOB toward R side, max A to place and remove board.  Juan A small scoots to achieve, cues for ant weight shift and hand placement    Exercise:  Patient was guided in 1 set(s) 10 reps of exercise to both lower extremities. General LE therex for RLE and amputation therex on L, L hip flexor stretching. Cues/education for positioning/technique . Exercises were completed for increased independence with functional mobility. Functional Outcome Measures: Completed  AM-PAC Inpatient Mobility without Stair Climbing Raw Score : 10  AM-PAC Inpatient without Stair Climbing T-Scale Score : 34.07    ASSESSMENT:  Assessment: Patient progressing toward established goals. Activity Tolerance:  Patient tolerance of  treatment: good. Limited by pain, pt able to work through it well. Pt demos decreased strength, endurance, and independence with mobility. Pt unsteady on feet and requires hands on assist for safety with mobility. Pt will benefit from cont PT at this time to ensure safety, to decrease the risk for falls and to return to PLOF. Equipment Recommendations:Equipment Needed: No Pt demos decreased strength, endurance, and independence with mobility. Pt unsteady on feet and requires hands on assist for safety with mobility. Pt will benefit from cont PT at this time to ensure safety, to decrease the risk for falls and to return to PLOF.    Discharge Recommendations: Inpatient Rehabilitation  Plan: Current Treatment Recommendations: Strengthening, Balance training, Functional mobility training, Transfer training, Endurance training, Equipment evaluation, education, & procurement, Patient/Caregiver education & training, Neuromuscular re-education, Safety education & training, Home exercise program, Therapeutic activities  General Plan:  (6x O)    Patient Education  Patient Education: Plan of Care, Precautions/Restrictions, Bed Mobility, Transfers, Verbal Exercise Instruction, positioning, pursed lip breathing    Goals:  Patient Goals : \"be able to get in and out bed and w/c with less assist\"  Short Term Goals  Time Frame for Short Term Goals: by discharge  Short Term Goal 1: Pt will demo supine to and from sit transfers with SBA and modifications as needed to progress with mobility. Short Term Goal 2: Pt will demo slide board transfers to and from w/c with mod Ax1 to progress with mobility. Short Term Goal 3: Pt will tolerate 10-20 reps of ther ex to increase overall mobility. Short Term Goal 4: Pt will demo S for w/c mobility for 100 feet to increase IND with mobility. Short Term Goal 5: PT to assess sit to/from stand transfers when appropriate. Long Term Goals  Time Frame for Long Term Goals : NA due to short ELOS    Following session, patient left in safe position with all fall risk precautions in place.

## 2023-01-12 NOTE — CARE COORDINATION
1/12/23, 12:29 PM EST    DISCHARGE ON GOING EVALUATION    Aniceto Prosper day: 2  Location: -04/004-A Reason for admit: Sepsis [facility]) [A41.9]  Septic arthritis of left ankle, due to unspecified organism [facility]) [M00.9]   Procedure: 1/10: Left Proximal symes/distal transtibial amputation   Barriers to Discharge: awaiting IPR precert approval, IV Cleocin q8hr, IV Zosyn q8hr, PT/OT following, ID following-no long term plan for IV ATB  PCP: EYAD Villanueva - CNP  Readmission Risk Score: 25.4%  Patient Goals/Plan/Treatment Preferences: Precert started for IPR per Jan.

## 2023-01-12 NOTE — PROGRESS NOTES
[address] [facility] Pharmacokinetic Monitoring Service - Vancomycin    Consulting Provider: Piyush Cunningham   Indication: foot/ankle infection  Target Concentration: Goal AUC/TARIQ 400-600 mg*hr/L  Day of Therapy: 3  Additional Antimicrobials: clindamycin, Zosyn    Pertinent Laboratory Values: Wt Readings from Last 1 Encounters:   01/11/23 283 lb 14.4 oz (128.8 kg)     Temp Readings from Last 1 Encounters:   01/12/23 97.4 °F (36.3 °C) (Oral)     Estimated Creatinine Clearance: 61 mL/min (A) (based on SCr of 1.8 mg/dL (H)). Recent Labs     01/11/23  1218 01/12/23  0612   CREATININE 1.6* 1.8*   WBC 13.2* 12.0*     Procalcitonin: no result    Pertinent Cultures:  Culture Date Source Results   1/10/23 Foot tissue Gram neg bacilli   1/10/23 Foot swab Proteus, gram + cocci   MRSA Nasal Swab: N/A. Non-respiratory infection.     Recent vancomycin administrations                     vancomycin (VANCOCIN) 1250 mg in dextrose 5 % 250 mL IVPB (mg) 1,250 mg New Bag 01/11/23 1451     1,250 mg New Bag  0200    vancomycin (VANCOCIN) 1,750 mg in dextrose 5 % 500 mL IVPB (mg) 1,750 mg New Bag 01/10/23 1453                    Assessment:  Date/Time Current Dose Concentration Timing of Concentration (h) AUC   1/10/23 On hold- was 1250 mg IV every 12 hours 18.6 15 h 21 m Estimated to be 922 if continued at every 12 hours   Note: Serum concentrations collected for AUC dosing may appear elevated if collected in close proximity to the dose administered, this is not necessarily an indication of toxicity    Plan:  Current dosing regimen is supra-therapeutic  Give a one time dose of 1250 mg vancomycin IV  Repeat vancomycin concentration ordered for 1/13/23 @ 0600   Pharmacy will continue to monitor patient and adjust therapy as indicated    Thank you for the consult,  Kanwal Ann PharmD, [facility] 1/12/2023 8:41 AM

## 2023-01-12 NOTE — PROGRESS NOTES
[facility]  PREADMISSION CONVERSATION WITH PATIENT/FAMILY    Date of Admission: 1/10/2023 12:28 PM    Patient Name: Yudi Sadler      MRN: [id number]    : 1960  (65 y.o.)  Gender: male     Payor Source: Payor: BCBS / Plan: BCBS - [place] PPO / Product Type: *No Product type* /   Secondary Payor Source:      Isolation Status: No active isolations    Spoke with patient and family explained acute rehabilitation philosophy, including fact that rehab consists of inpatient stay at [address] [place] [address]. Rehabilitation unit care includes 24-hour nursing care, oversight by physician, 3 hours of therapy 6 days a week, individualized treatment plan. Explained how benefits through insurance provider works. Discussed patient readiness for admission to Inpatient Rehab based. All medical testing must be completed and must be medically stable to be discharged from acute hospital setting to the acute inpatient rehabilitation unit. Patient and family agree with planned admission to acute inpatient rehab upon insurance approval and medical stability for admission. Patient and family able to explain reason for admission to acute inpatient rehab. Spoke with patient and family members present explained precert process. Await precert approval and bed availability.

## 2023-01-13 LAB
AEROBIC CULTURE: ABNORMAL
ANAEROBIC CULTURE: ABNORMAL
ANAEROBIC CULTURE: ABNORMAL
ANION GAP SERPL CALCULATED.3IONS-SCNC: 10 MEQ/L (ref 8–16)
BUN BLDV-MCNC: 30 MG/DL (ref 7–22)
CALCIUM SERPL-MCNC: 8 MG/DL (ref 8.5–10.5)
CHLORIDE BLD-SCNC: 101 MEQ/L (ref 98–111)
CO2: 24 MEQ/L (ref 23–33)
CREAT SERPL-MCNC: 1.7 MG/DL (ref 0.4–1.2)
ERYTHROCYTE [DISTWIDTH] IN BLOOD BY AUTOMATED COUNT: 19.5 % (ref 11.5–14.5)
ERYTHROCYTE [DISTWIDTH] IN BLOOD BY AUTOMATED COUNT: 55.8 FL (ref 35–45)
GFR SERPL CREATININE-BSD FRML MDRD: 45 ML/MIN/1.73M2
GLUCOSE BLD-MCNC: 144 MG/DL (ref 70–108)
GLUCOSE BLD-MCNC: 196 MG/DL (ref 70–108)
GLUCOSE BLD-MCNC: 199 MG/DL (ref 70–108)
GLUCOSE BLD-MCNC: 225 MG/DL (ref 70–108)
GLUCOSE BLD-MCNC: 272 MG/DL (ref 70–108)
GRAM STAIN RESULT: ABNORMAL
GRAM STAIN RESULT: ABNORMAL
HCT VFR BLD CALC: 25.5 % (ref 42–52)
HEMOGLOBIN: 7.5 GM/DL (ref 14–18)
MCH RBC QN AUTO: 23.9 PG (ref 26–33)
MCHC RBC AUTO-ENTMCNC: 29.4 GM/DL (ref 32.2–35.5)
MCV RBC AUTO: 81.2 FL (ref 80–94)
ORGANISM: ABNORMAL
PLATELET # BLD: 473 THOU/MM3 (ref 130–400)
PMV BLD AUTO: 9.3 FL (ref 9.4–12.4)
POTASSIUM REFLEX MAGNESIUM: 4.4 MEQ/L (ref 3.5–5.2)
RBC # BLD: 3.14 MILL/MM3 (ref 4.7–6.1)
SODIUM BLD-SCNC: 135 MEQ/L (ref 135–145)
VANCOMYCIN RANDOM: 18.1 UG/ML (ref 0.1–39.9)
WBC # BLD: 10 THOU/MM3 (ref 4.8–10.8)

## 2023-01-13 PROCEDURE — 6370000000 HC RX 637 (ALT 250 FOR IP): Performed by: PHYSICIAN ASSISTANT

## 2023-01-13 PROCEDURE — 97530 THERAPEUTIC ACTIVITIES: CPT

## 2023-01-13 PROCEDURE — 85027 COMPLETE CBC AUTOMATED: CPT

## 2023-01-13 PROCEDURE — 6360000002 HC RX W HCPCS

## 2023-01-13 PROCEDURE — 80202 ASSAY OF VANCOMYCIN: CPT

## 2023-01-13 PROCEDURE — 97110 THERAPEUTIC EXERCISES: CPT

## 2023-01-13 PROCEDURE — 1200000003 HC TELEMETRY R&B

## 2023-01-13 PROCEDURE — 94640 AIRWAY INHALATION TREATMENT: CPT

## 2023-01-13 PROCEDURE — 2580000003 HC RX 258

## 2023-01-13 PROCEDURE — 36415 COLL VENOUS BLD VENIPUNCTURE: CPT

## 2023-01-13 PROCEDURE — 82948 REAGENT STRIP/BLOOD GLUCOSE: CPT

## 2023-01-13 PROCEDURE — 6370000000 HC RX 637 (ALT 250 FOR IP)

## 2023-01-13 PROCEDURE — 2500000003 HC RX 250 WO HCPCS

## 2023-01-13 PROCEDURE — 94669 MECHANICAL CHEST WALL OSCILL: CPT

## 2023-01-13 PROCEDURE — 6370000000 HC RX 637 (ALT 250 FOR IP): Performed by: STUDENT IN AN ORGANIZED HEALTH CARE EDUCATION/TRAINING PROGRAM

## 2023-01-13 PROCEDURE — 1200000000 HC SEMI PRIVATE

## 2023-01-13 PROCEDURE — 80048 BASIC METABOLIC PNL TOTAL CA: CPT

## 2023-01-13 PROCEDURE — 99232 SBSQ HOSP IP/OBS MODERATE 35: CPT | Performed by: PHYSICIAN ASSISTANT

## 2023-01-13 PROCEDURE — 97542 WHEELCHAIR MNGMENT TRAINING: CPT

## 2023-01-13 RX ADMIN — CLONIDINE HYDROCHLORIDE 0.1 MG: 0.1 TABLET ORAL at 05:35

## 2023-01-13 RX ADMIN — AMLODIPINE BESYLATE 10 MG: 10 TABLET ORAL at 09:21

## 2023-01-13 RX ADMIN — METOPROLOL TARTRATE 50 MG: 50 TABLET, FILM COATED ORAL at 05:35

## 2023-01-13 RX ADMIN — ASPIRIN 81 MG 81 MG: 81 TABLET ORAL at 09:13

## 2023-01-13 RX ADMIN — OXYCODONE 10 MG: 5 TABLET ORAL at 22:24

## 2023-01-13 RX ADMIN — MINOXIDIL 10 MG: 2.5 TABLET ORAL at 09:22

## 2023-01-13 RX ADMIN — INSULIN LISPRO 4 UNITS: 100 INJECTION, SOLUTION INTRAVENOUS; SUBCUTANEOUS at 11:50

## 2023-01-13 RX ADMIN — GABAPENTIN 600 MG: 600 TABLET, FILM COATED ORAL at 22:24

## 2023-01-13 RX ADMIN — OXYCODONE 10 MG: 5 TABLET ORAL at 16:39

## 2023-01-13 RX ADMIN — SODIUM CHLORIDE, PRESERVATIVE FREE 10 ML: 5 INJECTION INTRAVENOUS at 22:26

## 2023-01-13 RX ADMIN — SODIUM CHLORIDE: 9 INJECTION, SOLUTION INTRAVENOUS at 00:20

## 2023-01-13 RX ADMIN — OXYCODONE 10 MG: 5 TABLET ORAL at 01:50

## 2023-01-13 RX ADMIN — ATORVASTATIN CALCIUM 80 MG: 80 TABLET, FILM COATED ORAL at 09:21

## 2023-01-13 RX ADMIN — PIPERACILLIN AND TAZOBACTAM 3375 MG: 3; .375 INJECTION, POWDER, FOR SOLUTION INTRAVENOUS at 09:30

## 2023-01-13 RX ADMIN — FERROUS SULFATE TAB 325 MG (65 MG ELEMENTAL FE) 325 MG: 325 (65 FE) TAB at 22:25

## 2023-01-13 RX ADMIN — GABAPENTIN 600 MG: 600 TABLET, FILM COATED ORAL at 09:21

## 2023-01-13 RX ADMIN — Medication 1 TABLET: at 09:20

## 2023-01-13 RX ADMIN — POLYETHYLENE GLYCOL 3350 17 G: 17 POWDER, FOR SOLUTION ORAL at 09:13

## 2023-01-13 RX ADMIN — PIPERACILLIN AND TAZOBACTAM 3375 MG: 3; .375 INJECTION, POWDER, FOR SOLUTION INTRAVENOUS at 01:47

## 2023-01-13 RX ADMIN — CLINDAMYCIN PHOSPHATE 600 MG: 600 INJECTION, SOLUTION INTRAVENOUS at 09:24

## 2023-01-13 RX ADMIN — OXYCODONE 10 MG: 5 TABLET ORAL at 07:06

## 2023-01-13 RX ADMIN — POTASSIUM CHLORIDE 20 MEQ: 1500 TABLET, EXTENDED RELEASE ORAL at 09:13

## 2023-01-13 RX ADMIN — CLINDAMYCIN PHOSPHATE 600 MG: 600 INJECTION, SOLUTION INTRAVENOUS at 00:21

## 2023-01-13 RX ADMIN — PIPERACILLIN AND TAZOBACTAM 3375 MG: 3; .375 INJECTION, POWDER, FOR SOLUTION INTRAVENOUS at 16:41

## 2023-01-13 RX ADMIN — INSULIN LISPRO 8 UNITS: 100 INJECTION, SOLUTION INTRAVENOUS; SUBCUTANEOUS at 16:53

## 2023-01-13 RX ADMIN — CLONIDINE HYDROCHLORIDE 0.1 MG: 0.1 TABLET ORAL at 22:25

## 2023-01-13 RX ADMIN — CLOPIDOGREL BISULFATE 75 MG: 75 TABLET ORAL at 09:13

## 2023-01-13 RX ADMIN — Medication 1250 MG: at 14:27

## 2023-01-13 RX ADMIN — FERROUS SULFATE TAB 325 MG (65 MG ELEMENTAL FE) 325 MG: 325 (65 FE) TAB at 09:21

## 2023-01-13 RX ADMIN — INSULIN GLARGINE 33 UNITS: 100 INJECTION, SOLUTION SUBCUTANEOUS at 13:50

## 2023-01-13 RX ADMIN — VALSARTAN 160 MG: 160 TABLET ORAL at 09:20

## 2023-01-13 RX ADMIN — TIOTROPIUM BROMIDE INHALATION SPRAY 2 PUFF: 3.12 SPRAY, METERED RESPIRATORY (INHALATION) at 07:55

## 2023-01-13 RX ADMIN — INSULIN GLARGINE 33 UNITS: 100 INJECTION, SOLUTION SUBCUTANEOUS at 22:26

## 2023-01-13 RX ADMIN — FUROSEMIDE 40 MG: 40 TABLET ORAL at 09:21

## 2023-01-13 RX ADMIN — METOPROLOL TARTRATE 50 MG: 50 TABLET, FILM COATED ORAL at 22:24

## 2023-01-13 RX ADMIN — OXYCODONE 10 MG: 5 TABLET ORAL at 11:50

## 2023-01-13 ASSESSMENT — PAIN DESCRIPTION - ONSET
ONSET: ON-GOING
ONSET: ON-GOING

## 2023-01-13 ASSESSMENT — PAIN DESCRIPTION - PAIN TYPE
TYPE: SURGICAL PAIN
TYPE: SURGICAL PAIN

## 2023-01-13 ASSESSMENT — PAIN SCALES - GENERAL
PAINLEVEL_OUTOF10: 7
PAINLEVEL_OUTOF10: 8
PAINLEVEL_OUTOF10: 6
PAINLEVEL_OUTOF10: 7
PAINLEVEL_OUTOF10: 5
PAINLEVEL_OUTOF10: 7

## 2023-01-13 ASSESSMENT — PAIN DESCRIPTION - LOCATION
LOCATION: LEG

## 2023-01-13 ASSESSMENT — PAIN DESCRIPTION - ORIENTATION
ORIENTATION: LEFT

## 2023-01-13 ASSESSMENT — PAIN DESCRIPTION - FREQUENCY
FREQUENCY: INTERMITTENT
FREQUENCY: INTERMITTENT

## 2023-01-13 ASSESSMENT — PAIN DESCRIPTION - DESCRIPTORS
DESCRIPTORS: BURNING
DESCRIPTORS: ACHING
DESCRIPTORS: ACHING

## 2023-01-13 NOTE — CARE COORDINATION
1/13/23, 2:16 PM EST    DISCHARGE ON GOING EVALUATION    Ramesh Hernan day: 3  Location: -04/004-A Reason for admit: Sepsis [facility]) [A41.9]  Septic arthritis of left ankle, due to unspecified organism [facility]) [M00.9]   Procedure:   1/10 Left Proximal symes/distal transtibial amputation with  mL by Dr Jason Hernandez  Barriers to Discharge: POD 3, await precert, up to wheelchair with PT/OT, Stand by assist. ID follows antibiotic plan. Vanco, Zosyn, and Cleocin IV. Pain control  PCP: EYAD Blakely CNP  Readmission Risk Score: 25.8%  Patient Goals/Plan/Treatment Preferences:IP rehab with precert started by Mane WILKERSON.

## 2023-01-13 NOTE — PROGRESS NOTES
[address] [address]  INPATIENT PHYSICAL THERAPY  DAILY NOTE  [facility] ORTHOPEDICS 7K - 7K-04/004-A    Time In: 3932  Time Out: 0845  Timed Code Treatment Minutes: 50 Minutes  Minutes: 48          Date: 2023  Patient Name: Romana Knudsen,  Gender:  male        MRN: [id number]  : 1960  (65 y.o.)     Referring Practitioner: oHla Naranjo DPM  Diagnosis: sepsis  Additional Pertinent Hx: per EMR \"The patient is a 65 y.o. male with significant past medical history of CAD, CKD, diabetes, hyperlipidemia, and hypertension who is being seen at bedside on behalf of Dr. Gordon Dennis. Patient relates that he has had ongoing problems with his left foot and ankle over the course of the past few years. 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 He relates that he follows up and sees Dr. Gordon Dennis in clinic weekly. He relates that he missed his appointment last week and did not have the dressing changed. The patient states that the previous week he had x-rays taken in clinic that showed a pathologic fracture of the left tibia. He relates that he has not been walking on his left lower extremity. He relates that he has applied some pressure when using the restroom on that leg. He states that amputation has been in discussion at his previous clinic encounters. The patient relates that he last a last evening at 8 or 9 PM.  He relates that he drank Crystal light tea around 8 or 9 AM this morning. He relates that he took Plavix this morning at 6 AM.  The patient denies any other concerns to his right lower extremity. The patient denies nausea, vomiting, fever, chills, shortness of breath or chest pain. \" Pt is s/p L BKA on 1/10/23 completed by Dr. Gordon Dennis.      Prior Level of Function:  Lives With: Alone  Type of Home: House  Home Layout: One level  Home Access: Ramped entrance  Home Equipment: Electric scooter, [id number] Shower/Tub: Walk-in shower, Shower chair with back  Bathroom Toilet: Handicap height  Bathroom Equipment: Grab bars in shower, Grab bars around toilet    Receives Help From: Friend(s)  ADL Assistance: Independent  Homemaking Assistance: Needs assistance  Homemaking Responsibilities: Yes  Ambulation Assistance: Independent  Transfer Assistance: Independent  Active : No  IADL Comments: He has a robert who works for him - worker's wife makes meals from that he only has to heat up in microwave and assists with cleaning tasks  Additional Comments: Pt reported that he has a very supportive family who can be available intermittently throughout the day upon discharge home. Pt reports he was amb very little, using his scooter or w/c most of the time    Restrictions/Precautions:  Restrictions/Precautions: General Precautions, Weight Bearing, Fall Risk  Left Lower Extremity Weight Bearing: Non Weight Bearing (BKA)  Position Activity Restriction  Other position/activity restrictions: L BKA     SUBJECTIVE: RN approved session. Pt in bed upon arrival and agrees to therapy. Pt pleasant and cooperative. Pt drowsy and forgetful at times during session- pt states he thinks it is because of the pain medication    PAIN: 6/10: L stump    Vitals: Oxygen: 88% upon arrival, after a couple deep breaths increased to 94%- monitored throughout session  Heart Rate: 68    OBJECTIVE:  Bed Mobility:  Rolling to Left: Stand By Assistance   Rolling to Right: Stand By Assistance   Supine to Sit: Stand By Assistance, with rail  Scooting: Stand By Assistance  Rolled each direction to don shorts for tfer and to R again for positional stretch, all completed with bed flat and use of rails    Transfers:  Slide Board:Stand By Assistance, [address], X 2 for safety, with increased time for completion, cues for hand placement, with verbal cues, from EOB>WC toward R side, mod A to place and remove board, SBA of 1 and SBA/CGA of 1 to scoot across board.  Mult small scoots noted to achieve, cues for ant weight shift. Wheelchair Mobility:  Stand By Assistance, with increased time for completion  Extremities Used: Bilateral Upper Extremities  Type of Chair:Manual  Surface: Level Tile  Distance: 60ft  Quality: Slow Velocity, Short Strokes, and Decreased Fluidity    Exercise:  Patient was guided in 1 set(s) 10 reps of exercise to both lower extremities. General LE on R and LLE amputee therex in supine. Also completed positional L hip flexor stretch 3 min hold . Exercises were completed for increased independence with functional mobility. Functional Outcome Measures: Completed  AM-PAC Inpatient Mobility without Stair Climbing Raw Score : 11  AM-PAC Inpatient without Stair Climbing T-Scale Score : 35.66    ASSESSMENT:  Assessment: Patient progressing toward established goals. Activity Tolerance:  Patient tolerance of  treatment: good. However fatigued and forgetful at times during session, Pt demos decreased strength, endurance, and independence with mobility. requires hands on assist for safety with mobility. Pt will benefit from cont PT at this time to ensure safety, to decrease the risk for falls and to return to OF.       Equipment Recommendations:Equipment Needed: No  Discharge Recommendations: Inpatient Rehabilitation  Plan: Current Treatment Recommendations: Strengthening, Balance training, Functional mobility training, Transfer training, Endurance training, Equipment evaluation, education, & procurement, Patient/Caregiver education & training, Neuromuscular re-education, Safety education & training, Home exercise program, Therapeutic activities  General Plan:  (6x O)    Patient Education  Patient Education: Plan of Care, Bed Mobility, Transfers, Wheelchair Mobility, Verbal Exercise Instruction, positioning, pursed lip breathing    Goals:  Patient Goals : \"be able to get in and out bed and w/c with less assist\"  Short Term Goals  Time Frame for Short Term Goals: by discharge  Short Term Goal 1: Pt will demo supine to and from sit transfers with SBA and modifications as needed to progress with mobility. Short Term Goal 2: Pt will demo slide board transfers to and from w/c with mod Ax1 to progress with mobility. Short Term Goal 3: Pt will tolerate 10-20 reps of ther ex to increase overall mobility. Short Term Goal 4: Pt will demo S for w/c mobility for 100 feet to increase IND with mobility. Short Term Goal 5: PT to assess sit to/from stand transfers when appropriate. Long Term Goals  Time Frame for Long Term Goals : NA due to short ELOS    Following session, patient left in safe position with all fall risk precautions in place.

## 2023-01-13 NOTE — PROGRESS NOTES
[address] [facility] Pharmacokinetic Monitoring Service - Vancomycin    Consulting Provider: Priscilla Corrigan DPM   Indication: L septic ankle with abscess  Target Concentration: Goal AUC/TARIQ 400-600 mg*hr/L  Day of Therapy: 4  Additional Antimicrobials: zosyn + clinda    Pertinent Laboratory Values: Wt Readings from Last 1 Encounters:   01/11/23 283 lb 14.4 oz (128.8 kg)     Temp Readings from Last 1 Encounters:   01/13/23 97.3 °F (36.3 °C) (Oral)     Estimated Creatinine Clearance: 65 mL/min (A) (based on SCr of 1.7 mg/dL (H)). Recent Labs     01/12/23  0612 01/13/23  0554   CREATININE 1.8* 1.7*   WBC 12.0* 10.0     Procalcitonin: none    Pertinent Cultures:  Culture Date Source Results   1/10/23 Foot tissue Proteus & Morganella   1/10/23 Foot swab Proteus, Morganella, & Enterococcus    MRSA Nasal Swab: N/A. Non-respiratory infection. Recent vancomycin administrations                     vancomycin (VANCOCIN) 1250 mg in dextrose 5 % 250 mL IVPB (mg) 1,250 mg New Bag 01/12/23 1218    vancomycin (VANCOCIN) 1250 mg in dextrose 5 % 250 mL IVPB (mg) 1,250 mg New Bag 01/11/23 1451     1,250 mg New Bag  0200    vancomycin (VANCOCIN) 1,750 mg in dextrose 5 % 500 mL IVPB (mg) 1,750 mg New Bag 01/10/23 1453        Assessment:  Date/Time Current Dose Concentration Timing of Concentration (h) AUC   1/13/23 0554 Received 1250 mg 1/12 18.1 ~17.5 hr 471   Note: Serum concentrations collected for AUC dosing may appear elevated if collected in close proximity to the dose administered, this is not necessarily an indication of toxicity    Plan:  Schedule vancomycin 1250 mg IV every 24 hours as SCr is stabilizing  Repeat vancomycin concentration planned in 1-2 days unless SCr changes significantly.   Pharmacy will continue to monitor patient and adjust therapy as indicated    Thank you for the consult,  Negar Omalley, PharmD, BCPS, BCCCP  1/13/2023 12:45 PM

## 2023-01-13 NOTE — PROGRESS NOTES
Per [employer] website the precert is \"PENDED\". Will communicate on Monday 1/16/2023 with CM team regarding this referral.            Deshawn Card Patient  Member ID  [id number]    Date of Birth  [id number]    Gender  NA    Transaction Type  Inpatient Authorization    Organization  [address] [facility] logo      Certificate Information  Certification Number  [id number]    Status  PENDED    Review Reason 1  Initial Utilization Review In Progress.

## 2023-01-13 NOTE — RT PROTOCOL NOTE
RT Inhaler-Nebulizer Bronchodilator Protocol Note    There is a bronchodilator order in the chart from a provider indicating to follow the RT Bronchodilator Protocol and there is an Initiate RT Inhaler-Nebulizer Bronchodilator Protocol order as well (see protocol at bottom of note). CXR Findings:  No results found. The findings from the last RT Protocol Assessment were as follows:   History Pulmonary Disease: Smoker 15 pack years or more  Respiratory Pattern: Regular pattern and RR 12-20 bpm  Breath Sounds: Slightly diminished and/or crackles  Cough: Strong, spontaneous, non-productive  Indication for Bronchodilator Therapy: Decreased or absent breath sounds  Bronchodilator Assessment Score: 3    Aerosolized bronchodilator medication orders have been revised according to the RT Inhaler-Nebulizer Bronchodilator Protocol below. Respiratory Therapist to perform RT Therapy Protocol Assessment initially then follow the protocol. Repeat RT Therapy Protocol Assessment PRN for score 0-3 or on second treatment, BID, and PRN for scores above 3. No Indications - adjust the frequency to every 6 hours PRN wheezing or bronchospasm, if no treatments needed after 48 hours then discontinue using Per Protocol order mode. If indication present, adjust the RT bronchodilator orders based on the Bronchodilator Assessment Score as indicated below. Use Inhaler orders unless patient has one or more of the following: on home nebulizer, not able to hold breath for 10 seconds, is not alert and oriented, cannot activate and use MDI correctly, or respiratory rate 25 breaths per minute or more, then use the equivalent nebulizer order(s) with same Frequency and PRN reasons based on the score. If a patient is on this medication at home then do not decrease Frequency below that used at home.     0-3 - enter or revise RT bronchodilator order(s) to equivalent RT Bronchodilator order with Frequency of every 4 hours PRN for wheezing or increased work of breathing using Per Protocol order mode. 4-6 - enter or revise RT Bronchodilator order(s) to two equivalent RT bronchodilator orders with one order with BID Frequency and one order with Frequency of every 4 hours PRN wheezing or increased work of breathing using Per Protocol order mode. 7-10 - enter or revise RT Bronchodilator order(s) to two equivalent RT bronchodilator orders with one order with TID Frequency and one order with Frequency of every 4 hours PRN wheezing or increased work of breathing using Per Protocol order mode. 11-13 - enter or revise RT Bronchodilator order(s) to one equivalent RT bronchodilator order with QID Frequency and an Albuterol order with Frequency of every 4 hours PRN wheezing or increased work of breathing using Per Protocol order mode. Greater than 13 - enter or revise RT Bronchodilator order(s) to one equivalent RT bronchodilator order with every 4 hours Frequency and an Albuterol order with Frequency of every 2 hours PRN wheezing or increased work of breathing using Per Protocol order mode. RT to enter RT Home Evaluation for COPD & MDI Assessment order using Per Protocol order mode.     Electronically signed by Irina Stuart RCP on 1/13/2023 at 8:04 AM

## 2023-01-13 NOTE — PLAN OF CARE
Problem: Discharge Planning  Goal: Discharge to home or other facility with appropriate resources  1/13/2023 1727 by Dada Celaya RN  Outcome: Progressing  Flowsheets (Taken 1/13/2023 1725)  Discharge to home or other facility with appropriate resources: Identify barriers to discharge with patient and caregiver  Note: Patient discharge planning is in progress, pt plans to go to inpatient rehab vs home     Problem: Pain  Goal: Verbalizes/displays adequate comfort level or baseline comfort level  1/13/2023 1727 by Dada Celaya RN  Outcome: Progressing  Flowsheets (Taken 1/13/2023 1727)  Verbalizes/displays adequate comfort level or baseline comfort level:   Encourage patient to monitor pain and request assistance   Assess pain using appropriate pain scale  Note: Patient taking pain medication on MAR as needed to control pain. Use of non-pharmacologic pain management including repositioning. Patient pain goal is 2/10. Problem: Safety - Adult  Goal: Free from fall injury  1/13/2023 1727 by Dada Celaya RN  Outcome: Progressing  Flowsheets  Taken 1/13/2023 1727  Free From Fall Injury: Instruct family/caregiver on patient safety          Taken 1/13/2023 1727  Free From Fall Injury: Instruct family/caregiver on patient safety  Note: Patient up with staff assistance. Able to use call light. Patient has remained free of falls during this shift. Appropriate fall prevention measures in place. Problem: ABCDS Injury Assessment  Goal: Absence of physical injury  1/13/2023 1727 by Dada Celaya RN  Outcome: Progressing  Flowsheets (Taken 1/13/2023 1729)  Absence of Physical Injury: Implement safety measures based on patient assessment  Note: Patient has remained free of physical injury during this shift. Patient has call light within reach, bed alarm on.         Problem: Infection - Adult  Goal: Absence of infection during hospitalization  1/13/2023 1729 by Dada Celaya RN  Outcome: Progressing  Flowsheets (Taken 1/13/2023 1729)  Absence of infection during hospitalization: Assess and monitor for signs and symptoms of infection  Note: Patient receiving Iv antibiotics around the clock for infection. No s/s of infection at this time     Problem: Skin/Tissue Integrity  Goal: Absence of new skin breakdown  Description: 1. Monitor for areas of redness and/or skin breakdown  2. Assess vascular access sites hourly  3. Every 4-6 hours minimum:  Change oxygen saturation probe site  4. Every 4-6 hours:  If on nasal continuous positive airway pressure, respiratory therapy assess nares and determine need for appliance change or resting period. 1/13/2023 1729 by Nichole Proctor RN  Outcome: Progressing  Note: Patient had left BKA, intact dry dressing. Patient repositions self in bed, waffle cushion in wheelchair. Problem: Chronic Conditions and Co-morbidities  Goal: Patient's chronic conditions and co-morbidity symptoms are monitored and maintained or improved  Outcome: Progressing  Flowsheets (Taken 1/13/2023 1729)  Care Plan - Patient's Chronic Conditions and Co-Morbidity Symptoms are Monitored and Maintained or Improved: Monitor and assess patient's chronic conditions and comorbid symptoms for stability, deterioration, or improvement  Note: Patient stable at this time, BS being managed with insulin coverage     Care plan reviewed with patient. Patient verbalize understanding of the plan of care and contribute to goal setting.

## 2023-01-13 NOTE — PROGRESS NOTES
Hospitalist -Progress Note      Patient: Ashwin Liriano    Unit/Bed:7K-04/004-A  YOB: 1960  MRN: [id number]   Acct: [id number]   PCP: EYAD Charles - CNP    Date of Service: Pt seen/examined on 01/13/23  Chief Complaint: Left septic ankle with abscess and open pathologic fracture    Assessment and Plan:-    Septic left ankle with pathologic fracture of left tibia and abscess of the left ankle:  POD #3 day s/p Proximal Symes amputation/distal transtibial amputation, left (DOS 1/10)    -Continue Zosyn, vancomycin and clindamycin   -Infectious disease following   - continue with management by primary    GISELA - stable   - suspect medication induced from antibiotics and possible cardiorenal   Cr baseline 1.0-1.2   Currently 1.7- improved from 1.8 day prior    BMP daily    - if further increases, consult nephro  - fluids held given concerns for fluid overload- consider starting gentle fluids 1/14 if no improvements in Cr     Acute respiratory failure- resolved  Started post operatively suspect fluid overload and anemia contributing   BNP elevated in 2000s- fluids stopped 1/11    Nocturnal hypoxia- suspect RILEY   Nocturnal pulse oximetry and completed   Will need oxygen at night long term   Referral for formal sleep study on DC     Type 2 diabetes mellitus: Uncontrolled.  A1c 8.3   Goal blood glucose while inpatient 140-180.    -Hold home meds  -Lantus 30 units BID - 1/12 increased to 33 units BID   -Hypoglycemic protocol-Accu-Cheks-Diabetic and cardiac diet    -Continue Neurontin  -1/12 increased SSI to high dose    -1/13 BG improved- continue current regimen    HTN: Recent hypertensive emergency in which he presented to [facility] November 2022 with chest pain and new RBBB.  Patient subsequently left AMA before treatment.  On arrival to [facility] patient's blood pressure was 131/62.   - Cardiology consulted - no further interventions recommended at this time  -Continue Norvasc,  clonidine, Lasix, Lopressor twice daily, and minoxidil with holding parameters. Chronic diastolic heart failure: Last echocardiogram 11/03/22 completed at [facility] showed concentric remodeling with normal regional wall motion, abnormal septal motion consistent with postoperative state, EF of 55 to 60% and grade 2 diastolic dysfunction.   -Lasix 40 mg daily               -Strict I & O                -Daily standing weights               -Low sodium diet   - 1/11 concerns for fluid overload given elevated BNP 1/10 and rales on exam- fluids stopped  -1/12 lungs clear- will hold off on additional diuresis right now      Hyperlipidemia: Last lipid panel 11/16/2022   -Continue Lipitor 80 mg daily    CAD s/p CABG: November 2019. Echo 4/21/20 EF 50%,    -aspirin and Plavix  -Continue home amlodipine, clonidine, lopressor, valsartan and minoxidil       Moderate persistent asthma: No PFTs noted in chart   -Continue home Spiriva   -Pulmonary hygiene    Hx of PAD: Continue aspirin and Plavix, statin therapy    Lupus:  Does not appear that patient is on any anticoagulation however he is on dual antiplatelet therapy.   -Hold aspirin and Plavix due to upcoming procedure.   -Note the patient is at elevated risk for clotting due to history of lupus  . Acute blood loss anemia on Chronic normocytic anemia: Baseline hemoglobin 10s.    - 1/11 Hgb dropped to 6.8- transfuse 1unit PRBC     Hgb stable in low 7s    -Continue home p.o. iron   -Nursing to monitor for any blood in stool   -Monitor with CBC daily        Disposition:- IPR pending precertification         History Of Present Illness:      58-year-old male was admitted to [facility] by podiatry for urgent/emergent management of septic left ankle with pathologic fracture of tibia and abscess of left ankle. Initially podiatry was planning to take patient to the OR tonight or tomorrow and consulted hospital team for risk stratification.       Patient has significant past medical history of diabetes mellitus type 2, uncontrolled, hypertension, CAD status post CABG, CKD, chronic normocytic anemia, lupus, moderate persistent asthma, PAD, grade 2 diastolic dysfunction, and hyperlipidemia,   It is important note the patient was recently hospitalized at [address] [place] [address] 11/2/2022 for hypertensive emergency in which she was experiencing chest pain and a new left bundle branch block was found. Patient subsequently left AMA despite having blood pressure 200s/100s. Upon arrival to [facility] blood cultures were obtained, patient was started on vancomycin and Zosyn. Chest x-ray obtained that demonstrated evidence of prior CABG as well as hyperinflated lungs suggestive of COPD. Patient also had evidence of healed granulomatous disease. With questionable mild interstitial fibrosis. X-ray of left ankle was obtained that showed gas density and soft tissue extending to the lower left leg. Complete dislocation and rotated talus relative to the tibia. Also there was abnormal periosteal reaction and distal tibia and fibula which indicate chronic osteomyelitis. There is also cortical erosion of distal fibula. EKG was also completed that demonstrated Normal sinus rhythm, Left axis deviation; Right bundle branch block, Inferior infarct , age undetermined. Abnormal ECG When compared with ECG of 25-NOV-2022 04:21,Right bundle branch block has replaced Intraventricular conduction delay. During my examination patient denied chest pain, shortness of breath, headache, lightheadedness, abdominal pain. Patient did have foot pain that was relieved with pain medication. Stat CBC, BMP, hepatic function panel as well as magnesium were ordered. 1/11/2023  Patient postop day 1. Hemoglobin dropped to 6.8. Will transfuse 1 unit PRBCs. Patient requiring O2 postoperatively and was weaned to room air today however dipped back into the mid 80s.   Currently satting 93% on 2 L.    1/12/2023  Hemoglobin 7 after transfusion. No longer requiring O2 at this time. Nocturnal pulse oximetry pending. WBC downtrending    1/13/2023  No events overnight. Blood sugar is much improved. VSS. Waiting for IPR precert            Past Medical History:        Diagnosis Date    Arthritis     CAD (coronary artery disease)     CKD (chronic kidney disease), stage II     Diabetes mellitus ([facility] [address]     Hyperlipidemia     Hypertension     Liver disease     HEPITITIS AS A CHILD       Past Surgical History:        Procedure Laterality Date    CARDIAC SURGERY  11/2019    triple bypass    CYST REMOVAL      RIGHT ANKLE     FOOT AMPUTATION Left 1/10/2023    LEFT BELOW KNEE AMPUTATION performed by Tomasz Major DPM at [facility] [address] Left 4/20/2020    LEFT TRANSMETATARSAL AMPUTATION  FOOT INCISION AND DRAINAGE performed by Tomasz Major DPM at [facility] [address] Left 7/20/2020    LEFT WOUND DEBRIDEMENT WITH WOUND VAC APPLICATION performed by Tomasz Major DPM at [facility] [address] Left 7/1/2022    LEFT FOOT [facility], [address] [place] [address], APPLICATION [employer] WOUND VAC performed by Tomasz Major DPM at [facility] Right     CYST REMOVED    FOOT SURGERY Left 8/14/2020    LEFT FOOT PREPARATION GRAFT SITE, HAVEST SPLIT THICKNESS SKIN GRAFT WITH APPLICATION, APPLICATION [employer] WOUND VAC performed by Tomasz Major DPM at [address] [facility] [address] Left 3/3/2020    I&D LEFT FOOT, TRANSMETATARSAL AMPUTATION performed by Tomasz Major DPM at [address] [place] [address] 11/23/2022    EGD performed by Winsome Mae MD at [facility]       Home Medications:   No current facility-administered medications on file prior to encounter.      Current Outpatient Medications on File Prior to Encounter   Medication Sig Dispense Refill    HYDROcodone-acetaminophen ([facility]) 5-325 MG per tablet Take 1 tablet by mouth every 4 hours as needed. cloNIDine (CATAPRES) 0.1 MG tablet Take 1 tablet by mouth 2 times daily 60 tablet 3    tiotropium (SPIRIVA RESPIMAT) 2.5 MCG/ACT AERS inhaler Inhale 2 puffs into the lungs daily 4 g 0    benzonatate (TESSALON) 200 MG capsule Take 1 capsule by mouth 3 times daily as needed for Cough (Patient not taking: Reported on 1/10/2023) 30 capsule 0    amLODIPine (NORVASC) 10 MG tablet Take 1 tablet by mouth daily 30 tablet 3    metoprolol tartrate (LOPRESSOR) 50 MG tablet Take 1 tablet by mouth 2 times daily 60 tablet 3    valsartan (DIOVAN) 160 MG tablet Take 1 tablet by mouth daily 30 tablet 3    minoxidil (LONITEN) 10 MG tablet Take 1 tablet by mouth daily 30 tablet 3    ferrous sulfate (IRON 325) 325 (65 Fe) MG tablet Take 1 tablet by mouth 2 times daily 180 tablet 1    furosemide (LASIX) 40 MG tablet Take 1 tablet by mouth daily 90 tablet 1    potassium chloride (KLOR-CON M) 20 MEQ extended release tablet Take 1 tablet by mouth daily 30 tablet 5    empagliflozin (JARDIANCE) 25 MG tablet Take 1 tablet by mouth daily 90 tablet 1    clopidogrel (PLAVIX) 75 MG tablet Take 1 tablet by mouth daily 90 tablet 3    gabapentin (NEURONTIN) 600 MG tablet Take 1 tablet by mouth 2 times daily for 180 days. 180 tablet 1    insulin detemir (LEVEMIR FLEXTOUCH) 100 UNIT/ML injection pen 30 units in the morning, and 30 units in the evening.  21 mL 3    insulin lispro (HUMALOG) 100 UNIT/ML injection vial Inject 5-15 Units into the skin 3 times daily (with meals) 15 mL 3    albuterol sulfate HFA (VENTOLIN HFA) 108 (90 Base) MCG/ACT inhaler Inhale 2 puffs into the lungs 4 times daily as needed for Wheezing 54 g 1    atorvastatin (LIPITOR) 80 MG tablet Take 1 tablet by mouth daily 30 tablet 3    Polyethylene Glycol 3350 (MIRALAX PO) Take by mouth as needed      aspirin 81 MG chewable tablet Take 1 tablet by mouth daily 30 tablet 3    Multiple Vitamins-Minerals (MULTIVITAMIN PO) Take 1 tablet by mouth daily          Allergies:    Patient has no known allergies. Social History:    reports that he quit smoking about 15 years ago. His smoking use included cigarettes. He has never used smokeless tobacco. He reports that he does not currently use alcohol. He reports that he does not use drugs. Family History:       Problem Relation Age of Onset    Diabetes Mother     High Blood Pressure Mother     Alzheimer's Disease Father          of, 80or 80years old    Heart Disease Father     High Blood Pressure Father     Cancer Neg Hx     Stroke Neg Hx        Diet:  ADULT DIET; Regular; 3 carb choices (45 gm/meal); Low Fat/Low Chol/High Fiber/2 gm Na    Review of systems:   Pertinent positives as noted in the HPI. All other systems reviewed and negative. PHYSICAL EXAM:  BP (!) 167/77   Pulse 80   Temp 98 °F (36.7 °C)   Resp 18   Ht 6' 2\" (1.88 m)   Wt 283 lb 14.4 oz (128.8 kg)   SpO2 92%   BMI 36.45 kg/m²   General appearance: No apparent distress, appears stated age and cooperative. Obese    HEENT: Normal cephalic, atraumatic without obvious deformity. Pallor improved from   Neck: No jugular venous distention. Trachea midline. Respiratory:  Normal respiratory effort.   Lungs clear to auscultation bilaterally-  Cardiovascular: Regular rate and rhythm with normal S1/S2   Abdomen: Hypoactive bowel sounds, abdomen is soft and rounded, nontender to palpation   Musculoskeletal:  Right lower extremity +2 edema, left lower leg  with amputation- wrapped  Skin: Warm and dry  Neurologic:  No focal deficits, bilateral numbness and tingling of right lower extremity as well as left lower extremity, follows commands  Psychiatric: Alert and oriented, thought content appropriate, normal insight  Labs:   Recent Labs     23  1218 23  0612 23  1214 23  0554   WBC 13.2* 12.0*  --  10.0   HGB 6.8* 7.0* 7.3* 7.5*   HCT 23.6* 24.0* 24.2* 25.5*   * 446*  --  473*       Recent Labs     01/11/23  1218 01/12/23  0612 01/13/23  0554   * 132* 135   K 4.8 4.6 4.4   CL 95* 98 101   CO2 22* 23 24   BUN 26* 33* 30*   CREATININE 1.6* 1.8* 1.7*   CALCIUM 8.0* 7.7* 8.0*       Recent Labs     01/10/23  1614   AST 44*   ALT 49   BILIDIR <0.2   BILITOT 0.5   ALKPHOS 114       No results for input(s): INR in the last 72 hours. No results for input(s): Rhae Childes in the last 72 hours. Urinalysis:    Lab Results   Component Value Date/Time    NITRU NEGATIVE 11/16/2022 01:52 PM    WBCUA 0-2 11/16/2022 01:52 PM    BACTERIA NONE SEEN 11/16/2022 01:52 PM    RBCUA 15-25 11/16/2022 01:52 PM    BLOODU MODERATE 11/16/2022 01:52 PM    GLUCOSEU >= 1000 11/16/2022 01:52 PM       Radiology:   XR ANKLE LEFT (MIN 3 VIEWS)   Final Result         1. Gas density in the soft tissues extending into the lower left leg. 2. Completely dislocated and rotated talus relative to the tibia. 3. Abnormal periosteal reaction in the distal tibia and fibula which can indicate chronic osteomyelitis. 4. Cortical erosion of the distal fibula. **This report has been created using voice recognition software. It may contain minor errors which are inherent in voice recognition technology. **      Final report electronically signed by Dr. Reshma Bautista on 1/10/2023 3:46 PM      XR CHEST PORTABLE   Final Result   1. Normal heart size. Metallic sternotomy sutures from prior surgery. Lungs somewhat hyperinflated, suggesting possible COPD. 2. Evidence for old, healed granulomatous disease. Question mild interstitial fibrosis/pneumonitis both lung bases. No effusion is seen. **This report has been created using voice recognition software. It may contain minor errors which are inherent in voice recognition technology. **      Final report electronically signed by Dr. Brittany Galeana on 1/10/2023 2:42 PM        XR CHEST PORTABLE    Result Date: 1/10/2023  PROCEDURE: XR CHEST PORTABLE CLINICAL INFORMATION: pre op COMPARISON: 11/25/2022 TECHNIQUE: A single mobile view of the chest was obtained. 1. Normal heart size. Metallic sternotomy sutures from prior surgery. Lungs somewhat hyperinflated, suggesting possible COPD. 2. Evidence for old, healed granulomatous disease. Question mild interstitial fibrosis/pneumonitis both lung bases. No effusion is seen. **This report has been created using voice recognition software. It may contain minor errors which are inherent in voice recognition technology. ** Final report electronically signed by Dr. Josesito Bonilla on 1/10/2023 2:42 PM        EKG: Normal sinus rhythm, Left axis deviation, Right bundle branch block, Inferior infarct , age undetermined Abnormal ECG.  When compared with ECG of 25-NOV-2022 04:21,  Right bundle branch block has replaced Intraventricular conduction delay    Electronically signed by Ashley Barton PA-C on 1/13/2023 at 8:32 AM            .

## 2023-01-13 NOTE — PROGRESS NOTES
Podiatric Progress Note  Leroy Merrill  Subjective :     1/12/23  Patient seen bedside today on behalf of Dr. Debbie Dickinson. Patient is 3 days s/p Proximal Symes amputation/distal transtibial amputation, left (DOS 1/10). Patient appeared pleasant, was oriented to person, place and time and in no acute distress. Patient relates that his pain is a improving from yesterday. He relates that the pain is well controlled with pain medication. He relates that the precert has been started for IP Rehab at time of discharge. Patient denies any N/V/F/C/SOB or CP. Patient relates good appetite. Patient has had a BM. Patient has no further pedal concerns at this point in time. 1/12/23  Patient seen bedside today on behalf of Dr. Debbie Dickinson. Patient is 2 days s/p Proximal Symes amputation/distal transtibial amputation, left (DOS 1/10). Patient appeared pleasant, was oriented to person, place and time and in no acute distress. Patient relates that his pain is a 6/7 compared to a 8/9 from yesterday. He relates that the pain is well controlled with pain medication. He relates that he plans to go to IP Rehab at time of discharge due to [facility] not having a bed available. Patient denies any N/V/F/C/SOB or CP. Patient has no further pedal concerns at this point in time. 1/11/23  Patient seen bedside today on behalf of Dr. Debbie Dickinson. Patient is 1 day s/p Proximal Symes amputation/distal transtibial amputation, left (DOS 1/10). Patient appeared pleasant, was oriented to person, place and time and in no acute distress. Patient relates that he has some pain, which is controlled with pain medication. He relates that he did not eat dinner last evening due to him not returning to his room late after surgery. He relates that he plans to go to [facility] at time of discharge for rehab. Patient denies any N/V/F/C/SOB or CP. Patient has no further pedal concerns at this point in time.      1/10/23  HISTORY OF PRESENT ILLNESS:                 The patient is a 65 y.o. male with significant past medical history of CAD, CKD, diabetes, hyperlipidemia, and hypertension who is being seen at bedside on behalf of Dr. Humberto Rivas. Patient relates that he has had ongoing problems with his left foot and ankle over the course of the past few years. 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 He relates that he follows up and sees Dr. Humberto Rivas in clinic weekly. He relates that he missed his appointment last week and did not have the dressing changed. The patient states that the previous week he had x-rays taken in clinic that showed a pathologic fracture of the left tibia. He relates that he has not been walking on his left lower extremity. He relates that he has applied some pressure when using the restroom on that leg. He states that amputation has been in discussion at his previous clinic encounters. The patient relates that he last a last evening at 8 or 9 PM.  He relates that he drank Crystal light tea around 8 or 9 AM this morning. He relates that he took Plavix this morning at 6 AM.  The patient denies any other concerns to his right lower extremity. The patient denies nausea, vomiting, fever, chills, shortness of breath or chest pain.         Current Medications:    Current Facility-Administered Medications: insulin glargine (LANTUS) injection vial 33 Units, 33 Units, SubCUTAneous, BID  insulin lispro (HUMALOG) injection vial 0-16 Units, 0-16 Units, SubCUTAneous, TID WC  insulin lispro (HUMALOG) injection vial 0-4 Units, 0-4 Units, SubCUTAneous, Nightly  polyethylene glycol (GLYCOLAX) packet 17 g, 17 g, Oral, Daily PRN  acetaminophen (TYLENOL) tablet 650 mg, 650 mg, Oral, Q6H PRN **OR** acetaminophen (TYLENOL) suppository 650 mg, 650 mg, Rectal, Q6H PRN  amLODIPine (NORVASC) tablet 10 mg, 10 mg, Oral, Daily  atorvastatin (LIPITOR) tablet 80 mg, 80 mg, Oral, Daily  albuterol sulfate HFA (PROVENTIL;VENTOLIN;PROAIR) 108 (90 Base) MCG/ACT inhaler 2 puff, 2 puff, Inhalation, 4x Daily PRN  ferrous sulfate (IRON 325) tablet 325 mg, 325 mg, Oral, BID  furosemide (LASIX) tablet 40 mg, 40 mg, Oral, Daily  gabapentin (NEURONTIN) tablet 600 mg, 600 mg, Oral, BID  minoxidil (LONITEN) tablet 10 mg, 10 mg, Oral, Daily  multivitamin 1 tablet, 1 tablet, Oral, Daily  potassium chloride (KLOR-CON M) extended release tablet 20 mEq, 20 mEq, Oral, Daily  valsartan (DIOVAN) tablet 160 mg, 160 mg, Oral, Daily  vancomycin (VANCOCIN) intermittent dosing (placeholder), , Other, RX Placeholder  Sodium Hypochlorite (DAKINS) 0.25 % external solution, , Irrigation, Daily  glucose chewable tablet 16 g, 4 tablet, Oral, PRN  dextrose bolus 10% 125 mL, 125 mL, IntraVENous, PRN **OR** dextrose bolus 10% 250 mL, 250 mL, IntraVENous, PRN  glucagon (rDNA) injection 1 mg, 1 mg, SubCUTAneous, PRN  dextrose 10 % infusion, , IntraVENous, Continuous PRN  piperacillin-tazobactam (ZOSYN) 3,375 mg in dextrose 5 % 50 mL IVPB (mini-bag), 3,375 mg, IntraVENous, q8h  aspirin chewable tablet 81 mg, 81 mg, Oral, Daily  clopidogrel (PLAVIX) tablet 75 mg, 75 mg, Oral, Daily  tiotropium (SPIRIVA RESPIMAT) 2.5 MCG/ACT inhaler 2 puff, 2 puff, Inhalation, Daily  clindamycin (CLEOCIN) 600 mg in dextrose 5 % 50 mL IVPB, 600 mg, IntraVENous, Q8H  cloNIDine (CATAPRES) tablet 0.1 mg, 0.1 mg, Oral, BID  metoprolol tartrate (LOPRESSOR) tablet 50 mg, 50 mg, Oral, BID  sodium chloride flush 0.9 % injection 5-40 mL, 5-40 mL, IntraVENous, 2 times per day  sodium chloride flush 0.9 % injection 5-40 mL, 5-40 mL, IntraVENous, PRN  0.9 % sodium chloride infusion, , IntraVENous, PRN  ondansetron (ZOFRAN-ODT) disintegrating tablet 4 mg, 4 mg, Oral, Q8H PRN **OR** ondansetron (ZOFRAN) injection 4 mg, 4 mg, IntraVENous, Q6H PRN  oxyCODONE (ROXICODONE) immediate release tablet 5 mg, 5 mg, Oral, Q4H PRN **OR** oxyCODONE (ROXICODONE) immediate release tablet 10 mg, 10 mg, Oral, Q4H PRN  morphine (PF) injection 2 mg, 2 mg, IntraVENous, Q2H PRN **OR** morphine injection 4 mg, 4 mg, IntraVENous, Q2H PRN    Objective     BP (!) 167/77   Pulse 80   Temp 98 °F (36.7 °C)   Resp 18   Ht 6' 2\" (1.88 m)   Wt 283 lb 14.4 oz (128.8 kg)   SpO2 92%   BMI 36.45 kg/m²      I/O:  Intake/Output Summary (Last 24 hours) at 1/13/2023 0607  Last data filed at 1/13/2023 0254  Gross per 24 hour   Intake 2040 ml   Output 3650 ml   Net -1610 ml                Wt Readings from Last 3 Encounters:   01/11/23 283 lb 14.4 oz (128.8 kg)   12/08/22 (!) 310 lb (140.6 kg)   12/08/22 (!) 310 lb (140.6 kg)       LABS:    Recent Labs     01/11/23  1218 01/12/23  0612 01/12/23  1214   WBC 13.2* 12.0*  --    HGB 6.8* 7.0* 7.3*   HCT 23.6* 24.0* 24.2*   * 446*  --           Recent Labs     01/12/23  0612   *   K 4.6   CL 98   CO2 23   BUN 33*   CREATININE 1.8*          Recent Labs     01/10/23  1614   PROT 6.7        No results for input(s): CKTOTAL, CKMB, CKMBINDEX, TROPONINI in the last 72 hours. Focused Lower Extremity Examination:    Vitals:    BP (!) 167/77   Pulse 80   Temp 98 °F (36.7 °C)   Resp 18   Ht 6' 2\" (1.88 m)   Wt 283 lb 14.4 oz (128.8 kg)   SpO2 92%   BMI 36.45 kg/m²      Dressings left intact to the LLE on 1/13.  1/12/23 Exam  Vascular: CFT WNL to left amputation stump. Edema present to the Left stump which is consistent with post op state. Leg and thigh are warm. Dermatologic: The Proximal Symes amputation/distal transtibial amputation site of the LLE is well coapted with sutures and staples. There is no sign of dehiscence. There is minimal to no sanguinous drainage. Negative for necrosis or ecchymosis. Neurovascular: Light touch sensation diminished bilaterally. Musculoskeletal: s/p Proximal Symes amputation/distal transtibial amputation of LLE. Tender to touch of LLE. Images  XR ANKLE LEFT (MIN 3 VIEWS)   Final Result         1.  Gas density in the soft tissues extending into the lower left leg. 2. Completely dislocated and rotated talus relative to the tibia. 3. Abnormal periosteal reaction in the distal tibia and fibula which can indicate chronic osteomyelitis. 4. Cortical erosion of the distal fibula. **This report has been created using voice recognition software. It may contain minor errors which are inherent in voice recognition technology. **      Final report electronically signed by Dr. Sophia Cali on 1/10/2023 3:46 PM      XR CHEST PORTABLE   Final Result   1. Normal heart size. Metallic sternotomy sutures from prior surgery. Lungs somewhat hyperinflated, suggesting possible COPD. 2. Evidence for old, healed granulomatous disease. Question mild interstitial fibrosis/pneumonitis both lung bases. No effusion is seen. **This report has been created using voice recognition software. It may contain minor errors which are inherent in voice recognition technology. **      Final report electronically signed by Dr. Evelyne Connelly on 1/10/2023 2:42 PM          ASSESSMENT: Pt. is a 65 y.o. 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    Sepsis ([address]    Acute left-sided low back pain with bilateral sciatica    S/P transmetatarsal amputation of foot, left (HCC)    Leukocytosis    Wound dehiscence, surgical, initial encounter    Postoperative wound infection    Metabolic acidosis    Hx of CABG    Lumbar stenosis without neurogenic claudication    CKD (chronic kidney disease), stage II    Normocytic anemia    Morbid obesity ([address]    Debility    Carotid stenosis, asymptomatic, bilateral    Hypokalemia    Nonhealing ulcer of left lower extremity (HCC)    Bleeding    Wound, open    SOB (shortness of breath) on exertion    Hemoglobin A1C greater than 9%, indicating poor diabetic control    Hypertensive emergency    Acute on chronic congestive heart failure (HCC)    Hypertensive urgency    Moderate persistent asthma    Septic arthritis of left ankle, due to unspecified organism (HCC)    Closed fracture of ankle with nonunion    S/P BKA (below knee amputation), left (HCC)       PLAN:     -Patient initially examined and evaluated  -WBC 12.0 (1/12); Afebrile  -CBC & BMP ordered daily  -Pharmacy to dose vancomycin and Zosyn and Clindamycin; per ID  -Culture anaerobic and aerobic; Proteus mirabilis and Morganella marganii ssp sibonii  -Blood cultures; preliminary: no growth  -X-ray left ankle ordered; see read above  -Dressing left intact at today's encounter; will be changed tomorrow, 1/14  -Dressing to be reinforced by nursing; order placed  -NWB to LLE  -Hospitalist following; preop restratification completed and management of comorbidities   -Infectious disease following; appreciate antibiotic recommendation  -Consult placed to social work and case management for discharge planning; Patient planning on going to [address] [place] [address] placed to PT and OT for evaluation and treatment  -Cardiology following; patient has extensive cardiac history  -All of patient's questions and concerns addressed at today's encounter.   -Related to patient that the pre-cert to [address] has been initiated. Discussed with patient that there might be a slight delay with precert approval due to a weekend approaching with a holiday on Monday.l Patient understands and is in agreement with the plan.       Chronic     Diabetes mellitus  -Continue homes medication  -Hospitalist consulted     CKD, Stage 3  -Continue homes medication  -Hospitalist consulted     CAD  -Continue homes medication  -Hospitalist consulted  -Cardiology consulted     Hyperlipidemia   -Continue homes medication  -Hospitalist consulted     Hypertension  -Continue homes medication  -Hospitalist consulted  -Cardiology consulted         DISPO: Pending response to IV abx, pending WBC trend. Pending precert to IP Rehab.      Kishan Stout [id number]  1/13/2023   6:07 AM

## 2023-01-13 NOTE — PROGRESS NOTES
Progress note: Infectious diseases    Patient - Moira Guadalupe,  Age - 65 y.o.    - 1960      Room Number - 7K-04/004-A   MRN -  [id number]   Acct # - [de-identified]  Date of Admission -  1/10/2023 12:28 PM    SUBJECTIVE:   No new issues. OBJECTIVE   VITALS    height is 6' 2\" (1.88 m) and weight is 283 lb 14.4 oz (128.8 kg). His oral temperature is 97.3 °F (36.3 °C). His blood pressure is 116/71 and his pulse is 78. His respiration is 18 and oxygen saturation is 93%. Wt Readings from Last 3 Encounters:   23 283 lb 14.4 oz (128.8 kg)   22 (!) 310 lb (140.6 kg)   22 (!) 310 lb (140.6 kg)       I/O (24 Hours)    Intake/Output Summary (Last 24 hours) at 2023 1523  Last data filed at 2023 1433  Gross per 24 hour   Intake 1480 ml   Output 5050 ml   Net -3570 ml         General Appearance: awake and oriented  HEENT - normocephalic, atraumatic, pale conjunctiva,  anicteric sclera  Neck - Supple, no mass  Lungs -  Bilateral   air entry, no rhonchi, no wheeze. Cardiovascular - Heart sounds are normal.     Abdomen - soft, not distended, nontender,   Neurologic -oriented  Skin - No bruising or bleeding  Extremities - dressed left BKA.            MEDICATIONS:      vancomycin  1,250 mg IntraVENous Q24H    insulin glargine  33 Units SubCUTAneous BID    insulin lispro  0-16 Units SubCUTAneous TID     insulin lispro  0-4 Units SubCUTAneous Nightly    amLODIPine  10 mg Oral Daily    atorvastatin  80 mg Oral Daily    ferrous sulfate  325 mg Oral BID    furosemide  40 mg Oral Daily    gabapentin  600 mg Oral BID    minoxidil  10 mg Oral Daily    multivitamin  1 tablet Oral Daily    potassium chloride  20 mEq Oral Daily    valsartan  160 mg Oral Daily    vancomycin (VANCOCIN) intermittent dosing (placeholder)   Other RX Placeholder    Sodium Hypochlorite   Irrigation Daily    piperacillin-tazobactam 3,375 mg IntraVENous q8h    aspirin  81 mg Oral Daily    clopidogrel  75 mg Oral Daily    tiotropium  2 puff Inhalation Daily    clindamycin (CLEOCIN) IV  600 mg IntraVENous Q8H    cloNIDine  0.1 mg Oral BID    metoprolol tartrate  50 mg Oral BID    sodium chloride flush  5-40 mL IntraVENous 2 times per day      dextrose      sodium chloride 20 mL/hr at 01/13/23 0020     polyethylene glycol, acetaminophen **OR** acetaminophen, albuterol sulfate HFA, glucose, dextrose bolus **OR** dextrose bolus, glucagon (rDNA), dextrose, sodium chloride flush, sodium chloride, ondansetron **OR** ondansetron, oxyCODONE **OR** oxyCODONE, morphine **OR** morphine      LABS:     CBC:   Recent Labs     01/11/23  1218 01/12/23  0612 01/12/23  1214 01/13/23  0554   WBC 13.2* 12.0*  --  10.0   HGB 6.8* 7.0* 7.3* 7.5*   * 446*  --  473*       BMP:    Recent Labs     01/11/23  1218 01/12/23  0612 01/13/23  0554   * 132* 135   K 4.8 4.6 4.4   CL 95* 98 101   CO2 22* 23 24   BUN 26* 33* 30*   CREATININE 1.6* 1.8* 1.7*   GLUCOSE 324* 252* 144*       Calcium:  Recent Labs     01/13/23  0554   CALCIUM 8.0*       Ionized Calcium:No results for input(s): IONCA in the last 72 hours. Magnesium:  Recent Labs     01/10/23  1614   MG 2.0       Phosphorus:No results for input(s): PHOS in the last 72 hours. BNP:No results for input(s): BNP in the last 72 hours. Glucose:  Recent Labs     01/12/23  2034 01/13/23  0621 01/13/23  1130   POCGLU 189* 199* 225*       HgbA1C:   Recent Labs     01/10/23  1614   LABA1C 8.3*       INR: No results for input(s): INR in the last 72 hours. Hepatic:   Recent Labs     01/10/23  1614   ALKPHOS 114   ALT 49   AST 44*   PROT 6.7   BILITOT 0.5   BILIDIR <0.2   LABALBU 2.4*       Amylase and Lipase:  Recent Labs     01/11/23  1434   LACTA 1.6       Lactic Acid:   Recent Labs     01/11/23  1434   LACTA 1.6       Troponin: No results for input(s): CKTOTAL, CKMB, TROPONINI in the last 72 hours.   BNP: No results for input(s): BNP in the last 72 hours. CULTURES:   UA: No results for input(s): SPECGRAV, PHUR, COLORU, CLARITYU, MUCUS, PROTEINU, BLOODU, RBCUA, WBCUA, BACTERIA, NITRU, GLUCOSEU, BILIRUBINUR, UROBILINOGEN, KETUA, LABCAST, LABCASTTY, AMORPHOS in the last 72 hours. Invalid input(s): CRYSTALS  Micro:   Lab Results   Component Value Date/Time    BC No growth 24 hours. No growth 48 hours. 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    Sepsis ([facility] A41.9    Acute left-sided low back pain with bilateral sciatica M54.42, M54.41    S/P transmetatarsal amputation of foot, left ([facility]) Z89.432    Leukocytosis D72.829    Wound dehiscence, surgical, initial encounter T81.31XA    Postoperative wound infection R22.29UZ    Metabolic acidosis I54.48    Hx of CABG Z95.1    Lumbar stenosis without neurogenic claudication M48.061    CKD (chronic kidney disease), stage II N18.2    Normocytic anemia D64.9    Morbid obesity ([facility]) E66.01    Debility R53.81    Carotid stenosis, asymptomatic, bilateral I65.23    Hypokalemia E87.6    Nonhealing ulcer of left lower extremity ([facility] L97.929    Bleeding R58    Wound, open T14. 8XXA    SOB (shortness of breath) on exertion R06.02    Hemoglobin A1C greater than 9%, indicating poor diabetic control R73.09    Hypertensive emergency I16.1    Acute on chronic congestive heart failure ([facility]) I50.9    Hypertensive urgency I16.0    Moderate persistent asthma J45.40    Septic arthritis of left ankle, due to unspecified organism ([facility]) M00.9    Closed fracture of ankle with nonunion S82.899K    S/P BKA (below knee amputation), left ([facility] D13.149 ASSESSMENT/PLAN   Necrotizing left foot/ankle infection: had urgent BKA. He is feeling better. will stop clindamycin and vancomycin  Poorly controlled diabetes  CAD   Will need ECF         Sona Ruff MD, MD, FACP 1/13/2023 3:23 PM

## 2023-01-13 NOTE — PROGRESS NOTES
[facility]  Occupational Therapy  Daily Note  Time:   Time In: 932  Time Out: 7184  Timed Code Treatment Minutes: 23 Minutes  Minutes: 23          Date: 2023  Patient Name: Karen Blunt,   Gender: male      Room: [facility]004-A  MRN: [id number]  : 1960  (65 y.o.)  Referring Practitioner: Ty Avina DPM  Diagnosis: Sepsis  Additional Pertinent Hx: The patient is a 65 y.o. male with significant past medical history of CAD, CKD, diabetes, hyperlipidemia, and hypertension who is being seen at bedside on behalf of Dr. Claribel Saleh. Patient relates that he has had ongoing problems with his left foot and ankle over the course of the past few years. 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 He relates that he follows up and sees Dr. Claribel Saleh in clinic weekly. He relates that he missed his appointment last week and did not have the dressing changed. The patient states that the previous week he had x-rays taken in clinic that showed a pathologic fracture of the left tibia. He relates that he has not been walking on his left lower extremity. He relates that he has applied some pressure when using the restroom on that leg. He states that amputation has been in discussion at his previous clinic encounters. The patient relates that he last a last evening at 8 or 9 PM.  He relates that he drank Crystal light tea around 8 or 9 AM this morning. He relates that he took Plavix this morning at 6 AM.  The patient denies any other concerns to his right lower extremity. The patient denies nausea, vomiting, fever, chills, shortness of breath or chest pain. Restrictions/Precautions:  Restrictions/Precautions: General Precautions, Weight Bearing, Fall Risk  Left Lower Extremity Weight Bearing: Non Weight Bearing (BKA)  Position Activity Restriction  Other position/activity restrictions: L BKA     SUBJECTIVE: Nurse approved Tx.  Pt sitting in wheel chair upon arrival.     PAIN: not stated     Vitals: Vitals not assessed per clinical judgement, see nursing flowsheet    COGNITION: WNL    ADL:   No ADL's completed this session. Pinky Angles BALANCE:  Sitting Balance:  Modified Independent. Supported in w/c     BED MOBILITY:  Rolling to Left: Stand By Assistance, with head of bed flat, with rail, with verbal cues , with increased time for completion    Sit to Supine: Stand By Assistance, with head of bed flat, with verbal cues , with increased time for completion    Scooting: Stand By Assistance, with head of bed flat, with verbal cues , with increased time for completion      TRANSFERS:  Sliding Board: Stand By Assistance, with increased time for completion, cues for hand placement, with verbal cues, and set up . Pt initiated w/c set up, set up assist provided min A for safety. ADDITIONAL ACTIVITIES:  Engaged in seated BUE ex's with orange theraband in all planes 1x10 ea to increase strength and (I) for self care tasks and transfers. ASSESSMENT:     Activity Tolerance:  Patient tolerance of  treatment: good.         Discharge Recommendations: Inpatient Rehabilitation  Equipment Recommendations: Equipment Needed: Yes  Other: TTB, slideboard, drop arm commode  Plan: Times Per Week: 6x  Current Treatment Recommendations: Strengthening, Balance training, Functional mobility training, Endurance training, Patient/Caregiver education & training, Safety education & training, Self-Care / ADL, Home management training    Patient Education  Patient Education: Role of OT, Plan of Care, and Importance of Increasing Activity    Goals  Short Term Goals  Time Frame for Short Term Goals: by discharge  Short Term Goal 1: Pt will complete grooming routine while seated EOB with Supervision and no VC for technique to incrase indep with self care  Short Term Goal 2: Pt will complete slideboard t/f with consistent CGA and min VC for placement of board to increase indep with toileting routine  Short Term Goal 3: Pt will demo indep with BUE strengthening HEP to increase overall UB strength/endurance for ease with t/fs  Short Term Goal 4: OTR to assess sit to stand/stand-pivot/and functional mobility when appropriate    Following session, patient left in safe position with all fall risk precautions in place.

## 2023-01-13 NOTE — PLAN OF CARE
Problem: Respiratory - Adult  Goal: Clear lung sounds  Description: Clear lung sounds  Outcome: Progressing   Continue mdi use to improve lung sounds. Pt agrees to plan of care.

## 2023-01-14 LAB
ANION GAP SERPL CALCULATED.3IONS-SCNC: 12 MEQ/L (ref 8–16)
BUN BLDV-MCNC: 26 MG/DL (ref 7–22)
CALCIUM SERPL-MCNC: 8.2 MG/DL (ref 8.5–10.5)
CHLORIDE BLD-SCNC: 97 MEQ/L (ref 98–111)
CO2: 24 MEQ/L (ref 23–33)
CREAT SERPL-MCNC: 1.5 MG/DL (ref 0.4–1.2)
ERYTHROCYTE [DISTWIDTH] IN BLOOD BY AUTOMATED COUNT: 19.9 % (ref 11.5–14.5)
ERYTHROCYTE [DISTWIDTH] IN BLOOD BY AUTOMATED COUNT: 56.8 FL (ref 35–45)
GFR SERPL CREATININE-BSD FRML MDRD: 53 ML/MIN/1.73M2
GLUCOSE BLD-MCNC: 115 MG/DL (ref 70–108)
GLUCOSE BLD-MCNC: 127 MG/DL (ref 70–108)
GLUCOSE BLD-MCNC: 128 MG/DL (ref 70–108)
GLUCOSE BLD-MCNC: 168 MG/DL (ref 70–108)
GLUCOSE BLD-MCNC: 171 MG/DL (ref 70–108)
HCT VFR BLD CALC: 27.4 % (ref 42–52)
HEMOGLOBIN: 8.1 GM/DL (ref 14–18)
MCH RBC QN AUTO: 24.2 PG (ref 26–33)
MCHC RBC AUTO-ENTMCNC: 29.6 GM/DL (ref 32.2–35.5)
MCV RBC AUTO: 81.8 FL (ref 80–94)
PLATELET # BLD: 585 THOU/MM3 (ref 130–400)
PMV BLD AUTO: 9.1 FL (ref 9.4–12.4)
POTASSIUM REFLEX MAGNESIUM: 4.5 MEQ/L (ref 3.5–5.2)
RBC # BLD: 3.35 MILL/MM3 (ref 4.7–6.1)
SODIUM BLD-SCNC: 133 MEQ/L (ref 135–145)
WBC # BLD: 7.7 THOU/MM3 (ref 4.8–10.8)

## 2023-01-14 PROCEDURE — 1200000000 HC SEMI PRIVATE

## 2023-01-14 PROCEDURE — 94669 MECHANICAL CHEST WALL OSCILL: CPT

## 2023-01-14 PROCEDURE — 97542 WHEELCHAIR MNGMENT TRAINING: CPT

## 2023-01-14 PROCEDURE — 85027 COMPLETE CBC AUTOMATED: CPT

## 2023-01-14 PROCEDURE — 82948 REAGENT STRIP/BLOOD GLUCOSE: CPT

## 2023-01-14 PROCEDURE — 80048 BASIC METABOLIC PNL TOTAL CA: CPT

## 2023-01-14 PROCEDURE — 97530 THERAPEUTIC ACTIVITIES: CPT

## 2023-01-14 PROCEDURE — 6370000000 HC RX 637 (ALT 250 FOR IP): Performed by: STUDENT IN AN ORGANIZED HEALTH CARE EDUCATION/TRAINING PROGRAM

## 2023-01-14 PROCEDURE — 36415 COLL VENOUS BLD VENIPUNCTURE: CPT

## 2023-01-14 PROCEDURE — 2580000003 HC RX 258

## 2023-01-14 PROCEDURE — 6360000002 HC RX W HCPCS

## 2023-01-14 PROCEDURE — 2580000003 HC RX 258: Performed by: PHYSICIAN ASSISTANT

## 2023-01-14 PROCEDURE — 6370000000 HC RX 637 (ALT 250 FOR IP)

## 2023-01-14 PROCEDURE — 99232 SBSQ HOSP IP/OBS MODERATE 35: CPT | Performed by: PHYSICIAN ASSISTANT

## 2023-01-14 PROCEDURE — 6370000000 HC RX 637 (ALT 250 FOR IP): Performed by: PHYSICIAN ASSISTANT

## 2023-01-14 PROCEDURE — 94640 AIRWAY INHALATION TREATMENT: CPT

## 2023-01-14 PROCEDURE — 1200000003 HC TELEMETRY R&B

## 2023-01-14 RX ORDER — 0.9 % SODIUM CHLORIDE 0.9 %
500 INTRAVENOUS SOLUTION INTRAVENOUS ONCE
Status: COMPLETED | OUTPATIENT
Start: 2023-01-14 | End: 2023-01-14

## 2023-01-14 RX ORDER — INSULIN GLARGINE 100 [IU]/ML
31 INJECTION, SOLUTION SUBCUTANEOUS 2 TIMES DAILY
Status: DISCONTINUED | OUTPATIENT
Start: 2023-01-14 | End: 2023-01-19

## 2023-01-14 RX ADMIN — GABAPENTIN 600 MG: 600 TABLET, FILM COATED ORAL at 07:49

## 2023-01-14 RX ADMIN — INSULIN GLARGINE 33 UNITS: 100 INJECTION, SOLUTION SUBCUTANEOUS at 10:20

## 2023-01-14 RX ADMIN — PIPERACILLIN AND TAZOBACTAM 3375 MG: 3; .375 INJECTION, POWDER, FOR SOLUTION INTRAVENOUS at 01:00

## 2023-01-14 RX ADMIN — TIOTROPIUM BROMIDE INHALATION SPRAY 2 PUFF: 3.12 SPRAY, METERED RESPIRATORY (INHALATION) at 08:05

## 2023-01-14 RX ADMIN — OXYCODONE 10 MG: 5 TABLET ORAL at 02:37

## 2023-01-14 RX ADMIN — POTASSIUM CHLORIDE 20 MEQ: 1500 TABLET, EXTENDED RELEASE ORAL at 07:48

## 2023-01-14 RX ADMIN — FUROSEMIDE 40 MG: 40 TABLET ORAL at 07:50

## 2023-01-14 RX ADMIN — CLONIDINE HYDROCHLORIDE 0.1 MG: 0.1 TABLET ORAL at 06:46

## 2023-01-14 RX ADMIN — ASPIRIN 81 MG 81 MG: 81 TABLET ORAL at 07:48

## 2023-01-14 RX ADMIN — PIPERACILLIN AND TAZOBACTAM 3375 MG: 3; .375 INJECTION, POWDER, FOR SOLUTION INTRAVENOUS at 09:20

## 2023-01-14 RX ADMIN — OXYCODONE 10 MG: 5 TABLET ORAL at 20:15

## 2023-01-14 RX ADMIN — CLOPIDOGREL BISULFATE 75 MG: 75 TABLET ORAL at 07:48

## 2023-01-14 RX ADMIN — ATORVASTATIN CALCIUM 80 MG: 80 TABLET, FILM COATED ORAL at 07:50

## 2023-01-14 RX ADMIN — INSULIN GLARGINE 31 UNITS: 100 INJECTION, SOLUTION SUBCUTANEOUS at 23:01

## 2023-01-14 RX ADMIN — OXYCODONE 10 MG: 5 TABLET ORAL at 14:57

## 2023-01-14 RX ADMIN — OXYCODONE 10 MG: 5 TABLET ORAL at 06:45

## 2023-01-14 RX ADMIN — FERROUS SULFATE TAB 325 MG (65 MG ELEMENTAL FE) 325 MG: 325 (65 FE) TAB at 21:47

## 2023-01-14 RX ADMIN — Medication 1 TABLET: at 07:48

## 2023-01-14 RX ADMIN — SODIUM CHLORIDE 500 ML: 9 INJECTION, SOLUTION INTRAVENOUS at 10:31

## 2023-01-14 RX ADMIN — FERROUS SULFATE TAB 325 MG (65 MG ELEMENTAL FE) 325 MG: 325 (65 FE) TAB at 07:49

## 2023-01-14 RX ADMIN — METOPROLOL TARTRATE 50 MG: 50 TABLET, FILM COATED ORAL at 06:46

## 2023-01-14 RX ADMIN — GABAPENTIN 600 MG: 600 TABLET, FILM COATED ORAL at 21:47

## 2023-01-14 RX ADMIN — PIPERACILLIN AND TAZOBACTAM 3375 MG: 3; .375 INJECTION, POWDER, FOR SOLUTION INTRAVENOUS at 17:10

## 2023-01-14 RX ADMIN — AMLODIPINE BESYLATE 10 MG: 10 TABLET ORAL at 07:49

## 2023-01-14 RX ADMIN — SODIUM CHLORIDE, PRESERVATIVE FREE 10 ML: 5 INJECTION INTRAVENOUS at 07:48

## 2023-01-14 RX ADMIN — VALSARTAN 160 MG: 160 TABLET ORAL at 07:49

## 2023-01-14 ASSESSMENT — PAIN DESCRIPTION - LOCATION
LOCATION: LEG
LOCATION: LEG

## 2023-01-14 ASSESSMENT — PAIN SCALES - GENERAL
PAINLEVEL_OUTOF10: 7
PAINLEVEL_OUTOF10: 5
PAINLEVEL_OUTOF10: 8
PAINLEVEL_OUTOF10: 7
PAINLEVEL_OUTOF10: 8

## 2023-01-14 ASSESSMENT — PAIN DESCRIPTION - ORIENTATION
ORIENTATION: LEFT
ORIENTATION: LEFT

## 2023-01-14 ASSESSMENT — PAIN DESCRIPTION - DESCRIPTORS
DESCRIPTORS: SORE
DESCRIPTORS: SORE

## 2023-01-14 NOTE — PLAN OF CARE
Problem: Discharge Planning  Goal: Discharge to home or other facility with appropriate resources  1/14/2023 0821 by Zhen Corral RN  Outcome: Progressing  1/14/2023 0742 by Oley Bosworth, RN  Outcome: Progressing  Flowsheets (Taken 1/12/2023 1517 by Geo Mesa RN)  Discharge to home or other facility with appropriate resources: Identify barriers to discharge with patient and caregiver     Problem: Pain  Goal: Verbalizes/displays adequate comfort level or baseline comfort level  1/14/2023 0821 by Zhen Corral RN  Outcome: Progressing  1/14/2023 0742 by Oley Bosworth, RN  Outcome: Progressing  Flowsheets (Taken 1/12/2023 1517 by Geo Mesa RN)  Verbalizes/displays adequate comfort level or baseline comfort level:   Encourage patient to monitor pain and request assistance   Assess pain using appropriate pain scale     Problem: Safety - Adult  Goal: Free from fall injury  1/14/2023 0821 by Zhen Corral RN  Outcome: Progressing  1/14/2023 0742 by Oley Bosworth, RN  Outcome: Progressing  Flowsheets (Taken 1/12/2023 1517 by Geo Mesa RN)  Free From Fall Injury: Instruct family/caregiver on patient safety     Problem: ABCDS Injury Assessment  Goal: Absence of physical injury  1/14/2023 0821 by Zhen Corral RN  Outcome: Progressing  1/14/2023 0742 by Oley Bosworth, RN  Outcome: Progressing  Flowsheets (Taken 1/12/2023 1517 by Geo Mesa RN)  Absence of Physical Injury: Implement safety measures based on patient assessment     Problem: Infection - Adult  Goal: Absence of infection during hospitalization  1/14/2023 0821 by Zhen Corral RN  Outcome: Progressing  1/14/2023 0742 by Oley Bosworth, RN  Outcome: Progressing  Flowsheets (Taken 1/12/2023 1517 by Geo Mesa RN)  Absence of infection during hospitalization: Assess and monitor for signs and symptoms of infection     Problem: Skin/Tissue Integrity  Goal: Absence of new skin breakdown  Description: 1. Monitor for areas of redness and/or skin breakdown  2. Assess vascular access sites hourly  3. Every 4-6 hours minimum:  Change oxygen saturation probe site  4. Every 4-6 hours:  If on nasal continuous positive airway pressure, respiratory therapy assess nares and determine need for appliance change or resting period.   1/14/2023 0821 by Aaron Garcia RN  Outcome: Progressing  1/14/2023 0742 by Latonya Al RN  Outcome: Progressing     Problem: Chronic Conditions and Co-morbidities  Goal: Patient's chronic conditions and co-morbidity symptoms are monitored and maintained or improved  1/14/2023 0821 by Aaron Garcia RN  Outcome: Progressing  1/14/2023 0742 by Latonya Al RN  Outcome: Progressing  Flowsheets (Taken 1/12/2023 1517 by Naomie Concepcion RN)  Care Plan - Patient's Chronic Conditions and Co-Morbidity Symptoms are Monitored and Maintained or Improved: Monitor and assess patient's chronic conditions and comorbid symptoms for stability, deterioration, or improvement

## 2023-01-14 NOTE — PROGRESS NOTES
[address] [address]  INPATIENT PHYSICAL THERAPY  DAILY NOTE  [facility] ORTHOPEDICS 7K - 7K-04/004-A    Time In: 0848  Time Out: 0394  Timed Code Treatment Minutes: 24 Minutes  Minutes: 24          Date: 2023  Patient Name: Kika Grant,  Gender:  male        MRN: [id number]  : 1960  (65 y.o.)     Referring Practitioner: Basilio Gonzalez DPM  Diagnosis: sepsis  Additional Pertinent Hx: per EMR \"The patient is a 65 y.o. male with significant past medical history of CAD, CKD, diabetes, hyperlipidemia, and hypertension who is being seen at bedside on behalf of Dr. Sundeep Contreras. Patient relates that he has had ongoing problems with his left foot and ankle over the course of the past few years. 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 He relates that he follows up and sees Dr. Sundeep Contreras in clinic weekly. He relates that he missed his appointment last week and did not have the dressing changed. The patient states that the previous week he had x-rays taken in clinic that showed a pathologic fracture of the left tibia. He relates that he has not been walking on his left lower extremity. He relates that he has applied some pressure when using the restroom on that leg. He states that amputation has been in discussion at his previous clinic encounters. The patient relates that he last a last evening at 8 or 9 PM.  He relates that he drank Crystal light tea around 8 or 9 AM this morning. He relates that he took Plavix this morning at 6 AM.  The patient denies any other concerns to his right lower extremity. The patient denies nausea, vomiting, fever, chills, shortness of breath or chest pain. \" Pt is s/p L BKA on 1/10/23 completed by Dr. Sundeep Contreras.      Prior Level of Function:  Lives With: Alone  Type of Home: House  Home Layout: One level  Home Access: Ramped entrance  Home Equipment: Electric scooter, [id number] [facility] Shower/Tub: Walk-in shower, Shower chair with back  Bathroom Toilet: Handicap height  Bathroom Equipment: Grab bars in shower, Grab bars around toilet    Receives Help From: Friend(s)  ADL Assistance: Independent  Homemaking Assistance: Needs assistance  Homemaking Responsibilities: Yes  Ambulation Assistance: Independent  Transfer Assistance: Independent  Active : No  IADL Comments: He has a robert who works for him - worker's wife makes meals from that he only has to heat up in microwave and assists with cleaning tasks  Additional Comments: Pt reported that he has a very supportive family who can be available intermittently throughout the day upon discharge home. Pt reports he was amb very little, using his scooter or w/c most of the time    Restrictions/Precautions:  Restrictions/Precautions: General Precautions, Weight Bearing, Fall Risk  Left Lower Extremity Weight Bearing: Non Weight Bearing (BKA)  Position Activity Restriction  Other position/activity restrictions: L BKA     SUBJECTIVE: RN approved session. Pt pleasant and agreeable to therapy    PAIN: 0/10: denies pain; reports just had pain meds     Vitals: Vitals not assessed per clinical judgement, see nursing flowsheet    OBJECTIVE:  Bed Mobility:  Supine to Sit: Contact Guard Assistance, X 1, with head of bed raised, with rail    Transfers:  Slide Board:Contact Guard Assistance, X 1, with verbal cues, from EOB to W/c towards R  *dependent for set up and placement of slideboard     Ambulation:  Not Tested    Wheelchair Mobility:  Contact Guard Assistance  Extremities Used: Bilateral Upper Extremities  Type of Chair:Manual  Surface: Level Tile  Distance: 80'  Quality: Slow Velocity, Short Strokes, Decreased Fluidity, and several rest breaks d/t fatigue     Balance:  Static Sitting Balance:  Stand By Assistance  Dynamic Sitting Balance: Contact Guard Assistance    Exercise:  Patient was guided in 1 set(s) 10 reps of exercise to left lower extremities. Long arc quads.   Exercises were completed for increased independence with functional mobility. Functional Outcome Measures: Completed  AM-PAC Inpatient Mobility without Stair Climbing Raw Score : 11  AM-PAC Inpatient without Stair Climbing T-Scale Score : 35.66    ASSESSMENT:  Assessment: Patient progressing toward established goals. Activity Tolerance:  Patient tolerance of  treatment: good. Equipment Recommendations:Equipment Needed: No  Discharge Recommendations: Inpatient Therapy Stay  Plan: Current Treatment Recommendations: Strengthening, Balance training, Functional mobility training, Transfer training, Endurance training, Equipment evaluation, education, & procurement, Patient/Caregiver education & training, Neuromuscular re-education, Safety education & training, Home exercise program, Therapeutic activities  General Plan:  (6x O)    Patient Education  Patient Education: Plan of Care, Precautions/Restrictions, Bed Mobility, Transfers, Wheelchair Mobility, Verbal Exercise Instruction, Education Related to Potential Risks and Complications Due to Impairment/Illness/Injury    Goals:  Patient Goals : \"be able to get in and out bed and w/c with less assist\"  Short Term Goals  Time Frame for Short Term Goals: by discharge  Short Term Goal 1: Pt will demo supine to and from sit transfers with SBA and modifications as needed to progress with mobility. Short Term Goal 2: Pt will demo slide board transfers to and from w/c with mod Ax1 to progress with mobility. Short Term Goal 3: Pt will tolerate 10-20 reps of ther ex to increase overall mobility. Short Term Goal 4: Pt will demo S for w/c mobility for 100 feet to increase IND with mobility. Short Term Goal 5: PT to assess sit to/from stand transfers when appropriate. Long Term Goals  Time Frame for Long Term Goals : NA due to short ELOS    Following session, patient left in safe position with all fall risk precautions in place.     Farhana Vail (Truex) PT, DPT

## 2023-01-14 NOTE — PROGRESS NOTES
Hospitalist -Progress Note      Patient: Karen Blunt    Unit/Bed:7K-04/004-A  YOB: 1960  MRN: [id number]   Acct: [de-identified]   PCP: EYAD Snider - CNP    Date of Service: Pt seen/examined on 01/14/23  Chief Complaint: Left septic ankle with abscess and open pathologic fracture    Assessment and Plan:-    Septic left ankle with pathologic fracture of left tibia and abscess of the left ankle:  POD #4 day s/p Proximal Symes amputation/distal transtibial amputation, left (DOS 1/10)    -Continue Zosyn- vancomycin and clindamycin stopped by ID on 1/13   -Infectious disease following   - continue with management by primary    GISELA - improving   - suspect medication induced from antibiotics and possible cardiorenal   Cr baseline 1.0-1.2   Currently 1.5- improved from 1.7 day prior    BMP daily    - if further increases, consult nephro     Acute respiratory failure- resolved  Started post operatively suspect fluid overload and anemia contributing   BNP elevated in 2000s- fluids stopped 1/11    Nocturnal hypoxia- suspect RILEY   Nocturnal pulse oximetry and completed   Will need oxygen at night long term   Referral for formal sleep study on DC     Type 2 diabetes mellitus: Uncontrolled. A1c 8.3   Goal blood glucose while inpatient 140-180.    -Hold home meds  -Lantus 30 units BID - 1/12 increased to 33 units BID - 1/14- decrease to 31 BID   -Hypoglycemic protocol-Accu-Cheks-Diabetic and cardiac diet    -Continue Neurontin  -1/12 increased SSI to high dose       HTN: Recent hypertensive emergency in which he presented to [facility] November 2022 with chest pain and new RBBB. Patient subsequently left AMA before treatment. On arrival to [facility] patient's blood pressure was 131/62.   - Cardiology consulted - no further interventions recommended at this time  -Continue Norvasc, clonidine, Lasix, Lopressor twice daily, and minoxidil with holding parameters.     Chronic diastolic heart failure: Last echocardiogram 11/03/22 completed at [facility] showed concentric remodeling with normal regional wall motion, abnormal septal motion consistent with postoperative state, EF of 55 to 60% and grade 2 diastolic dysfunction.   -Lasix 40 mg daily               -Strict I & O                -Daily standing weights               -Low sodium diet   - 1/11 concerns for fluid overload given elevated BNP 1/10 and rales on exam- fluids stopped  -1/12 lungs clear- will hold off on additional diuresis right now      Hyperlipidemia: Last lipid panel 11/16/2022   -Continue Lipitor 80 mg daily    CAD s/p CABG: November 2019. Echo 4/21/20 EF 50%,    -aspirin and Plavix  -Continue home amlodipine, clonidine, lopressor, valsartan and minoxidil       Moderate persistent asthma: No PFTs noted in chart   -Continue home Spiriva   -Pulmonary hygiene    Hx of PAD: Continue aspirin and Plavix, statin therapy    Lupus:  Does not appear that patient is on any anticoagulation however he is on dual antiplatelet therapy.   -Hold aspirin and Plavix due to upcoming procedure.   -Note the patient is at elevated risk for clotting due to history of lupus  . Acute blood loss anemia on Chronic normocytic anemia: Baseline hemoglobin 10s.    - 1/11 Hgb dropped to 6.8- transfuse 1unit PRBC     Hgb improved to 8.1 1/14   -Continue home p.o. iron   -Nursing to monitor for any blood in stool   -Monitor with CBC daily        Disposition:- IPR pending precertification         History Of Present Illness:      56-year-old male was admitted to [facility] by podiatry for urgent/emergent management of septic left ankle with pathologic fracture of tibia and abscess of left ankle. Initially podiatry was planning to take patient to the OR tonight or tomorrow and consulted hospital team for risk stratification.       Patient has significant past medical history of diabetes mellitus type 2, uncontrolled, hypertension, CAD status post CABG, CKD, chronic normocytic anemia, lupus, moderate persistent asthma, PAD, grade 2 diastolic dysfunction, and hyperlipidemia,   It is important note the patient was recently hospitalized at [facility] 11/2/2022 for hypertensive emergency in which she was experiencing chest pain and a new left bundle branch block was found. Patient subsequently left AMA despite having blood pressure 200s/100s. Upon [facility] to [facility] blood cultures were obtained, patient was started on vancomycin and Zosyn. Chest x-ray obtained that demonstrated evidence of prior CABG as well as hyperinflated lungs suggestive of COPD. Patient also had evidence of healed granulomatous disease. With questionable mild interstitial fibrosis. X-ray of left ankle was obtained that showed gas density and soft tissue extending to the lower left leg. Complete dislocation and rotated talus relative to the tibia. Also there was abnormal periosteal reaction and distal tibia and fibula which indicate chronic osteomyelitis. There is also cortical erosion of distal fibula. EKG was also completed that demonstrated Normal sinus rhythm, Left axis deviation; Right bundle branch block, Inferior infarct , age undetermined. Abnormal ECG When compared with ECG of 25-NOV-2022 04:21,Right bundle branch block has replaced Intraventricular conduction delay. During my examination patient denied chest pain, shortness of breath, headache, lightheadedness, abdominal pain. Patient did have foot pain that was relieved with pain medication. Stat CBC, BMP, hepatic function panel as well as magnesium were ordered. 1/11/2023  Patient postop day 1. Hemoglobin dropped to 6.8. Will transfuse 1 unit PRBCs. Patient requiring O2 postoperatively and was weaned to room air today however dipped back into the mid 80s. Currently satting 93% on 2 L.    1/12/2023  Hemoglobin 7 after transfusion. No longer requiring O2 at this time.   Nocturnal pulse oximetry pending. WBC downtrending    1/13/2023  No events overnight. Blood sugar is much improved. VSS. Waiting for IPR precert      1/25/2460  Vanc and clindamycin stopped yesterday. BG acceptable. VSS. Patient had episode of symptomatic hypotension today. 500cc bolus of NS ordered. Patient improved and BP acceptable. Encouraged oral hydration       Past Medical History:        Diagnosis Date    Arthritis     CAD (coronary artery disease)     CKD (chronic kidney disease), stage II     Diabetes mellitus ([facility] [address]     Hyperlipidemia     Hypertension     Liver disease     HEPITITIS AS A CHILD       Past Surgical History:        Procedure Laterality Date    CARDIAC SURGERY  11/2019    triple bypass    CYST REMOVAL      RIGHT ANKLE     FOOT AMPUTATION Left 1/10/2023    LEFT BELOW KNEE AMPUTATION performed by Neyda Awad DPM at [facility] [place] Left 4/20/2020    LEFT TRANSMETATARSAL AMPUTATION  FOOT INCISION AND DRAINAGE performed by Neyda Awad DPM at [facility] [place] Left 7/20/2020    LEFT WOUND DEBRIDEMENT WITH WOUND VAC APPLICATION performed by Neyda Awad DPM at [facility] [place] Left 7/1/2022    LEFT FOOT [facility], [address] [place] [address], APPLICATION [employer] WOUND VAC performed by Neyda Awad DPM at [facility] Right     CYST REMOVED    FOOT SURGERY Left 8/14/2020    LEFT FOOT PREPARATION GRAFT SITE, HAVEST SPLIT THICKNESS SKIN GRAFT WITH APPLICATION, APPLICATION [employer] WOUND VAC performed by Neyda Awad DPM at [address] Left 3/3/2020    I&D LEFT FOOT, TRANSMETATARSAL AMPUTATION performed by Neyda Awad DPM at [address] [place] [address] 11/23/2022    EGD performed by Carolyn Urena MD at [facility]       Home Medications:   No current facility-administered medications on file prior to encounter.      Current Outpatient Medications on File Prior to Encounter   Medication Sig Dispense Refill    HYDROcodone-acetaminophen (NORCO) 5-325 MG per tablet Take 1 tablet by mouth every 4 hours as needed. cloNIDine (CATAPRES) 0.1 MG tablet Take 1 tablet by mouth 2 times daily 60 tablet 3    tiotropium (SPIRIVA RESPIMAT) 2.5 MCG/ACT AERS inhaler Inhale 2 puffs into the lungs daily 4 g 0    benzonatate (TESSALON) 200 MG capsule Take 1 capsule by mouth 3 times daily as needed for Cough (Patient not taking: Reported on 1/10/2023) 30 capsule 0    amLODIPine (NORVASC) 10 MG tablet Take 1 tablet by mouth daily 30 tablet 3    metoprolol tartrate (LOPRESSOR) 50 MG tablet Take 1 tablet by mouth 2 times daily 60 tablet 3    valsartan (DIOVAN) 160 MG tablet Take 1 tablet by mouth daily 30 tablet 3    minoxidil (LONITEN) 10 MG tablet Take 1 tablet by mouth daily 30 tablet 3    ferrous sulfate (IRON 325) 325 (65 Fe) MG tablet Take 1 tablet by mouth 2 times daily 180 tablet 1    furosemide (LASIX) 40 MG tablet Take 1 tablet by mouth daily 90 tablet 1    potassium chloride (KLOR-CON M) 20 MEQ extended release tablet Take 1 tablet by mouth daily 30 tablet 5    empagliflozin (JARDIANCE) 25 MG tablet Take 1 tablet by mouth daily 90 tablet 1    clopidogrel (PLAVIX) 75 MG tablet Take 1 tablet by mouth daily 90 tablet 3    gabapentin (NEURONTIN) 600 MG tablet Take 1 tablet by mouth 2 times daily for 180 days. 180 tablet 1    insulin detemir (LEVEMIR FLEXTOUCH) 100 UNIT/ML injection pen 30 units in the morning, and 30 units in the evening.  21 mL 3    insulin lispro (HUMALOG) 100 UNIT/ML injection vial Inject 5-15 Units into the skin 3 times daily (with meals) 15 mL 3    albuterol sulfate HFA (VENTOLIN HFA) 108 (90 Base) MCG/ACT inhaler Inhale 2 puffs into the lungs 4 times daily as needed for Wheezing 54 g 1    atorvastatin (LIPITOR) 80 MG tablet Take 1 tablet by mouth daily 30 tablet 3    Polyethylene Glycol 3350 (MIRALAX PO) Take by mouth as needed      aspirin 81 MG chewable tablet Take 1 tablet by mouth daily 30 tablet 3    Multiple Vitamins-Minerals (MULTIVITAMIN PO) Take 1 tablet by mouth daily          Allergies:    Patient has no known allergies. Social History:    reports that he quit smoking about 15 years ago. His smoking use included cigarettes. He has never used smokeless tobacco. He reports that he does not currently use alcohol. He reports that he does not use drugs. Family History:       Problem Relation Age of Onset    Diabetes Mother     High Blood Pressure Mother     Alzheimer's Disease Father          of, 80or 80years old    Heart Disease Father     High Blood Pressure Father     Cancer Neg Hx     Stroke Neg Hx        Diet:  ADULT DIET; Regular; 3 carb choices (45 gm/meal); Low Fat/Low Chol/High Fiber/2 gm Na    Review of systems:   Pertinent positives as noted in the HPI. All other systems reviewed and negative. PHYSICAL EXAM:  BP (!) 143/71   Pulse 73   Temp 97.5 °F (36.4 °C) (Oral)   Resp 15   Ht 6' 2\" (1.88 m)   Wt 283 lb 14.4 oz (128.8 kg)   SpO2 94%   BMI 36.45 kg/m²   General appearance: No apparent distress, appears stated age and cooperative. Obese    HEENT: Normal cephalic, atraumatic without obvious deformity. Pallor improved from   Neck: No jugular venous distention. Trachea midline. Respiratory:  Normal respiratory effort.   Lungs clear to auscultation bilaterally-  Cardiovascular: Regular rate and rhythm with normal S1/S2   Abdomen: Hypoactive bowel sounds, abdomen is soft and rounded, nontender to palpation   Musculoskeletal:  Right lower extremity +2 edema, left lower leg  with amputation- wrapped  Skin: Warm and dry  Neurologic:  No focal deficits, bilateral numbness and tingling of right lower extremity as well as left lower extremity, follows commands  Psychiatric: Alert and oriented, thought content appropriate, normal insight  Labs:   Recent Labs     23  1218 23  0612 23  1214 23  0530 WBC 13.2* 12.0*  --  10.0   HGB 6.8* 7.0* 7.3* 7.5*   HCT 23.6* 24.0* 24.2* 25.5*   * 446*  --  473*       Recent Labs     01/11/23  1218 01/12/23  0612 01/13/23  0554   * 132* 135   K 4.8 4.6 4.4   CL 95* 98 101   CO2 22* 23 24   BUN 26* 33* 30*   CREATININE 1.6* 1.8* 1.7*   CALCIUM 8.0* 7.7* 8.0*       No results for input(s): AST, ALT, BILIDIR, BILITOT, ALKPHOS in the last 72 hours. No results for input(s): INR in the last 72 hours. No results for input(s): Beau Cordova in the last 72 hours. Urinalysis:    Lab Results   Component Value Date/Time    NITRU NEGATIVE 11/16/2022 01:52 PM    WBCUA 0-2 11/16/2022 01:52 PM    BACTERIA NONE SEEN 11/16/2022 01:52 PM    RBCUA 15-25 11/16/2022 01:52 PM    BLOODU MODERATE 11/16/2022 01:52 PM    GLUCOSEU >= 1000 11/16/2022 01:52 PM       Radiology:   XR ANKLE LEFT (MIN 3 VIEWS)   Final Result         1. Gas density in the soft tissues extending into the lower left leg. 2. Completely dislocated and rotated talus relative to the tibia. 3. Abnormal periosteal reaction in the distal tibia and fibula which can indicate chronic osteomyelitis. 4. Cortical erosion of the distal fibula. **This report has been created using voice recognition software. It may contain minor errors which are inherent in voice recognition technology. **      Final report electronically signed by Dr. Reba Jarrett on 1/10/2023 3:46 PM      XR CHEST PORTABLE   Final Result   1. Normal heart size. Metallic sternotomy sutures from prior surgery. Lungs somewhat hyperinflated, suggesting possible COPD. 2. Evidence for old, healed granulomatous disease. Question mild interstitial fibrosis/pneumonitis both lung bases. No effusion is seen. **This report has been created using voice recognition software. It may contain minor errors which are inherent in voice recognition technology. **      Final report electronically signed by Dr. Tessa Short on 1/10/2023 2:42 PM        XR CHEST PORTABLE    Result Date: 1/10/2023  PROCEDURE: XR CHEST PORTABLE CLINICAL INFORMATION: pre op COMPARISON: 11/25/2022 TECHNIQUE: A single mobile view of the chest was obtained. 1. Normal heart size. Metallic sternotomy sutures from prior surgery. Lungs somewhat hyperinflated, suggesting possible COPD. 2. Evidence for old, healed granulomatous disease. Question mild interstitial fibrosis/pneumonitis both lung bases. No effusion is seen. **This report has been created using voice recognition software. It may contain minor errors which are inherent in voice recognition technology. ** Final report electronically signed by Dr. Vivian Soto on 1/10/2023 2:42 PM        EKG: Normal sinus rhythm, Left axis deviation, Right bundle branch block, Inferior infarct , age undetermined Abnormal ECG.  When compared with ECG of 25-NOV-2022 04:21,  Right bundle branch block has replaced Intraventricular conduction delay    Electronically signed by Mikhail Venegas PA-C on 1/14/2023 at 7:46 AM            .

## 2023-01-14 NOTE — PLAN OF CARE
Problem: Discharge Planning  Goal: Discharge to home or other facility with appropriate resources  Outcome: Progressing  Flowsheets (Taken 1/12/2023 1517 by Magdalena Brito RN)  Discharge to home or other facility with appropriate resources: Identify barriers to discharge with patient and caregiver     Problem: Pain  Goal: Verbalizes/displays adequate comfort level or baseline comfort level  Outcome: Progressing  Flowsheets (Taken 1/12/2023 1517 by Magdalena Brito, RN)  Verbalizes/displays adequate comfort level or baseline comfort level:   Encourage patient to monitor pain and request assistance   Assess pain using appropriate pain scale     Problem: Safety - Adult  Goal: Free from fall injury  Outcome: Progressing  Flowsheets (Taken 1/12/2023 1517 by Magdalena Brito RN)  Free From Fall Injury: Instruct family/caregiver on patient safety     Problem: ABCDS Injury Assessment  Goal: Absence of physical injury  Outcome: Progressing  Flowsheets (Taken 1/12/2023 1517 by Magdalena Brito RN)  Absence of Physical Injury: Implement safety measures based on patient assessment     Problem: Infection - Adult  Goal: Absence of infection during hospitalization  Outcome: Progressing  Flowsheets (Taken 1/12/2023 1517 by Magdalena Brito RN)  Absence of infection during hospitalization: Assess and monitor for signs and symptoms of infection     Problem: Skin/Tissue Integrity  Goal: Absence of new skin breakdown  Description: 1. Monitor for areas of redness and/or skin breakdown  2. Assess vascular access sites hourly  3. Every 4-6 hours minimum:  Change oxygen saturation probe site  4. Every 4-6 hours:  If on nasal continuous positive airway pressure, respiratory therapy assess nares and determine need for appliance change or resting period.   Outcome: Progressing     Problem: Chronic Conditions and Co-morbidities  Goal: Patient's chronic conditions and co-morbidity symptoms are monitored and maintained or improved  Outcome: Progressing  Flowsheets (Taken 1/12/2023 1517 by Aleah An RN)  Care Plan - Patient's Chronic Conditions and Co-Morbidity Symptoms are Monitored and Maintained or Improved: Monitor and assess patient's chronic conditions and comorbid symptoms for stability, deterioration, or improvement     Care plan reviewed with patient. Patient verbalize understanding of the plan of care and contribute to goal setting.

## 2023-01-14 NOTE — PROGRESS NOTES
Podiatric Progress Note  Keya Berger  Subjective :     01/14/23     Patient is a pleasant 62yo male seen bedside this AM s/p left lower extremity amputation emergent in nature due to necrotizing fasciitis and extensive gas gangrene performed 1.10.23. Patient seated upright bedside working with PT. Patient overall states he feels vastly improved, post op dressing clean/dry/intact. Social work is following, precert for [employer] in place. ID following for IVAB therapy, currently on Zosyn IV (vancomycin and clindamycin discontinued). Labs are trending better, leukocytosis resolved. Will continue to follow until determination of placement. HISTORY OF PRESENT ILLNESS:                 The patient is a 65 y.o. male with significant past medical history of CAD, CKD, diabetes, hyperlipidemia, and hypertension who is being seen at bedside on behalf of Dr. Carla Valentine. Patient relates that he has had ongoing problems with his left foot and ankle over the course of the past few years. 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 He relates that he follows up and sees Dr. Carla Valentine in clinic weekly. He relates that he missed his appointment last week and did not have the dressing changed. The patient states that the previous week he had x-rays taken in clinic that showed a pathologic fracture of the left tibia. He relates that he has not been walking on his left lower extremity. He relates that he has applied some pressure when using the restroom on that leg. He states that amputation has been in discussion at his previous clinic encounters. The patient relates that he last a last evening at 8 or 9 PM.  He relates that he drank Crystal light tea around 8 or 9 AM this morning. He relates that he took Plavix this morning at 6 AM.  The patient denies any other concerns to his right lower extremity.   The patient denies nausea, vomiting, fever, chills, shortness of breath or chest pain.         Current Medications:    Current Facility-Administered Medications: insulin glargine (LANTUS) injection vial 33 Units, 33 Units, SubCUTAneous, BID  insulin lispro (HUMALOG) injection vial 0-16 Units, 0-16 Units, SubCUTAneous, TID WC  insulin lispro (HUMALOG) injection vial 0-4 Units, 0-4 Units, SubCUTAneous, Nightly  polyethylene glycol (GLYCOLAX) packet 17 g, 17 g, Oral, Daily PRN  acetaminophen (TYLENOL) tablet 650 mg, 650 mg, Oral, Q6H PRN **OR** acetaminophen (TYLENOL) suppository 650 mg, 650 mg, Rectal, Q6H PRN  amLODIPine (NORVASC) tablet 10 mg, 10 mg, Oral, Daily  atorvastatin (LIPITOR) tablet 80 mg, 80 mg, Oral, Daily  albuterol sulfate HFA (PROVENTIL;VENTOLIN;PROAIR) 108 (90 Base) MCG/ACT inhaler 2 puff, 2 puff, Inhalation, 4x Daily PRN  ferrous sulfate (IRON 325) tablet 325 mg, 325 mg, Oral, BID  furosemide (LASIX) tablet 40 mg, 40 mg, Oral, Daily  gabapentin (NEURONTIN) tablet 600 mg, 600 mg, Oral, BID  minoxidil (LONITEN) tablet 10 mg, 10 mg, Oral, Daily  multivitamin 1 tablet, 1 tablet, Oral, Daily  potassium chloride (KLOR-CON M) extended release tablet 20 mEq, 20 mEq, Oral, Daily  valsartan (DIOVAN) tablet 160 mg, 160 mg, Oral, Daily  Sodium Hypochlorite (DAKINS) 0.25 % external solution, , Irrigation, Daily  glucose chewable tablet 16 g, 4 tablet, Oral, PRN  dextrose bolus 10% 125 mL, 125 mL, IntraVENous, PRN **OR** dextrose bolus 10% 250 mL, 250 mL, IntraVENous, PRN  glucagon (rDNA) injection 1 mg, 1 mg, SubCUTAneous, PRN  dextrose 10 % infusion, , IntraVENous, Continuous PRN  piperacillin-tazobactam (ZOSYN) 3,375 mg in dextrose 5 % 50 mL IVPB (mini-bag), 3,375 mg, IntraVENous, q8h  aspirin chewable tablet 81 mg, 81 mg, Oral, Daily  clopidogrel (PLAVIX) tablet 75 mg, 75 mg, Oral, Daily  tiotropium (SPIRIVA RESPIMAT) 2.5 MCG/ACT inhaler 2 puff, 2 puff, Inhalation, Daily  cloNIDine (CATAPRES) tablet 0.1 mg, 0.1 mg, Oral, BID  metoprolol tartrate (LOPRESSOR) tablet 50 mg, 50 mg, Oral, BID  sodium chloride flush 0.9 % injection 5-40 mL, 5-40 mL, IntraVENous, 2 times per day  sodium chloride flush 0.9 % injection 5-40 mL, 5-40 mL, IntraVENous, PRN  0.9 % sodium chloride infusion, , IntraVENous, PRN  ondansetron (ZOFRAN-ODT) disintegrating tablet 4 mg, 4 mg, Oral, Q8H PRN **OR** ondansetron (ZOFRAN) injection 4 mg, 4 mg, IntraVENous, Q6H PRN  oxyCODONE (ROXICODONE) immediate release tablet 5 mg, 5 mg, Oral, Q4H PRN **OR** oxyCODONE (ROXICODONE) immediate release tablet 10 mg, 10 mg, Oral, Q4H PRN  morphine (PF) injection 2 mg, 2 mg, IntraVENous, Q2H PRN **OR** morphine injection 4 mg, 4 mg, IntraVENous, Q2H PRN    Objective     /62   Pulse 75   Temp 98 °F (36.7 °C) (Oral)   Resp 18   Ht 6' 2\" (1.88 m)   Wt 283 lb 14.4 oz (128.8 kg)   SpO2 93%   BMI 36.45 kg/m²      I/O:  Intake/Output Summary (Last 24 hours) at 1/14/2023 0909  Last data filed at 1/14/2023 0056  Gross per 24 hour   Intake 360 ml   Output 1150 ml   Net -790 ml              Wt Readings from Last 3 Encounters:   01/11/23 283 lb 14.4 oz (128.8 kg)   12/08/22 (!) 310 lb (140.6 kg)   12/08/22 (!) 310 lb (140.6 kg)       LABS:    Recent Labs     01/12/23  0612 01/12/23  1214 01/13/23  0554   WBC 12.0*  --  10.0   HGB 7.0* 7.3* 7.5*   HCT 24.0* 24.2* 25.5*   *  --  473*        Recent Labs     01/13/23  0554      K 4.4      CO2 24   BUN 30*   CREATININE 1.7*        No results for input(s): PROT, INR, APTT in the last 72 hours. No results for input(s): CKTOTAL, CKMB, CKMBINDEX, TROPONINI in the last 72 hours.     Focused Lower Extremity Examination:    Vitals:    /62   Pulse 75   Temp 98 °F (36.7 °C) (Oral)   Resp 18   Ht 6' 2\" (1.88 m)   Wt 283 lb 14.4 oz (128.8 kg)   SpO2 93%   BMI 36.45 kg/m²      Dressings left intact to the LLE on 1/13.  1/12/23 Exam  Vascular: popliteal pulse palpable, skin temp wnl   Dermatologic: dressing clean/dry/intact to left lower leg, no strikthrough present Neurovascular: Light touch sensation diminished bilaterally. Musculoskeletal: minimal pain with palpation/manipulation of operative site                         Images  XR ANKLE LEFT (MIN 3 VIEWS)   Final Result         1. Gas density in the soft tissues extending into the lower left leg. 2. Completely dislocated and rotated talus relative to the tibia. 3. Abnormal periosteal reaction in the distal tibia and fibula which can indicate chronic osteomyelitis. 4. Cortical erosion of the distal fibula. **This report has been created using voice recognition software. It may contain minor errors which are inherent in voice recognition technology. **      Final report electronically signed by Dr. Nile Rodas on 1/10/2023 3:46 PM      XR CHEST PORTABLE   Final Result   1. Normal heart size. Metallic sternotomy sutures from prior surgery. Lungs somewhat hyperinflated, suggesting possible COPD. 2. Evidence for old, healed granulomatous disease. Question mild interstitial fibrosis/pneumonitis both lung bases. No effusion is seen. **This report has been created using voice recognition software. It may contain minor errors which are inherent in voice recognition technology. **      Final report electronically signed by Dr. Thelma Chang on 1/10/2023 2:42 PM          ASSESSMENT: Pt. is a 65 y.o. 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    Sepsis ([address]    Acute left-sided low back pain with bilateral sciatica    S/P transmetatarsal amputation of foot, left (HCC)    Leukocytosis    Wound dehiscence, surgical, initial encounter Postoperative wound infection    Metabolic acidosis    Hx of CABG    Lumbar stenosis without neurogenic claudication    CKD (chronic kidney disease), stage II    Normocytic anemia    Morbid obesity ([facility]    Debility    Carotid stenosis, asymptomatic, bilateral    Hypokalemia    Nonhealing ulcer of left lower extremity (HCC)    Bleeding    Wound, open    SOB (shortness of breath) on exertion    Hemoglobin A1C greater than 9%, indicating poor diabetic control    Hypertensive emergency    Acute on chronic congestive heart failure ([facility]    Hypertensive urgency    Moderate persistent asthma    Septic arthritis of left ankle, due to unspecified organism (HCC)    Closed fracture of ankle with nonunion    S/P BKA (below knee amputation), left ([facility]       PLAN:   Patient examined and evaluated  Continue to follow labs/imaging  Awaiting SNF/IP rehab placement, social work following  Consultations to hospitalist (medical management), cardiology (extensive CAD with h/o sx)  ID following, IVAB adjusted yesterday (Continue zosyn, discontinue clindamycin/vancomycin)  All further questions/concerns were addressed    Yuliet Valiente [facility]   Electronically signed by Demetria Espinal DPM on 1/14/2023 at 9:17 AM

## 2023-01-15 LAB
ANION GAP SERPL CALCULATED.3IONS-SCNC: 11 MEQ/L (ref 8–16)
BLOOD CULTURE, ROUTINE: NORMAL
BLOOD CULTURE, ROUTINE: NORMAL
BUN BLDV-MCNC: 22 MG/DL (ref 7–22)
CALCIUM SERPL-MCNC: 8.6 MG/DL (ref 8.5–10.5)
CHLORIDE BLD-SCNC: 97 MEQ/L (ref 98–111)
CO2: 27 MEQ/L (ref 23–33)
CREAT SERPL-MCNC: 1.5 MG/DL (ref 0.4–1.2)
ERYTHROCYTE [DISTWIDTH] IN BLOOD BY AUTOMATED COUNT: 20.6 % (ref 11.5–14.5)
ERYTHROCYTE [DISTWIDTH] IN BLOOD BY AUTOMATED COUNT: 58.4 FL (ref 35–45)
GFR SERPL CREATININE-BSD FRML MDRD: 53 ML/MIN/1.73M2
GLUCOSE BLD-MCNC: 121 MG/DL (ref 70–108)
GLUCOSE BLD-MCNC: 128 MG/DL (ref 70–108)
GLUCOSE BLD-MCNC: 132 MG/DL (ref 70–108)
GLUCOSE BLD-MCNC: 152 MG/DL (ref 70–108)
GLUCOSE BLD-MCNC: 189 MG/DL (ref 70–108)
HCT VFR BLD CALC: 29.2 % (ref 42–52)
HEMOGLOBIN: 8.5 GM/DL (ref 14–18)
MCH RBC QN AUTO: 24.1 PG (ref 26–33)
MCHC RBC AUTO-ENTMCNC: 29.1 GM/DL (ref 32.2–35.5)
MCV RBC AUTO: 82.7 FL (ref 80–94)
PLATELET # BLD: 552 THOU/MM3 (ref 130–400)
PMV BLD AUTO: 9.2 FL (ref 9.4–12.4)
POTASSIUM REFLEX MAGNESIUM: 4.5 MEQ/L (ref 3.5–5.2)
RBC # BLD: 3.53 MILL/MM3 (ref 4.7–6.1)
SODIUM BLD-SCNC: 135 MEQ/L (ref 135–145)
WBC # BLD: 6.1 THOU/MM3 (ref 4.8–10.8)

## 2023-01-15 PROCEDURE — 6370000000 HC RX 637 (ALT 250 FOR IP)

## 2023-01-15 PROCEDURE — 36415 COLL VENOUS BLD VENIPUNCTURE: CPT

## 2023-01-15 PROCEDURE — 1200000000 HC SEMI PRIVATE

## 2023-01-15 PROCEDURE — 82948 REAGENT STRIP/BLOOD GLUCOSE: CPT

## 2023-01-15 PROCEDURE — 6360000002 HC RX W HCPCS: Performed by: PHYSICIAN ASSISTANT

## 2023-01-15 PROCEDURE — 2580000003 HC RX 258

## 2023-01-15 PROCEDURE — 99232 SBSQ HOSP IP/OBS MODERATE 35: CPT | Performed by: PHYSICIAN ASSISTANT

## 2023-01-15 PROCEDURE — 94760 N-INVAS EAR/PLS OXIMETRY 1: CPT

## 2023-01-15 PROCEDURE — 97530 THERAPEUTIC ACTIVITIES: CPT

## 2023-01-15 PROCEDURE — 6360000002 HC RX W HCPCS

## 2023-01-15 PROCEDURE — 94669 MECHANICAL CHEST WALL OSCILL: CPT

## 2023-01-15 PROCEDURE — 85027 COMPLETE CBC AUTOMATED: CPT

## 2023-01-15 PROCEDURE — 6370000000 HC RX 637 (ALT 250 FOR IP): Performed by: PHYSICIAN ASSISTANT

## 2023-01-15 PROCEDURE — 97110 THERAPEUTIC EXERCISES: CPT

## 2023-01-15 PROCEDURE — 6370000000 HC RX 637 (ALT 250 FOR IP): Performed by: STUDENT IN AN ORGANIZED HEALTH CARE EDUCATION/TRAINING PROGRAM

## 2023-01-15 PROCEDURE — 94640 AIRWAY INHALATION TREATMENT: CPT

## 2023-01-15 PROCEDURE — 80048 BASIC METABOLIC PNL TOTAL CA: CPT

## 2023-01-15 RX ORDER — ENOXAPARIN SODIUM 100 MG/ML
30 INJECTION SUBCUTANEOUS EVERY 12 HOURS
Status: DISCONTINUED | OUTPATIENT
Start: 2023-01-15 | End: 2023-01-25 | Stop reason: HOSPADM

## 2023-01-15 RX ADMIN — CLOPIDOGREL BISULFATE 75 MG: 75 TABLET ORAL at 09:23

## 2023-01-15 RX ADMIN — PIPERACILLIN AND TAZOBACTAM 3375 MG: 3; .375 INJECTION, POWDER, FOR SOLUTION INTRAVENOUS at 01:15

## 2023-01-15 RX ADMIN — OXYCODONE 10 MG: 5 TABLET ORAL at 05:34

## 2023-01-15 RX ADMIN — ASPIRIN 81 MG 81 MG: 81 TABLET ORAL at 09:23

## 2023-01-15 RX ADMIN — CLONIDINE HYDROCHLORIDE 0.1 MG: 0.1 TABLET ORAL at 05:34

## 2023-01-15 RX ADMIN — ENOXAPARIN SODIUM 30 MG: 100 INJECTION SUBCUTANEOUS at 23:55

## 2023-01-15 RX ADMIN — GABAPENTIN 600 MG: 600 TABLET, FILM COATED ORAL at 09:22

## 2023-01-15 RX ADMIN — ENOXAPARIN SODIUM 30 MG: 100 INJECTION SUBCUTANEOUS at 12:18

## 2023-01-15 RX ADMIN — POTASSIUM CHLORIDE 20 MEQ: 1500 TABLET, EXTENDED RELEASE ORAL at 09:22

## 2023-01-15 RX ADMIN — FERROUS SULFATE TAB 325 MG (65 MG ELEMENTAL FE) 325 MG: 325 (65 FE) TAB at 20:47

## 2023-01-15 RX ADMIN — OXYCODONE 5 MG: 5 TABLET ORAL at 20:46

## 2023-01-15 RX ADMIN — FERROUS SULFATE TAB 325 MG (65 MG ELEMENTAL FE) 325 MG: 325 (65 FE) TAB at 09:22

## 2023-01-15 RX ADMIN — METOPROLOL TARTRATE 50 MG: 50 TABLET, FILM COATED ORAL at 05:33

## 2023-01-15 RX ADMIN — OXYCODONE 10 MG: 5 TABLET ORAL at 01:18

## 2023-01-15 RX ADMIN — FUROSEMIDE 40 MG: 40 TABLET ORAL at 09:22

## 2023-01-15 RX ADMIN — PIPERACILLIN AND TAZOBACTAM 3375 MG: 3; .375 INJECTION, POWDER, FOR SOLUTION INTRAVENOUS at 17:01

## 2023-01-15 RX ADMIN — INSULIN GLARGINE 31 UNITS: 100 INJECTION, SOLUTION SUBCUTANEOUS at 09:26

## 2023-01-15 RX ADMIN — PIPERACILLIN AND TAZOBACTAM 3375 MG: 3; .375 INJECTION, POWDER, FOR SOLUTION INTRAVENOUS at 09:36

## 2023-01-15 RX ADMIN — OXYCODONE 10 MG: 5 TABLET ORAL at 14:23

## 2023-01-15 RX ADMIN — ATORVASTATIN CALCIUM 80 MG: 80 TABLET, FILM COATED ORAL at 09:22

## 2023-01-15 RX ADMIN — TIOTROPIUM BROMIDE INHALATION SPRAY 2 PUFF: 3.12 SPRAY, METERED RESPIRATORY (INHALATION) at 08:31

## 2023-01-15 RX ADMIN — Medication 1 TABLET: at 09:22

## 2023-01-15 RX ADMIN — INSULIN GLARGINE 31 UNITS: 100 INJECTION, SOLUTION SUBCUTANEOUS at 20:49

## 2023-01-15 RX ADMIN — OXYCODONE 10 MG: 5 TABLET ORAL at 09:26

## 2023-01-15 RX ADMIN — SODIUM CHLORIDE, PRESERVATIVE FREE 10 ML: 5 INJECTION INTRAVENOUS at 09:31

## 2023-01-15 RX ADMIN — GABAPENTIN 600 MG: 600 TABLET, FILM COATED ORAL at 20:46

## 2023-01-15 ASSESSMENT — PAIN SCALES - GENERAL
PAINLEVEL_OUTOF10: 8
PAINLEVEL_OUTOF10: 5
PAINLEVEL_OUTOF10: 7

## 2023-01-15 ASSESSMENT — PAIN DESCRIPTION - DESCRIPTORS
DESCRIPTORS: ACHING;DISCOMFORT
DESCRIPTORS: SORE
DESCRIPTORS: SORE

## 2023-01-15 ASSESSMENT — PAIN DESCRIPTION - LOCATION
LOCATION: LEG

## 2023-01-15 ASSESSMENT — PAIN DESCRIPTION - ORIENTATION
ORIENTATION: LEFT

## 2023-01-15 NOTE — PLAN OF CARE
Problem: Discharge Planning  Goal: Discharge to home or other facility with appropriate resources  1/15/2023 1112 by Lin Tran RN  Outcome: Progressing  1/15/2023 0135 by Adan Hagen RN  Outcome: Progressing  Flowsheets (Taken 1/15/2023 0135)  Discharge to home or other facility with appropriate resources:   Identify barriers to discharge with patient and caregiver   Identify discharge learning needs (meds, wound care, etc)   Refer to discharge planning if patient needs post-hospital services based on physician order or complex needs related to functional status, cognitive ability or social support system   Arrange for needed discharge resources and transportation as appropriate     Problem: Pain  Goal: Verbalizes/displays adequate comfort level or baseline comfort level  1/15/2023 1112 by Lin Tran RN  Outcome: Progressing  1/15/2023 0135 by Adan Hagen RN  Outcome: Progressing  Flowsheets (Taken 1/15/2023 0135)  Verbalizes/displays adequate comfort level or baseline comfort level:   Encourage patient to monitor pain and request assistance   Administer analgesics based on type and severity of pain and evaluate response   Consider cultural and social influences on pain and pain management   Assess pain using appropriate pain scale   Implement non-pharmacological measures as appropriate and evaluate response   Notify Licensed Independent Practitioner if interventions unsuccessful or patient reports new pain     Problem: Safety - Adult  Goal: Free from fall injury  1/15/2023 0134 by Adan Hagen RN  Outcome: Completed     Problem: ABCDS Injury Assessment  Goal: Absence of physical injury  1/15/2023 1112 by Lin Tran RN  Outcome: Progressing  1/15/2023 0135 by Adan Hagen RN  Outcome: Progressing  Flowsheets (Taken 1/15/2023 0135)  Absence of Physical Injury: Implement safety measures based on patient assessment     Problem: Infection - Adult  Goal: Absence of infection during hospitalization  1/15/2023 0135 by Itzel Olson RN  Outcome: Completed  Goal: Absence of infection at discharge  1/15/2023 1112 by Peter Reyes RN  Outcome: Progressing  1/15/2023 0135 by Itzel Olson RN  Outcome: Progressing  Flowsheets (Taken 1/15/2023 0135)  Absence of infection at discharge:   Assess and monitor for signs and symptoms of infection   Monitor lab/diagnostic results   Monitor all insertion sites i.e., indwelling lines, tubes and drains   Administer medications as ordered   Instruct and encourage patient and family to use good hand hygiene technique     Problem: Respiratory - Adult  Goal: Clear lung sounds  Description: Clear lung sounds  1/15/2023 0836 by Mian Saleem RCP  Outcome: Progressing  Note:  Patient lung sounds are considered normal for their current lung condition. No signs of distress noted. Current treatment regimen appropriate   Patient mutually agreed on goals.     Goal: Achieves optimal ventilation and oxygenation  1/15/2023 1112 by Peter Reyes RN  Outcome: Progressing  1/15/2023 0135 by Itzel Olson RN  Outcome: Progressing  Flowsheets (Taken 1/15/2023 0135)  Achieves optimal ventilation and oxygenation:   Assess for changes in respiratory status   Assess for changes in mentation and behavior   Encourage broncho-pulmonary hygiene including cough, deep breathe, incentive spirometry   Assess and instruct to report shortness of breath or any respiratory difficulty     Problem: Skin/Tissue Integrity - Adult  Goal: Incisions, wounds, or drain sites healing without S/S of infection  1/15/2023 1112 by Peter Reyes RN  Outcome: Progressing  1/15/2023 0135 by Itzel Olson RN  Outcome: Progressing  Flowsheets (Taken 1/15/2023 0135)  Incisions, Wounds, or Drain Sites Healing Without Sign and Symptoms of Infection:   ADMISSION and DAILY: Assess and document risk factors for pressure ulcer development   TWICE DAILY: Assess and document skin integrity   TWICE DAILY: Assess and document dressing/incision, wound bed, drain sites and surrounding tissue   Implement wound care per orders     Problem: Musculoskeletal - Adult  Goal: Return mobility to safest level of function  1/15/2023 1112 by Lin Tran RN  Outcome: Progressing  1/15/2023 0135 by Adan Hagen RN  Outcome: Progressing  Flowsheets (Taken 1/15/2023 0135)  Return Mobility to Safest Level of Function:   Assess patient stability and activity tolerance for standing, transferring and ambulating with or without assistive devices   Assist with transfers and ambulation using safe patient handling equipment as needed   Ensure adequate protection for wounds/incisions during mobilization   Obtain physical therapy/occupational therapy consults as needed   Instruct patient/family in ordered activity level  Goal: Return ADL status to a safe level of function  1/15/2023 1112 by Lin Tran RN  Outcome: Progressing  1/15/2023 0135 by Adan Hagen RN  Outcome: Progressing  Flowsheets (Taken 1/15/2023 0135)  Return ADL Status to a Safe Level of Function:   Administer medication as ordered   Obtain physical therapy/occupational therapy consults as needed   Assess activities of daily living deficits and provide assistive devices as needed   Assist and instruct patient to increase activity and self care as tolerated     Problem: Gastrointestinal - Adult  Goal: Maintains or returns to baseline bowel function  1/15/2023 1112 by Lin Tran RN  Outcome: Progressing  1/15/2023 0135 by Adan Hagen RN  Outcome: Progressing  Flowsheets (Taken 1/15/2023 0135)  Maintains or returns to baseline bowel function:   Assess bowel function   Encourage oral fluids to ensure adequate hydration   Administer ordered medications as needed   Encourage mobilization and activity     Problem: Genitourinary - Adult  Goal: Absence of urinary retention  1/15/2023 1112 by Lin Tran RN  Outcome: Progressing  1/15/2023 0135 by Adan Hagen RN  Outcome: Progressing  Flowsheets (Taken 1/15/2023 0135)  Absence of urinary retention:   Assess patients ability to void and empty bladder   Monitor intake/output and perform bladder scan as needed     Problem: Metabolic/Fluid and Electrolytes - Adult  Goal: Glucose maintained within prescribed range  1/15/2023 1112 by Jessica Huitron RN  Outcome: Progressing  1/15/2023 0135 by Lucas Mc RN  Outcome: Progressing  Flowsheets (Taken 1/15/2023 0135)  Glucose maintained within prescribed range:   Monitor blood glucose as ordered   Assess for signs and symptoms of hyperglycemia and hypoglycemia   Administer ordered medications to maintain glucose within target range   Instruct patient on self management of diabetes and initiate consult as needed     Problem: Hematologic - Adult  Goal: Maintains hematologic stability  1/15/2023 1112 by Jessica Huitron RN  Outcome: Progressing  1/15/2023 0135 by Lucas Mc RN  Outcome: Progressing  Flowsheets (Taken 1/15/2023 0135)  Maintains hematologic stability:   Assess for signs and symptoms of bleeding or hemorrhage   Monitor labs for bleeding or clotting disorders     Problem: Skin/Tissue Integrity  Goal: Absence of new skin breakdown  Description: 1. Monitor for areas of redness and/or skin breakdown  2. Assess vascular access sites hourly  3. Every 4-6 hours minimum:  Change oxygen saturation probe site  4. Every 4-6 hours:  If on nasal continuous positive airway pressure, respiratory therapy assess nares and determine need for appliance change or resting period.   1/15/2023 0134 by Lucas Mc RN  Outcome: Completed     Problem: Chronic Conditions and Co-morbidities  Goal: Patient's chronic conditions and co-morbidity symptoms are monitored and maintained or improved  1/15/2023 1112 by Jessica Huitron RN  Outcome: Progressing  1/15/2023 0135 by Lucas Mc RN  Outcome: Progressing  Flowsheets (Taken 1/15/2023 0135)  Care Plan - Patient's Chronic Conditions and Co-Morbidity Symptoms are Monitored and Maintained or Improved:   Monitor and assess patient's chronic conditions and comorbid symptoms for stability, deterioration, or improvement   Collaborate with multidisciplinary team to address chronic and comorbid conditions and prevent exacerbation or deterioration   Update acute care plan with appropriate goals if chronic or comorbid symptoms are exacerbated and prevent overall improvement and discharge

## 2023-01-15 NOTE — RT PROTOCOL NOTE
RT Inhaler-Nebulizer Bronchodilator Protocol Note    There is a bronchodilator order in the chart from a provider indicating to follow the RT Bronchodilator Protocol and there is an Initiate RT Inhaler-Nebulizer Bronchodilator Protocol order as well (see protocol at bottom of note). CXR Findings:  No results found. The findings from the last RT Protocol Assessment were as follows:   History Pulmonary Disease: None or smoker <15 pack years  Respiratory Pattern: Regular pattern and RR 12-20 bpm  Breath Sounds: Slightly diminished and/or crackles  Cough: Strong, spontaneous, non-productive  Indication for Bronchodilator Therapy: Decreased or absent breath sounds, On home bronchodilators  Bronchodilator Assessment Score: 2    Aerosolized bronchodilator medication orders have been revised according to the RT Inhaler-Nebulizer Bronchodilator Protocol below. Respiratory Therapist to perform RT Therapy Protocol Assessment initially then follow the protocol. Repeat RT Therapy Protocol Assessment PRN for score 0-3 or on second treatment, BID, and PRN for scores above 3. No Indications - adjust the frequency to every 6 hours PRN wheezing or bronchospasm, if no treatments needed after 48 hours then discontinue using Per Protocol order mode. If indication present, adjust the RT bronchodilator orders based on the Bronchodilator Assessment Score as indicated below. Use Inhaler orders unless patient has one or more of the following: on home nebulizer, not able to hold breath for 10 seconds, is not alert and oriented, cannot activate and use MDI correctly, or respiratory rate 25 breaths per minute or more, then use the equivalent nebulizer order(s) with same Frequency and PRN reasons based on the score. If a patient is on this medication at home then do not decrease Frequency below that used at home.     0-3 - enter or revise RT bronchodilator order(s) to equivalent RT Bronchodilator order with Frequency of every 4 hours PRN for wheezing or increased work of breathing using Per Protocol order mode. 4-6 - enter or revise RT Bronchodilator order(s) to two equivalent RT bronchodilator orders with one order with BID Frequency and one order with Frequency of every 4 hours PRN wheezing or increased work of breathing using Per Protocol order mode. 7-10 - enter or revise RT Bronchodilator order(s) to two equivalent RT bronchodilator orders with one order with TID Frequency and one order with Frequency of every 4 hours PRN wheezing or increased work of breathing using Per Protocol order mode. 11-13 - enter or revise RT Bronchodilator order(s) to one equivalent RT bronchodilator order with QID Frequency and an Albuterol order with Frequency of every 4 hours PRN wheezing or increased work of breathing using Per Protocol order mode. Greater than 13 - enter or revise RT Bronchodilator order(s) to one equivalent RT bronchodilator order with every 4 hours Frequency and an Albuterol order with Frequency of every 2 hours PRN wheezing or increased work of breathing using Per Protocol order mode. RT to enter RT Home Evaluation for COPD & MDI Assessment order using Per Protocol order mode.     Electronically signed by Bambi Salamanca RCP on 1/15/2023 at 8:37 AM

## 2023-01-15 NOTE — PROGRESS NOTES
Podiatric Progress Note  Moira Guadalupe  Subjective :   1/15/2023  Patient seen at bedside this morning on behalf of Dr. Chante Blakely. Patient is status post LLE proximal Symes amputation with distal transtibial osteotomy (DOS 1/10/2023). Patient appeared pleasant, was oriented to person, place and time and in no acute distress. Patient is very grateful for having the procedure performed; he states that he subjectively feels much better after the amputation. Patient expressed gratitude to Dr. Chante Blakely and [facility] for their services. Patient endorses minimal pain to the LLE (3/10). Patient does endorse mild neuropathic type symptoms to the RLE. Patient denies any N/V/F/C/SOB or CP. Patient states that he is passing flatus, but has not yet had a bowel movement. Patient has no further pedal concerns at this point in time. 1/14/2023  Patient is a pleasant 64yo male seen bedside this AM s/p left lower extremity amputation emergent in nature due to necrotizing fasciitis and extensive gas gangrene performed 1.10.23. Patient seated upright bedside working with PT. Patient overall states he feels vastly improved, post op dressing clean/dry/intact. Social work is following, precert for [employer] in place. ID following for IVAB therapy, currently on Zosyn IV (vancomycin and clindamycin discontinued). Labs are trending better, leukocytosis resolved. Will continue to follow until determination of placement. HISTORY OF PRESENT ILLNESS:                 The patient is a 65 y.o. male with significant past medical history of CAD, CKD, diabetes, hyperlipidemia, and hypertension who is being seen at bedside on behalf of Dr. Chante Blakely. Patient relates that he has had ongoing problems with his left foot and ankle over the course of the past few years. 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 He relates that he follows up and sees Dr. Chante Blakely in clinic weekly.   He relates that he missed his appointment last week and did not have the dressing changed. The patient states that the previous week he had x-rays taken in clinic that showed a pathologic fracture of the left tibia. He relates that he has not been walking on his left lower extremity. He relates that he has applied some pressure when using the restroom on that leg. He states that amputation has been in discussion at his previous clinic encounters. The patient relates that he last a last evening at 8 or 9 PM.  He relates that he drank Crystal light tea around 8 or 9 AM this morning. He relates that he took Plavix this morning at 6 AM.  The patient denies any other concerns to his right lower extremity. The patient denies nausea, vomiting, fever, chills, shortness of breath or chest pain.     Current Medications:    Current Facility-Administered Medications: enoxaparin Sodium (LOVENOX) injection 30 mg, 30 mg, SubCUTAneous, Q12H  insulin glargine (LANTUS) injection vial 31 Units, 31 Units, SubCUTAneous, BID  insulin lispro (HUMALOG) injection vial 0-16 Units, 0-16 Units, SubCUTAneous, TID WC  insulin lispro (HUMALOG) injection vial 0-4 Units, 0-4 Units, SubCUTAneous, Nightly  polyethylene glycol (GLYCOLAX) packet 17 g, 17 g, Oral, Daily PRN  acetaminophen (TYLENOL) tablet 650 mg, 650 mg, Oral, Q6H PRN **OR** acetaminophen (TYLENOL) suppository 650 mg, 650 mg, Rectal, Q6H PRN  amLODIPine (NORVASC) tablet 10 mg, 10 mg, Oral, Daily  atorvastatin (LIPITOR) tablet 80 mg, 80 mg, Oral, Daily  albuterol sulfate HFA (PROVENTIL;VENTOLIN;PROAIR) 108 (90 Base) MCG/ACT inhaler 2 puff, 2 puff, Inhalation, 4x Daily PRN  ferrous sulfate (IRON 325) tablet 325 mg, 325 mg, Oral, BID  furosemide (LASIX) tablet 40 mg, 40 mg, Oral, Daily  gabapentin (NEURONTIN) tablet 600 mg, 600 mg, Oral, BID  minoxidil (LONITEN) tablet 10 mg, 10 mg, Oral, Daily  multivitamin 1 tablet, 1 tablet, Oral, Daily  potassium chloride (KLOR-CON M) extended release tablet 20 mEq, 20 mEq, Oral, Daily  valsartan (DIOVAN) tablet 160 mg, 160 mg, Oral, Daily  Sodium Hypochlorite (DAKINS) 0.25 % external solution, , Irrigation, Daily  glucose chewable tablet 16 g, 4 tablet, Oral, PRN  dextrose bolus 10% 125 mL, 125 mL, IntraVENous, PRN **OR** dextrose bolus 10% 250 mL, 250 mL, IntraVENous, PRN  glucagon (rDNA) injection 1 mg, 1 mg, SubCUTAneous, PRN  dextrose 10 % infusion, , IntraVENous, Continuous PRN  piperacillin-tazobactam (ZOSYN) 3,375 mg in dextrose 5 % 50 mL IVPB (mini-bag), 3,375 mg, IntraVENous, q8h  aspirin chewable tablet 81 mg, 81 mg, Oral, Daily  clopidogrel (PLAVIX) tablet 75 mg, 75 mg, Oral, Daily  tiotropium (SPIRIVA RESPIMAT) 2.5 MCG/ACT inhaler 2 puff, 2 puff, Inhalation, Daily  cloNIDine (CATAPRES) tablet 0.1 mg, 0.1 mg, Oral, BID  metoprolol tartrate (LOPRESSOR) tablet 50 mg, 50 mg, Oral, BID  sodium chloride flush 0.9 % injection 5-40 mL, 5-40 mL, IntraVENous, 2 times per day  sodium chloride flush 0.9 % injection 5-40 mL, 5-40 mL, IntraVENous, PRN  0.9 % sodium chloride infusion, , IntraVENous, PRN  ondansetron (ZOFRAN-ODT) disintegrating tablet 4 mg, 4 mg, Oral, Q8H PRN **OR** ondansetron (ZOFRAN) injection 4 mg, 4 mg, IntraVENous, Q6H PRN  oxyCODONE (ROXICODONE) immediate release tablet 5 mg, 5 mg, Oral, Q4H PRN **OR** oxyCODONE (ROXICODONE) immediate release tablet 10 mg, 10 mg, Oral, Q4H PRN  morphine (PF) injection 2 mg, 2 mg, IntraVENous, Q2H PRN **OR** morphine injection 4 mg, 4 mg, IntraVENous, Q2H PRN    Objective     /72   Pulse 77   Temp 97.7 °F (36.5 °C)   Resp 15   Ht 6' 2\" (1.88 m)   Wt 283 lb 14.4 oz (128.8 kg)   SpO2 94%   BMI 36.45 kg/m²      I/O:  Intake/Output Summary (Last 24 hours) at 1/15/2023 1319  Last data filed at 1/15/2023 0909  Gross per 24 hour   Intake 880 ml   Output 4625 ml   Net -3745 ml              Wt Readings from Last 3 Encounters:   01/11/23 283 lb 14.4 oz (128.8 kg)   12/08/22 (!) 310 lb (140.6 kg)   12/08/22 (!) 310 lb (140.6 kg)       LABS:    Recent Labs     01/14/23  0916 01/15/23  0813   WBC 7.7 6.1   HGB 8.1* 8.5*   HCT 27.4* 29.2*   * 552*        Recent Labs     01/15/23  0813      K 4.5   CL 97*   CO2 27   BUN 22   CREATININE 1.5*      No results for input(s): PROT, INR, APTT in the last 72 hours. No results for input(s): CKTOTAL, CKMB, CKMBINDEX, TROPONINI in the last 72 hours. Focused Lower Extremity Examination:    Vitals:    /72   Pulse 77   Temp 97.7 °F (36.5 °C)   Resp 15   Ht 6' 2\" (1.88 m)   Wt 283 lb 14.4 oz (128.8 kg)   SpO2 94%   BMI 36.45 kg/m²      Vascular: Popliteal pulse is palpable to LLE. CFT within normal limits to LLE amputation stump. Skin temperature is warm to warm from proximal tibial tuberosity to distal amputation stump of LLE. Minimal edema noted to the distal LLE amputation stump. Dermatologic: LLE dressing intact and well maintained. No apparent strike through noted. There was some sanguinous drainage noted on the 4 x 4 gauze directly to the incision. After all dressings removed, incision appears very well coapted, without any drainage noted. All staples and sutures are intact. There is no surrounding erythema or warmth noted. Overall, incision appears to be healing very well. No other open lesions, rashes or subcutaneous nodules of note. Neurovascular: Light touch sensation grossly diminished to the level of the midfoot to RLE. Light touch sensation appears grossly intact to the level of the amputation stump of LLE. Musculoskeletal: Muscle strength testing deferred due to acute postop state. Patient's left leg dressed is in a slightly flexed position at the knee joint. Patient is able to fully extend left leg at the knee. Minimal pain with palpation of LLE amputation stump at posterior aspect.                    ASSESSMENT: Pt. is a 65 y.o. 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    Sepsis ([facility]    Acute left-sided low back pain with bilateral sciatica    S/P transmetatarsal amputation of foot, left ([facility])    Leukocytosis    Wound dehiscence, surgical, initial encounter    Postoperative wound infection    Metabolic acidosis    Hx of CABG    Lumbar stenosis without neurogenic claudication    CKD (chronic kidney disease), stage II    Normocytic anemia    Morbid obesity ([facility]    Debility    Carotid stenosis, asymptomatic, bilateral    Hypokalemia    Nonhealing ulcer of left lower extremity ([facility])    Bleeding    Wound, open    SOB (shortness of breath) on exertion    Hemoglobin A1C greater than 9%, indicating poor diabetic control    Hypertensive emergency    Acute on chronic congestive heart failure ([facility])    Hypertensive urgency    Moderate persistent asthma    Septic arthritis of left ankle, due to unspecified organism ([facility])    Closed fracture of ankle with nonunion    S/P BKA (below knee amputation), left ([facility])       PLAN:   Septic arthritis; left ankle -resolved  Pathologic fracture; left tibia -resolved  Abscess; left ankle -resolved  -Patient examined and evaluated  -WBC 6.1; patient currently afebrile  -Hemoglobin and hematocrit at 8.5 and 29.2, respectively  -Patient currently on IV Zosyn per infectious diseases  -Will continue to follow labs/imaging  -Changed dressings at today's visit with Betadine to the incision, covered with 4 x 4 gauze, ABD pads, Kerlix rolls, and Ace bandage.  -Discussed with patient that he should try to extend left leg at the knee to prevent flexion contracture  -Awaiting SNF/IP rehab placement, social work following  -Hospitalist following for medical management; recommendations are greatly appreciated -Cardiology consulted (extensive CAD with h/o sx)  -Discussed with patient at length that incision and the left lower leg appear healthy and viable. There is no sign of any residual infection clinically. Discussed with patient that at this time, we are awaiting placement into SNF/ECF. Discussed with patient that once the incision heals completely, we will then work with patient to have a well fitted prosthesis fitted to the left leg, to allow patient to resume ambulation.  -Patient verbalized understanding and agreement with the treatment plan as stated. All of his questions were answered to his satisfaction.     Sandra Lawrence DPM, PGY-3  Podiatric Surgical Resident  1/15/2023   1:19 PM

## 2023-01-15 NOTE — PLAN OF CARE
Problem: Respiratory - Adult  Goal: Clear lung sounds  Description: Clear lung sounds  Outcome: Progressing  Note:  Patient lung sounds are considered normal for their current lung condition. No signs of distress noted. Current treatment regimen appropriate   Patient mutually agreed on goals.

## 2023-01-15 NOTE — PROGRESS NOTES
Let XOCHILT Vargas know that patient's blood pressure meds were held this morning as they didn't meet parameters.

## 2023-01-15 NOTE — PROGRESS NOTES
Hospitalist -Progress Note      Patient: Yesy Olivier    Unit/Bed:7K-04/004-A  YOB: 1960  MRN: [id number]   Acct: [de-identified]   PCP: EYAD Gibbs - CNP    Date of Service: Pt seen/examined on 01/15/23  Chief Complaint: Left septic ankle with abscess and open pathologic fracture    Assessment and Plan:-    Septic left ankle with pathologic fracture of left tibia and abscess of the left ankle:  POD #5 day s/p Proximal Symes amputation/distal transtibial amputation, left (DOS 1/10)    -Continue Zosyn- vancomycin and clindamycin stopped by ID on 1/13   -Infectious disease following   - continue with management by primary    GISELA - improving   - suspect medication induced from antibiotics and possible cardiorenal   Cr baseline 1.0-1.2   Currently stable x 2 days at 1.5   BMP daily       Acute respiratory failure- resolved  Started post operatively suspect fluid overload and anemia contributing   BNP elevated in 2000s- fluids stopped 1/11    Nocturnal hypoxia- suspect RILEY   Nocturnal pulse oximetry and completed   Will need oxygen at night long term   Referral for formal sleep study on DC     Type 2 diabetes mellitus: Uncontrolled. A1c 8.3   Goal blood glucose while inpatient 140-180.    -Hold home meds  -Lantus 30 units BID - 1/12 increased to 33 units BID - 1/14- decrease to 31 BID   -Hypoglycemic protocol-Accu-Cheks-Diabetic and cardiac diet    -Continue Neurontin  -1/12 increased SSI to high dose       HTN: Recent hypertensive emergency in which he presented to [facility] November 2022 with chest pain and new RBBB. Patient subsequently left AMA before treatment. On arrival to [facility] patient's blood pressure was 131/62.   - Cardiology consulted - no further interventions recommended at this time  -Continue Norvasc, clonidine, Lasix, Lopressor twice daily, and minoxidil with holding parameters.     Chronic diastolic heart failure: Last echocardiogram 11/03/22 completed at [facility] showed concentric remodeling with normal regional wall motion, abnormal septal motion consistent with postoperative state, EF of 55 to 60% and grade 2 diastolic dysfunction.   -Lasix 40 mg daily               -Strict I & O                -Daily standing weights               -Low sodium diet   - 1/11 concerns for fluid overload given elevated BNP 1/10 and rales on exam- fluids stopped  -1/12 lungs clear- will hold off on additional diuresis right now      Hyperlipidemia: Last lipid panel 11/16/2022   -Continue Lipitor 80 mg daily    CAD s/p CABG: November 2019. Echo 4/21/20 EF 50%,    -aspirin and Plavix  -Continue home amlodipine, clonidine, lopressor, valsartan and minoxidil       Moderate persistent asthma: No PFTs noted in chart   -Continue home Spiriva   -Pulmonary hygiene    Hx of PAD: Continue aspirin and Plavix, statin therapy    Lupus:  Does not appear that patient is on any anticoagulation however he is on dual antiplatelet therapy.   -Hold aspirin and Plavix due to upcoming procedure.   -Note the patient is at elevated risk for clotting due to history of lupus  . Acute blood loss anemia on Chronic normocytic anemia: Baseline hemoglobin 10s.    - 1/11 Hgb dropped to 6.8- transfuse 1unit PRBC     Hgb improved to 8.1 1/14- stable at 8.5 on 1/15   -Continue home p.o. iron   -Nursing to monitor for any blood in stool   -Monitor with CBC daily        Disposition:- IPR pending precertification         History Of Present Illness:      52-year-old male was admitted to [facility] by podiatry for urgent/emergent management of septic left ankle with pathologic fracture of tibia and abscess of left ankle. Initially podiatry was planning to take patient to the OR tonight or tomorrow and consulted hospital team for risk stratification.       Patient has significant past medical history of diabetes mellitus type 2, uncontrolled, hypertension, CAD status post CABG, CKD, chronic normocytic anemia, lupus, moderate persistent asthma, PAD, grade 2 diastolic dysfunction, and hyperlipidemia,   It is important note the patient was recently hospitalized at [facility] 11/2/2022 for hypertensive emergency in which she was experiencing chest pain and a new left bundle branch block was found. Patient subsequently left AMA despite having blood pressure 200s/100s. Upon arrival to [facility] blood cultures were obtained, patient was started on vancomycin and Zosyn. Chest x-ray obtained that demonstrated evidence of prior CABG as well as hyperinflated lungs suggestive of COPD. Patient also had evidence of healed granulomatous disease. With questionable mild interstitial fibrosis. X-ray of left ankle was obtained that showed gas density and soft tissue extending to the lower left leg. Complete dislocation and rotated talus relative to the tibia. Also there was abnormal periosteal reaction and distal tibia and fibula which indicate chronic osteomyelitis. There is also cortical erosion of distal fibula. EKG was also completed that demonstrated Normal sinus rhythm, Left axis deviation; Right bundle branch block, Inferior infarct , age undetermined. Abnormal ECG When compared with ECG of 25-NOV-2022 04:21,Right bundle branch block has replaced Intraventricular conduction delay. During my examination patient denied chest pain, shortness of breath, headache, lightheadedness, abdominal pain. Patient did have foot pain that was relieved with pain medication. Stat CBC, BMP, hepatic function panel as well as magnesium were ordered. 1/11/2023  Patient postop day 1. Hemoglobin dropped to 6.8. Will transfuse 1 unit PRBCs. Patient requiring O2 postoperatively and was weaned to room air today however dipped back into the mid 80s. Currently satting 93% on 2 L.    1/12/2023  Hemoglobin 7 after transfusion. No longer requiring O2 at this time. Nocturnal pulse oximetry pending.    WBC downtrending    1/13/2023  No events overnight. Blood sugar is much improved. VSS. Waiting for IPR precert      9/03/2107  Vanc and clindamycin stopped yesterday. BG acceptable. VSS. Patient had episode of symptomatic hypotension today. 500cc bolus of NS ordered. Patient improved and BP acceptable. Encouraged oral hydration     1/15/2023  Patient doing well. VSS- no hypotensive episodes. Blood sugar well controlled. Hopeful for IPR precert tomorrow.          Past Medical History:        Diagnosis Date    Arthritis     CAD (coronary artery disease)     CKD (chronic kidney disease), stage II     Diabetes mellitus ([facility] [address]     Hyperlipidemia     Hypertension     Liver disease     HEPITITIS AS A CHILD       Past Surgical History:        Procedure Laterality Date    CARDIAC SURGERY  11/2019    triple bypass    CYST REMOVAL      RIGHT ANKLE     FOOT AMPUTATION Left 1/10/2023    LEFT BELOW KNEE AMPUTATION performed by Marie Rowe DPM at [facility] Left 4/20/2020    LEFT TRANSMETATARSAL AMPUTATION  FOOT INCISION AND DRAINAGE performed by Marie Rowe DPM at [facility] Left 7/20/2020    LEFT WOUND DEBRIDEMENT WITH WOUND VAC APPLICATION performed by Marie Rowe DPM at [facility] Left 7/1/2022    LEFT FOOT [facility], [address] [place] [address], APPLICATION [employer] WOUND VAC performed by Marie Rowe DPM at [facility] Right     CYST REMOVED    FOOT SURGERY Left 8/14/2020    LEFT FOOT PREPARATION GRAFT SITE, HAVEST SPLIT THICKNESS SKIN GRAFT WITH APPLICATION, APPLICATION [employer] WOUND VAC performed by Marie Rowe DPM at [address] Left 3/3/2020    I&D LEFT FOOT, TRANSMETATARSAL AMPUTATION performed by Marie Rowe DPM at [address] [place] [address] 11/23/2022    EGD performed by Gus De Guzman MD at [address] Medications:   No current facility-administered medications on file prior to encounter. Current Outpatient Medications on File Prior to Encounter   Medication Sig Dispense Refill    HYDROcodone-acetaminophen (NORCO) 5-325 MG per tablet Take 1 tablet by mouth every 4 hours as needed. cloNIDine (CATAPRES) 0.1 MG tablet Take 1 tablet by mouth 2 times daily 60 tablet 3    tiotropium (SPIRIVA RESPIMAT) 2.5 MCG/ACT AERS inhaler Inhale 2 puffs into the lungs daily 4 g 0    benzonatate (TESSALON) 200 MG capsule Take 1 capsule by mouth 3 times daily as needed for Cough (Patient not taking: Reported on 1/10/2023) 30 capsule 0    amLODIPine (NORVASC) 10 MG tablet Take 1 tablet by mouth daily 30 tablet 3    metoprolol tartrate (LOPRESSOR) 50 MG tablet Take 1 tablet by mouth 2 times daily 60 tablet 3    valsartan (DIOVAN) 160 MG tablet Take 1 tablet by mouth daily 30 tablet 3    minoxidil (LONITEN) 10 MG tablet Take 1 tablet by mouth daily 30 tablet 3    ferrous sulfate (IRON 325) 325 (65 Fe) MG tablet Take 1 tablet by mouth 2 times daily 180 tablet 1    furosemide (LASIX) 40 MG tablet Take 1 tablet by mouth daily 90 tablet 1    potassium chloride (KLOR-CON M) 20 MEQ extended release tablet Take 1 tablet by mouth daily 30 tablet 5    empagliflozin (JARDIANCE) 25 MG tablet Take 1 tablet by mouth daily 90 tablet 1    clopidogrel (PLAVIX) 75 MG tablet Take 1 tablet by mouth daily 90 tablet 3    gabapentin (NEURONTIN) 600 MG tablet Take 1 tablet by mouth 2 times daily for 180 days. 180 tablet 1    insulin detemir (LEVEMIR FLEXTOUCH) 100 UNIT/ML injection pen 30 units in the morning, and 30 units in the evening.  21 mL 3    insulin lispro (HUMALOG) 100 UNIT/ML injection vial Inject 5-15 Units into the skin 3 times daily (with meals) 15 mL 3    albuterol sulfate HFA (VENTOLIN HFA) 108 (90 Base) MCG/ACT inhaler Inhale 2 puffs into the lungs 4 times daily as needed for Wheezing 54 g 1    atorvastatin (LIPITOR) 80 MG tablet Take 1 tablet by mouth daily 30 tablet 3    Polyethylene Glycol 3350 (MIRALAX PO) Take by mouth as needed      aspirin 81 MG chewable tablet Take 1 tablet by mouth daily 30 tablet 3    Multiple Vitamins-Minerals (MULTIVITAMIN PO) Take 1 tablet by mouth daily          Allergies:    Patient has no known allergies. Social History:    reports that he quit smoking about 15 years ago. His smoking use included cigarettes. He has never used smokeless tobacco. He reports that he does not currently use alcohol. He reports that he does not use drugs. Family History:       Problem Relation Age of Onset    Diabetes Mother     High Blood Pressure Mother     Alzheimer's Disease Father          of, 80or 80years old    Heart Disease Father     High Blood Pressure Father     Cancer Neg Hx     Stroke Neg Hx        Diet:  ADULT DIET; Regular; 3 carb choices (45 gm/meal); Low Fat/Low Chol/High Fiber/2 gm Na    Review of systems:   Pertinent positives as noted in the HPI. All other systems reviewed and negative. PHYSICAL EXAM:  BP (!) 141/80   Pulse 86   Temp 97.3 °F (36.3 °C) (Oral)   Resp 16   Ht 6' 2\" (1.88 m)   Wt 283 lb 14.4 oz (128.8 kg)   SpO2 94%   BMI 36.45 kg/m²   General appearance: No apparent distress, appears stated age and cooperative. Obese    HEENT: Normal cephalic, atraumatic without obvious deformity. Pallor improved from   Neck: No jugular venous distention. Trachea midline. Respiratory:  Normal respiratory effort.   Lungs clear to auscultation bilaterally-  Cardiovascular: Regular rate and rhythm with normal S1/S2   Abdomen: Hypoactive bowel sounds, abdomen is soft and rounded, nontender to palpation   Musculoskeletal:  Right lower extremity +2 edema, left lower leg  with amputation- wrapped  Skin: Warm and dry  Neurologic:  No focal deficits, bilateral numbness and tingling of right lower extremity as well as left lower extremity, follows commands  Psychiatric: Alert and oriented, thought content appropriate, normal insight  Labs:   Recent Labs     01/12/23  1214 01/13/23  0554 01/14/23  0916   WBC  --  10.0 7.7   HGB 7.3* 7.5* 8.1*   HCT 24.2* 25.5* 27.4*   PLT  --  473* 585*       Recent Labs     01/13/23  0554 01/14/23  0916    133*   K 4.4 4.5    97*   CO2 24 24   BUN 30* 26*   CREATININE 1.7* 1.5*   CALCIUM 8.0* 8.2*       No results for input(s): AST, ALT, BILIDIR, BILITOT, ALKPHOS in the last 72 hours. No results for input(s): INR in the last 72 hours. No results for input(s): Marsha Highman in the last 72 hours. Urinalysis:    Lab Results   Component Value Date/Time    NITRU NEGATIVE 11/16/2022 01:52 PM    WBCUA 0-2 11/16/2022 01:52 PM    BACTERIA NONE SEEN 11/16/2022 01:52 PM    RBCUA 15-25 11/16/2022 01:52 PM    BLOODU MODERATE 11/16/2022 01:52 PM    GLUCOSEU >= 1000 11/16/2022 01:52 PM       Radiology:   XR ANKLE LEFT (MIN 3 VIEWS)   Final Result         1. Gas density in the soft tissues extending into the lower left leg. 2. Completely dislocated and rotated talus relative to the tibia. 3. Abnormal periosteal reaction in the distal tibia and fibula which can indicate chronic osteomyelitis. 4. Cortical erosion of the distal fibula. **This report has been created using voice recognition software. It may contain minor errors which are inherent in voice recognition technology. **      Final report electronically signed by Dr. Brittany Anthony on 1/10/2023 3:46 PM      XR CHEST PORTABLE   Final Result   1. Normal heart size. Metallic sternotomy sutures from prior surgery. Lungs somewhat hyperinflated, suggesting possible COPD. 2. Evidence for old, healed granulomatous disease. Question mild interstitial fibrosis/pneumonitis both lung bases. No effusion is seen. **This report has been created using voice recognition software. It may contain minor errors which are inherent in voice recognition technology. **      Final report electronically signed by Dr. Keyonna Self on 1/10/2023 2:42 PM        XR CHEST PORTABLE    Result Date: 1/10/2023  PROCEDURE: XR CHEST PORTABLE CLINICAL INFORMATION: pre op COMPARISON: 11/25/2022 TECHNIQUE: A single mobile view of the chest was obtained. 1. Normal heart size. Metallic sternotomy sutures from prior surgery. Lungs somewhat hyperinflated, suggesting possible COPD. 2. Evidence for old, healed granulomatous disease. Question mild interstitial fibrosis/pneumonitis both lung bases. No effusion is seen. **This report has been created using voice recognition software. It may contain minor errors which are inherent in voice recognition technology. ** Final report electronically signed by Dr. Keyonna Self on 1/10/2023 2:42 PM        EKG: Normal sinus rhythm, Left axis deviation, Right bundle branch block, Inferior infarct , age undetermined Abnormal ECG.  When compared with ECG of 25-NOV-2022 04:21,  Right bundle branch block has replaced Intraventricular conduction delay    Electronically signed by Rhoda Palacio PA-C on 1/15/2023 at 7:56 AM            .

## 2023-01-15 NOTE — PROGRESS NOTES
[facility]  Occupational Therapy  Daily Note  Time:   Time In: 1140  Time Out: 1203  Timed Code Treatment Minutes: 23 Minutes  Minutes: 23    Date: 1/15/2023  Patient Name: Jono Chambers,   Gender: male      Room: [facility]-A  MRN: [id number]  : 1960  (65 y.o.)  Referring Practitioner: Rell Whitfield DPM  Diagnosis: Sepsis  Additional Pertinent Hx: The patient is a 65 y.o. male with significant past medical history of CAD, CKD, diabetes, hyperlipidemia, and hypertension who is being seen at bedside on behalf of Dr. Ollie Goodman. Patient relates that he has had ongoing problems with his left foot and ankle over the course of the past few years. 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 He relates that he follows up and sees Dr. Ollie Goodman in clinic weekly. He relates that he missed his appointment last week and did not have the dressing changed. The patient states that the previous week he had x-rays taken in clinic that showed a pathologic fracture of the left tibia. He relates that he has not been walking on his left lower extremity. He relates that he has applied some pressure when using the restroom on that leg. He states that amputation has been in discussion at his previous clinic encounters. The patient relates that he last a last evening at 8 or 9 PM.  He relates that he drank Crystal light tea around 8 or 9 AM this morning. He relates that he took Plavix this morning at 6 AM.  The patient denies any other concerns to his right lower extremity. The patient denies nausea, vomiting, fever, chills, shortness of breath or chest pain.     Restrictions/Precautions:  Restrictions/Precautions: General Precautions, Weight Bearing, Fall Risk  Left Lower Extremity Weight Bearing: Non Weight Bearing (BKA)  Position Activity Restriction  Other position/activity restrictions: L BKA     SUBJECTIVE: Patient supine upon arrival, initially requiring encouragement to participate in therapy, stating he thought he had Sunday's off. Reviewed OT's POC of completing 6x/wk and Saturday being his day off this week. Pt then agreeable but declined getting to w/c, but did agree to sit EOB for other tasks. PAIN: 7/10: L stump     Vitals: Nurse checked vitals prior to session    COGNITION: WFL    ADL:   No ADL's completed this session. Completed prior to arrival.    BALANCE:  Sitting Balance:  Supervision. EOB without UE support     BED MOBILITY:  Supine to Sit: Stand By Assistance    Sit to Supine: Stand By Assistance      ADDITIONAL ACTIVITIES:  While seated EOB to also challenge sitting balance for ADLs, Patient completed BUE strengthening exercises with skilled education on HEP: completed x15 reps x1 set with a medium resistive band in all joints and all planes in order to improve UE strength and activity tolerance required for BADL routine and toilet / shower transfers. Patient tolerated well, requiring min rest breaks. Patient also required min cues for technique. HEP handout provided. ASSESSMENT:     Activity Tolerance:  Patient tolerance of  treatment: good.        Discharge Recommendations: Inpatient Rehabilitation  Equipment Recommendations: Equipment Needed: Yes  Other: TTB, slideboard, drop arm commode  Plan: Times Per Week: 6x  Current Treatment Recommendations: Strengthening, Balance training, Functional mobility training, Endurance training, Patient/Caregiver education & training, Safety education & training, Self-Care / ADL, Home management training    Patient Education  Patient Education: Plan of Care, Home Exercise Program, and Precautions    Goals  Short Term Goals  Time Frame for Short Term Goals: by discharge  Short Term Goal 1: Pt will complete grooming routine while seated EOB with Supervision and no VC for technique to incrase indep with self care  Short Term Goal 2: Pt will complete slideboard t/f with consistent CGA and min VC for placement of board to increase indep with toileting routine  Short Term Goal 3: Pt will demo indep with BUE strengthening HEP to increase overall UB strength/endurance for ease with t/fs  Short Term Goal 4: OTR to assess sit to stand/stand-pivot/and functional mobility when appropriate    Following session, patient left in safe position with all fall risk precautions in place.

## 2023-01-15 NOTE — PLAN OF CARE
Problem: Discharge Planning  Goal: Discharge to home or other facility with appropriate resources  Outcome: Progressing  Flowsheets (Taken 1/15/2023 0135)  Discharge to home or other facility with appropriate resources:   Identify barriers to discharge with patient and caregiver   Identify discharge learning needs (meds, wound care, etc)   Refer to discharge planning if patient needs post-hospital services based on physician order or complex needs related to functional status, cognitive ability or social support system   Arrange for needed discharge resources and transportation as appropriate     Problem: Pain  Goal: Verbalizes/displays adequate comfort level or baseline comfort level  Outcome: Progressing  Flowsheets (Taken 1/15/2023 0135)  Verbalizes/displays adequate comfort level or baseline comfort level:   Encourage patient to monitor pain and request assistance   Administer analgesics based on type and severity of pain and evaluate response   Consider cultural and social influences on pain and pain management   Assess pain using appropriate pain scale   Implement non-pharmacological measures as appropriate and evaluate response   Notify Licensed Independent Practitioner if interventions unsuccessful or patient reports new pain     Problem: ABCDS Injury Assessment  Goal: Absence of physical injury  Outcome: Progressing  Flowsheets (Taken 1/15/2023 0135)  Absence of Physical Injury: Implement safety measures based on patient assessment     Problem: Infection - Adult  Goal: Absence of infection at discharge  Outcome: Progressing  Flowsheets (Taken 1/15/2023 0135)  Absence of infection at discharge:   Assess and monitor for signs and symptoms of infection   Monitor lab/diagnostic results   Monitor all insertion sites i.e., indwelling lines, tubes and drains   Administer medications as ordered   Instruct and encourage patient and family to use good hand hygiene technique     Problem: Respiratory - Adult  Goal: Achieves optimal ventilation and oxygenation  Outcome: Progressing  Flowsheets (Taken 1/15/2023 0135)  Achieves optimal ventilation and oxygenation:   Assess for changes in respiratory status   Assess for changes in mentation and behavior   Encourage broncho-pulmonary hygiene including cough, deep breathe, incentive spirometry   Assess and instruct to report shortness of breath or any respiratory difficulty     Problem: Chronic Conditions and Co-morbidities  Goal: Patient's chronic conditions and co-morbidity symptoms are monitored and maintained or improved  Outcome: Progressing  Flowsheets (Taken 1/15/2023 0135)  Care Plan - Patient's Chronic Conditions and Co-Morbidity Symptoms are Monitored and Maintained or Improved:   Monitor and assess patient's chronic conditions and comorbid symptoms for stability, deterioration, or improvement   Collaborate with multidisciplinary team to address chronic and comorbid conditions and prevent exacerbation or deterioration   Update acute care plan with appropriate goals if chronic or comorbid symptoms are exacerbated and prevent overall improvement and discharge     Problem: Skin/Tissue Integrity - Adult  Goal: Incisions, wounds, or drain sites healing without S/S of infection  Outcome: Progressing  Flowsheets (Taken 1/15/2023 0135)  Incisions, Wounds, or Drain Sites Healing Without Sign and Symptoms of Infection:   ADMISSION and DAILY: Assess and document risk factors for pressure ulcer development   TWICE DAILY: Assess and document skin integrity   TWICE DAILY: Assess and document dressing/incision, wound bed, drain sites and surrounding tissue   Implement wound care per orders     Problem: Musculoskeletal - Adult  Goal: Return mobility to safest level of function  Outcome: Progressing  Flowsheets (Taken 1/15/2023 0135)  Return Mobility to Safest Level of Function:   Assess patient stability and activity tolerance for standing, transferring and ambulating with or without assistive devices   Assist with transfers and ambulation using safe patient handling equipment as needed   Ensure adequate protection for wounds/incisions during mobilization   Obtain physical therapy/occupational therapy consults as needed   Instruct patient/family in ordered activity level  Goal: Return ADL status to a safe level of function  Outcome: Progressing  Flowsheets (Taken 1/15/2023 0135)  Return ADL Status to a Safe Level of Function:   Administer medication as ordered   Obtain physical therapy/occupational therapy consults as needed   Assess activities of daily living deficits and provide assistive devices as needed   Assist and instruct patient to increase activity and self care as tolerated     Problem: Gastrointestinal - Adult  Goal: Maintains or returns to baseline bowel function  Outcome: Progressing  Flowsheets (Taken 1/15/2023 0135)  Maintains or returns to baseline bowel function:   Assess bowel function   Encourage oral fluids to ensure adequate hydration   Administer ordered medications as needed   Encourage mobilization and activity     Problem: Genitourinary - Adult  Goal: Absence of urinary retention  Outcome: Progressing  Flowsheets (Taken 1/15/2023 0135)  Absence of urinary retention:   Assess patients ability to void and empty bladder   Monitor intake/output and perform bladder scan as needed     Problem: Metabolic/Fluid and Electrolytes - Adult  Goal: Glucose maintained within prescribed range  Outcome: Progressing  Flowsheets (Taken 1/15/2023 0135)  Glucose maintained within prescribed range:   Monitor blood glucose as ordered   Assess for signs and symptoms of hyperglycemia and hypoglycemia   Administer ordered medications to maintain glucose within target range   Instruct patient on self management of diabetes and initiate consult as needed     Problem: Hematologic - Adult  Goal: Maintains hematologic stability  Outcome: Progressing  Flowsheets (Taken 1/15/2023 0135)  Maintains hematologic stability:   Assess for signs and symptoms of bleeding or hemorrhage   Monitor labs for bleeding or clotting disorders   Care plan reviewed with patient and family. Patient and family verbalize understanding of the plan of care and contribute to goal setting.

## 2023-01-16 LAB
ANION GAP SERPL CALCULATED.3IONS-SCNC: 13 MEQ/L (ref 8–16)
BUN BLDV-MCNC: 23 MG/DL (ref 7–22)
CALCIUM SERPL-MCNC: 8.8 MG/DL (ref 8.5–10.5)
CHLORIDE BLD-SCNC: 96 MEQ/L (ref 98–111)
CO2: 25 MEQ/L (ref 23–33)
CREAT SERPL-MCNC: 1.4 MG/DL (ref 0.4–1.2)
ERYTHROCYTE [DISTWIDTH] IN BLOOD BY AUTOMATED COUNT: 21.1 % (ref 11.5–14.5)
ERYTHROCYTE [DISTWIDTH] IN BLOOD BY AUTOMATED COUNT: 58.4 FL (ref 35–45)
GFR SERPL CREATININE-BSD FRML MDRD: 57 ML/MIN/1.73M2
GLUCOSE BLD-MCNC: 125 MG/DL (ref 70–108)
GLUCOSE BLD-MCNC: 130 MG/DL (ref 70–108)
GLUCOSE BLD-MCNC: 144 MG/DL (ref 70–108)
GLUCOSE BLD-MCNC: 146 MG/DL (ref 70–108)
GLUCOSE BLD-MCNC: 231 MG/DL (ref 70–108)
HCT VFR BLD CALC: 27.7 % (ref 42–52)
HEMOGLOBIN: 8.3 GM/DL (ref 14–18)
MCH RBC QN AUTO: 24.4 PG (ref 26–33)
MCHC RBC AUTO-ENTMCNC: 30 GM/DL (ref 32.2–35.5)
MCV RBC AUTO: 81.5 FL (ref 80–94)
PLATELET # BLD: 456 THOU/MM3 (ref 130–400)
PMV BLD AUTO: 9 FL (ref 9.4–12.4)
POTASSIUM REFLEX MAGNESIUM: 4.5 MEQ/L (ref 3.5–5.2)
RBC # BLD: 3.4 MILL/MM3 (ref 4.7–6.1)
SODIUM BLD-SCNC: 134 MEQ/L (ref 135–145)
WBC # BLD: 5.4 THOU/MM3 (ref 4.8–10.8)

## 2023-01-16 PROCEDURE — 80048 BASIC METABOLIC PNL TOTAL CA: CPT

## 2023-01-16 PROCEDURE — 6360000002 HC RX W HCPCS: Performed by: PHYSICIAN ASSISTANT

## 2023-01-16 PROCEDURE — 6360000002 HC RX W HCPCS

## 2023-01-16 PROCEDURE — 97530 THERAPEUTIC ACTIVITIES: CPT

## 2023-01-16 PROCEDURE — 6370000000 HC RX 637 (ALT 250 FOR IP)

## 2023-01-16 PROCEDURE — 85027 COMPLETE CBC AUTOMATED: CPT

## 2023-01-16 PROCEDURE — 36415 COLL VENOUS BLD VENIPUNCTURE: CPT

## 2023-01-16 PROCEDURE — 97110 THERAPEUTIC EXERCISES: CPT

## 2023-01-16 PROCEDURE — 82948 REAGENT STRIP/BLOOD GLUCOSE: CPT

## 2023-01-16 PROCEDURE — 94760 N-INVAS EAR/PLS OXIMETRY 1: CPT

## 2023-01-16 PROCEDURE — 6370000000 HC RX 637 (ALT 250 FOR IP): Performed by: PHYSICIAN ASSISTANT

## 2023-01-16 PROCEDURE — 94669 MECHANICAL CHEST WALL OSCILL: CPT

## 2023-01-16 PROCEDURE — 99232 SBSQ HOSP IP/OBS MODERATE 35: CPT | Performed by: PHYSICIAN ASSISTANT

## 2023-01-16 PROCEDURE — 6370000000 HC RX 637 (ALT 250 FOR IP): Performed by: STUDENT IN AN ORGANIZED HEALTH CARE EDUCATION/TRAINING PROGRAM

## 2023-01-16 PROCEDURE — 2580000003 HC RX 258

## 2023-01-16 PROCEDURE — 1200000000 HC SEMI PRIVATE

## 2023-01-16 PROCEDURE — 94640 AIRWAY INHALATION TREATMENT: CPT

## 2023-01-16 RX ORDER — SODIUM PHOSPHATE, DIBASIC AND SODIUM PHOSPHATE, MONOBASIC 7; 19 G/133ML; G/133ML
1 ENEMA RECTAL
Status: COMPLETED | OUTPATIENT
Start: 2023-01-16 | End: 2023-01-16

## 2023-01-16 RX ORDER — SENNA AND DOCUSATE SODIUM 50; 8.6 MG/1; MG/1
1 TABLET, FILM COATED ORAL 2 TIMES DAILY
Status: DISCONTINUED | OUTPATIENT
Start: 2023-01-16 | End: 2023-01-25 | Stop reason: HOSPADM

## 2023-01-16 RX ORDER — POLYETHYLENE GLYCOL 3350 17 G/17G
17 POWDER, FOR SOLUTION ORAL DAILY
Status: DISCONTINUED | OUTPATIENT
Start: 2023-01-16 | End: 2023-01-25 | Stop reason: HOSPADM

## 2023-01-16 RX ADMIN — CLOPIDOGREL BISULFATE 75 MG: 75 TABLET ORAL at 09:33

## 2023-01-16 RX ADMIN — ASPIRIN 81 MG 81 MG: 81 TABLET ORAL at 09:33

## 2023-01-16 RX ADMIN — FERROUS SULFATE TAB 325 MG (65 MG ELEMENTAL FE) 325 MG: 325 (65 FE) TAB at 21:18

## 2023-01-16 RX ADMIN — POTASSIUM CHLORIDE 20 MEQ: 1500 TABLET, EXTENDED RELEASE ORAL at 09:33

## 2023-01-16 RX ADMIN — INSULIN GLARGINE 31 UNITS: 100 INJECTION, SOLUTION SUBCUTANEOUS at 21:18

## 2023-01-16 RX ADMIN — PIPERACILLIN AND TAZOBACTAM 3375 MG: 3; .375 INJECTION, POWDER, FOR SOLUTION INTRAVENOUS at 09:35

## 2023-01-16 RX ADMIN — SODIUM PHOSPHATE 1 ENEMA: 7; 19 ENEMA RECTAL at 17:07

## 2023-01-16 RX ADMIN — PIPERACILLIN AND TAZOBACTAM 3375 MG: 3; .375 INJECTION, POWDER, FOR SOLUTION INTRAVENOUS at 16:51

## 2023-01-16 RX ADMIN — CLONIDINE HYDROCHLORIDE 0.1 MG: 0.1 TABLET ORAL at 05:47

## 2023-01-16 RX ADMIN — POLYETHYLENE GLYCOL 3350 17 G: 17 POWDER, FOR SOLUTION ORAL at 11:07

## 2023-01-16 RX ADMIN — SENNOSIDES AND DOCUSATE SODIUM 1 TABLET: 50; 8.6 TABLET ORAL at 11:07

## 2023-01-16 RX ADMIN — ATORVASTATIN CALCIUM 80 MG: 80 TABLET, FILM COATED ORAL at 09:33

## 2023-01-16 RX ADMIN — OXYCODONE 10 MG: 5 TABLET ORAL at 07:24

## 2023-01-16 RX ADMIN — METOPROLOL TARTRATE 50 MG: 50 TABLET, FILM COATED ORAL at 05:46

## 2023-01-16 RX ADMIN — TIOTROPIUM BROMIDE INHALATION SPRAY 2 PUFF: 3.12 SPRAY, METERED RESPIRATORY (INHALATION) at 07:30

## 2023-01-16 RX ADMIN — ENOXAPARIN SODIUM 30 MG: 100 INJECTION SUBCUTANEOUS at 23:38

## 2023-01-16 RX ADMIN — VALSARTAN 160 MG: 160 TABLET ORAL at 09:33

## 2023-01-16 RX ADMIN — PIPERACILLIN AND TAZOBACTAM 3375 MG: 3; .375 INJECTION, POWDER, FOR SOLUTION INTRAVENOUS at 00:51

## 2023-01-16 RX ADMIN — GABAPENTIN 600 MG: 600 TABLET, FILM COATED ORAL at 21:18

## 2023-01-16 RX ADMIN — SODIUM CHLORIDE, PRESERVATIVE FREE 10 ML: 5 INJECTION INTRAVENOUS at 09:33

## 2023-01-16 RX ADMIN — FUROSEMIDE 40 MG: 40 TABLET ORAL at 09:33

## 2023-01-16 RX ADMIN — SENNOSIDES AND DOCUSATE SODIUM 1 TABLET: 50; 8.6 TABLET ORAL at 21:18

## 2023-01-16 RX ADMIN — SODIUM CHLORIDE, PRESERVATIVE FREE 10 ML: 5 INJECTION INTRAVENOUS at 21:17

## 2023-01-16 RX ADMIN — FERROUS SULFATE TAB 325 MG (65 MG ELEMENTAL FE) 325 MG: 325 (65 FE) TAB at 09:33

## 2023-01-16 RX ADMIN — INSULIN LISPRO 4 UNITS: 100 INJECTION, SOLUTION INTRAVENOUS; SUBCUTANEOUS at 16:46

## 2023-01-16 RX ADMIN — ENOXAPARIN SODIUM 30 MG: 100 INJECTION SUBCUTANEOUS at 11:07

## 2023-01-16 RX ADMIN — CLONIDINE HYDROCHLORIDE 0.1 MG: 0.1 TABLET ORAL at 17:02

## 2023-01-16 RX ADMIN — INSULIN GLARGINE 31 UNITS: 100 INJECTION, SOLUTION SUBCUTANEOUS at 10:05

## 2023-01-16 RX ADMIN — GABAPENTIN 600 MG: 600 TABLET, FILM COATED ORAL at 09:33

## 2023-01-16 RX ADMIN — Medication 1 TABLET: at 09:33

## 2023-01-16 RX ADMIN — METOPROLOL TARTRATE 50 MG: 50 TABLET, FILM COATED ORAL at 17:02

## 2023-01-16 ASSESSMENT — PAIN - FUNCTIONAL ASSESSMENT
PAIN_FUNCTIONAL_ASSESSMENT: ACTIVITIES ARE NOT PREVENTED
PAIN_FUNCTIONAL_ASSESSMENT: 0-10

## 2023-01-16 ASSESSMENT — PAIN DESCRIPTION - DESCRIPTORS
DESCRIPTORS: ACHING;DISCOMFORT
DESCRIPTORS: NAGGING

## 2023-01-16 ASSESSMENT — PAIN DESCRIPTION - LOCATION: LOCATION: LEG

## 2023-01-16 ASSESSMENT — PAIN SCALES - GENERAL
PAINLEVEL_OUTOF10: 5
PAINLEVEL_OUTOF10: 7
PAINLEVEL_OUTOF10: 8

## 2023-01-16 ASSESSMENT — PAIN DESCRIPTION - ORIENTATION: ORIENTATION: LEFT

## 2023-01-16 NOTE — PROGRESS NOTES
Precert denied for IPR, Edgar Echevarria CM updated. Spoke with patient explained denial he states he wants to pursue SNF at the same location where his mother is living in assisted living.

## 2023-01-16 NOTE — CARE COORDINATION
1/16/23, 9:53 AM EST    DISCHARGE PLANNING EVALUATION    Inpt rehab has declined, re-referred to [facility], facility is reviewing for possible bed availability, will need precert if accepting

## 2023-01-16 NOTE — RT PROTOCOL NOTE
RT Inhaler-Nebulizer Bronchodilator Protocol Note    There is a bronchodilator order in the chart from a provider indicating to follow the RT Bronchodilator Protocol and there is an Initiate RT Inhaler-Nebulizer Bronchodilator Protocol order as well (see protocol at bottom of note). CXR Findings:  No results found. The findings from the last RT Protocol Assessment were as follows:   History Pulmonary Disease: None or smoker <15 pack years  Respiratory Pattern: Regular pattern and RR 12-20 bpm  Breath Sounds: Slightly diminished and/or crackles  Cough: Strong, spontaneous, non-productive  Indication for Bronchodilator Therapy: Decreased or absent breath sounds  Bronchodilator Assessment Score: 2    Aerosolized bronchodilator medication orders have been revised according to the RT Inhaler-Nebulizer Bronchodilator Protocol below. Respiratory Therapist to perform RT Therapy Protocol Assessment initially then follow the protocol. Repeat RT Therapy Protocol Assessment PRN for score 0-3 or on second treatment, BID, and PRN for scores above 3. No Indications - adjust the frequency to every 6 hours PRN wheezing or bronchospasm, if no treatments needed after 48 hours then discontinue using Per Protocol order mode. If indication present, adjust the RT bronchodilator orders based on the Bronchodilator Assessment Score as indicated below. Use Inhaler orders unless patient has one or more of the following: on home nebulizer, not able to hold breath for 10 seconds, is not alert and oriented, cannot activate and use MDI correctly, or respiratory rate 25 breaths per minute or more, then use the equivalent nebulizer order(s) with same Frequency and PRN reasons based on the score. If a patient is on this medication at home then do not decrease Frequency below that used at home.     0-3 - enter or revise RT bronchodilator order(s) to equivalent RT Bronchodilator order with Frequency of every 4 hours PRN for wheezing or increased work of breathing using Per Protocol order mode. 4-6 - enter or revise RT Bronchodilator order(s) to two equivalent RT bronchodilator orders with one order with BID Frequency and one order with Frequency of every 4 hours PRN wheezing or increased work of breathing using Per Protocol order mode. 7-10 - enter or revise RT Bronchodilator order(s) to two equivalent RT bronchodilator orders with one order with TID Frequency and one order with Frequency of every 4 hours PRN wheezing or increased work of breathing using Per Protocol order mode. 11-13 - enter or revise RT Bronchodilator order(s) to one equivalent RT bronchodilator order with QID Frequency and an Albuterol order with Frequency of every 4 hours PRN wheezing or increased work of breathing using Per Protocol order mode. Greater than 13 - enter or revise RT Bronchodilator order(s) to one equivalent RT bronchodilator order with every 4 hours Frequency and an Albuterol order with Frequency of every 2 hours PRN wheezing or increased work of breathing using Per Protocol order mode. RT to enter RT Home Evaluation for COPD & MDI Assessment order using Per Protocol order mode.     Electronically signed by Sukumar Flynn RCP on 1/16/2023 at 7:35 AM

## 2023-01-16 NOTE — PLAN OF CARE
Problem: Discharge Planning  Goal: Discharge to home or other facility with appropriate resources  Outcome: Progressing  Flowsheets (Taken 1/16/2023 1145)  Discharge to home or other facility with appropriate resources:   Identify barriers to discharge with patient and caregiver   Identify discharge learning needs (meds, wound care, etc)   Refer to discharge planning if patient needs post-hospital services based on physician order or complex needs related to functional status, cognitive ability or social support system   Arrange for needed discharge resources and transportation as appropriate     Problem: Pain  Goal: Verbalizes/displays adequate comfort level or baseline comfort level  Outcome: Progressing  Flowsheets (Taken 1/16/2023 1145)  Verbalizes/displays adequate comfort level or baseline comfort level:   Encourage patient to monitor pain and request assistance   Administer analgesics based on type and severity of pain and evaluate response   Consider cultural and social influences on pain and pain management   Assess pain using appropriate pain scale   Implement non-pharmacological measures as appropriate and evaluate response   Notify Licensed Independent Practitioner if interventions unsuccessful or patient reports new pain     Problem: ABCDS Injury Assessment  Goal: Absence of physical injury  Outcome: Progressing  Flowsheets (Taken 1/16/2023 1145)  Absence of Physical Injury: Implement safety measures based on patient assessment     Problem: Infection - Adult  Goal: Absence of infection at discharge  Outcome: Progressing  Flowsheets (Taken 1/16/2023 1145)  Absence of infection at discharge:   Assess and monitor for signs and symptoms of infection   Monitor lab/diagnostic results   Monitor all insertion sites i.e., indwelling lines, tubes and drains   Administer medications as ordered   Instruct and encourage patient and family to use good hand hygiene technique     Problem: Chronic Conditions and Co-morbidities  Goal: Patient's chronic conditions and co-morbidity symptoms are monitored and maintained or improved  Outcome: Progressing  Flowsheets (Taken 1/16/2023 1145)  Care Plan - Patient's Chronic Conditions and Co-Morbidity Symptoms are Monitored and Maintained or Improved:   Monitor and assess patient's chronic conditions and comorbid symptoms for stability, deterioration, or improvement   Collaborate with multidisciplinary team to address chronic and comorbid conditions and prevent exacerbation or deterioration   Update acute care plan with appropriate goals if chronic or comorbid symptoms are exacerbated and prevent overall improvement and discharge     Problem: Skin/Tissue Integrity - Adult  Goal: Incisions, wounds, or drain sites healing without S/S of infection  Outcome: Progressing  Flowsheets (Taken 1/16/2023 1145)  Incisions, Wounds, or Drain Sites Healing Without Sign and Symptoms of Infection:   ADMISSION and DAILY: Assess and document risk factors for pressure ulcer development   TWICE DAILY: Assess and document skin integrity   TWICE DAILY: Assess and document dressing/incision, wound bed, drain sites and surrounding tissue   Implement wound care per orders     Problem: Musculoskeletal - Adult  Goal: Return mobility to safest level of function  Outcome: Progressing  Flowsheets (Taken 1/16/2023 1145)  Return Mobility to Safest Level of Function:   Assess patient stability and activity tolerance for standing, transferring and ambulating with or without assistive devices   Assist with transfers and ambulation using safe patient handling equipment as needed   Ensure adequate protection for wounds/incisions during mobilization   Instruct patient/family in ordered activity level   Obtain physical therapy/occupational therapy consults as needed     Problem: Musculoskeletal - Adult  Goal: Return ADL status to a safe level of function  Outcome: Progressing  Flowsheets (Taken 1/16/2023 1145)  Return ADL Status to a Safe Level of Function:   Administer medication as ordered   Obtain physical therapy/occupational therapy consults as needed   Assess activities of daily living deficits and provide assistive devices as needed   Assist and instruct patient to increase activity and self care as tolerated     Problem: Gastrointestinal - Adult  Goal: Maintains or returns to baseline bowel function  Outcome: Progressing  Flowsheets (Taken 1/16/2023 1145)  Maintains or returns to baseline bowel function:   Assess bowel function   Encourage oral fluids to ensure adequate hydration   Administer ordered medications as needed     Problem: Metabolic/Fluid and Electrolytes - Adult  Goal: Glucose maintained within prescribed range  Outcome: Progressing  Flowsheets (Taken 1/16/2023 1145)  Glucose maintained within prescribed range:   Monitor blood glucose as ordered   Assess for signs and symptoms of hyperglycemia and hypoglycemia   Administer ordered medications to maintain glucose within target range     Problem: Hematologic - Adult  Goal: Maintains hematologic stability  Outcome: Progressing  Flowsheets (Taken 1/16/2023 1145)  Maintains hematologic stability:   Assess for signs and symptoms of bleeding or hemorrhage   Monitor labs for bleeding or clotting disorders     Problem: Respiratory - Adult  Goal: Achieves optimal ventilation and oxygenation  Outcome: Completed  Flowsheets (Taken 1/16/2023 1145)  Achieves optimal ventilation and oxygenation:   Assess for changes in respiratory status   Assess for changes in mentation and behavior     Problem: Genitourinary - Adult  Goal: Absence of urinary retention  Outcome: Completed  Flowsheets (Taken 1/16/2023 1145)  Absence of urinary retention:   Assess patients ability to void and empty bladder   Monitor intake/output and perform bladder scan as needed     Care plan reviewed with patient. Patient verbalizes understanding of plan of care and contributes to goal setting.

## 2023-01-16 NOTE — PLAN OF CARE
Problem: Respiratory - Adult  Goal: Clear lung sounds  Description: Clear lung sounds  Outcome: Progressing   Continue inhaled txs. Patient mutually agrees on goals.

## 2023-01-16 NOTE — CARE COORDINATION
1/16/23, 9:33 AM EST    DISCHARGE ON GOING EVALUATION    Natalee Home day: 6  Location: -04/004-A Reason for admit: Sepsis [facility]) [A41.9]  Septic arthritis of left ankle, due to unspecified organism [facility]) [M00.9]   Procedure: 1/10 Left Proximal symes/distal transtibial amputation with  mL by Dr Jolley Neither  Barriers to Discharge:  POD 6, await precert/denied for IPR. Continue PT/OT  PCP: EYAD Sloan CNP  Readmission Risk Score: 25.1%  Patient Goals/Plan/Treatment Preferences: patient denied for IPR; SW assisting with ECF.

## 2023-01-16 NOTE — PROGRESS NOTES
Progress note: Infectious diseases    Patient - Nessa Bran,  Age - 65 y.o.    - 1960      Room Number - 7K-04/004-A   MRN -  [id number]   Acct # - [de-identified]  Date of Admission -  1/10/2023 12:28 PM    SUBJECTIVE:   No new complaints. OBJECTIVE   VITALS    height is 6' 2\" (1.88 m) and weight is 286 lb 3.2 oz (129.8 kg). His oral temperature is 97.4 °F (36.3 °C). His blood pressure is 102/57 (abnormal) and his pulse is 77. His respiration is 16 and oxygen saturation is 95%. Wt Readings from Last 3 Encounters:   23 286 lb 3.2 oz (129.8 kg)   22 (!) 310 lb (140.6 kg)   22 (!) 310 lb (140.6 kg)       I/O (24 Hours)    Intake/Output Summary (Last 24 hours) at 2023 1022  Last data filed at 2023 1018  Gross per 24 hour   Intake 720 ml   Output 3900 ml   Net -3180 ml         General Appearance: awake and oriented  HEENT - normocephalic, atraumatic, pale conjunctiva,  anicteric sclera  Neck - Supple, no mass  Lungs -  Bilateral   air entry, no rhonchi, no wheeze. Cardiovascular - Heart sounds are normal.     Abdomen - soft, not distended, nontender,   Neurologic -oriented  Skin - No bruising or bleeding  Extremities -  left BKA.                MEDICATIONS:      senna-docusate  1 tablet Oral BID    polyethylene glycol  17 g Oral Daily    enoxaparin  30 mg SubCUTAneous Q12H    insulin glargine  31 Units SubCUTAneous BID    insulin lispro  0-16 Units SubCUTAneous TID     insulin lispro  0-4 Units SubCUTAneous Nightly    amLODIPine  10 mg Oral Daily    atorvastatin  80 mg Oral Daily    ferrous sulfate  325 mg Oral BID    furosemide  40 mg Oral Daily    gabapentin  600 mg Oral BID    minoxidil  10 mg Oral Daily    multivitamin  1 tablet Oral Daily    potassium chloride  20 mEq Oral Daily    valsartan  160 mg Oral Daily    Sodium Hypochlorite   Irrigation Daily    piperacillin-tazobactam 3,375 mg IntraVENous q8h    aspirin  81 mg Oral Daily    clopidogrel  75 mg Oral Daily    tiotropium  2 puff Inhalation Daily    cloNIDine  0.1 mg Oral BID    metoprolol tartrate  50 mg Oral BID    sodium chloride flush  5-40 mL IntraVENous 2 times per day      dextrose      sodium chloride 20 mL/hr at 01/13/23 0020     sodium phosphate, acetaminophen **OR** acetaminophen, albuterol sulfate HFA, glucose, dextrose bolus **OR** dextrose bolus, glucagon (rDNA), dextrose, sodium chloride flush, sodium chloride, ondansetron **OR** ondansetron, oxyCODONE **OR** oxyCODONE, morphine **OR** morphine      LABS:     CBC:   Recent Labs     01/14/23  0916 01/15/23  0813 01/16/23  0726   WBC 7.7 6.1 5.4   HGB 8.1* 8.5* 8.3*   * 552* 456*       BMP:    Recent Labs     01/14/23  0916 01/15/23  0813 01/16/23  0726   * 135 134*   K 4.5 4.5 4.5   CL 97* 97* 96*   CO2 24 27 25   BUN 26* 22 23*   CREATININE 1.5* 1.5* 1.4*   GLUCOSE 115* 121* 125*       Calcium:  Recent Labs     01/16/23  0726   CALCIUM 8.8        Recent Labs     01/15/23  1557 01/15/23  1952 01/16/23  0610   POCGLU 132* 128* 146*        CULTURES:   UA: No results for input(s): SPECGRAV, PHUR, COLORU, CLARITYU, MUCUS, PROTEINU, BLOODU, RBCUA, WBCUA, BACTERIA, NITRU, GLUCOSEU, BILIRUBINUR, UROBILINOGEN, KETUA, LABCAST, LABCASTTY, AMORPHOS in the last 72 hours. Invalid input(s): CRYSTALS  Micro:   Lab Results   Component Value Date/Time    [facility]  01/10/2023 03:28 PM     No growth 24 hours. No growth 48 hours.  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    Sepsis ([facility] A41.9    Acute left-sided low back pain with bilateral sciatica M54.42, M54.41    S/P transmetatarsal amputation of foot, left ([facility]) Z89.432    Leukocytosis D72.829    Wound dehiscence, surgical, initial encounter T81.31XA    Postoperative wound infection T48.37XR    Metabolic acidosis A45.98    Hx of CABG Z95.1    Lumbar stenosis without neurogenic claudication M48.061    CKD (chronic kidney disease), stage II N18.2    Normocytic anemia D64.9    Morbid obesity ([facility]) E66.01    Debility R53.81    Carotid stenosis, asymptomatic, bilateral I65.23    Hypokalemia E87.6    Nonhealing ulcer of left lower extremity ([facility] [address] L97.929    Bleeding R58    Wound, open T14. 8XXA    SOB (shortness of breath) on exertion R06.02    Hemoglobin A1C greater than 9%, indicating poor diabetic control R73.09    Hypertensive emergency I16.1    Acute on chronic congestive heart failure ([facility]) I50.9    Hypertensive urgency I16.0    Moderate persistent asthma J45.40    Septic arthritis of left ankle, due to unspecified organism ([facility]) M00.9    Closed fracture of ankle with nonunion S82.899K    S/P BKA (below knee amputation), left ([facility]) M48.750         ASSESSMENT/PLAN   Necrotizing left foot/ankle infection: had urgent BKA. The stump is healing. Will stop antibiotic on1/18/23  Poorly controlled diabetes  CAD  Continue therapy: plan for ECF.          Renee Berger MD, MD, FACP 1/16/2023 10:22 AM

## 2023-01-16 NOTE — PROGRESS NOTES
Hospitalist -Progress Note      Patient: Zelphia Buerger    Unit/Bed:7K-04/004-A  YOB: 1960  MRN: [id number]   Acct: [de-identified]   PCP: EYAD Flanagan - CNP    Date of Service: Pt seen/examined on 01/16/23  Chief Complaint: Left septic ankle with abscess and open pathologic fracture    Assessment and Plan:-    Septic left ankle with pathologic fracture of left tibia and abscess of the left ankle:  POD #5 day s/p Proximal Symes amputation/distal transtibial amputation, left (DOS 1/10)    -Continue Zosyn- vancomycin and clindamycin stopped by ID on 1/13   -Infectious disease following   - continue with management by primary    GISEAL -resolved   - suspect medication induced from antibiotics and possible cardiorenal   Cr baseline 1.0-1.2- currently 1.4   BMP daily       Acute respiratory failure- resolved  Started post operatively suspect fluid overload and anemia contributing   BNP elevated in 2000s- fluids stopped 1/11    Nocturnal hypoxia- suspect RILEY   Nocturnal pulse oximetry and completed   Will need oxygen at night long term  2L at night   121 desaturations between 80-89   Referral for formal sleep study on DC     Type 2 diabetes mellitus: Uncontrolled. A1c 8.3   Goal blood glucose while inpatient 140-180.    -Hold home meds  -Lantus 30 units BID - 1/12 increased to 33 units BID - 1/14- decrease to 31 BID   -Hypoglycemic protocol-Accu-Cheks-Diabetic and cardiac diet    -Continue Neurontin  -1/12 increased SSI to high dose       HTN: Recent hypertensive emergency in which he presented to [facility] November 2022 with chest pain and new RBBB. Patient subsequently left AMA before treatment. On arrival to [facility] patient's blood pressure was 131/62.   - Cardiology consulted - no further interventions recommended at this time  -Continue Norvasc, clonidine, Lasix, Lopressor twice daily, and minoxidil with holding parameters.     Chronic diastolic heart failure: Last echocardiogram 11/03/22 completed at [facility] showed concentric remodeling with normal regional wall motion, abnormal septal motion consistent with postoperative state, EF of 55 to 60% and grade 2 diastolic dysfunction.   -Lasix 40 mg daily               -Strict I & O                -Daily standing weights               -Low sodium diet   - 1/11 concerns for fluid overload given elevated BNP 1/10 and rales on exam- fluids stopped  -1/12 lungs clear- will hold off on additional diuresis right now      Hyperlipidemia: Last lipid panel 11/16/2022   -Continue Lipitor 80 mg daily    CAD s/p CABG: November 2019. Echo 4/21/20 EF 50%,    -aspirin and Plavix  -Continue home amlodipine, clonidine, lopressor, valsartan and minoxidil       Moderate persistent asthma: No PFTs noted in chart   -Continue home Spiriva   -Pulmonary hygiene    Hx of PAD: Continue aspirin and Plavix, statin therapy    Lupus:  Does not appear that patient is on any anticoagulation however he is on dual antiplatelet therapy.   -Hold aspirin and Plavix due to upcoming procedure.   -Note the patient is at elevated risk for clotting due to history of lupus  . Acute blood loss anemia on Chronic normocytic anemia: Baseline hemoglobin 10s.    - 1/11 Hgb dropped to 6.8- transfuse 1unit PRBC     Hgb stable in 8s post transfusion    -Continue home p.o. iron   -Nursing to monitor for any blood in stool   -Monitor with CBC daily        Disposition:- IPR pending precertification         History Of Present Illness:      50-year-old male was admitted to [facility] by podiatry for urgent/emergent management of septic left ankle with pathologic fracture of tibia and abscess of left ankle. Initially podiatry was planning to take patient to the OR tonight or tomorrow and consulted hospital team for risk stratification.       Patient has significant past medical history of diabetes mellitus type 2, uncontrolled, hypertension, CAD status post CABG, CKD, chronic normocytic anemia, lupus, moderate persistent asthma, PAD, grade 2 diastolic dysfunction, and hyperlipidemia,   It is important note the patient was recently hospitalized at [facility] 11/2/2022 for hypertensive emergency in which she was experiencing chest pain and a new left bundle branch block was found. Patient subsequently left AMA despite having blood pressure 200s/100s. Upon arrival to [facility] blood cultures were obtained, patient was started on vancomycin and Zosyn. Chest x-ray obtained that demonstrated evidence of prior CABG as well as hyperinflated lungs suggestive of COPD. Patient also had evidence of healed granulomatous disease. With questionable mild interstitial fibrosis. X-ray of left ankle was obtained that showed gas density and soft tissue extending to the lower left leg. Complete dislocation and rotated talus relative to the tibia. Also there was abnormal periosteal reaction and distal tibia and fibula which indicate chronic osteomyelitis. There is also cortical erosion of distal fibula. EKG was also completed that demonstrated Normal sinus rhythm, Left axis deviation; Right bundle branch block, Inferior infarct , age undetermined. Abnormal ECG When compared with ECG of 25-NOV-2022 04:21,Right bundle branch block has replaced Intraventricular conduction delay. During my examination patient denied chest pain, shortness of breath, headache, lightheadedness, abdominal pain. Patient did have foot pain that was relieved with pain medication. Stat CBC, BMP, hepatic function panel as well as magnesium were ordered. 1/11/2023  Patient postop day 1. Hemoglobin dropped to 6.8. Will transfuse 1 unit PRBCs. Patient requiring O2 postoperatively and was weaned to room air today however dipped back into the mid 80s. Currently satting 93% on 2 L.    1/12/2023  Hemoglobin 7 after transfusion. No longer requiring O2 at this time. Nocturnal pulse oximetry pending.    WBC downtrending    1/13/2023  No events overnight. Blood sugar is much improved. VSS. Waiting for IPR precert      1/30/9524  Vanc and clindamycin stopped yesterday. BG acceptable. VSS. Patient had episode of symptomatic hypotension today. 500cc bolus of NS ordered. Patient improved and BP acceptable. Encouraged oral hydration     1/15/2023  Patient doing well. VSS- no hypotensive episodes. Blood sugar well controlled. Hopeful for IPR precert tomorrow. 1/16/2023  IPR denied- SW to work on ECF placement- hopeful to [facility] Con.    Patient otherwise stable  Passing flatus but has not had BM- will order enema    Past Medical History:        Diagnosis Date    Arthritis     CAD (coronary artery disease)     CKD (chronic kidney disease), stage II     Diabetes mellitus ([facility] [address]     Hyperlipidemia     Hypertension     Liver disease     HEPITITIS AS A CHILD       Past Surgical History:        Procedure Laterality Date    CARDIAC SURGERY  11/2019    triple bypass    CYST REMOVAL      RIGHT ANKLE     FOOT AMPUTATION Left 1/10/2023    LEFT BELOW KNEE AMPUTATION performed by Bg Ruiz DPM at [facility] Left 4/20/2020    LEFT TRANSMETATARSAL AMPUTATION  FOOT INCISION AND DRAINAGE performed by Bg Ruiz DPM at [facility] Left 7/20/2020    LEFT WOUND DEBRIDEMENT WITH WOUND VAC APPLICATION performed by Bg Ruiz DPM at [facility] Left 7/1/2022    LEFT FOOT [address], [address], APPLICATION [address] performed by Bg Ruiz DPM at [facility] Left 8/14/2020    LEFT FOOT PREPARATION GRAFT SITE, HAVEST SPLIT THICKNESS SKIN GRAFT WITH APPLICATION, APPLICATION [employer] WOUND VAC performed by Bg Ruiz DPM at [address] Left 3/3/2020    I&D LEFT FOOT, TRANSMETATARSAL AMPUTATION performed by Bg Ruiz DPM at [facility] OR    TONSILLECTOMY      UPPER GASTROINTESTINAL ENDOSCOPY N/A 11/23/2022    EGD performed by Gus De Guzman MD at [facility]       Home Medications:   No current facility-administered medications on file prior to encounter. Current Outpatient Medications on File Prior to Encounter   Medication Sig Dispense Refill    HYDROcodone-acetaminophen (NORCO) 5-325 MG per tablet Take 1 tablet by mouth every 4 hours as needed. cloNIDine (CATAPRES) 0.1 MG tablet Take 1 tablet by mouth 2 times daily 60 tablet 3    tiotropium (SPIRIVA RESPIMAT) 2.5 MCG/ACT AERS inhaler Inhale 2 puffs into the lungs daily 4 g 0    benzonatate (TESSALON) 200 MG capsule Take 1 capsule by mouth 3 times daily as needed for Cough (Patient not taking: Reported on 1/10/2023) 30 capsule 0    amLODIPine (NORVASC) 10 MG tablet Take 1 tablet by mouth daily 30 tablet 3    metoprolol tartrate (LOPRESSOR) 50 MG tablet Take 1 tablet by mouth 2 times daily 60 tablet 3    valsartan (DIOVAN) 160 MG tablet Take 1 tablet by mouth daily 30 tablet 3    minoxidil (LONITEN) 10 MG tablet Take 1 tablet by mouth daily 30 tablet 3    ferrous sulfate (IRON 325) 325 (65 Fe) MG tablet Take 1 tablet by mouth 2 times daily 180 tablet 1    furosemide (LASIX) 40 MG tablet Take 1 tablet by mouth daily 90 tablet 1    potassium chloride (KLOR-CON M) 20 MEQ extended release tablet Take 1 tablet by mouth daily 30 tablet 5    empagliflozin (JARDIANCE) 25 MG tablet Take 1 tablet by mouth daily 90 tablet 1    clopidogrel (PLAVIX) 75 MG tablet Take 1 tablet by mouth daily 90 tablet 3    gabapentin (NEURONTIN) 600 MG tablet Take 1 tablet by mouth 2 times daily for 180 days. 180 tablet 1    insulin detemir (LEVEMIR FLEXTOUCH) 100 UNIT/ML injection pen 30 units in the morning, and 30 units in the evening.  21 mL 3    insulin lispro (HUMALOG) 100 UNIT/ML injection vial Inject 5-15 Units into the skin 3 times daily (with meals) 15 mL 3    albuterol sulfate HFA (VENTOLIN HFA) 108 (90 Base) MCG/ACT inhaler Inhale 2 puffs into the lungs 4 times daily as needed for Wheezing 54 g 1    atorvastatin (LIPITOR) 80 MG tablet Take 1 tablet by mouth daily 30 tablet 3    Polyethylene Glycol 3350 (MIRALAX PO) Take by mouth as needed      aspirin 81 MG chewable tablet Take 1 tablet by mouth daily 30 tablet 3    Multiple Vitamins-Minerals (MULTIVITAMIN PO) Take 1 tablet by mouth daily          Allergies:    Patient has no known allergies. Social History:    reports that he quit smoking about 15 years ago. His smoking use included cigarettes. He has never used smokeless tobacco. He reports that he does not currently use alcohol. He reports that he does not use drugs. Family History:       Problem Relation Age of Onset    Diabetes Mother     High Blood Pressure Mother     Alzheimer's Disease Father          of, 80or 80years old    Heart Disease Father     High Blood Pressure Father     Cancer Neg Hx     Stroke Neg Hx        Diet:  ADULT DIET; Regular; 3 carb choices (45 gm/meal); Low Fat/Low Chol/High Fiber/2 gm Na    Review of systems:   Pertinent positives as noted in the HPI. All other systems reviewed and negative. PHYSICAL EXAM:  /71   Pulse 77   Temp 97.7 °F (36.5 °C) (Oral)   Resp 16   Ht 6' 2\" (1.88 m)   Wt 286 lb 3.2 oz (129.8 kg)   SpO2 98%   BMI 36.75 kg/m²   General appearance: No apparent distress, appears stated age and cooperative. Obese    HEENT: Normal cephalic, atraumatic without obvious deformity. Neck: No jugular venous distention. Trachea midline. Respiratory:  Normal respiratory effort.   Lungs clear to auscultation bilaterally-  Cardiovascular: Regular rate and rhythm with normal S1/S2   Abdomen: Hypoactive bowel sounds, abdomen is soft and rounded, nontender to palpation   Musculoskeletal:  Right lower extremity +2 edema, left lower leg  with amputation- wrapped  Skin: Warm and dry  Neurologic:  No focal deficits, bilateral numbness and tingling of right lower extremity as well as left lower extremity, follows commands  Psychiatric: Alert and oriented, thought content appropriate, normal insight  Labs:   Recent Labs     01/14/23  0916 01/15/23  0813 01/16/23  0726   WBC 7.7 6.1 5.4   HGB 8.1* 8.5* 8.3*   HCT 27.4* 29.2* 27.7*   * 552* 456*       Recent Labs     01/14/23  0916 01/15/23  0813   * 135   K 4.5 4.5   CL 97* 97*   CO2 24 27   BUN 26* 22   CREATININE 1.5* 1.5*   CALCIUM 8.2* 8.6       No results for input(s): AST, ALT, BILIDIR, BILITOT, ALKPHOS in the last 72 hours. No results for input(s): INR in the last 72 hours. No results for input(s): Tea Parisian in the last 72 hours. Urinalysis:    Lab Results   Component Value Date/Time    NITRU NEGATIVE 11/16/2022 01:52 PM    WBCUA 0-2 11/16/2022 01:52 PM    BACTERIA NONE SEEN 11/16/2022 01:52 PM    RBCUA 15-25 11/16/2022 01:52 PM    BLOODU MODERATE 11/16/2022 01:52 PM    GLUCOSEU >= 1000 11/16/2022 01:52 PM       Radiology:   XR ANKLE LEFT (MIN 3 VIEWS)   Final Result         1. Gas density in the soft tissues extending into the lower left leg. 2. Completely dislocated and rotated talus relative to the tibia. 3. Abnormal periosteal reaction in the distal tibia and fibula which can indicate chronic osteomyelitis. 4. Cortical erosion of the distal fibula. **This report has been created using voice recognition software. It may contain minor errors which are inherent in voice recognition technology. **      Final report electronically signed by Dr. Jeannie Fuchs on 1/10/2023 3:46 PM      XR CHEST PORTABLE   Final Result   1. Normal heart size. Metallic sternotomy sutures from prior surgery. Lungs somewhat hyperinflated, suggesting possible COPD. 2. Evidence for old, healed granulomatous disease. Question mild interstitial fibrosis/pneumonitis both lung bases. No effusion is seen. **This report has been created using voice recognition software.   It may contain minor errors which are inherent in voice recognition technology. **      Final report electronically signed by Dr. Bell Higuera on 1/10/2023 2:42 PM        XR CHEST PORTABLE    Result Date: 1/10/2023  PROCEDURE: XR CHEST PORTABLE CLINICAL INFORMATION: pre op COMPARISON: 11/25/2022 TECHNIQUE: A single mobile view of the chest was obtained. 1. Normal heart size. Metallic sternotomy sutures from prior surgery. Lungs somewhat hyperinflated, suggesting possible COPD. 2. Evidence for old, healed granulomatous disease. Question mild interstitial fibrosis/pneumonitis both lung bases. No effusion is seen. **This report has been created using voice recognition software. It may contain minor errors which are inherent in voice recognition technology. ** Final report electronically signed by Dr. Bell Higuera on 1/10/2023 2:42 PM        EKG: Normal sinus rhythm, Left axis deviation, Right bundle branch block, Inferior infarct , age undetermined Abnormal ECG.  When compared with ECG of 25-NOV-2022 04:21,  Right bundle branch block has replaced Intraventricular conduction delay    Electronically signed by Shamar Ruvalcaba PA-C on 1/16/2023 at 8:01 AM            .

## 2023-01-16 NOTE — PROGRESS NOTES
[facility]  INPATIENT PHYSICAL THERAPY  DAILY NOTE  [facility] ORTHOPEDICS 7K - 7K-04/004-A    Time In: 1574  Time Out: 8703  Timed Code Treatment Minutes: 29 Minutes  Minutes: 34          Date: 2023  Patient Name: Andrew Chirinos,  Gender:  male        MRN: [id number]  : 1960  (65 y.o.)     Referring Practitioner: Trena Britton DPM  Diagnosis: sepsis  Additional Pertinent Hx: per EMR \"The patient is a 65 y.o. male with significant past medical history of CAD, CKD, diabetes, hyperlipidemia, and hypertension who is being seen at bedside on behalf of Dr. Vivian Garay. Patient relates that he has had ongoing problems with his left foot and ankle over the course of the past few years. 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 He relates that he follows up and sees Dr. Vivian Garay in clinic weekly. He relates that he missed his appointment last week and did not have the dressing changed. The patient states that the previous week he had x-rays taken in clinic that showed a pathologic fracture of the left tibia. He relates that he has not been walking on his left lower extremity. He relates that he has applied some pressure when using the restroom on that leg. He states that amputation has been in discussion at his previous clinic encounters. The patient relates that he last a last evening at 8 or 9 PM.  He relates that he drank Crystal light tea around 8 or 9 AM this morning. He relates that he took Plavix this morning at 6 AM.  The patient denies any other concerns to his right lower extremity. The patient denies nausea, vomiting, fever, chills, shortness of breath or chest pain. \" Pt is s/p L BKA on 1/10/23 completed by Dr. Vivian Garay.      Prior Level of Function:  Lives With: Alone  Type of Home: House  Home Layout: One level  Home Access: Ramped entrance  Home Equipment: Electric scooter, [id number] Shower/Tub: Walk-in shower, Shower chair with back  Bathroom Toilet: Handicap height  Bathroom Equipment: Grab bars in shower, Grab bars around toilet    Receives Help From: Friend(s)  ADL Assistance: Independent  Homemaking Assistance: Needs assistance  Homemaking Responsibilities: Yes  Ambulation Assistance: Independent  Transfer Assistance: Independent  Active : No  IADL Comments: He has a robert who works for him - worker's wife makes meals from that he only has to heat up in microwave and assists with cleaning tasks  Additional Comments: Pt reported that he has a very supportive family who can be available intermittently throughout the day upon discharge home. Pt reports he was amb very little, using his scooter or w/c most of the time    Restrictions/Precautions:  Restrictions/Precautions: General Precautions, Weight Bearing, Fall Risk  Left Lower Extremity Weight Bearing: Non Weight Bearing (BKA)  Position Activity Restriction  Other position/activity restrictions: L BKA     SUBJECTIVE: RN approved session. Patient in bed with HOB elevated upon arrival and agreeable to therapy. PAIN: denies  Vitals: Vitals not assessed per clinical judgement, see nursing flowsheet    OBJECTIVE:  Bed Mobility:  Rolling to Right: Stand By Assistance, Contact Guard Assistance, X 1, with head of bed raised, with rail   Supine to Sit: Stand By Assistance, [employer] Assistance, X 1, with head of bed raised, with rail    Transfers:  Slide Board:Contact Guard Assistance, Minimal Assistance, X 1, with increased time for completion, with verbal cues, from EOB to w/c towards R  **Dependent for set up and placement of slideboard, required Min to Mod assistance for removal of slide board and self adjustment for comfort post transfer.   **seated in w/c with LLE elevated on chair at end of session    Balance:  Static Sitting Balance:  Stand By Assistance, X 1, EOB  Dynamic Sitting Balance: Stand By Assistance  **Sat EOB 7 min in preparation for transfer  Exercise:  Patient was guided in 1 set(s) 10 reps of exercise to both lower extremities. Ankle pumps R only, Glut sets, Quad sets, Heelslides, Hip abduction/adduction, Straight leg raises, Seated heel/toe raises R only, and Long arc quads. Exercises were completed for increased independence with functional mobility. Functional Outcome Measures: Completed  AM-PAC Inpatient Mobility without Stair Climbing Raw Score : 11  AM-PAC Inpatient without Stair Climbing T-Scale Score : 35.66    ASSESSMENT:  Assessment: Patient progressing toward established goals. Activity Tolerance:  Patient tolerance of  treatment: good. Equipment Recommendations:Equipment Needed: No  Discharge Recommendations: Inpatient Rehabilitation and Patient would benefit from continued PT at discharge  Plan: Current Treatment Recommendations: Strengthening, Balance training, Functional mobility training, Transfer training, Endurance training, Equipment evaluation, education, & procurement, Patient/Caregiver education & training, Neuromuscular re-education, Safety education & training, Home exercise program, Therapeutic activities  General Plan:  (6x O)    Patient Education  Patient Education: Plan of Care, Bed Mobility, Transfers, Verbal Exercise Instruction    Goals:  Patient Goals : \"be able to get in and out bed and w/c with less assist\"  Short Term Goals  Time Frame for Short Term Goals: by discharge  Short Term Goal 1: Pt will demo supine to and from sit transfers with SBA and modifications as needed to progress with mobility. Short Term Goal 2: Pt will demo slide board transfers to and from w/c with mod Ax1 to progress with mobility. Short Term Goal 3: Pt will tolerate 10-20 reps of ther ex to increase overall mobility. Short Term Goal 4: Pt will demo S for w/c mobility for 100 feet to increase IND with mobility. Short Term Goal 5: PT to assess sit to/from stand transfers when appropriate.   Long Term Goals  Time Frame for Long Term Goals : NA due to short ELOS    Following session, patient left in safe position, with all fall risk precautions in place. Therapy session performed by Librado HILL under supervision of credentialed therapist Justin Ferrara PTA.

## 2023-01-16 NOTE — PROGRESS NOTES
[facility]  Occupational Therapy  Daily Note  Time:   Time In: 8184  Time Out: 0933  Timed Code Treatment Minutes: 29 Minutes  Minutes: 29          Date: 2023  Patient Name: Yudi Sadler,   Gender: male      Room: [facility]004-A  MRN: [id number]  : 1960  (65 y.o.)  Referring Practitioner: Herbert Wright DPM  Diagnosis: Sepsis  Additional Pertinent Hx: The patient is a 65 y.o. male with significant past medical history of CAD, CKD, diabetes, hyperlipidemia, and hypertension who is being seen at bedside on behalf of Dr. Alyson Salmon. Patient relates that he has had ongoing problems with his left foot and ankle over the course of the past few years. 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 He relates that he follows up and sees Dr. Alyson Salmon in clinic weekly. He relates that he missed his appointment last week and did not have the dressing changed. The patient states that the previous week he had x-rays taken in clinic that showed a pathologic fracture of the left tibia. He relates that he has not been walking on his left lower extremity. He relates that he has applied some pressure when using the restroom on that leg. He states that amputation has been in discussion at his previous clinic encounters. The patient relates that he last a last evening at 8 or 9 PM.  He relates that he drank Crystal light tea around 8 or 9 AM this morning. He relates that he took Plavix this morning at 6 AM.  The patient denies any other concerns to his right lower extremity. The patient denies nausea, vomiting, fever, chills, shortness of breath or chest pain. Restrictions/Precautions:  Restrictions/Precautions: General Precautions, Weight Bearing, Fall Risk  Left Lower Extremity Weight Bearing: Non Weight Bearing (BKA)  Position Activity Restriction  Other position/activity restrictions: L BKA     SUBJECTIVE: RN okayed OT treatment. Pt.  In room seated in w/c agreeable to participate in OT session requesting to return to bed. PAIN: 4/10: LLE    Vitals: Nurse checked vitals prior to session    COGNITION: WFL    ADL:   No ADL's completed this session. Stephanie Neville BALANCE:  Sitting Balance:  Supervision. Seated on EOB     BED MOBILITY:  Rolling to Left: Stand By Assistance    Rolling to Right: Stand By Assistance    Sit to Supine: Stand By Assistance      TRANSFERS:  Sliding Board: [address], with verbal cues, from w/c to bed transferred to the right. Dependent for placement of board and set up . ADDITIONAL ACTIVITIES:  Pt completed B UE exs with  resistance band x 12 reps, x 1 set, with good tolerance of exs, with  RBs throughout, and verbal and visual cues for tech with resistance band to improve strength and overall activity tolerance to support Adls. ASSESSMENT:     Activity Tolerance:  Patient tolerance of  treatment: good. Discharge Recommendations: Inpatient Rehabilitation  Equipment Recommendations: Equipment Needed: Yes  Other: TTB, slideboard, drop arm commode  Plan: Times Per Week: 6x  Current Treatment Recommendations: Strengthening, Balance training, Functional mobility training, Endurance training, Patient/Caregiver education & training, Safety education & training, Self-Care / ADL, Home management training    Patient Education  Patient Education: Home Exercise Program, Equipment Education, and Importance of Increasing Activity and safety with functional transfers.      Goals  Short Term Goals  Time Frame for Short Term Goals: by discharge  Short Term Goal 1: Pt will complete grooming routine while seated EOB with Supervision and no VC for technique to incrase indep with self care  Short Term Goal 2: Pt will complete slideboard t/f with consistent CGA and min VC for placement of board to increase indep with toileting routine  Short Term Goal 3: Pt will demo indep with BUE strengthening HEP to increase overall UB strength/endurance for ease with t/fs  Short Term Goal 4: OTR to assess sit to stand/stand-pivot/and functional mobility when appropriate    Following session, patient left in safe position with all fall risk precautions in place.

## 2023-01-16 NOTE — PROGRESS NOTES
Podiatric Progress Note  Mendy Ritchie  Subjective :   1/16/2023  Patient seen at chair side this morning on behalf of Dr. Nicholas Strong. Patient is s/p LLE proximal Symes amputation with distal transtibial osteotomy (DOS 1/10/2023). Patient is pleasant, and is alert and oriented. Patient states that he feels very well overall. Patient continues to endorse minimal pain to LLE (2/10). He currently denies any N/V/F/C/N/V/CP. Patient still has not had bowel movement yet; he denies any abdominal bloating, cramping, or discomfort. No other pedal complaints at this time. Patient states that per his insurance, precertification has been started for [facility]. 1/15/2023  Patient seen at bedside this morning on behalf of Dr. Nicholas Strong. Patient is status post LLE proximal Symes amputation with distal transtibial osteotomy (DOS 1/10/2023). Patient appeared pleasant, was oriented to person, place and time and in no acute distress. Patient is very grateful for having the procedure performed; he states that he subjectively feels much better after the amputation. Patient expressed gratitude to Dr. Nicholas Strong and Ron Felipe for their services. Patient endorses minimal pain to the LLE (3/10). Patient does endorse mild neuropathic type symptoms to the RLE. Patient denies any N/V/F/C/SOB or CP. Patient states that he is passing flatus, but has not yet had a bowel movement. Patient has no further pedal concerns at this point in time. 1/14/2023  Patient is a pleasant 62yo male seen bedside this AM s/p left lower extremity amputation emergent in nature due to necrotizing fasciitis and extensive gas gangrene performed 1.10.23. Patient seated upright bedside working with PT. Patient overall states he feels vastly improved, post op dressing clean/dry/intact. Social work is following, precert for [employer] in place. ID following for IVAB therapy, currently on Zosyn IV (vancomycin and clindamycin discontinued).  Labs are trending better, leukocytosis resolved. Will continue to follow until determination of placement. HISTORY OF PRESENT ILLNESS:                 The patient is a 65 y.o. male with significant past medical history of CAD, CKD, diabetes, hyperlipidemia, and hypertension who is being seen at bedside on behalf of Dr. Gordon Dennis. Patient relates that he has had ongoing problems with his left foot and ankle over the course of the past few years. 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 He relates that he follows up and sees Dr. Gordon Dennis in clinic weekly. He relates that he missed his appointment last week and did not have the dressing changed. The patient states that the previous week he had x-rays taken in clinic that showed a pathologic fracture of the left tibia. He relates that he has not been walking on his left lower extremity. He relates that he has applied some pressure when using the restroom on that leg. He states that amputation has been in discussion at his previous clinic encounters. The patient relates that he last a last evening at 8 or 9 PM.  He relates that he drank Crystal light tea around 8 or 9 AM this morning. He relates that he took Plavix this morning at 6 AM.  The patient denies any other concerns to his right lower extremity. The patient denies nausea, vomiting, fever, chills, shortness of breath or chest pain.     Current Medications:    Current Facility-Administered Medications: enoxaparin Sodium (LOVENOX) injection 30 mg, 30 mg, SubCUTAneous, Q12H  insulin glargine (LANTUS) injection vial 31 Units, 31 Units, SubCUTAneous, BID  insulin lispro (HUMALOG) injection vial 0-16 Units, 0-16 Units, SubCUTAneous, TID WC  insulin lispro (HUMALOG) injection vial 0-4 Units, 0-4 Units, SubCUTAneous, Nightly  polyethylene glycol (GLYCOLAX) packet 17 g, 17 g, Oral, Daily PRN  acetaminophen (TYLENOL) tablet 650 mg, 650 mg, Oral, Q6H PRN **OR** acetaminophen (TYLENOL) suppository 650 mg, 650 mg, Rectal, Q6H PRN  amLODIPine (NORVASC) tablet 10 mg, 10 mg, Oral, Daily  atorvastatin (LIPITOR) tablet 80 mg, 80 mg, Oral, Daily  albuterol sulfate HFA (PROVENTIL;VENTOLIN;PROAIR) 108 (90 Base) MCG/ACT inhaler 2 puff, 2 puff, Inhalation, 4x Daily PRN  ferrous sulfate (IRON 325) tablet 325 mg, 325 mg, Oral, BID  furosemide (LASIX) tablet 40 mg, 40 mg, Oral, Daily  gabapentin (NEURONTIN) tablet 600 mg, 600 mg, Oral, BID  minoxidil (LONITEN) tablet 10 mg, 10 mg, Oral, Daily  multivitamin 1 tablet, 1 tablet, Oral, Daily  potassium chloride (KLOR-CON M) extended release tablet 20 mEq, 20 mEq, Oral, Daily  valsartan (DIOVAN) tablet 160 mg, 160 mg, Oral, Daily  Sodium Hypochlorite (DAKINS) 0.25 % external solution, , Irrigation, Daily  glucose chewable tablet 16 g, 4 tablet, Oral, PRN  dextrose bolus 10% 125 mL, 125 mL, IntraVENous, PRN **OR** dextrose bolus 10% 250 mL, 250 mL, IntraVENous, PRN  glucagon (rDNA) injection 1 mg, 1 mg, SubCUTAneous, PRN  dextrose 10 % infusion, , IntraVENous, Continuous PRN  piperacillin-tazobactam (ZOSYN) 3,375 mg in dextrose 5 % 50 mL IVPB (mini-bag), 3,375 mg, IntraVENous, q8h  aspirin chewable tablet 81 mg, 81 mg, Oral, Daily  clopidogrel (PLAVIX) tablet 75 mg, 75 mg, Oral, Daily  tiotropium (SPIRIVA RESPIMAT) 2.5 MCG/ACT inhaler 2 puff, 2 puff, Inhalation, Daily  cloNIDine (CATAPRES) tablet 0.1 mg, 0.1 mg, Oral, BID  metoprolol tartrate (LOPRESSOR) tablet 50 mg, 50 mg, Oral, BID  sodium chloride flush 0.9 % injection 5-40 mL, 5-40 mL, IntraVENous, 2 times per day  sodium chloride flush 0.9 % injection 5-40 mL, 5-40 mL, IntraVENous, PRN  0.9 % sodium chloride infusion, , IntraVENous, PRN  ondansetron (ZOFRAN-ODT) disintegrating tablet 4 mg, 4 mg, Oral, Q8H PRN **OR** ondansetron (ZOFRAN) injection 4 mg, 4 mg, IntraVENous, Q6H PRN  oxyCODONE (ROXICODONE) immediate release tablet 5 mg, 5 mg, Oral, Q4H PRN **OR** oxyCODONE (ROXICODONE) immediate release tablet 10 mg, 10 mg, Oral, Q4H PRN  morphine (PF) injection 2 mg, 2 mg, IntraVENous, Q2H PRN **OR** morphine injection 4 mg, 4 mg, IntraVENous, Q2H PRN    Objective     BP (!) 102/57   Pulse 77   Temp 97.4 °F (36.3 °C) (Oral)   Resp 16   Ht 6' 2\" (1.88 m)   Wt 286 lb 3.2 oz (129.8 kg)   SpO2 95%   BMI 36.75 kg/m²      I/O:  Intake/Output Summary (Last 24 hours) at 1/16/2023 1003  Last data filed at 1/16/2023 0548  Gross per 24 hour   Intake 480 ml   Output 3200 ml   Net -2720 ml              Wt Readings from Last 3 Encounters:   01/16/23 286 lb 3.2 oz (129.8 kg)   12/08/22 (!) 310 lb (140.6 kg)   12/08/22 (!) 310 lb (140.6 kg)       LABS:    Recent Labs     01/15/23  0813 01/16/23  0726   WBC 6.1 5.4   HGB 8.5* 8.3*   HCT 29.2* 27.7*   * 456*        Recent Labs     01/16/23  0726   *   K 4.5   CL 96*   CO2 25   BUN 23*   CREATININE 1.4*      No results for input(s): PROT, INR, APTT in the last 72 hours. No results for input(s): CKTOTAL, CKMB, CKMBINDEX, TROPONINI in the last 72 hours. Focused Lower Extremity Examination:    Vitals:    BP (!) 102/57   Pulse 77   Temp 97.4 °F (36.3 °C) (Oral)   Resp 16   Ht 6' 2\" (1.88 m)   Wt 286 lb 3.2 oz (129.8 kg)   SpO2 95%   BMI 36.75 kg/m²      Dressings Intact and at today's visit. Physical exam findings from 1/15/2023. Vascular: Popliteal pulse is palpable to LLE. CFT within normal limits to LLE amputation stump. Skin temperature is warm to warm from proximal tibial tuberosity to distal amputation stump of LLE. Minimal edema noted to the distal LLE amputation stump. Dermatologic: LLE dressing intact and well maintained. No apparent strike through noted. There was some sanguinous drainage noted on the 4 x 4 gauze directly to the incision. After all dressings removed, incision appears very well coapted, without any drainage noted. All staples and sutures are intact. There is no surrounding erythema or warmth noted.   Overall, incision appears to be healing very well. No other open lesions, rashes or subcutaneous nodules of note. Neurovascular: Light touch sensation grossly diminished to the level of the midfoot to RLE. Light touch sensation appears grossly intact to the level of the amputation stump of LLE. Musculoskeletal: Muscle strength testing deferred due to acute postop state. Patient's left leg dressed is in a slightly flexed position at the knee joint. Patient is able to fully extend left leg at the knee. Minimal pain with palpation of LLE amputation stump at posterior aspect.                    ASSESSMENT: Pt. is a 65 y.o. 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    Sepsis ([address]    Acute left-sided low back pain with bilateral sciatica    S/P transmetatarsal amputation of foot, left (HCC)    Leukocytosis    Wound dehiscence, surgical, initial encounter    Postoperative wound infection    Metabolic acidosis    Hx of CABG    Lumbar stenosis without neurogenic claudication    CKD (chronic kidney disease), stage II    Normocytic anemia    Morbid obesity ([address]    Debility    Carotid stenosis, asymptomatic, bilateral    Hypokalemia    Nonhealing ulcer of left lower extremity (HCC)    Bleeding    Wound, open    SOB (shortness of breath) on exertion    Hemoglobin A1C greater than 9%, indicating poor diabetic control    Hypertensive emergency    Acute on chronic congestive heart failure (HCC)    Hypertensive urgency    Moderate persistent asthma    Septic arthritis of left ankle, due to unspecified organism (HCC)    Closed fracture of ankle with nonunion    S/P BKA (below knee amputation), left ([facility])       PLAN:   Septic arthritis; left ankle -resolved  Pathologic fracture; left tibia -resolved  Abscess; left ankle -resolved  -Patient examined and evaluated  -WBC 5.4; patient currently afebrile  -Hemoglobin and hematocrit at 8.3 and 27.7, respectively  -Patient currently on IV Zosyn per infectious diseases  -Will continue to follow labs/imaging  -Changed dressings at today's visit with Betadine to the incision, covered with 4 x 4 gauze, ABD pads, Kerlix rolls, and Ace bandage.  -Discussed with patient that he should try to extend left leg at the knee to prevent flexion contracture  -Awaiting SNF placement; precertification for [facility] started  -Hospitalist following for medical management; recommendations are greatly appreciated -Cardiology consulted (extensive CAD with h/o sx)  -Discussed with patient at length that incision and the left lower leg appear healthy and viable. There is no sign of any residual infection clinically. Discussed with patient that at this time, we are awaiting placement into SNF/ECF. Discussed with patient that once the incision heals completely, we will then work with patient to have a well fitted prosthesis fitted to the left leg, to allow patient to resume ambulation.  -Patient verbalized understanding and agreement with the treatment plan as stated. All of his questions were answered to his satisfaction. Dispo: Pending placement into [facility]. Patient can follow-up with Dr. Jason Hernandez for further care on an outpatient basis.     Conchis Salas DPM, PGY-3  Podiatric Surgical Resident  1/16/2023   10:03 AM

## 2023-01-17 LAB
GLUCOSE BLD-MCNC: 142 MG/DL (ref 70–108)
GLUCOSE BLD-MCNC: 154 MG/DL (ref 70–108)
GLUCOSE BLD-MCNC: 172 MG/DL (ref 70–108)
GLUCOSE BLD-MCNC: 183 MG/DL (ref 70–108)

## 2023-01-17 PROCEDURE — 6360000002 HC RX W HCPCS: Performed by: PHYSICIAN ASSISTANT

## 2023-01-17 PROCEDURE — 97110 THERAPEUTIC EXERCISES: CPT

## 2023-01-17 PROCEDURE — 97530 THERAPEUTIC ACTIVITIES: CPT

## 2023-01-17 PROCEDURE — 6370000000 HC RX 637 (ALT 250 FOR IP): Performed by: STUDENT IN AN ORGANIZED HEALTH CARE EDUCATION/TRAINING PROGRAM

## 2023-01-17 PROCEDURE — 82948 REAGENT STRIP/BLOOD GLUCOSE: CPT

## 2023-01-17 PROCEDURE — 94669 MECHANICAL CHEST WALL OSCILL: CPT

## 2023-01-17 PROCEDURE — 2580000003 HC RX 258

## 2023-01-17 PROCEDURE — 1200000000 HC SEMI PRIVATE

## 2023-01-17 PROCEDURE — 6360000002 HC RX W HCPCS

## 2023-01-17 PROCEDURE — 6370000000 HC RX 637 (ALT 250 FOR IP): Performed by: PHYSICIAN ASSISTANT

## 2023-01-17 PROCEDURE — 94640 AIRWAY INHALATION TREATMENT: CPT

## 2023-01-17 PROCEDURE — 97535 SELF CARE MNGMENT TRAINING: CPT

## 2023-01-17 PROCEDURE — 99232 SBSQ HOSP IP/OBS MODERATE 35: CPT | Performed by: PHYSICIAN ASSISTANT

## 2023-01-17 PROCEDURE — 6370000000 HC RX 637 (ALT 250 FOR IP)

## 2023-01-17 RX ADMIN — PIPERACILLIN AND TAZOBACTAM 3375 MG: 3; .375 INJECTION, POWDER, FOR SOLUTION INTRAVENOUS at 16:39

## 2023-01-17 RX ADMIN — OXYCODONE 10 MG: 5 TABLET ORAL at 16:34

## 2023-01-17 RX ADMIN — CLONIDINE HYDROCHLORIDE 0.1 MG: 0.1 TABLET ORAL at 16:39

## 2023-01-17 RX ADMIN — ENOXAPARIN SODIUM 30 MG: 100 INJECTION SUBCUTANEOUS at 23:18

## 2023-01-17 RX ADMIN — SENNOSIDES AND DOCUSATE SODIUM 1 TABLET: 50; 8.6 TABLET ORAL at 09:20

## 2023-01-17 RX ADMIN — AMLODIPINE BESYLATE 10 MG: 10 TABLET ORAL at 09:22

## 2023-01-17 RX ADMIN — ATORVASTATIN CALCIUM 80 MG: 80 TABLET, FILM COATED ORAL at 09:22

## 2023-01-17 RX ADMIN — FERROUS SULFATE TAB 325 MG (65 MG ELEMENTAL FE) 325 MG: 325 (65 FE) TAB at 09:22

## 2023-01-17 RX ADMIN — CLOPIDOGREL BISULFATE 75 MG: 75 TABLET ORAL at 09:20

## 2023-01-17 RX ADMIN — GABAPENTIN 600 MG: 600 TABLET, FILM COATED ORAL at 09:22

## 2023-01-17 RX ADMIN — FUROSEMIDE 40 MG: 40 TABLET ORAL at 09:22

## 2023-01-17 RX ADMIN — Medication 1 TABLET: at 09:20

## 2023-01-17 RX ADMIN — SODIUM CHLORIDE, PRESERVATIVE FREE 10 ML: 5 INJECTION INTRAVENOUS at 09:24

## 2023-01-17 RX ADMIN — METOPROLOL TARTRATE 50 MG: 50 TABLET, FILM COATED ORAL at 16:39

## 2023-01-17 RX ADMIN — VALSARTAN 160 MG: 160 TABLET ORAL at 09:20

## 2023-01-17 RX ADMIN — SENNOSIDES AND DOCUSATE SODIUM 1 TABLET: 50; 8.6 TABLET ORAL at 20:14

## 2023-01-17 RX ADMIN — POTASSIUM CHLORIDE 20 MEQ: 1500 TABLET, EXTENDED RELEASE ORAL at 09:20

## 2023-01-17 RX ADMIN — POLYETHYLENE GLYCOL 3350 17 G: 17 POWDER, FOR SOLUTION ORAL at 09:19

## 2023-01-17 RX ADMIN — FERROUS SULFATE TAB 325 MG (65 MG ELEMENTAL FE) 325 MG: 325 (65 FE) TAB at 20:14

## 2023-01-17 RX ADMIN — INSULIN GLARGINE 31 UNITS: 100 INJECTION, SOLUTION SUBCUTANEOUS at 09:25

## 2023-01-17 RX ADMIN — OXYCODONE 10 MG: 5 TABLET ORAL at 09:19

## 2023-01-17 RX ADMIN — ENOXAPARIN SODIUM 30 MG: 100 INJECTION SUBCUTANEOUS at 11:21

## 2023-01-17 RX ADMIN — PIPERACILLIN AND TAZOBACTAM 3375 MG: 3; .375 INJECTION, POWDER, FOR SOLUTION INTRAVENOUS at 01:38

## 2023-01-17 RX ADMIN — PIPERACILLIN AND TAZOBACTAM 3375 MG: 3; .375 INJECTION, POWDER, FOR SOLUTION INTRAVENOUS at 09:58

## 2023-01-17 RX ADMIN — INSULIN GLARGINE 31 UNITS: 100 INJECTION, SOLUTION SUBCUTANEOUS at 20:17

## 2023-01-17 RX ADMIN — ASPIRIN 81 MG 81 MG: 81 TABLET ORAL at 09:20

## 2023-01-17 RX ADMIN — OXYCODONE 10 MG: 5 TABLET ORAL at 22:38

## 2023-01-17 RX ADMIN — GABAPENTIN 600 MG: 600 TABLET, FILM COATED ORAL at 20:14

## 2023-01-17 RX ADMIN — TIOTROPIUM BROMIDE INHALATION SPRAY 2 PUFF: 3.12 SPRAY, METERED RESPIRATORY (INHALATION) at 07:34

## 2023-01-17 ASSESSMENT — PAIN SCALES - GENERAL
PAINLEVEL_OUTOF10: 8
PAINLEVEL_OUTOF10: 4
PAINLEVEL_OUTOF10: 7
PAINLEVEL_OUTOF10: 4
PAINLEVEL_OUTOF10: 7
PAINLEVEL_OUTOF10: 7
PAINLEVEL_OUTOF10: 4

## 2023-01-17 ASSESSMENT — PAIN DESCRIPTION - ORIENTATION
ORIENTATION: LEFT

## 2023-01-17 ASSESSMENT — PAIN - FUNCTIONAL ASSESSMENT
PAIN_FUNCTIONAL_ASSESSMENT: ACTIVITIES ARE NOT PREVENTED

## 2023-01-17 ASSESSMENT — PAIN DESCRIPTION - LOCATION
LOCATION: LEG

## 2023-01-17 ASSESSMENT — PAIN DESCRIPTION - DESCRIPTORS
DESCRIPTORS: THROBBING
DESCRIPTORS: THROBBING
DESCRIPTORS: ACHING

## 2023-01-17 NOTE — CARE COORDINATION
1/17/23, 11:11 AM EST    DISCHARGE PLANNING EVALUATION    Spoke with [facility] admissions, no bed is currently available. Spoke with patient about other preferences for ecf, as inpt rehab precert was denied and [facility] does not have a bed. Patient shares that he has heard from family members in assisted living at [facility] that residents have left and beds are open. Explained to patient that these beds may already be designated for other residents or earlier referrals. He requested sw call [facility] again. Call placed from the room to [facility] admissions. Admissions director has also received calls regarding this referral this morning. At present, a bed is not available, but facility will determine if bed changes can be made and if space would be available for patient within a time frame appropriate for discharge. Patient shares that if [facility] cannot confirm bed availability by the end of today, referral can be made to [facility]. Call made to [facility]  only to determine bed availability, not  complete full referral since this is patient's request, waiting return call.

## 2023-01-17 NOTE — CARE COORDINATION
1/17/23, 1:59 PM EST    DISCHARGE PLANNING EVALUATION    Received call from [facility] admissions, no bed is available for patient. Referral made to [facility].

## 2023-01-17 NOTE — CARE COORDINATION
1/17/23, 12:47 PM EST    DISCHARGE ON GOING EVALUATION    Andrew Arana day: 7  Location: -04/004-A Reason for admit: Sepsis [facility]) [A41.9]  Septic arthritis of left ankle, due to unspecified organism [facility]) [M00.9]   Procedure: 1/10: Left Proximal symes/distal transtibial amputation   Barriers to Discharge: IV Zosyn q 8 hr (last dose tomorrow), PRN pain meds, PT/OT following, ID following, hospitalist following  PCP: EYAD [place] CNP  Readmission Risk Score: 25%  Patient Goals/Plan/Treatment Preferences: From home. IPR denied 1/16. SW following for SNF placement. Pt prefers [facility] as first choice.  2nd choice is [facility]

## 2023-01-17 NOTE — PROGRESS NOTES
[address] [address]  INPATIENT PHYSICAL THERAPY  DAILY NOTE  [facility] ORTHOPEDICS 7K - 7K-04/004-A      Time In: 3303  Time Out: 2373  Timed Code Treatment Minutes: 45 Minutes  Minutes: 38          Date: 2023  Patient Name: Juan Dean,  Gender:  male        MRN: [id number]  : 1960  (65 y.o.)     Referring Practitioner: Dayana Paredes DPM  Diagnosis: sepsis  Additional Pertinent Hx: per EMR \"The patient is a 65 y.o. male with significant past medical history of CAD, CKD, diabetes, hyperlipidemia, and hypertension who is being seen at bedside on behalf of Dr. Nesha Kee. Patient relates that he has had ongoing problems with his left foot and ankle over the course of the past few years. 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 He relates that he follows up and sees Dr. Nesha Kee in clinic weekly. He relates that he missed his appointment last week and did not have the dressing changed. The patient states that the previous week he had x-rays taken in clinic that showed a pathologic fracture of the left tibia. He relates that he has not been walking on his left lower extremity. He relates that he has applied some pressure when using the restroom on that leg. He states that amputation has been in discussion at his previous clinic encounters. The patient relates that he last a last evening at 8 or 9 PM.  He relates that he drank Crystal light tea around 8 or 9 AM this morning. He relates that he took Plavix this morning at 6 AM.  The patient denies any other concerns to his right lower extremity. The patient denies nausea, vomiting, fever, chills, shortness of breath or chest pain. \" Pt is s/p L BKA on 1/10/23 completed by Dr. Nesha Kee.      Prior Level of Function:  Lives With: Alone  Type of Home: House  Home Layout: One level  Home Access: Ramped entrance  Home Equipment: Electric scooter, [employer]   Bathroom Shower/Tub: Walk-in shower, Shower chair with back  Bathroom Toilet: Handicap height  Bathroom Equipment: Grab bars in shower, Grab bars around toilet    Receives Help From: Friend(s)  ADL Assistance: Independent  Homemaking Assistance: Needs assistance  Homemaking Responsibilities: Yes  Ambulation Assistance: Independent  Transfer Assistance: Independent  Active : No  IADL Comments: He has a robert who works for him - worker's wife makes meals from that he only has to heat up in microwave and assists with cleaning tasks  Additional Comments: Pt reported that he has a very supportive family who can be available intermittently throughout the day upon discharge home.  Pt reports he was amb very little, using his scooter or w/c most of the time    Restrictions/Precautions:  Restrictions/Precautions: General Precautions, Weight Bearing, Fall Risk  Left Lower Extremity Weight Bearing: Non Weight Bearing (BKA)  Position Activity Restriction  Other position/activity restrictions: L BKA       SUBJECTIVE:   Pt up in chair on arrival reported that he hadn't been up for very long but his buttocks was sore and he was ready to get back into bed - educated pt on pressure relief while in chair and different options to help relieve buttock pressure he does have a waffle cushion     PAIN: pt c/o of sore buttocks- we talked about pressure relief while in chair and he has been off loading when in bed    Vitals: Nurse checked vitals prior to session    OBJECTIVE:  Bed Mobility:  Rolling to Left: Stand By Assistance, with rail   Rolling to Right: Stand By Assistance, with rail   Sit to Supine: Contact Guard Assistance, pt impulsive and kicking his w/c as he didn't allow therapist to move w/c prior to laying down, once in bed pt needed min assist to reposition shoulders and he completed 1/2 bridges to reposition hips      Transfers:  Slide board transfer from w/c to bed to a higher surface pt needed CGA in several scoots to scoot along with verbal cues- he did require max assist to place and remove board     Wheelchair Mobility:  Stand By Assistance  Extremities Used: Right Upper Extremity and Left Upper Extremity  Type of Chair:Manual  Surface: Level Tile  Distance: 50 feet and maneuvering in room   Quality: Slow Velocity and he would occ touch down his right LE, pt did struggle a little with placement for trasfers, he was able to lock the breaks but needed assist to remove leg rest and arm rest in prep for transfer     Exercise:  Patient was guided in 1 set(s) 15 reps of exercise to both lower extremities. Glut sets, Quad sets, Heelslides, Short arc quads, Hip abduction/adduction, Straight leg raises, and modified bridges, diag curl ups 5x to right and 5x to left  . Exercises were completed for increased independence with functional mobility. Functional Outcome Measures: Completed  AM-PAC Inpatient Mobility without Stair Climbing Raw Score : 11  AM-PAC Inpatient without Stair Climbing T-Scale Score : 35.66    ASSESSMENT:  Assessment: Patient progressing toward established goals. Activity Tolerance:  Patient tolerance of  treatment: good. Equipment Recommendations:Equipment Needed: No  Discharge Recommendations: Subacte/Skilled Nursing Facility  Plan: Current Treatment Recommendations: Strengthening, Balance training, Functional mobility training, Transfer training, Endurance training, Equipment evaluation, education, & procurement, Patient/Caregiver education & training, Neuromuscular re-education, Safety education & training, Home exercise program, Therapeutic activities  General Plan:  (6x O)    Patient Education  Patient Education: Plan of Care    Goals:  Patient Goals : \"be able to get in and out bed and w/c with less assist\"  Short Term Goals  Time Frame for Short Term Goals: by discharge  Short Term Goal 1: Pt will demo supine to and from sit transfers with SBA and modifications as needed to progress with mobility.   Short Term Goal 2: Pt will demo slide board transfers to and from w/c with mod Ax1 to progress with mobility. Short Term Goal 3: Pt will tolerate 10-20 reps of ther ex to increase overall mobility. Short Term Goal 4: Pt will demo S for w/c mobility for 100 feet to increase IND with mobility. Short Term Goal 5: PT to assess sit to/from stand transfers when appropriate. Long Term Goals  Time Frame for Long Term Goals : NA due to short ELOS    Following session, patient left in safe position with all fall risk precautions in place.

## 2023-01-17 NOTE — PROGRESS NOTES
Progress note: Infectious diseases    Patient - Zelphia Buerger,  Age - 65 y.o.    - 1960      Room Number - 7K-04/004-A   MRN -  [id number]   [facility]  Date of Admission -  1/10/2023 12:28 PM    SUBJECTIVE:   No newissues. Awaiting placement  OBJECTIVE   VITALS    height is 6' 2\" (1.88 m) and weight is 286 lb 3.2 oz (129.8 kg). His oral temperature is 97.7 °F (36.5 °C). His blood pressure is 147/78 (abnormal) and his pulse is 88. His respiration is 16 and oxygen saturation is 94%. Wt Readings from Last 3 Encounters:   23 286 lb 3.2 oz (129.8 kg)   22 (!) 310 lb (140.6 kg)   22 (!) 310 lb (140.6 kg)       I/O (24 Hours)    Intake/Output Summary (Last 24 hours) at 2023 1641  Last data filed at 2023 1546  Gross per 24 hour   Intake 200 ml   Output 3450 ml   Net -3250 ml         General Appearance: awake and oriented  HEENT - normocephalic, atraumatic, pale conjunctiva,  anicteric sclera  Neck - Supple, no mass  Lungs -  Bilateral   air entry, no rhonchi, no wheeze. Cardiovascular - Heart sounds are normal.     Abdomen - soft, not distended, nontender,   Neurologic -oriented  Skin - No bruising or bleeding  Extremities -  left BKA.                 MEDICATIONS:      sennosides-docusate sodium  1 tablet Oral BID    polyethylene glycol  17 g Oral Daily    enoxaparin  30 mg SubCUTAneous Q12H    insulin glargine  31 Units SubCUTAneous BID    insulin lispro  0-16 Units SubCUTAneous TID     insulin lispro  0-4 Units SubCUTAneous Nightly    amLODIPine  10 mg Oral Daily    atorvastatin  80 mg Oral Daily    ferrous sulfate  325 mg Oral BID    furosemide  40 mg Oral Daily    gabapentin  600 mg Oral BID    minoxidil  10 mg Oral Daily    multivitamin  1 tablet Oral Daily    potassium chloride  20 mEq Oral Daily    valsartan  160 mg Oral Daily    piperacillin-tazobactam  3,375 mg IntraVENous q8h    aspirin  81 mg Oral Daily    clopidogrel  75 mg Oral Daily    tiotropium  2 puff Inhalation Daily    cloNIDine  0.1 mg Oral BID    metoprolol tartrate  50 mg Oral BID    sodium chloride flush  5-40 mL IntraVENous 2 times per day      dextrose      sodium chloride 20 mL/hr at 01/13/23 0020     acetaminophen **OR** acetaminophen, albuterol sulfate HFA, glucose, dextrose bolus **OR** dextrose bolus, glucagon (rDNA), dextrose, sodium chloride flush, sodium chloride, ondansetron **OR** ondansetron, oxyCODONE **OR** oxyCODONE, morphine **OR** morphine      LABS:     CBC:   Recent Labs     01/15/23  0813 01/16/23  0726   WBC 6.1 5.4   HGB 8.5* 8.3*   * 456*       BMP:    Recent Labs     01/15/23  0813 01/16/23  0726    134*   K 4.5 4.5   CL 97* 96*   CO2 27 25   BUN 22 23*   CREATININE 1.5* 1.4*   GLUCOSE 121* 125*       Calcium:  Recent Labs     01/16/23  0726   CALCIUM 8.8        Recent Labs     01/17/23  0556 01/17/23  1040 01/17/23  1627   POCGLU 183* 172* 142*        CULTURES:   UA: No results for input(s): SPECGRAV, PHUR, COLORU, CLARITYU, MUCUS, PROTEINU, BLOODU, RBCUA, WBCUA, BACTERIA, NITRU, GLUCOSEU, BILIRUBINUR, UROBILINOGEN, KETUA, LABCAST, LABCASTTY, AMORPHOS in the last 72 hours. Invalid input(s): CRYSTALS  Micro:   Lab Results   Component Value Date/Time    [facility]  01/10/2023 03:28 PM     No growth 24 hours. No growth 48 hours.  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    [facility]eps[facility] ([facility] A41.9    Acute left-sided low back pain with bilateral sciatica M54.42, M54.41    S/P transmetatarsal amputation of foot, left ([facility] O86.932    Leukocytosis D72.829    Wound dehiscence, surgical, initial encounter T81.31XA    Postoperative wound infection R55.74TZ    Metabolic acidosis H63.86    Hx of CABG Z95.1    Lumbar stenosis without neurogenic claudication M48.061    CKD (chronic kidney disease), stage II N18.2    Normocytic anemia D64.9    Morbid obesity ([facility]) E66.01    Debility R53.81    Carotid stenosis, asymptomatic, bilateral I65.23    Hypokalemia E87.6    Nonhealing ulcer of left lower extremity ([address] L97.929    Bleeding R58    Wound, open T14. 8XXA    SOB (shortness of breath) on exertion R06.02    Hemoglobin A1C greater than 9%, indicating poor diabetic control R73.09    Hypertensive emergency I16.1    Acute on chronic congestive heart failure ([facility]) I50.9    Hypertensive urgency I16.0    Moderate persistent asthma J45.40    Septic arthritis of left ankle, due to unspecified organism ([facility]) M00.9    Closed fracture of ankle with nonunion S82.899K    S/P BKA (below knee amputation), left ([facility]) U35.782         ASSESSMENT/PLAN   Necrotizing left foot/ankle infection: had urgent BKA. The stump is healing.  Will stop antibiotic on1/18/23  Poorly controlled diabetes  CAD  Awaiting placement         Keiry Burgess MD, MD, FACP 1/17/2023 4:41 PM

## 2023-01-17 NOTE — PLAN OF CARE
Problem: Discharge Planning  Goal: Discharge to home or other facility with appropriate resources  1/17/2023 1043 by Dre Canales RN  Outcome: Progressing  Flowsheets (Taken 1/17/2023 1043)  Discharge to home or other facility with appropriate resources:   Identify barriers to discharge with patient and caregiver   Identify discharge learning needs (meds, wound care, etc)   Refer to discharge planning if patient needs post-hospital services based on physician order or complex needs related to functional status, cognitive ability or social support system   Arrange for needed discharge resources and transportation as appropriate     Problem: Pain  Goal: Verbalizes/displays adequate comfort level or baseline comfort level  1/17/2023 1043 by Dre Canales RN  Outcome: Progressing  Flowsheets (Taken 1/17/2023 1043)  Verbalizes/displays adequate comfort level or baseline comfort level:   Encourage patient to monitor pain and request assistance   Administer analgesics based on type and severity of pain and evaluate response   Consider cultural and social influences on pain and pain management   Assess pain using appropriate pain scale   Implement non-pharmacological measures as appropriate and evaluate response   Notify Licensed Independent Practitioner if interventions unsuccessful or patient reports new pain     Problem: ABCDS Injury Assessment  Goal: Absence of physical injury  1/17/2023 1043 by Dre Canales RN  Outcome: Progressing  Flowsheets (Taken 1/17/2023 1043)  Absence of Physical Injury: Implement safety measures based on patient assessment     Problem: Infection - Adult  Goal: Absence of infection at discharge  1/17/2023 1043 by Dre Canales RN  Outcome: Progressing  Flowsheets (Taken 1/17/2023 1043)  Absence of infection at discharge:   Assess and monitor for signs and symptoms of infection   Monitor lab/diagnostic results   Monitor all insertion sites i.e., indwelling lines, tubes and drains   Administer medications as ordered   Instruct and encourage patient and family to use good hand hygiene technique     Problem: Chronic Conditions and Co-morbidities  Goal: Patient's chronic conditions and co-morbidity symptoms are monitored and maintained or improved  1/17/2023 1043 by Job Mireles RN  Outcome: Progressing  Flowsheets (Taken 1/17/2023 1043)  Care Plan - Patient's Chronic Conditions and Co-Morbidity Symptoms are Monitored and Maintained or Improved:   Monitor and assess patient's chronic conditions and comorbid symptoms for stability, deterioration, or improvement   Collaborate with multidisciplinary team to address chronic and comorbid conditions and prevent exacerbation or deterioration   Update acute care plan with appropriate goals if chronic or comorbid symptoms are exacerbated and prevent overall improvement and discharge     Problem: Skin/Tissue Integrity - Adult  Goal: Incisions, wounds, or drain sites healing without S/S of infection  1/17/2023 1043 by Job Mireles RN  Outcome: Progressing  Flowsheets (Taken 1/17/2023 1043)  Incisions, Wounds, or Drain Sites Healing Without Sign and Symptoms of Infection:   ADMISSION and DAILY: Assess and document risk factors for pressure ulcer development   TWICE DAILY: Assess and document skin integrity   TWICE DAILY: Assess and document dressing/incision, wound bed, drain sites and surrounding tissue   Initiate isolation precautions as appropriate     Problem: Musculoskeletal - Adult  Goal: Return mobility to safest level of function  1/17/2023 1043 by Job Mireles RN  Outcome: Progressing  Flowsheets (Taken 1/17/2023 1043)  Return Mobility to Safest Level of Function:   Assess patient stability and activity tolerance for standing, transferring and ambulating with or without assistive devices   Assist with transfers and ambulation using safe patient handling equipment as needed   Ensure adequate protection for wounds/incisions during mobilization   Obtain physical therapy/occupational therapy consults as needed   Instruct patient/family in ordered activity level     Problem: Musculoskeletal - Adult  Goal: Return ADL status to a safe level of function  1/17/2023 1043 by Benito Conti RN  Outcome: Progressing  Flowsheets (Taken 1/17/2023 1043)  Return ADL Status to a Safe Level of Function:   Administer medication as ordered   Assess activities of daily living deficits and provide assistive devices as needed   Assist and instruct patient to increase activity and self care as tolerated   Obtain physical therapy/occupational therapy consults as needed     Problem: Gastrointestinal - Adult  Goal: Maintains or returns to baseline bowel function  1/17/2023 1043 by Benito Conti RN  Outcome: Progressing  Flowsheets (Taken 1/17/2023 1043)  Maintains or returns to baseline bowel function:   Assess bowel function   Encourage oral fluids to ensure adequate hydration   Administer ordered medications as needed   Encourage mobilization and activity     Problem: Metabolic/Fluid and Electrolytes - Adult  Goal: Glucose maintained within prescribed range  1/17/2023 1043 by Benito Conti RN  Outcome: Progressing  Flowsheets (Taken 1/17/2023 1043)  Glucose maintained within prescribed range:   Monitor blood glucose as ordered   Assess for signs and symptoms of hyperglycemia and hypoglycemia     Problem: Hematologic - Adult  Goal: Maintains hematologic stability  1/17/2023 1043 by Benito Conti RN  Outcome: Progressing  Flowsheets (Taken 1/17/2023 1043)  Maintains hematologic stability:   Assess for signs and symptoms of bleeding or hemorrhage   Monitor labs for bleeding or clotting disorders     Care plan reviewed with patient. Patient verbalizes understanding of plan of care and contributes to goal setting.

## 2023-01-17 NOTE — PROGRESS NOTES
Podiatric Progress Note  Excell Peaches  Subjective :   1/17/2023  Patient seen at bedside this morning on behalf of Dr. Hanley Scheuermann. Patient is s/p LLE proximal Symes amputation with distal transtibial osteotomy (DOS 1/10/2023). Patient is pleasant, and is alert and oriented. Patient states that he feels very well overall. Per patient, he had an enema yesterday, and then had another bowel movement afterwards. Patient endorses minimal pain to LLE. He denies any N/V/F/C/SOB/CP or bilateral calf tenderness. He has no other pedal complaints at this time. Patient is aware that there are no beds available at the facility which he originally wanted; and he is also aware that precertification is started at another SNF/ECF.    1/16/2023  Patient seen at chair side this morning on behalf of Dr. Hanley Scheuermann. Patient is s/p LLE proximal Symes amputation with distal transtibial osteotomy (DOS 1/10/2023). Patient is pleasant, and is alert and oriented. Patient states that he feels very well overall. Patient continues to endorse minimal pain to LLE (2/10). He currently denies any N/V/F/C/N/V/CP. Patient still has not had bowel movement yet; he denies any abdominal bloating, cramping, or discomfort. No other pedal complaints at this time. Patient states that per his insurance, precertification has been started for [facility]. 1/15/2023  Patient seen at bedside this morning on behalf of Dr. Hanley Scheuermann. Patient is status post LLE proximal Symes amputation with distal transtibial osteotomy (DOS 1/10/2023). Patient appeared pleasant, was oriented to person, place and time and in no acute distress. Patient is very grateful for having the procedure performed; he states that he subjectively feels much better after the amputation. Patient expressed gratitude to Dr. Hanley Scheuermann and Danni Rodriguez for their services. Patient endorses minimal pain to the LLE (3/10).   Patient does endorse mild neuropathic type symptoms to the RLE. Patient denies any N/V/F/C/SOB or CP. Patient states that he is passing flatus, but has not yet had a bowel movement. Patient has no further pedal concerns at this point in time. 1/14/2023  Patient is a pleasant 62yo male seen bedside this AM s/p left lower extremity amputation emergent in nature due to necrotizing fasciitis and extensive gas gangrene performed 1.10.23. Patient seated upright bedside working with PT. Patient overall states he feels vastly improved, post op dressing clean/dry/intact. Social work is following, precert for [employer] in place. ID following for IVAB therapy, currently on Zosyn IV (vancomycin and clindamycin discontinued). Labs are trending better, leukocytosis resolved. Will continue to follow until determination of placement. HISTORY OF PRESENT ILLNESS:                 The patient is a 65 y.o. male with significant past medical history of CAD, CKD, diabetes, hyperlipidemia, and hypertension who is being seen at bedside on behalf of Dr. Ollie Goodman. Patient relates that he has had ongoing problems with his left foot and ankle over the course of the past few years. 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 He relates that he follows up and sees Dr. Ollie Goodman in clinic weekly. He relates that he missed his appointment last week and did not have the dressing changed. The patient states that the previous week he had x-rays taken in clinic that showed a pathologic fracture of the left tibia. He relates that he has not been walking on his left lower extremity. He relates that he has applied some pressure when using the restroom on that leg. He states that amputation has been in discussion at his previous clinic encounters. The patient relates that he last a last evening at 8 or 9 PM.  He relates that he drank Crystal light tea around 8 or 9 AM this morning.   He relates that he took Plavix this morning at 6 AM.  The patient denies any other concerns to his right lower extremity. The patient denies nausea, vomiting, fever, chills, shortness of breath or chest pain.     Current Medications:    Current Facility-Administered Medications: sennosides-docusate sodium (SENOKOT-S) 8.6-50 MG tablet 1 tablet, 1 tablet, Oral, BID  polyethylene glycol (GLYCOLAX) packet 17 g, 17 g, Oral, Daily  enoxaparin Sodium (LOVENOX) injection 30 mg, 30 mg, SubCUTAneous, Q12H  insulin glargine (LANTUS) injection vial 31 Units, 31 Units, SubCUTAneous, BID  insulin lispro (HUMALOG) injection vial 0-16 Units, 0-16 Units, SubCUTAneous, TID WC  insulin lispro (HUMALOG) injection vial 0-4 Units, 0-4 Units, SubCUTAneous, Nightly  acetaminophen (TYLENOL) tablet 650 mg, 650 mg, Oral, Q6H PRN **OR** acetaminophen (TYLENOL) suppository 650 mg, 650 mg, Rectal, Q6H PRN  amLODIPine (NORVASC) tablet 10 mg, 10 mg, Oral, Daily  atorvastatin (LIPITOR) tablet 80 mg, 80 mg, Oral, Daily  albuterol sulfate HFA (PROVENTIL;VENTOLIN;PROAIR) 108 (90 Base) MCG/ACT inhaler 2 puff, 2 puff, Inhalation, 4x Daily PRN  ferrous sulfate (IRON 325) tablet 325 mg, 325 mg, Oral, BID  furosemide (LASIX) tablet 40 mg, 40 mg, Oral, Daily  gabapentin (NEURONTIN) tablet 600 mg, 600 mg, Oral, BID  minoxidil (LONITEN) tablet 10 mg, 10 mg, Oral, Daily  multivitamin 1 tablet, 1 tablet, Oral, Daily  potassium chloride (KLOR-CON M) extended release tablet 20 mEq, 20 mEq, Oral, Daily  valsartan (DIOVAN) tablet 160 mg, 160 mg, Oral, Daily  glucose chewable tablet 16 g, 4 tablet, Oral, PRN  dextrose bolus 10% 125 mL, 125 mL, IntraVENous, PRN **OR** dextrose bolus 10% 250 mL, 250 mL, IntraVENous, PRN  glucagon (rDNA) injection 1 mg, 1 mg, SubCUTAneous, PRN  dextrose 10 % infusion, , IntraVENous, Continuous PRN  piperacillin-tazobactam (ZOSYN) 3,375 mg in dextrose 5 % 50 mL IVPB (mini-bag), 3,375 mg, IntraVENous, q8h  aspirin chewable tablet 81 mg, 81 mg, Oral, Daily  clopidogrel (PLAVIX) tablet 75 mg, 75 mg, Oral, Daily  tiotropium (SPIRIVA RESPIMAT) 2.5 MCG/ACT inhaler 2 puff, 2 puff, Inhalation, Daily  cloNIDine (CATAPRES) tablet 0.1 mg, 0.1 mg, Oral, BID  metoprolol tartrate (LOPRESSOR) tablet 50 mg, 50 mg, Oral, BID  sodium chloride flush 0.9 % injection 5-40 mL, 5-40 mL, IntraVENous, 2 times per day  sodium chloride flush 0.9 % injection 5-40 mL, 5-40 mL, IntraVENous, PRN  0.9 % sodium chloride infusion, , IntraVENous, PRN  ondansetron (ZOFRAN-ODT) disintegrating tablet 4 mg, 4 mg, Oral, Q8H PRN **OR** ondansetron (ZOFRAN) injection 4 mg, 4 mg, IntraVENous, Q6H PRN  oxyCODONE (ROXICODONE) immediate release tablet 5 mg, 5 mg, Oral, Q4H PRN **OR** oxyCODONE (ROXICODONE) immediate release tablet 10 mg, 10 mg, Oral, Q4H PRN  morphine (PF) injection 2 mg, 2 mg, IntraVENous, Q2H PRN **OR** morphine injection 4 mg, 4 mg, IntraVENous, Q2H PRN    Objective     /62   Pulse 82   Temp 97.7 °F (36.5 °C) (Oral)   Resp 16   Ht 6' 2\" (1.88 m)   Wt 286 lb 3.2 oz (129.8 kg)   SpO2 96%   BMI 36.75 kg/m²      I/O:  Intake/Output Summary (Last 24 hours) at 1/17/2023 1213  Last data filed at 1/17/2023 1123  Gross per 24 hour   Intake 560 ml   Output 3350 ml   Net -2790 ml              Wt Readings from Last 3 Encounters:   01/16/23 286 lb 3.2 oz (129.8 kg)   12/08/22 (!) 310 lb (140.6 kg)   12/08/22 (!) 310 lb (140.6 kg)       LABS:    Recent Labs     01/15/23  0813 01/16/23  0726   WBC 6.1 5.4   HGB 8.5* 8.3*   HCT 29.2* 27.7*   * 456*        Recent Labs     01/16/23  0726   *   K 4.5   CL 96*   CO2 25   BUN 23*   CREATININE 1.4*      No results for input(s): PROT, INR, APTT in the last 72 hours. No results for input(s): CKTOTAL, CKMB, CKMBINDEX, TROPONINI in the last 72 hours.     Focused Lower Extremity Examination:    Vitals:    /62   Pulse 82   Temp 97.7 °F (36.5 °C) (Oral)   Resp 16   Ht 6' 2\" (1.88 m)   Wt 286 lb 3.2 oz (129.8 kg)   SpO2 96%   BMI 36.75 kg/m²      Dressings Intact and at today's visit.  Physical exam findings from 1/15/2023. Vascular: Popliteal pulse is palpable to LLE. CFT within normal limits to LLE amputation stump. Skin temperature is warm to warm from proximal tibial tuberosity to distal amputation stump of LLE. Minimal edema noted to the distal LLE amputation stump. Dermatologic: LLE dressing intact and well maintained. No apparent strike through noted. There was some sanguinous drainage noted on the 4 x 4 gauze directly to the incision. After all dressings removed, incision appears very well coapted, without any drainage noted. All staples and sutures are intact. There is no surrounding erythema or warmth noted. Overall, incision appears to be healing very well. No other open lesions, rashes or subcutaneous nodules of note. Neurovascular: Light touch sensation grossly diminished to the level of the midfoot to RLE. Light touch sensation appears grossly intact to the level of the amputation stump of LLE. Musculoskeletal: Muscle strength testing deferred due to acute postop state. Patient's left leg dressed is in a slightly flexed position at the knee joint. Patient is able to fully extend left leg at the knee. Minimal pain with palpation of LLE amputation stump at posterior aspect.                    ASSESSMENT: Pt. is a 65 y.o. 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    Sepsis ([address]    Acute left-sided low back pain with bilateral sciatica    S/P transmetatarsal amputation of foot, left (HCC)    Leukocytosis    Wound dehiscence, surgical, initial encounter Postoperative wound infection    Metabolic acidosis    Hx of CABG    Lumbar stenosis without neurogenic claudication    CKD (chronic kidney disease), stage II    Normocytic anemia    Morbid obesity ([facility]    Debility    Carotid stenosis, asymptomatic, bilateral    Hypokalemia    Nonhealing ulcer of left lower extremity (HCC)    Bleeding    Wound, open    SOB (shortness of breath) on exertion    Hemoglobin A1C greater than 9%, indicating poor diabetic control    Hypertensive emergency    Acute on chronic congestive heart failure (HCC)    Hypertensive urgency    Moderate persistent asthma    Septic arthritis of left ankle, due to unspecified organism [facility])    Closed fracture of ankle with nonunion    S/P BKA (below knee amputation), left (HCC)       PLAN:   Septic arthritis; left ankle -resolved  Pathologic fracture; left tibia -resolved  Abscess; left ankle -resolved  -Patient examined and evaluated  -WBC 5.4 on 1/16/2023; patient currently afebrile  -Hemoglobin and hematocrit at 8.3 and 27.7, respectively  -Patient currently on IV Zosyn per infectious diseases; patient is on last day of IV antibiotic therapy  -Will continue to follow labs/imaging  -Reinforced dressings at today's visit with second 6 inch Ace bandage, as old Ace bandage had slipped off and was reapplied by RN.  -Discussed with patient that he should try to extend left leg at the knee to prevent flexion contracture  -Awaiting SNF placement; [facility]; [facility] does not have a bed available currently, and [facility] has called [facility] again on patient's request, and has also contacted [facility] to ascertain if that is available there  -Hospitalist following for medical management  -Cardiology following  -Discussed with patient at length that we are awaiting placement into SNF/ECF, and are trying very hard to secure a bed for him.   Discussed with patient that podiatry will likely change dressing either tomorrow, 1/18/2023 or Thursday, 1/19/2023.  -Patient verbalized understanding and agreement with the treatment plan as stated. All of his questions were answered to his satisfaction. Dispo: [facility]. Patient can follow-up with Dr. Nesha Kee for further care on an outpatient basis.     Juliet Chang DPM, PGY-3  Podiatric Surgical Resident  1/17/2023   12:13 PM

## 2023-01-17 NOTE — PLAN OF CARE
Problem: Discharge Planning  Goal: Discharge to home or other facility with appropriate resources  1/16/2023 2313 by Frank Page RN  Outcome: Progressing  Flowsheets (Taken 1/16/2023 2313)  Discharge to home or other facility with appropriate resources:   Identify barriers to discharge with patient and caregiver   Identify discharge learning needs (meds, wound care, etc)   Refer to discharge planning if patient needs post-hospital services based on physician order or complex needs related to functional status, cognitive ability or social support system   Arrange for needed discharge resources and transportation as appropriate  1/16/2023 1145 by Bartolo Jose RN  Outcome: Progressing  Flowsheets (Taken 1/16/2023 1145)  Discharge to home or other facility with appropriate resources:   Identify barriers to discharge with patient and caregiver   Identify discharge learning needs (meds, wound care, etc)   Refer to discharge planning if patient needs post-hospital services based on physician order or complex needs related to functional status, cognitive ability or social support system   Arrange for needed discharge resources and transportation as appropriate     Problem: Pain  Goal: Verbalizes/displays adequate comfort level or baseline comfort level  1/16/2023 2313 by Frank Page RN  Outcome: Progressing  Flowsheets (Taken 1/16/2023 2313)  Verbalizes/displays adequate comfort level or baseline comfort level:   Encourage patient to monitor pain and request assistance   Administer analgesics based on type and severity of pain and evaluate response   Consider cultural and social influences on pain and pain management   Assess pain using appropriate pain scale   Implement non-pharmacological measures as appropriate and evaluate response   Notify Licensed Independent Practitioner if interventions unsuccessful or patient reports new pain  1/16/2023 1145 by Bartolo Jose RN  Outcome: Progressing  Flowsheets (Taken 1/16/2023 1145)  Verbalizes/displays adequate comfort level or baseline comfort level:   Encourage patient to monitor pain and request assistance   Administer analgesics based on type and severity of pain and evaluate response   Consider cultural and social influences on pain and pain management   Assess pain using appropriate pain scale   Implement non-pharmacological measures as appropriate and evaluate response   Notify Licensed Independent Practitioner if interventions unsuccessful or patient reports new pain     Problem: ABCDS Injury Assessment  Goal: Absence of physical injury  1/16/2023 2313 by Candace Kiser RN  Outcome: Progressing  Flowsheets (Taken 1/16/2023 2313)  Absence of Physical Injury: Implement safety measures based on patient assessment  1/16/2023 1145 by Hilton Honeycutt RN  Outcome: Progressing  Flowsheets (Taken 1/16/2023 1145)  Absence of Physical Injury: Implement safety measures based on patient assessment     Problem: Infection - Adult  Goal: Absence of infection at discharge  1/16/2023 2313 by Candace Kiser RN  Outcome: Progressing  Flowsheets (Taken 1/16/2023 2313)  Absence of infection at discharge:   Assess and monitor for signs and symptoms of infection   Monitor lab/diagnostic results   Monitor all insertion sites i.e., indwelling lines, tubes and drains   Administer medications as ordered   Instruct and encourage patient and family to use good hand hygiene technique  1/16/2023 1145 by Hiltno Honeycutt RN  Outcome: Progressing  Flowsheets (Taken 1/16/2023 1145)  Absence of infection at discharge:   Assess and monitor for signs and symptoms of infection   Monitor lab/diagnostic results   Monitor all insertion sites i.e., indwelling lines, tubes and drains   Administer medications as ordered   Instruct and encourage patient and family to use good hand hygiene technique     Problem: Chronic Conditions and Co-morbidities  Goal: Patient's chronic conditions and co-morbidity symptoms are monitored and maintained or improved  1/16/2023 2313 by Gloria Rodriguez RN  Outcome: Progressing  Flowsheets (Taken 1/16/2023 2313)  Care Plan - Patient's Chronic Conditions and Co-Morbidity Symptoms are Monitored and Maintained or Improved:   Monitor and assess patient's chronic conditions and comorbid symptoms for stability, deterioration, or improvement   Collaborate with multidisciplinary team to address chronic and comorbid conditions and prevent exacerbation or deterioration   Update acute care plan with appropriate goals if chronic or comorbid symptoms are exacerbated and prevent overall improvement and discharge  1/16/2023 1145 by Varun Plunkett RN  Outcome: Progressing  Flowsheets (Taken 1/16/2023 1145)  Care Plan - Patient's Chronic Conditions and Co-Morbidity Symptoms are Monitored and Maintained or Improved:   Monitor and assess patient's chronic conditions and comorbid symptoms for stability, deterioration, or improvement   Collaborate with multidisciplinary team to address chronic and comorbid conditions and prevent exacerbation or deterioration   Update acute care plan with appropriate goals if chronic or comorbid symptoms are exacerbated and prevent overall improvement and discharge     Problem: Skin/Tissue Integrity - Adult  Goal: Incisions, wounds, or drain sites healing without S/S of infection  1/16/2023 2313 by Gloria Rodriguez RN  Outcome: Progressing  Flowsheets (Taken 1/16/2023 2313)  Incisions, Wounds, or Drain Sites Healing Without Sign and Symptoms of Infection:   ADMISSION and DAILY: Assess and document risk factors for pressure ulcer development   TWICE DAILY: Assess and document skin integrity   TWICE DAILY: Assess and document dressing/incision, wound bed, drain sites and surrounding tissue   Implement wound care per orders  1/16/2023 1145 by Varun Plunkett RN  Outcome: Progressing  Flowsheets (Taken 1/16/2023 1145)  Incisions, Wounds, or Drain Sites Healing Without Sign and Symptoms of Infection:   ADMISSION and DAILY: Assess and document risk factors for pressure ulcer development   TWICE DAILY: Assess and document skin integrity   TWICE DAILY: Assess and document dressing/incision, wound bed, drain sites and surrounding tissue   Implement wound care per orders     Problem: Musculoskeletal - Adult  Goal: Return mobility to safest level of function  1/16/2023 2313 by Bettie Coffman RN  Outcome: Progressing  Flowsheets (Taken 1/16/2023 2313)  Return Mobility to Safest Level of Function:   Assess patient stability and activity tolerance for standing, transferring and ambulating with or without assistive devices   Assist with transfers and ambulation using safe patient handling equipment as needed   Ensure adequate protection for wounds/incisions during mobilization   Obtain physical therapy/occupational therapy consults as needed   Instruct patient/family in ordered activity level  1/16/2023 1145 by Eliott Siemens, RN  Outcome: Progressing  Flowsheets (Taken 1/16/2023 1145)  Return Mobility to Safest Level of Function:   Assess patient stability and activity tolerance for standing, transferring and ambulating with or without assistive devices   Assist with transfers and ambulation using safe patient handling equipment as needed   Ensure adequate protection for wounds/incisions during mobilization   Instruct patient/family in ordered activity level   Obtain physical therapy/occupational therapy consults as needed  Goal: Return ADL status to a safe level of function  1/16/2023 2313 by Bettie Coffman RN  Outcome: Progressing  Flowsheets (Taken 1/16/2023 2313)  Return ADL Status to a Safe Level of Function:   Administer medication as ordered   Obtain physical therapy/occupational therapy consults as needed   Assess activities of daily living deficits and provide assistive devices as needed   Assist and instruct patient to increase activity and self care as tolerated  1/16/2023 1145 by Sheba Cook RN  Outcome: Progressing  Flowsheets (Taken 1/16/2023 1145)  Return ADL Status to a Safe Level of Function:   Administer medication as ordered   Obtain physical therapy/occupational therapy consults as needed   Assess activities of daily living deficits and provide assistive devices as needed   Assist and instruct patient to increase activity and self care as tolerated     Problem: Gastrointestinal - Adult  Goal: Maintains or returns to baseline bowel function  1/16/2023 2313 by Jj Mcdaniel RN  Outcome: Progressing  [address] Nathan Crockett (Taken 1/16/2023 2313)  Maintains or returns to baseline bowel function:   Assess bowel function   Encourage oral fluids to ensure adequate hydration   Administer ordered medications as needed   Encourage mobilization and activity  1/16/2023 1145 by Sheba Cook RN  Outcome: Progressing  Flowsheets (Taken 1/16/2023 1145)  Maintains or returns to baseline bowel function:   Assess bowel function   Encourage oral fluids to ensure adequate hydration   Administer ordered medications as needed     Problem: Metabolic/Fluid and Electrolytes - Adult  Goal: Glucose maintained within prescribed range  1/16/2023 2313 by Jj Mcdaniel RN  Outcome: Progressing  Flowsheets (Taken 1/16/2023 2313)  Glucose maintained within prescribed range:   Monitor blood glucose as ordered   Assess for signs and symptoms of hyperglycemia and hypoglycemia   Administer ordered medications to maintain glucose within target range   Instruct patient on self management of diabetes and initiate consult as needed  1/16/2023 1145 by Sheba Cook RN  Outcome: Progressing  Flowsheets (Taken 1/16/2023 1145)  Glucose maintained within prescribed range:   Monitor blood glucose as ordered   Assess for signs and symptoms of hyperglycemia and hypoglycemia   Administer ordered medications to maintain glucose within target range     Problem: Hematologic - Adult  Goal: Maintains hematologic stability  1/16/2023 2313 by Itzel Olson, RN  Outcome: Progressing  Flowsheets (Taken 1/16/2023 2313)  Maintains hematologic stability:   Monitor labs for bleeding or clotting disorders   Assess for signs and symptoms of bleeding or hemorrhage  1/16/2023 1145 by Job Mireles RN  Outcome: Progressing  Flowsheets (Taken 1/16/2023 1145)  Maintains hematologic stability:   Assess for signs and symptoms of bleeding or hemorrhage   Monitor labs for bleeding or clotting disorders   Care plan reviewed with patient. Patient verbalizes understanding of the plan of care and contributes to goal setting.

## 2023-01-17 NOTE — PROGRESS NOTES
Hospitalist -Progress Note      Patient: Carina Cardona    Unit/Bed:7K-04/004-A  YOB: 1960  MRN: [id number]   Acct: [de-identified]   PCP: EYAD Jin - CNP    Date of Service: Pt seen/examined on 01/17/23  Chief Complaint: Left septic ankle with abscess and open pathologic fracture    Assessment and Plan:-    Septic left ankle with pathologic fracture of left tibia and abscess of the left ankle:  POD #6 day s/p Proximal Symes amputation/distal transtibial amputation, left (DOS 1/10)    -Continue Zosyn- vancomycin and clindamycin stopped by ID on 1/13   -Stop Abx 1/18 per ID    -Infectious disease following   - continue with management by primary    GISELA -resolved   - suspect medication induced from antibiotics and possible cardiorenal   BMP every other day      Acute respiratory failure- resolved  Started post operatively suspect fluid overload and anemia contributing   BNP elevated in 2000s- fluids stopped 1/11    Nocturnal hypoxia- suspect RILEY   Nocturnal pulse oximetry and completed   Will need oxygen at night long term  2L at night   121 desaturations between 80-89%   Referral for formal sleep study on DC     Type 2 diabetes mellitus: Uncontrolled. A1c 8.3   Goal blood glucose while inpatient 140-180.    -Hold home meds  -Lantus 30 units BID - 1/12 increased to 33 units BID - 1/14- decrease to 31 BID   -Hypoglycemic protocol-Accu-Cheks-Diabetic and cardiac diet    -Continue Neurontin  -1/12 increased SSI to high dose       HTN: Recent hypertensive emergency in which he presented to [facility] November 2022 with chest pain and new RBBB. Patient subsequently left AMA before treatment. On arrival to [facility] patient's blood pressure was 131/62.   - Cardiology consulted - no further interventions recommended at this time  -Continue Norvasc, clonidine, Lasix, Lopressor twice daily, and minoxidil with holding parameters.     Chronic diastolic heart failure: Last echocardiogram 11/03/22 completed at [facility] showed concentric remodeling with normal regional wall motion, abnormal septal motion consistent with postoperative state, EF of 55 to 60% and grade 2 diastolic dysfunction.   -Lasix 40 mg daily               -Strict I & O                -Daily standing weights               -Low sodium diet   - 1/11 concerns for fluid overload given elevated BNP 1/10 and rales on exam- fluids stopped  -1/12 lungs clear- will hold off on additional diuresis right now      Hyperlipidemia: Last lipid panel 11/16/2022   -Continue Lipitor 80 mg daily    CAD s/p CABG: November 2019. Echo 4/21/20 EF 50%,    -aspirin and Plavix  -Continue home amlodipine, clonidine, lopressor, valsartan and minoxidil       Moderate persistent asthma: No PFTs noted in chart   -Continue home Spiriva   -Pulmonary hygiene    Hx of PAD: Continue aspirin and Plavix, statin therapy    Lupus:  Does not appear that patient is on any anticoagulation however he is on dual antiplatelet therapy.   -Hold aspirin and Plavix due to upcoming procedure.   -Note the patient is at elevated risk for clotting due to history of lupus  . Acute blood loss anemia on Chronic normocytic anemia: Baseline hemoglobin 10s.    - 1/11 Hgb dropped to 6.8- transfuse 1unit PRBC     Hgb stable in 8s post transfusion    -Continue home p.o. iron   -Nursing to monitor for any blood in stool   -Monitor with CBC daily        Disposition:- pending SNF placement         History Of Present Illness:      52-year-old male was admitted to [facility] by podiatry for urgent/emergent management of septic left ankle with pathologic fracture of tibia and abscess of left ankle. Initially podiatry was planning to take patient to the OR tonight or tomorrow and consulted hospital team for risk stratification.       Patient has significant past medical history of diabetes mellitus type 2, uncontrolled, hypertension, CAD status post CABG, CKD, chronic normocytic anemia, lupus, moderate persistent asthma, PAD, grade 2 diastolic dysfunction, and hyperlipidemia,   It is important note the patient was recently hospitalized at [facility] 11/2/2022 for hypertensive emergency in which she was experiencing chest pain and a new left bundle branch block was found. Patient subsequently left AMA despite having blood pressure 200s/100s. Upon arrival to [facility] blood cultures were obtained, patient was started on vancomycin and Zosyn. Chest x-ray obtained that demonstrated evidence of prior CABG as well as hyperinflated lungs suggestive of COPD. Patient also had evidence of healed granulomatous disease. With questionable mild interstitial fibrosis. X-ray of left ankle was obtained that showed gas density and soft tissue extending to the lower left leg. Complete dislocation and rotated talus relative to the tibia. Also there was abnormal periosteal reaction and distal tibia and fibula which indicate chronic osteomyelitis. There is also cortical erosion of distal fibula. EKG was also completed that demonstrated Normal sinus rhythm, Left axis deviation; Right bundle branch block, Inferior infarct , age undetermined. Abnormal ECG When compared with ECG of 25-NOV-2022 04:21,Right bundle branch block has replaced Intraventricular conduction delay. During my examination patient denied chest pain, shortness of breath, headache, lightheadedness, abdominal pain. Patient did have foot pain that was relieved with pain medication. Stat CBC, BMP, hepatic function panel as well as magnesium were ordered. 1/11/2023  Patient postop day 1. Hemoglobin dropped to 6.8. Will transfuse 1 unit PRBCs. Patient requiring O2 postoperatively and was weaned to room air today however dipped back into the mid 80s. Currently satting 93% on 2 L.    1/12/2023  Hemoglobin 7 after transfusion. No longer requiring O2 at this time. Nocturnal pulse oximetry pending.    WBC downtrending    2023  No events overnight. Blood sugar is much improved. VSS. Waiting for IPR precert        Vanc and clindamycin stopped yesterday. BG acceptable. VSS. Patient had episode of symptomatic hypotension today. 500cc bolus of NS ordered. Patient improved and BP acceptable. Encouraged oral hydration     1/15/2023  Patient doing well. VSS- no hypotensive episodes. Blood sugar well controlled. Hopeful for IPR precert tomorrow. 2023  IPR denied- SW to work on ECF placement- hopeful to [facility]. Patient otherwise stable  Passing flatus but has not had BM- will order enema    2023  Patient with 2 BM yesterday.    No other complaints or issues at this time  Waiting for SNF placement     Past Medical History:        Diagnosis Date    Arthritis     CAD (coronary artery disease)     CKD (chronic kidney disease), stage II     Diabetes mellitus ([facility] [address]     Hyperlipidemia     Hypertension     Liver disease     HEPITITIS AS A CHILD       Past Surgical History:        Procedure Laterality Date    CARDIAC SURGERY  2019    triple bypass    CYST REMOVAL      RIGHT ANKLE     FOOT AMPUTATION Left 1/10/2023    LEFT BELOW KNEE AMPUTATION performed by Anner Apgar, DPM at  [facility]} Left 2020    LEFT TRANSMETATARSAL AMPUTATION  FOOT INCISION AND DRAINAGE performed by Anner Apgar, DPM at  [facility]} Left 2020    LEFT WOUND DEBRIDEMENT WITH WOUND VAC APPLICATION performed by Anner Apgar, DPM at [id number] [facility]} Left 2022    LEFT FOOT [facility], [address] [place] [address], APPLICATION [employer] WOUND VAC performed by Anner Apgar, DPM at [facility] Right     CYST REMOVED    FOOT SURGERY Left 2020    LEFT FOOT PREPARATION GRAFT SITE, HAVEST SPLIT THICKNESS SKIN GRAFT WITH APPLICATION, APPLICATION [employer] WOUND VAC performed by Anner Apgar, DPM at [address] [place] [address] TOE AMPUTATION Left 3/3/2020    I&D LEFT FOOT, TRANSMETATARSAL AMPUTATION performed by Jacinto Bowling DPM at [address] [facility] 11/23/2022    EGD performed by Mike Colin MD at [address] [facility] End[facility]       Home Medications:   No current facility-administered medications on file prior to encounter. Current Outpatient Medications on File Prior to Encounter   Medication Sig Dispense Refill    HYDROcodone-acetaminophen (NORCO) 5-325 MG per tablet Take 1 tablet by mouth every 4 hours as needed. cloNIDine (CATAPRES) 0.1 MG tablet Take 1 tablet by mouth 2 times daily 60 tablet 3    tiotropium (SPIRIVA RESPIMAT) 2.5 MCG/ACT AERS inhaler Inhale 2 puffs into the lungs daily 4 g 0    benzonatate (TESSALON) 200 MG capsule Take 1 capsule by mouth 3 times daily as needed for Cough (Patient not taking: Reported on 1/10/2023) 30 capsule 0    amLODIPine (NORVASC) 10 MG tablet Take 1 tablet by mouth daily 30 tablet 3    metoprolol tartrate (LOPRESSOR) 50 MG tablet Take 1 tablet by mouth 2 times daily 60 tablet 3    valsartan (DIOVAN) 160 MG tablet Take 1 tablet by mouth daily 30 tablet 3    minoxidil (LONITEN) 10 MG tablet Take 1 tablet by mouth daily 30 tablet 3    ferrous sulfate (IRON 325) 325 (65 Fe) MG tablet Take 1 tablet by mouth 2 times daily 180 tablet 1    furosemide (LASIX) 40 MG tablet Take 1 tablet by mouth daily 90 tablet 1    potassium chloride (KLOR-CON M) 20 MEQ extended release tablet Take 1 tablet by mouth daily 30 tablet 5    empagliflozin (JARDIANCE) 25 MG tablet Take 1 tablet by mouth daily 90 tablet 1    clopidogrel (PLAVIX) 75 MG tablet Take 1 tablet by mouth daily 90 tablet 3    gabapentin (NEURONTIN) 600 MG tablet Take 1 tablet by mouth 2 times daily for 180 days. 180 tablet 1    insulin detemir (LEVEMIR FLEXTOUCH) 100 UNIT/ML injection pen 30 units in the morning, and 30 units in the evening.  21 mL 3    insulin lispro (HUMALOG) 100 UNIT/ML injection vial Inject 5-15 Units into the skin 3 times daily (with meals) 15 mL 3    albuterol sulfate HFA (VENTOLIN HFA) 108 (90 Base) MCG/ACT inhaler Inhale 2 puffs into the lungs 4 times daily as needed for Wheezing 54 g 1    atorvastatin (LIPITOR) 80 MG tablet Take 1 tablet by mouth daily 30 tablet 3    Polyethylene Glycol 3350 (MIRALAX PO) Take by mouth as needed      aspirin 81 MG chewable tablet Take 1 tablet by mouth daily 30 tablet 3    Multiple Vitamins-Minerals (MULTIVITAMIN PO) Take 1 tablet by mouth daily          Allergies:    Patient has no known allergies. Social History:    reports that he quit smoking about 15 years ago. His smoking use included cigarettes. He has never used smokeless tobacco. He reports that he does not currently use alcohol. He reports that he does not use drugs. Family History:       Problem Relation Age of Onset    Diabetes Mother     High Blood Pressure Mother     Alzheimer's Disease Father          of, 80or 80years old    Heart Disease Father     High Blood Pressure Father     Cancer Neg Hx     Stroke Neg Hx        Diet:  ADULT DIET; Regular; 3 carb choices (45 gm/meal); Low Fat/Low Chol/High Fiber/2 gm Na    Review of systems:   Pertinent positives as noted in the HPI. All other systems reviewed and negative. PHYSICAL EXAM:  /62   Pulse 82   Temp 97.7 °F (36.5 °C) (Oral)   Resp 16   Ht 6' 2\" (1.88 m)   Wt 286 lb 3.2 oz (129.8 kg)   SpO2 96%   BMI 36.75 kg/m²   General appearance: No apparent distress, appears stated age and cooperative. Obese    HEENT: Normal cephalic, atraumatic without obvious deformity. Neck: No jugular venous distention. Trachea midline. Respiratory:  Normal respiratory effort.   Lungs clear to auscultation bilaterally-  Cardiovascular: Regular rate and rhythm with normal S1/S2   Abdomen: Hypoactive bowel sounds, abdomen is soft and rounded, nontender to palpation   Musculoskeletal:  Right lower extremity +2 edema, left lower leg  with amputation- wrapped  Skin: Warm and dry  Neurologic:  No focal deficits, bilateral numbness and tingling of right lower extremity as well as left lower extremity, follows commands  Psychiatric: Alert and oriented, thought content appropriate, normal insight  Labs:   Recent Labs     01/15/23  0813 01/16/23  0726   WBC 6.1 5.4   HGB 8.5* 8.3*   HCT 29.2* 27.7*   * 456*       Recent Labs     01/15/23  0813 01/16/23  0726    134*   K 4.5 4.5   CL 97* 96*   CO2 27 25   BUN 22 23*   CREATININE 1.5* 1.4*   CALCIUM 8.6 8.8       No results for input(s): AST, ALT, BILIDIR, BILITOT, ALKPHOS in the last 72 hours. No results for input(s): INR in the last 72 hours. No results for input(s): Crissy Fuchs in the last 72 hours. Urinalysis:    Lab Results   Component Value Date/Time    NITRU NEGATIVE 11/16/2022 01:52 PM    WBCUA 0-2 11/16/2022 01:52 PM    BACTERIA NONE SEEN 11/16/2022 01:52 PM    RBCUA 15-25 11/16/2022 01:52 PM    BLOODU MODERATE 11/16/2022 01:52 PM    GLUCOSEU >= 1000 11/16/2022 01:52 PM       Radiology:   XR ANKLE LEFT (MIN 3 VIEWS)   Final Result         1. Gas density in the soft tissues extending into the lower left leg. 2. Completely dislocated and rotated talus relative to the tibia. 3. Abnormal periosteal reaction in the distal tibia and fibula which can indicate chronic osteomyelitis. 4. Cortical erosion of the distal fibula. **This report has been created using voice recognition software. It may contain minor errors which are inherent in voice recognition technology. **      Final report electronically signed by Dr. Fracisco Hutton on 1/10/2023 3:46 PM      XR CHEST PORTABLE   Final Result   1. Normal heart size. Metallic sternotomy sutures from prior surgery. Lungs somewhat hyperinflated, suggesting possible COPD. 2. Evidence for old, healed granulomatous disease. Question mild interstitial fibrosis/pneumonitis both lung bases. No effusion is seen. **This report has been created using voice recognition software. It may contain minor errors which are inherent in voice recognition technology. **      Final report electronically signed by Dr. Mirza Escamilla on 1/10/2023 2:42 PM        XR CHEST PORTABLE    Result Date: 1/10/2023  PROCEDURE: XR CHEST PORTABLE CLINICAL INFORMATION: pre op COMPARISON: 11/25/2022 TECHNIQUE: A single mobile view of the chest was obtained. 1. Normal heart size. Metallic sternotomy sutures from prior surgery. Lungs somewhat hyperinflated, suggesting possible COPD. 2. Evidence for old, healed granulomatous disease. Question mild interstitial fibrosis/pneumonitis both lung bases. No effusion is seen. **This report has been created using voice recognition software. It may contain minor errors which are inherent in voice recognition technology. ** Final report electronically signed by Dr. Mirza Escamilla on 1/10/2023 2:42 PM        EKG: Normal sinus rhythm, Left axis deviation, Right bundle branch block, Inferior infarct , age undetermined Abnormal ECG.  When compared with ECG of 25-NOV-2022 04:21,  Right bundle branch block has replaced Intraventricular conduction delay    Electronically signed by Sid Islas PA-C on 1/17/2023 at 12:14 PM            .

## 2023-01-17 NOTE — ADT AUTH CERT
Knee: Amputation Above or Below Knee - Care Day 6 (1/15/2023) by Jose Domínguez RN       Review Entered Review Status   1/17/2023 0906 Completed      Criteria Review      Care Day: 6 Care Date: 1/15/2023 Level of Care: Inpatient Floor    Guideline Day 3    Level Of Care    (X) Floor    Clinical Status    (X) * Wound intact    1/17/2023 9:06 AM EST by Mark Bynum      dry and intact    (X) * No evidence of postoperative or surgical site infection    Activity    ( ) Progressive gait training    1/17/2023 9:06 AM EST by Olive Corral      bed rest with bathroom privileges    Routes    (X) * Oral hydration    1/17/2023 9:06 AM EST by Mark Bynum      minmal po intake    (X) * Oral medications    1/17/2023 9:06 AM EST by Mark Bynum      iron 325 mg bid po   lasix 40 mg daily po  neurontin 600 mg bid po  lopessor 50 mg bid po  multivitamin 1 tab daily po  klor con M 20 mEq daily po    (X) Advance diet    1/17/2023 9:06 AM EST by Mark Bynum      ADULT DIET; Regular; 3 carb choices (45 gm/meal); Low Fat/Low Chol/High Fiber/2 gm Na    Interventions    (X) * Drain absent    1/17/2023 9:06 AM EST by Olive Corral      none noted    Medications    ( ) * PCA absent    1/17/2023 9:06 AM EST by Mark Bynum      roxicodone 5 mg every 4 hours prn po x1  roxicodone 10 mg every 4 hours prn po x4    (X) Possible antibiotics    1/17/2023 9:06 AM EST by Mark Bynum      zosyn 3375 mg every 8 hours iv    * Milestone   Additional Notes   DATE: 1/15/2023      PERTINENT UPDATES:   -awaiting for IPR precert tomorrow   -on iv antibiotic      VITALS:   T 98.1 (36.7)   RR 18   HR 86   BP 99/76   SpO2 94% on RA   Pain Score 8      ABNL/PERTINENT LABS/RADIOLOGY/DIAGNOSTIC STUDIES:   rbc 3.53 L   hgb 8.5 L   hct 29.2 L   platelets 429 H   chloride 97 L   Glucose 121 H   Creatinine 1.5 H   ESt., Glom Filt Rate 53 !    POC Glucose 152 H      PHYSICAL EXAM:   Respiratory:  Normal respiratory effort. Lungs clear to auscultation bilaterally-   Cardiovascular: Regular rate and rhythm with normal S1/S2    Abdomen: Hypoactive bowel sounds, abdomen is soft and rounded, nontender to palpation    Musculoskeletal:  Right lower extremity +2 edema, left lower leg  with amputation- wrapped   Skin: Warm and dry   Neurologic:  No focal deficits, bilateral numbness and tingling of right lower extremity as well as left lower extremity, follows commands      PODIATRY P.E   Vascular: Popliteal pulse is palpable to LLE. CFT within normal limits to LLE amputation stump. Skin temperature is warm to warm from proximal tibial tuberosity to distal amputation stump of LLE. Minimal edema noted to the distal LLE amputation stump. Dermatologic: LLE dressing intact and well maintained. No apparent strike through noted. There was some sanguinous drainage noted on the 4 x 4 gauze directly to the incision. After all dressings removed, incision appears very well coapted, without any drainage noted. All staples and sutures are intact. There is no surrounding erythema or warmth noted. Overall, incision appears to be healing very well. No other open lesions, rashes or subcutaneous nodules of note. Neurovascular: Light touch sensation grossly diminished to the level of the midfoot to RLE. Light touch sensation appears grossly intact to the level of the amputation stump of LLE. Musculoskeletal: Muscle strength testing deferred due to acute postop state. Patient's left leg dressed is in a slightly flexed position at the knee joint. Patient is able to fully extend left leg at the knee. Minimal pain with palpation of LLE amputation stump at posterior aspect.        MD CONSULTS/ASSESSMENT AND PLAN:   IM Notes:   Septic left ankle with pathologic fracture of left tibia and abscess of the left ankle:   POD #5 day s/p Proximal Symes amputation/distal transtibial amputation, left (DOS 1/10) -Continue Zosyn- vancomycin and clindamycin stopped by ID on 1/13               -Infectious disease following               - continue with management by primary      Nocturnal hypoxia- suspect RILEY               Nocturnal pulse oximetry and completed               Will need oxygen at night long term               Referral for formal sleep study on DC       Type 2 diabetes mellitus: Uncontrolled. A1c 8.3    Goal blood glucose while inpatient 140-180.                -Hold home meds   -Lantus 30 units BID - 1/12 increased to 33 units BID - 1/14- decrease to 31 BID    -Hypoglycemic protocol-Accu-Cheks-Diabetic and cardiac diet                -Continue Neurontin   -1/12 increased SSI to high dose        Podiatry Notes:   WBC 6.1; patient currently afebrile   -Hemoglobin and hematocrit at 8.5 and 29.2, respectively   -Patient currently on IV Zosyn per infectious diseases   -Will continue to follow labs/imaging   -Changed dressings at today's visit with Betadine to the incision, covered with 4 x 4 gauze, ABD pads, Kerlix rolls, and Ace bandage.   -Discussed with patient that he should try to extend left leg at the knee to prevent flexion contracture      MEDICATIONS:   lovenox 30 mg every 12 hours sc   lantus 31 units bid sc   tiotropium 2 puff daily IN      ORDERS:   POCT Glucose   strict I&O   daily weights      PT/OT/SLP/CM ASSESSMENT OR NOTES:   RT Notes: The findings from the last RT Protocol Assessment were as follows:    History Pulmonary Disease: None or smoker <15 pack years   Respiratory Pattern: Regular pattern and RR 12-20 bpm   Breath Sounds: Slightly diminished and/or crackles   Cough: Strong, spontaneous, non-productive   Indication for Bronchodilator Therapy: Decreased or absent breath sounds, On home bronchodilators   Bronchodilator Assessment Score: 2      OT Notes: Activity Tolerance:  Patient tolerance of  treatment: good.         Discharge Recommendations: Inpatient Rehabilitation   Equipment Recommendations: Equipment Needed: Yes   Other: TTB, slideboard, drop arm commode   Plan: Times Per Week: 6x            Knee: Amputation Above or Below Knee - Care Day 4 (1/13/2023) by Ralf Figueredo RN       Review Entered Review Status   1/17/2023 0901 Completed      Criteria Review      Care Day: 4 Care Date: 1/13/2023 Level of Care: Inpatient Floor    Guideline Day 3    Level Of Care    (X) Floor    1/17/2023 9:01 AM EST by Aleksey Dodd      inpatient floor    Clinical Status    (X) * Wound intact    1/17/2023 9:01 AM EST by Mark Maradiaga      dry and clean    (X) * No evidence of postoperative or surgical site infection    1/17/2023 9:01 AM EST by Aleksey Dodd      very well coapted without drainage noted    Activity    (X) Progressive gait training    1/17/2023 9:01 AM EST by Aleksey Dodd      PT/OT training    Routes    (X) * Oral hydration    1/17/2023 9:01 AM EST by Aleksey Dodd      minimal po intake    (X) * Oral medications    1/17/2023 9:01 AM EST by Mark Maradiaga      iron 325 mg bid po  lasix 40 mg daily po  neurontin 600 mg bid po  multivitamin 1 tab daily po  klor con M 20 mEq daily po    (X) Advance diet    1/17/2023 9:01 AM EST by Mark Maradiaga      Regular; 3 carb choices (45 gm/meal);  Low Fat/Low Chol/High Fiber/2 gm Na    Interventions    (X) * Drain absent    (X) Physical therapy    1/17/2023 9:01 AM EST by Mark Maradiaga      [facility] 11    Medications    ( ) * PCA absent    1/17/2023 9:01 AM EST by Mark Maradiaga      roxicodone 10 mg every 4 hours prn po x5    (X) Possible antibiotics    1/17/2023 9:01 AM EST by Mark Maradiaga      clindamycin 600 mg every 8 hours iv  zosyn 3375 mg every 8 hours iv  vancocin 1250 mgevery 24 hours iv    * Milestone   Additional Notes   DATE: 1/13/2023      PERTINENT UPDATES:   -s/p proximal symes amputation/distal transtibial amputation,left    -precert is pending         VITALS:   T 98 (36.7)   RR 18 HR 80   /77   SpO2 92% on RA   Pain Score 7      ABNL/PERTINENT LABS/RADIOLOGY/DIAGNOSTIC STUDIES:   BUN 30 H   Creatinine 1.7 H   Est., Glom Filt rate 45 ! Glucose 144 H   POC Glucose 272 H   Calcium 8.0 L   rbc 3.14 L   hgb 7.5 L   hct 25.5 L   platelet count 088 H      PHYSICAL EXAM:   Vascular: CFT WNL to left amputation stump. Edema present to the Left stump which is consistent with post op state. Leg and thigh are warm. Dermatologic: The Proximal Symes amputation/distal transtibial amputation site of the LLE is well coapted with sutures and staples. There is no sign of dehiscence. There is minimal to no sanguinous drainage. Negative for necrosis or ecchymosis. Neurovascular: Light touch sensation diminished bilaterally. Musculoskeletal: s/p Proximal Symes amputation/distal transtibial amputation of LLE. Tender to touch of LLE.        MD CONSULTS/ASSESSMENT AND PLAN:   Podiatry Notes:   Diabetes mellitus   -Continue homes medication   -Hospitalist consulted       CKD, Stage 3   -Continue homes medication   -Hospitalist consulted       CAD   -Continue homes medication   -Hospitalist consulted   -Cardiology consulted       Hyperlipidemia    -Continue homes medication   -Hospitalist consulted       Hypertension   -Continue homes medication   -Hospitalist consulted   -Cardiology consulted       IM Notes:   Assessment and Plan:-       Septic left ankle with pathologic fracture of left tibia and abscess of the left ankle:   POD #3 day s/p Proximal Symes amputation/distal transtibial amputation, left (DOS 1/10)                -Continue Zosyn, vancomycin and clindamycin               -Infectious disease following               - continue with management by primary         Nocturnal hypoxia- suspect RILEY               Nocturnal pulse oximetry and completed               Will need oxygen at night long term               Referral for formal sleep study on DC        Type 2 diabetes mellitus: Uncontrolled. A1c 8.3    Goal blood glucose while inpatient 140-180.                -Hold home meds   -Lantus 30 units BID - 1/12 increased to 33 units BID    -Hypoglycemic protocol-Accu-Cheks-Diabetic and cardiac diet                -Continue Neurontin   -1/12 increased SSI to high dose                -1/13 BG improved- continue current regimen      MEDICATIONS:      lantus inj 33 units bid sc   humalog 0-16 units tid with meals sc   tiotropium 2 puff daily IN   valsartan 160 mg daily po   glycolax 17 g daily prn po x1      ORDERS:   POCT Glucose   strict I&O   daily weights      PT/OT/SLP/CM ASSESSMENT OR NOTES:   CM Notes: According to [employer] website the precert is \"PENDING\". PT Notes: Activity Tolerance:  Patient tolerance of  treatment: good. However fatigued and forgetful at times during session, Pt demos decreased strength, endurance, and independence with mobility. requires hands on assist for safety with mobility. Pt will benefit from cont PT at this time to ensure safety, to decrease the risk for falls and to return to [facility]. [facility] 11      RT Notes: The findings from the last RT Protocol Assessment were as follows:    History Pulmonary Disease: Smoker 15 pack years or more   Respiratory Pattern: Regular pattern and RR 12-20 bpm   Breath Sounds: Slightly diminished and/or crackles   Cough: Strong, spontaneous, non-productive   Indication for Bronchodilator Therapy: Decreased or absent breath sounds   Bronchodilator Assessment Score: 3      OT Notes:   ASSESSMENT:   Activity Tolerance:  Patient tolerance of  treatment: good. Discharge Recommendations: Inpatient Rehabilitation   Equipment Recommendations: Equipment Needed: Yes   Other: TTB, slideboard, drop arm commode      Pharmacy Notes:   Plan:   1. Schedule vancomycin 1250 mg IV every 24 hours as SCr is stabilizing   2. Repeat vancomycin concentration planned in 1-2 days unless SCr changes significantly.    3. Pharmacy will continue to monitor patient and adjust therapy as indicated

## 2023-01-17 NOTE — PLAN OF CARE
Problem: Respiratory - Adult  Goal: Clear lung sounds  Description: Clear lung sounds  Outcome: Progressing   Spiriva and Acapella to maintain clear breath sounds. Patient mutually agreed on goals.

## 2023-01-18 LAB
ANION GAP SERPL CALCULATED.3IONS-SCNC: 12 MEQ/L (ref 8–16)
BUN BLDV-MCNC: 23 MG/DL (ref 7–22)
CALCIUM SERPL-MCNC: 9.3 MG/DL (ref 8.5–10.5)
CHLORIDE BLD-SCNC: 100 MEQ/L (ref 98–111)
CO2: 28 MEQ/L (ref 23–33)
CREAT SERPL-MCNC: 1.5 MG/DL (ref 0.4–1.2)
GFR SERPL CREATININE-BSD FRML MDRD: 53 ML/MIN/1.73M2
GLUCOSE BLD-MCNC: 104 MG/DL (ref 70–108)
GLUCOSE BLD-MCNC: 119 MG/DL (ref 70–108)
GLUCOSE BLD-MCNC: 138 MG/DL (ref 70–108)
GLUCOSE BLD-MCNC: 140 MG/DL (ref 70–108)
GLUCOSE BLD-MCNC: 97 MG/DL (ref 70–108)
POTASSIUM REFLEX MAGNESIUM: 4.4 MEQ/L (ref 3.5–5.2)
SODIUM BLD-SCNC: 140 MEQ/L (ref 135–145)

## 2023-01-18 PROCEDURE — 80048 BASIC METABOLIC PNL TOTAL CA: CPT

## 2023-01-18 PROCEDURE — 1200000000 HC SEMI PRIVATE

## 2023-01-18 PROCEDURE — 97110 THERAPEUTIC EXERCISES: CPT

## 2023-01-18 PROCEDURE — 94640 AIRWAY INHALATION TREATMENT: CPT

## 2023-01-18 PROCEDURE — 82948 REAGENT STRIP/BLOOD GLUCOSE: CPT

## 2023-01-18 PROCEDURE — 6370000000 HC RX 637 (ALT 250 FOR IP): Performed by: PHYSICIAN ASSISTANT

## 2023-01-18 PROCEDURE — 2580000003 HC RX 258

## 2023-01-18 PROCEDURE — 6370000000 HC RX 637 (ALT 250 FOR IP): Performed by: NURSE PRACTITIONER

## 2023-01-18 PROCEDURE — 36415 COLL VENOUS BLD VENIPUNCTURE: CPT

## 2023-01-18 PROCEDURE — 6360000002 HC RX W HCPCS: Performed by: PHYSICIAN ASSISTANT

## 2023-01-18 PROCEDURE — 99231 SBSQ HOSP IP/OBS SF/LOW 25: CPT | Performed by: NURSE PRACTITIONER

## 2023-01-18 PROCEDURE — 97530 THERAPEUTIC ACTIVITIES: CPT

## 2023-01-18 PROCEDURE — 6370000000 HC RX 637 (ALT 250 FOR IP): Performed by: STUDENT IN AN ORGANIZED HEALTH CARE EDUCATION/TRAINING PROGRAM

## 2023-01-18 PROCEDURE — 6370000000 HC RX 637 (ALT 250 FOR IP)

## 2023-01-18 PROCEDURE — 97542 WHEELCHAIR MNGMENT TRAINING: CPT

## 2023-01-18 RX ADMIN — CLONIDINE HYDROCHLORIDE 0.1 MG: 0.1 TABLET ORAL at 17:27

## 2023-01-18 RX ADMIN — OXYCODONE 10 MG: 5 TABLET ORAL at 21:45

## 2023-01-18 RX ADMIN — SENNOSIDES AND DOCUSATE SODIUM 1 TABLET: 50; 8.6 TABLET ORAL at 21:45

## 2023-01-18 RX ADMIN — Medication 1 TABLET: at 09:15

## 2023-01-18 RX ADMIN — METOPROLOL TARTRATE 50 MG: 50 TABLET, FILM COATED ORAL at 17:27

## 2023-01-18 RX ADMIN — POTASSIUM CHLORIDE 20 MEQ: 1500 TABLET, EXTENDED RELEASE ORAL at 09:16

## 2023-01-18 RX ADMIN — ATORVASTATIN CALCIUM 80 MG: 80 TABLET, FILM COATED ORAL at 09:15

## 2023-01-18 RX ADMIN — GABAPENTIN 600 MG: 600 TABLET, FILM COATED ORAL at 09:16

## 2023-01-18 RX ADMIN — FERROUS SULFATE TAB 325 MG (65 MG ELEMENTAL FE) 325 MG: 325 (65 FE) TAB at 21:45

## 2023-01-18 RX ADMIN — ASPIRIN 81 MG 81 MG: 81 TABLET ORAL at 09:16

## 2023-01-18 RX ADMIN — TIOTROPIUM BROMIDE INHALATION SPRAY 2 PUFF: 3.12 SPRAY, METERED RESPIRATORY (INHALATION) at 06:00

## 2023-01-18 RX ADMIN — POLYETHYLENE GLYCOL 3350 17 G: 17 POWDER, FOR SOLUTION ORAL at 09:17

## 2023-01-18 RX ADMIN — OXYCODONE 10 MG: 5 TABLET ORAL at 04:25

## 2023-01-18 RX ADMIN — METOPROLOL TARTRATE 50 MG: 50 TABLET, FILM COATED ORAL at 06:19

## 2023-01-18 RX ADMIN — FERROUS SULFATE TAB 325 MG (65 MG ELEMENTAL FE) 325 MG: 325 (65 FE) TAB at 09:16

## 2023-01-18 RX ADMIN — ENOXAPARIN SODIUM 30 MG: 100 INJECTION SUBCUTANEOUS at 23:20

## 2023-01-18 RX ADMIN — CLOPIDOGREL BISULFATE 75 MG: 75 TABLET ORAL at 09:16

## 2023-01-18 RX ADMIN — CLONIDINE HYDROCHLORIDE 0.1 MG: 0.1 TABLET ORAL at 06:19

## 2023-01-18 RX ADMIN — ENOXAPARIN SODIUM 30 MG: 100 INJECTION SUBCUTANEOUS at 10:39

## 2023-01-18 RX ADMIN — SODIUM CHLORIDE, PRESERVATIVE FREE 10 ML: 5 INJECTION INTRAVENOUS at 21:48

## 2023-01-18 RX ADMIN — FUROSEMIDE 40 MG: 40 TABLET ORAL at 09:16

## 2023-01-18 RX ADMIN — SENNOSIDES AND DOCUSATE SODIUM 1 TABLET: 50; 8.6 TABLET ORAL at 09:16

## 2023-01-18 RX ADMIN — SODIUM CHLORIDE, PRESERVATIVE FREE 10 ML: 5 INJECTION INTRAVENOUS at 09:09

## 2023-01-18 RX ADMIN — OXYCODONE 5 MG: 5 TABLET ORAL at 09:16

## 2023-01-18 RX ADMIN — GABAPENTIN 600 MG: 600 TABLET, FILM COATED ORAL at 23:20

## 2023-01-18 ASSESSMENT — PAIN SCALES - GENERAL
PAINLEVEL_OUTOF10: 7
PAINLEVEL_OUTOF10: 6
PAINLEVEL_OUTOF10: 0
PAINLEVEL_OUTOF10: 3
PAINLEVEL_OUTOF10: 7
PAINLEVEL_OUTOF10: 6
PAINLEVEL_OUTOF10: 4
PAINLEVEL_OUTOF10: 6

## 2023-01-18 ASSESSMENT — PAIN DESCRIPTION - DESCRIPTORS
DESCRIPTORS: ACHING

## 2023-01-18 ASSESSMENT — PAIN DESCRIPTION - LOCATION
LOCATION: LEG

## 2023-01-18 ASSESSMENT — PAIN DESCRIPTION - ORIENTATION
ORIENTATION: LEFT

## 2023-01-18 ASSESSMENT — PAIN DESCRIPTION - PAIN TYPE: TYPE: SURGICAL PAIN

## 2023-01-18 ASSESSMENT — PAIN DESCRIPTION - ONSET: ONSET: ON-GOING

## 2023-01-18 ASSESSMENT — PAIN - FUNCTIONAL ASSESSMENT
PAIN_FUNCTIONAL_ASSESSMENT: PREVENTS OR INTERFERES SOME ACTIVE ACTIVITIES AND ADLS
PAIN_FUNCTIONAL_ASSESSMENT: ACTIVITIES ARE NOT PREVENTED
PAIN_FUNCTIONAL_ASSESSMENT: ACTIVITIES ARE NOT PREVENTED

## 2023-01-18 ASSESSMENT — PAIN DESCRIPTION - FREQUENCY: FREQUENCY: INTERMITTENT

## 2023-01-18 NOTE — PROGRESS NOTES
[address] [address]  INPATIENT PHYSICAL THERAPY  DAILY NOTE  [facility] ORTHOPEDICS 7K - 7K-04/004-A      Time In: 0945  Time Out: 1023  Timed Code Treatment Minutes: 45 Minutes  Minutes: 38          Date: 2023  Patient Name: Keya Berger,  Gender:  male        MRN: [id number]  : 1960  (65 y.o.)     Referring Practitioner: Ioana Mack DPM  Diagnosis: sepsis  Additional Pertinent Hx: per EMR \"The patient is a 65 y.o. male with significant past medical history of CAD, CKD, diabetes, hyperlipidemia, and hypertension who is being seen at bedside on behalf of Dr. Carla Valentine. Patient relates that he has had ongoing problems with his left foot and ankle over the course of the past few years. 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 He relates that he follows up and sees Dr. Carla Valentine in clinic weekly. He relates that he missed his appointment last week and did not have the dressing changed. The patient states that the previous week he had x-rays taken in clinic that showed a pathologic fracture of the left tibia. He relates that he has not been walking on his left lower extremity. He relates that he has applied some pressure when using the restroom on that leg. He states that amputation has been in discussion at his previous clinic encounters. The patient relates that he last a last evening at 8 or 9 PM.  He relates that he drank Crystal light tea around 8 or 9 AM this morning. He relates that he took Plavix this morning at 6 AM.  The patient denies any other concerns to his right lower extremity. The patient denies nausea, vomiting, fever, chills, shortness of breath or chest pain. \" Pt is s/p L BKA on 1/10/23 completed by Dr. Carla Valentine.      Prior Level of Function:  Lives With: Alone  Type of Home: House  Home Layout: One level  Home Access: Ramped entrance  Home Equipment: Electric scooter, [employer]   Bathroom Shower/Tub: Walk-in shower, Shower chair with back  Bathroom Toilet: Handicap height  Bathroom Equipment: Grab bars in shower, Grab bars around toilet    Receives Help From: Friend(s)  ADL Assistance: Independent  Homemaking Assistance: Needs assistance  Homemaking Responsibilities: Yes  Ambulation Assistance: Independent  Transfer Assistance: Independent  Active : No  IADL Comments: He has a robert who works for him - worker's wife makes meals from that he only has to heat up in microwave and assists with cleaning tasks  Additional Comments: Pt reported that he has a very supportive family who can be available intermittently throughout the day upon discharge home.  Pt reports he was amb very little, using his scooter or w/c most of the time    Restrictions/Precautions:  Restrictions/Precautions: General Precautions, Weight Bearing, Fall Risk  Left Lower Extremity Weight Bearing: Non Weight Bearing (BKA)  Position Activity Restriction  Other position/activity restrictions: L BKA       SUBJECTIVE: pt cooperative and agreeable for therapy, pt cont to c/o of sore buttocks and does off load while in bed by getting on his side, and wanting to get back into bed following mobility     PAIN: buttock pain no number given, pt placed on his side at end of session     Vitals: Vitals not assessed per clinical judgement, see nursing flowsheet    OBJECTIVE:  Bed Mobility:  Rolling to Right: Supervision, with rail   Supine to Sit: min assist at trunk, with rail  Sit to Supine: Contact Guard Assistance, pt a little impulsive then needed to complete 1/2 bridges to reposiiton hips and assist needed at shoulders - placed pt on his side at end of session    Scooting: Stand By Assistance    Transfers:  [address] from w/c to bed to a higher surface needing cues for hand placement and completed in several scoots pt also needed assist to place and remove board- another transfer from bed to w/c with CGA however this was a lower surface- again he needed help to place and remove board and cues for hand placement- pt also required assist for w/c set up to remove leg and arm rest     Wheelchair Mobility:  Stand By Assistance to CGA   Extremities Used: Bilateral Upper Extremities  Type of Chair:Manual  Surface: Level Tile  Distance: 200x1  Quality: Slow Velocity, Short Strokes, and pt had to take several rest breaks- pt took extended time to complete      Exercise:  Patient was guided in 1 set(s) 10-15 reps of exercise to both lower extremities. Glut sets, Quad sets, Heelslides, Short arc quads, Hip abduction/adduction, Straight leg raises, and modified bridges, diag curl ups, long arc quads sitting hip flexion . Exercises were completed for increased independence with functional mobility. Functional Outcome Measures: Completed  AM-PAC Inpatient Mobility without Stair Climbing Raw Score : 11  AM-PAC Inpatient without Stair Climbing T-Scale Score : 35.66    ASSESSMENT:  Assessment: Patient progressing toward established goals. Activity Tolerance:  Patient tolerance of  treatment: fair. Equipment Recommendations:Equipment Needed: No  Discharge Recommendations: Subacte/Skilled Nursing Facility  Plan: Current Treatment Recommendations: Strengthening, Balance training, Functional mobility training, Transfer training, Endurance training, Equipment evaluation, education, & procurement, Patient/Caregiver education & training, Neuromuscular re-education, Safety education & training, Home exercise program, Therapeutic activities  General Plan:  (6x O)    Patient Education  Patient Education: Plan of Care    Goals:  Patient Goals : \"be able to get in and out bed and w/c with less assist\"  Short Term Goals  Time Frame for Short Term Goals: by discharge  Short Term Goal 1: Pt will demo supine to and from sit transfers with SBA and modifications as needed to progress with mobility.   Short Term Goal 2: Pt will demo slide board transfers to and from w/c with mod Ax1 to progress with mobility. Short Term Goal 3: Pt will tolerate 10-20 reps of ther ex to increase overall mobility. Short Term Goal 4: Pt will demo S for w/c mobility for 100 feet to increase IND with mobility. Short Term Goal 5: PT to assess sit to/from stand transfers when appropriate. Long Term Goals  Time Frame for Long Term Goals : NA due to short ELOS    Following session, patient left in safe position with all fall risk precautions in place.

## 2023-01-18 NOTE — PLAN OF CARE
Problem: Discharge Planning  Goal: Discharge to home or other facility with appropriate resources  1/18/2023 0921 by Lucille Last RN  Outcome: Progressing  1/18/2023 0606 by Jeanne Hurd RN  Outcome: Progressing  Flowsheets (Taken 1/18/2023 0606)  Discharge to home or other facility with appropriate resources:   Identify barriers to discharge with patient and caregiver   Identify discharge learning needs (meds, wound care, etc)   Refer to discharge planning if patient needs post-hospital services based on physician order or complex needs related to functional status, cognitive ability or social support system     Problem: Pain  Goal: Verbalizes/displays adequate comfort level or baseline comfort level  1/18/2023 0921 by Lucille Last RN  Outcome: Progressing  1/18/2023 0606 by Jeanne Hurd RN  Outcome: Progressing  Flowsheets (Taken 1/18/2023 0606)  Verbalizes/displays adequate comfort level or baseline comfort level:   Assess pain using appropriate pain scale   Encourage patient to monitor pain and request assistance   Administer analgesics based on type and severity of pain and evaluate response   Implement non-pharmacological measures as appropriate and evaluate response   Consider cultural and social influences on pain and pain management     Problem: ABCDS Injury Assessment  Goal: Absence of physical injury  1/18/2023 0921 by Lucille Last RN  Outcome: Progressing  1/18/2023 0606 by Jeanne Hurd RN  Outcome: Progressing  Flowsheets (Taken 1/18/2023 0606)  Absence of Physical Injury: Implement safety measures based on patient assessment  Note: Patient has remained free of physical injury during this shift. Safe environment provided, call light within reach, and hourly rounding completed.        Problem: Infection - Adult  Goal: Absence of infection at discharge  1/18/2023 0921 by Lucille Last RN  Outcome: Progressing  1/18/2023 0606 by Jeanne Hurd RN  Outcome: Progressing  Flowsheets (Taken 1/18/2023 0606)  Absence of infection at discharge:   Assess and monitor for signs and symptoms of infection   Monitor all insertion sites i.e., indwelling lines, tubes and drains   Monitor lab/diagnostic results   Clio appropriate cooling/warming therapies per order   Administer medications as ordered   Instruct and encourage patient and family to use good hand hygiene technique     Problem: Respiratory - Adult  Goal: Clear lung sounds  Description: Clear lung sounds  1/18/2023 0603 by Keren Saenz RCP  Outcome: Progressing     Problem: Skin/Tissue Integrity - Adult  Goal: Incisions, wounds, or drain sites healing without S/S of infection  Outcome: Progressing     Problem: Musculoskeletal - Adult  Goal: Return mobility to safest level of function  Outcome: Progressing  Goal: Return ADL status to a safe level of function  Outcome: Progressing     Problem: Gastrointestinal - Adult  Goal: Maintains or returns to baseline bowel function  Outcome: Progressing     Problem: Metabolic/Fluid and Electrolytes - Adult  Goal: Glucose maintained within prescribed range  Outcome: Progressing     Problem: Hematologic - Adult  Goal: Maintains hematologic stability  Outcome: Progressing     Problem: Chronic Conditions and Co-morbidities  Goal: Patient's chronic conditions and co-morbidity symptoms are monitored and maintained or improved  1/18/2023 0921 by Chelsea Leigh RN  Outcome: Progressing  1/18/2023 0606 by Shahram Marcum RN  Outcome: Progressing  Flowsheets (Taken 1/18/2023 0606)  Care Plan - Patient's Chronic Conditions and Co-Morbidity Symptoms are Monitored and Maintained or Improved:   Monitor and assess patient's chronic conditions and comorbid symptoms for stability, deterioration, or improvement   Collaborate with multidisciplinary team to address chronic and comorbid conditions and prevent exacerbation or deterioration

## 2023-01-18 NOTE — PLAN OF CARE
Problem: Respiratory - Adult  Goal: Clear lung sounds  Description: Clear lung sounds  Outcome: Progressing   Continue inhaler to improve aeration of lungs. Patient mutually agreed on goals.

## 2023-01-18 NOTE — PLAN OF CARE
Problem: Discharge Planning  Goal: Discharge to home or other facility with appropriate resources  Outcome: Progressing  Flowsheets (Taken 1/18/2023 0606)  Discharge to home or other facility with appropriate resources:   Identify barriers to discharge with patient and caregiver   Identify discharge learning needs (meds, wound care, etc)   Refer to discharge planning if patient needs post-hospital services based on physician order or complex needs related to functional status, cognitive ability or social support system     Problem: Pain  Goal: Verbalizes/displays adequate comfort level or baseline comfort level  Outcome: Progressing  Flowsheets (Taken 1/18/2023 0606)  Verbalizes/displays adequate comfort level or baseline comfort level:   Assess pain using appropriate pain scale   Encourage patient to monitor pain and request assistance   Administer analgesics based on type and severity of pain and evaluate response   Implement non-pharmacological measures as appropriate and evaluate response   Consider cultural and social influences on pain and pain management     Problem: ABCDS Injury Assessment  Goal: Absence of physical injury  Outcome: Progressing  Flowsheets (Taken 1/18/2023 0606)  Absence of Physical Injury: Implement safety measures based on patient assessment  Note: Patient has remained free of physical injury during this shift. Safe environment provided, call light within reach, and hourly rounding completed.        Problem: Infection - Adult  Goal: Absence of infection at discharge  Outcome: Progressing  Flowsheets (Taken 1/18/2023 0606)  Absence of infection at discharge:   Assess and monitor for signs and symptoms of infection   Monitor all insertion sites i.e., indwelling lines, tubes and drains   Monitor lab/diagnostic results   [place] appropriate cooling/warming therapies per order   Administer medications as ordered   Instruct and encourage patient and family to use good hand hygiene technique Problem: Chronic Conditions and Co-morbidities  Goal: Patient's chronic conditions and co-morbidity symptoms are monitored and maintained or improved  Outcome: Progressing  Flowsheets (Taken 1/18/2023 0606)  Care Plan - Patient's Chronic Conditions and Co-Morbidity Symptoms are Monitored and Maintained or Improved:   Monitor and assess patient's chronic conditions and comorbid symptoms for stability, deterioration, or improvement   Collaborate with multidisciplinary team to address chronic and comorbid conditions and prevent exacerbation or deterioration   Care plan reviewed with patient. Patient verbalize understanding of the plan of care and contribute to goal setting.

## 2023-01-18 NOTE — PROGRESS NOTES
[facility]  Occupational Therapy  Daily Note  Time:   Time In: 1410  Time Out: 1433  Timed Code Treatment Minutes: 23 Minutes  Minutes: 23          Date: 2023  Patient Name: Juan Dean,   Gender: male      Room: -004-A  MRN: [id number]  : 1960  (65 y.o.)  Referring Practitioner: Dayana Paredes DPM  Diagnosis: Sepsis  Additional Pertinent Hx: The patient is a 65 y.o. male with significant past medical history of CAD, CKD, diabetes, hyperlipidemia, and hypertension who is being seen at bedside on behalf of Dr. Nesha Kee. Patient relates that he has had ongoing problems with his left foot and ankle over the course of the past few years. 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 He relates that he follows up and sees Dr. Nesha Kee in clinic weekly. He relates that he missed his appointment last week and did not have the dressing changed. The patient states that the previous week he had x-rays taken in clinic that showed a pathologic fracture of the left tibia. He relates that he has not been walking on his left lower extremity. He relates that he has applied some pressure when using the restroom on that leg. He states that amputation has been in discussion at his previous clinic encounters. The patient relates that he last a last evening at 8 or 9 PM.  He relates that he drank Crystal light tea around 8 or 9 AM this morning. He relates that he took Plavix this morning at 6 AM.  The patient denies any other concerns to his right lower extremity. The patient denies nausea, vomiting, fever, chills, shortness of breath or chest pain.     Restrictions/Precautions:  Restrictions/Precautions: General Precautions, Weight Bearing, Fall Risk  Left Lower Extremity Weight Bearing: Non Weight Bearing (BKA)  Position Activity Restriction  Other position/activity restrictions: L BKA     SUBJECTIVE: Pt laying in bed upon arrvial, pt agreeable to OT session, RN gave verbal approval for session     PAIN: 0/10:     Vitals: Nurse checked vitals prior to session    COGNITION: Slow Processing, Inattention, and Decreased Problem Solving    ADL:   No ADL's completed this session. .    BED MOBILITY:  Supine to Sit: Modified Independent with the HOB flat    TRANSFERS:  Sliding Board: Stand By Assistance. To the w/c     ADDITIONAL ACTIVITIES:  Patient completed BUE strengthening exercises with skilled education on HEP: completed x15 reps x2 set with a medium resistance  in all joints and all planes in order to improve UE strength and activity tolerance required for BADL routine and toilet / shower transfers. Patient tolerated good, requiring min rest breaks. Patient also required min cues for technique. ASSESSMENT:     Activity Tolerance:  Patient tolerance of  treatment: good.        Discharge Recommendations: ECF with OT  Equipment Recommendations: Equipment Needed: Yes  Other: TTB, slideboard, drop arm commode  Plan: Times Per Week: 6x  Current Treatment Recommendations: Strengthening, Balance training, Functional mobility training, Endurance training, Patient/Caregiver education & training, Safety education & training, Self-Care / ADL, Home management training    Patient Education  Patient Education: ADL's and Home Exercise Program    Goals  Short Term Goals  Time Frame for Short Term Goals: by discharge  Short Term Goal 1: Pt will complete grooming routine while seated EOB with Supervision and no VC for technique to incrase indep with self care  Short Term Goal 2: Pt will complete slideboard t/f with consistent CGA and min VC for placement of board to increase indep with toileting routine  Short Term Goal 3: Pt will demo indep with BUE strengthening HEP to increase overall UB strength/endurance for ease with t/fs  Short Term Goal 4: OTR to assess sit to stand/stand-pivot/and functional mobility when appropriate    Following session, patient left in safe position with all fall risk precautions in place.

## 2023-01-18 NOTE — CARE COORDINATION
1/18/23, 11:38 AM EST    DISCHARGE PLANNING EVALUATION    Rafy Riley is reviewing for possible admission. Will need precert if accepting.

## 2023-01-18 NOTE — PROGRESS NOTES
Comprehensive Nutrition Assessment    Type and Reason for Visit:  Initial (LOS)    Nutrition Recommendations/Plan:   Continue current diet  Continue MVI     Malnutrition Assessment:  Malnutrition Status:  No malnutrition (01/18/23 1505)    Context:  Acute Illness     Findings of the 6 clinical characteristics of malnutrition:  Energy Intake:  No significant decrease in energy intake  Weight Loss:  No significant weight loss     Body Fat Loss:  No significant body fat loss     Muscle Mass Loss:  No significant muscle mass loss    Fluid Accumulation:  No significant fluid accumulation     Strength:  Not Performed    Nutrition Assessment:     Pt. nutritionally compromised AEB wounds. At risk for further nutrition compromise r/t increased nutrient needs for wound healing (left BKA 1/10), underlying medical condition (Hx; CAD, CKD stage II, DM, HLD, HTN, Liver Disease-hepatitis as a child). Nutrition Related Findings:   Pt. Report/Treatments/Miscellaneous: Pt awaiting SNF placement; recommend continuing MVI to aid in would healing; Will hold off on other nutritional intervention at this time as stump is healing, good po intake during LOS, and pt is within blood glucose goals. GI Status: BM 1/16  Pertinent Labs: BUN 23, Cr 1.5, Glucose 97, POC glucose 119-104, A1C 8.3 1/10/23  Pertinent Meds: Iron, Lasix, Humalog, MVI, Glycolax, Senokot      Wound Type:  (s/p left BKA 1/10/23)       Current Nutrition Intake & Therapies:    Average Meal Intake: %  Average Supplements Intake: None Ordered  ADULT DIET; Regular; 3 carb choices (45 gm/meal); Low Fat/Low Chol/High Fiber/2 gm Na    Anthropometric Measures:  Height: 6' 2\" (188 cm)  Ideal Body Weight (IBW): 190 lbs (86 kg)    Admission Body Weight: 283 lb 14 oz (128.8 kg) (1/10: RLE +1)  Current Body Weight: 286 lb 3.2 oz (129.8 kg) (1/16; RLE +2), 150.6 % IBW.  Weight Source: Bed Scale  Current BMI (kg/m2): 36.7  Usual Body Weight:  (Per EMR 4/4/22 311 lb 6 oz, 11/22/22 334 lb 10 oz)     Weight Adjustment For: Amputation  Total Adjusted Percentage (Calculated): 5.9  Adjusted Ideal Body Weight (lbs) (Calculated): 178.8 lbs  Adjusted Ideal Body Weight (kg) (Calculated): 81.27 kg  Adjusted % Ideal Body Weight (Calculated): 160.1  Adjusted BMI (kg/m2) (Calculated): 38.9  BMI Categories: Obese Class 2 (BMI 35.0 -39.9)    Estimated Daily Nutrient Needs:  Energy Requirements Based On: Kcal/kg  Weight Used for Energy Requirements: Current (129.8 kg)  Energy (kcal/day): 0156-2799 (15-18 kcal/kg)  Weight Used for Protein Requirements: Ideal (86 kg)  Protein (g/day): 98 grams or more (1.2 g/kg) h/o CKD will reasses as appropriate       Nutrition Diagnosis:   Increased nutrient needs related to increase demand for energy/nutrients as evidenced by wounds    Nutrition Interventions:   Food and/or Nutrient Delivery: Continue Current Diet, Vitamin Supplement  Nutrition Education/Counseling: Education not indicated  Coordination of Nutrition Care: Continue to monitor while inpatient       Goals:     Goals: Meet at least 75% of estimated needs, by next RD assessment       Nutrition Monitoring and Evaluation:   Behavioral-Environmental Outcomes: None Identified  Food/Nutrient Intake Outcomes: Food and Nutrient Intake, Vitamin/Mineral Intake  Physical Signs/Symptoms Outcomes: Biochemical Data, GI Status, Weight, Skin, Fluid Status or Edema    Discharge Planning:     Too soon to determine     Brittney Jacobs, [address]  Contact: [phone]

## 2023-01-18 NOTE — PROGRESS NOTES
Progress note: Infectious diseases    Patient - Miko Chang,  Age - 65 y.o.    - 1960      Room Number - 7K-04/004-A   MRN -  [id number]   Acct # - [de-identified]  Date of Admission -  1/10/2023 12:28 PM    SUBJECTIVE:   No newi ssues. OBJECTIVE   VITALS    height is 6' 2\" (1.88 m) and weight is 286 lb 3.2 oz (129.8 kg). His oral temperature is 97.5 °F (36.4 °C). His blood pressure is 95/75 and his pulse is 66. His respiration is 18 and oxygen saturation is 100%. Wt Readings from Last 3 Encounters:   23 286 lb 3.2 oz (129.8 kg)   22 (!) 310 lb (140.6 kg)   22 (!) 310 lb (140.6 kg)       I/O (24 Hours)    Intake/Output Summary (Last 24 hours) at 2023 1202  Last data filed at 2023 0607  Gross per 24 hour   Intake 400 ml   Output 3100 ml   Net -2700 ml         General Appearance: awake and oriented  HEENT - normocephalic, atraumatic, pale conjunctiva,  anicteric sclera  Neck - Supple, no mass  Lungs -  Bilateral   air entry, no rhonchi, no wheeze. Cardiovascular - Heart sounds are normal.     Abdomen - soft, not distended, nontender,   Neurologic -oriented  Skin - No bruising or bleeding  Extremities -  left BKA.  The stump is clean no drainage               MEDICATIONS:      sennosides-docusate sodium  1 tablet Oral BID    polyethylene glycol  17 g Oral Daily    enoxaparin  30 mg SubCUTAneous Q12H    [Held by provider] insulin glargine  31 Units SubCUTAneous BID    insulin lispro  0-16 Units SubCUTAneous TID     insulin lispro  0-4 Units SubCUTAneous Nightly    amLODIPine  10 mg Oral Daily    atorvastatin  80 mg Oral Daily    ferrous sulfate  325 mg Oral BID    furosemide  40 mg Oral Daily    gabapentin  600 mg Oral BID    minoxidil  10 mg Oral Daily    multivitamin  1 tablet Oral Daily    potassium chloride  20 mEq Oral Daily    valsartan  160 mg Oral Daily    aspirin  81 mg Oral Daily    clopidogrel  75 mg Oral Daily    tiotropium  2 puff Inhalation Daily    cloNIDine  0.1 mg Oral BID    metoprolol tartrate  50 mg Oral BID    sodium chloride flush  5-40 mL IntraVENous 2 times per day      dextrose      sodium chloride 20 mL/hr at 01/13/23 0020     acetaminophen **OR** acetaminophen, albuterol sulfate HFA, glucose, dextrose bolus **OR** dextrose bolus, glucagon (rDNA), dextrose, sodium chloride flush, sodium chloride, ondansetron **OR** ondansetron, oxyCODONE **OR** oxyCODONE, morphine **OR** morphine      LABS:     CBC:   Recent Labs     01/16/23  0726   WBC 5.4   HGB 8.3*   *       BMP:    Recent Labs     01/16/23  0726 01/18/23  0555   * 140   K 4.5 4.4   CL 96* 100   CO2 25 28   BUN 23* 23*   CREATININE 1.4* 1.5*   GLUCOSE 125* 97       Calcium:  Recent Labs     01/18/23  0555   CALCIUM 9.3        Recent Labs     01/17/23  1938 01/18/23  0606 01/18/23  1037   POCGLU 154* 104 119*        CULTURES:   UA: No results for input(s): SPECGRAV, PHUR, COLORU, CLARITYU, MUCUS, PROTEINU, BLOODU, RBCUA, WBCUA, BACTERIA, NITRU, GLUCOSEU, BILIRUBINUR, UROBILINOGEN, KETUA, LABCAST, LABCASTTY, AMORPHOS in the last 72 hours. Invalid input(s): CRYSTALS  Micro:   Lab Results   Component Value Date/Time    [facility]  01/10/2023 03:28 PM     No growth 24 hours. No growth 48 hours.  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    Sepsis ([facility] [address] A41.9    Acute left-sided low back pain with bilateral sciatica M54.42, M54.41    S/P transmetatarsal amputation of foot, left ([facility]) B43.995    Leukocytosis D72.829    Wound dehiscence, surgical, initial encounter T81.31XA    Postoperative wound infection M77.71MO    Metabolic acidosis D52.22    Hx of CABG Z95.1    Lumbar stenosis without neurogenic claudication M48.061    CKD (chronic kidney disease), stage II N18.2    Normocytic anemia D64.9    Morbid obesity ([facility]) E66.01    Debility R53.81    Carotid stenosis, asymptomatic, bilateral I65.23    Hypokalemia E87.6    Nonhealing ulcer of left lower extremity ([address] L97.929    Bleeding R58    Wound, open T14. 8XXA    SOB (shortness of breath) on exertion R06.02    Hemoglobin A1C greater than 9%, indicating poor diabetic control R73.09    Hypertensive emergency I16.1    Acute on chronic congestive heart failure ([facility]) I50.9    Hypertensive urgency I16.0    Moderate persistent asthma J45.40    Septic arthritis of left ankle, due to unspecified organism ([facility]) M00.9    Closed fracture of ankle with nonunion S82.899K    S/P BKA (below knee amputation), left ([facility]) R22.551         ASSESSMENT/PLAN   Necrotizing left foot/ankle infection: had urgent BKA. The stump is healing. Poorly controlled diabetes  CAD  Plan for ECF.          Eden Jin MD, MD, FACP 1/18/2023 12:02 PM

## 2023-01-18 NOTE — PROGRESS NOTES
Podiatric Progress Note  Carina Cardona  Subjective :     1/18/2023  Patient seen at bedside this morning on behalf of Dr. Leisa Hunt. Patient is s/p LLE proximal Symes amputation with distal transtibial osteotomy (DOS 1/10/2023). Patient is pleasant, and is alert and oriented. Patient states that he is doing much better and is thankful for Dr. Leisa Hunt and Dr. Kamila Rosales for treating him fast and taking care of him. Patient endorses minimal pain to LLE. He relates that he is taking oral pain medication. He denies any N/V/F/C/SOB/CP or bilateral calf tenderness. He has no other pedal complaints at this time. Patient is aware that there are no beds available at the facility which he originally wanted; and he is also aware that precertification is started at another SNF/ECF. He relates that he has not heard anything recently. 1/17/2023  Patient seen at bedside this morning on behalf of Dr. Leisa Hunt. Patient is s/p LLE proximal Symes amputation with distal transtibial osteotomy (DOS 1/10/2023). Patient is pleasant, and is alert and oriented. Patient states that he feels very well overall. Per patient, he had an enema yesterday, and then had another bowel movement afterwards. Patient endorses minimal pain to LLE. He denies any N/V/F/C/SOB/CP or bilateral calf tenderness. He has no other pedal complaints at this time. Patient is aware that there are no beds available at the facility which he originally wanted; and he is also aware that precertification is started at another SNF/ECF.    1/16/2023  Patient seen at chair side this morning on behalf of Dr. Leisa Hunt. Patient is s/p LLE proximal Symes amputation with distal transtibial osteotomy (DOS 1/10/2023). Patient is pleasant, and is alert and oriented. Patient states that he feels very well overall. Patient continues to endorse minimal pain to LLE (2/10). He currently denies any N/V/F/C/N/V/CP.   Patient still has not had bowel movement yet; he denies any abdominal bloating, cramping, or discomfort. No other pedal complaints at this time. Patient states that per his insurance, precertification has been started for [facility]. 1/15/2023  Patient seen at bedside this morning on behalf of Dr. Lawanda Taveras. Patient is status post LLE proximal Symes amputation with distal transtibial osteotomy (DOS 1/10/2023). Patient appeared pleasant, was oriented to person, place and time and in no acute distress. Patient is very grateful for having the procedure performed; he states that he subjectively feels much better after the amputation. Patient expressed gratitude to Dr. Lawanda Taveras and German Montez for their services. Patient endorses minimal pain to the LLE (3/10). Patient does endorse mild neuropathic type symptoms to the RLE. Patient denies any N/V/F/C/SOB or CP. Patient states that he is passing flatus, but has not yet had a bowel movement. Patient has no further pedal concerns at this point in time. 1/14/2023  Patient is a pleasant 62yo male seen bedside this AM s/p left lower extremity amputation emergent in nature due to necrotizing fasciitis and extensive gas gangrene performed 1.10.23. Patient seated upright bedside working with PT. Patient overall states he feels vastly improved, post op dressing clean/dry/intact. Social work is following, precert for [employer] in place. ID following for IVAB therapy, currently on Zosyn IV (vancomycin and clindamycin discontinued). Labs are trending better, leukocytosis resolved. Will continue to follow until determination of placement. HISTORY OF PRESENT ILLNESS:                 The patient is a 65 y.o. male with significant past medical history of CAD, CKD, diabetes, hyperlipidemia, and hypertension who is being seen at bedside on behalf of Dr. Lawanda Taveras. Patient relates that he has had ongoing problems with his left foot and ankle over the course of the past few years.   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 He relates that he follows up and sees Dr. Claribel Saleh in clinic weekly. He relates that he missed his appointment last week and did not have the dressing changed. The patient states that the previous week he had x-rays taken in clinic that showed a pathologic fracture of the left tibia. He relates that he has not been walking on his left lower extremity. He relates that he has applied some pressure when using the restroom on that leg. He states that amputation has been in discussion at his previous clinic encounters. The patient relates that he last a last evening at 8 or 9 PM.  He relates that he drank Crystal light tea around 8 or 9 AM this morning. He relates that he took Plavix this morning at 6 AM.  The patient denies any other concerns to his right lower extremity. The patient denies nausea, vomiting, fever, chills, shortness of breath or chest pain.     Current Medications:    Current Facility-Administered Medications: sennosides-docusate sodium (SENOKOT-S) 8.6-50 MG tablet 1 tablet, 1 tablet, Oral, BID  polyethylene glycol (GLYCOLAX) packet 17 g, 17 g, Oral, Daily  enoxaparin Sodium (LOVENOX) injection 30 mg, 30 mg, SubCUTAneous, Q12H  insulin glargine (LANTUS) injection vial 31 Units, 31 Units, SubCUTAneous, BID  insulin lispro (HUMALOG) injection vial 0-16 Units, 0-16 Units, SubCUTAneous, TID WC  insulin lispro (HUMALOG) injection vial 0-4 Units, 0-4 Units, SubCUTAneous, Nightly  acetaminophen (TYLENOL) tablet 650 mg, 650 mg, Oral, Q6H PRN **OR** acetaminophen (TYLENOL) suppository 650 mg, 650 mg, Rectal, Q6H PRN  amLODIPine (NORVASC) tablet 10 mg, 10 mg, Oral, Daily  atorvastatin (LIPITOR) tablet 80 mg, 80 mg, Oral, Daily  albuterol sulfate HFA (PROVENTIL;VENTOLIN;PROAIR) 108 (90 Base) MCG/ACT inhaler 2 puff, 2 puff, Inhalation, 4x Daily PRN  ferrous sulfate (IRON 325) tablet 325 mg, 325 mg, Oral, BID  furosemide (LASIX) tablet 40 mg, 40 mg, Oral, Daily  gabapentin (NEURONTIN) tablet 600 mg, 600 mg, Oral, BID  minoxidil (LONITEN) tablet 10 mg, 10 mg, Oral, Daily  multivitamin 1 tablet, 1 tablet, Oral, Daily  potassium chloride (KLOR-CON M) extended release tablet 20 mEq, 20 mEq, Oral, Daily  valsartan (DIOVAN) tablet 160 mg, 160 mg, Oral, Daily  glucose chewable tablet 16 g, 4 tablet, Oral, PRN  dextrose bolus 10% 125 mL, 125 mL, IntraVENous, PRN **OR** dextrose bolus 10% 250 mL, 250 mL, IntraVENous, PRN  glucagon (rDNA) injection 1 mg, 1 mg, SubCUTAneous, PRN  dextrose 10 % infusion, , IntraVENous, Continuous PRN  aspirin chewable tablet 81 mg, 81 mg, Oral, Daily  clopidogrel (PLAVIX) tablet 75 mg, 75 mg, Oral, Daily  tiotropium (SPIRIVA RESPIMAT) 2.5 MCG/ACT inhaler 2 puff, 2 puff, Inhalation, Daily  cloNIDine (CATAPRES) tablet 0.1 mg, 0.1 mg, Oral, BID  metoprolol tartrate (LOPRESSOR) tablet 50 mg, 50 mg, Oral, BID  sodium chloride flush 0.9 % injection 5-40 mL, 5-40 mL, IntraVENous, 2 times per day  sodium chloride flush 0.9 % injection 5-40 mL, 5-40 mL, IntraVENous, PRN  0.9 % sodium chloride infusion, , IntraVENous, PRN  ondansetron (ZOFRAN-ODT) disintegrating tablet 4 mg, 4 mg, Oral, Q8H PRN **OR** ondansetron (ZOFRAN) injection 4 mg, 4 mg, IntraVENous, Q6H PRN  oxyCODONE (ROXICODONE) immediate release tablet 5 mg, 5 mg, Oral, Q4H PRN **OR** oxyCODONE (ROXICODONE) immediate release tablet 10 mg, 10 mg, Oral, Q4H PRN  morphine (PF) injection 2 mg, 2 mg, IntraVENous, Q2H PRN **OR** morphine injection 4 mg, 4 mg, IntraVENous, Q2H PRN    Objective     BP 95/75   Pulse 66   Temp 97.5 °F (36.4 °C) (Oral)   Resp 18   Ht 6' 2\" (1.88 m)   Wt 286 lb 3.2 oz (129.8 kg)   SpO2 100%   BMI 36.75 kg/m²      I/O:  Intake/Output Summary (Last 24 hours) at 1/18/2023 0904  Last data filed at 1/18/2023 9335  Gross per 24 hour   Intake 400 ml   Output 3925 ml   Net -3525 ml                Wt Readings from Last 3 Encounters:   01/16/23 286 lb 3.2 oz (129.8 kg) 12/08/22 (!) 310 lb (140.6 kg)   12/08/22 (!) 310 lb (140.6 kg)       LABS:    Recent Labs     01/16/23  0726   WBC 5.4   HGB 8.3*   HCT 27.7*   *          Recent Labs     01/18/23  0555      K 4.4      CO2 28   BUN 23*   CREATININE 1.5*        No results for input(s): PROT, INR, APTT in the last 72 hours. No results for input(s): CKTOTAL, CKMB, CKMBINDEX, TROPONINI in the last 72 hours. Focused Lower Extremity Examination:    Vitals:    BP 95/75   Pulse 66   Temp 97.5 °F (36.4 °C) (Oral)   Resp 18   Ht 6' 2\" (1.88 m)   Wt 286 lb 3.2 oz (129.8 kg)   SpO2 100%   BMI 36.75 kg/m²        Vascular: Popliteal pulse is palpable to LLE. CFT within normal limits to LLE amputation stump. Skin temperature is warm to warm from proximal tibial tuberosity to distal amputation stump of LLE. Minimal edema noted to the distal LLE amputation stump. Dermatologic: There was minimal sanguinous drainage noted on the 4 x 4 gauze directly to the incision. Incision appears very well coapted, without any drainage noted. All staples and sutures are intact. There is no surrounding erythema or warmth noted. Overall, incision appears to be healing very well. No other open lesions, rashes or subcutaneous nodules of note. Neurovascular: Light touch sensation grossly diminished to the level of the midfoot to RLE. Light touch sensation appears grossly intact to the level of the amputation stump of LLE. Musculoskeletal: Muscle strength testing deferred due to acute postop state. Patient's left leg dressed is in a slightly flexed position at the knee joint. Patient is able to fully extend left leg at the knee. Minimal pain with palpation of LLE amputation stump at posterior aspect.                              ASSESSMENT: Pt. is a 65 y.o. 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    Sepsis ([facility] [address]    Acute left-sided low back pain with bilateral sciatica    S/P transmetatarsal amputation of foot, left ([facility])    Leukocytosis    Wound dehiscence, surgical, initial encounter    Postoperative wound infection    Metabolic acidosis    Hx of CABG    Lumbar stenosis without neurogenic claudication    CKD (chronic kidney disease), stage II    Normocytic anemia    Morbid obesity ([facility] [address]    Debility    Carotid stenosis, asymptomatic, bilateral    Hypokalemia    Nonhealing ulcer of left lower extremity ([facility])    Bleeding    Wound, open    SOB (shortness of breath) on exertion    Hemoglobin A1C greater than 9%, indicating poor diabetic control    Hypertensive emergency    Acute on chronic congestive heart failure ([facility])    Hypertensive urgency    Moderate persistent asthma    Septic arthritis of left ankle, due to unspecified organism ([facility])    Closed fracture of ankle with nonunion    S/P BKA (below knee amputation), left ([facility])       PLAN:   Septic arthritis; left ankle -resolved  Pathologic fracture; left tibia -resolved  Abscess; left ankle -resolved  -Patient examined and evaluated  -WBC 5.4 on 1/16/2023; patient currently afebrile  -Hemoglobin and hematocrit at 8.3 and 27.7  -Discontinued IV Zosyn per infectious diseases  -Will continue to follow labs/imaging  -Discussed with patient that he should try to extend left leg at the knee to prevent flexion contracture  -Awaiting SNF placement; precertification for [facility] started; [facility] does not have a bed available currently, and [facility] has called [facility] again on patient's request, and has also contacted [facility] [place] to check if there is availability   -Hospitalist following for medical management  -Cardiology following  -Discussed with patient at length that we are awaiting placement into SNF/ECF, and are trying very hard to secure a bed for him.    -Patient verbalized understanding and agreement with the treatment plan as stated. All of his questions were answered to his satisfaction. Dispo: Pending placement into [facility]. Patient can follow-up with Dr. Roman Burdick for further care on an outpatient basis.     Yoan Rodney DPM, PGY-2  Podiatric Surgical Resident  1/18/2023   9:04 AM

## 2023-01-18 NOTE — PROGRESS NOTES
Physical Medicine & Rehabilitation   Progress Note    Chief Complaint:  Left BKA    Subjective:  Patient seen today, resting in bed. Discharge planning continues, planning SNF at this time. IPR was denied by [facility], patient requested to pursue SNF. Ongoing discussion regarding knee and hip extension and importance. Discussed  and shaping with plans for prosthetic in the future. Patient understanding. Rehabilitation:  PT: Reviewed. OT: Reviewed. Review of Systems:  CONSTITUTIONAL:  positive for  fatigue  EYES:  negative for  double vision, blurred vision, blind spots, and visual disturbance  HEENT:  negative for  hearing loss  RESPIRATORY:  negative for  dry cough, dyspnea, and wheezing  CARDIOVASCULAR:  negative for  chest pain, dyspnea, palpitations  GASTROINTESTINAL:  positive for constipation  GENITOURINARY:  negative  SKIN:  abrasions.  Left BKA incision  HEMATOLOGIC/LYMPHATIC:  positive for swelling/edema  MUSCULOSKELETAL:  positive for  muscle weakness  NEUROLOGICAL:  positive for gait problems, weakness, and pain  BEHAVIOR/PSYCH:  negative  System review otherwise negative      Objective:  BP 95/75   Pulse 66   Temp 97.5 °F (36.4 °C) (Oral)   Resp 18   Ht 6' 2\" (1.88 m)   Wt 286 lb 3.2 oz (129.8 kg)   SpO2 100%   BMI 36.75 kg/m²   awake  Orientation:   person, place, time  Mood: within normal limits  Affect: spontaneous  General appearance: Patient is well nourished, well developed, well groomed and in no acute distress, obese, appearing older than stated age     Memory:   normal,   Attention/Concentration: normal  Language:  normal     Cranial Nerves:  cranial nerves II-XII are grossly intact  ROM:  abnormal - left leg  Tone: normal  Muscle bulk: abnormal decreased right bicep  Sensory:  Sensory intact except decreased sensation from distal rght knee to foot, absent sensation of the right toes  Coordination:   normal BUE    Skin: warm and dry, no rash or erythema and scattered abrasions noted. Left BKA incision with ACE wrap in place. Healed Skin grafting site to left thigh  Peripheral vascular: Pulses: Normal upper and lower extremity pulses excluding left leg; Edema: 1+ right foot      Diagnostics:   Recent Results (from the past 24 hour(s))   POCT glucose    Collection Time: 01/17/23  4:27 PM   Result Value Ref Range    POC Glucose 142 (H) 70 - 108 mg/dl   POCT glucose    Collection Time: 01/17/23  7:38 PM   Result Value Ref Range    POC Glucose 154 (H) 70 - 108 mg/dl   Basic Metabolic Panel w/ Reflex to MG    Collection Time: 01/18/23  5:55 AM   Result Value Ref Range    Sodium 140 135 - 145 meq/L    Potassium reflex Magnesium 4.4 3.5 - 5.2 meq/L    Chloride 100 98 - 111 meq/L    CO2 28 23 - 33 meq/L    Glucose 97 70 - 108 mg/dL    BUN 23 (H) 7 - 22 mg/dL    Creatinine 1.5 (H) 0.4 - 1.2 mg/dL    Calcium 9.3 8.5 - 10.5 mg/dL   Anion Gap    Collection Time: 01/18/23  5:55 AM   Result Value Ref Range    Anion Gap 12.0 8.0 - 16.0 meq/L   Glomerular Filtration Rate, Estimated    Collection Time: 01/18/23  5:55 AM   Result Value Ref Range    Est, Glom Filt Rate 53 (A) >60 ml/min/1.73m2   POCT glucose    Collection Time: 01/18/23  6:06 AM   Result Value Ref Range    POC Glucose 104 70 - 108 mg/dl   POCT glucose    Collection Time: 01/18/23 10:37 AM   Result Value Ref Range    POC Glucose 119 (H) 70 - 108 mg/dl       Impression:  Necrotizing wound infection left foot with pathologic fracture of left tibia and abscess of the left ankle  S/p Left Proximal symes/distal transtibial amputation with  mL by Dr Basilio Del Cid on 1/10/23  Previous Left TMA in 2021  Type 2 diabetes mellitus: Uncontrolled  HTN  Chronic diastolic heart failure  Hyperlipidemia  CAD s/p CABG  Moderate persistent asthma  Hx of PAD  Lupus  Chronic normocytic anemia  Right bicep atrophy, ?d/t hyperextension injury to right arm 2022.      Plan:  Continue current therapies  SNF planned at discharge  F/u with PM&R in 6 weeks for stump check and prosthetic planning. Will sign off, please call if needed.      Missed Therapy Time:  None    Anahy Duval, APRN - CNP

## 2023-01-19 LAB
ANION GAP SERPL CALCULATED.3IONS-SCNC: 13 MEQ/L (ref 8–16)
BASOPHILS # BLD: 1.2 %
BASOPHILS ABSOLUTE: 0.1 THOU/MM3 (ref 0–0.1)
BUN BLDV-MCNC: 24 MG/DL (ref 7–22)
CALCIUM SERPL-MCNC: 8.9 MG/DL (ref 8.5–10.5)
CHLORIDE BLD-SCNC: 101 MEQ/L (ref 98–111)
CO2: 26 MEQ/L (ref 23–33)
CREAT SERPL-MCNC: 1.4 MG/DL (ref 0.4–1.2)
EOSINOPHIL # BLD: 3.6 %
EOSINOPHILS ABSOLUTE: 0.2 THOU/MM3 (ref 0–0.4)
ERYTHROCYTE [DISTWIDTH] IN BLOOD BY AUTOMATED COUNT: 21.6 % (ref 11.5–14.5)
ERYTHROCYTE [DISTWIDTH] IN BLOOD BY AUTOMATED COUNT: 63.5 FL (ref 35–45)
GFR SERPL CREATININE-BSD FRML MDRD: 57 ML/MIN/1.73M2
GLUCOSE BLD STRIP.AUTO-MCNC: 150 MG/DL (ref 70–108)
GLUCOSE BLD STRIP.AUTO-MCNC: 169 MG/DL (ref 70–108)
GLUCOSE BLD-MCNC: 194 MG/DL (ref 70–108)
GLUCOSE BLD-MCNC: 208 MG/DL (ref 70–108)
GLUCOSE BLD-MCNC: 216 MG/DL (ref 70–108)
HCT VFR BLD CALC: 27.5 % (ref 42–52)
HEMOGLOBIN: 8.3 GM/DL (ref 14–18)
IMMATURE GRANS (ABS): 0.06 THOU/MM3 (ref 0–0.07)
IMMATURE GRANULOCYTES: 1.2 %
LYMPHOCYTES # BLD: 28.6 %
LYMPHOCYTES ABSOLUTE: 1.4 THOU/MM3 (ref 1–4.8)
MCH RBC QN AUTO: 24.7 PG (ref 26–33)
MCHC RBC AUTO-ENTMCNC: 30.2 GM/DL (ref 32.2–35.5)
MCV RBC AUTO: 81.8 FL (ref 80–94)
MONOCYTES # BLD: 10.3 %
MONOCYTES ABSOLUTE: 0.5 THOU/MM3 (ref 0.4–1.3)
NUCLEATED RED BLOOD CELLS: 0 /100 WBC
PLATELET # BLD: 392 THOU/MM3 (ref 130–400)
PMV BLD AUTO: 9.3 FL (ref 9.4–12.4)
POTASSIUM SERPL-SCNC: 4.4 MEQ/L (ref 3.5–5.2)
RBC # BLD: 3.36 MILL/MM3 (ref 4.7–6.1)
SEG NEUTROPHILS: 55.1 %
SEGMENTED NEUTROPHILS ABSOLUTE COUNT: 2.8 THOU/MM3 (ref 1.8–7.7)
SODIUM BLD-SCNC: 140 MEQ/L (ref 135–145)
WBC # BLD: 5 THOU/MM3 (ref 4.8–10.8)

## 2023-01-19 PROCEDURE — 97110 THERAPEUTIC EXERCISES: CPT

## 2023-01-19 PROCEDURE — 6370000000 HC RX 637 (ALT 250 FOR IP): Performed by: NURSE PRACTITIONER

## 2023-01-19 PROCEDURE — 99232 SBSQ HOSP IP/OBS MODERATE 35: CPT | Performed by: NURSE PRACTITIONER

## 2023-01-19 PROCEDURE — 80048 BASIC METABOLIC PNL TOTAL CA: CPT

## 2023-01-19 PROCEDURE — 6370000000 HC RX 637 (ALT 250 FOR IP): Performed by: STUDENT IN AN ORGANIZED HEALTH CARE EDUCATION/TRAINING PROGRAM

## 2023-01-19 PROCEDURE — 6370000000 HC RX 637 (ALT 250 FOR IP): Performed by: PHYSICIAN ASSISTANT

## 2023-01-19 PROCEDURE — 85025 COMPLETE CBC W/AUTO DIFF WBC: CPT

## 2023-01-19 PROCEDURE — 2580000003 HC RX 258

## 2023-01-19 PROCEDURE — 97535 SELF CARE MNGMENT TRAINING: CPT

## 2023-01-19 PROCEDURE — 82948 REAGENT STRIP/BLOOD GLUCOSE: CPT

## 2023-01-19 PROCEDURE — 97530 THERAPEUTIC ACTIVITIES: CPT

## 2023-01-19 PROCEDURE — 1200000000 HC SEMI PRIVATE

## 2023-01-19 PROCEDURE — 36415 COLL VENOUS BLD VENIPUNCTURE: CPT

## 2023-01-19 PROCEDURE — 6370000000 HC RX 637 (ALT 250 FOR IP)

## 2023-01-19 PROCEDURE — 6360000002 HC RX W HCPCS: Performed by: PHYSICIAN ASSISTANT

## 2023-01-19 RX ORDER — INSULIN GLARGINE 100 [IU]/ML
10 INJECTION, SOLUTION SUBCUTANEOUS 2 TIMES DAILY
Status: DISCONTINUED | OUTPATIENT
Start: 2023-01-19 | End: 2023-01-25 | Stop reason: HOSPADM

## 2023-01-19 RX ADMIN — FERROUS SULFATE TAB 325 MG (65 MG ELEMENTAL FE) 325 MG: 325 (65 FE) TAB at 19:23

## 2023-01-19 RX ADMIN — ATORVASTATIN CALCIUM 80 MG: 80 TABLET, FILM COATED ORAL at 09:24

## 2023-01-19 RX ADMIN — MINOXIDIL 10 MG: 2.5 TABLET ORAL at 09:24

## 2023-01-19 RX ADMIN — OXYCODONE 10 MG: 5 TABLET ORAL at 14:28

## 2023-01-19 RX ADMIN — INSULIN GLARGINE 10 UNITS: 100 INJECTION, SOLUTION SUBCUTANEOUS at 21:27

## 2023-01-19 RX ADMIN — POLYETHYLENE GLYCOL 3350 17 G: 17 POWDER, FOR SOLUTION ORAL at 09:29

## 2023-01-19 RX ADMIN — SENNOSIDES AND DOCUSATE SODIUM 1 TABLET: 50; 8.6 TABLET ORAL at 09:24

## 2023-01-19 RX ADMIN — METOPROLOL TARTRATE 50 MG: 50 TABLET, FILM COATED ORAL at 06:42

## 2023-01-19 RX ADMIN — ENOXAPARIN SODIUM 30 MG: 100 INJECTION SUBCUTANEOUS at 11:15

## 2023-01-19 RX ADMIN — AMLODIPINE BESYLATE 10 MG: 10 TABLET ORAL at 09:24

## 2023-01-19 RX ADMIN — Medication 1 TABLET: at 09:24

## 2023-01-19 RX ADMIN — POTASSIUM CHLORIDE 20 MEQ: 1500 TABLET, EXTENDED RELEASE ORAL at 09:24

## 2023-01-19 RX ADMIN — SODIUM CHLORIDE, PRESERVATIVE FREE 10 ML: 5 INJECTION INTRAVENOUS at 09:25

## 2023-01-19 RX ADMIN — ASPIRIN 81 MG 81 MG: 81 TABLET ORAL at 09:24

## 2023-01-19 RX ADMIN — VALSARTAN 160 MG: 160 TABLET ORAL at 09:24

## 2023-01-19 RX ADMIN — GABAPENTIN 600 MG: 600 TABLET, FILM COATED ORAL at 09:24

## 2023-01-19 RX ADMIN — OXYCODONE 10 MG: 5 TABLET ORAL at 04:06

## 2023-01-19 RX ADMIN — FERROUS SULFATE TAB 325 MG (65 MG ELEMENTAL FE) 325 MG: 325 (65 FE) TAB at 09:24

## 2023-01-19 RX ADMIN — SENNOSIDES AND DOCUSATE SODIUM 1 TABLET: 50; 8.6 TABLET ORAL at 19:23

## 2023-01-19 RX ADMIN — OXYCODONE 10 MG: 5 TABLET ORAL at 21:26

## 2023-01-19 RX ADMIN — INSULIN GLARGINE 10 UNITS: 100 INJECTION, SOLUTION SUBCUTANEOUS at 09:19

## 2023-01-19 RX ADMIN — INSULIN LISPRO 4 UNITS: 100 INJECTION, SOLUTION INTRAVENOUS; SUBCUTANEOUS at 11:12

## 2023-01-19 RX ADMIN — GABAPENTIN 600 MG: 600 TABLET, FILM COATED ORAL at 19:23

## 2023-01-19 RX ADMIN — FUROSEMIDE 40 MG: 40 TABLET ORAL at 09:24

## 2023-01-19 RX ADMIN — ENOXAPARIN SODIUM 30 MG: 100 INJECTION SUBCUTANEOUS at 23:37

## 2023-01-19 RX ADMIN — SODIUM CHLORIDE, PRESERVATIVE FREE 10 ML: 5 INJECTION INTRAVENOUS at 19:25

## 2023-01-19 RX ADMIN — CLOPIDOGREL BISULFATE 75 MG: 75 TABLET ORAL at 09:24

## 2023-01-19 RX ADMIN — METOPROLOL TARTRATE 50 MG: 50 TABLET, FILM COATED ORAL at 16:41

## 2023-01-19 ASSESSMENT — PAIN SCALES - GENERAL
PAINLEVEL_OUTOF10: 7
PAINLEVEL_OUTOF10: 4
PAINLEVEL_OUTOF10: 7
PAINLEVEL_OUTOF10: 7
PAINLEVEL_OUTOF10: 4
PAINLEVEL_OUTOF10: 0

## 2023-01-19 ASSESSMENT — PAIN DESCRIPTION - LOCATION: LOCATION: LEG

## 2023-01-19 NOTE — PROGRESS NOTES
[facility] [facility]  Occupational Therapy  Daily Note  Time:   Time In: 0081  Time Out: 1030  Timed Code Treatment Minutes: 35 Minutes  Minutes: 35          Date: 2023  Patient Name: Arlet Colindres,   Gender: male      Room: [facility]  MRN: [id number]  : 1960  (65 y.o.)  Referring Practitioner: Manny Garza DPM  Diagnosis: Sepsis  Additional Pertinent Hx: The patient is a 65 y.o. male with significant past medical history of CAD, CKD, diabetes, hyperlipidemia, and hypertension who is being seen at bedside on behalf of Dr. Patsy Arriaza. Patient relates that he has had ongoing problems with his left foot and ankle over the course of the past few years. 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 He relates that he follows up and sees Dr. Patsy Arriaza in clinic weekly. He relates that he missed his appointment last week and did not have the dressing changed. The patient states that the previous week he had x-rays taken in clinic that showed a pathologic fracture of the left tibia. He relates that he has not been walking on his left lower extremity. He relates that he has applied some pressure when using the restroom on that leg. He states that amputation has been in discussion at his previous clinic encounters. The patient relates that he last a last evening at 8 or 9 PM.  He relates that he drank Crystal light tea around 8 or 9 AM this morning. He relates that he took Plavix this morning at 6 AM.  The patient denies any other concerns to his right lower extremity. The patient denies nausea, vomiting, fever, chills, shortness of breath or chest pain. Restrictions/Precautions:  Restrictions/Precautions: General Precautions, Weight Bearing, Fall Risk  Left Lower Extremity Weight Bearing: Non Weight Bearing (BKA)  Position Activity Restriction  Other position/activity restrictions: L BKA     SUBJECTIVE: RN okayed OT session. Pt.  In room supine resting in bed, agreeable to participate at EOB declined sitting in w/c due to pain on bottom from wound.     PAIN: 5-6/10: LLE and bottom-from wound    Vitals: Nurse checked vitals prior to session    COGNITION: Slow Processing, Inattention, and Decreased Problem Solving    ADL:   Grooming: Stand By Assistance.  Completed seated at EOB   Bathing: Stand By Assistance and with set-up.  To complete sponge bath at EOB alpa care completed supine and rolling to sides  Upper Extremity Dressing: Minimal Assistance.  To don gown  Lower Extremity Dressing: Stand By Assistance.  To roll side to side in bed and pull up and down .    BALANCE:  Sitting Balance:  Stand By Assistance. Seated at EOB  sat EOB for ~ 25 mins    BED MOBILITY:  Supine to Sit: Stand By Assistance    Sit to Supine: Stand By Assistance      TRANSFERS:  Declined siting up on w/c due to pain on bottom         ADDITIONAL ACTIVITIES:  Pt completed B UE exs with resistance band x 15 reps, x 1 set, with good  tolerance of exs, with  RBs throughout, and visual and verbal cues for tech with resistance band in attempt to improve overall strength and activity tolerance.      ASSESSMENT:     Activity Tolerance:  Patient tolerance of  treatment: fair.       Discharge Recommendations: Subacute/skilled nursing facility  Equipment Recommendations: Equipment Needed: Yes  Other: TTB, slideboard, drop arm commode  Plan: Times Per Week: 6x  Current Treatment Recommendations: Strengthening, Balance training, Functional mobility training, Endurance training, Patient/Caregiver education & training, Safety education & training, Self-Care / ADL, Home management training    Patient Education  Patient Education: ADL's and Home Exercise Program    Goals  Short Term Goals  Time Frame for Short Term Goals: by discharge  Short Term Goal 1: Pt will complete grooming routine while seated EOB with Supervision and no VC for technique to incrase indep with self care  Short Term Goal  2: Pt will complete slideboard t/f with consistent CGA and min VC for placement of board to increase indep with toileting routine  Short Term Goal 3: Pt will demo indep with BUE strengthening HEP to increase overall UB strength/endurance for ease with t/fs  Short Term Goal 4: OTR to assess sit to stand/stand-pivot/and functional mobility when appropriate    Following session, patient left in safe position with all fall risk precautions in place.

## 2023-01-19 NOTE — PROGRESS NOTES
Hospitalist Progress Note    Patient:  Lesly Hartmann      Unit/Bed:7K-04/004-A    YOB: 1960    MRN: [id number]       Acct: [de-identified]     PCP: Megan Miller, EYAD - CNP    Date of Admission: 1/10/2023    Assessment/Plan:      GISELA on CKD, resolved. Creatinine at upper limits of normal. Avoid nephrotoxic agents. Renally dose medications. Acute hypoxic postop respiratory insuffiencey, resolved. Nocturnal hypoxia. Probable RILEY. Nocturnal study completed. Needs 2L at HS. Referral to sleep on DC. Acute blood loss anemia on chronic disease. S/P 1 unit PRBC 1/11 for hbg 6.8. Stable post transfusion. Iron resumed. Septic left ankle with pathological fracture of left tibia and abscess. 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 Vanc and Clindamycin stopped 1/13 by ID. Zosyn continued until 1/18. ID/podiary managing. PT/OT. Type 2 diabetes, uncontrolled. A1c 8.3%. Insulin has been adjusted. Continue current regimen for now. Accu check ac/hs. Diabetic diet. Hypoglycemic protocol. Primary hypertension. Continue Norvasc, clonidine, Lasix, Lopressor twice daily, and minoxidil with holding parameters. Chronic diastolic heart failure. No decompensation. Lasix, strict intake and output. Daily weights. Fluids stopped 1/11. No additional diuresis was given. CAD s/p CABG. Asa/plavix, BB, ACE. Moderate persistent asthma. No PFTs available for review. Spiriva continued. Lupus. Aware. Dispo: awaiting precertification. Chief Complaint: Left septic ankle with abscess and open pathologic fracture    Hospital Course:     71-year-old male was admitted to [facility] by podiatry for urgent/emergent management of septic left ankle with pathologic fracture of tibia and abscess of left ankle. Initially podiatry was planning to take patient to the OR tonight or tomorrow and consulted hospital team for risk stratification. Patient has significant past medical history of diabetes mellitus type 2, uncontrolled, hypertension, CAD status post CABG, CKD, chronic normocytic anemia, lupus, moderate persistent asthma, PAD, grade 2 diastolic dysfunction, and hyperlipidemia,   It is important note the patient was recently hospitalized at [facility] 11/2/2022 for hypertensive emergency in which she was experiencing chest pain and a new left bundle branch block was found. Patient subsequently left AMA despite having blood pressure 200s/100s. Upon arrival to [facility] blood cultures were obtained, patient was started on vancomycin and Zosyn. Chest x-ray obtained that demonstrated evidence of prior CABG as well as hyperinflated lungs suggestive of COPD. Patient also had evidence of healed granulomatous disease. With questionable mild interstitial fibrosis. X-ray of left ankle was obtained that showed gas density and soft tissue extending to the lower left leg. Complete dislocation and rotated talus relative to the tibia. Also there was abnormal periosteal reaction and distal tibia and fibula which indicate chronic osteomyelitis. There is also cortical erosion of distal fibula. EKG was also completed that demonstrated Normal sinus rhythm, Left axis deviation; Right bundle branch block, Inferior infarct , age undetermined. Abnormal ECG When compared with ECG of 25-NOV-2022 04:21,Right bundle branch block has replaced Intraventricular conduction delay. During my examination patient denied chest pain, shortness of breath, headache, lightheadedness, abdominal pain. Patient did have foot pain that was relieved with pain medication. Stat CBC, BMP, hepatic function panel as well as magnesium were ordered. 1/11/2023  Patient postop day 1. Hemoglobin dropped to 6.8. Will transfuse 1 unit PRBCs. Patient requiring O2 postoperatively and was weaned to room air today however dipped back into the mid 80s.   Currently satting 93% on 2 L.     1/12/2023  Hemoglobin 7 after transfusion. No longer requiring O2 at this time. Nocturnal pulse oximetry pending. WBC downtrending     1/13/2023  No events overnight. Blood sugar is much improved. VSS. Waiting for IPR precert      1/42/6377  Vanc and clindamycin stopped yesterday. BG acceptable. VSS. Patient had episode of symptomatic hypotension today. 500cc bolus of NS ordered. Patient improved and BP acceptable. Encouraged oral hydration      1/15/2023  Patient doing well. VSS- no hypotensive episodes. Blood sugar well controlled. Hopeful for IPR precert tomorrow. 1/16/2023  IPR denied- SW to work on ECF placement- hopeful to [facility]. Patient otherwise stable  Passing flatus but has not had BM- will order enema     1/17/2023  Patient with 2 BM yesterday. No other complaints or issues at this time  Waiting for SNF placement     1/18/2023  No acute events. Subjective: Some pain to stump, worse when dangling. Pain medications helping. Working with therapy. Reports pressure ulcer on her bottom that is painful to sit up in the WC/Chair.        Medications:  Reviewed    Infusion Medications    dextrose      sodium chloride 20 mL/hr at 01/13/23 0020     Scheduled Medications    insulin glargine  10 Units SubCUTAneous BID    sennosides-docusate sodium  1 tablet Oral BID    polyethylene glycol  17 g Oral Daily    enoxaparin  30 mg SubCUTAneous Q12H    insulin lispro  0-16 Units SubCUTAneous TID WC    insulin lispro  0-4 Units SubCUTAneous Nightly    amLODIPine  10 mg Oral Daily    atorvastatin  80 mg Oral Daily    ferrous sulfate  325 mg Oral BID    furosemide  40 mg Oral Daily    gabapentin  600 mg Oral BID    minoxidil  10 mg Oral Daily    multivitamin  1 tablet Oral Daily    potassium chloride  20 mEq Oral Daily    valsartan  160 mg Oral Daily    aspirin  81 mg Oral Daily    clopidogrel  75 mg Oral Daily    tiotropium  2 puff Inhalation Daily    cloNIDine  0.1 mg Oral BID metoprolol tartrate  50 mg Oral BID    sodium chloride flush  5-40 mL IntraVENous 2 times per day     PRN Meds: acetaminophen **OR** acetaminophen, albuterol sulfate HFA, glucose, dextrose bolus **OR** dextrose bolus, glucagon (rDNA), dextrose, sodium chloride flush, sodium chloride, ondansetron **OR** ondansetron, oxyCODONE **OR** oxyCODONE, morphine **OR** morphine      Intake/Output Summary (Last 24 hours) at 1/19/2023 1123  Last data filed at 1/19/2023 0450  Gross per 24 hour   Intake 1210 ml   Output 2200 ml   Net -990 ml       Diet:  ADULT DIET; Regular; 3 carb choices (45 gm/meal); Low Fat/Low Chol/High Fiber/2 gm Na    Exam:  /68   Pulse 71   Temp 97.7 °F (36.5 °C) (Oral)   Resp 16   Ht 6' 2\" (1.88 m)   Wt 279 lb 4.8 oz (126.7 kg)   SpO2 95%   BMI 35.86 kg/m²     General appearance: No apparent distress, appears stated age and cooperative. HEENT: Pupils equal, round, and reactive to light. Conjunctivae/corneas clear. Neck: Supple, with full range of motion. No jugular venous distention. Trachea midline. Respiratory:  Normal respiratory effort. Clear to auscultation, bilaterally without Rales/Wheezes/Rhonchi. Cardiovascular: Regular rate and rhythm with normal S1/S2 without murmurs, rubs or gallops. Abdomen: Soft, non-tender, non-distended with normal bowel sounds. Musculoskeletal: passive and active ROM x 4 extremities. Skin: Skin color, texture, turgor normal.  Left stump with dressing. Neurologic:  Neurovascularly intact without any focal sensory/motor deficits. Cranial nerves: II-XII intact, grossly non-focal.  Psychiatric: Alert and oriented, thought content appropriate, normal insight  Capillary Refill: Brisk,< 3 seconds   Peripheral Pulses: +2 palpable right leg.       Labs:   Recent Labs     01/19/23  0559   WBC 5.0   HGB 8.3*   HCT 27.5*        Recent Labs     01/18/23  0555 01/19/23  0559    140   K 4.4 4.4    101   CO2 28 26   BUN 23* 24*   CREATININE 1.5* 1.4*   CALCIUM 9.3 8.9     No results for input(s): AST, ALT, BILIDIR, BILITOT, ALKPHOS in the last 72 hours. No results for input(s): INR in the last 72 hours. No results for input(s): Beau Cordova in the last 72 hours. Urinalysis:      Lab Results   Component Value Date/Time    NITRU NEGATIVE 11/16/2022 01:52 PM    WBCUA 0-2 11/16/2022 01:52 PM    BACTERIA NONE SEEN 11/16/2022 01:52 PM    RBCUA 15-25 11/16/2022 01:52 PM    BLOODU MODERATE 11/16/2022 01:52 PM    GLUCOSEU >= 1000 11/16/2022 01:52 PM       Radiology:  XR ANKLE LEFT (MIN 3 VIEWS)   Final Result         1. Gas density in the soft tissues extending into the lower left leg. 2. Completely dislocated and rotated talus relative to the tibia. 3. Abnormal periosteal reaction in the distal tibia and fibula which can indicate chronic osteomyelitis. 4. Cortical erosion of the distal fibula. **This report has been created using voice recognition software. It may contain minor errors which are inherent in voice recognition technology. **      Final report electronically signed by Dr. Reba Jarrett on 1/10/2023 3:46 PM      XR CHEST PORTABLE   Final Result   1. Normal heart size. Metallic sternotomy sutures from prior surgery. Lungs somewhat hyperinflated, suggesting possible COPD. 2. Evidence for old, healed granulomatous disease. Question mild interstitial fibrosis/pneumonitis both lung bases. No effusion is seen. **This report has been created using voice recognition software. It may contain minor errors which are inherent in voice recognition technology. **      Final report electronically signed by Dr. Tessa Short on 1/10/2023 2:42 PM          Diet: ADULT DIET; Regular; 3 carb choices (45 gm/meal);  Low Fat/Low Chol/High Fiber/2 gm Na       Disposition:    [] Home       [] TCU       [] Rehab       [] Psych       [x] SNF       [] [place]       [] Other-    Code Status: Full Code    PT/OT Eval Status: following        Electronically signed by EYAD Parish CNP on 1/19/2023 at 11:23 AM

## 2023-01-19 NOTE — PLAN OF CARE
Problem: Discharge Planning  Goal: Discharge to home or other facility with appropriate resources  Outcome: Progressing  Flowsheets (Taken 1/18/2023 2130)  Discharge to home or other facility with appropriate resources: Identify barriers to discharge with patient and caregiver     Problem: Pain  Goal: Verbalizes/displays adequate comfort level or baseline comfort level  Outcome: Progressing  Flowsheets  Taken 1/18/2023 2315  Verbalizes/displays adequate comfort level or baseline comfort level:   Encourage patient to monitor pain and request assistance   Assess pain using appropriate pain scale  Taken 1/18/2023 2130  Verbalizes/displays adequate comfort level or baseline comfort level:   Encourage patient to monitor pain and request assistance   Assess pain using appropriate pain scale     Problem: ABCDS Injury Assessment  Goal: Absence of physical injury  Outcome: Progressing  Flowsheets (Taken 1/18/2023 0606 by Katie Lopez RN)  Absence of Physical Injury: Implement safety measures based on patient assessment     Problem: Infection - Adult  Goal: Absence of infection at discharge  Outcome: Progressing  Flowsheets (Taken 1/18/2023 2130)  Absence of infection at discharge: Assess and monitor for signs and symptoms of infection     Problem: Chronic Conditions and Co-morbidities  Goal: Patient's chronic conditions and co-morbidity symptoms are monitored and maintained or improved  Outcome: Progressing  Flowsheets (Taken 1/18/2023 2130)  Care Plan - Patient's Chronic Conditions and Co-Morbidity Symptoms are Monitored and Maintained or Improved: Monitor and assess patient's chronic conditions and comorbid symptoms for stability, deterioration, or improvement     Problem: Skin/Tissue Integrity - Adult  Goal: Incisions, wounds, or drain sites healing without S/S of infection  Outcome: Progressing  Flowsheets (Taken 1/18/2023 2130)  Incisions, Wounds, or Drain Sites Healing Without Sign and Symptoms of Infection: ADMISSION and DAILY: Assess and document risk factors for pressure ulcer development     Problem: Musculoskeletal - Adult  Goal: Return mobility to safest level of function  Outcome: Progressing  Flowsheets (Taken 1/18/2023 2130)  Return Mobility to Safest Level of Function: Assess patient stability and activity tolerance for standing, transferring and ambulating with or without assistive devices  Goal: Return ADL status to a safe level of function  Outcome: Progressing  Flowsheets (Taken 1/18/2023 2130)  Return ADL Status to a Safe Level of Function: Administer medication as ordered     Problem: Gastrointestinal - Adult  Goal: Maintains or returns to baseline bowel function  Outcome: Progressing  Flowsheets (Taken 1/18/2023 2130)  Maintains or returns to baseline bowel function: Assess bowel function     Problem: Metabolic/Fluid and Electrolytes - Adult  Goal: Glucose maintained within prescribed range  Outcome: Progressing  Flowsheets (Taken 1/18/2023 2130)  Glucose maintained within prescribed range: Monitor blood glucose as ordered     Problem: Hematologic - Adult  Goal: Maintains hematologic stability  Outcome: Progressing  Flowsheets (Taken 1/18/2023 2130)  Maintains hematologic stability: Assess for signs and symptoms of bleeding or hemorrhage     Problem: Nutrition Deficit:  Goal: Optimize nutritional status  Outcome: Progressing  Flowsheets (Taken 1/19/2023 0224)  Nutrient intake appropriate for improving, restoring, or maintaining nutritional needs:   Assess nutritional status and recommend course of action   Monitor oral intake, labs, and treatment plans     Care plan reviewed with patient. Patient verbalizes understanding of the plan of care and contributes to goal setting.

## 2023-01-19 NOTE — PROGRESS NOTES
Podiatric Progress Note  Andrew Ards  Subjective :     1/19/2023  Patient seen at bedside this morning on behalf of Dr. Vivian Garay. Patient is s/p LLE proximal Symes amputation with distal transtibial osteotomy (DOS 1/10/2023). Patient is pleasant, and is alert and oriented. Patient endorses no pain to LLE. He relates that he is taking oral pain medication. He denies any N/V/F/C/SOB/CP or bilateral calf tenderness. He has no other pedal complaints at this time. Patient relates that he is waiting to hear if he will be able to go to [facility] upon discharge for rehab.     1/18/2023  Patient seen at bedside this morning on behalf of Dr. Vivian Garay. Patient is s/p LLE proximal Symes amputation with distal transtibial osteotomy (DOS 1/10/2023). Patient is pleasant, and is alert and oriented. Patient states that he is doing much better and is thankful for Dr. Vivian Garay and Dr. Frank Kohler for treating him fast and taking care of him. Patient endorses minimal pain to LLE. He relates that he is taking oral pain medication. He denies any N/V/F/C/SOB/CP or bilateral calf tenderness. He has no other pedal complaints at this time. Patient is aware that there are no beds available at the facility which he originally wanted; and he is also aware that precertification is started at another SNF/ECF. He relates that he has not heard anything recently. 1/17/2023  Patient seen at bedside this morning on behalf of Dr. Vivian Garay. Patient is s/p LLE proximal Symes amputation with distal transtibial osteotomy (DOS 1/10/2023). Patient is pleasant, and is alert and oriented. Patient states that he feels very well overall. Per patient, he had an enema yesterday, and then had another bowel movement afterwards. Patient endorses minimal pain to LLE. He denies any N/V/F/C/SOB/CP or bilateral calf tenderness. He has no other pedal complaints at this time.   Patient is aware that there are no beds available at the facility which he originally wanted; and he is also aware that precertification is started at another SNF/ECF.    1/16/2023  Patient seen at chair side this morning on behalf of Dr. Ankush Concepcion. Patient is s/p LLE proximal Symes amputation with distal transtibial osteotomy (DOS 1/10/2023). Patient is pleasant, and is alert and oriented. Patient states that he feels very well overall. Patient continues to endorse minimal pain to LLE (2/10). He currently denies any N/V/F/C/N/V/CP. Patient still has not had bowel movement yet; he denies any abdominal bloating, cramping, or discomfort. No other pedal complaints at this time. Patient states that per his insurance, precertification has been started for [facility]. 1/15/2023  Patient seen at bedside this morning on behalf of Dr. Ankush Concepcion. Patient is status post LLE proximal Symes amputation with distal transtibial osteotomy (DOS 1/10/2023). Patient appeared pleasant, was oriented to person, place and time and in no acute distress. Patient is very grateful for having the procedure performed; he states that he subjectively feels much better after the amputation. Patient expressed gratitude to Dr. Ankush Concepcion and Sathish Jacobs for their services. Patient endorses minimal pain to the LLE (3/10). Patient does endorse mild neuropathic type symptoms to the RLE. Patient denies any N/V/F/C/SOB or CP. Patient states that he is passing flatus, but has not yet had a bowel movement. Patient has no further pedal concerns at this point in time. 1/14/2023  Patient is a pleasant 64yo male seen bedside this AM s/p left lower extremity amputation emergent in nature due to necrotizing fasciitis and extensive gas gangrene performed 1.10.23. Patient seated upright bedside working with PT. Patient overall states he feels vastly improved, post op dressing clean/dry/intact. Social work is following, precert for [employer] in place.  ID following for IVAB therapy, currently on Zosyn IV (vancomycin and clindamycin discontinued). Labs are trending better, leukocytosis resolved. Will continue to follow until determination of placement. HISTORY OF PRESENT ILLNESS:                 The patient is a 65 y.o. male with significant past medical history of CAD, CKD, diabetes, hyperlipidemia, and hypertension who is being seen at bedside on behalf of Dr. Joaquín Jackson. Patient relates that he has had ongoing problems with his left foot and ankle over the course of the past few years. 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 He relates that he follows up and sees Dr. Joaquín Jackson in clinic weekly. He relates that he missed his appointment last week and did not have the dressing changed. The patient states that the previous week he had x-rays taken in clinic that showed a pathologic fracture of the left tibia. He relates that he has not been walking on his left lower extremity. He relates that he has applied some pressure when using the restroom on that leg. He states that amputation has been in discussion at his previous clinic encounters. The patient relates that he last a last evening at 8 or 9 PM.  He relates that he drank Crystal light tea around 8 or 9 AM this morning. He relates that he took Plavix this morning at 6 AM.  The patient denies any other concerns to his right lower extremity. The patient denies nausea, vomiting, fever, chills, shortness of breath or chest pain.     Current Medications:    Current Facility-Administered Medications: sennosides-docusate sodium (SENOKOT-S) 8.6-50 MG tablet 1 tablet, 1 tablet, Oral, BID  polyethylene glycol (GLYCOLAX) packet 17 g, 17 g, Oral, Daily  enoxaparin Sodium (LOVENOX) injection 30 mg, 30 mg, SubCUTAneous, Q12H  [Held by provider] insulin glargine (LANTUS) injection vial 31 Units, 31 Units, SubCUTAneous, BID  insulin lispro (HUMALOG) injection vial 0-16 Units, 0-16 Units, SubCUTAneous, TID WC  insulin lispro (HUMALOG) injection vial 0-4 Units, 0-4 Units, SubCUTAneous, Nightly  acetaminophen (TYLENOL) tablet 650 mg, 650 mg, Oral, Q6H PRN **OR** acetaminophen (TYLENOL) suppository 650 mg, 650 mg, Rectal, Q6H PRN  amLODIPine (NORVASC) tablet 10 mg, 10 mg, Oral, Daily  atorvastatin (LIPITOR) tablet 80 mg, 80 mg, Oral, Daily  albuterol sulfate HFA (PROVENTIL;VENTOLIN;PROAIR) 108 (90 Base) MCG/ACT inhaler 2 puff, 2 puff, Inhalation, 4x Daily PRN  ferrous sulfate (IRON 325) tablet 325 mg, 325 mg, Oral, BID  furosemide (LASIX) tablet 40 mg, 40 mg, Oral, Daily  gabapentin (NEURONTIN) tablet 600 mg, 600 mg, Oral, BID  minoxidil (LONITEN) tablet 10 mg, 10 mg, Oral, Daily  multivitamin 1 tablet, 1 tablet, Oral, Daily  potassium chloride (KLOR-CON M) extended release tablet 20 mEq, 20 mEq, Oral, Daily  valsartan (DIOVAN) tablet 160 mg, 160 mg, Oral, Daily  glucose chewable tablet 16 g, 4 tablet, Oral, PRN  dextrose bolus 10% 125 mL, 125 mL, IntraVENous, PRN **OR** dextrose bolus 10% 250 mL, 250 mL, IntraVENous, PRN  glucagon (rDNA) injection 1 mg, 1 mg, SubCUTAneous, PRN  dextrose 10 % infusion, , IntraVENous, Continuous PRN  aspirin chewable tablet 81 mg, 81 mg, Oral, Daily  clopidogrel (PLAVIX) tablet 75 mg, 75 mg, Oral, Daily  tiotropium (SPIRIVA RESPIMAT) 2.5 MCG/ACT inhaler 2 puff, 2 puff, Inhalation, Daily  cloNIDine (CATAPRES) tablet 0.1 mg, 0.1 mg, Oral, BID  metoprolol tartrate (LOPRESSOR) tablet 50 mg, 50 mg, Oral, BID  sodium chloride flush 0.9 % injection 5-40 mL, 5-40 mL, IntraVENous, 2 times per day  sodium chloride flush 0.9 % injection 5-40 mL, 5-40 mL, IntraVENous, PRN  0.9 % sodium chloride infusion, , IntraVENous, PRN  ondansetron (ZOFRAN-ODT) disintegrating tablet 4 mg, 4 mg, Oral, Q8H PRN **OR** ondansetron (ZOFRAN) injection 4 mg, 4 mg, IntraVENous, Q6H PRN  oxyCODONE (ROXICODONE) immediate release tablet 5 mg, 5 mg, Oral, Q4H PRN **OR** oxyCODONE (ROXICODONE) immediate release tablet 10 mg, 10 mg, Oral, Q4H PRN  morphine (PF) injection 2 mg, 2 mg, IntraVENous, Q2H PRN **OR** morphine injection 4 mg, 4 mg, IntraVENous, Q2H PRN    Objective     /80   Pulse 68   Temp 97.6 °F (36.4 °C) (Oral)   Resp 16   Ht 6' 2\" (1.88 m)   Wt 279 lb 4.8 oz (126.7 kg)   SpO2 99%   BMI 35.86 kg/m²      I/O:  Intake/Output Summary (Last 24 hours) at 1/19/2023 0829  Last data filed at 1/19/2023 0450  Gross per 24 hour   Intake 1210 ml   Output 2200 ml   Net -990 ml                Wt Readings from Last 3 Encounters:   01/19/23 279 lb 4.8 oz (126.7 kg)   12/08/22 (!) 310 lb (140.6 kg)   12/08/22 (!) 310 lb (140.6 kg)       LABS:    Recent Labs     01/19/23  0559   WBC 5.0   HGB 8.3*   HCT 27.5*             Recent Labs     01/19/23  0559      K 4.4      CO2 26   BUN 24*   CREATININE 1.4*        No results for input(s): PROT, INR, APTT in the last 72 hours. No results for input(s): CKTOTAL, CKMB, CKMBINDEX, TROPONINI in the last 72 hours. Focused Lower Extremity Examination:    Vitals:    /80   Pulse 68   Temp 97.6 °F (36.4 °C) (Oral)   Resp 16   Ht 6' 2\" (1.88 m)   Wt 279 lb 4.8 oz (126.7 kg)   SpO2 99%   BMI 35.86 kg/m²      Dressing was left intact at today's encounter. There is no strikethrough noted. The below Physical Exam is from 1/18/23. Vascular: Popliteal pulse is palpable to LLE. CFT within normal limits to LLE amputation stump. Skin temperature is warm to warm from proximal tibial tuberosity to distal amputation stump of LLE. Minimal edema noted to the distal LLE amputation stump. Dermatologic: There was minimal sanguinous drainage noted on the 4 x 4 gauze directly to the incision. Incision appears very well coapted, without any drainage noted. All staples and sutures are intact. There is no surrounding erythema or warmth noted. Overall, incision appears to be healing very well. No other open lesions, rashes or subcutaneous nodules of note.      Neurovascular: Light touch sensation grossly diminished to the level of the midfoot to RLE. Light touch sensation appears grossly intact to the level of the amputation stump of LLE. Musculoskeletal: Muscle strength testing deferred due to acute postop state. Patient's left leg dressed is in a slightly flexed position at the knee joint. Patient is able to fully extend left leg at the knee. Minimal pain with palpation of LLE amputation stump at posterior aspect.                              ASSESSMENT: Pt. is a 65 y.o. 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    Sepsis ([address]    Acute left-sided low back pain with bilateral sciatica    S/P transmetatarsal amputation of foot, left ([facility])    Leukocytosis    Wound dehiscence, surgical, initial encounter    Postoperative wound infection    Metabolic acidosis    Hx of CABG    Lumbar stenosis without neurogenic claudication    CKD (chronic kidney disease), stage II    Normocytic anemia    Morbid obesity ([address]    Debility    Carotid stenosis, asymptomatic, bilateral    Hypokalemia    Nonhealing ulcer of left lower extremity ([facility])    Bleeding    Wound, open    SOB (shortness of breath) on exertion    Hemoglobin A1C greater than 9%, indicating poor diabetic control    Hypertensive emergency    Acute on chronic congestive heart failure ([facility])    Hypertensive urgency    Moderate persistent asthma    Septic arthritis of left ankle, due to unspecified organism ([facility])    Closed fracture of ankle with nonunion    S/P BKA (below knee amputation), left ([facility])       PLAN:   Septic arthritis; left ankle -resolved  Pathologic fracture; left tibia -resolved  Abscess; left ankle -resolved  -Patient examined and evaluated  -WBC 5.0 on 1/19/2023; patient currently afebrile  -Hemoglobin and hematocrit at 8.3 and 27.5  -Discontinued IV Zosyn per infectious diseases  -Will continue to follow labs/imaging  -Discussed with patient that he should try to extend left leg at the knee to prevent flexion contracture  -Awaiting SNF placement; precertification for [place] at [facility], in process  -Hospitalist following for medical management  -Cardiology following  -Patient verbalized understanding and agreement with the treatment plan as stated. All of his questions were answered to his satisfaction. Dispo: Pending placement into [place] at [facility]. Patient can follow-up with Dr. Joel Matias for further care on an outpatient basis.     Viki Osorio DPM, PGY-2  Podiatric Surgical Resident  1/19/2023   8:29 AM

## 2023-01-19 NOTE — CARE COORDINATION
1/19/23, 10:38 AM EST    DISCHARGE PLANNING EVALUATION    [facility] will accept, working on precert for admission

## 2023-01-19 NOTE — CARE COORDINATION
1/19/23, 2:27 PM EST    DISCHARGE PLANNING EVALUATION    [facility] is unable to accept due to insurance. Discussed other options with patient, he requests [employer], referral initiated.

## 2023-01-19 NOTE — PROGRESS NOTES
Progress note: Infectious diseases    Patient - Mendy Ritchie,  Age - 65 y.o.    - 1960      Room Number - 7K-04/004-A   MRN -  [id number]   Acct # - [de-identified]  Date of Admission -  1/10/2023 12:28 PM    SUBJECTIVE:   No new complaints. No fever  OBJECTIVE   VITALS    height is 6' 2\" (1.88 m) and weight is 279 lb 4.8 oz (126.7 kg). His oral temperature is 97.6 °F (36.4 °C). His blood pressure is 122/80 and his pulse is 68. His respiration is 16 and oxygen saturation is 99%. Wt Readings from Last 3 Encounters:   23 279 lb 4.8 oz (126.7 kg)   22 (!) 310 lb (140.6 kg)   22 (!) 310 lb (140.6 kg)       I/O (24 Hours)    Intake/Output Summary (Last 24 hours) at 2023 0828  Last data filed at 2023 0450  Gross per 24 hour   Intake 1210 ml   Output 2200 ml   Net -990 ml         General Appearance: awake and oriented  HEENT - normocephalic, atraumatic, pale conjunctiva,  anicteric sclera  Neck - Supple, no mass  Lungs -  Bilateral   air entry, no rhonchi, no wheeze. Cardiovascular - Heart sounds are normal.     Abdomen - soft, not distended, nontender,   Neurologic -oriented  Skin - No bruising or bleeding  Extremities -  clean left BKA stump.        MEDICATIONS:      sennosides-docusate sodium  1 tablet Oral BID    polyethylene glycol  17 g Oral Daily    enoxaparin  30 mg SubCUTAneous Q12H    [Held by provider] insulin glargine  31 Units SubCUTAneous BID    insulin lispro  0-16 Units SubCUTAneous TID     insulin lispro  0-4 Units SubCUTAneous Nightly    amLODIPine  10 mg Oral Daily    atorvastatin  80 mg Oral Daily    ferrous sulfate  325 mg Oral BID    furosemide  40 mg Oral Daily    gabapentin  600 mg Oral BID    minoxidil  10 mg Oral Daily    multivitamin  1 tablet Oral Daily    potassium chloride  20 mEq Oral Daily    valsartan  160 mg Oral Daily    aspirin  81 mg Oral Daily clopidogrel  75 mg Oral Daily    tiotropium  2 puff Inhalation Daily    cloNIDine  0.1 mg Oral BID    metoprolol tartrate  50 mg Oral BID    sodium chloride flush  5-40 mL IntraVENous 2 times per day      dextrose      sodium chloride 20 mL/hr at 01/13/23 0020     acetaminophen **OR** acetaminophen, albuterol sulfate HFA, glucose, dextrose bolus **OR** dextrose bolus, glucagon (rDNA), dextrose, sodium chloride flush, sodium chloride, ondansetron **OR** ondansetron, oxyCODONE **OR** oxyCODONE, morphine **OR** morphine      LABS:     CBC:   Recent Labs     01/19/23  0559   WBC 5.0   HGB 8.3*          BMP:    Recent Labs     01/18/23  0555 01/19/23  0559    140   K 4.4 4.4    101   CO2 28 26   BUN 23* 24*   CREATININE 1.5* 1.4*   GLUCOSE 97 194*       Calcium:  Recent Labs     01/19/23  0559   CALCIUM 8.9        Recent Labs     01/18/23  1545 01/18/23  1942 01/19/23  0544   POCGLU 140* 138* 208*           Problem list of patient:     Patient Active Problem List   Diagnosis Code    Gangrene of toe of left foot ([facility] I96    CAD (coronary artery disease) I25.10    Type 2 diabetes mellitus with insulin therapy ([facility]) E11.9, Z79.4    Hyperlipidemia E78.5    Hypertension I10    Charcot's joint, right ankle and foot M14.671    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    Sepsis ([facility] A41.9    Acute left-sided low back pain with bilateral sciatica M54.42, M54.41    S/P transmetatarsal amputation of foot, left ([facility]) Z89.432    Leukocytosis D72.829    Wound dehiscence, surgical, initial encounter T81.31XA    Postoperative wound infection T15.04ZH    Metabolic acidosis C52.74    Hx of CABG Z95.1    Lumbar stenosis without neurogenic claudication M48.061    CKD (chronic kidney disease), stage II N18.2    Normocytic anemia D64.9    Morbid obesity ([facility]) E66.01    Debility R53.81    Carotid stenosis, asymptomatic, bilateral I65.23    Hypokalemia E87.6    Nonhealing ulcer of left lower extremity ([address] L97.929    Bleeding R58    Wound, open T14. 8XXA    SOB (shortness of breath) on exertion R06.02    Hemoglobin A1C greater than 9%, indicating poor diabetic control R73.09    Hypertensive emergency I16.1    Acute on chronic congestive heart failure ([facility]) I50.9    Hypertensive urgency I16.0    Moderate persistent asthma J45.40    Septic arthritis of left ankle, due to unspecified organism ([facility]) M00.9    Closed fracture of ankle with nonunion S82.899K    S/P BKA (below knee amputation), left ([facility]) Z37.732         ASSESSMENT/PLAN   Necrotizing left foot/ankle infection: had urgent BKA. The stump is healing well   Poorly controlled diabetes  CAD  Plan for ECF.          Terri Hastings MD, MD, FACP 1/19/2023 8:28 AM

## 2023-01-19 NOTE — PLAN OF CARE
Problem: Discharge Planning  Goal: Discharge to home or other facility with appropriate resources  Outcome: Progressing  Flowsheets (Taken 1/19/2023 1731)  Discharge to home or other facility with appropriate resources:   Identify barriers to discharge with patient and caregiver   Identify discharge learning needs (meds, wound care, etc)   Refer to discharge planning if patient needs post-hospital services based on physician order or complex needs related to functional status, cognitive ability or social support system   Arrange for needed discharge resources and transportation as appropriate     Problem: Pain  Goal: Verbalizes/displays adequate comfort level or baseline comfort level  Outcome: Progressing  Flowsheets  Taken 1/19/2023 1731 by Adan Hagen RN  Verbalizes/displays adequate comfort level or baseline comfort level:   Encourage patient to monitor pain and request assistance   Consider cultural and social influences on pain and pain management   Administer analgesics based on type and severity of pain and evaluate response   Assess pain using appropriate pain scale   Implement non-pharmacological measures as appropriate and evaluate response   Notify Licensed Independent Practitioner if interventions unsuccessful or patient reports new pain  Taken 1/19/2023 0400 by Kj Mcnamara RN  Verbalizes/displays adequate comfort level or baseline comfort level:   Encourage patient to monitor pain and request assistance   Assess pain using appropriate pain scale     Problem: ABCDS Injury Assessment  Goal: Absence of physical injury  Outcome: Progressing  Flowsheets (Taken 1/19/2023 1731)  Absence of Physical Injury: Implement safety measures based on patient assessment     Problem: Infection - Adult  Goal: Absence of infection at discharge  Outcome: Progressing  Flowsheets (Taken 1/19/2023 1731)  Absence of infection at discharge:   Assess and monitor for signs and symptoms of infection   Monitor lab/diagnostic results   Monitor all insertion sites i.e., indwelling lines, tubes and drains   Administer medications as ordered   Instruct and encourage patient and family to use good hand hygiene technique     Problem: Chronic Conditions and Co-morbidities  Goal: Patient's chronic conditions and co-morbidity symptoms are monitored and maintained or improved  Outcome: Progressing  Flowsheets (Taken 1/19/2023 1731)  Care Plan - Patient's Chronic Conditions and Co-Morbidity Symptoms are Monitored and Maintained or Improved:   Monitor and assess patient's chronic conditions and comorbid symptoms for stability, deterioration, or improvement   Collaborate with multidisciplinary team to address chronic and comorbid conditions and prevent exacerbation or deterioration   Update acute care plan with appropriate goals if chronic or comorbid symptoms are exacerbated and prevent overall improvement and discharge     Problem: Skin/Tissue Integrity - Adult  Goal: Incisions, wounds, or drain sites healing without S/S of infection  Outcome: Progressing  Flowsheets (Taken 1/19/2023 1731)  Incisions, Wounds, or Drain Sites Healing Without Sign and Symptoms of Infection:   ADMISSION and DAILY: Assess and document risk factors for pressure ulcer development   TWICE DAILY: Assess and document skin integrity   TWICE DAILY: Assess and document dressing/incision, wound bed, drain sites and surrounding tissue   Implement wound care per orders     Problem: Musculoskeletal - Adult  Goal: Return mobility to safest level of function  Outcome: Progressing  Flowsheets (Taken 1/19/2023 1731)  Return Mobility to Safest Level of Function:   Assess patient stability and activity tolerance for standing, transferring and ambulating with or without assistive devices   Assist with transfers and ambulation using safe patient handling equipment as needed   Ensure adequate protection for wounds/incisions during mobilization   Obtain physical therapy/occupational therapy consults as needed   Instruct patient/family in ordered activity level  Goal: Return ADL status to a safe level of function  Outcome: Progressing  Flowsheets (Taken 1/19/2023 1731)  Return ADL Status to a Safe Level of Function:   Administer medication as ordered   Obtain physical therapy/occupational therapy consults as needed   Assess activities of daily living deficits and provide assistive devices as needed   Assist and instruct patient to increase activity and self care as tolerated     Problem: Gastrointestinal - Adult  Goal: Maintains or returns to baseline bowel function  Outcome: Progressing  Flowsheets (Taken 1/19/2023 1731)  Maintains or returns to baseline bowel function:   Assess bowel function   Encourage oral fluids to ensure adequate hydration   Administer ordered medications as needed   Encourage mobilization and activity     Problem: Metabolic/Fluid and Electrolytes - Adult  Goal: Glucose maintained within prescribed range  Outcome: Progressing  Flowsheets (Taken 1/19/2023 1731)  Glucose maintained within prescribed range:   Monitor blood glucose as ordered   Assess for signs and symptoms of hyperglycemia and hypoglycemia   Administer ordered medications to maintain glucose within target range   Instruct patient on self management of diabetes and initiate consult as needed     Problem: Hematologic - Adult  Goal: Maintains hematologic stability  Outcome: Progressing  Flowsheets (Taken 1/19/2023 1731)  Maintains hematologic stability:   Assess for signs and symptoms of bleeding or hemorrhage   Monitor labs for bleeding or clotting disorders     Problem: Nutrition Deficit:  Goal: Optimize nutritional status  Outcome: Progressing  Flowsheets (Taken 1/19/2023 1731)  Nutrient intake appropriate for improving, restoring, or maintaining nutritional needs:   Assess nutritional status and recommend course of action   Monitor oral intake, labs, and treatment plans   Care plan reviewed with patient. Patient verbalizes understanding of the plan of care and contributes to goal setting.

## 2023-01-19 NOTE — DISCHARGE INSTR - COC
Continuity of Care Form    Patient Name: Priyank Kiser   :  1960  MRN:  [id number]    Admit date:  1/10/2023  Discharge date:  23    Code Status Order: Full Code   Advance Directives:     Admitting Physician:  Sid Murcia DPM  PCP: EYAD Blakely CNP    Discharging Nurse: [facility] Unit/Room#: 7K-04/004-A  Discharging Unit Phone Number: [phone]    Emergency Contact:   Extended Emergency Contact Information  Primary Emergency Contact: [facility] Phone: [phone]  Relation: Brother/Sister  Secondary Emergency Contact: [facility] Phone: [phone]  Mobile Phone: [phone]  Relation: Parent  Preferred language: English   needed?  No    Past Surgical History:  Past Surgical History:   Procedure Laterality Date    CARDIAC SURGERY  2019    triple bypass    CYST REMOVAL      RIGHT ANKLE     FOOT AMPUTATION Left 1/10/2023    LEFT BELOW KNEE AMPUTATION performed by Sid Murcia DPM at [facility] 2020    LEFT TRANSMETATARSAL AMPUTATION  FOOT INCISION AND DRAINAGE performed by Sid Murcia DPM at [facility] 2020    LEFT WOUND DEBRIDEMENT WITH WOUND VAC APPLICATION performed by Sid Murcia DPM at [facility] 2022    LEFT FOOT [facility], APPLICATION SKIN SUBSTITUTE GRAFT, APPLICATION [facility] WOUND VAC performed by Sid Murcia DPM at [address] Right     CYST REMOVED    FOOT SURGERY Left 2020    LEFT FOOT PREPARATION GRAFT SITE, HAVEST SPLIT THICKNESS SKIN GRAFT WITH APPLICATION, APPLICATION [facility] WOUND VAC performed by Sid Murcia DPM at [address] [facility] [address] Left 3/3/2020    I&D LEFT FOOT, TRANSMETATARSAL AMPUTATION performed by Sid Murcia DPM at [address] [place] [address] 2022    EGD performed by Jericho Isabel Curtis Dale MD at [facility]       Immunization History:   Immunization History   Administered Date(s) Administered    COVID-19, J&J, (age 18y+), IM, 0.5 mL 03/15/2021    Influenza Virus Vaccine 12/02/2019, 12/02/2019    Influenza, FLUAD, (age 72 y+), Adjuvanted, 0.5mL 11/16/2022    Pneumococcal Conjugate 13-valent (Vwrusgc50) 08/24/2022    Pneumococcal Polysaccharide ([id number]) 12/02/2019, 12/02/2019    Td (Adult), 5 Lf Tetanus Toxoid, Pf (Tenivac, Decavac) 01/24/2009       Active Problems:  Patient Active Problem List   Diagnosis Code    Gangrene of toe of left foot ([facility] I96    CAD (coronary artery disease) I25.10    Type 2 diabetes mellitus with insulin therapy ([facility]) E11.9, Z79.4    Hyperlipidemia E78.5    Hypertension I10    Charcot's joint, right ankle and foot M14.671    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    Sepsis ([facility] A41.9    Acute left-sided low back pain with bilateral sciatica M54.42, M54.41    S/P transmetatarsal amputation of foot, left ([facility]) Z89.432    Leukocytosis D72.829    Wound dehiscence, surgical, initial encounter T81.31XA    Postoperative wound infection Z22.93WC    Metabolic acidosis N55.19    Hx of CABG Z95.1    Lumbar stenosis without neurogenic claudication M48.061    CKD (chronic kidney disease), stage II N18.2    Normocytic anemia D64.9    Morbid obesity ([facility]) E66.01    Debility R53.81    Carotid stenosis, asymptomatic, bilateral I65.23    Hypokalemia E87.6    Nonhealing ulcer of left lower extremity ([facility] L97.929    Bleeding R58    Wound, open T14. 8XXA    SOB (shortness of breath) on exertion R06.02    Hemoglobin A1C greater than 9%, indicating poor diabetic control R73.09    Hypertensive emergency I16.1    Acute on chronic congestive heart failure ([facility]) I50.9    Hypertensive urgency I16.0    Moderate persistent asthma J45.40    Septic arthritis of left ankle, due to unspecified organism ([facility]) M00.9    Closed fracture of ankle with nonunion S82.899K    S/P ALFREDOA (below knee amputation), left ([facility] Z89.512       Isolation/Infection:   Isolation            No Isolation          Patient Infection Status       Infection Onset Added Last Indicated Last Indicated By Review Planned Expiration Resolved Resolved By    None active    Resolved    COVID-19 22 COVID-19 & Influenza Combo   22     COVID-19 (Rule Out) 22 COVID-19 & Influenza Combo (Ordered)   22 Rule-Out Test Resulted    COVID-19 (Rule Out) 20 Covid-19 Ambulatory (Ordered)   20 Rule-Out Test Resulted    COVID-19 (Rule Out) 20 COVID-19 (Ordered)   20 Rule-Out Test Resulted    COVID-19 (Rule Out) 20 COVID-19 (Ordered)   20 Rule-Out Test Resulted            Nurse Assessment:  Last Vital Signs: /68   Pulse 71   Temp 97.7 °F (36.5 °C) (Oral)   Resp 16   Ht 6' 2\" (1.88 m)   Wt 279 lb 4.8 oz (126.7 kg)   SpO2 95%   BMI 35.86 kg/m²     Last documented pain score (0-10 scale): Pain Level: 0  Last Weight:   Wt Readings from Last 1 Encounters:   23 279 lb 4.8 oz (126.7 kg)     Mental Status:  oriented and alert    IV Access:  - None    Nursing Mobility/ADLs:  Walking   Assisted  Transfer  Assisted  Bathing  Assisted  Dressing  Assisted  Toileting  Assisted  Feeding  Independent  Med Admin  Assisted  Med Delivery   whole    Wound Care Documentation and Therapy:  Wound 01/10/23 Foot Left (Active)   Dressing Status Clean;Dry; Intact; Other (Comment) 23   Dressing/Treatment Dry dressing; Ace wrap 01/15/23 1951   Wound Assessment Other (Comment) 01/15/23 1557   Drainage Amount Small 01/10/23 1325   Drainage Description Yellow 01/10/23 1325   Odor Moderate 01/10/23 1325   Sumaya-wound Assessment Other (Comment) 01/15/23 1951   Number of days: 8       Incision 22 Foot Anterior; Left (Active)   Number of days: 202       Incision 01/10/23 Leg Left; Lower (Active)   Dressing Status Clean;Dry; Intact 01/19/23 0923   Dressing/Treatment Dry dressing; Ace wrap 01/19/23 0923   Closure Other (Comment) 01/18/23 2130   Incision Assessment Other (Comment) 01/17/23 0920   Drainage Amount None 01/19/23 0923   Odor None 01/19/23 0923   Sumaya-incision Assessment Other (Comment) 01/17/23 0920   Number of days: 8       Incision 07/20/20 Left;Plantar (Active)   Number of days: 913       Incision 08/14/20 Right (Active)   Number of days: 172        Elimination:  Continence: Bowel: Yes  Bladder: Yes  Urinary Catheter: None   Colostomy/Ileostomy/Ileal Conduit: No       Date of Last BM: 1/21/23    Intake/Output Summary (Last 24 hours) at 1/19/2023 1038  Last data filed at 1/19/2023 0450  Gross per 24 hour   Intake 1210 ml   Output 2200 ml   Net -990 ml     I/O last 3 completed shifts: In: 65 [P.O.:1600; I.V.:10]  Out: 4400 [Urine:4400]    Safety Concerns:     History of Falls (last 30 days) and At Risk for Falls    Impairments/Disabilities:      Amputation - left leg    Nutrition Therapy:  Current Nutrition Therapy:   - Oral Diet:  Carb Control 3 carbs/meal (1500kcals/day)    Routes of Feeding: Oral  Liquids: No Restrictions  Daily Fluid Restriction: no  Last Modified Barium Swallow with Video (Video Swallowing Test): not done    Treatments at the Time of Hospital Discharge:   Respiratory Treatments: NA  Oxygen Therapy:  is not on home oxygen therapy.   Ventilator:    - No ventilator support    Rehab Therapies: Physical Therapy and Occupational Therapy  Weight Bearing Status/Restrictions: Non-weight bearing on left leg  Other Medical Equipment (for information only, NOT a DME order):  wheelchair, walker, and slide board  Other Treatments: keep wound clean and dry and dressings every 2-3 days with Betadine to incision, cover with 4 x 4 gauze, Kerlix roll, and an Ace bandage    Patient's personal belongings (please select all that are sent with patient):  Cam WILKERSON SIGNATURE: Electronically signed by Bradley Oconnell RN on 1/25/23 at 2:10 PM EST    CASE MANAGEMENT/SOCIAL WORK SECTION    Inpatient Status Date: 01/10/2023    Readmission Risk Assessment Score:  Readmission Risk              Risk of Unplanned Readmission:  28           Discharging to Facility/ Agency   Name: [facility]  Address:  [address]  [address]  Phone: [phone]  Fax: [phone]    Dialysis Facility (if applicable)   Name:  Address:  Dialysis Schedule:  Phone:  Fax:    / signature:  Electronically signed by MACEY Bolaños on 1/24/2023 at 3:09 PM     PHYSICIAN SECTION    Prognosis: Fair    Condition at Discharge: Stable    Rehab Potential (if transferring to Rehab): Good    Recommended Labs or Other Treatments After Discharge: Dressing changes: please apply betadine to the left leg amputation incision and cover with 4x4 gauze, ABD pad, Kerlix roll, and ACE bandage, and change every 2-3 days. Thank you! Physician Certification: I certify the above information and transfer of Mari Chang  is necessary for the continuing treatment of the diagnosis listed and that he requires [facility] for greater 30 days.      Update Admission H&P: No change in H&P    PHYSICIAN SIGNATURE:  Electronically signed by Brayden Villarreal DPM on 1/25/23 at 12:13 PM EST

## 2023-01-19 NOTE — PROGRESS NOTES
[address] [address]  INPATIENT PHYSICAL THERAPY  DAILY NOTE  [facility] ORTHOPEDICS 7K - 7K-04/004-A      Time In: 9874  Time Out: 1331  Timed Code Treatment Minutes: 44 Minutes  Minutes: 39          Date: 2023  Patient Name: Carina Cardona,  Gender:  male        MRN: [id number]  : 1960  (65 y.o.)     Referring Practitioner: Alin Hernandez DPM  Diagnosis: sepsis  Additional Pertinent Hx: per EMR \"The patient is a 65 y.o. male with significant past medical history of CAD, CKD, diabetes, hyperlipidemia, and hypertension who is being seen at bedside on behalf of Dr. Leisa Hunt. Patient relates that he has had ongoing problems with his left foot and ankle over the course of the past few years. 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 He relates that he follows up and sees Dr. Leisa Hunt in clinic weekly. He relates that he missed his appointment last week and did not have the dressing changed. The patient states that the previous week he had x-rays taken in clinic that showed a pathologic fracture of the left tibia. He relates that he has not been walking on his left lower extremity. He relates that he has applied some pressure when using the restroom on that leg. He states that amputation has been in discussion at his previous clinic encounters. The patient relates that he last a last evening at 8 or 9 PM.  He relates that he drank Crystal light tea around 8 or 9 AM this morning. He relates that he took Plavix this morning at 6 AM.  The patient denies any other concerns to his right lower extremity. The patient denies nausea, vomiting, fever, chills, shortness of breath or chest pain. \" Pt is s/p L BKA on 1/10/23 completed by Dr. Leisa Hunt.      Prior Level of Function:  Lives With: Alone  Type of Home: House  Home Layout: One level  Home Access: Ramped entrance  Home Equipment: Electric scooter, [employer]   Bathroom Shower/Tub: Walk-in shower, Shower chair with back  Bathroom Toilet: Handicap height  Bathroom Equipment: Grab bars in shower, Grab bars around toilet    Receives Help From: Friend(s)  ADL Assistance: Independent  Homemaking Assistance: Needs assistance  Homemaking Responsibilities: Yes  Ambulation Assistance: Independent  Transfer Assistance: Independent  Active : No  IADL Comments: He has a robert who works for him - worker's wife makes meals from that he only has to heat up in microwave and assists with cleaning tasks  Additional Comments: Pt reported that he has a very supportive family who can be available intermittently throughout the day upon discharge home.  Pt reports he was amb very little, using his scooter or w/c most of the time    Restrictions/Precautions:  Restrictions/Precautions: General Precautions, Weight Bearing, Fall Risk  Left Lower Extremity Weight Bearing: Non Weight Bearing (BKA)  Position Activity Restriction  Other position/activity restrictions: L BKA       SUBJECTIVE: pt agreeable for therapy he cont to c/o of sore on buttocks and has been off loading while in the bed, nursing reported ok'd to place mepilex to area when he is doing his slide board transfers however the sore is rather high on his buttocks and doesn't appear to be affected from him scooting - pt reported that he does have Charcot Rekha Tooth and had to place any sx on his right foot for several years but he reported that he is waiting for someone to get him a shoe and brace for his right foot -     PAIN sore buttocks     Vitals: Vitals not assessed per clinical judgement, see nursing flowsheet    OBJECTIVE:  Bed Mobility:  Rolling to Right: Stand By Assistance, with rail   Supine to Sit: [address], with head of bed raised, with rail  Sit to Supine: Stand By Assistance, however once in bed he needed min assist to get repositioned    Scooting: Contact Guard Assistance    Transfers:  Attempted graded lifts from edge of bed however pt completed 2 full sit to stand tranfers with one UE at support of bedrail, pt was also able to complete side scoots to [facility] with cues for increased wbing at rihgt LE and to lift his hips up vs just sliding       Balance:  Sitting edge of bed unsupported with supervision - pt completed reaching activity out of base of support with supervision   Pt completed 2 full stands w/ just right UE on rail and did guard at his right LE however noted fair quad stability he required CGA for standing for ~10 sec   Exercise:  Patient was guided in 1 set(s) 10-15 reps of exercise to both lower extremities. Glut sets, Quad sets, Heelslides, Short arc quads, Hip abduction/adduction, Straight leg raises, and modified bridges, diag curl ups, seated long arc quads, hip felxion, hamstring curls with thera band, hip abd/add with thera band resistance- noted decreased control and coord at left LE as demonstrated w/ prolonged ex  . Exercises were completed for increased independence with functional mobility. Functional Outcome Measures: Completed  -PAC Inpatient Mobility without Stair Climbing Raw Score : 13  -PAC Inpatient without Stair Climbing T-Scale Score : 38.96    ASSESSMENT:  Assessment: Patient progressing toward established goals. Activity Tolerance:  Patient tolerance of  treatment: fair.       Equipment Recommendations:Equipment Needed: No  Discharge Recommendations: Subacte/Skilled Nursing Facility  Plan: Current Treatment Recommendations: Strengthening, Balance training, Functional mobility training, Transfer training, Endurance training, Equipment evaluation, education, & procurement, Patient/Caregiver education & training, Neuromuscular re-education, Safety education & training, Home exercise program, Therapeutic activities  General Plan:  (6x O)    Patient Education  Patient Education: Plan of Care    Goals:  Patient Goals : \"be able to get in and out bed and w/c with less assist\"  Short Term Goals  Time Frame for Short Term Goals: by discharge  Short Term Goal 1: Pt will demo supine to and from sit transfers with SBA and modifications as needed to progress with mobility. Short Term Goal 2: Pt will demo slide board transfers to and from w/c with mod Ax1 to progress with mobility. Short Term Goal 3: Pt will tolerate 10-20 reps of ther ex to increase overall mobility. Short Term Goal 4: Pt will demo S for w/c mobility for 100 feet to increase IND with mobility. Short Term Goal 5: PT to assess sit to/from stand transfers when appropriate. Long Term Goals  Time Frame for Long Term Goals : NA due to short ELOS    Following session, patient left in safe position with all fall risk precautions in place.

## 2023-01-20 LAB
GLUCOSE BLD STRIP.AUTO-MCNC: 143 MG/DL (ref 70–108)
GLUCOSE BLD STRIP.AUTO-MCNC: 147 MG/DL (ref 70–108)
GLUCOSE BLD STRIP.AUTO-MCNC: 214 MG/DL (ref 70–108)
GLUCOSE BLD STRIP.AUTO-MCNC: 262 MG/DL (ref 70–108)

## 2023-01-20 PROCEDURE — 94640 AIRWAY INHALATION TREATMENT: CPT

## 2023-01-20 PROCEDURE — 94760 N-INVAS EAR/PLS OXIMETRY 1: CPT

## 2023-01-20 PROCEDURE — 6370000000 HC RX 637 (ALT 250 FOR IP)

## 2023-01-20 PROCEDURE — 6370000000 HC RX 637 (ALT 250 FOR IP): Performed by: PHYSICIAN ASSISTANT

## 2023-01-20 PROCEDURE — 97110 THERAPEUTIC EXERCISES: CPT

## 2023-01-20 PROCEDURE — 99024 POSTOP FOLLOW-UP VISIT: CPT | Performed by: NURSE PRACTITIONER

## 2023-01-20 PROCEDURE — 6370000000 HC RX 637 (ALT 250 FOR IP): Performed by: NURSE PRACTITIONER

## 2023-01-20 PROCEDURE — 6360000002 HC RX W HCPCS: Performed by: PHYSICIAN ASSISTANT

## 2023-01-20 PROCEDURE — 2580000003 HC RX 258

## 2023-01-20 PROCEDURE — 82948 REAGENT STRIP/BLOOD GLUCOSE: CPT

## 2023-01-20 PROCEDURE — 1200000000 HC SEMI PRIVATE

## 2023-01-20 PROCEDURE — 97535 SELF CARE MNGMENT TRAINING: CPT

## 2023-01-20 PROCEDURE — 97530 THERAPEUTIC ACTIVITIES: CPT

## 2023-01-20 PROCEDURE — 6370000000 HC RX 637 (ALT 250 FOR IP): Performed by: STUDENT IN AN ORGANIZED HEALTH CARE EDUCATION/TRAINING PROGRAM

## 2023-01-20 RX ADMIN — INSULIN GLARGINE 10 UNITS: 100 INJECTION, SOLUTION SUBCUTANEOUS at 10:22

## 2023-01-20 RX ADMIN — INSULIN GLARGINE 10 UNITS: 100 INJECTION, SOLUTION SUBCUTANEOUS at 21:01

## 2023-01-20 RX ADMIN — FERROUS SULFATE TAB 325 MG (65 MG ELEMENTAL FE) 325 MG: 325 (65 FE) TAB at 10:18

## 2023-01-20 RX ADMIN — FERROUS SULFATE TAB 325 MG (65 MG ELEMENTAL FE) 325 MG: 325 (65 FE) TAB at 21:00

## 2023-01-20 RX ADMIN — METOPROLOL TARTRATE 50 MG: 50 TABLET, FILM COATED ORAL at 05:37

## 2023-01-20 RX ADMIN — ATORVASTATIN CALCIUM 80 MG: 80 TABLET, FILM COATED ORAL at 10:18

## 2023-01-20 RX ADMIN — GABAPENTIN 600 MG: 600 TABLET, FILM COATED ORAL at 10:18

## 2023-01-20 RX ADMIN — SODIUM CHLORIDE, PRESERVATIVE FREE 10 ML: 5 INJECTION INTRAVENOUS at 10:27

## 2023-01-20 RX ADMIN — Medication 1 TABLET: at 10:18

## 2023-01-20 RX ADMIN — ENOXAPARIN SODIUM 30 MG: 100 INJECTION SUBCUTANEOUS at 23:09

## 2023-01-20 RX ADMIN — ASPIRIN 81 MG 81 MG: 81 TABLET ORAL at 10:18

## 2023-01-20 RX ADMIN — SODIUM CHLORIDE, PRESERVATIVE FREE 10 ML: 5 INJECTION INTRAVENOUS at 20:59

## 2023-01-20 RX ADMIN — CLOPIDOGREL BISULFATE 75 MG: 75 TABLET ORAL at 10:18

## 2023-01-20 RX ADMIN — OXYCODONE 10 MG: 5 TABLET ORAL at 20:59

## 2023-01-20 RX ADMIN — POLYETHYLENE GLYCOL 3350 17 G: 17 POWDER, FOR SOLUTION ORAL at 10:18

## 2023-01-20 RX ADMIN — TIOTROPIUM BROMIDE INHALATION SPRAY 2 PUFF: 3.12 SPRAY, METERED RESPIRATORY (INHALATION) at 10:09

## 2023-01-20 RX ADMIN — OXYCODONE 5 MG: 5 TABLET ORAL at 10:18

## 2023-01-20 RX ADMIN — OXYCODONE 10 MG: 5 TABLET ORAL at 02:51

## 2023-01-20 RX ADMIN — INSULIN LISPRO 8 UNITS: 100 INJECTION, SOLUTION INTRAVENOUS; SUBCUTANEOUS at 11:57

## 2023-01-20 RX ADMIN — ENOXAPARIN SODIUM 30 MG: 100 INJECTION SUBCUTANEOUS at 10:19

## 2023-01-20 RX ADMIN — SENNOSIDES AND DOCUSATE SODIUM 1 TABLET: 50; 8.6 TABLET ORAL at 20:59

## 2023-01-20 RX ADMIN — METOPROLOL TARTRATE 50 MG: 50 TABLET, FILM COATED ORAL at 18:37

## 2023-01-20 RX ADMIN — FUROSEMIDE 40 MG: 40 TABLET ORAL at 10:18

## 2023-01-20 RX ADMIN — GABAPENTIN 600 MG: 600 TABLET, FILM COATED ORAL at 20:59

## 2023-01-20 RX ADMIN — POTASSIUM CHLORIDE 20 MEQ: 1500 TABLET, EXTENDED RELEASE ORAL at 10:18

## 2023-01-20 RX ADMIN — SENNOSIDES AND DOCUSATE SODIUM 1 TABLET: 50; 8.6 TABLET ORAL at 10:18

## 2023-01-20 ASSESSMENT — PAIN DESCRIPTION - LOCATION: LOCATION: LEG

## 2023-01-20 ASSESSMENT — PAIN SCALES - GENERAL
PAINLEVEL_OUTOF10: 7
PAINLEVEL_OUTOF10: 4
PAINLEVEL_OUTOF10: 4

## 2023-01-20 ASSESSMENT — PAIN DESCRIPTION - ORIENTATION: ORIENTATION: LEFT

## 2023-01-20 NOTE — CARE COORDINATION
1/20/23, 8:48 AM EST    DISCHARGE PLANNING EVALUATION    Spoke with [facility] admissions. Chart information faxed again, facility will review today.

## 2023-01-20 NOTE — PROGRESS NOTES
Progress note: Infectious diseases    Patient - Delmy Marroquin,  Age - 65 y.o.    - 1960      Room Number - 7K-04/004-A   MRN -  [id number]   Acct # - [de-identified]  Date of Admission -  1/10/2023 12:28 PM    SUBJECTIVE:   No new complaints. OBJECTIVE   VITALS    height is 6' 2\" (1.88 m) and weight is 279 lb (126.6 kg). His oral temperature is 97.5 °F (36.4 °C). His blood pressure is 117/87 and his pulse is 76. His respiration is 16 and oxygen saturation is 97%. Wt Readings from Last 3 Encounters:   23 279 lb (126.6 kg)   22 (!) 310 lb (140.6 kg)   22 (!) 310 lb (140.6 kg)       I/O (24 Hours)    Intake/Output Summary (Last 24 hours) at 2023 0837  Last data filed at 2023 0252  Gross per 24 hour   Intake 1190 ml   Output 1825 ml   Net -635 ml         General Appearance: awake and oriented  HEENT - normocephalic, atraumatic, pale conjunctiva,  anicteric sclera  Neck - Supple, no mass  Lungs -  Bilateral   air entry, no rhonchi, no wheeze. Cardiovascular - Heart sounds are normal.     Abdomen - soft, not distended, nontender,   Neurologic -oriented  Skin - No bruising or bleeding  Extremities -  clean left BKA stump.        MEDICATIONS:      insulin glargine  10 Units SubCUTAneous BID    sennosides-docusate sodium  1 tablet Oral BID    polyethylene glycol  17 g Oral Daily    enoxaparin  30 mg SubCUTAneous Q12H    insulin lispro  0-16 Units SubCUTAneous TID     insulin lispro  0-4 Units SubCUTAneous Nightly    amLODIPine  10 mg Oral Daily    atorvastatin  80 mg Oral Daily    ferrous sulfate  325 mg Oral BID    furosemide  40 mg Oral Daily    gabapentin  600 mg Oral BID    minoxidil  10 mg Oral Daily    multivitamin  1 tablet Oral Daily    potassium chloride  20 mEq Oral Daily    valsartan  160 mg Oral Daily    aspirin  81 mg Oral Daily    clopidogrel  75 mg Oral Daily    tiotropium  2 puff Inhalation Daily    cloNIDine  0.1 mg Oral BID    metoprolol tartrate  50 mg Oral BID    sodium chloride flush  5-40 mL IntraVENous 2 times per day      dextrose      sodium chloride 20 mL/hr at 01/13/23 0020     acetaminophen **OR** acetaminophen, albuterol sulfate HFA, glucose, dextrose bolus **OR** dextrose bolus, glucagon (rDNA), dextrose, sodium chloride flush, sodium chloride, ondansetron **OR** ondansetron, oxyCODONE **OR** oxyCODONE, morphine **OR** morphine      LABS:     CBC:   Recent Labs     01/19/23  0559   WBC 5.0   HGB 8.3*          BMP:    Recent Labs     01/18/23  0555 01/19/23  0559    140   K 4.4 4.4    101   CO2 28 26   BUN 23* 24*   CREATININE 1.5* 1.4*   GLUCOSE 97 194*       Calcium:  Recent Labs     01/19/23  0559   CALCIUM 8.9        Recent Labs     01/19/23  1614 01/19/23 2012 01/20/23  0616   POCGLU 150* 169* 147*           Problem list of patient:     Patient Active Problem List   Diagnosis Code    Gangrene of toe of left foot ([address] I96    CAD (coronary artery disease) I25.10    Type 2 diabetes mellitus with insulin therapy ([facility]) E11.9, Z79.4    Hyperlipidemia E78.5    Hypertension I10    Charcot's joint, right ankle and foot M14.671    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    Sepsis ([address] A41.9    Acute left-sided low back pain with bilateral sciatica M54.42, M54.41    S/P transmetatarsal amputation of foot, left ([facility]) Z89.432    Leukocytosis D72.829    Wound dehiscence, surgical, initial encounter T81.31XA    Postoperative wound infection J24.67OL    Metabolic acidosis S66.28    Hx of CABG Z95.1    Lumbar stenosis without neurogenic claudication M48.061    CKD (chronic kidney disease), stage II N18.2    Normocytic anemia D64.9    Morbid obesity ([facility]) E66.01    Debility R53.81    Carotid stenosis, asymptomatic, bilateral I65.23    Hypokalemia E87.6    Nonhealing ulcer of left lower extremity ([address] L97.929    Bleeding R58    Wound, open T14. 8XXA    SOB (shortness of breath) on exertion R06.02    Hemoglobin A1C greater than 9%, indicating poor diabetic control R73.09    Hypertensive emergency I16.1    Acute on chronic congestive heart failure ([facility]) I50.9    Hypertensive urgency I16.0    Moderate persistent asthma J45.40    Septic arthritis of left ankle, due to unspecified organism ([facility]) M00.9    Closed fracture of ankle with nonunion S82.899K    S/P BKA (below knee amputation), left ([facility]) D76.035         ASSESSMENT/PLAN   Necrotizing left foot/ankle infection: had urgent BKA.  The stump is healing well   Poorly controlled diabetes  CAD  Plan for ECF.awaiting insurance approval         Toño Moore MD, MD, FACP 1/20/2023 8:37 AM

## 2023-01-20 NOTE — PROGRESS NOTES
[facility]  Occupational Therapy  Daily Note  Time:   Time In: 0810  Time Out: 2965  Timed Code Treatment Minutes: 28 Minutes  Minutes: 32          Date: 2023  Patient Name: Mariel Vargas,   Gender: male      [facility]  MRN: [id number]  : 1960  (65 y.o.)  Referring Practitioner: Marco Cerrato DPM  Diagnosis: Sepsis  Additional Pertinent Hx: The patient is a 65 y.o. male with significant past medical history of CAD, CKD, diabetes, hyperlipidemia, and hypertension who is being seen at bedside on behalf of Dr. Nathaniel Sampson. Patient relates that he has had ongoing problems with his left foot and ankle over the course of the past few years. 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 He relates that he follows up and sees Dr. Nathaniel Sampson in clinic weekly. He relates that he missed his appointment last week and did not have the dressing changed. The patient states that the previous week he had x-rays taken in clinic that showed a pathologic fracture of the left tibia. He relates that he has not been walking on his left lower extremity. He relates that he has applied some pressure when using the restroom on that leg. He states that amputation has been in discussion at his previous clinic encounters. The patient relates that he last a last evening at 8 or 9 PM.  He relates that he drank Crystal light tea around 8 or 9 AM this morning. He relates that he took Plavix this morning at 6 AM.  The patient denies any other concerns to his right lower extremity. The patient denies nausea, vomiting, fever, chills, shortness of breath or chest pain. Restrictions/Precautions:  Restrictions/Precautions: General Precautions, Weight Bearing, Fall Risk  Left Lower Extremity Weight Bearing: Non Weight Bearing (BKA)  Position Activity Restriction  Other position/activity restrictions: L BKA     SUBJECTIVE: RN okayed OT session. Pt.  In room lying in bed pleasant and agreeable to participate. Consulted with Pt.s RN to assist Pt. Back to the bed if PT unable to at the time Pt. Requesting. PAIN: 4/10: LLE and sore bottom    Vitals: Nurse checked vitals prior to session    COGNITION: Decreased Problem Solving and Impaired Attention    ADL:   Grooming: Stand By Assistance and with set-up. While seated in w/c . BALANCE:  Sitting Balance:  Supervision. Seated on EOB prior to transfer    BED MOBILITY:  Supine to Sit: Stand By Assistance    Scooting: Stand By Assistance      TRANSFERS:  OTR to assess    ADDITIONAL ACTIVITIES:  Pt completed B UE exs with resistance band x 15 reps, x 1 set, with good tolerance of exs, with  RBs throughout, and verbal and visual cues for tech with resistance band to improve overall strength and activity tolerance to support Adls. ASSESSMENT:     Activity Tolerance:  Patient tolerance of  treatment: good.        Discharge Recommendations: Subacute/skilled nursing facility  Equipment Recommendations: Equipment Needed: Yes  Other: TTB, slideboard, drop arm commode  Plan: Times Per Week: 6x  Current Treatment Recommendations: Strengthening, Balance training, Functional mobility training, Endurance training, Patient/Caregiver education & training, Safety education & training, Self-Care / ADL, Home management training    Patient Education  Patient Education: ADL's and Home Exercise Program    Goals  Short Term Goals  Time Frame for Short Term Goals: by discharge  Short Term Goal 1: Pt will complete grooming routine while seated EOB with Supervision and no VC for technique to incrase indep with self care  Short Term Goal 2: Pt will complete slideboard t/f with consistent CGA and min VC for placement of board to increase indep with toileting routine  Short Term Goal 3: Pt will demo indep with BUE strengthening HEP to increase overall UB strength/endurance for ease with t/fs  Short Term Goal 4: OTR to assess sit to stand/stand-pivot/and functional mobility when appropriate    Following session, patient left in safe position with all fall risk precautions in place.

## 2023-01-20 NOTE — CARE COORDINATION
1/20/23, 3:38 PM EST    DISCHARGE ON GOING EVALUATION    Omar Austin day: 10  Location: -04/004-A Reason for admit: Sepsis [facility]) [A41.9]  Septic arthritis of left ankle, due to unspecified organism [facility]) [M00.9]   Procedure: 1/10 Left Proximal symes/distal transtibial amputation with  mL by Dr Urbano Organ  Barriers to Discharge: POD 10, await ECF to accept pt. Incision care per podiatry. PCP: EYAD Keenan CNP  Readmission Risk Score: 24.7%  Patient Goals/Plan/Treatment Preferences: See  notes; [facility] may accept and pt will be precert.

## 2023-01-20 NOTE — PROGRESS NOTES
Podiatric Progress Note  Liu Bowling  Subjective :         1/20/2023  Patient is a pleasant 72-year-old gentleman who is following up from a left below the knee amputation with Dr. Lawanda Taveras on 1/10/2023. Overall patient is doing well. Patient states he is doing well as his p.o. oxycodone. Discussed with patient about trying to wean from his oxycodone as patient states his pain is very minimal at this point. Patient is currently working with physical therapy daily. Patient is waiting placement at this point which we are waiting to hear back from [facility] for potential rehab location for [facility]. Surgical incision appears well coapted and maintained. No redness or drainage of note. Staples and sutures are dry and intact. Patient was placed into a dry dressing with Ace bandage. Patient will continue to remain nonweightbearing to the left lower extremity. Patient has no new complaints today. We will continue to follow daily while [facility] remains inpatient. HISTORY OF PRESENT ILLNESS:                 The patient is a 65 y.o. male with significant past medical history of CAD, CKD, diabetes, hyperlipidemia, and hypertension who is being seen at bedside on behalf of Dr. Lawanda Taveras. Patient relates that he has had ongoing problems with his left foot and ankle over the course of the past few years. 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 He relates that he follows up and sees Dr. Lawanda Taveras in clinic weekly. He relates that he missed his appointment last week and did not have the dressing changed. The patient states that the previous week he had x-rays taken in clinic that showed a pathologic fracture of the left tibia. He relates that he has not been walking on his left lower extremity. He relates that he has applied some pressure when using the restroom on that leg. He states that amputation has been in discussion at his previous clinic encounters.   The patient relates that he last a last evening at 8 or 9 PM.  He relates that he drank Crystal light tea around 8 or 9 AM this morning. He relates that he took Plavix this morning at 6 AM.  The patient denies any other concerns to his right lower extremity. The patient denies nausea, vomiting, fever, chills, shortness of breath or chest pain.     Current Medications:    Current Facility-Administered Medications: insulin glargine (LANTUS) injection vial 10 Units, 10 Units, SubCUTAneous, BID  sennosides-docusate sodium (SENOKOT-S) 8.6-50 MG tablet 1 tablet, 1 tablet, Oral, BID  polyethylene glycol (GLYCOLAX) packet 17 g, 17 g, Oral, Daily  enoxaparin Sodium (LOVENOX) injection 30 mg, 30 mg, SubCUTAneous, Q12H  insulin lispro (HUMALOG) injection vial 0-16 Units, 0-16 Units, SubCUTAneous, TID WC  insulin lispro (HUMALOG) injection vial 0-4 Units, 0-4 Units, SubCUTAneous, Nightly  acetaminophen (TYLENOL) tablet 650 mg, 650 mg, Oral, Q6H PRN **OR** acetaminophen (TYLENOL) suppository 650 mg, 650 mg, Rectal, Q6H PRN  amLODIPine (NORVASC) tablet 10 mg, 10 mg, Oral, Daily  atorvastatin (LIPITOR) tablet 80 mg, 80 mg, Oral, Daily  albuterol sulfate HFA (PROVENTIL;VENTOLIN;PROAIR) 108 (90 Base) MCG/ACT inhaler 2 puff, 2 puff, Inhalation, 4x Daily PRN  ferrous sulfate (IRON 325) tablet 325 mg, 325 mg, Oral, BID  furosemide (LASIX) tablet 40 mg, 40 mg, Oral, Daily  gabapentin (NEURONTIN) tablet 600 mg, 600 mg, Oral, BID  minoxidil (LONITEN) tablet 10 mg, 10 mg, Oral, Daily  multivitamin 1 tablet, 1 tablet, Oral, Daily  potassium chloride (KLOR-CON M) extended release tablet 20 mEq, 20 mEq, Oral, Daily  valsartan (DIOVAN) tablet 160 mg, 160 mg, Oral, Daily  glucose chewable tablet 16 g, 4 tablet, Oral, PRN  dextrose bolus 10% 125 mL, 125 mL, IntraVENous, PRN **OR** dextrose bolus 10% 250 mL, 250 mL, IntraVENous, PRN  glucagon (rDNA) injection 1 mg, 1 mg, SubCUTAneous, PRN  dextrose 10 % infusion, , IntraVENous, Continuous PRN  aspirin chewable tablet 81 mg, 81 mg, Oral, Daily  clopidogrel (PLAVIX) tablet 75 mg, 75 mg, Oral, Daily  tiotropium (SPIRIVA RESPIMAT) 2.5 MCG/ACT inhaler 2 puff, 2 puff, Inhalation, Daily  cloNIDine (CATAPRES) tablet 0.1 mg, 0.1 mg, Oral, BID  metoprolol tartrate (LOPRESSOR) tablet 50 mg, 50 mg, Oral, BID  sodium chloride flush 0.9 % injection 5-40 mL, 5-40 mL, IntraVENous, 2 times per day  sodium chloride flush 0.9 % injection 5-40 mL, 5-40 mL, IntraVENous, PRN  0.9 % sodium chloride infusion, , IntraVENous, PRN  ondansetron (ZOFRAN-ODT) disintegrating tablet 4 mg, 4 mg, Oral, Q8H PRN **OR** ondansetron (ZOFRAN) injection 4 mg, 4 mg, IntraVENous, Q6H PRN  oxyCODONE (ROXICODONE) immediate release tablet 5 mg, 5 mg, Oral, Q4H PRN **OR** oxyCODONE (ROXICODONE) immediate release tablet 10 mg, 10 mg, Oral, Q4H PRN  morphine (PF) injection 2 mg, 2 mg, IntraVENous, Q2H PRN **OR** morphine injection 4 mg, 4 mg, IntraVENous, Q2H PRN    Objective     /66   Pulse 80   Temp 97.7 °F (36.5 °C) (Oral)   Resp 16   Ht 6' 2\" (1.88 m)   Wt 279 lb (126.6 kg)   SpO2 97%   BMI 35.82 kg/m²      I/O:  Intake/Output Summary (Last 24 hours) at 1/20/2023 1410  Last data filed at 1/20/2023 0252  Gross per 24 hour   Intake 710 ml   Output 1350 ml   Net -640 ml              Wt Readings from Last 3 Encounters:   01/20/23 279 lb (126.6 kg)   12/08/22 (!) 310 lb (140.6 kg)   12/08/22 (!) 310 lb (140.6 kg)       LABS:    Recent Labs     01/19/23  0559   WBC 5.0   HGB 8.3*   HCT 27.5*           Recent Labs     01/19/23  0559      K 4.4      CO2 26   BUN 24*   CREATININE 1.4*      No results for input(s): PROT, INR, APTT in the last 72 hours. No results for input(s): CKTOTAL, CKMB, CKMBINDEX, TROPONINI in the last 72 hours.     Focused Lower Extremity Examination:    Vitals:    /66   Pulse 80   Temp 97.7 °F (36.5 °C) (Oral)   Resp 16   Ht 6' 2\" (1.88 m)   Wt 279 lb (126.6 kg)   SpO2 97%   BMI 35.82 kg/m²      General: Patient was alert and oriented x3. Patient is well-groomed with no acute distress at today's visit. Vascular: Popliteal pulse is palpable to LLE. CFT within normal limits to LLE amputation stump. Skin temperature is warm to warm from proximal tibial tuberosity to distal amputation stump of LLE. Minimal edema noted to the distal LLE amputation stump. Dermatologic: Surgical incision appears well coapted and maintained. Staples and sutures are dry and intact and were kept in place. Very minimal drainage noted at this point. Patient does have some slight gapping between some of the staples. No true dehiscence of note. No signs of localized infection of note. Patient is still noted to be very pale in color throughout. Neurovascular: Light touch sensation grossly diminished to the level of the midfoot to RLE. Light touch sensation appears grossly intact to the level of the amputation stump of LLE. Musculoskeletal: Muscle strength testing deferred due to acute postop state. Patient's left leg dressed is in a slightly flexed position at the knee joint. Patient is able to fully extend left leg at the knee. Minimal pain with palpation of LLE amputation stump at posterior aspect.                      ASSESSMENT: Pt. is a 65 y.o. 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    Sepsis ([address]    Acute left-sided low back pain with bilateral sciatica    S/P transmetatarsal amputation of foot, left ([facility])    Leukocytosis    Wound dehiscence, surgical, initial encounter    Postoperative wound infection Metabolic acidosis    Hx of CABG    Lumbar stenosis without neurogenic claudication    CKD (chronic kidney disease), stage II    Normocytic anemia    Morbid obesity ([facility])    Debility    Carotid stenosis, asymptomatic, bilateral    Hypokalemia    Nonhealing ulcer of left lower extremity ([facility]CC)    Bleeding    Wound, open    SOB (shortness of breath) on exertion    Hemoglobin A1C greater than 9%, indicating poor diabetic control    Hypertensive emergency    Acute on chronic congestive heart failure ([facility])    Hypertensive urgency    Moderate persistent asthma    Septic arthritis of left ankle, due to unspecified organism ([facility])    Closed fracture of ankle with nonunion    S/P BKA (below knee amputation), left ([address]       PLAN:   Patient was seen this afternoon on behalf of Dr. Carmita Cerrato. Patient is status post left below the knee amputation from 1/10/2023. Patient continues to improve in the postoperative setting. We are still awaiting approval for discharge. We are still waiting to hear back from [facility] for potential placement for [employer]. Social work is assisting with this process. Patient will continue taking oxycodone as needed for pain. We did discuss trying to wean down as patient states his pain is relatively minimal at this point. Patient will continue working with physical therapy daily. Patient still appears to be very pale in color. Last hemoglobin was 8.3. Patient denies any shortness of breath, lightheadedness or dizziness. All other questions or concerns regarding surgery and medical management were otherwise discussed at today's visit. Dispo: Pending referral to [facility]. Patient will follow-up with Dr. Carmita Cerrato for further care on an outpatient basis at [facility] on [address]     Electronically signed by EYAD Cohen CNP on 1/20/2023 at 2:13 PM

## 2023-01-20 NOTE — PROGRESS NOTES
Podiatric Progress Note  Dada Fee  Subjective :     1/20/2023  Patient seen at bedside this morning on behalf of Dr. No Baer. Patient is s/p LLE proximal Symes amputation with distal transtibial osteotomy (DOS 1/10/2023). Patient is pleasant, and is alert and oriented. Patient endorses no pain to LLE. He relates that he is taking oral pain medication. He denies any N/V/F/C/SOB/CP or bilateral calf tenderness. He has no other pedal complaints at this time. Patient relates that he is not able to got to [facility] due to insurance. He is waiting to hear if he is able to go to [facility] upon discharge for rehab. 1/19/2023  Patient seen at bedside this morning on behalf of Dr. No Baer. Patient is s/p LLE proximal Symes amputation with distal transtibial osteotomy (DOS 1/10/2023). Patient is pleasant, and is alert and oriented. Patient endorses no pain to LLE. He relates that he is taking oral pain medication. He denies any N/V/F/C/SOB/CP or bilateral calf tenderness. He has no other pedal complaints at this time. Patient relates that he is waiting to hear if he will be able to go to [facility] upon discharge for rehab.     1/18/2023  Patient seen at bedside this morning on behalf of Dr. No Baer. Patient is s/p LLE proximal Symes amputation with distal transtibial osteotomy (DOS 1/10/2023). Patient is pleasant, and is alert and oriented. Patient states that he is doing much better and is thankful for Dr. No Baer and Dr. Ramya Desai for treating him fast and taking care of him. Patient endorses minimal pain to LLE. He relates that he is taking oral pain medication. He denies any N/V/F/C/SOB/CP or bilateral calf tenderness. He has no other pedal complaints at this time. Patient is aware that there are no beds available at the facility which he originally wanted; and he is also aware that precertification is started at another SNF/ECF.  He relates that he has not heard anything recently. 1/17/2023  Patient seen at bedside this morning on behalf of Dr. Patsy Arriaza. Patient is s/p LLE proximal Symes amputation with distal transtibial osteotomy (DOS 1/10/2023). Patient is pleasant, and is alert and oriented. Patient states that he feels very well overall. Per patient, he had an enema yesterday, and then had another bowel movement afterwards. Patient endorses minimal pain to LLE. He denies any N/V/F/C/SOB/CP or bilateral calf tenderness. He has no other pedal complaints at this time. Patient is aware that there are no beds available at the facility which he originally wanted; and he is also aware that precertification is started at another SNF/ECF.    1/16/2023  Patient seen at chair side this morning on behalf of Dr. Patsy Arriaza. Patient is s/p LLE proximal Symes amputation with distal transtibial osteotomy (DOS 1/10/2023). Patient is pleasant, and is alert and oriented. Patient states that he feels very well overall. Patient continues to endorse minimal pain to LLE (2/10). He currently denies any N/V/F/C/N/V/CP. Patient still has not had bowel movement yet; he denies any abdominal bloating, cramping, or discomfort. No other pedal complaints at this time. Patient states that per his insurance, precertification has been started for [facility]. 1/15/2023  Patient seen at bedside this morning on behalf of Dr. Patsy Arriaza. Patient is status post LLE proximal Symes amputation with distal transtibial osteotomy (DOS 1/10/2023). Patient appeared pleasant, was oriented to person, place and time and in no acute distress. Patient is very grateful for having the procedure performed; he states that he subjectively feels much better after the amputation. Patient expressed gratitude to Dr. Patsy Arriaza and Александр Buck for their services. Patient endorses minimal pain to the LLE (3/10). Patient does endorse mild neuropathic type symptoms to the RLE. Patient denies any N/V/F/C/SOB or CP. Patient states that he is passing flatus, but has not yet had a bowel movement. Patient has no further pedal concerns at this point in time. 1/14/2023  Patient is a pleasant 62yo male seen bedside this AM s/p left lower extremity amputation emergent in nature due to necrotizing fasciitis and extensive gas gangrene performed 1.10.23. Patient seated upright bedside working with PT. Patient overall states he feels vastly improved, post op dressing clean/dry/intact. Social work is following, precert for [employer] in place. ID following for IVAB therapy, currently on Zosyn IV (vancomycin and clindamycin discontinued). Labs are trending better, leukocytosis resolved. Will continue to follow until determination of placement. HISTORY OF PRESENT ILLNESS:                 The patient is a 65 y.o. male with significant past medical history of CAD, CKD, diabetes, hyperlipidemia, and hypertension who is being seen at bedside on behalf of Dr. Carmita Cerrato. Patient relates that he has had ongoing problems with his left foot and ankle over the course of the past few years. 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 He relates that he follows up and sees Dr. Carmita Cerrato in clinic weekly. He relates that he missed his appointment last week and did not have the dressing changed. The patient states that the previous week he had x-rays taken in clinic that showed a pathologic fracture of the left tibia. He relates that he has not been walking on his left lower extremity. He relates that he has applied some pressure when using the restroom on that leg. He states that amputation has been in discussion at his previous clinic encounters. The patient relates that he last a last evening at 8 or 9 PM.  He relates that he drank Crystal light tea around 8 or 9 AM this morning.   He relates that he took Plavix this morning at 6 AM.  The patient denies any other concerns to his right lower extremity. The patient denies nausea, vomiting, fever, chills, shortness of breath or chest pain.     Current Medications:    Current Facility-Administered Medications: insulin glargine (LANTUS) injection vial 10 Units, 10 Units, SubCUTAneous, BID  sennosides-docusate sodium (SENOKOT-S) 8.6-50 MG tablet 1 tablet, 1 tablet, Oral, BID  polyethylene glycol (GLYCOLAX) packet 17 g, 17 g, Oral, Daily  enoxaparin Sodium (LOVENOX) injection 30 mg, 30 mg, SubCUTAneous, Q12H  insulin lispro (HUMALOG) injection vial 0-16 Units, 0-16 Units, SubCUTAneous, TID WC  insulin lispro (HUMALOG) injection vial 0-4 Units, 0-4 Units, SubCUTAneous, Nightly  acetaminophen (TYLENOL) tablet 650 mg, 650 mg, Oral, Q6H PRN **OR** acetaminophen (TYLENOL) suppository 650 mg, 650 mg, Rectal, Q6H PRN  amLODIPine (NORVASC) tablet 10 mg, 10 mg, Oral, Daily  atorvastatin (LIPITOR) tablet 80 mg, 80 mg, Oral, Daily  albuterol sulfate HFA (PROVENTIL;VENTOLIN;PROAIR) 108 (90 Base) MCG/ACT inhaler 2 puff, 2 puff, Inhalation, 4x Daily PRN  ferrous sulfate (IRON 325) tablet 325 mg, 325 mg, Oral, BID  furosemide (LASIX) tablet 40 mg, 40 mg, Oral, Daily  gabapentin (NEURONTIN) tablet 600 mg, 600 mg, Oral, BID  minoxidil (LONITEN) tablet 10 mg, 10 mg, Oral, Daily  multivitamin 1 tablet, 1 tablet, Oral, Daily  potassium chloride (KLOR-CON M) extended release tablet 20 mEq, 20 mEq, Oral, Daily  valsartan (DIOVAN) tablet 160 mg, 160 mg, Oral, Daily  glucose chewable tablet 16 g, 4 tablet, Oral, PRN  dextrose bolus 10% 125 mL, 125 mL, IntraVENous, PRN **OR** dextrose bolus 10% 250 mL, 250 mL, IntraVENous, PRN  glucagon (rDNA) injection 1 mg, 1 mg, SubCUTAneous, PRN  dextrose 10 % infusion, , IntraVENous, Continuous PRN  aspirin chewable tablet 81 mg, 81 mg, Oral, Daily  clopidogrel (PLAVIX) tablet 75 mg, 75 mg, Oral, Daily  tiotropium (SPIRIVA RESPIMAT) 2.5 MCG/ACT inhaler 2 puff, 2 puff, Inhalation, Daily  cloNIDine (CATAPRES) tablet 0.1 mg, 0.1 mg, Oral, BID  metoprolol tartrate (LOPRESSOR) tablet 50 mg, 50 mg, Oral, BID  sodium chloride flush 0.9 % injection 5-40 mL, 5-40 mL, IntraVENous, 2 times per day  sodium chloride flush 0.9 % injection 5-40 mL, 5-40 mL, IntraVENous, PRN  0.9 % sodium chloride infusion, , IntraVENous, PRN  ondansetron (ZOFRAN-ODT) disintegrating tablet 4 mg, 4 mg, Oral, Q8H PRN **OR** ondansetron (ZOFRAN) injection 4 mg, 4 mg, IntraVENous, Q6H PRN  oxyCODONE (ROXICODONE) immediate release tablet 5 mg, 5 mg, Oral, Q4H PRN **OR** oxyCODONE (ROXICODONE) immediate release tablet 10 mg, 10 mg, Oral, Q4H PRN  morphine (PF) injection 2 mg, 2 mg, IntraVENous, Q2H PRN **OR** morphine injection 4 mg, 4 mg, IntraVENous, Q2H PRN    Objective     /87   Pulse 76   Temp 97.5 °F (36.4 °C) (Oral)   Resp 16   Ht 6' 2\" (1.88 m)   Wt 279 lb (126.6 kg)   SpO2 97%   BMI 35.82 kg/m²      I/O:  Intake/Output Summary (Last 24 hours) at 1/20/2023 0607  Last data filed at 1/19/2023 2325  Gross per 24 hour   Intake 990 ml   Output 1825 ml   Net -835 ml                Wt Readings from Last 3 Encounters:   01/20/23 279 lb (126.6 kg)   12/08/22 (!) 310 lb (140.6 kg)   12/08/22 (!) 310 lb (140.6 kg)       LABS:    Recent Labs     01/19/23  0559   WBC 5.0   HGB 8.3*   HCT 27.5*             Recent Labs     01/19/23  0559      K 4.4      CO2 26   BUN 24*   CREATININE 1.4*        No results for input(s): PROT, INR, APTT in the last 72 hours. No results for input(s): CKTOTAL, CKMB, CKMBINDEX, TROPONINI in the last 72 hours. Focused Lower Extremity Examination:    Vitals:    /87   Pulse 76   Temp 97.5 °F (36.4 °C) (Oral)   Resp 16   Ht 6' 2\" (1.88 m)   Wt 279 lb (126.6 kg)   SpO2 97%   BMI 35.82 kg/m²      Dressing was left intact at today's encounter. There is no strikethrough noted. The below Physical Exam is from 1/18/23. Vascular: Popliteal pulse is palpable to LLE.   CFT within normal limits to LLE amputation stump. Skin temperature is warm to warm from proximal tibial tuberosity to distal amputation stump of LLE. Minimal edema noted to the distal LLE amputation stump. Dermatologic: There was minimal sanguinous drainage noted on the 4 x 4 gauze directly to the incision. Incision appears very well coapted, without any drainage noted. All staples and sutures are intact. There is no surrounding erythema or warmth noted. Overall, incision appears to be healing very well. No other open lesions, rashes or subcutaneous nodules of note. Neurovascular: Light touch sensation grossly diminished to the level of the midfoot to RLE. Light touch sensation appears grossly intact to the level of the amputation stump of LLE. Musculoskeletal: Muscle strength testing deferred due to acute postop state. Patient's left leg dressed is in a slightly flexed position at the knee joint. Patient is able to fully extend left leg at the knee. Minimal pain with palpation of LLE amputation stump at posterior aspect.                              ASSESSMENT: Pt. is a 65 y.o. 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    Sepsis ([address]    Acute left-sided low back pain with bilateral sciatica    S/P transmetatarsal amputation of foot, left (HCC)    Leukocytosis    Wound dehiscence, surgical, initial encounter    Postoperative wound infection    Metabolic acidosis    Hx of CABG    Lumbar stenosis without neurogenic claudication    CKD (chronic kidney disease), stage II    Normocytic anemia    Morbid obesity ([address]    Debility    Carotid stenosis, asymptomatic, bilateral    Hypokalemia    Nonhealing ulcer of left lower extremity (HCC)    Bleeding    Wound, open    SOB (shortness of breath) on exertion    Hemoglobin A1C greater than 9%, indicating poor diabetic control    Hypertensive emergency    Acute on chronic congestive heart failure (HCC)    Hypertensive urgency    Moderate persistent asthma    Septic arthritis of left ankle, due to unspecified organism [facility])    Closed fracture of ankle with nonunion    S/P BKA (below knee amputation), left ([facility])       PLAN:   Septic arthritis; left ankle -resolved  Pathologic fracture; left tibia -resolved  Abscess; left ankle -resolved  -Patient examined and evaluated  -WBC 5.0 on 1/19/2023; patient currently afebrile  -Hemoglobin and hematocrit at 8.3 and 27.5  -Discontinued IV Zosyn per infectious diseases  -Will continue to follow labs/imaging  -Discussed with patient that he should try to extend left leg at the knee to prevent flexion contracture  -Awaiting SNF placement; case management assisting with process to [facility] following for medical management  -Cardiology following  -Patient verbalized understanding and agreement with the treatment plan as stated. All of his questions were answered to his satisfaction. Dispo: Pending referral to [facility]. Patient can follow-up with Dr. Leticia Frausto for further care on an outpatient basis.     Jet Reno DPM, PGY-2  Podiatric Surgical Resident  1/20/2023   6:07 AM

## 2023-01-20 NOTE — PLAN OF CARE
Problem: Discharge Planning  Goal: Discharge to home or other facility with appropriate resources  1/20/2023 0104 by Gabrielle Malloy RN  Outcome: Progressing  Flowsheets (Taken 1/19/2023 1915)  Discharge to home or other facility with appropriate resources: Identify barriers to discharge with patient and caregiver  1/19/2023 1731 by Bg Burnett RN  Outcome: Progressing  Flowsheets (Taken 1/19/2023 1731)  Discharge to home or other facility with appropriate resources:   Identify barriers to discharge with patient and caregiver   Identify discharge learning needs (meds, wound care, etc)   Refer to discharge planning if patient needs post-hospital services based on physician order or complex needs related to functional status, cognitive ability or social support system   Arrange for needed discharge resources and transportation as appropriate     Problem: Pain  Goal: Verbalizes/displays adequate comfort level or baseline comfort level  1/20/2023 0104 by Gabrielle Malloy RN  Outcome: Progressing  Flowsheets  Taken 1/19/2023 2325  Verbalizes/displays adequate comfort level or baseline comfort level:   Encourage patient to monitor pain and request assistance   Assess pain using appropriate pain scale  Taken 1/19/2023 1915  Verbalizes/displays adequate comfort level or baseline comfort level:   Encourage patient to monitor pain and request assistance   Assess pain using appropriate pain scale  1/19/2023 1731 by Bg Burnett RN  Outcome: Progressing  Flowsheets  Taken 1/19/2023 1731 by Bg Burnett RN  Verbalizes/displays adequate comfort level or baseline comfort level:   Encourage patient to monitor pain and request assistance   Consider cultural and social influences on pain and pain management   Administer analgesics based on type and severity of pain and evaluate response   Assess pain using appropriate pain scale   Implement non-pharmacological measures as appropriate and evaluate response   Notify Licensed Independent Practitioner if interventions unsuccessful or patient reports new pain  Taken 1/19/2023 0400 by Raza Rowley RN  Verbalizes/displays adequate comfort level or baseline comfort level:   Encourage patient to monitor pain and request assistance   Assess pain using appropriate pain scale     Problem: ABCDS Injury Assessment  Goal: Absence of physical injury  1/20/2023 0104 by Raza Rowley RN  Outcome: Progressing  [address] (Taken 1/19/2023 1731 by Itzel Martinez RN)  Absence of Physical Injury: Implement safety measures based on patient assessment  1/19/2023 1731 by Itzel Martinez RN  Outcome: Progressing  Flowsheets (Taken 1/19/2023 1731)  Absence of Physical Injury: Implement safety measures based on patient assessment     Problem: Infection - Adult  Goal: Absence of infection at discharge  1/20/2023 0104 by Raza Rowley RN  Outcome: Progressing  [address] (Taken 1/19/2023 1915)  Absence of infection at discharge: Assess and monitor for signs and symptoms of infection  1/19/2023 1731 by Itzel Martinez RN  Outcome: Progressing  Flowsheets (Taken 1/19/2023 1731)  Absence of infection at discharge:   Assess and monitor for signs and symptoms of infection   Monitor lab/diagnostic results   Monitor all insertion sites i.e., indwelling lines, tubes and drains   Administer medications as ordered   Instruct and encourage patient and family to use good hand hygiene technique     Problem: Chronic Conditions and Co-morbidities  Goal: Patient's chronic conditions and co-morbidity symptoms are monitored and maintained or improved  1/20/2023 0104 by Raza Rowley RN  Outcome: Progressing  [address] (Taken 1/19/2023 1915)  Care Plan - Patient's Chronic Conditions and Co-Morbidity Symptoms are Monitored and Maintained or Improved: Monitor and assess patient's chronic conditions and comorbid symptoms for stability, deterioration, or improvement  1/19/2023 1731 by Itzel Martinez RN  Outcome: Progressing  Flowsheets (Taken 1/19/2023 1731)  Care Plan - Patient's Chronic Conditions and Co-Morbidity Symptoms are Monitored and Maintained or Improved:   Monitor and assess patient's chronic conditions and comorbid symptoms for stability, deterioration, or improvement   Collaborate with multidisciplinary team to address chronic and comorbid conditions and prevent exacerbation or deterioration   Update acute care plan with appropriate goals if chronic or comorbid symptoms are exacerbated and prevent overall improvement and discharge     Problem: Skin/Tissue Integrity - Adult  Goal: Incisions, wounds, or drain sites healing without S/S of infection  1/20/2023 0104 by Yahir Crawford RN  Outcome: Progressing  Flowsheets (Taken 1/19/2023 1915)  Incisions, Wounds, or Drain Sites Healing Without Sign and Symptoms of Infection: ADMISSION and DAILY: Assess and document risk factors for pressure ulcer development  1/19/2023 1731 by Marcelo Rhodes RN  Outcome: Progressing  Flowsheets (Taken 1/19/2023 1731)  Incisions, Wounds, or Drain Sites Healing Without Sign and Symptoms of Infection:   ADMISSION and DAILY: Assess and document risk factors for pressure ulcer development   TWICE DAILY: Assess and document skin integrity   TWICE DAILY: Assess and document dressing/incision, wound bed, drain sites and surrounding tissue   Implement wound care per orders     Problem: Musculoskeletal - Adult  Goal: Return mobility to safest level of function  1/20/2023 0104 by Yahir Crawford RN  Outcome: Progressing  Flowsheets (Taken 1/19/2023 1915)  Return Mobility to Safest Level of Function: Assess patient stability and activity tolerance for standing, transferring and ambulating with or without assistive devices  1/19/2023 1731 by Mareclo Rhodes RN  Outcome: Progressing  Flowsheets (Taken 1/19/2023 1731)  Return Mobility to Safest Level of Function:   Assess patient stability and activity tolerance for standing, transferring and ambulating with or without assistive devices   Assist with transfers and ambulation using safe patient handling equipment as needed   Ensure adequate protection for wounds/incisions during mobilization   Obtain physical therapy/occupational therapy consults as needed   Instruct patient/family in ordered activity level  Goal: Return ADL status to a safe level of function  1/20/2023 0104 by Modesta Noonan RN  Outcome: Progressing  Flowsheets (Taken 1/19/2023 1915)  Return ADL Status to a Safe Level of Function: Administer medication as ordered  1/19/2023 1731 by Edith Jean RN  Outcome: Progressing  Flowsheets (Taken 1/19/2023 1731)  Return ADL Status to a Safe Level of Function:   Administer medication as ordered   Obtain physical therapy/occupational therapy consults as needed   Assess activities of daily living deficits and provide assistive devices as needed   Assist and instruct patient to increase activity and self care as tolerated     Problem: Gastrointestinal - Adult  Goal: Maintains or returns to baseline bowel function  1/20/2023 0104 by Modesta Noonan RN  Outcome: Progressing  Flowsheets (Taken 1/19/2023 1915)  Maintains or returns to baseline bowel function: Assess bowel function  1/19/2023 1731 by Edith Jean RN  Outcome: Progressing  Flowsheets (Taken 1/19/2023 1731)  Maintains or returns to baseline bowel function:   Assess bowel function   Encourage oral fluids to ensure adequate hydration   Administer ordered medications as needed   Encourage mobilization and activity     Problem: Metabolic/Fluid and Electrolytes - Adult  Goal: Glucose maintained within prescribed range  1/20/2023 0104 by Modesta Noonan RN  Outcome: Progressing  Flowsheets (Taken 1/19/2023 1915)  Glucose maintained within prescribed range: Monitor blood glucose as ordered  1/19/2023 1731 by Edith Jean RN  Outcome: Progressing  Flowsheets (Taken 1/19/2023 1731)  Glucose maintained within prescribed range:   Monitor blood glucose as ordered   Assess for signs and symptoms of hyperglycemia and hypoglycemia   Administer ordered medications to maintain glucose within target range   Instruct patient on self management of diabetes and initiate consult as needed     Problem: Hematologic - Adult  Goal: Maintains hematologic stability  1/20/2023 0104 by Erin Samuel RN  Outcome: Progressing  Flowsheets (Taken 1/19/2023 1915)  Maintains hematologic stability: Assess for signs and symptoms of bleeding or hemorrhage  1/19/2023 1731 by Jhonathan Watson RN  Outcome: Progressing  Flowsheets (Taken 1/19/2023 1731)  Maintains hematologic stability:   Assess for signs and symptoms of bleeding or hemorrhage   Monitor labs for bleeding or clotting disorders     Problem: Nutrition Deficit:  Goal: Optimize nutritional status  1/20/2023 0104 by Erin Samuel RN  Outcome: Progressing  Flowsheets (Taken 1/19/2023 1731 by Jhonathan Wtason RN)  Nutrient intake appropriate for improving, restoring, or maintaining nutritional needs:   Assess nutritional status and recommend course of action   Monitor oral intake, labs, and treatment plans  1/19/2023 1731 by Jhonathan Watson RN  Outcome: Progressing  Flowsheets (Taken 1/19/2023 1731)  Nutrient intake appropriate for improving, restoring, or maintaining nutritional needs:   Assess nutritional status and recommend course of action   Monitor oral intake, labs, and treatment plans     Care plan reviewed with patient. Patient verbalizes understanding of the plan of care and contributes to goal setting.

## 2023-01-20 NOTE — PROGRESS NOTES
[facility]  INPATIENT PHYSICAL THERAPY  DAILY NOTE  [facility] ORTHOPEDICS 7K - 7K-04/004-A    Time In: 6435  Time Out: 1207  Timed Code Treatment Minutes: 16 Minutes  Minutes: 16          Date: 2023  Patient Name: Shilpa Morse,  Gender:  male        MRN: [id number]  : 1960  (65 y.o.)     Referring Practitioner: Que Jimenez DPM  Diagnosis: sepsis  Additional Pertinent Hx: per EMR \"The patient is a 65 y.o. male with significant past medical history of CAD, CKD, diabetes, hyperlipidemia, and hypertension who is being seen at bedside on behalf of Dr. Yuliet Tejada. Patient relates that he has had ongoing problems with his left foot and ankle over the course of the past few years. 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 He relates that he follows up and sees Dr. Yuliet Tejada in clinic weekly. He relates that he missed his appointment last week and did not have the dressing changed. The patient states that the previous week he had x-rays taken in clinic that showed a pathologic fracture of the left tibia. He relates that he has not been walking on his left lower extremity. He relates that he has applied some pressure when using the restroom on that leg. He states that amputation has been in discussion at his previous clinic encounters. The patient relates that he last a last evening at 8 or 9 PM.  He relates that he drank Crystal light tea around 8 or 9 AM this morning. He relates that he took Plavix this morning at 6 AM.  The patient denies any other concerns to his right lower extremity. The patient denies nausea, vomiting, fever, chills, shortness of breath or chest pain. \" Pt is s/p L BKA on 1/10/23 completed by Dr. Yuliet Tejada.      Prior Level of Function:  Lives With: Alone  Type of Home: House  Home Layout: One level  Home Access: Ramped entrance  Home Equipment: Electric scooter, [address] Shower/Tub: Walk-in shower, Shower chair with back  Bathroom Toilet: Handicap height  Bathroom Equipment: Grab bars in shower, Grab bars around toilet    Receives Help From: Friend(s)  ADL Assistance: Independent  Homemaking Assistance: Needs assistance  Homemaking Responsibilities: Yes  Ambulation Assistance: Independent  Transfer Assistance: Independent  Active : No  IADL Comments: He has a robert who works for him - worker's wife makes meals from that he only has to heat up in microwave and assists with cleaning tasks  Additional Comments: Pt reported that he has a very supportive family who can be available intermittently throughout the day upon discharge home. Pt reports he was amb very little, using his scooter or w/c most of the time    Restrictions/Precautions:  Restrictions/Precautions: General Precautions, Weight Bearing, Fall Risk  Left Lower Extremity Weight Bearing: Non Weight Bearing (BKA)  Position Activity Restriction  Other position/activity restrictions: L BKA     SUBJECTIVE: RN approved session. Pt pleasant and agreeable to therapy. PAIN: minimal pain reported     Vitals: Vitals not assessed per clinical judgement, see nursing flowsheet    OBJECTIVE:  Bed Mobility:  Supine to Sit: Stand By Assistance, with head of bed raised    Transfers:  Sit to Stand: Contact Guard Assistance  Stand to [address], with verbal cues from EOB to bedside chair towards R     Ambulation:  Not tested    Balance:  Static Sitting Balance:  Stand By Assistance  Static Standing Balance: Contact Guard Assistance    Exercise:  Patient was guided in 1 set(s) 10 reps of exercise to left lower extremities. Quad sets and Straight leg raises. Exercises were completed for increased independence with functional mobility.     Time spent re-wrapping LLE stump d/t wrap falling off and being circularly wrapped with pocket at end instead of figure 8    Functional Outcome Measures: Completed  AM-PAC Inpatient Mobility without Stair Climbing Raw Score : 13  AM-PAC Inpatient without Stair Climbing T-Scale Score : 38.96    ASSESSMENT:  Assessment: Patient progressing toward established goals. Activity Tolerance:  Patient tolerance of  treatment: good. Equipment Recommendations:Equipment Needed: No  Discharge Recommendations: Subacte/Skilled Nursing Facility  Plan: Current Treatment Recommendations: Strengthening, Balance training, Functional mobility training, Transfer training, Endurance training, Equipment evaluation, education, & procurement, Patient/Caregiver education & training, Neuromuscular re-education, Safety education & training, Home exercise program, Therapeutic activities  General Plan:  (5xO)    Patient Education  Patient Education: Plan of Care, Precautions/Restrictions, Bed Mobility, Transfers, Verbal Exercise Instruction    Goals:  Patient Goals : Demaris Feast able to get in and out bed and w/c with less assist\"  Short Term Goals  Time Frame for Short Term Goals: by discharge  Short Term Goal 1: Pt will demo supine to and from sit transfers with SBA and modifications as needed to progress with mobility. Short Term Goal 2: Pt will demo slide board transfers to and from w/c with mod Ax1 to progress with mobility. Short Term Goal 3: Pt will tolerate 10-20 reps of ther ex to increase overall mobility. Short Term Goal 4: Pt will demo S for w/c mobility for 100 feet to increase IND with mobility. Short Term Goal 5: sit<>stand and stand pivot transfers with SBA and LRD mod I for safe transfers  Long Term Goals  Time Frame for Long Term Goals : NA due to short ELOS    Following session, patient left in safe position with all fall risk precautions in place.     Natan Lozano ([place]) PT, DPT

## 2023-01-20 NOTE — PLAN OF CARE
Problem: Discharge Planning  Goal: Discharge to home or other facility with appropriate resources  Outcome: Progressing  Flowsheets (Taken 1/20/2023 1619)  Discharge to home or other facility with appropriate resources:   Identify barriers to discharge with patient and caregiver   Identify discharge learning needs (meds, wound care, etc)   Refer to discharge planning if patient needs post-hospital services based on physician order or complex needs related to functional status, cognitive ability or social support system   Arrange for needed discharge resources and transportation as appropriate     Problem: Pain  Goal: Verbalizes/displays adequate comfort level or baseline comfort level  Outcome: Progressing  Flowsheets  Taken 1/20/2023 1619 by Candace Kiser RN  Verbalizes/displays adequate comfort level or baseline comfort level:   Encourage patient to monitor pain and request assistance   Administer analgesics based on type and severity of pain and evaluate response   Consider cultural and social influences on pain and pain management   Assess pain using appropriate pain scale   Implement non-pharmacological measures as appropriate and evaluate response   Notify Licensed Independent Practitioner if interventions unsuccessful or patient reports new pain  Taken 1/20/2023 0245 by Destiny Blake RN  Verbalizes/displays adequate comfort level or baseline comfort level:   Encourage patient to monitor pain and request assistance   Assess pain using appropriate pain scale     Problem: ABCDS Injury Assessment  Goal: Absence of physical injury  Outcome: Progressing  Flowsheets (Taken 1/20/2023 1619)  Absence of Physical Injury: Implement safety measures based on patient assessment     Problem: Infection - Adult  Goal: Absence of infection at discharge  Outcome: Progressing  Flowsheets (Taken 1/20/2023 1619)  Absence of infection at discharge:   Assess and monitor for signs and symptoms of infection   Monitor lab/diagnostic results   Monitor all insertion sites i.e., indwelling lines, tubes and drains   Administer medications as ordered   Instruct and encourage patient and family to use good hand hygiene technique     Problem: Chronic Conditions and Co-morbidities  Goal: Patient's chronic conditions and co-morbidity symptoms are monitored and maintained or improved  Outcome: Progressing  Flowsheets (Taken 1/20/2023 1619)  Care Plan - Patient's Chronic Conditions and Co-Morbidity Symptoms are Monitored and Maintained or Improved:   Monitor and assess patient's chronic conditions and comorbid symptoms for stability, deterioration, or improvement   Collaborate with multidisciplinary team to address chronic and comorbid conditions and prevent exacerbation or deterioration   Update acute care plan with appropriate goals if chronic or comorbid symptoms are exacerbated and prevent overall improvement and discharge     Problem: Skin/Tissue Integrity - Adult  Goal: Incisions, wounds, or drain sites healing without S/S of infection  Outcome: Progressing  Flowsheets (Taken 1/20/2023 1619)  Incisions, Wounds, or Drain Sites Healing Without Sign and Symptoms of Infection:   ADMISSION and DAILY: Assess and document risk factors for pressure ulcer development   TWICE DAILY: Assess and document skin integrity   TWICE DAILY: Assess and document dressing/incision, wound bed, drain sites and surrounding tissue   Implement wound care per orders   Initiate pressure ulcer prevention bundle as indicated     Problem: Musculoskeletal - Adult  Goal: Return mobility to safest level of function  Outcome: Progressing  Flowsheets (Taken 1/20/2023 1619)  Return Mobility to Safest Level of Function:   Assess patient stability and activity tolerance for standing, transferring and ambulating with or without assistive devices   Assist with transfers and ambulation using safe patient handling equipment as needed   Ensure adequate protection for wounds/incisions during mobilization   Obtain physical therapy/occupational therapy consults as needed   Instruct patient/family in ordered activity level  Goal: Return ADL status to a safe level of function  Outcome: Progressing  Flowsheets (Taken 1/20/2023 1619)  Return ADL Status to a Safe Level of Function:   Administer medication as ordered   Obtain physical therapy/occupational therapy consults as needed   Assess activities of daily living deficits and provide assistive devices as needed   Assist and instruct patient to increase activity and self care as tolerated     Problem: Gastrointestinal - Adult  Goal: Maintains or returns to baseline bowel function  Outcome: Progressing  Flowsheets (Taken 1/20/2023 1619)  Maintains or returns to baseline bowel function:   Assess bowel function   Encourage oral fluids to ensure adequate hydration   Administer ordered medications as needed   Encourage mobilization and activity     Problem: Metabolic/Fluid and Electrolytes - Adult  Goal: Glucose maintained within prescribed range  Outcome: Progressing  Flowsheets (Taken 1/20/2023 1619)  Glucose maintained within prescribed range:   Monitor blood glucose as ordered   Assess for signs and symptoms of hyperglycemia and hypoglycemia   Administer ordered medications to maintain glucose within target range   Instruct patient on self management of diabetes and initiate consult as needed     Problem: Hematologic - Adult  Goal: Maintains hematologic stability  Outcome: Progressing  Flowsheets (Taken 1/20/2023 1619)  Maintains hematologic stability:   Assess for signs and symptoms of bleeding or hemorrhage   Monitor labs for bleeding or clotting disorders     Problem: Nutrition Deficit:  Goal: Optimize nutritional status  Outcome: Progressing  Flowsheets (Taken 1/20/2023 1619)  Nutrient intake appropriate for improving, restoring, or maintaining nutritional needs:   Assess nutritional status and recommend course of action   Monitor oral intake, labs, and treatment plans   Care plan reviewed with patient. Patient verbalizes understanding of the plan of care and contributes to goal setting.

## 2023-01-21 LAB
GLUCOSE BLD STRIP.AUTO-MCNC: 149 MG/DL (ref 70–108)
GLUCOSE BLD STRIP.AUTO-MCNC: 199 MG/DL (ref 70–108)
GLUCOSE BLD STRIP.AUTO-MCNC: 222 MG/DL (ref 70–108)
GLUCOSE BLD STRIP.AUTO-MCNC: 232 MG/DL (ref 70–108)

## 2023-01-21 PROCEDURE — 6370000000 HC RX 637 (ALT 250 FOR IP)

## 2023-01-21 PROCEDURE — 6370000000 HC RX 637 (ALT 250 FOR IP): Performed by: NURSE PRACTITIONER

## 2023-01-21 PROCEDURE — 94640 AIRWAY INHALATION TREATMENT: CPT

## 2023-01-21 PROCEDURE — 97110 THERAPEUTIC EXERCISES: CPT

## 2023-01-21 PROCEDURE — 6370000000 HC RX 637 (ALT 250 FOR IP): Performed by: PHYSICIAN ASSISTANT

## 2023-01-21 PROCEDURE — 82948 REAGENT STRIP/BLOOD GLUCOSE: CPT

## 2023-01-21 PROCEDURE — 99231 SBSQ HOSP IP/OBS SF/LOW 25: CPT | Performed by: NURSE PRACTITIONER

## 2023-01-21 PROCEDURE — 2580000003 HC RX 258

## 2023-01-21 PROCEDURE — 1200000000 HC SEMI PRIVATE

## 2023-01-21 PROCEDURE — 97530 THERAPEUTIC ACTIVITIES: CPT

## 2023-01-21 PROCEDURE — 6360000002 HC RX W HCPCS: Performed by: PHYSICIAN ASSISTANT

## 2023-01-21 PROCEDURE — 6370000000 HC RX 637 (ALT 250 FOR IP): Performed by: STUDENT IN AN ORGANIZED HEALTH CARE EDUCATION/TRAINING PROGRAM

## 2023-01-21 RX ADMIN — INSULIN LISPRO 4 UNITS: 100 INJECTION, SOLUTION INTRAVENOUS; SUBCUTANEOUS at 16:26

## 2023-01-21 RX ADMIN — GABAPENTIN 600 MG: 600 TABLET, FILM COATED ORAL at 19:35

## 2023-01-21 RX ADMIN — SODIUM CHLORIDE, PRESERVATIVE FREE 10 ML: 5 INJECTION INTRAVENOUS at 19:35

## 2023-01-21 RX ADMIN — GABAPENTIN 600 MG: 600 TABLET, FILM COATED ORAL at 07:48

## 2023-01-21 RX ADMIN — TIOTROPIUM BROMIDE INHALATION SPRAY 2 PUFF: 3.12 SPRAY, METERED RESPIRATORY (INHALATION) at 09:54

## 2023-01-21 RX ADMIN — POLYETHYLENE GLYCOL 3350 17 G: 17 POWDER, FOR SOLUTION ORAL at 07:48

## 2023-01-21 RX ADMIN — SODIUM CHLORIDE, PRESERVATIVE FREE 10 ML: 5 INJECTION INTRAVENOUS at 07:48

## 2023-01-21 RX ADMIN — METOPROLOL TARTRATE 50 MG: 50 TABLET, FILM COATED ORAL at 18:17

## 2023-01-21 RX ADMIN — OXYCODONE 10 MG: 5 TABLET ORAL at 23:53

## 2023-01-21 RX ADMIN — ENOXAPARIN SODIUM 30 MG: 100 INJECTION SUBCUTANEOUS at 11:14

## 2023-01-21 RX ADMIN — OXYCODONE 10 MG: 5 TABLET ORAL at 19:22

## 2023-01-21 RX ADMIN — INSULIN GLARGINE 10 UNITS: 100 INJECTION, SOLUTION SUBCUTANEOUS at 07:48

## 2023-01-21 RX ADMIN — SENNOSIDES AND DOCUSATE SODIUM 1 TABLET: 50; 8.6 TABLET ORAL at 07:48

## 2023-01-21 RX ADMIN — INSULIN GLARGINE 10 UNITS: 100 INJECTION, SOLUTION SUBCUTANEOUS at 20:49

## 2023-01-21 RX ADMIN — Medication 1 TABLET: at 07:48

## 2023-01-21 RX ADMIN — FERROUS SULFATE TAB 325 MG (65 MG ELEMENTAL FE) 325 MG: 325 (65 FE) TAB at 07:48

## 2023-01-21 RX ADMIN — CLOPIDOGREL BISULFATE 75 MG: 75 TABLET ORAL at 07:48

## 2023-01-21 RX ADMIN — FERROUS SULFATE TAB 325 MG (65 MG ELEMENTAL FE) 325 MG: 325 (65 FE) TAB at 19:35

## 2023-01-21 RX ADMIN — ENOXAPARIN SODIUM 30 MG: 100 INJECTION SUBCUTANEOUS at 23:53

## 2023-01-21 RX ADMIN — ATORVASTATIN CALCIUM 80 MG: 80 TABLET, FILM COATED ORAL at 07:48

## 2023-01-21 RX ADMIN — POTASSIUM CHLORIDE 20 MEQ: 1500 TABLET, EXTENDED RELEASE ORAL at 07:48

## 2023-01-21 RX ADMIN — ASPIRIN 81 MG 81 MG: 81 TABLET ORAL at 07:48

## 2023-01-21 RX ADMIN — FUROSEMIDE 40 MG: 40 TABLET ORAL at 07:48

## 2023-01-21 ASSESSMENT — PAIN SCALES - GENERAL
PAINLEVEL_OUTOF10: 7
PAINLEVEL_OUTOF10: 9
PAINLEVEL_OUTOF10: 0
PAINLEVEL_OUTOF10: 7

## 2023-01-21 ASSESSMENT — PAIN DESCRIPTION - ORIENTATION
ORIENTATION: LEFT
ORIENTATION: LEFT

## 2023-01-21 ASSESSMENT — PAIN DESCRIPTION - LOCATION
LOCATION: LEG
LOCATION: LEG

## 2023-01-21 ASSESSMENT — PAIN DESCRIPTION - FREQUENCY: FREQUENCY: INTERMITTENT

## 2023-01-21 ASSESSMENT — PAIN - FUNCTIONAL ASSESSMENT: PAIN_FUNCTIONAL_ASSESSMENT: ACTIVITIES ARE NOT PREVENTED

## 2023-01-21 ASSESSMENT — PAIN DESCRIPTION - DESCRIPTORS: DESCRIPTORS: ACHING

## 2023-01-21 ASSESSMENT — PAIN DESCRIPTION - PAIN TYPE: TYPE: SURGICAL PAIN

## 2023-01-21 ASSESSMENT — PAIN DESCRIPTION - ONSET: ONSET: ON-GOING

## 2023-01-21 NOTE — PLAN OF CARE
Problem: Respiratory - Adult  Goal: Clear lung sounds  Description: Clear lung sounds  Outcome: Progressing   Continue mdi tx to improve lung sounds. Pt agrees to plan of care. No

## 2023-01-21 NOTE — PROGRESS NOTES
Hospitalist Progress Note    Patient:  Arlet Colindres      Unit/Bed:7K-04/004-A    YOB: 1960    MRN: [id number]       Acct: [de-identified]     PCP: EYAD Almodovar CNP    Date of Admission: 1/10/2023    Assessment/Plan:      GISELA on CKD, resolved. Creatinine at upper limits of normal. Avoid nephrotoxic agents. Renally dose medications. Acute hypoxic postop respiratory insuffiencey, resolved. Nocturnal hypoxia. Probable RILEY. Nocturnal study completed. Needs 2L at HS. Referral to sleep on DC. Acute blood loss anemia on chronic disease. S/P 1 unit PRBC 1/11 for hbg 6.8. Stable post transfusion. Iron resumed. Septic left ankle with pathological fracture of left tibia and abscess. 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 Vanc and Clindamycin stopped 1/13 by ID. Zosyn continued until 1/18. ID/podiary managing. PT/OT. Type 2 diabetes, uncontrolled. A1c 8.3%. BS trend has improved. Continue current regimen for now. Accu check ac/hs. Diabetic diet. Hypoglycemic protocol. Primary hypertension. Continue Norvasc, clonidine, Lasix, Lopressor twice daily, and minoxidil with holding parameters. Chronic diastolic heart failure. No decompensation. Lasix, strict intake and output. Daily weights. Fluids stopped 1/11. No additional diuresis was given. CAD s/p CABG. Asa/plavix, BB, ACE. Moderate persistent asthma. No PFTs available for review. Spiriva continued. Lupus. Aware. Dispo: awaiting precertification / placement. Grossly medically stable. Hospitalist will sign off care. Call back PRN. Chief Complaint: Left septic ankle with abscess and open pathologic fracture    Hospital Course:     61-year-old male was admitted to [facility] by podiatry for urgent/emergent management of septic left ankle with pathologic fracture of tibia and abscess of left ankle.   Initially podiatry was planning to take patient to the OR tonight or tomorrow and consulted hospital team for risk stratification. Patient has significant past medical history of diabetes mellitus type 2, uncontrolled, hypertension, CAD status post CABG, CKD, chronic normocytic anemia, lupus, moderate persistent asthma, PAD, grade 2 diastolic dysfunction, and hyperlipidemia,   It is important note the patient was recently hospitalized at [facility] 11/2/2022 for hypertensive emergency in which she was experiencing chest pain and a new left bundle branch block was found. Patient subsequently left AMA despite having blood pressure 200s/100s. Upon arrival to [facility] blood cultures were obtained, patient was started on vancomycin and Zosyn. Chest x-ray obtained that demonstrated evidence of prior CABG as well as hyperinflated lungs suggestive of COPD. Patient also had evidence of healed granulomatous disease. With questionable mild interstitial fibrosis. X-ray of left ankle was obtained that showed gas density and soft tissue extending to the lower left leg. Complete dislocation and rotated talus relative to the tibia. Also there was abnormal periosteal reaction and distal tibia and fibula which indicate chronic osteomyelitis. There is also cortical erosion of distal fibula. EKG was also completed that demonstrated Normal sinus rhythm, Left axis deviation; Right bundle branch block, Inferior infarct , age undetermined. Abnormal ECG When compared with ECG of 25-NOV-2022 04:21,Right bundle branch block has replaced Intraventricular conduction delay. During my examination patient denied chest pain, shortness of breath, headache, lightheadedness, abdominal pain. Patient did have foot pain that was relieved with pain medication. Stat CBC, BMP, hepatic function panel as well as magnesium were ordered. 1/11/2023  Patient postop day 1. Hemoglobin dropped to 6.8. Will transfuse 1 unit PRBCs.   Patient requiring O2 postoperatively and was weaned to room air today however dipped back into the mid 80s. Currently satting 93% on 2 L.     1/12/2023  Hemoglobin 7 after transfusion. No longer requiring O2 at this time. Nocturnal pulse oximetry pending. WBC downtrending     1/13/2023  No events overnight. Blood sugar is much improved. VSS. Waiting for IPR precert      1/63/7122  Vanc and clindamycin stopped yesterday. BG acceptable. VSS. Patient had episode of symptomatic hypotension today. 500cc bolus of NS ordered. Patient improved and BP acceptable. Encouraged oral hydration      1/15/2023  Patient doing well. VSS- no hypotensive episodes. Blood sugar well controlled. Hopeful for IPR precert tomorrow. 1/16/2023  IPR denied- SW to work on ECF placement- hopeful to [facility]. Patient otherwise stable  Passing flatus but has not had BM- will order enema     1/17/2023  Patient with 2 BM yesterday. No other complaints or issues at this time  Waiting for SNF placement     1/18/2023  No acute events. 1/19/2023  Some pain to stump, worse when dangling. Pain medications helping. Reports pressure ulcer on her bottom that is painful to sit up in the WC/Chair. Subjective: Pain well controlled today. Upset he is still here and placement has been difficult.        Medications:  Reviewed    Infusion Medications    dextrose      sodium chloride 20 mL/hr at 01/13/23 0020     Scheduled Medications    insulin glargine  10 Units SubCUTAneous BID    sennosides-docusate sodium  1 tablet Oral BID    polyethylene glycol  17 g Oral Daily    enoxaparin  30 mg SubCUTAneous Q12H    insulin lispro  0-16 Units SubCUTAneous TID     insulin lispro  0-4 Units SubCUTAneous Nightly    amLODIPine  10 mg Oral Daily    atorvastatin  80 mg Oral Daily    ferrous sulfate  325 mg Oral BID    furosemide  40 mg Oral Daily    gabapentin  600 mg Oral BID    minoxidil  10 mg Oral Daily    multivitamin  1 tablet Oral Daily    potassium chloride  20 mEq Oral Daily valsartan  160 mg Oral Daily    aspirin  81 mg Oral Daily    clopidogrel  75 mg Oral Daily    tiotropium  2 puff Inhalation Daily    cloNIDine  0.1 mg Oral BID    metoprolol tartrate  50 mg Oral BID    sodium chloride flush  5-40 mL IntraVENous 2 times per day     PRN Meds: acetaminophen **OR** acetaminophen, albuterol sulfate HFA, glucose, dextrose bolus **OR** dextrose bolus, glucagon (rDNA), dextrose, sodium chloride flush, sodium chloride, ondansetron **OR** ondansetron, oxyCODONE **OR** oxyCODONE, morphine **OR** morphine      Intake/Output Summary (Last 24 hours) at 1/21/2023 1422  Last data filed at 1/21/2023 1239  Gross per 24 hour   Intake 730 ml   Output 2800 ml   Net -2070 ml         Diet:  ADULT DIET; Regular; 3 carb choices (45 gm/meal); Low Fat/Low Chol/High Fiber/2 gm Na    Exam:  /62   Pulse 78   Temp 97.4 °F (36.3 °C) (Oral)   Resp 16   Ht 6' 2\" (1.88 m)   Wt 281 lb 12.8 oz (127.8 kg)   SpO2 98%   BMI 36.18 kg/m²     General appearance: No apparent distress, appears stated age and cooperative. HEENT: Pupils equal, round, and reactive to light. Conjunctivae/corneas clear. Neck: Supple, with full range of motion. No jugular venous distention. Trachea midline. Respiratory:  Normal respiratory effort. Clear to auscultation, bilaterally without Rales/Wheezes/Rhonchi. Cardiovascular: Regular rate and rhythm with normal S1/S2 without murmurs, rubs or gallops. Abdomen: Soft, non-tender, non-distended with normal bowel sounds. Musculoskeletal: passive and active ROM x 4 extremities. Skin: Skin color, texture, turgor normal.  Left stump with dressing. Neurologic:  Neurovascularly intact without any focal sensory/motor deficits. Cranial nerves: II-XII intact, grossly non-focal.  Psychiatric: Alert and oriented, thought content appropriate, normal insight  Capillary Refill: Brisk,< 3 seconds   Peripheral Pulses: +2 palpable right leg.       Labs:   Recent Labs     01/19/23  0559   WBC 5.0 HGB 8.3*   HCT 27.5*          Recent Labs     01/19/23  0559      K 4.4      CO2 26   BUN 24*   CREATININE 1.4*   CALCIUM 8.9       No results for input(s): AST, ALT, BILIDIR, BILITOT, ALKPHOS in the last 72 hours. No results for input(s): INR in the last 72 hours. No results for input(s): Kattripp Malone in the last 72 hours. Urinalysis:      Lab Results   Component Value Date/Time    NITRU NEGATIVE 11/16/2022 01:52 PM    WBCUA 0-2 11/16/2022 01:52 PM    BACTERIA NONE SEEN 11/16/2022 01:52 PM    RBCUA 15-25 11/16/2022 01:52 PM    BLOODU MODERATE 11/16/2022 01:52 PM    GLUCOSEU >= 1000 11/16/2022 01:52 PM       Radiology:  XR ANKLE LEFT (MIN 3 VIEWS)   Final Result         1. Gas density in the soft tissues extending into the lower left leg. 2. Completely dislocated and rotated talus relative to the tibia. 3. Abnormal periosteal reaction in the distal tibia and fibula which can indicate chronic osteomyelitis. 4. Cortical erosion of the distal fibula. **This report has been created using voice recognition software. It may contain minor errors which are inherent in voice recognition technology. **      Final report electronically signed by Dr. Tanisha Hou on 1/10/2023 3:46 PM      XR CHEST PORTABLE   Final Result   1. Normal heart size. Metallic sternotomy sutures from prior surgery. Lungs somewhat hyperinflated, suggesting possible COPD. 2. Evidence for old, healed granulomatous disease. Question mild interstitial fibrosis/pneumonitis both lung bases. No effusion is seen. **This report has been created using voice recognition software. It may contain minor errors which are inherent in voice recognition technology. **      Final report electronically signed by Dr. Vandana Fitzgerald on 1/10/2023 2:42 PM          Diet: ADULT DIET; Regular; 3 carb choices (45 gm/meal);  Low Fat/Low Chol/High Fiber/2 gm Na       Disposition:    [] Home       [] TCU       [] Rehab       [] Psych       [x] SNF       [] [place]       [] Other-    Code Status: Full Code    PT/OT Eval Status: following        Electronically signed by EYAD Green CNP on 1/21/2023 at 2:22 PM

## 2023-01-21 NOTE — PLAN OF CARE
Problem: Musculoskeletal - Adult  Goal: Return mobility to safest level of function  1/21/2023 0026 by Stephen Davidson RN  Flowsheets (Taken 1/21/2023 8013)  Return Mobility to Safest Level of Function:   Assess patient stability and activity tolerance for standing, transferring and ambulating with or without assistive devices   Assist with transfers and ambulation using safe patient handling equipment as needed   Ensure adequate protection for wounds/incisions during mobilization   Obtain physical therapy/occupational therapy consults as needed  1/21/2023 0025 by Stephen Davidson RN  Outcome: Progressing  1/20/2023 1619 by Itzel Martinez RN  Outcome: Progressing  Flowsheets (Taken 1/20/2023 1619)  Return Mobility to Safest Level of Function:   Assess patient stability and activity tolerance for standing, transferring and ambulating with or without assistive devices   Assist with transfers and ambulation using safe patient handling equipment as needed   Ensure adequate protection for wounds/incisions during mobilization   Obtain physical therapy/occupational therapy consults as needed   Instruct patient/family in ordered activity level

## 2023-01-21 NOTE — PROGRESS NOTES
[facility]  INPATIENT PHYSICAL THERAPY  DAILY NOTE  [facility] ORTHOPEDICS 7K - 7K-04/004-A  Time In: 0154  Time Out: 1522  Timed Code Treatment Minutes: 23 Minutes  Minutes: 23          Date: 2023  Patient Name: Mariel Vargas,  Gender:  male        MRN: [id number]  : 1960  (65 y.o.)     Referring Practitioner: Marco Cerrato DPM  Diagnosis: sepsis  Additional Pertinent Hx: per EMR \"The patient is a 65 y.o. male with significant past medical history of CAD, CKD, diabetes, hyperlipidemia, and hypertension who is being seen at bedside on behalf of Dr. Nathaniel Sampson. Patient relates that he has had ongoing problems with his left foot and ankle over the course of the past few years. 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 He relates that he follows up and sees Dr. Nathaniel Sampson in clinic weekly. He relates that he missed his appointment last week and did not have the dressing changed. The patient states that the previous week he had x-rays taken in clinic that showed a pathologic fracture of the left tibia. He relates that he has not been walking on his left lower extremity. He relates that he has applied some pressure when using the restroom on that leg. He states that amputation has been in discussion at his previous clinic encounters. The patient relates that he last a last evening at 8 or 9 PM.  He relates that he drank Crystal light tea around 8 or 9 AM this morning. He relates that he took Plavix this morning at 6 AM.  The patient denies any other concerns to his right lower extremity. The patient denies nausea, vomiting, fever, chills, shortness of breath or chest pain. \" Pt is s/p L BKA on 1/10/23 completed by Dr. Nathaniel Sampson.      Prior Level of Function:  Lives With: Alone  Type of Home: House  Home Layout: One level  Home Access: Ramped entrance  Home Equipment: Electric scooter, [id number] Shower/Tub: Walk-in shower, Shower chair with back  Bathroom Toilet: Handicap height  Bathroom Equipment: Grab bars in shower, Grab bars around toilet    Receives Help From: Friend(s)  ADL Assistance: Independent  Homemaking Assistance: Needs assistance  Homemaking Responsibilities: Yes  Ambulation Assistance: Independent  Transfer Assistance: Independent  Active : No  IADL Comments: He has a robert who works for him - worker's wife makes meals from that he only has to heat up in microwave and assists with cleaning tasks  Additional Comments: Pt reported that he has a very supportive family who can be available intermittently throughout the day upon discharge home. Pt reports he was amb very little, using his scooter or w/c most of the time    Restrictions/Precautions:  Restrictions/Precautions: General Precautions, Weight Bearing, Fall Risk  Left Lower Extremity Weight Bearing: Non Weight Bearing (BKA)  Position Activity Restriction  Other position/activity restrictions: L BKA     SUBJECTIVE: RN approved session. Patient in chair upon arrival and agreeable to therapy. Pt asked to be transferred back to bed. PAIN: Not Rated    Vitals: Vitals not assessed per clinical judgement, see nursing flowsheet    OBJECTIVE:  Bed Mobility:  Sit to Supine: Contact Guard Assistance, with head of bed flat, without rail, with verbal cues , with increased time for completion   Scooting: Contact Guard Assistance    Transfers:  Sit to Stand: Contact Guard Assistance  Stand to [facility] [address]  Stand Pivot:Stand By Assistance    Ambulation:  Not Tested    Balance:  Static Standing Balance: Contact Guard Assistance, for a short time prior to transfer    Exercise:  Patient was guided in 1 set(s) 10 reps of exercise to left lower extremities. Hooklying hip abduction/adduction and side lying hip extension with cues needed for true hip extension . Exercises were completed for increased independence with functional mobility.     Functional Outcome Measures: Completed  AM-PAC Inpatient Mobility without Stair Climbing Raw Score : 13  AM-PAC Inpatient without Stair Climbing T-Scale Score : 38.96    ASSESSMENT:  Assessment: Patient progressing toward established goals. Following treatment a roll towel was placed under left hip to aid in gaining adequate hip extension for prosthetic limb. Activity Tolerance:  Patient tolerance of  treatment: good. Equipment Recommendations:Equipment Needed: No  Discharge Recommendations: Continue to assess pending progress, Subacte/Skilled Nursing Facility, and Patient would benefit from continued PT at discharge  Plan: Current Treatment Recommendations: Strengthening, Balance training, Functional mobility training, Transfer training, Endurance training, Equipment evaluation, education, & procurement, Patient/Caregiver education & training, Neuromuscular re-education, Safety education & training, Home exercise program, Therapeutic activities  General Plan:  (5xO)    Patient Education  Patient Education: Plan of Care, Bed Mobility, Transfers, Verbal Exercise Instruction    Goals:  Patient Goals : \"be able to get in and out bed and w/c with less assist\"  Short Term Goals  Time Frame for Short Term Goals: by discharge  Short Term Goal 1: Pt will demo supine to and from sit transfers with SBA and modifications as needed to progress with mobility. Short Term Goal 2: Pt will demo slide board transfers to and from w/c with mod Ax1 to progress with mobility. Short Term Goal 3: Pt will tolerate 10-20 reps of ther ex to increase overall mobility. Short Term Goal 4: Pt will demo S for w/c mobility for 100 feet to increase IND with mobility. Short Term Goal 5: sit<>stand and stand pivot transfers with SBA and LRD mod I for safe transfers  Long Term Goals  Time Frame for Long Term Goals : NA due to short ELOS    Following session, patient left in safe position with all fall risk precautions in place.

## 2023-01-21 NOTE — PLAN OF CARE
Problem: Discharge Planning  Goal: Discharge to home or other facility with appropriate resources  Outcome: Progressing  Flowsheets (Taken 1/21/2023 1050)  Discharge to home or other facility with appropriate resources:   Identify barriers to discharge with patient and caregiver   Identify discharge learning needs (meds, wound care, etc)   Refer to discharge planning if patient needs post-hospital services based on physician order or complex needs related to functional status, cognitive ability or social support system   Arrange for needed discharge resources and transportation as appropriate     Problem: Infection - Adult  Goal: Absence of infection at discharge  Outcome: Progressing  Flowsheets (Taken 1/21/2023 1050)  Absence of infection at discharge:   Assess and monitor for signs and symptoms of infection   Monitor lab/diagnostic results   Monitor all insertion sites i.e., indwelling lines, tubes and drains   Administer medications as ordered   Instruct and encourage patient and family to use good hand hygiene technique   Identify and instruct in appropriate isolation precautions for identified infection/condition     Problem: Chronic Conditions and Co-morbidities  Goal: Patient's chronic conditions and co-morbidity symptoms are monitored and maintained or improved  Outcome: Progressing  Flowsheets (Taken 1/21/2023 1050)  Care Plan - Patient's Chronic Conditions and Co-Morbidity Symptoms are Monitored and Maintained or Improved:   Monitor and assess patient's chronic conditions and comorbid symptoms for stability, deterioration, or improvement   Collaborate with multidisciplinary team to address chronic and comorbid conditions and prevent exacerbation or deterioration   Update acute care plan with appropriate goals if chronic or comorbid symptoms are exacerbated and prevent overall improvement and discharge     Problem: Skin/Tissue Integrity - Adult  Goal: Incisions, wounds, or drain sites healing without S/S of infection  Outcome: Progressing  Flowsheets (Taken 1/21/2023 1050)  Incisions, Wounds, or Drain Sites Healing Without Sign and Symptoms of Infection:   ADMISSION and DAILY: Assess and document risk factors for pressure ulcer development   TWICE DAILY: Assess and document skin integrity   TWICE DAILY: Assess and document dressing/incision, wound bed, drain sites and surrounding tissue   Implement wound care per orders     Problem: Musculoskeletal - Adult  Goal: Return mobility to safest level of function  1/21/2023 1050 by Edvin Jordan RN  Outcome: Progressing  Flowsheets (Taken 1/21/2023 1050)  Return Mobility to Safest Level of Function:   Assess patient stability and activity tolerance for standing, transferring and ambulating with or without assistive devices   Assist with transfers and ambulation using safe patient handling equipment as needed   Ensure adequate protection for wounds/incisions during mobilization   Instruct patient/family in ordered activity level   Obtain physical therapy/occupational therapy consults as needed     Problem: Musculoskeletal - Adult  Goal: Return ADL status to a safe level of function  1/21/2023 1050 by Edvin Jordan RN  Outcome: Progressing  Flowsheets (Taken 1/21/2023 1050)  Return ADL Status to a Safe Level of Function:   Administer medication as ordered   Assess activities of daily living deficits and provide assistive devices as needed   Assist and instruct patient to increase activity and self care as tolerated   Obtain physical therapy/occupational therapy consults as needed     Problem: Gastrointestinal - Adult  Goal: Maintains or returns to baseline bowel function  Outcome: Progressing  Flowsheets (Taken 1/21/2023 1050)  Maintains or returns to baseline bowel function:   Assess bowel function   Encourage oral fluids to ensure adequate hydration   Administer ordered medications as needed   Encourage mobilization and activity     Problem: Metabolic/Fluid and Electrolytes - Adult  Goal: Glucose maintained within prescribed range  Outcome: Progressing  Flowsheets (Taken 1/21/2023 1050)  Glucose maintained within prescribed range:   Monitor blood glucose as ordered   Administer ordered medications to maintain glucose within target range   Assess for signs and symptoms of hyperglycemia and hypoglycemia   Assess barriers to adequate nutritional intake and initiate nutrition consult as needed   Instruct patient on self management of diabetes and initiate consult as needed     Problem: Nutrition Deficit:  Goal: Optimize nutritional status  Outcome: Progressing  Flowsheets (Taken 1/21/2023 1050)  Nutrient intake appropriate for improving, restoring, or maintaining nutritional needs:   Assess nutritional status and recommend course of action   Monitor oral intake, labs, and treatment plans     Problem: Pain  Goal: Verbalizes/displays adequate comfort level or baseline comfort level  Outcome: Adequate for Discharge  Flowsheets (Taken 1/21/2023 1050)  Verbalizes/displays adequate comfort level or baseline comfort level:   Encourage patient to monitor pain and request assistance   Administer analgesics based on type and severity of pain and evaluate response   Consider cultural and social influences on pain and pain management   Assess pain using appropriate pain scale   Implement non-pharmacological measures as appropriate and evaluate response   Notify Licensed Independent Practitioner if interventions unsuccessful or patient reports new pain     Problem: ABCDS Injury Assessment  Goal: Absence of physical injury  Outcome: Adequate for Discharge  Flowsheets (Taken 1/21/2023 1050)  Absence of Physical Injury: Implement safety measures based on patient assessment     Problem: Hematologic - Adult  Goal: Maintains hematologic stability  Outcome: Adequate for Discharge  Flowsheets (Taken 1/21/2023 1050)  Maintains hematologic stability:   Assess for signs and symptoms of bleeding or hemorrhage   Monitor labs for bleeding or clotting disorders     Care plan reviewed with patient. Patient verbalizes understanding of plan of care and contributes to goal setting.

## 2023-01-21 NOTE — PROGRESS NOTES
[address] [facility], [place]  Occupational Therapy  Daily Note  Time:    Time In: 40  Time Out: 1437  Timed Code Treatment Minutes: 23 Minutes  Minutes: 23          Date: 2023  Patient Name: Nessa Bran,   Gender: male      Room: [facility]004-A  MRN: [id number]  : 1960  (65 y.o.)  Referring Practitioner: Diego Manriquez DPM  Diagnosis: Sepsis  Additional Pertinent Hx: The patient is a 65 y.o. male with significant past medical history of CAD, CKD, diabetes, hyperlipidemia, and hypertension who is being seen at bedside on behalf of Dr. Clau Lind. Patient relates that he has had ongoing problems with his left foot and ankle over the course of the past few years. 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 He relates that he follows up and sees Dr. Clau Lind in clinic weekly. He relates that he missed his appointment last week and did not have the dressing changed. The patient states that the previous week he had x-rays taken in clinic that showed a pathologic fracture of the left tibia. He relates that he has not been walking on his left lower extremity. He relates that he has applied some pressure when using the restroom on that leg. He states that amputation has been in discussion at his previous clinic encounters. The patient relates that he last a last evening at 8 or 9 PM.  He relates that he drank Crystal light tea around 8 or 9 AM this morning. He relates that he took Plavix this morning at 6 AM.  The patient denies any other concerns to his right lower extremity. The patient denies nausea, vomiting, fever, chills, shortness of breath or chest pain.     Restrictions/Precautions:  Restrictions/Precautions: General Precautions, Weight Bearing, Fall Risk  Left Lower Extremity Weight Bearing: Non Weight Bearing (BKA)  Position Activity Restriction  Other position/activity restrictions: L BKA     SUBJECTIVE: Pt was resting in bed upon arrival, pleasant and agreeable to OT. PAIN: Denies    Vitals: Vitals not assessed per clinical judgement, see nursing flowsheet    COGNITION: Decreased Safety Awareness    ADL:   No ADL's completed this session. Lydia Hsieh BALANCE:  Sitting Balance:  Supervision. EOB  Standing Balance: Contact Guard Assistance. With BUE support on standard walker    BED MOBILITY:  Rolling to Right: Supervision    Supine to Sit: Stand By Assistance impulsive  Scooting: Stand By Assistance      TRANSFERS:  Sit to Stand:  [employer]. From EOB, VC for body positioning and hand placement  Stand to Sit: Contact Guard Assistance. To recliner, VC for body positioning prior to t/f initiation  Stand Pivot: [employer]. EOB to recliner, VC for technique, impulsive    ADDITIONAL ACTIVITIES:  Patient completed BUE strengthening exercises with skilled education on HEP: completed x10 reps x1 set with a moderate resistance band in all joints and all planes in order to improve UE strength and activity tolerance required for BADL routine and toilet / shower transfers. Patient tolerated well, requiring min rest breaks. Patient also required min cues for technique. ASSESSMENT:     Activity Tolerance:  Patient tolerance of  treatment: good.         Discharge Recommendations: Inpatient Therapy Stay  Equipment Recommendations: Equipment Needed: Yes  Other: TTB, slideboard, drop arm commode  Plan: Times Per Week: 6x  Current Treatment Recommendations: Strengthening, Balance training, Functional mobility training, Endurance training, Patient/Caregiver education & training, Safety education & training, Self-Care / ADL, Home management training    Patient Education  Patient Education: Plan of Care, Reviewed Prior Education, and Importance of Increasing Activity    Goals  Short Term Goals  Time Frame for Short Term Goals: by discharge  Short Term Goal 1: Pt will complete grooming routine while seated EOB with Supervision and no VC for technique to incrase indep with self care  Short Term Goal 2: Pt will complete slideboard t/f with consistent CGA and min VC for placement of board to increase indep with toileting routine  Short Term Goal 3: Pt will demo indep with BUE strengthening HEP to increase overall UB strength/endurance for ease with t/fs  Short Term Goal 4: OTR to assess sit to stand/stand-pivot/and functional mobility when appropriate    Revised Short-Term Goals  Short Term Goals  Time Frame for Short Term Goals: by discharge  Short Term Goal 1: Pt will complete grooming routine while seated EOB with Supervision and no VC for technique to incrase indep with self care  Short Term Goal 2: Pt will complete stand pivot t/fs with SBA and min VC for safety to increase indep with toileting routine  Short Term Goal 3: Pt will demo indep with BUE strengthening HEP to increase overall UB strength/endurance for ease with t/fs  Short Term Goal 4: Pt will tolerate dynamic standing x2-3min with unilateral release and SBA for increased indep with clothing mgmt      Following session, patient left in safe position with all fall risk precautions in place.

## 2023-01-22 LAB
GLUCOSE BLD STRIP.AUTO-MCNC: 139 MG/DL (ref 70–108)
GLUCOSE BLD STRIP.AUTO-MCNC: 165 MG/DL (ref 70–108)
GLUCOSE BLD STRIP.AUTO-MCNC: 169 MG/DL (ref 70–108)
GLUCOSE BLD STRIP.AUTO-MCNC: 212 MG/DL (ref 70–108)

## 2023-01-22 PROCEDURE — 94640 AIRWAY INHALATION TREATMENT: CPT

## 2023-01-22 PROCEDURE — 6370000000 HC RX 637 (ALT 250 FOR IP): Performed by: PHYSICIAN ASSISTANT

## 2023-01-22 PROCEDURE — 6370000000 HC RX 637 (ALT 250 FOR IP)

## 2023-01-22 PROCEDURE — 2580000003 HC RX 258

## 2023-01-22 PROCEDURE — 6370000000 HC RX 637 (ALT 250 FOR IP): Performed by: NURSE PRACTITIONER

## 2023-01-22 PROCEDURE — 6360000002 HC RX W HCPCS: Performed by: PHYSICIAN ASSISTANT

## 2023-01-22 PROCEDURE — 6370000000 HC RX 637 (ALT 250 FOR IP): Performed by: STUDENT IN AN ORGANIZED HEALTH CARE EDUCATION/TRAINING PROGRAM

## 2023-01-22 PROCEDURE — 1200000000 HC SEMI PRIVATE

## 2023-01-22 PROCEDURE — 82948 REAGENT STRIP/BLOOD GLUCOSE: CPT

## 2023-01-22 RX ADMIN — FUROSEMIDE 40 MG: 40 TABLET ORAL at 08:20

## 2023-01-22 RX ADMIN — OXYCODONE 10 MG: 5 TABLET ORAL at 23:31

## 2023-01-22 RX ADMIN — METOPROLOL TARTRATE 50 MG: 50 TABLET, FILM COATED ORAL at 17:49

## 2023-01-22 RX ADMIN — INSULIN LISPRO 4 UNITS: 100 INJECTION, SOLUTION INTRAVENOUS; SUBCUTANEOUS at 16:37

## 2023-01-22 RX ADMIN — INSULIN GLARGINE 10 UNITS: 100 INJECTION, SOLUTION SUBCUTANEOUS at 20:43

## 2023-01-22 RX ADMIN — GABAPENTIN 600 MG: 600 TABLET, FILM COATED ORAL at 08:20

## 2023-01-22 RX ADMIN — OXYCODONE 10 MG: 5 TABLET ORAL at 03:57

## 2023-01-22 RX ADMIN — Medication 1 TABLET: at 08:20

## 2023-01-22 RX ADMIN — TIOTROPIUM BROMIDE INHALATION SPRAY 2 PUFF: 3.12 SPRAY, METERED RESPIRATORY (INHALATION) at 08:43

## 2023-01-22 RX ADMIN — ENOXAPARIN SODIUM 30 MG: 100 INJECTION SUBCUTANEOUS at 23:31

## 2023-01-22 RX ADMIN — INSULIN GLARGINE 10 UNITS: 100 INJECTION, SOLUTION SUBCUTANEOUS at 08:18

## 2023-01-22 RX ADMIN — METOPROLOL TARTRATE 50 MG: 50 TABLET, FILM COATED ORAL at 05:21

## 2023-01-22 RX ADMIN — POTASSIUM CHLORIDE 20 MEQ: 1500 TABLET, EXTENDED RELEASE ORAL at 08:20

## 2023-01-22 RX ADMIN — SODIUM CHLORIDE, PRESERVATIVE FREE 10 ML: 5 INJECTION INTRAVENOUS at 08:21

## 2023-01-22 RX ADMIN — CLOPIDOGREL BISULFATE 75 MG: 75 TABLET ORAL at 08:20

## 2023-01-22 RX ADMIN — GABAPENTIN 600 MG: 600 TABLET, FILM COATED ORAL at 19:38

## 2023-01-22 RX ADMIN — SODIUM CHLORIDE, PRESERVATIVE FREE 10 ML: 5 INJECTION INTRAVENOUS at 19:38

## 2023-01-22 RX ADMIN — FERROUS SULFATE TAB 325 MG (65 MG ELEMENTAL FE) 325 MG: 325 (65 FE) TAB at 08:20

## 2023-01-22 RX ADMIN — ENOXAPARIN SODIUM 30 MG: 100 INJECTION SUBCUTANEOUS at 11:40

## 2023-01-22 RX ADMIN — ASPIRIN 81 MG 81 MG: 81 TABLET ORAL at 08:20

## 2023-01-22 RX ADMIN — ATORVASTATIN CALCIUM 80 MG: 80 TABLET, FILM COATED ORAL at 08:20

## 2023-01-22 RX ADMIN — FERROUS SULFATE TAB 325 MG (65 MG ELEMENTAL FE) 325 MG: 325 (65 FE) TAB at 19:38

## 2023-01-22 ASSESSMENT — PAIN SCALES - GENERAL
PAINLEVEL_OUTOF10: 3
PAINLEVEL_OUTOF10: 7
PAINLEVEL_OUTOF10: 6
PAINLEVEL_OUTOF10: 7
PAINLEVEL_OUTOF10: 0
PAINLEVEL_OUTOF10: 7
PAINLEVEL_OUTOF10: 7

## 2023-01-22 ASSESSMENT — PAIN DESCRIPTION - LOCATION
LOCATION: LEG
LOCATION: LEG

## 2023-01-22 ASSESSMENT — PAIN DESCRIPTION - ORIENTATION
ORIENTATION: LEFT
ORIENTATION: LEFT

## 2023-01-22 NOTE — PROGRESS NOTES
Podiatric Progress Note  Jackpeace Vargas  Subjective :   1/22/2023  Patient seen at bedside on behalf of Dr. Nathaniel Sampson. Patient is s/p LLE proximal Symes amputation with distal transtibial osteotomy (DOS 1/10/2023). Patient is pleasant, and is alert and oriented. He has no new complaints. He says he was confused that his blood sugar got a little high yesterday, however it is not down in the normal range. He is pending discharge planning. Patient denies any nausea, vomiting, fever, chills, chest pain, shortness of breath.    1/21/2023  Patient seen at bedside this morning on behalf of Dr. Nathaniel Sampson. Patient is s/p LLE proximal Symes amputation with distal transtibial osteotomy (DOS 1/10/2023). Patient is pleasant, and is alert and oriented. Patient endorses no pain to LLE. He relates that he is taking oral pain medication. He denies any N/V/F/C/SOB/CP or bilateral calf tenderness. 1/20/2023  Patient seen at bedside this morning on behalf of Dr. Nathaniel Sampson. Patient is s/p LLE proximal Symes amputation with distal transtibial osteotomy (DOS 1/10/2023). Patient is pleasant, and is alert and oriented. Patient endorses no pain to LLE. He relates that he is taking oral pain medication. He denies any N/V/F/C/SOB/CP or bilateral calf tenderness. He has no other pedal complaints at this time. Patient relates that he is not able to got to [facility] due to insurance. He is waiting to hear if he is able to go to [facility] upon discharge for rehab. 1/19/2023  Patient seen at bedside this morning on behalf of Dr. Nathaniel Sampson. Patient is s/p LLE proximal Symes amputation with distal transtibial osteotomy (DOS 1/10/2023). Patient is pleasant, and is alert and oriented. Patient endorses no pain to LLE. He relates that he is taking oral pain medication. He denies any N/V/F/C/SOB/CP or bilateral calf tenderness. He has no other pedal complaints at this time.   Patient relates that he is waiting to hear if he will be able to go to [facility] upon discharge for rehab.     1/18/2023  Patient seen at bedside this morning on behalf of Dr. Clau Lind. Patient is s/p LLE proximal Symes amputation with distal transtibial osteotomy (DOS 1/10/2023). Patient is pleasant, and is alert and oriented. Patient states that he is doing much better and is thankful for Dr. Clau Lind and Dr. Reid Snell for treating him fast and taking care of him. Patient endorses minimal pain to LLE. He relates that he is taking oral pain medication. He denies any N/V/F/C/SOB/CP or bilateral calf tenderness. He has no other pedal complaints at this time. Patient is aware that there are no beds available at the facility which he originally wanted; and he is also aware that precertification is started at another SNF/ECF. He relates that he has not heard anything recently. 1/17/2023  Patient seen at bedside this morning on behalf of Dr. Clau Lind. Patient is s/p LLE proximal Symes amputation with distal transtibial osteotomy (DOS 1/10/2023). Patient is pleasant, and is alert and oriented. Patient states that he feels very well overall. Per patient, he had an enema yesterday, and then had another bowel movement afterwards. Patient endorses minimal pain to LLE. He denies any N/V/F/C/SOB/CP or bilateral calf tenderness. He has no other pedal complaints at this time. Patient is aware that there are no beds available at the facility which he originally wanted; and he is also aware that precertification is started at another SNF/ECF.    1/16/2023  Patient seen at chair side this morning on behalf of Dr. Clau Lind. Patient is s/p LLE proximal Symes amputation with distal transtibial osteotomy (DOS 1/10/2023). Patient is pleasant, and is alert and oriented. Patient states that he feels very well overall. Patient continues to endorse minimal pain to LLE (2/10). He currently denies any N/V/F/C/N/V/CP.   Patient still has not had bowel movement yet; he denies any abdominal bloating, cramping, or discomfort. No other pedal complaints at this time. Patient states that per his insurance, [facility] 1/15/2023  Patient seen at bedside this morning on behalf of Dr. Joaquín Jackson. Patient is status post LLE proximal Symes amputation with distal transtibial osteotomy (DOS 1/10/2023). Patient appeared pleasant, was oriented to person, place and time and in no acute distress. Patient is very grateful for having the procedure performed; he states that he subjectively feels much better after the amputation. Patient expressed gratitude to Dr. Joaquín Jackson and Chaitanya Colbert for their services. Patient endorses minimal pain to the LLE (3/10). Patient does endorse mild neuropathic type symptoms to the RLE. Patient denies any N/V/F/C/SOB or CP. Patient states that he is passing flatus, but has not yet had a bowel movement. Patient has no further pedal concerns at this point in time. 1/14/2023  Patient is a pleasant 62yo male seen bedside this AM s/p left lower extremity amputation emergent in nature due to necrotizing fasciitis and extensive gas gangrene performed 1.10.23. Patient seated upright bedside working with PT. Patient overall states he feels vastly improved, post op dressing clean/dry/intact. Social work is following, precert for [employer] in place. ID following for IVAB therapy, currently on Zosyn IV (vancomycin and clindamycin discontinued). Labs are trending better, leukocytosis resolved. Will continue to follow until determination of placement. HISTORY OF PRESENT ILLNESS:                 The patient is a 65 y.o. male with significant past medical history of CAD, CKD, diabetes, hyperlipidemia, and hypertension who is being seen at bedside on behalf of Dr. Joaquín Jackson.  Patient relates that he has had ongoing problems with his left foot and ankle over the course of the past few years. 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 He relates that he follows up and sees Dr. Debbie Dickinson in clinic weekly. He relates that he missed his appointment last week and did not have the dressing changed. The patient states that the previous week he had x-rays taken in clinic that showed a pathologic fracture of the left tibia. He relates that he has not been walking on his left lower extremity. He relates that he has applied some pressure when using the restroom on that leg. He states that amputation has been in discussion at his previous clinic encounters. The patient relates that he last a last evening at 8 or 9 PM.  He relates that he drank Crystal light tea around 8 or 9 AM this morning. He relates that he took Plavix this morning at 6 AM.  The patient denies any other concerns to his right lower extremity. The patient denies nausea, vomiting, fever, chills, shortness of breath or chest pain.     Current Medications:    Current Facility-Administered Medications: insulin glargine (LANTUS) injection vial 10 Units, 10 Units, SubCUTAneous, BID  sennosides-docusate sodium (SENOKOT-S) 8.6-50 MG tablet 1 tablet, 1 tablet, Oral, BID  polyethylene glycol (GLYCOLAX) packet 17 g, 17 g, Oral, Daily  enoxaparin Sodium (LOVENOX) injection 30 mg, 30 mg, SubCUTAneous, Q12H  insulin lispro (HUMALOG) injection vial 0-16 Units, 0-16 Units, SubCUTAneous, TID WC  insulin lispro (HUMALOG) injection vial 0-4 Units, 0-4 Units, SubCUTAneous, Nightly  acetaminophen (TYLENOL) tablet 650 mg, 650 mg, Oral, Q6H PRN **OR** acetaminophen (TYLENOL) suppository 650 mg, 650 mg, Rectal, Q6H PRN  amLODIPine (NORVASC) tablet 10 mg, 10 mg, Oral, Daily  atorvastatin (LIPITOR) tablet 80 mg, 80 mg, Oral, Daily  albuterol sulfate HFA (PROVENTIL;VENTOLIN;PROAIR) 108 (90 Base) MCG/ACT inhaler 2 puff, 2 puff, Inhalation, 4x Daily PRN  ferrous sulfate (IRON 325) tablet 325 mg, 325 mg, Oral, BID  furosemide (LASIX) tablet 40 mg, 40 mg, Oral, Daily  gabapentin (NEURONTIN) tablet 600 mg, 600 mg, Oral, BID  minoxidil (LONITEN) tablet 10 mg, 10 mg, Oral, Daily  multivitamin 1 tablet, 1 tablet, Oral, Daily  potassium chloride (KLOR-CON M) extended release tablet 20 mEq, 20 mEq, Oral, Daily  valsartan (DIOVAN) tablet 160 mg, 160 mg, Oral, Daily  glucose chewable tablet 16 g, 4 tablet, Oral, PRN  dextrose bolus 10% 125 mL, 125 mL, IntraVENous, PRN **OR** dextrose bolus 10% 250 mL, 250 mL, IntraVENous, PRN  glucagon (rDNA) injection 1 mg, 1 mg, SubCUTAneous, PRN  dextrose 10 % infusion, , IntraVENous, Continuous PRN  aspirin chewable tablet 81 mg, 81 mg, Oral, Daily  clopidogrel (PLAVIX) tablet 75 mg, 75 mg, Oral, Daily  tiotropium (SPIRIVA RESPIMAT) 2.5 MCG/ACT inhaler 2 puff, 2 puff, Inhalation, Daily  cloNIDine (CATAPRES) tablet 0.1 mg, 0.1 mg, Oral, BID  metoprolol tartrate (LOPRESSOR) tablet 50 mg, 50 mg, Oral, BID  sodium chloride flush 0.9 % injection 5-40 mL, 5-40 mL, IntraVENous, 2 times per day  sodium chloride flush 0.9 % injection 5-40 mL, 5-40 mL, IntraVENous, PRN  0.9 % sodium chloride infusion, , IntraVENous, PRN  ondansetron (ZOFRAN-ODT) disintegrating tablet 4 mg, 4 mg, Oral, Q8H PRN **OR** ondansetron (ZOFRAN) injection 4 mg, 4 mg, IntraVENous, Q6H PRN  oxyCODONE (ROXICODONE) immediate release tablet 5 mg, 5 mg, Oral, Q4H PRN **OR** oxyCODONE (ROXICODONE) immediate release tablet 10 mg, 10 mg, Oral, Q4H PRN  morphine (PF) injection 2 mg, 2 mg, IntraVENous, Q2H PRN **OR** morphine injection 4 mg, 4 mg, IntraVENous, Q2H PRN    Objective     /60   Pulse 72   Temp 97.6 °F (36.4 °C) (Oral)   Resp 18   Ht 6' 2\" (1.88 m)   Wt 281 lb 12.8 oz (127.8 kg)   SpO2 96%   BMI 36.18 kg/m²      I/O:  Intake/Output Summary (Last 24 hours) at 1/22/2023 1425  Last data filed at 1/22/2023 1351  Gross per 24 hour   Intake 1440 ml   Output 1900 ml   Net -460 ml              Wt Readings from Last 3 Encounters:   01/21/23 281 lb 12.8 oz (127.8 kg)   12/08/22 (!) 310 lb (140.6 kg)   12/08/22 (!) 310 lb (140.6 kg)       LABS:    No results for input(s): WBC, HGB, HCT, PLT in the last 72 hours. No results for input(s): NA, K, CL, CO2, PHOS, BUN, CREATININE, CA in the last 72 hours. No results for input(s): PROT, INR, APTT in the last 72 hours. No results for input(s): CKTOTAL, CKMB, CKMBINDEX, TROPONINI in the last 72 hours. Focused Lower Extremity Examination:    Vitals:    /60   Pulse 72   Temp 97.6 °F (36.4 °C) (Oral)   Resp 18   Ht 6' 2\" (1.88 m)   Wt 281 lb 12.8 oz (127.8 kg)   SpO2 96%   BMI 36.18 kg/m²      Dressings left intact today 1/22/2023    Vascular: Popliteal pulse is palpable to LLE. CFT within normal limits to LLE amputation stump. Skin temperature is warm to warm from proximal tibial tuberosity to distal amputation stump of LLE. Minimal edema noted to the distal LLE amputation stump. Dermatologic: There was minimal sanguinous drainage noted on the 4 x 4 gauze directly to the incision. Incision appears very well coapted, without any drainage noted. All staples and sutures are intact. There is no surrounding erythema or warmth noted. Overall, incision appears to be healing very well. No other open lesions, rashes or subcutaneous nodules of note. Neurovascular: Light touch sensation grossly diminished to the level of the midfoot to RLE. Light touch sensation appears grossly intact to the level of the amputation stump of LLE. Musculoskeletal: Muscle strength testing deferred due to acute postop state. Patient's left leg dressed is in a slightly flexed position at the knee joint. Patient is able to fully extend left leg at the knee. Minimal pain with palpation of LLE amputation stump at posterior aspect.                ASSESSMENT: Pt. is a 65 y.o. 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    Sepsis ([address]    Acute left-sided low back pain with bilateral sciatica    S/P transmetatarsal amputation of foot, left ([facility])    Leukocytosis    Wound dehiscence, surgical, initial encounter    Postoperative wound infection    Metabolic acidosis    Hx of CABG    Lumbar stenosis without neurogenic claudication    CKD (chronic kidney disease), stage II    Normocytic anemia    Morbid obesity ([facility] [address]    Debility    Carotid stenosis, asymptomatic, bilateral    Hypokalemia    Nonhealing ulcer of left lower extremity ([facility])    Bleeding    Wound, open    SOB (shortness of breath) on exertion    Hemoglobin A1C greater than 9%, indicating poor diabetic control    Hypertensive emergency    Acute on chronic congestive heart failure ([facility])    Hypertensive urgency    Moderate persistent asthma    Septic arthritis of left ankle, due to unspecified organism ([facility])    Closed fracture of ankle with nonunion    S/P BKA (below knee amputation), left ([facility])       PLAN:   Septic arthritis; left ankle -resolved  Pathologic fracture; left tibia -resolved  Abscess; left ankle -resolved  -Patient examined and evaluated  -WBC 5.0 on 1/19/2023; patient currently afebrile  -Hemoglobin and hematocrit at 8.3 and 27.5  -Discontinued IV Zosyn per infectious diseases  -Will continue to follow labs/imaging  -Discussed with patient that he should try to extend left leg at the knee to prevent flexion contracture  -Awaiting SNF placement; case management assisting with process to [facility] following for medical management  -Cardiology following  -Patient verbalized understanding and agreement with the treatment plan as stated. All of his questions were answered to his satisfaction. 4. GISELA on CKD  -Consulted hospitalist  -Creatinine at upper limits of normal. Avoid nephrotoxic agents. Renally dose medications. 5. Acute hypoxic postop respiratory insufficiency  -Consulted hospitalist  -Resolved    6. Nocturnal Hypoxica  -Consulted hospitalist  -Nocturnal sutdy completed, referral to sleep on Dc    7. Acute blood loss anemia on chronic disease  -Consulted hospitalist  -Stable    8. Type 2 DM, uncontrolled  -Consulted hospitalist  -A1c 8.3%    9. Primary hypertension  -Consulted hospitalist  -Continue home meds with holding parameters    10. Chronic diastolic heart failure  -Consulted hospitalist    11. CAD s/p CABG  -Consulted hospitalist  -ASA/plavix, BB, ACE    12. Moderate persistent asthma  -Consulted hospitalist  -Spiriva continued    13. Lupus  -Consulted hospitalist  -Aware    Dispo: Pending referral to [facility]. Patient can follow-up with Dr. No Baer for further care on an outpatient basis.     Saroj Parker DPM, PGY-3  Podiatric Surgical Resident  1/22/2023   2:25 PM

## 2023-01-22 NOTE — PLAN OF CARE
Problem: Discharge Planning  Goal: Discharge to home or other facility with appropriate resources  1/21/2023 2214 by Kerri Ferguson RN  Outcome: Progressing  Flowsheets (Taken 1/21/2023 2214)  Discharge to home or other facility with appropriate resources:   Identify barriers to discharge with patient and caregiver   Arrange for needed discharge resources and transportation as appropriate   Identify discharge learning needs (meds, wound care, etc)  Note: Plans to be discharged to [place] [id number]     Problem: Pain  Goal: Verbalizes/displays adequate comfort level or baseline comfort level  1/21/2023 2214 by Kerri Ferguson RN  Outcome: Progressing  Flowsheets (Taken 1/21/2023 2214)  Verbalizes/displays adequate comfort level or baseline comfort level:   Encourage patient to monitor pain and request assistance   Assess pain using appropriate pain scale   Administer analgesics based on type and severity of pain and evaluate response   Implement non-pharmacological measures as appropriate and evaluate response  Note: Patient taking pain medication on MAR as needed to control pain. Use of non-pharmacologic pain management including repositioning. Patient pain goal is 2. Problem: ABCDS Injury Assessment  Goal: Absence of physical injury  1/21/2023 2214 by Kerri Ferguson RN  Outcome: Progressing  Flowsheets (Taken 1/21/2023 2214)  Absence of Physical Injury: Implement safety measures based on patient assessment  Note: Patient up with staff assistance. Able to use call light. Patient has remained free of falls during this shift. Appropriate fall prevention measures in place.         Problem: Infection - Adult  Goal: Absence of infection at discharge  1/21/2023 2214 by Kerri Ferguson RN  Outcome: Progressing  Flowsheets (Taken 1/21/2023 2214)  Absence of infection at discharge:   Assess and monitor for signs and symptoms of infection   Monitor lab/diagnostic results   Monitor all insertion sites i.e., indwelling lines, tubes and drains     Problem: Skin/Tissue Integrity - Adult  Goal: Incisions, wounds, or drain sites healing without S/S of infection  1/21/2023 2214 by Renu Pineda RN  Outcome: Progressing  Flowsheets (Taken 1/21/2023 2214)  Incisions, Wounds, or Drain Sites Healing Without Sign and Symptoms of Infection:   TWICE DAILY: Assess and document skin integrity   Implement wound care per orders     Problem: Musculoskeletal - Adult  Goal: Return mobility to safest level of function  1/21/2023 2214 by Renu Pineda RN  Outcome: Progressing  Flowsheets (Taken 1/21/2023 2214)  Return Mobility to Safest Level of Function:   Assess patient stability and activity tolerance for standing, transferring and ambulating with or without assistive devices   Assist with transfers and ambulation using safe patient handling equipment as needed   Ensure adequate protection for wounds/incisions during mobilization     Problem: Musculoskeletal - Adult  Goal: Return ADL status to a safe level of function  1/21/2023 2214 by Renu Pineda RN  Outcome: Progressing  Flowsheets (Taken 1/21/2023 2214)  Return ADL Status to a Safe Level of Function:   Administer medication as ordered   Assess activities of daily living deficits and provide assistive devices as needed     Problem: Gastrointestinal - Adult  Goal: Maintains or returns to baseline bowel function  1/21/2023 2214 by Renu Pineda RN  Outcome: Progressing  [address] (Taken 1/21/2023 2214)  Maintains or returns to baseline bowel function:   Assess bowel function   Encourage oral fluids to ensure adequate hydration     Problem: Metabolic/Fluid and Electrolytes - Adult  Goal: Glucose maintained within prescribed range  1/21/2023 2214 by Renu Pineda RN  Outcome: Progressing  Flowsheets (Taken 1/21/2023 2214)  Glucose maintained within prescribed range:   Monitor blood glucose as ordered   Assess for signs and symptoms of hyperglycemia and hypoglycemia     Problem: Hematologic - Adult  Goal: Maintains hematologic stability  1/21/2023 2214 by Timothy Bob RN  Outcome: Progressing  Flowsheets (Taken 1/21/2023 2214)  Maintains hematologic stability:   Assess for signs and symptoms of bleeding or hemorrhage   Monitor labs for bleeding or clotting disorders     Problem: Chronic Conditions and Co-morbidities  Goal: Patient's chronic conditions and co-morbidity symptoms are monitored and maintained or improved  1/21/2023 2214 by Timothy Bob RN  Outcome: Progressing  Flowsheets (Taken 1/21/2023 2214)  Care Plan - Patient's Chronic Conditions and Co-Morbidity Symptoms are Monitored and Maintained or Improved:   Monitor and assess patient's chronic conditions and comorbid symptoms for stability, deterioration, or improvement   Collaborate with multidisciplinary team to address chronic and comorbid conditions and prevent exacerbation or deterioration     Problem: Nutrition Deficit:  Goal: Optimize nutritional status  1/21/2023 2214 by Timothy Bob RN  Outcome: Progressing  Flowsheets (Taken 1/21/2023 2214)  Nutrient intake appropriate for improving, restoring, or maintaining nutritional needs:   Assess nutritional status and recommend course of action   Monitor oral intake, labs, and treatment plans     Care plan reviewed with patient. Patient verbalized understanding of the plan of care and contribute to goal setting.

## 2023-01-22 NOTE — PLAN OF CARE
Problem: Discharge Planning  Goal: Discharge to home or other facility with appropriate resources  1/22/2023 0941 by Bartolome Medina RN  Outcome: Progressing  Flowsheets (Taken 1/22/2023 2985)  Discharge to home or other facility with appropriate resources:   Identify barriers to discharge with patient and caregiver   Identify discharge learning needs (meds, wound care, etc)   Refer to discharge planning if patient needs post-hospital services based on physician order or complex needs related to functional status, cognitive ability or social support system   Arrange for needed discharge resources and transportation as appropriate     Problem: Pain  Goal: Verbalizes/displays adequate comfort level or baseline comfort level  1/22/2023 0941 by Bartolome Medina RN  Outcome: Progressing  Flowsheets (Taken 1/22/2023 0941)  Verbalizes/displays adequate comfort level or baseline comfort level:   Encourage patient to monitor pain and request assistance   Assess pain using appropriate pain scale   Administer analgesics based on type and severity of pain and evaluate response   Consider cultural and social influences on pain and pain management   Implement non-pharmacological measures as appropriate and evaluate response   Notify Licensed Independent Practitioner if interventions unsuccessful or patient reports new pain     Problem: Infection - Adult  Goal: Absence of infection at discharge  1/22/2023 0941 by Bartolome Medina RN  Outcome: Progressing  Flowsheets (Taken 1/22/2023 0941)  Absence of infection at discharge:   Assess and monitor for signs and symptoms of infection   Monitor lab/diagnostic results   Monitor all insertion sites i.e., indwelling lines, tubes and drains   Administer medications as ordered   Instruct and encourage patient and family to use good hand hygiene technique     Problem: Chronic Conditions and Co-morbidities  Goal: Patient's chronic conditions and co-morbidity symptoms are monitored and maintained or improved  1/22/2023 0941 by Milena Salazar RN  Outcome: Progressing  Flowsheets (Taken 1/22/2023 8659)  Care Plan - Patient's Chronic Conditions and Co-Morbidity Symptoms are Monitored and Maintained or Improved:   Monitor and assess patient's chronic conditions and comorbid symptoms for stability, deterioration, or improvement   Collaborate with multidisciplinary team to address chronic and comorbid conditions and prevent exacerbation or deterioration   Update acute care plan with appropriate goals if chronic or comorbid symptoms are exacerbated and prevent overall improvement and discharge     Problem: Skin/Tissue Integrity - Adult  Goal: Incisions, wounds, or drain sites healing without S/S of infection  1/22/2023 0941 by Milena Salazar RN  Outcome: Progressing  Flowsheets (Taken 1/22/2023 0941)  Incisions, Wounds, or Drain Sites Healing Without Sign and Symptoms of Infection:   ADMISSION and DAILY: Assess and document risk factors for pressure ulcer development   TWICE DAILY: Assess and document skin integrity   TWICE DAILY: Assess and document dressing/incision, wound bed, drain sites and surrounding tissue   Implement wound care per orders     Problem: Musculoskeletal - Adult  Goal: Return mobility to safest level of function  1/22/2023 0941 by Milena Salazar RN  Outcome: Progressing  Flowsheets (Taken 1/22/2023 0941)  Return Mobility to Safest Level of Function:   Assess patient stability and activity tolerance for standing, transferring and ambulating with or without assistive devices   Assist with transfers and ambulation using safe patient handling equipment as needed   Ensure adequate protection for wounds/incisions during mobilization   Obtain physical therapy/occupational therapy consults as needed   Instruct patient/family in ordered activity level     Problem: Musculoskeletal - Adult  Goal: Return ADL status to a safe level of function  1/22/2023 0941 by Milena Salazar RN  Outcome: Progressing  Flowsheets (Taken 1/22/2023 0941)  Return ADL Status to a Safe Level of Function:   Administer medication as ordered   Assess activities of daily living deficits and provide assistive devices as needed   Assist and instruct patient to increase activity and self care as tolerated   Obtain physical therapy/occupational therapy consults as needed     Problem: Metabolic/Fluid and Electrolytes - Adult  Goal: Glucose maintained within prescribed range  1/22/2023 0941 by Prema Green RN  Outcome: Progressing  Flowsheets (Taken 1/22/2023 0941)  Glucose maintained within prescribed range:   Monitor blood glucose as ordered   Administer ordered medications to maintain glucose within target range   Assess for signs and symptoms of hyperglycemia and hypoglycemia   Assess barriers to adequate nutritional intake and initiate nutrition consult as needed     Problem: Nutrition Deficit:  Goal: Optimize nutritional status  1/22/2023 0941 by Prema Green RN  Outcome: Progressing  Flowsheets (Taken 1/22/2023 0941)  Nutrient intake appropriate for improving, restoring, or maintaining nutritional needs:   Assess nutritional status and recommend course of action   Monitor oral intake, labs, and treatment plans     Problem: ABCDS Injury Assessment  Goal: Absence of physical injury  1/22/2023 0941 by Prema Green RN  Outcome: Adequate for Discharge  Flowsheets (Taken 1/22/2023 0941)  Absence of Physical Injury: Implement safety measures based on patient assessment     Problem: Hematologic - Adult  Goal: Maintains hematologic stability  1/22/2023 0941 by Prema Green RN  Outcome: Adequate for Discharge  Flowsheets (Taken 1/22/2023 0941)  Maintains hematologic stability:   Assess for signs and symptoms of bleeding or hemorrhage   Monitor labs for bleeding or clotting disorders     Problem: Gastrointestinal - Adult  Goal: Maintains or returns to baseline bowel function  1/22/2023 0941 by Kvng Amaya Neftali Weathers RN  Outcome: Completed  Flowsheets (Taken 1/22/2023 4994)  Maintains or returns to baseline bowel function:   Assess bowel function   Encourage oral fluids to ensure adequate hydration   Administer ordered medications as needed   Encourage mobilization and activity     Care plan reviewed with patient. Patient verbalizes understanding of plan of care and contributes to goal setting.

## 2023-01-23 LAB
GLUCOSE BLD STRIP.AUTO-MCNC: 178 MG/DL (ref 70–108)
GLUCOSE BLD STRIP.AUTO-MCNC: 194 MG/DL (ref 70–108)
GLUCOSE BLD STRIP.AUTO-MCNC: 198 MG/DL (ref 70–108)
GLUCOSE BLD STRIP.AUTO-MCNC: 216 MG/DL (ref 70–108)

## 2023-01-23 PROCEDURE — 6370000000 HC RX 637 (ALT 250 FOR IP): Performed by: STUDENT IN AN ORGANIZED HEALTH CARE EDUCATION/TRAINING PROGRAM

## 2023-01-23 PROCEDURE — 94640 AIRWAY INHALATION TREATMENT: CPT

## 2023-01-23 PROCEDURE — 97110 THERAPEUTIC EXERCISES: CPT

## 2023-01-23 PROCEDURE — 6370000000 HC RX 637 (ALT 250 FOR IP): Performed by: NURSE PRACTITIONER

## 2023-01-23 PROCEDURE — 6370000000 HC RX 637 (ALT 250 FOR IP)

## 2023-01-23 PROCEDURE — 1200000000 HC SEMI PRIVATE

## 2023-01-23 PROCEDURE — 94760 N-INVAS EAR/PLS OXIMETRY 1: CPT

## 2023-01-23 PROCEDURE — 97542 WHEELCHAIR MNGMENT TRAINING: CPT

## 2023-01-23 PROCEDURE — 97530 THERAPEUTIC ACTIVITIES: CPT

## 2023-01-23 PROCEDURE — 6360000002 HC RX W HCPCS: Performed by: PHYSICIAN ASSISTANT

## 2023-01-23 PROCEDURE — 2580000003 HC RX 258

## 2023-01-23 PROCEDURE — 6370000000 HC RX 637 (ALT 250 FOR IP): Performed by: PHYSICIAN ASSISTANT

## 2023-01-23 PROCEDURE — 82948 REAGENT STRIP/BLOOD GLUCOSE: CPT

## 2023-01-23 RX ADMIN — METOPROLOL TARTRATE 50 MG: 50 TABLET, FILM COATED ORAL at 17:34

## 2023-01-23 RX ADMIN — FERROUS SULFATE TAB 325 MG (65 MG ELEMENTAL FE) 325 MG: 325 (65 FE) TAB at 08:58

## 2023-01-23 RX ADMIN — FUROSEMIDE 40 MG: 40 TABLET ORAL at 08:58

## 2023-01-23 RX ADMIN — FERROUS SULFATE TAB 325 MG (65 MG ELEMENTAL FE) 325 MG: 325 (65 FE) TAB at 19:50

## 2023-01-23 RX ADMIN — POTASSIUM CHLORIDE 20 MEQ: 1500 TABLET, EXTENDED RELEASE ORAL at 08:58

## 2023-01-23 RX ADMIN — ENOXAPARIN SODIUM 30 MG: 100 INJECTION SUBCUTANEOUS at 23:07

## 2023-01-23 RX ADMIN — ENOXAPARIN SODIUM 30 MG: 100 INJECTION SUBCUTANEOUS at 11:32

## 2023-01-23 RX ADMIN — OXYCODONE 10 MG: 5 TABLET ORAL at 14:54

## 2023-01-23 RX ADMIN — GABAPENTIN 600 MG: 600 TABLET, FILM COATED ORAL at 08:58

## 2023-01-23 RX ADMIN — CLONIDINE HYDROCHLORIDE 0.1 MG: 0.1 TABLET ORAL at 17:34

## 2023-01-23 RX ADMIN — CLOPIDOGREL BISULFATE 75 MG: 75 TABLET ORAL at 09:01

## 2023-01-23 RX ADMIN — SODIUM CHLORIDE, PRESERVATIVE FREE 10 ML: 5 INJECTION INTRAVENOUS at 08:58

## 2023-01-23 RX ADMIN — TIOTROPIUM BROMIDE INHALATION SPRAY 2 PUFF: 3.12 SPRAY, METERED RESPIRATORY (INHALATION) at 08:48

## 2023-01-23 RX ADMIN — INSULIN LISPRO 4 UNITS: 100 INJECTION, SOLUTION INTRAVENOUS; SUBCUTANEOUS at 08:58

## 2023-01-23 RX ADMIN — CLONIDINE HYDROCHLORIDE 0.1 MG: 0.1 TABLET ORAL at 05:43

## 2023-01-23 RX ADMIN — METOPROLOL TARTRATE 50 MG: 50 TABLET, FILM COATED ORAL at 05:05

## 2023-01-23 RX ADMIN — INSULIN GLARGINE 10 UNITS: 100 INJECTION, SOLUTION SUBCUTANEOUS at 20:33

## 2023-01-23 RX ADMIN — VALSARTAN 160 MG: 160 TABLET ORAL at 08:58

## 2023-01-23 RX ADMIN — ATORVASTATIN CALCIUM 80 MG: 80 TABLET, FILM COATED ORAL at 08:58

## 2023-01-23 RX ADMIN — ASPIRIN 81 MG 81 MG: 81 TABLET ORAL at 08:58

## 2023-01-23 RX ADMIN — SODIUM CHLORIDE, PRESERVATIVE FREE 10 ML: 5 INJECTION INTRAVENOUS at 19:50

## 2023-01-23 RX ADMIN — OXYCODONE 10 MG: 5 TABLET ORAL at 19:50

## 2023-01-23 RX ADMIN — GABAPENTIN 600 MG: 600 TABLET, FILM COATED ORAL at 20:34

## 2023-01-23 RX ADMIN — INSULIN GLARGINE 10 UNITS: 100 INJECTION, SOLUTION SUBCUTANEOUS at 08:58

## 2023-01-23 RX ADMIN — Medication 1 TABLET: at 08:58

## 2023-01-23 ASSESSMENT — PAIN DESCRIPTION - DESCRIPTORS
DESCRIPTORS: THROBBING
DESCRIPTORS: ACHING
DESCRIPTORS: THROBBING
DESCRIPTORS: ACHING
DESCRIPTORS: THROBBING

## 2023-01-23 ASSESSMENT — PAIN DESCRIPTION - ORIENTATION
ORIENTATION: LEFT

## 2023-01-23 ASSESSMENT — PAIN SCALES - GENERAL
PAINLEVEL_OUTOF10: 7
PAINLEVEL_OUTOF10: 7
PAINLEVEL_OUTOF10: 5
PAINLEVEL_OUTOF10: 8
PAINLEVEL_OUTOF10: 2
PAINLEVEL_OUTOF10: 4
PAINLEVEL_OUTOF10: 8
PAINLEVEL_OUTOF10: 0
PAINLEVEL_OUTOF10: 8

## 2023-01-23 ASSESSMENT — PAIN DESCRIPTION - LOCATION
LOCATION: LEG

## 2023-01-23 ASSESSMENT — PAIN - FUNCTIONAL ASSESSMENT
PAIN_FUNCTIONAL_ASSESSMENT: ACTIVITIES ARE NOT PREVENTED

## 2023-01-23 NOTE — PROGRESS NOTES
Post-Fall Assessment  Date of Fall:   01/23/23  Time of Fall:   1450   Yes No N/A Comment   Was Patient on [place] Program? [x] [] []    Was the Fall Witnessed? [x] [] []    Were Clothes a Factor? [] [x] []    Was Patient Wearing Corrective Footwear? [x] [] []    Other Environmental Factors Involved? [] [x] []      Description of Fall  Who found the patient: Astrid Regalado, therapist  Where was the patient at the time of the fall:  side of bed  Brief description of fall: patient's right knee buckled and he was lowered to the ground by therapist  Patient comments regarding fall:   \"I'm fine. \"  Medications potentially contributing to fall risk (such as Sedatives, Hypnotics, Antihypertensives, Narcotics, Psychotropics, Anticonvulsants):   antihypertensives    Patient Assessment of Injury    (Please document Vital Signs in Doc Flowsheet)     Yes No   Patient hit his/her head [] [x]   Patient is taking an anticoagulant [x] []   CT of Head requested [] [x]     Neurological Assessment Protocol: If the patient has hit his/her head during this fall,   a Neurological Assessment must be completed every 2 hours for 12 hours;   every 3 hours for 24 hours;   then every 4 hours for 24 hours. Document in Doc Flowsheets. Neurological Assessment Protocol initiated  no    If the patient did not hit his/her head during this fall, monitor vital signs every 8 hours. Notify the physician within 24 hours and document. Physician Notification  Please document under Provider Notification group within the Assessment (Complex Assessment) template of Doc Flowsheets.      Physician notified yes    Pharmacy notified yes Time Notified: [address] [facility] Supervisor/Clinical Manager Notified:   Carilyn Cheadle, RN  Date:   01/23/23 Time:   5  Family Member Notified:   Debora Godfrey, sister   Date:   01/23/23 Time:   [phone]

## 2023-01-23 NOTE — PROGRESS NOTES
[facility]  INPATIENT PHYSICAL THERAPY  DAILY NOTE  [facility] ORTHOPEDICS 7K - 7K-04/004-A    Time In: 09  Time Out: 8038  Timed Code Treatment Minutes: 54 Minutes  Minutes: 55          Date: 2023  Patient Name: Kika Grant,  Gender:  male        MRN: [id number]  : 1960  (65 y.o.)     Referring Practitioner: Basilio Gonzalez DPM  Diagnosis: sepsis  Additional Pertinent Hx: per EMR \"The patient is a 65 y.o. male with significant past medical history of CAD, CKD, diabetes, hyperlipidemia, and hypertension who is being seen at bedside on behalf of Dr. Sundeep Contreras. Patient relates that he has had ongoing problems with his left foot and ankle over the course of the past few years. 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 He relates that he follows up and sees Dr. Sundeep Contreras in clinic weekly. He relates that he missed his appointment last week and did not have the dressing changed. The patient states that the previous week he had x-rays taken in clinic that showed a pathologic fracture of the left tibia. He relates that he has not been walking on his left lower extremity. He relates that he has applied some pressure when using the restroom on that leg. He states that amputation has been in discussion at his previous clinic encounters. The patient relates that he last a last evening at 8 or 9 PM.  He relates that he drank Crystal light tea around 8 or 9 AM this morning. He relates that he took Plavix this morning at 6 AM.  The patient denies any other concerns to his right lower extremity. The patient denies nausea, vomiting, fever, chills, shortness of breath or chest pain. \" Pt is s/p L BKA on 1/10/23 completed by Dr. Sundeep Contreras.      Prior Level of Function:  Lives With: Alone  Type of Home: House  Home Layout: One level  Home Access: Ramped entrance  Home Equipment: Electric scooter, [address] Shower/Tub: Walk-in shower, Shower chair with back  Bathroom Toilet: Handicap height  Bathroom Equipment: Grab bars in shower, Grab bars around toilet    Receives Help From: Friend(s)  ADL Assistance: Independent  Homemaking Assistance: Needs assistance  Homemaking Responsibilities: Yes  Ambulation Assistance: Independent  Transfer Assistance: Independent  Active : No  IADL Comments: He has a robert who works for him - worker's wife makes meals from that he only has to heat up in microwave and assists with cleaning tasks  Additional Comments: Pt reported that he has a very supportive family who can be available intermittently throughout the day upon discharge home. Pt reports he was amb very little, using his scooter or w/c most of the time    Restrictions/Precautions:  Restrictions/Precautions: General Precautions, Weight Bearing, Fall Risk  Left Lower Extremity Weight Bearing: Non Weight Bearing (BKA)  Position Activity Restriction  Other position/activity restrictions: L BKA     SUBJECTIVE: RN approved session. Pt in bed upon arrival and agrees to therapy. Pt pleasant and cooperative, talkative and needing cues at times to stay on task- easy to redirect. Pt A&O x4. PAIN: 4/10: L stump \"mild\"    Vitals: Oxygen: 94%  Heart Rate: 73    OBJECTIVE:  Bed Mobility:  Rolling to Left: Stand By Assistance   Rolling to Right: Stand By Assistance   Supine to Sit: Stand By Assistance  Sit to Supine: Stand By Assistance   Bed flat, used rail for all  Scooting: Stand By Assistance, Dependent, SBA to scoot while in seated position and to scoot laterally in bed in supine, dep to boost to [facility] using hercules function  Pt rolled to R and L mult times, also rolled to/from prone position for therex.  Pt did need cues when rolling to prone not to push into bed with stump due to NWB status- good carryover with cue    Transfers:  Sit to Stand: Contact Guard Assistance, Minimal Assistance, with increased time for completion, cues for hand placement, with verbal cues, 2 attempts needed to stand from [address] to [employer], Minimal Assistance, cues for hand placement, with verbal cues, cues for safe alignment to surface  Stand Pivot:Contact Guard Assistance, Minimal Assistance, with increased time for completion, cues for hand placement, with verbal cues using SW    Wheelchair Mobility:  Stand By Assistance  Extremities Used: Bilateral Upper Extremities  Type of Chair:Manual  Surface: Level Tile  Distance: 50ft  Quality: Slow Velocity, Short Strokes, and Decreased Fluidity, fatigued quickly and rest breaks needed throughout    Exercise:  Patient was guided in 1 set(s) 10 reps of exercise to both lower extremities. Amputee therex and stretching in supine, sidelying and prone pt needing cues for technique, positioning and ROM. Rest breaks needed as well due to fatigue . Exercises were completed for increased independence with functional mobility. Functional Outcome Measures: Completed  AM-PAC Inpatient Mobility without Stair Climbing Raw Score : 13  AM-PAC Inpatient without Stair Climbing T-Scale Score : 38.96    ASSESSMENT:  Assessment: Patient progressing toward established goals. Activity Tolerance:  Patient tolerance of  treatment: good. Pt demos decreased strength, endurance, and independence with mobility. Pt unsteady on feet and requires hands on assist for safety with mobility. Pt will benefit from cont PT at this time to ensure safety, to decrease the risk for falls and to return to PLOF.       Equipment Recommendations:Equipment Needed: No  Discharge Recommendations: Subacte/Skilled Nursing Facility  Plan: Current Treatment Recommendations: Strengthening, Balance training, Functional mobility training, Transfer training, Endurance training, Equipment evaluation, education, & procurement, Patient/Caregiver education & training, Neuromuscular re-education, Safety education & training, Home exercise program, Therapeutic activities  General Plan: (5xO)    Patient Education  Patient Education: Plan of Care, Precautions/Restrictions, Bed Mobility, Transfers, Verbal Exercise Instruction    Goals:  Patient Goals : [place] able to get in and out bed and w/c with less assist\"  Short Term Goals  Time Frame for Short Term Goals: by discharge  Short Term Goal 1: Pt will demo supine to and from sit transfers with SBA and modifications as needed to progress with mobility. Short Term Goal 2: Pt will demo slide board transfers to and from w/c with mod Ax1 to progress with mobility. Short Term Goal 3: Pt will tolerate 10-20 reps of ther ex to increase overall mobility. Short Term Goal 4: Pt will demo S for w/c mobility for 100 feet to increase IND with mobility. Short Term Goal 5: sit<>stand and stand pivot transfers with SBA and LRD mod I for safe transfers  Long Term Goals  Time Frame for Long Term Goals : NA due to short ELOS    Following session, patient left in safe position with all fall risk precautions in place.

## 2023-01-23 NOTE — PROGRESS NOTES
Post-fall pharmacy consult    Pharmacy has been consulted to review medication profile for fall risk.   Medications increasing risk of falling: amlodipine, clonidine, furosemide, gabapentin, metoprolol, minoxidil, morphine, oxycodone, polyethylene glycol, sennosides-docusate, valsartan, insulin  Medications increasing risk of bleeding: enoxaparin, clopidogrel  Findings discussed with Breanne Armando misti  1/23/2023  3:34 PM

## 2023-01-23 NOTE — PLAN OF CARE
Problem: Discharge Planning  Goal: Discharge to home or other facility with appropriate resources  1/22/2023 2154 by Viji Ramirez RN  Outcome: Progressing  Flowsheets (Taken 1/22/2023 2154)  Discharge to home or other facility with appropriate resources:   Identify barriers to discharge with patient and caregiver   Arrange for needed discharge resources and transportation as appropriate   Identify discharge learning needs (meds, wound care, etc)  Note: Plans to be discharged to [place] [id number]     Problem: Pain  Goal: Verbalizes/displays adequate comfort level or baseline comfort level  1/22/2023 2154 by Viji Ramirez RN  Outcome: Progressing  Flowsheets (Taken 1/22/2023 2154)  Verbalizes/displays adequate comfort level or baseline comfort level:   Encourage patient to monitor pain and request assistance   Assess pain using appropriate pain scale   Administer analgesics based on type and severity of pain and evaluate response   Implement non-pharmacological measures as appropriate and evaluate response  Note: Patient taking pain medication on MAR as needed to control pain. Use of non-pharmacologic pain management including repositioning. Patient pain goal is 2. Problem: ABCDS Injury Assessment  Goal: Absence of physical injury  1/22/2023 2154 by Viji Ramirez RN  Outcome: Progressing  Flowsheets (Taken 1/22/2023 2154)  Absence of Physical Injury: Implement safety measures based on patient assessment  Note: Patient up with staff assistance. Able to use call light. Patient has remained free of falls during this shift. Appropriate fall prevention measures in place.         Problem: Infection - Adult  Goal: Absence of infection at discharge  1/22/2023 2154 by Viji Ramirez RN  Outcome: Progressing  Flowsheets (Taken 1/22/2023 2154)  Absence of infection at discharge:   Assess and monitor for signs and symptoms of infection   Monitor lab/diagnostic results   Monitor all insertion sites i.e., indwelling lines, tubes and drains     Problem: Skin/Tissue Integrity - Adult  Goal: Incisions, wounds, or drain sites healing without S/S of infection  1/22/2023 2154 by Kenneth Short RN  Outcome: Progressing  Flowsheets (Taken 1/22/2023 2154)  Incisions, Wounds, or Drain Sites Healing Without Sign and Symptoms of Infection:   TWICE DAILY: Assess and document skin integrity   ADMISSION and DAILY: Assess and document risk factors for pressure ulcer development   TWICE DAILY: Assess and document dressing/incision, wound bed, drain sites and surrounding tissue     Problem: Musculoskeletal - Adult  Goal: Return mobility to safest level of function  1/22/2023 2154 by Kenneth Short RN  Outcome: Progressing  Flowsheets (Taken 1/22/2023 2154)  Return Mobility to Safest Level of Function:   Assess patient stability and activity tolerance for standing, transferring and ambulating with or without assistive devices   Assist with transfers and ambulation using safe patient handling equipment as needed   Ensure adequate protection for wounds/incisions during mobilization     Problem: Musculoskeletal - Adult  Goal: Return ADL status to a safe level of function  1/22/2023 2154 by Kenneth Short RN  Outcome: Progressing  Flowsheets (Taken 1/22/2023 2154)  Return ADL Status to a Safe Level of Function:   Administer medication as ordered   Assess activities of daily living deficits and provide assistive devices as needed     Problem: Gastrointestinal - Adult  Goal: Maintains or returns to baseline bowel function  1/22/2023 0941 by Kristyn Araya RN  Outcome: Completed  Flowsheets (Taken 1/22/2023 0930)  Maintains or returns to baseline bowel function:   Assess bowel function   Encourage oral fluids to ensure adequate hydration   Administer ordered medications as needed   Encourage mobilization and activity     Problem: Metabolic/Fluid and Electrolytes - Adult  Goal: Glucose maintained within prescribed range  1/22/2023 2154 by Haresh Tian RN  Outcome: Progressing  Flowsheets (Taken 1/22/2023 2154)  Glucose maintained within prescribed range:   Monitor blood glucose as ordered   Assess for signs and symptoms of hyperglycemia and hypoglycemia   Administer ordered medications to maintain glucose within target range     Problem: Hematologic - Adult  Goal: Maintains hematologic stability  1/22/2023 2154 by Haresh Tian RN  Outcome: Progressing  Flowsheets (Taken 1/22/2023 2154)  Maintains hematologic stability:   Assess for signs and symptoms of bleeding or hemorrhage   Monitor labs for bleeding or clotting disorders     Problem: Chronic Conditions and Co-morbidities  Goal: Patient's chronic conditions and co-morbidity symptoms are monitored and maintained or improved  1/22/2023 2154 by Haresh Tian RN  Outcome: Progressing  Flowsheets (Taken 1/22/2023 2154)  Care Plan - Patient's Chronic Conditions and Co-Morbidity Symptoms are Monitored and Maintained or Improved:   Monitor and assess patient's chronic conditions and comorbid symptoms for stability, deterioration, or improvement   Collaborate with multidisciplinary team to address chronic and comorbid conditions and prevent exacerbation or deterioration     Problem: Nutrition Deficit:  Goal: Optimize nutritional status  1/22/2023 2154 by Haresh Tian RN  Outcome: Progressing  Flowsheets (Taken 1/22/2023 2154)  Nutrient intake appropriate for improving, restoring, or maintaining nutritional needs:   Assess nutritional status and recommend course of action   Monitor oral intake, labs, and treatment plans   Recommend appropriate diets, oral nutritional supplements, and vitamin/mineral supplements     Care plan reviewed with patient. Patient verbalized understanding of the plan of care and contribute to goal setting.

## 2023-01-23 NOTE — PROGRESS NOTES
[address] [facility]  Occupational Therapy  Daily Note  Time:   Time In: 4457  Time Out: 1450  Timed Code Treatment Minutes: 18 Minutes  Minutes: 18          Date: 2023  Patient Name: Lavinia Meeks,   Gender: male      Room: [facility]004-A  MRN: [id number]  : 1960  (65 y.o.)  Referring Practitioner: Mir Gomes DPM  Diagnosis: Sepsis  Additional Pertinent Hx: The patient is a 65 y.o. male with significant past medical history of CAD, CKD, diabetes, hyperlipidemia, and hypertension who is being seen at bedside on behalf of Dr. Ankush Concepcion. Patient relates that he has had ongoing problems with his left foot and ankle over the course of the past few years. 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 He relates that he follows up and sees Dr. Ankush Concepcion in clinic weekly. He relates that he missed his appointment last week and did not have the dressing changed. The patient states that the previous week he had x-rays taken in clinic that showed a pathologic fracture of the left tibia. He relates that he has not been walking on his left lower extremity. He relates that he has applied some pressure when using the restroom on that leg. He states that amputation has been in discussion at his previous clinic encounters. The patient relates that he last a last evening at 8 or 9 PM.  He relates that he drank Crystal light tea around 8 or 9 AM this morning. He relates that he took Plavix this morning at 6 AM.  The patient denies any other concerns to his right lower extremity. The patient denies nausea, vomiting, fever, chills, shortness of breath or chest pain. Restrictions/Precautions:  Restrictions/Precautions: General Precautions, Weight Bearing, Fall Risk  Left Lower Extremity Weight Bearing: Non Weight Bearing (BKA)  Position Activity Restriction  Other position/activity restrictions: L BKA     SUBJECTIVE: RN ok'd OT session.  Pt supine in bed and agreeable to therapy. Comment: During session, pt's knee buckled and was gently lowered to ground on R knee (see stand pivot under transfer section)     PAIN: 7/10: Left LE     Vitals: Vitals not assessed per clinical judgement, see nursing flowsheet    COGNITION: Slow Processing, Decreased Insight, Decreased Problem Solving, Decreased Safety Awareness, and Decreased Arousal    ADL:   No ADL's completed this session. [facility] BALANCE:  Sitting Balance:  Stand By Assistance. BED MOBILITY:  Supine to Sit: Contact Guard Assistance      TRANSFERS:  Sit to Stand:  Minimal Assistance. From EOB with standard walker  Stand Pivot: Minimal Assistance. Towards pt's right side. During stand pivot, pt's right knee buckled, resulting in pt being gently lowered to ground on right knee. Pt's right UE supported on w/c while being lowered. Patient did not  hit head. Pt required x2 assist to return back to EOB with pushing BUE on w/c and manually maneuver of hips onto bed. Pt stated \"It's fine, I'm not hurt\". Pt reported \"a little weight was applied to my left leg, but I don't think it was too much\". RN notified. FUNCTIONAL MOBILITY:  Not completed     ASSESSMENT:     Activity Tolerance:  Patient tolerance of  treatment: fair.        Discharge Recommendations: Continue to assess pending progress  Equipment Recommendations: Equipment Needed: Yes  Other: TTB, slideboard, drop arm commode  Plan: Times Per Week: 6x  Current Treatment Recommendations: Strengthening, Balance training, Functional mobility training, Endurance training, Patient/Caregiver education & training, Safety education & training, Self-Care / ADL, Home management training    Patient Education  Patient Education: Role of OT, Plan of Care, and Assistive Device Safety    Goals  Short Term Goals  Time Frame for Short Term Goals: by discharge  Short Term Goal 1: Pt will complete grooming routine while seated EOB with Supervision and no VC for technique to incrase indep with self care  Short Term Goal 2: Pt will complete stand pivot t/fs with SBA and min VC for safety to increase indep with toileting routine  Short Term Goal 3: Pt will demo indep with BUE strengthening HEP to increase overall UB strength/endurance for ease with t/fs  Short Term Goal 4: Pt will tolerate dynamic standing x2-3min with unilateral release and SBA for increased indep with clothing mgmt    Following session, patient left in safe position with all fall risk precautions in place.

## 2023-01-23 NOTE — CARE COORDINATION
1/23/23, 8:21 AM EST    DISCHARGE PLANNING EVALUATION    Spoke with [facility] admissions. Facility plans to start precert today when PT/OT notes are available. Discussed with patient.

## 2023-01-23 NOTE — PROGRESS NOTES
Progress note: Infectious diseases    Patient - Elvis Chapman,  Age - 65 y.o.    - 1960      Room Number - 7K-04/004-A   MRN -  [id number]   Acct # - [de-identified]  Date of Admission -  1/10/2023 12:28 PM    SUBJECTIVE:   No new complaints. OBJECTIVE   VITALS    height is 6' 2\" (1.88 m) and weight is 270 lb (122.5 kg). His oral temperature is 98.4 °F (36.9 °C). His blood pressure is 115/68 and his pulse is 76. His respiration is 16 and oxygen saturation is 97%. Wt Readings from Last 3 Encounters:   23 270 lb (122.5 kg)   22 (!) 310 lb (140.6 kg)   22 (!) 310 lb (140.6 kg)       I/O (24 Hours)    Intake/Output Summary (Last 24 hours) at 2023 1341  Last data filed at 2023 0530  Gross per 24 hour   Intake 850 ml   Output 3280 ml   Net -2430 ml       General Appearance: awake and oriented  HEENT - normocephalic, atraumatic, pale conjunctiva,  anicteric sclera  Neck - Supple, no mass  Lungs -  Bilateral   air entry, no rhonchi, no wheeze. Cardiovascular - Heart sounds are normal.     Abdomen - soft, not distended, nontender,   Neurologic -oriented  Skin - No bruising or bleeding  Extremities -  clean left BKA stump.        MEDICATIONS:      insulin glargine  10 Units SubCUTAneous BID    sennosides-docusate sodium  1 tablet Oral BID    polyethylene glycol  17 g Oral Daily    enoxaparin  30 mg SubCUTAneous Q12H    insulin lispro  0-16 Units SubCUTAneous TID     insulin lispro  0-4 Units SubCUTAneous Nightly    amLODIPine  10 mg Oral Daily    atorvastatin  80 mg Oral Daily    ferrous sulfate  325 mg Oral BID    furosemide  40 mg Oral Daily    gabapentin  600 mg Oral BID    minoxidil  10 mg Oral Daily    multivitamin  1 tablet Oral Daily    potassium chloride  20 mEq Oral Daily    valsartan  160 mg Oral Daily    aspirin  81 mg Oral Daily    clopidogrel  75 mg Oral Daily    tiotropium  2 puff Inhalation Daily    cloNIDine  0.1 mg Oral BID    metoprolol tartrate  50 mg Oral BID    sodium chloride flush  5-40 mL IntraVENous 2 times per day      dextrose      sodium chloride 20 mL/hr at 01/13/23 0020     acetaminophen **OR** acetaminophen, albuterol sulfate HFA, glucose, dextrose bolus **OR** dextrose bolus, glucagon (rDNA), dextrose, sodium chloride flush, sodium chloride, ondansetron **OR** ondansetron, oxyCODONE **OR** oxyCODONE, morphine **OR** morphine      LABS:     CBC:   No results for input(s): WBC, HGB, PLT in the last 72 hours. BMP:    No results for input(s): NA, K, CL, CO2, BUN, CREATININE, GLUCOSE in the last 72 hours. Calcium:  No results for input(s): CALCIUM in the last 72 hours. Recent Labs     01/22/23  1929 01/23/23  0555 01/23/23  1028   POCGLU 169* 216* 198*         Problem list of patient:     Patient Active Problem List   Diagnosis Code    Gangrene of toe of left foot (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 [address] A41.9    Acute left-sided low back pain with bilateral sciatica M54.42, M54.41    S/P transmetatarsal amputation of foot, left ([facility]) Z89.432    Leukocytosis D72.829    Wound dehiscence, surgical, initial encounter T81.31XA    Postoperative wound infection R03.88TZ    Metabolic acidosis D40.18    Hx of CABG Z95.1    Lumbar stenosis without neurogenic claudication M48.061    CKD (chronic kidney disease), stage II N18.2    Normocytic anemia D64.9    Morbid obesity ([facility]) E66.01    Debility R53.81    Carotid stenosis, asymptomatic, bilateral I65.23    Hypokalemia E87.6    Nonhealing ulcer of left lower extremity ([facility] [address] L97.929    Bleeding R58    Wound, open T14. 8XXA    SOB (shortness of breath) on exertion R06.02    Hemoglobin A1C greater than 9%, indicating poor diabetic control R73.09    Hypertensive emergency I16.1    Acute on chronic congestive heart failure ([facility]) I50.9    Hypertensive urgency I16.0    Moderate persistent asthma J45.40    Septic arthritis of left ankle, due to unspecified organism ([facility]) M00.9    Closed fracture of ankle with nonunion S82.899K    S/P BKA (below knee amputation), left ([facility]) I03.033         ASSESSMENT/PLAN   Necrotizing left foot/ankle infection: had urgent BKA.  The stump is healing well   Poorly controlled diabetes  CAD  Awaiting placement         Jose Hull MD, MD, FACP 1/23/2023 1:41 PM

## 2023-01-23 NOTE — PLAN OF CARE
Problem: Discharge Planning  Goal: Discharge to home or other facility with appropriate resources  1/23/2023 1027 by Escobar Khan LPN  Outcome: Progressing  Flowsheets (Taken 1/23/2023 1027)  Discharge to home or other facility with appropriate resources:   Identify barriers to discharge with patient and caregiver   Arrange for needed discharge resources and transportation as appropriate   Identify discharge learning needs (meds, wound care, etc)     Problem: Pain  Goal: Verbalizes/displays adequate comfort level or baseline comfort level  1/23/2023 1027 by Escobar Khan LPN  Outcome: Progressing  Flowsheets (Taken 1/23/2023 1027)  Verbalizes/displays adequate comfort level or baseline comfort level:   Encourage patient to monitor pain and request assistance   Assess pain using appropriate pain scale   Administer analgesics based on type and severity of pain and evaluate response   Implement non-pharmacological measures as appropriate and evaluate response     Problem: ABCDS Injury Assessment  Goal: Absence of physical injury  1/23/2023 1027 by Escobar Khan LPN  Outcome: Progressing  Flowsheets (Taken 1/22/2023 2154 by Libby Butts RN)  Absence of Physical Injury: Implement safety measures based on patient assessment  Note: Patient remains free from injury at this time this shift.      Problem: Infection - Adult  Goal: Absence of infection at discharge  1/23/2023 1027 by Escobar Khan LPN  Outcome: Progressing  Flowsheets (Taken 1/23/2023 1027)  Absence of infection at discharge:   Assess and monitor for signs and symptoms of infection   Monitor lab/diagnostic results   Monitor all insertion sites i.e., indwelling lines, tubes and drains     Problem: Chronic Conditions and Co-morbidities  Goal: Patient's chronic conditions and co-morbidity symptoms are monitored and maintained or improved  1/23/2023 1027 by Escobar Khan LPN  Outcome: Progressing  Flowsheets (Taken 1/23/2023 1027)  Care Plan - Patient's Chronic Conditions and Co-Morbidity Symptoms are Monitored and Maintained or Improved:   Monitor and assess patient's chronic conditions and comorbid symptoms for stability, deterioration, or improvement   Collaborate with multidisciplinary team to address chronic and comorbid conditions and prevent exacerbation or deterioration     Problem: Skin/Tissue Integrity - Adult  Goal: Incisions, wounds, or drain sites healing without S/S of infection  1/23/2023 1027 by Keesha White LPN  Outcome: Progressing  Flowsheets (Taken 1/23/2023 1027)  Incisions, Wounds, or Drain Sites Healing Without Sign and Symptoms of Infection:   TWICE DAILY: Assess and document skin integrity   TWICE DAILY: Assess and document dressing/incision, wound bed, drain sites and surrounding tissue     Problem: Musculoskeletal - Adult  Goal: Return mobility to safest level of function  1/23/2023 1027 by Keesha White LPN  Outcome: Progressing  Flowsheets (Taken 1/23/2023 1027)  Return Mobility to Safest Level of Function:   Assess patient stability and activity tolerance for standing, transferring and ambulating with or without assistive devices   Assist with transfers and ambulation using safe patient handling equipment as needed   Ensure adequate protection for wounds/incisions during mobilization     Problem: Musculoskeletal - Adult  Goal: Return ADL status to a safe level of function  1/23/2023 1027 by Keesha White LPN  Outcome: Benton Sánchez (Taken 1/22/2023 2154 by Brandy Mckinney RN)  Return ADL Status to a Safe Level of Function:   Administer medication as ordered   Assess activities of daily living deficits and provide assistive devices as needed     Problem: Metabolic/Fluid and Electrolytes - Adult  Goal: Glucose maintained within prescribed range  1/23/2023 1027 by Keesha White LPN  Outcome: Progressing  Flowsheets (Taken 1/23/2023 1027)  Glucose maintained within prescribed range:   Monitor blood glucose as ordered   Assess for signs and symptoms of hyperglycemia and hypoglycemia   Administer ordered medications to maintain glucose within target range     Problem: Hematologic - Adult  Goal: Maintains hematologic stability  1/23/2023 1027 by Jim Franks LPN  Outcome: Progressing  Flowsheets (Taken 1/22/2023 2154 by Nicolle Vital RN)  Maintains hematologic stability:   Assess for signs and symptoms of bleeding or hemorrhage   Monitor labs for bleeding or clotting disorders     Care plan reviewed with patient. Patient verbalized understanding of the plan of care and contribute to goal setting.

## 2023-01-23 NOTE — CARE COORDINATION
[id number]-  Received a call from Humera Harper with [address]; she wanted to know discharge plans for this pt. She shared she can assist discharge and her number is [phone]. Updated [employer].

## 2023-01-23 NOTE — PROGRESS NOTES
Podiatric Progress Note  Mari Bernardo  Subjective :     1/23/2023  Patient seen at bedside on behalf of Dr. Shola Cornejo. Patient is s/p LLE proximal Symes amputation with distal transtibial osteotomy (DOS 1/10/2023). Patient is pleasant, and is alert and oriented. He has no new complaints. He relates that he is hoping to be discharged to a rehab center. He relates that he has followed up with Dr. Shola Cornejo once a week in the past.  He is pending discharge planning. Patient denies any nausea, vomiting, fever, chills, chest pain, shortness of breath.      1/22/2023  Patient seen at bedside on behalf of Dr. Shola Cornejo. Patient is s/p LLE proximal Symes amputation with distal transtibial osteotomy (DOS 1/10/2023). Patient is pleasant, and is alert and oriented. He has no new complaints. He says he was confused that his blood sugar got a little high yesterday, however it is not down in the normal range. He is pending discharge planning. Patient denies any nausea, vomiting, fever, chills, chest pain, shortness of breath.    1/21/2023  Patient seen at bedside this morning on behalf of Dr. Shola Cornejo. Patient is s/p LLE proximal Symes amputation with distal transtibial osteotomy (DOS 1/10/2023). Patient is pleasant, and is alert and oriented. Patient endorses no pain to LLE. He relates that he is taking oral pain medication. He denies any N/V/F/C/SOB/CP or bilateral calf tenderness. 1/20/2023  Patient seen at bedside this morning on behalf of Dr. Shola Cornejo. Patient is s/p LLE proximal Symes amputation with distal transtibial osteotomy (DOS 1/10/2023). Patient is pleasant, and is alert and oriented. Patient endorses no pain to LLE. He relates that he is taking oral pain medication. He denies any N/V/F/C/SOB/CP or bilateral calf tenderness. He has no other pedal complaints at this time. Patient relates that he is not able to got to [facility]  SNF/ECF due to insurance.  He is waiting to hear if he is able to go to [facility] upon discharge for rehab. 1/19/2023  Patient seen at bedside this morning on behalf of Dr. Joaquín Jackson. Patient is s/p LLE proximal Symes amputation with distal transtibial osteotomy (DOS 1/10/2023). Patient is pleasant, and is alert and oriented. Patient endorses no pain to LLE. He relates that he is taking oral pain medication. He denies any N/V/F/C/SOB/CP or bilateral calf tenderness. He has no other pedal complaints at this time. Patient relates that he is waiting to hear if he will be able to go to [facility] upon discharge for rehab.     1/18/2023  Patient seen at bedside this morning on behalf of Dr. Joaquín Jackson. Patient is s/p LLE proximal Symes amputation with distal transtibial osteotomy (DOS 1/10/2023). Patient is pleasant, and is alert and oriented. Patient states that he is doing much better and is thankful for Dr. Joaquín Jackson and Dr. Rivera Berger for treating him fast and taking care of him. Patient endorses minimal pain to LLE. He relates that he is taking oral pain medication. He denies any N/V/F/C/SOB/CP or bilateral calf tenderness. He has no other pedal complaints at this time. Patient is aware that there are no beds available at the facility which he originally wanted; and he is also aware that precertification is started at another [facility]/F. He relates that he has not heard anything recently. 1/17/2023  Patient seen at bedside this morning on behalf of Dr. Joaquín aJckson. Patient is s/p LLE proximal Symes amputation with distal transtibial osteotomy (DOS 1/10/2023). Patient is pleasant, and is alert and oriented. Patient states that he feels very well overall. Per patient, he had an enema yesterday, and then had another bowel movement afterwards. Patient endorses minimal pain to LLE. He denies any N/V/F/C/SOB/CP or bilateral calf tenderness. He has no other pedal complaints at this time.   Patient is aware that there are no beds available at the facility which he originally wanted; and he is also aware that precertification is started at another SNF/ECF.    1/16/2023  Patient seen at chair side this morning on behalf of Dr. Cristobal Mittal. Patient is s/p LLE proximal Symes amputation with distal transtibial osteotomy (DOS 1/10/2023). Patient is pleasant, and is alert and oriented. Patient states that he feels very well overall. Patient continues to endorse minimal pain to LLE (2/10). He currently denies any N/V/F/C/N/V/CP. Patient still has not had bowel movement yet; he denies any abdominal bloating, cramping, or discomfort. No other pedal complaints at this time. Patient states that per his insurance, precertification has been started for [facility]. 1/15/2023  Patient seen at bedside this morning on behalf of Dr. Cristobal Mittal. Patient is status post LLE proximal Symes amputation with distal transtibial osteotomy (DOS 1/10/2023). Patient appeared pleasant, was oriented to person, place and time and in no acute distress. Patient is very grateful for having the procedure performed; he states that he subjectively feels much better after the amputation. Patient expressed gratitude to Dr. Cristobal Mittal and Curtis Martinez for their services. Patient endorses minimal pain to the LLE (3/10). Patient does endorse mild neuropathic type symptoms to the RLE. Patient denies any N/V/F/C/SOB or CP. Patient states that he is passing flatus, but has not yet had a bowel movement. Patient has no further pedal concerns at this point in time. 1/14/2023  Patient is a pleasant 62yo male seen bedside this AM s/p left lower extremity amputation emergent in nature due to necrotizing fasciitis and extensive gas gangrene performed 1.10.23. Patient seated upright bedside working with PT. Patient overall states he feels vastly improved, post op dressing clean/dry/intact. Social work is following, precert for [employer] in place.  ID following for IVAB therapy, currently on Zosyn IV (vancomycin and clindamycin discontinued). Labs are trending better, leukocytosis resolved. Will continue to follow until determination of placement. HISTORY OF PRESENT ILLNESS:                 The patient is a 65 y.o. male with significant past medical history of CAD, CKD, diabetes, hyperlipidemia, and hypertension who is being seen at bedside on behalf of Dr. Joaquín Jackson. Patient relates that he has had ongoing problems with his left foot and ankle over the course of the past few years. 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 He relates that he follows up and sees Dr. Joaquín Jackson in clinic weekly. He relates that he missed his appointment last week and did not have the dressing changed. The patient states that the previous week he had x-rays taken in clinic that showed a pathologic fracture of the left tibia. He relates that he has not been walking on his left lower extremity. He relates that he has applied some pressure when using the restroom on that leg. He states that amputation has been in discussion at his previous clinic encounters. The patient relates that he last a last evening at 8 or 9 PM.  He relates that he drank Crystal light tea around 8 or 9 AM this morning. He relates that he took Plavix this morning at 6 AM.  The patient denies any other concerns to his right lower extremity. The patient denies nausea, vomiting, fever, chills, shortness of breath or chest pain.     Current Medications:    Current Facility-Administered Medications: insulin glargine (LANTUS) injection vial 10 Units, 10 Units, SubCUTAneous, BID  sennosides-docusate sodium (SENOKOT-S) 8.6-50 MG tablet 1 tablet, 1 tablet, Oral, BID  polyethylene glycol (GLYCOLAX) packet 17 g, 17 g, Oral, Daily  enoxaparin Sodium (LOVENOX) injection 30 mg, 30 mg, SubCUTAneous, Q12H  insulin lispro (HUMALOG) injection vial 0-16 Units, 0-16 Units, SubCUTAneous, TID WC  insulin lispro (HUMALOG) injection vial 0-4 Units, 0-4 Units, SubCUTAneous, Nightly  acetaminophen (TYLENOL) tablet 650 mg, 650 mg, Oral, Q6H PRN **OR** acetaminophen (TYLENOL) suppository 650 mg, 650 mg, Rectal, Q6H PRN  amLODIPine (NORVASC) tablet 10 mg, 10 mg, Oral, Daily  atorvastatin (LIPITOR) tablet 80 mg, 80 mg, Oral, Daily  albuterol sulfate HFA (PROVENTIL;VENTOLIN;PROAIR) 108 (90 Base) MCG/ACT inhaler 2 puff, 2 puff, Inhalation, 4x Daily PRN  ferrous sulfate (IRON 325) tablet 325 mg, 325 mg, Oral, BID  furosemide (LASIX) tablet 40 mg, 40 mg, Oral, Daily  gabapentin (NEURONTIN) tablet 600 mg, 600 mg, Oral, BID  minoxidil (LONITEN) tablet 10 mg, 10 mg, Oral, Daily  multivitamin 1 tablet, 1 tablet, Oral, Daily  potassium chloride (KLOR-CON M) extended release tablet 20 mEq, 20 mEq, Oral, Daily  valsartan (DIOVAN) tablet 160 mg, 160 mg, Oral, Daily  glucose chewable tablet 16 g, 4 tablet, Oral, PRN  dextrose bolus 10% 125 mL, 125 mL, IntraVENous, PRN **OR** dextrose bolus 10% 250 mL, 250 mL, IntraVENous, PRN  glucagon (rDNA) injection 1 mg, 1 mg, SubCUTAneous, PRN  dextrose 10 % infusion, , IntraVENous, Continuous PRN  aspirin chewable tablet 81 mg, 81 mg, Oral, Daily  clopidogrel (PLAVIX) tablet 75 mg, 75 mg, Oral, Daily  tiotropium (SPIRIVA RESPIMAT) 2.5 MCG/ACT inhaler 2 puff, 2 puff, Inhalation, Daily  cloNIDine (CATAPRES) tablet 0.1 mg, 0.1 mg, Oral, BID  metoprolol tartrate (LOPRESSOR) tablet 50 mg, 50 mg, Oral, BID  sodium chloride flush 0.9 % injection 5-40 mL, 5-40 mL, IntraVENous, 2 times per day  sodium chloride flush 0.9 % injection 5-40 mL, 5-40 mL, IntraVENous, PRN  0.9 % sodium chloride infusion, , IntraVENous, PRN  ondansetron (ZOFRAN-ODT) disintegrating tablet 4 mg, 4 mg, Oral, Q8H PRN **OR** ondansetron (ZOFRAN) injection 4 mg, 4 mg, IntraVENous, Q6H PRN  oxyCODONE (ROXICODONE) immediate release tablet 5 mg, 5 mg, Oral, Q4H PRN **OR** oxyCODONE (ROXICODONE) immediate release tablet 10 mg, 10 mg, Oral, Q4H PRN  morphine (PF) injection 2 mg, 2 mg, IntraVENous, Q2H PRN **OR** morphine injection 4 mg, 4 mg, IntraVENous, Q2H PRN    Objective     /67   Pulse 79   Temp 97.5 °F (36.4 °C) (Oral)   Resp 18   Ht 6' 2\" (1.88 m)   Wt 280 lb 1.6 oz (127.1 kg)   SpO2 96%   BMI 35.96 kg/m²      I/O:  Intake/Output Summary (Last 24 hours) at 1/23/2023 0846  Last data filed at 1/23/2023 0530  Gross per 24 hour   Intake 850 ml   Output 3280 ml   Net -2430 ml                Wt Readings from Last 3 Encounters:   01/23/23 280 lb 1.6 oz (127.1 kg)   12/08/22 (!) 310 lb (140.6 kg)   12/08/22 (!) 310 lb (140.6 kg)       LABS:    No results for input(s): WBC, HGB, HCT, PLT in the last 72 hours. No results for input(s): NA, K, CL, CO2, PHOS, BUN, CREATININE, CA in the last 72 hours. No results for input(s): PROT, INR, APTT in the last 72 hours. No results for input(s): CKTOTAL, CKMB, CKMBINDEX, TROPONINI in the last 72 hours. Focused Lower Extremity Examination:    Vitals:    /67   Pulse 79   Temp 97.5 °F (36.4 °C) (Oral)   Resp 18   Ht 6' 2\" (1.88 m)   Wt 280 lb 1.6 oz (127.1 kg)   SpO2 96%   BMI 35.96 kg/m²      Dressings left intact today 1/23/2023    Vascular: Popliteal pulse is palpable to LLE. CFT within normal limits to LLE amputation stump. Skin temperature is warm to warm from proximal tibial tuberosity to distal amputation stump of LLE. Minimal edema noted to the distal LLE amputation stump. Dermatologic: There was minimal sanguinous drainage noted on the 4 x 4 gauze directly to the incision. Incision appears very well coapted, without any drainage noted. All staples and sutures are intact. There is no surrounding erythema or warmth noted. Overall, incision appears to be healing very well. No other open lesions, rashes or subcutaneous nodules of note. Neurovascular: Light touch sensation grossly diminished to the level of the midfoot to RLE.   Light touch sensation appears grossly intact to the level of the amputation stump of LLE. Musculoskeletal: Muscle strength testing deferred due to acute postop state. Patient's left leg dressed is in a slightly flexed position at the knee joint. Patient is able to fully extend left leg at the knee. Minimal pain with palpation of LLE amputation stump at posterior aspect.                ASSESSMENT: Pt. is a 65 y.o. 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    Sepsis ([address]    Acute left-sided low back pain with bilateral sciatica    S/P transmetatarsal amputation of foot, left (formerly [facility])    Leukocytosis    Wound dehiscence, surgical, initial encounter    Postoperative wound infection    Metabolic acidosis    Hx of CABG    Lumbar stenosis without neurogenic claudication    CKD (chronic kidney disease), stage II    Normocytic anemia    Morbid obesity ([address]    Debility    Carotid stenosis, asymptomatic, bilateral    Hypokalemia    Nonhealing ulcer of left lower extremity (formerly [facility])    Bleeding    Wound, open    SOB (shortness of breath) on exertion    Hemoglobin A1C greater than 9%, indicating poor diabetic control    Hypertensive emergency    Acute on chronic congestive heart failure (formerly [facility])    Hypertensive urgency    Moderate persistent asthma    Septic arthritis of left ankle, due to unspecified organism (formerly [facility])    Closed fracture of ankle with nonunion    S/P BKA (below knee amputation), left (formerly [facility])       PLAN:   Septic arthritis; left ankle -resolved  Pathologic fracture; left tibia -resolved  Abscess; left ankle -resolved  -Patient examined and evaluated  -WBC 5.0 on 1/19/2023; patient currently afebrile  -Hemoglobin and hematocrit at 8.3 and 27.5  -Discontinued IV Zosyn per infectious diseases  -Will continue to follow labs/imaging  -Discussed with patient that he should try to extend left leg at the knee to prevent flexion contracture  -Awaiting SNF placement; case management assisting with process to [facility] following for medical management  -Cardiology following  -Patient verbalized understanding and agreement with the treatment plan as stated. All of his questions were answered to his satisfaction. 4. GISELA on CKD  -Consulted hospitalist  -Creatinine at upper limits of normal. Avoid nephrotoxic agents. Renally dose medications. 5. Acute hypoxic postop respiratory insufficiency  -Consulted hospitalist  -Resolved    6. Nocturnal Hypoxica  -Consulted hospitalist  -Nocturnal sutdy completed, referral to sleep on Dc    7. Acute blood loss anemia on chronic disease  -Consulted hospitalist  -Stable    8. Type 2 DM, uncontrolled  -Consulted hospitalist  -A1c 8.3%    9. Primary hypertension  -Consulted hospitalist  -Continue home meds with holding parameters    10. Chronic diastolic heart failure  -Consulted hospitalist    11. CAD s/p CABG  -Consulted hospitalist  -ASA/plavix, BB, ACE    12. Moderate persistent asthma  -Consulted hospitalist  -Spiriva continued    13. Lupus  -Consulted hospitalist  -Aware    Dispo: Pending referral to [employer]. Patient can follow-up with Dr. Hanley Scheuermann for further care on an outpatient basis.     Del Squires DPM, PGY-2  Podiatric Surgical Resident  1/23/2023   8:46 AM

## 2023-01-24 LAB
ANION GAP SERPL CALC-SCNC: 12 MEQ/L (ref 8–16)
ANISOCYTOSIS BLD QL SMEAR: PRESENT
BASOPHILS ABSOLUTE: 0.1 THOU/MM3 (ref 0–0.1)
BASOPHILS NFR BLD AUTO: 1.6 %
BUN SERPL-MCNC: 28 MG/DL (ref 7–22)
CALCIUM SERPL-MCNC: 8.5 MG/DL (ref 8.5–10.5)
CHLORIDE SERPL-SCNC: 102 MEQ/L (ref 98–111)
CO2 SERPL-SCNC: 23 MEQ/L (ref 23–33)
CREAT SERPL-MCNC: 1.5 MG/DL (ref 0.4–1.2)
DEPRECATED RDW RBC AUTO: 65.2 FL (ref 35–45)
EOSINOPHIL NFR BLD AUTO: 4 %
EOSINOPHILS ABSOLUTE: 0.3 THOU/MM3 (ref 0–0.4)
ERYTHROCYTE [DISTWIDTH] IN BLOOD BY AUTOMATED COUNT: 21.8 % (ref 11.5–14.5)
GFR SERPL CREATININE-BSD FRML MDRD: 53 ML/MIN/1.73M2
GLUCOSE BLD STRIP.AUTO-MCNC: 161 MG/DL (ref 70–108)
GLUCOSE BLD STRIP.AUTO-MCNC: 167 MG/DL (ref 70–108)
GLUCOSE BLD STRIP.AUTO-MCNC: 169 MG/DL (ref 70–108)
GLUCOSE BLD STRIP.AUTO-MCNC: 216 MG/DL (ref 70–108)
GLUCOSE SERPL-MCNC: 204 MG/DL (ref 70–108)
HCT VFR BLD AUTO: 27 % (ref 42–52)
HGB BLD-MCNC: 8.1 GM/DL (ref 14–18)
IMM GRANULOCYTES # BLD AUTO: 0.03 THOU/MM3 (ref 0–0.07)
IMM GRANULOCYTES NFR BLD AUTO: 0.4 %
LYMPHOCYTES ABSOLUTE: 1.4 THOU/MM3 (ref 1–4.8)
LYMPHOCYTES NFR BLD AUTO: 20.1 %
MCH RBC QN AUTO: 24.8 PG (ref 26–33)
MCHC RBC AUTO-ENTMCNC: 30 GM/DL (ref 32.2–35.5)
MCV RBC AUTO: 82.6 FL (ref 80–94)
MONOCYTES ABSOLUTE: 0.6 THOU/MM3 (ref 0.4–1.3)
MONOCYTES NFR BLD AUTO: 8.1 %
NEUTROPHILS NFR BLD AUTO: 65.8 %
NRBC BLD AUTO-RTO: 0 /100 WBC
PLATELET # BLD AUTO: 345 THOU/MM3 (ref 130–400)
PMV BLD AUTO: 10 FL (ref 9.4–12.4)
POTASSIUM SERPL-SCNC: 5 MEQ/L (ref 3.5–5.2)
RBC # BLD AUTO: 3.27 MILL/MM3 (ref 4.7–6.1)
SEGMENTED NEUTROPHILS ABSOLUTE COUNT: 4.5 THOU/MM3 (ref 1.8–7.7)
SODIUM SERPL-SCNC: 137 MEQ/L (ref 135–145)
WBC # BLD AUTO: 6.8 THOU/MM3 (ref 4.8–10.8)

## 2023-01-24 PROCEDURE — 99233 SBSQ HOSP IP/OBS HIGH 50: CPT | Performed by: NURSE PRACTITIONER

## 2023-01-24 PROCEDURE — 6370000000 HC RX 637 (ALT 250 FOR IP): Performed by: NURSE PRACTITIONER

## 2023-01-24 PROCEDURE — 85025 COMPLETE CBC W/AUTO DIFF WBC: CPT

## 2023-01-24 PROCEDURE — 6360000002 HC RX W HCPCS

## 2023-01-24 PROCEDURE — 94640 AIRWAY INHALATION TREATMENT: CPT

## 2023-01-24 PROCEDURE — 80048 BASIC METABOLIC PNL TOTAL CA: CPT

## 2023-01-24 PROCEDURE — 97535 SELF CARE MNGMENT TRAINING: CPT

## 2023-01-24 PROCEDURE — 6360000002 HC RX W HCPCS: Performed by: PHYSICIAN ASSISTANT

## 2023-01-24 PROCEDURE — 97530 THERAPEUTIC ACTIVITIES: CPT

## 2023-01-24 PROCEDURE — 1200000000 HC SEMI PRIVATE

## 2023-01-24 PROCEDURE — 6370000000 HC RX 637 (ALT 250 FOR IP): Performed by: PHYSICIAN ASSISTANT

## 2023-01-24 PROCEDURE — 36415 COLL VENOUS BLD VENIPUNCTURE: CPT

## 2023-01-24 PROCEDURE — 2580000003 HC RX 258

## 2023-01-24 PROCEDURE — 82948 REAGENT STRIP/BLOOD GLUCOSE: CPT

## 2023-01-24 PROCEDURE — 6370000000 HC RX 637 (ALT 250 FOR IP)

## 2023-01-24 PROCEDURE — 2580000003 HC RX 258: Performed by: NURSE PRACTITIONER

## 2023-01-24 PROCEDURE — 6370000000 HC RX 637 (ALT 250 FOR IP): Performed by: STUDENT IN AN ORGANIZED HEALTH CARE EDUCATION/TRAINING PROGRAM

## 2023-01-24 PROCEDURE — 97110 THERAPEUTIC EXERCISES: CPT

## 2023-01-24 RX ORDER — 0.9 % SODIUM CHLORIDE 0.9 %
250 INTRAVENOUS SOLUTION INTRAVENOUS ONCE
Status: COMPLETED | OUTPATIENT
Start: 2023-01-24 | End: 2023-01-24

## 2023-01-24 RX ADMIN — INSULIN GLARGINE 10 UNITS: 100 INJECTION, SOLUTION SUBCUTANEOUS at 20:47

## 2023-01-24 RX ADMIN — FERROUS SULFATE TAB 325 MG (65 MG ELEMENTAL FE) 325 MG: 325 (65 FE) TAB at 20:45

## 2023-01-24 RX ADMIN — METOPROLOL TARTRATE 50 MG: 50 TABLET, FILM COATED ORAL at 06:13

## 2023-01-24 RX ADMIN — OXYCODONE 10 MG: 5 TABLET ORAL at 00:01

## 2023-01-24 RX ADMIN — ATORVASTATIN CALCIUM 80 MG: 80 TABLET, FILM COATED ORAL at 09:27

## 2023-01-24 RX ADMIN — ENOXAPARIN SODIUM 30 MG: 100 INJECTION SUBCUTANEOUS at 12:05

## 2023-01-24 RX ADMIN — ASPIRIN 81 MG 81 MG: 81 TABLET ORAL at 09:27

## 2023-01-24 RX ADMIN — Medication 1 TABLET: at 09:27

## 2023-01-24 RX ADMIN — METOPROLOL TARTRATE 50 MG: 50 TABLET, FILM COATED ORAL at 18:18

## 2023-01-24 RX ADMIN — ONDANSETRON 4 MG: 2 INJECTION INTRAMUSCULAR; INTRAVENOUS at 09:12

## 2023-01-24 RX ADMIN — CLONIDINE HYDROCHLORIDE 0.1 MG: 0.1 TABLET ORAL at 06:13

## 2023-01-24 RX ADMIN — OXYCODONE 5 MG: 5 TABLET ORAL at 23:25

## 2023-01-24 RX ADMIN — SODIUM CHLORIDE 250 ML: 9 INJECTION, SOLUTION INTRAVENOUS at 09:47

## 2023-01-24 RX ADMIN — FERROUS SULFATE TAB 325 MG (65 MG ELEMENTAL FE) 325 MG: 325 (65 FE) TAB at 09:27

## 2023-01-24 RX ADMIN — SENNOSIDES AND DOCUSATE SODIUM 1 TABLET: 50; 8.6 TABLET ORAL at 20:45

## 2023-01-24 RX ADMIN — OXYCODONE 10 MG: 5 TABLET ORAL at 04:33

## 2023-01-24 RX ADMIN — CLOPIDOGREL BISULFATE 75 MG: 75 TABLET ORAL at 09:27

## 2023-01-24 RX ADMIN — SODIUM CHLORIDE, PRESERVATIVE FREE 10 ML: 5 INJECTION INTRAVENOUS at 09:21

## 2023-01-24 RX ADMIN — INSULIN LISPRO 4 UNITS: 100 INJECTION, SOLUTION INTRAVENOUS; SUBCUTANEOUS at 12:05

## 2023-01-24 RX ADMIN — GABAPENTIN 600 MG: 600 TABLET, FILM COATED ORAL at 20:46

## 2023-01-24 RX ADMIN — ENOXAPARIN SODIUM 30 MG: 100 INJECTION SUBCUTANEOUS at 23:21

## 2023-01-24 RX ADMIN — TIOTROPIUM BROMIDE INHALATION SPRAY 2 PUFF: 3.12 SPRAY, METERED RESPIRATORY (INHALATION) at 09:39

## 2023-01-24 RX ADMIN — SODIUM CHLORIDE, PRESERVATIVE FREE 10 ML: 5 INJECTION INTRAVENOUS at 20:41

## 2023-01-24 RX ADMIN — INSULIN GLARGINE 10 UNITS: 100 INJECTION, SOLUTION SUBCUTANEOUS at 09:48

## 2023-01-24 RX ADMIN — GABAPENTIN 600 MG: 600 TABLET, FILM COATED ORAL at 09:27

## 2023-01-24 ASSESSMENT — PAIN DESCRIPTION - ORIENTATION
ORIENTATION: LEFT
ORIENTATION: LEFT

## 2023-01-24 ASSESSMENT — PAIN SCALES - GENERAL
PAINLEVEL_OUTOF10: 6
PAINLEVEL_OUTOF10: 7
PAINLEVEL_OUTOF10: 7
PAINLEVEL_OUTOF10: 8
PAINLEVEL_OUTOF10: 5
PAINLEVEL_OUTOF10: 4
PAINLEVEL_OUTOF10: 4

## 2023-01-24 ASSESSMENT — PAIN DESCRIPTION - LOCATION
LOCATION: LEG
LOCATION: LEG

## 2023-01-24 ASSESSMENT — PAIN - FUNCTIONAL ASSESSMENT: PAIN_FUNCTIONAL_ASSESSMENT: PREVENTS OR INTERFERES SOME ACTIVE ACTIVITIES AND ADLS

## 2023-01-24 ASSESSMENT — PAIN DESCRIPTION - DESCRIPTORS
DESCRIPTORS: ACHING
DESCRIPTORS: ACHING

## 2023-01-24 NOTE — CARE COORDINATION
1/24/23, 3:55 PM EST    DISCHARGE ON GOING EVALUATION    Natalee Home day: 14  Location: -04/004-A Reason for admit: Sepsis [facility]) [A41.9]  Septic arthritis of left ankle, due to unspecified organism [facility]) [M00.9]   Procedure:   1/10 Left Proximal symes/distal transtibial amputation with  mL by Dr Jolley Neither  Barriers to Discharge: Hospitalist and ID following. SBP 60s this morning-medication induced. PT/OT. Await precert. PCP: EYAD Sloan CNP  Readmission Risk Score: 24.7%  Patient Goals/Plan/Treatment Preferences: [facility] at discharge.

## 2023-01-24 NOTE — PLAN OF CARE
Problem: Discharge Planning  Goal: Discharge to home or other facility with appropriate resources  1/24/2023 1633 by Leonor Self LPN  Outcome: Progressing  Flowsheets (Taken 1/24/2023 1633)  Discharge to home or other facility with appropriate resources:   Identify barriers to discharge with patient and caregiver   Arrange for needed discharge resources and transportation as appropriate   Identify discharge learning needs (meds, wound care, etc)     Problem: Pain  Goal: Verbalizes/displays adequate comfort level or baseline comfort level  1/24/2023 1633 by Leonor Self LPN  Outcome: Progressing  Flowsheets (Taken 1/24/2023 1633)  Verbalizes/displays adequate comfort level or baseline comfort level:   Encourage patient to monitor pain and request assistance   Assess pain using appropriate pain scale   Administer analgesics based on type and severity of pain and evaluate response   Implement non-pharmacological measures as appropriate and evaluate response     Problem: ABCDS Injury Assessment  Goal: Absence of physical injury  1/24/2023 1633 by Leonor Self LPN  Outcome: Progressing  Flowsheets  Taken 1/24/2023 1633 by Leonor Self LPN  Absence of Physical Injury: Implement safety measures based on patient assessment  Taken 1/24/2023 1330 by Smitha Dela Cruz RN  Absence of Physical Injury: Implement safety measures based on patient assessment  Note: Patient remains free from injury at this time this shift. Call light with in reach.      Problem: Infection - Adult  Goal: Absence of infection at discharge  1/24/2023 1633 by Leonor Self LPN  Outcome: Progressing  Flowsheets (Taken 1/24/2023 1633)  Absence of infection at discharge:   Assess and monitor for signs and symptoms of infection   Monitor lab/diagnostic results   Monitor all insertion sites i.e., indwelling lines, tubes and drains   Administer medications as ordered     Problem: Chronic Conditions and Co-morbidities  Goal: Patient's chronic conditions and co-morbidity symptoms are monitored and maintained or improved  Outcome: Progressing  Flowsheets (Taken 1/23/2023 1027)  Care Plan - Patient's Chronic Conditions and Co-Morbidity Symptoms are Monitored and Maintained or Improved:   Monitor and assess patient's chronic conditions and comorbid symptoms for stability, deterioration, or improvement   Collaborate with multidisciplinary team to address chronic and comorbid conditions and prevent exacerbation or deterioration     Problem: Skin/Tissue Integrity - Adult  Goal: Incisions, wounds, or drain sites healing without S/S of infection  Outcome: Progressing  Flowsheets (Taken 1/24/2023 1633)  Incisions, Wounds, or Drain Sites Healing Without Sign and Symptoms of Infection:   TWICE DAILY: Assess and document skin integrity   TWICE DAILY: Assess and document dressing/incision, wound bed, drain sites and surrounding tissue     Problem: Musculoskeletal - Adult  Goal: Return ADL status to a safe level of function  Outcome: Progressing  Flowsheets (Taken 1/24/2023 1633)  Return ADL Status to a Safe Level of Function:   Administer medication as ordered   Assess activities of daily living deficits and provide assistive devices as needed     Problem: Metabolic/Fluid and Electrolytes - Adult  Goal: Glucose maintained within prescribed range  1/24/2023 1633 by Kayce Contreras LPN  Outcome: Progressing  Flowsheets (Taken 1/24/2023 0335 by Lorrie Bolton RN)  Glucose maintained within prescribed range:   Monitor blood glucose as ordered   Assess for signs and symptoms of hyperglycemia and hypoglycemia   Administer ordered medications to maintain glucose within target range   Assess barriers to adequate nutritional intake and initiate nutrition consult as needed     Problem: Hematologic - Adult  Goal: Maintains hematologic stability  Outcome: Progressing  Flowsheets (Taken 1/22/2023 2154 by Heaven Purcell RN)  Maintains hematologic stability:   Assess for signs and symptoms of bleeding or hemorrhage   Monitor labs for bleeding or clotting disorders     Problem: Nutrition Deficit:  Goal: Optimize nutritional status  Outcome: Progressing  Flowsheets (Taken 1/24/2023 1863)  Nutrient intake appropriate for improving, restoring, or maintaining nutritional needs:   Assess nutritional status and recommend course of action   Monitor oral intake, labs, and treatment plans     Care plan reviewed with patient . Patient verbalize understanding of the plan of care and contribute to goal setting.

## 2023-01-24 NOTE — PROGRESS NOTES
Updated hospitalist on patients blood pressures. Per hospitalist continue to monitor and hold medications per parameters as ordered.

## 2023-01-24 NOTE — PROGRESS NOTES
Debra Blevins 61  PHYSICAL THERAPY MISSED TREATMENT NOTE  [facility] ORTHOPEDICS 7K    Date: 2023  Patient Name: Mariel Vragas        MRN: [id number]   : 1960  (65 y.o.)  Gender: male   Referring Practitioner: Marco Cerrato DPM  Diagnosis: sepsis         REASON FOR MISSED TREATMENT:  Hold treatment per nursing request.      Pt had episode of low BP while in recliner, symptomatic. Pt was returned to bed, and BP came up some, just re checked and still low 87/56. Will hold off this am and check back later if able.

## 2023-01-24 NOTE — CARE COORDINATION
1/24/23, 3:03 PM EST    DISCHARGE PLANNING EVALUATION    Precert approved, [facility] [facility] can accept at discharge.

## 2023-01-24 NOTE — PROGRESS NOTES
Podiatric Progress Note  Moira Guadalupe  Subjective :     1/24/2023  Patient seen at bedside on behalf of Dr. Chante Blakely. Patient is s/p LLE proximal Symes amputation with distal transtibial osteotomy (DOS 1/10/2023). Patient is pleasant, and is alert and oriented. He has no new complaints. He relates that he fell yesterday in therapy. He relates that he came down on his LLE stump. He relates that the dressing was removed and checked the incision site, which remained intact with sutures and  staples. He denies hitting his head of LOC. Therapy at bedside today. Patient denies any nausea, vomiting, fever, chills, chest pain, shortness of breath.      1/23/2023  Patient seen at bedside on behalf of Dr. Chante Blakely. Patient is s/p LLE proximal Symes amputation with distal transtibial osteotomy (DOS 1/10/2023). Patient is pleasant, and is alert and oriented. He has no new complaints. He relates that he is hoping to be discharged to a rehab center. He relates that he has followed up with Dr. Chante Blakely once a week in the past.  He is pending discharge planning. Patient denies any nausea, vomiting, fever, chills, chest pain, shortness of breath.      1/22/2023  Patient seen at bedside on behalf of Dr. Chante Blakely. Patient is s/p LLE proximal Symes amputation with distal transtibial osteotomy (DOS 1/10/2023). Patient is pleasant, and is alert and oriented. He has no new complaints. He says he was confused that his blood sugar got a little high yesterday, however it is not down in the normal range. He is pending discharge planning. Patient denies any nausea, vomiting, fever, chills, chest pain, shortness of breath.    1/21/2023  Patient seen at bedside this morning on behalf of Dr. Chante Blakely. Patient is s/p LLE proximal Symes amputation with distal transtibial osteotomy (DOS 1/10/2023). Patient is pleasant, and is alert and oriented. Patient endorses no pain to LLE. He relates that he is taking oral pain medication.  He denies any N/V/F/C/SOB/CP or bilateral calf tenderness. 1/20/2023  Patient seen at bedside this morning on behalf of Dr. Sundeep Contreras. Patient is s/p LLE proximal Symes amputation with distal transtibial osteotomy (DOS 1/10/2023). Patient is pleasant, and is alert and oriented. Patient endorses no pain to LLE. He relates that he is taking oral pain medication. He denies any N/V/F/C/SOB/CP or bilateral calf tenderness. He has no other pedal complaints at this time. Patient relates that he is not able to got to [facility] due to insurance. He is waiting to hear if he is able to go to [place] [id number] upon discharge for rehab. 1/19/2023  Patient seen at bedside this morning on behalf of Dr. Sundeep Contreras. Patient is s/p LLE proximal Symes amputation with distal transtibial osteotomy (DOS 1/10/2023). Patient is pleasant, and is alert and oriented. Patient endorses no pain to LLE. He relates that he is taking oral pain medication. He denies any N/V/F/C/SOB/CP or bilateral calf tenderness. He has no other pedal complaints at this time. Patient relates that he is waiting to hear if he will be able to go to [facility] upon discharge for rehab.     1/18/2023  Patient seen at bedside this morning on behalf of Dr. Sundeep Contreras. Patient is s/p LLE proximal Symes amputation with distal transtibial osteotomy (DOS 1/10/2023). Patient is pleasant, and is alert and oriented. Patient states that he is doing much better and is thankful for Dr. Sundeep Contreras and Dr. Blaise Jackson for treating him fast and taking care of him. Patient endorses minimal pain to LLE. He relates that he is taking oral pain medication. He denies any N/V/F/C/SOB/CP or bilateral calf tenderness. He has no other pedal complaints at this time. Patient is aware that there are no beds available at the facility which he originally wanted; and he is also aware that precertification is started at another [facility]/F.  He relates that he has not heard anything recently. 1/17/2023  Patient seen at bedside this morning on behalf of Dr. Halle Call. Patient is s/p LLE proximal Symes amputation with distal transtibial osteotomy (DOS 1/10/2023). Patient is pleasant, and is alert and oriented. Patient states that he feels very well overall. Per patient, he had an enema yesterday, and then had another bowel movement afterwards. Patient endorses minimal pain to LLE. He denies any N/V/F/C/SOB/CP or bilateral calf tenderness. He has no other pedal complaints at this time. Patient is aware that there are no beds available at the facility which he originally wanted; and he is also aware that precertification is started at another SNF/ECF.    1/16/2023  Patient seen at chair side this morning on behalf of Dr. Halle Call. Patient is s/p LLE proximal Symes amputation with distal transtibial osteotomy (DOS 1/10/2023). Patient is pleasant, and is alert and oriented. Patient states that he feels very well overall. Patient continues to endorse minimal pain to LLE (2/10). He currently denies any N/V/F/C/N/V/CP. Patient still has not had bowel movement yet; he denies any abdominal bloating, cramping, or discomfort. No other pedal complaints at this time. Patient states that per his insurance, precertification has been started for [facility]. 1/15/2023  Patient seen at bedside this morning on behalf of Dr. Halle Call. Patient is status post LLE proximal Symes amputation with distal transtibial osteotomy (DOS 1/10/2023). Patient appeared pleasant, was oriented to person, place and time and in no acute distress. Patient is very grateful for having the procedure performed; he states that he subjectively feels much better after the amputation. Patient expressed gratitude to Dr. Halle Call and Todd Smith for their services. Patient endorses minimal pain to the LLE (3/10).   Patient does endorse mild neuropathic type symptoms to the RLE.  Patient denies any N/V/F/C/SOB or CP. Patient states that he is passing flatus, but has not yet had a bowel movement. Patient has no further pedal concerns at this point in time. 1/14/2023  Patient is a pleasant 62yo male seen bedside this AM s/p left lower extremity amputation emergent in nature due to necrotizing fasciitis and extensive gas gangrene performed 1.10.23. Patient seated upright bedside working with PT. Patient overall states he feels vastly improved, post op dressing clean/dry/intact. Social work is following, precert for [employer] in place. ID following for IVAB therapy, currently on Zosyn IV (vancomycin and clindamycin discontinued). Labs are trending better, leukocytosis resolved. Will continue to follow until determination of placement. HISTORY OF PRESENT ILLNESS:                 The patient is a 65 y.o. male with significant past medical history of CAD, CKD, diabetes, hyperlipidemia, and hypertension who is being seen at bedside on behalf of Dr. Joel Matias. Patient relates that he has had ongoing problems with his left foot and ankle over the course of the past few years. 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 He relates that he follows up and sees Dr. Joel Matias in clinic weekly. He relates that he missed his appointment last week and did not have the dressing changed. The patient states that the previous week he had x-rays taken in clinic that showed a pathologic fracture of the left tibia. He relates that he has not been walking on his left lower extremity. He relates that he has applied some pressure when using the restroom on that leg. He states that amputation has been in discussion at his previous clinic encounters. The patient relates that he last a last evening at 8 or 9 PM.  He relates that he drank Crystal light tea around 8 or 9 AM this morning.   He relates that he took Plavix this morning at 6 AM.  The patient denies any other concerns to his right lower extremity. The patient denies nausea, vomiting, fever, chills, shortness of breath or chest pain.     Current Medications:    Current Facility-Administered Medications: insulin glargine (LANTUS) injection vial 10 Units, 10 Units, SubCUTAneous, BID  sennosides-docusate sodium (SENOKOT-S) 8.6-50 MG tablet 1 tablet, 1 tablet, Oral, BID  polyethylene glycol (GLYCOLAX) packet 17 g, 17 g, Oral, Daily  enoxaparin Sodium (LOVENOX) injection 30 mg, 30 mg, SubCUTAneous, Q12H  insulin lispro (HUMALOG) injection vial 0-16 Units, 0-16 Units, SubCUTAneous, TID WC  insulin lispro (HUMALOG) injection vial 0-4 Units, 0-4 Units, SubCUTAneous, Nightly  acetaminophen (TYLENOL) tablet 650 mg, 650 mg, Oral, Q6H PRN **OR** acetaminophen (TYLENOL) suppository 650 mg, 650 mg, Rectal, Q6H PRN  amLODIPine (NORVASC) tablet 10 mg, 10 mg, Oral, Daily  atorvastatin (LIPITOR) tablet 80 mg, 80 mg, Oral, Daily  albuterol sulfate HFA (PROVENTIL;VENTOLIN;PROAIR) 108 (90 Base) MCG/ACT inhaler 2 puff, 2 puff, Inhalation, 4x Daily PRN  ferrous sulfate (IRON 325) tablet 325 mg, 325 mg, Oral, BID  furosemide (LASIX) tablet 40 mg, 40 mg, Oral, Daily  gabapentin (NEURONTIN) tablet 600 mg, 600 mg, Oral, BID  minoxidil (LONITEN) tablet 10 mg, 10 mg, Oral, Daily  multivitamin 1 tablet, 1 tablet, Oral, Daily  potassium chloride (KLOR-CON M) extended release tablet 20 mEq, 20 mEq, Oral, Daily  valsartan (DIOVAN) tablet 160 mg, 160 mg, Oral, Daily  glucose chewable tablet 16 g, 4 tablet, Oral, PRN  dextrose bolus 10% 125 mL, 125 mL, IntraVENous, PRN **OR** dextrose bolus 10% 250 mL, 250 mL, IntraVENous, PRN  glucagon (rDNA) injection 1 mg, 1 mg, SubCUTAneous, PRN  dextrose 10 % infusion, , IntraVENous, Continuous PRN  aspirin chewable tablet 81 mg, 81 mg, Oral, Daily  clopidogrel (PLAVIX) tablet 75 mg, 75 mg, Oral, Daily  tiotropium (SPIRIVA RESPIMAT) 2.5 MCG/ACT inhaler 2 puff, 2 puff, Inhalation, Daily  cloNIDine (CATAPRES) tablet 0.1 mg, 0.1 mg, Oral, BID  metoprolol tartrate (LOPRESSOR) tablet 50 mg, 50 mg, Oral, BID  sodium chloride flush 0.9 % injection 5-40 mL, 5-40 mL, IntraVENous, 2 times per day  sodium chloride flush 0.9 % injection 5-40 mL, 5-40 mL, IntraVENous, PRN  0.9 % sodium chloride infusion, , IntraVENous, PRN  ondansetron (ZOFRAN-ODT) disintegrating tablet 4 mg, 4 mg, Oral, Q8H PRN **OR** ondansetron (ZOFRAN) injection 4 mg, 4 mg, IntraVENous, Q6H PRN  oxyCODONE (ROXICODONE) immediate release tablet 5 mg, 5 mg, Oral, Q4H PRN **OR** oxyCODONE (ROXICODONE) immediate release tablet 10 mg, 10 mg, Oral, Q4H PRN  morphine (PF) injection 2 mg, 2 mg, IntraVENous, Q2H PRN **OR** morphine injection 4 mg, 4 mg, IntraVENous, Q2H PRN    Objective     BP (!) 145/67   Pulse 75   Temp 97.5 °F (36.4 °C) (Oral)   Resp 16   Ht 6' 2\" (1.88 m)   Wt 270 lb (122.5 kg)   SpO2 100%   BMI 34.67 kg/m²      I/O:  Intake/Output Summary (Last 24 hours) at 1/24/2023 0840  Last data filed at 1/24/2023 0537  Gross per 24 hour   Intake 1180 ml   Output 1750 ml   Net -570 ml                Wt Readings from Last 3 Encounters:   01/23/23 270 lb (122.5 kg)   12/08/22 (!) 310 lb (140.6 kg)   12/08/22 (!) 310 lb (140.6 kg)       LABS:    No results for input(s): WBC, HGB, HCT, PLT in the last 72 hours. No results for input(s): NA, K, CL, CO2, PHOS, BUN, CREATININE, CA in the last 72 hours. No results for input(s): PROT, INR, APTT in the last 72 hours. No results for input(s): CKTOTAL, CKMB, CKMBINDEX, TROPONINI in the last 72 hours. Focused Lower Extremity Examination:    Vitals:    BP (!) 145/67   Pulse 75   Temp 97.5 °F (36.4 °C) (Oral)   Resp 16   Ht 6' 2\" (1.88 m)   Wt 270 lb (122.5 kg)   SpO2 100%   BMI 34.67 kg/m²        Vascular: Popliteal pulse is palpable to LLE. CFT within normal limits to LLE amputation stump. Skin temperature is warm to warm from proximal tibial tuberosity to distal amputation stump of LLE. Minimal edema noted to the distal LLE amputation stump. Dermatologic: There was no sanguinous drainage noted on the ACE bandage directly to the incision. Incision appears very well coapted, without any drainage noted. All staples and sutures are intact. There is no surrounding erythema or warmth noted. Overall, incision appears to be healing very well. No other open lesions, rashes or subcutaneous nodules of note. Neurovascular: Light touch sensation grossly diminished to the level of the midfoot to RLE. Light touch sensation appears grossly intact to the level of the amputation stump of LLE. Musculoskeletal: Muscle strength testing deferred due to acute postop state. Patient's left leg dressed is in a slightly flexed position at the knee joint. Patient is able to fully extend left leg at the knee. Minimal pain with palpation of LLE amputation stump at posterior aspect.                ASSESSMENT: Pt. is a 65 y.o. 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    Sepsis ([address]    Acute left-sided low back pain with bilateral sciatica    S/P transmetatarsal amputation of foot, left (HCC)    Leukocytosis    Wound dehiscence, surgical, initial encounter    Postoperative wound infection    Metabolic acidosis    Hx of CABG    Lumbar stenosis without neurogenic claudication    CKD (chronic kidney disease), stage II    Normocytic anemia    Morbid obesity ([address]    Debility    Carotid stenosis, asymptomatic, bilateral    Hypokalemia    Nonhealing ulcer of left lower extremity (HCC)    Bleeding    Wound, open    SOB (shortness of breath) on exertion    Hemoglobin A1C greater than 9%, indicating poor diabetic control    Hypertensive emergency    Acute on chronic congestive heart failure (HCC)    Hypertensive urgency    Moderate persistent asthma    Septic arthritis of left ankle, due to unspecified organism [facility])    Closed fracture of ankle with nonunion    S/P BKA (below knee amputation), left ([facility])       PLAN:   Septic arthritis; left ankle -resolved  Pathologic fracture; left tibia -resolved  Abscess; left ankle -resolved  -Patient examined and evaluated  -WBC 5.0 on 1/19/2023; patient currently afebrile  -Hemoglobin and hematocrit at 8.3 and 27.5  -Discontinued IV Zosyn per infectious diseases  -Will continue to follow labs/imaging  -Discussed with patient that he should try to extend left leg at the knee to prevent flexion contracture  -Applied DSD & ACE wrap to LLE. -Awaiting SNF placement; case management assisting with process to [facility] following for medical management  -Cardiology following  -Patient verbalized understanding and agreement with the treatment plan as stated. All of his questions were answered to his satisfaction. 4. GISELA on CKD  -Consulted hospitalist  -Creatinine at upper limits of normal. Avoid nephrotoxic agents. Renally dose medications. 5. Acute hypoxic postop respiratory insufficiency  -Consulted hospitalist  -Resolved    6. Nocturnal Hypoxica  -Consulted hospitalist  -Nocturnal sutdy completed, referral to sleep on Dc    7. Acute blood loss anemia on chronic disease  -Consulted hospitalist  -Stable    8. Type 2 DM, uncontrolled  -Consulted hospitalist  -A1c 8.3%    9. Primary hypertension  -Consulted hospitalist  -Continue home meds with holding parameters    10. Chronic diastolic heart failure  -Consulted hospitalist    11. CAD s/p CABG  -Consulted hospitalist  -ASA/plavix, BB, ACE    12.  Moderate persistent asthma  -Consulted hospitalist  -Spiriva continued    13. Lupus  -Consulted hospitalist  -Aware    Dispo: Pending referral to [employer]. Patient can follow-up with Dr. Nathaniel Sampson for further care on an outpatient basis.     Marco Cerrato DPM, PGY-2  Podiatric Surgical Resident  1/24/2023   8:40 AM

## 2023-01-24 NOTE — PROGRESS NOTES
[facility]  Occupational Therapy  Daily Note  Time:   Time In: 08  Time Out: 1433  Timed Code Treatment Minutes: 28 Minutes  Minutes: 28          Date: 2023  Patient Name: Aleisha Oliveira,   Gender: male      Room: [facility]-A  MRN: [id number]  : 1960  (65 y.o.)  Referring Practitioner: Corin Morales DPM  Diagnosis: Sepsis  Additional Pertinent Hx: The patient is a 65 y.o. male with significant past medical history of CAD, CKD, diabetes, hyperlipidemia, and hypertension who is being seen at bedside on behalf of Dr. Kayla Plascencia. Patient relates that he has had ongoing problems with his left foot and ankle over the course of the past few years. 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 He relates that he follows up and sees Dr. Kayla Plascencia in clinic weekly. He relates that he missed his appointment last week and did not have the dressing changed. The patient states that the previous week he had x-rays taken in clinic that showed a pathologic fracture of the left tibia. He relates that he has not been walking on his left lower extremity. He relates that he has applied some pressure when using the restroom on that leg. He states that amputation has been in discussion at his previous clinic encounters. The patient relates that he last a last evening at 8 or 9 PM.  He relates that he drank Crystal light tea around 8 or 9 AM this morning. He relates that he took Plavix this morning at 6 AM.  The patient denies any other concerns to his right lower extremity. The patient denies nausea, vomiting, fever, chills, shortness of breath or chest pain.     Restrictions/Precautions:  Restrictions/Precautions: General Precautions, Weight Bearing, Fall Risk  Left Lower Extremity Weight Bearing: Non Weight Bearing (BKA)  Position Activity Restriction  Other position/activity restrictions: L BKA     SUBJECTIVE: Pt laying in bed upon arrival, pt agreeable to OT session, RN gave verbal approvla for session     PAIN: 4/10: RLE knee due to fall yesterday    Vitals: Nurse checked vitals prior to session    COGNITION: Slow Processing, Impaired Memory, and Inattention    ADL:   Bathing: Stand By Assistance. With pt completing sponge bath sitting in recliner  Upper Extremity Dressing: Stand By Assistance. For donning gown  Lower Extremity Dressing: Stand By Assistance. For pt to don brief with pt able to shift hips back and forth . BALANCE:  Standing Balance: Minimal Assistance. With RW, and BUE support on the walker in anticipation for functional mob    BED MOBILITY:  Supine to Sit: Modified Independent      TRANSFERS:  Sit to Stand:  Minimal Assistance. Stand to Sit: Minimal Assistance. Stand Pivot: Moderate Assistance. From the EOB to the recliner    ASSESSMENT:     Activity Tolerance:  Patient tolerance of  treatment: good.        Discharge Recommendations: ECF with OT  Equipment Recommendations: Equipment Needed: Yes  Other: TTB, slideboard, drop arm commode  Plan: Times Per Week: 6x  Current Treatment Recommendations: Strengthening, Balance training, Functional mobility training, Endurance training, Patient/Caregiver education & training, Safety education & training, Self-Care / ADL, Home management training    Patient Education  Patient Education: ADL's    Goals  Short Term Goals  Time Frame for Short Term Goals: by discharge  Short Term Goal 1: Pt will complete grooming routine while seated EOB with Supervision and no VC for technique to incrase indep with self care  Short Term Goal 2: Pt will complete stand pivot t/fs with SBA and min VC for safety to increase indep with toileting routine  Short Term Goal 3: Pt will demo indep with BUE strengthening HEP to increase overall UB strength/endurance for ease with t/fs  Short Term Goal 4: Pt will tolerate dynamic standing x2-3min with unilateral release and SBA for increased indep with clothing mgmt    Following session, patient left in safe position with all fall risk precautions in place.

## 2023-01-24 NOTE — PROGRESS NOTES
[facility]  INPATIENT PHYSICAL THERAPY  DAILY NOTE  [facility] ORTHOPEDICS 7K - 7K-04/004-A    Time In: 3718  Time Out: 1459  Timed Code Treatment Minutes: 44 Minutes  Minutes: 39          Date: 2023  Patient Name: Liu Bowling,  Gender:  male        MRN: [id number]  : 1960  (65 y.o.)     Referring Practitioner: Priscilla Corrigan DPM  Diagnosis: sepsis  Additional Pertinent Hx: per EMR \"The patient is a 65 y.o. male with significant past medical history of CAD, CKD, diabetes, hyperlipidemia, and hypertension who is being seen at bedside on behalf of Dr. Lawanda Taveras. Patient relates that he has had ongoing problems with his left foot and ankle over the course of the past few years. 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 He relates that he follows up and sees Dr. Lawanda Taveras in clinic weekly. He relates that he missed his appointment last week and did not have the dressing changed. The patient states that the previous week he had x-rays taken in clinic that showed a pathologic fracture of the left tibia. He relates that he has not been walking on his left lower extremity. He relates that he has applied some pressure when using the restroom on that leg. He states that amputation has been in discussion at his previous clinic encounters. The patient relates that he last a last evening at 8 or 9 PM.  He relates that he drank Crystal light tea around 8 or 9 AM this morning. He relates that he took Plavix this morning at 6 AM.  The patient denies any other concerns to his right lower extremity. The patient denies nausea, vomiting, fever, chills, shortness of breath or chest pain. \" Pt is s/p L BKA on 1/10/23 completed by Dr. Lawanda Taveras.      Prior Level of Function:  Lives With: Alone  Type of Home: House  Home Layout: One level  Home Access: Ramped entrance  Home Equipment: Electric scooter, [address] Shower/Tub: Walk-in shower, Shower chair with back  Bathroom Toilet: Handicap height  Bathroom Equipment: Grab bars in shower, Grab bars around toilet    Receives Help From: Friend(s)  ADL Assistance: Independent  Homemaking Assistance: Needs assistance  Homemaking Responsibilities: Yes  Ambulation Assistance: Independent  Transfer Assistance: Independent  Active : No  IADL Comments: He has a robert who works for him - worker's wife makes meals from that he only has to heat up in microwave and assists with cleaning tasks  Additional Comments: Pt reported that he has a very supportive family who can be available intermittently throughout the day upon discharge home. Pt reports he was amb very little, using his scooter or w/c most of the time    Restrictions/Precautions:  Restrictions/Precautions: General Precautions, Weight Bearing, Fall Risk  Left Lower Extremity Weight Bearing: Non Weight Bearing (BKA)  Position Activity Restriction  Other position/activity restrictions: L BKA     SUBJECTIVE: RN approved session. Pt in bed upon arrival and agrees to therapy. Pt pleasant and cooperative. Reporting he is feeling better than he did this am.     PAIN: L stump \"very sore\" but better than last night    Vitals: Blood Pressure: 96/60 MAP 71    OBJECTIVE:  Bed Mobility:  Rolling to Left: Stand By Assistance   Rolling to Right: Stand By Assistance   Supine to Sit: Stand By Assistance  Sit to Supine: Stand By Assistance   Scooting: Stand By Assistance  Pt rolled L,R prone and supine. Pt used rail, bed flat    Balance:  Completed BP check at EOB, also worked on graded lifts from EOB, pt guarded and c/o weakness. Pt demos decreased buttock clearance from EOB    Exercise:  Patient was guided in 1 set(s) 10-15 reps of exercise to both lower extremities. Amputee stretching and strengthening therex in bed- sidelying, prone and supine . Exercises were completed for increased independence with functional mobility.     Functional Outcome Measures: Completed  AM-PAC Inpatient Mobility without Stair Climbing Raw Score : 13  AM-PAC Inpatient without Stair Climbing T-Scale Score : 38.96    ASSESSMENT:  Assessment: Patient progressing toward established goals. Activity Tolerance:  Patient tolerance of  treatment: good. Pt demos decreased strength, endurance, and independence with mobility. Pt will benefit from cont PT at this time to ensure safety, to decrease the risk for falls and to return to PLOF. Equipment Recommendations:Equipment Needed: No  Discharge Recommendations: Subacte/Skilled Nursing Facility  Plan: Current Treatment Recommendations: Strengthening, Balance training, Functional mobility training, Transfer training, Endurance training, Equipment evaluation, education, & procurement, Patient/Caregiver education & training, Neuromuscular re-education, Safety education & training, Home exercise program, Therapeutic activities  General Plan:  (5xO)    Patient Education  Patient Education: Plan of Care, Bed Mobility, Verbal Exercise Instruction, Health Promotion and Wellness Education    Goals:  Patient Goals : Thuy Holcomb able to get in and out bed and w/c with less assist\"  Short Term Goals  Time Frame for Short Term Goals: by discharge  Short Term Goal 1: Pt will demo supine to and from sit transfers with SBA and modifications as needed to progress with mobility. Short Term Goal 2: Pt will demo slide board transfers to and from w/c with mod Ax1 to progress with mobility. Short Term Goal 3: Pt will tolerate 10-20 reps of ther ex to increase overall mobility. Short Term Goal 4: Pt will demo S for w/c mobility for 100 feet to increase IND with mobility. Short Term Goal 5: sit<>stand and stand pivot transfers with SBA and LRD mod I for safe transfers  Long Term Goals  Time Frame for Long Term Goals : NA due to short ELOS    Following session, patient left in safe position with all fall risk precautions in place. Cheiloplasty (Less Than 50%) Text: A decision was made to reconstruct the defect with a  cheiloplasty.  The defect was undermined extensively.  Additional obicularis oris muscle was excised with a 15 blade scalpel.  The defect was converted into a full thickness wedge, of less than 50% of the vertical height of the lip, to facilite a better cosmetic result.  Small vessels were then tied off with 5-0 monocyrl. The obicularis oris, superficial fascia, adipose and dermis were then reapproximated.  After the deeper layers were approximated the epidermis was reapproximated with particular care given to realign the vermilion border.

## 2023-01-24 NOTE — ADT AUTH CERT
Utilization Reviews       Request for Reconsideration with Additional Clinical by Valentino Parsons       Review Status Review Entered   In Primary 1/24/2023 1533       Created By   Valentino Parsons      Criteria Review   Continued Stay Review Note     1/14/23     Patient Visit Status: inpt  Level of Care: med surg with tele     Last set of vitals: BP 95/56   Pulse 75   Temp 98 °F (36.7 °C) (Oral)   Resp 18   Ht 6' 2\" (1.88 m)   Wt 283 lb 14.4 oz (128.8 kg)   SpO2 93%       Vascular: popliteal pulse palpable, skin temp wnl   Dermatologic: dressing clean/dry/intact to left lower leg, no strikthrough present   Neurovascular: Light touch sensation diminished bilaterally.    Musculoskeletal: minimal pain with palpation/manipulation of operative site      Current Treatments: Had episode of symptomatic hypotension today, received ivf bolus ns 500cc; iv zosyn 3375 mg q8h, po oxycodone 10 mg prn x4, wound care, strict I&O, balderas weights     Labs:       Sodium 133 Low     Chloride 97 Low     Glucose 115 High     BUN 26 High     Creatinine 1.5 High     Calcium 8.2 Low        WBC 7.7    RBC 3.35 Low     Hemoglobin 8.1 Low     Hematocrit 27.4 Low        Review/Comments:  Septic left ankle with pathologic fracture of left tibia and abscess of the left ankle:  POD #4 day s/p Proximal Symes amputation/distal transtibial amputation, left (DOS 1/10)               -Continue Zosyn- vancomycin and clindamycin stopped by ID on 1/13              -Infectious disease following              - continue with management by primary     GISELA - improving              - suspect medication induced from antibiotics and possible cardiorenal              Cr baseline 1.0-1.2              Currently 1.5- improved from 1.7 day prior               BMP daily               - if further increases, consult nephro      Acute respiratory failure- resolved  Started post operatively suspect fluid overload and anemia contributing              BNP elevated in 2000s- fluids stopped 1/11     Nocturnal hypoxia- suspect RILEY              Nocturnal pulse oximetry and completed              Will need oxygen at night long term              Referral for formal sleep study on DC      Type 2 diabetes mellitus: Uncontrolled. A1c 8.3   Goal blood glucose while inpatient 140-180.               -Hold home meds  -Lantus 30 units BID - 1/12 increased to 33 units BID - 1/14- decrease to 31 BID   -Hypoglycemic protocol-Accu-Cheks-Diabetic and cardiac diet               -Continue Neurontin  -1/12 increased SSI to high dose        HTN: Recent hypertensive emergency in which he presented to [facility] November 2022 with chest pain and new RBBB. Patient subsequently left AMA before treatment. On arrival to [facility] patient's blood pressure was 131/62.   - Cardiology consulted - no further interventions recommended at this time  -Continue Norvasc, clonidine, Lasix, Lopressor twice daily, and minoxidil with holding parameters.      Chronic diastolic heart failure: Last echocardiogram 11/03/22 completed at [facility] showed concentric remodeling with normal regional wall motion, abnormal septal motion consistent with postoperative state, EF of 55 to 60% and grade 2 diastolic dysfunction.              -Lasix 40 mg daily               -Strict I & O                -Daily standing weights               -Low sodium diet   - 1/11 concerns for fluid overload given elevated BNP 1/10 and rales on exam- fluids stopped  -1/12 lungs clear- will hold off on additional diuresis right now     Podiatry MD Notes  Patient examined and evaluated  Continue to follow labs/imaging  Awaiting SNF/IP rehab placement, social work following  Consultations to hospitalist (medical management), cardiology (extensive CAD with h/o sx)  ID following, IVAB adjusted yesterday (Continue zosyn, discontinue clindamycin/vancomycin)  All further questions/concerns were addressed     Anticipated Discharge Plan: IPR vs SNF Knee: Amputation Above or Below Knee - Care Day 14 (1/23/2023) by Blu Caldera RN       Review Status Review Entered   Completed 1/23/2023 6750       Created By   Blu Caldera RN      Criteria Review      Care Day: 14 Care Date: 1/23/2023 Level of Care: Inpatient Floor    Guideline Day 4    Level Of Care    (X) Floor to discharge    1/23/2023 5:50 PM EST by Armando Art      medical    Clinical Status    (X) * Hemodynamic stability    1/23/2023 5:50 PM EST by Armando Art      98.0, HR 76, RR 16, /70, 95% on RA    (X) * No evidence of postoperative or surgical site infection    1/23/2023 5:50 PM EST by Armando Art      no drainage noted, no surround erythema or warmth    (X) * Wound intact    1/23/2023 5:50 PM EST by Armando Art      All staples and sutures are intact    (X) * Pain absent or managed    1/23/2023 5:50 PM EST by Armando Art      pain controlled, pt w/o complaints    ( ) * Discharge plans and education understood    1/23/2023 5:50 PM EST by Armando Art      pending placement    Activity    ( ) * Gait training at next level of care arranged    1/23/2023 5:50 PM EST by Armando Art      no ambulation this care date    Routes    (X) * Oral hydration    1/23/2023 5:50 PM EST by Armando Art      PO hydration    (X) * Oral medications or regimen acceptable for next level of care    1/23/2023 5:50 PM EST by Armando Art      ASA 81mg PO daily  Lipitor 80mg PO daily  Catapres 0.1mg PO BID  Plavix 75mg PO daily  Iron 325mg PO BID  Lasix 40mg PO daily  Neurontin 600mg PO BID  Lopressor 50mg PO BID  Klor con 20meq PO daily  Diovan 160mg PO daily  Oxycodone 10mg PO q4h PRn x 1    (X) * Oral diet or acceptable for next level of care    1/23/2023 5:50 PM EST by Armando Art      regular diet    Interventions    (X) Physical therapy    1/23/2023 5:50 PM EST by Armando Art      see PT notes in narrative    Medications    (X) Possibly discontinue antibiotics    1/23/2023 5:50 PM EST by Siobhan Alva      no antibiotics, regimen completed on 1/18       Definitions for Care Day 14    Hemodynamic stability    (X) Hemodynamic stability, as indicated by  1 or more  of the following :       (X) Hemodynamic abnormalities at baseline or acceptable for next level of care       * Milestone   Additional Notes   DATE: 1/23/2023 Continue stay review      PERTINENT UPDATES:   Pending placement   Continues to work with PT      ABNL/PERTINENT LABS/RADIOLOGY/DIAGNOSTIC STUDIES:   POC glucose 216, 198, 178      PHYSICAL EXAM:   There was minimal sanguinous drainage noted on the 4 x 4 gauze directly to the incision. Incision appears very well coapted, without any drainage noted. All staples and sutures are intact. There is no surrounding erythema or warmth noted. Overall, incision appears to be healing very well. No other open lesions, rashes or subcutaneous nodules of note. MD CONSULTS/ASSESSMENT AND PLAN:   Podiatry note:    Patient is s/p LLE proximal Symes amputation with distal transtibial osteotomy (DOS 1/10/2023). Patient is pleasant, and is alert and oriented. He has no new complaints. He relates that he is hoping to be discharged to a rehab center. He relates that he has followed up with Dr. Jolley Neither once a week in the past.  He is pending discharge planning. Patient denies any nausea, vomiting, fever, chills, chest pain, shortness of breath.       MEDICATIONS:   Lovenox 30mg SC q12h   Spiriva daily         ORDERS:   No new orders      PT NOTES:   Wheelchair Mobility:   Stand By Assistance   Extremities Used: Bilateral Upper Extremities   Type of Chair:Manual   Surface: Level Tile   Distance: 50ft   Quality: Slow Velocity, Short Strokes, and Decreased Fluidity, fatigued quickly and rest breaks needed throughout            Knee: Amputation Above or Below Knee - Care Day 12 (1/21/2023) by Barbie Dowd RN       Review Status Review Entered Completed 1/23/2023 1738       Created By   Arben Tapia RN      Criteria Review      Care Day: 12 Care Date: 1/21/2023 Level of Care: Inpatient Floor    Guideline Day 4    Level Of Care    (X) Floor to discharge    1/23/2023 5:38 PM EST by Phil Diallo      medical    Clinical Status    (X) * Hemodynamic stability    1/23/2023 5:38 PM EST by Phil Diallo      98.3, HR 83, RR 18, /68, 96% on RA    (X) * No evidence of postoperative or surgical site infection    1/23/2023 5:38 PM EST by Phil Diallo      no surrounding erythema or warmth noted, no drainage noted    (X) * Wound intact    1/23/2023 5:38 PM EST by Phil Diallo      all staples and sutures are intact    (X) * Pain absent or managed    1/23/2023 5:38 PM EST by Phil Diallo      pain well controlled today    ( ) * Discharge plans and education understood    1/23/2023 5:38 PM EST by Phil Diallo      pending placement    Activity    ( ) * Gait training at next level of care arranged    1/23/2023 5:38 PM EST by Phil Diallo      no ambulation with PT today    Routes    (X) * Oral hydration    1/23/2023 5:38 PM EST by Phil Diallo      PO hydration    (X) * Oral medications or regimen acceptable for next level of care    1/23/2023 5:38 PM EST by Phil Diallo      ASA 81mg PO daily  Lipitor 80mg PO daily  Plavix 75mg PO daily  Iron 325mg PO BID  Lasix 40mg PO daily  Neurontin 600mg PO BID  Lopressor 50mg PO BID x 1  Klor con 20meq PO daily  Oxycodone 10mg PO q4h PRN x 2    (X) * Oral diet or acceptable for next level of care    1/23/2023 5:38 PM EST by Phil Diallo      regular diet    Interventions    (X) Physical therapy    1/23/2023 5:38 PM EST by Phil Diallo      continues to work with PT    Medications    (X) Possibly discontinue antibiotics    1/23/2023 5:38 PM EST by Phil Diallo      no antibiotics, regimen completed on 1/18       Definitions for Care Day 12    Hemodynamic stability    (X) Hemodynamic stability, as indicated by  1 or more  of the following :       (X) Hemodynamic abnormalities at baseline or acceptable for next level of care       * Milestone   Additional Notes   DATE: 1/21/2023 Continued stay review      PERTINENT UPDATES:   Pending placement   Continues to work with PT         ABNL/PERTINENT LABS/RADIOLOGY/DIAGNOSTIC STUDIES:   Glucose 199, 232, 149, 222      PHYSICAL EXAM:   Musculoskeletal: passive and active ROM x 4 extremities. Skin: Skin color, texture, turgor normal.  Left stump with dressing. Neurologic:  Neurovascularly intact without any focal sensory/motor deficits. Cranial nerves: II-XII intact, grossly non-focal.      MD CONSULTS/ASSESSMENT AND PLAN:   IM ntoe:   Assessment/Plan:           GISELA on CKD, resolved. Creatinine at upper limits of normal. Avoid nephrotoxic agents. Renally dose medications. Acute hypoxic postop respiratory insuffiencey, resolved. Nocturnal hypoxia. Probable RILEY. Nocturnal study completed. Needs 2L at HS. Referral to sleep on DC. Acute blood loss anemia on chronic disease. S/P 1 unit PRBC 1/11 for hbg 6.8. Stable post transfusion. Iron resumed. Septic left ankle with pathological fracture of left tibia and abscess. 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 Vanc and Clindamycin stopped 1/13 by ID. Zosyn continued until 1/18. ID/podiary managing. PT/OT. Type 2 diabetes, uncontrolled. A1c 8.3%. BS trend has improved. Continue current regimen for now. Accu check ac/hs. Diabetic diet. Hypoglycemic protocol. Primary hypertension. Continue Norvasc, clonidine, Lasix, Lopressor twice daily, and minoxidil with holding parameters. Chronic diastolic heart failure. No decompensation. Lasix, strict intake and output. Daily weights. Fluids stopped 1/11. No additional diuresis was given. CAD s/p CABG. Asa/plavix, BB, ACE. Moderate persistent asthma.  No PFTs available for review. Spiriva continued. Lupus. Aware. Dispo: awaiting precertification / placement. Grossly medically stable. Hospitalist will sign off care. Podiatry note:   Patient seen at bedside this morning on behalf of Dr. Lawanda Taveras. Patient is s/p LLE proximal Symes amputation with distal transtibial osteotomy (DOS 1/10/2023). Patient is pleasant, and is alert and oriented. Patient endorses no pain to LLE. He relates that he is taking oral pain medication. He denies any N/V/F/C/SOB/CP or bilateral calf tenderness. MEDICATIONS:   Lovenox 30mg SC q12h   Spiriva daily         ORDERS:   POC glucose 4x daily, regular 3 carb diet, daily weight, IS q2h, NWB operative leg, strict I&O, up with assist      PT NOTES:   AM-PAC Inpatient Mobility without Stair Climbing Raw Score : 13   AM-PAC Inpatient without Stair Climbing T-Scale Score : 38.96            Knee: Amputation Above or Below Knee - Care Day 10 (1/19/2023) by William Gonzalez RN       Review Status Review Entered   Completed 1/23/2023 1725       Created By   William Gonzalez RN      Criteria Review      Care Day: 10 Care Date: 1/19/2023 Level of Care: Inpatient Floor    Guideline Day 3    Level Of Care    (X) Floor    1/23/2023 5:25 PM EST by Amadou Hyde      medical    Clinical Status    (X) * Wound intact    1/23/2023 5:25 PM EST by Amadou Hyde      There was minimal sanguinous drainage noted on the 4 x 4 gauze directly to the incision. Incision appears very well coapted, without any drainage noted. All staples and sutures are intact. There is no surrounding erythema or warmth noted.     (X) * No evidence of postoperative or surgical site infection    1/23/2023 5:25 PM EST by Amadou Hyde      wound w/o drainage, no erythema or warmth    Routes    (X) * Oral hydration    1/23/2023 5:25 PM EST by Amadou Hyde      PO hydration    (X) * Oral medications    1/23/2023 5:25 PM EST by Amadou Hyde      Norvasc 10mg PO daily  ASA 81mg PO daily  Lipitor 80mg PO daily  Plavix 75mg PO daily  Lovenox 30mg SC q12h  Iron 325mg PO BID  Lasix 40mg PO daily  Neurontin 600mg PO BID  Lopressor 50mg PO BID  Loniten 10mg PO daily    (X) Advance diet    1/23/2023 5:25 PM EST by Adolfo Lee      regular 3 carb diet    Interventions    (X) * Drain absent    1/23/2023 5:25 PM EST by Adolfo Lee      no drain present    (X) Physical therapy    1/23/2023 5:25 PM EST by Adolfo Lee      see PT note in narrative    Medications    (X) * PCA absent    1/23/2023 5:25 PM EST by Adolfo Lee      no PCA    * Milestone   Additional Notes   DATE: 1/19/2023 Continued stay review      PERTINENT UPDATES:   Some pain to stump, worse when dangling. Pain medications helping. Working with therapy. Reports pressure ulcer on her bottom that is painful to sit up in the WC/Chair. Continue with PT   Patient relates that he is waiting to hear if he will be able to go to [facility]  SNF/ECF upon discharge for rehab. VITALS:   97.3, HR 75, RR 16, /62, 97% on RA      ABNL/PERTINENT LABS/RADIOLOGY/DIAGNOSTIC STUDIES:   BUN 24   Creatinine 1.4   GFR 57   Glucose 208, 294, 216, 169         RBC: 3.36 (L)   Hemoglobin Quant: 8.3 (L)   Hematocrit: 27.5 (L)   MCV: 81.8   MCH: 24.7 (L)   MCHC: 30.2 (L)   MPV: 9.3 (L)   RDW-CV: 21.6 (H)   RDW-SD: 63.5 (H)      PHYSICAL EXAM:   Musculoskeletal: passive and active ROM x 4 extremities. Skin: Skin color, texture, turgor normal.  Left stump with dressing. Neurologic:  Neurovascularly intact without any focal sensory/motor deficits. Cranial nerves: II-XII intact, grossly non-focal.   Psychiatric: Alert and oriented, thought content appropriate, normal insight         MD CONSULTS/ASSESSMENT AND PLAN:   IM note:   GISELA on CKD, resolved. Creatinine at upper limits of normal. Avoid nephrotoxic agents. Renally dose medications. Acute hypoxic postop respiratory insuffiencey, resolved. Nocturnal hypoxia. Probable RILEY. Nocturnal study completed. Needs 2L at HS. Referral to sleep on DC. Acute blood loss anemia on chronic disease. S/P 1 unit PRBC 1/11 for hbg 6.8. Stable post transfusion. Iron resumed. Septic left ankle with pathological fracture of left tibia and abscess. 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 Vanc and Clindamycin stopped 1/13 by ID. Zosyn continued until 1/18. ID/podiary managing. PT/OT. Type 2 diabetes, uncontrolled. A1c 8.3%. Insulin has been adjusted. Continue current regimen for now. Accu check ac/hs. Diabetic diet. Hypoglycemic protocol. Primary hypertension. Continue Norvasc, clonidine, Lasix, Lopressor twice daily, and minoxidil with holding parameters. Chronic diastolic heart failure. No decompensation. Lasix, strict intake and output. Daily weights. Fluids stopped 1/11. No additional diuresis was given. CAD s/p CABG. Asa/plavix, BB, ACE. Moderate persistent asthma. No PFTs available for review. Spiriva continued. Lupus. Aware. Podiatry note:    Patient is s/p LLE proximal Symes amputation with distal transtibial osteotomy (DOS 1/10/2023). Patient is pleasant, and is alert and oriented. Patient endorses no pain to LLE. He relates that he is taking oral pain medication. He denies any N/V/F/C/SOB/CP or bilateral calf tenderness. He has no other pedal complaints at this time. Patient relates that he is waiting to hear if he will be able to go to [facility]  SNF/F upon discharge for rehab.        MEDICATIONS:   Klor con 20meq PO daily   Diovan 160mg PO daily   Oxycodone 10mg PO q4h PRN x 3         ORDERS:   POC glucose 4x daily, regular 3 carb diet, daily weight, IS q2h, NWB operative leg, strict I&O, up with assist      PT NOTES:   AM-PAC Inpatient Mobility without Stair Climbing Raw Score : 13   AM-PAC Inpatient without Stair Climbing T-Scale Score : 38.96   Transfers:   Attempted graded lifts from edge of bed however pt completed 2 full sit to stand tranfers with one UE at support of bedrail, pt was also able to complete side scoots to [facility] and to lift his hips up vs just sliding        Balance:   Sitting edge of bed unsupported with supervision - pt completed reaching activity out of base of support with supervision    Pt completed 2 full stands w/ just right UE on rail and did guard at his right LE however noted fair quad stability he required CGA for standing for ~10 sec             Knee: Amputation Above or Below Knee - Care Day 6 (1/15/2023) by Casimiro Frankel, RHEA       Review Status Review Entered   Completed 1/17/2023 0906       Created By   Mark Abraham RN      Criteria Review      Care Day: 6 Care Date: 1/15/2023 Level of Care: Inpatient Floor    Guideline Day 3    Level Of Care    (X) Floor    Clinical Status    (X) * Wound intact    1/17/2023 9:06 AM EST by Mark Gallegos      dry and intact    (X) * No evidence of postoperative or surgical site infection    Activity    ( ) Progressive gait training    1/17/2023 9:06 AM EST by Jet Has      bed rest with bathroom privileges    Routes    (X) * Oral hydration    1/17/2023 9:06 AM EST by Mark Gallegos      minmal po intake    (X) * Oral medications    1/17/2023 9:06 AM EST by Mark Gallegos      iron 325 mg bid po   lasix 40 mg daily po  neurontin 600 mg bid po  lopessor 50 mg bid po  multivitamin 1 tab daily po  klor con M 20 mEq daily po    (X) Advance diet    1/17/2023 9:06 AM EST by Jet Has      ADULT DIET; Regular; 3 carb choices (45 gm/meal);  Low Fat/Low Chol/High Fiber/2 gm Na    Interventions    (X) * Drain absent    1/17/2023 9:06 AM EST by Jet Has      none noted    Medications    ( ) * PCA absent    1/17/2023 9:06 AM EST by Mark Kirk      roxicodone 5 mg every 4 hours prn po x1  roxicodone 10 mg every 4 hours prn po x4    (X) Possible antibiotics    1/17/2023 9:06 AM EST by Julian EstradaMark      rejisyn 3375 mg every 8 hours iv    * Milestone   Additional Notes   DATE: 1/15/2023      PERTINENT UPDATES:   -awaiting for IPR precert tomorrow   -on iv antibiotic      VITALS:   T 98.1 (36.7)   RR 18   HR 86   BP 99/76   SpO2 94% on RA   Pain Score 8      ABNL/PERTINENT LABS/RADIOLOGY/DIAGNOSTIC STUDIES:   rbc 3.53 L   hgb 8.5 L   hct 29.2 L   platelets 421 H   chloride 97 L   Glucose 121 H   Creatinine 1.5 H   ESt., Glom Filt Rate 53 ! POC Glucose 152 H      PHYSICAL EXAM:   Respiratory:  Normal respiratory effort. Lungs clear to auscultation bilaterally-   Cardiovascular: Regular rate and rhythm with normal S1/S2    Abdomen: Hypoactive bowel sounds, abdomen is soft and rounded, nontender to palpation    Musculoskeletal:  Right lower extremity +2 edema, left lower leg  with amputation- wrapped   Skin: Warm and dry   Neurologic:  No focal deficits, bilateral numbness and tingling of right lower extremity as well as left lower extremity, follows commands      PODIATRY P.E   Vascular: Popliteal pulse is palpable to LLE. CFT within normal limits to LLE amputation stump. Skin temperature is warm to warm from proximal tibial tuberosity to distal amputation stump of LLE. Minimal edema noted to the distal LLE amputation stump. Dermatologic: LLE dressing intact and well maintained. No apparent strike through noted. There was some sanguinous drainage noted on the 4 x 4 gauze directly to the incision. After all dressings removed, incision appears very well coapted, without any drainage noted. All staples and sutures are intact. There is no surrounding erythema or warmth noted. Overall, incision appears to be healing very well. No other open lesions, rashes or subcutaneous nodules of note. Neurovascular: Light touch sensation grossly diminished to the level of the midfoot to RLE.   Light touch sensation appears grossly intact to the level of the amputation stump of LLE. Musculoskeletal: Muscle strength testing deferred due to acute postop state. Patient's left leg dressed is in a slightly flexed position at the knee joint. Patient is able to fully extend left leg at the knee. Minimal pain with palpation of LLE amputation stump at posterior aspect. MD CONSULTS/ASSESSMENT AND PLAN:   IM Notes:   Septic left ankle with pathologic fracture of left tibia and abscess of the left ankle:   POD #5 day s/p Proximal Symes amputation/distal transtibial amputation, left (DOS 1/10)                -Continue Zosyn- vancomycin and clindamycin stopped by ID on 1/13               -Infectious disease following               - continue with management by primary      Nocturnal hypoxia- suspect RILEY               Nocturnal pulse oximetry and completed               Will need oxygen at night long term               Referral for formal sleep study on DC       Type 2 diabetes mellitus: Uncontrolled.   A1c 8.3    Goal blood glucose while inpatient 140-180.                -Hold home meds   -Lantus 30 units BID - 1/12 increased to 33 units BID - 1/14- decrease to 31 BID    -Hypoglycemic protocol-Accu-Cheks-Diabetic and cardiac diet                -Continue Neurontin   -1/12 increased SSI to high dose        Podiatry Notes:   WBC 6.1; patient currently afebrile   -Hemoglobin and hematocrit at 8.5 and 29.2, respectively   -Patient currently on IV Zosyn per infectious diseases   -Will continue to follow labs/imaging   -Changed dressings at today's visit with Betadine to the incision, covered with 4 x 4 gauze, ABD pads, Kerlix rolls, and Ace bandage.   -Discussed with patient that he should try to extend left leg at the knee to prevent flexion contracture      MEDICATIONS:   lovenox 30 mg every 12 hours sc   lantus 31 units bid sc   tiotropium 2 puff daily IN      ORDERS:   POCT Glucose   strict I&O   daily weights      PT/OT/SLP/CM ASSESSMENT OR NOTES:   RT Notes: The findings from the last RT Protocol Assessment were as follows:    History Pulmonary Disease: None or smoker <15 pack years   Respiratory Pattern: Regular pattern and RR 12-20 bpm   Breath Sounds: Slightly diminished and/or crackles   Cough: Strong, spontaneous, non-productive   Indication for Bronchodilator Therapy: Decreased or absent breath sounds, On home bronchodilators   Bronchodilator Assessment Score: 2      OT Notes: Activity Tolerance:  Patient tolerance of  treatment: good.         Discharge Recommendations: Inpatient Rehabilitation   Equipment Recommendations: Equipment Needed: Yes   Other: TTB, slideboard, drop arm commode   Plan: Times Per Week: 6x

## 2023-01-24 NOTE — PROGRESS NOTES
Progress note: Infectious diseases    Patient - Carina Cardona,  Age - 65 y.o.    - 1960      Room Number - 7K-04/004-A   MRN -  [id number]   Acct # - [de-identified]  Date of Admission -  1/10/2023 12:28 PM    SUBJECTIVE:   He was dizzy and light headed when he stood up  OBJECTIVE   VITALS    height is 6' 2\" (1.88 m) and weight is 270 lb (122.5 kg). His oral temperature is 97.5 °F (36.4 °C). His blood pressure is 145/67 (abnormal) and his pulse is 75. His respiration is 16 and oxygen saturation is 100%. Wt Readings from Last 3 Encounters:   23 270 lb (122.5 kg)   22 (!) 310 lb (140.6 kg)   22 (!) 310 lb (140.6 kg)       I/O (24 Hours)    Intake/Output Summary (Last 24 hours) at 2023 0902  Last data filed at 2023 0537  Gross per 24 hour   Intake 1180 ml   Output 1750 ml   Net -570 ml         General Appearance: awake and oriented  HEENT - normocephalic, atraumatic, pale conjunctiva,  anicteric sclera  Neck - Supple, no mass  Lungs -  Bilateral   air entry, no rhonchi, no wheeze. Cardiovascular - Heart sounds are normal.     Abdomen - soft, not distended, nontender,   Neurologic -oriented  Skin - No bruising or bleeding  Extremities -   left BKA stump.        MEDICATIONS:      insulin glargine  10 Units SubCUTAneous BID    sennosides-docusate sodium  1 tablet Oral BID    polyethylene glycol  17 g Oral Daily    enoxaparin  30 mg SubCUTAneous Q12H    insulin lispro  0-16 Units SubCUTAneous TID WC    insulin lispro  0-4 Units SubCUTAneous Nightly    amLODIPine  10 mg Oral Daily    atorvastatin  80 mg Oral Daily    ferrous sulfate  325 mg Oral BID    furosemide  40 mg Oral Daily    gabapentin  600 mg Oral BID    minoxidil  10 mg Oral Daily    multivitamin  1 tablet Oral Daily    potassium chloride  20 mEq Oral Daily    valsartan  160 mg Oral Daily    aspirin  81 mg Oral Daily    clopidogrel 75 mg Oral Daily    tiotropium  2 puff Inhalation Daily    cloNIDine  0.1 mg Oral BID    metoprolol tartrate  50 mg Oral BID    sodium chloride flush  5-40 mL IntraVENous 2 times per day      dextrose      sodium chloride 20 mL/hr at 01/13/23 0020     acetaminophen **OR** acetaminophen, albuterol sulfate HFA, glucose, dextrose bolus **OR** dextrose bolus, glucagon (rDNA), dextrose, sodium chloride flush, sodium chloride, ondansetron **OR** ondansetron, oxyCODONE **OR** oxyCODONE, morphine **OR** morphine      LABS:     CBC:   No results for input(s): WBC, HGB, PLT in the last 72 hours. BMP:    No results for input(s): NA, K, CL, CO2, BUN, CREATININE, GLUCOSE in the last 72 hours. Calcium:  No results for input(s): CALCIUM in the last 72 hours. 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    Sepsis ([facility] [address] A41.9    Acute left-sided low back pain with bilateral sciatica M54.42, M54.41    S/P transmetatarsal amputation of foot, left ([facility]) Z89.432    Leukocytosis D72.829    Wound dehiscence, surgical, initial encounter T81.31XA    Postoperative wound infection F38.31GU    Metabolic acidosis G89.70    Hx of CABG Z95.1    Lumbar stenosis without neurogenic claudication M48.061    CKD (chronic kidney disease), stage II N18.2    Normocytic anemia D64.9    Morbid obesity ([facility]) E66.01    Debility R53.81    Carotid stenosis, asymptomatic, bilateral I65.23    Hypokalemia E87.6    Nonhealing ulcer of left lower extremity ([address] L97.929    Bleeding R58    Wound, open T14. 8XXA    SOB (shortness of breath) on exertion R06.02    Hemoglobin A1C greater than 9%, indicating poor diabetic control R73.09    Hypertensive emergency I16.1    Acute on chronic congestive heart failure ([facility]) I50.9    Hypertensive urgency I16.0    Moderate persistent asthma J45.40    Septic arthritis of left ankle, due to unspecified organism ([facility]) M00.9    Closed fracture of ankle with nonunion S82.899K    S/P BKA (below knee amputation), left ([facility]) E34.553         ASSESSMENT/PLAN   Necrotizing left foot/ankle infection: had urgent BKA. The stump is healing well   Poorly controlled diabetes  CAD  Dizziness and lightheaded likely vagal.placed on his bed.          Rose Conway MD, MD, FACP 1/24/2023 9:02 AM

## 2023-01-24 NOTE — PLAN OF CARE
Problem: Respiratory - Adult  Goal: Clear lung sounds  Description: Clear lung sounds  Outcome: Progressing   Continue with Spiriva   Patient agreed on goals

## 2023-01-24 NOTE — PROGRESS NOTES
Comprehensive Nutrition Assessment    Type and Reason for Visit:  Reassess    Nutrition Recommendations/Plan:   Continue current diet  Continue MVI to aid in wound healing     Malnutrition Assessment:  Malnutrition Status:  No malnutrition (01/18/23 1505)    Context:  Acute Illness     Findings of the 6 clinical characteristics of malnutrition:  Energy Intake:  No significant decrease in energy intake  Weight Loss:  No significant weight loss     Body Fat Loss:  No significant body fat loss     Muscle Mass Loss:  No significant muscle mass loss    Fluid Accumulation:  No significant fluid accumulation     Strength:  Not Performed    Nutrition Assessment:     Pt. nutritionally stable AEB po intake %. At  risk for further nutrition compromise r/t increased nutrient needs for wound healing (left BKA 1/10), underlying medical condition (Hx; CAD, CKD stage II, DM, HLD, HTN, Liver Disease-hepatitis as a child). Nutrition Related Findings:    Pt. Report/Treatments/Miscellaneous: Pt continues with good po intake; Awaiting placement. GI Status: BM 1/20  Pertinent Labs: K 5.0, BUN 28, Cr 1.5, Glucose 204, 1/10/23 8.3 A1C  Pertinent Meds: Iron, Lasix, Lantus, Humalog, MVI, Glycolax, Senokot-S     Wound Type:  (s/p left BKA 1/10/23)       Current Nutrition Intake & Therapies:    Average Meal Intake: %  Average Supplements Intake: None Ordered  ADULT DIET; Regular; 3 carb choices (45 gm/meal); Low Fat/Low Chol/High Fiber/2 gm Na    Anthropometric Measures:  Height: 6' 2\" (188 cm)  Ideal Body Weight (IBW): 190 lbs (86 kg)    Admission Body Weight: 283 lb 14 oz (128.8 kg) (1/10: RLE +1)  Current Body Weight: 270 lb (122.5 kg) (1/23; RLE +2), 150.6 % IBW.  Weight Source: Bed Scale  Current BMI (kg/m2): 34.7  Usual Body Weight:  (Per EMR 4/4/22 311 lb 6 oz, 11/22/22 334 lb 10 oz)     Weight Adjustment For: Amputation  Total Adjusted Percentage (Calculated): 5.9  Adjusted Ideal Body Weight (lbs) (Calculated): 178.8 lbs  Adjusted Ideal Body Weight (kg) (Calculated): 81.27 kg  Adjusted % Ideal Body Weight (Calculated): 160.1  Adjusted BMI (kg/m2) (Calculated): 38.9  BMI Categories: Obese Class 2 (BMI 35.0 -39.9)    Estimated Daily Nutrient Needs:  Energy Requirements Based On: Kcal/kg  Weight Used for Energy Requirements: Current (129.8 kg)  Energy (kcal/day): 2329-4881 (15-18 kcal/kg)  Weight Used for Protein Requirements: Ideal (86 kg)  Protein (g/day): ~65 grams (0.8 g/kg)         Nutrition Diagnosis:   Increased nutrient needs related to increase demand for energy/nutrients as evidenced by wounds    Nutrition Interventions:   Food and/or Nutrient Delivery: Continue Current Diet, Vitamin Supplement  Nutrition Education/Counseling: Education not indicated  Coordination of Nutrition Care: Continue to monitor while inpatient       Goals:  Previous Goal Met: Goal(s) Achieved  Goals: Meet at least 75% of estimated needs, by next RD assessment       Nutrition Monitoring and Evaluation:   Behavioral-Environmental Outcomes: None Identified  Food/Nutrient Intake Outcomes: Food and Nutrient Intake, Vitamin/Mineral Intake  Physical Signs/Symptoms Outcomes: Biochemical Data, GI Status, Weight, Skin, Fluid Status or Edema    Discharge Planning:     Too soon to determine     Nelli Deal [address]  Contact: [phone]

## 2023-01-24 NOTE — PROGRESS NOTES
Hospitalist Progress Note    Patient:  Karen Halltler      Unit/Bed:7K-04/004-A    YOB: 1960    MRN: [id number]       Acct: [de-identified]     PCP: EYAD Snider CNP    Date of Admission: 1/10/2023    Assessment/Plan:    Acute hypotension. Medication induced. Given Clonidine and Lopressor early this AM around 6. Hold clonidine. Holding parameters previously given for other medications and nursing update. Added holding parameters to Lasix. IV fluid bolus and monitor. Check BMP and CBC. Near syncope due to #1. Resolved. GISELA on CKD, resolved. Creatinine at upper limits of normal. Avoid nephrotoxic agents. Renally dose medications. Acute hypoxic postop respiratory insuffiencey, resolved. Nocturnal hypoxia. Probable RILEY. Nocturnal study completed. Needs 2L at HS. Referral to sleep on DC. Acute blood loss anemia on chronic disease. S/P 1 unit PRBC 1/11 for hbg 6.8. Stable post transfusion. Iron resumed. Septic left ankle with pathological fracture of left tibia and abscess. 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 Vanc and Clindamycin stopped 1/13 by ID. Zosyn continued until 1/18. ID/podiary managing. PT/OT. Type 2 diabetes, uncontrolled. A1c 8.3%. BS trend has improved. Continue current regimen for now. Accu check ac/hs. Diabetic diet. Hypoglycemic protocol. Primary hypertension. Continue Norvasc, clonidine, Lasix, Lopressor twice daily, and minoxidil with holding parameters. Chronic diastolic heart failure. No decompensation. Lasix, strict intake and output. Daily weights. Fluids stopped 1/11. No additional diuresis was given. CAD s/p CABG. Asa/plavix, BB, ACE. Moderate persistent asthma. No PFTs available for review. Spiriva continued. Lupus. Aware. Dispo: awaiting precertification / placement. Grossly medically stable. Hospitalist will sign off care. Call back PRN.     Chief Complaint: Left septic ankle with abscess and open pathologic fracture    Hospital Course:     61-year-old male was admitted to [facility] by podiatry for urgent/emergent management of septic left ankle with pathologic fracture of tibia and abscess of left ankle. Initially podiatry was planning to take patient to the OR tonight or tomorrow and consulted hospital team for risk stratification. Patient has significant past medical history of diabetes mellitus type 2, uncontrolled, hypertension, CAD status post CABG, CKD, chronic normocytic anemia, lupus, moderate persistent asthma, PAD, grade 2 diastolic dysfunction, and hyperlipidemia,   It is important note the patient was recently hospitalized at [facility] 11/2/2022 for hypertensive emergency in which she was experiencing chest pain and a new left bundle branch block was found. Patient subsequently left AMA despite having blood pressure 200s/100s. Upon arrival to [facility] blood cultures were obtained, patient was started on vancomycin and Zosyn. Chest x-ray obtained that demonstrated evidence of prior CABG as well as hyperinflated lungs suggestive of COPD. Patient also had evidence of healed granulomatous disease. With questionable mild interstitial fibrosis. X-ray of left ankle was obtained that showed gas density and soft tissue extending to the lower left leg. Complete dislocation and rotated talus relative to the tibia. Also there was abnormal periosteal reaction and distal tibia and fibula which indicate chronic osteomyelitis. There is also cortical erosion of distal fibula. EKG was also completed that demonstrated Normal sinus rhythm, Left axis deviation; Right bundle branch block, Inferior infarct , age undetermined. Abnormal ECG When compared with ECG of 25-NOV-2022 04:21,Right bundle branch block has replaced Intraventricular conduction delay.       During my examination patient denied chest pain, shortness of breath, headache, lightheadedness, abdominal pain. Patient did have foot pain that was relieved with pain medication. Stat CBC, BMP, hepatic function panel as well as magnesium were ordered. 1/11/2023  Patient postop day 1. Hemoglobin dropped to 6.8. Will transfuse 1 unit PRBCs. Patient requiring O2 postoperatively and was weaned to room air today however dipped back into the mid 80s. Currently satting 93% on 2 L.     1/12/2023  Hemoglobin 7 after transfusion. No longer requiring O2 at this time. Nocturnal pulse oximetry pending. WBC downtrending     1/13/2023  No events overnight. Blood sugar is much improved. VSS. Waiting for IPR precert      2/61/3232  Vanc and clindamycin stopped yesterday. BG acceptable. VSS. Patient had episode of symptomatic hypotension today. 500cc bolus of NS ordered. Patient improved and BP acceptable. Encouraged oral hydration      1/15/2023  Patient doing well. VSS- no hypotensive episodes. Blood sugar well controlled. Hopeful for IPR precert tomorrow. 1/16/2023  IPR denied- SW to work on ECF placement- hopeful to [facility]. Patient otherwise stable  Passing flatus but has not had BM- will order enema     1/17/2023  Patient with 2 BM yesterday. No other complaints or issues at this time  Waiting for SNF placement     1/18/2023  No acute events. 1/19/2023  Some pain to stump, worse when dangling. Pain medications helping. Reports pressure ulcer on her bottom that is painful to sit up in the WC/Chair. Subjective: Reports being in the chair this AM and feeling like he was going to pass out. SBP in the 60's. States he did not sleep well last night. Hit stump on chair during transfer yesterday and has had increased pain.       Medications:  Reviewed    Infusion Medications    dextrose      sodium chloride 20 mL/hr at 01/13/23 0020     Scheduled Medications    insulin glargine  10 Units SubCUTAneous BID    sennosides-docusate sodium  1 tablet Oral BID    polyethylene glycol  17 g Oral Daily    enoxaparin  30 mg SubCUTAneous Q12H    insulin lispro  0-16 Units SubCUTAneous TID WC    insulin lispro  0-4 Units SubCUTAneous Nightly    amLODIPine  10 mg Oral Daily    atorvastatin  80 mg Oral Daily    ferrous sulfate  325 mg Oral BID    furosemide  40 mg Oral Daily    gabapentin  600 mg Oral BID    minoxidil  10 mg Oral Daily    multivitamin  1 tablet Oral Daily    potassium chloride  20 mEq Oral Daily    valsartan  160 mg Oral Daily    aspirin  81 mg Oral Daily    clopidogrel  75 mg Oral Daily    tiotropium  2 puff Inhalation Daily    [Held by provider] cloNIDine  0.1 mg Oral BID    metoprolol tartrate  50 mg Oral BID    sodium chloride flush  5-40 mL IntraVENous 2 times per day     PRN Meds: acetaminophen **OR** acetaminophen, albuterol sulfate HFA, glucose, dextrose bolus **OR** dextrose bolus, glucagon (rDNA), dextrose, sodium chloride flush, sodium chloride, ondansetron **OR** ondansetron, oxyCODONE **OR** oxyCODONE, morphine **OR** morphine      Intake/Output Summary (Last 24 hours) at 1/24/2023 1252  Last data filed at 1/24/2023 0537  Gross per 24 hour   Intake 1180 ml   Output 1750 ml   Net -570 ml         Diet:  ADULT DIET; Regular; 3 carb choices (45 gm/meal); Low Fat/Low Chol/High Fiber/2 gm Na    Exam:  /68   Pulse 69   Temp 97.7 °F (36.5 °C) (Oral)   Resp 16   Ht 6' 2\" (1.88 m)   Wt 270 lb (122.5 kg)   SpO2 97%   BMI 34.67 kg/m²     General appearance: No apparent distress, appears stated age and cooperative. HEENT: Pupils equal, round, and reactive to light. Conjunctivae/corneas clear. Neck: Supple, with full range of motion. No jugular venous distention. Trachea midline. Respiratory:  Normal respiratory effort. Clear to auscultation, bilaterally without Rales/Wheezes/Rhonchi. Cardiovascular: Regular rate and rhythm with normal S1/S2 without murmurs, rubs or gallops. Abdomen: Soft, non-tender, non-distended with normal bowel sounds.   Musculoskeletal: passive and active ROM x 4 extremities. Skin: Skin color, texture, turgor normal.  Left stump with dressing. Neurologic:  Neurovascularly intact without any focal sensory/motor deficits. Cranial nerves: II-XII intact, grossly non-focal.  Psychiatric: Alert and oriented, thought content appropriate, normal insight  Capillary Refill: Brisk,< 3 seconds   Peripheral Pulses: +2 palpable right leg. Labs:   Recent Labs     01/24/23  0949   WBC 6.8   HGB 8.1*   HCT 27.0*          Recent Labs     01/24/23  0949      K 5.0      CO2 23   BUN 28*   CREATININE 1.5*   CALCIUM 8.5       No results for input(s): AST, ALT, BILIDIR, BILITOT, ALKPHOS in the last 72 hours. No results for input(s): INR in the last 72 hours. No results for input(s): Alina Mould in the last 72 hours. Urinalysis:      Lab Results   Component Value Date/Time    NITRU NEGATIVE 11/16/2022 01:52 PM    WBCUA 0-2 11/16/2022 01:52 PM    BACTERIA NONE SEEN 11/16/2022 01:52 PM    RBCUA 15-25 11/16/2022 01:52 PM    BLOODU MODERATE 11/16/2022 01:52 PM    GLUCOSEU >= 1000 11/16/2022 01:52 PM       Radiology:  XR ANKLE LEFT (MIN 3 VIEWS)   Final Result         1. Gas density in the soft tissues extending into the lower left leg. 2. Completely dislocated and rotated talus relative to the tibia. 3. Abnormal periosteal reaction in the distal tibia and fibula which can indicate chronic osteomyelitis. 4. Cortical erosion of the distal fibula. **This report has been created using voice recognition software. It may contain minor errors which are inherent in voice recognition technology. **      Final report electronically signed by Dr. Masood Mariano on 1/10/2023 3:46 PM      XR CHEST PORTABLE   Final Result   1. Normal heart size. Metallic sternotomy sutures from prior surgery. Lungs somewhat hyperinflated, suggesting possible COPD. 2. Evidence for old, healed granulomatous disease.  Question mild interstitial fibrosis/pneumonitis both lung bases. No effusion is seen. **This report has been created using voice recognition software. It may contain minor errors which are inherent in voice recognition technology. **      Final report electronically signed by Dr. Ti Freeman on 1/10/2023 2:42 PM          Diet: ADULT DIET; Regular; 3 carb choices (45 gm/meal);  Low Fat/Low Chol/High Fiber/2 gm Na       Disposition:    [] Home       [] TCU       [] Rehab       [] Psych       [x] SNF       [] [place]       [] Other-    Code Status: Full Code    PT/OT Eval Status: following        Electronically signed by EYAD Vasquez CNP on 1/24/2023 at 12:52 PM

## 2023-01-24 NOTE — PLAN OF CARE
Problem: Discharge Planning  Goal: Discharge to home or other facility with appropriate resources  Outcome: Progressing  Flowsheets (Taken 1/24/2023 0335)  Discharge to home or other facility with appropriate resources:   Identify barriers to discharge with patient and caregiver   Identify discharge learning needs (meds, wound care, etc)   Arrange for needed discharge resources and transportation as appropriate     Problem: Pain  Goal: Verbalizes/displays adequate comfort level or baseline comfort level  Outcome: Progressing  Flowsheets (Taken 1/24/2023 0335)  Verbalizes/displays adequate comfort level or baseline comfort level:   Encourage patient to monitor pain and request assistance   Assess pain using appropriate pain scale   Administer analgesics based on type and severity of pain and evaluate response   Implement non-pharmacological measures as appropriate and evaluate response   Consider cultural and social influences on pain and pain management     Problem: ABCDS Injury Assessment  Goal: Absence of physical injury  Outcome: Progressing  Flowsheets (Taken 1/24/2023 0335)  Absence of Physical Injury: Implement safety measures based on patient assessment  Note: Patient has remained free of physical injury during this shift. Safe environment provided, call light within reach, and hourly rounding completed.        Problem: Infection - Adult  Goal: Absence of infection at discharge  Outcome: Progressing  Flowsheets (Taken 1/24/2023 0335)  Absence of infection at discharge:   Assess and monitor for signs and symptoms of infection   Monitor lab/diagnostic results   Administer medications as ordered     Problem: Musculoskeletal - Adult  Goal: Return mobility to safest level of function  Outcome: Progressing  Flowsheets (Taken 1/24/2023 0335)  Return Mobility to Safest Level of Function:   Assess patient stability and activity tolerance for standing, transferring and ambulating with or without assistive devices Assist with transfers and ambulation using safe patient handling equipment as needed   Obtain physical therapy/occupational therapy consults as needed     Problem: Metabolic/Fluid and Electrolytes - Adult  Goal: Glucose maintained within prescribed range  Outcome: Progressing  Flowsheets (Taken 1/24/2023 0335)  Glucose maintained within prescribed range:   Monitor blood glucose as ordered   Assess for signs and symptoms of hyperglycemia and hypoglycemia   Administer ordered medications to maintain glucose within target range   Assess barriers to adequate nutritional intake and initiate nutrition consult as needed   Care plan reviewed with patient. Patient  verbalize understanding of the plan of care and contribute to goal setting.

## 2023-01-25 VITALS
SYSTOLIC BLOOD PRESSURE: 111 MMHG | TEMPERATURE: 98.4 F | HEART RATE: 75 BPM | OXYGEN SATURATION: 97 % | DIASTOLIC BLOOD PRESSURE: 57 MMHG | RESPIRATION RATE: 18 BRPM | BODY MASS INDEX: 34.65 KG/M2 | HEIGHT: 74 IN | WEIGHT: 270 LBS

## 2023-01-25 LAB
GLUCOSE BLD STRIP.AUTO-MCNC: 145 MG/DL (ref 70–108)
GLUCOSE BLD STRIP.AUTO-MCNC: 155 MG/DL (ref 70–108)
GLUCOSE BLD STRIP.AUTO-MCNC: 233 MG/DL (ref 70–108)

## 2023-01-25 PROCEDURE — 97530 THERAPEUTIC ACTIVITIES: CPT

## 2023-01-25 PROCEDURE — 6370000000 HC RX 637 (ALT 250 FOR IP): Performed by: STUDENT IN AN ORGANIZED HEALTH CARE EDUCATION/TRAINING PROGRAM

## 2023-01-25 PROCEDURE — 97110 THERAPEUTIC EXERCISES: CPT

## 2023-01-25 PROCEDURE — 82948 REAGENT STRIP/BLOOD GLUCOSE: CPT

## 2023-01-25 PROCEDURE — 6370000000 HC RX 637 (ALT 250 FOR IP): Performed by: PHYSICIAN ASSISTANT

## 2023-01-25 PROCEDURE — 6360000002 HC RX W HCPCS: Performed by: PHYSICIAN ASSISTANT

## 2023-01-25 PROCEDURE — 6370000000 HC RX 637 (ALT 250 FOR IP): Performed by: NURSE PRACTITIONER

## 2023-01-25 PROCEDURE — 2580000003 HC RX 258

## 2023-01-25 PROCEDURE — 94640 AIRWAY INHALATION TREATMENT: CPT

## 2023-01-25 PROCEDURE — 97542 WHEELCHAIR MNGMENT TRAINING: CPT

## 2023-01-25 PROCEDURE — 99232 SBSQ HOSP IP/OBS MODERATE 35: CPT | Performed by: NURSE PRACTITIONER

## 2023-01-25 PROCEDURE — 6370000000 HC RX 637 (ALT 250 FOR IP)

## 2023-01-25 RX ORDER — INSULIN GLARGINE 100 [IU]/ML
10 INJECTION, SOLUTION SUBCUTANEOUS 2 TIMES DAILY
Qty: 10 ML | Refills: 0
Start: 2023-01-25

## 2023-01-25 RX ORDER — CLONIDINE HYDROCHLORIDE 0.1 MG/1
0.1 TABLET ORAL 2 TIMES DAILY PRN
Qty: 60 TABLET | Refills: 0
Start: 2023-01-25

## 2023-01-25 RX ORDER — FUROSEMIDE 40 MG/1
40 TABLET ORAL DAILY
Qty: 90 TABLET | Refills: 0
Start: 2023-01-25

## 2023-01-25 RX ORDER — AMLODIPINE BESYLATE 10 MG/1
10 TABLET ORAL DAILY
Qty: 30 TABLET | Refills: 0
Start: 2023-01-25

## 2023-01-25 RX ORDER — INSULIN LISPRO 100 [IU]/ML
0-16 INJECTION, SOLUTION INTRAVENOUS; SUBCUTANEOUS
Qty: 1 EACH | Refills: 0
Start: 2023-01-25

## 2023-01-25 RX ORDER — VALSARTAN 160 MG/1
160 TABLET ORAL DAILY
Qty: 30 TABLET | Refills: 0
Start: 2023-01-25

## 2023-01-25 RX ORDER — METOPROLOL TARTRATE 50 MG/1
50 TABLET, FILM COATED ORAL 2 TIMES DAILY
Qty: 60 TABLET | Refills: 0
Start: 2023-01-25

## 2023-01-25 RX ORDER — MINOXIDIL 10 MG/1
10 TABLET ORAL DAILY
Qty: 30 TABLET | Refills: 0
Start: 2023-01-26

## 2023-01-25 RX ORDER — INSULIN LISPRO 100 [IU]/ML
0-4 INJECTION, SOLUTION INTRAVENOUS; SUBCUTANEOUS NIGHTLY
Qty: 1 EACH | Refills: 0
Start: 2023-01-25

## 2023-01-25 RX ADMIN — ATORVASTATIN CALCIUM 80 MG: 80 TABLET, FILM COATED ORAL at 10:04

## 2023-01-25 RX ADMIN — CLOPIDOGREL BISULFATE 75 MG: 75 TABLET ORAL at 10:07

## 2023-01-25 RX ADMIN — METOPROLOL TARTRATE 50 MG: 50 TABLET, FILM COATED ORAL at 06:07

## 2023-01-25 RX ADMIN — GABAPENTIN 600 MG: 600 TABLET, FILM COATED ORAL at 10:04

## 2023-01-25 RX ADMIN — FERROUS SULFATE TAB 325 MG (65 MG ELEMENTAL FE) 325 MG: 325 (65 FE) TAB at 10:04

## 2023-01-25 RX ADMIN — POLYETHYLENE GLYCOL 3350 17 G: 17 POWDER, FOR SOLUTION ORAL at 10:08

## 2023-01-25 RX ADMIN — INSULIN GLARGINE 10 UNITS: 100 INJECTION, SOLUTION SUBCUTANEOUS at 10:29

## 2023-01-25 RX ADMIN — Medication 1 TABLET: at 10:04

## 2023-01-25 RX ADMIN — TIOTROPIUM BROMIDE INHALATION SPRAY 2 PUFF: 3.12 SPRAY, METERED RESPIRATORY (INHALATION) at 07:44

## 2023-01-25 RX ADMIN — ENOXAPARIN SODIUM 30 MG: 100 INJECTION SUBCUTANEOUS at 10:29

## 2023-01-25 RX ADMIN — SENNOSIDES AND DOCUSATE SODIUM 1 TABLET: 50; 8.6 TABLET ORAL at 10:07

## 2023-01-25 RX ADMIN — SODIUM CHLORIDE, PRESERVATIVE FREE 10 ML: 5 INJECTION INTRAVENOUS at 10:04

## 2023-01-25 RX ADMIN — ASPIRIN 81 MG 81 MG: 81 TABLET ORAL at 10:07

## 2023-01-25 ASSESSMENT — PAIN DESCRIPTION - FREQUENCY: FREQUENCY: CONTINUOUS

## 2023-01-25 ASSESSMENT — PAIN DESCRIPTION - DESCRIPTORS: DESCRIPTORS: ACHING

## 2023-01-25 ASSESSMENT — PAIN SCALES - GENERAL: PAINLEVEL_OUTOF10: 4

## 2023-01-25 ASSESSMENT — PAIN DESCRIPTION - ORIENTATION: ORIENTATION: LEFT

## 2023-01-25 ASSESSMENT — PAIN DESCRIPTION - ONSET: ONSET: ON-GOING

## 2023-01-25 ASSESSMENT — PAIN - FUNCTIONAL ASSESSMENT: PAIN_FUNCTIONAL_ASSESSMENT: ACTIVITIES ARE NOT PREVENTED

## 2023-01-25 ASSESSMENT — PAIN DESCRIPTION - PAIN TYPE: TYPE: SURGICAL PAIN

## 2023-01-25 ASSESSMENT — PAIN DESCRIPTION - LOCATION: LOCATION: LEG

## 2023-01-25 NOTE — PROGRESS NOTES
Progress note: Infectious diseases    Patient - Aki Ferrera,  Age - 65 y.o.    - 1960      Room Number - 7K-04/004-A   MRN -  [id number]   Acct # - [de-identified]  Date of Admission -  1/10/2023 12:28 PM    SUBJECTIVE:   No new issues. OBJECTIVE   VITALS    height is 6' 2\" (1.88 m) and weight is 270 lb (122.5 kg). His oral temperature is 98.3 °F (36.8 °C). His blood pressure is 143/74 (abnormal) and his pulse is 73. His respiration is 16 and oxygen saturation is 96%. Wt Readings from Last 3 Encounters:   23 270 lb (122.5 kg)   22 (!) 310 lb (140.6 kg)   22 (!) 310 lb (140.6 kg)       I/O (24 Hours)    Intake/Output Summary (Last 24 hours) at 2023 0826  Last data filed at 2023 0735  Gross per 24 hour   Intake 600 ml   Output 3225 ml   Net -2625 ml         General Appearance: awake and oriented. HEENT - normocephalic, atraumatic, pale conjunctiva,  anicteric sclera  Neck - Supple, no mass  Lungs -  Bilateral   air entry, no rhonchi, no wheeze.   Cardiovascular - Heart sounds are normal.     Abdomen - soft, not distended, nontender,   Neurologic -oriented  Skin - No bruising or bleeding  Extremities -   dressed left  BKA , no drainage       MEDICATIONS:      insulin glargine  10 Units SubCUTAneous BID    sennosides-docusate sodium  1 tablet Oral BID    polyethylene glycol  17 g Oral Daily    enoxaparin  30 mg SubCUTAneous Q12H    insulin lispro  0-16 Units SubCUTAneous TID     insulin lispro  0-4 Units SubCUTAneous Nightly    amLODIPine  10 mg Oral Daily    atorvastatin  80 mg Oral Daily    ferrous sulfate  325 mg Oral BID    furosemide  40 mg Oral Daily    gabapentin  600 mg Oral BID    minoxidil  10 mg Oral Daily    multivitamin  1 tablet Oral Daily    potassium chloride  20 mEq Oral Daily    valsartan  160 mg Oral Daily    aspirin  81 mg Oral Daily    clopidogrel  75 mg Oral Daily tiotropium  2 puff Inhalation Daily    [Held by provider] cloNIDine  0.1 mg Oral BID    metoprolol tartrate  50 mg Oral BID    sodium chloride flush  5-40 mL IntraVENous 2 times per day      dextrose      sodium chloride 20 mL/hr at 01/13/23 0020     acetaminophen **OR** acetaminophen, albuterol sulfate HFA, glucose, dextrose bolus **OR** dextrose bolus, glucagon (rDNA), dextrose, sodium chloride flush, sodium chloride, ondansetron **OR** ondansetron, oxyCODONE **OR** oxyCODONE, morphine **OR** morphine      LABS:     CBC:   Recent Labs     01/24/23  0949   WBC 6.8   HGB 8.1*        BMP:    Recent Labs     01/24/23  0949      K 5.0      CO2 23   BUN 28*   CREATININE 1.5*   GLUCOSE 204*     Calcium:  Recent Labs     01/24/23  0949   CALCIUM 8.5        Recent Labs     01/24/23  1533 01/24/23  1941 01/25/23  0613   POCGLU 169* 167* 145*           Problem list of patient:     Patient Active Problem List   Diagnosis Code    [facility]ang[facility] toe of left foot ([facility] I96    CAD (coronary artery disease) I25.10    Type 2 diabetes mellitus with insulin therapy ([facility]) E11.9, Z79.4    Hyperlipidemia E78.5    Hypertension I10    Charcot's joint, right ankle and foot M14.671    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    Sepsis ([facility] A41.9    Acute left-sided low back pain with bilateral sciatica M54.42, M54.41    S/P transmetatarsal amputation of foot, left ([facility]) Z89.432    Leukocytosis D72.829    Wound dehiscence, surgical, initial encounter T81.31XA    Postoperative wound infection S27.19FX    Metabolic acidosis C29.06    Hx of CABG Z95.1    Lumbar stenosis without neurogenic claudication M48.061    CKD (chronic kidney disease), stage II N18.2    Normocytic anemia D64.9    Morbid obesity ([facility]) E66.01    Debility R53.81    Carotid stenosis, asymptomatic, bilateral I65.23    Hypokalemia E87.6    Nonhealing ulcer of left lower extremity ([address] L97.929    Bleeding R58    Wound, open T14. 8XXA    SOB (shortness of breath) on exertion R06.02    Hemoglobin A1C greater than 9%, indicating poor diabetic control R73.09    Hypertensive emergency I16.1    Acute on chronic congestive heart failure ([facility]) I50.9    Hypertensive urgency I16.0    Moderate persistent asthma J45.40    Septic arthritis of left ankle, due to unspecified organism ([facility]) M00.9    Closed fracture of ankle with nonunion S82.899K    S/P BKA (below knee amputation), left ([facility]) E55.861         ASSESSMENT/PLAN   Necrotizing left foot/ankle infection: had urgent BKA.  The stump is healing well   Poorly controlled diabetes  CAD   Awaiting placement          Tameka Yoon MD, MD, FACP 1/25/2023 8:58 AM

## 2023-01-25 NOTE — PROGRESS NOTES
Report called to [facility] at [facility] care, pt transported via wheelchair by [facility]

## 2023-01-25 NOTE — PROGRESS NOTES
Hospitalist Progress Note    Patient:  Juan Dean      Unit/Bed:7K-04/004-A    YOB: 1960    MRN: [id number]       Acct: [de-identified]     PCP: EYAD Davis CNP    Date of Admission: 1/10/2023    Assessment/Plan:    Acute hypotension, resolved with IV fluids. Medication induced. OK to discharge with the following recs: change Clonidine to BID PRN >170. Continue Lopressor, Norvasc and Diovan with holding parameters. Added holding parameters to Lasix. .   Near syncope due to #1. Resolved. GISELA on CKD, resolved. Creatinine at upper limits of normal. Avoid nephrotoxic agents. Renally dose medications. Acute hypoxic postop respiratory insuffiencey, resolved. Nocturnal hypoxia. Probable RILEY. Nocturnal study completed. Needs 2L at HS. Referral to sleep on DC. Acute blood loss anemia on chronic disease. S/P 1 unit PRBC 1/11 for hbg 6.8. Stable post transfusion. Iron resumed. Septic left ankle with pathological fracture of left tibia and abscess. 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 Vanc and Clindamycin stopped 1/13 by ID. Zosyn continued until 1/18. ID/podiary managing. PT/OT. Type 2 diabetes, uncontrolled. A1c 8.3%. BS trend has improved. Continue current regimen, new rx given on discharge. Recommend stopping Jardiance with amputation. Accu check ac/hs. Diabetic diet. Hypoglycemic protocol. Primary hypertension. See #1. Chronic diastolic heart failure. No decompensation. Lasix, strict intake and output. Daily weights. Fluids stopped 1/11. No additional diuresis was given. CAD s/p CABG. Asa/plavix, BB, ARB. Moderate persistent asthma. No PFTs available for review. Spiriva continued. Lupus. Aware. Dispo: Ok to d/c from medicine standpoint. Will continue to follow if here.      Chief Complaint: Left septic ankle with abscess and open pathologic fracture    Hospital Course:     58-year-old male was admitted to [facility] by podiatry for urgent/emergent management of septic left ankle with pathologic fracture of tibia and abscess of left ankle. Initially podiatry was planning to take patient to the OR tonight or tomorrow and consulted hospital team for risk stratification. Patient has significant past medical history of diabetes mellitus type 2, uncontrolled, hypertension, CAD status post CABG, CKD, chronic normocytic anemia, lupus, moderate persistent asthma, PAD, grade 2 diastolic dysfunction, and hyperlipidemia,   It is important note the patient was recently hospitalized at [facility] 11/2/2022 for hypertensive emergency in which she was experiencing chest pain and a new left bundle branch block was found. Patient subsequently left AMA despite having blood pressure 200s/100s. Upon arrival to [facility] blood cultures were obtained, patient was started on vancomycin and Zosyn. Chest x-ray obtained that demonstrated evidence of prior CABG as well as hyperinflated lungs suggestive of COPD. Patient also had evidence of healed granulomatous disease. With questionable mild interstitial fibrosis. X-ray of left ankle was obtained that showed gas density and soft tissue extending to the lower left leg. Complete dislocation and rotated talus relative to the tibia. Also there was abnormal periosteal reaction and distal tibia and fibula which indicate chronic osteomyelitis. There is also cortical erosion of distal fibula. EKG was also completed that demonstrated Normal sinus rhythm, Left axis deviation; Right bundle branch block, Inferior infarct , age undetermined. Abnormal ECG When compared with ECG of 25-NOV-2022 04:21,Right bundle branch block has replaced Intraventricular conduction delay. During my examination patient denied chest pain, shortness of breath, headache, lightheadedness, abdominal pain. Patient did have foot pain that was relieved with pain medication.       Stat CBC, BMP, hepatic function panel as well as magnesium were ordered. 1/11/2023  Patient postop day 1. Hemoglobin dropped to 6.8. Will transfuse 1 unit PRBCs. Patient requiring O2 postoperatively and was weaned to room air today however dipped back into the mid 80s. Currently satting 93% on 2 L.     1/12/2023  Hemoglobin 7 after transfusion. No longer requiring O2 at this time. Nocturnal pulse oximetry pending. WBC downtrending     1/13/2023  No events overnight. Blood sugar is much improved. VSS. Waiting for IPR precert      9/06/5927  Vanc and clindamycin stopped yesterday. BG acceptable. VSS. Patient had episode of symptomatic hypotension today. 500cc bolus of NS ordered. Patient improved and BP acceptable. Encouraged oral hydration      1/15/2023  Patient doing well. VSS- no hypotensive episodes. Blood sugar well controlled. Hopeful for IPR precert tomorrow. 1/16/2023  IPR denied- SW to work on ECF placement- hopeful to [facility]. Patient otherwise stable  Passing flatus but has not had BM- will order enema     1/17/2023  Patient with 2 BM yesterday. No other complaints or issues at this time  Waiting for SNF placement     1/18/2023  No acute events. 1/19/2023  Some pain to stump, worse when dangling. Pain medications helping. Reports pressure ulcer on her bottom that is painful to sit up in the WC/Chair. Subjective: Pain to stump improved. No recurrent BP issues or near syncope.        Medications:  Reviewed    Infusion Medications    dextrose      sodium chloride 20 mL/hr at 01/13/23 0020     Scheduled Medications    insulin glargine  10 Units SubCUTAneous BID    sennosides-docusate sodium  1 tablet Oral BID    polyethylene glycol  17 g Oral Daily    enoxaparin  30 mg SubCUTAneous Q12H    insulin lispro  0-16 Units SubCUTAneous TID WC    insulin lispro  0-4 Units SubCUTAneous Nightly    amLODIPine  10 mg Oral Daily    atorvastatin  80 mg Oral Daily    ferrous sulfate  325 mg Oral BID    furosemide  40 mg Oral Daily    gabapentin  600 mg Oral BID    minoxidil  10 mg Oral Daily    multivitamin  1 tablet Oral Daily    potassium chloride  20 mEq Oral Daily    valsartan  160 mg Oral Daily    aspirin  81 mg Oral Daily    clopidogrel  75 mg Oral Daily    tiotropium  2 puff Inhalation Daily    [Held by provider] cloNIDine  0.1 mg Oral BID    metoprolol tartrate  50 mg Oral BID    sodium chloride flush  5-40 mL IntraVENous 2 times per day     PRN Meds: acetaminophen **OR** acetaminophen, albuterol sulfate HFA, glucose, dextrose bolus **OR** dextrose bolus, glucagon (rDNA), dextrose, sodium chloride flush, sodium chloride, ondansetron **OR** ondansetron, oxyCODONE **OR** oxyCODONE, morphine **OR** morphine      Intake/Output Summary (Last 24 hours) at 1/25/2023 1323  Last data filed at 1/25/2023 8373  Gross per 24 hour   Intake 600 ml   Output 3225 ml   Net -2625 ml         Diet:  ADULT DIET; Regular; 3 carb choices (45 gm/meal); Low Fat/Low Chol/High Fiber/2 gm Na    Exam:  BP (!) 111/57   Pulse 75   Temp 98.4 °F (36.9 °C) (Oral)   Resp 18   Ht 6' 2\" (1.88 m)   Wt 270 lb (122.5 kg)   SpO2 97%   BMI 34.67 kg/m²     General appearance: No apparent distress, appears stated age and cooperative. HEENT: Pupils equal, round, and reactive to light. Conjunctivae/corneas clear. Neck: Supple, with full range of motion. No jugular venous distention. Trachea midline. Respiratory:  Normal respiratory effort. Clear to auscultation, bilaterally without Rales/Wheezes/Rhonchi. Cardiovascular: Regular rate and rhythm with normal S1/S2 without murmurs, rubs or gallops. Abdomen: Soft, non-tender, non-distended with normal bowel sounds. Musculoskeletal: passive and active ROM x 4 extremities. Skin: Skin color, texture, turgor normal.  Left stump with dressing. Neurologic:  Neurovascularly intact without any focal sensory/motor deficits.  Cranial nerves: II-XII intact, grossly non-focal.  Psychiatric: Alert and oriented, thought content appropriate, normal insight  Capillary Refill: Brisk,< 3 seconds   Peripheral Pulses: +2 palpable right leg. Labs:   Recent Labs     01/24/23  0949   WBC 6.8   HGB 8.1*   HCT 27.0*          Recent Labs     01/24/23  0949      K 5.0      CO2 23   BUN 28*   CREATININE 1.5*   CALCIUM 8.5       No results for input(s): AST, ALT, BILIDIR, BILITOT, ALKPHOS in the last 72 hours. No results for input(s): INR in the last 72 hours. No results for input(s): Kathyrn Belch in the last 72 hours. Urinalysis:      Lab Results   Component Value Date/Time    NITRU NEGATIVE 11/16/2022 01:52 PM    WBCUA 0-2 11/16/2022 01:52 PM    BACTERIA NONE SEEN 11/16/2022 01:52 PM    RBCUA 15-25 11/16/2022 01:52 PM    BLOODU MODERATE 11/16/2022 01:52 PM    GLUCOSEU >= 1000 11/16/2022 01:52 PM       Radiology:  XR ANKLE LEFT (MIN 3 VIEWS)   Final Result         1. Gas density in the soft tissues extending into the lower left leg. 2. Completely dislocated and rotated talus relative to the tibia. 3. Abnormal periosteal reaction in the distal tibia and fibula which can indicate chronic osteomyelitis. 4. Cortical erosion of the distal fibula. **This report has been created using voice recognition software. It may contain minor errors which are inherent in voice recognition technology. **      Final report electronically signed by Dr. Tanisha Hou on 1/10/2023 3:46 PM      XR CHEST PORTABLE   Final Result   1. Normal heart size. Metallic sternotomy sutures from prior surgery. Lungs somewhat hyperinflated, suggesting possible COPD. 2. Evidence for old, healed granulomatous disease. Question mild interstitial fibrosis/pneumonitis both lung bases. No effusion is seen. **This report has been created using voice recognition software. It may contain minor errors which are inherent in voice recognition technology. **      Final report electronically signed by Dr. Celio Serna on 1/10/2023 2:42 PM          Diet: ADULT DIET; Regular; 3 carb choices (45 gm/meal);  Low Fat/Low Chol/High Fiber/2 gm Na       Disposition:    [] Home       [] TCU       [] Rehab       [] Psych       [x] SNF       [] [place]       [] Other-    Code Status: Full Code    PT/OT Eval Status: following        Electronically signed by EYAD Mata CNP on 1/25/2023 at 1:23 PM

## 2023-01-25 NOTE — DISCHARGE INSTRUCTIONS
Discharge instructions:    Fluids and Diet  -If not nauseated, you may resume a regular diet when you desire    Medications  -Take prescription medications only as directed  -If pain is not severe, you may take the non-prescription medication that you normally take  -You may resume your prescription medication scheduled  -If any side effects or adverse reactions occur, discontinue the medication and contact your doctor  -review the patient drug information leaflet, when provided, before you begin taking the medication    Ambulation and Activity Non-weight bearing to the left leg  -You are advised to go directly home from the hospital  -Use the prescribed walking aids                            - crutches/roll-a-bout/walker  -Do no lift or move heavy objects  -Do not Drive    Post Anesthesia Instructions  -Do not drive or operate machinery  -Do not consume alcohol, tranquilizers, sleeping medications, or a non-prescription pain medication  -Do not make important decisions  -You should have someone home with you tonight    Care of the Operative Site  -Keep cast or bandage clean, dry, and intact  -Do not shower  -Elevate Operative extremity as much as possible to reduce swelling and discomfort for the first 72 hours  -Apply ice bag wrapped in thick towel over bandage 20 minutes of every hour for the first 72 hours while awake  -Do not attempt to put anything between the cast or dressing ans skin, some itching is normal    Special Instructions: Call doctor immediately if you develop any of the following:  -Fever over 100 degrees by mouth - take your temperature daily  -Pain not relieved by medication ordered  -Swelling, increased redness, warmth, or hardness around operative area  -Numb, tingling or cold toes  -Toe(s) become white or bluish  -Bandage becomes wet, soiled, or blood soaked (a small amount of bleeding may be normal)  -Increasing or progressive drainage from surgical area    Call the Office for any other questions or concerns: [phone]  Cell phone: [phone]

## 2023-01-25 NOTE — PLAN OF CARE
Problem: Discharge Planning  Goal: Discharge to home or other facility with appropriate resources  1/24/2023 2344 by Desi Montalvo RN  Flowsheets (Taken 1/24/2023 2027)  Discharge to home or other facility with appropriate resources:   Arrange for needed discharge resources and transportation as appropriate   Identify barriers to discharge with patient and caregiver   Identify discharge learning needs (meds, wound care, etc)   Refer to discharge planning if patient needs post-hospital services based on physician order or complex needs related to functional status, cognitive ability or social support system  1/24/2023 1633 by Keesha White LPN  Outcome: Progressing  Flowsheets (Taken 1/24/2023 1633)  Discharge to home or other facility with appropriate resources:   Identify barriers to discharge with patient and caregiver   Arrange for needed discharge resources and transportation as appropriate   Identify discharge learning needs (meds, wound care, etc)     Problem: Pain  Goal: Verbalizes/displays adequate comfort level or baseline comfort level  1/24/2023 2344 by Desi Montalvo RN  Flowsheets (Taken 1/24/2023 2344)  Verbalizes/displays adequate comfort level or baseline comfort level:   Encourage patient to monitor pain and request assistance   Administer analgesics based on type and severity of pain and evaluate response   Implement non-pharmacological measures as appropriate and evaluate response   Assess pain using appropriate pain scale  1/24/2023 1633 by Keesha White LPN  Outcome: Progressing  Flowsheets (Taken 1/24/2023 1633)  Verbalizes/displays adequate comfort level or baseline comfort level:   Encourage patient to monitor pain and request assistance   Assess pain using appropriate pain scale   Administer analgesics based on type and severity of pain and evaluate response   Implement non-pharmacological measures as appropriate and evaluate response     Problem: ABCDS Injury Assessment  Goal: Absence of physical injury  1/24/2023 2344 by Junito Mcghee RN  Flowsheets (Taken 1/24/2023 2344)  Absence of Physical Injury: Implement safety measures based on patient assessment  1/24/2023 1633 by Sherri Banks LPN  Outcome: Progressing  Flowsheets  Taken 1/24/2023 1633 by Sherri Banks LPN  Absence of Physical Injury: Implement safety measures based on patient assessment  Taken 1/24/2023 1330 by Lee Ann Schmitz RN  Absence of Physical Injury: Implement safety measures based on patient assessment  Note: Patient remains free from injury at this time this shift. Call light with in reach.      Problem: Infection - Adult  Goal: Absence of infection at discharge  1/24/2023 2344 by Junito Mcghee RN  Flowsheets (Taken 1/24/2023 2027)  Absence of infection at discharge:   Assess and monitor for signs and symptoms of infection   Monitor lab/diagnostic results   Monitor all insertion sites i.e., indwelling lines, tubes and drains   Administer medications as ordered   Instruct and encourage patient and family to use good hand hygiene technique  1/24/2023 1633 by Sherri Banks LPN  Outcome: Progressing  Flowsheets (Taken 1/24/2023 1633)  Absence of infection at discharge:   Assess and monitor for signs and symptoms of infection   Monitor lab/diagnostic results   Monitor all insertion sites i.e., indwelling lines, tubes and drains   Administer medications as ordered     Problem: Chronic Conditions and Co-morbidities  Goal: Patient's chronic conditions and co-morbidity symptoms are monitored and maintained or improved  1/24/2023 2344 by Junito Mcghee RN  Flowsheets (Taken 1/24/2023 2027)  Care Plan - Patient's Chronic Conditions and Co-Morbidity Symptoms are Monitored and Maintained or Improved:   Collaborate with multidisciplinary team to address chronic and comorbid conditions and prevent exacerbation or deterioration   Update acute care plan with appropriate goals if chronic or comorbid symptoms are exacerbated and prevent overall improvement and discharge   Monitor and assess patient's chronic conditions and comorbid symptoms for stability, deterioration, or improvement  1/24/2023 1633 by Dianna Jonas LPN  Outcome: Progressing  Flowsheets (Taken 1/23/2023 1027)  Care Plan - Patient's Chronic Conditions and Co-Morbidity Symptoms are Monitored and Maintained or Improved:   Monitor and assess patient's chronic conditions and comorbid symptoms for stability, deterioration, or improvement   Collaborate with multidisciplinary team to address chronic and comorbid conditions and prevent exacerbation or deterioration     Problem: Skin/Tissue Integrity - Adult  Goal: Incisions, wounds, or drain sites healing without S/S of infection  1/24/2023 2344 by Rosy Nicholas RN  Flowsheets  Taken 1/24/2023 2241  Incisions, Wounds, or Drain Sites Healing Without Sign and Symptoms of Infection:   ADMISSION and DAILY: Assess and document risk factors for pressure ulcer development   TWICE DAILY: Assess and document dressing/incision, wound bed, drain sites and surrounding tissue   TWICE DAILY: Assess and document skin integrity   Implement wound care per orders  Taken 1/24/2023 2027  Incisions, Wounds, or Drain Sites Healing Without Sign and Symptoms of Infection:   ADMISSION and DAILY: Assess and document risk factors for pressure ulcer development   TWICE DAILY: Assess and document skin integrity   TWICE DAILY: Assess and document dressing/incision, wound bed, drain sites and surrounding tissue  1/24/2023 1633 by Dianna Jonas LPN  Outcome: Progressing  Flowsheets (Taken 1/24/2023 1633)  Incisions, Wounds, or Drain Sites Healing Without Sign and Symptoms of Infection:   TWICE DAILY: Assess and document skin integrity   TWICE DAILY: Assess and document dressing/incision, wound bed, drain sites and surrounding tissue     Problem: Musculoskeletal - Adult  Goal: Return mobility to safest level of function  1/24/2023 2344 by Luz Marina Cohen RN  Flowsheets (Taken 1/24/2023 2027)  Return Mobility to Safest Level of Function:   Assess patient stability and activity tolerance for standing, transferring and ambulating with or without assistive devices   Ensure adequate protection for wounds/incisions during mobilization   Assist with transfers and ambulation using safe patient handling equipment as needed  1/24/2023 1633 by Sudha Lucero LPN  Outcome: Progressing  Flowsheets (Taken 1/24/2023 1633)  Return Mobility to Safest Level of Function:   Assess patient stability and activity tolerance for standing, transferring and ambulating with or without assistive devices   Assist with transfers and ambulation using safe patient handling equipment as needed   Ensure adequate protection for wounds/incisions during mobilization  Goal: Return ADL status to a safe level of function  1/24/2023 2344 by Luz Marina Cohen RN  Flowsheets (Taken 1/24/2023 2027)  Return ADL Status to a Safe Level of Function:   Administer medication as ordered   Assess activities of daily living deficits and provide assistive devices as needed  1/24/2023 1633 by Sudha Lucero LPN  Outcome: Progressing  Flowsheets (Taken 1/24/2023 1633)  Return ADL Status to a Safe Level of Function:   Administer medication as ordered   Assess activities of daily living deficits and provide assistive devices as needed     Problem: Metabolic/Fluid and Electrolytes - Adult  Goal: Glucose maintained within prescribed range  1/24/2023 2344 by Luz Marina Cohen RN  Flowsheets (Taken 1/24/2023 2027)  Glucose maintained within prescribed range: Monitor blood glucose as ordered  1/24/2023 1633 by Sudha Lucero LPN  Outcome: Progressing  Flowsheets (Taken 1/24/2023 0335 by Lulú Jackson RN)  Glucose maintained within prescribed range:   Monitor blood glucose as ordered   Assess for signs and symptoms of hyperglycemia and hypoglycemia   Administer ordered medications to maintain glucose within target range   Assess barriers to adequate nutritional intake and initiate nutrition consult as needed     Problem: Hematologic - Adult  Goal: Maintains hematologic stability  1/24/2023 2344 by Samia Carter RN  Flowsheets (Taken 1/24/2023 2027)  Maintains hematologic stability: Assess for signs and symptoms of bleeding or hemorrhage  1/24/2023 1633 by Jim Franks LPN  Outcome: Progressing  Flowsheets (Taken 1/22/2023 2154 by Nicolle Vital RN)  Maintains hematologic stability:   Assess for signs and symptoms of bleeding or hemorrhage   Monitor labs for bleeding or clotting disorders     Problem: Nutrition Deficit:  Goal: Optimize nutritional status  1/24/2023 2344 by Samia Carter RN  Flowsheets (Taken 1/24/2023 2344)  Nutrient intake appropriate for improving, restoring, or maintaining nutritional needs:   Assess nutritional status and recommend course of action   Monitor oral intake, labs, and treatment plans  1/24/2023 1633 by Jim Franks LPN  Outcome: Progressing  Flowsheets (Taken 1/24/2023 1633)  Nutrient intake appropriate for improving, restoring, or maintaining nutritional needs:   Assess nutritional status and recommend course of action   Monitor oral intake, labs, and treatment plans     Care plan reviewed with patient. Patient verbalized understanding of the plan of care and contribute to goal setting.

## 2023-01-25 NOTE — CARE COORDINATION
1/25/23, 9:38 AM EST    DISCHARGE PLANNING EVALUATION    Precert complete, facility can accept today. Transport available at 6:00 pm today. 1/25/23, 11:19 AM EST    Patient goals/plan/ treatment preferences discussed by  and . Patient goals/plan/ treatment preferences reviewed with patient/ family. Patient/ family verbalize understanding of discharge plan and are in agreement with goal/plan/treatment preferences. Understanding was demonstrated using the teach back method. AVS provided by RN at time of discharge, which includes all necessary medical information pertaining to the patients current course of illness, treatment, post-discharge goals of care, and treatment preferences.      Services At/After Discharge: [place] (SNF) and In [facility] [place], [facility]

## 2023-01-25 NOTE — PROGRESS NOTES
[address] [facility]  Occupational Therapy  Daily Note  Time:   Time In: 1046  Time Out: 1110  Timed Code Treatment Minutes: 24 Minutes  Minutes: 24          Date: 2023  Patient Name: Arian Palm,   Gender: male      Room: [facility]004-A  MRN: [id number]  : 1960  (65 y.o.)  Referring Practitioner: Caleb Degroot DPM  Diagnosis: Sepsis  Additional Pertinent Hx: The patient is a 65 y.o. male with significant past medical history of CAD, CKD, diabetes, hyperlipidemia, and hypertension who is being seen at bedside on behalf of Dr. Gabrielle Redding. Patient relates that he has had ongoing problems with his left foot and ankle over the course of the past few years. 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 He relates that he follows up and sees Dr. Gabrielle Redding in clinic weekly. He relates that he missed his appointment last week and did not have the dressing changed. The patient states that the previous week he had x-rays taken in clinic that showed a pathologic fracture of the left tibia. He relates that he has not been walking on his left lower extremity. He relates that he has applied some pressure when using the restroom on that leg. He states that amputation has been in discussion at his previous clinic encounters. The patient relates that he last a last evening at 8 or 9 PM.  He relates that he drank Crystal light tea around 8 or 9 AM this morning. He relates that he took Plavix this morning at 6 AM.  The patient denies any other concerns to his right lower extremity. The patient denies nausea, vomiting, fever, chills, shortness of breath or chest pain.     Restrictions/Precautions:  Restrictions/Precautions: General Precautions, Weight Bearing, Fall Risk  Left Lower Extremity Weight Bearing: Non Weight Bearing (BKA)  Position Activity Restriction  Other position/activity restrictions: L BKA     SUBJECTIVE: Patient supine in bed upon arrival; agreeable to therapy this date. Patient expresses he would like to continue doing slideboards at this time as his RLE is still weak and has fallen x2. Patient pleasant and cooperative throughout session. PAIN: 0/10:     Vitals: Vitals not assessed per clinical judgement, see nursing flowsheet    COGNITION: WFL    ADL:   No ADL's completed this session. Trankit Blew BALANCE:  Sitting Balance:  Modified Independent. BED MOBILITY:  Supine to Sit: Supervision    Sit to Supine: Supervision    Scooting: Supervision slide to R side for positioining prior to laying down    TRANSFERS:  declined    ADDITIONAL ACTIVITIES:  Patient completed BUE strengthening exercises with skilled education on HEP: completed x15 reps x2 set with a moderate resistance in all joints and all planes in order to improve UE strength and activity tolerance required for BADL routine and toilet / shower transfers. Patient tolerated good, requiring minimal rest breaks. Patient also required minimal verbal/visual cues for technique. ASSESSMENT:     Activity Tolerance:  Patient tolerance of  treatment: good.        Discharge Recommendations: Subacute/skilled nursing facility  Equipment Recommendations: Equipment Needed: Yes  Other: TTB, slideboard, drop arm commode  Plan: Times Per Week: 6x  Current Treatment Recommendations: Strengthening, Balance training, Functional mobility training, Endurance training, Patient/Caregiver education & training, Safety education & training, Self-Care / ADL, Home management training    Patient Education  Patient Education: Role of OT, Plan of Care, ADL's, IADL's, Home Exercise Program, Precautions, Home Safety, and Importance of Increasing Activity    Goals  Short Term Goals  Time Frame for Short Term Goals: by discharge  Short Term Goal 1: Pt will complete grooming routine while seated EOB with Supervision and no VC for technique to incrase indep with self care  Short Term Goal 2: Pt will complete stand pivot t/fs with SBA and min VC for safety to increase indep with toileting routine  Short Term Goal 3: Pt will demo indep with BUE strengthening HEP to increase overall UB strength/endurance for ease with t/fs  Short Term Goal 4: Pt will tolerate dynamic standing x2-3min with unilateral release and SBA for increased indep with clothing mgmt    Following session, patient left in safe position with all fall risk precautions in place.

## 2023-01-25 NOTE — PROGRESS NOTES
[address] [address]  INPATIENT PHYSICAL THERAPY  DAILY NOTE  [facility] 7K - 7K-04/004-A    Time In: 3934  Time Out: 6784  Timed Code Treatment Minutes: 63 Minutes  Minutes: 63          Date: 2023  Patient Name: Jono Chambers,  Gender:  male        MRN: [id number]  : 1960  (65 y.o.)     Referring Practitioner: Rell Whitfield DPM  Diagnosis: sepsis  Additional Pertinent Hx: per EMR \"The patient is a 65 y.o. male with significant past medical history of CAD, CKD, diabetes, hyperlipidemia, and hypertension who is being seen at bedside on behalf of Dr. Ollie Goodman. Patient relates that he has had ongoing problems with his left foot and ankle over the course of the past few years. 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 He relates that he follows up and sees Dr. Ollie Goodman in clinic weekly. He relates that he missed his appointment last week and did not have the dressing changed. The patient states that the previous week he had x-rays taken in clinic that showed a pathologic fracture of the left tibia. He relates that he has not been walking on his left lower extremity. He relates that he has applied some pressure when using the restroom on that leg. He states that amputation has been in discussion at his previous clinic encounters. The patient relates that he last a last evening at 8 or 9 PM.  He relates that he drank Crystal light tea around 8 or 9 AM this morning. He relates that he took Plavix this morning at 6 AM.  The patient denies any other concerns to his right lower extremity. The patient denies nausea, vomiting, fever, chills, shortness of breath or chest pain. \" Pt is s/p L BKA on 1/10/23 completed by Dr. Ollie Goodman.      Prior Level of Function:  Lives With: Alone  Type of Home: House  Home Layout: One level  Home Access: Ramped entrance  Home Equipment: Electric scooter, [id number] [facility] [place] [facility] [place] Shower/Tub: Walk-in shower, Shower chair with back  Bathroom Toilet: Handicap height  Bathroom Equipment: Grab bars in shower, Grab bars around toilet    Receives Help From: Friend(s)  ADL Assistance: Independent  Homemaking Assistance: Needs assistance  Homemaking Responsibilities: Yes  Ambulation Assistance: Non-ambulatory (\"took 1 step or 2 from electric scooter to [facility] [employer]" for 3 years)  Transfer Assistance: Independent  Active : No  IADL Comments: He has a robert who works for him - worker's wife makes meals from that he only has to heat up in microwave and assists with cleaning tasks  Additional Comments: Pt reported that he has a very supportive family who can be available intermittently throughout the day upon discharge home. Pt reports he was amb very little, using his scooter or w/c most of the time    Restrictions/Precautions:  Restrictions/Precautions: General Precautions, Weight Bearing, Fall Risk  Left Lower Extremity Weight Bearing: Non Weight Bearing (BKA)  Position Activity Restriction  Other position/activity restrictions: L BKA     SUBJECTIVE: RN approved session. Pt in bed upon arrival and agrees to therapy. Pt pleasant and cooperative. Pt expressing concerns with upper body strength and being afraid to try to stand up due to having 2 falls in the last 2 days- active listening and support given.  Pt reporting he was basically living on the couch and his scooter for 3 years unable to walk or go hardly anywhere and family had to assist.     PAIN: L stump \"its better today but its still pretty sore\"    Vitals: Nurse checked vitals prior to session BP at 0600 was 143/74    OBJECTIVE:  Bed Mobility:  Rolling to Left: Supervision   Rolling to Right: Supervision   Supine to Sit: Supervision  Sit to Supine: Supervision   Scooting: Supervision  Pt rolled L<>R supine<>prone with bed flat and use of rails, extra time to complete, good carryover with NWB on L LE    Transfers:  Slide Board:Stand By Assistance, Minimal Assistance, with verbal cues, assist o placeand remove board otherwise SBA to scoot across from EOB>WC cue for ant weight shift    Wheelchair Mobility:  Stand By Assistance, with verbal cues , with increased time for completion  Extremities Used: Bilateral Upper Extremities  Type of Chair:Manual  Surface: Level Tile  Distance: 80ft  Quality: Slow Velocity, Short Strokes, and Decreased Fluidity    Exercise:  Patient was guided in 1 set(s) 10 reps of exercise to both lower extremities. Supine, sidelying and prone therex in hospital bed . Exercises were completed for increased independence with functional mobility. Functional Outcome Measures: Completed  AM-PAC Inpatient Mobility without Stair Climbing Raw Score : 13  AM-PAC Inpatient without Stair Climbing T-Scale Score : 38.96    ASSESSMENT:  Assessment: Patient progressing toward established goals. Activity Tolerance:  Patient tolerance of  treatment: good. Pt demos decreased strength, endurance, and independence with mobility. Pt unsteady on feet and requires hands on assist for safety with mobility. Pt will benefit from cont PT at this time to ensure safety, to decrease the risk for falls and to return to OF.       Equipment Recommendations:Equipment Needed: No  Discharge Recommendations: Subacte/Skilled Nursing Facility  Plan: Current Treatment Recommendations: Strengthening, Balance training, Functional mobility training, Transfer training, Endurance training, Equipment evaluation, education, & procurement, Patient/Caregiver education & training, Neuromuscular re-education, Safety education & training, Home exercise program, Therapeutic activities  General Plan:  (5xO)    Patient Education  Patient Education: Plan of Care, Bed Mobility, Transfers, Verbal Exercise Instruction    Goals:  Patient Goals : Vivek Highman able to get in and out bed and w/c with less assist\"  Short Term Goals  Time Frame for Short Term Goals: by discharge  Short Term Goal 1: Pt will demo supine to and from sit transfers with SBA and modifications as needed to progress with mobility. Short Term Goal 2: Pt will demo slide board transfers to and from w/c with mod Ax1 to progress with mobility. Short Term Goal 3: Pt will tolerate 10-20 reps of ther ex to increase overall mobility. Short Term Goal 4: Pt will demo S for w/c mobility for 100 feet to increase IND with mobility. Short Term Goal 5: sit<>stand and stand pivot transfers with SBA and LRD mod I for safe transfers  Long Term Goals  Time Frame for Long Term Goals : NA due to short ELOS    Following session, patient left in safe position with all fall risk precautions in place.

## 2023-01-25 NOTE — PROGRESS NOTES
Podiatric Progress Note  Shilpa Morse  Subjective :     1/25/2023  Patient seen at bedside on behalf of Dr. Yuliet Tejada. Patient is s/p LLE proximal Symes amputation with distal transtibial osteotomy (DOS 1/10/2023). Patient is pleasant, and is alert and oriented. He has no new complaints. The patient relates that his BP dropped yesterday but is back to his normal. He relates that he was given fluids. He thinks that the BP medication caused a drop in the BP. Patient denies any nausea, vomiting, fever, chills, chest pain, shortness of breath.      1/24/2023  Patient seen at bedside on behalf of Dr. Yuliet Tejada. Patient is s/p LLE proximal Symes amputation with distal transtibial osteotomy (DOS 1/10/2023). Patient is pleasant, and is alert and oriented. He has no new complaints. He relates that he fell yesterday in therapy. He relates that he came down on his LLE stump. He relates that the dressing was removed and checked the incision site, which remained intact with sutures and  staples. He denies hitting his head of LOC. Therapy at bedside today. Patient denies any nausea, vomiting, fever, chills, chest pain, shortness of breath.      1/23/2023  Patient seen at bedside on behalf of Dr. Yuliet Tejada. Patient is s/p LLE proximal Symes amputation with distal transtibial osteotomy (DOS 1/10/2023). Patient is pleasant, and is alert and oriented. He has no new complaints. He relates that he is hoping to be discharged to a rehab center. He relates that he has followed up with Dr. Yuliet Tejada once a week in the past.  He is pending discharge planning. Patient denies any nausea, vomiting, fever, chills, chest pain, shortness of breath.      1/22/2023  Patient seen at bedside on behalf of Dr. Yuliet Tejada. Patient is s/p LLE proximal Symes amputation with distal transtibial osteotomy (DOS 1/10/2023). Patient is pleasant, and is alert and oriented. He has no new complaints.   He says he was confused that his blood sugar got a little high yesterday, however it is not down in the normal range. He is pending discharge planning. Patient denies any nausea, vomiting, fever, chills, chest pain, shortness of breath.    1/21/2023  Patient seen at bedside this morning on behalf of Dr. Joel Matias. Patient is s/p LLE proximal Symes amputation with distal transtibial osteotomy (DOS 1/10/2023). Patient is pleasant, and is alert and oriented. Patient endorses no pain to LLE. He relates that he is taking oral pain medication. He denies any N/V/F/C/SOB/CP or bilateral calf tenderness. 1/20/2023  Patient seen at bedside this morning on behalf of Dr. Joel Matias. Patient is s/p LLE proximal Symes amputation with distal transtibial osteotomy (DOS 1/10/2023). Patient is pleasant, and is alert and oriented. Patient endorses no pain to LLE. He relates that he is taking oral pain medication. He denies any N/V/F/C/SOB/CP or bilateral calf tenderness. He has no other pedal complaints at this time. Patient relates that he is not able to got to [facility] due to insurance. He is waiting to hear if he is able to go to [facility] upon discharge for rehab. 1/19/2023  Patient seen at bedside this morning on behalf of Dr. Joel Matias. Patient is s/p LLE proximal Symes amputation with distal transtibial osteotomy (DOS 1/10/2023). Patient is pleasant, and is alert and oriented. Patient endorses no pain to LLE. He relates that he is taking oral pain medication. He denies any N/V/F/C/SOB/CP or bilateral calf tenderness. He has no other pedal complaints at this time. Patient relates that he is waiting to hear if he will be able to go to [facility] upon discharge for rehab.     1/18/2023  Patient seen at bedside this morning on behalf of Dr. Joel Matias. Patient is s/p LLE proximal Symes amputation with distal transtibial osteotomy (DOS 1/10/2023). Patient is pleasant, and is alert and oriented.   Patient states that he is doing much better and is thankful for Dr. Carmita Cerrato and Dr. Armida Eaton for treating him fast and taking care of him. Patient endorses minimal pain to LLE. He relates that he is taking oral pain medication. He denies any N/V/F/C/SOB/CP or bilateral calf tenderness. He has no other pedal complaints at this time. Patient is aware that there are no beds available at the facility which he originally wanted; and he is also aware that precertification is started at another SNF/ECF. He relates that he has not heard anything recently. 1/17/2023  Patient seen at bedside this morning on behalf of Dr. Carmita Cerrato. Patient is s/p LLE proximal Symes amputation with distal transtibial osteotomy (DOS 1/10/2023). Patient is pleasant, and is alert and oriented. Patient states that he feels very well overall. Per patient, he had an enema yesterday, and then had another bowel movement afterwards. Patient endorses minimal pain to LLE. He denies any N/V/F/C/SOB/CP or bilateral calf tenderness. He has no other pedal complaints at this time. Patient is aware that there are no beds available at the facility which he originally wanted; and he is also aware that precertification is started at another SNF/ECF.    1/16/2023  Patient seen at chair side this morning on behalf of Dr. Carmita Cerrato. Patient is s/p LLE proximal Symes amputation with distal transtibial osteotomy (DOS 1/10/2023). Patient is pleasant, and is alert and oriented. Patient states that he feels very well overall. Patient continues to endorse minimal pain to LLE (2/10). He currently denies any N/V/F/C/N/V/CP. Patient still has not had bowel movement yet; he denies any abdominal bloating, cramping, or discomfort. No other pedal complaints at this time. Patient states that per his insurance, precertification has been started for [facility]. 1/15/2023  Patient seen at bedside this morning on behalf of Dr. Carmita Cerrato.   Patient is status post LLE proximal Symes amputation with distal transtibial osteotomy (DOS 1/10/2023). Patient appeared pleasant, was oriented to person, place and time and in no acute distress. Patient is very grateful for having the procedure performed; he states that he subjectively feels much better after the amputation. Patient expressed gratitude to Dr. Lawanda Taveras and German Montez for their services. Patient endorses minimal pain to the LLE (3/10). Patient does endorse mild neuropathic type symptoms to the RLE. Patient denies any N/V/F/C/SOB or CP. Patient states that he is passing flatus, but has not yet had a bowel movement. Patient has no further pedal concerns at this point in time. 1/14/2023  Patient is a pleasant 64yo male seen bedside this AM s/p left lower extremity amputation emergent in nature due to necrotizing fasciitis and extensive gas gangrene performed 1.10.23. Patient seated upright bedside working with PT. Patient overall states he feels vastly improved, post op dressing clean/dry/intact. Social work is following, precert for [employer] in place. ID following for IVAB therapy, currently on Zosyn IV (vancomycin and clindamycin discontinued). Labs are trending better, leukocytosis resolved. Will continue to follow until determination of placement. HISTORY OF PRESENT ILLNESS:                 The patient is a 65 y.o. male with significant past medical history of CAD, CKD, diabetes, hyperlipidemia, and hypertension who is being seen at bedside on behalf of Dr. Lawanda Taveras. Patient relates that he has had ongoing problems with his left foot and ankle over the course of the past few years. 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 He relates that he follows up and sees Dr. Lawanda Taveras in clinic weekly. He relates that he missed his appointment last week and did not have the dressing changed.   The patient states that the previous week he had x-rays taken in clinic that showed a pathologic fracture of the left tibia. He relates that he has not been walking on his left lower extremity. He relates that he has applied some pressure when using the restroom on that leg. He states that amputation has been in discussion at his previous clinic encounters. The patient relates that he last a last evening at 8 or 9 PM.  He relates that he drank Crystal light tea around 8 or 9 AM this morning. He relates that he took Plavix this morning at 6 AM.  The patient denies any other concerns to his right lower extremity. The patient denies nausea, vomiting, fever, chills, shortness of breath or chest pain.     Current Medications:    Current Facility-Administered Medications: insulin glargine (LANTUS) injection vial 10 Units, 10 Units, SubCUTAneous, BID  sennosides-docusate sodium (SENOKOT-S) 8.6-50 MG tablet 1 tablet, 1 tablet, Oral, BID  polyethylene glycol (GLYCOLAX) packet 17 g, 17 g, Oral, Daily  enoxaparin Sodium (LOVENOX) injection 30 mg, 30 mg, SubCUTAneous, Q12H  insulin lispro (HUMALOG) injection vial 0-16 Units, 0-16 Units, SubCUTAneous, TID WC  insulin lispro (HUMALOG) injection vial 0-4 Units, 0-4 Units, SubCUTAneous, Nightly  acetaminophen (TYLENOL) tablet 650 mg, 650 mg, Oral, Q6H PRN **OR** acetaminophen (TYLENOL) suppository 650 mg, 650 mg, Rectal, Q6H PRN  amLODIPine (NORVASC) tablet 10 mg, 10 mg, Oral, Daily  atorvastatin (LIPITOR) tablet 80 mg, 80 mg, Oral, Daily  albuterol sulfate HFA (PROVENTIL;VENTOLIN;PROAIR) 108 (90 Base) MCG/ACT inhaler 2 puff, 2 puff, Inhalation, 4x Daily PRN  ferrous sulfate (IRON 325) tablet 325 mg, 325 mg, Oral, BID  furosemide (LASIX) tablet 40 mg, 40 mg, Oral, Daily  gabapentin (NEURONTIN) tablet 600 mg, 600 mg, Oral, BID  minoxidil (LONITEN) tablet 10 mg, 10 mg, Oral, Daily  multivitamin 1 tablet, 1 tablet, Oral, Daily  potassium chloride (KLOR-CON M) extended release tablet 20 mEq, 20 mEq, Oral, Daily  valsartan (DIOVAN) tablet 160 mg, 160 mg, Oral, Daily  glucose chewable tablet 16 g, 4 tablet, Oral, PRN  dextrose bolus 10% 125 mL, 125 mL, IntraVENous, PRN **OR** dextrose bolus 10% 250 mL, 250 mL, IntraVENous, PRN  glucagon (rDNA) injection 1 mg, 1 mg, SubCUTAneous, PRN  dextrose 10 % infusion, , IntraVENous, Continuous PRN  aspirin chewable tablet 81 mg, 81 mg, Oral, Daily  clopidogrel (PLAVIX) tablet 75 mg, 75 mg, Oral, Daily  tiotropium (SPIRIVA RESPIMAT) 2.5 MCG/ACT inhaler 2 puff, 2 puff, Inhalation, Daily  [Held by provider] cloNIDine (CATAPRES) tablet 0.1 mg, 0.1 mg, Oral, BID  metoprolol tartrate (LOPRESSOR) tablet 50 mg, 50 mg, Oral, BID  sodium chloride flush 0.9 % injection 5-40 mL, 5-40 mL, IntraVENous, 2 times per day  sodium chloride flush 0.9 % injection 5-40 mL, 5-40 mL, IntraVENous, PRN  0.9 % sodium chloride infusion, , IntraVENous, PRN  ondansetron (ZOFRAN-ODT) disintegrating tablet 4 mg, 4 mg, Oral, Q8H PRN **OR** ondansetron (ZOFRAN) injection 4 mg, 4 mg, IntraVENous, Q6H PRN  oxyCODONE (ROXICODONE) immediate release tablet 5 mg, 5 mg, Oral, Q4H PRN **OR** oxyCODONE (ROXICODONE) immediate release tablet 10 mg, 10 mg, Oral, Q4H PRN  morphine (PF) injection 2 mg, 2 mg, IntraVENous, Q2H PRN **OR** morphine injection 4 mg, 4 mg, IntraVENous, Q2H PRN    Objective     /62   Pulse 66   Temp 98.3 °F (36.8 °C) (Oral)   Resp 16   Ht 6' 2\" (1.88 m)   Wt 270 lb (122.5 kg)   SpO2 96%   BMI 34.67 kg/m²      I/O:  Intake/Output Summary (Last 24 hours) at 1/25/2023 0604  Last data filed at 1/25/2023 0510  Gross per 24 hour   Intake 600 ml   Output 2425 ml   Net -1825 ml                Wt Readings from Last 3 Encounters:   01/23/23 270 lb (122.5 kg)   12/08/22 (!) 310 lb (140.6 kg)   12/08/22 (!) 310 lb (140.6 kg)       LABS:    Recent Labs     01/24/23  0949   WBC 6.8   HGB 8.1*   HCT 27.0*             Recent Labs     01/24/23  0949      K 5.0      CO2 23   BUN 28*   CREATININE 1.5*        No results for input(s): PROT, INR, APTT in the last 72 hours. No results for input(s): CKTOTAL, CKMB, CKMBINDEX, TROPONINI in the last 72 hours. Focused Lower Extremity Examination:    Vitals:    /62   Pulse 66   Temp 98.3 °F (36.8 °C) (Oral)   Resp 16   Ht 6' 2\" (1.88 m)   Wt 270 lb (122.5 kg)   SpO2 96%   BMI 34.67 kg/m²        Vascular: Popliteal pulse is palpable to LLE. CFT within normal limits to LLE amputation stump. Skin temperature is warm to warm from proximal tibial tuberosity to distal amputation stump of LLE. Minimal edema noted to the distal LLE amputation stump. Dermatologic: There was no sanguinous drainage noted on the ACE bandage directly to the incision. Incision appears very well coapted, without any drainage noted. All staples and sutures are intact. There is no surrounding erythema or warmth noted. Overall, incision appears to be healing very well. No other open lesions, rashes or subcutaneous nodules of note. Neurovascular: Light touch sensation grossly diminished to the level of the midfoot to RLE. Light touch sensation appears grossly intact to the level of the amputation stump of LLE. Musculoskeletal: Muscle strength testing deferred due to acute postop state. Patient's left leg dressed is in a slightly flexed position at the knee joint. Patient is able to fully extend left leg at the knee. Minimal pain with palpation of LLE amputation stump at posterior aspect.                ASSESSMENT: Pt. is a 65 y.o. 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    Sepsis ([address]    Acute left-sided low back pain with bilateral sciatica    S/P transmetatarsal amputation of foot, left ([facility])    Leukocytosis    Wound dehiscence, surgical, initial encounter    Postoperative wound infection    Metabolic acidosis    Hx of CABG    Lumbar stenosis without neurogenic claudication    CKD (chronic kidney disease), stage II    Normocytic anemia    Morbid obesity ([facility]    Debility    Carotid stenosis, asymptomatic, bilateral    Hypokalemia    Nonhealing ulcer of left lower extremity ([facility])    Bleeding    Wound, open    SOB (shortness of breath) on exertion    Hemoglobin A1C greater than 9%, indicating poor diabetic control    Hypertensive emergency    Acute on chronic congestive heart failure ([facility])    Hypertensive urgency    Moderate persistent asthma    Septic arthritis of left ankle, due to unspecified organism ([facility])    Closed fracture of ankle with nonunion    S/P BKA (below knee amputation), left ([facility])       PLAN:   Septic arthritis; left ankle -resolved  Pathologic fracture; left tibia -resolved  Abscess; left ankle -resolved  -Patient examined and evaluated  -WBC 5.0 on 1/19/2023; patient currently afebrile  -Hemoglobin and hematocrit at 8.3 and 27.5  -Discontinued IV Zosyn per infectious diseases  -Will continue to follow labs/imaging  -Discussed with patient that he should try to extend left leg at the knee to prevent flexion contracture  -Applied DSD & ACE wrap to LLE. -Awaiting SNF placement; case management assisting with process to [facility]; awaiting precert  -Hospitalist following for medical management  -Cardiology following  -Patient verbalized understanding and agreement with the treatment plan as stated. All of his questions were answered to his satisfaction. 4. GISELA on CKD  -Consulted hospitalist  -Creatinine at upper limits of normal. Avoid nephrotoxic agents. Renally dose medications. 5. Acute hypoxic postop respiratory insufficiency  -Consulted hospitalist  -Resolved    6.  Nocturnal Hypoxica  -Consulted hospitalist  -Nocturnal sutdy completed, referral to sleep on Dc    7. Acute blood loss anemia on chronic disease  -Consulted hospitalist  -Stable    8. Type 2 DM, uncontrolled  -Consulted hospitalist  -A1c 8.3%    9. Primary hypertension  -Consulted hospitalist  -Continue home meds with holding parameters    10. Chronic diastolic heart failure  -Consulted hospitalist    11. CAD s/p CABG  -Consulted hospitalist  -ASA/plavix, BB, ACE    12. Moderate persistent asthma  -Consulted hospitalist  -Spiriva continued    13. Lupus  -Consulted hospitalist  -Aware    Dispo: Pending precert to [employer]. Patient can follow-up with Dr. Halle Call for further care on an outpatient basis.     Piyush Cunningham DPM, PGY-2  Podiatric Surgical Resident  1/25/2023   6:04 AM

## 2023-01-25 NOTE — CARE COORDINATION
1/25/23, 7:44 AM EST    DISCHARGE ON GOING EVALUATION    Deepika Coburn day: 15  Location: -04/004-A Reason for admit: Sepsis [facility]) [A41.9]  Septic arthritis of left ankle, due to unspecified organism [facility]) [M00.9]   Procedure:   1/10 Left Proximal symes/distal transtibial amputation with  mL by Dr Patsy Arriaza    Barriers to Discharge: Podiatry and ID following. BP this am 143/74. Room air. Lovenox. PCP: EYAD Almodovar CNP  Readmission Risk Score: 24.6%  Patient Goals/Plan/Treatment Preferences: [facility] at discharge. Precert approved per MARIZA.     Message sent to attending informing them that precert approved. He states pt is ready for discharge today. SW updated.

## 2023-01-25 NOTE — DISCHARGE SUMMARY
PODIATRIC SURGERY DISCHARGE  Patient ID:  Arriaza Cleaves  [id number]    Physician: Elian Avendano DPM     Admition date: 1/10/2023    Discharge date: 1/25/2023    Date of Surgery: 1/10/2023    Admission Diagnoses: Sepsis ([facility] [address] [A41.9]  Septic arthritis of left ankle, due to unspecified organism [facility]) [M00.9]    Discharge Diagnoses: Sepsis ([facility] [address] [A41.9]  Septic arthritis of left ankle, due to unspecified organism ([facility] [M00.9]    Procedures: Proximal Symes amputation/distal transtibial amputation, left    History, Physical Exam:    HISTORY OF PRESENT ILLNESS:    The patient is a 65 y.o. male with significant past medical history of CAD, CKD, diabetes, hyperlipidemia, and hypertension who is being seen at bedside on behalf of Dr. Lorna Monte. Patient relates that he has had ongoing problems with his left foot and ankle over the course of the past few years. 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 He relates that he follows up and sees Dr. Lorna Monte in clinic weekly. He relates that he missed his appointment last week and did not have the dressing changed. The patient states that the previous week he had x-rays taken in clinic that showed a pathologic fracture of the left tibia. He relates that he has not been walking on his left lower extremity. He relates that he has applied some pressure when using the restroom on that leg. He states that amputation has been in discussion at his previous clinic encounters. The patient relates that he last a last evening at 8 or 9 PM.  He relates that he drank Crystal light tea around 8 or 9 AM this morning. He relates that he took Plavix this morning at 6 AM.  The patient denies any other concerns to his right lower extremity. The patient denies nausea, vomiting, fever, chills, shortness of breath or chest pain. Focused Lower Extremity Examination:  Vascular: Popliteal pulse is palpable to LLE.   CFT within normal limits to LLE amputation stump. Skin temperature is warm to warm from proximal tibial tuberosity to distal amputation stump of LLE. Minimal edema noted to the distal LLE amputation stump. Dermatologic: There was no sanguinous drainage noted on the ACE bandage directly to the incision. Incision appears very well coapted, without any drainage noted. All staples and sutures are intact. There is no surrounding erythema or warmth noted. Overall, incision appears to be healing very well. No other open lesions, rashes or subcutaneous nodules of note. Neurovascular: Light touch sensation grossly diminished to the level of the midfoot to RLE. Light touch sensation appears grossly intact to the level of the amputation stump of LLE. Musculoskeletal: Muscle strength testing deferred due to acute postop state. Patient's left leg dressed is in a slightly flexed position at the knee joint. Patient is able to fully extend left leg at the knee. Minimal pain with palpation of LLE amputation stump at posterior aspect. Discharge Physician: Karen Hayden, [facility] Course:  1/10/2023: Patient admitted to [facility] by Dr. Clau Lind due to worsening infection with fracture dislocation of left ankle. Patient was seen earlier this day with Dr. Lili Gann clinic, advised sent to [facility] for urgent surgery and IV antibiotic therapy. Patient had  Proximal Symes amputation with distal transtibial amputation of left lower extremity. Infectious diseases and hospitalist were consulted for comanagement. 1/11/2023: Patient seen at bedside on behalf of Dr. Clau Lind. Patient is pleasant, and alert and oriented. Patient endorses some pain to the LLE, but states that is well controlled with pain medication. Patient denies any N/V/F/C/SOB/CP.  1/12/2023: Patient seen at bedside again. Patient continues to endorse pain to the LLE, but states that the pain medications are working well for him.   He denies any N/V/F/C/SOB/CP or bilateral calf tenderness. 1/13/2023: Patient seen at bedside. Patient endorses improving pain. Denies any N/V/F/C/SOB or CP. No other complaints. 1/14/2023: Patient seen at bedside by Dr. Cristobal Mittal. Patient is working with physical therapy, but states that he is doing much better subjectively. Awaiting placement for inpatient rehab. 1/15/2023: Patient seen at bedside on behalf of Dr. Cristobal Mittal. Patient states that he is doing very well overall, and states that the pain is controlled very well with the pain medication. He endorses minimal pain to the LLE. No other pedal complaints at this visit. 1/16/2023: Patient seen at bedside on behalf of Dr. Cristobal Mittal. Patient relates he is doing very well and denies any new pedal complaints. 1/17/2023: Patient seen at bedside on behalf of Dr. Cristobal Mittal. Patient states pain is minimal (2-3/10). Patient is aware that there are no beds available in inpatient rehab, so LSW started another precertification at a different SNF/ECF.  1/18/2023: Seen at bedside on behalf of Dr. Cristobal Mittal. Patient states that the pain is minimal and well controlled with current pain medication regimen. He denies any N/V/F/C/SOB/CP or calf tenderness. No other pedal complaints. 1/19/2023: Seen at bedside on behalf of Dr. Cristobal Mittal. Patient states pain is minimal and well controlled with current pain medication regimen. He denies any N/V/F/C/SOB/CP or bilateral calf tenderness. Patient is still awaiting to hear back facility is able to accept him. No new pedal complaints. 1/20/2023: Patient seen at bedside on behalf of Dr. Cristobal Mittal. No new pedal complaints. Patient is aware that another facility cannot accept him due to his insurance. Another precertification is started. 1/21/2023: Patient seen at bedside on behalf of Dr. Cristobal Mittal. Patient is pleasant, is alert and oriented. He denies any pain to LLE at this visit, there is still a lot of oral pain medication.   He denies any N/V/F/C/SOB/CP or bilateral calf tenderness. Patient still awaiting placement. 1/22/2023: Patient seen at bedside on behalf of Dr. Carla Valenitne. Patient has no new pedal complaints at this time. Charge planning to hold facility is pending. Patient denies any N/V/F/C/SOB/CP or bilateral calf tenderness. 1/23/2023: Patient seen at bedside on behalf of Dr. Carla Valentine. Patient has no new pedal complaints at this visit. States that the pain is very well controlled, and subjectively feels very well overall. Continues to deny any N/V/F/C/SOB/CP or bilateral calf tenderness. 1/24/2023: Patient seen at bedside on behalf of Dr. Carla Valentine. Patient is pleasant, and is alert and oriented. Patient has no new pedal complaints. Patient states that he fell yesterday during physical therapy, dressing was changed and incision was checked; incision remains intact. Patient denies any N/V/F/C/SOB/CP or bilateral calf tenderness. 1/25/2023: Patient seen at bedside on behalf of Dr. Carla Valentine. Patient states pain is very well controlled on oral pain medication, patient states that he is try to wean himself off of opioids. Patient denies any N/B/F/C/SOB/CP or bilateral calf tenderness. Patient is aware that has a bed available at [address] [place] [address]. Patient was discharged today, and transport is set to transport him to the facility at 6 PM today.     Consults: ID and hospitalist    Discharged Condition: fair    Disposition: SNF    Patient Instructions:   Current Discharge Medication List        START taking these medications    Details   insulin glargine (LANTUS) 100 UNIT/ML injection vial Inject 10 Units into the skin 2 times daily  Qty: 10 mL, Refills: 0           CONTINUE these medications which have CHANGED    Details   !! insulin lispro (HUMALOG) 100 UNIT/ML SOLN injection vial Inject 0-16 Units into the skin 3 times daily (with meals)  Qty: 1 each, Refills: 0      !! insulin lispro (HUMALOG) 100 UNIT/ML SOLN injection vial Inject 0-4 Units into the skin nightly  Qty: 1 each, Refills: 0      cloNIDine (CATAPRES) 0.1 MG tablet Take 1 tablet by mouth 2 times daily as needed for High Blood Pressure For SBP >170  Qty: 60 tablet, Refills: 0      minoxidil (LONITEN) 10 MG tablet Take 1 tablet by mouth daily Hold SBP <120  Qty: 30 tablet, Refills: 0      valsartan (DIOVAN) 160 MG tablet Take 1 tablet by mouth daily Hold SBP <110  Qty: 30 tablet, Refills: 0      metoprolol tartrate (LOPRESSOR) 50 MG tablet Take 1 tablet by mouth 2 times daily Hold SBP <100 or HR <60  Qty: 60 tablet, Refills: 0      amLODIPine (NORVASC) 10 MG tablet Take 1 tablet by mouth daily Hold for SBP <120  Qty: 30 tablet, Refills: 0      furosemide (LASIX) 40 MG tablet Take 1 tablet by mouth daily Hold SBP <110  Qty: 90 tablet, Refills: 0    Associated Diagnoses: At risk for fluid volume overload       !! - Potential duplicate medications found. Please discuss with provider. CONTINUE these medications which have NOT CHANGED    Details   HYDROcodone-acetaminophen (NORCO) 5-325 MG per tablet Take 1 tablet by mouth every 4 hours as needed. tiotropium (SPIRIVA RESPIMAT) 2.5 MCG/ACT AERS inhaler Inhale 2 puffs into the lungs daily  Qty: 4 g, Refills: 0    Associated Diagnoses: COVID-19      ferrous sulfate (IRON 325) 325 (65 Fe) MG tablet Take 1 tablet by mouth 2 times daily  Qty: 180 tablet, Refills: 1    Associated Diagnoses: Anemia, unspecified type      clopidogrel (PLAVIX) 75 MG tablet Take 1 tablet by mouth daily  Qty: 90 tablet, Refills: 3      gabapentin (NEURONTIN) 600 MG tablet Take 1 tablet by mouth 2 times daily for 180 days. Qty: 180 tablet, Refills: 1      albuterol sulfate HFA (VENTOLIN HFA) 108 (90 Base) MCG/ACT inhaler Inhale 2 puffs into the lungs 4 times daily as needed for Wheezing  Qty: 54 g, Refills: 1    Comments:  Whichever albuterol the pt insurance will cover      atorvastatin (LIPITOR) 80 MG tablet Take 1 tablet by mouth daily  Qty: 30 tablet, Refills: 3      Polyethylene Glycol 3350 (MIRALAX PO) Take by mouth as needed      aspirin 81 MG chewable tablet Take 1 tablet by mouth daily  Qty: 30 tablet, Refills: 3    Associated Diagnoses: Coronary artery disease involving native coronary artery of native heart without angina pectoris      Multiple Vitamins-Minerals (MULTIVITAMIN PO) Take 1 tablet by mouth daily            STOP taking these medications       benzonatate (TESSALON) 200 MG capsule Comments:   Reason for Stopping:         potassium chloride (KLOR-CON M) 20 MEQ extended release tablet Comments:   Reason for Stopping:         empagliflozin (JARDIANCE) 25 MG tablet Comments:   Reason for Stopping:         insulin detemir (LEVEMIR FLEXTOUCH) 100 UNIT/ML injection pen Comments:   Reason for Stopping:             Activity: Non-weightbearing to LLE  Diet: diabetic diet  Wound Care: keep wound clean and dry and dressings every 2-3 days with Betadine to incision, cover with 4 x 4 gauze, Kerlix roll, and an Ace bandage  Follow-up with Dr. Claribel Saleh first Tuesday after discharge    Signed:  Ya Anderson DPM PGY-3  1/25/2023  12:18 PM

## 2023-01-27 ENCOUNTER — APPOINTMENT (OUTPATIENT)
Dept: GENERAL RADIOLOGY | Age: 63
End: 2023-01-27
Payer: COMMERCIAL

## 2023-01-27 ENCOUNTER — HOSPITAL ENCOUNTER (EMERGENCY)
Age: 63
Discharge: HOME | End: 2023-01-27
Payer: COMMERCIAL

## 2023-01-27 VITALS
OXYGEN SATURATION: 94 % | RESPIRATION RATE: 18 BRPM | DIASTOLIC BLOOD PRESSURE: 78 MMHG | TEMPERATURE: 98.7 F | SYSTOLIC BLOOD PRESSURE: 129 MMHG | HEART RATE: 82 BPM

## 2023-01-27 DIAGNOSIS — S91.114A LACERATION OF LESSER TOE OF RIGHT FOOT WITHOUT FOREIGN BODY PRESENT OR DAMAGE TO NAIL, INITIAL ENCOUNTER: Primary | ICD-10-CM

## 2023-01-27 DIAGNOSIS — S92.502B OPEN FRACTURE OF PHALANX OF LEFT FIFTH TOE, INITIAL ENCOUNTER: ICD-10-CM

## 2023-01-27 PROCEDURE — 99284 EMERGENCY DEPT VISIT MOD MDM: CPT

## 2023-01-27 PROCEDURE — 73630 X-RAY EXAM OF FOOT: CPT

## 2023-01-27 PROCEDURE — 96372 THER/PROPH/DIAG INJ SC/IM: CPT

## 2023-01-27 PROCEDURE — 6360000002 HC RX W HCPCS: Performed by: NURSE PRACTITIONER

## 2023-01-27 PROCEDURE — 2500000003 HC RX 250 WO HCPCS

## 2023-01-27 RX ORDER — DOXYCYCLINE HYCLATE 100 MG
100 TABLET ORAL 2 TIMES DAILY
Qty: 20 TABLET | Refills: 0 | Status: SHIPPED | OUTPATIENT
Start: 2023-01-27 | End: 2023-02-06

## 2023-01-27 RX ORDER — LIDOCAINE HYDROCHLORIDE 10 MG/ML
5 INJECTION, SOLUTION EPIDURAL; INFILTRATION; INTRACAUDAL; PERINEURAL ONCE
Status: COMPLETED | OUTPATIENT
Start: 2023-01-27 | End: 2023-01-27

## 2023-01-27 RX ORDER — WATER 1000 ML/1000ML
INJECTION, SOLUTION INTRAVENOUS
Status: DISCONTINUED
Start: 2023-01-27 | End: 2023-01-28 | Stop reason: HOSPADM

## 2023-01-27 RX ORDER — CEFAZOLIN SODIUM 1 G/3ML
1000 INJECTION, POWDER, FOR SOLUTION INTRAMUSCULAR; INTRAVENOUS ONCE
Status: COMPLETED | OUTPATIENT
Start: 2023-01-27 | End: 2023-01-27

## 2023-01-27 RX ADMIN — LIDOCAINE HYDROCHLORIDE 5 ML: 10 INJECTION, SOLUTION EPIDURAL; INFILTRATION; INTRACAUDAL; PERINEURAL at 18:28

## 2023-01-27 RX ADMIN — LIDOCAINE HYDROCHLORIDE 5 ML: 10 INJECTION, SOLUTION EPIDURAL; INFILTRATION; INTRACAUDAL; PERINEURAL at 18:27

## 2023-01-27 RX ADMIN — CEFAZOLIN 1000 MG: 1 INJECTION, POWDER, FOR SOLUTION INTRAMUSCULAR; INTRAVENOUS at 18:23

## 2023-01-27 ASSESSMENT — PAIN - FUNCTIONAL ASSESSMENT: PAIN_FUNCTIONAL_ASSESSMENT: NONE - DENIES PAIN

## 2023-01-27 NOTE — CONSULTS
Podiatric Surgery Consult    Patient Kate[facility] RECORD NUMBER:  [id number]  AGE: 65 y.o. GENDER: male  : 1960  EPISODE DATE:  2023    Reason for Consult: Laceration of the right fifth toe sulcus    Requesting Physician: Deon Gastelum    CHIEF COMPLAINT:  <principal problem not specified>    HISTORY OF PRESENT ILLNESS:                The patient is a 65 y.o. male with significant past medical history of diabetes mellitus, CAD, CKD, hypertension and hyperlipidemia who is being seen at bedside on behalf of Dr Sundeep Contreras. Patient relates that he must have caught his toe on the bed frame at the nursing facility. He does not have much feeling so he does not recall when it happened but relates that it happened while he was sleeping. He states that he woke up this morning with blood dripping down his foot. The physician at the nursing facility was asked to come see the patient; the physician recommended that the patient is to go to the ED. The patient was recently discharged 2 days ago from [facility] for a lengthy stay in regards to a amputation of the left foot. The patient denies any nausea, vomiting, fever, chills, shortness of breath or chest pain. The patient denies any other pedal complaints.         Past Medical History:    Past Medical History:   Diagnosis Date    Arthritis     CAD (coronary artery disease)     CKD (chronic kidney disease), stage II     Diabetes mellitus ([facility] [address]     Hyperlipidemia     Hypertension     Liver disease     HEPITITIS AS A CHILD        Past Surgical History:    Past Surgical History:   Procedure Laterality Date    CARDIAC SURGERY  2019    triple bypass    CYST REMOVAL      RIGHT ANKLE     FOOT AMPUTATION Left 1/10/2023    LEFT BELOW KNEE AMPUTATION performed by Anner Apgar, DPM at [facility] Left 2020    LEFT TRANSMETATARSAL AMPUTATION  FOOT INCISION AND DRAINAGE performed by Anner Apgar, DPM at [facility] Left 7/20/2020    LEFT WOUND DEBRIDEMENT WITH WOUND VAC APPLICATION performed by Christi Bonilla DPM at [address] Left 7/1/2022    LEFT FOOT [address], APPLICATION SKIN SUBSTITUTE GRAFT, APPLICATION [employer]CI WOUND VAC performed [facility] Christ[facility]illa DPM [facility] Right     CYST REMOVED    FOOT SURGERY Left 8/14/2020    LEFT FOOT PREPARATION GRAFT SITE, HAVEST SPLIT THICKNESS SKIN GRAFT WITH APPLICATION, APPLICATION [employer] WOUND VAC performed by Christi Bonilla DPM at [address] Left 3/3/2020    I&D LEFT FOOT, TRANSMETATARSAL AMPUTATION performed by Christi Bonilla DPM at [facility] 11/23/2022    EGD performed by Beatriz Kaba MD at [facility]        Current Medications:    Current Facility-Administered Medications: sterile water injection, , ,     Allergies:  Patient has no known allergies. Social History:    Social History     Socioeconomic History    Marital status: Single   Tobacco Use    Smoking status: Former     Types: Cigarettes     Quit date: 2008     Years since quitting: 15.0    Smokeless tobacco: Never   Vaping Use    Vaping Use: Never used   Substance and Sexual Activity    Alcohol use: Not Currently    Drug use: Never     Social Determinants of Health     Financial Resource Strain: Low Risk     Difficulty of Paying Living Expenses: Not hard at all   Food Insecurity: No Food Insecurity    Worried About Running Out of Food in the Last Year: Never true    Ran Out of Food in the Last Year: Never true       Family History:   family history includes Alzheimer's Disease in his father; Diabetes in his mother; Heart Disease in his father; High Blood Pressure in his father and mother. REVIEW OF SYSTEMS:    General appearance:  No apparent distress, appears stated age and cooperative.   Psychiatric:  Alert and oriented, thought content appropriate, normal insight    PHYSICAL EXAM:      Vitals:    /78   Pulse 82   Temp 98.7 °F (37.1 °C) (Oral)   Resp 18   SpO2 94%     Exam:     Vascular: DP and PT pulses are faintly palpable to the RLE. CFT within normal limits. Skin temperature is warm to warm from proximal tibial tuberosity to distal digits of RLE. Dermatologic: There is a laceration noted to the right fifth toe at the plantar sulcus extending medially along the fifth toe. There is no bone or tendon exposed. There is minimal sanguinous drainage. Bleeding is well controlled. Skin edges are viable. Neurovascular: Light touch sensation grossly diminished to the level of the midfoot to RLE. Musculoskeletal: Minimal pain with palpation of RLE, specifically at the 5th digit. Able to flex and extend fifth digit. IMAGING    XR ANKLE LEFT (MIN 3 VIEWS)    Result Date: 1/10/2023  PROCEDURE: XR ANKLE LEFT (MIN 3 VIEWS) CLINICAL INFORMATION: bone exposed; fracture. Septic left ankle. Abscess. Open pathologic fracture. Prior transmetatarsal amputation. COMPARISON: No prior study. TECHNIQUE: 3 views of the left ankle. FINDINGS:  There is gas density in the soft tissues of the ankle. This extends into the distal lower leg. There are vascular calcifications. There is some periosteal reaction of the distal tibia and distal fibula. This is along the diaphyses. This can indicate osteomyelitis. The ankle mortise is dislocated. The talar dome is directed laterally. The articular surface is 90 degrees from its normal position. The navicular is not identified. The exposed bone based on overlying bandaging is the distal fibula and also the lateral  corner of the talar dome. There is some cortical disruption/cortical erosions suggesting osteomyelitis of the distal fibula. 1. Gas density in the soft tissues extending into the lower left leg. 2. Completely dislocated and rotated talus relative to the tibia.  3. Abnormal periosteal reaction in the distal tibia and fibula which can indicate chronic osteomyelitis. 4. Cortical erosion of the distal fibula. **This report has been created using voice recognition software. It may contain minor errors which are inherent in voice recognition technology. ** Final report electronically signed by Dr. Jennifer Rodgers on 1/10/2023 3:46 PM    XR FOOT RIGHT (MIN 3 VIEWS)    Result Date: 1/27/2023  PROCEDURE: XR FOOT RIGHT (MIN 3 VIEWS) CLINICAL INFORMATION: right little toe injury . TECHNIQUE: 4 projections COMPARISON: No prior study. FINDINGS: Digits are flexed in all projections. There is there is acute fracture medial base of the middle phalanx of the fifth digit. This is at the articular surface. There is marked deformity of the foot with collapse of the midfoot. 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 Deformity of the first metatarsal from presumed prior injury. 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 Fracture of the medial base of the middle phalanx of the fifth digit **This report has been created using voice recognition software. It may contain minor errors which are inherent in voice recognition technology. ** Final report electronically signed by Dr. Gabriela Knight on 1/27/2023 4:56 PM    XR CHEST PORTABLE    Result Date: 1/10/2023  PROCEDURE: XR CHEST PORTABLE CLINICAL INFORMATION: pre op COMPARISON: 11/25/2022 TECHNIQUE: A single mobile view of the chest was obtained. 1. Normal heart size. Metallic sternotomy sutures from prior surgery. Lungs somewhat hyperinflated, suggesting possible COPD. 2. Evidence for old, healed granulomatous disease. Question mild interstitial fibrosis/pneumonitis both lung bases. No effusion is seen. **This report has been created using voice recognition software. It may contain minor errors which are inherent in voice recognition technology. ** Final report electronically signed by Dr. Alex Bagley on 1/10/2023 2:42 PM LABS: No results for input(s): WBC, HGB, HCT, PLT in the last 72 hours. No results for input(s): NA, K, CL, CO2, PHOS, BUN, CREATININE, CA in the last 72 hours. No results for input(s): PROT, INR, APTT in the last 72 hours. No results for input(s): CKTOTAL, CKMB, CKMBINDEX, TROPONINI in the last 72 hours. Treatment:   Orders Placed This Encounter   Procedures    XR FOOT RIGHT (MIN 3 VIEWS)     Standing Status:   Standing     Number of Occurrences:   1     Order Specific Question:   Reason for exam:     Answer:   right little toe injury       Assessment and Plan  Principle  1. Laceration, right fifth toe    - Patient initially examined and evaluated  - Patient given 1 dose of Ancef in emergency room   - Flushed laceration with sterile saline  - Sutured the laceration of the right fifth toe with 4-0 Monocryl antibacterial  - Applied dressing consisting of: Betadine, gauze, Galo, ABD, Kerlix  - Patient discharged with a postop shoe  - X-rays ordered and reviewed; fracture of the medial base of the middle phalanx of the fifth digit  - Partial heel weightbearing to the right foot in postop shoe  - All of patient's questions and concerns addressed at today's encounter.  -Discussed with patient that the nursing facility may do dressing changes daily to the lacerated fifth toe lesion consisting of Betadine and dry sterile dressing.  -Patient to follow-up with Dr. Gabrielle Redding in clinic on Tuesday at previously scheduled appointment    DISPO: Patient to follow-up with Dr. Gabrielle Redding in clinic on Tuesday. Thank you for the consultation allowing podiatry to assist in the medical welfare of this patient. Podiatry will continue to follow this patient throughout the duration of hospitalization.      Caleb Degroot DPM -PGY2  Foot and Ankle Surgical Resident  1/27/2023 6:31 PM

## 2023-01-27 NOTE — ED TRIAGE NOTES
Patient to ED from nursing facility with complaints of right pinky toe laceration. Patient states that he is not in any pain. Patient states it happened yesterday and nursing home sent him in for stitches.

## 2023-01-27 NOTE — ED PROVIDER NOTES
[facility] Emergency Department    CHIEF COMPLAINT       Chief Complaint   Patient presents with    Laceration     toe       Nurses Notes reviewed and I agree except as noted in the HPI. HISTORY OF PRESENT ILLNESS    Juan Dean is a 65 y.o. male who presents to the ED for evaluation of a right 5th digit laceration. 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 Pt states he woke this morning to blood in his sheets and staff found the laceration on his 5th digit and bandaged it. He states he was unaware he had injured his toe due to his neuropathy. He continues to deny pain. Pt reports staff uncovered the bandage to redress it a few hours ago and it would not stop bleeding so he was sent to the ED for evaluation. He is on clopidogrel and ASA daily. He denies fever, chills or dizziness. HPI was provided by the patient. PAST MEDICAL HISTORY     Past Medical History:   Diagnosis Date    Arthritis     CAD (coronary artery disease)     CKD (chronic kidney disease), stage II     Diabetes mellitus ([facility] [address]     Hyperlipidemia     Hypertension     Liver disease     HEPITITIS AS A CHILD       SURGICALHISTORY      has a past surgical history that includes Cardiac surgery (11/2019); Foot surgery (Right); Tonsillectomy; cyst removal; Toe amputation (Left, 3/3/2020); Foot Debridement (Left, 4/20/2020); Foot Debridement (Left, 7/20/2020); Foot surgery (Left, 8/14/2020); Foot surgery; Foot Debridement (Left, 7/1/2022); Upper gastrointestinal endoscopy (N/A, 11/23/2022); and Foot Amputation (Left, 1/10/2023).     CURRENT MEDICATIONS       Previous Medications    ALBUTEROL SULFATE HFA (VENTOLIN HFA) 108 (90 BASE) MCG/ACT INHALER    Inhale 2 puffs into the lungs 4 times daily as needed for Wheezing    AMLODIPINE (NORVASC) 10 MG TABLET    Take 1 tablet by mouth daily Hold for SBP <120    ASPIRIN 81 MG CHEWABLE TABLET    Take 1 tablet by mouth daily    ATORVASTATIN (LIPITOR) 80 MG TABLET    Take 1 tablet by mouth daily    CLONIDINE (CATAPRES) 0.1 MG TABLET    Take 1 tablet by mouth 2 times daily as needed for High Blood Pressure For SBP >170    CLOPIDOGREL (PLAVIX) 75 MG TABLET    Take 1 tablet by mouth daily    FERROUS SULFATE (IRON 325) 325 (65 FE) MG TABLET    Take 1 tablet by mouth 2 times daily    FUROSEMIDE (LASIX) 40 MG TABLET    Take 1 tablet by mouth daily Hold SBP <110    GABAPENTIN (NEURONTIN) 600 MG TABLET    Take 1 tablet by mouth 2 times daily for 180 days. HYDROCODONE-ACETAMINOPHEN (NORCO) 5-325 MG PER TABLET    Take 1 tablet by mouth every 4 hours as needed. INSULIN GLARGINE (LANTUS) 100 UNIT/ML INJECTION VIAL    Inject 10 Units into the skin 2 times daily    INSULIN LISPRO (HUMALOG) 100 UNIT/ML SOLN INJECTION VIAL    Inject 0-16 Units into the skin 3 times daily (with meals)    INSULIN LISPRO (HUMALOG) 100 UNIT/ML SOLN INJECTION VIAL    Inject 0-4 Units into the skin nightly    METOPROLOL TARTRATE (LOPRESSOR) 50 MG TABLET    Take 1 tablet by mouth 2 times daily Hold SBP <100 or HR <60    MINOXIDIL (LONITEN) 10 MG TABLET    Take 1 tablet by mouth daily Hold SBP <120    MULTIPLE VITAMINS-MINERALS (MULTIVITAMIN PO)    Take 1 tablet by mouth daily     POLYETHYLENE GLYCOL 3350 (MIRALAX PO)    Take by mouth as needed    TIOTROPIUM (SPIRIVA RESPIMAT) 2.5 MCG/ACT AERS INHALER    Inhale 2 puffs into the lungs daily    VALSARTAN (DIOVAN) 160 MG TABLET    Take 1 tablet by mouth daily Hold SBP <110       ALLERGIES     has No Known Allergies. FAMILY HISTORY     He indicated that his mother is alive. He indicated that his father is . He indicated that both of his sisters are alive. He indicated that his brother is alive. He indicated that the status of his neg hx is unknown.   family history includes Alzheimer's Disease in his father; Diabetes in his mother; Heart Disease in his father; High Blood Pressure in his father and mother.     SOCIAL HISTORY       Social History Socioeconomic History    Marital status: Single     Spouse name: Not on file    Number of children: Not on file    Years of education: Not on file    Highest education level: Not on file   Occupational History    Not on file   Tobacco Use    Smoking status: Former     Types: Cigarettes     Quit date: 2008     Years since quitting: 15.0    Smokeless tobacco: Never   Vaping Use    Vaping Use: Never used   Substance and Sexual Activity    Alcohol use: Not Currently    Drug use: Never    Sexual activity: Not on file   Other Topics Concern    Not on file   Social History Narrative    Not on file     Social Determinants of Health     Financial Resource Strain: Low Risk     Difficulty of Paying Living Expenses: Not hard at all   Food Insecurity: No Food Insecurity    Worried About Running Out of Food in the Last Year: Never true    Ran Out of Food in the Last Year: Never true   Transportation Needs: Not on file   Physical Activity: Not on file   Stress: Not on file   Social Connections: Not on file   Intimate Partner Violence: Not on file   Housing Stability: Not on file       PHYSICAL EXAM     INITIAL VITALS:  oral temperature is 98.7 °F (37.1 °C). His blood pressure is 129/78 and his pulse is 82. His respiration is 18 and oxygen saturation is 94%. Physical Exam  Vitals and nursing note reviewed. Constitutional:       General: He is not in acute distress. Appearance: Normal appearance. He is not toxic-appearing. Cardiovascular:      Rate and Rhythm: Normal rate and regular rhythm. Pulses: Normal pulses. Heart sounds: Normal heart sounds. Pulmonary:      Effort: Pulmonary effort is normal.      Breath sounds: Normal breath sounds. Abdominal:      General: Bowel sounds are normal.      Palpations: Abdomen is soft. Tenderness: There is no abdominal tenderness. There is no guarding. Musculoskeletal:      Right foot: Charcot foot present.         Feet:       Left Lower Extremity: Left leg is amputated below knee. Feet:      Right foot:      Skin integrity: Dry skin present. Skin:     General: Skin is dry. Neurological:      General: No focal deficit present. Mental Status: He is alert. Mental status is at baseline. DIFFERENTIAL DIAGNOSIS:    laceration, fracture, abrasion    DIAGNOSTIC RESULTS       RADIOLOGY: non-plainfilm images(s) such as CT, Ultrasound and MRI are read by the radiologist.  Plain radiographic images are visualized and preliminarily interpreted by the emergency physician unless otherwise stated below. XR FOOT RIGHT (MIN 3 VIEWS)   Final Result   Fracture of the medial base of the middle phalanx of the fifth digit            **This report has been created using voice recognition software. It may contain minor errors which are inherent in voice recognition technology. **      Final report electronically signed by Dr. Andrew Davis on 1/27/2023 4:56 PM            LABS:   Labs Reviewed - No data to display    EMERGENCY DEPARTMENT COURSE:   Vitals:    Vitals:    01/27/23 1603   BP: 129/78   Pulse: 82   Resp: 18   Temp: 98.7 °F (37.1 °C)   TempSrc: Oral   SpO2: 94%         MDM    Patient was seen and evaluated in the emergency department, patient appeared to be in no acute distress, vital signs reviewed, no significant findings are noted. Physical exam is completed, noted laceration to the bottom of the fifth digit on the right foot, no significant active bleeding noted at the time my evaluation. X-rays were obtained and show fracture of the medial base of the middle phalanx of the fifth digit. Discussed the case with podiatry resident on-call Dr. Ky Albarado, she came in close the wound. Patient was given 1 dose of Ancef. He will be discharged on doxycycline. And he will follow-up with podiatry on Tuesday. Patient verbalized understanding of plan of care. Patient will be discharged back to his nursing facility.   Medications   ceFAZolin (ANCEF) injection 1,000 mg (has no administration in time range)   lidocaine PF 1 % injection 5 mL (has no administration in time range)   lidocaine PF 1 % injection 5 mL (has no administration in time range)       Patient was seenindependently by myself. The patient's final impression and disposition and plan was determined by myself. CRITICAL CARE:   None    CONSULTS:  Podiatry    PROCEDURES:  None    FINAL IMPRESSION     1. Laceration of lesser toe of right foot without foreign body present or damage to nail, initial encounter    2. Open fracture of phalanx of left fifth toe, initial encounter          DISPOSITION/PLAN   Patient discharged    PATIENT REFERREDTO:  Jacinto Bowling DPM  [address] [place] [address]  [phone]    Go on 1/31/2023  For follow up and evaluation    DISCHARGE MEDICATIONS:  New Prescriptions    DOXYCYCLINE HYCLATE (VIBRA-TABS) 100 MG TABLET    Take 1 tablet by mouth 2 times daily for 10 days       (Please note that portions of this note were completed with a voice recognition program.  Efforts were made to edit the dictations but occasionally words are mis-transcribed.)      Provider:  I personally performed the services described in the documentation,reviewed and edited the documentation which was dictated to the scribe in my presence, and it accurately records my words and actions.     Wyatt Gastelum CNP 01/27/23 5:44 PM    EYAD Robertson - NICCI        AdTonik, EYAD - CNP  01/27/23 7013

## 2023-02-03 ENCOUNTER — APPOINTMENT (OUTPATIENT)
Dept: GENERAL RADIOLOGY | Age: 63
End: 2023-02-03
Payer: COMMERCIAL

## 2023-02-03 ENCOUNTER — HOSPITAL ENCOUNTER (EMERGENCY)
Age: 63
Discharge: HOME | End: 2023-02-03
Attending: EMERGENCY MEDICINE
Payer: COMMERCIAL

## 2023-02-03 VITALS
HEIGHT: 74 IN | OXYGEN SATURATION: 98 % | TEMPERATURE: 98.3 F | SYSTOLIC BLOOD PRESSURE: 133 MMHG | HEART RATE: 87 BPM | BODY MASS INDEX: 34.65 KG/M2 | DIASTOLIC BLOOD PRESSURE: 86 MMHG | WEIGHT: 270 LBS | RESPIRATION RATE: 16 BRPM

## 2023-02-03 DIAGNOSIS — R55 SYNCOPE AND COLLAPSE: Primary | ICD-10-CM

## 2023-02-03 LAB
ALBUMIN SERPL BCG-MCNC: 3.3 G/DL (ref 3.5–5.1)
ALP SERPL-CCNC: 85 U/L (ref 38–126)
ALT SERPL W/O P-5'-P-CCNC: 14 U/L (ref 11–66)
ANION GAP SERPL CALC-SCNC: 13 MEQ/L (ref 8–16)
AST SERPL-CCNC: 17 U/L (ref 5–40)
BASOPHILS ABSOLUTE: 0.1 THOU/MM3 (ref 0–0.1)
BASOPHILS NFR BLD AUTO: 1.5 %
BILIRUB CONJ SERPL-MCNC: < 0.2 MG/DL (ref 0–0.3)
BILIRUB SERPL-MCNC: 0.4 MG/DL (ref 0.3–1.2)
BUN SERPL-MCNC: 24 MG/DL (ref 7–22)
CALCIUM SERPL-MCNC: 9.5 MG/DL (ref 8.5–10.5)
CHLORIDE SERPL-SCNC: 104 MEQ/L (ref 98–111)
CO2 SERPL-SCNC: 23 MEQ/L (ref 23–33)
CREAT SERPL-MCNC: 1.5 MG/DL (ref 0.4–1.2)
DEPRECATED RDW RBC AUTO: 63 FL (ref 35–45)
EKG ATRIAL RATE: 73 BPM
EKG P AXIS: 48 DEGREES
EKG P-R INTERVAL: 200 MS
EKG Q-T INTERVAL: 394 MS
EKG QRS DURATION: 140 MS
EKG QTC CALCULATION (BAZETT): 434 MS
EKG R AXIS: -26 DEGREES
EKG T AXIS: 27 DEGREES
EKG VENTRICULAR RATE: 73 BPM
EOSINOPHIL NFR BLD AUTO: 4.6 %
EOSINOPHILS ABSOLUTE: 0.3 THOU/MM3 (ref 0–0.4)
ERYTHROCYTE [DISTWIDTH] IN BLOOD BY AUTOMATED COUNT: 19.8 % (ref 11.5–14.5)
GFR SERPL CREATININE-BSD FRML MDRD: 53 ML/MIN/1.73M2
GLUCOSE BLD STRIP.AUTO-MCNC: 196 MG/DL (ref 70–108)
GLUCOSE SERPL-MCNC: 219 MG/DL (ref 70–108)
HCT VFR BLD AUTO: 29.3 % (ref 42–52)
HGB BLD-MCNC: 9 GM/DL (ref 14–18)
IMM GRANULOCYTES # BLD AUTO: 0.03 THOU/MM3 (ref 0–0.07)
IMM GRANULOCYTES NFR BLD AUTO: 0.5 %
LYMPHOCYTES ABSOLUTE: 1 THOU/MM3 (ref 1–4.8)
LYMPHOCYTES NFR BLD AUTO: 17.2 %
MCH RBC QN AUTO: 26.5 PG (ref 26–33)
MCHC RBC AUTO-ENTMCNC: 30.7 GM/DL (ref 32.2–35.5)
MCV RBC AUTO: 86.2 FL (ref 80–94)
MONOCYTES ABSOLUTE: 0.4 THOU/MM3 (ref 0.4–1.3)
MONOCYTES NFR BLD AUTO: 6.9 %
NEUTROPHILS NFR BLD AUTO: 69.3 %
NRBC BLD AUTO-RTO: 0 /100 WBC
NT-PROBNP SERPL IA-MCNC: 510 PG/ML (ref 0–124)
PLATELET # BLD AUTO: 253 THOU/MM3 (ref 130–400)
PMV BLD AUTO: 10.4 FL (ref 9.4–12.4)
POTASSIUM SERPL-SCNC: 4.6 MEQ/L (ref 3.5–5.2)
PROT SERPL-MCNC: 5.9 G/DL (ref 6.1–8)
RBC # BLD AUTO: 3.4 MILL/MM3 (ref 4.7–6.1)
SEGMENTED NEUTROPHILS ABSOLUTE COUNT: 4.2 THOU/MM3 (ref 1.8–7.7)
SODIUM SERPL-SCNC: 140 MEQ/L (ref 135–145)
TROPONIN T: 0.04 NG/ML
TROPONIN T: 0.06 NG/ML
WBC # BLD AUTO: 6.1 THOU/MM3 (ref 4.8–10.8)

## 2023-02-03 PROCEDURE — 80053 COMPREHEN METABOLIC PANEL: CPT

## 2023-02-03 PROCEDURE — 85025 COMPLETE CBC W/AUTO DIFF WBC: CPT

## 2023-02-03 PROCEDURE — 84484 ASSAY OF TROPONIN QUANT: CPT

## 2023-02-03 PROCEDURE — 99285 EMERGENCY DEPT VISIT HI MDM: CPT

## 2023-02-03 PROCEDURE — 93005 ELECTROCARDIOGRAM TRACING: CPT

## 2023-02-03 PROCEDURE — 82248 BILIRUBIN DIRECT: CPT

## 2023-02-03 PROCEDURE — 82948 REAGENT STRIP/BLOOD GLUCOSE: CPT

## 2023-02-03 PROCEDURE — 71046 X-RAY EXAM CHEST 2 VIEWS: CPT

## 2023-02-03 PROCEDURE — 36415 COLL VENOUS BLD VENIPUNCTURE: CPT

## 2023-02-03 PROCEDURE — 83880 ASSAY OF NATRIURETIC PEPTIDE: CPT

## 2023-02-03 PROCEDURE — 93010 ELECTROCARDIOGRAM REPORT: CPT | Performed by: INTERNAL MEDICINE

## 2023-02-03 RX ORDER — FENTANYL CITRATE 50 UG/ML
100 INJECTION, SOLUTION INTRAMUSCULAR; INTRAVENOUS ONCE
Status: DISCONTINUED | OUTPATIENT
Start: 2023-02-03 | End: 2023-02-03

## 2023-02-03 RX ORDER — DROPERIDOL 2.5 MG/ML
1.25 INJECTION, SOLUTION INTRAMUSCULAR; INTRAVENOUS EVERY 6 HOURS PRN
Status: DISCONTINUED | OUTPATIENT
Start: 2023-02-03 | End: 2023-02-03

## 2023-02-03 ASSESSMENT — PAIN - FUNCTIONAL ASSESSMENT
PAIN_FUNCTIONAL_ASSESSMENT: NONE - DENIES PAIN
PAIN_FUNCTIONAL_ASSESSMENT: NONE - DENIES PAIN

## 2023-02-03 NOTE — ED NOTES
Shameka completed - 196. EKG performed and handed to Dr. Flavio Vargas.       Carlin Sicard, RHEA  02/03/23 [id number]

## 2023-02-03 NOTE — ED TRIAGE NOTES
Patient to room 11 via [facility] following an incident at physical therapy where patient became diaphoretic and had a momentary loss of concsiousness. Patient states that he does not remember this event. Patient recently had his left leg amputation below the knee. Patient is also diabetic. Patient reports that he is feeling fine now. EMS reports that patient's accucheck was 180 en route.

## 2023-02-03 NOTE — ED NOTES
In to round on patient, patient currently speaking on phone. Vitals as documented.       Bre Thompson RN  02/03/23 [id number]

## 2023-02-03 NOTE — ED NOTES
ED nurse-to-nurse bedside report    Chief Complaint   Patient presents with    Loss of Consciousness      LOC: alert and orientated to name, place, date  Vital signs   Vitals:    02/03/23 0919 02/03/23 1044 02/03/23 1156 02/03/23 1251   BP: 122/67  125/80 (!) 149/89   Pulse: 72  88 92   Resp: 16 18 13 20   Temp: 97.8 °F (36.6 °C)      TempSrc: Oral      SpO2: 100% 100% 99% 99%   Weight: 270 lb (122.5 kg)      Height: 6' 2\" (1.88 m)         Pain:    Pain Interventions: N/A  Pain Goal: N/A  Oxygen: No    Current needs required Room Air   Telemetry: Yes  LDAs:    Continuous Infusions:   Mobility: Requires assistance * 2  Zhou Fall Risk Score: No flowsheet data found.   Fall Interventions: Hourly Rounding, Side Rails x 2  Report given to: Tova Kang RN  02/03/23 0656

## 2023-02-03 NOTE — ED PROVIDER NOTES
[address] [facility] [address] [address] EMERGENCY DEPT      EMERGENCY MEDICINE     Pt Name: Margi Anderson  MRN: [id number]  Birthdate 1960  Date of evaluation: 2/3/2023  Resident Physician: Alberto Mcelroy MD  Attending Physician: Dr. Fayrene Lesches       Chief Complaint   Patient presents with    Loss of Consciousness     HISTORY OF PRESENT ILLNESS   Margi Anderson is a 65 y.o. male who presents to the emergency department from nursing home, brought in by EMS for evaluation of syncopal episode    Per nursing home patient syncopized just after physical therapy. Nursing home notes patient was diaphoretic just prior to syncopizing. Patient reports that he woke up this morning and received his insulin dose approximately 730 - 745 this morning and was taken down to breakfast about 8 - 815 and then immediately went to physical therapy which she said was very strenuous. Patient expressed having muscle aches shortness of breath and sweating during his physical therapy saying that his weights have gone up. Patient remembers being escorted to the \"cooldown room\". Then patient remembers waking up with a cold rag on his forehead. Patient was told that he had passed out. Patient does not remember have any chest pain or palpitation. Patient denies any lightheadedness, dizziness, nausea, vomiting, numbness, weakness. Patient is status post below the ankle amputation secondary to significant gangrene from foot wound caused by his poorly controlled diabetes. The patient has no other acute complaints at this time. Review of Systems    Negative Except as Documented Above.     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    Sepsis ([facility] A41.9    Acute left-sided low back pain with bilateral sciatica M54.42, M54.41    S/P transmetatarsal amputation of foot, left ([facility]) Z89.432    Leukocytosis D72.829    Wound dehiscence, surgical, initial encounter T81.31XA    Postoperative wound infection H60.90OF    Metabolic acidosis Y55.67    Hx of CABG Z95.1    Lumbar stenosis without neurogenic claudication M48.061    CKD (chronic kidney disease), stage II N18.2    Normocytic anemia D64.9    Morbid obesity ([facility]) E66.01    Debility R53.81    Carotid stenosis, asymptomatic, bilateral I65.23    Hypokalemia E87.6    Nonhealing ulcer of left lower extremity ([facility] L97.929    Bleeding R58    Wound, open T14. 8XXA    SOB (shortness of breath) on exertion R06.02    Hemoglobin A1C greater than 9%, indicating poor diabetic control R73.09    Hypertensive emergency I16.1    Acute on chronic congestive heart failure ([facility]) I50.9    Hypertensive urgency I16.0    Moderate persistent asthma J45.40    Septic arthritis of left ankle, due to unspecified organism ([facility] M00.9    Closed fracture of ankle with nonunion S82.899K    S/P BKA (below knee amputation), left ([facility] X68.936     SURGICAL HISTORY       Past Surgical History:   Procedure Laterality Date    CARDIAC SURGERY  11/2019    triple bypass    CYST REMOVAL      RIGHT ANKLE     FOOT AMPUTATION Left 1/10/2023    LEFT BELOW KNEE AMPUTATION performed by Alisha Husain DPM at [address] [employer] Left 4/20/2020    LEFT TRANSMETATARSAL AMPUTATION  FOOT INCISION AND DRAINAGE performed by Alisha Husain DPM at [address] [employer] Left 7/20/2020    LEFT WOUND DEBRIDEMENT WITH WOUND VAC APPLICATION performed by Alisha Husain DPM at [address] [facility]    FOOT DEBRIDEMENT Left 7/1/2022    LEFT FOOT EXCISONAL WOUND DEBRIDEMENT, APPLICATION SKIN SUBSTITUTE GRAFT, APPLICATION [facility] WOUND VAC performed by Sade Garcia DPM at [address] [place] [address] Right     CYST REMOVED    FOOT SURGERY Left 8/14/2020    LEFT FOOT PREPARATION GRAFT SITE, HAVEST SPLIT THICKNESS SKIN GRAFT WITH APPLICATION, APPLICATION [facility] WOUND VAC performed by Sade Garcia DPM at [address] Left 3/3/2020    I&D LEFT FOOT, TRANSMETATARSAL AMPUTATION performed by Sade Garcia DPM at [address] 11/23/2022    EGD performed by Анна Fisher MD at [facility]       CURRENT MEDICATIONS       Previous Medications    ALBUTEROL SULFATE HFA (VENTOLIN HFA) 108 (90 BASE) MCG/ACT INHALER    Inhale 2 puffs into the lungs 4 times daily as needed for Wheezing    AMLODIPINE (NORVASC) 10 MG TABLET    Take 1 tablet by mouth daily Hold for SBP <120    ASPIRIN 81 MG CHEWABLE TABLET    Take 1 tablet by mouth daily    ATORVASTATIN (LIPITOR) 80 MG TABLET    Take 1 tablet by mouth daily    CLONIDINE (CATAPRES) 0.1 MG TABLET    Take 1 tablet by mouth 2 times daily as needed for High Blood Pressure For SBP >170    CLOPIDOGREL (PLAVIX) 75 MG TABLET    Take 1 tablet by mouth daily    DOXYCYCLINE HYCLATE (VIBRA-TABS) 100 MG TABLET    Take 1 tablet by mouth 2 times daily for 10 days    FERROUS SULFATE (IRON 325) 325 (65 FE) MG TABLET    Take 1 tablet by mouth 2 times daily    FUROSEMIDE (LASIX) 40 MG TABLET    Take 1 tablet by mouth daily Hold SBP <110    GABAPENTIN (NEURONTIN) 600 MG TABLET    Take 1 tablet by mouth 2 times daily for 180 days. HYDROCODONE-ACETAMINOPHEN (NORCO) 5-325 MG PER TABLET    Take 1 tablet by mouth every 4 hours as needed.     INSULIN GLARGINE (LANTUS) 100 UNIT/ML INJECTION VIAL    Inject 10 Units into the skin 2 times daily    INSULIN LISPRO (HUMALOG) 100 UNIT/ML SOLN INJECTION VIAL    Inject 0-16 Units into the skin 3 times daily (with meals)    INSULIN LISPRO (HUMALOG) 100 UNIT/ML SOLN INJECTION VIAL    Inject 0-4 Units into the skin nightly    METOPROLOL TARTRATE (LOPRESSOR) 50 MG TABLET    Take 1 tablet by mouth 2 times daily Hold SBP <100 or HR <60    MINOXIDIL (LONITEN) 10 MG TABLET    Take 1 tablet by mouth daily Hold SBP <120    MULTIPLE VITAMINS-MINERALS (MULTIVITAMIN PO)    Take 1 tablet by mouth daily     POLYETHYLENE GLYCOL 3350 (MIRALAX PO)    Take by mouth as needed    TIOTROPIUM (SPIRIVA RESPIMAT) 2.5 MCG/ACT AERS INHALER    Inhale 2 puffs into the lungs daily    VALSARTAN (DIOVAN) 160 MG TABLET    Take 1 tablet by mouth daily Hold SBP <110       ALLERGIES     has No Known Allergies. FAMILY HISTORY     He indicated that his mother is alive. He indicated that his father is . He indicated that both of his sisters are alive. He indicated that his brother is alive. He indicated that the status of his neg hx is unknown. SOCIAL HISTORY       Social History     Tobacco Use    Smoking status: Former     Types: Cigarettes     Quit date:      Years since quitting: 15.1    Smokeless tobacco: Never   Vaping Use    Vaping Use: Never used   Substance Use Topics    Alcohol use: Not Currently    Drug use: Never       PHYSICAL EXAM       ED Triage Vitals [23 0919]   BP Temp Temp Source Heart Rate Resp SpO2 Height Weight   122/67 97.8 °F (36.6 °C) Oral 72 16 100 % 6' 2\" (1.88 m) 270 lb (122.5 kg)       Physical Exam  General: Lying in bed no obvious distress  HEENT:  normocephalic and atraumatic. No scleral icterus. PERR. EOMI  Neck: Supple. No JVD. No thyromegaly. Lungs: clear to auscultation. No retractions, No evidence of Respiratory Distress   Cardiac: RRR without MGR, Bilateral Radial 2+  Abdomen: soft. Nontender. Nondistended, Bowel Sounds Present  Extremities:  No clubbing, cyanosis, or edema x 4. Cap Refill < 2 Seconds    Skin:  warm and dry.  No rashes or bruises  Psych:  Alert and oriented x3. Affect appropriate  Lymph:  No supraclavicular adenopathy. Neurologic:   Strength 5 out of 5 bilateral upper lower extremities, sensation proprioception intact,    FORMAL DIAGNOSTIC RESULTS     RADIOLOGY: Interpretation per the Radiologist below, if available at the time of this note (none if blank):    XR CHEST (2 VW)   Final Result   1. No acute cardiopulmonary finding. **This report has been created using voice recognition software. It may contain minor errors which are inherent in voice recognition technology. **      Final report electronically signed by Dr Mesfin Buck on 2/3/2023 10:50 AM          LABS: (none if blank)  Labs Reviewed   BRAIN NATRIURETIC PEPTIDE - Abnormal; Notable for the following components:       Result Value    Pro-.0 (*)     All other components within normal limits   TROPONIN - Abnormal; Notable for the following components:    Troponin T 0.057 (*)     All other components within normal limits   BASIC METABOLIC PANEL - Abnormal; Notable for the following components:    Glucose 219 (*)     BUN 24 (*)     Creatinine 1.5 (*)     All other components within normal limits   HEPATIC FUNCTION PANEL - Abnormal; Notable for the following components:    Albumin 3.3 (*)     Total Protein 5.9 (*)     All other components within normal limits   CBC WITH AUTO DIFFERENTIAL - Abnormal; Notable for the following components:    RBC 3.40 (*)     Hemoglobin 9.0 (*)     Hematocrit 29.3 (*)     MCHC 30.7 (*)     RDW-CV 19.8 (*)     RDW-SD 63.0 (*)     All other components within normal limits   GLOMERULAR FILTRATION RATE, ESTIMATED - Abnormal; Notable for the following components:    Est, Glom Filt Rate 53 (*)     All other components within normal limits   TROPONIN - Abnormal; Notable for the following components:    Troponin T 0.042 (*)     All other components within normal limits   POCT GLUCOSE - Abnormal; Notable for the following components:    POC Glucose 196 (*)     All other components within normal limits   ANION GAP       (Any cultures that may have been sent were not resulted at the time of this patient visit)    [address] / ED COURSE:     1) Number and Complexity of Problems            Problem List This Visit:         Chief Complaint   Patient presents with    Loss of Consciousness            Differential Diagnosis includes (but not limited to):  Transient hypoglycemia, ACS, PE, pneumothorax, stroke, sepsis, electrolyte abnormalities, acute onset anemia,                 Pertinent Comorbid Conditions:    Poorly controlled type 2 diabetes on insulin    2)  Data Reviewed (none if left blank)          My Independent interpretations:     EKG:      Normal sinus rhythm with bifascicular block unchanged from prior EKG    Imaging: See Ed Course    Labs:      See Ed Course                 Decision Rules/Clinical Scores utilized:              External Documentation Reviewed:         Previous patient encounter documents & history available on EMR was reviewed              See Formal Diagnostic Results above for the lab and radiology tests and orders. 3)  Treatment and Disposition         ED Reassessment: Upon reassessment, patient lying in bed having no obvious distress. Please see ED course for further reassessments, treatments, MDM, and final disposition.             Case discussed with consulting clinician:           Shared Decision-Making was performed and disposition discussed with the        Patient/Family and questions answered          Social determinants of health impacting treatment or disposition:           Code Status:  Full      Summary of Patient Presentation:      MDM  Number of Diagnoses or Management Options  Syncope and collapse: new, needed workup     Amount and/or Complexity of Data Reviewed  Clinical lab tests: ordered and reviewed  Tests in the radiology section of CPT®: ordered and reviewed  Decide to obtain previous medical records or to obtain history from someone other than the patient: yes  Review and summarize past medical records: yes  Independent visualization of images, tracings, or specimens: yes    Patient Progress  Patient progress: improved   /   ED Course as of 02/03/23 1508   Fri Feb 03, 2023   1506 Patient's troponin mildly elevated but at baseline. Electrolytes also at baseline. Glucose came back at 196. Suspect patient's syncopal episode may have been transient hypoglycemia given the rapid sequence of the events that occurred this morning and he did not have time to digest his food after receiving his insulin prior to working out. Discussed these results with the patient. Patient agrees he is suitable for discharge at this time will follow-up with his primary care doctor following his visit today. Patient has no further complaints [COOKIE]      ED Course User Index  [COOKIE] Cheryl Hall MD     Vitals Reviewed:    Vitals:    02/03/23 1044 02/03/23 1156 02/03/23 1251 02/03/23 1443   BP:  125/80 (!) 149/89 133/86   Pulse:  88 92 87   Resp: 18 13 20 16   Temp:    98.3 °F (36.8 °C)   TempSrc:    Oral   SpO2: 100% 99% 99% 98%   Weight:       Height:           The patient was seen and examined. Appropriate diagnostic testing was performed and results reviewed with the patient. The results of pertinent diagnostic studies and exam findings were discussed. The patients provisional diagnosis and plan of care were discussed with the patient and present family who expressed understanding. Any medications were reviewed and indications and risks of medications were discussed with the patient /family present. Strict verbal and written return precautions, instructions and appropriate follow-up provided to  the patient .      ED Medications administered this visit:  (None if blank)  Medications - No data to display      PROCEDURES: (None if blank)  Procedures:     CRITICAL CARE: (Please see Attending note / Attestation regarding Critical Care Time. )      DISCHARGE PRESCRIPTIONS: (None if blank)  New Prescriptions    No medications on file       FINAL IMPRESSION      1. Syncope and collapse          DISPOSITION/PLAN   DISPOSITION Decision To Discharge 02/03/2023 02:46:13 PM      OUTPATIENT FOLLOW UP THE PATIENT:  [address] [facility] [address] [address] EMERGENCY DEPT  [facility] [place] [address] [phone]  Go to   If symptoms worsen    Euell Fees, APRN - CNP  [address]  [facility]  [phone]    Call in 1 week      Phyllis Jones MD    This transcription was electronically signed. Parts of this transcriptions may have been dictated by use of voice recognition software and electronically transcribed, and parts may have been transcribed with the assistance of an ED scribe. The transcription may contain errors not detected in proofreading. Please refer to my supervising physician's documentation if my documentation differs.     Electronically Signed: Phyllis Jones MD, 02/03/23, 3:08 PM       Ivy Leigh MD  Resident  02/03/23 6083

## 2023-02-03 NOTE — ED NOTES
Patient is resting in bed, no further complaints at this time.      Neymar Washington  02/03/23 8979

## 2023-02-03 NOTE — ED NOTES
Bedside report taken from [address] this nurse assuming care  Patient resting in bed. Respirations easy and unlabored. No distress noted. Call light within reach.        Zulma Cunningham, RN  02/03/23 5510

## 2023-02-03 NOTE — DISCHARGE INSTRUCTIONS
We suspect your syncopal episode was secondary to transient hypoglycemia given the timing of your insulin and eating and exercise.  Your work-up today was overall unremarkable.  Please return emerged part if any worsening symptoms or new concerns.  Follow-up with your primary care doctor following visit today.

## 2023-02-13 ENCOUNTER — HOSPITAL ENCOUNTER (EMERGENCY)
Age: 63
Discharge: HOME | End: 2023-02-13
Payer: COMMERCIAL

## 2023-02-13 VITALS
SYSTOLIC BLOOD PRESSURE: 148 MMHG | WEIGHT: 280 LBS | TEMPERATURE: 98 F | HEIGHT: 74 IN | RESPIRATION RATE: 18 BRPM | OXYGEN SATURATION: 99 % | DIASTOLIC BLOOD PRESSURE: 89 MMHG | BODY MASS INDEX: 35.94 KG/M2 | HEART RATE: 82 BPM

## 2023-02-13 DIAGNOSIS — R19.7 DIARRHEA OF PRESUMED INFECTIOUS ORIGIN: Primary | ICD-10-CM

## 2023-02-13 PROCEDURE — 6370000000 HC RX 637 (ALT 250 FOR IP): Performed by: PHYSICIAN ASSISTANT

## 2023-02-13 PROCEDURE — 99283 EMERGENCY DEPT VISIT LOW MDM: CPT

## 2023-02-13 RX ORDER — ONDANSETRON 4 MG/1
4 TABLET, ORALLY DISINTEGRATING ORAL ONCE
Status: COMPLETED | OUTPATIENT
Start: 2023-02-13 | End: 2023-02-13

## 2023-02-13 RX ADMIN — ONDANSETRON 4 MG: 4 TABLET, ORALLY DISINTEGRATING ORAL at 04:12

## 2023-02-13 ASSESSMENT — ENCOUNTER SYMPTOMS
RHINORRHEA: 0
NAUSEA: 1
SORE THROAT: 0
SHORTNESS OF BREATH: 0
DIARRHEA: 0
BACK PAIN: 0
PHOTOPHOBIA: 0
BLOOD IN STOOL: 0
CONSTIPATION: 1
ABDOMINAL PAIN: 1
VOMITING: 0
COUGH: 0

## 2023-02-13 ASSESSMENT — PAIN - FUNCTIONAL ASSESSMENT: PAIN_FUNCTIONAL_ASSESSMENT: NONE - DENIES PAIN

## 2023-02-13 NOTE — ED TRIAGE NOTES
Patient presents to the ED via EMS from [facility] care with chief complaint of diarrhea. Patient states he ate some bad food tonight. Patient resting in bed. Respirations easy and unlabored. No distress noted. Call light within reach.

## 2023-02-13 NOTE — ED PROVIDER NOTES
[address] [facility] [address] [address] EMERGENCY DEPT      Pt Name: Miko Chang  MRN: [id number]  Birthdate 1960  Date of evaluation: 2/13/2023  Provider: Marielos Helms PA-C    CHIEF COMPLAINT       Chief Complaint   Patient presents with    Diarrhea       Nurses Notes reviewed and I agree except as noted in the HPI. HISTORY OF PRESENT ILLNESS    Miko Chang is a 65 y.o. male who presents via EMS from a local nursing home rehab facility for diarrhea. Patient ate a sub sandwich from [place] during the [employer]. Sometime after the game around 2300 patient started to feel unwell. He developed abdominal cramping. Patient had 2 solid bowel movements, a third bowel movement that was partially formed then liquid, and then proceeded to have 5-6 more diarrhea type bowel movements with a lot of gas. Patient explains that while on the commode for the last few episodes he became lightheaded and sweaty. This concerned nursing staff prompting them to advise the patient to come to the ER for evaluation. Patient did not want to come in as he started to feel better but they were insistent. Patient informs me he does not want to be here and does not feel he needs any type of work-up. Patient affirms slight nausea with some belching but denies vomiting, fever, chills, bloody stools, chest pain, dyspnea, weakness, current abdominal pain, or any other complaints. Patient underwent a left BKA 3 weeks ago by Dr. Nazario Gardiner which is why he is in the nursing home rehab facility. REVIEW OF SYSTEMS     Review of Systems   Constitutional:  Positive for diaphoresis (Resolved). Negative for appetite change, chills and fever. HENT:  Negative for congestion, ear pain, rhinorrhea and sore throat. Eyes:  Negative for photophobia. Respiratory:  Negative for cough and shortness of breath. Cardiovascular:  Negative for chest pain.    Gastrointestinal:  Positive for abdominal pain (Cramping earlier that has resolved), constipation and nausea. Negative for blood in stool, diarrhea and vomiting. Genitourinary:  Negative for decreased urine volume, difficulty urinating, dysuria, flank pain and frequency. Musculoskeletal:  Negative for back pain. Skin:  Negative for rash. Neurological:  Positive for light-headedness (Resolved). Negative for dizziness and weakness. Psychiatric/Behavioral:  Negative for confusion. PAST MEDICAL HISTORY    has a past medical history of Arthritis, CAD (coronary artery disease), CKD (chronic kidney disease), stage II, Diabetes mellitus ([facility] [address] Hyperlipidemia, Hypertension, and Liver disease. SURGICAL HISTORY      has a past surgical history that includes Cardiac surgery (11/2019); Foot surgery (Right); Tonsillectomy; cyst removal; Toe amputation (Left, 3/3/2020); Foot Debridement (Left, 4/20/2020); Foot Debridement (Left, 7/20/2020); Foot surgery (Left, 8/14/2020); Foot surgery; Foot Debridement (Left, 7/1/2022); Upper gastrointestinal endoscopy (N/A, 11/23/2022); and Foot Amputation (Left, 1/10/2023). CURRENT MEDICATIONS       Previous Medications    ALBUTEROL SULFATE HFA (VENTOLIN HFA) 108 (90 BASE) MCG/ACT INHALER    Inhale 2 puffs into the lungs 4 times daily as needed for Wheezing    AMLODIPINE (NORVASC) 10 MG TABLET    Take 1 tablet by mouth daily Hold for SBP <120    ASPIRIN 81 MG CHEWABLE TABLET    Take 1 tablet by mouth daily    ATORVASTATIN (LIPITOR) 80 MG TABLET    Take 1 tablet by mouth daily    CLONIDINE (CATAPRES) 0.1 MG TABLET    Take 1 tablet by mouth 2 times daily as needed for High Blood Pressure For SBP >170    CLOPIDOGREL (PLAVIX) 75 MG TABLET    Take 1 tablet by mouth daily    FERROUS SULFATE (IRON 325) 325 (65 FE) MG TABLET    Take 1 tablet by mouth 2 times daily    FUROSEMIDE (LASIX) 40 MG TABLET    Take 1 tablet by mouth daily Hold SBP <110    GABAPENTIN (NEURONTIN) 600 MG TABLET    Take 1 tablet by mouth 2 times daily for 180 days.     HYDROCODONE-ACETAMINOPHEN (NORCO) 5-325 MG PER TABLET    Take 1 tablet by mouth every 4 hours as needed. INSULIN GLARGINE (LANTUS) 100 UNIT/ML INJECTION VIAL    Inject 10 Units into the skin 2 times daily    INSULIN LISPRO (HUMALOG) 100 UNIT/ML SOLN INJECTION VIAL    Inject 0-16 Units into the skin 3 times daily (with meals)    INSULIN LISPRO (HUMALOG) 100 UNIT/ML SOLN INJECTION VIAL    Inject 0-4 Units into the skin nightly    METOPROLOL TARTRATE (LOPRESSOR) 50 MG TABLET    Take 1 tablet by mouth 2 times daily Hold SBP <100 or HR <60    MINOXIDIL (LONITEN) 10 MG TABLET    Take 1 tablet by mouth daily Hold SBP <120    MULTIPLE VITAMINS-MINERALS (MULTIVITAMIN PO)    Take 1 tablet by mouth daily     POLYETHYLENE GLYCOL 3350 (MIRALAX PO)    Take by mouth as needed    TIOTROPIUM (SPIRIVA RESPIMAT) 2.5 MCG/ACT AERS INHALER    Inhale 2 puffs into the lungs daily    VALSARTAN (DIOVAN) 160 MG TABLET    Take 1 tablet by mouth daily Hold SBP <110       ALLERGIES     has No Known Allergies. FAMILY HISTORY     He indicated that his mother is alive. He indicated that his father is . He indicated that both of his sisters are alive. He indicated that his brother is alive. He indicated that the status of his neg hx is unknown.   family history includes Alzheimer's Disease in his father; Diabetes in his mother; Heart Disease in his father; High Blood Pressure in his father and mother. SOCIAL HISTORY    reports that he quit smoking about 15 years ago. His smoking use included cigarettes. He has never used smokeless tobacco. He reports that he does not currently use alcohol. He reports that he does not use drugs. PHYSICAL EXAM     INITIAL VITALS:  height is 6' 2\" (1.88 m) and weight is 280 lb (127 kg). His temperature is 98 °F (36.7 °C). His blood pressure is 148/89 (abnormal) and his pulse is 82. His respiration is 18 and oxygen saturation is 99%. Physical Exam  Vitals and nursing note reviewed.    Constitutional:       General: He is not in acute distress. Appearance: Normal appearance. He is well-developed. He is obese. He is not toxic-appearing or diaphoretic. HENT:      Head: Normocephalic and atraumatic. Right Ear: Hearing normal.      Left Ear: Hearing normal.      Nose: Nose normal. No rhinorrhea. Mouth/Throat:      Lips: Pink. Mouth: Mucous membranes are moist.      Pharynx: Uvula midline. Eyes:      General: Lids are normal. No scleral icterus. Extraocular Movements: Extraocular movements intact. Conjunctiva/sclera: Conjunctivae normal.      Pupils: Pupils are equal, round, and reactive to light. Neck:      Trachea: Trachea normal. No tracheal deviation. Cardiovascular:      Rate and Rhythm: Normal rate and regular rhythm. Heart sounds: Normal heart sounds. No murmur heard. Pulmonary:      Effort: Pulmonary effort is normal. No respiratory distress. Breath sounds: Normal breath sounds. No stridor. No decreased breath sounds or wheezing. Abdominal:      General: Bowel sounds are normal. There is no distension. Palpations: Abdomen is soft. Abdomen is not rigid. There is no pulsatile mass. Tenderness: There is no abdominal tenderness. There is no right CVA tenderness, left CVA tenderness, guarding or rebound. Negative signs include Garcia's sign and McBurney's sign. Hernia: No hernia is present. Musculoskeletal:         General: Normal range of motion. Cervical back: No rigidity. No muscular tenderness. Comments: Movement normal as observed. Left BKA   Lymphadenopathy:      Cervical: No cervical adenopathy. Skin:     General: Skin is warm and dry. Coloration: Skin is not pale. Findings: No rash. Neurological:      General: No focal deficit present. Mental Status: He is alert and oriented to person, place, and time. Sensory: No sensory deficit.       Gait: Gait normal.   Psychiatric:         Attention and Perception: Attention normal. Mood and Affect: Mood normal.         Speech: Speech normal.         Behavior: Behavior normal. Behavior is cooperative. Thought Content: Thought content normal.         Cognition and Memory: Cognition normal.       DIFFERENTIAL DIAGNOSIS:   Including but not limited to: Food poisoning, other infectious diarrhea, vasovagal episode, dehydration    Diagnoses Considered but I have low suspicion of:   GISELA, ACS, C. difficile    DIAGNOSTIC RESULTS     EKG: All E[facility]'s are [facility] who either signs or Co-signs this chart in the absence of a cardiologist.  None-declined    RADIOLOGY: non-plain film images(s) such as CT,Ultrasound and MRI are read by the radiologist.  Plain radiographic images are visualized and preliminarily interpreted by the emergency physician unless otherwise stated below. No orders to display       LABS:   Labs Reviewed - No data to display    EMERGENCY DEPARTMENT COURSE:   Vitals:    Vitals:    02/13/23 0327   BP: (!) 148/89   Pulse: 82   Resp: 18   Temp: 98 °F (36.7 °C)   SpO2: 99%   Weight: 280 lb (127 kg)   Height: 6' 2\" (1.88 m)       MDM:  The patient was seen and evaluated within the ED today for diarrhea. On exam, I appreciated no acute findings. The patient was smiling, pleasant, and nontoxic-appearing. Abdomen was soft and nontender. Old records were reviewed. Within the department, I observed the patient's vital signs to be within acceptable range. Patient was offered work-up including labs and IV fluids. Patient declined. He felt much improved and requested Zofran only and some oral food and fluids. Within the department the patient was treated with Zofran ODT. I observed the patient's condition to modestly improve during the duration of their stay. On re-exam his symptoms were resolved. There was no diarrhea in the ER. The patient tolerated applesauce and oral fluids. Vital signs remained stable.  The patient remained non-toxic appearing with no distress evident in the ER. The patient was comfortable with the plan of discharge home and to follow up with his PCP. Anticipatory guidance was given. The patient was instructed to return to the emergency department for any worsening of their symptoms. Patient was discharged from the emergency department in good condition with all questions answered. See disposition below. I have given the patient strict written and verbal instructions about care at home, follow-up, and signs and symptoms of worsening of condition and they did verbalize understanding. CRITICAL CARE:   None    CONSULTS:  None    PROCEDURES:  None    FINAL IMPRESSION      1. Diarrhea of presumed infectious origin          DISPOSITION/PLAN     1.  Diarrhea of presumed infectious origin        PATIENT REFERRED TO:  Cata Ge, APRN - CNP  [address] [facility]  [address] [facility]  [phone]    Schedule an appointment as soon as possible for a visit         DISCHARGE MEDICATIONS:  New Prescriptions    No medications on file       (Please note that portions of this note were completed with a voice recognition program.  Efforts were made to edit the dictations but occasionally words are mis-transcribed.)    Daniel Dale PA-C 02/13/23 5:04 AM    PEYTON Hauser PA-C  02/13/23 8827

## 2023-02-13 NOTE — ED NOTES
Patient resting in bed. Respirations easy and unlabored. No distress noted. Call light within reach.      Farzad Banks RN  02/13/23 2038

## 2023-02-13 NOTE — ED NOTES
Patient provided with jello at this time. Patient resting in bed. Respirations easy and unlabored. No distress noted. Call light within reach. LACP to pick patient up at 0645.       Eusebia Naqvi RN  02/13/23 8256

## 2023-02-13 NOTE — ED NOTES
Attempted to call report back to [facility] with no response.       Dawson Quintana, RN  02/13/23 3497

## 2023-02-20 ENCOUNTER — TELEPHONE (OUTPATIENT)
Dept: ONCOLOGY | Age: 63
End: 2023-02-20

## 2023-02-20 DIAGNOSIS — D64.9 ANEMIA, UNSPECIFIED TYPE: Primary | ICD-10-CM

## 2023-03-10 ENCOUNTER — CARE COORDINATION (OUTPATIENT)
Dept: CARE COORDINATION | Age: 63
End: 2023-03-10

## 2023-03-10 NOTE — CARE COORDINATION
Patient discharged home from [facility] today. Will notify ACM at this time.

## 2023-03-13 ENCOUNTER — CARE COORDINATION (OUTPATIENT)
Dept: CARE COORDINATION | Age: 63
End: 2023-03-13

## 2023-03-13 NOTE — CARE COORDINATION
Ambulatory Care Coordination Note  3/13/2023    Patient Current Location:  Home: [place]    ACM contacted the patient by telephone. Verified name and  with patient as identifiers. Provided introduction to self, and explanation of the ACM role. ACM: King Zhao RN    Challenges to be reviewed by the provider   Additional needs identified to be addressed with provider: Yes  medications-pt d/c from [facility] facility on 3/10. Reports medications were changed during his hospitalization and SNF stay but was not given any scripts at d/c. I attempted to review with pt what meds he is currently taking but he declined review stating that he has just went back to taking what he was before. Pt does not have f/u until Friday 3/17. Anyway pt could be seen earlier? If so, earliest  pt could be seen this wk is Wed b/c he relies on transportation and needs 2 days notice. Please review and advise. Method of communication with provider: chart routing. Valerie Ray was referred to care coordination by CTN following SNF d/c. Pt was initially admitted in  for septic arthritis of left ankle. Underwent distal transtibial amputation of lle on 1/10. Was d/c to [facility] facility for rehab. Pt d/c to home with [facility] on 3/10. [facility] called pt today. They will be calling back to arrange home visit. Pt has h/o: CHF, CAD, DM, HTN, CKD  Pt has scooter that he uses to get around his home. Pt very independent. Lives by himself. 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 Independent with bathing. Has a ramp. Uses [employer] for transport or [employer]. F/u with [facility] on 3/23 for shrink sleeve and prosthetic eval.   Following with Dr. Doirna Ferguson has an appt but unsure when at time of call  F/u with PCP 3/17. Concerns regarding medication.   Pt declined med review today b/c he states that his med list on d/c papers from SNF does not match what he was taking prior to admission. Pt was not given any new scripts so he has went back to taking what he was prior to admission until he meets with PCP. Pt is agreeable in getting in sooner with PCP as long as he can get transport. Requesting 2 day notice prior to appt so he can set up ride. Will discuss with PCP as I do not see availability within her schedule. DM: A1C in Jan 8.3.  per med rec pt is on Lantus and Humalog. Pt only checking BS's twice daily. Admits that he is on sliding scale but is not checking BS before each meal to dose insulin properly. Educated pt on this. Pt wanting to discuss with PCP at f/u appt. Pt currently checking BS 2 hrs after meal.    Eating 3-4 meals per day. No open wounds. Reports BS's fairly well controlled. Had not checked BS at time of call.  yesterday. CHF: unable to weigh self d/t recent amputation. Lasix listed but pt unsure if he is taking. Pt ending call early d/t someone being at his house that he needed to talk to. Pt requesting to call back if appt able to be moved up. Plan of care:  Weekly ACM calls   to mail out DM, CHF zone tools  Will attempt to get pt in to see PCP earlier than 3/17 for med review/clarification  F/u with [facility] as scheduled  F/u with podiatry as scheduled  Check BS's before meals and follow Humalog sliding scale accordingly. Monitor for s/s of edema. F/u with [facility]  Reinforce safety precautions  Diabetes Assessment    Medic Alert ID: No  How often do you test your blood sugar?:  (Comment: twice daily)   Do you have barriers with adherence to non-pharmacologic self-management interventions?  (Nutrition/Exercise/Self-Monitoring): No   Have you ever had to go to the ED for symptoms of low blood sugar?: No       Do you have hyperglycemia symptoms?: No   Do you have hypoglycemia symptoms?: No   Last Blood Sugar Value: 159         and   Congestive Heart Failure Assessment Symptoms:  CHF associated angina: Neg, CHF associated dyspnea on exertion: Neg, CHF associated fatigue: Neg, CHF associated leg swelling: Neg, CHF associated orthostatic hypotension: Neg, CHF associated PND: Neg, CHF associated shortness of breath: Neg, CHF associated weakness: Neg      Symptom course: stable  Patient-reported weight (lb):  (Comment: unable to weigh d/t recent amputation)  Salt intake watch compared to last visit: stable               Offered patient enrollment in the Remote Patient Monitoring (RPM) program for in-home monitoring:  did not discuss during call today. .    Ambulatory Care Coordination Assessment    Care Coordination Protocol  Referral from Primary Care Provider: No  Week 1 - Initial Assessment     Do you have all of your prescriptions and are they filled?: No  Barriers to medication adherence: None  Are you able to afford your medications?: Yes  How often do you have trouble taking your medications the way you have been told to take them?: I always take them as prescribed.      Do you have Home O2 Therapy?: No                        Suggested Interventions and Community Resources                  Future Appointments   Date Time Provider Marissa Alvarez   3/17/2023 11:00 AM [facility] [address] [facility]   5/26/2023 11:45 AM MD Baldo Franklin [place] HEAR [address] [facility]

## 2023-03-13 NOTE — TELEPHONE ENCOUNTER
We do not have any discharge paperwork from the nursing home. The orders I have are from his discharge from hospital. We are full this week. Have him keep his appointment for Friday at this time.

## 2023-03-13 NOTE — TELEPHONE ENCOUNTER
I have left a message with [facility] requesting d/c orders/instructions including full med list be faxed over to PCP office. Attempted to reach [facility] [place].  No answer. Message left requesting return call.

## 2023-03-14 ENCOUNTER — CARE COORDINATION (OUTPATIENT)
Dept: CARE COORDINATION | Age: 63
End: 2023-03-14

## 2023-03-17 ENCOUNTER — OFFICE VISIT (OUTPATIENT)
Dept: FAMILY MEDICINE CLINIC | Age: 63
End: 2023-03-17

## 2023-03-17 VITALS — HEART RATE: 77 BPM | SYSTOLIC BLOOD PRESSURE: 132 MMHG | OXYGEN SATURATION: 99 % | DIASTOLIC BLOOD PRESSURE: 82 MMHG

## 2023-03-17 DIAGNOSIS — L97.929 NON-HEALING ULCER OF LOWER EXTREMITY, LEFT, WITH UNSPECIFIED SEVERITY (HCC): ICD-10-CM

## 2023-03-17 DIAGNOSIS — Z91.89 AT RISK FOR FLUID VOLUME OVERLOAD: ICD-10-CM

## 2023-03-17 DIAGNOSIS — U07.1 COVID-19: ICD-10-CM

## 2023-03-17 DIAGNOSIS — I50.33 ACUTE ON CHRONIC DIASTOLIC CONGESTIVE HEART FAILURE (HCC): ICD-10-CM

## 2023-03-17 DIAGNOSIS — Z89.432 S/P TRANSMETATARSAL AMPUTATION OF FOOT, LEFT (HCC): ICD-10-CM

## 2023-03-17 DIAGNOSIS — I25.10 CORONARY ARTERY DISEASE INVOLVING NATIVE CORONARY ARTERY OF NATIVE HEART WITHOUT ANGINA PECTORIS: ICD-10-CM

## 2023-03-17 DIAGNOSIS — I73.9 PERIPHERAL VASCULAR DISEASE, UNSPECIFIED (HCC): ICD-10-CM

## 2023-03-17 DIAGNOSIS — D64.9 ANEMIA, UNSPECIFIED TYPE: ICD-10-CM

## 2023-03-17 DIAGNOSIS — E11.51 TYPE 2 DIABETES MELLITUS WITH DIABETIC PERIPHERAL ANGIOPATHY WITHOUT GANGRENE, WITH LONG-TERM CURRENT USE OF INSULIN (HCC): ICD-10-CM

## 2023-03-17 DIAGNOSIS — Z79.4 TYPE 2 DIABETES MELLITUS WITH DIABETIC PERIPHERAL ANGIOPATHY WITHOUT GANGRENE, WITH LONG-TERM CURRENT USE OF INSULIN (HCC): ICD-10-CM

## 2023-03-17 DIAGNOSIS — Z09 HOSPITAL DISCHARGE FOLLOW-UP: Primary | ICD-10-CM

## 2023-03-17 PROBLEM — Z89.512 LEFT BELOW-KNEE AMPUTEE (HCC): Status: ACTIVE | Noted: 2023-03-17

## 2023-03-17 RX ORDER — ASPIRIN 81 MG/1
81 TABLET, CHEWABLE ORAL DAILY
Qty: 90 TABLET | Refills: 1 | Status: SHIPPED | OUTPATIENT
Start: 2023-03-17

## 2023-03-17 RX ORDER — MINOXIDIL 10 MG/1
10 TABLET ORAL DAILY
Qty: 90 TABLET | Refills: 1 | Status: SHIPPED | OUTPATIENT
Start: 2023-03-17

## 2023-03-17 RX ORDER — ALBUTEROL SULFATE 90 UG/1
2 AEROSOL, METERED RESPIRATORY (INHALATION) 4 TIMES DAILY PRN
Qty: 54 G | Refills: 1 | Status: SHIPPED | OUTPATIENT
Start: 2023-03-17

## 2023-03-17 RX ORDER — POTASSIUM CHLORIDE 20 MEQ/1
20 TABLET, EXTENDED RELEASE ORAL DAILY
Qty: 90 TABLET | Refills: 1 | Status: SHIPPED | OUTPATIENT
Start: 2023-03-17

## 2023-03-17 RX ORDER — GABAPENTIN 600 MG/1
600 TABLET ORAL 2 TIMES DAILY
Qty: 180 TABLET | Refills: 1 | Status: SHIPPED | OUTPATIENT
Start: 2023-03-17 | End: 2023-09-13

## 2023-03-17 RX ORDER — ATORVASTATIN CALCIUM 80 MG/1
80 TABLET, FILM COATED ORAL DAILY
Qty: 30 TABLET | Refills: 1 | Status: SHIPPED | OUTPATIENT
Start: 2023-03-17

## 2023-03-17 RX ORDER — METOPROLOL TARTRATE 50 MG/1
50 TABLET, FILM COATED ORAL 2 TIMES DAILY
Qty: 180 TABLET | Refills: 1 | Status: SHIPPED | OUTPATIENT
Start: 2023-03-17

## 2023-03-17 RX ORDER — FERROUS SULFATE 325(65) MG
325 TABLET ORAL 2 TIMES DAILY
Qty: 180 TABLET | Refills: 1 | Status: SHIPPED | OUTPATIENT
Start: 2023-03-17

## 2023-03-17 RX ORDER — VALSARTAN 160 MG/1
160 TABLET ORAL DAILY
Qty: 90 TABLET | Refills: 1 | Status: SHIPPED | OUTPATIENT
Start: 2023-03-17

## 2023-03-17 RX ORDER — FUROSEMIDE 40 MG/1
40 TABLET ORAL DAILY
Qty: 90 TABLET | Refills: 1 | Status: SHIPPED | OUTPATIENT
Start: 2023-03-17

## 2023-03-17 RX ORDER — AMLODIPINE BESYLATE 10 MG/1
10 TABLET ORAL DAILY
Qty: 90 TABLET | Refills: 1 | Status: SHIPPED | OUTPATIENT
Start: 2023-03-17

## 2023-03-17 RX ORDER — CLOPIDOGREL BISULFATE 75 MG/1
75 TABLET ORAL DAILY
Qty: 90 TABLET | Refills: 1 | Status: SHIPPED | OUTPATIENT
Start: 2023-03-17

## 2023-03-17 SDOH — ECONOMIC STABILITY: FOOD INSECURITY: WITHIN THE PAST 12 MONTHS, THE FOOD YOU BOUGHT JUST DIDN'T LAST AND YOU DIDN'T HAVE MONEY TO GET MORE.: NEVER TRUE

## 2023-03-17 SDOH — ECONOMIC STABILITY: INCOME INSECURITY: HOW HARD IS IT FOR YOU TO PAY FOR THE VERY BASICS LIKE FOOD, HOUSING, MEDICAL CARE, AND HEATING?: NOT HARD AT ALL

## 2023-03-17 SDOH — ECONOMIC STABILITY: HOUSING INSECURITY
IN THE LAST 12 MONTHS, WAS THERE A TIME WHEN YOU DID NOT HAVE A STEADY PLACE TO SLEEP OR SLEPT IN A SHELTER (INCLUDING NOW)?: NO

## 2023-03-17 SDOH — ECONOMIC STABILITY: FOOD INSECURITY: WITHIN THE PAST 12 MONTHS, YOU WORRIED THAT YOUR FOOD WOULD RUN OUT BEFORE YOU GOT MONEY TO BUY MORE.: NEVER TRUE

## 2023-03-17 ASSESSMENT — ENCOUNTER SYMPTOMS
COUGH: 0
EYE REDNESS: 0
SHORTNESS OF BREATH: 0
EYE ITCHING: 0
SINUS PAIN: 0
VOICE CHANGE: 0
RHINORRHEA: 0
WHEEZING: 0
TROUBLE SWALLOWING: 0
SORE THROAT: 0
CHEST TIGHTNESS: 0
GASTROINTESTINAL NEGATIVE: 1
EYE PAIN: 0

## 2023-03-17 ASSESSMENT — PATIENT HEALTH QUESTIONNAIRE - PHQ9
SUM OF ALL RESPONSES TO PHQ QUESTIONS 1-9: 0
SUM OF ALL RESPONSES TO PHQ9 QUESTIONS 1 & 2: 0
1. LITTLE INTEREST OR PLEASURE IN DOING THINGS: 0
SUM OF ALL RESPONSES TO PHQ QUESTIONS 1-9: 0
2. FEELING DOWN, DEPRESSED OR HOPELESS: 0

## 2023-03-17 NOTE — PROGRESS NOTES
[address] [facility]  Dept: [phone]          Sushant Meeks (:  1960) is a 65 y.o. male,Established patient, here for evaluation of the following chief complaint(s):  Follow-Up from [facility] ([facility] Follow Up and [facility] for rehab.) and Discuss Medications (He brought his pills and the medication list from [facility] and is not sure what he should and should not be taking.)      ASSESSMENT/PLAN:  I have reviewed the patient's medical history in detail and updated the computerized patient record. HPI/ROS per the patient and caregiver. Overall non toxic in appearance. Answers questions appropriately. Conditions discussed and addressed this visit include:   Here for follow up from hospital/nh  Meds all verified and refill sent  Overall pt is much improved  Anxious to get his prosthesis  Therapy to start next week  Continues to follow with Dr Hola Cooper  Blood sugar controlled at home  Labs in 1 month  1. Hospital discharge follow-up  -     [place] DISCHARGE MEDS RECONCILED W/ CURRENT OUTPATIENT MED LIST  2. Type 2 diabetes mellitus with diabetic peripheral angiopathy without gangrene, with long-term current use of insulin (HCC)  Being evaluated/managed by pcp. No acute findings today meriting change in management plan. -     Lipid Panel; Future  -     Comprehensive Metabolic Panel; Future  -     CBC with Auto Differential; Future  -     Hemoglobin A1C; Future  -     TSH with Reflex; Future  3. Peripheral vascular disease, unspecified ([facility] [address]  Being evaluated/managed by pcp/[facility]. No acute findings today meriting change in management plan. -     Lipid Panel; Future  -     Comprehensive Metabolic Panel; Future  -     CBC with Auto Differential; Future  -     Hemoglobin A1C; Future  -     TSH with Reflex; Future  4. S/P transmetatarsal amputation of foot, left ([facility] [address]  Being evaluated/managed by [facility]/pcp.  No acute findings today meriting change in management plan.  -     Lipid Panel; Future  -     Comprehensive Metabolic Panel; Future  -     CBC with Auto Differential; Future  -     Hemoglobin A1C; Future  -     TSH with Reflex; Future  5. Non-healing ulcer of lower extremity, left, with unspecified severity (HCC)  6. Acute on chronic diastolic congestive heart failure (HCC)  Being evaluated/managed by pcp/cardiology. No acute findings today meriting change in management plan.  -     Lipid Panel; Future  -     Comprehensive Metabolic Panel; Future  -     CBC with Auto Differential; Future  -     Hemoglobin A1C; Future  -     TSH with Reflex; Future  7. Coronary artery disease involving native coronary artery of native heart without angina pectoris  -     aspirin 81 MG chewable tablet; Take 1 tablet by mouth daily, Disp-90 tablet, R-1Normal  8. Anemia, unspecified type  -     ferrous sulfate (IRON 325) 325 (65 Fe) MG tablet; Take 1 tablet by mouth 2 times daily, Disp-180 tablet, R-1Normal  -     Lipid Panel; Future  -     Comprehensive Metabolic Panel; Future  -     CBC with Auto Differential; Future  -     Hemoglobin A1C; Future  -     TSH with Reflex; Future  -     Iron; Future  -     Iron Saturation; Future  9. At risk for fluid volume overload  -     furosemide (LASIX) 40 MG tablet; Take 1 tablet by mouth daily Hold SBP <110, Disp-90 tablet, R-1Normal  10. COVID-19  -     tiotropium (SPIRIVA RESPIMAT) 2.5 MCG/ACT AERS inhaler; Inhale 2 puffs into the lungs daily, Disp-12 g, R-3Normal    Return in about 6 months (around 9/17/2023) for Medication evaluation, Results review, Routine follow up.    SUBJECTIVE/OBJECTIVE:  HPI  Here for hospital and NH follow up  S/p LLE amputation  Sepsis resolved  Kidneys improving  Back at home   Awaiting prosthesis   Review of Systems   Constitutional:  Positive for fatigue. Negative for activity change, appetite change, chills and unexpected weight change.   HENT:  Negative for congestion,  postnasal drip, rhinorrhea, sinus pain, sore throat, trouble swallowing and voice change. Eyes:  Negative for pain, redness and itching. Respiratory:  Negative for cough, chest tightness, shortness of breath and wheezing. Cardiovascular:  Positive for leg swelling. Negative for chest pain and palpitations. Gastrointestinal: Negative. Endocrine: Negative for cold intolerance and heat intolerance. Genitourinary: Negative. Musculoskeletal:  Positive for arthralgias and gait problem. Negative for myalgias, neck pain and neck stiffness. Skin:  Negative for wound. Allergic/Immunologic: Positive for environmental allergies. Neurological:  Positive for weakness. Negative for dizziness, tremors, light-headedness, numbness and headaches. Hematological:  Negative for adenopathy. Does not bruise/bleed easily. Psychiatric/Behavioral: Negative. Physical Exam  Vitals and nursing note reviewed. Constitutional:       Appearance: Normal appearance. He is obese. He is ill-appearing. HENT:      Head: Normocephalic and atraumatic. Right Ear: Tympanic membrane, ear canal and external ear normal.      Left Ear: Tympanic membrane, ear canal and external ear normal.      Nose: No congestion. Mouth/Throat:      Mouth: Mucous membranes are dry. Pharynx: No posterior oropharyngeal erythema. Eyes:      General:         Right eye: No discharge. Left eye: No discharge. Conjunctiva/sclera: Conjunctivae normal.   Neck:      Vascular: Carotid bruit (left side) present. Cardiovascular:      Rate and Rhythm: Regular rhythm. Tachycardia present. Pulses: Normal pulses. Heart sounds: Normal heart sounds. No murmur heard. Comments: Unable to assess for JVD  Pulmonary:      Effort: Pulmonary effort is normal.      Breath sounds: Normal breath sounds. No wheezing. Abdominal:      General: Abdomen is flat. Bowel sounds are normal.      Palpations: Abdomen is soft.   Musculoskeletal:         General: No swelling or tenderness. Normal range of motion.      Cervical back: Normal range of motion and neck supple.      Right lower leg: Edema present.      Left Lower Extremity: Left leg is amputated below knee.   Lymphadenopathy:      Cervical: No cervical adenopathy.   Skin:     General: Skin is warm and dry.      Capillary Refill: Capillary refill takes 2 to 3 seconds.      Findings: No rash.   Neurological:      General: No focal deficit present.      Mental Status: He is alert and oriented to person, place, and time. Mental status is at baseline.      Motor: Weakness present.      Coordination: Coordination normal.      Gait: Gait abnormal.   Psychiatric:         Mood and Affect: Mood normal.         Behavior: Behavior normal.         Thought Content: Thought content normal.               An electronic signature was used to authenticate this note.    --EYDA Charles - CNP

## 2023-03-21 ENCOUNTER — CARE COORDINATION (OUTPATIENT)
Dept: CARE COORDINATION | Age: 63
End: 2023-03-21

## 2023-03-21 NOTE — CARE COORDINATION
cancel. I will notify my provider of any barriers to my plan of care. I will follow my Zone Management tool to seek urgent or emergent care. I will notify my provider of any symptoms that indicate a worsening of my condition. DM    Barriers: lack of support, overwhelmed by complexity of regimen, and stress  Plan for overcoming my barriers: support and education from [facility], PCP, specialists.  Use sliding scale for mealtime insulin dosing and check BS's before meals  Confidence: 8/10  Anticipated Goal Completion Date: 6/21/23                Future Appointments   Date Time Provider Marissa Alvarez   5/26/2023 11:45 AM MD Edenilson Leonardo [facility] [address]

## 2023-03-22 RX ORDER — INSULIN LISPRO 100 [IU]/ML
INJECTION, SOLUTION INTRAVENOUS; SUBCUTANEOUS
Qty: 7 ADJUSTABLE DOSE PRE-FILLED PEN SYRINGE | Refills: 3 | Status: SHIPPED | OUTPATIENT
Start: 2023-03-22

## 2023-03-22 RX ORDER — INSULIN LISPRO 100 [IU]/ML
INJECTION, SOLUTION INTRAVENOUS; SUBCUTANEOUS
Qty: 7 ADJUSTABLE DOSE PRE-FILLED PEN SYRINGE | Refills: 3 | Status: SHIPPED | OUTPATIENT
Start: 2023-03-22 | End: 2023-03-22 | Stop reason: SDUPTHER

## 2023-03-22 NOTE — TELEPHONE ENCOUNTER
Pt notified of sliding scale for Humalog dosing: -199= 3 units, 200-249= 6 units, 250-299= 9 units, 300-349= 12 units, > 350= 15 units TID. Max units per day 45. Pt wrote scale down and repeated it back to me. Denies further questions.

## 2023-03-22 NOTE — TELEPHONE ENCOUNTER
-199= 3 units, 200-249= 6 units, 250-299= 9 units, 300-349= 12 units, > 350= 15 units TID.  Max units per day 45

## 2023-03-29 ENCOUNTER — CARE COORDINATION (OUTPATIENT)
Dept: CARE COORDINATION | Age: 63
End: 2023-03-29

## 2023-03-29 NOTE — CARE COORDINATION
Ambulatory Care Coordination Note  3/29/2023    Patient Current Location:  Home: [place]     ACM contacted the patient by telephone. Verified name and  with patient as identifiers. Provided introduction to self, and explanation of the ACM role. Challenges to be reviewed by the provider   Additional needs identified to be addressed with provider: No  none               Method of communication with provider: none. ACM: Golden Hartmann RN     Prince gupta was referred to care coordination by CTN following SNF d/c. Pt was initially admitted in  for septic arthritis of left ankle. Underwent distal transtibial amputation of lle on 1/10. Was d/c to [facility] for rehab. Pt d/c to home with [facility] HH on 3/10. Pt s/p left carotid endarterectomy on 3/27. Pt had procedure at [facility]. Was d/c yesterday. Reports that dsg is d/I. He has instructions to remove dsg later today. HH will be out later this wk to see pt. Pt has h/o: CHF, CAD, DM, HTN, CKD  DM: pt has not checked BS yet this AM.  Encouraged to do so. Has sliding scale. Encouraged to follow. Pt has f/u appt with PCP on . Pt will continue monitoring BP's. Offered patient enrollment in the Remote Patient Monitoring (RPM) program for in-home monitoring: Patient declined. Plan of care:  F/u with PCP next wk. F/u with Dr. Georges Aponte as scheduled. Pt not near his appts at time of call but states that his f/u is within next 2-3 wks  Continue working with [facility]  Monitor BS's and BP's closely  Monitor incision for any s/s of infection: redness, drainage, swelling, or pain. Lab Results       None            Care Coordination Interventions    Referral from Primary Care Provider: No  Suggested Interventions and [employer] on Wheels: Declined  Medi Set or Pill Pack: Completed  Transportation Support: Completed  Zone Management Tools:  In Process          Goals Addressed                   This Visit's

## 2023-04-04 ENCOUNTER — OFFICE VISIT (OUTPATIENT)
Dept: FAMILY MEDICINE CLINIC | Age: 63
End: 2023-04-04
Payer: COMMERCIAL

## 2023-04-04 VITALS
TEMPERATURE: 98.1 F | HEART RATE: 81 BPM | SYSTOLIC BLOOD PRESSURE: 138 MMHG | DIASTOLIC BLOOD PRESSURE: 86 MMHG | OXYGEN SATURATION: 98 %

## 2023-04-04 DIAGNOSIS — Z98.890 H/O CAROTID ENDARTERECTOMY: ICD-10-CM

## 2023-04-04 DIAGNOSIS — I65.22 STENOSIS OF LEFT CAROTID ARTERY: ICD-10-CM

## 2023-04-04 DIAGNOSIS — Z09 HOSPITAL DISCHARGE FOLLOW-UP: Primary | ICD-10-CM

## 2023-04-04 PROCEDURE — 99214 OFFICE O/P EST MOD 30 MIN: CPT | Performed by: NURSE PRACTITIONER

## 2023-04-04 PROCEDURE — 1111F DSCHRG MED/CURRENT MED MERGE: CPT | Performed by: NURSE PRACTITIONER

## 2023-04-04 PROCEDURE — 3079F DIAST BP 80-89 MM HG: CPT | Performed by: NURSE PRACTITIONER

## 2023-04-04 PROCEDURE — 3075F SYST BP GE 130 - 139MM HG: CPT | Performed by: NURSE PRACTITIONER

## 2023-04-04 RX ORDER — ATORVASTATIN CALCIUM 80 MG/1
80 TABLET, FILM COATED ORAL DAILY
Qty: 90 TABLET | Refills: 1 | Status: SHIPPED | OUTPATIENT
Start: 2023-04-04

## 2023-04-04 ASSESSMENT — ENCOUNTER SYMPTOMS
VOICE CHANGE: 0
RHINORRHEA: 0
SHORTNESS OF BREATH: 0
EYE REDNESS: 0
ROS SKIN COMMENTS: LEFT NECK INCISION
TROUBLE SWALLOWING: 0
SINUS PAIN: 0
EYE PAIN: 0
GASTROINTESTINAL NEGATIVE: 1
CHEST TIGHTNESS: 0
WHEEZING: 0
SORE THROAT: 0
EYE ITCHING: 0
COUGH: 0

## 2023-04-04 NOTE — PROGRESS NOTES
seconds. Findings: No rash. Neurological:      General: No focal deficit present. Mental Status: He is alert and oriented to person, place, and time. Mental status is at baseline. Motor: No weakness. Coordination: Coordination normal.      Gait: Gait abnormal.   Psychiatric:         Mood and Affect: Mood normal.         Behavior: Behavior normal.         Thought Content: Thought content normal.               An electronic signature was used to authenticate this note.     --EYAD Daly - CNP

## 2023-04-04 NOTE — PATIENT INSTRUCTIONS
products). Having high cholesterol can lead to the buildup of fatty deposits called plaque (say \"plak\") in artery walls. This can increase your risk of heart attack and stroke. When doctors talk about high cholesterol, they are talking about your total cholesterol and LDL cholesterol levels. LDL is sometimes called the \"bad\" cholesterol. Your doctor may also speak about HDL levels. HDL is sometimes called the \"good\" cholesterol. High HDL is linked with a lower risk for heart attack and stroke. How can you help prevent high cholesterol? Eat high-fiber foods, such as fruits, vegetables, and whole grains. Eat lean proteins, such as seafood, lean meats, and beans. Eat healthy fats, such as canola and olive oil. Choose foods that are low in saturated fat, such as avocados, nuts, and low-fat milk or yogurt. Limit sodium and alcohol. Limit drinks and foods with added sugar. Try to be active at least 2½ hours a week. Get to and stay at a healthy weight. Don't use tobacco or nicotine, and talk to your doctor if you need help quitting. How is high cholesterol treated? Treatment focuses on heart-healthy lifestyle changes. It may also include medicines. Treatment reduces your risk for a heart attack or stroke. The goal of treatment is to lower your LDL cholesterol levels. Where can you learn more? Go to http://www.woods.com/ and enter Q621 to learn more about \"Learning About High Cholesterol. \"  Current as of: October 6, 2021               Content Version: 13.5  © 2006-2022 [employer]. Care instructions adapted under license by [employer]. If you have questions about a medical condition or this instruction, always ask your healthcare professional. [place] [address] any warranty or liability for your use of this information. Learning About High Triglycerides  What are high triglycerides?      Triglycerides

## 2023-04-06 ENCOUNTER — CARE COORDINATION (OUTPATIENT)
Dept: CARE COORDINATION | Age: 63
End: 2023-04-06

## 2023-04-06 SDOH — HEALTH STABILITY: PHYSICAL HEALTH: ON AVERAGE, HOW MANY DAYS PER WEEK DO YOU ENGAGE IN MODERATE TO STRENUOUS EXERCISE (LIKE A BRISK WALK)?: 0 DAYS

## 2023-04-06 SDOH — HEALTH STABILITY: PHYSICAL HEALTH: ON AVERAGE, HOW MANY MINUTES DO YOU ENGAGE IN EXERCISE AT THIS LEVEL?: 0 MIN

## 2023-04-06 SDOH — ECONOMIC STABILITY: TRANSPORTATION INSECURITY
IN THE PAST 12 MONTHS, HAS THE LACK OF TRANSPORTATION KEPT YOU FROM MEDICAL APPOINTMENTS OR FROM GETTING MEDICATIONS?: NO

## 2023-04-06 SDOH — ECONOMIC STABILITY: HOUSING INSECURITY: IN THE LAST 12 MONTHS, HOW MANY PLACES HAVE YOU LIVED?: 1

## 2023-04-06 SDOH — ECONOMIC STABILITY: INCOME INSECURITY: IN THE LAST 12 MONTHS, WAS THERE A TIME WHEN YOU WERE NOT ABLE TO PAY THE MORTGAGE OR RENT ON TIME?: NO

## 2023-04-06 SDOH — ECONOMIC STABILITY: TRANSPORTATION INSECURITY
IN THE PAST 12 MONTHS, HAS LACK OF TRANSPORTATION KEPT YOU FROM MEETINGS, WORK, OR FROM GETTING THINGS NEEDED FOR DAILY LIVING?: NO

## 2023-04-06 ASSESSMENT — SOCIAL DETERMINANTS OF HEALTH (SDOH)
IN A TYPICAL WEEK, HOW MANY TIMES DO YOU TALK ON THE PHONE WITH FAMILY, FRIENDS, OR NEIGHBORS?: MORE THAN THREE TIMES A WEEK
DO YOU BELONG TO ANY CLUBS OR ORGANIZATIONS SUCH AS CHURCH GROUPS UNIONS, FRATERNAL OR ATHLETIC GROUPS, OR SCHOOL GROUPS?: NO
HOW OFTEN DO YOU ATTEND CHURCH OR RELIGIOUS SERVICES?: NEVER
HOW OFTEN DO YOU GET TOGETHER WITH FRIENDS OR RELATIVES?: MORE THAN THREE TIMES A WEEK
HOW OFTEN DO YOU ATTENT MEETINGS OF THE CLUB OR ORGANIZATION YOU BELONG TO?: NEVER

## 2023-04-06 NOTE — CARE COORDINATION
and check BS's before meals  Confidence: 8/10  Anticipated Goal Completion Date: 6/21/23                Future Appointments   Date Time Provider Marissa Alvarez   5/26/2023 11:45 AM MD Duc Love [facility] [address] [facility]

## 2023-04-21 ENCOUNTER — CARE COORDINATION (OUTPATIENT)
Dept: CARE COORDINATION | Age: 63
End: 2023-04-21

## 2023-04-21 NOTE — CARE COORDINATION
Ambulatory Care Coordination Note  2023    Patient Current Location:  Home: [address] [address]     CHERRI MAE contacted the patient by telephone. Verified name and  with patient as identifiers. Provided introduction to self, and explanation of the CHERRI MAE role. Challenges to be reviewed by the provider   Additional needs identified to be addressed with provider: No  none               Method of communication with provider: none. I spoke with the patient for continued Care Coordination follow up and education. Patient states he is doing good. HHC is no longer active. Patient states BS's are doing good and staying under 200. Patient denied any symptoms of hypo or hyperglycemia. Diabetic diet and importance of diet adherence reviewed with patient. Patient was also encouraged to work to remain active and reviewed how this also helps with BS control. Educated on how to identify sx's that are worse than the baseline and the importance of early symptom recognition and reporting to prevent exacerbation which may lead to ED visits and hospital admissions. I advised patient to contact PCP office if needed. No further needs at this time. Lab Results       None            Care Coordination Interventions    Referral from Primary Care Provider: No  Suggested Interventions and [employer] on Wheels: Declined  Medi Set or Pill Pack: Completed  Transportation Support: Completed  Zone Management Tools:  In Process          Goals Addressed    None         Future Appointments   Date Time Provider Marissa Alvarez   2023 11:45 AM MD Nanda Colby [facility] [address]

## 2023-05-05 ENCOUNTER — CARE COORDINATION (OUTPATIENT)
Dept: CARE COORDINATION | Age: 63
End: 2023-05-05

## 2023-05-05 NOTE — CARE COORDINATION
Ambulatory Care Coordination Note  2023    Patient Current Location:  Home: [place]     ACM contacted the patient by telephone. Verified name and  with patient as identifiers. Provided introduction to self, and explanation of the ACM role. Challenges to be reviewed by the provider   Additional needs identified to be addressed with provider: No  none               Method of communication with provider: none. ACM: Ana Villegas RN     Jeffry Solano was referred to care coordination by CTN following SNF d/c. Pt was initially admitted in  for septic arthritis of left ankle. Underwent distal transtibial amputation of lle on 1/10. Was d/c to [facility] for rehab. Pt d/c to home with [facility] HH on 3/10. Pt s/p left carotid endarterectomy on 3/27. Pt had procedure at [facility]. Was d/c yesterday. Reports that dsg is d/I. He has instructions to remove dsg later today. HH will be out later this wk to see pt. Pt has h/o: CHF, CAD, DM, HTN, CKD  Spoke with Jeffry Solano today for f/u. Pt reports that he is doing really well. Conversation short today. 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 Recently obtained his prosthesis. Has first PT appt on . Has f/u with [facility] on   DM: monitoring BS's. Pt did not provide me with any BS's today but reports that majority of readings have been around 150. Did not check BS yet today. Strongly encouraged pt to monitor daily. No new concerns or barriers identified. Plan of care:  Monitor BS's twice daily  Reinforce safety precautions  F/u with PT on   F/u with [facility] on   Limit sodium to 2gms per day  Call with new concerns  Diabetes Assessment    Medic Alert ID: No  How often do you test your blood sugar?:  (Comment: twice daily)   Do you have barriers with adherence to non-pharmacologic self-management interventions?  (Nutrition/Exercise/Self-Monitoring): No   Have you ever had

## 2023-05-08 ENCOUNTER — HOSPITAL ENCOUNTER (OUTPATIENT)
Dept: PHYSICAL THERAPY | Age: 63
Setting detail: THERAPIES SERIES
Discharge: HOME | End: 2023-05-08
Payer: COMMERCIAL

## 2023-05-08 PROCEDURE — 97162 PT EVAL MOD COMPLEX 30 MIN: CPT

## 2023-05-08 NOTE — PROGRESS NOTES
** PLEASE SIGN, DATE AND TIME CERTIFICATION BELOW AND RETURN TO [facility] OUTPATIENT REHABILITATION (FAX #: [phone]). ATTEST/CO-SIGN IF ACCESSING VIA [employer]. THANK YOU.**    I certify that I have examined the patient below and determined that Physical Medicine and Rehabilitation service is necessary and that I approve the established plan of care for up to 90 days or as specifically noted. Attestation, signature or co-signature of physician indicates approval of certification requirements.    ________________________ ____________ __________  Physician Signature   Date   Time  [address] [facility]  PHYSICAL THERAPY  [x] EVALUATION  [] DAILY NOTE (LAND) [] DAILY NOTE (AQUATIC ) [] PROGRESS NOTE [] DISCHARGE NOTE    [] [address] [facility]   [] [place] [address]    [] [address] [facility]   [x] [facility]    Date: 2023  Patient Name:  Reinier Blanton  : 1960  MRN: [id number]  CSN: [id number]    Referring Practitioner EYAD Lainez*   Diagnosis Acquired absence of left leg below knee [Z89.512]    Treatment Diagnosis Difficulty walking; gait dysfunction    Date of Evaluation 23    Additional Pertinent History Open heart surgery 4 yrs ago; Functional Outcome Measure Used LEFS   Functional Outcome Score 36/80 (68) (23)       Insurance: Primary: Payor: Juan Jose Fabian /  /  / ,   Secondary:    Authorization Information: Pre-certification is needed after eval   Visit # 1, 1/10 for progress note   Visits Allowed: 20 visits per calendar year-HARD MAX   Recertification Date:    Physician Follow-Up:    Physician Orders:    History of Present Illness: Hakan Verduzco is referred to PT to continue with strengthening and gait training with prosthetic limb. Starting 3 yrs ago he jammed his left foot into a base board when he slipped. He did not feel any pain due to significant diabetic neuropathy.  A few days later he noticed that his sock was

## 2023-05-17 ENCOUNTER — CARE COORDINATION (OUTPATIENT)
Dept: CARE COORDINATION | Age: 63
End: 2023-05-17

## 2023-05-17 ENCOUNTER — HOSPITAL ENCOUNTER (OUTPATIENT)
Dept: PHYSICAL THERAPY | Age: 63
Setting detail: THERAPIES SERIES
End: 2023-05-17
Payer: COMMERCIAL

## 2023-05-17 ENCOUNTER — HOSPITAL ENCOUNTER (OUTPATIENT)
Dept: PHYSICAL THERAPY | Age: 63
Setting detail: THERAPIES SERIES
Discharge: HOME | End: 2023-05-17
Payer: COMMERCIAL

## 2023-05-17 PROCEDURE — 97110 THERAPEUTIC EXERCISES: CPT

## 2023-05-17 NOTE — CARE COORDINATION
Attempted to reach Prince gupta today for f/u. Pt not available to talk at this time. Will attempt to reach tomorrow.

## 2023-05-17 NOTE — PROGRESS NOTES
[address] [facility]  PHYSICAL THERAPY  [] EVALUATION  [x] DAILY NOTE (LAND) [] DAILY NOTE (AQUATIC ) [] PROGRESS NOTE [] DISCHARGE NOTE    [] OUTPATIENT REHABILITATION [facility]   [] Heidi     [] [address] [facility]   [x] Ashok Urena    Date: 2023  Patient Name:  Jordy Galaviz  : 1960  MRN: [id number]  CSN: [id number]    Referring Practitioner No ref. provider found   Diagnosis No admission diagnoses are documented for this encounter. Treatment Diagnosis Difficulty walking; gait dysfunction    Date of Evaluation 23    Additional Pertinent History Open heart surgery 4 yrs ago; Functional Outcome Measure Used LEFS   Functional Outcome Score 36/80 (68) (23)       Insurance: Primary: Payor: Chris Johnson 150 /  /  / ,   Secondary:    Authorization Information: Pre-certification is needed after eval   Visit # 2, 2/10 for progress note   Visits Allowed: 20 visits per calendar year-HARD MAX   Recertification Date:    Physician Follow-Up:    Physician Orders:    History of Present Illness: Jesenia Ramírez is referred to PT to continue with strengthening and gait training with prosthetic limb. Starting 3 yrs ago he jammed his left foot into a base board when he slipped. He did not feel any pain due to significant diabetic neuropathy. A few days later he noticed that his sock was bloody and the second toe was black and gangrenous. He was rushed into emergency surgery where all toes were amputated. The gangrene settled into the mid-foot and he had additional surgery clean it out. The infection persisted and he noticed that he was not thinking clearly and was feeling out of it. He collapsed and was taken back to [address] [place] [address] where he found out he was septic. He was hospitalized for a couple of weeks before being discharged with IV antibiotics.  He continued to have issues with his left foot and after being discharged from the hospital he followed up with [facility] where it

## 2023-05-19 ENCOUNTER — CARE COORDINATION (OUTPATIENT)
Dept: CARE COORDINATION | Age: 63
End: 2023-05-19

## 2023-05-19 ENCOUNTER — APPOINTMENT (OUTPATIENT)
Dept: PHYSICAL THERAPY | Age: 63
End: 2023-05-19
Payer: COMMERCIAL

## 2023-05-19 NOTE — CARE COORDINATION
Ambulatory Care Coordination Note  2023    Patient Current Location:  Home: [place]     ACM contacted the patient by telephone. Verified name and  with patient as identifiers. Provided introduction to self, and explanation of the ACM role. Challenges to be reviewed by the provider   Additional needs identified to be addressed with provider: No  none               Method of communication with provider: none. ACM: Karon Talavera RN     Hosea Pagan was referred to care coordination by CTN following SNF d/c. Pt was initially admitted in  for septic arthritis of left ankle. Underwent distal transtibial amputation of lle on 1/10. Was d/c to [facility] for rehab. Pt d/c to home with [facility] HH on 3/10. Pt s/p left carotid endarterectomy on 3/27. Pt had procedure at [facility]. Was d/c yesterday. Reports that dsg is d/I. He has instructions to remove dsg later today. HH will be out later this wk to see pt. Pt has h/o: CHF, CAD, DM, HTN, CKD  Now doing outpt therapy. DM: BS today 146 pt reports that BS's have been running in 140's-150's. Pt aware that he needs to work on getting readings consistently below 150. Activity is slowly increasing. Just started outpt PT. Reinforced importance of following dietary restrictions. CHF: reports having some swelling in lower ext. Has ordered compression stockings. Has been advised to wear. Tries to limit sodium. Reviewed CHF tuck me in ed. Has cardio appt next wk. Went to [facility] today. Reports that stump had shrunk causing his prosthesis to fit loose. He continued to wear it and now reports area is sore and is swollen. Denies any skin breakdown. Pt was advised to rest and avoid further irritation. He will be going back to [facility] once swelling improves to readjust prosthesis. Plan of care:  Continue monitoring stump for any s/s of skin breakdown.  If prosthesis feeling loose or not fitting properly

## 2023-05-22 ENCOUNTER — APPOINTMENT (OUTPATIENT)
Dept: PHYSICAL THERAPY | Age: 63
End: 2023-05-22
Payer: COMMERCIAL

## 2023-05-25 ENCOUNTER — APPOINTMENT (OUTPATIENT)
Dept: PHYSICAL THERAPY | Age: 63
End: 2023-05-25
Payer: COMMERCIAL

## 2023-05-30 ENCOUNTER — APPOINTMENT (OUTPATIENT)
Dept: PHYSICAL THERAPY | Age: 63
End: 2023-05-30
Payer: COMMERCIAL

## 2023-06-01 ENCOUNTER — CARE COORDINATION (OUTPATIENT)
Dept: CARE COORDINATION | Age: 63
End: 2023-06-01

## 2023-06-01 NOTE — CARE COORDINATION
Attempted to reach patient for continued Care Coordination follow up and education. Patient was unavailable at the time of my call, and a generic voicemail message was left asking patient to return my call at [phone].

## 2023-06-08 ENCOUNTER — TELEPHONE (OUTPATIENT)
Dept: FAMILY MEDICINE CLINIC | Age: 63
End: 2023-06-08

## 2023-06-08 NOTE — TELEPHONE ENCOUNTER
Tiera Haynes from [facility] called stating the patient is having a pace maker put in on 6/9/2023 and will be discharged from the hospital over the weekend and wants to set up for [address] [facility] with [address] wants to know if Cris Fry will follow home health orders?

## 2023-06-09 NOTE — TELEPHONE ENCOUNTER
I called Fabi Matias back and received voicemail. I left a detailed message regarding Theresa's response and if she needs anything she can call the office at [phone] and she can fax what is needed to [phone].

## 2023-06-21 ENCOUNTER — OFFICE VISIT (OUTPATIENT)
Dept: FAMILY MEDICINE CLINIC | Age: 63
End: 2023-06-21
Payer: COMMERCIAL

## 2023-06-21 VITALS
DIASTOLIC BLOOD PRESSURE: 82 MMHG | HEART RATE: 78 BPM | SYSTOLIC BLOOD PRESSURE: 102 MMHG | OXYGEN SATURATION: 99 % | TEMPERATURE: 97.9 F

## 2023-06-21 DIAGNOSIS — Z98.890 H/O CAROTID ENDARTERECTOMY: ICD-10-CM

## 2023-06-21 DIAGNOSIS — Z09 HOSPITAL DISCHARGE FOLLOW-UP: Primary | ICD-10-CM

## 2023-06-21 DIAGNOSIS — I65.23 CAROTID STENOSIS, ASYMPTOMATIC, BILATERAL: ICD-10-CM

## 2023-06-21 DIAGNOSIS — I50.33 ACUTE ON CHRONIC DIASTOLIC CONGESTIVE HEART FAILURE (HCC): ICD-10-CM

## 2023-06-21 DIAGNOSIS — Z79.4 TYPE 2 DIABETES MELLITUS WITH DIABETIC PERIPHERAL ANGIOPATHY WITHOUT GANGRENE, WITH LONG-TERM CURRENT USE OF INSULIN (HCC): ICD-10-CM

## 2023-06-21 DIAGNOSIS — E11.51 TYPE 2 DIABETES MELLITUS WITH DIABETIC PERIPHERAL ANGIOPATHY WITHOUT GANGRENE, WITH LONG-TERM CURRENT USE OF INSULIN (HCC): ICD-10-CM

## 2023-06-21 DIAGNOSIS — I16.1 HYPERTENSIVE EMERGENCY: ICD-10-CM

## 2023-06-21 DIAGNOSIS — N18.31 STAGE 3A CHRONIC KIDNEY DISEASE (HCC): ICD-10-CM

## 2023-06-21 PROBLEM — I96 GANGRENE OF TOE OF LEFT FOOT (HCC): Status: RESOLVED | Noted: 2020-03-02 | Resolved: 2023-06-21

## 2023-06-21 PROBLEM — S82.899K: Status: RESOLVED | Noted: 2023-01-11 | Resolved: 2023-06-21

## 2023-06-21 PROBLEM — M00.9: Status: RESOLVED | Noted: 2023-01-10 | Resolved: 2023-06-21

## 2023-06-21 PROBLEM — S92.253K: Status: RESOLVED | Noted: 2020-03-03 | Resolved: 2023-06-21

## 2023-06-21 PROBLEM — Z89.512 S/P BKA (BELOW KNEE AMPUTATION), LEFT (HCC): Status: RESOLVED | Noted: 2023-01-12 | Resolved: 2023-06-21

## 2023-06-21 PROBLEM — T14.8XXA WOUND, OPEN: Status: RESOLVED | Noted: 2020-07-20 | Resolved: 2023-06-21

## 2023-06-21 PROBLEM — R58 BLEEDING: Status: RESOLVED | Noted: 2020-07-20 | Resolved: 2023-06-21

## 2023-06-21 PROBLEM — Z95.0 PRESENCE OF CARDIAC PACEMAKER: Status: ACTIVE | Noted: 2023-06-21

## 2023-06-21 PROBLEM — A41.9 SEPSIS (HCC): Status: RESOLVED | Noted: 2020-04-17 | Resolved: 2023-06-21

## 2023-06-21 PROCEDURE — 3052F HG A1C>EQUAL 8.0%<EQUAL 9.0%: CPT | Performed by: NURSE PRACTITIONER

## 2023-06-21 PROCEDURE — 99214 OFFICE O/P EST MOD 30 MIN: CPT | Performed by: NURSE PRACTITIONER

## 2023-06-21 PROCEDURE — 3079F DIAST BP 80-89 MM HG: CPT | Performed by: NURSE PRACTITIONER

## 2023-06-21 PROCEDURE — 3074F SYST BP LT 130 MM HG: CPT | Performed by: NURSE PRACTITIONER

## 2023-06-21 PROCEDURE — 1111F DSCHRG MED/CURRENT MED MERGE: CPT | Performed by: NURSE PRACTITIONER

## 2023-06-21 RX ORDER — BUMETANIDE 1 MG/1
1 TABLET ORAL DAILY
Qty: 90 TABLET | Refills: 1 | Status: SHIPPED | OUTPATIENT
Start: 2023-06-21

## 2023-06-21 RX ORDER — BUMETANIDE 1 MG/1
1 TABLET ORAL DAILY
COMMUNITY
Start: 2023-06-12 | End: 2023-06-21 | Stop reason: SDUPTHER

## 2023-06-21 RX ORDER — HYDRALAZINE HYDROCHLORIDE 25 MG/1
25 TABLET, FILM COATED ORAL 3 TIMES DAILY
COMMUNITY
Start: 2023-06-12

## 2023-06-21 NOTE — PROGRESS NOTES
[address] [facility]  Dept: [phone]          Eliseo Henderson (:  1960) is a 65 y.o. male,Established patient, here for evaluation of the following chief complaint(s):     Post-Discharge Transitional Care Management Progress Note      Eliseo Henderson   YOB: 1960    Date of Office Visit:  2023  Date of Hospital Admission: 2023  Date of Hospital Discharge: 2023    Care management risk score Rising risk (score 2-5) and Complex Care (Scores >=6): No Risk Score On File     Non face to face  following discharge, date last encounter closed (first attempt may have been earlier): *No documented post hospital discharge outreach found in the last 14 days *No documented post hospital discharge outreach found in the last 14 days    Call initiated 2 business days of discharge: *No response recorded in the last 14 days  I have reviewed the patient's medical history in detail and updated the computerized patient record. HPI/ROS per the patient and caregiver. Overall non toxic in appearance. Answers questions appropriately.    Conditions discussed and addressed this visit include:   Here for follow up on his most recent hospitalization  Has had a complicated hx over the last year  Looking forward to getting moving, and up with prosthesis   Following with Dr Sonja Hunt for Kidney  Dr Mary Garcia Cardiology  Dr Mina Mccartney cardiothoracic surgery  Will continue to follow as pt progresses  No labs at this time    ASSESSMENT/PLAN:   Hospital discharge follow-up  -     OR DISCHARGE MEDS RECONCILED W/ CURRENT OUTPATIENT MED LIST  Acute on chronic diastolic congestive heart failure ([address]  Hypertensive emergency  Type 2 diabetes mellitus with diabetic peripheral angiopathy without gangrene, with long-term current use of insulin ([address]  Carotid stenosis, asymptomatic, bilateral  Stage 3a chronic kidney disease ([address]  H/O carotid endarterectomy    Medical Decision Making: moderate

## 2023-06-23 ENCOUNTER — CARE COORDINATION (OUTPATIENT)
Dept: CARE COORDINATION | Age: 63
End: 2023-06-23

## 2023-07-06 ENCOUNTER — CARE COORDINATION (OUTPATIENT)
Dept: CARE COORDINATION | Age: 63
End: 2023-07-06

## 2023-07-06 NOTE — CARE COORDINATION
No  How often do you test your blood sugar?:  (Comment: twice daily)   Do you have barriers with adherence to non-pharmacologic self-management interventions? (Nutrition/Exercise/Self-Monitoring): No   Have you ever had to go to the ED for symptoms of low blood sugar?: No       Do you have hyperglycemia symptoms?: No   Do you have hypoglycemia symptoms?: No   Blood Sugar Trends: No Change         and   Congestive Heart Failure Assessment           Symptoms:  CHF associated angina: Neg, CHF associated dyspnea on exertion: Pos, CHF associated fatigue: Neg, CHF associated leg swelling: Neg, CHF associated orthostatic hypotension: Neg, CHF associated PND: Neg, CHF associated shortness of breath: Neg, CHF associated weakness: Neg      Symptom course: stable  Patient-reported weight (lb):  (Comment: unable to weigh)  Salt intake watch compared to last visit: improved         Lab Results       None            Care Coordination Interventions    Referral from Primary Care Provider: No  Suggested Interventions and [employer] on Wheels: Declined  Medi Set or Pill Pack: Completed  Transportation Support: Completed (Comment: COA for transport and [employer])  Zone Management Tools: Completed          Goals Addressed    None         No future appointments.

## 2023-07-20 ENCOUNTER — CARE COORDINATION (OUTPATIENT)
Dept: CARE COORDINATION | Age: 63
End: 2023-07-20

## 2023-07-20 NOTE — CARE COORDINATION
Ambulatory Care Coordination Note  2023    Patient Current Location:  Home: [address] [place] [address]     CHERRI MAE contacted the patient by telephone. Verified name and  with patient as identifiers. Provided introduction to self, and explanation of the CHERRI MAE role. Challenges to be reviewed by the provider   Additional needs identified to be addressed with provider: No  none               Method of communication with provider: none. I spoke with the patient for continued Care Coordination follow up and education. Patient states he is doing well. Prosthetic came about a week ago. Patient has not started outpt therapy and states he is unsure if he's going too. BS this AM was 118. Patient states BS's have been 150 or below. Patient denied any symptoms of hypo or hyperglycemia. Diabetic diet and importance of diet adherence reviewed with patient. Patient was also encouraged to work to remain active and reviewed how this also helps with BS control. Wearing compression stocking to rle. Reports that edema is under control at this time. Following fluid and sodium restriction. Educated on how to identify sx's that are worse than the baseline and the importance of early symptom recognition and reporting to prevent exacerbation which may lead to ED visits and hospital admissions. Patient is questioning if he is to continue taking Hydralazine 25 mg TID. Patient is out of medication. Will route note to PCP office to verify. I advised patient to contact PCP office if needed. No further needs at this time.          Lab Results       None            Care Coordination Interventions    Referral from Primary Care Provider: No  Suggested Interventions and [employer] on Wheels: Declined  Medi Set or Pill Pack: Completed  Transportation Support: Completed (Comment: COA for transport and [employer])  Zone Management Tools: Completed          Goals Addressed    None         No future

## 2023-07-21 RX ORDER — HYDRALAZINE HYDROCHLORIDE 25 MG/1
25 TABLET, FILM COATED ORAL 3 TIMES DAILY
Qty: 270 TABLET | Refills: 1 | Status: SHIPPED | OUTPATIENT
Start: 2023-07-21

## 2023-08-10 ENCOUNTER — CARE COORDINATION (OUTPATIENT)
Dept: CARE COORDINATION | Age: 63
End: 2023-08-10

## 2023-08-14 ENCOUNTER — TELEPHONE (OUTPATIENT)
Dept: FAMILY MEDICINE CLINIC | Age: 63
End: 2023-08-14

## 2023-08-14 NOTE — TELEPHONE ENCOUNTER
The patient called and said that he needs a refill on his Levemir insulin. He takes 30 units twice daily and wants it sent to [facility] [address] [facility] in [place], [place].

## 2023-08-22 ENCOUNTER — CARE COORDINATION (OUTPATIENT)
Dept: CARE COORDINATION | Age: 63
End: 2023-08-22

## 2023-08-23 NOTE — CARE COORDINATION
Ambulatory Care Coordination Note  2023    Patient Current Location:  Home: [address] [facility]     ACM contacted the patient by telephone. Verified name and  with patient as identifiers. Provided introduction to self, and explanation of the ACM role. Challenges to be reviewed by the provider   Additional needs identified to be addressed with provider: No  none               Method of communication with provider: none. ACM: RHEA Durán Johanny was referred to care coordination by CTN following SNF d/c. Pt was initially admitted in  for septic arthritis of left ankle. Underwent distal transtibial amputation of lle on 1/10, Left BKA   Pt s/p left carotid endarterectomy on 3/27. Pt had procedure at [facility]. Continues to monitor BP's. Pt reports that they have been running 130-140's/70-80's. DM: BS's have improved. Checking twice daily. Has had a couple BS's in low 200's d/t diet. Pt back to driving  Continues to work with [address] [place] [address] [employer] for prosthesis. Gets long term prosthesis on . Denies having any open wounds on stump. Aware to monitor area closely for any type of skin breakdown. CHF: denies edema to ext's. Follows with cardiology. Following low sodium diet  Ambulating with walker  Plan of care:  Arranged f/u with PCP as pt did not have one scheduled. Pt will have new insurance : [employer]. He reports that he will bring new ins card to his next appt. Pt to continue monitoring BS's and BP's. Aware of resources. Pt remains very independent. Denies new barriers to care. Will graduate from care coordination at this time. Offered patient enrollment in the Remote Patient Monitoring (RPM) program for in-home monitoring: NA.     Lab Results       None            Care Coordination Interventions    Referral from Primary Care Provider: No  Suggested Interventions and [employer] on Wheels: Declined  Medi Set or Pill Pack:

## 2023-09-11 ENCOUNTER — OFFICE VISIT (OUTPATIENT)
Dept: FAMILY MEDICINE CLINIC | Age: 63
End: 2023-09-11
Payer: COMMERCIAL

## 2023-09-11 VITALS — HEART RATE: 82 BPM | DIASTOLIC BLOOD PRESSURE: 86 MMHG | SYSTOLIC BLOOD PRESSURE: 128 MMHG | OXYGEN SATURATION: 92 %

## 2023-09-11 DIAGNOSIS — Z12.5 SCREENING FOR MALIGNANT NEOPLASM OF PROSTATE: ICD-10-CM

## 2023-09-11 DIAGNOSIS — N18.31 STAGE 3A CHRONIC KIDNEY DISEASE (HCC): ICD-10-CM

## 2023-09-11 DIAGNOSIS — I16.0 HYPERTENSIVE URGENCY: ICD-10-CM

## 2023-09-11 DIAGNOSIS — R73.09 HEMOGLOBIN A1C GREATER THAN 9%, INDICATING POOR DIABETIC CONTROL: ICD-10-CM

## 2023-09-11 DIAGNOSIS — Z00.01 ENCOUNTER FOR WELL ADULT EXAM WITH ABNORMAL FINDINGS: Primary | ICD-10-CM

## 2023-09-11 DIAGNOSIS — I50.33 ACUTE ON CHRONIC DIASTOLIC CONGESTIVE HEART FAILURE (HCC): ICD-10-CM

## 2023-09-11 DIAGNOSIS — I25.10 CORONARY ARTERY DISEASE INVOLVING NATIVE CORONARY ARTERY OF NATIVE HEART, UNSPECIFIED WHETHER ANGINA PRESENT: ICD-10-CM

## 2023-09-11 PROCEDURE — 3079F DIAST BP 80-89 MM HG: CPT | Performed by: NURSE PRACTITIONER

## 2023-09-11 PROCEDURE — 99396 PREV VISIT EST AGE 40-64: CPT | Performed by: NURSE PRACTITIONER

## 2023-09-11 PROCEDURE — 3074F SYST BP LT 130 MM HG: CPT | Performed by: NURSE PRACTITIONER

## 2023-09-11 RX ORDER — GABAPENTIN 600 MG/1
600 TABLET ORAL 2 TIMES DAILY
Qty: 180 TABLET | Refills: 1 | Status: SHIPPED | OUTPATIENT
Start: 2023-09-11 | End: 2024-03-09

## 2023-09-11 ASSESSMENT — ENCOUNTER SYMPTOMS
EYE ITCHING: 0
SHORTNESS OF BREATH: 1
COUGH: 0
CHEST TIGHTNESS: 0
EYE REDNESS: 0
WHEEZING: 0
GASTROINTESTINAL NEGATIVE: 1
ALLERGIC/IMMUNOLOGIC NEGATIVE: 1
EYE PAIN: 0

## 2023-09-11 NOTE — PATIENT INSTRUCTIONS
medical condition or this instruction, always ask your healthcare professional. [address] any warranty or liability for your use of this information.

## 2023-09-11 NOTE — PROGRESS NOTES
General: No scleral icterus. Conjunctiva/sclera: Conjunctivae normal.   Neck:      Vascular: No carotid bruit. Cardiovascular:      Rate and Rhythm: Normal rate and regular rhythm. Heart sounds: Murmur (aortic III/IV) heard. No gallop. Pulmonary:      Effort: Pulmonary effort is normal.      Breath sounds: Normal breath sounds. No wheezing, rhonchi or rales. Abdominal:      General: Bowel sounds are normal.   Musculoskeletal:         General: No swelling (RLE, + 1 generalized. Compression hose on.). Normal range of motion. Cervical back: Normal range of motion and neck supple. No tenderness. Comments: LLE prosthesis. Lymphadenopathy:      Cervical: No cervical adenopathy. Skin:     General: Skin is warm and dry. Capillary Refill: Capillary refill takes 2 to 3 seconds. Coloration: Skin is not pale. Findings: No erythema or rash. Neurological:      General: No focal deficit present. Mental Status: He is alert and oriented to person, place, and time. Mental status is at baseline. Psychiatric:         Mood and Affect: Mood normal.         Behavior: Behavior normal.         Thought Content: Thought content normal.                 An electronic signature was used to authenticate this note.     --Minus EYAD Burger - CNP

## 2023-09-19 ENCOUNTER — NURSE ONLY (OUTPATIENT)
Dept: LAB | Age: 63
End: 2023-09-19

## 2023-09-19 DIAGNOSIS — R73.09 HEMOGLOBIN A1C GREATER THAN 9%, INDICATING POOR DIABETIC CONTROL: ICD-10-CM

## 2023-09-19 DIAGNOSIS — D64.9 ANEMIA, UNSPECIFIED TYPE: ICD-10-CM

## 2023-09-19 DIAGNOSIS — I16.0 HYPERTENSIVE URGENCY: ICD-10-CM

## 2023-09-19 DIAGNOSIS — Z12.5 SCREENING FOR MALIGNANT NEOPLASM OF PROSTATE: ICD-10-CM

## 2023-09-19 DIAGNOSIS — I73.9 PERIPHERAL VASCULAR DISEASE, UNSPECIFIED (HCC): ICD-10-CM

## 2023-09-19 DIAGNOSIS — I50.33 ACUTE ON CHRONIC DIASTOLIC CONGESTIVE HEART FAILURE (HCC): ICD-10-CM

## 2023-09-19 DIAGNOSIS — Z79.4 TYPE 2 DIABETES MELLITUS WITH DIABETIC PERIPHERAL ANGIOPATHY WITHOUT GANGRENE, WITH LONG-TERM CURRENT USE OF INSULIN (HCC): ICD-10-CM

## 2023-09-19 DIAGNOSIS — N18.31 STAGE 3A CHRONIC KIDNEY DISEASE (HCC): ICD-10-CM

## 2023-09-19 DIAGNOSIS — E11.51 TYPE 2 DIABETES MELLITUS WITH DIABETIC PERIPHERAL ANGIOPATHY WITHOUT GANGRENE, WITH LONG-TERM CURRENT USE OF INSULIN (HCC): ICD-10-CM

## 2023-09-19 DIAGNOSIS — I25.10 CORONARY ARTERY DISEASE INVOLVING NATIVE CORONARY ARTERY OF NATIVE HEART, UNSPECIFIED WHETHER ANGINA PRESENT: ICD-10-CM

## 2023-09-19 DIAGNOSIS — Z89.432 S/P TRANSMETATARSAL AMPUTATION OF FOOT, LEFT (HCC): ICD-10-CM

## 2023-09-19 LAB
ALBUMIN SERPL BCG-MCNC: 4.1 G/DL (ref 3.5–5.1)
ALP SERPL-CCNC: 88 U/L (ref 38–126)
ALT SERPL W/O P-5'-P-CCNC: 16 U/L (ref 11–66)
ANION GAP SERPL CALC-SCNC: 14 MEQ/L (ref 8–16)
AST SERPL-CCNC: 17 U/L (ref 5–40)
BASOPHILS ABSOLUTE: 0.1 THOU/MM3 (ref 0–0.1)
BASOPHILS NFR BLD AUTO: 1.2 %
BILIRUB SERPL-MCNC: 0.4 MG/DL (ref 0.3–1.2)
BUN SERPL-MCNC: 28 MG/DL (ref 7–22)
CALCIUM SERPL-MCNC: 10 MG/DL (ref 8.5–10.5)
CHLORIDE SERPL-SCNC: 100 MEQ/L (ref 98–111)
CHOLEST SERPL-MCNC: 167 MG/DL (ref 100–199)
CO2 SERPL-SCNC: 24 MEQ/L (ref 23–33)
CREAT SERPL-MCNC: 1.6 MG/DL (ref 0.4–1.2)
DEPRECATED MEAN GLUCOSE BLD GHB EST-ACNC: 210 MG/DL (ref 70–126)
DEPRECATED RDW RBC AUTO: 46.7 FL (ref 35–45)
EOSINOPHIL NFR BLD AUTO: 4.2 %
EOSINOPHILS ABSOLUTE: 0.4 THOU/MM3 (ref 0–0.4)
ERYTHROCYTE [DISTWIDTH] IN BLOOD BY AUTOMATED COUNT: 14.9 % (ref 11.5–14.5)
GFR SERPL CREATININE-BSD FRML MDRD: 48 ML/MIN/1.73M2
GLUCOSE SERPL-MCNC: 245 MG/DL (ref 70–108)
HBA1C MFR BLD HPLC: 9 % (ref 4.4–6.4)
HCT VFR BLD AUTO: 41.7 % (ref 42–52)
HDLC SERPL-MCNC: 44 MG/DL
HGB BLD-MCNC: 13.8 GM/DL (ref 14–18)
IMM GRANULOCYTES # BLD AUTO: 0.07 THOU/MM3 (ref 0–0.07)
IMM GRANULOCYTES NFR BLD AUTO: 0.8 %
IRON SATN MFR SERPL: 55 % (ref 20–50)
IRON SERPL-MCNC: 144 UG/DL (ref 65–195)
LDLC SERPL CALC-MCNC: 89 MG/DL
LYMPHOCYTES ABSOLUTE: 1.2 THOU/MM3 (ref 1–4.8)
LYMPHOCYTES NFR BLD AUTO: 13.4 %
MCH RBC QN AUTO: 29.1 PG (ref 26–33)
MCHC RBC AUTO-ENTMCNC: 33.1 GM/DL (ref 32.2–35.5)
MCV RBC AUTO: 87.8 FL (ref 80–94)
MONOCYTES ABSOLUTE: 0.7 THOU/MM3 (ref 0.4–1.3)
MONOCYTES NFR BLD AUTO: 7.3 %
NEUTROPHILS NFR BLD AUTO: 73.1 %
NRBC BLD AUTO-RTO: 0 /100 WBC
PLATELET # BLD AUTO: 198 THOU/MM3 (ref 130–400)
PMV BLD AUTO: 12 FL (ref 9.4–12.4)
POTASSIUM SERPL-SCNC: 4.1 MEQ/L (ref 3.5–5.2)
PROT SERPL-MCNC: 7.3 G/DL (ref 6.1–8)
PSA SERPL-MCNC: 2.03 NG/ML (ref 0–1)
RBC # BLD AUTO: 4.75 MILL/MM3 (ref 4.7–6.1)
SEGMENTED NEUTROPHILS ABSOLUTE COUNT: 6.7 THOU/MM3 (ref 1.8–7.7)
SODIUM SERPL-SCNC: 138 MEQ/L (ref 135–145)
TIBC SERPL-MCNC: 264 UG/DL (ref 171–450)
TRIGL SERPL-MCNC: 171 MG/DL (ref 0–199)
TSH SERPL DL<=0.005 MIU/L-ACNC: 1.19 UIU/ML (ref 0.4–4.2)
WBC # BLD AUTO: 9.1 THOU/MM3 (ref 4.8–10.8)

## 2023-09-20 ENCOUNTER — TELEPHONE (OUTPATIENT)
Dept: FAMILY MEDICINE CLINIC | Age: 63
End: 2023-09-20

## 2023-09-20 NOTE — TELEPHONE ENCOUNTER
I called and spoke to the patient. I let him know Theresa's response regarding his lab results. He voiced understanding.

## 2023-09-20 NOTE — TELEPHONE ENCOUNTER
I wrote on the lab orders to have them done on or around 03- and hi-lited it. I then mailed them to the patient today.

## 2023-09-20 NOTE — TELEPHONE ENCOUNTER
----- Message from EYAD Anne CNP sent at 9/20/2023  7:48 AM EDT -----    Labs show A1c up slightly. Will have pt increase levemir to 35 units bid. Can stop iron for now. Kidney function about the same. Will decrease jardiance to 10 mg due to diminished kidney function. New order sent to [facility]. Be sure to be pushing clear liquids. PSA elevated as well. Will recheck level in 6 months.

## 2023-10-09 ENCOUNTER — OFFICE VISIT (OUTPATIENT)
Dept: FAMILY MEDICINE CLINIC | Age: 63
End: 2023-10-09
Payer: COMMERCIAL

## 2023-10-09 VITALS
DIASTOLIC BLOOD PRESSURE: 82 MMHG | HEART RATE: 85 BPM | BODY MASS INDEX: 40.6 KG/M2 | SYSTOLIC BLOOD PRESSURE: 122 MMHG | OXYGEN SATURATION: 98 % | TEMPERATURE: 97.8 F | WEIGHT: 315 LBS

## 2023-10-09 DIAGNOSIS — J40 BRONCHITIS: ICD-10-CM

## 2023-10-09 DIAGNOSIS — J01.10 ACUTE NON-RECURRENT FRONTAL SINUSITIS: Primary | ICD-10-CM

## 2023-10-09 PROCEDURE — G8484 FLU IMMUNIZE NO ADMIN: HCPCS | Performed by: NURSE PRACTITIONER

## 2023-10-09 PROCEDURE — 3079F DIAST BP 80-89 MM HG: CPT | Performed by: NURSE PRACTITIONER

## 2023-10-09 PROCEDURE — 1036F TOBACCO NON-USER: CPT | Performed by: NURSE PRACTITIONER

## 2023-10-09 PROCEDURE — 3074F SYST BP LT 130 MM HG: CPT | Performed by: NURSE PRACTITIONER

## 2023-10-09 PROCEDURE — G8427 DOCREV CUR MEDS BY ELIG CLIN: HCPCS | Performed by: NURSE PRACTITIONER

## 2023-10-09 PROCEDURE — 3017F COLORECTAL CA SCREEN DOC REV: CPT | Performed by: NURSE PRACTITIONER

## 2023-10-09 PROCEDURE — G8417 CALC BMI ABV UP PARAM F/U: HCPCS | Performed by: NURSE PRACTITIONER

## 2023-10-09 PROCEDURE — 99214 OFFICE O/P EST MOD 30 MIN: CPT | Performed by: NURSE PRACTITIONER

## 2023-10-09 RX ORDER — CLOPIDOGREL BISULFATE 75 MG/1
75 TABLET ORAL DAILY
Qty: 90 TABLET | Refills: 1 | Status: SHIPPED | OUTPATIENT
Start: 2023-10-09

## 2023-10-09 RX ORDER — GUAIFENESIN 600 MG/1
1200 TABLET, EXTENDED RELEASE ORAL 2 TIMES DAILY
Qty: 40 TABLET | Refills: 0 | Status: SHIPPED | OUTPATIENT
Start: 2023-10-09 | End: 2023-10-19

## 2023-10-09 RX ORDER — PREDNISONE 20 MG/1
20 TABLET ORAL 2 TIMES DAILY
Qty: 10 TABLET | Refills: 0 | Status: SHIPPED | OUTPATIENT
Start: 2023-10-09 | End: 2023-10-14

## 2023-10-09 RX ORDER — AMOXICILLIN AND CLAVULANATE POTASSIUM 875; 125 MG/1; MG/1
1 TABLET, FILM COATED ORAL 2 TIMES DAILY
Qty: 20 TABLET | Refills: 0 | Status: SHIPPED | OUTPATIENT
Start: 2023-10-09 | End: 2023-10-19

## 2023-10-09 RX ORDER — AMLODIPINE BESYLATE 10 MG/1
TABLET ORAL
Qty: 90 TABLET | Refills: 1 | Status: SHIPPED | OUTPATIENT
Start: 2023-10-09

## 2023-10-09 RX ORDER — BENZONATATE 200 MG/1
200 CAPSULE ORAL 3 TIMES DAILY PRN
Qty: 30 CAPSULE | Refills: 0 | Status: SHIPPED | OUTPATIENT
Start: 2023-10-09 | End: 2023-10-19

## 2023-10-09 ASSESSMENT — ENCOUNTER SYMPTOMS
RHINORRHEA: 1
HEMOPTYSIS: 0
CHEST TIGHTNESS: 1
WHEEZING: 1
GASTROINTESTINAL NEGATIVE: 1
HEARTBURN: 0
SORE THROAT: 0
SINUS PRESSURE: 1
COUGH: 1
HOARSE VOICE: 1

## 2023-10-09 NOTE — TELEPHONE ENCOUNTER
Robert Fisher needs refill of   Requested Prescriptions     Pending Prescriptions Disp Refills    amLODIPine (NORVASC) 10 MG tablet [Pharmacy Med Name: amLODIPine Besylate 10 MG Oral Tablet] 90 tablet 0     Sig: TAKE 1 TABLET BY MOUTH ONCE DAILY. HOLD FOR SBP LESS THAN 120    clopidogrel (PLAVIX) 75 MG tablet [Pharmacy Med Name: Clopidogrel Bisulfate 75 MG Oral Tablet] 90 tablet 0     Sig: Take 1 tablet by mouth once daily       Last Filled on:  03- #90 R-1 and sent to [facility] [address] [facility] in [place], [place] by Joe Gardner CNP.       Last Visit Date:  09/11/2023    Next Visit Date:  10/09/2023

## 2023-10-09 NOTE — PROGRESS NOTES
[facility]  Dept: [phone]          Brooke Camara (:  1960) is a 65 y.o. male,Established patient, here for evaluation of the following chief complaint(s):  Sinus Problem (Last Thursday, head cold then sinus infection.)      ASSESSMENT/PLAN:  I have reviewed the patient's medical history in detail and updated the computerized patient record. HPI/ROS per the patient and caregiver. Overall non toxic in appearance. Answers questions appropriately. Conditions discussed and addressed this visit include:   Patient appears ill but non-toxic and well hydrated. Patient is to take medications as directed. Continue all home medications. Potential side effects of the medications were reviewed with the patient in detail. Use albuterol inhaler every 4 hours as needed for sob and wheezing. The patient can increase fluids. The patient can have Tylenol or Motrin for pain and fever as needed. Follow up by end of week if sx do not improve. 1. Acute non-recurrent frontal sinusitis  -     amoxicillin-clavulanate (AUGMENTIN) 875-125 MG per tablet; Take 1 tablet by mouth 2 times daily for 10 days, Disp-20 tablet, R-0Normal  -     predniSONE (DELTASONE) 20 MG tablet; Take 1 tablet by mouth 2 times daily for 5 days, Disp-10 tablet, R-0Normal  -     guaiFENesin (MUCINEX) 600 MG extended release tablet; Take 2 tablets by mouth 2 times daily for 10 days, Disp-40 tablet, R-0Normal  -     benzonatate (TESSALON) 200 MG capsule; Take 1 capsule by mouth 3 times daily as needed for Cough, Disp-30 capsule, R-0Normal  2. Bronchitis  -     amoxicillin-clavulanate (AUGMENTIN) 875-125 MG per tablet; Take 1 tablet by mouth 2 times daily for 10 days, Disp-20 tablet, R-0Normal  -     predniSONE (DELTASONE) 20 MG tablet; Take 1 tablet by mouth 2 times daily for 5 days, Disp-10 tablet, R-0Normal  -     guaiFENesin (MUCINEX) 600 MG extended release tablet;  Take 2 tablets by mouth 2 times daily for

## 2023-10-18 NOTE — PROGRESS NOTES
[address] [facility]  Occupational Therapy  Daily Note  Time:   Time In: 3001  Time Out: 0901  Timed Code Treatment Minutes: 23 Minutes  Minutes: 23          Date: 2023  Patient Name: Jeb Shipman,   Gender: male      Room: -04/004-A  MRN: [id number]  : 1960  (65 y.o.)  Referring Practitioner: Piyush Cunningham DPM  Diagnosis: Sepsis  Additional Pertinent Hx: The patient is a 65 y.o. male with significant past medical history of CAD, CKD, diabetes, hyperlipidemia, and hypertension who is being seen at bedside on behalf of Dr. Halle Call. Patient relates that he has had ongoing problems with his left foot and ankle over the course of the past few years. 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 He relates that he follows up and sees Dr. Halle Call in clinic weekly. He relates that he missed his appointment last week and did not have the dressing changed. The patient states that the previous week he had x-rays taken in clinic that showed a pathologic fracture of the left tibia. He relates that he has not been walking on his left lower extremity. He relates that he has applied some pressure when using the restroom on that leg. He states that amputation has been in discussion at his previous clinic encounters. The patient relates that he last a last evening at 8 or 9 PM.  He relates that he drank Crystal light tea around 8 or 9 AM this morning. He relates that he took Plavix this morning at 6 AM.  The patient denies any other concerns to his right lower extremity. The patient denies nausea, vomiting, fever, chills, shortness of breath or chest pain.     Restrictions/Precautions:  Restrictions/Precautions: General Precautions, Weight Bearing, Fall Risk  Left Lower Extremity Weight Bearing: Non Weight Bearing (BKA)  Position Activity Restriction  Other position/activity restrictions: L BKA     SUBJECTIVE: Pt laying in bed upon arrival, pt agreeable to OT session, RN gave verbal approval for session    PAIN: 6/10: back, with pt educated on taking pain medication     Vitals: Nurse checked vitals prior to session    COGNITION: Slow Processing and Impaired Memory    ADL:   Grooming: Stand By Assistance. For pt sitting at the EOB and combing hair  Bathing: Stand By Assistance. With pt completing sponge bath sitting at the EOB  Upper Extremity Dressing: Stand By Assistance. For donning gown  Lower Extremity Dressing: Minimal Assistance. With donning on LLE while supine in bed . BED MOBILITY:  Supine to Sit: Supervision with the [facility] lowered    TRANSFERS:  Sliding Board: Stand By Assistance. To pt's right side    ASSESSMENT:     Activity Tolerance:  Patient tolerance of  treatment: good. Discharge Recommendations: ECF with OT  Equipment Recommendations: Equipment Needed: Yes  Other: TTB, slideboard, drop arm commode  Plan: Times Per Week: 6x  Current Treatment Recommendations: Strengthening, Balance training, Functional mobility training, Endurance training, Patient/Caregiver education & training, Safety education & training, Self-Care / ADL, Home management training    Patient Education  Patient Education: ADL's    Goals  Short Term Goals  Time Frame for Short Term Goals: by discharge  Short Term Goal 1: Pt will complete grooming routine while seated EOB with Supervision and no VC for technique to incrase indep with self care  Short Term Goal 2: Pt will complete slideboard t/f with consistent CGA and min VC for placement of board to increase indep with toileting routine  Short Term Goal 3: Pt will demo indep with BUE strengthening HEP to increase overall UB strength/endurance for ease with t/fs  Short Term Goal 4: OTR to assess sit to stand/stand-pivot/and functional mobility when appropriate    Following session, patient left in safe position with all fall risk precautions in place. bilateral breast augmentation

## 2023-10-20 ENCOUNTER — OFFICE VISIT (OUTPATIENT)
Dept: CARDIOLOGY CLINIC | Age: 63
End: 2023-10-20
Payer: COMMERCIAL

## 2023-10-20 VITALS
HEIGHT: 75 IN | SYSTOLIC BLOOD PRESSURE: 127 MMHG | HEART RATE: 74 BPM | BODY MASS INDEX: 39.17 KG/M2 | DIASTOLIC BLOOD PRESSURE: 62 MMHG | WEIGHT: 315 LBS

## 2023-10-20 DIAGNOSIS — I25.10 CAD IN NATIVE ARTERY: ICD-10-CM

## 2023-10-20 DIAGNOSIS — E78.5 DYSLIPIDEMIA: Primary | ICD-10-CM

## 2023-10-20 PROCEDURE — G8427 DOCREV CUR MEDS BY ELIG CLIN: HCPCS | Performed by: INTERNAL MEDICINE

## 2023-10-20 PROCEDURE — 99214 OFFICE O/P EST MOD 30 MIN: CPT | Performed by: INTERNAL MEDICINE

## 2023-10-20 PROCEDURE — 3078F DIAST BP <80 MM HG: CPT | Performed by: INTERNAL MEDICINE

## 2023-10-20 PROCEDURE — 3017F COLORECTAL CA SCREEN DOC REV: CPT | Performed by: INTERNAL MEDICINE

## 2023-10-20 PROCEDURE — 3074F SYST BP LT 130 MM HG: CPT | Performed by: INTERNAL MEDICINE

## 2023-10-20 PROCEDURE — G8417 CALC BMI ABV UP PARAM F/U: HCPCS | Performed by: INTERNAL MEDICINE

## 2023-10-20 PROCEDURE — 1036F TOBACCO NON-USER: CPT | Performed by: INTERNAL MEDICINE

## 2023-10-20 PROCEDURE — G8484 FLU IMMUNIZE NO ADMIN: HCPCS | Performed by: INTERNAL MEDICINE

## 2023-10-20 RX ORDER — EZETIMIBE 10 MG/1
10 TABLET ORAL DAILY
Qty: 90 TABLET | Refills: 1 | Status: SHIPPED | OUTPATIENT
Start: 2023-10-20

## 2023-10-20 NOTE — PLAN OF CARE
Called to bedside by nursing staff    Patient's telemetry showing asystole  Patient unresponsive  No heart or lung lungs on auscultation  No carotid or radial pulses palpable bilaterally    Time of death 12:39PM on 10/20/23    Family at bedside  Attending Physician paged    DO LISA Washington Hospitalist    Problem: Wound:  Goal: Will show signs of wound healing; wound closure and no evidence of infection  Description: Will show signs of wound healing; wound closure and no evidence of infection  Outcome: Ongoing   Patient presents to wound clinic for follow up of left heel and foot wounds. Left foot- Applied silver alginate to wound. Wrapped with unna boot, ABD over wound area, then wrapped with kerlix and coban from base of toes to 1-2 inches below bend of knee. Leave intact until surgery on Friday. Left heel- Applied silver alginate to wound. Wrapped with unna boot, ABD over wound area, then wrapped with kerlix and coban from base of toes to 1-2 inches below bend of knee. Leave intact until surgery on Friday. Follow up visit: To be scheduled at [facility] after surgery on Friday. Care plan reviewed with patient. Patient verbalize understanding of the plan of care and contribute to goal setting.

## 2023-10-20 NOTE — PROGRESS NOTES
Pt C/O sob on exertions      Pt denies CP, Headache, dizziness, heart palpitations, swelling, fatigue
Component Value Date/Time     09/19/2023 08:32 AM    K 4.1 09/19/2023 08:32 AM    K 4.4 01/18/2023 05:55 AM     09/19/2023 08:32 AM    CO2 24 09/19/2023 08:32 AM    BUN 28 09/19/2023 08:32 AM    LABALBU 4.1 09/19/2023 08:32 AM    CREATININE 1.6 09/19/2023 08:32 AM    CALCIUM 10.0 09/19/2023 08:32 AM    LABGLOM 48 09/19/2023 08:32 AM    GLUCOSE 245 09/19/2023 08:32 AM    GLUCOSE 142 07/06/2023 08:50 AM       Lab Results   Component Value Date/Time    ALKPHOS 88 09/19/2023 08:32 AM    ALT 16 09/19/2023 08:32 AM    AST 17 09/19/2023 08:32 AM    PROT 7.3 09/19/2023 08:32 AM    BILITOT 0.4 09/19/2023 08:32 AM    BILIDIR <0.2 02/03/2023 09:56 AM    LABALBU 4.1 09/19/2023 08:32 AM       Lab Results   Component Value Date/Time    MG 2.1 07/06/2023 08:50 AM       Lab Results   Component Value Date    INR 1.07 03/21/2023    INR 1.03 11/25/2022    INR 1.01 10/20/2022    PROTIME 12.3 03/21/2023         Lab Results   Component Value Date/Time    LABA1C 9.0 09/19/2023 08:32 AM       Lab Results   Component Value Date/Time    TRIG 171 09/19/2023 08:32 AM    HDL 44 09/19/2023 08:32 AM    LDLCALC 89 09/19/2023 08:32 AM       Lab Results   Component Value Date/Time    TSH 1.190 09/19/2023 08:32 AM         Testing Reviewed:      I have individually reviewed the cardiac test below:    ECHO 11/2022  Left Ventricle   Chamber size is enlarged. Increased wall thickness. Concentric remodeling is present. Normal global wall motion. Regional wall motion is normal. Abnormal septal motion consistent with post-operative state. Ejection fraction is normal (55 - 60%). Diastolic function is consistent with pseudonormalization (grade II). Peripheral angiogram 11/2020  IMPRESSION:  Nonobstructive peripheral arterial disease. RECOMMENDATIONS:  Medical management. Cardiac Monitor: 12/2022  Two automatically triggered tracings received and revealed. Significant  artifacts noted, triggering events.   Otherwise, the rhythm is

## 2023-10-30 ENCOUNTER — OFFICE VISIT (OUTPATIENT)
Dept: NEPHROLOGY | Age: 63
End: 2023-10-30
Payer: COMMERCIAL

## 2023-10-30 VITALS
WEIGHT: 315 LBS | OXYGEN SATURATION: 97 % | DIASTOLIC BLOOD PRESSURE: 75 MMHG | SYSTOLIC BLOOD PRESSURE: 130 MMHG | HEART RATE: 78 BPM | BODY MASS INDEX: 39.62 KG/M2

## 2023-10-30 DIAGNOSIS — E11.21 DIABETIC NEPHROPATHY WITH PROTEINURIA (HCC): ICD-10-CM

## 2023-10-30 DIAGNOSIS — N18.31 STAGE 3A CHRONIC KIDNEY DISEASE (HCC): Primary | ICD-10-CM

## 2023-10-30 DIAGNOSIS — I10 ESSENTIAL HYPERTENSION: ICD-10-CM

## 2023-10-30 PROCEDURE — 3017F COLORECTAL CA SCREEN DOC REV: CPT | Performed by: INTERNAL MEDICINE

## 2023-10-30 PROCEDURE — 1036F TOBACCO NON-USER: CPT | Performed by: INTERNAL MEDICINE

## 2023-10-30 PROCEDURE — 2022F DILAT RTA XM EVC RTNOPTHY: CPT | Performed by: INTERNAL MEDICINE

## 2023-10-30 PROCEDURE — G8427 DOCREV CUR MEDS BY ELIG CLIN: HCPCS | Performed by: INTERNAL MEDICINE

## 2023-10-30 PROCEDURE — 3052F HG A1C>EQUAL 8.0%<EQUAL 9.0%: CPT | Performed by: INTERNAL MEDICINE

## 2023-10-30 PROCEDURE — G8484 FLU IMMUNIZE NO ADMIN: HCPCS | Performed by: INTERNAL MEDICINE

## 2023-10-30 PROCEDURE — G8417 CALC BMI ABV UP PARAM F/U: HCPCS | Performed by: INTERNAL MEDICINE

## 2023-10-30 PROCEDURE — 3075F SYST BP GE 130 - 139MM HG: CPT | Performed by: INTERNAL MEDICINE

## 2023-10-30 PROCEDURE — 99204 OFFICE O/P NEW MOD 45 MIN: CPT | Performed by: INTERNAL MEDICINE

## 2023-10-30 PROCEDURE — 3078F DIAST BP <80 MM HG: CPT | Performed by: INTERNAL MEDICINE

## 2023-10-30 ASSESSMENT — ENCOUNTER SYMPTOMS
DIARRHEA: 0
EYES NEGATIVE: 1
NAUSEA: 0
VOMITING: 0
ABDOMINAL PAIN: 0
SHORTNESS OF BREATH: 0
COUGH: 0
BACK PAIN: 0

## 2023-10-30 NOTE — PROGRESS NOTES
[facility]         Outpatient Initial consult note         10/30/2023 2:34 PM    [facility]  [address] [facility]  Dept: [phone]  Loc: [phone]      Patient Name:   Bassam Mitchell  YOB: 1960  Primary Care Physician:  EYAD Harris CNP     Chief Complaint : Evaluation of   worsening renal function     History of presenting illness :Bassam Mitchell is a 59 y.o.   male with Past Medical History as stated below was sent / referred by EYAD Harris CNP forevaluation of the above problem. Patient has a history of hypertension for 6 years. Patient has history of diabetes mellitus, with neuropathy, and for 6 years. The patient denies history of renal stones anddenies NSAID use. Patient has no history of proteinuria. Patient Never had a kidney biopsy done. Patient does use Herbal remedies/vitamin supplements. Patient denies frequency, hematuria, hesitancy, retention. Patient has no family history of kidney disease.     Patient possibly has been diagnosed with CKD for the last 1 year has seen [facility] nephrology however due to the insurance issues he changed the providers and seeing is in [facility] at this time     Past History :     Past Medical History:   Diagnosis Date    Arthritis     CAD (coronary artery disease)     CKD (chronic kidney disease), stage II     Diabetes mellitus ([address])     Hyperlipidemia     Hypertension     Liver disease     HEPITITIS AS A CHILD    Wound dehiscence, surgical, initial encounter      Past Surgical History:   Procedure Laterality Date    CARDIAC SURGERY  11/2019    triple bypass    CYST REMOVAL      RIGHT ANKLE     FOOT AMPUTATION Left 1/10/2023    LEFT BELOW KNEE AMPUTATION performed by Xavier Barry DPM at [facility] Left 4/20/2020    LEFT TRANSMETATARSAL

## 2023-11-29 ENCOUNTER — NURSE ONLY (OUTPATIENT)
Dept: CARDIOLOGY CLINIC | Age: 63
End: 2023-11-29
Payer: COMMERCIAL

## 2023-11-29 DIAGNOSIS — Z95.0 PRESENCE OF CARDIAC PACEMAKER: Primary | ICD-10-CM

## 2023-11-29 PROCEDURE — 93280 PM DEVICE PROGR EVAL DUAL: CPT | Performed by: INTERNAL MEDICINE

## 2023-11-29 NOTE — PROGRESS NOTES
[place] sci dual pacer in office     Initial check in office/tranfer from [facility]  His insurance had changed and [facility] did not accept so device f/u never happened     States he has latitude, will transfer   Incision well healed without open areas     . Anuja Velasquez Battery longevity:  14.5 years on device   Presenting rhythm  AS     Atrial impedance 580  RV impedance 749    P wave sensing 5.1  R wave sensing none today, he's dependent     <1 % atrial paced  100 % RV paced     Atrial threshold 0.5 V  at 0.4ms auto programmed on  RV threshold 0.8 V at 0.4msauto programmed on   Mode switches   0

## 2023-12-11 RX ORDER — ATORVASTATIN CALCIUM 80 MG/1
80 TABLET, FILM COATED ORAL DAILY
Qty: 90 TABLET | Refills: 1 | Status: SHIPPED | OUTPATIENT
Start: 2023-12-11

## 2023-12-11 NOTE — TELEPHONE ENCOUNTER
Nam Matter needs refill of   Requested Prescriptions     Pending Prescriptions Disp Refills    atorvastatin (LIPITOR) 80 MG tablet [Pharmacy Med Name: Atorvastatin Calcium 80 MG Oral Tablet] 30 tablet 0     Sig: Take 1 tablet by mouth once daily       Last Filled on:  04- #90 R-1 and sent to [facility] [address] [facility] in [facility] by Nick Gregorio CNP.       Last Visit Date:  10/09/2023    Next Visit Date:  Visit date not found

## 2023-12-12 ENCOUNTER — TELEPHONE (OUTPATIENT)
Dept: FAMILY MEDICINE CLINIC | Age: 63
End: 2023-12-12

## 2023-12-12 RX ORDER — ERYTHROMYCIN 5 MG/G
OINTMENT OPHTHALMIC
Qty: 3.5 G | Refills: 0 | Status: SHIPPED | OUTPATIENT
Start: 2023-12-12 | End: 2023-12-22

## 2023-12-12 NOTE — TELEPHONE ENCOUNTER
Sent in romycin ointment for pt. 1 cm to the [facility] 3 x per day for 5 days.

## 2023-12-12 NOTE — TELEPHONE ENCOUNTER
Magdalena Mcgraw is calling to see if he can get something for a stye in his eye. [facility] in [facility] is his pharmacy.

## 2023-12-26 RX ORDER — BUMETANIDE 1 MG/1
1 TABLET ORAL DAILY
Qty: 90 TABLET | Refills: 1 | Status: SHIPPED | OUTPATIENT
Start: 2023-12-26

## 2024-01-02 ENCOUNTER — NURSE ONLY (OUTPATIENT)
Dept: LAB | Age: 64
End: 2024-01-02

## 2024-01-02 DIAGNOSIS — I10 ESSENTIAL HYPERTENSION: ICD-10-CM

## 2024-01-02 DIAGNOSIS — N18.31 STAGE 3A CHRONIC KIDNEY DISEASE (HCC): ICD-10-CM

## 2024-01-02 DIAGNOSIS — E11.21 DIABETIC NEPHROPATHY WITH PROTEINURIA (HCC): ICD-10-CM

## 2024-01-02 LAB
ALBUMIN SERPL BCG-MCNC: 3.8 G/DL (ref 3.5–5.1)
ANION GAP SERPL CALC-SCNC: 17 MEQ/L (ref 8–16)
BACTERIA: ABNORMAL
BILIRUB UR QL STRIP: NEGATIVE
BUN SERPL-MCNC: 28 MG/DL (ref 7–22)
CALCIUM SERPL-MCNC: 9.7 MG/DL (ref 8.5–10.5)
CASTS #/AREA URNS LPF: ABNORMAL /LPF
CASTS #/AREA URNS LPF: ABNORMAL /LPF
CHARACTER UR: CLEAR
CHARCOAL URNS QL MICRO: ABNORMAL
CHLORIDE SERPL-SCNC: 98 MEQ/L (ref 98–111)
CO2 SERPL-SCNC: 23 MEQ/L (ref 23–33)
COLOR UR: YELLOW
CREAT SERPL-MCNC: 1.7 MG/DL (ref 0.4–1.2)
CREAT UR-MCNC: 103.7 MG/DL
CREAT UR-MCNC: 103.7 MG/DL
CRYSTALS URNS QL MICRO: ABNORMAL
EPITHELIAL CELLS, UA: ABNORMAL /HPF
GFR SERPL CREATININE-BSD FRML MDRD: 45 ML/MIN/1.73M2
GLUCOSE SERPL-MCNC: 267 MG/DL (ref 70–108)
GLUCOSE UR QL STRIP.AUTO: >= 1000 MG/DL
HGB UR QL STRIP.AUTO: NEGATIVE
KETONES UR QL STRIP.AUTO: NEGATIVE
LEUKOCYTE ESTERASE UR QL STRIP.AUTO: NEGATIVE
MICROALBUMIN UR-MCNC: 226.39 MG/DL
MICROALBUMIN/CREAT RATIO PNL UR: 2183 MG/G (ref 0–30)
NITRITE UR QL STRIP.AUTO: NEGATIVE
PH UR STRIP.AUTO: 5.5 [PH] (ref 5–9)
PHOSPHATE SERPL-MCNC: 3.7 MG/DL (ref 2.4–4.7)
POTASSIUM SERPL-SCNC: 3.9 MEQ/L (ref 3.5–5.2)
PROT UR STRIP.AUTO-MCNC: 300 MG/DL
PROT UR-MCNC: 312.4 MG/DL
PROT/CREAT 24H UR: 3.01 MG/G{CREAT}
RBC #/AREA URNS HPF: ABNORMAL /HPF
RENAL EPI CELLS #/AREA URNS HPF: ABNORMAL /[HPF]
SODIUM SERPL-SCNC: 138 MEQ/L (ref 135–145)
SPECIFIC GRAVITY UA: 1.02 (ref 1–1.03)
UROBILINOGEN, URINE: 0.2 EU/DL (ref 0–1)
WBC #/AREA URNS HPF: ABNORMAL /HPF
YEAST LIKE FUNGI URNS QL MICRO: ABNORMAL

## 2024-01-03 LAB — KAPPA/LAMBDA FREE LIGHT CHAINS: NORMAL

## 2024-01-05 LAB — IMMUNOFIXATION WITH QUANT: NORMAL

## 2024-01-16 RX ORDER — HYDRALAZINE HYDROCHLORIDE 25 MG/1
25 TABLET, FILM COATED ORAL 3 TIMES DAILY
Qty: 270 TABLET | Refills: 1 | Status: SHIPPED | OUTPATIENT
Start: 2024-01-16

## 2024-01-16 NOTE — TELEPHONE ENCOUNTER
Ashwin Liriano needs refill of   Requested Prescriptions     Pending Prescriptions Disp Refills    hydrALAZINE (APRESOLINE) 25 MG tablet [Pharmacy Med Name: hydrALAZINE HCl 25 MG Oral Tablet] 270 tablet 0     Sig: TAKE 1 TABLET BY MOUTH THREE TIMES DAILY       Last Filled on:  07- #270 R-1 and sent to [facility] in [place], [place] by Theresa Muñoz CNP.      Last Visit Date:  10/09/2023    Next Visit Date:  Visit date not found

## 2024-01-26 RX ORDER — METOPROLOL TARTRATE 50 MG/1
TABLET, FILM COATED ORAL
Qty: 180 TABLET | Refills: 1 | Status: SHIPPED | OUTPATIENT
Start: 2024-01-26

## 2024-01-26 NOTE — TELEPHONE ENCOUNTER
Ashwin Liriano needs refill of   Requested Prescriptions     Pending Prescriptions Disp Refills    metoprolol tartrate (LOPRESSOR) 50 MG tablet [Pharmacy Med Name: Metoprolol Tartrate 50 MG Oral Tablet] 180 tablet 0     Sig: TAKE 1 TABLET BY MOUTH TWICE DAILY. HOLD FOR SBP LESS THAN 100 OR HEART RATE LESS THAN 60       Last Filled on:  03- #180 R-1 and sent to [facility] in [place], [place] by Theresa Muñoz CNP.      Last Visit Date:  10/09/2023    Next Visit Date:  Visit date not found

## 2024-01-29 ENCOUNTER — OFFICE VISIT (OUTPATIENT)
Dept: NEPHROLOGY | Age: 64
End: 2024-01-29
Payer: COMMERCIAL

## 2024-01-29 VITALS — DIASTOLIC BLOOD PRESSURE: 69 MMHG | OXYGEN SATURATION: 93 % | HEART RATE: 79 BPM | SYSTOLIC BLOOD PRESSURE: 136 MMHG

## 2024-01-29 DIAGNOSIS — E66.01 SEVERE OBESITY (BMI 35.0-39.9) WITH COMORBIDITY (HCC): ICD-10-CM

## 2024-01-29 DIAGNOSIS — E11.21 DIABETIC NEPHROPATHY WITH PROTEINURIA (HCC): ICD-10-CM

## 2024-01-29 DIAGNOSIS — I10 ESSENTIAL HYPERTENSION: ICD-10-CM

## 2024-01-29 DIAGNOSIS — N18.31 STAGE 3A CHRONIC KIDNEY DISEASE (HCC): Primary | ICD-10-CM

## 2024-01-29 PROCEDURE — 3046F HEMOGLOBIN A1C LEVEL >9.0%: CPT | Performed by: INTERNAL MEDICINE

## 2024-01-29 PROCEDURE — 3017F COLORECTAL CA SCREEN DOC REV: CPT | Performed by: INTERNAL MEDICINE

## 2024-01-29 PROCEDURE — 1036F TOBACCO NON-USER: CPT | Performed by: INTERNAL MEDICINE

## 2024-01-29 PROCEDURE — 3075F SYST BP GE 130 - 139MM HG: CPT | Performed by: INTERNAL MEDICINE

## 2024-01-29 PROCEDURE — G8427 DOCREV CUR MEDS BY ELIG CLIN: HCPCS | Performed by: INTERNAL MEDICINE

## 2024-01-29 PROCEDURE — 2022F DILAT RTA XM EVC RTNOPTHY: CPT | Performed by: INTERNAL MEDICINE

## 2024-01-29 PROCEDURE — G8484 FLU IMMUNIZE NO ADMIN: HCPCS | Performed by: INTERNAL MEDICINE

## 2024-01-29 PROCEDURE — G8417 CALC BMI ABV UP PARAM F/U: HCPCS | Performed by: INTERNAL MEDICINE

## 2024-01-29 PROCEDURE — 99214 OFFICE O/P EST MOD 30 MIN: CPT | Performed by: INTERNAL MEDICINE

## 2024-01-29 PROCEDURE — 3078F DIAST BP <80 MM HG: CPT | Performed by: INTERNAL MEDICINE

## 2024-01-29 RX ORDER — LISINOPRIL 20 MG/1
20 TABLET ORAL DAILY
Qty: 90 TABLET | Refills: 1 | Status: SHIPPED | OUTPATIENT
Start: 2024-01-29

## 2024-01-29 NOTE — PROGRESS NOTES
[facility]        Outpatient Follow-Up note         1/29/2024 1:18 PM    Patient Name:   Ashwin Liriano  YOB: 1960  Primary Care Physician:  Theresa Muñoz, EYAD - CNP   [facility] [facility]  [facility]  [address]  [address]  [facility] [address]  Dept: [phone]  Loc: [phone]     Chief Complaint / Reason for follow-up : Follow Up of CKD     Interval History :  Patient seen and examined by me.   Feels well.  No hospitalizations and no med changes     Past History :  Past Medical History:   Diagnosis Date    Arthritis     CAD (coronary artery disease)     CKD (chronic kidney disease), stage II     Diabetes mellitus (HCC)     Hyperlipidemia     Hypertension     Liver disease     HEPITITIS AS A CHILD    Wound dehiscence, surgical, initial encounter      Past Surgical History:   Procedure Laterality Date    CARDIAC SURGERY  11/2019    triple bypass    CYST REMOVAL      RIGHT ANKLE     FOOT AMPUTATION Left 1/10/2023    LEFT BELOW KNEE AMPUTATION performed by Mert Rubio DPM at [facility] OR    FOOT DEBRIDEMENT Left 4/20/2020    LEFT TRANSMETATARSAL AMPUTATION  FOOT INCISION AND DRAINAGE performed by Mert Rubio DPM at [facility] OR    FOOT DEBRIDEMENT Left 7/20/2020    LEFT WOUND DEBRIDEMENT WITH WOUND VAC APPLICATION [facility]    FOOT DEBRIDEMENT Left 7/1/2022    LEFT FOOT EXCISONAL WOUND DEBRIDEMENT, APPLICATION SKIN SUBSTITUTE GRAFT, APPLICATION KCI WOUND VAC performed by Mert Rubio DPM at [facility] OR    FOOT SURGERY Right     CYST REMOVED    FOOT SURGERY Left 8/14/2020    LEFT FOOT PREPARATION GRAFT SITE, HAVEST SPLIT THICKNESS SKIN GRAFT WITH APPLICATION, APPLICATION KCI WOUND VAC performed by Mert Rubio DPM at [facility] OR    FOOT SURGERY      OIO    TOE AMPUTATION Left 3/3/2020    I&D LEFT FOOT, TRANSMETATARSAL AMPUTATION performed by Mert KENNY

## 2024-02-05 ENCOUNTER — TELEPHONE (OUTPATIENT)
Dept: FAMILY MEDICINE CLINIC | Age: 64
End: 2024-02-05

## 2024-02-05 NOTE — TELEPHONE ENCOUNTER
Ashwin flores requesting a refill on his     Insulin Detemir (Levemir Flextouch) 100 Unit/ML Injection Pen  Injects 35 Units into the skin 2 times daily    Would like this sent to [facility] in [facility]      Scheduled to be seen in the office with Theresa 2/12/2024

## 2024-02-12 ENCOUNTER — OFFICE VISIT (OUTPATIENT)
Dept: FAMILY MEDICINE CLINIC | Age: 64
End: 2024-02-12
Payer: COMMERCIAL

## 2024-02-12 ENCOUNTER — NURSE ONLY (OUTPATIENT)
Dept: LAB | Age: 64
End: 2024-02-12

## 2024-02-12 VITALS
TEMPERATURE: 97 F | BODY MASS INDEX: 33.9 KG/M2 | DIASTOLIC BLOOD PRESSURE: 88 MMHG | OXYGEN SATURATION: 96 % | WEIGHT: 271.2 LBS | SYSTOLIC BLOOD PRESSURE: 132 MMHG | HEART RATE: 91 BPM

## 2024-02-12 DIAGNOSIS — Z79.4 TYPE 2 DIABETES MELLITUS WITH DIABETIC PERIPHERAL ANGIOPATHY WITHOUT GANGRENE, WITH LONG-TERM CURRENT USE OF INSULIN (HCC): ICD-10-CM

## 2024-02-12 DIAGNOSIS — E55.9 HYPOVITAMINOSIS D: ICD-10-CM

## 2024-02-12 DIAGNOSIS — R73.09 HEMOGLOBIN A1C GREATER THAN 9%, INDICATING POOR DIABETIC CONTROL: Primary | ICD-10-CM

## 2024-02-12 DIAGNOSIS — E11.51 TYPE 2 DIABETES MELLITUS WITH DIABETIC PERIPHERAL ANGIOPATHY WITHOUT GANGRENE, WITH LONG-TERM CURRENT USE OF INSULIN (HCC): ICD-10-CM

## 2024-02-12 DIAGNOSIS — R73.09 HEMOGLOBIN A1C GREATER THAN 9%, INDICATING POOR DIABETIC CONTROL: ICD-10-CM

## 2024-02-12 DIAGNOSIS — R97.20 ELEVATED PSA MEASUREMENT: ICD-10-CM

## 2024-02-12 DIAGNOSIS — Z79.4 TYPE 2 DIABETES MELLITUS WITH INSULIN THERAPY (HCC): ICD-10-CM

## 2024-02-12 DIAGNOSIS — I50.33 ACUTE ON CHRONIC DIASTOLIC CONGESTIVE HEART FAILURE (HCC): ICD-10-CM

## 2024-02-12 DIAGNOSIS — E11.9 TYPE 2 DIABETES MELLITUS WITH INSULIN THERAPY (HCC): ICD-10-CM

## 2024-02-12 DIAGNOSIS — L97.929 NON-HEALING ULCER OF LOWER EXTREMITY, LEFT, WITH UNSPECIFIED SEVERITY (HCC): ICD-10-CM

## 2024-02-12 DIAGNOSIS — Z89.432 S/P TRANSMETATARSAL AMPUTATION OF FOOT, LEFT (HCC): ICD-10-CM

## 2024-02-12 PROBLEM — I16.0 HYPERTENSIVE URGENCY: Status: RESOLVED | Noted: 2022-11-20 | Resolved: 2024-02-12

## 2024-02-12 LAB
25(OH)D3 SERPL-MCNC: 25 NG/ML (ref 30–100)
DEPRECATED MEAN GLUCOSE BLD GHB EST-ACNC: 234 MG/DL (ref 70–126)
HBA1C MFR BLD HPLC: 9.8 % (ref 4.4–6.4)
PSA SERPL-MCNC: 1.45 NG/ML (ref 0–1)
TSH SERPL DL<=0.005 MIU/L-ACNC: 1.62 UIU/ML (ref 0.4–4.2)

## 2024-02-12 PROCEDURE — G8484 FLU IMMUNIZE NO ADMIN: HCPCS | Performed by: NURSE PRACTITIONER

## 2024-02-12 PROCEDURE — G8417 CALC BMI ABV UP PARAM F/U: HCPCS | Performed by: NURSE PRACTITIONER

## 2024-02-12 PROCEDURE — 2022F DILAT RTA XM EVC RTNOPTHY: CPT | Performed by: NURSE PRACTITIONER

## 2024-02-12 PROCEDURE — 3046F HEMOGLOBIN A1C LEVEL >9.0%: CPT | Performed by: NURSE PRACTITIONER

## 2024-02-12 PROCEDURE — 3017F COLORECTAL CA SCREEN DOC REV: CPT | Performed by: NURSE PRACTITIONER

## 2024-02-12 PROCEDURE — 99214 OFFICE O/P EST MOD 30 MIN: CPT | Performed by: NURSE PRACTITIONER

## 2024-02-12 PROCEDURE — 1036F TOBACCO NON-USER: CPT | Performed by: NURSE PRACTITIONER

## 2024-02-12 PROCEDURE — 3075F SYST BP GE 130 - 139MM HG: CPT | Performed by: NURSE PRACTITIONER

## 2024-02-12 PROCEDURE — G8427 DOCREV CUR MEDS BY ELIG CLIN: HCPCS | Performed by: NURSE PRACTITIONER

## 2024-02-12 PROCEDURE — 3079F DIAST BP 80-89 MM HG: CPT | Performed by: NURSE PRACTITIONER

## 2024-02-12 RX ORDER — GABAPENTIN 600 MG/1
600 TABLET ORAL DAILY
Qty: 90 TABLET | Refills: 1
Start: 2024-02-12 | End: 2024-08-10

## 2024-02-12 NOTE — PROGRESS NOTES
[address] [place] [id number]  Dept: [phone]          Ashwin Liriano (:  1960) is a 63 y.o. male,Established patient, here for evaluation of the following chief complaint(s):  Diabetes      ASSESSMENT/PLAN:  I have reviewed the patient's medical history in detail and updated the computerized patient record.  HPI/ROS per the patient and caregiver.   Overall non toxic in appearance. Answers questions appropriately.   Conditions discussed and addressed this visit include:   PT here for follow up  Overall much improved but still considered disabled  CHF stable  Kidney function stable  Will check A1c  Continue meds as ordered  Continue to follow with podiatry every 9 weeks  The patient is advised to follow a low fat, low cholesterol diet, attempt to lose weight, reduce salt in diet and cooking, reduce exposure to stress, improve dietary compliance, continue current medications, and continue current healthy lifestyle patterns.    1. Hemoglobin A1C greater than 9%, indicating poor diabetic control  -     Hemoglobin A1C; Future  -     TSH with Reflex; Future  -     Vitamin D 25 Hydroxy; Future  -     PSA, Prostatic Specific Antigen; Future  2. S/P transmetatarsal amputation of foot, left (HCC)Being evaluated/managed by pcp and podiatry/OIO. No acute findings today meriting change in management plan.  3. Non-healing ulcer of lower extremity, left, with unspecified severity (HCC)Resolved at this time  4. Acute on chronic diastolic congestive heart failure (HCC)Being evaluated/managed by Changed to Chronic CHF.   -     Hemoglobin A1C; Future  -     TSH with Reflex; Future  -     Vitamin D 25 Hydroxy; Future  -     PSA, Prostatic Specific Antigen; Future  5. Type 2 diabetes mellitus with diabetic peripheral angiopathy without gangrene, with long-term current use of insulin (HCC)Being evaluated/managed by pcp. Changed to use of insulin.  -     Hemoglobin A1C; Future  -     TSH with Reflex;

## 2024-02-12 NOTE — PATIENT INSTRUCTIONS
too. It can help you reach and stay at a healthy weight. It also helps improve blood pressure and cholesterol, which can reduce the risk of heart disease.  Exercise can make you feel stronger and happier. It can help you relax and sleep better. And it can give you confidence in other things you do.  Exercising safely when you have diabetes  Before you start a new exercise program, talk to your doctor about how and when to exercise. Some types of exercise can be harmful if your diabetes is causing other problems, such as problems with your feet. Your doctor can tell you what types of exercise are good choices for you.  Here are some general safety tips.  Take steps to avoid blood sugar problems.  Check your blood sugar before and after you exercise.  Ask your doctor what blood sugar range is safe for you when you exercise.  If you take medicine or insulin that lowers blood sugar, check your blood sugar before you exercise.  If your blood sugar is less than 90 mg/dL, you may need to eat a carbohydrate snack first.  Be careful when you exercise if your blood sugar is too high. Make sure to drink plenty of water.  Try to exercise at about the same time each day.   This may help keep your blood sugar steady. If you want to exercise more, slowly increase how hard or long you exercise.  Have someone with you when you exercise.   Or exercise at a gym. You may need help if your blood sugar drops too low.  Keep some quick-sugar food with you.   You may get symptoms of low blood sugar during exercise or up to 24 hours later.  Use proper footwear and the right equipment.  Pay attention to your body.   If you are used to exercising and notice that you cannot do as much as usual, talk to your doctor.  Follow-up care is a key part of your treatment and safety. Be sure to make and go to all appointments, and call your doctor if you are having problems. It's also a good idea to know your test results and keep a list of the medicines

## 2024-02-14 ENCOUNTER — TELEPHONE (OUTPATIENT)
Dept: FAMILY MEDICINE CLINIC | Age: 64
End: 2024-02-14

## 2024-02-14 DIAGNOSIS — E11.51 TYPE 2 DIABETES MELLITUS WITH DIABETIC PERIPHERAL ANGIOPATHY WITHOUT GANGRENE, WITH LONG-TERM CURRENT USE OF INSULIN (HCC): ICD-10-CM

## 2024-02-14 DIAGNOSIS — R73.09 HEMOGLOBIN A1C GREATER THAN 9%, INDICATING POOR DIABETIC CONTROL: Primary | ICD-10-CM

## 2024-02-14 DIAGNOSIS — Z79.4 TYPE 2 DIABETES MELLITUS WITH DIABETIC PERIPHERAL ANGIOPATHY WITHOUT GANGRENE, WITH LONG-TERM CURRENT USE OF INSULIN (HCC): ICD-10-CM

## 2024-02-14 RX ORDER — INSULIN LISPRO 100 [IU]/ML
INJECTION, SOLUTION INTRAVENOUS; SUBCUTANEOUS
Qty: 7 ADJUSTABLE DOSE PRE-FILLED PEN SYRINGE | Refills: 3 | Status: SHIPPED | OUTPATIENT
Start: 2024-02-14

## 2024-02-14 NOTE — TELEPHONE ENCOUNTER
I called and spoke to the patient. I let him know Theresa's response regarding his lab results. He voiced understanding and wanted to know what about going on the Ozempic?

## 2024-02-14 NOTE — TELEPHONE ENCOUNTER
Sent in the first month dose. 0.75 mg per week. Monitor for rash, abdominal pain, bloating, diarrhea. When on the last week, let  us know and will increase the dose.

## 2024-02-14 NOTE — TELEPHONE ENCOUNTER
Spoke with patient states that the pharmacy called and stated Trulicity was ready or . Patient though ozempic was being sent in?

## 2024-02-14 NOTE — TELEPHONE ENCOUNTER
I copy and pasted Theresa's response regarding the patient's lab results and sent them to the patient via My Chart.

## 2024-02-14 NOTE — TELEPHONE ENCOUNTER
I called and spoke to the patient. I let him know Theresa's response regarding the Ozempic. He voiced understanding.

## 2024-02-14 NOTE — TELEPHONE ENCOUNTER
----- Message from EYAD Charles CNP sent at 2/14/2024  8:47 AM EST -----  A1c up to 9.8. All other labs stable. Increase sliding scale to 5 units per meal plus the sliding scale that pt is using. Recheck A1c in 3 months.

## 2024-02-14 NOTE — TELEPHONE ENCOUNTER
I wrote on the lab order to have it done on or around 05- and hi lited it. I then mailed it to the patient today.

## 2024-02-15 NOTE — TELEPHONE ENCOUNTER
I called Ashwin and let him know that his insurance went with the Trulicity. Ashwin stated understanding!

## 2024-02-28 ENCOUNTER — PROCEDURE VISIT (OUTPATIENT)
Dept: CARDIOLOGY CLINIC | Age: 64
End: 2024-02-28

## 2024-02-28 DIAGNOSIS — Z95.0 PRESENCE OF CARDIAC PACEMAKER: Primary | ICD-10-CM

## 2024-02-28 NOTE — PROGRESS NOTES
Dr jimenez pt   Vandiver sci dual pacer remote   Battery 12.5 yrs remaining      Dddr     A paced 0%  V paced 99%    P waves 5.2  Rv waves >25    Atrial impedence 625  Vent impedence 757  Atrial threshold 0.4 @ 0.4  Vent threshold 1 @ 0.4    Atrial amplitude auto 2 @ 0.4  Vent amplitude auto 1.5 @ 0.4    No dx of afib / no oacs     1 high atrial episode for :01 seconds on 2/17/24/ afib

## 2024-03-18 RX ORDER — GABAPENTIN 600 MG/1
600 TABLET ORAL 2 TIMES DAILY
Qty: 180 TABLET | Refills: 1 | Status: SHIPPED | OUTPATIENT
Start: 2024-03-18 | End: 2024-09-14

## 2024-03-18 NOTE — TELEPHONE ENCOUNTER
Ashwin Liriano needs refill of   Requested Prescriptions     Pending Prescriptions Disp Refills    gabapentin (NEURONTIN) 600 MG tablet [Pharmacy Med Name: Gabapentin 600 MG Oral Tablet] 180 tablet 0     Sig: Take 1 tablet by mouth 2 times daily.       Last Filled on:      Last Visit Date:  02/12/2024    Next Visit Date:  Visit date not found

## 2024-03-26 RX ORDER — CLOPIDOGREL BISULFATE 75 MG/1
75 TABLET ORAL DAILY
Qty: 90 TABLET | Refills: 1 | Status: SHIPPED | OUTPATIENT
Start: 2024-03-26 | End: 2024-05-21 | Stop reason: SDUPTHER

## 2024-03-26 RX ORDER — EMPAGLIFLOZIN 10 MG/1
10 TABLET, FILM COATED ORAL DAILY
Qty: 90 TABLET | Refills: 1 | Status: SHIPPED | OUTPATIENT
Start: 2024-03-26 | End: 2024-05-21 | Stop reason: SDUPTHER

## 2024-03-26 RX ORDER — AMLODIPINE BESYLATE 10 MG/1
TABLET ORAL
Qty: 90 TABLET | Refills: 1 | OUTPATIENT
Start: 2024-03-26

## 2024-03-26 NOTE — TELEPHONE ENCOUNTER
Ashwin Liriano needs refill of   Requested Prescriptions     Pending Prescriptions Disp Refills    JARDIANCE 10 MG tablet [Pharmacy Med Name: Jardiance 10 MG Oral Tablet] 90 tablet 0     Sig: Take 1 tablet by mouth once daily    amLODIPine (NORVASC) 10 MG tablet [Pharmacy Med Name: amLODIPine Besylate 10 MG Oral Tablet] 90 tablet 0     Sig: TAKE 1 TABLET BY MOUTH ONCE DAILY **HOLD  FOR  SYSTOLIC  BLOOD  PRESSURE  IS  LESS  THAN  120**    clopidogrel (PLAVIX) 75 MG tablet [Pharmacy Med Name: Clopidogrel Bisulfate 75 MG Oral Tablet] 90 tablet 0     Sig: Take 1 tablet by mouth once daily       Last Filled on:      Last Visit Date:  02/12/2024    Next Visit Date:  Visit date not found

## 2024-04-15 RX ORDER — EZETIMIBE 10 MG/1
10 TABLET ORAL DAILY
Qty: 90 TABLET | Refills: 2 | Status: SHIPPED | OUTPATIENT
Start: 2024-04-15

## 2024-04-22 RX ORDER — AMLODIPINE BESYLATE 10 MG/1
TABLET ORAL
Qty: 90 TABLET | Refills: 0 | OUTPATIENT
Start: 2024-04-22

## 2024-04-25 NOTE — TELEPHONE ENCOUNTER
Ashwin Liriano needs refill of   Requested Prescriptions     Pending Prescriptions Disp Refills    amLODIPine (NORVASC) 10 MG tablet [Pharmacy Med Name: amLODIPine Besylate 10 MG Oral Tablet] 90 tablet 0     Sig: TAKE 1 TABLET BY MOUTH ONCE DAILY **HOLD  FOR  SYSTOLIC  BLOOD  PRESSURE  IS  LESS  THAN  120**       Last Filled on:      Last Visit Date:  2/12/2024    Next Visit Date:  Visit date not found

## 2024-04-26 RX ORDER — AMLODIPINE BESYLATE 10 MG/1
TABLET ORAL
Qty: 90 TABLET | Refills: 0 | OUTPATIENT
Start: 2024-04-26

## 2024-05-06 ENCOUNTER — TELEPHONE (OUTPATIENT)
Dept: CARDIOLOGY CLINIC | Age: 64
End: 2024-05-06

## 2024-05-06 NOTE — TELEPHONE ENCOUNTER
Patient called stating he is having CHF symptoms. Advised patient he needs to follow up in ER. No openings with Dr. Swift or PA or CNP soon.  Patient voiced understanding. Patient will follow up in ER.

## 2024-05-10 ENCOUNTER — HOSPITAL ENCOUNTER (INPATIENT)
Age: 64
LOS: 5 days | Discharge: HOME | DRG: 291 | End: 2024-05-15
Attending: STUDENT IN AN ORGANIZED HEALTH CARE EDUCATION/TRAINING PROGRAM | Admitting: INTERNAL MEDICINE
Payer: COMMERCIAL

## 2024-05-10 ENCOUNTER — APPOINTMENT (OUTPATIENT)
Dept: GENERAL RADIOLOGY | Age: 64
DRG: 291 | End: 2024-05-10
Payer: COMMERCIAL

## 2024-05-10 ENCOUNTER — APPOINTMENT (OUTPATIENT)
Age: 64
DRG: 291 | End: 2024-05-10
Payer: COMMERCIAL

## 2024-05-10 DIAGNOSIS — I42.9 CARDIOMYOPATHY, UNSPECIFIED TYPE (HCC): ICD-10-CM

## 2024-05-10 DIAGNOSIS — I50.33 ACUTE ON CHRONIC DIASTOLIC CONGESTIVE HEART FAILURE (HCC): ICD-10-CM

## 2024-05-10 DIAGNOSIS — R07.9 CHEST PAIN, UNSPECIFIED TYPE: Primary | ICD-10-CM

## 2024-05-10 DIAGNOSIS — N17.9 ACUTE KIDNEY INJURY (HCC): ICD-10-CM

## 2024-05-10 DIAGNOSIS — I50.9 ACUTE ON CHRONIC CONGESTIVE HEART FAILURE, UNSPECIFIED HEART FAILURE TYPE (HCC): ICD-10-CM

## 2024-05-10 DIAGNOSIS — R79.89 ELEVATED TROPONIN: ICD-10-CM

## 2024-05-10 PROBLEM — I21.4 NSTEMI (NON-ST ELEVATED MYOCARDIAL INFARCTION) (HCC): Status: ACTIVE | Noted: 2024-05-10

## 2024-05-10 LAB
ANION GAP SERPL CALC-SCNC: 13 MEQ/L (ref 8–16)
APTT PPP: 34 SECONDS (ref 22–38)
BASOPHILS ABSOLUTE: 0.1 THOU/MM3 (ref 0–0.1)
BASOPHILS NFR BLD AUTO: 0.7 %
BUN SERPL-MCNC: 44 MG/DL (ref 7–22)
CALCIUM SERPL-MCNC: 9.5 MG/DL (ref 8.5–10.5)
CHLORIDE SERPL-SCNC: 101 MEQ/L (ref 98–111)
CO2 SERPL-SCNC: 24 MEQ/L (ref 23–33)
CREAT SERPL-MCNC: 2.1 MG/DL (ref 0.4–1.2)
DEPRECATED RDW RBC AUTO: 53.4 FL (ref 35–45)
ECHO AO ASC DIAM: 3.2 CM
ECHO AO ASCENDING AORTA INDEX: 1.22 CM/M2
ECHO AV AREA PEAK VELOCITY: 1.2 CM2
ECHO AV AREA VTI: 1.1 CM2
ECHO AV AREA/BSA PEAK VELOCITY: 0.5 CM2/M2
ECHO AV AREA/BSA VTI: 0.4 CM2/M2
ECHO AV CUSP MM: 1.1 CM
ECHO AV MEAN GRADIENT: 23 MMHG
ECHO AV MEAN VELOCITY: 2.3 M/S
ECHO AV PEAK GRADIENT: 33 MMHG
ECHO AV PEAK VELOCITY: 2.9 M/S
ECHO AV VELOCITY RATIO: 0.34
ECHO AV VTI: 57.9 CM
ECHO BSA: 2.85 M2
ECHO LA DIAMETER INDEX: 1.64 CM/M2
ECHO LA DIAMETER: 4.3 CM
ECHO LV EDV A2C: 141 ML
ECHO LV EDV A4C: 153 ML
ECHO LV EDV INDEX A4C: 58 ML/M2
ECHO LV EDV NDEX A2C: 54 ML/M2
ECHO LV EJECTION FRACTION A2C: 34 %
ECHO LV EJECTION FRACTION A4C: 40 %
ECHO LV EJECTION FRACTION BIPLANE: 36 % (ref 55–100)
ECHO LV ESV A2C: 94 ML
ECHO LV ESV A4C: 92 ML
ECHO LV ESV INDEX A2C: 36 ML/M2
ECHO LV ESV INDEX A4C: 35 ML/M2
ECHO LV FRACTIONAL SHORTENING: 22 % (ref 28–44)
ECHO LV INTERNAL DIMENSION DIASTOLE INDEX: 2.29 CM/M2
ECHO LV INTERNAL DIMENSION DIASTOLIC: 6 CM (ref 4.2–5.9)
ECHO LV INTERNAL DIMENSION SYSTOLIC INDEX: 1.79 CM/M2
ECHO LV INTERNAL DIMENSION SYSTOLIC: 4.7 CM
ECHO LV IVSD: 0.9 CM (ref 0.6–1)
ECHO LV MASS 2D: 215.7 G (ref 88–224)
ECHO LV MASS INDEX 2D: 82.3 G/M2 (ref 49–115)
ECHO LV POSTERIOR WALL DIASTOLIC: 0.9 CM (ref 0.6–1)
ECHO LV RELATIVE WALL THICKNESS RATIO: 0.3
ECHO LVOT AREA: 3.5 CM2
ECHO LVOT AV VTI INDEX: 0.33
ECHO LVOT DIAM: 2.1 CM
ECHO LVOT MEAN GRADIENT: 2 MMHG
ECHO LVOT PEAK GRADIENT: 4 MMHG
ECHO LVOT PEAK VELOCITY: 1 M/S
ECHO LVOT STROKE VOLUME INDEX: 25 ML/M2
ECHO LVOT SV: 65.4 ML
ECHO LVOT VTI: 18.9 CM
ECHO MV E DECELERATION TIME (DT): 210 MS
ECHO MV E VELOCITY: 1.33 M/S
ECHO MV REGURGITANT PEAK GRADIENT: 49 MMHG
ECHO MV REGURGITANT PEAK VELOCITY: 3.5 M/S
ECHO PV MAX VELOCITY: 0.7 M/S
ECHO PV PEAK GRADIENT: 2 MMHG
ECHO RV INTERNAL DIMENSION: 2.1 CM
ECHO TV E WAVE: 0.5 M/S
EKG ATRIAL RATE: 93 BPM
EKG P AXIS: 68 DEGREES
EKG P-R INTERVAL: 192 MS
EKG Q-T INTERVAL: 428 MS
EKG QRS DURATION: 180 MS
EKG QTC CALCULATION (BAZETT): 532 MS
EKG R AXIS: -77 DEGREES
EKG T AXIS: 100 DEGREES
EKG VENTRICULAR RATE: 93 BPM
EOSINOPHIL NFR BLD AUTO: 3.2 %
EOSINOPHILS ABSOLUTE: 0.2 THOU/MM3 (ref 0–0.4)
ERYTHROCYTE [DISTWIDTH] IN BLOOD BY AUTOMATED COUNT: 15.9 % (ref 11.5–14.5)
GFR SERPL CREATININE-BSD FRML MDRD: 35 ML/MIN/1.73M2
GLUCOSE BLD STRIP.AUTO-MCNC: 150 MG/DL (ref 70–108)
GLUCOSE BLD STRIP.AUTO-MCNC: 188 MG/DL (ref 70–108)
GLUCOSE SERPL-MCNC: 211 MG/DL (ref 70–108)
HCT VFR BLD AUTO: 36.5 % (ref 42–52)
HEPARIN UNFRACTIONATED: 0.06 U/ML (ref 0.3–0.7)
HEPARIN UNFRACTIONATED: 0.07 U/ML (ref 0.3–0.7)
HEPARIN UNFRACTIONATED: 0.15 U/ML (ref 0.3–0.7)
HGB BLD-MCNC: 11.2 GM/DL (ref 14–18)
IMM GRANULOCYTES # BLD AUTO: 0.03 THOU/MM3 (ref 0–0.07)
IMM GRANULOCYTES NFR BLD AUTO: 0.4 %
INR PPP: 1 (ref 0.85–1.13)
LYMPHOCYTES ABSOLUTE: 0.6 THOU/MM3 (ref 1–4.8)
LYMPHOCYTES NFR BLD AUTO: 8.3 %
MCH RBC QN AUTO: 28.4 PG (ref 26–33)
MCHC RBC AUTO-ENTMCNC: 30.7 GM/DL (ref 32.2–35.5)
MCV RBC AUTO: 92.6 FL (ref 80–94)
MONOCYTES ABSOLUTE: 0.7 THOU/MM3 (ref 0.4–1.3)
MONOCYTES NFR BLD AUTO: 9.6 %
NEUTROPHILS ABSOLUTE: 5.6 THOU/MM3 (ref 1.8–7.7)
NEUTROPHILS NFR BLD AUTO: 77.8 %
NRBC BLD AUTO-RTO: 0 /100 WBC
NT-PROBNP SERPL IA-MCNC: 3175 PG/ML (ref 0–124)
OSMOLALITY SERPL CALC.SUM OF ELEC: 293.1 MOSMOL/KG (ref 275–300)
PLATELET # BLD AUTO: 215 THOU/MM3 (ref 130–400)
PMV BLD AUTO: 11.1 FL (ref 9.4–12.4)
POTASSIUM SERPL-SCNC: 4.5 MEQ/L (ref 3.5–5.2)
RBC # BLD AUTO: 3.94 MILL/MM3 (ref 4.7–6.1)
SODIUM SERPL-SCNC: 138 MEQ/L (ref 135–145)
TROPONIN, HIGH SENSITIVITY: 142 NG/L (ref 0–12)
TROPONIN, HIGH SENSITIVITY: 151 NG/L (ref 0–12)
WBC # BLD AUTO: 7.2 THOU/MM3 (ref 4.8–10.8)

## 2024-05-10 PROCEDURE — 99285 EMERGENCY DEPT VISIT HI MDM: CPT

## 2024-05-10 PROCEDURE — 6370000000 HC RX 637 (ALT 250 FOR IP): Performed by: STUDENT IN AN ORGANIZED HEALTH CARE EDUCATION/TRAINING PROGRAM

## 2024-05-10 PROCEDURE — 99222 1ST HOSP IP/OBS MODERATE 55: CPT | Performed by: INTERNAL MEDICINE

## 2024-05-10 PROCEDURE — 99223 1ST HOSP IP/OBS HIGH 75: CPT | Performed by: PHYSICIAN ASSISTANT

## 2024-05-10 PROCEDURE — 83880 ASSAY OF NATRIURETIC PEPTIDE: CPT

## 2024-05-10 PROCEDURE — 71045 X-RAY EXAM CHEST 1 VIEW: CPT

## 2024-05-10 PROCEDURE — 85520 HEPARIN ASSAY: CPT

## 2024-05-10 PROCEDURE — 82948 REAGENT STRIP/BLOOD GLUCOSE: CPT

## 2024-05-10 PROCEDURE — 80048 BASIC METABOLIC PNL TOTAL CA: CPT

## 2024-05-10 PROCEDURE — 6360000002 HC RX W HCPCS: Performed by: STUDENT IN AN ORGANIZED HEALTH CARE EDUCATION/TRAINING PROGRAM

## 2024-05-10 PROCEDURE — 84484 ASSAY OF TROPONIN QUANT: CPT

## 2024-05-10 PROCEDURE — 93010 ELECTROCARDIOGRAM REPORT: CPT | Performed by: INTERNAL MEDICINE

## 2024-05-10 PROCEDURE — 6370000000 HC RX 637 (ALT 250 FOR IP): Performed by: PHYSICIAN ASSISTANT

## 2024-05-10 PROCEDURE — 6360000002 HC RX W HCPCS

## 2024-05-10 PROCEDURE — 85610 PROTHROMBIN TIME: CPT

## 2024-05-10 PROCEDURE — 85025 COMPLETE CBC W/AUTO DIFF WBC: CPT

## 2024-05-10 PROCEDURE — 93306 TTE W/DOPPLER COMPLETE: CPT

## 2024-05-10 PROCEDURE — 1200000003 HC TELEMETRY R&B

## 2024-05-10 PROCEDURE — 93005 ELECTROCARDIOGRAM TRACING: CPT | Performed by: STUDENT IN AN ORGANIZED HEALTH CARE EDUCATION/TRAINING PROGRAM

## 2024-05-10 PROCEDURE — 93306 TTE W/DOPPLER COMPLETE: CPT | Performed by: INTERNAL MEDICINE

## 2024-05-10 PROCEDURE — 36415 COLL VENOUS BLD VENIPUNCTURE: CPT

## 2024-05-10 PROCEDURE — 99255 IP/OBS CONSLTJ NEW/EST HI 80: CPT | Performed by: INTERNAL MEDICINE

## 2024-05-10 PROCEDURE — 85730 THROMBOPLASTIN TIME PARTIAL: CPT

## 2024-05-10 RX ORDER — CLOPIDOGREL BISULFATE 75 MG/1
75 TABLET ORAL DAILY
Status: DISCONTINUED | OUTPATIENT
Start: 2024-05-11 | End: 2024-05-15 | Stop reason: HOSPADM

## 2024-05-10 RX ORDER — HYDRALAZINE HYDROCHLORIDE 25 MG/1
25 TABLET, FILM COATED ORAL 3 TIMES DAILY
Status: DISCONTINUED | OUTPATIENT
Start: 2024-05-10 | End: 2024-05-15 | Stop reason: HOSPADM

## 2024-05-10 RX ORDER — POTASSIUM CHLORIDE 7.45 MG/ML
10 INJECTION INTRAVENOUS PRN
Status: DISCONTINUED | OUTPATIENT
Start: 2024-05-10 | End: 2024-05-15 | Stop reason: HOSPADM

## 2024-05-10 RX ORDER — BUMETANIDE 1 MG/1
1 TABLET ORAL DAILY
Status: DISCONTINUED | OUTPATIENT
Start: 2024-05-10 | End: 2024-05-15 | Stop reason: HOSPADM

## 2024-05-10 RX ORDER — ASPIRIN 81 MG/1
81 TABLET, CHEWABLE ORAL DAILY
Status: DISCONTINUED | OUTPATIENT
Start: 2024-05-11 | End: 2024-05-15 | Stop reason: HOSPADM

## 2024-05-10 RX ORDER — ATORVASTATIN CALCIUM 80 MG/1
80 TABLET, FILM COATED ORAL DAILY
Status: DISCONTINUED | OUTPATIENT
Start: 2024-05-11 | End: 2024-05-15 | Stop reason: HOSPADM

## 2024-05-10 RX ORDER — GABAPENTIN 600 MG/1
600 TABLET ORAL 2 TIMES DAILY
Status: DISCONTINUED | OUTPATIENT
Start: 2024-05-10 | End: 2024-05-15 | Stop reason: HOSPADM

## 2024-05-10 RX ORDER — INSULIN LISPRO 100 [IU]/ML
0-8 INJECTION, SOLUTION INTRAVENOUS; SUBCUTANEOUS
Status: DISCONTINUED | OUTPATIENT
Start: 2024-05-10 | End: 2024-05-15 | Stop reason: HOSPADM

## 2024-05-10 RX ORDER — ACETAMINOPHEN 650 MG/1
650 SUPPOSITORY RECTAL EVERY 6 HOURS PRN
Status: DISCONTINUED | OUTPATIENT
Start: 2024-05-10 | End: 2024-05-15 | Stop reason: HOSPADM

## 2024-05-10 RX ORDER — DEXTROSE MONOHYDRATE 100 MG/ML
INJECTION, SOLUTION INTRAVENOUS CONTINUOUS PRN
Status: DISCONTINUED | OUTPATIENT
Start: 2024-05-10 | End: 2024-05-15 | Stop reason: HOSPADM

## 2024-05-10 RX ORDER — SODIUM CHLORIDE 0.9 % (FLUSH) 0.9 %
5-40 SYRINGE (ML) INJECTION EVERY 12 HOURS SCHEDULED
Status: DISCONTINUED | OUTPATIENT
Start: 2024-05-10 | End: 2024-05-15 | Stop reason: HOSPADM

## 2024-05-10 RX ORDER — MAGNESIUM SULFATE IN WATER 40 MG/ML
2000 INJECTION, SOLUTION INTRAVENOUS PRN
Status: DISCONTINUED | OUTPATIENT
Start: 2024-05-10 | End: 2024-05-15 | Stop reason: HOSPADM

## 2024-05-10 RX ORDER — SODIUM CHLORIDE 0.9 % (FLUSH) 0.9 %
5-40 SYRINGE (ML) INJECTION PRN
Status: DISCONTINUED | OUTPATIENT
Start: 2024-05-10 | End: 2024-05-15 | Stop reason: HOSPADM

## 2024-05-10 RX ORDER — EZETIMIBE 10 MG/1
10 TABLET ORAL DAILY
Status: DISCONTINUED | OUTPATIENT
Start: 2024-05-11 | End: 2024-05-15 | Stop reason: HOSPADM

## 2024-05-10 RX ORDER — POTASSIUM CHLORIDE 20 MEQ/1
20 TABLET, EXTENDED RELEASE ORAL DAILY
Status: DISCONTINUED | OUTPATIENT
Start: 2024-05-10 | End: 2024-05-10

## 2024-05-10 RX ORDER — HEPARIN SODIUM 1000 [USP'U]/ML
4000 INJECTION, SOLUTION INTRAVENOUS; SUBCUTANEOUS PRN
Status: DISCONTINUED | OUTPATIENT
Start: 2024-05-10 | End: 2024-05-15 | Stop reason: HOSPADM

## 2024-05-10 RX ORDER — ONDANSETRON 2 MG/ML
4 INJECTION INTRAMUSCULAR; INTRAVENOUS EVERY 6 HOURS PRN
Status: DISCONTINUED | OUTPATIENT
Start: 2024-05-10 | End: 2024-05-15 | Stop reason: HOSPADM

## 2024-05-10 RX ORDER — BUMETANIDE 0.25 MG/ML
1 INJECTION INTRAMUSCULAR; INTRAVENOUS ONCE
Status: COMPLETED | OUTPATIENT
Start: 2024-05-10 | End: 2024-05-10

## 2024-05-10 RX ORDER — ASPIRIN 81 MG/1
324 TABLET, CHEWABLE ORAL ONCE
Status: COMPLETED | OUTPATIENT
Start: 2024-05-10 | End: 2024-05-10

## 2024-05-10 RX ORDER — INSULIN LISPRO 100 [IU]/ML
0-4 INJECTION, SOLUTION INTRAVENOUS; SUBCUTANEOUS NIGHTLY
Status: DISCONTINUED | OUTPATIENT
Start: 2024-05-10 | End: 2024-05-15 | Stop reason: HOSPADM

## 2024-05-10 RX ORDER — POLYETHYLENE GLYCOL 3350 17 G/17G
17 POWDER, FOR SOLUTION ORAL DAILY PRN
Status: DISCONTINUED | OUTPATIENT
Start: 2024-05-10 | End: 2024-05-15 | Stop reason: HOSPADM

## 2024-05-10 RX ORDER — ONDANSETRON 4 MG/1
4 TABLET, ORALLY DISINTEGRATING ORAL EVERY 8 HOURS PRN
Status: DISCONTINUED | OUTPATIENT
Start: 2024-05-10 | End: 2024-05-15 | Stop reason: HOSPADM

## 2024-05-10 RX ORDER — HEPARIN SODIUM 10000 [USP'U]/100ML
5-30 INJECTION, SOLUTION INTRAVENOUS CONTINUOUS
Status: DISCONTINUED | OUTPATIENT
Start: 2024-05-10 | End: 2024-05-15 | Stop reason: HOSPADM

## 2024-05-10 RX ORDER — LISINOPRIL 20 MG/1
20 TABLET ORAL DAILY
Status: DISCONTINUED | OUTPATIENT
Start: 2024-05-10 | End: 2024-05-15 | Stop reason: HOSPADM

## 2024-05-10 RX ORDER — HEPARIN SODIUM 1000 [USP'U]/ML
4000 INJECTION, SOLUTION INTRAVENOUS; SUBCUTANEOUS ONCE
Status: COMPLETED | OUTPATIENT
Start: 2024-05-10 | End: 2024-05-10

## 2024-05-10 RX ORDER — BUMETANIDE 0.25 MG/ML
1 INJECTION INTRAMUSCULAR; INTRAVENOUS 2 TIMES DAILY
Status: DISCONTINUED | OUTPATIENT
Start: 2024-05-10 | End: 2024-05-15 | Stop reason: HOSPADM

## 2024-05-10 RX ORDER — ACETAMINOPHEN 325 MG/1
650 TABLET ORAL EVERY 6 HOURS PRN
Status: DISCONTINUED | OUTPATIENT
Start: 2024-05-10 | End: 2024-05-15 | Stop reason: HOSPADM

## 2024-05-10 RX ORDER — INSULIN GLARGINE 100 [IU]/ML
35 INJECTION, SOLUTION SUBCUTANEOUS 2 TIMES DAILY
Status: DISCONTINUED | OUTPATIENT
Start: 2024-05-10 | End: 2024-05-15 | Stop reason: HOSPADM

## 2024-05-10 RX ORDER — METOPROLOL TARTRATE 50 MG/1
50 TABLET, FILM COATED ORAL 2 TIMES DAILY
Status: DISCONTINUED | OUTPATIENT
Start: 2024-05-10 | End: 2024-05-15 | Stop reason: HOSPADM

## 2024-05-10 RX ORDER — INSULIN LISPRO 100 [IU]/ML
5 INJECTION, SOLUTION INTRAVENOUS; SUBCUTANEOUS
Status: DISCONTINUED | OUTPATIENT
Start: 2024-05-10 | End: 2024-05-15 | Stop reason: HOSPADM

## 2024-05-10 RX ORDER — SODIUM CHLORIDE 9 MG/ML
INJECTION, SOLUTION INTRAVENOUS PRN
Status: DISCONTINUED | OUTPATIENT
Start: 2024-05-10 | End: 2024-05-15 | Stop reason: HOSPADM

## 2024-05-10 RX ORDER — GLUCAGON 1 MG/ML
1 KIT INJECTION PRN
Status: DISCONTINUED | OUTPATIENT
Start: 2024-05-10 | End: 2024-05-15 | Stop reason: HOSPADM

## 2024-05-10 RX ORDER — POTASSIUM CHLORIDE 20 MEQ/1
40 TABLET, EXTENDED RELEASE ORAL PRN
Status: DISCONTINUED | OUTPATIENT
Start: 2024-05-10 | End: 2024-05-15 | Stop reason: HOSPADM

## 2024-05-10 RX ORDER — HEPARIN SODIUM 1000 [USP'U]/ML
2000 INJECTION, SOLUTION INTRAVENOUS; SUBCUTANEOUS PRN
Status: DISCONTINUED | OUTPATIENT
Start: 2024-05-10 | End: 2024-05-15 | Stop reason: HOSPADM

## 2024-05-10 RX ORDER — ALBUTEROL SULFATE 90 UG/1
2 AEROSOL, METERED RESPIRATORY (INHALATION) EVERY 4 HOURS PRN
Status: DISCONTINUED | OUTPATIENT
Start: 2024-05-10 | End: 2024-05-15 | Stop reason: HOSPADM

## 2024-05-10 RX ADMIN — HEPARIN SODIUM 4000 UNITS: 1000 INJECTION INTRAVENOUS; SUBCUTANEOUS at 17:13

## 2024-05-10 RX ADMIN — GABAPENTIN 600 MG: 600 TABLET, FILM COATED ORAL at 21:18

## 2024-05-10 RX ADMIN — HEPARIN SODIUM 2000 UNITS: 1000 INJECTION INTRAVENOUS; SUBCUTANEOUS at 23:51

## 2024-05-10 RX ADMIN — HYDRALAZINE HYDROCHLORIDE 25 MG: 25 TABLET ORAL at 21:19

## 2024-05-10 RX ADMIN — HYDRALAZINE HYDROCHLORIDE 25 MG: 25 TABLET ORAL at 16:06

## 2024-05-10 RX ADMIN — BUMETANIDE 1 MG: 0.25 INJECTION INTRAMUSCULAR; INTRAVENOUS at 16:06

## 2024-05-10 RX ADMIN — BUMETANIDE 1 MG: 0.25 INJECTION INTRAMUSCULAR; INTRAVENOUS at 11:13

## 2024-05-10 RX ADMIN — HEPARIN SODIUM 4000 UNITS: 1000 INJECTION INTRAVENOUS; SUBCUTANEOUS at 11:17

## 2024-05-10 RX ADMIN — METOPROLOL TARTRATE 50 MG: 50 TABLET, FILM COATED ORAL at 23:52

## 2024-05-10 RX ADMIN — ASPIRIN 81 MG 324 MG: 81 TABLET ORAL at 08:13

## 2024-05-10 RX ADMIN — INSULIN GLARGINE 35 UNITS: 100 INJECTION, SOLUTION SUBCUTANEOUS at 21:17

## 2024-05-10 RX ADMIN — HEPARIN SODIUM AND DEXTROSE 7 UNITS/KG/HR: 10000; 5 INJECTION INTRAVENOUS at 11:18

## 2024-05-10 ASSESSMENT — PAIN DESCRIPTION - LOCATION: LOCATION: CHEST

## 2024-05-10 ASSESSMENT — PAIN SCALES - GENERAL
PAINLEVEL_OUTOF10: 2
PAINLEVEL_OUTOF10: 2
PAINLEVEL_OUTOF10: 0

## 2024-05-10 ASSESSMENT — PAIN - FUNCTIONAL ASSESSMENT
PAIN_FUNCTIONAL_ASSESSMENT: 0-10
PAIN_FUNCTIONAL_ASSESSMENT: 0-10

## 2024-05-10 ASSESSMENT — HEART SCORE: ECG: NON-SPECIFC REPOLARIZATION DISTURBANCE/LBTB/PM

## 2024-05-10 NOTE — H&P
Hospitalist History & Physical    Patient:  Ashwin Liriano    Unit/Bed:07/007A  YOB: 1960  MRN: [id number]   Acct: [id number]   PCP: Theresa Muñoz APRN - CNP  Code Status: Full Code    Date of Service: The patient was seen and examined on 05/10/24 and admitted to Inpatient with an expected length of stay of less than two midnights due to medical therapy.     Chief Complaint: Chest pain, shortness of breath    Assessment/Plan:    NSTEMI  Intermittent chest pain associated with dyspnea and exertion over the last week.  High-sensitivity troponin significantly elevated at 151, 142  Heparin initiated in the emergency department.  No active chest pain.  No EKG changes.  Cardiology consult.  Check TTE.  Acute on chronic diastolic heart failure  Echo on 11/3/2022 with EF of 55 to 60% and grade 2 diastolic dysfunction.  The patient is also noted to have mild aortic stenosis and trace aortic regurgitation.  Outpatient diuretic regimen includes Bumex 1 mg daily.  Received 1 mg of IV Bumex in the emergency department.  Will defer additional diuresis to cardiology and nephrology  Strict ins and outs. Daily weights. Low sodium diet. Fluid restriction.  Coronary artery disease  Status post CABG.  Continue antiplatelets.  Cardiology consult as above.  GISELA on CKD stage IIIa  Creatinine 2.1, baseline appears to be around 1.5.  Patient endorses decreased urine output over the last week.  Suspect kidney injury secondary to congestive heart failure exacerbation.  Receiving 1 mg of IV Bumex in the emergency department.  Will consult nephrology for further assistance and for possible clearance for cardiac catheterization in the setting of NSTEMI.  Hold lisinopril.  Strict ins and outs.  Aortic stenosis  Mild per echocardiogram on 11/9/2023.  Repeat TTE as above.  Bilateral carotid artery stenosis  Known right carotid artery occlusion.  History of left carotid endarterectomy.  Insulin-dependent type 2

## 2024-05-10 NOTE — ED NOTES
ED to inpatient nurses report      Chief Complaint:  Chief Complaint   Patient presents with    Shortness of Breath     Present to ED from: home    MOA:     LOC: alert and orientated to name, place, date  Mobility: Independent  Oxygen Baseline: none    Current needs required: na     Code Status:   Prior    What abnormal results were found and what did you give/do to treat them? Labs - meds   Any procedures or intervention occur? na    Mental Status:  Level of Consciousness: Alert (0)    Psych Assessment:        Vitals:  Patient Vitals for the past 24 hrs:   BP Temp Temp src Pulse Resp SpO2 Height Weight   05/10/24 0907 132/86 -- -- 89 20 93 % -- --   05/10/24 0839 (!) 133/98 -- -- 91 20 91 % -- --   05/10/24 0751 (!) 152/96 98.5 °F (36.9 °C) Oral 95 20 93 % 2.083 m (6' 10\") (!) 140.6 kg (310 lb)        LDAs:   Peripheral IV 05/10/24 Left Antecubital (Active)   Site Assessment Clean, dry & intact 05/10/24 0754   Line Status Normal saline locked 05/10/24 0754   Dressing Status Clean, dry & intact 05/10/24 0754       Ambulatory Status:  No data recorded    Diagnosis:  DISPOSITION Admitted 05/10/2024 10:28:17 AM   Final diagnoses:   Chest pain, unspecified type   Elevated troponin   Acute on chronic congestive heart failure, unspecified heart failure type (HCC)        Consults:  None     Pain Score:  Pain Assessment  Pain Assessment: 0-10  Pain Level: 2  Pain Location: Chest    C-SSRS:        Sepsis Screening:  Sepsis Risk Score: 2.99    Molina Fall Risk:       Swallow Screening        Preferred Language:   English      ALLERGIES     Patient has no known allergies.    SURGICAL HISTORY       Past Surgical History:   Procedure Laterality Date    CARDIAC SURGERY  11/2019    triple bypass    CYST REMOVAL      RIGHT ANKLE     FOOT AMPUTATION Left 1/10/2023    LEFT BELOW KNEE AMPUTATION performed by Mert Rubio DPM at [facility] OR    FOOT DEBRIDEMENT Left 4/20/2020    LEFT TRANSMETATARSAL AMPUTATION  FOOT INCISION AND  DRAINAGE performed by Mert Rubio DPM at [facility] OR    FOOT DEBRIDEMENT Left 7/20/2020    LEFT WOUND DEBRIDEMENT WITH WOUND VAC APPLICATION performed by Mert Rubio DPM at [facility] OR    FOOT DEBRIDEMENT Left 7/1/2022    LEFT FOOT EXCISONAL WOUND DEBRIDEMENT, APPLICATION SKIN SUBSTITUTE GRAFT, APPLICATION KCI WOUND VAC performed by Mert Rubio DPM at [facility] OR    FOOT SURGERY Right     CYST REMOVED    FOOT SURGERY Left 8/14/2020    LEFT FOOT PREPARATION GRAFT SITE, HAVEST SPLIT THICKNESS SKIN GRAFT WITH APPLICATION, APPLICATION KCI WOUND VAC performed by Mert Rubio DPM at [facility] OR    FOOT SURGERY      OIO    TOE AMPUTATION Left 3/3/2020    I&D LEFT FOOT, TRANSMETATARSAL AMPUTATION performed by Mert Rubio DPM at [facility] OR    TONSILLECTOMY      UPPER GASTROINTESTINAL ENDOSCOPY N/A 11/23/2022    EGD performed by Angi Booth MD at [facility]       PAST MEDICAL HISTORY       Past Medical History:   Diagnosis Date    Acute left-sided low back pain with bilateral sciatica     Arthritis     CAD (coronary artery disease)     CKD (chronic kidney disease), stage II     Diabetes mellitus (HCC)     Hyperlipidemia     Hypertension     Liver disease     HEPITITIS AS A CHILD    Wound dehiscence, surgical, initial encounter            Electronically signed by Kylie Morrison RN on 5/10/2024 at 10:44 AM

## 2024-05-10 NOTE — ED TRIAGE NOTES
Pt presents to the ED for SOB x1 week. Pt states his SOB with exertion. Pt complaining of chest pain. Pt states he has been taking his medication as prescribed.

## 2024-05-10 NOTE — CONSULTS
TIMES DAILY 270 tablet 1    bumetanide (BUMEX) 1 MG tablet Take 1 tablet by mouth once daily 90 tablet 1    atorvastatin (LIPITOR) 80 MG tablet Take 1 tablet by mouth once daily 90 tablet 1    albuterol sulfate HFA (VENTOLIN HFA) 108 (90 Base) MCG/ACT inhaler Inhale 2 puffs into the lungs 4 times daily as needed for Wheezing 54 g 1    aspirin 81 MG chewable tablet Take 1 tablet by mouth daily 90 tablet 1    tiotropium (SPIRIVA RESPIMAT) 2.5 MCG/ACT AERS inhaler Inhale 2 puffs into the lungs daily 12 g 3    potassium chloride (KLOR-CON M) 20 MEQ extended release tablet Take 1 tablet by mouth daily 90 tablet 1    Polyethylene Glycol 3350 (MIRALAX PO) Take by mouth as needed      Multiple Vitamins-Minerals (MULTIVITAMIN PO) Take 1 tablet by mouth daily [place]       Prior to Admission medications    Medication Sig Start Date End Date Taking? Authorizing Provider   ezetimibe (ZETIA) 10 MG tablet Take 1 tablet by mouth once daily 4/15/24   Tracie Jo MD   empagliflozin (JARDIANCE) 10 MG tablet Take 1 tablet by mouth once daily 3/26/24   Theresa Muñoz APRN - CNP   clopidogrel (PLAVIX) 75 MG tablet Take 1 tablet by mouth once daily 3/26/24   Theresa Muñoz APRN - CNP   gabapentin (NEURONTIN) 600 MG tablet Take 1 tablet by mouth 2 times daily for 180 days. 3/18/24 9/14/24  Theresa Muñoz APRN - CNP   insulin lispro, 1 Unit Dial, (HUMALOG KWIKPEN) 100 UNIT/ML SOPN 5 units plus scale: -199= 3 units, 200-249= 6 units, 250-299= 9 units, 300-349= 12 units, > 350= 15 units TID. Max units per day 60 2/14/24   Theresa Muñoz APRN - CNP   Dulaglutide 0.75 MG/0.5ML SOPN Inject 0.75 mg into the skin once a week 2/14/24   Theresa Muñoz APRN - CNP   insulin detemir (LEVEMIR FLEXTOUCH) 100 UNIT/ML injection pen Inject 35 Units into the skin 2 times daily 2/5/24 8/3/24  Theresa Muñoz, APRN - CNP   lisinopril (PRINIVIL;ZESTRIL) 20 MG tablet Take 1  Father     High Blood Pressure Father     Cancer Neg Hx     Stroke Neg Hx      Social History     Socioeconomic History    Marital status: Single     Spouse name: Not on file    Number of children: Not on file    Years of education: Not on file    Highest education level: Not on file   Occupational History    Not on file   Tobacco Use    Smoking status: Former     Current packs/day: 0.00     Types: Cigarettes     Quit date:      Years since quittin.3    Smokeless tobacco: Never   Vaping Use    Vaping Use: Never used   Substance and Sexual Activity    Alcohol use: Not Currently    Drug use: Never    Sexual activity: Not on file   Other Topics Concern    Not on file   Social History Narrative    Not on file     Social Determinants of Health     Financial Resource Strain: Low Risk  (3/17/2023)    Overall Financial Resource Strain (CARDIA)     Difficulty of Paying Living Expenses: Not hard at all   Food Insecurity: Not on file (3/17/2023)   Transportation Needs: No Transportation Needs (2023)    PRAPARE - Transportation     Lack of Transportation (Medical): No     Lack of Transportation (Non-Medical): No   Physical Activity: Inactive (2023)    Exercise Vital Sign     Days of Exercise per Week: 0 days     Minutes of Exercise per Session: 0 min   Stress: Stress Concern Present (2023)    [place] [employer] - Occupational Stress Questionnaire     Feeling of Stress : To some extent   Social Connections: Unknown (2023)    Social Connection and Isolation Panel [NHANES]     Frequency of Communication with Friends and Family: More than three times a week     Frequency of Social Gatherings with Friends and Family: More than three times a week     Attends Muslim Services: Never     Active Member of Clubs or Organizations: No     Attends Club or Organization Meetings: Never     Marital Status: Not on file   Intimate Partner Violence: Not on file   Housing Stability: Low Risk

## 2024-05-10 NOTE — CONSULTS
[facility]          [address]        [address]        [place], [place] [id number] [phone]           Inpatient Initial consult note         5/10/2024 3:52 PM      Patient Name:   Ashwin Liriano  YOB: 1960  Primary Care Physician:  Theresa Muñoz APRN - CNP   Admit Date:    5/10/2024  7:46 AM    Consultation requested by : Tyree Salcido PA-C    Reason for Consult : Nephrology following for GISELA/CKD/possible fluid overload.    History of presenting illness :Ashwin Liriano is a 63 y.o.   male with Past Medical History as stated below presented with a chief complaint of Shortness of Breath   on 5/10/2024 .    Patient presented with chief complaints of chest pain shortness of breath which started this morning associated with some weight gain of maybe 10 pounds lower extremity edema.  This has been gradually worsening over the last 1 week has some orthopnea as well shortness of breath worsens with ambulation.  Patient says he has been compliant with his diuretics.    He was given started on a heparin drip cardiology and nephrology has been consulted patient's creatinine was also slightly elevated seen by cardiology ischemic workup pending    Past History:  Past Medical History:   Diagnosis Date    Acute left-sided low back pain with bilateral sciatica     Arthritis     CAD (coronary artery disease)     CKD (chronic kidney disease), stage II     Diabetes mellitus (HCC)     Hyperlipidemia     Hypertension     Liver disease     HEPITITIS AS A CHILD    Wound dehiscence, surgical, initial encounter      Past Surgical History:   Procedure Laterality Date    CARDIAC SURGERY  11/2019    triple bypass    CYST REMOVAL      RIGHT ANKLE     FOOT AMPUTATION Left 1/10/2023    LEFT BELOW KNEE AMPUTATION performed by Mert Rubio DPM at [facility] OR    FOOT DEBRIDEMENT Left 4/20/2020    LEFT TRANSMETATARSAL AMPUTATION  FOOT INCISION AND DRAINAGE performed by Mert  Ear: External ear normal.      Left Ear: External ear normal.      Nose: Nose normal.      Mouth/Throat:      Mouth: Mucous membranes are moist.   Eyes:      General: No scleral icterus.        Right eye: No discharge.         Left eye: No discharge.      Conjunctiva/sclera: Conjunctivae normal.   Cardiovascular:      Rate and Rhythm: Normal rate and regular rhythm.      Heart sounds: Normal heart sounds.   Pulmonary:      Effort: Pulmonary effort is normal. No respiratory distress.      Breath sounds: Normal breath sounds. No wheezing or rales.   Abdominal:      General: Bowel sounds are normal. There is no distension.      Palpations: Abdomen is soft.      Tenderness: There is no abdominal tenderness.   Musculoskeletal:         General: Swelling present.      Cervical back: Normal range of motion and neck supple.      Right lower leg: Edema present.      Left lower leg: Edema present.   Skin:     General: Skin is warm and dry.      Findings: No erythema or rash.   Neurological:      General: No focal deficit present.      Mental Status: He is alert and oriented to person, place, and time.   Psychiatric:         Mood and Affect: Mood normal.         Behavior: Behavior normal.          /89   Pulse 84   Temp 97.7 °F (36.5 °C) (Oral)   Resp 20   Ht 1.88 m (6' 2.02\")   Wt (!) 140.6 kg (310 lb)   SpO2 97%   BMI 39.78 kg/m²   Labs, Radiology and Tests :  CBC:   Recent Labs     05/10/24  0748   WBC 7.2   HGB 11.2*   HCT 36.5*        CMP:  Recent Labs     05/10/24  0748      K 4.5      CO2 24   BUN 44*   CREATININE 2.1*   GLUCOSE 211*   CALCIUM 9.5     Hepatic: No results for input(s): \"LABALBU\", \"AST\", \"ALT\", \"BILITOT\", \"ALKPHOS\" in the last 72 hours.    Invalid input(s): \"ALB\"    Radiology : Chest x-ray reviewed by me shows mild prominence of pulmonary vasculature no overt congestion    Old lab data have been reviewed and noted patient's baseline creatinine is around 1.5-1.7    Assessment

## 2024-05-10 NOTE — ED PROVIDER NOTES
[facility]  EMERGENCY DEPARTMENT ENCOUNTER      Patient Name: Ashwin Liriano  MRN: [id number]  YOB: 1960  Date of Evaluation: 5/10/2024  Emergency Physician: Miguel Ordoñez MD    CHIEF COMPLAINT       Chief Complaint   Patient presents with    Shortness of Breath       HISTORY OF PRESENT ILLNESS    HPI    History obtained from the patient.    Ashwin Liriano is a 63 y.o. male with PMHx of CAD, DM 2, hyperlipidemia, hypertension, CKD who presents to the emergency department from home by private vehicle for evaluation of shortness of breath.  Patient states over the last week he has been having worsening dyspnea and dyspnea on exertion accompanied by chest pain with strenuous exertion.  He reports orthopnea as well and worsening lower extremity swelling.  No fevers or chills or abdominal pain or nausea or vomiting.  Patient does report that he has noticed decrease in urinary output.  Patient denies active chest pain currently.    Pertinent previous and/or external records reviewed: Non-contributory    REVIEW OF SYSTEMS   Review of Systems  Negative unless documented in HPI    PAST MEDICAL AND SURGICAL HISTORY     Past Medical History:   Diagnosis Date    Acute left-sided low back pain with bilateral sciatica     Arthritis     CAD (coronary artery disease)     CKD (chronic kidney disease), stage II     Diabetes mellitus (HCC)     Hyperlipidemia     Hypertension     Liver disease     HEPITITIS AS A CHILD    Wound dehiscence, surgical, initial encounter        Past Surgical History:   Procedure Laterality Date    CARDIAC SURGERY  11/2019    triple bypass    CYST REMOVAL      RIGHT ANKLE     FOOT AMPUTATION Left 1/10/2023    LEFT BELOW KNEE AMPUTATION performed by Mert Rubio DPM at [facility] OR    FOOT DEBRIDEMENT Left 4/20/2020    LEFT TRANSMETATARSAL AMPUTATION  FOOT INCISION AND DRAINAGE performed by Mert Rubio DPM at [facility] OR    FOOT DEBRIDEMENT Left  no administration in time range)   bumetanide (BUMEX) injection 1 mg (1 mg IntraVENous Given 5/10/24 1606)   aspirin chewable tablet 324 mg (324 mg Oral Given 5/10/24 0813)   bumetanide (BUMEX) injection 1 mg (1 mg IntraVENous Given 5/10/24 1113)   heparin (porcine) injection 4,000 Units (4,000 Units IntraVENous Given 5/10/24 1117)       ED Course as of 05/10/24 1748   Fri May 10, 2024   0756 EKG reviewed showing atrial sensed and ventricularly paced rhythm.  Negative Sgarbossa's criteria. [JT]   0846 Pro-BNP(!): 3175.0 [JT]   0846 Troponin, High Sensitivity(!): 151 [JT]   0846 Creatinine(!): 2.1  Gradually uptrending creatinine. [JT]   0849 XR CHEST PORTABLE  IMPRESSION:  1. Mild cardiomegaly status post sternotomy and pacemaker placement.  2. Diffuse COPD with no acute infiltrates or effusions.   [JT]   0925 Pro-BNP(!): 3175.0  Significantly elevated BMP especially when compared to last value.  Chest x-ray does show cardiomegaly.  Bibasilar rales are auscultated.  Concerning for CHF exacerbation. [JT]   1007 Troponin, High Sensitivity(!): 142  Delta troponin 9. [JT]      ED Course User Index  [JT] Miguel Ordoñez MD       Procedures: (None if left blank)  Procedures:     Consultants:  IP CONSULT TO CARDIOLOGY  IP CONSULT TO NEPHROLOGY    Documentation:    Heart Score    Heart Score for chest pain patients  History: Highly Suspicious  ECG: Non-Specifc repolarization disturbance/LBTB/PM  Patient Age: > 45 and < 65 years  *Risk factors for Atherosclerotic disease: Obesity, Hypertension, Hypercholesterolemia, Coronary Artery Disease  Risk Factors: > 3 Risk factors or history of atherosclerotic disease*  Troponin: > 3X normal limit  Heart Score Total: 8    MACE: Major Acute Cardiac Event (All Cause Mortality, AMI or revascularization)  “Chest Pain in the Emergency Room: A Multicenter Validation of the HEART Score”. [place] BE, Michael AJ, Jasmyne CARMELITA, Jessica TP, van hiwot [place] F, Jessica EG, Soha SH, ivette Ellsworth RM,

## 2024-05-11 LAB
ANION GAP SERPL CALC-SCNC: 11 MEQ/L (ref 8–16)
BASOPHILS ABSOLUTE: 0.1 THOU/MM3 (ref 0–0.1)
BASOPHILS NFR BLD AUTO: 0.8 %
BUN SERPL-MCNC: 46 MG/DL (ref 7–22)
CALCIUM SERPL-MCNC: 8.7 MG/DL (ref 8.5–10.5)
CHLORIDE SERPL-SCNC: 100 MEQ/L (ref 98–111)
CO2 SERPL-SCNC: 26 MEQ/L (ref 23–33)
CREAT SERPL-MCNC: 2.2 MG/DL (ref 0.4–1.2)
DEPRECATED RDW RBC AUTO: 52.7 FL (ref 35–45)
EKG ATRIAL RATE: 82 BPM
EKG P AXIS: 47 DEGREES
EKG P-R INTERVAL: 226 MS
EKG Q-T INTERVAL: 436 MS
EKG QRS DURATION: 156 MS
EKG QTC CALCULATION (BAZETT): 509 MS
EKG R AXIS: -76 DEGREES
EKG T AXIS: 106 DEGREES
EKG VENTRICULAR RATE: 82 BPM
EOSINOPHIL NFR BLD AUTO: 2.8 %
EOSINOPHILS ABSOLUTE: 0.2 THOU/MM3 (ref 0–0.4)
ERYTHROCYTE [DISTWIDTH] IN BLOOD BY AUTOMATED COUNT: 15.7 % (ref 11.5–14.5)
GFR SERPL CREATININE-BSD FRML MDRD: 33 ML/MIN/1.73M2
GLUCOSE BLD STRIP.AUTO-MCNC: 164 MG/DL (ref 70–108)
GLUCOSE BLD STRIP.AUTO-MCNC: 179 MG/DL (ref 70–108)
GLUCOSE BLD STRIP.AUTO-MCNC: 195 MG/DL (ref 70–108)
GLUCOSE BLD STRIP.AUTO-MCNC: 230 MG/DL (ref 70–108)
GLUCOSE BLD STRIP.AUTO-MCNC: 239 MG/DL (ref 70–108)
GLUCOSE SERPL-MCNC: 222 MG/DL (ref 70–108)
HCT VFR BLD AUTO: 33.6 % (ref 42–52)
HEPARIN UNFRACTIONATED: 0.22 U/ML (ref 0.3–0.7)
HEPARIN UNFRACTIONATED: 0.27 U/ML (ref 0.3–0.7)
HEPARIN UNFRACTIONATED: 0.34 U/ML (ref 0.3–0.7)
HGB BLD-MCNC: 10.1 GM/DL (ref 14–18)
IMM GRANULOCYTES # BLD AUTO: 0.03 THOU/MM3 (ref 0–0.07)
IMM GRANULOCYTES NFR BLD AUTO: 0.4 %
LYMPHOCYTES ABSOLUTE: 0.7 THOU/MM3 (ref 1–4.8)
LYMPHOCYTES NFR BLD AUTO: 9.3 %
MCH RBC QN AUTO: 27.8 PG (ref 26–33)
MCHC RBC AUTO-ENTMCNC: 30.1 GM/DL (ref 32.2–35.5)
MCV RBC AUTO: 92.6 FL (ref 80–94)
MONOCYTES ABSOLUTE: 0.5 THOU/MM3 (ref 0.4–1.3)
MONOCYTES NFR BLD AUTO: 7 %
NEUTROPHILS ABSOLUTE: 6.1 THOU/MM3 (ref 1.8–7.7)
NEUTROPHILS NFR BLD AUTO: 79.7 %
NRBC BLD AUTO-RTO: 0 /100 WBC
PLATELET # BLD AUTO: 208 THOU/MM3 (ref 130–400)
PMV BLD AUTO: 11.4 FL (ref 9.4–12.4)
POTASSIUM SERPL-SCNC: 5 MEQ/L (ref 3.5–5.2)
RBC # BLD AUTO: 3.63 MILL/MM3 (ref 4.7–6.1)
SODIUM SERPL-SCNC: 137 MEQ/L (ref 135–145)
WBC # BLD AUTO: 7.6 THOU/MM3 (ref 4.8–10.8)

## 2024-05-11 PROCEDURE — 6360000002 HC RX W HCPCS: Performed by: PHYSICIAN ASSISTANT

## 2024-05-11 PROCEDURE — 85520 HEPARIN ASSAY: CPT

## 2024-05-11 PROCEDURE — 6360000002 HC RX W HCPCS: Performed by: STUDENT IN AN ORGANIZED HEALTH CARE EDUCATION/TRAINING PROGRAM

## 2024-05-11 PROCEDURE — 80048 BASIC METABOLIC PNL TOTAL CA: CPT

## 2024-05-11 PROCEDURE — 6370000000 HC RX 637 (ALT 250 FOR IP): Performed by: PHYSICIAN ASSISTANT

## 2024-05-11 PROCEDURE — 6360000002 HC RX W HCPCS

## 2024-05-11 PROCEDURE — 99232 SBSQ HOSP IP/OBS MODERATE 35: CPT | Performed by: INTERNAL MEDICINE

## 2024-05-11 PROCEDURE — 1200000003 HC TELEMETRY R&B

## 2024-05-11 PROCEDURE — 99232 SBSQ HOSP IP/OBS MODERATE 35: CPT | Performed by: STUDENT IN AN ORGANIZED HEALTH CARE EDUCATION/TRAINING PROGRAM

## 2024-05-11 PROCEDURE — 93005 ELECTROCARDIOGRAM TRACING: CPT | Performed by: INTERNAL MEDICINE

## 2024-05-11 PROCEDURE — 99232 SBSQ HOSP IP/OBS MODERATE 35: CPT

## 2024-05-11 PROCEDURE — 82948 REAGENT STRIP/BLOOD GLUCOSE: CPT

## 2024-05-11 PROCEDURE — 85025 COMPLETE CBC W/AUTO DIFF WBC: CPT

## 2024-05-11 PROCEDURE — 36415 COLL VENOUS BLD VENIPUNCTURE: CPT

## 2024-05-11 PROCEDURE — 93010 ELECTROCARDIOGRAM REPORT: CPT | Performed by: INTERNAL MEDICINE

## 2024-05-11 RX ADMIN — ASPIRIN 81 MG 81 MG: 81 TABLET ORAL at 08:41

## 2024-05-11 RX ADMIN — GABAPENTIN 600 MG: 600 TABLET, FILM COATED ORAL at 08:42

## 2024-05-11 RX ADMIN — METOPROLOL TARTRATE 50 MG: 50 TABLET, FILM COATED ORAL at 20:44

## 2024-05-11 RX ADMIN — HYDRALAZINE HYDROCHLORIDE 25 MG: 25 TABLET ORAL at 08:42

## 2024-05-11 RX ADMIN — HEPARIN SODIUM AND DEXTROSE 17 UNITS/KG/HR: 10000; 5 INJECTION INTRAVENOUS at 19:48

## 2024-05-11 RX ADMIN — INSULIN LISPRO 5 UNITS: 100 INJECTION, SOLUTION INTRAVENOUS; SUBCUTANEOUS at 17:47

## 2024-05-11 RX ADMIN — HEPARIN SODIUM 2000 UNITS: 1000 INJECTION INTRAVENOUS; SUBCUTANEOUS at 19:48

## 2024-05-11 RX ADMIN — GABAPENTIN 600 MG: 600 TABLET, FILM COATED ORAL at 20:44

## 2024-05-11 RX ADMIN — METOPROLOL TARTRATE 50 MG: 50 TABLET, FILM COATED ORAL at 08:46

## 2024-05-11 RX ADMIN — ATORVASTATIN CALCIUM 80 MG: 80 TABLET, FILM COATED ORAL at 08:46

## 2024-05-11 RX ADMIN — HEPARIN SODIUM 2000 UNITS: 1000 INJECTION INTRAVENOUS; SUBCUTANEOUS at 06:23

## 2024-05-11 RX ADMIN — INSULIN GLARGINE 35 UNITS: 100 INJECTION, SOLUTION SUBCUTANEOUS at 20:40

## 2024-05-11 RX ADMIN — INSULIN GLARGINE 35 UNITS: 100 INJECTION, SOLUTION SUBCUTANEOUS at 08:42

## 2024-05-11 RX ADMIN — CLOPIDOGREL BISULFATE 75 MG: 75 TABLET ORAL at 08:41

## 2024-05-11 RX ADMIN — HYDRALAZINE HYDROCHLORIDE 25 MG: 25 TABLET ORAL at 20:44

## 2024-05-11 RX ADMIN — INSULIN LISPRO 5 UNITS: 100 INJECTION, SOLUTION INTRAVENOUS; SUBCUTANEOUS at 12:12

## 2024-05-11 RX ADMIN — BUMETANIDE 1 MG: 0.25 INJECTION INTRAMUSCULAR; INTRAVENOUS at 08:43

## 2024-05-11 RX ADMIN — INSULIN LISPRO 5 UNITS: 100 INJECTION, SOLUTION INTRAVENOUS; SUBCUTANEOUS at 08:42

## 2024-05-11 RX ADMIN — EZETIMIBE 10 MG: 10 TABLET ORAL at 08:46

## 2024-05-11 RX ADMIN — HEPARIN SODIUM AND DEXTROSE 13 UNITS/KG/HR: 10000; 5 INJECTION INTRAVENOUS at 05:41

## 2024-05-11 RX ADMIN — EMPAGLIFLOZIN 10 MG: 10 TABLET, FILM COATED ORAL at 08:46

## 2024-05-11 RX ADMIN — ONDANSETRON 4 MG: 2 INJECTION INTRAMUSCULAR; INTRAVENOUS at 03:27

## 2024-05-11 RX ADMIN — BUMETANIDE 1 MG: 0.25 INJECTION INTRAMUSCULAR; INTRAVENOUS at 17:47

## 2024-05-11 ASSESSMENT — PAIN SCALES - GENERAL
PAINLEVEL_OUTOF10: 0
PAINLEVEL_OUTOF10: 0

## 2024-05-11 NOTE — CARE COORDINATION
05/11/24 1248   Service Assessment   Patient Orientation Alert and Oriented   Cognition Alert   History Provided By Patient   Primary Caregiver Self   Accompanied By/Relationship none   Support Systems Family Members;Friends/Neighbors;Parent   Patient's Healthcare Decision Maker is: Named in Scanned ACP Document   PCP Verified by CM Yes   Last Visit to PCP Within last 3 months   Prior Functional Level Independent in ADLs/IADLs   Current Functional Level Independent in ADLs/IADLs   Can patient return to prior living arrangement Yes   Ability to make needs known: Good   Family able to assist with home care needs: Yes   Would you like for me to discuss the discharge plan with any other family members/significant others, and if so, who? No   Financial Resources Medicaid   Community Resources None   CM/SW Referral ADLs/IADLs   Social/Functional History   Lives With Alone   Type of Home House   Active  Yes   Discharge Planning   Type of Residence House   Living Arrangements Alone   Current Services Prior To Admission Durable Medical Equipment   Current DME Prior to Arrival Walker;Wheelchair;Other (Comment)  (Prosthetic; Scooter;)   Potential Assistance Needed N/A   DME Ordered? No   Potential Assistance Purchasing Medications No   Type of Home Care Services None   Patient expects to be discharged to: House   One/Two Story Residence One story   History of falls? 0   Services At/After Discharge   Confirm Follow Up Transport Family   Condition of Participation: Discharge Planning   The Plan for Transition of Care is related to the following treatment goals: \"Get the diuretic thing under control and get the liquid off my lungs\"     Patient Goals/Plan/Treatment Preferences: Met w/ Bill. Verified PCP and insurance. He is independent and actively drives. Has good family support. Has all needed DME. Plans to return home alone. Denies needs for DME or HH. Monitor for potential home O2 needs.     If patient is discharged

## 2024-05-11 NOTE — PLAN OF CARE
Problem: Discharge Planning  Goal: Discharge to home or other facility with appropriate resources  Outcome: Progressing  Flowsheets (Taken 5/11/2024 0019)  Discharge to home or other facility with appropriate resources: Identify barriers to discharge with patient and caregiver     Problem: Pain  Goal: Verbalizes/displays adequate comfort level or baseline comfort level  Outcome: Progressing  Flowsheets (Taken 5/11/2024 0019)  Verbalizes/displays adequate comfort level or baseline comfort level: Encourage patient to monitor pain and request assistance     Problem: Safety - Adult  Goal: Free from fall injury  Outcome: Progressing  Flowsheets (Taken 5/11/2024 0019)  Free From Fall Injury: Instruct family/caregiver on patient safety     Problem: ABCDS Injury Assessment  Goal: Absence of physical injury  Outcome: Progressing  Flowsheets (Taken 5/11/2024 0019)  Absence of Physical Injury: Implement safety measures based on patient assessment     Problem: Skin/Tissue Integrity  Goal: Absence of new skin breakdown  Description: 1.  Monitor for areas of redness and/or skin breakdown  2.  Assess vascular access sites hourly  3.  Every 4-6 hours minimum:  Change oxygen saturation probe site  4.  Every 4-6 hours:  If on nasal continuous positive airway pressure, respiratory therapy assess nares and determine need for appliance change or resting period.  Outcome: Progressing  Note: Skin integrity intact. Patient educated to frequently turn in bed to reduce pressure ulcers.       Problem: Chronic Conditions and Co-morbidities  Goal: Patient's chronic conditions and co-morbidity symptoms are monitored and maintained or improved  Outcome: Progressing  Flowsheets (Taken 5/11/2024 0019)  Care Plan - Patient's Chronic Conditions and Co-Morbidity Symptoms are Monitored and Maintained or Improved: Monitor and assess patient's chronic conditions and comorbid symptoms for stability, deterioration, or improvement     Care plan reviewed  with patient.  Patient verbalize understanding of the plan of care and contribute to goal setting.

## 2024-05-11 NOTE — PROGRESS NOTES
[facility]   Nephrology progress note  5/11/2024, 12:28 PM      Pt Name:    Ashwin Liriano  MRN:     [id number]     YOB: 1960  Admit Date:    5/10/2024  7:46 AM    Chief Complaint: Nephrology following for GISELA/CKD/fluid overload.    Subjective:  Patient seen and examined  No chest pain or shortness of breath  Overall says he is feeling much better excellent urine output    Objective:  24HR INTAKE/OUTPUT:    Intake/Output Summary (Last 24 hours) at 5/11/2024 1228  Last data filed at 5/11/2024 0624  Gross per 24 hour   Intake 1214.43 ml   Output 2325 ml   Net -1110.57 ml      Admission weight: (!) 140.6 kg (310 lb)  Wt Readings from Last 3 Encounters:   05/11/24 (!) 151.4 kg (333 lb 12.4 oz)   02/12/24 123 kg (271 lb 3.2 oz)   10/30/23 (!) 143.8 kg (317 lb)        Vitals :   Vitals:    05/11/24 0515 05/11/24 0842 05/11/24 1204 05/11/24 1206   BP:  103/84 (!) 85/59    Pulse:  77 75    Resp:  18 17    Temp:  98.2 °F (36.8 °C) 98.2 °F (36.8 °C)    TempSrc:  Oral Oral    SpO2:  97% (!) 86% 93%   Weight: (!) 151.4 kg (333 lb 12.4 oz)      Height:           Physical examination  General Appearance:  Well developed. No distress  Mouth/Throat:  Oral mucosa moist  Neck:  Supple, no JVD  Lungs:  Breath sounds: clear  Heart::  S1,S2 heard  Abdomen:  Soft, non - tender  Musculoskeletal:  Edema -bilateral edema noted    Medications:  Infusion:    heparin (PORCINE) Infusion 15 Units/kg/hr (05/11/24 0624)    sodium chloride      dextrose       Meds:    sodium chloride flush  5-40 mL IntraVENous 2 times per day    insulin lispro  0-8 Units SubCUTAneous TID     insulin lispro  0-4 Units SubCUTAneous Nightly    aspirin  81 mg Oral Daily    atorvastatin  80 mg Oral Daily    [Held by provider] bumetanide  1 mg Oral Daily    clopidogrel  75 mg Oral Daily    empagliflozin  10 mg Oral Daily    ezetimibe  10 mg Oral Daily    gabapentin  600 mg Oral BID    hydrALAZINE  25 mg Oral TID    insulin lispro  5

## 2024-05-11 NOTE — PROGRESS NOTES
Hospitalist Progress Note      Patient:  Ashwin Liriano    Unit/Bed:8B-34/034-A  YOB: 1960  MRN: [id number]   Acct: [id number]   PCP: Theresa Muñoz APRN - CNP  Date of Admission: 5/10/2024    Assessment/Plan:    NSTEMI  Intermittent chest pain associated with dyspnea and exertion over the last week.  High-sensitivity troponin significantly elevated at 151, 142  Heparin initiated in the emergency department.  No active chest pain.  No EKG changes.  Cardiology consulted.  Echo showing EF of 35 to 40% drop from prior echo of 55 to 60%.   Currently treating acute on chronic diastolic heart failure.  Per cardiology note today, echo findings discussed with patient concern for worsening CAD.  Renal risk associated with cath need to be weighed.  Patient to discuss with Dr. Howard.  Possible LHC on Monday if patient agreeable and understands risk, primarily renal effect.  Acute on chronic diastolic heart failure  Echo on 11/3/2022 with EF of 55 to 60% and grade 2 diastolic dysfunction.  The patient is also noted to have mild aortic stenosis and trace aortic regurgitation.  Outpatient diuretic regimen includes Bumex 1 mg daily.  Received 1 mg of IV Bumex in the emergency department.  Nephrology and cardiology on board.  Continue Bumex 1 mg twice a day per their recommendation.  Strict ins and outs. Daily weights. Low sodium diet. Fluid restriction.  Acute hypoxic respiratory failure, likely secondary to #2: SpO2 noted to be 86 to 89% on room air.  Placed on supplemental oxygen.  Chest x-ray yesterday showing mild cardiomegaly, diffuse COPD with no acute infiltrates or effusions.  Continue diuretics.  Wean to room air.  Encourage pulmonary hygiene.  Albuterol inhaler as needed for shortness of breath.  Current SpO2 93% on 1 L.  Coronary artery disease  Status post CABG.  Continue antiplatelets.  Cardiology consult as  01/02/2024 02:15 PM    WBCUA 0-5 07/06/2023 08:50 AM    BACTERIA NONE SEEN 01/02/2024 02:15 PM    RBCUA 3-5 01/02/2024 02:15 PM    BLOODU NEGATIVE 01/02/2024 02:15 PM    GLUCOSEU >= 1000 01/02/2024 02:15 PM    GLUCOSEU >1000 mg/dL 07/06/2023 08:50 AM       Radiology:  XR CHEST PORTABLE   Final Result   1. Mild cardiomegaly status post sternotomy and pacemaker placement.   2. Diffuse COPD with no acute infiltrates or effusions.         **This report has been created using voice recognition software. It may contain minor errors which are inherent in voice recognition technology.**      Final report electronically signed by DR KITTY BANSAL on 5/10/2024 8:36 AM        Echo (TTE) complete (PRN contrast/bubble/strain/3D)    Result Date: 5/10/2024    Left Ventricle: Moderately reduced left ventricular systolic function with a visually estimated EF of 35 - 40%. Left ventricle size is normal. Mildly increased wall thickness. Findings consistent with concentric remodeling. Moderate global hypokinesis present. Severe hypokinesis of the apex. Abnormal diastolic function.   Aortic Valve: Trileaflet valve. Moderately thickened cusp. Mildly calcified cusp. Moderate stenosis of the aortic valve. AV mean gradient is 23 mmHg. AV area by continuity VTI is 1.1 cm2.   Mitral Valve: Mild annular calcification of the mitral valve. Mild regurgitation.   Image quality is technically difficult. Technically difficult study due to patient's body habitus.     XR CHEST PORTABLE    Result Date: 5/10/2024  PROCEDURE: XR CHEST PORTABLE CLINICAL INFORMATION: chest pain, shortness of breath, bibasilar rales, h/o CHF, +pedal edema. COMPARISON: Chest x-ray dated 2/3/2023. TECHNIQUE: AP upright view of the chest. FINDINGS: There is mild cardiomegaly in this patient status post pacemaker placement and previous sternotomy..The mediastinum is not widened.  There  is diffuse COPD with no new infiltrates or effusions. The pulmonary vascularity is normal. No

## 2024-05-11 NOTE — PROGRESS NOTES
Cardiology Progress Note      Patient:  Ashwin Liriano  YOB: 1960  MRN: [id number]   Acct: [id number]  Admit Date:  5/10/2024  Primary Cardiologist: Jeniffer Leary MD  Note per dr Swift:  \"CHIEF COMPLAINT: dyspnea on exertion, chest pain  REASON FOR CONSULT:  NSTEMI, CHF        HPI: This is a pleasant 63 y.o. male with a history of CAD s/p CABG x 3 2019, CHF, HTN, HLD, CKD2, diabetes mellitus, and sciatica who presented to [facility] ED on 5/10/2024 with dyspnea on exertion and chest pain. Per patient, he has been having worsening dyspnea over the course of the past week associated with exertion. He also noted increasing orthopnea, which concerned him d/t his heart history. States that when he was active, he would first become short of breath before substernal, non-radiating chest pressure would present. After resting, chest pain would resolve first, then shortness of breath. He has also noticed significant increase in right lower extremity edema over the past week as well. 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 Notes he had a Lexiscan in 2022, but no ischemic workup since that time. States that he hasn't had any recent medication changes, diet changes, or activity changes that could contribute to his current condition. Denies dizziness, vision changes, abdominal pain, changes in bowels/bladder, or skin changes.      Echo 6/[facility] at [facility]: LVEF 50-55%, mild concentric LV hypertrophy, normal LV size & function, LV diastolic relaxation not substatiated, RVSP 41mmHg, moderate-severe aortic calcification, aortic valve area 1.57 cm2 with max gradient 86mmHg and mean gradient 54mmHg, trivial AR, trace TR, moderate MR with centrally directed jet, no vidence of pulmonic regurgitation, dilated IVC, poor acoustic windows.      All labs, EKG's, diagnostic testing and images as well as cardiac cath, stress testing were reviewed during this encounter\"    Subjective (Events in last 24

## 2024-05-12 PROBLEM — I50.43 ACUTE ON CHRONIC COMBINED SYSTOLIC AND DIASTOLIC CONGESTIVE HEART FAILURE (HCC): Status: ACTIVE | Noted: 2024-05-10

## 2024-05-12 PROBLEM — R79.89 ELEVATED TROPONIN: Status: ACTIVE | Noted: 2024-05-12

## 2024-05-12 LAB
DEPRECATED RDW RBC AUTO: 54.6 FL (ref 35–45)
ERYTHROCYTE [DISTWIDTH] IN BLOOD BY AUTOMATED COUNT: 15.9 % (ref 11.5–14.5)
GLUCOSE BLD STRIP.AUTO-MCNC: 135 MG/DL (ref 70–108)
GLUCOSE BLD STRIP.AUTO-MCNC: 169 MG/DL (ref 70–108)
GLUCOSE BLD STRIP.AUTO-MCNC: 186 MG/DL (ref 70–108)
GLUCOSE BLD STRIP.AUTO-MCNC: 212 MG/DL (ref 70–108)
HCT VFR BLD AUTO: 34.6 % (ref 42–52)
HEPARIN UNFRACTIONATED: 0.37 U/ML (ref 0.3–0.7)
HEPARIN UNFRACTIONATED: 0.37 U/ML (ref 0.3–0.7)
HEPARIN UNFRACTIONATED: 0.38 U/ML (ref 0.3–0.7)
HGB BLD-MCNC: 10.3 GM/DL (ref 14–18)
MCH RBC QN AUTO: 27.9 PG (ref 26–33)
MCHC RBC AUTO-ENTMCNC: 29.8 GM/DL (ref 32.2–35.5)
MCV RBC AUTO: 93.8 FL (ref 80–94)
PLATELET # BLD AUTO: 224 THOU/MM3 (ref 130–400)
PMV BLD AUTO: 11.6 FL (ref 9.4–12.4)
RBC # BLD AUTO: 3.69 MILL/MM3 (ref 4.7–6.1)
WBC # BLD AUTO: 8.3 THOU/MM3 (ref 4.8–10.8)

## 2024-05-12 PROCEDURE — 6360000002 HC RX W HCPCS: Performed by: STUDENT IN AN ORGANIZED HEALTH CARE EDUCATION/TRAINING PROGRAM

## 2024-05-12 PROCEDURE — 99232 SBSQ HOSP IP/OBS MODERATE 35: CPT | Performed by: NURSE PRACTITIONER

## 2024-05-12 PROCEDURE — 6370000000 HC RX 637 (ALT 250 FOR IP): Performed by: PHYSICIAN ASSISTANT

## 2024-05-12 PROCEDURE — 99232 SBSQ HOSP IP/OBS MODERATE 35: CPT | Performed by: STUDENT IN AN ORGANIZED HEALTH CARE EDUCATION/TRAINING PROGRAM

## 2024-05-12 PROCEDURE — 2580000003 HC RX 258: Performed by: PHYSICIAN ASSISTANT

## 2024-05-12 PROCEDURE — 82948 REAGENT STRIP/BLOOD GLUCOSE: CPT

## 2024-05-12 PROCEDURE — 6360000002 HC RX W HCPCS

## 2024-05-12 PROCEDURE — 85520 HEPARIN ASSAY: CPT

## 2024-05-12 PROCEDURE — 36415 COLL VENOUS BLD VENIPUNCTURE: CPT

## 2024-05-12 PROCEDURE — 99232 SBSQ HOSP IP/OBS MODERATE 35: CPT | Performed by: INTERNAL MEDICINE

## 2024-05-12 PROCEDURE — 85027 COMPLETE CBC AUTOMATED: CPT

## 2024-05-12 PROCEDURE — 1200000003 HC TELEMETRY R&B

## 2024-05-12 RX ADMIN — METOPROLOL TARTRATE 50 MG: 50 TABLET, FILM COATED ORAL at 21:00

## 2024-05-12 RX ADMIN — ATORVASTATIN CALCIUM 80 MG: 80 TABLET, FILM COATED ORAL at 09:09

## 2024-05-12 RX ADMIN — INSULIN LISPRO 5 UNITS: 100 INJECTION, SOLUTION INTRAVENOUS; SUBCUTANEOUS at 10:06

## 2024-05-12 RX ADMIN — HEPARIN SODIUM AND DEXTROSE 17 UNITS/KG/HR: 10000; 5 INJECTION INTRAVENOUS at 07:13

## 2024-05-12 RX ADMIN — INSULIN GLARGINE 35 UNITS: 100 INJECTION, SOLUTION SUBCUTANEOUS at 21:09

## 2024-05-12 RX ADMIN — INSULIN LISPRO 5 UNITS: 100 INJECTION, SOLUTION INTRAVENOUS; SUBCUTANEOUS at 16:48

## 2024-05-12 RX ADMIN — CLOPIDOGREL BISULFATE 75 MG: 75 TABLET ORAL at 09:09

## 2024-05-12 RX ADMIN — BUMETANIDE 1 MG: 0.25 INJECTION INTRAMUSCULAR; INTRAVENOUS at 18:29

## 2024-05-12 RX ADMIN — METOPROLOL TARTRATE 50 MG: 50 TABLET, FILM COATED ORAL at 09:09

## 2024-05-12 RX ADMIN — GABAPENTIN 600 MG: 600 TABLET, FILM COATED ORAL at 21:00

## 2024-05-12 RX ADMIN — HYDRALAZINE HYDROCHLORIDE 25 MG: 25 TABLET ORAL at 09:09

## 2024-05-12 RX ADMIN — EZETIMIBE 10 MG: 10 TABLET ORAL at 09:09

## 2024-05-12 RX ADMIN — HEPARIN SODIUM AND DEXTROSE 17 UNITS/KG/HR: 10000; 5 INJECTION INTRAVENOUS at 18:51

## 2024-05-12 RX ADMIN — BUMETANIDE 1 MG: 0.25 INJECTION INTRAMUSCULAR; INTRAVENOUS at 13:46

## 2024-05-12 RX ADMIN — ASPIRIN 81 MG 81 MG: 81 TABLET ORAL at 09:09

## 2024-05-12 RX ADMIN — SODIUM CHLORIDE, PRESERVATIVE FREE 10 ML: 5 INJECTION INTRAVENOUS at 09:11

## 2024-05-12 RX ADMIN — HYDRALAZINE HYDROCHLORIDE 25 MG: 25 TABLET ORAL at 16:14

## 2024-05-12 RX ADMIN — GABAPENTIN 600 MG: 600 TABLET, FILM COATED ORAL at 09:09

## 2024-05-12 RX ADMIN — HYDRALAZINE HYDROCHLORIDE 25 MG: 25 TABLET ORAL at 21:00

## 2024-05-12 RX ADMIN — INSULIN GLARGINE 35 UNITS: 100 INJECTION, SOLUTION SUBCUTANEOUS at 10:07

## 2024-05-12 RX ADMIN — EMPAGLIFLOZIN 10 MG: 10 TABLET, FILM COATED ORAL at 09:09

## 2024-05-12 RX ADMIN — INSULIN LISPRO 2 UNITS: 100 INJECTION, SOLUTION INTRAVENOUS; SUBCUTANEOUS at 16:48

## 2024-05-12 RX ADMIN — INSULIN LISPRO 5 UNITS: 100 INJECTION, SOLUTION INTRAVENOUS; SUBCUTANEOUS at 13:46

## 2024-05-12 ASSESSMENT — PAIN SCALES - GENERAL: PAINLEVEL_OUTOF10: 0

## 2024-05-12 NOTE — PROGRESS NOTES
[facility]   Nephrology progress note  5/12/2024, 12:33 PM      Pt Name:    Ashwin Liriano  MRN:     [id number]     YOB: 1960  Admit Date:    5/10/2024  7:46 AM    Chief Complaint: Nephrology following for GISELA/CKD/fluid overload.    Subjective:  Patient seen and examined  No chest pain or shortness of breath  Overall says he is feeling much better excellent urine output    Objective:  24HR INTAKE/OUTPUT:    Intake/Output Summary (Last 24 hours) at 5/12/2024 1233  Last data filed at 5/12/2024 1043  Gross per 24 hour   Intake 1970 ml   Output 1750 ml   Net 220 ml        Admission weight: (!) 140.6 kg (310 lb)    Wt Readings from Last 3 Encounters:   05/12/24 (!) 152.9 kg (337 lb 1.3 oz)   02/12/24 123 kg (271 lb 3.2 oz)   10/30/23 (!) 143.8 kg (317 lb)        Vitals :   Vitals:    05/12/24 0610 05/12/24 0845 05/12/24 1015 05/12/24 1145   BP:  120/83 100/67 93/64   Pulse:  77  72   Resp:  18  18   Temp:  98 °F (36.7 °C)  98.5 °F (36.9 °C)   TempSrc:  Oral  Oral   SpO2:  93%  96%   Weight: (!) 152.9 kg (337 lb 1.3 oz)      Height:           Physical examination  General Appearance:  Well developed. No distress  Mouth/Throat:  Oral mucosa moist  Neck:  Supple, no JVD  Lungs:  Breath sounds: clear  Heart::  S1,S2 heard  Abdomen:  Soft, non - tender  Musculoskeletal:  Edema -bilateral edema noted    Medications:  Infusion:    heparin (PORCINE) Infusion 17 Units/kg/hr (05/12/24 0713)    sodium chloride      dextrose       Meds:    sodium chloride flush  5-40 mL IntraVENous 2 times per day    insulin lispro  0-8 Units SubCUTAneous TID WC    insulin lispro  0-4 Units SubCUTAneous Nightly    aspirin  81 mg Oral Daily    atorvastatin  80 mg Oral Daily    [Held by provider] bumetanide  1 mg Oral Daily    clopidogrel  75 mg Oral Daily    empagliflozin  10 mg Oral Daily    ezetimibe  10 mg Oral Daily    gabapentin  600 mg Oral BID    hydrALAZINE  25 mg Oral TID    insulin lispro  5 Units  SubCUTAneous TID WC    [Held by provider] lisinopril  20 mg Oral Daily    metoprolol tartrate  50 mg Oral BID    insulin glargine  35 Units SubCUTAneous BID    bumetanide  1 mg IntraVENous BID       Lab Data :  CBC:   Recent Labs     05/10/24  0748 05/11/24  0516 05/12/24  0643   WBC 7.2 7.6 8.3   HGB 11.2* 10.1* 10.3*   HCT 36.5* 33.6* 34.6*    208 224       CMP:  Recent Labs     05/10/24  0748 05/11/24  0516    137   K 4.5 5.0    100   CO2 24 26   BUN 44* 46*   CREATININE 2.1* 2.2*   GLUCOSE 211* 222*   CALCIUM 9.5 8.7       Hepatic: No results for input(s): \"LABALBU\", \"AST\", \"ALT\", \"BILITOT\", \"ALKPHOS\" in the last 72 hours.    Invalid input(s): \"ALB\"    Assessment and Plan:  Renal -mild acute kidney injury on CKD with baseline creatinine around 1.5-1.7 etiology not certain cannot rule out cardiorenal hide is definitely on multiple nephrotoxic agents including Bumex, Jardiance and lisinopril.  Volume status better creatinine maintaining stable currently on Bumex  Monitor renal function closely no new labs today check a BMP in the morning  Electrolytes -within normal limits  Essential hypertension running reasonable  Insulin-dependent diabetes mellitus  Non-ST elevation MI seen by cardiology for possible stress test tomorrow discussed briefly about possibility of a cardiac cath we will discuss again based on the stress test tomorrow  Fluid overload plan as mentioned above  Meds reviewed and discussed with patient    Pastor Howard MD  [facility]    This report has been created using voice recognition software. It may contain minor errors which are inherent in voice recognition technology

## 2024-05-12 NOTE — PLAN OF CARE
Problem: Discharge Planning  Goal: Discharge to home or other facility with appropriate resources  Outcome: Progressing  Flowsheets (Taken 5/11/2024 2042)  Discharge to home or other facility with appropriate resources:   Identify barriers to discharge with patient and caregiver   Identify discharge learning needs (meds, wound care, etc)     Problem: Pain  Goal: Verbalizes/displays adequate comfort level or baseline comfort level  Outcome: Progressing  Flowsheets (Taken 5/11/2024 2042)  Verbalizes/displays adequate comfort level or baseline comfort level:   Encourage patient to monitor pain and request assistance   Assess pain using appropriate pain scale   Administer analgesics based on type and severity of pain and evaluate response   Implement non-pharmacological measures as appropriate and evaluate response   Consider cultural and social influences on pain and pain management     Problem: Safety - Adult  Goal: Free from fall injury  Outcome: Progressing  Flowsheets (Taken 5/12/2024 0236)  Free From Fall Injury: Instruct family/caregiver on patient safety     Problem: ABCDS Injury Assessment  Goal: Absence of physical injury  Outcome: Progressing  Flowsheets (Taken 5/12/2024 0236)  Absence of Physical Injury: Implement safety measures based on patient assessment     Problem: Skin/Tissue Integrity  Goal: Absence of new skin breakdown  Description: 1.  Monitor for areas of redness and/or skin breakdown  2.  Assess vascular access sites hourly  3.  Every 4-6 hours minimum:  Change oxygen saturation probe site  4.  Every 4-6 hours:  If on nasal continuous positive airway pressure, respiratory therapy assess nares and determine need for appliance change or resting period.  Outcome: Progressing  Note: Patient educated on turning and repositioning self in bed to prevent skin breakdown      Problem: Chronic Conditions and Co-morbidities  Goal: Patient's chronic conditions and co-morbidity symptoms are monitored and  Primary Diagnosis:  Mood disorder [F39]      Bipolar disorder [F31.9]        Problem Dx:   Alcohol use disorder [F10.90]      Cannabis use disorder [F12.90]       Primary Diagnosis:  Bipolar disorder [F31.9]        Problem Dx:   Alcohol use disorder [F10.90]      Cannabis use disorder [F12.90]      Mood disorder [F39]       Primary Diagnosis:  Mood disorder [F39]      Bipolar disorder [F31.9]        Problem Dx:   Alcohol use disorder [F10.90]      Cannabis use disorder [F12.90]       Primary Diagnosis:  Mood disorder [F39]      Bipolar disorder [F31.9]        Problem Dx:   Alcohol use disorder [F10.90]      Cannabis use disorder [F12.90]       Primary Diagnosis:  Bipolar disorder [F31.9]        Problem Dx:   Alcohol use disorder [F10.90]      Cannabis use disorder [F12.90]      Mood disorder [F39]

## 2024-05-12 NOTE — PROGRESS NOTES
Cardiology Progress Note      Patient:  Ashwin Liriano  YOB: 1960  MRN: [id number]   Acct: [id number]  Admit Date:  5/10/2024  Primary Cardiologist: Jeniffer Leary MD  Note per dr Swift:  \"CHIEF COMPLAINT: dyspnea on exertion, chest pain  REASON FOR CONSULT:  NSTEMI, CHF        HPI: This is a pleasant 63 y.o. male with a history of CAD s/p CABG x 3 2019, CHF, HTN, HLD, CKD2, diabetes mellitus, and sciatica who presented to [facility] ED on 5/10/2024 with dyspnea on exertion and chest pain. Per patient, he has been having worsening dyspnea over the course of the past week associated with exertion. He also noted increasing orthopnea, which concerned him d/t his heart history. States that when he was active, he would first become short of breath before substernal, non-radiating chest pressure would present. After resting, chest pain would resolve first, then shortness of breath. He has also noticed significant increase in right lower extremity edema over the past week as well. 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 Notes he had a Lexiscan in 2022, but no ischemic workup since that time. States that he hasn't had any recent medication changes, diet changes, or activity changes that could contribute to his current condition. Denies dizziness, vision changes, abdominal pain, changes in bowels/bladder, or skin changes.      Echo 6/6/2023 at [facility]: LVEF 50-55%, mild concentric LV hypertrophy, normal LV size & function, LV diastolic relaxation not substatiated, RVSP 41mmHg, moderate-severe aortic calcification, aortic valve area 1.57 cm2 with max gradient 86mmHg and mean gradient 54mmHg, trivial AR, trace TR, moderate MR with centrally directed jet, no vidence of pulmonic regurgitation, dilated IVC, poor acoustic windows.      All labs, EKG's, diagnostic testing and images as well as cardiac cath, stress testing were reviewed during this encounter\"    Subjective (Events in last 24  05/11/2024 05:16 AM    RBC 3.63 05/11/2024 05:16 AM    HGB 10.1 05/11/2024 05:16 AM    HCT 33.6 05/11/2024 05:16 AM     05/11/2024 05:16 AM       CMP:    Lab Results   Component Value Date/Time     05/11/2024 05:16 AM    K 5.0 05/11/2024 05:16 AM     05/11/2024 05:16 AM    CO2 26 05/11/2024 05:16 AM    BUN 46 05/11/2024 05:16 AM    CREATININE 2.2 05/11/2024 05:16 AM    LABGLOM 33 05/11/2024 05:16 AM    LABGLOM 45 01/02/2024 08:35 AM    GLUCOSE 222 05/11/2024 05:16 AM    GLUCOSE 142 07/06/2023 08:50 AM    CALCIUM 8.7 05/11/2024 05:16 AM       Hepatic Function Panel:    Lab Results   Component Value Date/Time    ALKPHOS 88 09/19/2023 08:32 AM    ALT 16 09/19/2023 08:32 AM    AST 17 09/19/2023 08:32 AM    BILITOT 0.4 09/19/2023 08:32 AM    BILIDIR <0.2 02/03/2023 09:56 AM       Magnesium:    Lab Results   Component Value Date/Time    MG 2.1 07/06/2023 08:50 AM       PT/INR:    Lab Results   Component Value Date/Time    PROTIME 12.3 03/21/2023 08:31 AM    INR 1.00 05/10/2024 09:05 AM       HgBA1c:    Lab Results   Component Value Date/Time    LABA1C 9.8 02/12/2024 11:48 AM       FLP:    Lab Results   Component Value Date/Time    TRIG 171 09/19/2023 08:32 AM    HDL 44 09/19/2023 08:32 AM       TSH:    Lab Results   Component Value Date/Time    TSH 1.620 02/12/2024 11:48 AM         Assessment:  Acute on chronic HFpEF  New low EF 35-40%, was previously 55%  Hx of CAD/CABG  Pacer  HTN  HLD  CKD  DM2  Bilat BKA    Plan:  Patient's symptoms are improved with diuresis. D/w him about echo findings and concern for worsening CAD. He has chronic CKD as well and this will have to weighed in deciding to proceed with cath. He will discuss further with Dr melgar.     Agree with continued diuresis for improvement in symptoms.     Cont asa, statin, plavix, zetia, bb.     If patient develops acute worsening of sypmtoms, get EKG and call cardiology.     Will consider ischemia workup after patient discussion with

## 2024-05-12 NOTE — PLAN OF CARE
Problem: Discharge Planning  Goal: Discharge to home or other facility with appropriate resources  Outcome: Progressing  Flowsheets (Taken 5/12/2024 1655)  Discharge to home or other facility with appropriate resources: Identify barriers to discharge with patient and caregiver     Problem: Pain  Goal: Verbalizes/displays adequate comfort level or baseline comfort level  Outcome: Progressing  Flowsheets (Taken 5/12/2024 1655)  Verbalizes/displays adequate comfort level or baseline comfort level:   Encourage patient to monitor pain and request assistance   Assess pain using appropriate pain scale     Problem: Safety - Adult  Goal: Free from fall injury  Outcome: Progressing  Flowsheets (Taken 5/12/2024 1655)  Free From Fall Injury: Instruct family/caregiver on patient safety     Problem: ABCDS Injury Assessment  Goal: Absence of physical injury  Outcome: Progressing  Flowsheets (Taken 5/12/2024 1655)  Absence of Physical Injury: Implement safety measures based on patient assessment     Problem: Chronic Conditions and Co-morbidities  Goal: Patient's chronic conditions and co-morbidity symptoms are monitored and maintained or improved  Outcome: Progressing  Flowsheets (Taken 5/12/2024 1655)  Care Plan - Patient's Chronic Conditions and Co-Morbidity Symptoms are Monitored and Maintained or Improved: Monitor and assess patient's chronic conditions and comorbid symptoms for stability, deterioration, or improvement    Care plan reviewed with patient.  Patient verbalizes understanding of the care plan and contributed to goal setting.

## 2024-05-12 NOTE — PROGRESS NOTES
Patient agreeable to stress test tomorrow, both NICCI Quezada and PA Connor Mortimer made aware. Explained to patient he must remain caffeine free for 24 hours. Patient agreeable.

## 2024-05-13 ENCOUNTER — APPOINTMENT (OUTPATIENT)
Dept: NUCLEAR MEDICINE | Age: 64
DRG: 291 | End: 2024-05-13
Payer: COMMERCIAL

## 2024-05-13 ENCOUNTER — HOSPITAL ENCOUNTER (INPATIENT)
Age: 64
Discharge: HOME | DRG: 291 | End: 2024-05-15
Payer: COMMERCIAL

## 2024-05-13 LAB
ANION GAP SERPL CALC-SCNC: 13 MEQ/L (ref 8–16)
BUN SERPL-MCNC: 63 MG/DL (ref 7–22)
CALCIUM SERPL-MCNC: 9.2 MG/DL (ref 8.5–10.5)
CHLORIDE SERPL-SCNC: 99 MEQ/L (ref 98–111)
CO2 SERPL-SCNC: 25 MEQ/L (ref 23–33)
CREAT SERPL-MCNC: 2.6 MG/DL (ref 0.4–1.2)
ECHO BSA: 2.85 M2
GFR SERPL CREATININE-BSD FRML MDRD: 27 ML/MIN/1.73M2
GLUCOSE BLD STRIP.AUTO-MCNC: 121 MG/DL (ref 70–108)
GLUCOSE BLD STRIP.AUTO-MCNC: 157 MG/DL (ref 70–108)
GLUCOSE BLD STRIP.AUTO-MCNC: 166 MG/DL (ref 70–108)
GLUCOSE BLD STRIP.AUTO-MCNC: 223 MG/DL (ref 70–108)
GLUCOSE SERPL-MCNC: 130 MG/DL (ref 70–108)
HEPARIN UNFRACTIONATED: 0.42 U/ML (ref 0.3–0.7)
NUC STRESS EJECTION FRACTION: 27 %
POTASSIUM SERPL-SCNC: 5 MEQ/L (ref 3.5–5.2)
SODIUM SERPL-SCNC: 137 MEQ/L (ref 135–145)
STRESS BASELINE DIAS BP: 88 MMHG
STRESS BASELINE HR: 67 BPM
STRESS BASELINE SYS BP: 167 MMHG
STRESS STAGE 1 BP: NORMAL MMHG
STRESS STAGE 1 DURATION: 1 MIN:SEC
STRESS STAGE 1 HR: 68 BPM
STRESS STAGE 2 BP: NORMAL MMHG
STRESS STAGE 2 DURATION: 1 MIN:SEC
STRESS STAGE 2 HR: 75 BPM
STRESS STAGE 3 BP: NORMAL MMHG
STRESS STAGE 3 DURATION: 1 MIN:SEC
STRESS STAGE 3 HR: 74 BPM
STRESS STAGE RECOVERY 1 BP: NORMAL MMHG
STRESS STAGE RECOVERY 1 DURATION: 1 MIN:SEC
STRESS STAGE RECOVERY 1 HR: 81 BPM
STRESS STAGE RECOVERY 2 BP: NORMAL MMHG
STRESS STAGE RECOVERY 2 DURATION: 1 MIN:SEC
STRESS STAGE RECOVERY 2 HR: 79 BPM
STRESS STAGE RECOVERY 3 BP: NORMAL MMHG
STRESS STAGE RECOVERY 3 DURATION: 1 MIN:SEC
STRESS STAGE RECOVERY 3 HR: 71 BPM
STRESS STAGE RECOVERY 4 BP: NORMAL MMHG
STRESS STAGE RECOVERY 4 DURATION: 1 MIN:SEC
STRESS STAGE RECOVERY 4 HR: 71 BPM
STRESS TARGET HR: 158 BPM
TID: 1.11

## 2024-05-13 PROCEDURE — 2580000003 HC RX 258: Performed by: PHYSICIAN ASSISTANT

## 2024-05-13 PROCEDURE — 1200000003 HC TELEMETRY R&B

## 2024-05-13 PROCEDURE — 85520 HEPARIN ASSAY: CPT

## 2024-05-13 PROCEDURE — 6360000002 HC RX W HCPCS: Performed by: STUDENT IN AN ORGANIZED HEALTH CARE EDUCATION/TRAINING PROGRAM

## 2024-05-13 PROCEDURE — 3430000000 HC RX DIAGNOSTIC RADIOPHARMACEUTICAL: Performed by: PHYSICIAN ASSISTANT

## 2024-05-13 PROCEDURE — 99232 SBSQ HOSP IP/OBS MODERATE 35: CPT | Performed by: INTERNAL MEDICINE

## 2024-05-13 PROCEDURE — A9500 TC99M SESTAMIBI: HCPCS | Performed by: PHYSICIAN ASSISTANT

## 2024-05-13 PROCEDURE — 80048 BASIC METABOLIC PNL TOTAL CA: CPT

## 2024-05-13 PROCEDURE — 82948 REAGENT STRIP/BLOOD GLUCOSE: CPT

## 2024-05-13 PROCEDURE — 6370000000 HC RX 637 (ALT 250 FOR IP): Performed by: PHYSICIAN ASSISTANT

## 2024-05-13 PROCEDURE — 6360000002 HC RX W HCPCS

## 2024-05-13 PROCEDURE — 99232 SBSQ HOSP IP/OBS MODERATE 35: CPT | Performed by: NURSE PRACTITIONER

## 2024-05-13 PROCEDURE — 36415 COLL VENOUS BLD VENIPUNCTURE: CPT

## 2024-05-13 PROCEDURE — 6360000002 HC RX W HCPCS: Performed by: INTERNAL MEDICINE

## 2024-05-13 RX ORDER — TETRAKIS(2-METHOXYISOBUTYLISOCYANIDE)COPPER(I) TETRAFLUOROBORATE 1 MG/ML
34 INJECTION, POWDER, LYOPHILIZED, FOR SOLUTION INTRAVENOUS
Status: COMPLETED | OUTPATIENT
Start: 2024-05-13 | End: 2024-05-13

## 2024-05-13 RX ORDER — TETRAKIS(2-METHOXYISOBUTYLISOCYANIDE)COPPER(I) TETRAFLUOROBORATE 1 MG/ML
10 INJECTION, POWDER, LYOPHILIZED, FOR SOLUTION INTRAVENOUS
Status: COMPLETED | OUTPATIENT
Start: 2024-05-13 | End: 2024-05-13

## 2024-05-13 RX ORDER — REGADENOSON 0.08 MG/ML
0.4 INJECTION, SOLUTION INTRAVENOUS
Status: CANCELLED | OUTPATIENT
Start: 2024-05-13

## 2024-05-13 RX ORDER — REGADENOSON 0.08 MG/ML
0.4 INJECTION, SOLUTION INTRAVENOUS
Status: COMPLETED | OUTPATIENT
Start: 2024-05-13 | End: 2024-05-13

## 2024-05-13 RX ORDER — METOPROLOL TARTRATE 1 MG/ML
5 INJECTION, SOLUTION INTRAVENOUS EVERY 5 MIN PRN
Status: CANCELLED | OUTPATIENT
Start: 2024-05-13 | End: 2024-05-13

## 2024-05-13 RX ORDER — NITROGLYCERIN 0.4 MG/1
0.4 TABLET SUBLINGUAL EVERY 5 MIN PRN
Status: CANCELLED | OUTPATIENT
Start: 2024-05-13 | End: 2024-05-13

## 2024-05-13 RX ORDER — SODIUM CHLORIDE 9 MG/ML
500 INJECTION, SOLUTION INTRAVENOUS CONTINUOUS PRN
Status: CANCELLED | OUTPATIENT
Start: 2024-05-13 | End: 2024-05-13

## 2024-05-13 RX ORDER — ALBUTEROL SULFATE 90 UG/1
2 AEROSOL, METERED RESPIRATORY (INHALATION) PRN
Status: CANCELLED | OUTPATIENT
Start: 2024-05-13 | End: 2024-05-13

## 2024-05-13 RX ORDER — SODIUM CHLORIDE 0.9 % (FLUSH) 0.9 %
5-40 SYRINGE (ML) INJECTION PRN
Status: CANCELLED | OUTPATIENT
Start: 2024-05-13 | End: 2024-05-13

## 2024-05-13 RX ORDER — AMINOPHYLLINE 25 MG/ML
50 INJECTION, SOLUTION INTRAVENOUS PRN
Status: CANCELLED | OUTPATIENT
Start: 2024-05-13 | End: 2024-05-13

## 2024-05-13 RX ORDER — ATROPINE SULFATE 0.1 MG/ML
0.5 INJECTION INTRAVENOUS EVERY 5 MIN PRN
Status: CANCELLED | OUTPATIENT
Start: 2024-05-13 | End: 2024-05-13

## 2024-05-13 RX ADMIN — CLOPIDOGREL BISULFATE 75 MG: 75 TABLET ORAL at 08:09

## 2024-05-13 RX ADMIN — INSULIN GLARGINE 35 UNITS: 100 INJECTION, SOLUTION SUBCUTANEOUS at 08:08

## 2024-05-13 RX ADMIN — INSULIN GLARGINE 35 UNITS: 100 INJECTION, SOLUTION SUBCUTANEOUS at 21:52

## 2024-05-13 RX ADMIN — ASPIRIN 81 MG 81 MG: 81 TABLET ORAL at 08:07

## 2024-05-13 RX ADMIN — INSULIN LISPRO 2 UNITS: 100 INJECTION, SOLUTION INTRAVENOUS; SUBCUTANEOUS at 13:06

## 2024-05-13 RX ADMIN — EZETIMIBE 10 MG: 10 TABLET ORAL at 08:08

## 2024-05-13 RX ADMIN — Medication 34 MILLICURIE: at 09:43

## 2024-05-13 RX ADMIN — REGADENOSON 0.4 MG: 0.08 INJECTION, SOLUTION INTRAVENOUS at 09:35

## 2024-05-13 RX ADMIN — GABAPENTIN 600 MG: 600 TABLET, FILM COATED ORAL at 08:10

## 2024-05-13 RX ADMIN — BUMETANIDE 1 MG: 0.25 INJECTION INTRAMUSCULAR; INTRAVENOUS at 08:08

## 2024-05-13 RX ADMIN — HEPARIN SODIUM AND DEXTROSE 17 UNITS/KG/HR: 10000; 5 INJECTION INTRAVENOUS at 18:30

## 2024-05-13 RX ADMIN — EMPAGLIFLOZIN 10 MG: 10 TABLET, FILM COATED ORAL at 08:07

## 2024-05-13 RX ADMIN — METOPROLOL TARTRATE 50 MG: 50 TABLET, FILM COATED ORAL at 21:53

## 2024-05-13 RX ADMIN — METOPROLOL TARTRATE 50 MG: 50 TABLET, FILM COATED ORAL at 08:07

## 2024-05-13 RX ADMIN — GABAPENTIN 600 MG: 600 TABLET, FILM COATED ORAL at 21:52

## 2024-05-13 RX ADMIN — HYDRALAZINE HYDROCHLORIDE 25 MG: 25 TABLET ORAL at 13:07

## 2024-05-13 RX ADMIN — Medication 10 MILLICURIE: at 08:50

## 2024-05-13 RX ADMIN — HYDRALAZINE HYDROCHLORIDE 25 MG: 25 TABLET ORAL at 08:07

## 2024-05-13 RX ADMIN — HEPARIN SODIUM AND DEXTROSE 17 UNITS/KG/HR: 10000; 5 INJECTION INTRAVENOUS at 05:39

## 2024-05-13 RX ADMIN — ATORVASTATIN CALCIUM 80 MG: 80 TABLET, FILM COATED ORAL at 08:09

## 2024-05-13 RX ADMIN — SODIUM CHLORIDE, PRESERVATIVE FREE 10 ML: 5 INJECTION INTRAVENOUS at 08:13

## 2024-05-13 RX ADMIN — INSULIN LISPRO 5 UNITS: 100 INJECTION, SOLUTION INTRAVENOUS; SUBCUTANEOUS at 13:07

## 2024-05-13 RX ADMIN — HYDRALAZINE HYDROCHLORIDE 25 MG: 25 TABLET ORAL at 21:53

## 2024-05-13 ASSESSMENT — PAIN SCALES - GENERAL
PAINLEVEL_OUTOF10: 0
PAINLEVEL_OUTOF10: 0

## 2024-05-13 NOTE — PROGRESS NOTES
Hospitalist Progress Note      Patient:  Ashwin Liriano    Unit/Bed:8B-34/034-A  YOB: 1960  MRN: [id number]   Acct: [id number]   PCP: Theresa Muñoz APRN - CNP  Date of Admission: 5/10/2024    Assessment/Plan:    Elevated troponin. Concerns for NSTEMI. Intermittent chest pain associated with dyspnea and exertion over the last week. High-sensitivity troponin significantly elevated at 151, 142. Heparin initiated in the emergency department. No EKG changes. Cardiology consulted.  Echo showing EF of 35 to 40% drop from prior echo of 55 to 60%. Patient is unsure if he wants to proceed with [facility]. Stress test was done today. Results pending.   Acute on chronic combined heart failure, volume status has greatly improved. Echo on 11/3/2022 with EF of 55 to 60% and grade 2 diastolic dysfunction.  Repeat echo this admission with newly reduced ef of 35-40%. The patient is also noted to have mild aortic stenosis and trace aortic regurgitation. Diuresing. Nephrology and cardiology on board.  Strict ins and outs. Daily weights. Low sodium diet. Fluid restriction. Post agressive diureses. Holding additional doses for now.   Acute hypoxic respiratory failure, likely secondary to #2. Max oxygen demand was 2L/NC. Currently at 1L/NC. Continue to wean as able. Encourage incentive spirometer.   Coronary artery disease. Status post CABG. Continue antiplatelets.  GISELA on CKD stage IIIa. Creatinine 2.1 on arrival, baseline appears to be around 1.5-1.7. Up to 2.6 today. Holding Bumex. Holding Lisinopril. Nephrology following and managing volume status. Repeat BMP in AM.  Aortic stenosis. Mild per echocardiogram on 11/9/2023.  Bilateral carotid artery stenosis. Known right carotid artery occlusion.  History of left carotid endarterectomy.  Insulin-dependent type 2 diabetes mellitus. Home regimen includes Levemir 35 units twice daily, Humalog 5  midline.  Respiratory:  Normal respiratory effort. Clear to auscultation, bilaterally without Rales/Wheezes/Rhonchi.  Cardiovascular: Regular rate and rhythm with normal S1/S2 without murmurs, rubs or gallops.  Abdomen: Soft, obese, non-tender, non-distended with normal bowel sounds.  Musculoskeletal: Left bka, 1-2+edema to right lower extremity  Skin: Skin color, texture, turgor normal.  No rashes or lesions.  Neurologic:  Neurovascularly intact without any focal sensory/motor deficits.   Psychiatric: Alert and oriented, thought content appropriate, normal insight  Capillary Refill: Brisk,< 3 seconds   Peripheral Pulses: +2 palpable, equal bilaterally     Labs:   Recent Labs     05/11/24  0516 05/12/24  0643   WBC 7.6 8.3   HGB 10.1* 10.3*   HCT 33.6* 34.6*    224       Recent Labs     05/11/24  0516 05/13/24  0612    137   K 5.0 5.0    99   CO2 26 25   BUN 46* 63*   CREATININE 2.2* 2.6*   CALCIUM 8.7 9.2       No results for input(s): \"AST\", \"ALT\", \"BILIDIR\", \"BILITOT\", \"ALKPHOS\" in the last 72 hours.  No results for input(s): \"INR\" in the last 72 hours.    No results for input(s): \"CKTOTAL\", \"TROPONINI\" in the last 72 hours.    Microbiology:    Blood culture #1:   Lab Results   Component Value Date/Time    BC  01/10/2023 03:28 PM     No growth 24 hours. No growth 48 hours. No growth at 5 days       Blood culture #2:No results found for: \"BLOODCULT2\"    Organism:  Lab Results   Component Value Date/Time    ORG Proteus mirabilis 01/10/2023 08:49 PM    ORG Morganella morganii ssp. sibonii 01/10/2023 08:49 PM         Lab Results   Component Value Date/Time    LABGRAM  01/10/2023 08:49 PM     No segmented neutrophils observed. No epithelial cells observed. No bacteria seen.       MRSA culture only:No results found for: \"MRSAC\"    Urine culture: No results found for: \"LABURIN\"    Respiratory culture: No results found for: \"CULTRESP\"    Aerobic and Anaerobic :  Lab Results   Component Value Date/Time

## 2024-05-13 NOTE — PLAN OF CARE
safety  5/12/2024 1655 by Marilyn León RN  Outcome: Progressing  Flowsheets (Taken 5/12/2024 1655)  Free From Fall Injury: Instruct family/caregiver on patient safety     Problem: ABCDS Injury Assessment  Goal: Absence of physical injury  5/13/2024 0557 by Carmita Chau RN  Outcome: Progressing  Flowsheets (Taken 5/13/2024 0557)  Absence of Physical Injury: Implement safety measures based on patient assessment  5/12/2024 1655 by Marilyn León RN  Outcome: Progressing  Flowsheets (Taken 5/12/2024 1655)  Absence of Physical Injury: Implement safety measures based on patient assessment     Problem: Skin/Tissue Integrity  Goal: Absence of new skin breakdown  Description: 1.  Monitor for areas of redness and/or skin breakdown  2.  Assess vascular access sites hourly  3.  Every 4-6 hours minimum:  Change oxygen saturation probe site  4.  Every 4-6 hours:  If on nasal continuous positive airway pressure, respiratory therapy assess nares and determine need for appliance change or resting period.  5/13/2024 0557 by Carmita Chau RN  Outcome: Progressing  Note: Patient's skin is appropriate for ethnicity, warm, and dry, with no new skin issues noted this shift. Mucous membranes are pink, moist, and intact.    5/12/2024 1655 by Marilyn León RN  Outcome: Progressing     Problem: Chronic Conditions and Co-morbidities  Goal: Patient's chronic conditions and co-morbidity symptoms are monitored and maintained or improved  5/13/2024 0557 by Carmita Chau RN  Outcome: Progressing  Flowsheets  Taken 5/13/2024 0557  Care Plan - Patient's Chronic Conditions and Co-Morbidity Symptoms are Monitored and Maintained or Improved: Monitor and assess patient's chronic conditions and comorbid symptoms for stability, deterioration, or improvement  Taken 5/12/2024 2055  Care Plan - Patient's Chronic Conditions and Co-Morbidity Symptoms are Monitored and Maintained or Improved: Monitor and assess patient's chronic  conditions and comorbid symptoms for stability, deterioration, or improvement  5/12/2024 1655 by Marilyn León, RN  Outcome: Progressing  Flowsheets (Taken 5/12/2024 1655)  Care Plan - Patient's Chronic Conditions and Co-Morbidity Symptoms are Monitored and Maintained or Improved: Monitor and assess patient's chronic conditions and comorbid symptoms for stability, deterioration, or improvement       Care plan reviewed with patient.  Patient does verbalize understanding of the plan of care and contribute to goal setting.

## 2024-05-13 NOTE — CARE COORDINATION
5/13/24, 3:04 PM EDT    DISCHARGE ON GOING EVALUATION    [facility] day: 3  Location: [address] [place] [address]- Reason for admit: Elevated troponin [R79.89]  NSTEMI (non-ST elevated myocardial infarction) ([facility]) [I21.4]  Acute on chronic diastolic congestive heart failure (HCC) [I50.33]  Chest pain, unspecified type [R07.9]  Acute on chronic congestive heart failure, unspecified heart failure type (HCC) [I50.9]     Procedures:   5/13 Stress test today    Imaging since last note:   5/10 CXR:   1. Mild cardiomegaly status post sternotomy and pacemaker placement.  2. Diffuse COPD with no acute infiltrates or effusions.     Barriers to Discharge: Hospitalist, cardio, nephro following. Creatinine 2.6. Bumex on hold. Repeat labs in AM. Heparin gtt.     PCP: Theresa Muñoz APRN - CNP  Readmission Risk Score: 18.3    Patient Goals/Plan/Treatment Preferences: Plans home alone, has dme.

## 2024-05-13 NOTE — PROGRESS NOTES
Cardiology Progress Note      Patient:  Ashwin Liriano  YOB: 1960  MRN: [id number]   Acct: [id number]  Admit Date:  5/10/2024  Primary Cardiologist: Jeniffer Leary MD  Note per dr Swift:  \"CHIEF COMPLAINT: dyspnea on exertion, chest pain  REASON FOR CONSULT:  NSTEMI, CHF        HPI: This is a pleasant 63 y.o. male with a history of CAD s/p CABG x 3 2019, CHF, HTN, HLD, CKD2, diabetes mellitus, and sciatica who presented to [facility] ED on 5/10/2024 with dyspnea on exertion and chest pain. Per patient, he has been having worsening dyspnea over the course of the past week associated with exertion. He also noted increasing orthopnea, which concerned him d/t his heart history. States that when he was active, he would first become short of breath before substernal, non-radiating chest pressure would present. After resting, chest pain would resolve first, then shortness of breath. He has also noticed significant increase in right lower extremity edema over the past week as well. 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 Notes he had a Lexiscan in 2022, but no ischemic workup since that time. States that he hasn't had any recent medication changes, diet changes, or activity changes that could contribute to his current condition. Denies dizziness, vision changes, abdominal pain, changes in bowels/bladder, or skin changes.      Echo 6/6/2023 at [facility]: LVEF 50-55%, mild concentric LV hypertrophy, normal LV size & function, LV diastolic relaxation not substatiated, RVSP 41mmHg, moderate-severe aortic calcification, aortic valve area 1.57 cm2 with max gradient 86mmHg and mean gradient 54mmHg, trivial AR, trace TR, moderate MR with centrally directed jet, no vidence of pulmonic regurgitation, dilated IVC, poor acoustic windows.      All labs, EKG's, diagnostic testing and images as well as cardiac cath, stress testing were reviewed during this encounter\"    Subjective (Events in last 24

## 2024-05-13 NOTE — PROGRESS NOTES
[facility]   PROGRESS NOTE      Patient: Ashwin Liriano  Room #: 8B-34/034-A            YOB: 1960  Age: 63 y.o.  Gender: male            Admit Date & Time: 5/10/2024  7:46 AM    Assessment:    The patient welcomed a visit and explored his health concerns and future goals. The patient focused on the concern that he could have kidney failure or heart failure.     Interventions:  The patient was encouraged to think of the implications of his decision. The patient was aware that kidney failure led to dialysis and he viewed spending three days a week in that care was worse than further heart failure. The patient share his view his condition could be mostly food related. The patient also shared how his younger brother  this year from heart failure. The patient shared his brother refused to take his medications. The patient scoffed at this as he always takes his medications. This grief reaction shows a frustration at this younger brother and that the two have shared multiple conversations on this issue. This is not to say the patient did not have faults. The patient shared multiple occasions where he just happened to be going in for care when physicians discovered he had catastrophic injuries. The patient seems to downplay his injuries to where he does not register broken bones or infections well.     As we discussed his health the patient also explored his sharan as a Baptism and sees his sharan as foundational to his character. Methodists generally work to perfection. This working attitude may show in his primary goal in recovery is to return to working. The patient also shared his view on global Protestant concerns and is not active in a Hinduism.     Outcomes:  The patient was thankful for the provided prayer at the end of our meeting.     Plan:  1.Spiritual care will continue to follow the patient according to [facility] spiritual care SOP.       Electronically

## 2024-05-13 NOTE — PROGRESS NOTES
[facility]   Nephrology progress note  5/13/2024, 9:20 AM      Pt Name:    Ashwin Liriano  MRN:     [id number]     YOB: 1960  Admit Date:    5/10/2024  7:46 AM    Chief Complaint: Nephrology following for GISELA/CKD/fluid overload.    Subjective:  Patient seen and examined  No chest pain or shortness of breath  Says overall feeling much better    Objective:  24HR INTAKE/OUTPUT:    Intake/Output Summary (Last 24 hours) at 5/13/2024 0920  Last data filed at 5/13/2024 0622  Gross per 24 hour   Intake 1762.99 ml   Output 3725 ml   Net -1962.01 ml        Admission weight: (!) 140.6 kg (310 lb)    Wt Readings from Last 3 Encounters:   05/13/24 (!) 152.7 kg (336 lb 10.3 oz)   02/12/24 123 kg (271 lb 3.2 oz)   10/30/23 (!) 143.8 kg (317 lb)        Vitals :   Vitals:    05/12/24 2056 05/13/24 0010 05/13/24 0327 05/13/24 0800   BP: 133/81 101/72 112/78 128/89   Pulse: 79 70 76 71   Resp: 18 18 18 20   Temp: 98.5 °F (36.9 °C) 97.8 °F (36.6 °C) 98.1 °F (36.7 °C) 97.5 °F (36.4 °C)   TempSrc: Oral Oral Oral Oral   SpO2: 91% 92% 92% 96%   Weight:   (!) 152.7 kg (336 lb 10.3 oz)    Height:           Physical examination  General Appearance:  Well developed. No distress  Mouth/Throat:  Oral mucosa moist  Neck:  Supple, no JVD  Lungs:  Breath sounds: clear  Heart::  S1,S2 heard  Abdomen:  Soft, non - tender  Musculoskeletal:  Edema -bilateral edema noted but improved    Medications:  Infusion:    heparin (PORCINE) Infusion 17 Units/kg/hr (05/13/24 0717)    sodium chloride      dextrose       Meds:    sodium chloride flush  5-40 mL IntraVENous 2 times per day    insulin lispro  0-8 Units SubCUTAneous TID WC    insulin lispro  0-4 Units SubCUTAneous Nightly    aspirin  81 mg Oral Daily    atorvastatin  80 mg Oral Daily    [Held by provider] bumetanide  1 mg Oral Daily    clopidogrel  75 mg Oral Daily    empagliflozin  10 mg Oral Daily    ezetimibe  10 mg Oral Daily    gabapentin  600 mg Oral BID

## 2024-05-14 LAB
ANION GAP SERPL CALC-SCNC: 12 MEQ/L (ref 8–16)
BUN SERPL-MCNC: 60 MG/DL (ref 7–22)
CALCIUM SERPL-MCNC: 9.1 MG/DL (ref 8.5–10.5)
CHLORIDE SERPL-SCNC: 100 MEQ/L (ref 98–111)
CO2 SERPL-SCNC: 25 MEQ/L (ref 23–33)
CREAT SERPL-MCNC: 2.5 MG/DL (ref 0.4–1.2)
DEPRECATED RDW RBC AUTO: 53.6 FL (ref 35–45)
ERYTHROCYTE [DISTWIDTH] IN BLOOD BY AUTOMATED COUNT: 15.9 % (ref 11.5–14.5)
GFR SERPL CREATININE-BSD FRML MDRD: 28 ML/MIN/1.73M2
GLUCOSE BLD STRIP.AUTO-MCNC: 108 MG/DL (ref 70–108)
GLUCOSE BLD STRIP.AUTO-MCNC: 127 MG/DL (ref 70–108)
GLUCOSE BLD STRIP.AUTO-MCNC: 144 MG/DL (ref 70–108)
GLUCOSE BLD STRIP.AUTO-MCNC: 242 MG/DL (ref 70–108)
GLUCOSE SERPL-MCNC: 140 MG/DL (ref 70–108)
HCT VFR BLD AUTO: 33.5 % (ref 42–52)
HEPARIN UNFRACTIONATED: 0.48 U/ML (ref 0.3–0.7)
HGB BLD-MCNC: 10.3 GM/DL (ref 14–18)
MCH RBC QN AUTO: 28.2 PG (ref 26–33)
MCHC RBC AUTO-ENTMCNC: 30.7 GM/DL (ref 32.2–35.5)
MCV RBC AUTO: 91.8 FL (ref 80–94)
PLATELET # BLD AUTO: 219 THOU/MM3 (ref 130–400)
PMV BLD AUTO: 11.4 FL (ref 9.4–12.4)
POTASSIUM SERPL-SCNC: 4.7 MEQ/L (ref 3.5–5.2)
RBC # BLD AUTO: 3.65 MILL/MM3 (ref 4.7–6.1)
SODIUM SERPL-SCNC: 137 MEQ/L (ref 135–145)
WBC # BLD AUTO: 7.8 THOU/MM3 (ref 4.8–10.8)

## 2024-05-14 PROCEDURE — 1200000003 HC TELEMETRY R&B

## 2024-05-14 PROCEDURE — 2580000003 HC RX 258: Performed by: INTERNAL MEDICINE

## 2024-05-14 PROCEDURE — 6360000002 HC RX W HCPCS: Performed by: STUDENT IN AN ORGANIZED HEALTH CARE EDUCATION/TRAINING PROGRAM

## 2024-05-14 PROCEDURE — 6370000000 HC RX 637 (ALT 250 FOR IP): Performed by: PHYSICIAN ASSISTANT

## 2024-05-14 PROCEDURE — 36415 COLL VENOUS BLD VENIPUNCTURE: CPT

## 2024-05-14 PROCEDURE — 99232 SBSQ HOSP IP/OBS MODERATE 35: CPT | Performed by: INTERNAL MEDICINE

## 2024-05-14 PROCEDURE — 80048 BASIC METABOLIC PNL TOTAL CA: CPT

## 2024-05-14 PROCEDURE — 85027 COMPLETE CBC AUTOMATED: CPT

## 2024-05-14 PROCEDURE — 99232 SBSQ HOSP IP/OBS MODERATE 35: CPT | Performed by: NURSE PRACTITIONER

## 2024-05-14 PROCEDURE — 99232 SBSQ HOSP IP/OBS MODERATE 35: CPT | Performed by: PHYSICIAN ASSISTANT

## 2024-05-14 PROCEDURE — 82948 REAGENT STRIP/BLOOD GLUCOSE: CPT

## 2024-05-14 PROCEDURE — 85520 HEPARIN ASSAY: CPT

## 2024-05-14 RX ORDER — SODIUM CHLORIDE 9 MG/ML
INJECTION, SOLUTION INTRAVENOUS CONTINUOUS
Status: ACTIVE | OUTPATIENT
Start: 2024-05-14 | End: 2024-05-15

## 2024-05-14 RX ADMIN — HYDRALAZINE HYDROCHLORIDE 25 MG: 25 TABLET ORAL at 19:39

## 2024-05-14 RX ADMIN — HEPARIN SODIUM AND DEXTROSE 17 UNITS/KG/HR: 10000; 5 INJECTION INTRAVENOUS at 03:20

## 2024-05-14 RX ADMIN — METOPROLOL TARTRATE 50 MG: 50 TABLET, FILM COATED ORAL at 19:39

## 2024-05-14 RX ADMIN — GABAPENTIN 600 MG: 600 TABLET, FILM COATED ORAL at 19:39

## 2024-05-14 RX ADMIN — INSULIN LISPRO 5 UNITS: 100 INJECTION, SOLUTION INTRAVENOUS; SUBCUTANEOUS at 18:12

## 2024-05-14 RX ADMIN — HYDRALAZINE HYDROCHLORIDE 25 MG: 25 TABLET ORAL at 14:00

## 2024-05-14 RX ADMIN — INSULIN LISPRO 5 UNITS: 100 INJECTION, SOLUTION INTRAVENOUS; SUBCUTANEOUS at 12:41

## 2024-05-14 RX ADMIN — HEPARIN SODIUM AND DEXTROSE 17 UNITS/KG/HR: 10000; 5 INJECTION INTRAVENOUS at 14:02

## 2024-05-14 RX ADMIN — METOPROLOL TARTRATE 50 MG: 50 TABLET, FILM COATED ORAL at 08:35

## 2024-05-14 RX ADMIN — EMPAGLIFLOZIN 10 MG: 10 TABLET, FILM COATED ORAL at 08:39

## 2024-05-14 RX ADMIN — INSULIN GLARGINE 35 UNITS: 100 INJECTION, SOLUTION SUBCUTANEOUS at 19:39

## 2024-05-14 RX ADMIN — ASPIRIN 81 MG 81 MG: 81 TABLET ORAL at 08:34

## 2024-05-14 RX ADMIN — INSULIN GLARGINE 35 UNITS: 100 INJECTION, SOLUTION SUBCUTANEOUS at 10:41

## 2024-05-14 RX ADMIN — GABAPENTIN 600 MG: 600 TABLET, FILM COATED ORAL at 08:35

## 2024-05-14 RX ADMIN — CLOPIDOGREL BISULFATE 75 MG: 75 TABLET ORAL at 08:34

## 2024-05-14 RX ADMIN — EZETIMIBE 10 MG: 10 TABLET ORAL at 08:39

## 2024-05-14 RX ADMIN — ATORVASTATIN CALCIUM 80 MG: 80 TABLET, FILM COATED ORAL at 08:39

## 2024-05-14 RX ADMIN — SODIUM CHLORIDE: 9 INJECTION, SOLUTION INTRAVENOUS at 14:57

## 2024-05-14 RX ADMIN — HYDRALAZINE HYDROCHLORIDE 25 MG: 25 TABLET ORAL at 08:35

## 2024-05-14 ASSESSMENT — PAIN SCALES - GENERAL: PAINLEVEL_OUTOF10: 0

## 2024-05-14 NOTE — PROGRESS NOTES
[facility]   Nephrology progress note  5/14/2024, 12:51 PM      Pt Name:    Ashwin Liriano  MRN:     [id number]     YOB: 1960  Admit Date:    5/10/2024  7:46 AM    Chief Complaint: Nephrology following for GISELA/CKD/fluid overload.    Subjective:  Patient seen and examined  No chest pain or shortness of breath  Status post stress test which was apparently normal    Objective:  24HR INTAKE/OUTPUT:    Intake/Output Summary (Last 24 hours) at 5/14/2024 1251  Last data filed at 5/14/2024 0924  Gross per 24 hour   Intake 120 ml   Output 2775 ml   Net -2655 ml        Admission weight: (!) 140.6 kg (310 lb)    Wt Readings from Last 3 Encounters:   05/13/24 (!) 152.7 kg (336 lb 10.3 oz)   02/12/24 123 kg (271 lb 3.2 oz)   10/30/23 (!) 143.8 kg (317 lb)        Vitals :   Vitals:    05/13/24 2343 05/14/24 0315 05/14/24 0830 05/14/24 1153   BP: 106/74 131/78 (!) 129/90 (!) 149/82   Pulse: 70 76 79 71   Resp: 20 20 18 18   Temp: 98.6 °F (37 °C) 97.9 °F (36.6 °C) 97.8 °F (36.6 °C) 97.9 °F (36.6 °C)   TempSrc: Oral Oral Oral Oral   SpO2: 93% 96% 92% 92%   Weight:       Height:           Physical examination  General Appearance:  Well developed. No distress  Mouth/Throat:  Oral mucosa moist  Neck:  Supple, no JVD  Lungs:  Breath sounds: clear  Heart::  S1,S2 heard  Abdomen:  Soft, non - tender  Musculoskeletal:  Edema -bilateral edema noted but improved    Medications:  Infusion:    heparin (PORCINE) Infusion 17 Units/kg/hr (05/14/24 0655)    sodium chloride      dextrose       Meds:    sodium chloride flush  5-40 mL IntraVENous 2 times per day    insulin lispro  0-8 Units SubCUTAneous TID WC    insulin lispro  0-4 Units SubCUTAneous Nightly    aspirin  81 mg Oral Daily    atorvastatin  80 mg Oral Daily    [Held by provider] bumetanide  1 mg Oral Daily    clopidogrel  75 mg Oral Daily    empagliflozin  10 mg Oral Daily    ezetimibe  10 mg Oral Daily    gabapentin  600 mg Oral BID    hydrALAZINE

## 2024-05-14 NOTE — PROGRESS NOTES
Cardiology Progress Note      Patient:  Ashwin Liriano  YOB: 1960  MRN: [id number]   Acct: [id number]  Admit Date:  5/10/2024  Primary Cardiologist:  dr jimenez    Note per dr jimenez \"CHIEF COMPLAINT: dyspnea on exertion, chest pain  REASON FOR CONSULT:  NSTEMI, CHF        HPI: This is a pleasant 63 y.o. male with a history of CAD s/p CABG x 3 2019, CHF, HTN, HLD, CKD2, diabetes mellitus, and sciatica who presented to [facility] ED on 5/10/2024 with dyspnea on exertion and chest pain. Per patient, he has been having worsening dyspnea over the course of the past week associated with exertion. He also noted increasing orthopnea, which concerned him d/t his heart history. States that when he was active, he would first become short of breath before substernal, non-radiating chest pressure would present. After resting, chest pain would resolve first, then shortness of breath. He has also noticed significant increase in right lower extremity edema over the past week as well. 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 Notes he had a Lexiscan in 2022, but no ischemic workup since that time. States that he hasn't had any recent medication changes, diet changes, or activity changes that could contribute to his current condition. Denies dizziness, vision changes, abdominal pain, changes in bowels/bladder, or skin changes. \"    Subjective (Events in last 24 hours): pt awake and alert.  NAD. No cp.  Sob improved.  Edema improving.  No orthopnea. No complaints  On 1-2 l/min O2  On heparin gtt    Net I/o -6.8 L    Objective:   BP (!) 129/90   Pulse 79   Temp 97.8 °F (36.6 °C) (Oral)   Resp 18   Ht 1.88 m (6' 2.02\")   Wt (!) 152.7 kg (336 lb 10.3 oz)   SpO2 92%   BMI 43.20 kg/m²        TELEMETRY: nsr    Physical Exam:  General Appearance: alert and oriented to person, place and time, in no acute distress  Cardiovascular: normal rate, regular rhythm, normal S1 and S2, no murmurs, rubs,  clicks, or gallops, distal pulses intact, no carotid bruits, no JVD  Pulmonary/Chest: clear to auscultation bilaterally- no wheezes, rales or rhonchi, normal air movement, no respiratory distress  Abdomen: soft, non-tender, non-distended, normal bowel sounds, no masses Extremities: +ble edema, pulse , left BKA  Skin: warm and dry, no rash or erythema  Head: normocephalic and atraumatic  Eyes: pupils equal, round, and reactive to light  Neck: supple and non-tender without mass, no thyromegaly       Medications:    sodium chloride flush  5-40 mL IntraVENous 2 times per day    insulin lispro  0-8 Units SubCUTAneous TID WC    insulin lispro  0-4 Units SubCUTAneous Nightly    aspirin  81 mg Oral Daily    atorvastatin  80 mg Oral Daily    [Held by provider] bumetanide  1 mg Oral Daily    clopidogrel  75 mg Oral Daily    empagliflozin  10 mg Oral Daily    ezetimibe  10 mg Oral Daily    gabapentin  600 mg Oral BID    hydrALAZINE  25 mg Oral TID    insulin lispro  5 Units SubCUTAneous TID WC    [Held by provider] lisinopril  20 mg Oral Daily    metoprolol tartrate  50 mg Oral BID    insulin glargine  35 Units SubCUTAneous BID    [Held by provider] bumetanide  1 mg IntraVENous BID      heparin (PORCINE) Infusion 17 Units/kg/hr (05/14/24 0655)    sodium chloride      dextrose       heparin (porcine), 4,000 Units, PRN  heparin (porcine), 2,000 Units, PRN  sodium chloride flush, 5-40 mL, PRN  sodium chloride, , PRN  potassium chloride, 40 mEq, PRN   Or  potassium alternative oral replacement, 40 mEq, PRN   Or  potassium chloride, 10 mEq, PRN  magnesium sulfate, 2,000 mg, PRN  ondansetron, 4 mg, Q8H PRN   Or  ondansetron, 4 mg, Q6H PRN  polyethylene glycol, 17 g, Daily PRN  acetaminophen, 650 mg, Q6H PRN   Or  acetaminophen, 650 mg, Q6H PRN  glucose, 4 tablet, PRN  dextrose bolus, 125 mL, PRN   Or  dextrose bolus, 250 mL, PRN  glucagon (rDNA), 1 mg, PRN  dextrose, , Continuous PRN  albuterol sulfate HFA, 2 puff, Q4H

## 2024-05-14 NOTE — PROGRESS NOTES
05/14/24 1735   Encounter Summary   Encounter Overview/Reason Attempted Encounter   Service Provided For Patient   Referral/Consult From Rounding   Support System Family members   Last Encounter  05/14/24   Complexity of Encounter Low   Begin Time 1730   End Time  1735   Total Time Calculated 5 min   Assessment/Intervention/Outcome   Assessment Unable to assess   Intervention Prayer (assurance of)/[place]     During my encounter with the 63 yr old patient, I attempted to visit with the pt on 8B. The patient appears to be resting now and I didn't want to disturb the patient. I or another  will attempt to visit the patient or the family at another time. The pt was admitted due to NSTEMI.

## 2024-05-14 NOTE — PROGRESS NOTES
Physician Progress Note      PATIENT:               YEE MORA  CSN #:                  [id number]  :                       1960  ADMIT DATE:       5/10/2024 7:46 AM  DISCH DATE:  RESPONDING  PROVIDER #:        Nani Quezada CNP          QUERY TEXT:    Patient admitted with SOB. Noted documentation of acute respiratory failure in   PN. In order to support the diagnosis of acute respiratory failure, please   include additional clinical indicators in your documentation.  Or please   document if the diagnosis of acute respiratory failure has been ruled out   after further study.      The medical record reflects the following:  Risk Factors: Elderly, CHF, HTN, CKD  Clinical Indicators: 89% on RA, resp 16-20, FERRERA, on 1-2LNC, no increased work   of breathing  Treatment: oxygen 1-2LNC, IS, lab monitoring, imaging    Acute Respiratory Failure Clinical Indicators per 3[facility] MS-DRG Training Guide and   Quick Reference Guide:  pO2 < 60 mmHg or SpO2 (pulse oximetry) < 91% breathing room air  pCO2 > 50 and pH < 7.35  P/F ratio (pO2 / FIO2) < 300  pO2 decrease or pCO2 increase by 10 mmHg from baseline (if known)  Supplemental oxygen of 40% or more  Presence of respiratory distress, tachypnea, dyspnea, shortness of breath,   wheezing  Unable to speak in complete sentences  Use of accessory muscles to breathe  Extreme anxiety and feeling of impending doom  Tripod position  Confusion/altered mental status/obtunded    Thank You!  Toyin Eldridge RN, CRCR  RN Clinical Documentation Integrity  Options provided:  -- Acute Respiratory Failure as evidenced by, Please document evidence.  -- Acute Respiratory Failure ruled out after study  -- Other - I will add my own diagnosis  -- Disagree - Not applicable / Not valid  -- Disagree - Clinically unable to determine / Unknown  -- Refer to Clinical Documentation Reviewer    PROVIDER RESPONSE TEXT:    This patient is in acute respiratory failure as evidenced by increased

## 2024-05-14 NOTE — PLAN OF CARE
Problem: Discharge Planning  Goal: Discharge to home or other facility with appropriate resources  Outcome: Progressing  Flowsheets (Taken 5/14/2024 0459)  Discharge to home or other facility with appropriate resources:   Identify barriers to discharge with patient and caregiver   Arrange for needed discharge resources and transportation as appropriate   Identify discharge learning needs (meds, wound care, etc)   Refer to discharge planning if patient needs post-hospital services based on physician order or complex needs related to functional status, cognitive ability or social support system     Problem: Pain  Goal: Verbalizes/displays adequate comfort level or baseline comfort level  Outcome: Progressing  Flowsheets (Taken 5/14/2024 0459)  Verbalizes/displays adequate comfort level or baseline comfort level:   Assess pain using appropriate pain scale   Administer analgesics based on type and severity of pain and evaluate response   Encourage patient to monitor pain and request assistance   Implement non-pharmacological measures as appropriate and evaluate response   Consider cultural and social influences on pain and pain management   Notify Licensed Independent Practitioner if interventions unsuccessful or patient reports new pain     Problem: Safety - Adult  Goal: Free from fall injury  Outcome: Progressing  Flowsheets (Taken 5/14/2024 0459)  Free From Fall Injury:   Instruct family/caregiver on patient safety   Based on caregiver fall risk screen, instruct family/caregiver to ask for assistance with transferring infant if caregiver noted to have fall risk factors     Problem: ABCDS Injury Assessment  Goal: Absence of physical injury  Outcome: Progressing  Flowsheets (Taken 5/14/2024 0459)  Absence of Physical Injury: Implement safety measures based on patient assessment     Problem: Chronic Conditions and Co-morbidities  Goal: Patient's chronic conditions and co-morbidity symptoms are monitored and maintained

## 2024-05-14 NOTE — PROGRESS NOTES
Hospitalist Progress Note      Patient:  Ashwin Liriano    Unit/Bed:8B-34/034-A  YOB: 1960  MRN: [id number]   Acct: [id number]   PCP: Theresa Muñoz APRN - CNP  Date of Admission: 5/10/2024    Assessment/Plan:    Elevated troponin. Concerns for NSTEMI. Intermittent chest pain associated with dyspnea and exertion over the last week. High-sensitivity troponin significantly elevated at 151, 142. Heparin initiated in the emergency department. No EKG changes. Cardiology consulted.  Echo showing EF of 35 to 40% drop from prior echo of 55 to 60%. Stress test completed 5/13 with no ischemia. No plans for LHC unless CP reoccurs. OMT. Cardiology has signed off, f/u OP in 2-4 weeks.  Acute on chronic combined heart failure, new CMP. Volume status has greatly improved. Echo on 11/3/2022 with EF of 55 to 60% and grade 2 diastolic dysfunction.  Repeat echo this admission with newly reduced ef of 35-40%. The patient is also noted to have mild aortic stenosis and trace aortic regurgitation. Diuresed. Nephrology and cardiology on board.  Strict ins and outs. Daily weights. Low sodium diet. Fluid restriction. Post agressive diureses. Holding additional doses for now.   Acute hypoxic respiratory failure, likely secondary to #2. Max oxygen demand was 2L/NC. Currently at 1L/NC. Continue to wean as able. Encourage incentive spirometer.   Coronary artery disease. Status post CABG. Continue antiplatelets.  GISELA on CKD stage IIIa. Creatinine 2.1 on arrival, baseline appears to be around 1.5-1.7. Up to 2.6 5/13, 2.5 today. Holding Bumex. Holding Lisinopril. Nephrology following and managing volume status. Started gentle IVF for 12 hours. Repeat BMP in AM.  Aortic stenosis. Mild per echocardiogram on 11/9/2023.  Bilateral carotid artery stenosis. Known right carotid artery occlusion.  History of left carotid endarterectomy.  Insulin-dependent

## 2024-05-15 ENCOUNTER — TELEPHONE (OUTPATIENT)
Dept: FAMILY MEDICINE CLINIC | Age: 64
End: 2024-05-15

## 2024-05-15 VITALS
HEIGHT: 74 IN | DIASTOLIC BLOOD PRESSURE: 83 MMHG | RESPIRATION RATE: 20 BRPM | SYSTOLIC BLOOD PRESSURE: 150 MMHG | TEMPERATURE: 97.9 F | OXYGEN SATURATION: 95 % | WEIGHT: 315 LBS | HEART RATE: 75 BPM | BODY MASS INDEX: 40.43 KG/M2

## 2024-05-15 LAB
ANION GAP SERPL CALC-SCNC: 14 MEQ/L (ref 8–16)
BUN SERPL-MCNC: 57 MG/DL (ref 7–22)
CALCIUM SERPL-MCNC: 9.3 MG/DL (ref 8.5–10.5)
CHLORIDE SERPL-SCNC: 101 MEQ/L (ref 98–111)
CO2 SERPL-SCNC: 25 MEQ/L (ref 23–33)
CREAT SERPL-MCNC: 2.2 MG/DL (ref 0.4–1.2)
GFR SERPL CREATININE-BSD FRML MDRD: 33 ML/MIN/1.73M2
GLUCOSE BLD STRIP.AUTO-MCNC: 111 MG/DL (ref 70–108)
GLUCOSE SERPL-MCNC: 115 MG/DL (ref 70–108)
HEPARIN UNFRACTIONATED: 0.45 U/ML (ref 0.3–0.7)
POTASSIUM SERPL-SCNC: 5.2 MEQ/L (ref 3.5–5.2)
SODIUM SERPL-SCNC: 140 MEQ/L (ref 135–145)

## 2024-05-15 PROCEDURE — 99239 HOSP IP/OBS DSCHRG MGMT >30: CPT | Performed by: NURSE PRACTITIONER

## 2024-05-15 PROCEDURE — 80048 BASIC METABOLIC PNL TOTAL CA: CPT

## 2024-05-15 PROCEDURE — 6360000002 HC RX W HCPCS: Performed by: STUDENT IN AN ORGANIZED HEALTH CARE EDUCATION/TRAINING PROGRAM

## 2024-05-15 PROCEDURE — 36415 COLL VENOUS BLD VENIPUNCTURE: CPT

## 2024-05-15 PROCEDURE — 6370000000 HC RX 637 (ALT 250 FOR IP): Performed by: PHYSICIAN ASSISTANT

## 2024-05-15 PROCEDURE — 85520 HEPARIN ASSAY: CPT

## 2024-05-15 PROCEDURE — 99232 SBSQ HOSP IP/OBS MODERATE 35: CPT | Performed by: INTERNAL MEDICINE

## 2024-05-15 PROCEDURE — 82948 REAGENT STRIP/BLOOD GLUCOSE: CPT

## 2024-05-15 RX ORDER — BUMETANIDE 1 MG/1
1 TABLET ORAL DAILY
Qty: 90 TABLET | Refills: 1 | Status: SHIPPED
Start: 2024-05-15 | End: 2024-05-21 | Stop reason: SDUPTHER

## 2024-05-15 RX ADMIN — METOPROLOL TARTRATE 50 MG: 50 TABLET, FILM COATED ORAL at 07:39

## 2024-05-15 RX ADMIN — INSULIN GLARGINE 35 UNITS: 100 INJECTION, SOLUTION SUBCUTANEOUS at 07:59

## 2024-05-15 RX ADMIN — ATORVASTATIN CALCIUM 80 MG: 80 TABLET, FILM COATED ORAL at 08:01

## 2024-05-15 RX ADMIN — HEPARIN SODIUM AND DEXTROSE 17 UNITS/KG/HR: 10000; 5 INJECTION INTRAVENOUS at 00:33

## 2024-05-15 RX ADMIN — GABAPENTIN 600 MG: 600 TABLET, FILM COATED ORAL at 07:39

## 2024-05-15 RX ADMIN — EMPAGLIFLOZIN 10 MG: 10 TABLET, FILM COATED ORAL at 07:58

## 2024-05-15 RX ADMIN — EZETIMIBE 10 MG: 10 TABLET ORAL at 07:58

## 2024-05-15 RX ADMIN — ASPIRIN 81 MG 81 MG: 81 TABLET ORAL at 08:00

## 2024-05-15 RX ADMIN — CLOPIDOGREL BISULFATE 75 MG: 75 TABLET ORAL at 07:57

## 2024-05-15 RX ADMIN — INSULIN LISPRO 5 UNITS: 100 INJECTION, SOLUTION INTRAVENOUS; SUBCUTANEOUS at 10:24

## 2024-05-15 RX ADMIN — HYDRALAZINE HYDROCHLORIDE 25 MG: 25 TABLET ORAL at 07:39

## 2024-05-15 NOTE — DISCHARGE SUMMARY
Hospital Medicine Discharge Summary      Patient Identification:   Ashwin Liriano   : 1960  MRN: [id number]   Account: [id number]      Patient's PCP: Theresa Muñoz APRN - CNP    Admit Date: 5/10/2024     Discharge Date: 5/15/2024      Admitting Physician: No admitting provider for patient encounter.     Discharging Nurse Practitioner: EYAD Powers CNP     Discharge Diagnoses with Assessment/Plan:  Acute chest pain, unknown etiology--appreciate cardiology input, stress test did not reveal ischemia however did show infarction with possible artifact per cardiology note dated May 14, 2020, plan is no heart cath unless patient develops recurrent chest pain as patient has a concern about THU and possibly the risk of dialysis; on aspirin, statin, Plavix, Lopressor; resolved  Acute on chronic systolic/diastolic heart failure--echo from May 10, 2024 revealed an EF 35 to 40% and abnormal diastolic function--Bumex 1 mg twice daily on hold at this time and can resume on May 16 per nephrology of his home dose 1 mg daily, on Jardiance, on hydralazine, no ACE/ARB secondary to GISELA, on Lopressor; behind 8.5 L for the admission; needs a 2 to 3-week follow-up with nephrology outpatient and repeat BMP prior to the appointment and orders placed in epic  Acute hypoxic respiratory failure--likely secondary to heart failure, resolved, on room air  GISELA on CKD stage IIIa--appreciate nephrology input, creatinine improved to 2.2; appears baseline is around 1.7; plan is repeat BMP in approximately 2 to 3 weeks with outpatient follow-up with nephrology   Moderate aortic stenosis with valve area 1.1 cm²  Bilateral carotid artery stenosis with history left carotid endarterectomy  Diabetes mellitus type 2, uncontrolled--on Jardiance, Lantus  Primary hypertension, uncontrolled--on hydralazine, Lopressor  Hyperlipidemia--on statin  Normocytic anemia  History of left lower extremity diabetic infection/history of  extremity diabetic infection/history of Charcot or deformity of right lower extremity status post left BKA; prosthesis fits well per patient  CAD status post CABG--aspirin, Plavix, statin, beta-blocker; follows with Dr. Swift  Obesity class III with BMI 43.17     The patient was seen and examined on day of discharge and this discharge summary is in conjunction with any daily progress note from day of discharge.    Hospital Course:   Ashwin Liriano is a 63 y.o. male admitted to [facility] on 5/10/2024 for chest pain and shortness of breath;   Per progress note dated May 14, 2024: \"\"Ashwin Liriano is a 63 y.o. male with a history of bilateral carotid artery stenosis, coronary artery disease, CKD stage IIIa, diastolic heart failure, aortic stenosis, insulin-dependent type 2 diabetes mellitus, hypertension, hyperlipidemia, left lower extremity diabetic infection status post left BKA, and Charcot deformity of right lower extremity who presented to [facility] with chief complaint of chest pain, shortness of breath, and lower extremity swelling.  The patient states over the last week or so he has been having some worsening of his baseline shortness of breath.  He endorses increasing lower extremity swelling, abdominal distention, worsening orthopnea, and intermittent chest pain.  The patient states he has been having substernal chest pain which she describes as a twinge that has been occurring with exertion and improves with rest.  This occurs with something as simple as even washing his hair.  This is associated with significant dyspnea.  Patient also reports he has had decreasing urine output as well as increasing lower extremity swelling, abdominal distention, and orthopnea over the last week as well.  He reports compliance with his medications including his diuretics as well as a low-salt diet.  No other complaints at this time. \"     5/15--> hemodynamically stable, on room air; behind 8.5 L the entire

## 2024-05-15 NOTE — TELEPHONE ENCOUNTER
I called Ashwin back to schedule him for a [facility] follow-up, D/C was 5/15/2024. I scheduled Ashwin for 5/21/2024.

## 2024-05-15 NOTE — TELEPHONE ENCOUNTER
Care Transitions Initial Follow Up Call    Outreach made within 2 business days of discharge: Yes    Patient: Ashwin Liriano Patient : 1960   MRN: [id number]  Reason for Admission: There are no discharge diagnoses documented for the most recent discharge.  Discharge Date: 5/15/24       Spoke with: Patient    Discharge department/facility: [facility] Follow Up    TCM Interactive Patient Contact:  Was patient able to fill all prescriptions: Yes  Was patient instructed to bring all medications to the follow-up visit: Yes  Is patient taking all medications as directed in the discharge summary? Yes  Does patient understand their discharge instructions: Yes  Does patient have questions or concerns that need addressed prior to 7-14 day follow up office visit: No    Scheduled appointment with PCP within 7-14 days    Follow Up  Future Appointments   Date Time Provider Department Center   2024  3:00 PM Theresa Muñoz APRN - CNP [facility]   2024 12:00 PM Sapphire Swift MD N [facility]   6/10/2024  1:40 PM Pastor Howard MD N [place] [facility]   2024  1:40 PM Pastor Howard MD N [place] [facility]   10/18/2024 10:00 AM Sapphire Swift MD N [place] [facility]   2024  2:00 PM SCHEDULE, SRPX PACER NURSE N [facility] PACER [facility]       Sarita Larios CMA ([facility])

## 2024-05-15 NOTE — PROGRESS NOTES
Discharge teaching and instructions for diagnosis/procedure of NSTEMI completed with patient using teachback method. AVS reviewed. Patient voiced understanding regarding prescriptions, follow up appointments, and care of self at home. Discharged in a wheelchair to  home with support per family

## 2024-05-15 NOTE — PROGRESS NOTES
[facility]   Nephrology progress note  5/15/2024, 9:28 AM      Pt Name:    Ashwin Liriano  MRN:     [id number]     YOB: 1960  Admit Date:    5/10/2024  7:46 AM    Chief Complaint: Nephrology following for GISELA/CKD/fluid overload.    Subjective:  Patient seen and examined  No chest pain or shortness of breath  Patient is eager to go home  Good urine output    Objective:  24HR INTAKE/OUTPUT:    Intake/Output Summary (Last 24 hours) at 5/15/2024 0928  Last data filed at 5/15/2024 0328  Gross per 24 hour   Intake 640 ml   Output 2300 ml   Net -1660 ml        Admission weight: (!) 140.6 kg (310 lb)    Wt Readings from Last 3 Encounters:   05/14/24 (!) 152.6 kg (336 lb 6.4 oz)   02/12/24 123 kg (271 lb 3.2 oz)   10/30/23 (!) 143.8 kg (317 lb)        Vitals :   Vitals:    05/14/24 2320 05/14/24 2333 05/15/24 0328 05/15/24 0730   BP: 121/77  110/62 (!) 150/83   Pulse: 77  69 75   Resp: 18  18 20   Temp: 97.7 °F (36.5 °C)  97.5 °F (36.4 °C) 97.9 °F (36.6 °C)   TempSrc: Oral  Oral Oral   SpO2: 95%  100% 95%   Weight:  (!) 152.6 kg (336 lb 6.4 oz)     Height:           Physical examination  General Appearance:  Well developed. No distress  Mouth/Throat:  Oral mucosa moist  Neck:  Supple, no JVD  Lungs:  Breath sounds: clear  Heart::  S1,S2 heard  Abdomen:  Soft, non - tender  Musculoskeletal:  Edema -edema much improved    Medications:  Infusion:    heparin (PORCINE) Infusion 17 Units/kg/hr (05/15/24 0735)    sodium chloride      dextrose       Meds:    sodium chloride flush  5-40 mL IntraVENous 2 times per day    insulin lispro  0-8 Units SubCUTAneous TID WC    insulin lispro  0-4 Units SubCUTAneous Nightly    aspirin  81 mg Oral Daily    atorvastatin  80 mg Oral Daily    [Held by provider] bumetanide  1 mg Oral Daily    clopidogrel  75 mg Oral Daily    empagliflozin  10 mg Oral Daily    ezetimibe  10 mg Oral Daily    gabapentin  600 mg Oral BID    hydrALAZINE  25 mg Oral TID    insulin

## 2024-05-15 NOTE — TELEPHONE ENCOUNTER
----- Message from John Paul Santana sent at 5/15/2024 11:14 AM EDT -----  Regarding: [facility] Appointment Request  [facility] Appointment Request    Patient needs appointment for [facility] Appointment Type: Hospital Follow Up.    Reason for Appointment Request: Other the caller disconnected while trying to connect to practice  --------------------------------------------------------------------------------------------------------------------------    Relationship to Patient: Covered Entity Nurse named Soniya     Call Back Information: OK to leave message on voicemail  Preferred Call Back Number: Phone [phone]

## 2024-05-15 NOTE — PLAN OF CARE
Problem: Discharge Planning  Goal: Discharge to home or other facility with appropriate resources  Outcome: Progressing  Flowsheets (Taken 5/14/2024 1915)  Discharge to home or other facility with appropriate resources: Identify barriers to discharge with patient and caregiver     Problem: Pain  Goal: Verbalizes/displays adequate comfort level or baseline comfort level  Outcome: Progressing  Flowsheets (Taken 5/14/2024 1915)  Verbalizes/displays adequate comfort level or baseline comfort level: Encourage patient to monitor pain and request assistance     Problem: Safety - Adult  Goal: Free from fall injury  Outcome: Progressing     Problem: ABCDS Injury Assessment  Goal: Absence of physical injury  Outcome: Progressing     Problem: Skin/Tissue Integrity  Goal: Absence of new skin breakdown  Description: 1.  Monitor for areas of redness and/or skin breakdown  2.  Assess vascular access sites hourly  3.  Every 4-6 hours minimum:  Change oxygen saturation probe site  4.  Every 4-6 hours:  If on nasal continuous positive airway pressure, respiratory therapy assess nares and determine need for appliance change or resting period.  Outcome: Progressing     Problem: Chronic Conditions and Co-morbidities  Goal: Patient's chronic conditions and co-morbidity symptoms are monitored and maintained or improved  Outcome: Progressing  Flowsheets (Taken 5/14/2024 1915)  Care Plan - Patient's Chronic Conditions and Co-Morbidity Symptoms are Monitored and Maintained or Improved: Monitor and assess patient's chronic conditions and comorbid symptoms for stability, deterioration, or improvement   Care plan reviewed with patient .  Patient  verbalize understanding of the plan of care and contribute to goal setting.

## 2024-05-15 NOTE — CARE COORDINATION
5/15/24, 1:51 PM EDT    Patient goals/plan/ treatment preferences discussed by  and .  Patient goals/plan/ treatment preferences reviewed with patient/ family.  Patient/ family verbalize understanding of discharge plan and are in agreement with goal/plan/treatment preferences.  Understanding was demonstrated using the teach back method.  AVS provided by RN at time of discharge, which includes all necessary medical information pertaining to the patients current course of illness, treatment, post-discharge goals of care, and treatment preferences.     Services At/After Discharge: None     Discharged home alone as prior to admission.

## 2024-05-21 ENCOUNTER — OFFICE VISIT (OUTPATIENT)
Dept: FAMILY MEDICINE CLINIC | Age: 64
End: 2024-05-21

## 2024-05-21 VITALS
SYSTOLIC BLOOD PRESSURE: 122 MMHG | DIASTOLIC BLOOD PRESSURE: 82 MMHG | OXYGEN SATURATION: 97 % | TEMPERATURE: 98.6 F | HEART RATE: 96 BPM

## 2024-05-21 DIAGNOSIS — M14.671 CHARCOT'S JOINT, RIGHT ANKLE AND FOOT: ICD-10-CM

## 2024-05-21 DIAGNOSIS — I50.43 ACUTE ON CHRONIC COMBINED SYSTOLIC AND DIASTOLIC CONGESTIVE HEART FAILURE (HCC): ICD-10-CM

## 2024-05-21 DIAGNOSIS — I50.32 CHRONIC DIASTOLIC CONGESTIVE HEART FAILURE (HCC): ICD-10-CM

## 2024-05-21 DIAGNOSIS — I25.10 CORONARY ARTERY DISEASE INVOLVING NATIVE CORONARY ARTERY OF NATIVE HEART WITHOUT ANGINA PECTORIS: ICD-10-CM

## 2024-05-21 DIAGNOSIS — R29.898 SEVERE MUSCLE DECONDITIONING: ICD-10-CM

## 2024-05-21 DIAGNOSIS — I10 ESSENTIAL HYPERTENSION: ICD-10-CM

## 2024-05-21 DIAGNOSIS — Z09 HOSPITAL DISCHARGE FOLLOW-UP: Primary | ICD-10-CM

## 2024-05-21 DIAGNOSIS — R73.09 HEMOGLOBIN A1C GREATER THAN 9%, INDICATING POOR DIABETIC CONTROL: ICD-10-CM

## 2024-05-21 DIAGNOSIS — I21.4 NSTEMI (NON-ST ELEVATED MYOCARDIAL INFARCTION) (HCC): ICD-10-CM

## 2024-05-21 RX ORDER — ASPIRIN 81 MG/1
81 TABLET, CHEWABLE ORAL DAILY
Qty: 90 TABLET | Refills: 1 | Status: SHIPPED | OUTPATIENT
Start: 2024-05-21

## 2024-05-21 RX ORDER — METOPROLOL TARTRATE 50 MG/1
TABLET, FILM COATED ORAL
Qty: 180 TABLET | Refills: 1 | Status: SHIPPED | OUTPATIENT
Start: 2024-05-21

## 2024-05-21 RX ORDER — INSULIN LISPRO 100 [IU]/ML
INJECTION, SOLUTION INTRAVENOUS; SUBCUTANEOUS
Qty: 7 ADJUSTABLE DOSE PRE-FILLED PEN SYRINGE | Refills: 3 | Status: SHIPPED | OUTPATIENT
Start: 2024-05-21

## 2024-05-21 RX ORDER — HYDRALAZINE HYDROCHLORIDE 25 MG/1
25 TABLET, FILM COATED ORAL 3 TIMES DAILY
Qty: 270 TABLET | Refills: 1 | Status: SHIPPED | OUTPATIENT
Start: 2024-05-21

## 2024-05-21 RX ORDER — POTASSIUM CHLORIDE 20 MEQ/1
20 TABLET, EXTENDED RELEASE ORAL DAILY
Qty: 90 TABLET | Refills: 1 | Status: SHIPPED | OUTPATIENT
Start: 2024-05-21

## 2024-05-21 RX ORDER — CLOPIDOGREL BISULFATE 75 MG/1
75 TABLET ORAL DAILY
Qty: 90 TABLET | Refills: 1 | Status: SHIPPED | OUTPATIENT
Start: 2024-05-21

## 2024-05-21 RX ORDER — BUMETANIDE 1 MG/1
1 TABLET ORAL DAILY
Qty: 90 TABLET | Refills: 1 | Status: SHIPPED | OUTPATIENT
Start: 2024-05-21

## 2024-05-21 RX ORDER — ATORVASTATIN CALCIUM 80 MG/1
80 TABLET, FILM COATED ORAL DAILY
Qty: 90 TABLET | Refills: 1 | Status: SHIPPED | OUTPATIENT
Start: 2024-05-21

## 2024-05-21 SDOH — ECONOMIC STABILITY: FOOD INSECURITY: WITHIN THE PAST 12 MONTHS, THE FOOD YOU BOUGHT JUST DIDN'T LAST AND YOU DIDN'T HAVE MONEY TO GET MORE.: NEVER TRUE

## 2024-05-21 SDOH — ECONOMIC STABILITY: INCOME INSECURITY: HOW HARD IS IT FOR YOU TO PAY FOR THE VERY BASICS LIKE FOOD, HOUSING, MEDICAL CARE, AND HEATING?: NOT HARD AT ALL

## 2024-05-21 SDOH — ECONOMIC STABILITY: FOOD INSECURITY: WITHIN THE PAST 12 MONTHS, YOU WORRIED THAT YOUR FOOD WOULD RUN OUT BEFORE YOU GOT MONEY TO BUY MORE.: NEVER TRUE

## 2024-05-21 NOTE — PROGRESS NOTES
tablet  clopidogrel 75 MG tablet  empagliflozin 10 MG tablet  hydrALAZINE 25 MG tablet  insulin lispro (1 Unit Dial) 100 UNIT/ML Sopn  metoprolol tartrate 50 MG tablet  potassium chloride 20 MEQ extended release tablet           Medications marked \"taking\" at this time  Outpatient Medications Marked as Taking for the 5/21/24 encounter (Office Visit) with Theresa Muñoz APRN - CNP   Medication Sig Dispense Refill    bumetanide (BUMEX) 1 MG tablet Take 1 tablet by mouth daily Starting May 16, 2024 90 tablet 1    empagliflozin (JARDIANCE) 10 MG tablet Take 1 tablet by mouth daily 90 tablet 1    aspirin 81 MG chewable tablet Take 1 tablet by mouth daily 90 tablet 1    atorvastatin (LIPITOR) 80 MG tablet Take 1 tablet by mouth daily 90 tablet 1    clopidogrel (PLAVIX) 75 MG tablet Take 1 tablet by mouth daily 90 tablet 1    hydrALAZINE (APRESOLINE) 25 MG tablet Take 1 tablet by mouth 3 times daily 270 tablet 1    insulin lispro, 1 Unit Dial, (HUMALOG KWIKPEN) 100 UNIT/ML SOPN 5 units plus scale: -199= 3 units, 200-249= 6 units, 250-299= 9 units, 300-349= 12 units, > 350= 15 units TID. Max units per day 60 7 Adjustable Dose Pre-filled Pen Syringe 3    metoprolol tartrate (LOPRESSOR) 50 MG tablet TAKE 1 TABLET BY MOUTH TWICE DAILY. HOLD FOR SBP LESS THAN 100 OR HEART RATE LESS THAN 60 180 tablet 1    potassium chloride (KLOR-CON M) 20 MEQ extended release tablet Take 1 tablet by mouth daily 90 tablet 1    ezetimibe (ZETIA) 10 MG tablet Take 1 tablet by mouth once daily 90 tablet 2    gabapentin (NEURONTIN) 600 MG tablet Take 1 tablet by mouth 2 times daily for 180 days. 180 tablet 1    insulin detemir (LEVEMIR FLEXTOUCH) 100 UNIT/ML injection pen Inject 35 Units into the skin 2 times daily 63 mL 1    albuterol sulfate HFA (VENTOLIN HFA) 108 (90 Base) MCG/ACT inhaler Inhale 2 puffs into the lungs 4 times daily as needed for Wheezing 54 g 1    Polyethylene Glycol 3350 (MIRALAX PO) Take by mouth as needed

## 2024-05-21 NOTE — PATIENT INSTRUCTIONS
[facility] Internal Medicine5.0  (1)·Internist  [facility] Internal Medicine  5.0(1) · Internist  [address] · [phone]  Open ? Closes 5?PM    [facility]  Address: [address] [place] [address]  Hours: Closes soon ? 4?PM ? Opens 8?AM Wed  Phone: [phone]    Address: [address] [place] [address]  Hours: Open ? Closes 4:30?PM  Phone: [phone]

## 2024-05-28 NOTE — PROGRESS NOTES
[facility] PHYSICIANS [place] [facility]  [facility] CARDIOLOGY  [address]  [address]  [facility] [address]  Dept: [phone]  Dept Fax: [phone]  Loc: [phone]    Visit Date: 5/29/2024    Mr. Liriano is a 63 y.o. male  who presented for:  Recent admission   CAD, CABG  PAD  CHF  HPI:   HPI   Ashwin Liriano is a pleasant 63 year old male patient who  has a past medical history of Acute left-sided low back pain with bilateral sciatica, Arthritis, CAD (coronary artery disease), CKD (chronic kidney disease), stage II, Diabetes mellitus (HCC), Hyperlipidemia, Hypertension, Liver disease, S/P transmetatarsal amputation of foot, left (HCC), and Wound dehiscence, surgical, initial encounter. Has h/o PPM placement, 3 vessel CABG on 11/2019 (SVG to OM1, SVG to Circumflex, LIMA to distal LAD). He also has h/o PAD, s/p left CEA 5/2023, left BKA. Peripheral angiogram 11/2020 was c/w nonobstructive PAD. Patient was recently evaluated by cardiology for chest pain and CHF during an admission at [facility]. Patient had a concern about THU. Echo 5/2024 revealed an EF of 35-40%. Stress test 5/2024 was negative for ischemia. He continues to have shortness of breath, dyspnea on exertion. Denies chest pain. Mild right leg swelling.       Current Outpatient Medications:     bumetanide (BUMEX) 1 MG tablet, Take 1 tablet by mouth daily Starting May 16, 2024, Disp: 90 tablet, Rfl: 1    empagliflozin (JARDIANCE) 10 MG tablet, Take 1 tablet by mouth daily, Disp: 90 tablet, Rfl: 1    aspirin 81 MG chewable tablet, Take 1 tablet by mouth daily, Disp: 90 tablet, Rfl: 1    atorvastatin (LIPITOR) 80 MG tablet, Take 1 tablet by mouth daily, Disp: 90 tablet, Rfl: 1    clopidogrel (PLAVIX) 75 MG tablet, Take 1 tablet by mouth daily, Disp: 90 tablet, Rfl: 1    hydrALAZINE (APRESOLINE) 25 MG tablet, Take 1 tablet by mouth 3 times daily, Disp: 270 tablet, Rfl: 1    insulin lispro, 1 Unit Dial, (HUMALOG KWIKPEN) 100 UNIT/ML SOPN, 5

## 2024-05-29 ENCOUNTER — OFFICE VISIT (OUTPATIENT)
Dept: CARDIOLOGY CLINIC | Age: 64
End: 2024-05-29
Payer: COMMERCIAL

## 2024-05-29 VITALS
BODY MASS INDEX: 43.19 KG/M2 | HEIGHT: 74 IN | HEART RATE: 72 BPM | DIASTOLIC BLOOD PRESSURE: 82 MMHG | SYSTOLIC BLOOD PRESSURE: 142 MMHG

## 2024-05-29 DIAGNOSIS — I50.20 HFREF (HEART FAILURE WITH REDUCED EJECTION FRACTION) (HCC): Primary | ICD-10-CM

## 2024-05-29 PROCEDURE — 3077F SYST BP >= 140 MM HG: CPT | Performed by: INTERNAL MEDICINE

## 2024-05-29 PROCEDURE — 1111F DSCHRG MED/CURRENT MED MERGE: CPT | Performed by: INTERNAL MEDICINE

## 2024-05-29 PROCEDURE — G8428 CUR MEDS NOT DOCUMENT: HCPCS | Performed by: INTERNAL MEDICINE

## 2024-05-29 PROCEDURE — 3079F DIAST BP 80-89 MM HG: CPT | Performed by: INTERNAL MEDICINE

## 2024-05-29 PROCEDURE — 1036F TOBACCO NON-USER: CPT | Performed by: INTERNAL MEDICINE

## 2024-05-29 PROCEDURE — 99214 OFFICE O/P EST MOD 30 MIN: CPT | Performed by: INTERNAL MEDICINE

## 2024-05-29 PROCEDURE — 3017F COLORECTAL CA SCREEN DOC REV: CPT | Performed by: INTERNAL MEDICINE

## 2024-05-29 PROCEDURE — G8417 CALC BMI ABV UP PARAM F/U: HCPCS | Performed by: INTERNAL MEDICINE

## 2024-05-29 RX ORDER — METOPROLOL SUCCINATE 50 MG/1
50 TABLET, EXTENDED RELEASE ORAL DAILY
Qty: 30 TABLET | Refills: 4 | Status: SHIPPED | OUTPATIENT
Start: 2024-05-29

## 2024-05-29 RX ORDER — METOPROLOL SUCCINATE 25 MG/1
25 TABLET, EXTENDED RELEASE ORAL DAILY
Qty: 30 TABLET | Refills: 3 | Status: SHIPPED | OUTPATIENT
Start: 2024-05-29 | End: 2024-05-29 | Stop reason: ALTCHOICE

## 2024-05-29 RX ORDER — BUMETANIDE 1 MG/1
1.5 TABLET ORAL DAILY
Qty: 90 TABLET | Refills: 1 | Status: SHIPPED | OUTPATIENT
Start: 2024-05-29

## 2024-05-29 NOTE — PROGRESS NOTES
Pt here for fu srmc    Pt continues with sob in exertion,     Pt states med list is correct from PCP appt 5/21/24      Pt has taken extra bumex in afternoon 5-6 times since DC

## 2024-06-03 ENCOUNTER — PROCEDURE VISIT (OUTPATIENT)
Dept: CARDIOLOGY CLINIC | Age: 64
End: 2024-06-03

## 2024-06-03 DIAGNOSIS — Z95.0 PACEMAKER: Primary | ICD-10-CM

## 2024-06-03 NOTE — PROGRESS NOTES
Dr jimenez pt   Nxt [employer] sci dual pacer remote   Battery 12.5 yrs remaining    Dddr   A paced 0%  V paced 100%  P waves 3.8  Rv waves not measured per the device     Atrial impedence 603  Vent impedence 720    Atrial threshold 0.6 @ 0.4  Vent threshold 0.9 @ 0.4    Atrial amplitude auto 2 @ 0.4  Vent amplitude auto 1.4 @ 0.4  No dx of afib / no oacs   Afib burden<1%    Episode of afib on 5/28/24 fro :02 seconds

## 2024-06-04 ENCOUNTER — NURSE ONLY (OUTPATIENT)
Dept: LAB | Age: 64
End: 2024-06-04

## 2024-06-04 DIAGNOSIS — I50.20 HFREF (HEART FAILURE WITH REDUCED EJECTION FRACTION) (HCC): ICD-10-CM

## 2024-06-04 LAB
ANION GAP SERPL CALC-SCNC: 14 MEQ/L (ref 8–16)
BUN SERPL-MCNC: 43 MG/DL (ref 7–22)
CALCIUM SERPL-MCNC: 9.2 MG/DL (ref 8.5–10.5)
CHLORIDE SERPL-SCNC: 101 MEQ/L (ref 98–111)
CO2 SERPL-SCNC: 24 MEQ/L (ref 23–33)
CREAT SERPL-MCNC: 1.9 MG/DL (ref 0.4–1.2)
GFR SERPL CREATININE-BSD FRML MDRD: 39 ML/MIN/1.73M2
GLUCOSE SERPL-MCNC: 283 MG/DL (ref 70–108)
POTASSIUM SERPL-SCNC: 4 MEQ/L (ref 3.5–5.2)
SODIUM SERPL-SCNC: 139 MEQ/L (ref 135–145)

## 2024-06-10 ENCOUNTER — OFFICE VISIT (OUTPATIENT)
Dept: NEPHROLOGY | Age: 64
End: 2024-06-10
Payer: COMMERCIAL

## 2024-06-10 ENCOUNTER — NURSE ONLY (OUTPATIENT)
Dept: LAB | Age: 64
End: 2024-06-10

## 2024-06-10 VITALS — OXYGEN SATURATION: 95 % | HEART RATE: 66 BPM | DIASTOLIC BLOOD PRESSURE: 90 MMHG | SYSTOLIC BLOOD PRESSURE: 174 MMHG

## 2024-06-10 DIAGNOSIS — E11.21 DIABETIC NEPHROPATHY WITH PROTEINURIA (HCC): ICD-10-CM

## 2024-06-10 DIAGNOSIS — I10 ESSENTIAL HYPERTENSION: ICD-10-CM

## 2024-06-10 DIAGNOSIS — N18.31 STAGE 3A CHRONIC KIDNEY DISEASE (HCC): Primary | ICD-10-CM

## 2024-06-10 PROCEDURE — 3080F DIAST BP >= 90 MM HG: CPT | Performed by: INTERNAL MEDICINE

## 2024-06-10 PROCEDURE — 1111F DSCHRG MED/CURRENT MED MERGE: CPT | Performed by: INTERNAL MEDICINE

## 2024-06-10 PROCEDURE — 3017F COLORECTAL CA SCREEN DOC REV: CPT | Performed by: INTERNAL MEDICINE

## 2024-06-10 PROCEDURE — 3077F SYST BP >= 140 MM HG: CPT | Performed by: INTERNAL MEDICINE

## 2024-06-10 PROCEDURE — 3046F HEMOGLOBIN A1C LEVEL >9.0%: CPT | Performed by: INTERNAL MEDICINE

## 2024-06-10 PROCEDURE — 99213 OFFICE O/P EST LOW 20 MIN: CPT | Performed by: INTERNAL MEDICINE

## 2024-06-10 PROCEDURE — G8417 CALC BMI ABV UP PARAM F/U: HCPCS | Performed by: INTERNAL MEDICINE

## 2024-06-10 PROCEDURE — G2211 COMPLEX E/M VISIT ADD ON: HCPCS | Performed by: INTERNAL MEDICINE

## 2024-06-10 PROCEDURE — G8427 DOCREV CUR MEDS BY ELIG CLIN: HCPCS | Performed by: INTERNAL MEDICINE

## 2024-06-10 PROCEDURE — 2022F DILAT RTA XM EVC RTNOPTHY: CPT | Performed by: INTERNAL MEDICINE

## 2024-06-10 PROCEDURE — 1036F TOBACCO NON-USER: CPT | Performed by: INTERNAL MEDICINE

## 2024-06-10 NOTE — PROGRESS NOTES
Hx of coronary artery disease status post CABG probably 2018  Fu in 6 months    Addendum reviewed the lab data-creatinine maintaining stable around 1.9 will continue current dose of diuretics.    Tests and orders placed this Encounter     No orders of the defined types were placed in this encounter.      Pastor Howard M.D  [facility].

## 2024-06-11 ENCOUNTER — TELEPHONE (OUTPATIENT)
Dept: PHYSICAL MEDICINE AND REHAB | Age: 64
End: 2024-06-11

## 2024-06-11 PROBLEM — R79.89 ELEVATED TROPONIN: Status: RESOLVED | Noted: 2024-05-12 | Resolved: 2024-06-11

## 2024-06-11 LAB
ANION GAP SERPL CALC-SCNC: 11 MEQ/L (ref 8–16)
BUN SERPL-MCNC: 44 MG/DL (ref 7–22)
CALCIUM SERPL-MCNC: 9.2 MG/DL (ref 8.5–10.5)
CHLORIDE SERPL-SCNC: 104 MEQ/L (ref 98–111)
CO2 SERPL-SCNC: 26 MEQ/L (ref 23–33)
CREAT SERPL-MCNC: 1.8 MG/DL (ref 0.4–1.2)
GFR SERPL CREATININE-BSD FRML MDRD: 42 ML/MIN/1.73M2
GLUCOSE SERPL-MCNC: 151 MG/DL (ref 70–108)
POTASSIUM SERPL-SCNC: 4.3 MEQ/L (ref 3.5–5.2)
SODIUM SERPL-SCNC: 141 MEQ/L (ref 135–145)

## 2024-06-11 NOTE — TELEPHONE ENCOUNTER
Pastor Howard MD  [facility] Clinical Staff  Please let the patient know that kidney numbers look well  No changes in the diuretics continue to take 1.5 mg Bumex twice a day      Patient has been informed

## 2024-06-12 RX ORDER — BUMETANIDE 1 MG/1
1.5 TABLET ORAL DAILY
Qty: 90 TABLET | Refills: 1 | Status: CANCELLED | OUTPATIENT
Start: 2024-06-12

## 2024-06-12 NOTE — TELEPHONE ENCOUNTER
Ashwin Liriano called requesting a refill on the following medications:  Requested Prescriptions     Pending Prescriptions Disp Refills    bumetanide (BUMEX) 1 MG tablet 90 tablet 1     Sig: Take 1.5 tablets by mouth daily Starting May 16, 2024     Pharmacy verified:[facility]      Date of last visit: 5/29/24  Date of next visit (if applicable): 10/18/24

## 2024-06-12 NOTE — TELEPHONE ENCOUNTER
Per Melhems note patient was to increase x3 days and then back it down to 1.5mg a day,. Patient called and notified, voiced understanding.

## 2024-06-20 ENCOUNTER — HOSPITAL ENCOUNTER (INPATIENT)
Age: 64
LOS: 4 days | Discharge: HOME | DRG: 291 | End: 2024-06-24
Attending: FAMILY MEDICINE | Admitting: FAMILY MEDICINE
Payer: COMMERCIAL

## 2024-06-20 ENCOUNTER — APPOINTMENT (OUTPATIENT)
Dept: GENERAL RADIOLOGY | Age: 64
DRG: 291 | End: 2024-06-20
Payer: COMMERCIAL

## 2024-06-20 DIAGNOSIS — I50.9 ACUTE ON CHRONIC CONGESTIVE HEART FAILURE, UNSPECIFIED HEART FAILURE TYPE (HCC): Primary | ICD-10-CM

## 2024-06-20 DIAGNOSIS — J44.1 COPD EXACERBATION (HCC): ICD-10-CM

## 2024-06-20 PROBLEM — I50.43 ACUTE ON CHRONIC COMBINED SYSTOLIC (CONGESTIVE) AND DIASTOLIC (CONGESTIVE) HEART FAILURE (HCC): Status: ACTIVE | Noted: 2024-06-20

## 2024-06-20 LAB
ANION GAP SERPL CALC-SCNC: 13 MEQ/L (ref 8–16)
BASOPHILS ABSOLUTE: 0.1 THOU/MM3 (ref 0–0.1)
BASOPHILS NFR BLD AUTO: 0.6 %
BUN SERPL-MCNC: 36 MG/DL (ref 7–22)
CALCIUM SERPL-MCNC: 8.6 MG/DL (ref 8.5–10.5)
CHLORIDE SERPL-SCNC: 101 MEQ/L (ref 98–111)
CO2 SERPL-SCNC: 25 MEQ/L (ref 23–33)
CREAT SERPL-MCNC: 1.9 MG/DL (ref 0.4–1.2)
DEPRECATED RDW RBC AUTO: 58.2 FL (ref 35–45)
EKG ATRIAL RATE: 85 BPM
EKG P AXIS: 61 DEGREES
EKG P-R INTERVAL: 202 MS
EKG Q-T INTERVAL: 454 MS
EKG QRS DURATION: 170 MS
EKG QTC CALCULATION (BAZETT): 540 MS
EKG R AXIS: -80 DEGREES
EKG T AXIS: 99 DEGREES
EKG VENTRICULAR RATE: 85 BPM
EOSINOPHIL NFR BLD AUTO: 0.7 %
EOSINOPHILS ABSOLUTE: 0.1 THOU/MM3 (ref 0–0.4)
ERYTHROCYTE [DISTWIDTH] IN BLOOD BY AUTOMATED COUNT: 17.7 % (ref 11.5–14.5)
GFR SERPL CREATININE-BSD FRML MDRD: 39 ML/MIN/1.73M2
GLUCOSE BLD STRIP.AUTO-MCNC: 246 MG/DL (ref 70–108)
GLUCOSE BLD STRIP.AUTO-MCNC: 276 MG/DL (ref 70–108)
GLUCOSE SERPL-MCNC: 223 MG/DL (ref 70–108)
HCT VFR BLD AUTO: 35.9 % (ref 42–52)
HGB BLD-MCNC: 10.5 GM/DL (ref 14–18)
IMM GRANULOCYTES # BLD AUTO: 0.03 THOU/MM3 (ref 0–0.07)
IMM GRANULOCYTES NFR BLD AUTO: 0.3 %
LYMPHOCYTES ABSOLUTE: 0.6 THOU/MM3 (ref 1–4.8)
LYMPHOCYTES NFR BLD AUTO: 6.6 %
MCH RBC QN AUTO: 27.1 PG (ref 26–33)
MCHC RBC AUTO-ENTMCNC: 29.2 GM/DL (ref 32.2–35.5)
MCV RBC AUTO: 92.5 FL (ref 80–94)
MONOCYTES ABSOLUTE: 0.5 THOU/MM3 (ref 0.4–1.3)
MONOCYTES NFR BLD AUTO: 5.4 %
NEUTROPHILS ABSOLUTE: 7.6 THOU/MM3 (ref 1.8–7.7)
NEUTROPHILS NFR BLD AUTO: 86.4 %
NRBC BLD AUTO-RTO: 0 /100 WBC
NT-PROBNP SERPL IA-MCNC: 5684 PG/ML (ref 0–124)
OSMOLALITY SERPL CALC.SUM OF ELEC: 292.8 MOSMOL/KG (ref 275–300)
PLATELET # BLD AUTO: 189 THOU/MM3 (ref 130–400)
PMV BLD AUTO: 11.1 FL (ref 9.4–12.4)
POTASSIUM SERPL-SCNC: 3.9 MEQ/L (ref 3.5–5.2)
PROCALCITONIN SERPL IA-MCNC: 0.13 NG/ML (ref 0.01–0.09)
RBC # BLD AUTO: 3.88 MILL/MM3 (ref 4.7–6.1)
SODIUM SERPL-SCNC: 139 MEQ/L (ref 135–145)
TROPONIN, HIGH SENSITIVITY: 70 NG/L (ref 0–12)
WBC # BLD AUTO: 8.8 THOU/MM3 (ref 4.8–10.8)

## 2024-06-20 PROCEDURE — 84145 PROCALCITONIN (PCT): CPT

## 2024-06-20 PROCEDURE — 99223 1ST HOSP IP/OBS HIGH 75: CPT | Performed by: FAMILY MEDICINE

## 2024-06-20 PROCEDURE — 96374 THER/PROPH/DIAG INJ IV PUSH: CPT

## 2024-06-20 PROCEDURE — 93010 ELECTROCARDIOGRAM REPORT: CPT | Performed by: INTERNAL MEDICINE

## 2024-06-20 PROCEDURE — 82948 REAGENT STRIP/BLOOD GLUCOSE: CPT

## 2024-06-20 PROCEDURE — 83880 ASSAY OF NATRIURETIC PEPTIDE: CPT

## 2024-06-20 PROCEDURE — 1200000003 HC TELEMETRY R&B

## 2024-06-20 PROCEDURE — 2580000003 HC RX 258: Performed by: STUDENT IN AN ORGANIZED HEALTH CARE EDUCATION/TRAINING PROGRAM

## 2024-06-20 PROCEDURE — 6360000002 HC RX W HCPCS: Performed by: STUDENT IN AN ORGANIZED HEALTH CARE EDUCATION/TRAINING PROGRAM

## 2024-06-20 PROCEDURE — 6370000000 HC RX 637 (ALT 250 FOR IP): Performed by: STUDENT IN AN ORGANIZED HEALTH CARE EDUCATION/TRAINING PROGRAM

## 2024-06-20 PROCEDURE — 2700000000 HC OXYGEN THERAPY PER DAY

## 2024-06-20 PROCEDURE — 2580000003 HC RX 258

## 2024-06-20 PROCEDURE — 85025 COMPLETE CBC W/AUTO DIFF WBC: CPT

## 2024-06-20 PROCEDURE — 36415 COLL VENOUS BLD VENIPUNCTURE: CPT

## 2024-06-20 PROCEDURE — 84484 ASSAY OF TROPONIN QUANT: CPT

## 2024-06-20 PROCEDURE — 6370000000 HC RX 637 (ALT 250 FOR IP)

## 2024-06-20 PROCEDURE — 94761 N-INVAS EAR/PLS OXIMETRY MLT: CPT

## 2024-06-20 PROCEDURE — 80048 BASIC METABOLIC PNL TOTAL CA: CPT

## 2024-06-20 PROCEDURE — 93005 ELECTROCARDIOGRAM TRACING: CPT | Performed by: STUDENT IN AN ORGANIZED HEALTH CARE EDUCATION/TRAINING PROGRAM

## 2024-06-20 PROCEDURE — 99285 EMERGENCY DEPT VISIT HI MDM: CPT

## 2024-06-20 PROCEDURE — 1200000000 HC SEMI PRIVATE

## 2024-06-20 PROCEDURE — 94640 AIRWAY INHALATION TREATMENT: CPT

## 2024-06-20 PROCEDURE — 6360000002 HC RX W HCPCS

## 2024-06-20 PROCEDURE — 71045 X-RAY EXAM CHEST 1 VIEW: CPT

## 2024-06-20 RX ORDER — HYDRALAZINE HYDROCHLORIDE 25 MG/1
25 TABLET, FILM COATED ORAL 3 TIMES DAILY
Status: DISCONTINUED | OUTPATIENT
Start: 2024-06-20 | End: 2024-06-24 | Stop reason: HOSPADM

## 2024-06-20 RX ORDER — SODIUM CHLORIDE 0.9 % (FLUSH) 0.9 %
10 SYRINGE (ML) INJECTION EVERY 12 HOURS SCHEDULED
Status: DISCONTINUED | OUTPATIENT
Start: 2024-06-20 | End: 2024-06-24 | Stop reason: HOSPADM

## 2024-06-20 RX ORDER — ONDANSETRON 2 MG/ML
4 INJECTION INTRAMUSCULAR; INTRAVENOUS EVERY 6 HOURS PRN
Status: DISCONTINUED | OUTPATIENT
Start: 2024-06-20 | End: 2024-06-24 | Stop reason: HOSPADM

## 2024-06-20 RX ORDER — ACETAMINOPHEN 325 MG/1
650 TABLET ORAL EVERY 6 HOURS PRN
Status: DISCONTINUED | OUTPATIENT
Start: 2024-06-20 | End: 2024-06-24 | Stop reason: HOSPADM

## 2024-06-20 RX ORDER — ONDANSETRON 4 MG/1
4 TABLET, ORALLY DISINTEGRATING ORAL EVERY 8 HOURS PRN
Status: DISCONTINUED | OUTPATIENT
Start: 2024-06-20 | End: 2024-06-24 | Stop reason: HOSPADM

## 2024-06-20 RX ORDER — MAGNESIUM SULFATE IN WATER 40 MG/ML
2000 INJECTION, SOLUTION INTRAVENOUS PRN
Status: DISCONTINUED | OUTPATIENT
Start: 2024-06-20 | End: 2024-06-24 | Stop reason: HOSPADM

## 2024-06-20 RX ORDER — IPRATROPIUM BROMIDE AND ALBUTEROL SULFATE 2.5; .5 MG/3ML; MG/3ML
1 SOLUTION RESPIRATORY (INHALATION)
Status: DISCONTINUED | OUTPATIENT
Start: 2024-06-20 | End: 2024-06-21

## 2024-06-20 RX ORDER — INSULIN LISPRO 100 [IU]/ML
0-8 INJECTION, SOLUTION INTRAVENOUS; SUBCUTANEOUS
Status: DISCONTINUED | OUTPATIENT
Start: 2024-06-20 | End: 2024-06-24 | Stop reason: HOSPADM

## 2024-06-20 RX ORDER — BUMETANIDE 0.25 MG/ML
2 INJECTION INTRAMUSCULAR; INTRAVENOUS 2 TIMES DAILY
Status: DISPENSED | OUTPATIENT
Start: 2024-06-20 | End: 2024-06-22

## 2024-06-20 RX ORDER — METOPROLOL SUCCINATE 50 MG/1
50 TABLET, EXTENDED RELEASE ORAL DAILY
Status: DISCONTINUED | OUTPATIENT
Start: 2024-06-21 | End: 2024-06-24 | Stop reason: HOSPADM

## 2024-06-20 RX ORDER — ENOXAPARIN SODIUM 100 MG/ML
40 INJECTION SUBCUTANEOUS 2 TIMES DAILY
Status: DISCONTINUED | OUTPATIENT
Start: 2024-06-20 | End: 2024-06-24 | Stop reason: HOSPADM

## 2024-06-20 RX ORDER — INSULIN GLARGINE 100 [IU]/ML
35 INJECTION, SOLUTION SUBCUTANEOUS 2 TIMES DAILY
Status: DISCONTINUED | OUTPATIENT
Start: 2024-06-20 | End: 2024-06-21

## 2024-06-20 RX ORDER — POTASSIUM CHLORIDE 20 MEQ/1
20 TABLET, EXTENDED RELEASE ORAL DAILY
Status: DISCONTINUED | OUTPATIENT
Start: 2024-06-21 | End: 2024-06-24 | Stop reason: HOSPADM

## 2024-06-20 RX ORDER — POTASSIUM CHLORIDE 7.45 MG/ML
10 INJECTION INTRAVENOUS PRN
Status: DISCONTINUED | OUTPATIENT
Start: 2024-06-20 | End: 2024-06-24 | Stop reason: HOSPADM

## 2024-06-20 RX ORDER — ACETAMINOPHEN 650 MG/1
650 SUPPOSITORY RECTAL EVERY 6 HOURS PRN
Status: DISCONTINUED | OUTPATIENT
Start: 2024-06-20 | End: 2024-06-24 | Stop reason: HOSPADM

## 2024-06-20 RX ORDER — ATORVASTATIN CALCIUM 80 MG/1
80 TABLET, FILM COATED ORAL NIGHTLY
Status: DISCONTINUED | OUTPATIENT
Start: 2024-06-20 | End: 2024-06-24 | Stop reason: HOSPADM

## 2024-06-20 RX ORDER — GABAPENTIN 600 MG/1
600 TABLET ORAL 2 TIMES DAILY
Status: DISCONTINUED | OUTPATIENT
Start: 2024-06-20 | End: 2024-06-24 | Stop reason: HOSPADM

## 2024-06-20 RX ORDER — IPRATROPIUM BROMIDE AND ALBUTEROL SULFATE 2.5; .5 MG/3ML; MG/3ML
3 SOLUTION RESPIRATORY (INHALATION) ONCE
Status: COMPLETED | OUTPATIENT
Start: 2024-06-20 | End: 2024-06-20

## 2024-06-20 RX ORDER — SODIUM CHLORIDE 9 MG/ML
INJECTION, SOLUTION INTRAVENOUS PRN
Status: DISCONTINUED | OUTPATIENT
Start: 2024-06-20 | End: 2024-06-24 | Stop reason: HOSPADM

## 2024-06-20 RX ORDER — POLYETHYLENE GLYCOL 3350 17 G/17G
17 POWDER, FOR SOLUTION ORAL DAILY PRN
Status: DISCONTINUED | OUTPATIENT
Start: 2024-06-20 | End: 2024-06-24 | Stop reason: HOSPADM

## 2024-06-20 RX ORDER — SODIUM CHLORIDE 0.9 % (FLUSH) 0.9 %
10 SYRINGE (ML) INJECTION PRN
Status: DISCONTINUED | OUTPATIENT
Start: 2024-06-20 | End: 2024-06-24 | Stop reason: HOSPADM

## 2024-06-20 RX ORDER — CLOPIDOGREL BISULFATE 75 MG/1
75 TABLET ORAL DAILY
Status: DISCONTINUED | OUTPATIENT
Start: 2024-06-21 | End: 2024-06-24 | Stop reason: HOSPADM

## 2024-06-20 RX ORDER — BENZONATATE 100 MG/1
100 CAPSULE ORAL 3 TIMES DAILY PRN
Status: DISCONTINUED | OUTPATIENT
Start: 2024-06-20 | End: 2024-06-24 | Stop reason: HOSPADM

## 2024-06-20 RX ORDER — INSULIN LISPRO 100 [IU]/ML
0-4 INJECTION, SOLUTION INTRAVENOUS; SUBCUTANEOUS NIGHTLY
Status: DISCONTINUED | OUTPATIENT
Start: 2024-06-20 | End: 2024-06-23

## 2024-06-20 RX ORDER — EZETIMIBE 10 MG/1
10 TABLET ORAL DAILY
Status: DISCONTINUED | OUTPATIENT
Start: 2024-06-21 | End: 2024-06-24 | Stop reason: HOSPADM

## 2024-06-20 RX ORDER — POTASSIUM CHLORIDE 20 MEQ/1
40 TABLET, EXTENDED RELEASE ORAL PRN
Status: DISCONTINUED | OUTPATIENT
Start: 2024-06-20 | End: 2024-06-24 | Stop reason: HOSPADM

## 2024-06-20 RX ORDER — ASPIRIN 81 MG/1
81 TABLET, CHEWABLE ORAL DAILY
Status: DISCONTINUED | OUTPATIENT
Start: 2024-06-21 | End: 2024-06-24 | Stop reason: HOSPADM

## 2024-06-20 RX ADMIN — INSULIN GLARGINE 35 UNITS: 100 INJECTION, SOLUTION SUBCUTANEOUS at 22:37

## 2024-06-20 RX ADMIN — BENZONATATE 100 MG: 100 CAPSULE ORAL at 22:37

## 2024-06-20 RX ADMIN — HYDRALAZINE HYDROCHLORIDE 25 MG: 25 TABLET ORAL at 22:38

## 2024-06-20 RX ADMIN — WATER 125 MG: 1 INJECTION INTRAMUSCULAR; INTRAVENOUS; SUBCUTANEOUS at 14:47

## 2024-06-20 RX ADMIN — IPRATROPIUM BROMIDE AND ALBUTEROL SULFATE 3 DOSE: .5; 3 SOLUTION RESPIRATORY (INHALATION) at 15:30

## 2024-06-20 RX ADMIN — ENOXAPARIN SODIUM 40 MG: 100 INJECTION SUBCUTANEOUS at 22:37

## 2024-06-20 RX ADMIN — GABAPENTIN 600 MG: 600 TABLET, FILM COATED ORAL at 22:38

## 2024-06-20 RX ADMIN — SODIUM CHLORIDE, PRESERVATIVE FREE 10 ML: 5 INJECTION INTRAVENOUS at 22:40

## 2024-06-20 RX ADMIN — IPRATROPIUM BROMIDE AND ALBUTEROL SULFATE 1 DOSE: .5; 3 SOLUTION RESPIRATORY (INHALATION) at 21:07

## 2024-06-20 RX ADMIN — ATORVASTATIN CALCIUM 80 MG: 80 TABLET, FILM COATED ORAL at 22:38

## 2024-06-20 ASSESSMENT — PAIN - FUNCTIONAL ASSESSMENT: PAIN_FUNCTIONAL_ASSESSMENT: NONE - DENIES PAIN

## 2024-06-20 NOTE — H&P
Mert Rubio DPM at [facility] OR    TONSILLECTOMY      UPPER GASTROINTESTINAL ENDOSCOPY N/A 11/23/2022    EGD performed by Angi Booth MD at [facility]       Social History  The patient currently lives at home.   Tobacco use:   reports that he quit smoking about 16 years ago. His smoking use included cigarettes. He has never used smokeless tobacco.  Alcohol use:   reports that he does not currently use alcohol.  Drug use:  reports no history of drug use.     Medications Prior to Admission:   Prior to Admission medications    Medication Sig Start Date End Date Taking? Authorizing Provider   bumetanide (BUMEX) 1 MG tablet Take 1.5 tablets by mouth daily Starting May 16, 2024 5/29/24   Sapphire Swift MD   metoprolol succinate (TOPROL XL) 50 MG extended release tablet Take 1 tablet by mouth daily 5/29/24   Sapphire Swift MD   empagliflozin (JARDIANCE) 10 MG tablet Take 1 tablet by mouth daily 5/21/24   Theresa Muñoz APRN - CNP   aspirin 81 MG chewable tablet Take 1 tablet by mouth daily 5/21/24   Theresa Muñoz APRN - CNP   atorvastatin (LIPITOR) 80 MG tablet Take 1 tablet by mouth daily 5/21/24   Theresa Muñoz APRN - CNP   clopidogrel (PLAVIX) 75 MG tablet Take 1 tablet by mouth daily 5/21/24   Theresa Muñoz APRN - CNP   hydrALAZINE (APRESOLINE) 25 MG tablet Take 1 tablet by mouth 3 times daily 5/21/24   Theresa Muñoz APRN - CNP   insulin lispro, 1 Unit Dial, (HUMALOG KWIKPEN) 100 UNIT/ML SOPN 5 units plus scale: -199= 3 units, 200-249= 6 units, 250-299= 9 units, 300-349= 12 units, > 350= 15 units TID. Max units per day 60 5/21/24   Theresa Muñoz APRN - CNP   potassium chloride (KLOR-CON M) 20 MEQ extended release tablet Take 1 tablet by mouth daily 5/21/24   Theresa Muñoz APRN - CNP   ezetimibe (ZETIA) 10 MG tablet Take 1 tablet by mouth once daily 4/15/24   Tracie Jo MD   gabapentin (NEURONTIN) 600 MG tablet

## 2024-06-20 NOTE — ED NOTES
ED to inpatient nurses report      Chief Complaint:  Chief Complaint   Patient presents with    Shortness of Breath    Weight Gain     Present to ED from: home    MOA:     LOC: alert and orientated to name, place, date  Mobility: Requires assistance * 1  Oxygen Baseline: RA    Current needs required: RA     Code Status:   Full Code    What abnormal results were found and what did you give/do to treat them?   COPD exacerbation   Acute on chronic CHF  Given DuoNeb and SoluMedrol    Mental Status:  Level of Consciousness: Alert (0)    Psych Assessment:        Vitals:  Patient Vitals for the past 24 hrs:   BP Temp Temp src Pulse Resp SpO2 Height Weight   06/20/24 1551 (!) 162/89 -- -- 79 19 99 % -- --   06/20/24 1542 -- -- -- 80 -- 100 % -- --   06/20/24 1539 -- -- -- -- 20 100 % -- --   06/20/24 1530 -- -- -- 78 20 96 % -- --   06/20/24 1445 (!) 140/88 -- -- 78 18 94 % -- --   06/20/24 1349 118/71 -- -- 76 23 96 % -- --   06/20/24 1306 (!) 140/92 98.3 °F (36.8 °C) Oral 79 26 98 % 1.88 m (6' 2\") (!) 161.5 kg (356 lb)        LDAs:   Peripheral IV 06/20/24 Left Antecubital (Active)   Site Assessment Clean, dry & intact 06/20/24 1551   Line Status Normal saline locked 06/20/24 1551   Line Care Connections checked and tightened 06/20/24 1349   Phlebitis Assessment No symptoms 06/20/24 1349   Infiltration Assessment 0 06/20/24 1349   Dressing Status Clean, dry & intact 06/20/24 1349   Dressing Type Transparent 06/20/24 1349       Ambulatory Status:  No data recorded    Diagnosis:  DISPOSITION Admitted 06/20/2024 03:44:47 PM   Final diagnoses:   Acute on chronic congestive heart failure, unspecified heart failure type (HCC)   COPD exacerbation (HCC)        Consults:  None     Pain Score:  Pain Assessment  Pain Assessment: None - Denies Pain    C-SSRS:   Risk of Suicide: No Risk    Sepsis Screening:       [place] Fall Risk:       Swallow Screening        Preferred Language:   English      ALLERGIES     Patient has no known

## 2024-06-20 NOTE — ED PROVIDER NOTES
PGY-3 Resident Attestation    Case and care plan discussed with resident Dr. Cristobal Mcleod MD. I agree with the history, physical, and plan of care as documented above. I have independently seen and evaluated this patient.     Brief History: 63-year-old male past medical history significant for morbid obesity, NSTEMI, congestive heart failure, COPD arrives to the ED due to chief complaint of shortness of breath and weight gain.  Symptoms have been ongoing and worsening since Monday, approximately 4 days ago.  Patient states that he has gained 16 pounds in the last week from his last PCP checkup.  Patient also stating that he has been feeling unwell and coughing and feeling very weak for the past few weeks.  Patient tried to use his albuterol with no symptomatic improvement of the breathing.    Physical Exam: notable for decreased breath sounds bilaterally, crackles heard in the left lung base.  Pursed lip breathing.  2+ pitting edema to the right lower extremity.  Left lower extremity amputation.    Summary and Plan: Have ordered 125 Solu-Medrol as well as DuoNeb x 3 for patient due to pursed lip breathing.  Patient proBNP also increased from previous at 5684.  Due to the patient's weight gain and symptoms.  Believe that he is having CHF exacerbation.  Chest x-ray with bilateral lower lung infiltrates/atelectasis and small left-sided pleural effusion but no overt signs of infection.  WBC 8.8.  Will not treat for pneumonia at this time.  Would like to admit this patient for CHF exacerbation      This patient was staffed with Clinton Lorenzana MD.     Electronically signed by Naomie Nath MD on 6/20/24 at 2:44 PM EDT

## 2024-06-20 NOTE — ED TRIAGE NOTES
Pt presents to ED due to shortness of breath that began Monday that has progressively worsened. Pt notes that he has gained 16 pounds in the last week. Pt notes that he feels very weak. Pt notes he has a hx of CHF. Pt states, \"I just don't feel all there right now.\" Pt also notes, \"in the last few weeks I have been hacking up a bunch of phlegm that is greenish yellow.\" Pt also notes that he is a diabetic and this morning his BG was 58. 246 on arrival. Pt notes a month ago he had to have fluid taken off. Pt alert and oriented x4, EKG complete.

## 2024-06-20 NOTE — ED PROVIDER NOTES
[facility] EMERGENCY DEPT  EMERGENCY DEPARTMENT ENCOUNTER          Pt Name: Ashwin Liriano  MRN: [id number]  Birthdate 1960  Date of evaluation: 6/20/2024  Physician: Cristobal Mcleod MD  Supervising Attending Physician: Clinton Lorenzana MD       CHIEF COMPLAINT       Chief Complaint   Patient presents with    Shortness of Breath    Weight Gain         HISTORY OF PRESENT ILLNESS    HPI  Ashwin Liriano is a 63 y.o. male with PMH significant for CHF, CAD s/p triple bypass, CKD, DMT2, HTN, HLD, COPD who presents to the emergency department from home for evaluation of shortness of breath and increased work of breathing.  States that he has gained 16 pounds over the past 7 days.  States also that he has had worsening shortness of breath with productive yellow/green/gray cough and generalized fatigue over the past 4 days.  Patient attempted to use albuterol inhaler this morning without any relief.  No recent travel.  No known recent sick contacts.  States that he follows with nephrologist (Dr. Parker) and cardiologist (Dr. Swift).  States that recently both physicians have been adjusting his Bumex concordantly.  Has been taking all of his medications as prescribed.    Denies fever, chills, headache, lightheadedness, dizziness, vision changes, tinnitus, congestion, sore throat, neck pain/stiffness, chest pain, abdominal pain, nausea, vomiting, constipation, diarrhea, dysuria, blood in the urine or stool, numbness/tingling/weakness in extremities.    The patient has no other acute complaints at this time.      PAST MEDICAL AND SURGICAL HISTORY     Past Medical History:   Diagnosis Date    Acute left-sided low back pain with bilateral sciatica     Arthritis     CAD (coronary artery disease)     CKD (chronic kidney disease), stage II     Diabetes mellitus (HCC)     Hyperlipidemia     Hypertension     Liver disease     HEPITITIS AS A CHILD    S/P transmetatarsal amputation of foot, left (HCC)     Wound

## 2024-06-21 LAB
ANION GAP SERPL CALC-SCNC: 12 MEQ/L (ref 8–16)
BUN SERPL-MCNC: 39 MG/DL (ref 7–22)
CALCIUM SERPL-MCNC: 9.2 MG/DL (ref 8.5–10.5)
CHLORIDE SERPL-SCNC: 104 MEQ/L (ref 98–111)
CO2 SERPL-SCNC: 24 MEQ/L (ref 23–33)
CREAT SERPL-MCNC: 1.9 MG/DL (ref 0.4–1.2)
DEPRECATED RDW RBC AUTO: 58.4 FL (ref 35–45)
ERYTHROCYTE [DISTWIDTH] IN BLOOD BY AUTOMATED COUNT: 17.8 % (ref 11.5–14.5)
GFR SERPL CREATININE-BSD FRML MDRD: 39 ML/MIN/1.73M2
GLUCOSE BLD STRIP.AUTO-MCNC: 202 MG/DL (ref 70–108)
GLUCOSE BLD STRIP.AUTO-MCNC: 225 MG/DL (ref 70–108)
GLUCOSE BLD STRIP.AUTO-MCNC: 269 MG/DL (ref 70–108)
GLUCOSE BLD STRIP.AUTO-MCNC: 314 MG/DL (ref 70–108)
GLUCOSE SERPL-MCNC: 262 MG/DL (ref 70–108)
HCT VFR BLD AUTO: 36.8 % (ref 42–52)
HGB BLD-MCNC: 11.1 GM/DL (ref 14–18)
MAGNESIUM SERPL-MCNC: 2.4 MG/DL (ref 1.6–2.4)
MCH RBC QN AUTO: 27.8 PG (ref 26–33)
MCHC RBC AUTO-ENTMCNC: 30.2 GM/DL (ref 32.2–35.5)
MCV RBC AUTO: 92 FL (ref 80–94)
OSMOLALITY SERPL CALC.SUM OF ELEC: 297.9 MOSMOL/KG (ref 275–300)
PLATELET # BLD AUTO: 223 THOU/MM3 (ref 130–400)
PMV BLD AUTO: 11.3 FL (ref 9.4–12.4)
POTASSIUM SERPL-SCNC: 5 MEQ/L (ref 3.5–5.2)
RBC # BLD AUTO: 4 MILL/MM3 (ref 4.7–6.1)
SODIUM SERPL-SCNC: 140 MEQ/L (ref 135–145)
WBC # BLD AUTO: 8 THOU/MM3 (ref 4.8–10.8)

## 2024-06-21 PROCEDURE — 1200000000 HC SEMI PRIVATE

## 2024-06-21 PROCEDURE — 6370000000 HC RX 637 (ALT 250 FOR IP)

## 2024-06-21 PROCEDURE — 85027 COMPLETE CBC AUTOMATED: CPT

## 2024-06-21 PROCEDURE — 94669 MECHANICAL CHEST WALL OSCILL: CPT

## 2024-06-21 PROCEDURE — 82948 REAGENT STRIP/BLOOD GLUCOSE: CPT

## 2024-06-21 PROCEDURE — 1200000003 HC TELEMETRY R&B

## 2024-06-21 PROCEDURE — 83735 ASSAY OF MAGNESIUM: CPT

## 2024-06-21 PROCEDURE — 6360000002 HC RX W HCPCS: Performed by: STUDENT IN AN ORGANIZED HEALTH CARE EDUCATION/TRAINING PROGRAM

## 2024-06-21 PROCEDURE — 6370000000 HC RX 637 (ALT 250 FOR IP): Performed by: STUDENT IN AN ORGANIZED HEALTH CARE EDUCATION/TRAINING PROGRAM

## 2024-06-21 PROCEDURE — 80048 BASIC METABOLIC PNL TOTAL CA: CPT

## 2024-06-21 PROCEDURE — 6360000002 HC RX W HCPCS

## 2024-06-21 PROCEDURE — 94640 AIRWAY INHALATION TREATMENT: CPT

## 2024-06-21 PROCEDURE — 36415 COLL VENOUS BLD VENIPUNCTURE: CPT

## 2024-06-21 PROCEDURE — 2580000003 HC RX 258: Performed by: STUDENT IN AN ORGANIZED HEALTH CARE EDUCATION/TRAINING PROGRAM

## 2024-06-21 PROCEDURE — 99233 SBSQ HOSP IP/OBS HIGH 50: CPT | Performed by: INTERNAL MEDICINE

## 2024-06-21 RX ORDER — GLUCAGON 1 MG/ML
1 KIT INJECTION PRN
Status: DISCONTINUED | OUTPATIENT
Start: 2024-06-21 | End: 2024-06-24 | Stop reason: HOSPADM

## 2024-06-21 RX ORDER — DIPHENHYDRAMINE HYDROCHLORIDE 50 MG/ML
25 INJECTION INTRAMUSCULAR; INTRAVENOUS ONCE
Status: COMPLETED | OUTPATIENT
Start: 2024-06-21 | End: 2024-06-21

## 2024-06-21 RX ORDER — LANOLIN ALCOHOL/MO/W.PET/CERES
3 CREAM (GRAM) TOPICAL NIGHTLY PRN
Status: DISCONTINUED | OUTPATIENT
Start: 2024-06-21 | End: 2024-06-24 | Stop reason: HOSPADM

## 2024-06-21 RX ORDER — HYDROXYZINE HYDROCHLORIDE 10 MG/1
10 TABLET, FILM COATED ORAL 3 TIMES DAILY PRN
Status: DISCONTINUED | OUTPATIENT
Start: 2024-06-21 | End: 2024-06-24 | Stop reason: HOSPADM

## 2024-06-21 RX ORDER — INSULIN GLARGINE 100 [IU]/ML
42 INJECTION, SOLUTION SUBCUTANEOUS 2 TIMES DAILY
Status: DISCONTINUED | OUTPATIENT
Start: 2024-06-21 | End: 2024-06-24 | Stop reason: HOSPADM

## 2024-06-21 RX ORDER — DEXTROSE MONOHYDRATE 100 MG/ML
INJECTION, SOLUTION INTRAVENOUS CONTINUOUS PRN
Status: DISCONTINUED | OUTPATIENT
Start: 2024-06-21 | End: 2024-06-24 | Stop reason: HOSPADM

## 2024-06-21 RX ORDER — IPRATROPIUM BROMIDE AND ALBUTEROL SULFATE 2.5; .5 MG/3ML; MG/3ML
1 SOLUTION RESPIRATORY (INHALATION) 2 TIMES DAILY
Status: DISCONTINUED | OUTPATIENT
Start: 2024-06-21 | End: 2024-06-21

## 2024-06-21 RX ORDER — IPRATROPIUM BROMIDE AND ALBUTEROL SULFATE 2.5; .5 MG/3ML; MG/3ML
1 SOLUTION RESPIRATORY (INHALATION) 3 TIMES DAILY
Status: DISCONTINUED | OUTPATIENT
Start: 2024-06-22 | End: 2024-06-23

## 2024-06-21 RX ADMIN — IPRATROPIUM BROMIDE AND ALBUTEROL SULFATE 1 DOSE: .5; 3 SOLUTION RESPIRATORY (INHALATION) at 08:39

## 2024-06-21 RX ADMIN — Medication 3 MG: at 03:23

## 2024-06-21 RX ADMIN — BUMETANIDE 2 MG: 0.25 INJECTION INTRAMUSCULAR; INTRAVENOUS at 08:31

## 2024-06-21 RX ADMIN — GABAPENTIN 600 MG: 600 TABLET, FILM COATED ORAL at 21:58

## 2024-06-21 RX ADMIN — HYDRALAZINE HYDROCHLORIDE 25 MG: 25 TABLET ORAL at 12:00

## 2024-06-21 RX ADMIN — ENOXAPARIN SODIUM 40 MG: 100 INJECTION SUBCUTANEOUS at 21:57

## 2024-06-21 RX ADMIN — ENOXAPARIN SODIUM 40 MG: 100 INJECTION SUBCUTANEOUS at 08:30

## 2024-06-21 RX ADMIN — ATORVASTATIN CALCIUM 80 MG: 80 TABLET, FILM COATED ORAL at 21:58

## 2024-06-21 RX ADMIN — HYDRALAZINE HYDROCHLORIDE 25 MG: 25 TABLET ORAL at 08:31

## 2024-06-21 RX ADMIN — INSULIN LISPRO 4 UNITS: 100 INJECTION, SOLUTION INTRAVENOUS; SUBCUTANEOUS at 11:09

## 2024-06-21 RX ADMIN — INSULIN GLARGINE 35 UNITS: 100 INJECTION, SOLUTION SUBCUTANEOUS at 08:31

## 2024-06-21 RX ADMIN — POTASSIUM CHLORIDE 20 MEQ: 1500 TABLET, EXTENDED RELEASE ORAL at 08:31

## 2024-06-21 RX ADMIN — ASPIRIN 81 MG 81 MG: 81 TABLET ORAL at 08:31

## 2024-06-21 RX ADMIN — WATER 40 MG: 1 INJECTION INTRAMUSCULAR; INTRAVENOUS; SUBCUTANEOUS at 08:30

## 2024-06-21 RX ADMIN — CLOPIDOGREL BISULFATE 75 MG: 75 TABLET ORAL at 08:31

## 2024-06-21 RX ADMIN — HYDRALAZINE HYDROCHLORIDE 25 MG: 25 TABLET ORAL at 21:58

## 2024-06-21 RX ADMIN — SODIUM CHLORIDE, PRESERVATIVE FREE 10 ML: 5 INJECTION INTRAVENOUS at 22:00

## 2024-06-21 RX ADMIN — AZITHROMYCIN MONOHYDRATE 500 MG: 500 INJECTION, POWDER, LYOPHILIZED, FOR SOLUTION INTRAVENOUS at 17:04

## 2024-06-21 RX ADMIN — GABAPENTIN 600 MG: 600 TABLET, FILM COATED ORAL at 08:31

## 2024-06-21 RX ADMIN — INSULIN LISPRO 6 UNITS: 100 INJECTION, SOLUTION INTRAVENOUS; SUBCUTANEOUS at 15:52

## 2024-06-21 RX ADMIN — DIPHENHYDRAMINE HYDROCHLORIDE 25 MG: 50 INJECTION INTRAMUSCULAR; INTRAVENOUS at 03:23

## 2024-06-21 RX ADMIN — CEFTRIAXONE SODIUM 1000 MG: 1 INJECTION, POWDER, FOR SOLUTION INTRAMUSCULAR; INTRAVENOUS at 16:23

## 2024-06-21 RX ADMIN — IPRATROPIUM BROMIDE AND ALBUTEROL SULFATE 1 DOSE: .5; 3 SOLUTION RESPIRATORY (INHALATION) at 18:03

## 2024-06-21 RX ADMIN — BUMETANIDE 2 MG: 0.25 INJECTION INTRAMUSCULAR; INTRAVENOUS at 17:10

## 2024-06-21 RX ADMIN — EZETIMIBE 10 MG: 10 TABLET ORAL at 08:31

## 2024-06-21 RX ADMIN — INSULIN LISPRO 2 UNITS: 100 INJECTION, SOLUTION INTRAVENOUS; SUBCUTANEOUS at 08:30

## 2024-06-21 RX ADMIN — SODIUM CHLORIDE, PRESERVATIVE FREE 10 ML: 5 INJECTION INTRAVENOUS at 08:32

## 2024-06-21 RX ADMIN — METOPROLOL SUCCINATE 50 MG: 50 TABLET, EXTENDED RELEASE ORAL at 08:31

## 2024-06-21 RX ADMIN — INSULIN GLARGINE 42 UNITS: 100 INJECTION, SOLUTION SUBCUTANEOUS at 21:58

## 2024-06-21 NOTE — CARE COORDINATION
Case Management Assessment Initial Evaluation    Date/Time of Evaluation: 2024 7:27 AM  Assessment Completed by: Gaurang Cortes RN    If patient is discharged prior to next notation, then this note serves as note for discharge by case management.    Patient Name: Ashwin Liriano                   YOB: 1960  Diagnosis: COPD exacerbation (HCC) [J44.1]  Acute on chronic combined systolic (congestive) and diastolic (congestive) heart failure (HCC) [I50.43]  Acute on chronic congestive heart failure, unspecified heart failure type (HCC) [I50.9]                   Date / Time: 2024 12:58 PM  Location: [address] [place] [address]     Patient Admission Status: Inpatient   Readmission Risk Low 0-14, Mod 15-19), High > 20: Readmission Risk Score: 20.5    Current PCP: Elsa Pierce APRN - CNP    Additional Case Management Notes: creat 1.9, BNP 5684, trops elev. 89-94% RA. IV rocephin, IV zithromax, IV bumex, IV solumedrol, duonebs. PT/OT.     Procedures: none    Imagin/20 cxray: small left pleural effusion, BLL atelectasis/infiltrate    Patient Goals/Plan/Treatment Preferences: Met with Ashwin, he plans to return home independently at discharge. DME as below. He denies all needs at this time.        24 1200   Service Assessment   Patient Orientation Alert and Oriented   Cognition Alert   History Provided By Patient   Primary Caregiver Self   Support Systems Family Members;Friends/Neighbors   Patient's Healthcare Decision Maker is: Named in Scanned ACP Document   PCP Verified by CM Yes   Prior Functional Level Assistance with the following:;Housework   Current Functional Level Assistance with the following:;Housework   Can patient return to prior living arrangement Yes   Ability to make needs known: Good   Family able to assist with home care needs: Yes   Would you like for me to discuss the discharge plan with any other family members/significant others, and if so, who? No   Financial Resources Other

## 2024-06-21 NOTE — PROGRESS NOTES
Hospitalist Progress Note  Internal Medicine Resident      Patient: Ashwin Liriano 63 y.o. male      Unit/Bed: -13/013-A    Admit Date: 2024      ASSESSMENT AND PLAN  Active Problems    Acute decompensated heart failure-unclear etiology: Patient says he has gained 16 pounds over the past week.  proBNP is elevated of around 5000 significantly from 2000 previously.  Has also had increased wheezing which may be related to cardiac wheeze.  Has had orthopnea.  Chest x-ray shows cephalization of vessels, hazy opacities, and loss of diaphragmatic angles consistent pleural effusion.  Patient also had recent negative ischemic workup.  He denies chest pain.  He does report compliance with dietary and sodium restrictions.   -Strict I's and O's every shift   -Standing weights   -2 g sodium restriction and 2 L fluid restriction   -2 mg twice daily IV Bumex daily   -Acapella and incentive spirometry   -Will schedule follow-up with CHF clinic on discharge    COPD exacerbation: Patient presented with wheezes, increased cough with yellow-green thick gray sputum.  Does have history of COPD and has not been using on his prescribed home inhalers because he was unable to get his prescriptions refilled and they ran out.   -Continue DuoNebs every 4 hours while awake   -40 mg p.o. methylprednisone 5-day total   -Started on Stiolto    Possible pneumonia: Along with cough described above patient has been feeling fatigued and lethargic for the past several days.  He denies fever night sweats.  Does have a mildly elevated procalcitonin of 0.13.  Will treat empirically at this time   -Ceftriaxone 1 g for 5 days, azithromycin 500 mg for 3 days      Resolved Problems    Chronic Conditions (reviewed and stable unless otherwise stated)  Systolic and diastolic heart failure with reduced ejection fraction:  COPD: Ran out of Dulera after prescription , will need pulmonology follow-up discharge  CAD status post CABG: Continue

## 2024-06-22 PROBLEM — I35.0 MODERATE AORTIC STENOSIS: Status: ACTIVE | Noted: 2024-06-22

## 2024-06-22 PROBLEM — J44.1 COPD WITH EXACERBATION (HCC): Status: ACTIVE | Noted: 2024-06-22

## 2024-06-22 LAB
ANION GAP SERPL CALC-SCNC: 13 MEQ/L (ref 8–16)
BUN SERPL-MCNC: 49 MG/DL (ref 7–22)
CALCIUM SERPL-MCNC: 9.2 MG/DL (ref 8.5–10.5)
CHLORIDE SERPL-SCNC: 98 MEQ/L (ref 98–111)
CO2 SERPL-SCNC: 25 MEQ/L (ref 23–33)
CREAT SERPL-MCNC: 1.9 MG/DL (ref 0.4–1.2)
DEPRECATED RDW RBC AUTO: 59.3 FL (ref 35–45)
ERYTHROCYTE [DISTWIDTH] IN BLOOD BY AUTOMATED COUNT: 18.4 % (ref 11.5–14.5)
GFR SERPL CREATININE-BSD FRML MDRD: 39 ML/MIN/1.73M2
GLUCOSE BLD STRIP.AUTO-MCNC: 177 MG/DL (ref 70–108)
GLUCOSE BLD STRIP.AUTO-MCNC: 208 MG/DL (ref 70–108)
GLUCOSE BLD STRIP.AUTO-MCNC: 349 MG/DL (ref 70–108)
GLUCOSE BLD STRIP.AUTO-MCNC: 395 MG/DL (ref 70–108)
GLUCOSE SERPL-MCNC: 175 MG/DL (ref 70–108)
HCT VFR BLD AUTO: 39.9 % (ref 42–52)
HGB BLD-MCNC: 11.7 GM/DL (ref 14–18)
MCH RBC QN AUTO: 27.1 PG (ref 26–33)
MCHC RBC AUTO-ENTMCNC: 29.3 GM/DL (ref 32.2–35.5)
MCV RBC AUTO: 92.6 FL (ref 80–94)
PLATELET # BLD AUTO: 237 THOU/MM3 (ref 130–400)
PMV BLD AUTO: 10.9 FL (ref 9.4–12.4)
POTASSIUM SERPL-SCNC: 4.7 MEQ/L (ref 3.5–5.2)
RBC # BLD AUTO: 4.31 MILL/MM3 (ref 4.7–6.1)
SODIUM SERPL-SCNC: 136 MEQ/L (ref 135–145)
WBC # BLD AUTO: 12.3 THOU/MM3 (ref 4.8–10.8)

## 2024-06-22 PROCEDURE — 85027 COMPLETE CBC AUTOMATED: CPT

## 2024-06-22 PROCEDURE — 2580000003 HC RX 258: Performed by: STUDENT IN AN ORGANIZED HEALTH CARE EDUCATION/TRAINING PROGRAM

## 2024-06-22 PROCEDURE — 94640 AIRWAY INHALATION TREATMENT: CPT

## 2024-06-22 PROCEDURE — 6370000000 HC RX 637 (ALT 250 FOR IP)

## 2024-06-22 PROCEDURE — 99232 SBSQ HOSP IP/OBS MODERATE 35: CPT | Performed by: INTERNAL MEDICINE

## 2024-06-22 PROCEDURE — 1200000003 HC TELEMETRY R&B

## 2024-06-22 PROCEDURE — 82948 REAGENT STRIP/BLOOD GLUCOSE: CPT

## 2024-06-22 PROCEDURE — 36415 COLL VENOUS BLD VENIPUNCTURE: CPT

## 2024-06-22 PROCEDURE — 80048 BASIC METABOLIC PNL TOTAL CA: CPT

## 2024-06-22 PROCEDURE — 6360000002 HC RX W HCPCS: Performed by: STUDENT IN AN ORGANIZED HEALTH CARE EDUCATION/TRAINING PROGRAM

## 2024-06-22 PROCEDURE — 1200000000 HC SEMI PRIVATE

## 2024-06-22 PROCEDURE — 94669 MECHANICAL CHEST WALL OSCILL: CPT

## 2024-06-22 PROCEDURE — 6370000000 HC RX 637 (ALT 250 FOR IP): Performed by: STUDENT IN AN ORGANIZED HEALTH CARE EDUCATION/TRAINING PROGRAM

## 2024-06-22 PROCEDURE — 94761 N-INVAS EAR/PLS OXIMETRY MLT: CPT

## 2024-06-22 RX ADMIN — INSULIN GLARGINE 42 UNITS: 100 INJECTION, SOLUTION SUBCUTANEOUS at 21:32

## 2024-06-22 RX ADMIN — ENOXAPARIN SODIUM 40 MG: 100 INJECTION SUBCUTANEOUS at 07:33

## 2024-06-22 RX ADMIN — ASPIRIN 81 MG 81 MG: 81 TABLET ORAL at 07:34

## 2024-06-22 RX ADMIN — WATER 40 MG: 1 INJECTION INTRAMUSCULAR; INTRAVENOUS; SUBCUTANEOUS at 07:33

## 2024-06-22 RX ADMIN — GABAPENTIN 600 MG: 600 TABLET, FILM COATED ORAL at 07:34

## 2024-06-22 RX ADMIN — ENOXAPARIN SODIUM 40 MG: 100 INJECTION SUBCUTANEOUS at 21:32

## 2024-06-22 RX ADMIN — INSULIN GLARGINE 42 UNITS: 100 INJECTION, SOLUTION SUBCUTANEOUS at 07:33

## 2024-06-22 RX ADMIN — BUMETANIDE 2 MG: 0.25 INJECTION INTRAMUSCULAR; INTRAVENOUS at 07:33

## 2024-06-22 RX ADMIN — CLOPIDOGREL BISULFATE 75 MG: 75 TABLET ORAL at 07:34

## 2024-06-22 RX ADMIN — INSULIN LISPRO 4 UNITS: 100 INJECTION, SOLUTION INTRAVENOUS; SUBCUTANEOUS at 21:32

## 2024-06-22 RX ADMIN — INSULIN LISPRO 2 UNITS: 100 INJECTION, SOLUTION INTRAVENOUS; SUBCUTANEOUS at 11:03

## 2024-06-22 RX ADMIN — HYDRALAZINE HYDROCHLORIDE 25 MG: 25 TABLET ORAL at 12:35

## 2024-06-22 RX ADMIN — IPRATROPIUM BROMIDE AND ALBUTEROL SULFATE 1 DOSE: .5; 3 SOLUTION RESPIRATORY (INHALATION) at 22:08

## 2024-06-22 RX ADMIN — HYDRALAZINE HYDROCHLORIDE 25 MG: 25 TABLET ORAL at 21:35

## 2024-06-22 RX ADMIN — SODIUM CHLORIDE, PRESERVATIVE FREE 10 ML: 5 INJECTION INTRAVENOUS at 07:36

## 2024-06-22 RX ADMIN — TIOTROPIUM BROMIDE AND OLODATEROL 2 PUFF: 3.124; 2.736 SPRAY, METERED RESPIRATORY (INHALATION) at 07:46

## 2024-06-22 RX ADMIN — ATORVASTATIN CALCIUM 80 MG: 80 TABLET, FILM COATED ORAL at 21:33

## 2024-06-22 RX ADMIN — AZITHROMYCIN MONOHYDRATE 500 MG: 500 INJECTION, POWDER, LYOPHILIZED, FOR SOLUTION INTRAVENOUS at 17:24

## 2024-06-22 RX ADMIN — INSULIN LISPRO 6 UNITS: 100 INJECTION, SOLUTION INTRAVENOUS; SUBCUTANEOUS at 16:10

## 2024-06-22 RX ADMIN — GABAPENTIN 600 MG: 600 TABLET, FILM COATED ORAL at 21:33

## 2024-06-22 RX ADMIN — POTASSIUM CHLORIDE 20 MEQ: 1500 TABLET, EXTENDED RELEASE ORAL at 07:33

## 2024-06-22 RX ADMIN — IPRATROPIUM BROMIDE AND ALBUTEROL SULFATE 1 DOSE: .5; 3 SOLUTION RESPIRATORY (INHALATION) at 12:13

## 2024-06-22 RX ADMIN — CEFTRIAXONE SODIUM 1000 MG: 1 INJECTION, POWDER, FOR SOLUTION INTRAMUSCULAR; INTRAVENOUS at 15:37

## 2024-06-22 RX ADMIN — EZETIMIBE 10 MG: 10 TABLET ORAL at 07:34

## 2024-06-22 RX ADMIN — IPRATROPIUM BROMIDE AND ALBUTEROL SULFATE 1 DOSE: .5; 3 SOLUTION RESPIRATORY (INHALATION) at 07:46

## 2024-06-22 RX ADMIN — HYDRALAZINE HYDROCHLORIDE 25 MG: 25 TABLET ORAL at 07:34

## 2024-06-22 RX ADMIN — SODIUM CHLORIDE, PRESERVATIVE FREE 10 ML: 5 INJECTION INTRAVENOUS at 21:33

## 2024-06-22 RX ADMIN — METOPROLOL SUCCINATE 50 MG: 50 TABLET, EXTENDED RELEASE ORAL at 07:34

## 2024-06-23 LAB
GLUCOSE BLD STRIP.AUTO-MCNC: 218 MG/DL (ref 70–108)
GLUCOSE BLD STRIP.AUTO-MCNC: 260 MG/DL (ref 70–108)
GLUCOSE BLD STRIP.AUTO-MCNC: 274 MG/DL (ref 70–108)
GLUCOSE BLD STRIP.AUTO-MCNC: 325 MG/DL (ref 70–108)
GLUCOSE BLD STRIP.AUTO-MCNC: 338 MG/DL (ref 70–108)

## 2024-06-23 PROCEDURE — 82948 REAGENT STRIP/BLOOD GLUCOSE: CPT

## 2024-06-23 PROCEDURE — 6370000000 HC RX 637 (ALT 250 FOR IP)

## 2024-06-23 PROCEDURE — 1200000000 HC SEMI PRIVATE

## 2024-06-23 PROCEDURE — 94669 MECHANICAL CHEST WALL OSCILL: CPT

## 2024-06-23 PROCEDURE — 99232 SBSQ HOSP IP/OBS MODERATE 35: CPT | Performed by: INTERNAL MEDICINE

## 2024-06-23 PROCEDURE — 6370000000 HC RX 637 (ALT 250 FOR IP): Performed by: STUDENT IN AN ORGANIZED HEALTH CARE EDUCATION/TRAINING PROGRAM

## 2024-06-23 PROCEDURE — 94640 AIRWAY INHALATION TREATMENT: CPT

## 2024-06-23 PROCEDURE — 94761 N-INVAS EAR/PLS OXIMETRY MLT: CPT

## 2024-06-23 PROCEDURE — 2580000003 HC RX 258: Performed by: STUDENT IN AN ORGANIZED HEALTH CARE EDUCATION/TRAINING PROGRAM

## 2024-06-23 PROCEDURE — 6360000002 HC RX W HCPCS: Performed by: STUDENT IN AN ORGANIZED HEALTH CARE EDUCATION/TRAINING PROGRAM

## 2024-06-23 RX ORDER — PREDNISONE 20 MG/1
40 TABLET ORAL DAILY
Status: COMPLETED | OUTPATIENT
Start: 2024-06-24 | End: 2024-06-24

## 2024-06-23 RX ORDER — IPRATROPIUM BROMIDE AND ALBUTEROL SULFATE 2.5; .5 MG/3ML; MG/3ML
1 SOLUTION RESPIRATORY (INHALATION) EVERY 4 HOURS PRN
Status: DISCONTINUED | OUTPATIENT
Start: 2024-06-23 | End: 2024-06-24 | Stop reason: HOSPADM

## 2024-06-23 RX ORDER — IPRATROPIUM BROMIDE AND ALBUTEROL SULFATE 2.5; .5 MG/3ML; MG/3ML
1 SOLUTION RESPIRATORY (INHALATION)
Status: DISCONTINUED | OUTPATIENT
Start: 2024-06-23 | End: 2024-06-24 | Stop reason: HOSPADM

## 2024-06-23 RX ORDER — BUMETANIDE 1 MG/1
2 TABLET ORAL 2 TIMES DAILY
Status: DISCONTINUED | OUTPATIENT
Start: 2024-06-23 | End: 2024-06-24

## 2024-06-23 RX ORDER — INSULIN LISPRO 100 [IU]/ML
15 INJECTION, SOLUTION INTRAVENOUS; SUBCUTANEOUS
Status: DISCONTINUED | OUTPATIENT
Start: 2024-06-23 | End: 2024-06-24 | Stop reason: HOSPADM

## 2024-06-23 RX ADMIN — HYDRALAZINE HYDROCHLORIDE 25 MG: 25 TABLET ORAL at 20:11

## 2024-06-23 RX ADMIN — INSULIN LISPRO 15 UNITS: 100 INJECTION, SOLUTION INTRAVENOUS; SUBCUTANEOUS at 16:06

## 2024-06-23 RX ADMIN — IPRATROPIUM BROMIDE AND ALBUTEROL SULFATE 1 DOSE: .5; 3 SOLUTION RESPIRATORY (INHALATION) at 10:00

## 2024-06-23 RX ADMIN — INSULIN GLARGINE 42 UNITS: 100 INJECTION, SOLUTION SUBCUTANEOUS at 07:28

## 2024-06-23 RX ADMIN — HYDRALAZINE HYDROCHLORIDE 25 MG: 25 TABLET ORAL at 07:27

## 2024-06-23 RX ADMIN — BUMETANIDE 2 MG: 1 TABLET ORAL at 09:21

## 2024-06-23 RX ADMIN — INSULIN GLARGINE 42 UNITS: 100 INJECTION, SOLUTION SUBCUTANEOUS at 20:10

## 2024-06-23 RX ADMIN — SODIUM CHLORIDE, PRESERVATIVE FREE 10 ML: 5 INJECTION INTRAVENOUS at 20:10

## 2024-06-23 RX ADMIN — BUMETANIDE 2 MG: 1 TABLET ORAL at 16:15

## 2024-06-23 RX ADMIN — INSULIN LISPRO 2 UNITS: 100 INJECTION, SOLUTION INTRAVENOUS; SUBCUTANEOUS at 07:28

## 2024-06-23 RX ADMIN — ATORVASTATIN CALCIUM 80 MG: 80 TABLET, FILM COATED ORAL at 20:11

## 2024-06-23 RX ADMIN — CEFTRIAXONE SODIUM 1000 MG: 1 INJECTION, POWDER, FOR SOLUTION INTRAMUSCULAR; INTRAVENOUS at 15:37

## 2024-06-23 RX ADMIN — ENOXAPARIN SODIUM 40 MG: 100 INJECTION SUBCUTANEOUS at 20:11

## 2024-06-23 RX ADMIN — ENOXAPARIN SODIUM 40 MG: 100 INJECTION SUBCUTANEOUS at 07:28

## 2024-06-23 RX ADMIN — SODIUM CHLORIDE, PRESERVATIVE FREE 10 ML: 5 INJECTION INTRAVENOUS at 07:33

## 2024-06-23 RX ADMIN — WATER 40 MG: 1 INJECTION INTRAMUSCULAR; INTRAVENOUS; SUBCUTANEOUS at 07:28

## 2024-06-23 RX ADMIN — ASPIRIN 81 MG 81 MG: 81 TABLET ORAL at 07:27

## 2024-06-23 RX ADMIN — METOPROLOL SUCCINATE 50 MG: 50 TABLET, EXTENDED RELEASE ORAL at 07:27

## 2024-06-23 RX ADMIN — INSULIN LISPRO 6 UNITS: 100 INJECTION, SOLUTION INTRAVENOUS; SUBCUTANEOUS at 16:06

## 2024-06-23 RX ADMIN — INSULIN LISPRO 15 UNITS: 100 INJECTION, SOLUTION INTRAVENOUS; SUBCUTANEOUS at 11:29

## 2024-06-23 RX ADMIN — GABAPENTIN 600 MG: 600 TABLET, FILM COATED ORAL at 07:27

## 2024-06-23 RX ADMIN — CLOPIDOGREL BISULFATE 75 MG: 75 TABLET ORAL at 07:27

## 2024-06-23 RX ADMIN — INSULIN LISPRO 4 UNITS: 100 INJECTION, SOLUTION INTRAVENOUS; SUBCUTANEOUS at 11:29

## 2024-06-23 RX ADMIN — GABAPENTIN 600 MG: 600 TABLET, FILM COATED ORAL at 20:11

## 2024-06-23 RX ADMIN — HYDRALAZINE HYDROCHLORIDE 25 MG: 25 TABLET ORAL at 13:00

## 2024-06-23 RX ADMIN — POTASSIUM CHLORIDE 20 MEQ: 1500 TABLET, EXTENDED RELEASE ORAL at 07:27

## 2024-06-23 NOTE — PROGRESS NOTES
Hospitalist Progress Note  Internal Medicine Resident      Patient: Ashwin Liriano 63 y.o. male      Unit/Bed: -13/013-A    Admit Date: 6/20/2024      ASSESSMENT AND PLAN  Active Problems    Acute decompensated heart failure-unclear etiology: Patient says he has gained 16 pounds over the past week.  proBNP is elevated of around 5000 significantly from 2000 previously.  Has also had increased wheezing which may be related to cardiac wheeze.  Has had orthopnea.  Chest x-ray shows cephalization of vessels, hazy opacities, and loss of diaphragmatic angles consistent pleural effusion.  Patient also had recent negative ischemic workup.  He denies chest pain.  He does report compliance with dietary and sodium restrictions.   -Strict I's and O's every shift   -Standing weights   -2 g sodium restriction and 2 L fluid restriction   -2 mg oral Bumex twice daily   -Acapella and incentive spirometry   -Will schedule follow-up with CHF clinic on discharge    COPD exacerbation: Patient presented with wheezes, increased cough with yellow-green thick gray sputum.  Does have history of COPD and has not been using on his prescribed home inhalers because he was unable to get his prescriptions refilled and they ran out.   -Continue DuoNebs every 4 hours while awake   -40 mg p.o. methylprednisone 5-day total   -Started on Stiolto, will continue prescription for 30 days at discharge    Possible pneumonia: Along with cough described above patient has been feeling fatigued and lethargic for the past several days.  He denies fever night sweats.  Does have a mildly elevated procalcitonin of 0.13.  Will treat empirically at this time   -Ceftriaxone 1 g for 5 days(may switch to Augmentin at discharge) , azithromycin 500 mg for 3 days      Resolved Problems    Chronic Conditions (reviewed and stable unless otherwise stated)  Systolic and diastolic heart failure with reduced ejection fraction:  COPD: Ran out of Dulera after prescription

## 2024-06-24 VITALS
SYSTOLIC BLOOD PRESSURE: 163 MMHG | WEIGHT: 315 LBS | DIASTOLIC BLOOD PRESSURE: 92 MMHG | OXYGEN SATURATION: 96 % | HEIGHT: 74 IN | RESPIRATION RATE: 18 BRPM | TEMPERATURE: 98 F | BODY MASS INDEX: 40.43 KG/M2 | HEART RATE: 85 BPM

## 2024-06-24 LAB
ANION GAP SERPL CALC-SCNC: 11 MEQ/L (ref 8–16)
BUN SERPL-MCNC: 55 MG/DL (ref 7–22)
CALCIUM SERPL-MCNC: 9.2 MG/DL (ref 8.5–10.5)
CHLORIDE SERPL-SCNC: 99 MEQ/L (ref 98–111)
CO2 SERPL-SCNC: 27 MEQ/L (ref 23–33)
CREAT SERPL-MCNC: 1.9 MG/DL (ref 0.4–1.2)
GFR SERPL CREATININE-BSD FRML MDRD: 39 ML/MIN/1.73M2
GLUCOSE BLD STRIP.AUTO-MCNC: 184 MG/DL (ref 70–108)
GLUCOSE BLD STRIP.AUTO-MCNC: 255 MG/DL (ref 70–108)
GLUCOSE SERPL-MCNC: 193 MG/DL (ref 70–108)
POTASSIUM SERPL-SCNC: 4.5 MEQ/L (ref 3.5–5.2)
SODIUM SERPL-SCNC: 137 MEQ/L (ref 135–145)

## 2024-06-24 PROCEDURE — 6370000000 HC RX 637 (ALT 250 FOR IP): Performed by: STUDENT IN AN ORGANIZED HEALTH CARE EDUCATION/TRAINING PROGRAM

## 2024-06-24 PROCEDURE — 94640 AIRWAY INHALATION TREATMENT: CPT

## 2024-06-24 PROCEDURE — 6370000000 HC RX 637 (ALT 250 FOR IP)

## 2024-06-24 PROCEDURE — 97535 SELF CARE MNGMENT TRAINING: CPT

## 2024-06-24 PROCEDURE — 36415 COLL VENOUS BLD VENIPUNCTURE: CPT

## 2024-06-24 PROCEDURE — 2580000003 HC RX 258: Performed by: STUDENT IN AN ORGANIZED HEALTH CARE EDUCATION/TRAINING PROGRAM

## 2024-06-24 PROCEDURE — 6360000002 HC RX W HCPCS: Performed by: STUDENT IN AN ORGANIZED HEALTH CARE EDUCATION/TRAINING PROGRAM

## 2024-06-24 PROCEDURE — 82948 REAGENT STRIP/BLOOD GLUCOSE: CPT

## 2024-06-24 PROCEDURE — 80048 BASIC METABOLIC PNL TOTAL CA: CPT

## 2024-06-24 PROCEDURE — 97166 OT EVAL MOD COMPLEX 45 MIN: CPT

## 2024-06-24 RX ORDER — AMOXICILLIN AND CLAVULANATE POTASSIUM 875; 125 MG/1; MG/1
1 TABLET, FILM COATED ORAL 2 TIMES DAILY
Qty: 4 TABLET | Refills: 0 | Status: SHIPPED | OUTPATIENT
Start: 2024-06-24 | End: 2024-06-26

## 2024-06-24 RX ORDER — BUMETANIDE 1 MG/1
2 TABLET ORAL DAILY
Status: DISCONTINUED | OUTPATIENT
Start: 2024-06-24 | End: 2024-06-24 | Stop reason: HOSPADM

## 2024-06-24 RX ADMIN — IPRATROPIUM BROMIDE AND ALBUTEROL SULFATE 1 DOSE: .5; 3 SOLUTION RESPIRATORY (INHALATION) at 09:33

## 2024-06-24 RX ADMIN — ASPIRIN 81 MG 81 MG: 81 TABLET ORAL at 08:09

## 2024-06-24 RX ADMIN — INSULIN LISPRO 4 UNITS: 100 INJECTION, SOLUTION INTRAVENOUS; SUBCUTANEOUS at 08:10

## 2024-06-24 RX ADMIN — CLOPIDOGREL BISULFATE 75 MG: 75 TABLET ORAL at 08:09

## 2024-06-24 RX ADMIN — PREDNISONE 40 MG: 20 TABLET ORAL at 08:10

## 2024-06-24 RX ADMIN — GABAPENTIN 600 MG: 600 TABLET, FILM COATED ORAL at 08:09

## 2024-06-24 RX ADMIN — SODIUM CHLORIDE, PRESERVATIVE FREE 10 ML: 5 INJECTION INTRAVENOUS at 08:10

## 2024-06-24 RX ADMIN — TIOTROPIUM BROMIDE AND OLODATEROL 2 PUFF: 3.124; 2.736 SPRAY, METERED RESPIRATORY (INHALATION) at 09:33

## 2024-06-24 RX ADMIN — POTASSIUM CHLORIDE 20 MEQ: 1500 TABLET, EXTENDED RELEASE ORAL at 08:10

## 2024-06-24 RX ADMIN — INSULIN LISPRO 15 UNITS: 100 INJECTION, SOLUTION INTRAVENOUS; SUBCUTANEOUS at 12:14

## 2024-06-24 RX ADMIN — INSULIN LISPRO 15 UNITS: 100 INJECTION, SOLUTION INTRAVENOUS; SUBCUTANEOUS at 08:10

## 2024-06-24 RX ADMIN — METOPROLOL SUCCINATE 50 MG: 50 TABLET, EXTENDED RELEASE ORAL at 08:10

## 2024-06-24 RX ADMIN — ENOXAPARIN SODIUM 40 MG: 100 INJECTION SUBCUTANEOUS at 08:09

## 2024-06-24 RX ADMIN — EZETIMIBE 10 MG: 10 TABLET ORAL at 08:09

## 2024-06-24 RX ADMIN — HYDRALAZINE HYDROCHLORIDE 25 MG: 25 TABLET ORAL at 08:09

## 2024-06-24 RX ADMIN — INSULIN GLARGINE 42 UNITS: 100 INJECTION, SOLUTION SUBCUTANEOUS at 08:09

## 2024-06-24 RX ADMIN — BUMETANIDE 2 MG: 1 TABLET ORAL at 08:09

## 2024-06-24 NOTE — PLAN OF CARE
Problem: Discharge Planning  Goal: Discharge to home or other facility with appropriate resources  6/21/2024 0512 by Naomie Medina RN  Outcome: Progressing  6/21/2024 0510 by Naomie Medina RN  Outcome: Progressing  Flowsheets (Taken 6/20/2024 2108)  Discharge to home or other facility with appropriate resources:   Identify barriers to discharge with patient and caregiver   Arrange for needed discharge resources and transportation as appropriate   Identify discharge learning needs (meds, wound care, etc)     Problem: Safety - Adult  Goal: Free from fall injury  6/21/2024 0512 by Naomie Medina RN  Outcome: Progressing  6/21/2024 0510 by Naomie Medina RN  Outcome: Progressing     Problem: Skin/Tissue Integrity  Goal: Absence of new skin breakdown  Description: 1.  Monitor for areas of redness and/or skin breakdown  2.  Assess vascular access sites hourly  3.  Every 4-6 hours minimum:  Change oxygen saturation probe site  4.  Every 4-6 hours:  If on nasal continuous positive airway pressure, respiratory therapy assess nares and determine need for appliance change or resting period.  6/21/2024 0512 by Naomie Medina RN  Outcome: Progressing  6/21/2024 0510 by Naomie Medina RN  Outcome: Progressing     Problem: Respiratory - Adult  Goal: Achieves optimal ventilation and oxygenation  Outcome: Progressing     Problem: Cardiovascular - Adult  Goal: Maintains optimal cardiac output and hemodynamic stability  Outcome: Progressing  Goal: Absence of cardiac dysrhythmias or at baseline  Outcome: Progressing     Problem: Metabolic/Fluid and Electrolytes - Adult  Goal: Electrolytes maintained within normal limits  Outcome: Progressing  Goal: Hemodynamic stability and optimal renal function maintained  Outcome: Progressing  Goal: Glucose maintained within prescribed range  Outcome: Progressing     
  Problem: Discharge Planning  Goal: Discharge to home or other facility with appropriate resources  6/21/2024 1000 by Lydia Montilla RN  Outcome: Progressing  Flowsheets (Taken 6/21/2024 0824)  Discharge to home or other facility with appropriate resources: Identify barriers to discharge with patient and caregiver  6/21/2024 0512 by Naomie Medina RN  Outcome: Progressing  6/21/2024 0510 by Naomie Medina RN  Outcome: Progressing  Flowsheets (Taken 6/20/2024 2108)  Discharge to home or other facility with appropriate resources:   Identify barriers to discharge with patient and caregiver   Arrange for needed discharge resources and transportation as appropriate   Identify discharge learning needs (meds, wound care, etc)     Problem: Safety - Adult  Goal: Free from fall injury  6/21/2024 1000 by Lydia Montilla RN  Outcome: Progressing  Flowsheets (Taken 6/21/2024 1000)  Free From Fall Injury: Instruct family/caregiver on patient safety  6/21/2024 0512 by Naomie Medina RN  Outcome: Progressing  6/21/2024 0510 by Naomie Medina RN  Outcome: Progressing     Problem: Skin/Tissue Integrity  Goal: Absence of new skin breakdown  Description: 1.  Monitor for areas of redness and/or skin breakdown  2.  Assess vascular access sites hourly  3.  Every 4-6 hours minimum:  Change oxygen saturation probe site  4.  Every 4-6 hours:  If on nasal continuous positive airway pressure, respiratory therapy assess nares and determine need for appliance change or resting period.  6/21/2024 1000 by Lydia Montilla RN  Outcome: Progressing  6/21/2024 0512 by Naomie Medina RN  Outcome: Progressing  6/21/2024 0510 by Naomie Medina RN  Outcome: Progressing     Problem: Respiratory - Adult  Goal: Achieves optimal ventilation and oxygenation  6/21/2024 1000 by Lydia Montilla RN  Outcome: Progressing  Flowsheets  Taken 6/21/2024 0838 by Rahel Clemente RCP  Achieves optimal ventilation and oxygenation:   
  Problem: Discharge Planning  Goal: Discharge to home or other facility with appropriate resources  6/23/2024 0941 by Lydia Montilla RN  Outcome: Progressing  Flowsheets (Taken 6/23/2024 0721)  Discharge to home or other facility with appropriate resources: Identify barriers to discharge with patient and caregiver  6/23/2024 0230 by Eddi Dennis RN  Outcome: Progressing     Problem: Safety - Adult  Goal: Free from fall injury  6/23/2024 0941 by Lydia Montilla RN  Outcome: Progressing  Flowsheets (Taken 6/21/2024 1000)  Free From Fall Injury: Instruct family/caregiver on patient safety  6/23/2024 0230 by Eddi Dennis RN  Outcome: Progressing     Problem: Skin/Tissue Integrity  Goal: Absence of new skin breakdown  Description: 1.  Monitor for areas of redness and/or skin breakdown  2.  Assess vascular access sites hourly  3.  Every 4-6 hours minimum:  Change oxygen saturation probe site  4.  Every 4-6 hours:  If on nasal continuous positive airway pressure, respiratory therapy assess nares and determine need for appliance change or resting period.  Outcome: Progressing     Problem: Respiratory - Adult  Goal: Achieves optimal ventilation and oxygenation  Outcome: Progressing  Flowsheets (Taken 6/23/2024 0721)  Achieves optimal ventilation and oxygenation: Assess for changes in respiratory status     Problem: Cardiovascular - Adult  Goal: Maintains optimal cardiac output and hemodynamic stability  Outcome: Progressing  Flowsheets (Taken 6/23/2024 0721)  Maintains optimal cardiac output and hemodynamic stability: Monitor blood pressure and heart rate  Goal: Absence of cardiac dysrhythmias or at baseline  Outcome: Progressing  Flowsheets (Taken 6/23/2024 0721)  Absence of cardiac dysrhythmias or at baseline: Monitor cardiac rate and rhythm     Problem: Metabolic/Fluid and Electrolytes - Adult  Goal: Electrolytes maintained within normal limits  Outcome: Progressing  Flowsheets (Taken 6/23/2024 
  Problem: Respiratory - Adult  Goal: Clear lung sounds  Outcome: Progressing  Note: Patient agrees to goals     
Lydia Montilla RN  Outcome: Progressing  Flowsheets (Taken 6/22/2024 0728)  Maintains optimal cardiac output and hemodynamic stability: Monitor blood pressure and heart rate  6/22/2024 0039 by Eddi Dennis RN  Outcome: Progressing  Flowsheets (Taken 6/21/2024 1940)  Maintains optimal cardiac output and hemodynamic stability: Monitor blood pressure and heart rate  Goal: Absence of cardiac dysrhythmias or at baseline  6/22/2024 1023 by Lydia Montilla RN  Outcome: Progressing  Flowsheets (Taken 6/22/2024 0728)  Absence of cardiac dysrhythmias or at baseline: Monitor cardiac rate and rhythm  6/22/2024 0039 by Eddi Dennis RN  Outcome: Progressing  Flowsheets (Taken 6/21/2024 1940)  Absence of cardiac dysrhythmias or at baseline: Monitor cardiac rate and rhythm     Problem: Metabolic/Fluid and Electrolytes - Adult  Goal: Electrolytes maintained within normal limits  6/22/2024 1023 by Lydia Montilla RN  Outcome: Progressing  Flowsheets (Taken 6/22/2024 0728)  Electrolytes maintained within normal limits: Monitor labs and assess patient for signs and symptoms of electrolyte imbalances  6/22/2024 0039 by Eddi Dennis RN  Outcome: Progressing  Flowsheets (Taken 6/21/2024 1940)  Electrolytes maintained within normal limits: Monitor labs and assess patient for signs and symptoms of electrolyte imbalances  Goal: Hemodynamic stability and optimal renal function maintained  6/22/2024 1023 by Lydia Montilla RN  Outcome: Progressing  Flowsheets (Taken 6/22/2024 0728)  Hemodynamic stability and optimal renal function maintained: Monitor labs and assess for signs and symptoms of volume excess or deficit  6/22/2024 0039 by Eddi Dennis RN  Outcome: Progressing  Flowsheets (Taken 6/21/2024 1940)  Hemodynamic stability and optimal renal function maintained: Monitor labs and assess for signs and symptoms of volume excess or deficit  Goal: Glucose maintained within prescribed range  6/22/2024 1023

## 2024-06-24 NOTE — CARE COORDINATION
6/24/24, 12:01 PM EDT    Discharge to home. Met with Ashwin again, he is independent, has all needed DME, and denies all needs.   Patient goals/plan/ treatment preferences discussed by  and .  Patient goals/plan/ treatment preferences reviewed with patient/ family.  Patient/ family verbalize understanding of discharge plan and are in agreement with goal/plan/treatment preferences.  Understanding was demonstrated using the teach back method.  AVS provided by RN at time of discharge, which includes all necessary medical information pertaining to the patients current course of illness, treatment, post-discharge goals of care, and treatment preferences.     Services At/After Discharge: None

## 2024-06-24 NOTE — RT PROTOCOL NOTE
RT Inhaler-Nebulizer Bronchodilator Protocol Note    There is a bronchodilator order in the chart from a provider indicating to follow the RT Bronchodilator Protocol and there is an “Initiate RT Inhaler-Nebulizer Bronchodilator Protocol” order as well (see protocol at bottom of note).    CXR Findings:  No results found.    The findings from the last RT Protocol Assessment were as follows:   History Pulmonary Disease: Chronic pulmonary disease  Respiratory Pattern: Regular pattern and RR 12-20 bpm  Breath Sounds: Slightly diminished and/or crackles  Cough: Strong, productive  Indication for Bronchodilator Therapy: Decreased or absent breath sounds  Bronchodilator Assessment Score: 5    Aerosolized bronchodilator medication orders have been revised according to the RT Inhaler-Nebulizer Bronchodilator Protocol below.    Respiratory Therapist to perform RT Therapy Protocol Assessment initially then follow the protocol.  Repeat RT Therapy Protocol Assessment PRN for score 0-3 or on second treatment, BID, and PRN for scores above 3.    No Indications - adjust the frequency to every 6 hours PRN wheezing or bronchospasm, if no treatments needed after 48 hours then discontinue using Per Protocol order mode.     If indication present, adjust the RT bronchodilator orders based on the Bronchodilator Assessment Score as indicated below.  Use Inhaler orders unless patient has one or more of the following: on home nebulizer, not able to hold breath for 10 seconds, is not alert and oriented, cannot activate and use MDI correctly, or respiratory rate 25 breaths per minute or more, then use the equivalent nebulizer order(s) with same Frequency and PRN reasons based on the score.  If a patient is on this medication at home then do not decrease Frequency below that used at home.    0-3 - enter or revise RT bronchodilator order(s) to equivalent RT Bronchodilator order with Frequency of every 4 hours PRN for wheezing or increased work of 
RT Inhaler-Nebulizer Bronchodilator Protocol Note    There is a bronchodilator order in the chart from a provider indicating to follow the RT Bronchodilator Protocol and there is an “Initiate RT Inhaler-Nebulizer Bronchodilator Protocol” order as well (see protocol at bottom of note).    CXR Findings:  No results found.    The findings from the last RT Protocol Assessment were as follows:   History Pulmonary Disease: Chronic pulmonary disease  Respiratory Pattern: Regular pattern and RR 12-20 bpm  Breath Sounds: Slightly diminished and/or crackles  Cough: Strong, spontaneous, non-productive  Indication for Bronchodilator Therapy: Decreased or absent breath sounds  Bronchodilator Assessment Score: 4    Aerosolized bronchodilator medication orders have been revised according to the RT Inhaler-Nebulizer Bronchodilator Protocol below.    Respiratory Therapist to perform RT Therapy Protocol Assessment initially then follow the protocol.  Repeat RT Therapy Protocol Assessment PRN for score 0-3 or on second treatment, BID, and PRN for scores above 3.    No Indications - adjust the frequency to every 6 hours PRN wheezing or bronchospasm, if no treatments needed after 48 hours then discontinue using Per Protocol order mode.     If indication present, adjust the RT bronchodilator orders based on the Bronchodilator Assessment Score as indicated below.  Use Inhaler orders unless patient has one or more of the following: on home nebulizer, not able to hold breath for 10 seconds, is not alert and oriented, cannot activate and use MDI correctly, or respiratory rate 25 breaths per minute or more, then use the equivalent nebulizer order(s) with same Frequency and PRN reasons based on the score.  If a patient is on this medication at home then do not decrease Frequency below that used at home.    0-3 - enter or revise RT bronchodilator order(s) to equivalent RT Bronchodilator order with Frequency of every 4 hours PRN for wheezing or 
RT Inhaler-Nebulizer Bronchodilator Protocol Note    There is a bronchodilator order in the chart from a provider indicating to follow the RT Bronchodilator Protocol and there is an “Initiate RT Inhaler-Nebulizer Bronchodilator Protocol” order as well (see protocol at bottom of note).    CXR Findings:  XR CHEST PORTABLE    Result Date: 6/20/2024  1. Small left-sided pleural effusion. 2. Bilateral lower lung atelectasis/infiltrate. 3. Mild stable cardiomegaly. **This report has been created using voice recognition software. It may contain minor errors which are inherent in voice recognition technology.** Electronically signed by Dr. Ronni Diaz      The findings from the last RT Protocol Assessment were as follows:   History Pulmonary Disease: Chronic pulmonary disease  Respiratory Pattern: Regular pattern and RR 12-20 bpm  Breath Sounds: Intermittent or unilateral wheezes  Cough: Strong, productive  Indication for Bronchodilator Therapy: Wheezing associated with pulm disorder  Bronchodilator Assessment Score: 7    Aerosolized bronchodilator medication orders have been revised according to the RT Inhaler-Nebulizer Bronchodilator Protocol below.    Respiratory Therapist to perform RT Therapy Protocol Assessment initially then follow the protocol.  Repeat RT Therapy Protocol Assessment PRN for score 0-3 or on second treatment, BID, and PRN for scores above 3.    No Indications - adjust the frequency to every 6 hours PRN wheezing or bronchospasm, if no treatments needed after 48 hours then discontinue using Per Protocol order mode.     If indication present, adjust the RT bronchodilator orders based on the Bronchodilator Assessment Score as indicated below.  Use Inhaler orders unless patient has one or more of the following: on home nebulizer, not able to hold breath for 10 seconds, is not alert and oriented, cannot activate and use MDI correctly, or respiratory rate 25 breaths per minute or more, then use the 
RT Inhaler-Nebulizer Bronchodilator Protocol Note    There is a bronchodilator order in the chart from a provider indicating to follow the RT Bronchodilator Protocol and there is an “Initiate RT Inhaler-Nebulizer Bronchodilator Protocol” order as well (see protocol at bottom of note).    CXR Findings:  XR CHEST PORTABLE    Result Date: 6/20/2024  1. Small left-sided pleural effusion. 2. Bilateral lower lung atelectasis/infiltrate. 3. Mild stable cardiomegaly. **This report has been created using voice recognition software. It may contain minor errors which are inherent in voice recognition technology.** Electronically signed by Dr. Ronni Diaz      The findings from the last RT Protocol Assessment were as follows:   History Pulmonary Disease: Chronic pulmonary disease  Respiratory Pattern: Regular pattern and RR 12-20 bpm  Breath Sounds: Slightly diminished and/or crackles  Cough: Strong, spontaneous, non-productive  Indication for Bronchodilator Therapy: Decreased or absent breath sounds  Bronchodilator Assessment Score: 4    Aerosolized bronchodilator medication orders have been revised according to the RT Inhaler-Nebulizer Bronchodilator Protocol below.    Respiratory Therapist to perform RT Therapy Protocol Assessment initially then follow the protocol.  Repeat RT Therapy Protocol Assessment PRN for score 0-3 or on second treatment, BID, and PRN for scores above 3.    No Indications - adjust the frequency to every 6 hours PRN wheezing or bronchospasm, if no treatments needed after 48 hours then discontinue using Per Protocol order mode.     If indication present, adjust the RT bronchodilator orders based on the Bronchodilator Assessment Score as indicated below.  Use Inhaler orders unless patient has one or more of the following: on home nebulizer, not able to hold breath for 10 seconds, is not alert and oriented, cannot activate and use MDI correctly, or respiratory rate 25 breaths per minute or more, 
RT Nebulizer Bronchodilator Protocol Note    There is a bronchodilator order in the chart from a provider indicating to follow the RT Bronchodilator Protocol and there is an “Initiate RT Bronchodilator Protocol” order as well (see protocol at bottom of note).    CXR Findings:  XR CHEST PORTABLE    Result Date: 6/20/2024  1. Small left-sided pleural effusion. 2. Bilateral lower lung atelectasis/infiltrate. 3. Mild stable cardiomegaly. **This report has been created using voice recognition software. It may contain minor errors which are inherent in voice recognition technology.** Electronically signed by Dr. Ronni Diaz      The findings from the last RT Protocol Assessment were as follows:  Smoking: Chronic pulmonary disease  Respiratory Pattern: Dyspnea on exertion or RR 21-25 bpm  Breath Sounds: Inspiratory and expiratory or bilateral wheezing and/or rhonchi  Cough: Strong, productive  Indication for Bronchodilator Therapy: On home bronchodilators (Takes albuterol inhaler as needed at home)  Bronchodilator Assessment Score: 11    Aerosolized bronchodilator medication orders have been revised according to the RT Nebulizer Bronchodilator Protocol below.    Respiratory Therapist to perform RT Therapy Protocol Assessment initially then follow the protocol.  Repeat RT Therapy Protocol Assessment PRN for score 0-3 or on second treatment, BID, and PRN for scores above 3.    No Indications - adjust the frequency to every 6 hours PRN wheezing or bronchospasm, if no treatments needed after 48 hours then discontinue using Per Protocol order mode.     If indication present, adjust the RT bronchodilator orders based on the Bronchodilator Assessment Score as indicated below.  If a patient is on this medication at home then do not decrease Frequency below that used at home.    0-3 - enter or revise RT bronchodilator order(s) to equivalent RT Bronchodilator order with Frequency of every 4 hours PRN for wheezing or increased 
Per Protocol order mode.       4-6 - enter or revise RT Bronchodilator order(s) to two equivalent RT bronchodilator orders with one order with BID Frequency and one order with Frequency of every 4 hours PRN wheezing or increased work of breathing using Per Protocol order mode.         7-10 - enter or revise RT Bronchodilator order(s) to two equivalent RT bronchodilator orders with one order with TID Frequency and one order with Frequency of every 4 hours PRN wheezing or increased work of breathing using Per Protocol order mode.       11-13 - enter or revise RT Bronchodilator order(s) to one equivalent RT bronchodilator order with QID Frequency and an Albuterol order with Frequency of every 4 hours PRN wheezing or increased work of breathing using Per Protocol order mode.      Greater than 13 - enter or revise RT Bronchodilator order(s) to one equivalent RT bronchodilator order with every 4 hours Frequency and an Albuterol order with Frequency of every 2 hours PRN wheezing or increased work of breathing using Per Protocol order mode.     RT to enter RT Home Evaluation for COPD & MDI Assessment order using Per Protocol order mode.    Electronically signed by Kori Valdez RCP on 6/22/2024 at 7:49 AM

## 2024-06-24 NOTE — PROGRESS NOTES
[facility]  INPATIENT OCCUPATIONAL THERAPY  STRZ RENAL TELEMETRY 6K  EVALUATION    Time:   Time In: 1121  Time Out: 1137  Timed Code Treatment Minutes: 8 Minutes  Minutes: 16          Date: 2024  Patient Name: Ashwin Liriano,   Gender: male      MRN: [id number]  : 1960  (63 y.o.)  Referring Practitioner: Ned Perez DO  Diagnosis: COPD exacerbation  Additional Pertinent Hx: Per H&P: \"Ashwin Liriano is a 63 y.o. male with past medical history described below who presents to [facility] with shortness of breath/weight gain.  States that he was recently admitted last month for similar episodes and underwent cardiac evaluation with a stress test which showed an area of infarction however no ischemia.  LHC was deferred at that time due to CKD with concern about THU among other risk factors.  Patient was discharged home with changes to his medications and he states that he had increasing diuretics upon discharge.  Those have been tapered off with the works of cardiology and nephrology.  He states that since his last PCP appointment he had gained 16 pounds.  He has been noticing worsening difficulty with shortness of breath, wheezing and increasing cough and purulent sputum production that is been yellow and green.  He states that he has not been feeling well.  He has not missed any of his medications.  He is accompanied by his friend who brought him and he states that he has been having more dyspnea on exertion than usual.  Also endorses lower extremity swelling.  Arrival to ED patient to be afebrile slightly hypertensive.  Labs showed an elevated creatinine at 1.9 which is down from his previous admission with an GISELA however is above his baseline at 1.7.  No leukocytosis noted.  Chronic normocytic anemia was present.  His BNP was markedly elevated at 5600 and on last discharge it was at 3100.  His troponin was elevated at 70 however this is down from his last admission at

## 2024-06-24 NOTE — PROGRESS NOTES
Discharge teaching and instructions for diagnosis/procedure of acute on chronic combined systolic and diastolic CHF completed with patient using teachback method. AVS reviewed. Patient voiced understanding regarding prescriptions, follow up appointments, and care of self at home. Discharged in a wheelchair to  independent living per family.

## 2024-06-24 NOTE — DISCHARGE SUMMARY
Time Spent on discharge is 60 minutes in the examination, evaluation, counseling and review of medications and discharge plan.    Thank you Elsa Pierce APRN - CNP for the opportunity to be involved in this patient's care.      Signed:    Electronically signed by Pepe Blanco MD PGY1 IM resident on 6/24/24 at 4:46 PM EDT     Case was discussed with Attending, Dr. Puenet

## 2024-06-25 ENCOUNTER — CARE COORDINATION (OUTPATIENT)
Dept: CASE MANAGEMENT | Age: 64
End: 2024-06-25

## 2024-06-25 NOTE — CARE COORDINATION
himself.  Weight this morning was 337 lbs.  Discussed importance of monitoring weight and when to contact provider with a weight gain of 3 lbs overnight or 5 lbs within a week.  Reports that he is monitoring his diet.  Pt following 2 g sodium restriction per day and 2 L fluid restriction per day.  Pt states he is taking Bumex 2 mg as instructed by his doctor.  Pt has a follow up with CHF clinic on 7/11/24.  Pt reports he is urinating and moving his bowels w/o issues.  Reports he is urinating a lot but it is d/t the bumex.  Denies having any dysuria, hematuria, changes in urgency or frequency, odor, fever or chills.  Pt also has appt with PCP on 7/2/24 and nephrology on 8/12/24.  His friend from work takes him to his appts.  Pt confirmed his new PCP is Elsa Pierce d/t Theresa Muñoz leaving the practice.  Reviewed medication list.  Pt confirmed he has the Augmentin and Stiolto.  Discussed when to contact providers.  No questions, concerns or needs at this time.  He is agreeable to follow up calls.      Care Transition Nurse reviewed discharge instructions, medical action plan, and red flags with patient. The patient was given an opportunity to ask questions; all questions answered at this time.. The patient verbalized understanding.   Were discharge instructions available to patient? Yes.   Reviewed appropriate site of care based on symptoms and resources available to patient including: PCP  Specialist  When to call 911. The patient agrees to contact the primary care provider and/or specialist office for questions related to their healthcare.      Advance Care Planning:   Does patient have an Advance Directive: reviewed and current.    Medication Reconciliation:  Medication reconciliation was performed with patient,1111F entered: N/A.     Remote Patient Monitoring:  Offered patient enrollment in the Remote Patient Monitoring (RPM) program for in-home monitoring: did not discuss during initial

## 2024-06-27 ENCOUNTER — CARE COORDINATION (OUTPATIENT)
Dept: CASE MANAGEMENT | Age: 64
End: 2024-06-27

## 2024-07-03 ENCOUNTER — CARE COORDINATION (OUTPATIENT)
Dept: CASE MANAGEMENT | Age: 64
End: 2024-07-03

## 2024-07-03 NOTE — CARE COORDINATION
Care Transitions Note    Follow Up Call     Patient Current Location:  [place]    Care Transition Nurse contacted the patient by telephone. Verified name and  as identifiers.    Additional needs identified to be addressed with provider   No needs identified                 Method of communication with provider: none.    Care Summary Note: Contacted pt for care transition follow up.  Ashwin states he is doing just fine.  He has returned to work.  Reports weight has been overall staying steady.  Admits to having a weight gain of 2 lbs within the last 3 days.  Discussed when to contact provider with a weight gain of 3 lbs overnight or 5 lbs within a week.  He verbalized understanding.  He states his weight gain is probably from what he is eating.  Discussed RD referral.  Pt states he has talk to dieticians in the past.  Declines RD referral.  FBS this morning was 147.  States he had a follow up with PCP yesterday.  [facility] PCP did not have any concerns currently.  Denies any new or changed medications.  Pt denies having any c/o chest pain/discomfort, increased shortness of breath, increased swelling in right leg.  Reports swelling in right leg is down.  Pt has an appt with the CHF clinic on 24.  Reviewed s/s to monitor and when to contact providers.  No questions or concerns at this time.      Plan of care updates since last contact:  Education: continue to monitor weight closely, when to contact provider with a weight gain of 3 lbs overnight or 5 lbs within a week,  CHF s/s       Advance Care Planning:   Does patient have an Advance Directive:  on file  .    Medication Review:  No changes since last call.     Remote Patient Monitoring:  Offered patient enrollment in the Remote Patient Monitoring (RPM) program for in-home monitoring: Patient is not eligible for RPM program because: insurance coverage-[employer] (not Medicaid)    Assessments:  Care Transitions Subsequent and Final Call    Subsequent and

## 2024-07-11 ENCOUNTER — OFFICE VISIT (OUTPATIENT)
Dept: CARDIOLOGY CLINIC | Age: 64
End: 2024-07-11
Payer: COMMERCIAL

## 2024-07-11 VITALS
BODY MASS INDEX: 40.43 KG/M2 | SYSTOLIC BLOOD PRESSURE: 150 MMHG | WEIGHT: 315 LBS | HEIGHT: 74 IN | DIASTOLIC BLOOD PRESSURE: 90 MMHG | OXYGEN SATURATION: 94 % | HEART RATE: 96 BPM

## 2024-07-11 DIAGNOSIS — I42.0 DILATED CARDIOMYOPATHY (HCC): Primary | ICD-10-CM

## 2024-07-11 DIAGNOSIS — I35.0 NONRHEUMATIC AORTIC VALVE STENOSIS: ICD-10-CM

## 2024-07-11 DIAGNOSIS — I50.23 ACUTE ON CHRONIC SYSTOLIC CONGESTIVE HEART FAILURE, NYHA CLASS 2 (HCC): Chronic | ICD-10-CM

## 2024-07-11 DIAGNOSIS — I73.9 PAD (PERIPHERAL ARTERY DISEASE) (HCC): ICD-10-CM

## 2024-07-11 DIAGNOSIS — Z95.1 HX OF CABG: ICD-10-CM

## 2024-07-11 DIAGNOSIS — I10 ESSENTIAL HYPERTENSION: ICD-10-CM

## 2024-07-11 DIAGNOSIS — Z91.89 AT RISK FOR FLUID VOLUME OVERLOAD: ICD-10-CM

## 2024-07-11 DIAGNOSIS — I35.0 MODERATE AORTIC STENOSIS: ICD-10-CM

## 2024-07-11 DIAGNOSIS — Z72.820 POOR SLEEP: ICD-10-CM

## 2024-07-11 PROCEDURE — 1036F TOBACCO NON-USER: CPT | Performed by: NURSE PRACTITIONER

## 2024-07-11 PROCEDURE — 3077F SYST BP >= 140 MM HG: CPT | Performed by: NURSE PRACTITIONER

## 2024-07-11 PROCEDURE — G8427 DOCREV CUR MEDS BY ELIG CLIN: HCPCS | Performed by: NURSE PRACTITIONER

## 2024-07-11 PROCEDURE — 99215 OFFICE O/P EST HI 40 MIN: CPT | Performed by: NURSE PRACTITIONER

## 2024-07-11 PROCEDURE — 1111F DSCHRG MED/CURRENT MED MERGE: CPT | Performed by: NURSE PRACTITIONER

## 2024-07-11 PROCEDURE — 3080F DIAST BP >= 90 MM HG: CPT | Performed by: NURSE PRACTITIONER

## 2024-07-11 PROCEDURE — G8417 CALC BMI ABV UP PARAM F/U: HCPCS | Performed by: NURSE PRACTITIONER

## 2024-07-11 PROCEDURE — 3017F COLORECTAL CA SCREEN DOC REV: CPT | Performed by: NURSE PRACTITIONER

## 2024-07-11 RX ORDER — AMLODIPINE BESYLATE 5 MG/1
5 TABLET ORAL DAILY
Qty: 90 TABLET | Refills: 3 | Status: SHIPPED | OUTPATIENT
Start: 2024-07-11

## 2024-07-11 ASSESSMENT — ENCOUNTER SYMPTOMS
VOMITING: 0
SHORTNESS OF BREATH: 1
ABDOMINAL DISTENTION: 0
NAUSEA: 0
COUGH: 0

## 2024-07-11 NOTE — PROGRESS NOTES
Heart Failure Clinic       Visit Date: 7/11/2024  Cardiologist:  Dr. Swift   Primary Care Physician: Elsa Johnson, APRN - CNP    Ashwin Liriano is a 63 y.o. male who presents today for:  Chief Complaint   Patient presents with    New Patient     CHF       HPI:     TYPE HF: HFrEF 35-40% 2024, 50% 2020   Cause:   Device:   HX: CAD s/p CABG, HTN, DM 2  Dry Wt:  351 on 7/11/24      Ashwin Liriano is a 63 y.o. male who presents to the office for a new patient visit in the heart failure clinic.    Concerns today: here today as a new pt referred by Dr. Swift for management of his CHF. He has multiple co-morbidities with frequent hospital admissions. He has been admitted twice over the last 6 months for CHF. He is up 10lbs since hospital discharge 3 weeks ago. He notes that over the last 6 months he has noted more FERRERA. He did have a stress test done that was negative, had wanted to do a LHC but pt declined d/t renal risk. He has been taking a full two tabs of bumex him self over the last couple of weeks w/ good urination. States his SOB is at his baseline. Denies worsening leg swelling, bloating, and PND. Does not add salt to his food.       Patient follows:      Hospitalization:      6/2024 11/2024      Activity: limited d/t left BKA and FERRERA  Diet: Educated - seems to be following    Patient has:  Chest Pain: no  SOB: yes - baseline  Orthopnea/PND: no  RILEY: needs tested  Edema: no  Fatigue: no  Abdominal bloating: no  Cough: no  Appetite: good      Past Medical History:   Diagnosis Date    Acute left-sided low back pain with bilateral sciatica     Arthritis     CAD (coronary artery disease)     CKD (chronic kidney disease), stage II     Diabetes mellitus (HCC)     Hyperlipidemia     Hypertension     Liver disease     HEPITITIS AS A CHILD    S/P transmetatarsal amputation of foot, left (HCC)     Wound dehiscence, surgical, initial encounter      Past Surgical History:   Procedure Laterality Date

## 2024-07-11 NOTE — PATIENT INSTRUCTIONS
You may receive a survey regarding the care you received during your visit.  Your input is valuable to us.  We encourage you to complete and return your survey.  We hope you will choose us in the future for your healthcare needs.    Your nurses today were Katt.  Office hours:   Mon-Thurs 8-4:30  Friday 8-12  Phone: [phone]    Continue:  Continue current medications  Daily weights and record  Fluid restriction of 2 Liters per day  Limit sodium in diet to around 3624-3017 mg/day  Monitor BP  Activity as tolerated     Call the Heart Failure Clinic for any of the following symptoms:   Weight gain of -3 pounds in 1 day or 5 pounds in 1 week  Increased shortness of breath  Shortness of breath while laying down  Cough  Chest pain  Swelling in feet, ankles or legs  Bloating in abdomen  Fatigue        Repeat blood work today     Start imdur 30mg daily     Continue diet/fluid adherence  Continue daily wts.  F/U w/ Cardiology  F/U in clinic in 6 weeks

## 2024-07-12 ENCOUNTER — CARE COORDINATION (OUTPATIENT)
Dept: CASE MANAGEMENT | Age: 64
End: 2024-07-12

## 2024-07-12 ENCOUNTER — LAB (OUTPATIENT)
Dept: LAB | Age: 64
End: 2024-07-12

## 2024-07-12 DIAGNOSIS — I50.23 ACUTE ON CHRONIC SYSTOLIC CONGESTIVE HEART FAILURE, NYHA CLASS 2 (HCC): Chronic | ICD-10-CM

## 2024-07-12 LAB
ANION GAP SERPL CALC-SCNC: 14 MEQ/L (ref 8–16)
BUN SERPL-MCNC: 29 MG/DL (ref 7–22)
CALCIUM SERPL-MCNC: 8.9 MG/DL (ref 8.5–10.5)
CHLORIDE SERPL-SCNC: 103 MEQ/L (ref 98–111)
CO2 SERPL-SCNC: 24 MEQ/L (ref 23–33)
CREAT SERPL-MCNC: 1.7 MG/DL (ref 0.4–1.2)
GFR SERPL CREATININE-BSD FRML MDRD: 45 ML/MIN/1.73M2
GLUCOSE SERPL-MCNC: 80 MG/DL (ref 70–108)
NT-PROBNP SERPL IA-MCNC: 6111 PG/ML (ref 0–124)
POTASSIUM SERPL-SCNC: 4.2 MEQ/L (ref 3.5–5.2)
SODIUM SERPL-SCNC: 141 MEQ/L (ref 135–145)

## 2024-07-12 NOTE — CARE COORDINATION
Care Transitions Note    Follow Up Call     Patient Current Location:  [place]    Care Transition Nurse contacted the patient by telephone. Verified name and  as identifiers.    Additional needs identified to be addressed with provider   No needs identified                 Method of communication with provider: none.    Care Summary Note: Contacted pt for care transition follow up.  Ashwin states he is doing fairly good besides gaining weight.  States he has gained 10 lbs since he was discharged from the hospital.  States he does not think it's fluid but because he has been overeating.  Reports he does not add salt to his food.  Declined referral to RD.  Has not weighed himself this morning.  Discussed importance of monitor weight and when to contact provider with a weight gain of 3 lbs within 24 hours or 5 lbs within a week.  He verbalized understanding.  Reports his shortness of breath is at baseline.  Denies having any c/o chest pain/discomfort, pressure, tightness.  Reports having \"a little bit\" of swelling in right leg.  Swelling usually occurs when he is up on his right leg.  Had follow up with CHF Clinic yesterday.  States he was started on a new medication-Amlodipine.  Plans to  the medication today.  Checked with other CTN regarding pt qualifying for RPM.  She confirmed pt is eligible.  Introduced RPM and benefits of the program.  Pt is interested but would like to know how much it would cost or if insurance will cover it.  CTN gave pt the RPM CPT codes.  He will contact his insurance and let CTN know what he has decided during next outreach.  We again discussed importance of monitoring s/s and when to contact provider.  No other questions or needs at this time.      Plan of care updates since last contact:  Education: importance of monitoring weight, swelling, SOB and other s/s       Advance Care Planning:   Does patient have an Advance Directive:  on file .    Medication Review:  Medications

## 2024-07-15 ENCOUNTER — TELEPHONE (OUTPATIENT)
Dept: CARDIOLOGY CLINIC | Age: 64
End: 2024-07-15

## 2024-07-15 NOTE — TELEPHONE ENCOUNTER
----- Message from EYAD Wilkerson - CNP sent at 7/15/2024 10:23 AM EDT -----  Labs stable, some improvement of renal function - no further changes

## 2024-07-16 ENCOUNTER — CARE COORDINATION (OUTPATIENT)
Dept: CASE MANAGEMENT | Age: 64
End: 2024-07-16

## 2024-07-16 NOTE — CARE COORDINATION
Care Transitions Note    Follow Up Call     Patient Current Location:  [place]    Care Transition Nurse contacted the patient by telephone. Verified name and  as identifiers.    Additional needs identified to be addressed with provider   No needs identified                 Method of communication with provider: none.    Care Summary Note: Contacted pt for care transition follow up.  Ashwin states he is doing fine.  Reports his weight has \"come down\".  States he lost about 5 lbs since last week.  We discussed importance of monitoring and when to contact provider.  Denies having any swelling in right leg today.  No c/o chest pain/discomfort, pressure, tightness, shortness of breath, bloating.  Reports FBS was 128.  He informed CTN that he \"changed his diet a little bit\".  Trying to eat better.  We previously discussed RD referral but he declined.  Pt states he contacted his insurance and was told they would cover RPM.  He is agreeable to enrolling.  See RPM enrollment note below.  Pt informed CTN that he would like to put Markell Patel his friend/co-worker as his emergency contact because he sees Arno almost every day.  He is aware that Markell is not on HIPAA but he would still like to use him.  No questions or concerns at this time.      Plan of care updates since last contact:  Education: continue to monitor weight closely, contact provider if having a 3 lbs weight gain within 24 hours or 5 lbs within a week as well as with any significant weight loss.         Advance Care Planning:   Does patient have an Advance Directive:  on file .    Medication Review:  No changes since last call.     Remote Patient Monitoring:  Offered patient enrollment in the Remote Patient Monitoring (RPM) program for in-home monitoring: Yes, patient enrolled today:     Remote Patient Monitoring Enrollment Note    Date/Time:  2024 2:49 PM    Offered patient enrollment in the [facility] Remote Patient Monitoring (RPM) program

## 2024-07-22 ENCOUNTER — CARE COORDINATION (OUTPATIENT)
Dept: CASE MANAGEMENT | Age: 64
End: 2024-07-22

## 2024-07-22 ENCOUNTER — CARE COORDINATION (OUTPATIENT)
Dept: CARE COORDINATION | Age: 64
End: 2024-07-22

## 2024-07-22 NOTE — CARE COORDINATION
Care Transitions Note    Follow Up Call     Patient Current Location:  Home:  [address]  [place] [address]    Care Transition Nurse contacted the patient by telephone. Verified name and  as identifiers.    Additional needs identified to be addressed with provider   No needs identified                 Method of communication with provider: none.    Care Summary Note: Contacted pt for care transition follow up.  Ashwin states that he received the RPM kit but does not have scale set up as of yet.  States he tested it out and checked his BP, pulse and SpO2.  Reports his O2 sat was initially at 90% but within a few seconds it went up to 92-93%.  /83 with pulse 90.  Denies having any c/o chest pain/discomfort, pressure, tightness, increased shortness of breath or increased swelling in right leg.  He did not check his blood sugar this morning but reports it was 188 late last night.  Discussed importance of monitoring his blood sugars and when to contact his PCP.  He verbalized understanding.  Pt reports he plans to read the instructions from the RPM kit.  States the person with HRS will be calling him back to set up his scale.  Will complete welcome call during next outreach.  No questions or needs at this time.      Sent staff message to Crystal Nicholson LPN to let her know that pt was testing out equipment.  Has not set up his scale yet.  Also let her know that pulse ox reading went up within seconds to 92-93%.      Plan of care updates since last contact:  Education: monitor s/s, BP, pulse, SpO2       Advance Care Planning:   Does patient have an Advance Directive:  on file .    Medication Review:  No changes since last call.     Remote Patient Monitoring:  Offered patient enrollment in the Remote Patient Monitoring (RPM) program for in-home monitoring: Yes, patient enrolled; current status is preactivated  .waiting to set up scale    Follow Up Appointment:   KRISTEL appointment attended as scheduled   Future

## 2024-07-22 NOTE — CARE COORDINATION
Remote Patient Monitoring Note      Date/Time:  7/22/2024 3:43 PM    LPN attempted to contact patient by telephone regarding red alert received for pulse ox reading (90%).    Background:  High Blood-Pressure, Kidney Disease, CHF     Clinical Interventions:   HIPAA compliant message left on  requesting a return call.    Plan/Follow Up: Will continue to review, monitor and address alerts with follow up based on severity of symptoms and risk factors.       BUBBA Kemp [facility]/ CTN/ Remote Patient Monitoring  [phone]

## 2024-07-23 ENCOUNTER — CARE COORDINATION (OUTPATIENT)
Dept: CARE COORDINATION | Age: 64
End: 2024-07-23

## 2024-07-23 NOTE — CARE COORDINATION
Remote Alert Monitoring Note      Date/Time:  2024 8:35 AM  Patient Current Location: Home:  [facility]  [place] [address]    LPN contacted patient by telephone regarding red alert received for pulse ox reading (91%). Verified patients name and  as identifiers.    Rpm alert to be reviewed by the provider                         Background: High Blood-Pressure, Kidney Disease, CHF     Refer to 911 immediately if:  Patient unresponsive or unable to provide history  Change in cognition or sudden confusion  Patient unable to respond in complete sentences  Intense chest pain/tightness  Any concern for any clinical emergency  Red Alert: Provider response time of 1 hr required for any red alert requiring intervention  Yellow Alert: Provider response time of 3hr required for any escalated yellow alert        O2 Triage  Are you having any Chest Pain? no   Are you having any Shortness of Breath? FERRERA             Clinical Interventions: Reviewed and followed up on alerts and treatments-. Pt is asymptomatic and in no apparent distress, speaking in full sentences.  Patient states he is doing fine and states breathing is at baseline.  He struggles with stamina and states he checked his SP02 right after walking into the house. Patient educated on allowing time to rest for 10 minutes prior to checking his SP02. He voiced understanding. He states he was unable to recheck his SP02 at this time as he was leaving for an appt.  Patient is new to the program and education provided.      Signs and symptoms of CHF discussed with patient, such as feeling more tired than normal, feeling short of breath, coughing that increases when lying down, sudden weight gain, swelling of the feet, legs or belly.  Patient verbalized understanding to notify physician office if these symptoms occur.    Plan/Follow Up: Will continue to review, monitor and address alerts with follow up based on severity of symptoms and risk factors.    Crystal JOHN

## 2024-07-24 ENCOUNTER — TELEPHONE (OUTPATIENT)
Dept: CARDIOLOGY CLINIC | Age: 64
End: 2024-07-24

## 2024-07-24 ENCOUNTER — CARE COORDINATION (OUTPATIENT)
Dept: CARE COORDINATION | Age: 64
End: 2024-07-24

## 2024-07-24 NOTE — TELEPHONE ENCOUNTER
Would like pt to take a metolazone today if able. If not I will have him take 2mg of bumex now and again in 6 hrs, and 2mg tomorrow morning (he has his stress ECHO tomorrow at 1). Will also need to take additional potassium. If oxygen levels continue to drop he will need to go to ER. What is his oxygen level now?

## 2024-07-24 NOTE — CARE COORDINATION
Remote Alert Monitoring Note      Date/Time:  2024 11:03 AM  Patient Current Location: Home:  [facility]  [place] [address]    LPN contacted patient by telephone regarding red alert received for pulse ox reading (89%) and weight increase (361lb). Verified patients name and  as identifiers.    Rpm alert to be reviewed by the provider                         Background: High Blood-Pressure, Kidney Disease, CHF     Refer to 911 immediately if:  Patient unresponsive or unable to provide history  Change in cognition or sudden confusion  Patient unable to respond in complete sentences  Intense chest pain/tightness  Any concern for any clinical emergency  Red Alert: Provider response time of 1 hr required for any red alert requiring intervention  Yellow Alert: Provider response time of 3hr required for any escalated yellow alert        Weight Scale Triage  Was your weight obtained upon rising/waking today? yes   Was your weight obtained after voiding and/or use of the bathroom today? yes   Did you weigh yourself in the same amount of clothing today, compared to how you typically do? yes   Was the scale bumped or moved prior to today's weight? yes   Is your scale on a flat/hard surface? yes   Did you obtain your weight with shoes on? no   If yes, is this something you normally do during your daily weights? yes   Were you standing up straight on the scale today? yes   Were you leaning on anything while obtaining your weight today? no     Have you taken your medications as instructed by your doctor today? Yes   Weight Triage  Are you weighing any different than you did yesterday? (time of day, clothes and shoes on or off, etc)? yes   Do you have any shortness of breath? yes   Do you have any swelling in your hands of feet? yes   Are you having any other health concerns or issues? no       Clinical Interventions: Reviewed and followed up on alerts and treatments-. Patient states the first 2 weights were taken on

## 2024-07-24 NOTE — TELEPHONE ENCOUNTER
Patient called in concerned with readings on RPM  Wt today 361  SPo2 89%  121/77  80    More SOB  Has some CIPRIANO  And a little bloated    Taking bumex 2 mg daily     Vitals           SYSTOLIC DIASTOLIC Site Position Cuff Size Pulse Temp Respirations   7/22/2024 127  83     90            93      7/23/2024 108  68     81            87              SpO2 Weight - Scale   7/22/2024 90 %  355 lb (H)    7/23/2024 91 %  354 lb (H)       Legend:  (H) High

## 2024-07-24 NOTE — TELEPHONE ENCOUNTER
Ok take bumex 2mg again today and a full 2mg tomorrow morning. No extra potassium at this time. F/u tomorrow after stress echo

## 2024-07-25 ENCOUNTER — CARE COORDINATION (OUTPATIENT)
Dept: CASE MANAGEMENT | Age: 64
End: 2024-07-25

## 2024-07-25 ENCOUNTER — HOSPITAL ENCOUNTER (OUTPATIENT)
Age: 64
Discharge: HOME | End: 2024-07-27
Payer: COMMERCIAL

## 2024-07-25 VITALS — HEIGHT: 74 IN | WEIGHT: 315 LBS | BODY MASS INDEX: 40.43 KG/M2

## 2024-07-25 DIAGNOSIS — I35.0 NONRHEUMATIC AORTIC VALVE STENOSIS: ICD-10-CM

## 2024-07-25 LAB
ECHO AV MEAN GRADIENT: 38 MMHG
ECHO AV MEAN VELOCITY: 2.9 M/S
ECHO AV PEAK GRADIENT: 61 MMHG
ECHO AV PEAK VELOCITY: 3.9 M/S
ECHO AV VTI: 101 CM
ECHO BSA: 2.86 M2
ECHO LV EDV A4C: 161 ML
ECHO LV EDV INDEX A4C: 59 ML/M2
ECHO LV EJECTION FRACTION A4C: 31 %
ECHO LV ESV A4C: 112 ML
ECHO LV ESV INDEX A4C: 41 ML/M2
STRESS BASELINE DIAS BP: 72 MMHG
STRESS BASELINE HR: 69 BPM
STRESS BASELINE SYS BP: 143 MMHG
STRESS EXERCISE DUR MIN: 3 MIN
STRESS EXERCISE DUR SEC: 17 SEC
STRESS PEAK SYS BP: NORMAL MMHG
STRESS PERCENT HR ACHIEVED: 48 %
STRESS POST PEAK HR: 76 BPM
STRESS RATE PRESSURE PRODUCT: NORMAL BPM*MMHG
STRESS STAGE 1 DURATION: NORMAL MIN:SEC
STRESS STAGE 1 HR: 70 BPM
STRESS STAGE RECOVERY 1 DURATION: 0 MIN:SEC
STRESS STAGE RECOVERY 1 HR: 76 BPM
STRESS STAGE RECOVERY 2 BP: NORMAL MMHG
STRESS STAGE RECOVERY 2 DURATION: 2 MIN:SEC
STRESS STAGE RECOVERY 2 HR: 76 BPM
STRESS STAGE RECOVERY 3 BP: NORMAL MMHG
STRESS STAGE RECOVERY 3 DURATION: 3 MIN:SEC
STRESS STAGE RECOVERY 3 HR: 94 BPM
STRESS TARGET HR: 158 BPM

## 2024-07-25 PROCEDURE — 93017 CV STRESS TEST TRACING ONLY: CPT

## 2024-07-25 PROCEDURE — 6360000002 HC RX W HCPCS: Performed by: INTERNAL MEDICINE

## 2024-07-25 RX ORDER — DOBUTAMINE HYDROCHLORIDE 200 MG/100ML
2.5-5 INJECTION INTRAVENOUS CONTINUOUS
Status: DISCONTINUED | OUTPATIENT
Start: 2024-07-25 | End: 2024-07-28 | Stop reason: HOSPADM

## 2024-07-25 RX ADMIN — DOBUTAMINE HYDROCHLORIDE 5 MCG/KG/MIN: 200 INJECTION INTRAVENOUS at 13:58

## 2024-07-25 NOTE — CARE COORDINATION
Care Transitions Note    Follow Up Call     Attempted to reach patient for transitions of care follow up.  Unable to reach patient.      Outreach Attempts:   HIPAA compliant voicemail left for patient.     Care Summary Note: 1st attempt to contact pt for care transition follow up.  Unable to reach pt.  Left message with contact information and request for call back.      Follow Up Appointment:   Future Appointments         Provider Specialty Dept Phone    8/12/2024 1:40 PM Pastor Howard MD Nephrology [phone]    8/22/2024 3:00 PM Lakia Verma APRN - CNP Cardiology [phone]    10/7/2024 11:00 AM Raymond Garcia MD Pulmonology [phone]    10/18/2024 10:00 AM Sapphire Swift MD Cardiology [phone]    12/5/2024 2:00 PM SCHEDULE, [facility] PACER NURSE Cardiology [phone]    12/9/2024 1:20 PM Pastor Howard MD Nephrology [phone]            Plan for follow-up on next business day.  based on severity of symptoms and risk factors. Plan for next call: symptom management-BP, pulse, SpO2, weight, any new or worsening symptoms, complete welcome call     Melinda Handley RN

## 2024-07-26 ENCOUNTER — CARE COORDINATION (OUTPATIENT)
Dept: CASE MANAGEMENT | Age: 64
End: 2024-07-26

## 2024-07-26 ENCOUNTER — TELEPHONE (OUTPATIENT)
Dept: CARDIOLOGY CLINIC | Age: 64
End: 2024-07-26

## 2024-07-26 DIAGNOSIS — I35.0 SEVERE AORTIC STENOSIS: Primary | ICD-10-CM

## 2024-07-26 NOTE — TELEPHONE ENCOUNTER
Result note for Stress Echocardiogram     ----- Message from Sapphire Swift MD sent at 7/26/2024  1:57 PM EDT -----  Severe aortic stenosis noted  Also, has drop in EF  Has h/o CKD  Has h/o CABG, PAD, left BKA  He refused LHC in the past (recommended for cardiomyopathy, CAD)  Now findings are c/w severe aortic stenosis   Patient may be a candidate for LHC/possible PCI, and TAVR  If he is agreeable, may refer him to see Dr Bah

## 2024-07-26 NOTE — CARE COORDINATION
identified to be addressed with provider   No needs identified                 Method of communication with provider: none.    Care Summary Note: Contacted pt for care transition follow up.  Ashwin states he is feeling much better.  He has not checked his vital signs today.  States he will go up to the house and check it.  Requesting CTN call him back in 30 minutes.  Advised to rest before checking his vital signs.      Received return phone call from pt.  /78, pulse 83, SpO2 92%, weight 347 lbs. Ashwin states he is feeling much better since his Bumex was increased for the 2 days.  Still gets short of breath but states it has gotten better.  Reports he is able to take deep breaths.  Chest does not feel as tight.  Slept very well last night.  Swelling in right foot still present but has not worsened.  He is urinating very well and w/o difficulties.  He is back to taking his regular dosage of Bumex.  Pt informed CTN that he just received a new scale from [employer].  States weight was not syncing with tablet and he was having to put the reading in manually.  He will set up his new scale.  RPM welcome call completed.  See above note.  Pt aware of when to contact his providers.  No questions or needs at this time.  Next call final and will hand off to LUIS A Sanchez per Barbi Case, CC Manager.      Sent staff message to LUIS A Sanchez to let her know handing off on 7/31/24.    Plan of care updates since last contact:  Education: continue to monitor vital signs closely, contact provider with any new or worsening s/s       Advance Care Planning:   Does patient have an Advance Directive:  on file .    Medication Review:  Medications changed since last call, reviewed today.  He is now back to his regular dosage of Bumex    Remote Patient Monitoring:  Offered patient enrollment in the Remote Patient Monitoring (RPM) program for in-home monitoring: Yes, patient enrolled; current status is activated and

## 2024-07-30 ENCOUNTER — CARE COORDINATION (OUTPATIENT)
Dept: CARE COORDINATION | Age: 64
End: 2024-07-30

## 2024-07-30 NOTE — CARE COORDINATION
Remote Alert Monitoring Note      Date/Time:  2024 9:09 AM  Patient Current Location: Home:  [facility]  [place] [address]    LPN contacted patient by telephone regarding red alert received for pulse ox reading (91%). Verified patients name and  as identifiers.    Rpm alert to be reviewed by the provider                         Background: High Blood-Pressure, Kidney Disease, CHF     Refer to 911 immediately if:  Patient unresponsive or unable to provide history  Change in cognition or sudden confusion  Patient unable to respond in complete sentences  Intense chest pain/tightness  Any concern for any clinical emergency  Red Alert: Provider response time of 1 hr required for any red alert requiring intervention  Yellow Alert: Provider response time of 3hr required for any escalated yellow alert        O2 Triage  Are you having any Chest Pain? no   Are you having any Shortness of Breath? no             Clinical Interventions: Reviewed and followed up on alerts and treatments-. Pt is asymptomatic and in no apparent distress, speaking in full sentences.  Patient states he is breathing fine and denies any issues.  He is unable to recheck at this time as he is not home.  He is agreeable to recheck later today.  Will await update.      Plan/Follow Up: Will continue to review, monitor and address alerts with follow up based on severity of symptoms and risk factors.    Crystal Nicholson LPN  [facility]/ CTN/ Remote Patient monitoring  [phone]

## 2024-07-31 ENCOUNTER — CARE COORDINATION (OUTPATIENT)
Dept: CASE MANAGEMENT | Age: 64
End: 2024-07-31

## 2024-07-31 NOTE — CARE COORDINATION
Care Transitions Note    Final Call     Patient Current Location:  Home:  [address]  [place] [address]    Care Transition Nurse contacted the patient by telephone. Verified name and  as identifiers.    Patient graduated from the Care Transitions program on 2024.  Patient/family progressing towards self management. .      Advance Care Planning:   Does patient have an Advance Directive:  on file .    Handoff:   RPM and condition management for HTN, CKD, CHF.     Care Summary Note: Contacted pt for care transition follow up.  Ashwin states he is doing \"pretty good and coming along\".  Denies having any chest pain/discomfort, tightness.  May become a \"little short of breath but not too bad\" if overexerting himself.  He is trying not avoid any overexertion.  /66 with pulse 81, SpO2 94%, weight 347 lbs.  Reports swelling in right leg is \"not too bad\".  Pt informed CTN that he has an appt with Dr. Bah on 24.  Advised that he contact the office because according to appt desk, it says he has an appt on 24.  Pt will also be following up with [facility] on 24 and nephrology on 24.  He is aware of when to contact providers.  No questions or needs at this time.  No further outreach.    Sent staff message to HRS to change care team member in HRS.    Sent staff message to LUIS A Sanchez with handoff update.      Assessments:  Care Transitions Subsequent and Final Call    Subsequent and Final Calls  Do you have any ongoing symptoms?: Yes  Patient-reported symptoms: Shortness of Breath, Other  Have your medications changed?: No  Do you have any questions related to your medications?: No  Do you currently have any active services?: No  Do you have any needs or concerns that I can assist you with?: No  Care Transitions Interventions     Transportation Support: Declined     Registered Dietician: Declined    Other Interventions:              Upcoming Appointments:    Future Appointments         Provider

## 2024-08-01 ENCOUNTER — CARE COORDINATION (OUTPATIENT)
Dept: CARE COORDINATION | Age: 64
End: 2024-08-01

## 2024-08-01 NOTE — CARE COORDINATION
Remote Alert Monitoring Note      Date/Time:  2024 8:58 AM  Patient Current Location: Home:  [facility]  [place] [address]    LPN contacted patient by telephone regarding red alert received for pulse ox reading (90%) and weight increase (352lb). 5lb weight gain overnight.  Verified patients name and  as identifiers.    Rpm alert to be reviewed by the provider   Red Alert                       Background: : High Blood-Pressure, Kidney Disease, CHF     Refer to 911 immediately if:  Patient unresponsive or unable to provide history  Change in cognition or sudden confusion  Patient unable to respond in complete sentences  Intense chest pain/tightness  Any concern for any clinical emergency  Red Alert: Provider response time of 1 hr required for any red alert requiring intervention  Yellow Alert: Provider response time of 3hr required for any escalated yellow alert        O2 Triage  Are you having any Chest Pain? no   Are you having any Shortness of Breath? no   Swelling in your hands or feet? Minimal/ baseline     Are you having any other health concerns or issues? no     Have you taken your medications as instructed by your doctor today? Yes   Weight Triage  Are you weighing any different than you did yesterday? (time of day, clothes and shoes on or off, etc)? no   Do you have any shortness of breath? no   Do you have any swelling in your hands of feet? Minimal/ baseline   Are you having any other health concerns or issues? no       Clinical Interventions: Reviewed and followed up on alerts and treatments-. Pt is in no apparent distress, speaking in full sentences.  Patient states he went to the [facility] last night and overate very heavily, several sausage sandwiches, deep fried foods and sweets. Patient is agreeable to resume low sodium diet today and watch for sxs of SOB or swelling. He was also able to recheck his Sp02 while on the call and got a normal reading of 95%. Will monitor weight at this

## 2024-08-02 ENCOUNTER — CARE COORDINATION (OUTPATIENT)
Dept: CARE COORDINATION | Age: 64
End: 2024-08-02

## 2024-08-02 VITALS
HEART RATE: 93 BPM | BODY MASS INDEX: 45.45 KG/M2 | SYSTOLIC BLOOD PRESSURE: 127 MMHG | DIASTOLIC BLOOD PRESSURE: 79 MMHG | WEIGHT: 315 LBS | OXYGEN SATURATION: 92 %

## 2024-08-02 NOTE — CARE COORDINATION
Remote Patient Monitoring Note      Date/Time:  8/2/2024 3:54 PM    LPN attempted to contact patient by telephone regarding red alert received for weight increase (354lb).    Background: High Blood-Pressure, Kidney Disease, CHF     Clinical Interventions:  No answer, unable to LM.  ACM updated.    Plan/Follow Up: Will continue to review, monitor and address alerts with follow up based on severity of symptoms and risk factors.       BUBBA Kemp [facility]/ CTN/ Remote Patient Monitoring  [phone]

## 2024-08-05 ENCOUNTER — CARE COORDINATION (OUTPATIENT)
Dept: CARE COORDINATION | Age: 64
End: 2024-08-05

## 2024-08-05 RX ORDER — BUMETANIDE 1 MG/1
2 TABLET ORAL DAILY
Qty: 180 TABLET | Refills: 3 | Status: SHIPPED | OUTPATIENT
Start: 2024-08-05

## 2024-08-05 NOTE — CARE COORDINATION
Date/Time:  8/5/2024 9:14 AM    Update: Patient informed of new orders to take Bumex 2mg bid for 3 days along with Potassium 20bid for 3 days.  Patient advised to go to ER is sxs suddenly worsen.

## 2024-08-05 NOTE — CARE COORDINATION
Remote Alert Monitoring Note      Date/Time:  2024 8:17 AM  Patient Current Location: Home:  [facility]  [place] [address]    LPN contacted patient by telephone regarding red alert received for pulse ox reading (90%) and weight increase (358lb). 11.5lb increase in the past 7 days. Verified patients name and  as identifiers.    Rpm alert to be reviewed by the provider    Red Alert Weight increase and SP02 90%    Weight began slowly increasing last week [place]. Weight continued to jump over the weekend and patient is now complains of SOB and increased swelling and bloating. In all, he is up 11.5lbs in the past 7 days.  He states he is scheduled to see Dr Bah tomorrow regarding a possible valve issue. He states he continues to take his Bumex 1.5mg qd.  Please advise as he is not feeling well.    See metrics history below.                     Background: High Blood-Pressure, Kidney Disease, CHF     Refer to 911 immediately if:  Patient unresponsive or unable to provide history  Change in cognition or sudden confusion  Patient unable to respond in complete sentences  Intense chest pain/tightness  Any concern for any clinical emergency  Red Alert: Provider response time of 1 hr required for any red alert requiring intervention  Yellow Alert: Provider response time of 3hr required for any escalated yellow alert          Have you taken your medications as instructed by your doctor today? Yes   Weight Triage  Are you weighing any different than you did yesterday? (time of day, clothes and shoes on or off, etc)? no   Do you have any shortness of breath? yes   Do you have any swelling in your hands of feet? yes   Are you having any other health concerns or issues? no       Clinical Interventions: Reviewed and followed up on alerts and treatments-. Spoke with patient regarding weight gain and SP02. Weight began slowly increasing last week after [place] the [place]. Weight continued to jump

## 2024-08-05 NOTE — CARE COORDINATION
Pt is to be on 2mg of bumex daily, not 1.5 daily.    To double bumex and potassium for 3 days. Notify if no improvements.

## 2024-08-07 ENCOUNTER — CARE COORDINATION (OUTPATIENT)
Dept: CARE COORDINATION | Age: 64
End: 2024-08-07

## 2024-08-07 ENCOUNTER — LAB (OUTPATIENT)
Dept: LAB | Age: 64
End: 2024-08-07

## 2024-08-07 DIAGNOSIS — I10 ESSENTIAL HYPERTENSION: ICD-10-CM

## 2024-08-07 DIAGNOSIS — N18.31 STAGE 3A CHRONIC KIDNEY DISEASE (HCC): ICD-10-CM

## 2024-08-07 DIAGNOSIS — E11.21 DIABETIC NEPHROPATHY WITH PROTEINURIA (HCC): ICD-10-CM

## 2024-08-07 LAB
ALBUMIN SERPL BCG-MCNC: 3.6 G/DL (ref 3.5–5.1)
ANION GAP SERPL CALC-SCNC: 13 MEQ/L (ref 8–16)
BUN SERPL-MCNC: 33 MG/DL (ref 7–22)
CALCIUM SERPL-MCNC: 9.1 MG/DL (ref 8.5–10.5)
CHLORIDE SERPL-SCNC: 98 MEQ/L (ref 98–111)
CO2 SERPL-SCNC: 28 MEQ/L (ref 23–33)
CREAT SERPL-MCNC: 2.1 MG/DL (ref 0.4–1.2)
GFR SERPL CREATININE-BSD FRML MDRD: 35 ML/MIN/1.73M2
GLUCOSE SERPL-MCNC: 255 MG/DL (ref 70–108)
PHOSPHATE SERPL-MCNC: 4.8 MG/DL (ref 2.4–4.7)
POTASSIUM SERPL-SCNC: 4 MEQ/L (ref 3.5–5.2)
SODIUM SERPL-SCNC: 139 MEQ/L (ref 135–145)

## 2024-08-07 RX ORDER — METOLAZONE 2.5 MG/1
2.5 TABLET ORAL DAILY
Qty: 1 TABLET | Refills: 0 | Status: SHIPPED | OUTPATIENT
Start: 2024-08-07 | End: 2024-08-07 | Stop reason: SDUPTHER

## 2024-08-07 RX ORDER — METOLAZONE 2.5 MG/1
2.5 TABLET ORAL DAILY
Qty: 1 TABLET | Refills: 0 | Status: SHIPPED | OUTPATIENT
Start: 2024-08-07

## 2024-08-07 NOTE — CARE COORDINATION
Date/Time:  8/7/2024 4:05 PM    Update: Patient informed of new orders.  He is agreeable to  new medication this evening.    
Ordered 1 dose of 2.5mg of metolazone. To take an additional 20meq of potassium with it. Can take this tomorrow.   
Remote Patient Monitoring Note      Date/Time:  8/7/2024 11:14 AM    LPN attempted to contact patient by telephone regarding red alert received for weight increase (358.5lb).    Background:  High Blood-Pressure, Kidney Disease, CHF     Clinical Interventions:  No answer, unable to LM.    Plan/Follow Up: Will continue to review, monitor and address alerts with follow up based on severity of symptoms and risk factors.       Crystal Nicholson LPN  [facility]/ CTN/ Remote Patient Monitoring  [phone]    
different than you did yesterday? (time of day, clothes and shoes on or off, etc)? no   Do you have any shortness of breath? FERRERA but improved   Do you have any swelling in your hands of feet? Yes but slightly improved   Are you having any other health concerns or issues? no       Clinical Interventions: Reviewed and followed up on alerts and treatments-. Pt is in no apparent distress, speaking in full sentences.  Weight gain of 4 lbs noted overnight despite doubled doses of Bumex and Potassium for 3 days. He does report improved breathing and states swelling is also slightly improved but still present. Patient again educated on limiting sodium intake and he voices understanding.  Will provide update to Lakia Verma and LUIS A.      Signs and symptoms of CHF discussed with patient, such as feeling more tired than normal, feeling short of breath, coughing that increases when lying down, sudden weight gain, swelling of the feet, legs or belly.  Patient verbalized understanding to notify physician office if these symptoms occur.    Plan/Follow Up: Will continue to review, monitor and address alerts with follow up based on severity of symptoms and risk factors.    BUBBA Kemp [facility]/ CTN/ Remote Patient monitoring  [phone]

## 2024-08-07 NOTE — CARE COORDINATION
Ambulatory Care Coordination Note     2024 1:51 PM     Patient Current Location:  Home:  [facility]  [place] [address]     This patient was received as a referral from Care Transition Nurse.    Patient contacted the patient by telephone. Verified name and  with patient as identifiers. Provided introduction to self, and explanation of the ACM role.   Patient accepted care management services at this time.          ACM: Leonora Angelo RN     Challenges to be reviewed by the provider   Additional needs identified to be addressed with provider No  none               Method of communication with provider: none.    Care Summary Note: Spoke with Ashwin Faulkner self/role  Was CTN referral for support and was active with RPM  Discussed overall health and baseline of chronic conditions  States his heart conditions make it difficulty for him to get around, shortness of breath and edema is baseline  States he is following up with CHF and cardiology for possible TAVR  Has follow up appt scheduled  Discussed RPM and alerts on weights  States his weight has been fluctuating but his baseline symptoms feel the same, states \"I'm feeling alright\"  Taking extra doses of medication as ordered by CHF clinic, states he has 2 more days of that  CHF clinic is aware of weight changes  Blood sugars are running good \" in the 100's\"  Was agreeable to ACM follow up and support    Plan  Continue with RPM monitoring  Collaborate with CHF clinic as needed  Encourage blood sugar tracking and reporting  Ensure follow up appts completed      Offered patient enrollment in the Remote Patient Monitoring (RPM) program for in-home monitoring: Yes, patient enrolled; current status is activated and monitoring.     Assessments Completed:   Diabetes Assessment    Medic Alert ID: No  How often do you test your blood sugar?:  (Comment: twice daily)   Do you have barriers with adherence to non-pharmacologic self-management interventions?

## 2024-08-08 ENCOUNTER — CARE COORDINATION (OUTPATIENT)
Dept: CARE COORDINATION | Age: 64
End: 2024-08-08

## 2024-08-08 LAB
BACTERIA: ABNORMAL
BILIRUB UR QL STRIP: NEGATIVE
CASTS #/AREA URNS LPF: ABNORMAL /LPF
CASTS #/AREA URNS LPF: ABNORMAL /LPF
CHARACTER UR: CLEAR
CHARCOAL URNS QL MICRO: ABNORMAL
COLOR UR: YELLOW
CREAT UR-MCNC: 40.5 MG/DL
CREAT UR-MCNC: 40.5 MG/DL
CRYSTALS URNS QL MICRO: ABNORMAL
EPITHELIAL CELLS, UA: ABNORMAL /HPF
GLUCOSE UR QL STRIP.AUTO: 500 MG/DL
HGB UR QL STRIP.AUTO: NEGATIVE
KETONES UR QL STRIP.AUTO: NEGATIVE
LEUKOCYTE ESTERASE UR QL STRIP.AUTO: NEGATIVE
MICROALBUMIN UR-MCNC: 55.38 MG/DL
MICROALBUMIN/CREAT RATIO PNL UR: 1367 MG/G (ref 0–30)
NITRITE UR QL STRIP.AUTO: NEGATIVE
PH UR STRIP.AUTO: 6 [PH] (ref 5–9)
PROT UR STRIP.AUTO-MCNC: 100 MG/DL
PROT UR-MCNC: 78.3 MG/DL
PROT/CREAT 24H UR: 1.93 MG/G{CREAT}
RBC #/AREA URNS HPF: ABNORMAL /HPF
RENAL EPI CELLS #/AREA URNS HPF: ABNORMAL /[HPF]
SPECIFIC GRAVITY UA: 1.01 (ref 1–1.03)
UROBILINOGEN, URINE: 0.2 EU/DL (ref 0–1)
WBC #/AREA URNS HPF: ABNORMAL /HPF
YEAST LIKE FUNGI URNS QL MICRO: ABNORMAL

## 2024-08-08 NOTE — CARE COORDINATION
Remote Alert Monitoring Note      Date/Time:  2024 11:13 AM  Patient Current Location: Home:  [facility]  [place] [address]    LPN contacted patient by telephone regarding red alert received for pulse ox reading (91%). Verified patients name and  as identifiers.    Rpm alert to be reviewed by the provider                         Background: High Blood-Pressure, Kidney Disease, CHF     Refer to 911 immediately if:  Patient unresponsive or unable to provide history  Change in cognition or sudden confusion  Patient unable to respond in complete sentences  Intense chest pain/tightness  Any concern for any clinical emergency  Red Alert: Provider response time of 1 hr required for any red alert requiring intervention  Yellow Alert: Provider response time of 3hr required for any escalated yellow alert        O2 Triage  Are you having any Chest Pain? no   Are you having any Shortness of Breath? no             Clinical Interventions: Reviewed and followed up on alerts and treatments-. Pt is asymptomatic and in no apparent distress, speaking in full sentences.  Patient agreeable to ensure hands are warm and recheck his SP02. Updated SP02 wnl 92%.        Plan/Follow Up: Will continue to review, monitor and address alerts with follow up based on severity of symptoms and risk factors.    BUBBA Kemp [facility]/ CTN/ Remote Patient monitoring  [phone]

## 2024-08-12 ENCOUNTER — OFFICE VISIT (OUTPATIENT)
Dept: NEPHROLOGY | Age: 64
End: 2024-08-12
Payer: COMMERCIAL

## 2024-08-12 VITALS
OXYGEN SATURATION: 89 % | WEIGHT: 315 LBS | BODY MASS INDEX: 40.43 KG/M2 | HEART RATE: 85 BPM | HEIGHT: 74 IN | DIASTOLIC BLOOD PRESSURE: 77 MMHG | SYSTOLIC BLOOD PRESSURE: 142 MMHG

## 2024-08-12 DIAGNOSIS — N18.32 STAGE 3B CHRONIC KIDNEY DISEASE (HCC): ICD-10-CM

## 2024-08-12 DIAGNOSIS — I10 ESSENTIAL HYPERTENSION: Primary | ICD-10-CM

## 2024-08-12 DIAGNOSIS — E11.21 DIABETIC NEPHROPATHY WITH PROTEINURIA (HCC): ICD-10-CM

## 2024-08-12 DIAGNOSIS — E87.79 OTHER FLUID OVERLOAD: ICD-10-CM

## 2024-08-12 PROCEDURE — 3077F SYST BP >= 140 MM HG: CPT | Performed by: INTERNAL MEDICINE

## 2024-08-12 PROCEDURE — 2022F DILAT RTA XM EVC RTNOPTHY: CPT | Performed by: INTERNAL MEDICINE

## 2024-08-12 PROCEDURE — G8417 CALC BMI ABV UP PARAM F/U: HCPCS | Performed by: INTERNAL MEDICINE

## 2024-08-12 PROCEDURE — G2211 COMPLEX E/M VISIT ADD ON: HCPCS | Performed by: INTERNAL MEDICINE

## 2024-08-12 PROCEDURE — 99215 OFFICE O/P EST HI 40 MIN: CPT | Performed by: INTERNAL MEDICINE

## 2024-08-12 PROCEDURE — 3017F COLORECTAL CA SCREEN DOC REV: CPT | Performed by: INTERNAL MEDICINE

## 2024-08-12 PROCEDURE — G8427 DOCREV CUR MEDS BY ELIG CLIN: HCPCS | Performed by: INTERNAL MEDICINE

## 2024-08-12 PROCEDURE — 3046F HEMOGLOBIN A1C LEVEL >9.0%: CPT | Performed by: INTERNAL MEDICINE

## 2024-08-12 PROCEDURE — 1036F TOBACCO NON-USER: CPT | Performed by: INTERNAL MEDICINE

## 2024-08-12 PROCEDURE — 3078F DIAST BP <80 MM HG: CPT | Performed by: INTERNAL MEDICINE

## 2024-08-12 RX ORDER — BUMETANIDE 1 MG/1
2 TABLET ORAL 2 TIMES DAILY
Qty: 180 TABLET | Refills: 3 | Status: SHIPPED | OUTPATIENT
Start: 2024-08-12

## 2024-08-12 RX ORDER — POTASSIUM CHLORIDE 20 MEQ/1
20 TABLET, EXTENDED RELEASE ORAL 2 TIMES DAILY
Qty: 90 TABLET | Refills: 1 | Status: SHIPPED | OUTPATIENT
Start: 2024-08-12

## 2024-08-12 RX ORDER — METOLAZONE 10 MG/1
10 TABLET ORAL DAILY
Qty: 30 TABLET | Refills: 0 | Status: SHIPPED | OUTPATIENT
Start: 2024-08-12 | End: 2024-08-13 | Stop reason: SDUPTHER

## 2024-08-12 NOTE — PROGRESS NOTES
(NEURONTIN) 600 MG tablet Take 1 tablet by mouth 2 times daily for 180 days. 180 tablet 1    insulin detemir (LEVEMIR FLEXTOUCH) 100 UNIT/ML injection pen Inject 35 Units into the skin 2 times daily 63 mL 1    albuterol sulfate HFA (VENTOLIN HFA) 108 (90 Base) MCG/ACT inhaler Inhale 2 puffs into the lungs 4 times daily as needed for Wheezing 54 g 1    Multiple Vitamins-Minerals (MULTIVITAMIN PO) Take 1 tablet by mouth daily Central-Pa         Vitals     BP (!) 142/77 (Site: Right Upper Arm, Position: Sitting, Cuff Size: Large Adult)   Pulse 85   Ht 1.88 m (6' 2\")   Wt (!) 160.6 kg (354 lb)   SpO2 (!) 89%   BMI 45.45 kg/m²  Wt Readings from Last 3 Encounters:   08/12/24 (!) 160.6 kg (354 lb)   07/25/24 (!) 156.5 kg (345 lb)   08/12/24 (!) 160.6 kg (354 lb)        Physical Exam     General -- no distress  Lungs -- clear  Heart -- S1, S2 heard, JVD - no  Abdomen - soft, non-tender  Extremities --chronic right leg edema  CNS - awake and alert    Labs, Radiology and Tests    Labs -    9/23 1/24 6/24 8/24        potassium 4.1 3.9 4.0 4.0        BUN 28 28 43 33        Creatinine 1.6 1.7 1.9 2.1        eGFR 48 45 39 35                    UPCR 2.8 3.1          UMCR 300 2183                        Renal Ultrasound Scan -- 11/22  Right kidney 10.9 cm left kidney 11.6 cm  Creased resistive indices     Other : old  lab data have been reviewed and noted patient's baseline creatinine is probably around 1.3 - 1.5    Ejection fraction 4/20-EF 50% and grade 1 diastolic dysfunction  Assessment    Renal -patient appears to be having chronic kidney disease stage III most likely secondary to senile nephrosclerosis longstanding hypertension and diabetes however due to his recent sepsis in September 22 he did lose some kidney function at that time  -Slight worsening of renal function which is acceptable  -But the patient is having significant fluid overload and symptomatic shortness of breath as well.  -Will increase the Bumex to 2

## 2024-08-13 RX ORDER — METOLAZONE 10 MG/1
10 TABLET ORAL DAILY
Qty: 30 TABLET | Refills: 0 | Status: SHIPPED | OUTPATIENT
Start: 2024-08-13 | End: 2024-09-03

## 2024-08-16 ENCOUNTER — TELEPHONE (OUTPATIENT)
Dept: CARDIOLOGY CLINIC | Age: 64
End: 2024-08-16

## 2024-08-16 ENCOUNTER — OFFICE VISIT (OUTPATIENT)
Dept: NEPHROLOGY | Age: 64
End: 2024-08-16
Payer: COMMERCIAL

## 2024-08-16 VITALS
HEART RATE: 84 BPM | WEIGHT: 315 LBS | SYSTOLIC BLOOD PRESSURE: 118 MMHG | BODY MASS INDEX: 43.27 KG/M2 | DIASTOLIC BLOOD PRESSURE: 90 MMHG | OXYGEN SATURATION: 91 %

## 2024-08-16 DIAGNOSIS — N18.32 STAGE 3B CHRONIC KIDNEY DISEASE (HCC): ICD-10-CM

## 2024-08-16 DIAGNOSIS — E11.21 DIABETIC NEPHROPATHY WITH PROTEINURIA (HCC): ICD-10-CM

## 2024-08-16 DIAGNOSIS — I10 ESSENTIAL HYPERTENSION: Primary | ICD-10-CM

## 2024-08-16 DIAGNOSIS — E87.79 OTHER FLUID OVERLOAD: ICD-10-CM

## 2024-08-16 LAB
ANION GAP SERPL CALC-SCNC: 15 MEQ/L (ref 8–16)
BUN SERPL-MCNC: 53 MG/DL (ref 7–22)
CALCIUM SERPL-MCNC: 9.8 MG/DL (ref 8.5–10.5)
CHLORIDE SERPL-SCNC: 91 MEQ/L (ref 98–111)
CO2 SERPL-SCNC: 31 MEQ/L (ref 23–33)
CREAT SERPL-MCNC: 2.1 MG/DL (ref 0.4–1.2)
GFR SERPL CREATININE-BSD FRML MDRD: 35 ML/MIN/1.73M2
GLUCOSE SERPL-MCNC: 165 MG/DL (ref 70–108)
POTASSIUM SERPL-SCNC: 3.8 MEQ/L (ref 3.5–5.2)
SODIUM SERPL-SCNC: 137 MEQ/L (ref 135–145)

## 2024-08-16 PROCEDURE — 3080F DIAST BP >= 90 MM HG: CPT | Performed by: INTERNAL MEDICINE

## 2024-08-16 PROCEDURE — G8427 DOCREV CUR MEDS BY ELIG CLIN: HCPCS | Performed by: INTERNAL MEDICINE

## 2024-08-16 PROCEDURE — 3017F COLORECTAL CA SCREEN DOC REV: CPT | Performed by: INTERNAL MEDICINE

## 2024-08-16 PROCEDURE — 3074F SYST BP LT 130 MM HG: CPT | Performed by: INTERNAL MEDICINE

## 2024-08-16 PROCEDURE — 2022F DILAT RTA XM EVC RTNOPTHY: CPT | Performed by: INTERNAL MEDICINE

## 2024-08-16 PROCEDURE — 1036F TOBACCO NON-USER: CPT | Performed by: INTERNAL MEDICINE

## 2024-08-16 PROCEDURE — G8417 CALC BMI ABV UP PARAM F/U: HCPCS | Performed by: INTERNAL MEDICINE

## 2024-08-16 PROCEDURE — 3046F HEMOGLOBIN A1C LEVEL >9.0%: CPT | Performed by: INTERNAL MEDICINE

## 2024-08-16 PROCEDURE — 99213 OFFICE O/P EST LOW 20 MIN: CPT | Performed by: INTERNAL MEDICINE

## 2024-08-16 NOTE — PROGRESS NOTES
[facility]        Outpatient Follow-Up note         8/16/2024 8:37 AM    Patient Name:   Ashwin Liriano  YOB: 1960  Primary Care Physician:  Elsa Pierce APRN - CNP   [facility] [place] [facility]  [facility]  [address]  [address]  [facility] [place] [facility] [address]  Dept: [phone]  Loc: [phone]     Chief Complaint / Reason for follow-up : Follow Up of CKD     Interval History :  Patient seen and examined by me.   Feels well starting to make more urine the lower extremity edema is getting better.     Past History :  Past Medical History:   Diagnosis Date    Acute left-sided low back pain with bilateral sciatica     Arthritis     CAD (coronary artery disease)     CKD (chronic kidney disease), stage II     Diabetes mellitus (HCC)     Hyperlipidemia     Hypertension     Liver disease     HEPITITIS AS A CHILD    S/P transmetatarsal amputation of foot, left (HCC)     Wound dehiscence, surgical, initial encounter      Past Surgical History:   Procedure Laterality Date    CARDIAC SURGERY  11/2019    triple bypass    CYST REMOVAL      RIGHT ANKLE     FOOT AMPUTATION Left 1/10/2023    LEFT BELOW KNEE AMPUTATION performed by Mert Rubio DPM at [facility] OR    FOOT DEBRIDEMENT Left 4/20/2020    LEFT TRANSMETATARSAL AMPUTATION  FOOT INCISION AND DRAINAGE performed by Mert Rubio DPM at [facility] OR    FOOT DEBRIDEMENT Left 7/20/2020    LEFT WOUND DEBRIDEMENT WITH WOUND VAC APPLICATION performed by Mert Rubio DPM at [facility] OR    FOOT DEBRIDEMENT Left 7/1/2022    LEFT FOOT EXCISONAL WOUND DEBRIDEMENT, APPLICATION SKIN SUBSTITUTE GRAFT, APPLICATION KCI WOUND VAC performed by Mert Rubio DPM at [facility] OR    FOOT SURGERY Right     CYST REMOVED    FOOT SURGERY Left 8/14/2020    LEFT FOOT PREPARATION GRAFT SITE, HAVEST SPLIT THICKNESS SKIN GRAFT WITH APPLICATION, APPLICATION KCI WOUND VAC performed by Mert Rubio DPM at

## 2024-08-16 NOTE — TELEPHONE ENCOUNTER
Patient sees Dr Bah on 8/22/24    Pre-TAVR workup:    Referring Provider: Lakia Verma CNP    Primary Cardiologist: Sapphire Swift MD    History: CAD, CKD, DM, HLD, HTN, liver disease, s/p transmetatarsal of foot, left, PPM placement, 3 vessel CABG on 11/2019 (SVG to OM1, SVG to Circumflex, LIMA to distal LAD). He also has h/o PAD, s/p left CEA 5/2023, left BKA     ECHO: Obtained on 5/10 with the below results:      EKG: Obtained on  resulting in      Cardiac Catheterization:  needs performed     CHF appointment: Lakia Verma CNP    Dental Clearance: teeth vs dentures?    Labs: 8/16/24\

## 2024-08-22 ENCOUNTER — TELEPHONE (OUTPATIENT)
Dept: CARDIOLOGY CLINIC | Age: 64
End: 2024-08-22

## 2024-08-22 ENCOUNTER — OFFICE VISIT (OUTPATIENT)
Dept: CARDIOLOGY CLINIC | Age: 64
End: 2024-08-22
Payer: COMMERCIAL

## 2024-08-22 VITALS
DIASTOLIC BLOOD PRESSURE: 74 MMHG | SYSTOLIC BLOOD PRESSURE: 116 MMHG | HEART RATE: 84 BPM | BODY MASS INDEX: 42.69 KG/M2 | HEIGHT: 74 IN

## 2024-08-22 DIAGNOSIS — I35.0 SEVERE AORTIC STENOSIS: Primary | ICD-10-CM

## 2024-08-22 DIAGNOSIS — N19 RENAL FAILURE, UNSPECIFIED CHRONICITY: ICD-10-CM

## 2024-08-22 PROCEDURE — 3017F COLORECTAL CA SCREEN DOC REV: CPT | Performed by: INTERNAL MEDICINE

## 2024-08-22 PROCEDURE — 3078F DIAST BP <80 MM HG: CPT | Performed by: INTERNAL MEDICINE

## 2024-08-22 PROCEDURE — 1036F TOBACCO NON-USER: CPT | Performed by: INTERNAL MEDICINE

## 2024-08-22 PROCEDURE — 99215 OFFICE O/P EST HI 40 MIN: CPT | Performed by: INTERNAL MEDICINE

## 2024-08-22 PROCEDURE — 3074F SYST BP LT 130 MM HG: CPT | Performed by: INTERNAL MEDICINE

## 2024-08-22 PROCEDURE — G8417 CALC BMI ABV UP PARAM F/U: HCPCS | Performed by: INTERNAL MEDICINE

## 2024-08-22 PROCEDURE — G8427 DOCREV CUR MEDS BY ELIG CLIN: HCPCS | Performed by: INTERNAL MEDICINE

## 2024-08-22 NOTE — TELEPHONE ENCOUNTER
Orders received from Dr. Bah to obtain a CV/CT Surgery appointment, TAVR CTA, Carotid US, Cardiac Catheterization, Dental Clearance and CHF appointment for optimization.    CT/CV Surgery appointment scheduled on 9/3/24  CTA scheduled on ___ at ___  Carotid US completed on 5/18/24.  Cardiac Catheterization scheduled with Dr. Bah on ___ at ___ with an arrival time of ___   CHF appointment scheduled on 9/3/24 at 1300.  Pt will need additional follow-up following heart cath with Dr. Bah.   Patient has dentures.    PreTAVR KCCQ12 completed and scanned into chart.     Dr. Howard, patient will need R/L HC and CTA neck, chest, abdomen and pelvis.  What would you like us to do for renal protection prior?    Dr. Bah, please agree.     Nora, can you please schedule this workup?

## 2024-08-22 NOTE — TELEPHONE ENCOUNTER
I am covering Dr. Howard this week.  Patient is seeing Dr. Howard in less than 2 weeks.  Given CKD 3B/ (close to 4) we will need to discuss risk of contrast nephropathy.   Dr. Howard is scheduled to see this patient in less than 2 weeks. He will discuss with patient and will make recommendations for hydration accordingly.

## 2024-08-22 NOTE — PROGRESS NOTES
[facility] [place] [facility]  [facility] CARDIOLOGY  [address]  [address]  [facility] [address]  Dept: [phone]  Dept Fax: [phone]  Loc: [phone]    Visit Date: 8/22/2024    Mr. Liriano is a 63 y.o. male  who presented for:  Chief Complaint   Patient presents with    Establish Cardiologist    New Patient     LFLG AS     Cardiomyopathy       HPI:   HPI   64 yo M who presents for evaluation of severe LF LG AS that has contractile reserve by DSE.  Has dropped his EF and has gained significant weight and LE and has been diuresed well.  He can walk with prosthetic.  He sleeps and is fatigued a lot.  Symptoms for months.  Lives alone. He lost his LLE due to toe fracture and gangrene that resulted in BKA. Has CKD.  His LE swelling has improved.  Avoiding salt.  No arm or jaw pain.  He has some orthopnea but has improved.  He is able to eat and drink.  He can urinate normally, follows with [facility] and CHF.   He would rather live on dialysis than pass away from the valve.    Referring Provider: Lakia Verma CNP     Primary Cardiologist: Sapphire Swift MD     History: CAD, CKD, DM, HLD, HTN, liver disease, s/p transmetatarsal of foot, left, PPM placement, 3 vessel CABG on 11/2019 (SVG to OM1, SVG to Circumflex, LIMA to distal LAD). He also has h/o PAD, s/p left CEA 5/2023, left BKA      ECHO: Obtained on 5/10 with the below results:        Cardiac Catheterization:  needs performed                CHF appointment: Lakia Verma CNP     Dental Clearance: natural teeth     Labs: 8/16/24\            Current Outpatient Medications:     metOLazone (ZAROXOLYN) 10 MG tablet, Take 1 tablet by mouth daily, Disp: 30 tablet, Rfl: 0    bumetanide (BUMEX) 1 MG tablet, Take 2 tablets by mouth 2 times daily Starting May 16, 2024, Disp: 180 tablet, Rfl: 3    potassium chloride (KLOR-CON M) 20 MEQ extended release tablet, Take 1 tablet by mouth 2 times daily, Disp: 90 tablet, Rfl: 1    amLODIPine

## 2024-08-26 ENCOUNTER — LAB (OUTPATIENT)
Dept: LAB | Age: 64
End: 2024-08-26

## 2024-08-26 DIAGNOSIS — I10 ESSENTIAL HYPERTENSION: ICD-10-CM

## 2024-08-26 DIAGNOSIS — E87.79 OTHER FLUID OVERLOAD: ICD-10-CM

## 2024-08-26 DIAGNOSIS — E11.21 DIABETIC NEPHROPATHY WITH PROTEINURIA (HCC): ICD-10-CM

## 2024-08-26 DIAGNOSIS — N18.32 STAGE 3B CHRONIC KIDNEY DISEASE (HCC): ICD-10-CM

## 2024-08-26 LAB
ANION GAP SERPL CALC-SCNC: 10 MEQ/L (ref 8–16)
BUN SERPL-MCNC: 60 MG/DL (ref 7–22)
CALCIUM SERPL-MCNC: 9.8 MG/DL (ref 8.5–10.5)
CHLORIDE SERPL-SCNC: 90 MEQ/L (ref 98–111)
CO2 SERPL-SCNC: 35 MEQ/L (ref 23–33)
CREAT SERPL-MCNC: 2 MG/DL (ref 0.4–1.2)
GFR SERPL CREATININE-BSD FRML MDRD: 37 ML/MIN/1.73M2
GLUCOSE SERPL-MCNC: 361 MG/DL (ref 70–108)
POTASSIUM SERPL-SCNC: 4.3 MEQ/L (ref 3.5–5.2)
SODIUM SERPL-SCNC: 135 MEQ/L (ref 135–145)

## 2024-08-30 ENCOUNTER — CARE COORDINATION (OUTPATIENT)
Dept: CARE COORDINATION | Age: 64
End: 2024-08-30

## 2024-08-30 NOTE — CARE COORDINATION
Ambulatory Care Coordination Note     2024 1:19 PM     Patient Current Location:  Home:  [facility]  [place] [address]     ACM contacted the patient by telephone. Verified name and  with patient as identifiers.         ACM: Leonora Angelo RN     Challenges to be reviewed by the provider   Additional needs identified to be addressed with provider No  none               Method of communication with provider: none.    Care Summary Note: Spoke with Ashwin for continued Care Coordination follow up  Speciality appts completed  Working towards TAVR procedure  Following with CHF clinic  States taking medications as ordered  Active with RPM monitoring, had some alerts with weight fluctuation but stable  Monitoring blood sugars and stable  Informed would pause RPM when TAVR scheduled is inpt admission is necessary    Plan  Continue with RPM monitoring  Encourage blood sugar tracking and reporting  Ensure follow up appts completed  Reinforce importance of early symptom recognition and reporting to prevent exacerbation and unnecessary hospitalization    Offered patient enrollment in the Remote Patient Monitoring (RPM) program for in-home monitoring: Yes, patient enrolled; current status is activated and monitoring.     Assessments Completed:   Congestive Heart Failure Assessment    Are you currently restricting fluids?: 2000cc  Do you understand a low sodium diet?: Yes  Do you salt your food before tasting it?: No         Symptoms:  CHF associated angina: Neg, CHF associated dyspnea on exertion: Pos, CHF associated fatigue: Neg, CHF associated orthostatic hypotension: Neg, CHF associated PND: Neg, CHF associated shortness of breath: Neg, CHF associated weakness: Neg      Symptom course: stable  Patient-reported weight (lb): 332 (Comment: being monitored by RPM)  Weight trend: stable      ,   General Assessment    Do you have any symptoms that are causing concern?: Yes  Progression since Onset: Unchanged  Reported  Symptoms: Other (Comment: working towards TAVR)          Medications Reviewed:   Completed during this call    Advance Care Planning:   Not reviewed during this call     Care Planning:    Goals Addressed                      This Visit's Progress      Conditions and Symptoms (pt-stated)         I will schedule office visits, as directed by my provider.  I will keep my appointment or reschedule if I have to cancel.  I will notify my provider of any barriers to my plan of care.  I will follow my Zone Management tool to seek urgent or emergent care.  I will notify my provider of any symptoms that indicate a worsening of my condition.    Barriers: need for additional support and education  Plan for overcoming my barriers: family and ACM support  Confidence: 8/10  Anticipated Goal Completion Date: 11-30-24                 PCP/Specialist follow up:   Future Appointments         Provider Specialty Dept Phone    9/3/2024 9:00 AM Pastor Howard MD Nephrology [phone]    9/3/2024 11:30 AM King Lancaster PA-C Cardiothoracic Surgery [phone]    9/3/2024 1:00 PM Lakia Verma APRN - CNP Cardiology [phone]    10/7/2024 11:00 AM Raymond Garcia MD Pulmonology [phone]    10/18/2024 10:00 AM Sapphire Swift MD Cardiology [phone]    12/5/2024 2:00 PM SCHEDULE, [facility] PACER NURSE Cardiology [phone]    12/9/2024 1:20 PM Pastor Howard MD Nephrology [phone]    2/21/2025 12:30 PM Shakira Milligan APRN - CNP Cardiology [phone]            Follow Up:   Plan for next ACM outreach in approximately 2 weeks to complete:  - disease specific assessments  - medication review   - goal progression  - education   - RPM.   Patient  is agreeable to this plan.

## 2024-09-03 ENCOUNTER — OFFICE VISIT (OUTPATIENT)
Dept: CARDIOLOGY CLINIC | Age: 64
End: 2024-09-03
Payer: COMMERCIAL

## 2024-09-03 ENCOUNTER — OFFICE VISIT (OUTPATIENT)
Dept: NEPHROLOGY | Age: 64
End: 2024-09-03
Payer: COMMERCIAL

## 2024-09-03 ENCOUNTER — OFFICE VISIT (OUTPATIENT)
Dept: CARDIOTHORACIC SURGERY | Age: 64
End: 2024-09-03
Payer: COMMERCIAL

## 2024-09-03 VITALS
DIASTOLIC BLOOD PRESSURE: 88 MMHG | HEIGHT: 74 IN | OXYGEN SATURATION: 94 % | BODY MASS INDEX: 40.43 KG/M2 | HEART RATE: 80 BPM | WEIGHT: 315 LBS | SYSTOLIC BLOOD PRESSURE: 138 MMHG

## 2024-09-03 VITALS
OXYGEN SATURATION: 97 % | HEART RATE: 75 BPM | SYSTOLIC BLOOD PRESSURE: 128 MMHG | DIASTOLIC BLOOD PRESSURE: 73 MMHG | BODY MASS INDEX: 40.43 KG/M2 | HEIGHT: 74 IN | WEIGHT: 315 LBS

## 2024-09-03 VITALS — OXYGEN SATURATION: 96 % | HEART RATE: 72 BPM | SYSTOLIC BLOOD PRESSURE: 132 MMHG | DIASTOLIC BLOOD PRESSURE: 78 MMHG

## 2024-09-03 DIAGNOSIS — I73.9 PAD (PERIPHERAL ARTERY DISEASE) (HCC): ICD-10-CM

## 2024-09-03 DIAGNOSIS — I10 ESSENTIAL HYPERTENSION: Primary | ICD-10-CM

## 2024-09-03 DIAGNOSIS — I35.0 SEVERE AORTIC STENOSIS: Primary | ICD-10-CM

## 2024-09-03 DIAGNOSIS — I35.0 AORTIC STENOSIS, SEVERE: Primary | ICD-10-CM

## 2024-09-03 DIAGNOSIS — I10 ESSENTIAL HYPERTENSION: ICD-10-CM

## 2024-09-03 DIAGNOSIS — Z91.89 AT RISK FOR FLUID VOLUME OVERLOAD: ICD-10-CM

## 2024-09-03 DIAGNOSIS — I50.23 ACUTE ON CHRONIC SYSTOLIC CONGESTIVE HEART FAILURE, NYHA CLASS 2 (HCC): ICD-10-CM

## 2024-09-03 DIAGNOSIS — N18.32 STAGE 3B CHRONIC KIDNEY DISEASE (HCC): ICD-10-CM

## 2024-09-03 DIAGNOSIS — E11.21 DIABETIC NEPHROPATHY WITH PROTEINURIA (HCC): ICD-10-CM

## 2024-09-03 DIAGNOSIS — E87.79 OTHER FLUID OVERLOAD: ICD-10-CM

## 2024-09-03 PROCEDURE — 1036F TOBACCO NON-USER: CPT | Performed by: NURSE PRACTITIONER

## 2024-09-03 PROCEDURE — G8417 CALC BMI ABV UP PARAM F/U: HCPCS | Performed by: NURSE PRACTITIONER

## 2024-09-03 PROCEDURE — 3074F SYST BP LT 130 MM HG: CPT | Performed by: PHYSICIAN ASSISTANT

## 2024-09-03 PROCEDURE — 3078F DIAST BP <80 MM HG: CPT | Performed by: PHYSICIAN ASSISTANT

## 2024-09-03 PROCEDURE — G8427 DOCREV CUR MEDS BY ELIG CLIN: HCPCS | Performed by: PHYSICIAN ASSISTANT

## 2024-09-03 PROCEDURE — 3017F COLORECTAL CA SCREEN DOC REV: CPT | Performed by: INTERNAL MEDICINE

## 2024-09-03 PROCEDURE — 3017F COLORECTAL CA SCREEN DOC REV: CPT | Performed by: PHYSICIAN ASSISTANT

## 2024-09-03 PROCEDURE — 1036F TOBACCO NON-USER: CPT | Performed by: PHYSICIAN ASSISTANT

## 2024-09-03 PROCEDURE — G2211 COMPLEX E/M VISIT ADD ON: HCPCS | Performed by: INTERNAL MEDICINE

## 2024-09-03 PROCEDURE — 3075F SYST BP GE 130 - 139MM HG: CPT | Performed by: INTERNAL MEDICINE

## 2024-09-03 PROCEDURE — G8417 CALC BMI ABV UP PARAM F/U: HCPCS | Performed by: PHYSICIAN ASSISTANT

## 2024-09-03 PROCEDURE — 3075F SYST BP GE 130 - 139MM HG: CPT | Performed by: NURSE PRACTITIONER

## 2024-09-03 PROCEDURE — 3017F COLORECTAL CA SCREEN DOC REV: CPT | Performed by: NURSE PRACTITIONER

## 2024-09-03 PROCEDURE — 99205 OFFICE O/P NEW HI 60 MIN: CPT | Performed by: PHYSICIAN ASSISTANT

## 2024-09-03 PROCEDURE — 2022F DILAT RTA XM EVC RTNOPTHY: CPT | Performed by: INTERNAL MEDICINE

## 2024-09-03 PROCEDURE — 3078F DIAST BP <80 MM HG: CPT | Performed by: INTERNAL MEDICINE

## 2024-09-03 PROCEDURE — 1036F TOBACCO NON-USER: CPT | Performed by: INTERNAL MEDICINE

## 2024-09-03 PROCEDURE — G8417 CALC BMI ABV UP PARAM F/U: HCPCS | Performed by: INTERNAL MEDICINE

## 2024-09-03 PROCEDURE — 3079F DIAST BP 80-89 MM HG: CPT | Performed by: NURSE PRACTITIONER

## 2024-09-03 PROCEDURE — 3046F HEMOGLOBIN A1C LEVEL >9.0%: CPT | Performed by: INTERNAL MEDICINE

## 2024-09-03 PROCEDURE — G8427 DOCREV CUR MEDS BY ELIG CLIN: HCPCS | Performed by: INTERNAL MEDICINE

## 2024-09-03 PROCEDURE — 99214 OFFICE O/P EST MOD 30 MIN: CPT | Performed by: INTERNAL MEDICINE

## 2024-09-03 PROCEDURE — 99214 OFFICE O/P EST MOD 30 MIN: CPT | Performed by: NURSE PRACTITIONER

## 2024-09-03 PROCEDURE — G8427 DOCREV CUR MEDS BY ELIG CLIN: HCPCS | Performed by: NURSE PRACTITIONER

## 2024-09-03 RX ORDER — METOLAZONE 5 MG/1
TABLET ORAL
Qty: 30 TABLET | Refills: 0 | Status: SHIPPED | OUTPATIENT
Start: 2024-09-03

## 2024-09-03 ASSESSMENT — ENCOUNTER SYMPTOMS
NAUSEA: 0
ABDOMINAL DISTENTION: 0
COUGH: 0
VOMITING: 0
SHORTNESS OF BREATH: 1

## 2024-09-03 NOTE — PROGRESS NOTES
Patient is here with friend Markell    Per Patient  Dr. Howard hs put the metolazone and Jardiance on hold.     
rales/wheezes/rhonchi  Cardiovascular:  Regular rate and rhythm with Grade III/VI JIA heard best at LSB.  Abdomen: Soft, non-tender, non-distended with normal bowel sounds.  Musculoskeletal:  No clubbing, cyanosis or edema bilaterally.  Full range of motion without deformity. Left leg amputation.  Skin: Skin color, texture, turgor normal.  No rashes or lesions.  Neurologic:  Neurovascularly intact without any focal sensory/motor deficits.   Psychiatric:  Alert and oriented, thought content appropriate, normal insight.  Capillary Refill: Brisk,< 3 seconds   Peripheral Pulses: +2 palpable, equal bilaterally   Active Problem List:  Patient Active Problem List   Diagnosis    CAD (coronary artery disease)    Type 2 diabetes mellitus with insulin therapy ([facility])    Hyperlipidemia    Essential hypertension    Charcot's joint, right ankle and foot    Hx of CABG    Lumbar stenosis without neurogenic claudication    Stage 3 chronic kidney disease ([facility])    Chronic anemia    Morbid obesity ([facility])    Physical deconditioning    Hemoglobin A1C greater than 9%, indicating poor diabetic control    Acute on chronic congestive heart failure ([facility])    Moderate persistent asthma    Peripheral vascular disease, unspecified ([facility])    Hx of BKA, left ([facility])    History of left-sided carotid endarterectomy    Bilateral carotid artery stenosis    Presence of cardiac pacemaker    Elevated PSA measurement    Hypovitaminosis D    NSTEMI (non-ST elevated myocardial infarction) ([facility])    Acute on chronic combined systolic and diastolic congestive heart failure ([facility])    Acute kidney injury ([facility])    Chest pain    Severe muscle deconditioning    Acute on chronic combined systolic and diastolic CHF (congestive heart failure) ([facility])    COPD with exacerbation ([facility])    Moderate aortic stenosis     Assessment:   Severe Symptomatic Aortic Stenosis  Previous sternotomy s/p CABG    Plan: 9/3/24  63 y.o. year-old male with severe symptomatic aortic stenosis.  Patient

## 2024-09-03 NOTE — PROGRESS NOTES
tablet Take 1 tablet by mouth daily (Patient not taking: Reported on 9/3/2024) 90 tablet 1     No current facility-administered medications for this visit.     No Known Allergies    SUBJECTIVE:   Review of Systems   Constitutional:  Negative for activity change, appetite change, diaphoresis and fatigue.   Respiratory:  Positive for shortness of breath (FERRERA). Negative for cough.    Cardiovascular:  Negative for chest pain, palpitations and leg swelling.   Gastrointestinal:  Negative for abdominal distention, nausea and vomiting.   Neurological:  Negative for weakness, light-headedness and headaches.   Hematological:  Negative for adenopathy.   Psychiatric/Behavioral:  Negative for sleep disturbance.        OBJECTIVE:   Today's Vitals:  /88   Pulse 80   Ht 1.88 m (6' 2\")   Wt (!) 152 kg (335 lb)   SpO2 94%   BMI 43.01 kg/m²     Physical Exam  Vitals reviewed.   Constitutional:       General: He is not in acute distress.     Appearance: Normal appearance. He is well-developed. He is not diaphoretic.   HENT:      Head: Normocephalic and atraumatic.   Eyes:      Conjunctiva/sclera: Conjunctivae normal.   Cardiovascular:      Rate and Rhythm: Normal rate and regular rhythm.      Heart sounds: Murmur (grade 2-3) heard.   Pulmonary:      Effort: Pulmonary effort is normal. No respiratory distress.      Breath sounds: Normal breath sounds. No wheezing or rales.   Abdominal:      General: Bowel sounds are normal. There is no distension.      Palpations: Abdomen is soft.      Tenderness: There is no abdominal tenderness.   Musculoskeletal:         General: Normal range of motion.      Cervical back: Normal range of motion and neck supple.      Right lower leg: No edema.   Skin:     General: Skin is warm and dry.      Capillary Refill: Capillary refill takes less than 2 seconds.   Neurological:      Mental Status: He is alert and oriented to person, place, and time.      Coordination: Coordination normal.

## 2024-09-03 NOTE — PROGRESS NOTES
the skin 2 times daily 63 mL 1    albuterol sulfate HFA (VENTOLIN HFA) 108 (90 Base) MCG/ACT inhaler Inhale 2 puffs into the lungs 4 times daily as needed for Wheezing 54 g 1    Polyethylene Glycol 3350 (MIRALAX PO) Take by mouth as needed      Multiple Vitamins-Minerals (MULTIVITAMIN PO) Take 1 tablet by mouth daily Central-Pa         Vitals     /78 (Site: Left Upper Arm, Position: Sitting, Cuff Size: Medium Adult)   Pulse 72   SpO2 96%  Wt Readings from Last 3 Encounters:   08/16/24 (!) 152.9 kg (337 lb)   08/12/24 (!) 160.6 kg (354 lb)   07/25/24 (!) 156.5 kg (345 lb)        Physical Exam     General -- no distress  Lungs -- clear  Heart -- S1, S2 heard, JVD - no  Abdomen - soft, non-tender  Extremities --chronic right leg edema  CNS - awake and alert    Labs, Radiology and Tests    Labs -    9/23 1/24 6/24 8/24 8/24       potassium 4.1 3.9 4.0 4.0 4.3       BUN 28 28 43 33 60       Creatinine 1.6 1.7 1.9 2.1 2.0       eGFR 48 45 39 35 37                   UPCR 2.8 3.1          UMCR 300 2183                        Renal Ultrasound Scan -- 11/22  Right kidney 10.9 cm left kidney 11.6 cm  Creased resistive indices     Other : old  lab data have been reviewed and noted patient's baseline creatinine is probably around 1.3 - 1.5    Ejection fraction 4/20-EF 50% and grade 1 diastolic dysfunction  Assessment    Renal -patient appears to be having chronic kidney disease stage III most likely secondary to senile nephrosclerosis longstanding hypertension and diabetes however due to his recent sepsis in September 22 he did lose some kidney function at that time  -Overall fluid status is much better currently at home he is weighing 332 pounds  -Patient's CO2 is slightly rising as well at this time I will hold the metolazone  -Continue Bumex 2 mg twice a day.  -Check weight every day at any time the weight crosses 335 pounds at home advised him to take the metolazone for 1 to 2 days 5 mg  -Check a BMP in a

## 2024-09-03 NOTE — PATIENT INSTRUCTIONS
You may receive a survey regarding the care you received during your visit.  Your input is valuable to us.  We encourage you to complete and return your survey.  We hope you will choose us in the future for your healthcare needs.    Your nurses today were Katt.  Office hours:   Mon-Thurs 8-4:30  Friday 8-12  Phone: [phone]    Continue:  Continue current medications  Daily weights and record  Fluid restriction of 2 Liters per day  Limit sodium in diet to around 4362-4783 mg/day  Monitor BP  Activity as tolerated     Call the Heart Failure Clinic for any of the following symptoms:   Weight gain of -3 pounds in 1 day or 5 pounds in 1 week  Increased shortness of breath  Shortness of breath while laying down  Cough  Chest pain  Swelling in feet, ankles or legs  Bloating in abdomen  Fatigue      No medication changes today     Continue diet/fluid adherence  Continue daily wts.  F/U w/ Cardiology  F/U in clinic per structural team

## 2024-09-03 NOTE — PATIENT INSTRUCTIONS
You may receive a survey regarding the care you received during your visit.  Your input is valuable to us.  We encourage you to complete and return your survey.  We hope you will choose us in the future for your healthcare needs.    Thank you for choosing [facility]!    Your Medical Assistant Today:  Ara MCALLISTER   Your Provider for Today: Tea Lozano PA-C  Ph. [phone]    Have a Great Day!

## 2024-09-04 NOTE — TELEPHONE ENCOUNTER
Pt had appt with Dr. Howard 9/3.  Per Dr. Howard's note:      Nora, can you please schedule CTAs and heart cath with these prehydration orders?

## 2024-09-10 PROBLEM — I35.0 SEVERE AORTIC STENOSIS: Status: ACTIVE | Noted: 2024-08-22

## 2024-09-10 NOTE — TELEPHONE ENCOUNTER
Patient scheduled for CTAs 9/16.  Discussed with OPN, pt will come in for 6 hours of prehydration at 6:00am with 5 hours post hydration per orders from Dr. Howard.  On schedule for prehydration.

## 2024-09-10 NOTE — TELEPHONE ENCOUNTER
Heart cath scheduled with Dr. Swift.  Pt will come in the night before for prehydration.  Discussed with Ila in admitting.  Scheduled with [facility] in cath lab.     Shakira, patient will be coming in 10/7/24 at 8pm for prehydration.  Orders from Dr. Howard:

## 2024-09-12 ENCOUNTER — TELEPHONE (OUTPATIENT)
Dept: NEPHROLOGY | Age: 64
End: 2024-09-12

## 2024-09-13 ENCOUNTER — CARE COORDINATION (OUTPATIENT)
Dept: CARE COORDINATION | Age: 64
End: 2024-09-13

## 2024-09-16 ENCOUNTER — HOSPITAL ENCOUNTER (OUTPATIENT)
Dept: NURSING | Age: 64
Discharge: HOME | End: 2024-09-16
Payer: COMMERCIAL

## 2024-09-16 ENCOUNTER — HOSPITAL ENCOUNTER (OUTPATIENT)
Dept: CT IMAGING | Age: 64
Discharge: HOME | End: 2024-09-16
Payer: COMMERCIAL

## 2024-09-16 VITALS
RESPIRATION RATE: 18 BRPM | TEMPERATURE: 97.8 F | DIASTOLIC BLOOD PRESSURE: 66 MMHG | WEIGHT: 315 LBS | OXYGEN SATURATION: 94 % | BODY MASS INDEX: 42.11 KG/M2 | SYSTOLIC BLOOD PRESSURE: 117 MMHG

## 2024-09-16 DIAGNOSIS — I35.0 SEVERE AORTIC STENOSIS: ICD-10-CM

## 2024-09-16 PROCEDURE — 6360000004 HC RX CONTRAST MEDICATION: Performed by: INTERNAL MEDICINE

## 2024-09-16 PROCEDURE — 71275 CT ANGIOGRAPHY CHEST: CPT

## 2024-09-16 PROCEDURE — 74174 CTA ABD&PLVS W/CONTRAST: CPT

## 2024-09-16 PROCEDURE — 96360 HYDRATION IV INFUSION INIT: CPT

## 2024-09-16 PROCEDURE — 2580000003 HC RX 258: Performed by: INTERNAL MEDICINE

## 2024-09-16 PROCEDURE — 70498 CT ANGIOGRAPHY NECK: CPT

## 2024-09-16 PROCEDURE — 96361 HYDRATE IV INFUSION ADD-ON: CPT

## 2024-09-16 RX ORDER — DAPAGLIFLOZIN 5 MG/1
5 TABLET, FILM COATED ORAL EVERY MORNING
COMMUNITY

## 2024-09-16 RX ORDER — SODIUM CHLORIDE 9 MG/ML
INJECTION, SOLUTION INTRAVENOUS CONTINUOUS
Status: DISCONTINUED | OUTPATIENT
Start: 2024-09-16 | End: 2024-09-16 | Stop reason: HOSPADM

## 2024-09-16 RX ORDER — IOPAMIDOL 755 MG/ML
125 INJECTION, SOLUTION INTRAVASCULAR
Status: COMPLETED | OUTPATIENT
Start: 2024-09-16 | End: 2024-09-16

## 2024-09-16 RX ADMIN — IOPAMIDOL 125 ML: 755 INJECTION, SOLUTION INTRAVENOUS at 12:11

## 2024-09-16 RX ADMIN — SODIUM CHLORIDE: 9 INJECTION, SOLUTION INTRAVENOUS at 06:11

## 2024-09-16 ASSESSMENT — PAIN - FUNCTIONAL ASSESSMENT: PAIN_FUNCTIONAL_ASSESSMENT: NONE - DENIES PAIN

## 2024-09-18 ENCOUNTER — LAB (OUTPATIENT)
Dept: LAB | Age: 64
End: 2024-09-18

## 2024-09-18 ENCOUNTER — TELEPHONE (OUTPATIENT)
Dept: CARDIOLOGY CLINIC | Age: 64
End: 2024-09-18

## 2024-09-18 DIAGNOSIS — I10 ESSENTIAL HYPERTENSION: ICD-10-CM

## 2024-09-18 DIAGNOSIS — N18.32 STAGE 3B CHRONIC KIDNEY DISEASE (HCC): ICD-10-CM

## 2024-09-18 DIAGNOSIS — E87.79 OTHER FLUID OVERLOAD: ICD-10-CM

## 2024-09-18 DIAGNOSIS — E11.21 DIABETIC NEPHROPATHY WITH PROTEINURIA (HCC): ICD-10-CM

## 2024-09-18 LAB
ANION GAP SERPL CALC-SCNC: 14 MEQ/L (ref 8–16)
BUN SERPL-MCNC: 26 MG/DL (ref 7–22)
CALCIUM SERPL-MCNC: 9.2 MG/DL (ref 8.5–10.5)
CHLORIDE SERPL-SCNC: 101 MEQ/L (ref 98–111)
CO2 SERPL-SCNC: 24 MEQ/L (ref 23–33)
CREAT SERPL-MCNC: 1.8 MG/DL (ref 0.4–1.2)
GFR SERPL CREATININE-BSD FRML MDRD: 42 ML/MIN/1.73M2
GLUCOSE SERPL-MCNC: 161 MG/DL (ref 70–108)
POTASSIUM SERPL-SCNC: 4 MEQ/L (ref 3.5–5.2)
SODIUM SERPL-SCNC: 139 MEQ/L (ref 135–145)

## 2024-09-23 ENCOUNTER — OFFICE VISIT (OUTPATIENT)
Dept: CARDIOLOGY CLINIC | Age: 64
End: 2024-09-23
Payer: COMMERCIAL

## 2024-09-23 VITALS
BODY MASS INDEX: 42.63 KG/M2 | HEART RATE: 81 BPM | SYSTOLIC BLOOD PRESSURE: 143 MMHG | HEIGHT: 74 IN | DIASTOLIC BLOOD PRESSURE: 80 MMHG

## 2024-09-23 DIAGNOSIS — I35.0 SEVERE AORTIC STENOSIS: Primary | ICD-10-CM

## 2024-09-23 DIAGNOSIS — I10 ESSENTIAL HYPERTENSION: ICD-10-CM

## 2024-09-23 DIAGNOSIS — Z95.1 HX OF CABG: ICD-10-CM

## 2024-09-23 DIAGNOSIS — I48.0 PAROXYSMAL ATRIAL FIBRILLATION (HCC): ICD-10-CM

## 2024-09-23 PROCEDURE — 99214 OFFICE O/P EST MOD 30 MIN: CPT | Performed by: PHYSICIAN ASSISTANT

## 2024-09-23 PROCEDURE — 3077F SYST BP >= 140 MM HG: CPT | Performed by: PHYSICIAN ASSISTANT

## 2024-09-23 PROCEDURE — 3079F DIAST BP 80-89 MM HG: CPT | Performed by: PHYSICIAN ASSISTANT

## 2024-09-24 ENCOUNTER — CARE COORDINATION (OUTPATIENT)
Dept: CARE COORDINATION | Age: 64
End: 2024-09-24

## 2024-10-01 ENCOUNTER — CARE COORDINATION (OUTPATIENT)
Dept: CARE COORDINATION | Age: 64
End: 2024-10-01

## 2024-10-01 NOTE — CARE COORDINATION
Ambulatory Care Coordination Note     10/1/2024 1:20 PM     Patient Current Location:  Home:  [facility]  [place] [address]     ACM contacted the patient by telephone. Verified name and  with patient as identifiers.         ACM: Leonora Angelo RN     Challenges to be reviewed by the provider   Additional needs identified to be addressed with provider No  none               Method of communication with provider: none.    Has the patient been seen in the ED since your last call? no    Care Summary Note: Spoke with Ashwin Xiong for review  States doing well and feeling good  Scheduled for cardiac cath on Oct 8th  Completed follow up appts  Continues with RPM monitoring, no alerts noted  Denies needs from myself during this call    Plan  Reinforce education completed  Collaborate with CHF clinic and specialty offices as needed  Ensure no barriers following cardiac cath  Reinforce importance of early symptom recognition and reporting  Continue with RPM monitoring    Offered patient enrollment in the Remote Patient Monitoring (RPM) program for in-home monitoring: Yes, patient enrolled; current status is activated and monitoring.     Assessments Completed:   No changes since last call    Medications Reviewed:   Completed during this call    Advance Care Planning:   Not reviewed during this call     Care Planning:    Goals Addressed                      This Visit's Progress      Conditions and Symptoms (pt-stated)   On track      I will schedule office visits, as directed by my provider.  I will keep my appointment or reschedule if I have to cancel.  I will notify my provider of any barriers to my plan of care.  I will follow my Zone Management tool to seek urgent or emergent care.  I will notify my provider of any symptoms that indicate a worsening of my condition.    Barriers: need for additional support and education  Plan for overcoming my barriers: family and ACM support  Confidence: 8/10  Anticipated Goal

## 2024-10-07 ENCOUNTER — OFFICE VISIT (OUTPATIENT)
Dept: PULMONOLOGY | Age: 64
End: 2024-10-07
Payer: COMMERCIAL

## 2024-10-07 ENCOUNTER — CARE COORDINATION (OUTPATIENT)
Dept: CARE COORDINATION | Age: 64
End: 2024-10-07

## 2024-10-07 ENCOUNTER — PREP FOR PROCEDURE (OUTPATIENT)
Dept: CARDIOLOGY | Age: 64
End: 2024-10-07

## 2024-10-07 ENCOUNTER — HOSPITAL ENCOUNTER (OUTPATIENT)
Age: 64
Discharge: HOME | End: 2024-10-08
Attending: INTERNAL MEDICINE | Admitting: INTERNAL MEDICINE
Payer: COMMERCIAL

## 2024-10-07 VITALS
BODY MASS INDEX: 40.43 KG/M2 | OXYGEN SATURATION: 93 % | TEMPERATURE: 98.1 F | DIASTOLIC BLOOD PRESSURE: 78 MMHG | SYSTOLIC BLOOD PRESSURE: 124 MMHG | HEART RATE: 85 BPM | HEIGHT: 74 IN | WEIGHT: 315 LBS

## 2024-10-07 DIAGNOSIS — R06.81 WITNESSED EPISODE OF APNEA: ICD-10-CM

## 2024-10-07 DIAGNOSIS — I50.20 HFREF (HEART FAILURE WITH REDUCED EJECTION FRACTION) (HCC): ICD-10-CM

## 2024-10-07 DIAGNOSIS — I35.0 SEVERE AORTIC STENOSIS: ICD-10-CM

## 2024-10-07 DIAGNOSIS — E66.01 MORBID OBESITY WITH BMI OF 50.0-59.9, ADULT: ICD-10-CM

## 2024-10-07 DIAGNOSIS — G47.19 EXCESSIVE DAYTIME SLEEPINESS: Primary | ICD-10-CM

## 2024-10-07 PROBLEM — N28.9 RENAL INSUFFICIENCY: Status: ACTIVE | Noted: 2024-10-07

## 2024-10-07 LAB
ABO: NORMAL
ANTIBODY SCREEN: NORMAL
INR PPP: 1.06 (ref 0.85–1.13)
RH FACTOR: NORMAL

## 2024-10-07 PROCEDURE — 3074F SYST BP LT 130 MM HG: CPT | Performed by: INTERNAL MEDICINE

## 2024-10-07 PROCEDURE — 1036F TOBACCO NON-USER: CPT | Performed by: INTERNAL MEDICINE

## 2024-10-07 PROCEDURE — 3017F COLORECTAL CA SCREEN DOC REV: CPT | Performed by: INTERNAL MEDICINE

## 2024-10-07 PROCEDURE — 3078F DIAST BP <80 MM HG: CPT | Performed by: INTERNAL MEDICINE

## 2024-10-07 PROCEDURE — 86850 RBC ANTIBODY SCREEN: CPT

## 2024-10-07 PROCEDURE — 93005 ELECTROCARDIOGRAM TRACING: CPT | Performed by: NURSE PRACTITIONER

## 2024-10-07 PROCEDURE — G8427 DOCREV CUR MEDS BY ELIG CLIN: HCPCS | Performed by: INTERNAL MEDICINE

## 2024-10-07 PROCEDURE — 2580000003 HC RX 258: Performed by: NURSE PRACTITIONER

## 2024-10-07 PROCEDURE — G8417 CALC BMI ABV UP PARAM F/U: HCPCS | Performed by: INTERNAL MEDICINE

## 2024-10-07 PROCEDURE — 36415 COLL VENOUS BLD VENIPUNCTURE: CPT

## 2024-10-07 PROCEDURE — G8484 FLU IMMUNIZE NO ADMIN: HCPCS | Performed by: INTERNAL MEDICINE

## 2024-10-07 PROCEDURE — 99204 OFFICE O/P NEW MOD 45 MIN: CPT | Performed by: INTERNAL MEDICINE

## 2024-10-07 PROCEDURE — 85610 PROTHROMBIN TIME: CPT

## 2024-10-07 PROCEDURE — 86900 BLOOD TYPING SEROLOGIC ABO: CPT

## 2024-10-07 PROCEDURE — 86901 BLOOD TYPING SEROLOGIC RH(D): CPT

## 2024-10-07 RX ORDER — SODIUM CHLORIDE 0.9 % (FLUSH) 0.9 %
5-40 SYRINGE (ML) INJECTION EVERY 12 HOURS SCHEDULED
Status: DISCONTINUED | OUTPATIENT
Start: 2024-10-07 | End: 2024-10-08 | Stop reason: HOSPADM

## 2024-10-07 RX ORDER — SODIUM CHLORIDE 0.9 % (FLUSH) 0.9 %
5-40 SYRINGE (ML) INJECTION EVERY 12 HOURS SCHEDULED
Status: CANCELLED | OUTPATIENT
Start: 2024-10-07

## 2024-10-07 RX ORDER — NITROGLYCERIN 0.4 MG/1
0.4 TABLET SUBLINGUAL EVERY 5 MIN PRN
Status: CANCELLED | OUTPATIENT
Start: 2024-10-07

## 2024-10-07 RX ORDER — ASPIRIN 325 MG
325 TABLET ORAL ONCE
Status: CANCELLED | OUTPATIENT
Start: 2024-10-07 | End: 2024-10-07

## 2024-10-07 RX ORDER — SODIUM CHLORIDE 9 MG/ML
INJECTION, SOLUTION INTRAVENOUS CONTINUOUS
Status: DISCONTINUED | OUTPATIENT
Start: 2024-10-07 | End: 2024-10-08 | Stop reason: HOSPADM

## 2024-10-07 RX ORDER — SODIUM CHLORIDE 0.9 % (FLUSH) 0.9 %
5-40 SYRINGE (ML) INJECTION PRN
Status: DISCONTINUED | OUTPATIENT
Start: 2024-10-07 | End: 2024-10-08 | Stop reason: HOSPADM

## 2024-10-07 RX ORDER — NITROGLYCERIN 0.4 MG/1
0.4 TABLET SUBLINGUAL EVERY 5 MIN PRN
Status: DISCONTINUED | OUTPATIENT
Start: 2024-10-07 | End: 2024-10-08 | Stop reason: HOSPADM

## 2024-10-07 RX ORDER — SODIUM CHLORIDE 9 MG/ML
INJECTION, SOLUTION INTRAVENOUS PRN
Status: DISCONTINUED | OUTPATIENT
Start: 2024-10-07 | End: 2024-10-08 | Stop reason: HOSPADM

## 2024-10-07 RX ORDER — SODIUM CHLORIDE 0.9 % (FLUSH) 0.9 %
5-40 SYRINGE (ML) INJECTION PRN
Status: CANCELLED | OUTPATIENT
Start: 2024-10-07

## 2024-10-07 RX ORDER — SODIUM CHLORIDE 9 MG/ML
INJECTION, SOLUTION INTRAVENOUS PRN
Status: CANCELLED | OUTPATIENT
Start: 2024-10-07

## 2024-10-07 RX ORDER — SODIUM CHLORIDE 9 MG/ML
INJECTION, SOLUTION INTRAVENOUS CONTINUOUS
Status: CANCELLED | OUTPATIENT
Start: 2024-10-07

## 2024-10-07 RX ADMIN — SODIUM CHLORIDE: 9 INJECTION, SOLUTION INTRAVENOUS at 21:14

## 2024-10-07 RX ADMIN — SODIUM CHLORIDE, PRESERVATIVE FREE 10 ML: 5 INJECTION INTRAVENOUS at 21:13

## 2024-10-07 NOTE — CARE COORDINATION
Remote Alert Monitoring Note      Date/Time:  10/7/2024 3:47 PM  Patient Current Location: Home:  [facility]  [place] [address]    LPN contacted patient by telephone regarding red alert received for weight increase (341lb). Verified patients name and  as identifiers.    Rpm alert to be reviewed by the provider                         Background:  High Blood-Pressure, Kidney Disease, CHF     Refer to 911 immediately if:  Patient unresponsive or unable to provide history  Change in cognition or sudden confusion  Patient unable to respond in complete sentences  Intense chest pain/tightness  Any concern for any clinical emergency  Red Alert: Provider response time of 1 hr required for any red alert requiring intervention  Yellow Alert: Provider response time of 3hr required for any escalated yellow alert        Weight Weight Scale Triage  Was your weight obtained upon rising/waking today? no   Was your weight obtained after voiding and/or use of the bathroom today? yes   Did you weigh yourself in the same amount of clothing today, compared to how you typically do? no   Was the scale bumped or moved prior to today's weight? no   Is your scale on a flat/hard surface? yes   Did you obtain your weight with shoes on? yes   If yes, is this something you normally do during your daily weights? no   Were you standing up straight on the scale today? yes   Were you leaning on anything while obtaining your weight today? no     Have you taken your medications as instructed by your doctor today? Has been holding the Bumex for a few days due to upcoming heart cath tomorrow.     Weight Triage  Are you weighing any different than you did yesterday? (time of day, clothes and shoes on or off, etc)? yes   Do you have any shortness of breath? no   Do you have any swelling in your hands of feet? Mild BLE          Clinical Interventions: Reviewed and followed up on alerts and treatments-. Pt is in no apparent distress, speaking in full

## 2024-10-07 NOTE — PROGRESS NOTES
3350 (MIRALAX PO) Take by mouth as needed      Multiple Vitamins-Minerals (MULTIVITAMIN PO) Take 1 tablet by mouth daily Central-Pa       No current facility-administered medications for this visit.       Family History   Problem Relation Age of Onset    Diabetes Mother     High Blood Pressure Mother     Alzheimer's Disease Father          of, 84 or 85 years old    Heart Disease Father     High Blood Pressure Father     Cancer Neg Hx     Stroke Neg Hx         Any family history of any sleep problems or any one in your family on CPAP?Yes    Social History     Tobacco Use    Smoking status: Former     Current packs/day: 0.00     Types: Cigarettes     Quit date:      Years since quittin.7    Smokeless tobacco: Never   Vaping Use    Vaping status: Never Used   Substance Use Topics    Alcohol use: Not Currently    Drug use: Never     Caffeine Intake:  How much soda (pop), coffee, tea, power drinks do you ingest per day? 0 per day.    Employment History:  Where do you work? [employer]  What are your shifts? 7 am to 3 pm        Review of Systems:   General/Constitutional: No recent loss of weight or appetite changes. No fever or chills.  HENT: Negative.   Eyes: Negative.  Upper respiratory tract: No nasal stuffiness or post nasal drip.  Lower respiratory tract/ lungs: No cough or sputum production. No hemoptysis.  Cardiovascular: No palpitations or chest pain.  Gastrointestinal: No nausea or vomiting.  Neurological: No focal neurologiacal weakness.  Extremities: No edema.  Musculoskeletal: No complaints.  Genitourinary: No complaints.  Hematological: Negative.   Psychiatric/Behavioral: Negative.   Skin: No itching.  Physical Exam:    HEIGHTHeight: 188 cm (6' 2\") WEIGHTWeight - Scale: (!) 155.3 kg (342 lb 6.4 oz)    BMI:  There is no height or weight on file to calculate BMI.    Neck Size: 21.5      SAQLI:57  ESS:  15  Vitals:   Vitals:    10/07/24 1054   BP: 124/78   Site: Right Upper Arm   Position: Sitting

## 2024-10-08 ENCOUNTER — CARE COORDINATION (OUTPATIENT)
Dept: CARE COORDINATION | Age: 64
End: 2024-10-08

## 2024-10-08 VITALS
SYSTOLIC BLOOD PRESSURE: 153 MMHG | BODY MASS INDEX: 40.43 KG/M2 | OXYGEN SATURATION: 99 % | HEART RATE: 100 BPM | HEIGHT: 74 IN | WEIGHT: 315 LBS | TEMPERATURE: 97.7 F | RESPIRATION RATE: 18 BRPM | DIASTOLIC BLOOD PRESSURE: 88 MMHG

## 2024-10-08 LAB
ANION GAP SERPL CALC-SCNC: 11 MEQ/L (ref 8–16)
BDY SITE: ABNORMAL
BDY SITE: ABNORMAL
BUN SERPL-MCNC: 36 MG/DL (ref 7–22)
CALCIUM SERPL-MCNC: 8.8 MG/DL (ref 8.5–10.5)
CHLORIDE SERPL-SCNC: 100 MEQ/L (ref 98–111)
CHOLEST SERPL-MCNC: 104 MG/DL (ref 100–199)
CO2 SERPL-SCNC: 25 MEQ/L (ref 23–33)
COLLECTED BY:: ABNORMAL
COLLECTED BY:: ABNORMAL
CREAT SERPL-MCNC: 1.8 MG/DL (ref 0.4–1.2)
DEPRECATED MEAN GLUCOSE BLD GHB EST-ACNC: 249 MG/DL (ref 70–126)
DEPRECATED RDW RBC AUTO: 58.5 FL (ref 35–45)
ECHO BSA: 2.82 M2
EKG ATRIAL RATE: 94 BPM
EKG P AXIS: 79 DEGREES
EKG P-R INTERVAL: 188 MS
EKG Q-T INTERVAL: 436 MS
EKG QRS DURATION: 180 MS
EKG QTC CALCULATION (BAZETT): 545 MS
EKG R AXIS: -85 DEGREES
EKG T AXIS: 91 DEGREES
EKG VENTRICULAR RATE: 94 BPM
ERYTHROCYTE [DISTWIDTH] IN BLOOD BY AUTOMATED COUNT: 18 % (ref 11.5–14.5)
GFR SERPL CREATININE-BSD FRML MDRD: 42 ML/MIN/1.73M2
GLUCOSE SERPL-MCNC: 306 MG/DL (ref 70–108)
HBA1C MFR BLD HPLC: 10.3 % (ref 4.4–6.4)
HCT VFR BLD AUTO: 35.3 % (ref 42–52)
HDLC SERPL-MCNC: 30 MG/DL
HGB BLD-MCNC: 11 GM/DL (ref 14–18)
LDLC SERPL CALC-MCNC: 41 MG/DL
MCH RBC QN AUTO: 27.8 PG (ref 26–33)
MCHC RBC AUTO-ENTMCNC: 31.2 GM/DL (ref 32.2–35.5)
MCV RBC AUTO: 89.4 FL (ref 80–94)
PLATELET # BLD AUTO: 187 THOU/MM3 (ref 130–400)
PMV BLD AUTO: 11.2 FL (ref 9.4–12.4)
POTASSIUM SERPL-SCNC: 4.5 MEQ/L (ref 3.5–5.2)
RBC # BLD AUTO: 3.95 MILL/MM3 (ref 4.7–6.1)
SAO2 % BLD: 56 % (ref 94–97)
SAO2 % BLD: 92 % (ref 94–97)
SODIUM SERPL-SCNC: 136 MEQ/L (ref 135–145)
TRIGL SERPL-MCNC: 167 MG/DL (ref 0–199)
WBC # BLD AUTO: 8 THOU/MM3 (ref 4.8–10.8)

## 2024-10-08 PROCEDURE — 6370000000 HC RX 637 (ALT 250 FOR IP): Performed by: NURSE PRACTITIONER

## 2024-10-08 PROCEDURE — C1894 INTRO/SHEATH, NON-LASER: HCPCS | Performed by: INTERNAL MEDICINE

## 2024-10-08 PROCEDURE — 2500000003 HC RX 250 WO HCPCS: Performed by: INTERNAL MEDICINE

## 2024-10-08 PROCEDURE — 82810 BLOOD GASES O2 SAT ONLY: CPT

## 2024-10-08 PROCEDURE — 99152 MOD SED SAME PHYS/QHP 5/>YRS: CPT | Performed by: INTERNAL MEDICINE

## 2024-10-08 PROCEDURE — 80048 BASIC METABOLIC PNL TOTAL CA: CPT

## 2024-10-08 PROCEDURE — 6360000002 HC RX W HCPCS: Performed by: INTERNAL MEDICINE

## 2024-10-08 PROCEDURE — 93461 R&L HRT ART/VENTRICLE ANGIO: CPT | Performed by: INTERNAL MEDICINE

## 2024-10-08 PROCEDURE — 36415 COLL VENOUS BLD VENIPUNCTURE: CPT

## 2024-10-08 PROCEDURE — C1769 GUIDE WIRE: HCPCS | Performed by: INTERNAL MEDICINE

## 2024-10-08 PROCEDURE — 2580000003 HC RX 258: Performed by: NURSE PRACTITIONER

## 2024-10-08 PROCEDURE — 85027 COMPLETE CBC AUTOMATED: CPT

## 2024-10-08 PROCEDURE — 93010 ELECTROCARDIOGRAM REPORT: CPT | Performed by: INTERNAL MEDICINE

## 2024-10-08 PROCEDURE — 83036 HEMOGLOBIN GLYCOSYLATED A1C: CPT

## 2024-10-08 PROCEDURE — 2709999900 HC NON-CHARGEABLE SUPPLY: Performed by: INTERNAL MEDICINE

## 2024-10-08 PROCEDURE — 99153 MOD SED SAME PHYS/QHP EA: CPT | Performed by: INTERNAL MEDICINE

## 2024-10-08 PROCEDURE — 2580000003 HC RX 258: Performed by: INTERNAL MEDICINE

## 2024-10-08 PROCEDURE — 80061 LIPID PANEL: CPT

## 2024-10-08 PROCEDURE — C1887 CATHETER, GUIDING: HCPCS | Performed by: INTERNAL MEDICINE

## 2024-10-08 PROCEDURE — 6360000004 HC RX CONTRAST MEDICATION: Performed by: INTERNAL MEDICINE

## 2024-10-08 RX ORDER — SODIUM CHLORIDE 9 MG/ML
INJECTION, SOLUTION INTRAVENOUS PRN
Status: DISCONTINUED | OUTPATIENT
Start: 2024-10-08 | End: 2024-10-08 | Stop reason: HOSPADM

## 2024-10-08 RX ORDER — SODIUM CHLORIDE 0.9 % (FLUSH) 0.9 %
5-40 SYRINGE (ML) INJECTION EVERY 12 HOURS SCHEDULED
Status: DISCONTINUED | OUTPATIENT
Start: 2024-10-08 | End: 2024-10-08 | Stop reason: HOSPADM

## 2024-10-08 RX ORDER — SODIUM CHLORIDE 0.9 % (FLUSH) 0.9 %
5-40 SYRINGE (ML) INJECTION PRN
Status: DISCONTINUED | OUTPATIENT
Start: 2024-10-08 | End: 2024-10-08 | Stop reason: HOSPADM

## 2024-10-08 RX ORDER — FENTANYL CITRATE 50 UG/ML
INJECTION, SOLUTION INTRAMUSCULAR; INTRAVENOUS PRN
Status: DISCONTINUED | OUTPATIENT
Start: 2024-10-08 | End: 2024-10-08 | Stop reason: HOSPADM

## 2024-10-08 RX ORDER — MIDAZOLAM HYDROCHLORIDE 1 MG/ML
INJECTION INTRAMUSCULAR; INTRAVENOUS PRN
Status: DISCONTINUED | OUTPATIENT
Start: 2024-10-08 | End: 2024-10-08 | Stop reason: HOSPADM

## 2024-10-08 RX ORDER — BUMETANIDE 0.25 MG/ML
INJECTION INTRAMUSCULAR; INTRAVENOUS PRN
Status: DISCONTINUED | OUTPATIENT
Start: 2024-10-08 | End: 2024-10-08 | Stop reason: HOSPADM

## 2024-10-08 RX ORDER — LIDOCAINE HYDROCHLORIDE 20 MG/ML
INJECTION, SOLUTION EPIDURAL; INFILTRATION; INTRACAUDAL; PERINEURAL PRN
Status: DISCONTINUED | OUTPATIENT
Start: 2024-10-08 | End: 2024-10-08 | Stop reason: HOSPADM

## 2024-10-08 RX ORDER — ATROPINE SULFATE 0.4 MG/ML
0.5 INJECTION, SOLUTION INTRAVENOUS
Status: DISCONTINUED | OUTPATIENT
Start: 2024-10-08 | End: 2024-10-08 | Stop reason: HOSPADM

## 2024-10-08 RX ORDER — ASPIRIN 325 MG
325 TABLET ORAL ONCE
Status: COMPLETED | OUTPATIENT
Start: 2024-10-08 | End: 2024-10-08

## 2024-10-08 RX ORDER — IOPAMIDOL 755 MG/ML
INJECTION, SOLUTION INTRAVASCULAR PRN
Status: DISCONTINUED | OUTPATIENT
Start: 2024-10-08 | End: 2024-10-08 | Stop reason: HOSPADM

## 2024-10-08 RX ORDER — ACETAMINOPHEN 325 MG/1
650 TABLET ORAL EVERY 4 HOURS PRN
Status: DISCONTINUED | OUTPATIENT
Start: 2024-10-08 | End: 2024-10-08 | Stop reason: HOSPADM

## 2024-10-08 RX ADMIN — SODIUM CHLORIDE: 9 INJECTION, SOLUTION INTRAVENOUS at 07:38

## 2024-10-08 RX ADMIN — SODIUM CHLORIDE, PRESERVATIVE FREE 10 ML: 5 INJECTION INTRAVENOUS at 10:07

## 2024-10-08 RX ADMIN — ASPIRIN 325 MG: 325 TABLET ORAL at 05:46

## 2024-10-08 NOTE — PROGRESS NOTES
Pt admitted to  8AB36 via in a wheelchair from direct admit: home .  Complaints: Prehydration for heart cath tomorrow.    IV none Vital signs obtained. Assessment and data collection initiated. Two nurse skin assessment performed by Sade WILKERSON and Cinthia WILKERSON.   Oriented to room. Policies and procedures for 8AB explained. All questions answered with no further questions at this time. Fall prevention and safety brochure discussed with patient.  Bed alarm on. Call light in reach.Oriented to room.   Cinthia Zuniga RN, RN 10/7/2024 9:50 PM     Explained patients right to have family, representative or physician notified of their admission.  Patient has Declined for physician to be notified.  Patient has Declined for family/representative to be notified.    Patient would like family notified once per shift? No

## 2024-10-08 NOTE — PROGRESS NOTES
Pt educated on discharge instructions, questions answered. IV removed, hemostasis achieved. Pt follow-up appts made. Pt left via wheelchair. Discharged home with friend.

## 2024-10-08 NOTE — DISCHARGE INSTRUCTIONS
Increased swelling of groin or leg.   Unusual pain at groin or down that leg.   Signs of infection: redness, warmth to touch, drainage, poorly healing incision, fever, or chills.

## 2024-10-08 NOTE — CARE COORDINATION
RPM team,      Ashwin has been admitted to [facility].  Could you please pause RPM until notified of discharge.  Thank you!

## 2024-10-08 NOTE — H&P
[facility]  Sedation/Analgesia History & Physical    Pt Name: Ashwin Liriano  Account number: [id number]  MRN: [id number]  YOB: 1960  Provider Performing Procedure: Sapphire Swift MD MD  Referring Provider: Freedom Bah MD   Primary Care Physician: Elsa Pierce APRN - CNP  Date: 10/8/2024    PRE-PROCEDURE    Code Status: FULL CODE  Brief History/Pre-Procedure Diagnosis:   Symptomatic severe aortic stenosis  Undergoing TAVR work up   CAD, CABG  FERRERA, Angina   Consent: : I have discussed with the patient risks, benefits, and alternatives (and relevant risks, benefits, and side effects related to alternatives or not receiving care), and likelihood of the success.   The patient and/or representative appear to understand and agree to proceed.  The discussion encompasses risks, benefits, and side effects related to the alternatives and the risks related to not receiving the proposed care, treatment, and services.     The indication, risks and benefits of the procedure and possible therapeutic consequences and alternatives were discussed with the patient. The patient was given the opportunity to ask questions and to have them answered to his/her satisfaction. Risks of the procedure include but are not limited to the following: Bleeding, hematoma including retroperitoneal hemmorhage, infection, pain and discomfort, injury to the aorta and other blood vessels, rhythm disturbance, low blood pressure, myocardial infarction, stroke, kidney damage/failure, myocardial perforation, allergic reactions to sedatives/contrast material, loss of pulse/vascular compromise requiring surgery, aneurysm/pseudoaneurysm formation, possible loss of a limb/hand/leg, needing blood transfusion, requiring emergent open heart surgery or emergent coronary intervention, the need for intubation/respiratory support, the requirement for defibrillation/cardioversion, and death. Alternatives to and omission of the

## 2024-10-08 NOTE — PROCEDURES
PROCEDURE NOTE  Date: 10/7/2024   Name: Ashwin Liriano  YOB: 1960    Procedures      EKG was completed and handed to RN

## 2024-10-09 ENCOUNTER — OFFICE VISIT (OUTPATIENT)
Dept: FAMILY MEDICINE CLINIC | Age: 64
End: 2024-10-09
Payer: COMMERCIAL

## 2024-10-09 VITALS
HEIGHT: 74 IN | WEIGHT: 315 LBS | RESPIRATION RATE: 18 BRPM | OXYGEN SATURATION: 97 % | BODY MASS INDEX: 40.43 KG/M2 | SYSTOLIC BLOOD PRESSURE: 126 MMHG | HEART RATE: 95 BPM | TEMPERATURE: 96.9 F | DIASTOLIC BLOOD PRESSURE: 76 MMHG

## 2024-10-09 DIAGNOSIS — I50.9 CHRONIC CONGESTIVE HEART FAILURE, UNSPECIFIED HEART FAILURE TYPE (HCC): ICD-10-CM

## 2024-10-09 DIAGNOSIS — Z76.89 ENCOUNTER TO ESTABLISH CARE: ICD-10-CM

## 2024-10-09 DIAGNOSIS — N18.32 CKD STAGE 3B, GFR 30-44 ML/MIN (HCC): ICD-10-CM

## 2024-10-09 DIAGNOSIS — E66.01 MORBID OBESITY: ICD-10-CM

## 2024-10-09 DIAGNOSIS — R73.09 HEMOGLOBIN A1C GREATER THAN 9%, INDICATING POOR DIABETIC CONTROL: Primary | ICD-10-CM

## 2024-10-09 PROCEDURE — 99214 OFFICE O/P EST MOD 30 MIN: CPT | Performed by: NURSE PRACTITIONER

## 2024-10-09 PROCEDURE — 3074F SYST BP LT 130 MM HG: CPT | Performed by: NURSE PRACTITIONER

## 2024-10-09 PROCEDURE — 3078F DIAST BP <80 MM HG: CPT | Performed by: NURSE PRACTITIONER

## 2024-10-09 PROCEDURE — 1036F TOBACCO NON-USER: CPT | Performed by: NURSE PRACTITIONER

## 2024-10-09 PROCEDURE — G8427 DOCREV CUR MEDS BY ELIG CLIN: HCPCS | Performed by: NURSE PRACTITIONER

## 2024-10-09 PROCEDURE — G8417 CALC BMI ABV UP PARAM F/U: HCPCS | Performed by: NURSE PRACTITIONER

## 2024-10-09 PROCEDURE — 3017F COLORECTAL CA SCREEN DOC REV: CPT | Performed by: NURSE PRACTITIONER

## 2024-10-09 PROCEDURE — G8484 FLU IMMUNIZE NO ADMIN: HCPCS | Performed by: NURSE PRACTITIONER

## 2024-10-09 RX ORDER — ACYCLOVIR 400 MG/1
1 TABLET ORAL WEEKLY
Qty: 3 EACH | Refills: 2 | Status: SHIPPED | OUTPATIENT
Start: 2024-10-09

## 2024-10-09 ASSESSMENT — ENCOUNTER SYMPTOMS
NAUSEA: 0
APNEA: 1
ABDOMINAL DISTENTION: 0
BACK PAIN: 0
CHEST TIGHTNESS: 0
COUGH: 0
SHORTNESS OF BREATH: 1
WHEEZING: 0
DIARRHEA: 0
COLOR CHANGE: 0
VOMITING: 0
RHINORRHEA: 0

## 2024-10-09 NOTE — ASSESSMENT & PLAN NOTE
Currently undergoing workup for TAVR.  Will work on obesity after this is complete.  Patient currently unable to do any exercise due to shortness of breath associated with congestive heart failure.  Will try to better control diabetes.    Orders:    Continuous Glucose Sensor (DEXCOM G7 SENSOR) MISC; 1 each by Does not apply route once a week

## 2024-10-09 NOTE — PROGRESS NOTES
[facility]  [address]  [facility] [address]  Dept: [phone]  Loc: [phone]    10/9/24      Ashwin Liriano (:  1960) is a 63 y.o. male here for evaluation of the following chief complaint(s):  New Patient (Establish care, previously saw Theresa)       HPI  Patient here to establish care with new primary care provider.  Patient has significant medical problems.  Has a history of coronary artery disease status post CABG, has aortic stenosis.  Currently being evaluated by Dr. Bah for TAVR.  Had cardiac cath completed yesterday, shows open coronary arteries.  Is possibly proceeding onto TAVR.  Has significant congestive heart failure.  Follows with congestive heart failure clinic.  Sees Tyesha Betancourt, seeing tomorrow.  Notes currently on diuretic treatment.  Taking Bumex twice daily.  Also taking Zaroxolyn as needed for weight gain.  Was given extra dose of Bumex yesterday after procedure.  Notes currently being worked up for sleep apnea.  Saw pulmonology, plans to have sleep study completed after TAVR.  History of diabetes, not well-controlled.  A1c yesterday was 10.3.  Takes Levemir and lispro.  Recently started on Farxiga.  He has chronic kidney disease.  Follow with Dr. Howard.  He recently was found to have A-fib, noted on pacemaker interrogation.  Started on Eliquis.  He has a history of peripheral vascular disease, on Plavix.  He has left leg amputation.  He currently denies any acute symptoms.  Notes he continues to work part-time.  Trying to stay busy.  Was seeing a healthcare provider from on the [facility] until follow-up with me.  Assessment & Plan  Encounter to establish care     Hemoglobin A1C greater than 9%, indicating poor diabetic control  Will order Dexcom, for more data and to better control his diabetes.    Orders:    Continuous Glucose Sensor (DEXCOM G7 SENSOR) MISC; 1 each by Does not apply

## 2024-10-09 NOTE — ASSESSMENT & PLAN NOTE
Will order Dexcom, for more data and to better control his diabetes.    Orders:    Continuous Glucose Sensor (DEXCOM G7 SENSOR) MISC; 1 each by Does not apply route once a week

## 2024-10-10 ENCOUNTER — TELEPHONE (OUTPATIENT)
Dept: FAMILY MEDICINE CLINIC | Age: 64
End: 2024-10-10

## 2024-10-10 ENCOUNTER — OFFICE VISIT (OUTPATIENT)
Dept: CARDIOLOGY CLINIC | Age: 64
End: 2024-10-10
Payer: COMMERCIAL

## 2024-10-10 ENCOUNTER — CARE COORDINATION (OUTPATIENT)
Dept: CARE COORDINATION | Age: 64
End: 2024-10-10

## 2024-10-10 VITALS
HEART RATE: 82 BPM | HEIGHT: 74 IN | OXYGEN SATURATION: 97 % | SYSTOLIC BLOOD PRESSURE: 118 MMHG | DIASTOLIC BLOOD PRESSURE: 70 MMHG | WEIGHT: 315 LBS | BODY MASS INDEX: 40.43 KG/M2

## 2024-10-10 DIAGNOSIS — E11.9 TYPE 2 DIABETES MELLITUS WITH INSULIN THERAPY (HCC): Primary | ICD-10-CM

## 2024-10-10 DIAGNOSIS — I50.23 ACUTE ON CHRONIC SYSTOLIC CONGESTIVE HEART FAILURE, NYHA CLASS 2 (HCC): ICD-10-CM

## 2024-10-10 DIAGNOSIS — I48.0 PAROXYSMAL ATRIAL FIBRILLATION (HCC): ICD-10-CM

## 2024-10-10 DIAGNOSIS — Z79.4 TYPE 2 DIABETES MELLITUS WITH INSULIN THERAPY (HCC): Primary | ICD-10-CM

## 2024-10-10 DIAGNOSIS — I35.0 SEVERE AORTIC STENOSIS: Primary | ICD-10-CM

## 2024-10-10 DIAGNOSIS — I42.8 NONISCHEMIC CARDIOMYOPATHY (HCC): ICD-10-CM

## 2024-10-10 PROCEDURE — G8417 CALC BMI ABV UP PARAM F/U: HCPCS | Performed by: NURSE PRACTITIONER

## 2024-10-10 PROCEDURE — 99214 OFFICE O/P EST MOD 30 MIN: CPT | Performed by: NURSE PRACTITIONER

## 2024-10-10 PROCEDURE — 3017F COLORECTAL CA SCREEN DOC REV: CPT | Performed by: NURSE PRACTITIONER

## 2024-10-10 PROCEDURE — 1036F TOBACCO NON-USER: CPT | Performed by: NURSE PRACTITIONER

## 2024-10-10 PROCEDURE — G8427 DOCREV CUR MEDS BY ELIG CLIN: HCPCS | Performed by: NURSE PRACTITIONER

## 2024-10-10 PROCEDURE — 3074F SYST BP LT 130 MM HG: CPT | Performed by: NURSE PRACTITIONER

## 2024-10-10 PROCEDURE — G8484 FLU IMMUNIZE NO ADMIN: HCPCS | Performed by: NURSE PRACTITIONER

## 2024-10-10 PROCEDURE — 3078F DIAST BP <80 MM HG: CPT | Performed by: NURSE PRACTITIONER

## 2024-10-10 RX ORDER — INSULIN DEGLUDEC 200 U/ML
35 INJECTION, SOLUTION SUBCUTANEOUS 2 TIMES DAILY
Qty: 3 ADJUSTABLE DOSE PRE-FILLED PEN SYRINGE | Refills: 2 | Status: SHIPPED | OUTPATIENT
Start: 2024-10-10 | End: 2025-01-08

## 2024-10-10 ASSESSMENT — ENCOUNTER SYMPTOMS
VOMITING: 0
NAUSEA: 0
SHORTNESS OF BREATH: 1
ABDOMINAL DISTENTION: 0
COUGH: 0

## 2024-10-10 NOTE — PROGRESS NOTES
10/08/2024 02:56 AM    CO2 25 10/08/2024 02:56 AM    BUN 36 10/08/2024 02:56 AM    CREATININE 1.8 10/08/2024 02:56 AM    LABGLOM 42 10/08/2024 02:56 AM    LABGLOM 45 01/02/2024 08:35 AM    GLUCOSE 306 10/08/2024 02:56 AM    GLUCOSE 142 07/06/2023 08:50 AM    CALCIUM 8.8 10/08/2024 02:56 AM     Hepatic Function Panel:    Lab Results   Component Value Date/Time    ALKPHOS 88 09/19/2023 08:32 AM    ALT 16 09/19/2023 08:32 AM    AST 17 09/19/2023 08:32 AM    BILITOT 0.4 09/19/2023 08:32 AM    BILIDIR <0.2 02/03/2023 09:56 AM     Magnesium:    Lab Results   Component Value Date/Time    MG 2.4 06/21/2024 06:09 AM     PT/INR:    Lab Results   Component Value Date/Time    PROTIME 12.3 03/21/2023 08:31 AM    INR 1.06 10/07/2024 08:16 PM     Lipids:    Lab Results   Component Value Date/Time    TRIG 167 10/08/2024 02:56 AM    HDL 30 10/08/2024 02:56 AM       ASSESSMENT AND PLAN:   The patient's condition/symptoms are stable        Diagnosis Orders   1. Severe aortic stenosis        2. Acute on chronic systolic congestive heart failure, NYHA class 2/3, stage C (HCC)        3. Paroxysmal atrial fibrillation (HCC)        4. Nonischemic cardiomyopathy (HCC)              Continue:  GDMT:   ACE/ARB/ARNI - no d/t CKD   BB - Toprol 50 daily    Diuretic - Bumex 2mg BID  AA - no d/t CKD  SGLT2 -  Farxiga 5 daily - on hold per neph  Vasodilator - hydralazine 25mg TID   Other - plavix/eliquis,  KCL 20 bid, amlodipine 5 daily       HFrEF 35-40 2024, 50% 2020  Concentric LVH  DD  Severe AS - LF/LG - current TAVR work up  CAD s/p CABG 11/2019 (SVG to OM1, SVG to LCX, LIMA to LAD)  Nonobstructive PAD 2020 angiogram- med management at that time  New onset of paroxysmal afib found on device check - now on Eliquis, stable today   Total occlusion of right carotid and s/p carotid endarterectomy 5/2023  HTN - stable today, no changes    Stable, return of lower leg swelling and weight gain of 9lbs after holding his diuretic and

## 2024-10-10 NOTE — CARE COORDINATION
RPM team,      Can you please unpause RPM at this time to resume monitoring.  Thank you!  
[phone]    12/9/2024 1:20 PM Pastor Howard MD Nephrology [phone]    1/6/2025 11:30 AM Raymond Garcia MD Pulmonology [phone]    2/21/2025 12:30 PM Shakira Milligan APRN - [facility] Cardiology [phone]            Follow Up:   Plan for next ACM outreach in approximately 2 weeks to complete:  - disease specific assessments  - medication review   - goal progression  - education   - RPM.   Patient  is agreeable to this plan.

## 2024-10-10 NOTE — TELEPHONE ENCOUNTER
Patient called and states that [facility] not currently have any Levemir in stock and are not sure when they will get anymore in stock. Patient is wondering if he can have an alternative sent to the pharmacy. Please advise.

## 2024-10-10 NOTE — PATIENT INSTRUCTIONS
You may receive a survey regarding the care you received during your visit.  Your input is valuable to us.  We encourage you to complete and return your survey.  We hope you will choose us in the future for your healthcare needs.    Your nurses today were Katt.  Office hours:   Mon-Thurs 8-4:30  Friday 8-12  Phone: [phone]    Continue:  Continue current medications  Daily weights and record  Fluid restriction of 2 Liters per day  Limit sodium in diet to around 8567-8284 mg/day  Monitor BP  Activity as tolerated     Call the Heart Failure Clinic for any of the following symptoms:   Weight gain of -3 pounds in 1 day or 5 pounds in 1 week  Increased shortness of breath  Shortness of breath while laying down  Cough  Chest pain  Swelling in feet, ankles or legs  Bloating in abdomen  Fatigue        No medication changes today     Blood work next week for Dr. Cuevas     Will call next Monday for weight and decide on more metolazone.     Continue diet/fluid adherence  Continue daily wts.  F/U w/ Cardiology  F/U in clinic in 3 weeks

## 2024-10-14 ENCOUNTER — TELEPHONE (OUTPATIENT)
Dept: FAMILY MEDICINE CLINIC | Age: 64
End: 2024-10-14

## 2024-10-14 DIAGNOSIS — R73.09 HEMOGLOBIN A1C GREATER THAN 9%, INDICATING POOR DIABETIC CONTROL: Primary | ICD-10-CM

## 2024-10-15 ENCOUNTER — LAB (OUTPATIENT)
Dept: LAB | Age: 64
End: 2024-10-15

## 2024-10-15 DIAGNOSIS — N18.32 STAGE 3B CHRONIC KIDNEY DISEASE (HCC): ICD-10-CM

## 2024-10-15 DIAGNOSIS — I10 ESSENTIAL HYPERTENSION: ICD-10-CM

## 2024-10-15 DIAGNOSIS — E11.21 DIABETIC NEPHROPATHY WITH PROTEINURIA (HCC): ICD-10-CM

## 2024-10-15 DIAGNOSIS — E87.79 OTHER FLUID OVERLOAD: ICD-10-CM

## 2024-10-15 LAB
ANION GAP SERPL CALC-SCNC: 16 MEQ/L (ref 8–16)
BUN SERPL-MCNC: 46 MG/DL (ref 7–22)
CALCIUM SERPL-MCNC: 9.7 MG/DL (ref 8.5–10.5)
CHLORIDE SERPL-SCNC: 88 MEQ/L (ref 98–111)
CO2 SERPL-SCNC: 30 MEQ/L (ref 23–33)
CREAT SERPL-MCNC: 2.1 MG/DL (ref 0.4–1.2)
GFR SERPL CREATININE-BSD FRML MDRD: 35 ML/MIN/1.73M2
GLUCOSE SERPL-MCNC: 178 MG/DL (ref 70–108)
HGB BLD-MCNC: 12.7 GM/DL (ref 14–18)
POTASSIUM SERPL-SCNC: 3.6 MEQ/L (ref 3.5–5.2)
SODIUM SERPL-SCNC: 134 MEQ/L (ref 135–145)

## 2024-10-21 RX ORDER — METOPROLOL SUCCINATE 50 MG/1
50 TABLET, EXTENDED RELEASE ORAL DAILY
Qty: 30 TABLET | Refills: 0 | Status: SHIPPED | OUTPATIENT
Start: 2024-10-21

## 2024-10-22 ENCOUNTER — LAB (OUTPATIENT)
Dept: LAB | Age: 64
End: 2024-10-22

## 2024-10-22 ENCOUNTER — OFFICE VISIT (OUTPATIENT)
Dept: NEPHROLOGY | Age: 64
End: 2024-10-22
Payer: COMMERCIAL

## 2024-10-22 ENCOUNTER — TELEPHONE (OUTPATIENT)
Dept: CARDIAC REHAB | Age: 64
End: 2024-10-22

## 2024-10-22 ENCOUNTER — OFFICE VISIT (OUTPATIENT)
Dept: CARDIOLOGY CLINIC | Age: 64
End: 2024-10-22
Payer: COMMERCIAL

## 2024-10-22 VITALS
BODY MASS INDEX: 40.43 KG/M2 | WEIGHT: 315 LBS | OXYGEN SATURATION: 93 % | DIASTOLIC BLOOD PRESSURE: 64 MMHG | HEIGHT: 74 IN | SYSTOLIC BLOOD PRESSURE: 130 MMHG | HEART RATE: 91 BPM

## 2024-10-22 VITALS
WEIGHT: 315 LBS | SYSTOLIC BLOOD PRESSURE: 182 MMHG | HEART RATE: 86 BPM | BODY MASS INDEX: 41.73 KG/M2 | OXYGEN SATURATION: 92 % | DIASTOLIC BLOOD PRESSURE: 88 MMHG

## 2024-10-22 DIAGNOSIS — N18.32 STAGE 3B CHRONIC KIDNEY DISEASE (HCC): ICD-10-CM

## 2024-10-22 DIAGNOSIS — I10 ESSENTIAL HYPERTENSION: ICD-10-CM

## 2024-10-22 DIAGNOSIS — I10 ESSENTIAL HYPERTENSION: Primary | ICD-10-CM

## 2024-10-22 DIAGNOSIS — E87.79 OTHER FLUID OVERLOAD: ICD-10-CM

## 2024-10-22 DIAGNOSIS — E11.21 DIABETIC NEPHROPATHY WITH PROTEINURIA (HCC): ICD-10-CM

## 2024-10-22 DIAGNOSIS — I48.0 PAROXYSMAL ATRIAL FIBRILLATION (HCC): ICD-10-CM

## 2024-10-22 DIAGNOSIS — I35.0 SEVERE AORTIC STENOSIS: ICD-10-CM

## 2024-10-22 DIAGNOSIS — I42.8 NONISCHEMIC CARDIOMYOPATHY (HCC): ICD-10-CM

## 2024-10-22 DIAGNOSIS — I50.23 ACUTE ON CHRONIC SYSTOLIC CONGESTIVE HEART FAILURE, NYHA CLASS 2 (HCC): Primary | ICD-10-CM

## 2024-10-22 PROCEDURE — 3075F SYST BP GE 130 - 139MM HG: CPT | Performed by: NURSE PRACTITIONER

## 2024-10-22 PROCEDURE — 3077F SYST BP >= 140 MM HG: CPT | Performed by: INTERNAL MEDICINE

## 2024-10-22 PROCEDURE — 3046F HEMOGLOBIN A1C LEVEL >9.0%: CPT | Performed by: INTERNAL MEDICINE

## 2024-10-22 PROCEDURE — G8417 CALC BMI ABV UP PARAM F/U: HCPCS | Performed by: INTERNAL MEDICINE

## 2024-10-22 PROCEDURE — 2022F DILAT RTA XM EVC RTNOPTHY: CPT | Performed by: INTERNAL MEDICINE

## 2024-10-22 PROCEDURE — G2211 COMPLEX E/M VISIT ADD ON: HCPCS | Performed by: INTERNAL MEDICINE

## 2024-10-22 PROCEDURE — 99214 OFFICE O/P EST MOD 30 MIN: CPT | Performed by: NURSE PRACTITIONER

## 2024-10-22 PROCEDURE — 3079F DIAST BP 80-89 MM HG: CPT | Performed by: INTERNAL MEDICINE

## 2024-10-22 PROCEDURE — 99213 OFFICE O/P EST LOW 20 MIN: CPT | Performed by: INTERNAL MEDICINE

## 2024-10-22 PROCEDURE — G8484 FLU IMMUNIZE NO ADMIN: HCPCS | Performed by: NURSE PRACTITIONER

## 2024-10-22 PROCEDURE — G8484 FLU IMMUNIZE NO ADMIN: HCPCS | Performed by: INTERNAL MEDICINE

## 2024-10-22 PROCEDURE — G8427 DOCREV CUR MEDS BY ELIG CLIN: HCPCS | Performed by: NURSE PRACTITIONER

## 2024-10-22 PROCEDURE — 1036F TOBACCO NON-USER: CPT | Performed by: INTERNAL MEDICINE

## 2024-10-22 PROCEDURE — G8427 DOCREV CUR MEDS BY ELIG CLIN: HCPCS | Performed by: INTERNAL MEDICINE

## 2024-10-22 PROCEDURE — 3017F COLORECTAL CA SCREEN DOC REV: CPT | Performed by: NURSE PRACTITIONER

## 2024-10-22 PROCEDURE — 1036F TOBACCO NON-USER: CPT | Performed by: NURSE PRACTITIONER

## 2024-10-22 PROCEDURE — 3017F COLORECTAL CA SCREEN DOC REV: CPT | Performed by: INTERNAL MEDICINE

## 2024-10-22 PROCEDURE — 3078F DIAST BP <80 MM HG: CPT | Performed by: NURSE PRACTITIONER

## 2024-10-22 PROCEDURE — G8417 CALC BMI ABV UP PARAM F/U: HCPCS | Performed by: NURSE PRACTITIONER

## 2024-10-22 ASSESSMENT — ENCOUNTER SYMPTOMS
ABDOMINAL DISTENTION: 0
COUGH: 0
VOMITING: 0
NAUSEA: 0
SHORTNESS OF BREATH: 1

## 2024-10-22 NOTE — PATIENT INSTRUCTIONS
You may receive a survey regarding the care you received during your visit.  Your input is valuable to us.  We encourage you to complete and return your survey.  We hope you will choose us in the future for your healthcare needs.    Your nurses today were Katt.  Office hours:   Mon-Thurs 8-4:30  Friday 8-12  Phone: [phone]    Continue:  Continue current medications  Daily weights and record  Fluid restriction of 2 Liters per day  Limit sodium in diet to around 4924-4260 mg/day  Monitor BP  Activity as tolerated     Call the Heart Failure Clinic for any of the following symptoms:   Weight gain of -3 pounds in 1 day or 5 pounds in 1 week  Increased shortness of breath  Shortness of breath while laying down  Cough  Chest pain  Swelling in feet, ankles or legs  Bloating in abdomen  Fatigue        No medication changes today       Continue diet/fluid adherence  Continue daily wts.  F/U w/ Cardiology  F/U in clinic post procedure.

## 2024-10-22 NOTE — PROGRESS NOTES
Heart Failure Clinic for any changes in symptoms as noted in AVS.      No follow-ups on file. or sooner if needed     Patient given educational materials - see patient instructions.   We discussed the importance of weighing oneself and recording daily. We also discussed the importance of a low sodium diet, higher sodium foods to avoid and better low sodium food options.   Patient verbalizes understanding of plan of care using teach back method, and is agreeable to the treatment plan.       Electronically signed by EYDA Wilkerson CNP on 10/22/2024 at 1:52 PM

## 2024-10-22 NOTE — PROGRESS NOTES
[facility]        Outpatient Follow-Up note         10/22/2024 10:28 AM    Patient Name:   Ashwin Liriano  YOB: 1960  Primary Care Physician:  Wyatt Gastelum, EYAD - CNP   [facility]  [facility]  [address]  [address]  [facility] [address]  Dept: [phone]  Loc: [phone]     Chief Complaint / Reason for follow-up : Follow Up of CKD     Interval History :  Patient seen and examined by me.   Overall feels well weight significantly down no shortness of breath no chest pain  Overall well.     Past History :  Past Medical History:   Diagnosis Date    Acute left-sided low back pain with bilateral sciatica     Arthritis     CAD (coronary artery disease)     CKD (chronic kidney disease), stage II     Diabetes mellitus (HCC)     Hyperlipidemia     Hypertension     Liver disease     HEPITITIS AS A CHILD    S/P transmetatarsal amputation of foot, left (HCC)     Wound dehiscence, surgical, initial encounter      Past Surgical History:   Procedure Laterality Date    CARDIAC PROCEDURE N/A 10/8/2024    Left and right heart cath / coronary angiography w grafts performed by Sapphire Swift MD at [facility] CARDIAC CATH LAB    CARDIAC SURGERY  11/2019    triple bypass    CYST REMOVAL      RIGHT ANKLE     FOOT AMPUTATION Left 1/10/2023    LEFT BELOW KNEE AMPUTATION performed by Mert Rubio DPM at [facility] OR    FOOT DEBRIDEMENT Left 4/20/2020    LEFT TRANSMETATARSAL AMPUTATION  FOOT INCISION AND DRAINAGE performed by Mert Rubio DPM at [facility] OR    FOOT DEBRIDEMENT Left 7/20/2020    LEFT WOUND DEBRIDEMENT WITH WOUND VAC APPLICATION performed by Mert Rubio DPM at [facility] OR    FOOT DEBRIDEMENT Left 7/1/2022    LEFT FOOT EXCISONAL WOUND DEBRIDEMENT, APPLICATION SKIN SUBSTITUTE GRAFT, APPLICATION KCI WOUND VAC performed by Mert Rubio DPM at [facility] OR    FOOT SURGERY Right     CYST REMOVED    FOOT SURGERY

## 2024-10-22 NOTE — TELEPHONE ENCOUNTER
Pre Op TAVR Cardiac Rehab Education    Ashwin was brought to Cardiac Rehab by [facility] staff.  Discussed the importance of Cardiac Rehab with Ashwin.  Reviewed cardiac rehab class times.  Patient questions answered.  I explained we will contact at home to schedule evaluation appointment after TAVR procedure. Cardiac Rehab brochure given.

## 2024-10-23 ENCOUNTER — TELEPHONE (OUTPATIENT)
Dept: CARDIOLOGY CLINIC | Age: 64
End: 2024-10-23

## 2024-10-23 DIAGNOSIS — I35.0 SEVERE AORTIC STENOSIS: Primary | ICD-10-CM

## 2024-10-23 NOTE — TELEPHONE ENCOUNTER
Orders received from Dr. Bah to schedule the patient for a TAVR procedure and have the patient hold the follow medications the morning of the TAVR procedure: Hold Toprol XL, Norvasc, Farxiga and Hydralazine 11/5.  Hold Bumex and Potassium 11/4, 11/5 and 11/6 per Dr. Howard's recommendations.  Hold Eliquis for 2 days prior.  Take last dose on 11/2.    TAVR: 11/5 at 0800 with an arrival of 2000 on 11/4.  Discharge home with friend Markell.    Valve Rep Natan notified with [employer]  Pacer Rep Nagi notified with [employer]    Post TAVR instructions per Dr. Bah: have the patient be seen in the [facility] 7 days post TAVR, obtain a CBC, BMP and ECHO prior to the 30 day post TAVR follow up appointment.    7 day post TAVR appointment: 11/12/24 at 1045  CHF Clinic follow up appointment 11/19/24 at 1030  CBC/BMP/ECHO:12/5/24 at 1100  30 day post TAVR appointment:12/9/24 at 1445    Primary Cardiologist:  Dr. Swift.    Dr. Bah, please agree.     Sabrina, can you check prior auth?     Shakira, patient coming in for prehydration on 11/4 for TAVR 11/5.  Orders from Dr. Howard:

## 2024-10-24 ENCOUNTER — CARE COORDINATION (OUTPATIENT)
Dept: CARE COORDINATION | Age: 64
End: 2024-10-24

## 2024-10-24 NOTE — TELEPHONE ENCOUNTER
PENDING    Precert for TAVR  DOS: 11/05/2024      [x] Cath    [x] Echo    [x] Us Carotid    [x] CT chest, abd, pelvis    [x] CT surgery note    [x] Cardiology note    [x] EKG     Clinicals sent to Trell via fax [phone]

## 2024-10-24 NOTE — CARE COORDINATION
Ambulatory Care Coordination Note     10/24/2024 10:23 AM     Patient outreach attempt by this ACM today to perform care management follow up . ACM was unable to reach the patient by telephone today; left voice message requesting a return phone call to this ACM.     ACM: Leonora Angelo, RN     Care Summary Note:     PCP/Specialist follow up:   Future Appointments         Provider Specialty Dept Phone    10/28/2024 8:30 AM Prashant Garrett PA-C Cardiology [phone]    11/12/2024 10:45 AM King Lancaster PA-C Cardiothoracic Surgery [phone]    11/19/2024 10:30 AM Lakia Verma APRN - CNP Cardiology [phone]    12/5/2024 11:00 AM (Arrive by 10:45 AM) STR ECHO 2 Cardiology [phone]    12/5/2024 2:00 PM SCHEDULE, [facility] PACER NURSE Cardiology [phone]    12/9/2024 1:20 PM Pastor Howard MD Nephrology [phone]    12/9/2024 2:45 PM Freedom Bah MD Cardiology [phone]    1/6/2025 11:30 AM Raymond Garcia MD Pulmonology [phone]    1/23/2025 9:20 AM Pastor Howard MD Nephrology [phone]    2/21/2025 12:30 PM Shakira Milligan APRN - CNP Cardiology [phone]            Follow Up:   Plan for next ACM outreach in approximately 1 week to complete:  - disease specific assessments  - medication review  - goal progression  - education   - RPM.

## 2024-10-28 ENCOUNTER — OFFICE VISIT (OUTPATIENT)
Dept: CARDIOLOGY CLINIC | Age: 64
End: 2024-10-28
Payer: COMMERCIAL

## 2024-10-28 VITALS
SYSTOLIC BLOOD PRESSURE: 138 MMHG | HEART RATE: 83 BPM | BODY MASS INDEX: 40.43 KG/M2 | WEIGHT: 315 LBS | DIASTOLIC BLOOD PRESSURE: 86 MMHG | HEIGHT: 74 IN

## 2024-10-28 DIAGNOSIS — I35.0 SEVERE AORTIC STENOSIS: Primary | ICD-10-CM

## 2024-10-28 DIAGNOSIS — I50.20 HFREF (HEART FAILURE WITH REDUCED EJECTION FRACTION) (HCC): ICD-10-CM

## 2024-10-28 DIAGNOSIS — Z95.1 HX OF CABG: ICD-10-CM

## 2024-10-28 PROCEDURE — 3079F DIAST BP 80-89 MM HG: CPT | Performed by: PHYSICIAN ASSISTANT

## 2024-10-28 PROCEDURE — 99214 OFFICE O/P EST MOD 30 MIN: CPT | Performed by: PHYSICIAN ASSISTANT

## 2024-10-28 PROCEDURE — 3075F SYST BP GE 130 - 139MM HG: CPT | Performed by: PHYSICIAN ASSISTANT

## 2024-10-28 NOTE — PROGRESS NOTES
AV Peak Gradient: 19.89  LVOT Peak Gradient: 5   cm/s                mmHg                     mmHgLVOT Mean Gradient: 3   MV E/A Ratio: 0.86  AV Mean Velocity: 155    mmHg   MV Peak Gradient:   cm/s   1.42 mmHg           AV Mean Gradient: 11     TV Peak E-Wave: 61.3 cm/s                       mmHg                     TV Peak A-Wave: 91.7 cm/s   MV Deceleration     AV VTI: 36.7 cm   Time: 259 msec      AV Area                  TV Peak Gradient: 1.5 mmHg                       (Continuity):1.79 cm^2   TR Velocity:250 cm/s                                                TR Gradient:25 mmHg   MV E' Septal        LVOT VTI: 20.9 cm        PV Peak Velocity: 87.8 cm/s   Velocity: 6.4 cm/s                           PV Peak Gradient: 3.08 mmHg   MV A' Septal   Velocity: 9.2 cm/s   MV E' Lateral       AV DVI (VTI): 0.57AV DVI   Velocity: 4.9 cm/s  (Vmax):0.51   MV A' Lateral   Velocity: 8.5 cm/s   E/E' septal: 9.31   E/E' lateral: 12.16   MR Velocity: 377   cm/s     [email or web address]?[id number]       Stress Mariel: No results found for this or any previous visit.    Cardiac Monitor: No results found for this or any previous visit.    Ramy Stress Test: No results found for this or any previous visit.    Cath:   10/8/2024  Severe native coronary artery disease  Patent bypass grafts * 3  Elevated filling pressures         Recommendations    Bed rest for the next 4 hours  Access site care  Medical therapy is recommended  Aggressive risk factor modification for CAD  Elevated filling pressures were noted. Patient states that he was instructed to hold his diuretics for two days prior to the procedure. He denies increased in FERRERA. IVF were stopped. He was given Bumex 2 mg IV*1 dose post procedure in the cath lab. Will resume Bumex 2 mg po BID. He is on Metolazone 5 mg po on as needed basis, patient was instructed to take one dose

## 2024-10-31 ENCOUNTER — CARE COORDINATION (OUTPATIENT)
Dept: CASE MANAGEMENT | Age: 64
End: 2024-10-31

## 2024-10-31 NOTE — CARE COORDINATION
-Remote Alert Monitoring Note      Date/Time:  10/31/2024 12:32 PM  Patient Current Location: Home:  [address] [address]  Verified patients name and  as identifiers.    Rpm alert to be reviewed by the provider   red alert  weight (increase of  3 pounds in 1 days)  Vitals Recheck n/a  Additional needs to be addressed by provider: No                   LPN contacted patient by telephone regarding red alert received   Background: enrolled in RPM for  HTN KIDNEY DISEASE AND CHF  Refer to 911 immediately if:  Patient unresponsive or unable to provide history  Change in cognition or sudden confusion  Patient unable to respond in complete sentences  Intense chest pain/tightness  Any concern for any clinical emergency  Red Alert: Provider response time of 1 hr required for any red alert requiring intervention  Yellow Alert: Provider response time of 3hr required for any escalated yellow alert  Patient Chief Complaint:  Weight Weight Scale Triage  Was your weight obtained upon rising/waking today? NO    Was your weight obtained after voiding and/or use of the bathroom today? yes   Did you weigh yourself in the same amount of clothing today, compared to how you typically do? yes   Was the scale bumped or moved prior to today's weight? no   Is your scale on a flat/hard surface? no   Did you obtain your weight with shoes on? Yes  AMPUTEE/ PROSTHESIS   If yes, is this something you normally do during your daily weights? yes   Were you standing up straight on the scale today? yes   Were you leaning on anything while obtaining your weight today? no     Clinical Interventions: Reviewed and followed up on alerts and treatments-SPOKE TO WM regarding RPM alert for weight increase.  Denies chest pain or dyspnea,. No swelling in abdomen or right lower leg. Pt wears a prosthetic left leg BKA. Pt reports he weighed himself after dressing and eating breakfast.   He is taking all medications including Bumex as directed.  No

## 2024-11-01 ENCOUNTER — CARE COORDINATION (OUTPATIENT)
Dept: CARE COORDINATION | Age: 64
End: 2024-11-01

## 2024-11-01 NOTE — TELEPHONE ENCOUNTER
I spoke with Tea at [facility] at 12:10 pm to follow up on the prior auth that Sabrina sent on 10/24/2024.    Tea looked into the CPT code 67824 and stated that this code is not handled by [employer], that it would have to be submitted through [employer], and that they discarded the prior auth request that was faxed over by Sabrina, when I asked why they didn't contact cardiology to notify them they discarded the prior auth, Tea stated that they dont ever notify the office, just discard the request.     I submitted the auth request on [employer].     CPT CODE: 68695  Diagnosis code :i35.0  Auth number : [id number]   Valid for 11/05/2024-02/03/2025    Auth letter attached to this encounter.

## 2024-11-01 NOTE — CARE COORDINATION
Ambulatory Care Coordination Note     2024 1:27 PM     Patient Current Location:  Home:  [facility]  [place] [address]     ACM contacted the patient by telephone. Verified name and  with patient as identifiers.         ACM: Leonora Angelo RN     Challenges to be reviewed by the provider   Additional needs identified to be addressed with provider No  none               Method of communication with provider: none.    Has the patient been seen in the ED since your last call? no    Care Summary Note: Spoke with Ashwin Xiong for review  States doing well  Continues with RPM monitoring  Red alert for weight but stable  Scheduled to have TAVR procedure completed next week   States everything is ready to  go for procedure  Encouraged to call with any needs or additional support  Will follow up after TAVR    Plan  Reinforce education completed  Continue with RPM monitoring  Reinforce importance of early symptom recognition and reporting    Offered patient enrollment in the Remote Patient Monitoring (RPM) program for in-home monitoring: Yes, patient enrolled; current status is activated and monitoring.     Assessments Completed:   No changes since last call    Medications Reviewed:   Patient denies any changes with medications and reports taking all medications as prescribed.    Advance Care Planning:   Not reviewed during this call     Care Planning:    Goals Addressed                      This Visit's Progress      Conditions and Symptoms (pt-stated)   On track      I will schedule office visits, as directed by my provider.  I will keep my appointment or reschedule if I have to cancel.  I will notify my provider of any barriers to my plan of care.  I will follow my Zone Management tool to seek urgent or emergent care.  I will notify my provider of any symptoms that indicate a worsening of my condition.    Barriers: need for additional support and education  Plan for overcoming my barriers: family and

## 2024-11-04 ENCOUNTER — PREP FOR PROCEDURE (OUTPATIENT)
Dept: CARDIOLOGY | Age: 64
End: 2024-11-04

## 2024-11-04 ENCOUNTER — HOSPITAL ENCOUNTER (INPATIENT)
Age: 64
LOS: 1 days | Discharge: HOME | DRG: 267 | End: 2024-11-06
Attending: INTERNAL MEDICINE | Admitting: INTERNAL MEDICINE
Payer: COMMERCIAL

## 2024-11-04 DIAGNOSIS — Z95.2 HISTORY OF TRANSCATHETER AORTIC VALVE REPLACEMENT (TAVR): Primary | ICD-10-CM

## 2024-11-04 DIAGNOSIS — I35.0 SEVERE AORTIC STENOSIS: ICD-10-CM

## 2024-11-04 DIAGNOSIS — Z95.2 HEART VALVE REPLACED BY TRANSPLANT: ICD-10-CM

## 2024-11-04 PROCEDURE — 86923 COMPATIBILITY TEST ELECTRIC: CPT

## 2024-11-04 PROCEDURE — 2580000003 HC RX 258: Performed by: STUDENT IN AN ORGANIZED HEALTH CARE EDUCATION/TRAINING PROGRAM

## 2024-11-04 PROCEDURE — 2580000003 HC RX 258: Performed by: NURSE PRACTITIONER

## 2024-11-04 RX ORDER — SODIUM CHLORIDE 0.9 % (FLUSH) 0.9 %
5-40 SYRINGE (ML) INJECTION EVERY 12 HOURS SCHEDULED
Status: CANCELLED | OUTPATIENT
Start: 2024-11-04

## 2024-11-04 RX ORDER — SODIUM CHLORIDE 9 MG/ML
INJECTION, SOLUTION INTRAVENOUS PRN
Status: DISCONTINUED | OUTPATIENT
Start: 2024-11-04 | End: 2024-11-06 | Stop reason: HOSPADM

## 2024-11-04 RX ORDER — SODIUM CHLORIDE 9 MG/ML
INJECTION, SOLUTION INTRAVENOUS CONTINUOUS
Status: CANCELLED | OUTPATIENT
Start: 2024-11-04

## 2024-11-04 RX ORDER — SODIUM CHLORIDE 9 MG/ML
INJECTION, SOLUTION INTRAVENOUS PRN
Status: CANCELLED | OUTPATIENT
Start: 2024-11-04

## 2024-11-04 RX ORDER — SODIUM CHLORIDE 0.9 % (FLUSH) 0.9 %
5-40 SYRINGE (ML) INJECTION EVERY 12 HOURS SCHEDULED
Status: DISCONTINUED | OUTPATIENT
Start: 2024-11-04 | End: 2024-11-05 | Stop reason: ALTCHOICE

## 2024-11-04 RX ORDER — SODIUM CHLORIDE 0.9 % (FLUSH) 0.9 %
5-40 SYRINGE (ML) INJECTION PRN
Status: CANCELLED | OUTPATIENT
Start: 2024-11-04

## 2024-11-04 RX ORDER — SODIUM CHLORIDE 9 MG/ML
INJECTION, SOLUTION INTRAVENOUS CONTINUOUS
Status: DISCONTINUED | OUTPATIENT
Start: 2024-11-04 | End: 2024-11-05

## 2024-11-04 RX ORDER — SODIUM CHLORIDE 0.9 % (FLUSH) 0.9 %
5-40 SYRINGE (ML) INJECTION PRN
Status: DISCONTINUED | OUTPATIENT
Start: 2024-11-04 | End: 2024-11-05 | Stop reason: ALTCHOICE

## 2024-11-04 RX ADMIN — SODIUM CHLORIDE: 9 INJECTION, SOLUTION INTRAVENOUS at 21:23

## 2024-11-04 RX ADMIN — SODIUM CHLORIDE, PRESERVATIVE FREE 10 ML: 5 INJECTION INTRAVENOUS at 21:24

## 2024-11-04 ASSESSMENT — LIFESTYLE VARIABLES: HOW OFTEN DO YOU HAVE A DRINK CONTAINING ALCOHOL: NEVER

## 2024-11-05 ENCOUNTER — CARE COORDINATION (OUTPATIENT)
Dept: CARE COORDINATION | Age: 64
End: 2024-11-05

## 2024-11-05 ENCOUNTER — TELEPHONE (OUTPATIENT)
Dept: FAMILY MEDICINE CLINIC | Age: 64
End: 2024-11-05

## 2024-11-05 LAB
ABO: NORMAL
ACTIVATED CLOTTING TIME: 299 SECONDS (ref 1–150)
ANION GAP SERPL CALC-SCNC: 14 MEQ/L (ref 8–16)
ANTIBODY SCREEN: NORMAL
APTT PPP: 28.2 SECONDS (ref 22–38)
BUN SERPL-MCNC: 33 MG/DL (ref 7–22)
CALCIUM SERPL-MCNC: 8.6 MG/DL (ref 8.5–10.5)
CHLORIDE SERPL-SCNC: 98 MEQ/L (ref 98–111)
CO2 SERPL-SCNC: 23 MEQ/L (ref 23–33)
CREAT SERPL-MCNC: 1.7 MG/DL (ref 0.4–1.2)
DEPRECATED RDW RBC AUTO: 56.7 FL (ref 35–45)
EKG ATRIAL RATE: 85 BPM
EKG P AXIS: 57 DEGREES
EKG P-R INTERVAL: 200 MS
EKG Q-T INTERVAL: 406 MS
EKG QRS DURATION: 132 MS
EKG QTC CALCULATION (BAZETT): 483 MS
EKG R AXIS: -63 DEGREES
EKG T AXIS: 50 DEGREES
EKG VENTRICULAR RATE: 85 BPM
ERYTHROCYTE [DISTWIDTH] IN BLOOD BY AUTOMATED COUNT: 17.6 % (ref 11.5–14.5)
GFR SERPL CREATININE-BSD FRML MDRD: 45 ML/MIN/1.73M2
GLUCOSE BLD STRIP.AUTO-MCNC: 272 MG/DL (ref 70–108)
GLUCOSE BLD STRIP.AUTO-MCNC: 301 MG/DL (ref 70–108)
GLUCOSE SERPL-MCNC: 226 MG/DL (ref 70–108)
HCT VFR BLD AUTO: 35.9 % (ref 42–52)
HGB BLD-MCNC: 11.4 GM/DL (ref 14–18)
INR PPP: 1.03 (ref 0.85–1.13)
MCH RBC QN AUTO: 28.3 PG (ref 26–33)
MCHC RBC AUTO-ENTMCNC: 31.8 GM/DL (ref 32.2–35.5)
MCV RBC AUTO: 89.1 FL (ref 80–94)
NT-PROBNP SERPL IA-MCNC: 2385 PG/ML (ref 0–124)
PLATELET # BLD AUTO: 194 THOU/MM3 (ref 130–400)
PMV BLD AUTO: 10.6 FL (ref 9.4–12.4)
POTASSIUM SERPL-SCNC: 3.8 MEQ/L (ref 3.5–5.2)
RBC # BLD AUTO: 4.03 MILL/MM3 (ref 4.7–6.1)
RH FACTOR: NORMAL
SODIUM SERPL-SCNC: 135 MEQ/L (ref 135–145)
WBC # BLD AUTO: 7.6 THOU/MM3 (ref 4.8–10.8)

## 2024-11-05 PROCEDURE — 33361 REPLACE AORTIC VALVE PERQ: CPT | Performed by: INTERNAL MEDICINE

## 2024-11-05 PROCEDURE — 6370000000 HC RX 637 (ALT 250 FOR IP): Performed by: INTERNAL MEDICINE

## 2024-11-05 PROCEDURE — 6360000002 HC RX W HCPCS: Performed by: STUDENT IN AN ORGANIZED HEALTH CARE EDUCATION/TRAINING PROGRAM

## 2024-11-05 PROCEDURE — 93005 ELECTROCARDIOGRAM TRACING: CPT | Performed by: INTERNAL MEDICINE

## 2024-11-05 PROCEDURE — 6370000000 HC RX 637 (ALT 250 FOR IP): Performed by: NURSE PRACTITIONER

## 2024-11-05 PROCEDURE — 99152 MOD SED SAME PHYS/QHP 5/>YRS: CPT | Performed by: INTERNAL MEDICINE

## 2024-11-05 PROCEDURE — 33361 REPLACE AORTIC VALVE PERQ: CPT | Performed by: THORACIC SURGERY (CARDIOTHORACIC VASCULAR SURGERY)

## 2024-11-05 PROCEDURE — 6360000004 HC RX CONTRAST MEDICATION: Performed by: INTERNAL MEDICINE

## 2024-11-05 PROCEDURE — 02RF38Z REPLACEMENT OF AORTIC VALVE WITH ZOOPLASTIC TISSUE, PERCUTANEOUS APPROACH: ICD-10-PCS | Performed by: INTERNAL MEDICINE

## 2024-11-05 PROCEDURE — 85610 PROTHROMBIN TIME: CPT

## 2024-11-05 PROCEDURE — 99153 MOD SED SAME PHYS/QHP EA: CPT | Performed by: INTERNAL MEDICINE

## 2024-11-05 PROCEDURE — 94640 AIRWAY INHALATION TREATMENT: CPT

## 2024-11-05 PROCEDURE — C1889 IMPLANT/INSERT DEVICE, NOC: HCPCS | Performed by: INTERNAL MEDICINE

## 2024-11-05 PROCEDURE — C1760 CLOSURE DEV, VASC: HCPCS | Performed by: INTERNAL MEDICINE

## 2024-11-05 PROCEDURE — 94761 N-INVAS EAR/PLS OXIMETRY MLT: CPT

## 2024-11-05 PROCEDURE — B3101ZZ FLUOROSCOPY OF THORACIC AORTA USING LOW OSMOLAR CONTRAST: ICD-10-PCS | Performed by: INTERNAL MEDICINE

## 2024-11-05 PROCEDURE — 93005 ELECTROCARDIOGRAM TRACING: CPT

## 2024-11-05 PROCEDURE — 2580000003 HC RX 258: Performed by: INTERNAL MEDICINE

## 2024-11-05 PROCEDURE — 36415 COLL VENOUS BLD VENIPUNCTURE: CPT

## 2024-11-05 PROCEDURE — 83880 ASSAY OF NATRIURETIC PEPTIDE: CPT

## 2024-11-05 PROCEDURE — 85730 THROMBOPLASTIN TIME PARTIAL: CPT

## 2024-11-05 PROCEDURE — 85027 COMPLETE CBC AUTOMATED: CPT

## 2024-11-05 PROCEDURE — 86901 BLOOD TYPING SEROLOGIC RH(D): CPT

## 2024-11-05 PROCEDURE — C1894 INTRO/SHEATH, NON-LASER: HCPCS | Performed by: INTERNAL MEDICINE

## 2024-11-05 PROCEDURE — 82948 REAGENT STRIP/BLOOD GLUCOSE: CPT

## 2024-11-05 PROCEDURE — 85347 COAGULATION TIME ACTIVATED: CPT

## 2024-11-05 PROCEDURE — 2709999900 HC NON-CHARGEABLE SUPPLY: Performed by: INTERNAL MEDICINE

## 2024-11-05 PROCEDURE — B246ZZZ ULTRASONOGRAPHY OF RIGHT AND LEFT HEART: ICD-10-PCS | Performed by: INTERNAL MEDICINE

## 2024-11-05 PROCEDURE — 93010 ELECTROCARDIOGRAM REPORT: CPT | Performed by: NUCLEAR MEDICINE

## 2024-11-05 PROCEDURE — 2500000003 HC RX 250 WO HCPCS: Performed by: INTERNAL MEDICINE

## 2024-11-05 PROCEDURE — C1769 GUIDE WIRE: HCPCS | Performed by: INTERNAL MEDICINE

## 2024-11-05 PROCEDURE — 6360000002 HC RX W HCPCS: Performed by: INTERNAL MEDICINE

## 2024-11-05 PROCEDURE — 1200000000 HC SEMI PRIVATE

## 2024-11-05 PROCEDURE — C1725 CATH, TRANSLUMIN NON-LASER: HCPCS | Performed by: INTERNAL MEDICINE

## 2024-11-05 PROCEDURE — 86900 BLOOD TYPING SEROLOGIC ABO: CPT

## 2024-11-05 PROCEDURE — 2720000010 HC SURG SUPPLY STERILE: Performed by: INTERNAL MEDICINE

## 2024-11-05 PROCEDURE — 86850 RBC ANTIBODY SCREEN: CPT

## 2024-11-05 PROCEDURE — 80048 BASIC METABOLIC PNL TOTAL CA: CPT

## 2024-11-05 PROCEDURE — G0269 OCCLUSIVE DEVICE IN VEIN ART: HCPCS | Performed by: INTERNAL MEDICINE

## 2024-11-05 DEVICE — TAV EVFXPLUS-34 COMM US
Type: IMPLANTABLE DEVICE | Status: FUNCTIONAL
Brand: EVOLUT™ FX+

## 2024-11-05 RX ORDER — HEPARIN SODIUM 10000 [USP'U]/ML
INJECTION, SOLUTION INTRAVENOUS; SUBCUTANEOUS PRN
Status: DISCONTINUED | OUTPATIENT
Start: 2024-11-05 | End: 2024-11-05 | Stop reason: HOSPADM

## 2024-11-05 RX ORDER — SODIUM CHLORIDE 9 MG/ML
INJECTION, SOLUTION INTRAVENOUS CONTINUOUS
Status: DISCONTINUED | OUTPATIENT
Start: 2024-11-05 | End: 2024-11-06 | Stop reason: HOSPADM

## 2024-11-05 RX ORDER — HYDRALAZINE HYDROCHLORIDE 20 MG/ML
10 INJECTION INTRAMUSCULAR; INTRAVENOUS EVERY 10 MIN PRN
Status: DISCONTINUED | OUTPATIENT
Start: 2024-11-05 | End: 2024-11-06 | Stop reason: HOSPADM

## 2024-11-05 RX ORDER — ONDANSETRON 2 MG/ML
4 INJECTION INTRAMUSCULAR; INTRAVENOUS EVERY 6 HOURS PRN
Status: DISCONTINUED | OUTPATIENT
Start: 2024-11-05 | End: 2024-11-06 | Stop reason: HOSPADM

## 2024-11-05 RX ORDER — ATROPINE SULFATE 0.4 MG/ML
0.5 INJECTION, SOLUTION INTRAVENOUS
Status: ACTIVE | OUTPATIENT
Start: 2024-11-05 | End: 2024-11-06

## 2024-11-05 RX ORDER — EZETIMIBE 10 MG/1
10 TABLET ORAL DAILY
Status: DISCONTINUED | OUTPATIENT
Start: 2024-11-05 | End: 2024-11-06 | Stop reason: HOSPADM

## 2024-11-05 RX ORDER — IOPAMIDOL 755 MG/ML
INJECTION, SOLUTION INTRAVASCULAR PRN
Status: DISCONTINUED | OUTPATIENT
Start: 2024-11-05 | End: 2024-11-05 | Stop reason: HOSPADM

## 2024-11-05 RX ORDER — INSULIN GLARGINE 100 [IU]/ML
28 INJECTION, SOLUTION SUBCUTANEOUS 2 TIMES DAILY
Status: DISCONTINUED | OUTPATIENT
Start: 2024-11-05 | End: 2024-11-06 | Stop reason: HOSPADM

## 2024-11-05 RX ORDER — FENTANYL CITRATE 50 UG/ML
INJECTION, SOLUTION INTRAMUSCULAR; INTRAVENOUS PRN
Status: DISCONTINUED | OUTPATIENT
Start: 2024-11-05 | End: 2024-11-05 | Stop reason: HOSPADM

## 2024-11-05 RX ORDER — BISACODYL 5 MG/1
5 TABLET, DELAYED RELEASE ORAL DAILY PRN
Status: DISCONTINUED | OUTPATIENT
Start: 2024-11-05 | End: 2024-11-06 | Stop reason: HOSPADM

## 2024-11-05 RX ORDER — POTASSIUM CHLORIDE 1500 MG/1
20 TABLET, EXTENDED RELEASE ORAL 2 TIMES DAILY
Status: DISCONTINUED | OUTPATIENT
Start: 2024-11-05 | End: 2024-11-06 | Stop reason: HOSPADM

## 2024-11-05 RX ORDER — ONDANSETRON 4 MG/1
4 TABLET, ORALLY DISINTEGRATING ORAL EVERY 8 HOURS PRN
Status: DISCONTINUED | OUTPATIENT
Start: 2024-11-05 | End: 2024-11-06 | Stop reason: HOSPADM

## 2024-11-05 RX ORDER — ATORVASTATIN CALCIUM 40 MG/1
80 TABLET, FILM COATED ORAL DAILY
Status: DISCONTINUED | OUTPATIENT
Start: 2024-11-05 | End: 2024-11-06 | Stop reason: HOSPADM

## 2024-11-05 RX ORDER — ASPIRIN 81 MG/1
81 TABLET ORAL DAILY
Status: DISCONTINUED | OUTPATIENT
Start: 2024-11-06 | End: 2024-11-06 | Stop reason: HOSPADM

## 2024-11-05 RX ORDER — CLOPIDOGREL BISULFATE 75 MG/1
TABLET ORAL PRN
Status: DISCONTINUED | OUTPATIENT
Start: 2024-11-05 | End: 2024-11-05 | Stop reason: HOSPADM

## 2024-11-05 RX ORDER — PROTAMINE SULFATE 10 MG/ML
INJECTION, SOLUTION INTRAVENOUS PRN
Status: DISCONTINUED | OUTPATIENT
Start: 2024-11-05 | End: 2024-11-05 | Stop reason: HOSPADM

## 2024-11-05 RX ORDER — POLYETHYLENE GLYCOL 3350 17 G/17G
17 POWDER, FOR SOLUTION ORAL DAILY PRN
Status: DISCONTINUED | OUTPATIENT
Start: 2024-11-05 | End: 2024-11-06 | Stop reason: HOSPADM

## 2024-11-05 RX ORDER — HYDROCODONE BITARTRATE AND ACETAMINOPHEN 5; 325 MG/1; MG/1
1 TABLET ORAL EVERY 4 HOURS PRN
Status: DISCONTINUED | OUTPATIENT
Start: 2024-11-05 | End: 2024-11-06 | Stop reason: HOSPADM

## 2024-11-05 RX ORDER — SODIUM CHLORIDE 0.9 % (FLUSH) 0.9 %
5-40 SYRINGE (ML) INJECTION PRN
Status: DISCONTINUED | OUTPATIENT
Start: 2024-11-05 | End: 2024-11-06 | Stop reason: HOSPADM

## 2024-11-05 RX ORDER — ACETAMINOPHEN 325 MG/1
650 TABLET ORAL EVERY 4 HOURS PRN
Status: DISCONTINUED | OUTPATIENT
Start: 2024-11-05 | End: 2024-11-06 | Stop reason: HOSPADM

## 2024-11-05 RX ORDER — MIDAZOLAM HYDROCHLORIDE 1 MG/ML
INJECTION, SOLUTION INTRAMUSCULAR; INTRAVENOUS PRN
Status: DISCONTINUED | OUTPATIENT
Start: 2024-11-05 | End: 2024-11-05 | Stop reason: HOSPADM

## 2024-11-05 RX ORDER — LIDOCAINE 4 G/G
1 PATCH TOPICAL DAILY
Status: DISCONTINUED | OUTPATIENT
Start: 2024-11-05 | End: 2024-11-06 | Stop reason: HOSPADM

## 2024-11-05 RX ORDER — SODIUM CHLORIDE 0.9 % (FLUSH) 0.9 %
5-40 SYRINGE (ML) INJECTION EVERY 12 HOURS SCHEDULED
Status: DISCONTINUED | OUTPATIENT
Start: 2024-11-05 | End: 2024-11-06 | Stop reason: HOSPADM

## 2024-11-05 RX ORDER — SODIUM CHLORIDE 9 MG/ML
INJECTION, SOLUTION INTRAVENOUS PRN
Status: DISCONTINUED | OUTPATIENT
Start: 2024-11-05 | End: 2024-11-05 | Stop reason: ALTCHOICE

## 2024-11-05 RX ADMIN — INSULIN GLARGINE 28 UNITS: 100 INJECTION, SOLUTION SUBCUTANEOUS at 20:07

## 2024-11-05 RX ADMIN — TIOTROPIUM BROMIDE AND OLODATEROL 2 PUFF: 3.124; 2.736 SPRAY, METERED RESPIRATORY (INHALATION) at 12:26

## 2024-11-05 RX ADMIN — HYDROCODONE BITARTRATE AND ACETAMINOPHEN 1 TABLET: 5; 325 TABLET ORAL at 17:31

## 2024-11-05 RX ADMIN — POTASSIUM CHLORIDE 20 MEQ: 1500 TABLET, EXTENDED RELEASE ORAL at 20:07

## 2024-11-05 RX ADMIN — SODIUM CHLORIDE, PRESERVATIVE FREE 10 ML: 5 INJECTION INTRAVENOUS at 20:08

## 2024-11-05 RX ADMIN — ATORVASTATIN CALCIUM 80 MG: 40 TABLET, FILM COATED ORAL at 17:31

## 2024-11-05 RX ADMIN — Medication 3000 MG: at 06:39

## 2024-11-05 RX ADMIN — EZETIMIBE 10 MG: 10 TABLET ORAL at 21:28

## 2024-11-05 ASSESSMENT — PAIN DESCRIPTION - PAIN TYPE: TYPE: CHRONIC PAIN

## 2024-11-05 ASSESSMENT — PAIN DESCRIPTION - ONSET: ONSET: ON-GOING

## 2024-11-05 ASSESSMENT — PAIN DESCRIPTION - ORIENTATION
ORIENTATION: LOWER
ORIENTATION: LOWER

## 2024-11-05 ASSESSMENT — PAIN DESCRIPTION - DESCRIPTORS
DESCRIPTORS: ACHING
DESCRIPTORS: ACHING

## 2024-11-05 ASSESSMENT — PAIN SCALES - GENERAL
PAINLEVEL_OUTOF10: 3
PAINLEVEL_OUTOF10: 5

## 2024-11-05 ASSESSMENT — PAIN DESCRIPTION - LOCATION
LOCATION: BACK
LOCATION: BACK

## 2024-11-05 ASSESSMENT — PAIN DESCRIPTION - FREQUENCY: FREQUENCY: CONTINUOUS

## 2024-11-05 NOTE — PLAN OF CARE
Problem: Chronic Conditions and Co-morbidities  Goal: Patient's chronic conditions and co-morbidity symptoms are monitored and maintained or improved  Outcome: Progressing  Flowsheets (Taken 11/5/2024 0108)  Care Plan - Patient's Chronic Conditions and Co-Morbidity Symptoms are Monitored and Maintained or Improved:   Monitor and assess patient's chronic conditions and comorbid symptoms for stability, deterioration, or improvement   Collaborate with multidisciplinary team to address chronic and comorbid conditions and prevent exacerbation or deterioration   Update acute care plan with appropriate goals if chronic or comorbid symptoms are exacerbated and prevent overall improvement and discharge     Problem: Discharge Planning  Goal: Discharge to home or other facility with appropriate resources  Outcome: Progressing  Flowsheets  Taken 11/5/2024 0108  Discharge to home or other facility with appropriate resources:   Identify barriers to discharge with patient and caregiver   Identify discharge learning needs (meds, wound care, etc)   Refer to discharge planning if patient needs post-hospital services based on physician order or complex needs related to functional status, cognitive ability or social support system   Arrange for needed discharge resources and transportation as appropriate  Taken 11/4/2024 2031  Discharge to home or other facility with appropriate resources:   Identify barriers to discharge with patient and caregiver   Refer to discharge planning if patient needs post-hospital services based on physician order or complex needs related to functional status, cognitive ability or social support system   Arrange for needed discharge resources and transportation as appropriate   Identify discharge learning needs (meds, wound care, etc)     Problem: Safety - Adult  Goal: Free from fall injury  Outcome: Progressing  Flowsheets (Taken 11/5/2024 0108)  Free From Fall Injury: Instruct family/caregiver on patient

## 2024-11-05 NOTE — PLAN OF CARE
Problem: Respiratory - Adult  Goal: Lung sounds clear or within normal limits for patient  Outcome: Progressing   Stiolto daily started, breath sounds diminished.  Patient mutually agreed on goals.

## 2024-11-05 NOTE — PROCEDURES
PROCEDURE NOTE  Date: 11/5/2024   Name: Ashwin Liriano  YOB: 1960    Procedures    12 lead EKG completed. Results handed to Roxann WILKERSON

## 2024-11-05 NOTE — H&P
[facility]  Sedation/Analgesia History & Physical    Pt Name: Ashwin Liriano  Account number: [id number]  MRN: [id number]  YOB: 1960  Provider Performing Procedure: Freedom Bah MD MD  Referring Provider: Freedom Bah MD   Primary Care Physician: Wyatt Gastelum APRN - CNP  Date: 11/5/2024    PRE-PROCEDURE    Code Status: FULL CODE  Brief History/Pre-Procedure Diagnosis:   Severe AS  NYHAIII CHF    Consent: : I have discussed with the patient risks, benefits, and alternatives (and relevant risks, benefits, and side effects related to alternatives or not receiving care), and likelihood of the success.   The patient and/or representative appear to understand and agree to proceed.  The discussion encompasses risks, benefits, and side effects related to the alternatives and the risks related to not receiving the proposed care, treatment, and services.     The indication, risks and benefits of the procedure and possible therapeutic consequences and alternatives were discussed with the patient. The patient was given the opportunity to ask questions and to have them answered to his/her satisfaction. Risks of the procedure include but are not limited to the following: Bleeding, hematoma including retroperitoneal hemmorhage, infection, pain and discomfort, injury to the aorta and other blood vessels, rhythm disturbance, low blood pressure, myocardial infarction, stroke, kidney damage/failure, myocardial perforation, allergic reactions to sedatives/contrast material, loss of pulse/vascular compromise requiring surgery, aneurysm/pseudoaneurysm formation, possible loss of a limb/hand/leg, needing blood transfusion, requiring emergent open heart surgery or emergent coronary intervention, the need for intubation/respiratory support, the requirement for defibrillation/cardioversion, and death. Alternatives to and omission of the suggested procedure were discussed. The patient had no

## 2024-11-05 NOTE — PROGRESS NOTES
Pharmacy Medication History Note    List of current medications patient is taking is complete.    Source of information: External, pt, mother, sister, bottles    Changes made to medication list:  Medications removed (include reason, ex. therapy complete or physician discontinued):  Farxiga: pt taking Jardiance    Medications added/doses adjusted:  Jardiance     Other notes (ex. Recent course of antibiotics, Coumadin dosing):  Pt had been out of Stiolto for few months  Denies use of other OTC or herbal medications.    Allergies reviewed    Electronically signed by Nelia Dash on 11/5/2024 at 1:16 PM

## 2024-11-05 NOTE — PROGRESS NOTES
Patient arrived per cart to 3B. Heart monitor applied and vitals taken.  Admission paperwork completed.  Explained to patient that [facility] is not responsible for any lost or stolen items.  Patient verbalized understanding. Oriented to room and use of call light and bed controls.  Patient denies pain or needs. No signs of distress noted.  Bed locked & in low position, side-rails up x2.  Call light in reach.  Reminded patient to call nurse if any needs arise.     2 person skin assessment performed by this nurse and Jorje WILKERSON.    Explained patients right to have family, representative or physician notified of their admission.  Patient has Declined for physician to be notified.  Patient has Declined for family/representative to be notified.

## 2024-11-05 NOTE — CARE COORDINATION
Ashwin is currently at [facility] to undergo TAVR procedure.  Will not pause RPM due to possible discharge home soon after procedure.  If longer than 24 hours, will pause RPM at that time.

## 2024-11-05 NOTE — BRIEF OP NOTE
[facility]  Sedation/Analgesia Post Sedation Record    Pt Name: Ashwin Liriano  Account number: [id number]  MRN: [id number]  YOB: 1960  Procedure Performed By: Freedom Bah MD MD FACC, ASUNCION, RPVI  Primary Care Physician: Wyatt Gastelum APRN - CNP  Date: 11/5/2024    POST-PROCEDURE    Physicians/Assistants: Freedom Bah MD, VIDYA, ASUNCION, MARBIN    Procedure Performed: TAVR    Sedation/Anesthesia: Versed/ Fentanyl and 2% xylocaine local anesthesia.      Estimated Blood Loss: 100 ml.     Specimens Removed: None         Disposition of Specimen: N/A        Complications: No Immediate Complications.       Post-procedure Diagnosis/Findings:       EFX+ 34      Electronically signed by Freedom Bah MD on 11/5/24 at 8:35 AM EST   Interventional Cardiology

## 2024-11-05 NOTE — CARE COORDINATION
Case Management Assessment Initial Evaluation    Date/Time of Evaluation: 11/5/2024 7:59 AM  Assessment Completed by: Carolina Taveras, RN    If patient is discharged prior to next notation, then this note serves as note for discharge by case management.    Patient Name: Ashwin Liriano                   YOB: 1960  Diagnosis: Renal insufficiency [N28.9]  Severe aortic stenosis [I35.0]                   Date / Time: 11/4/2024  7:55 PM  Location: [address] [place] [address]     Patient Admission Status: Inpatient   Readmission Risk Low 0-14, Mod 15-19), High > 20: Readmission Risk Score: 19    Current PCP: Wyatt Gastelum APRN - CNP  Health Care Decision Makers:   Primary Decision Maker: Glo Liriano - Parent - [phone]    Secondary Decision Maker: TylerjuniMisaelKamini - Brother/Sister - [phone]    Additional Case Management Notes: Here for elective procedure, TAVR to be done today with Dr. Bah. ECHO to be done in the am.  Anticipate discharge tomorrow if medically ready and after ECHO is read by Dr. Bah.     Procedure:   11/5 TAVR procedure with Dr. Bah.   11/6 ECHO: to be done.     Patient Goals/Plan/Treatment Preferences: Spoke with pt, mom and sister present. 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 Plans to return home at discharge, his mother will be staying with him for a while when he gets home. Denies needs.      11/05/24 1031   Service Assessment   Patient Orientation Alert and Oriented   Cognition Alert   History Provided By Patient   Primary Caregiver Self   Accompanied By/Relationship mom and sister   Support Systems Parent;Family Members;Friends/Neighbors   Patient's Healthcare Decision Maker is: Named in Scanned ACP Document   PCP Verified by CM Yes   Last Visit to PCP Within last 3 months   Prior Functional Level Independent in ADLs/IADLs   Current Functional Level Independent in ADLs/IADLs   Can patient return to prior living arrangement Yes   Ability to make needs known: Good   Family  able to assist with home care needs: Yes   Would you like for me to discuss the discharge plan with any other family members/significant others, and if so, who? 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 No   Potential Assistance Purchasing Medications No   Type of Home Care Services None   Patient expects to be discharged to: House   Services At/After Discharge   Mode of Transport at Discharge Other (see comment)  (family/friend)   Confirm Follow Up Transport Friends   Condition of Participation: Discharge Planning   The Plan for Transition of Care is related to the following treatment goals: \"recover from this TAVR\"

## 2024-11-05 NOTE — OP NOTE
DATE: 11/05/24    PATIENT: Ashwin Liriano    MRN: [id number]     Acct: [id number]    PREOP DIAGNOSIS:   Severe aortic stenosis    Postop diagnosis:  Severe aortic stenosis    Procedure:  Transcatheter aortic valve replacement with a Medtronic 34 mm Evolut FX prosthesis    OPERATORS:  Freedom Bah MD; Ty Nava MD    ESTIMATED BLOOD LOSS: 50 ml    A pre-procedure discussion was conducted with the patient to confirm/exclude candidacy for open surgical salvage in case of procedure failure.    After informed consent was obtained from the patient, the patient was brought to the hybrid operating room and prepped in sterile fashion. Infiltration of the bilateral inguinal regions was accomplished with 2% lidocaine. Using micropuncture and modified Seldinger technique, a 6 Fr sheath was inserted into the right internal jugular vein. A 5 Fr pacemaker was inserted into position under fluoroscopic guidance into the right ventricle, with verification of appropriate pacing thresholds.    Using the same technique under fluoroscopic guidance, a 5 Fr sheath was inserted into the right common femoral artery.  Similarly, a 4 Fr sheath, followed after contrast verification by a 5 Fr sheath was inserted into the left common femoral artery. This was replaced with a 6 Fr J4 catheter over a guidewire, and a Supra Core wire was used to traverse the aortic arch.  We then performed standard preclose technique by deploying 2 Percloses in orthogonal fashion and leaving them incomplete. A 14 Fr sheath was inserted over the wire under guidance. The patient was heparinized to an ACT above 250 seconds.     A straight pigtail was placed via the right femoral artery and aortography was performed in a coplanar view to ensure alignment.  The aortic valve was crossed using a straight wire through an AL1 catheter. The guidewire was changed to a J-tipped wire, on which was inserted an angled pigtail.  Direct hemodynamic measurements were obtained.

## 2024-11-06 ENCOUNTER — APPOINTMENT (OUTPATIENT)
Age: 64
DRG: 267 | End: 2024-11-06
Attending: INTERNAL MEDICINE
Payer: COMMERCIAL

## 2024-11-06 ENCOUNTER — TELEPHONE (OUTPATIENT)
Dept: CARDIOLOGY CLINIC | Age: 64
End: 2024-11-06

## 2024-11-06 VITALS
HEART RATE: 94 BPM | BODY MASS INDEX: 40.43 KG/M2 | SYSTOLIC BLOOD PRESSURE: 159 MMHG | HEIGHT: 74 IN | OXYGEN SATURATION: 93 % | DIASTOLIC BLOOD PRESSURE: 84 MMHG | RESPIRATION RATE: 17 BRPM | WEIGHT: 315 LBS | TEMPERATURE: 98.3 F

## 2024-11-06 DIAGNOSIS — Z95.2 S/P TAVR (TRANSCATHETER AORTIC VALVE REPLACEMENT): Primary | ICD-10-CM

## 2024-11-06 LAB
ANION GAP SERPL CALC-SCNC: 12 MEQ/L (ref 8–16)
APTT PPP: 29.5 SECONDS (ref 22–38)
BUN SERPL-MCNC: 28 MG/DL (ref 7–22)
CALCIUM SERPL-MCNC: 8.5 MG/DL (ref 8.5–10.5)
CHLORIDE SERPL-SCNC: 101 MEQ/L (ref 98–111)
CO2 SERPL-SCNC: 22 MEQ/L (ref 23–33)
CREAT SERPL-MCNC: 1.5 MG/DL (ref 0.4–1.2)
DEPRECATED RDW RBC AUTO: 57.4 FL (ref 35–45)
ECHO AO ASC DIAM: 3.5 CM
ECHO AO ASCENDING AORTA INDEX: 1.3 CM/M2
ECHO AV ACCELERATION TIME: 81 MS
ECHO AV AREA PEAK VELOCITY: 1.9 CM2
ECHO AV AREA VTI: 2.1 CM2
ECHO AV AREA/BSA PEAK VELOCITY: 0.7 CM2/M2
ECHO AV AREA/BSA VTI: 0.8 CM2/M2
ECHO AV MEAN GRADIENT: 7 MMHG
ECHO AV MEAN VELOCITY: 1.2 M/S
ECHO AV PEAK GRADIENT: 14 MMHG
ECHO AV PEAK VELOCITY: 1.9 M/S
ECHO AV VELOCITY RATIO: 0.63
ECHO AV VTI: 32 CM
ECHO EST RA PRESSURE: 8 MMHG
ECHO LA AREA 2C: 19.8 CM2
ECHO LA AREA 4C: 17.3 CM2
ECHO LA DIAMETER INDEX: 1.74 CM/M2
ECHO LA DIAMETER: 4.7 CM
ECHO LA MAJOR AXIS: 5.7 CM
ECHO LA MINOR AXIS: 5.8 CM
ECHO LA VOL BP: 48 ML (ref 18–58)
ECHO LA VOL MOD A2C: 54 ML (ref 18–58)
ECHO LA VOL MOD A4C: 43 ML (ref 18–58)
ECHO LA VOL/BSA BIPLANE: 18 ML/M2 (ref 16–34)
ECHO LA VOLUME INDEX MOD A2C: 20 ML/M2 (ref 16–34)
ECHO LA VOLUME INDEX MOD A4C: 16 ML/M2 (ref 16–34)
ECHO LV E' LATERAL VELOCITY: 10.7 CM/S
ECHO LV E' SEPTAL VELOCITY: 10.7 CM/S
ECHO LV EDV A2C: 140 ML
ECHO LV EDV A4C: 238 ML
ECHO LV EDV INDEX A4C: 88 ML/M2
ECHO LV EDV NDEX A2C: 52 ML/M2
ECHO LV EJECTION FRACTION A2C: 41 %
ECHO LV EJECTION FRACTION A4C: 43 %
ECHO LV EJECTION FRACTION BIPLANE: 41 % (ref 55–100)
ECHO LV ESV A2C: 83 ML
ECHO LV ESV A4C: 134 ML
ECHO LV ESV INDEX A2C: 31 ML/M2
ECHO LV ESV INDEX A4C: 50 ML/M2
ECHO LV FRACTIONAL SHORTENING: 26 % (ref 28–44)
ECHO LV INTERNAL DIMENSION DIASTOLE INDEX: 2.26 CM/M2
ECHO LV INTERNAL DIMENSION DIASTOLIC: 6.1 CM (ref 4.2–5.9)
ECHO LV INTERNAL DIMENSION SYSTOLIC INDEX: 1.67 CM/M2
ECHO LV INTERNAL DIMENSION SYSTOLIC: 4.5 CM
ECHO LV ISOVOLUMETRIC RELAXATION TIME (IVRT): 92 MS
ECHO LV IVSD: 1.1 CM (ref 0.6–1)
ECHO LV MASS 2D: 287.5 G (ref 88–224)
ECHO LV MASS INDEX 2D: 106.5 G/M2 (ref 49–115)
ECHO LV POSTERIOR WALL DIASTOLIC: 1.1 CM (ref 0.6–1)
ECHO LV RELATIVE WALL THICKNESS RATIO: 0.36
ECHO LVOT AREA: 3.1 CM2
ECHO LVOT AV VTI INDEX: 0.67
ECHO LVOT DIAM: 2 CM
ECHO LVOT MEAN GRADIENT: 3 MMHG
ECHO LVOT PEAK GRADIENT: 5 MMHG
ECHO LVOT PEAK VELOCITY: 1.2 M/S
ECHO LVOT STROKE VOLUME INDEX: 24.8 ML/M2
ECHO LVOT SV: 66.9 ML
ECHO LVOT VTI: 21.3 CM
ECHO MV AREA VTI: 1.9 CM2
ECHO MV E DECELERATION TIME (DT): 150 MS
ECHO MV E VELOCITY: 1.48 M/S
ECHO MV E/E' LATERAL: 13.83
ECHO MV E/E' RATIO (AVERAGED): 13.83
ECHO MV E/E' SEPTAL: 13.83
ECHO MV LVOT VTI INDEX: 1.69
ECHO MV MAX VELOCITY: 1.9 M/S
ECHO MV MEAN GRADIENT: 7 MMHG
ECHO MV MEAN VELOCITY: 1.3 M/S
ECHO MV PEAK GRADIENT: 14 MMHG
ECHO MV REGURGITANT PEAK GRADIENT: 88 MMHG
ECHO MV REGURGITANT PEAK VELOCITY: 4.7 M/S
ECHO MV VTI: 36 CM
ECHO RIGHT VENTRICULAR SYSTOLIC PRESSURE (RVSP): 33 MMHG
ECHO RV INTERNAL DIMENSION: 2.9 CM
ECHO RV TAPSE: 1 CM (ref 1.7–?)
ECHO TV E WAVE: 1 M/S
ECHO TV REGURGITANT MAX VELOCITY: 2.48 M/S
ECHO TV REGURGITANT PEAK GRADIENT: 25 MMHG
EKG ATRIAL RATE: 70 BPM
EKG P AXIS: 58 DEGREES
EKG P-R INTERVAL: 194 MS
EKG Q-T INTERVAL: 420 MS
EKG QRS DURATION: 94 MS
EKG QTC CALCULATION (BAZETT): 453 MS
EKG R AXIS: -34 DEGREES
EKG T AXIS: 65 DEGREES
EKG VENTRICULAR RATE: 70 BPM
ERYTHROCYTE [DISTWIDTH] IN BLOOD BY AUTOMATED COUNT: 17.9 % (ref 11.5–14.5)
GFR SERPL CREATININE-BSD FRML MDRD: 52 ML/MIN/1.73M2
GLUCOSE BLD STRIP.AUTO-MCNC: 192 MG/DL (ref 70–108)
GLUCOSE BLD STRIP.AUTO-MCNC: 215 MG/DL (ref 70–108)
GLUCOSE SERPL-MCNC: 223 MG/DL (ref 70–108)
HCT VFR BLD AUTO: 32.8 % (ref 42–52)
HGB BLD-MCNC: 10.6 GM/DL (ref 14–18)
INR PPP: 1.08 (ref 0.85–1.13)
MCH RBC QN AUTO: 28.9 PG (ref 26–33)
MCHC RBC AUTO-ENTMCNC: 32.3 GM/DL (ref 32.2–35.5)
MCV RBC AUTO: 89.4 FL (ref 80–94)
PLATELET # BLD AUTO: 197 THOU/MM3 (ref 130–400)
PMV BLD AUTO: 10.8 FL (ref 9.4–12.4)
POTASSIUM SERPL-SCNC: 4.2 MEQ/L (ref 3.5–5.2)
RBC # BLD AUTO: 3.67 MILL/MM3 (ref 4.7–6.1)
SODIUM SERPL-SCNC: 135 MEQ/L (ref 135–145)
WBC # BLD AUTO: 10.1 THOU/MM3 (ref 4.8–10.8)

## 2024-11-06 PROCEDURE — 36415 COLL VENOUS BLD VENIPUNCTURE: CPT

## 2024-11-06 PROCEDURE — 93306 TTE W/DOPPLER COMPLETE: CPT

## 2024-11-06 PROCEDURE — 80048 BASIC METABOLIC PNL TOTAL CA: CPT

## 2024-11-06 PROCEDURE — 93306 TTE W/DOPPLER COMPLETE: CPT | Performed by: INTERNAL MEDICINE

## 2024-11-06 PROCEDURE — 6370000000 HC RX 637 (ALT 250 FOR IP): Performed by: NURSE PRACTITIONER

## 2024-11-06 PROCEDURE — 93005 ELECTROCARDIOGRAM TRACING: CPT

## 2024-11-06 PROCEDURE — 85610 PROTHROMBIN TIME: CPT

## 2024-11-06 PROCEDURE — 94640 AIRWAY INHALATION TREATMENT: CPT

## 2024-11-06 PROCEDURE — 82948 REAGENT STRIP/BLOOD GLUCOSE: CPT

## 2024-11-06 PROCEDURE — 94761 N-INVAS EAR/PLS OXIMETRY MLT: CPT

## 2024-11-06 PROCEDURE — 85730 THROMBOPLASTIN TIME PARTIAL: CPT

## 2024-11-06 PROCEDURE — 6370000000 HC RX 637 (ALT 250 FOR IP): Performed by: INTERNAL MEDICINE

## 2024-11-06 PROCEDURE — 2580000003 HC RX 258: Performed by: INTERNAL MEDICINE

## 2024-11-06 PROCEDURE — 85027 COMPLETE CBC AUTOMATED: CPT

## 2024-11-06 RX ORDER — POTASSIUM CHLORIDE 1500 MG/1
20 TABLET, EXTENDED RELEASE ORAL 2 TIMES DAILY
Qty: 90 TABLET | Refills: 1 | Status: SHIPPED
Start: 2024-11-07

## 2024-11-06 RX ORDER — CLOPIDOGREL BISULFATE 75 MG/1
75 TABLET ORAL DAILY
Status: DISCONTINUED | OUTPATIENT
Start: 2024-11-06 | End: 2024-11-06 | Stop reason: HOSPADM

## 2024-11-06 RX ORDER — BUMETANIDE 1 MG/1
2 TABLET ORAL 2 TIMES DAILY
Qty: 180 TABLET | Refills: 3
Start: 2024-11-07

## 2024-11-06 RX ORDER — AMLODIPINE BESYLATE 5 MG/1
5 TABLET ORAL DAILY
Status: DISCONTINUED | OUTPATIENT
Start: 2024-11-06 | End: 2024-11-06 | Stop reason: HOSPADM

## 2024-11-06 RX ORDER — METOPROLOL SUCCINATE 50 MG/1
50 TABLET, EXTENDED RELEASE ORAL DAILY
Status: DISCONTINUED | OUTPATIENT
Start: 2024-11-06 | End: 2024-11-06 | Stop reason: HOSPADM

## 2024-11-06 RX ORDER — HYDRALAZINE HYDROCHLORIDE 25 MG/1
25 TABLET, FILM COATED ORAL 3 TIMES DAILY
Qty: 270 TABLET | Refills: 1
Start: 2024-11-07

## 2024-11-06 RX ADMIN — CLOPIDOGREL BISULFATE 75 MG: 75 TABLET ORAL at 11:26

## 2024-11-06 RX ADMIN — ATORVASTATIN CALCIUM 80 MG: 40 TABLET, FILM COATED ORAL at 08:28

## 2024-11-06 RX ADMIN — POTASSIUM CHLORIDE 20 MEQ: 1500 TABLET, EXTENDED RELEASE ORAL at 08:26

## 2024-11-06 RX ADMIN — TIOTROPIUM BROMIDE AND OLODATEROL 2 PUFF: 3.124; 2.736 SPRAY, METERED RESPIRATORY (INHALATION) at 08:18

## 2024-11-06 RX ADMIN — ASPIRIN 81 MG: 81 TABLET, COATED ORAL at 08:26

## 2024-11-06 RX ADMIN — SODIUM CHLORIDE, PRESERVATIVE FREE 10 ML: 5 INJECTION INTRAVENOUS at 08:30

## 2024-11-06 RX ADMIN — INSULIN GLARGINE 28 UNITS: 100 INJECTION, SOLUTION SUBCUTANEOUS at 08:21

## 2024-11-06 RX ADMIN — METOPROLOL SUCCINATE 50 MG: 50 TABLET, EXTENDED RELEASE ORAL at 11:26

## 2024-11-06 RX ADMIN — POLYETHYLENE GLYCOL 3350 17 G: 17 POWDER, FOR SOLUTION ORAL at 00:55

## 2024-11-06 RX ADMIN — APIXABAN 5 MG: 5 TABLET, FILM COATED ORAL at 11:26

## 2024-11-06 RX ADMIN — AMLODIPINE BESYLATE 5 MG: 5 TABLET ORAL at 11:26

## 2024-11-06 RX ADMIN — EZETIMIBE 10 MG: 10 TABLET ORAL at 08:26

## 2024-11-06 NOTE — PROGRESS NOTES
Discharge teaching and instructions for diagnosis/procedure of TAVR completed with patient using teachback method. AVS reviewed.  Patient voiced understanding regarding medications, follow up appointments, and care of self at home. Discharged in a wheelchair to  home with support per family. Patient stable at discharge. Denies pain, denies shortness of breath. Bilateral groin sites soft, no hematoma, no bleeding, slight bruising left groin.

## 2024-11-06 NOTE — PROGRESS NOTES
Structural Heart/Cardiology Discharge Summary       Name:  Ashwin Liriano  YOB: 1960  Medical Record Number:  [id number]    Date of Admission:  11/4/2024  Date of Discharge:  11/6/2024    Admitting physician: Freedom Bah MD  Discharge to: Home  Condition at d/c: Stable    Time spent on discharge:  >60 minutes    Hospital Problem List:  Patient Active Problem List   Diagnosis    CAD (coronary artery disease)    Type 2 diabetes mellitus with insulin therapy (HCC)    Hyperlipidemia    Essential hypertension    Charcot's joint, right ankle and foot    Hx of CABG    Lumbar stenosis without neurogenic claudication    Stage 3 chronic kidney disease ([facility])    Chronic anemia    Morbid obesity    Physical deconditioning    Hemoglobin A1C greater than 9%, indicating poor diabetic control    Acute on chronic congestive heart failure ([facility])    Moderate persistent asthma    Peripheral vascular disease, unspecified ([facility])    Hx of BKA, left ([facility])    History of left-sided carotid endarterectomy    Bilateral carotid artery stenosis    Presence of cardiac pacemaker    Elevated PSA measurement    Hypovitaminosis D    NSTEMI (non-ST elevated myocardial infarction) ([facility])    Acute on chronic combined systolic and diastolic congestive heart failure ([facility])    Acute kidney injury ([facility])    Chest pain    Severe muscle deconditioning    Acute on chronic combined systolic and diastolic CHF (congestive heart failure) ([facility])    COPD with exacerbation ([facility])    Moderate aortic stenosis    Severe aortic stenosis    Renal insufficiency       Procedures: TAVR 11/4/2024    Hospital Course: Underwent successful TAVR on 11/4/2024.  Hgb remained stable post procedure.  No rhythm issues post-procedure. Pt up to chair, good BM yesterday, and good urine out put.     Discharge physical examination:   Vitals:    11/06/24 1117   BP: (!) 159/84   Pulse: 94   Resp: 17   Temp: 98.3 °F (36.8 °C)   SpO2: 93%     General Appearance: alert

## 2024-11-06 NOTE — TELEPHONE ENCOUNTER
Care Transitions Initial Follow Up Call    Outreach made within 2 business days of discharge: Yes    Patient: Ashwin Liriano Patient : 1960   MRN: [id number]  Reason for Admission: Cardiac Procedure    Discharge Date: 24       Spoke with: Patient    Discharge department/facility: [place], [place].    TCM Interactive Patient Contact:  Was patient able to fill all prescriptions: Yes  Was patient instructed to bring all medications to the follow-up visit: Yes  Is patient taking all medications as directed in the discharge summary? Yes  Does patient understand their discharge instructions: Yes  Does patient have questions or concerns that need addressed prior to 7-14 day follow up office visit: no    Additional needs identified to be addressed with provider  N/A             Scheduled appointment with PCP within 7-14 days    Follow Up  Future Appointments   Date Time Provider Department Center   2024  9:30 AM Wyatt Gastelum APRN - CNP [facility] FM [facility] BS ECC DEP   2024 10:45 AM King Lancaster PA-C SRPX CT SURG [facility]   2024 10:30 AM Lakia Verma APRN - CNP N [facility] CHF [facility]   2024 11:00 AM STR ECHO 2 [facility] STR Rad/Card   2024  2:00 PM SCHEDULE, SRPX PACER NURSE N [facility] PACER [facility]   2024  1:20 PM Pastor Howard MD N [place] [facility]   2024  2:45 PM Freedom Bah MD N [facility] Heart [facility]   2025 11:30 AM Raymond Garcia MD N Pulm Med [facility]   2025  9:20 AM Pastor Howard MD N [place] [facility]   2025 12:30 PM Shakira Milligan APRN - CNP N [facility] CHF [facility]   2025  8:30 AM Sapphire Swift MD N SRPX Heart [facility]   2025  2:00 PM HemShakira valerio APRN - CNP N SRPX CHF P - [facility], [facility]

## 2024-11-06 NOTE — PROCEDURES
PROCEDURE NOTE  Date: 11/6/2024   Name: Ashwin Liriano  YOB: 1960    Procedures        EKG was completed and handed to RN

## 2024-11-06 NOTE — PROGRESS NOTES
Inpatient Cardiac Rehabilitation Consult    Received consult for Phase II Cardiac Rehabilitation.  Patient needs cardiac rehab due to TAVR on 11/5/24.  Importance of Cardiac Rehab discussed with patient.  Reviewed cardiac rehab class times.  Patient questions answered.  We will contact patient at home to schedule evaluation appointment.  Cardiac Rehab brochure given.

## 2024-11-06 NOTE — TELEPHONE ENCOUNTER
Orders received from Dr. Bah to schedule the patient for her 1 year post TAVR follow up appointment with an ECHO, CBC, and BMP.    Appointment is scheduled with Shakira for 11/11/25 at 1400.  ECHO scheduled for 11/5/25 at ___.    Zandra, can you please schedule this ECHO?    Dr. Bah, please agree.

## 2024-11-06 NOTE — DISCHARGE SUMMARY
Structural Heart/Cardiology Discharge Summary       Name:  Ashwin Liriano  YOB: 1960  Medical Record Number:  [id number]    Date of Admission:  11/4/2024  Date of Discharge:  11/6/2024    Admitting physician: Freedom Bah MD  Discharge to: Home  Condition at d/c: Stable    Time spent on discharge:  >60 minutes    Hospital Problem List:  Patient Active Problem List   Diagnosis    CAD (coronary artery disease)    Type 2 diabetes mellitus with insulin therapy (HCC)    Hyperlipidemia    Essential hypertension    Charcot's joint, right ankle and foot    Hx of CABG    Lumbar stenosis without neurogenic claudication    Stage 3 chronic kidney disease ([facility])    Chronic anemia    Morbid obesity    Physical deconditioning    Hemoglobin A1C greater than 9%, indicating poor diabetic control    Acute on chronic congestive heart failure ([facility])    Moderate persistent asthma    Peripheral vascular disease, unspecified ([facility])    Hx of BKA, left ([facility])    History of left-sided carotid endarterectomy    Bilateral carotid artery stenosis    Presence of cardiac pacemaker    Elevated PSA measurement    Hypovitaminosis D    NSTEMI (non-ST elevated myocardial infarction) ([facility])    Acute on chronic combined systolic and diastolic congestive heart failure ([facility])    Acute kidney injury ([facility])    Chest pain    Severe muscle deconditioning    Acute on chronic combined systolic and diastolic CHF (congestive heart failure) ([facility])    COPD with exacerbation ([facility])    Moderate aortic stenosis    Severe aortic stenosis    Renal insufficiency       Procedures: TAVR 11/4/2024    Hospital Course: Underwent successful TAVR on 11/4/2024.  Hgb remained stable post procedure.  No rhythm issues post-procedure. Pt up to chair, good BM yesterday, and good urine out put.     Discharge physical examination:   Vitals:    11/06/24 1117   BP: (!) 159/84   Pulse: 94   Resp: 17   Temp: 98.3 °F (36.8 °C)   SpO2: 93%     General Appearance: alert

## 2024-11-06 NOTE — DISCHARGE INSTRUCTIONS
Post Transcatheter Aortic Valve Replacement (TAVR) Discharge Instructions     Your follow-up appointment and echocardiogram will be scheduled by Dr. Bah's office and their office staff and will call you with the date and time.    We are here for you! If you have any questions please call: Sima IGLESIASN, RN and Romina IGLESIASN, RN  [phone]      Do you have the help you need at home? Someone must be with you for the first 5-7 days or until you are seen in the office post TAVR.                                       ACTIVITY:  Weigh yourself every day in the morning after using the bathroom.    NO DRIVING UNTIL YOU RETURN TO THE DOCTOR'S OFFICE.  You may ride in a car.   Do not lift more than five to ten pounds for the first week you are home.  (A gallon of milk is 7 lbs)  Get up and get dressed every day. Do not stay in bed. Set a daily routine. This is an important step in re-gaining your strength.  You may walk up and down a few stairs.   Walk as much as you can.  This will help facilitate the healing process.  Plan rest periods during the day.  Avoid work that increases muscle tension.        (straining with bowel movements, moving furniture)    WOUND CARE:  Remove Band-Aids after 24 hours of placement.  May shower once Band-Aids are removed. No bathtubs, pools, or hot tubs for the first week after your procedure   Cleanse wounds with Hibiclens soap and water. Pat incision dry. Keep wounds open to air after Band-Aids are removed and keep dry.   A small amount of bloody or clear drainage is normal. Call if there is any extensive bruising, oozing, swelling or hardness of the site.  Watch for signs of infections: redness, incision hot to the touch, fever greater than 101 degrees, swelling at the groin or incision site, or discolored drainage from your incision.  When coughing, sneezing, straining for a bowel movement, vomiting or dry heaving please place manual pressure over the bilateral groin sites to give

## 2024-11-06 NOTE — PLAN OF CARE
Problem: Chronic Conditions and Co-morbidities  Goal: Patient's chronic conditions and co-morbidity symptoms are monitored and maintained or improved  Outcome: Progressing  Flowsheets (Taken 11/6/2024 0108)  Care Plan - Patient's Chronic Conditions and Co-Morbidity Symptoms are Monitored and Maintained or Improved:   Monitor and assess patient's chronic conditions and comorbid symptoms for stability, deterioration, or improvement   Collaborate with multidisciplinary team to address chronic and comorbid conditions and prevent exacerbation or deterioration   Update acute care plan with appropriate goals if chronic or comorbid symptoms are exacerbated and prevent overall improvement and discharge     Problem: Discharge Planning  Goal: Discharge to home or other facility with appropriate resources  Outcome: Progressing  Flowsheets (Taken 11/6/2024 0108)  Discharge to home or other facility with appropriate resources:   Identify barriers to discharge with patient and caregiver   Identify discharge learning needs (meds, wound care, etc)   Refer to discharge planning if patient needs post-hospital services based on physician order or complex needs related to functional status, cognitive ability or social support system   Arrange for needed discharge resources and transportation as appropriate     Problem: Safety - Adult  Goal: Free from fall injury  Outcome: Progressing  Flowsheets (Taken 11/6/2024 0108)  Free From Fall Injury: Instruct family/caregiver on patient safety  Note: Bed locked & in low position, call light in reach, side-rails up x2, bed/chair alarm utilized, non-slip socks on when ambulating, reminded patient to use call light to call for assistance.      Problem: ABCDS Injury Assessment  Goal: Absence of physical injury  Outcome: Progressing  Flowsheets (Taken 11/6/2024 0108)  Absence of Physical Injury: Implement safety measures based on patient assessment  Note: Bed locked & in low position, call light in

## 2024-11-06 NOTE — PROGRESS NOTES
CLINICAL PHARMACY: DISCHARGE MED RECONCILIATION/REVIEW    [facility] Select Patient?: Yes  Total # of Interventions Recommended: 0     Total # Interventions Accepted: 0  Intervention Severity:   - Level 1 Intervention Present?: No   - Level 2 #: 0   - Level 3 #: 0   Time Spent (min): 5

## 2024-11-07 ENCOUNTER — CARE COORDINATION (OUTPATIENT)
Dept: CARE COORDINATION | Age: 64
End: 2024-11-07

## 2024-11-07 NOTE — CARE COORDINATION
Ambulatory Care Coordination Note     11/7/2024 9:47 AM     Patient outreach attempt by this ACM today to perform hospital follow up. ACM was unable to reach the patient by telephone today; hospital follow up call within 2 business days of discharge: Yes     ACM: Leonora Angelo, RN     Care Summary Note: left voicemail to return phone call to complete hospital follow up call following TAVR procedure    PCP/Specialist follow up:   Future Appointments         Provider Specialty Dept Phone    11/11/2024 9:30 AM Wyatt Gastelum APRN - CNP Family Medicine [phone]    11/12/2024 10:45 AM King Lancaster PA-C Cardiothoracic Surgery [phone]    11/19/2024 10:30 AM Lakia Verma APRN - CNP Cardiology [phone]    12/5/2024 11:00 AM (Arrive by 10:45 AM) STR ECHO 2 Cardiology [phone]    12/5/2024 2:00 PM SCHEDULE, SRPX PACER NURSE Cardiology [phone]    12/9/2024 1:20 PM Pastor Howard MD Nephrology [phone]    12/9/2024 2:45 PM Freedom Bah MD Cardiology [phone]    1/6/2025 11:30 AM Raymond Garcia MD Pulmonology [phone]    1/23/2025 9:20 AM Pastor Howard MD Nephrology [phone]    2/21/2025 12:30 PM Shakira Milligan APRN - [facility] Cardiology [phone]    11/5/2025 8:45 AM (Arrive by 8:30 AM) STR ECHO 1 Cardiology [phone]    11/11/2025 2:00 PM Shakira Milligan APRN - [facility] Cardiology [phone]    11/19/2025 2:30 PM Sapphire Swift MD Cardiology [phone]            Follow Up:   Plan for next ACM outreach in approximately 1-2 days  to complete:  - hospital follow up.

## 2024-11-08 ENCOUNTER — CARE COORDINATION (OUTPATIENT)
Dept: CARE COORDINATION | Age: 64
End: 2024-11-08

## 2024-11-08 NOTE — CARE COORDINATION
Remote Patient Monitoring Note      Date/Time:  11/8/2024 10:52 AM    LPN attempted to contact patient by telephone regarding red alert received for weight increase (336.5lb).    Background:  High Blood-Pressure, Kidney Disease, CHF     Clinical Interventions:  No answer, unable to LM.    Plan/Follow Up: Will continue to review, monitor and address alerts with follow up based on severity of symptoms and risk factors.       Crystal Nicholson LPN  [facility]/ CTN/ Remote Patient Monitoring  [phone]

## 2024-11-08 NOTE — CARE COORDINATION
Remote Alert Monitoring Note      Date/Time:  2024 11:29 AM  Patient Current Location: [facility]:  [facility]  [place] [address]    LPN contacted patient by telephone regarding red alert received for weight increase (336.5lb). Verified patients name and  as identifiers.    Rpm alert to be reviewed by the provider    Red Alert Weight gain  12lb weight gain overnight. Pt is asymptomatic and in no apparent distress, speaking in full sentences.  Pt underwent TAVR on 24 and just resumed Bumex yesterday 24. He denies any SOB and states the only new swelling he has is surgical in the groin area. He states Cardiology advised him to take Zaroxolyn 5mg if weight reached 335lb. He is going to take one today along with Bumex.     See metric hx below                       Background:  High Blood-Pressure, Kidney Disease, CHF     Refer to 911 immediately if:  Patient unresponsive or unable to provide history  Change in cognition or sudden confusion  Patient unable to respond in complete sentences  Intense chest pain/tightness  Any concern for any clinical emergency  Red Alert: Provider response time of 1 hr required for any red alert requiring intervention  Yellow Alert: Provider response time of 3hr required for any escalated yellow alert        Weight Weight Scale Triage  Was your weight obtained upon rising/waking today? yes   Was your weight obtained after voiding and/or use of the bathroom today? yes   Did you weigh yourself in the same amount of clothing today, compared to how you typically do? yes   Was the scale bumped or moved prior to today's weight? no   Is your scale on a flat/hard surface? yes   Did you obtain your weight with shoes on? no   If yes, is this something you normally do during your daily weights? yes   Were you standing up straight on the scale today? yes   Were you leaning on anything while obtaining your weight today? no     Have you taken your medications as instructed by your doctor

## 2024-11-11 ENCOUNTER — OFFICE VISIT (OUTPATIENT)
Dept: FAMILY MEDICINE CLINIC | Age: 64
End: 2024-11-11

## 2024-11-11 ENCOUNTER — CARE COORDINATION (OUTPATIENT)
Dept: CARE COORDINATION | Age: 64
End: 2024-11-11

## 2024-11-11 VITALS
OXYGEN SATURATION: 93 % | TEMPERATURE: 99.1 F | SYSTOLIC BLOOD PRESSURE: 138 MMHG | DIASTOLIC BLOOD PRESSURE: 82 MMHG | HEART RATE: 89 BPM

## 2024-11-11 DIAGNOSIS — Z09 HOSPITAL DISCHARGE FOLLOW-UP: Primary | ICD-10-CM

## 2024-11-11 DIAGNOSIS — E66.01 MORBID OBESITY: ICD-10-CM

## 2024-11-11 DIAGNOSIS — I50.9 CHRONIC CONGESTIVE HEART FAILURE, UNSPECIFIED HEART FAILURE TYPE (HCC): ICD-10-CM

## 2024-11-11 DIAGNOSIS — R73.09 HEMOGLOBIN A1C GREATER THAN 9%, INDICATING POOR DIABETIC CONTROL: ICD-10-CM

## 2024-11-11 DIAGNOSIS — Z95.2 HISTORY OF TRANSCATHETER AORTIC VALVE REPLACEMENT (TAVR): ICD-10-CM

## 2024-11-11 ASSESSMENT — ENCOUNTER SYMPTOMS
DIARRHEA: 0
COLOR CHANGE: 0
WHEEZING: 0
ABDOMINAL PAIN: 0
CHEST TIGHTNESS: 0
COUGH: 0
SORE THROAT: 0
ABDOMINAL DISTENTION: 0
RHINORRHEA: 0
CONSTIPATION: 0
SHORTNESS OF BREATH: 0
NAUSEA: 0
VOMITING: 0

## 2024-11-11 NOTE — CARE COORDINATION
ACM scheduled for follow up call today.  Ashwin completed appt today with PCP.  RPM metrics are back to baseline with weight back down to 327.  No metrics entered yet today.  Will continue to follow up.

## 2024-11-11 NOTE — ASSESSMENT & PLAN NOTE
Reports he is out of Jardiance, refill written.  We had written for Dexcom, this was refused after prior Auth.  We had written for a freestyle estevan, patient reports he never received this.  We called the pharmacy, notes it will be $64 for the patient to refill.  He reports that he bought a watch recently that test his blood sugar, he has been to try this out first and then he will think about getting the freestyle estevan.    Orders:    empagliflozin (JARDIANCE) 10 MG tablet; Take 1 tablet by mouth daily

## 2024-11-11 NOTE — ASSESSMENT & PLAN NOTE
Continue to monitor blood sugar, continue low sugar diet, begin exercise routine when cleared by cardiology.

## 2024-11-11 NOTE — PATIENT INSTRUCTIONS
You may receive a survey regarding the care you received during your visit.  Your input is valuable to us.  We encourage you to complete and return your survey.  We hope you will choose us in the future for your healthcare needs.    Thank you for choosing [facility]!    Your Medical Assistant Today:  Ara MCALLISTER   Your Provider for Today: Tea Lozano PA-C  Ph. [phone]    Have a Great Day!

## 2024-11-11 NOTE — PROGRESS NOTES
[facility]  [facility]  [address]  [facility] [address]  Dept: [phone]  Loc: [phone]    24      Ashwin Liriano (:  1960) is a 64 y.o. male here for evaluation of the following chief complaint(s):  Follow-Up from Hospital ([facility] Follow Up)       HPI    Patient here for follow-up from hospital admission.  Patient had TAVR completed by Dr. Bah.  Denies any significant complication.  Notes that he had significant bleeding from the left groin.  Notes that it is still bruised.  Denies any signs of infection.  Denies fevers or chills.  He denies any significant chest pain or shortness of breath.  He reports follow-up tomorrow with thoracic surgery.  Follow-up next week with CHF clinic.  He notes that he had significant weight gain after procedure.  Had approximately 10 pound weight gain, notes that he is now lost 9 pounds since then.  He denies any shortness of breath associated with this.  He has left lower leg amputation.  Right lower leg is mildly edematous.  He notes this appears to be stable.  He reports being nearly out of his Jardiance.  Notes that he needs a refill.  Assessment & Plan  Hospital discharge follow-up  Appears to be doing well, denies any significant complaints, has follow-up appointment scheduled and plans to attend them.    Orders:    NM DISCHARGE MEDS RECONCILED W/ CURRENT OUTPATIENT MED LIST    Hemoglobin A1C greater than 9%, indicating poor diabetic control  Reports he is out of Jardiance, refill written.  We had written for Dexcom, this was refused after prior Auth.  We had written for a freestyle estevan, patient reports he never received this.  We called the pharmacy, notes it will be $64 for the patient to refill.  He reports that he bought a watch recently that test his blood sugar, he has been to try this out first and then he will think about getting the freestyle

## 2024-11-12 ENCOUNTER — OFFICE VISIT (OUTPATIENT)
Dept: CARDIOTHORACIC SURGERY | Age: 64
End: 2024-11-12
Payer: COMMERCIAL

## 2024-11-12 VITALS
DIASTOLIC BLOOD PRESSURE: 65 MMHG | HEART RATE: 86 BPM | SYSTOLIC BLOOD PRESSURE: 113 MMHG | OXYGEN SATURATION: 93 % | BODY MASS INDEX: 40.43 KG/M2 | HEIGHT: 74 IN | WEIGHT: 315 LBS

## 2024-11-12 DIAGNOSIS — I35.0 SEVERE AORTIC STENOSIS: Primary | ICD-10-CM

## 2024-11-12 PROCEDURE — G8427 DOCREV CUR MEDS BY ELIG CLIN: HCPCS | Performed by: PHYSICIAN ASSISTANT

## 2024-11-12 PROCEDURE — 3074F SYST BP LT 130 MM HG: CPT | Performed by: PHYSICIAN ASSISTANT

## 2024-11-12 PROCEDURE — 1111F DSCHRG MED/CURRENT MED MERGE: CPT | Performed by: PHYSICIAN ASSISTANT

## 2024-11-12 PROCEDURE — G8484 FLU IMMUNIZE NO ADMIN: HCPCS | Performed by: PHYSICIAN ASSISTANT

## 2024-11-12 PROCEDURE — 1036F TOBACCO NON-USER: CPT | Performed by: PHYSICIAN ASSISTANT

## 2024-11-12 PROCEDURE — G8417 CALC BMI ABV UP PARAM F/U: HCPCS | Performed by: PHYSICIAN ASSISTANT

## 2024-11-12 PROCEDURE — 99214 OFFICE O/P EST MOD 30 MIN: CPT | Performed by: PHYSICIAN ASSISTANT

## 2024-11-12 PROCEDURE — 3078F DIAST BP <80 MM HG: CPT | Performed by: PHYSICIAN ASSISTANT

## 2024-11-12 PROCEDURE — 3017F COLORECTAL CA SCREEN DOC REV: CPT | Performed by: PHYSICIAN ASSISTANT

## 2024-11-12 RX ORDER — ASPIRIN 81 MG/1
81 TABLET ORAL DAILY
COMMUNITY

## 2024-11-12 NOTE — PROGRESS NOTES
Disp: , Rfl:     empagliflozin (JARDIANCE) 10 MG tablet, Take 1 tablet by mouth daily, Disp: 30 tablet, Rfl: 3    bumetanide (BUMEX) 1 MG tablet, Take 2 tablets by mouth 2 times daily, Disp: 180 tablet, Rfl: 3    hydrALAZINE (APRESOLINE) 25 MG tablet, Take 1 tablet by mouth 3 times daily, Disp: 270 tablet, Rfl: 1    potassium chloride (KLOR-CON M) 20 MEQ extended release tablet, Take 1 tablet by mouth 2 times daily, Disp: 90 tablet, Rfl: 1    metoprolol succinate (TOPROL XL) 50 MG extended release tablet, Take 1 tablet by mouth once daily, Disp: 30 tablet, Rfl: 0    Insulin Degludec (TRESIBA FLEXTOUCH) 200 UNIT/ML SOPN, Inject 35 Units into the skin in the morning and at bedtime, Disp: 3 Adjustable Dose Pre-filled Pen Syringe, Rfl: 2    apixaban (ELIQUIS) 5 MG TABS tablet, Take 1 tablet by mouth 2 times daily, Disp: 180 tablet, Rfl: 3    amLODIPine (NORVASC) 5 MG tablet, Take 1 tablet by mouth daily, Disp: 90 tablet, Rfl: 3    tiotropium-olodaterol (STIOLTO) 2.5-2.5 MCG/ACT AERS, Inhale 2 puffs into the lungs daily, Disp: 1 each, Rfl: 0    atorvastatin (LIPITOR) 80 MG tablet, Take 1 tablet by mouth daily, Disp: 90 tablet, Rfl: 1    clopidogrel (PLAVIX) 75 MG tablet, Take 1 tablet by mouth daily, Disp: 90 tablet, Rfl: 1    insulin lispro, 1 Unit Dial, (HUMALOG KWIKPEN) 100 UNIT/ML SOPN, 5 units plus scale: -199= 3 units, 200-249= 6 units, 250-299= 9 units, 300-349= 12 units, > 350= 15 units TID. Max units per day 60, Disp: 7 Adjustable Dose Pre-filled Pen Syringe, Rfl: 3    ezetimibe (ZETIA) 10 MG tablet, Take 1 tablet by mouth once daily, Disp: 90 tablet, Rfl: 2    gabapentin (NEURONTIN) 600 MG tablet, Take 1 tablet by mouth 2 times daily for 180 days., Disp: 180 tablet, Rfl: 1    albuterol sulfate HFA (VENTOLIN HFA) 108 (90 Base) MCG/ACT inhaler, Inhale 2 puffs into the lungs 4 times daily as needed for Wheezing, Disp: 54 g, Rfl: 1    Polyethylene Glycol 3350 (MIRALAX PO), Take by mouth as needed, Disp: ,

## 2024-11-12 NOTE — PROGRESS NOTES
Physician Progress Note      PATIENT:               YEE MORA  CSN #:                  [id number]  :                       1960  ADMIT DATE:       2024 7:55 PM  DISCH DATE:        2024 3:05 PM  RESPONDING  PROVIDER #:        Freedom Bah MD          QUERY TEXT:    Pt admitted with Severe AS and has CHF documented. If possible, please   document in progress notes and discharge summary further specificity regarding   the type and acuity of CHF:    The medical record reflects the following:  Risk Factors: History of CHF on Bumex at home  Clinical Indicators:  BNP 2385, ECHO  EF 41%.  Treatment: Norvasc, Bumex    Thank you.  Carolina Dinh RN, Clinical Documentation Integrity, [employer]   [employer], [facility], CRCR  Options provided:  -- Acute on Chronic Systolic CHF/HFrEF  -- Acute Systolic CHF/HFrEF  -- Chronic Systolic CHF/HFrEF  -- Other - I will add my own diagnosis  -- Disagree - Not applicable / Not valid  -- Disagree - Clinically unable to determine / Unknown  -- Refer to Clinical Documentation Reviewer    PROVIDER RESPONSE TEXT:    This patient has chronic systolic CHF/HFrEF.    Query created by: Carolina Dinh on 2024 10:17 AM      Electronically signed by:  Freedom Bah MD 2024 11:07 AM

## 2024-11-18 ENCOUNTER — CARE COORDINATION (OUTPATIENT)
Dept: CARE COORDINATION | Age: 64
End: 2024-11-18

## 2024-11-18 NOTE — CARE COORDINATION
Crystal,    Ashwin Sen alerted red today for weight.  After discussion with him this morning, he is asymptomatic.  States he \"over ate\" while watching football this weekend.  Denies increased swelling.  Denies changes in breathing.  Has appt with CHF clinic scheduled tomorrow.  Just FYI.  Thanks!

## 2024-11-18 NOTE — CARE COORDINATION
Ambulatory Care Coordination Note     2024 11:10 AM     Patient Current Location:  Home:  [facility]  [place] [address]     ACM contacted the patient by telephone. Verified name and  with patient as identifiers.         ACM: Leonora Angelo RN     Challenges to be reviewed by the provider   Additional needs identified to be addressed with provider No  none               Method of communication with provider: none.    Utilization: Patient has not had any utilization since our last call.     Care Summary Note: Spoke with Ashwin Xiong for review  Doing well since TAVR  Has appt with CHF clinic scheduled tomorrow  Discussed RPM red alert for weight  States he was watching football this weekend and \"over ate\"  states it is not fluid retention  Denies changes in his breathing  Blood sugars are stable  Continue with monitoring and will update RPM team of red alert    Plan  Continue with RPM monitoring  Ensure follow up appt with CHF clinic completed  Reinforce importance of early symptom recognition and reporting    Offered patient enrollment in the Remote Patient Monitoring (RPM) program for in-home monitoring: Yes, patient enrolled; current status is activated and monitoring.     Assessments Completed:   General Assessment    Do you have any symptoms that are causing concern?: Yes  Progression since Onset: Gradually Improving  Reported Symptoms: Other (Comment: s/p TAVR)          Medications Reviewed:   Patient denies any changes with medications and reports taking all medications as prescribed.    Advance Care Planning:   Not reviewed during this call     Care Planning:    Goals Addressed                      This Visit's Progress      Conditions and Symptoms (pt-stated)   On track      I will schedule office visits, as directed by my provider.  I will keep my appointment or reschedule if I have to cancel.  I will notify my provider of any barriers to my plan of care.  I will follow my Zone Management

## 2024-11-19 ENCOUNTER — CARE COORDINATION (OUTPATIENT)
Dept: CARE COORDINATION | Age: 64
End: 2024-11-19

## 2024-11-19 ENCOUNTER — OFFICE VISIT (OUTPATIENT)
Dept: CARDIOLOGY CLINIC | Age: 64
End: 2024-11-19

## 2024-11-19 ENCOUNTER — TELEPHONE (OUTPATIENT)
Dept: CARDIOLOGY CLINIC | Age: 64
End: 2024-11-19

## 2024-11-19 VITALS
HEART RATE: 88 BPM | DIASTOLIC BLOOD PRESSURE: 70 MMHG | BODY MASS INDEX: 40.43 KG/M2 | WEIGHT: 315 LBS | OXYGEN SATURATION: 93 % | SYSTOLIC BLOOD PRESSURE: 138 MMHG | HEIGHT: 74 IN

## 2024-11-19 DIAGNOSIS — I42.8 NONISCHEMIC CARDIOMYOPATHY (HCC): ICD-10-CM

## 2024-11-19 DIAGNOSIS — I50.23 ACUTE ON CHRONIC SYSTOLIC CONGESTIVE HEART FAILURE, NYHA CLASS 2 (HCC): ICD-10-CM

## 2024-11-19 DIAGNOSIS — I10 ESSENTIAL HYPERTENSION: ICD-10-CM

## 2024-11-19 DIAGNOSIS — I48.0 PAROXYSMAL ATRIAL FIBRILLATION (HCC): ICD-10-CM

## 2024-11-19 DIAGNOSIS — Z95.2 S/P TAVR (TRANSCATHETER AORTIC VALVE REPLACEMENT): Primary | ICD-10-CM

## 2024-11-19 RX ORDER — METOLAZONE 5 MG/1
5 TABLET ORAL PRN
Qty: 5 TABLET | Refills: 0 | Status: SHIPPED | OUTPATIENT
Start: 2024-11-19

## 2024-11-19 RX ORDER — METOPROLOL SUCCINATE 50 MG/1
50 TABLET, EXTENDED RELEASE ORAL DAILY
Qty: 90 TABLET | Refills: 0 | Status: SHIPPED | OUTPATIENT
Start: 2024-11-19

## 2024-11-19 RX ORDER — BUMETANIDE 0.25 MG/ML
2 INJECTION, SOLUTION INTRAMUSCULAR; INTRAVENOUS ONCE
Status: COMPLETED | OUTPATIENT
Start: 2024-11-19 | End: 2024-11-19

## 2024-11-19 RX ORDER — POTASSIUM CHLORIDE 1500 MG/1
20 TABLET, EXTENDED RELEASE ORAL ONCE
Status: COMPLETED | OUTPATIENT
Start: 2024-11-19 | End: 2024-11-19

## 2024-11-19 RX ADMIN — POTASSIUM CHLORIDE 20 MEQ: 1500 TABLET, EXTENDED RELEASE ORAL at 11:27

## 2024-11-19 RX ADMIN — BUMETANIDE 2 MG: 0.25 INJECTION, SOLUTION INTRAMUSCULAR; INTRAVENOUS at 11:26

## 2024-11-19 ASSESSMENT — ENCOUNTER SYMPTOMS
VOMITING: 0
NAUSEA: 0
COUGH: 0
ABDOMINAL DISTENTION: 0
SHORTNESS OF BREATH: 1

## 2024-11-19 NOTE — PATIENT INSTRUCTIONS
You may receive a survey regarding the care you received during your visit.  Your input is valuable to us.  We encourage you to complete and return your survey.  We hope you will choose us in the future for your healthcare needs.    Your nurses today were Katt.  Office hours:   Mon-Thurs 8-4:30  Friday 8-12  Phone: [phone]    Continue:  Continue current medications  Daily weights and record  Fluid restriction of 2 Liters per day  Limit sodium in diet to around 1275-0371 mg/day  Monitor BP  Activity as tolerated     Call the Heart Failure Clinic for any of the following symptoms:   Weight gain of -3 pounds in 1 day or 5 pounds in 1 week  Increased shortness of breath  Shortness of breath while laying down  Cough  Chest pain  Swelling in feet, ankles or legs  Bloating in abdomen  Fatigue        IV bumex today with 20 of KCL    Metolazone 5mg tomorrow and Thursday with an additional 20meq of potassium      Continue diet/fluid adherence  Continue daily wts.  F/U w/ Cardiology  F/U in clinic 3 month f/u

## 2024-11-19 NOTE — PROGRESS NOTES
Heart Failure Clinic       Visit Date: 11/19/2024  Cardiologist:  Dr. Swift   Primary Care Physician: Dr. Gastelum, Wyatt KAT, APRN - CNP    Ashwin Liriano is a 64 y.o. male who presents today for:  Chief Complaint   Patient presents with    Valvular Heart Disease       HPI:     TYPE HF: HFrEF 35-40% 2024, 50% 2020   Cause:   Device: dual pacemaker for Second degree mobitz type 2 block  HX: CAD s/p CABG, HTN, DM 2  Dry Wt:  351 on 7/11/24, 335 on 9/3/24, 344 on 10/10/24, 327 on 10/22/24, 329 on 11/19/24      Ashwin Liriano is a 64 y.o. male who presents to the office for a f/u patient visit in the heart failure clinic.    Concerns today: post TAVR visit. Weight is up 2lbs from last visit. Has not taken his metolazone in the last 2 weeks. Renal function is stable.     Activity: limited d/t left BKA and FERRERA  Diet: Educated - pt admits to having a slushy machine at home and ate a lot over this past weekend    Patient has:  Chest Pain: no (chest tightness resolved since TAVR)  SOB: yes improved since TAVR - continues intermittently  Orthopnea/PND: no    RILEY: needs tested  Edema: yes  Fatigue: no  Abdominal bloating: no  Cough: no  Appetite: good    Visit on 10/22/24: weight is down 17lbs. Respond well to his metolazone, good urination.     Activity: limited d/t left BKA and FERRERA  Diet: Educated - seems to be following    Patient has:  Chest Pain: no  SOB: yes - improved with fluid loss  Orthopnea/PND: no  RILEY: needs tested  Edema: resolved  Fatigue: no  Abdominal bloating: no  Cough: no  Appetite: good    Visit on 10/10/24: here today for his pre-TAVR work up. Weight is up 9lbs. LHC done showing patent bypasses x 3 with elevated RH pressures. He states nephrology instructed him to hold his diuretics two days prior to his cath and then pre-hydrated him as well. He was given IV diuretic after his cath and told to take metolazone today and tomorrow.     Activity: limited d/t left BKA and FERRERA  Diet: Educated - seems to

## 2024-11-19 NOTE — CARE COORDINATION
Remote Alert Monitoring Note      Date/Time:  2024 9:12 AM  Patient Current Location: Home:  [facility]  [place] [address]    LPN contacted patient by telephone regarding red alert received for weight increase (328lb). Verified patients name and  as identifiers.    Rpm alert to be reviewed by the provider    Please see metric hx below for review.                         Background:  High Blood-Pressure, Kidney Disease, CHF     Refer to 911 immediately if:  Patient unresponsive or unable to provide history  Change in cognition or sudden confusion  Patient unable to respond in complete sentences  Intense chest pain/tightness  Any concern for any clinical emergency  Red Alert: Provider response time of 1 hr required for any red alert requiring intervention  Yellow Alert: Provider response time of 3hr required for any escalated yellow alert          Have you taken your medications as instructed by your doctor today? Yes   Weight Triage  Are you weighing any different than you did yesterday? (time of day, clothes and shoes on or off, etc)? no   Do you have any shortness of breath? no   Do you have any swelling in your hands of feet? baseline   Are you having any other health concerns or issues? no       Clinical Interventions: Reviewed and followed up on alerts and treatments-. Pt is in no apparent distress, speaking in full sentences.  Patients weight remains elevated from yesterday. He continues to deny any changes in breathing or swelling. He is scheduled with CHF clinic in less than an hour. Metric hx provided to Lakia Verma at CHF clinic.    Signs and symptoms of CHF discussed with patient, such as feeling more tired than normal, feeling short of breath, coughing that increases when lying down, sudden weight gain, swelling of the feet, legs or belly.  Patient verbalized understanding to notify physician office if these symptoms occur.    Plan/Follow Up: Will continue to review, monitor and address

## 2024-11-19 NOTE — PROGRESS NOTES
Venipuncture obtained from [facility].  IV bumex and po potassium given per order  Patient tolerated procedure without complications or complaints.

## 2024-11-20 ENCOUNTER — CARE COORDINATION (OUTPATIENT)
Dept: CARE COORDINATION | Age: 64
End: 2024-11-20

## 2024-11-20 NOTE — CARE COORDINATION
Remote Patient Monitoring Note      Date/Time:  11/20/2024 11:17 AM    Weight trending down 2lbs since yesterday. Patient was seen at CHF clinic yesterday and treated with IV Bumex and KCL.  Patient advised to take Metolazone 5mg yesterday and today with KCL. Cardiology has been in contact with patient is am and noted that patient reported improved sxs and increased urination. No outreach necessary at this time.      Background: High Blood-Pressure, Kidney Disease, CHF     Clinical Interventions:  Will continue to monitor    Plan/Follow Up: Will continue to review, monitor and address alerts with follow up based on severity of symptoms and risk factors.       BUBBA Kemp [facility]/ CTN/ Remote Patient Monitoring  [phone]

## 2024-11-20 NOTE — TELEPHONE ENCOUNTER
Please have him call back with his weight    Note sent from care coordination - weight is 326 today. Taking metolazone this AM- f/u on weight tomorrow please

## 2024-11-26 ENCOUNTER — TELEPHONE (OUTPATIENT)
Dept: CARDIOLOGY CLINIC | Age: 64
End: 2024-11-26

## 2024-11-26 NOTE — TELEPHONE ENCOUNTER
Pt has an echo scheduled for 12/05/24. Pt left voicemail stating he can not afford to pay for it, since the estimate is 2,840.40. Pt had TAVR on 11/05/24

## 2024-12-03 ENCOUNTER — CARE COORDINATION (OUTPATIENT)
Dept: CARE COORDINATION | Age: 64
End: 2024-12-03

## 2024-12-03 NOTE — CARE COORDINATION
Ambulatory Care Coordination Note  12/3/2024    Patient Current Location:  Home:  [address] [address]     CHERRI MAE contacted the patient by telephone. Verified name and  with patient as identifiers. Provided introduction to self, and explanation of the CHERRI MAE role.     Challenges to be reviewed by the provider   Additional needs identified to be addressed with provider: No  none               Method of communication with provider: none.    I spoke with the patient for continued Care Coordination follow up and education.  Patient states overall he feels good.  Breathing at baseline.  Denies increased SOB, CP or edema.  We discussed Zone management tools for chronic disease(s) and patient denies current questions re: s/sx at this time.  Patient is scheduled for echo on .  Patient remains active with RPM.  Educated on how to identify sx's that are worse than the baseline and the importance of early symptom recognition and reporting to prevent exacerbation which may lead to ED visits and hospital admissions.  I advised patient to contact PCP office if needed.  No further needs at this time.         Lab Results       None                 Goals Addressed    None         Future Appointments   Date Time Provider Department Center   2024 11:00 AM STR ECHO 2 [facility] Rad/Card   2024  2:00 PM SCHEDULE, [facility] PACER NURSE N [facility] PACER P - [place]   2024  1:20 PM Pastor Howard MD N [facility] [facility] - [place]   2024  2:45 PM Freedom Bah MD N [facility] Heart P - [place]   2025 11:30 AM Raymond Garcia MD N Pulm Med P - [place]   2025  9:20 AM Pastor Howard MD N [place] [facility] - [place]   2025  3:00 PM aLkia Verma APRN - CNP N [facility] CHF [facility] - [place]   2025 12:30 PM HemShakira valerio APRN - CNP N [facility] CHF [facility] - [place]   2025  8:45 AM STR ECHO 1 [facility] STR Rad/Card   2025  2:00 PM HemShakira valerio APRN - CNP N [facility] CHF [facility] - [place]   2025  2:30

## 2024-12-04 ENCOUNTER — LAB (OUTPATIENT)
Dept: LAB | Age: 64
End: 2024-12-04

## 2024-12-04 DIAGNOSIS — N18.31 STAGE 3A CHRONIC KIDNEY DISEASE (HCC): ICD-10-CM

## 2024-12-04 DIAGNOSIS — E11.21 DIABETIC NEPHROPATHY WITH PROTEINURIA (HCC): ICD-10-CM

## 2024-12-04 DIAGNOSIS — I35.0 SEVERE AORTIC STENOSIS: ICD-10-CM

## 2024-12-04 DIAGNOSIS — I10 ESSENTIAL HYPERTENSION: ICD-10-CM

## 2024-12-04 LAB
ALBUMIN SERPL BCG-MCNC: 3.7 G/DL (ref 3.5–5.1)
ANION GAP SERPL CALC-SCNC: 13 MEQ/L (ref 8–16)
BUN SERPL-MCNC: 34 MG/DL (ref 7–22)
CALCIUM SERPL-MCNC: 9.4 MG/DL (ref 8.5–10.5)
CHLORIDE SERPL-SCNC: 98 MEQ/L (ref 98–111)
CO2 SERPL-SCNC: 31 MEQ/L (ref 23–33)
CREAT SERPL-MCNC: 1.8 MG/DL (ref 0.4–1.2)
DEPRECATED RDW RBC AUTO: 57.2 FL (ref 35–45)
ERYTHROCYTE [DISTWIDTH] IN BLOOD BY AUTOMATED COUNT: 17.2 % (ref 11.5–14.5)
GFR SERPL CREATININE-BSD FRML MDRD: 42 ML/MIN/1.73M2
GLUCOSE SERPL-MCNC: 142 MG/DL (ref 70–108)
HCT VFR BLD AUTO: 37.8 % (ref 42–52)
HGB BLD-MCNC: 11.7 GM/DL (ref 14–18)
MCH RBC QN AUTO: 28.3 PG (ref 26–33)
MCHC RBC AUTO-ENTMCNC: 31 GM/DL (ref 32.2–35.5)
MCV RBC AUTO: 91.3 FL (ref 80–94)
PHOSPHATE SERPL-MCNC: 3 MG/DL (ref 2.4–4.7)
PLATELET # BLD AUTO: 249 THOU/MM3 (ref 130–400)
PMV BLD AUTO: 10.4 FL (ref 9.4–12.4)
POTASSIUM SERPL-SCNC: 4.2 MEQ/L (ref 3.5–5.2)
RBC # BLD AUTO: 4.14 MILL/MM3 (ref 4.7–6.1)
SODIUM SERPL-SCNC: 142 MEQ/L (ref 135–145)
WBC # BLD AUTO: 10.5 THOU/MM3 (ref 4.8–10.8)

## 2024-12-05 ENCOUNTER — NURSE ONLY (OUTPATIENT)
Dept: CARDIOLOGY CLINIC | Age: 64
End: 2024-12-05

## 2024-12-05 ENCOUNTER — HOSPITAL ENCOUNTER (OUTPATIENT)
Age: 64
Discharge: HOME | End: 2024-12-07
Attending: INTERNAL MEDICINE
Payer: COMMERCIAL

## 2024-12-05 DIAGNOSIS — Z95.0 PACEMAKER: Primary | ICD-10-CM

## 2024-12-05 DIAGNOSIS — I35.0 SEVERE AORTIC STENOSIS: ICD-10-CM

## 2024-12-05 LAB
BACTERIA: ABNORMAL
BILIRUB UR QL STRIP: NEGATIVE
CASTS #/AREA URNS LPF: ABNORMAL /LPF
CASTS #/AREA URNS LPF: ABNORMAL /LPF
CHARACTER UR: CLEAR
CHARCOAL URNS QL MICRO: ABNORMAL
COLOR UR: YELLOW
CREAT UR-MCNC: 23.3 MG/DL
CREAT UR-MCNC: 23.3 MG/DL
CRYSTALS URNS QL MICRO: ABNORMAL
ECHO AV AREA PEAK VELOCITY: 2.1 CM2
ECHO AV AREA VTI: 2 CM2
ECHO AV MEAN GRADIENT: 9 MMHG
ECHO AV MEAN VELOCITY: 1.5 M/S
ECHO AV PEAK GRADIENT: 13 MMHG
ECHO AV PEAK VELOCITY: 1.8 M/S
ECHO AV VELOCITY RATIO: 0.56
ECHO AV VTI: 35.7 CM
ECHO LA AREA 2C: 16.4 CM2
ECHO LA AREA 4C: 13.9 CM2
ECHO LA DIAMETER: 4.3 CM
ECHO LA MAJOR AXIS: 4.5 CM
ECHO LA MINOR AXIS: 5.2 CM
ECHO LA VOL BP: 41 ML (ref 18–58)
ECHO LA VOL MOD A2C: 43 ML (ref 18–58)
ECHO LA VOL MOD A4C: 35 ML (ref 18–58)
ECHO LV E' LATERAL VELOCITY: 8.2 CM/S
ECHO LV E' SEPTAL VELOCITY: 3.6 CM/S
ECHO LV EDV A2C: 113 ML
ECHO LV EDV A4C: 178 ML
ECHO LV EJECTION FRACTION A2C: 47 %
ECHO LV EJECTION FRACTION A4C: 47 %
ECHO LV EJECTION FRACTION BIPLANE: 46 % (ref 55–100)
ECHO LV ESV A2C: 61 ML
ECHO LV ESV A4C: 95 ML
ECHO LV FRACTIONAL SHORTENING: 24 % (ref 28–44)
ECHO LV INTERNAL DIMENSION DIASTOLIC: 5.5 CM (ref 4.2–5.9)
ECHO LV INTERNAL DIMENSION SYSTOLIC: 4.2 CM
ECHO LV ISOVOLUMETRIC RELAXATION TIME (IVRT): 60 MS
ECHO LV IVSD: 1 CM (ref 0.6–1)
ECHO LV MASS 2D: 213.2 G (ref 88–224)
ECHO LV POSTERIOR WALL DIASTOLIC: 1 CM (ref 0.6–1)
ECHO LV RELATIVE WALL THICKNESS RATIO: 0.36
ECHO LVOT AREA: 3.8 CM2
ECHO LVOT AV VTI INDEX: 0.53
ECHO LVOT DIAM: 2.2 CM
ECHO LVOT MEAN GRADIENT: 3 MMHG
ECHO LVOT PEAK GRADIENT: 4 MMHG
ECHO LVOT PEAK VELOCITY: 1 M/S
ECHO LVOT SV: 72.2 ML
ECHO LVOT VTI: 19 CM
ECHO MV A VELOCITY: 1.59 M/S
ECHO MV AREA VTI: 1.9 CM2
ECHO MV E DECELERATION TIME (DT): 292 MS
ECHO MV E VELOCITY: 1.41 M/S
ECHO MV E/A RATIO: 0.89
ECHO MV E/E' LATERAL: 17.2
ECHO MV E/E' RATIO (AVERAGED): 28.18
ECHO MV E/E' SEPTAL: 39.17
ECHO MV LVOT VTI INDEX: 2.04
ECHO MV MAX VELOCITY: 1.6 M/S
ECHO MV MEAN GRADIENT: 6 MMHG
ECHO MV MEAN VELOCITY: 1.1 M/S
ECHO MV PEAK GRADIENT: 10 MMHG
ECHO MV REGURGITANT PEAK GRADIENT: 77 MMHG
ECHO MV REGURGITANT PEAK VELOCITY: 4.4 M/S
ECHO MV VTI: 38.7 CM
ECHO PV MAX VELOCITY: 0.6 M/S
ECHO PV PEAK GRADIENT: 2 MMHG
ECHO RV INTERNAL DIMENSION: 2.7 CM
ECHO RV TAPSE: 1.3 CM (ref 1.7–?)
ECHO TV E WAVE: 0.6 M/S
EPITHELIAL CELLS, UA: ABNORMAL /HPF
GLUCOSE UR QL STRIP.AUTO: >= 1000 MG/DL
HGB UR QL STRIP.AUTO: NEGATIVE
KETONES UR QL STRIP.AUTO: NEGATIVE
LEUKOCYTE ESTERASE UR QL STRIP.AUTO: NEGATIVE
MICROALBUMIN UR-MCNC: 34.73 MG/DL
MICROALBUMIN/CREAT RATIO PNL UR: 1491 MG/G (ref 0–30)
NITRITE UR QL STRIP.AUTO: NEGATIVE
PH UR STRIP.AUTO: 6.5 [PH] (ref 5–9)
PROT UR STRIP.AUTO-MCNC: 100 MG/DL
PROT UR-MCNC: 54.3 MG/DL
PROT/CREAT 24H UR: 2.33 MG/G{CREAT}
RBC #/AREA URNS HPF: ABNORMAL /HPF
RENAL EPI CELLS #/AREA URNS HPF: ABNORMAL /[HPF]
SPECIFIC GRAVITY UA: 1.01 (ref 1–1.03)
UROBILINOGEN, URINE: 0.2 EU/DL (ref 0–1)
WBC #/AREA URNS HPF: ABNORMAL /HPF
YEAST LIKE FUNGI URNS QL MICRO: ABNORMAL

## 2024-12-05 PROCEDURE — 93306 TTE W/DOPPLER COMPLETE: CPT

## 2024-12-09 ENCOUNTER — OFFICE VISIT (OUTPATIENT)
Dept: CARDIOLOGY CLINIC | Age: 64
End: 2024-12-09
Payer: COMMERCIAL

## 2024-12-09 ENCOUNTER — OFFICE VISIT (OUTPATIENT)
Dept: NEPHROLOGY | Age: 64
End: 2024-12-09
Payer: COMMERCIAL

## 2024-12-09 VITALS
OXYGEN SATURATION: 100 % | BODY MASS INDEX: 41.73 KG/M2 | SYSTOLIC BLOOD PRESSURE: 144 MMHG | WEIGHT: 315 LBS | DIASTOLIC BLOOD PRESSURE: 76 MMHG | HEART RATE: 90 BPM

## 2024-12-09 VITALS
WEIGHT: 315 LBS | HEIGHT: 74 IN | SYSTOLIC BLOOD PRESSURE: 178 MMHG | BODY MASS INDEX: 40.43 KG/M2 | DIASTOLIC BLOOD PRESSURE: 82 MMHG | HEART RATE: 99 BPM

## 2024-12-09 DIAGNOSIS — N18.32 STAGE 3B CHRONIC KIDNEY DISEASE (HCC): Primary | ICD-10-CM

## 2024-12-09 DIAGNOSIS — E11.21 DIABETIC NEPHROPATHY WITH PROTEINURIA (HCC): ICD-10-CM

## 2024-12-09 DIAGNOSIS — I10 ESSENTIAL HYPERTENSION: ICD-10-CM

## 2024-12-09 DIAGNOSIS — Z95.2 S/P TAVR (TRANSCATHETER AORTIC VALVE REPLACEMENT): Primary | ICD-10-CM

## 2024-12-09 PROCEDURE — G8417 CALC BMI ABV UP PARAM F/U: HCPCS | Performed by: INTERNAL MEDICINE

## 2024-12-09 PROCEDURE — G8427 DOCREV CUR MEDS BY ELIG CLIN: HCPCS | Performed by: INTERNAL MEDICINE

## 2024-12-09 PROCEDURE — 3017F COLORECTAL CA SCREEN DOC REV: CPT | Performed by: INTERNAL MEDICINE

## 2024-12-09 PROCEDURE — 3046F HEMOGLOBIN A1C LEVEL >9.0%: CPT | Performed by: INTERNAL MEDICINE

## 2024-12-09 PROCEDURE — 2022F DILAT RTA XM EVC RTNOPTHY: CPT | Performed by: INTERNAL MEDICINE

## 2024-12-09 PROCEDURE — 3077F SYST BP >= 140 MM HG: CPT | Performed by: INTERNAL MEDICINE

## 2024-12-09 PROCEDURE — 1036F TOBACCO NON-USER: CPT | Performed by: INTERNAL MEDICINE

## 2024-12-09 PROCEDURE — 3079F DIAST BP 80-89 MM HG: CPT | Performed by: INTERNAL MEDICINE

## 2024-12-09 PROCEDURE — 99213 OFFICE O/P EST LOW 20 MIN: CPT | Performed by: INTERNAL MEDICINE

## 2024-12-09 PROCEDURE — 99214 OFFICE O/P EST MOD 30 MIN: CPT | Performed by: INTERNAL MEDICINE

## 2024-12-09 PROCEDURE — G8484 FLU IMMUNIZE NO ADMIN: HCPCS | Performed by: INTERNAL MEDICINE

## 2024-12-09 PROCEDURE — G2211 COMPLEX E/M VISIT ADD ON: HCPCS | Performed by: INTERNAL MEDICINE

## 2024-12-09 PROCEDURE — 93000 ELECTROCARDIOGRAM COMPLETE: CPT | Performed by: INTERNAL MEDICINE

## 2024-12-09 PROCEDURE — 3078F DIAST BP <80 MM HG: CPT | Performed by: INTERNAL MEDICINE

## 2024-12-09 RX ORDER — LISINOPRIL 10 MG/1
10 TABLET ORAL DAILY
Qty: 90 TABLET | Refills: 1 | Status: SHIPPED | OUTPATIENT
Start: 2024-12-09

## 2024-12-09 RX ORDER — AMLODIPINE BESYLATE 10 MG/1
10 TABLET ORAL DAILY
Qty: 90 TABLET | Refills: 3 | Status: SHIPPED | OUTPATIENT
Start: 2024-12-09

## 2024-12-09 RX ORDER — ATORVASTATIN CALCIUM 80 MG/1
80 TABLET, FILM COATED ORAL DAILY
Qty: 90 TABLET | Refills: 1 | Status: SHIPPED | OUTPATIENT
Start: 2024-12-09

## 2024-12-09 NOTE — PATIENT INSTRUCTIONS
Your nurses today were RHEA Oneil and BUBBA Brown  Your provider today was Dr. Bah  Phone number: [phone]

## 2024-12-09 NOTE — PROGRESS NOTES
[facility]        Outpatient Follow-Up note         12/9/2024 1:25 PM    Patient Name:   Ashwin Liriano  YOB: 1960  Primary Care Physician:  Wyatt Gastelum, EYAD - CNP   [facility] [place] [facility]  [facility]  [address]  [address]  [facility] [address]  Dept: [phone]  Loc: [phone]     Chief Complaint / Reason for follow-up : Follow Up of CKD     Interval History :  Patient seen and examined by me.   Overall feels well weight is significantly down  Had a TAVR done on 11/5/2024 no complaints     Past History :  Past Medical History:   Diagnosis Date    Acute left-sided low back pain with bilateral sciatica     Aneurysm (HCC)     Arthritis     CAD (coronary artery disease)     CKD (chronic kidney disease), stage II     COPD (chronic obstructive pulmonary disease) (HCC)     Diabetes mellitus (HCC)     Hyperlipidemia     Hypertension     Liver disease     HEPITITIS AS A CHILD    Mitral valve prolapse     S/P transmetatarsal amputation of foot, left (HCC)     Wound dehiscence, surgical, initial encounter      Past Surgical History:   Procedure Laterality Date    CARDIAC PROCEDURE N/A 10/8/2024    Left and right heart cath / coronary angiography w grafts performed by Sapphire Swift MD at [facility] CARDIAC CATH LAB    CARDIAC PROCEDURE N/A 11/5/2024    Transcatheter aortic valve replacement performed by Freedom Bah MD at [facility] CARDIAC CATH LAB    CARDIAC SURGERY  11/2019    triple bypass    CYST REMOVAL      RIGHT ANKLE     FOOT AMPUTATION Left 1/10/2023    LEFT BELOW KNEE AMPUTATION performed by Mert Rubio DPM at [facility] OR    FOOT DEBRIDEMENT Left 4/20/2020    LEFT TRANSMETATARSAL AMPUTATION  FOOT INCISION AND DRAINAGE performed by Mert Rubio DPM at [facility] OR    FOOT DEBRIDEMENT Left 7/20/2020    LEFT WOUND DEBRIDEMENT WITH WOUND VAC APPLICATION performed by Mert Rubio DPM at [facility] OR    FOOT

## 2024-12-09 NOTE — PROGRESS NOTES
Here for a 30 day TAVR follow up    EKG completed today in office    Med list up to date and pharmacy verified.     Denies any cardiac concerns.       
01/02/2024 08:35 AM    GLUCOSE 142 12/04/2024 10:32 AM    GLUCOSE 142 07/06/2023 08:50 AM       Lab Results   Component Value Date/Time    ALKPHOS 88 09/19/2023 08:32 AM    ALT 16 09/19/2023 08:32 AM    AST 17 09/19/2023 08:32 AM    BILITOT 0.4 09/19/2023 08:32 AM    BILIDIR <0.2 02/03/2023 09:56 AM       Lab Results   Component Value Date/Time    MG 2.4 06/21/2024 06:09 AM       Lab Results   Component Value Date    INR 1.08 11/06/2024    INR 1.03 11/05/2024    INR 1.06 10/07/2024    PROTIME 12.3 03/21/2023         Lab Results   Component Value Date/Time    LABA1C 10.3 10/08/2024 02:56 AM       Lab Results   Component Value Date/Time    TRIG 167 10/08/2024 02:56 AM    HDL 30 10/08/2024 02:56 AM       Lab Results   Component Value Date/Time    TSH 1.620 02/12/2024 11:48 AM         Testing Reviewed:      I have individually reviewed the cardiac test below:    ECHO: No results found for this or any previous visit.          S/p EFX+34 - 11/2024  EF 45-50% (improved as compared to prior), NYHA II  HTN  CKD  DM II  CABG x 3 (SVG to OM1, SVG to Circumflex, LIMA to distal LAD)  S/p L BKA  S/p L CEA 5/2023  Afib on Eliquis  S/p PM    Assessment & Plan  His blood pressure is slightly elevated, with recent readings around 140/78. The goal is to maintain his blood pressure within the range of 120 to 130 to prevent excessive strain on the heart valve. The dosage of amlodipine will be increased to 10 mg. He is advised to continue his current regimen of Eliquis.  He is following CHF.  No exacerbations.  Advised on diet.  His Cr is improving. He is urinating well.       Disposition:  1 year         Discussed symptoms needing emergency care or those which require further medical attention.   Discussed diet/exercise/BP/weight loss/health lifestyle choices/lipids; the patient understands the goals and will try to comply.    The patient (or guardian, if applicable) and other individuals in attendance with the patient were advised that

## 2024-12-09 NOTE — TELEPHONE ENCOUNTER
Our office received a fax from [facility] in [place] for a refill on the patient's atorvastatin 80 mg, takes one tablet daily with a 90 day supply.

## 2024-12-10 ENCOUNTER — TELEPHONE (OUTPATIENT)
Dept: CARDIOLOGY CLINIC | Age: 64
End: 2024-12-10

## 2024-12-11 ENCOUNTER — CARE COORDINATION (OUTPATIENT)
Dept: CARE COORDINATION | Age: 64
End: 2024-12-11

## 2024-12-11 NOTE — CARE COORDINATION
Date/Time:  12/11/2024 11:58 AM    Update: Patient informed of new orders and voiced understanding.

## 2024-12-11 NOTE — CARE COORDINATION
Remote Alert Monitoring Note      Date/Time:  2024 10:20 AM  Patient Current Location: Home:  [facility]  [place] [address]    LPN contacted patient by telephone regarding red alert received for blood pressure reading (84/53). Verified patients name and  as identifiers.    Rpm alert to be reviewed by the provider   Red Alert BP 84/53     Patient recently seen in Cardiology on . Amlodipine increased to 10mg at that time per note. Patient states he was also restarted on Lisinopril 10mg at that time. BP readings today:  78/52  88/52  84/53    Patient denies feeling dizzy or light headed, but complains of \"feeling off\".    Please advise.    Advised pt to call 911 and go to ED for the following: chest pain/pressure/tightness, new or worsening SOB, sudden loss of use of an arm or leg, sudden numbness or tingling--especially unilateral, confusion, sudden change in vision, facial droop, slurred speech, fainting spell or any other serious or worsening symptoms. Patient agreeable to this plan.                      Background:  High Blood-Pressure, Kidney Disease, CHF        Refer to 911 immediately if:  Patient unresponsive or unable to provide history  Change in cognition or sudden confusion  Patient unable to respond in complete sentences  Intense chest pain/tightness  Any concern for any clinical emergency  Red Alert: Provider response time of 1 hr required for any red alert requiring intervention  Yellow Alert: Provider response time of 3hr required for any escalated yellow alert        Blood Pressure BP Triage  Are you having any Chest Pain? no   Are you having any Shortness of Breath? no   Do you have a headache or have any vision changes? no   Are you having any numbness or tingling? no   Are you having any other health concerns or issues? no  Patient/Caregiver educated on how to properly take a blood pressure. Patient/Caregiver verbalizes understanding.     Have you taken your medications as instructed

## 2024-12-16 RX ORDER — INSULIN DEGLUDEC 200 U/ML
INJECTION, SOLUTION SUBCUTANEOUS
Qty: 9 ML | Refills: 2 | Status: SHIPPED | OUTPATIENT
Start: 2024-12-16

## 2024-12-16 NOTE — TELEPHONE ENCOUNTER
Ashwin Liriano needs refill of   Requested Prescriptions     Pending Prescriptions Disp Refills    TRESIBA FLEXTOUCH 200 UNIT/ML SOPN [Pharmacy Med Name: Tresiba FlexTouch 200 UNIT/ML Subcutaneous Solution Pen-injector] 9 mL 0     Sig: INJECT 35 UNITS SUBCUTANEOUSLY IN THE MORNING AND AT BEDTIME       Last Filled on:  10- #3 R-2 and sent to  in [place], [place] by Wyatt Gastelum CNP.      Last Visit Date:  11/11/2024    Next Visit Date:  Visit date not found

## 2024-12-17 ENCOUNTER — CARE COORDINATION (OUTPATIENT)
Dept: CARE COORDINATION | Age: 64
End: 2024-12-17

## 2024-12-17 ENCOUNTER — LAB (OUTPATIENT)
Dept: LAB | Age: 64
End: 2024-12-17

## 2024-12-17 DIAGNOSIS — E11.21 DIABETIC NEPHROPATHY WITH PROTEINURIA (HCC): ICD-10-CM

## 2024-12-17 DIAGNOSIS — N18.32 STAGE 3B CHRONIC KIDNEY DISEASE (HCC): ICD-10-CM

## 2024-12-17 DIAGNOSIS — I10 ESSENTIAL HYPERTENSION: ICD-10-CM

## 2024-12-17 LAB
BUN SERPL-MCNC: 34 MG/DL (ref 7–22)
CREAT SERPL-MCNC: 1.9 MG/DL (ref 0.4–1.2)
GFR SERPL CREATININE-BSD FRML MDRD: 39 ML/MIN/1.73M2
POTASSIUM SERPL-SCNC: 4.8 MEQ/L (ref 3.5–5.2)

## 2024-12-17 NOTE — CARE COORDINATION
Ambulatory Care Coordination Note     2024 10:07 AM     Patient Current Location:  Home:  [facility]  [place] [address]     ACM contacted the patient by telephone. Verified name and  with patient as identifiers.         ACM: Leonora Angelo RN     Challenges to be reviewed by the provider   Additional needs identified to be addressed with provider No  none               Method of communication with provider: none.    Utilization: Patient has not had any utilization since our last call.     Care Summary Note: Spoke with Ashwin Peralta doing better for review  Discussed RPM monitoring  He is currently on day 148  Did have some recent changes in blood pressure medication last week from cardiology office  Blood pressures were running low and now doing better  Readings 119/70 and 120/72  Discussed continuing RPM monitoring for another few weeks to ensure no barriers with medication changes and to get through holidays with continued monitoring  He was in agreement with plan  Will work towards graduation    Plan  Continue with RPM monitoring due to recent blood pressure medication changes and low blood pressures  Work towards graduation if stable and work towards RPM graduation if stable    Offered patient enrollment in the Remote Patient Monitoring (RPM) program for in-home monitoring: Yes, patient enrolled; current status is activated and monitoring.     Assessments Completed:   Diabetes Assessment    Medic Alert ID: No  How often do you test your blood sugar?:  (Comment: twice daily)   Do you have barriers with adherence to non-pharmacologic self-management interventions? (Nutrition/Exercise/Self-Monitoring): No   Have you ever had to go to the ED for symptoms of low blood sugar?: No       Do you have hyperglycemia symptoms?: No   Do you have hypoglycemia symptoms?: No   Blood Sugar Trends: No Change         ,   Congestive Heart Failure Assessment    Are you currently restricting fluids?: 2000cc  Do you

## 2024-12-20 ENCOUNTER — CARE COORDINATION (OUTPATIENT)
Dept: CASE MANAGEMENT | Age: 64
End: 2024-12-20

## 2024-12-20 NOTE — CARE COORDINATION
RPM reviewed. no metrics updated at this time  Message sent to patient via Patient Connect Portal for Remote Patient Monitoring     RPM Nurse message Tablet Readings Hello, Please see an important message from your RPM Team:    Please remember to take your readings daily before noon.   Please do not respond to this message; If you have a question or concern please call your Ambulatory Care Manager or your Primary Care Physician.

## 2025-01-02 RX ORDER — BUMETANIDE 1 MG/1
2 TABLET ORAL 2 TIMES DAILY
Qty: 360 TABLET | Refills: 3 | Status: SHIPPED | OUTPATIENT
Start: 2025-01-02

## 2025-01-02 RX ORDER — POTASSIUM CHLORIDE 1500 MG/1
20 TABLET, EXTENDED RELEASE ORAL 2 TIMES DAILY
Qty: 180 TABLET | Refills: 3 | Status: SHIPPED | OUTPATIENT
Start: 2025-01-02

## 2025-01-06 ENCOUNTER — CARE COORDINATION (OUTPATIENT)
Dept: CARE COORDINATION | Age: 65
End: 2025-01-06

## 2025-01-06 NOTE — CARE COORDINATION
Ambulatory Care Coordination Note     2025 12:06 PM     Patient Current Location:  Home:  [address] [address]     ACM contacted the patient by telephone. Verified name and  with patient as identifiers.         ACM: Leonora Angelo RN     Challenges to be reviewed by the provider   Additional needs identified to be addressed with provider No  none               Method of communication with provider: none.    Utilization: Patient has not had any utilization since our last call.     Care Summary Note: Spoke with Ashwin Xiong for review  Discussed graduation from RPM and he is in agreement  He has original box and ACM encouraged her to pack everything up including charging cords    Plan  Order for RPM graduation    Offered patient enrollment in the Remote Patient Monitoring (RPM) program for in-home monitoring: Yes, patient enrolled; current status is activated and monitoring.     Assessments Completed:   No changes since last call    Medications Reviewed:   Patient denies any changes with medications and reports taking all medications as prescribed.    Advance Care Planning:   Not reviewed during this call     Care Planning:    Goals Addressed    None          PCP/Specialist follow up:   Future Appointments         Provider Specialty Dept Phone    2025 3:00 PM Lakia Verma APRN - CNP Cardiology [phone]    2025 12:30 PM Shakira Milligan APRN - CNP Cardiology [phone]    2025 11:40 AM Pastor Howard MD Nephrology [phone]    2025 8:45 AM (Arrive by 8:30 AM) STR ECHO 1 Cardiology [phone]    2025 2:00 PM Shakira Milligan APRN - CNP Cardiology [phone]    2025 2:30 PM Sapphire Swift MD Cardiology [phone]    2025 2:00 PM SCHEDULE, [facility] PACER NURSE Cardiology [phone]            Follow Up:   Plan for next ACM outreach in approximately 1 week to complete:  - RPM.   Patient  is agreeable to this plan.

## 2025-01-06 NOTE — CARE COORDINATION
RPM team,      Ashwin is ready for RPM graduation.  Could you please assist him with getting RPM equipment turned back in.  Thank you!    Remote Patient Monitoring Graduation      Date/Time:  1/6/2025 12:08 PM  Patient Current Location: Home: [address] [facility]  [place] [address]  Patient has graduated from the Remote Patient Monitoring program on 1/6/2025.   RPM goals have been met at this time.      Patient has been provided instruction on process to return RPM equipment and RPM has been deactivated.     Patient has AC's contact information for any further questions, concerns, or needs.

## 2025-01-07 PROCEDURE — 93294 REM INTERROG EVL PM/LDLS PM: CPT | Performed by: INTERNAL MEDICINE

## 2025-01-07 PROCEDURE — 93296 REM INTERROG EVL PM/IDS: CPT | Performed by: INTERNAL MEDICINE

## 2025-01-08 ENCOUNTER — CARE COORDINATION (OUTPATIENT)
Dept: CARE COORDINATION | Age: 65
End: 2025-01-08

## 2025-01-08 NOTE — CARE COORDINATION
RPM team,      Ashwin states [employer] came out today to  equipment but states he has 2 boxes of equipment and they would only  one because they only had a label for one box.      Could you please reach out to Ashwin and assist with getting the other box of equipment turned back in.  States the [employer] robert weighed the box and states it's a 16# box.  Thank you!

## 2025-01-13 ENCOUNTER — CARE COORDINATION (OUTPATIENT)
Dept: CARE COORDINATION | Age: 65
End: 2025-01-13

## 2025-01-13 NOTE — CARE COORDINATION
I have been working with Ashwin on Care Coordination program to provide support and education to help manage chronic conditions.  Pt has met those goals and all education has been completed with no new barriers noted.  I will graduate patient at this time.  RPM equipment has been turned in.

## 2025-01-20 RX ORDER — EZETIMIBE 10 MG/1
10 TABLET ORAL DAILY
Qty: 90 TABLET | Refills: 3 | Status: SHIPPED | OUTPATIENT
Start: 2025-01-20

## 2025-01-29 ENCOUNTER — TELEPHONE (OUTPATIENT)
Dept: CARDIOLOGY CLINIC | Age: 65
End: 2025-01-29

## 2025-01-29 NOTE — TELEPHONE ENCOUNTER
Pt called stating he has new insurance and they aren't going to cover his Zetia, it is too expensive.   Please advise.   
Pt notified.  Med list updated.   
Stop   
 used

## 2025-02-24 RX ORDER — METOPROLOL SUCCINATE 50 MG/1
50 TABLET, EXTENDED RELEASE ORAL DAILY
Qty: 90 TABLET | Refills: 2 | Status: SHIPPED | OUTPATIENT
Start: 2025-02-24

## 2025-03-17 DIAGNOSIS — R73.09 HEMOGLOBIN A1C GREATER THAN 9%, INDICATING POOR DIABETIC CONTROL: ICD-10-CM

## 2025-03-17 RX ORDER — EMPAGLIFLOZIN 10 MG/1
10 TABLET, FILM COATED ORAL DAILY
Qty: 90 TABLET | Refills: 1 | Status: SHIPPED | OUTPATIENT
Start: 2025-03-17

## 2025-03-17 NOTE — TELEPHONE ENCOUNTER
Ashwin Liriano needs refill of   Requested Prescriptions     Pending Prescriptions Disp Refills    JARDIANCE 10 MG tablet [Pharmacy Med Name: Jardiance 10 MG Oral Tablet] 30 tablet 0     Sig: Take 1 tablet by mouth once daily       Last Filled on:  11- #30 R-3 and sent to  Pharmacy in [place], [place] by Wyatt Gastelum CNP.       Last Visit Date:  11/11/2024    Next Visit Date:  Visit date not found

## 2025-03-21 ENCOUNTER — OFFICE VISIT (OUTPATIENT)
Dept: CARDIOLOGY CLINIC | Age: 65
End: 2025-03-21
Payer: COMMERCIAL

## 2025-03-21 ENCOUNTER — PREP FOR PROCEDURE (OUTPATIENT)
Dept: CARDIOLOGY CLINIC | Age: 65
End: 2025-03-21

## 2025-03-21 ENCOUNTER — TELEPHONE (OUTPATIENT)
Dept: CARDIOLOGY CLINIC | Age: 65
End: 2025-03-21

## 2025-03-21 VITALS
SYSTOLIC BLOOD PRESSURE: 118 MMHG | OXYGEN SATURATION: 95 % | BODY MASS INDEX: 40.43 KG/M2 | HEART RATE: 81 BPM | WEIGHT: 315 LBS | DIASTOLIC BLOOD PRESSURE: 70 MMHG | HEIGHT: 74 IN

## 2025-03-21 DIAGNOSIS — Z95.2 S/P TAVR (TRANSCATHETER AORTIC VALVE REPLACEMENT): ICD-10-CM

## 2025-03-21 DIAGNOSIS — I10 ESSENTIAL HYPERTENSION: Primary | ICD-10-CM

## 2025-03-21 DIAGNOSIS — I48.0 PAROXYSMAL ATRIAL FIBRILLATION (HCC): ICD-10-CM

## 2025-03-21 DIAGNOSIS — Z95.1 HX OF CABG: ICD-10-CM

## 2025-03-21 DIAGNOSIS — N18.30 STAGE 3 CHRONIC KIDNEY DISEASE, UNSPECIFIED WHETHER STAGE 3A OR 3B CKD (HCC): ICD-10-CM

## 2025-03-21 DIAGNOSIS — E78.5 HYPERLIPIDEMIA, UNSPECIFIED HYPERLIPIDEMIA TYPE: ICD-10-CM

## 2025-03-21 DIAGNOSIS — I50.22 HEART FAILURE WITH MID-RANGE EJECTION FRACTION (HFMEF) (HCC): ICD-10-CM

## 2025-03-21 DIAGNOSIS — I25.10 CORONARY ARTERY DISEASE INVOLVING NATIVE CORONARY ARTERY OF NATIVE HEART WITHOUT ANGINA PECTORIS: ICD-10-CM

## 2025-03-21 DIAGNOSIS — I73.9 PERIPHERAL VASCULAR DISEASE, UNSPECIFIED: ICD-10-CM

## 2025-03-21 PROCEDURE — G8417 CALC BMI ABV UP PARAM F/U: HCPCS | Performed by: NURSE PRACTITIONER

## 2025-03-21 PROCEDURE — 3074F SYST BP LT 130 MM HG: CPT | Performed by: NURSE PRACTITIONER

## 2025-03-21 PROCEDURE — 3017F COLORECTAL CA SCREEN DOC REV: CPT | Performed by: NURSE PRACTITIONER

## 2025-03-21 PROCEDURE — G8427 DOCREV CUR MEDS BY ELIG CLIN: HCPCS | Performed by: NURSE PRACTITIONER

## 2025-03-21 PROCEDURE — 3078F DIAST BP <80 MM HG: CPT | Performed by: NURSE PRACTITIONER

## 2025-03-21 PROCEDURE — 99214 OFFICE O/P EST MOD 30 MIN: CPT | Performed by: NURSE PRACTITIONER

## 2025-03-21 PROCEDURE — 1036F TOBACCO NON-USER: CPT | Performed by: NURSE PRACTITIONER

## 2025-03-21 NOTE — TELEPHONE ENCOUNTER
Patient will try co pay card for jardiance  Patient did take patient assist jonas home  Will fill out and return if unable to use co pay card

## 2025-03-21 NOTE — PATIENT INSTRUCTIONS
Continue current medications as prescribed. Will discuss Sohail need with Dr. Bah and let you know. Will check into financial assistance options regarding the Jardiance for you.     Stay as active as you can.     Eat heart healthy diet.     Follow-up with your PCP as scheduled.    Follow-up with Shakira GRAJEDA on 11/11/2025 for 1 year post TAVR as scheduled or sooner if need.   Have the echo and labs done prior as scheduled.     Office hours:   Mon-Thurs 8-4:30  Friday 8-12  Phone: [phone]    Continue:  Continue current medications  Daily weights and record  Fluid restriction of 2 Liters per day  Limit sodium in diet to around 1072-9793 mg/day  Monitor BP    Call the Heart Failure Clinic for any of the following symptoms:   Weight gain of 3 pounds in 1 day or 5 pounds in 1 week  Increased shortness of breath  Shortness of breath while laying down  Chest pain  Swelling in feet, ankles or legs  Bloating in abdomen  Fatigue

## 2025-03-21 NOTE — PROGRESS NOTES
Ashwin Liriano (:  1960) is a 64 y.o. male, Established patient, here for 6 month follow-up evaluation.    Primary Cardiologist: Freedom Bah MD    Check-Up    HPI:  Last seen in November per Dr. Bah for evaluation of BP. BP had been running in the 140s but he had been drinking slushies with his lunch. Amlodipine increased from 5 to 10 mg daily. Was advised to follow dietary guidelines for CHF and HTN management with goal BP of 120 - 130/.       3/21/2025 6 month follow-up:  Assessment & Plan  S/p EFX+34 - 2024  EF 45-50% (improved as compared to prior), NYHA II  HTN (goal 120 -130/)  CKD  DM II  CABG x 3 (SVG to OM1, SVG to Circumflex, LIMA to distal LAD)  S/p L BKA  S/p L CEA 2023  Afib on Eliquis  S/p PM  HLD; LDL 41 as of 10/8/2024 on Lipitor 80 mg QD      1. Hypertension.  His blood pressure has been well-regulated at home with a reading of 118/70 in the office today. He has been following his diet in limiting salt and limiting fluid to the 64 ounces as recommended. He has been feeling well. No dyspnea or LE swelling. Activity tolerance only limited by the deformity in his R ankle. No volume on exam today.   The current regimen of amlodipine 10 mg daily, Lisinopril 10 mg QD and Hydralazine 25 mg TID will be maintained. Bumex 2 mg BID continued.     2. Atrial Fibrillation.  Requests to stop Eliquis as he believes he has not had any atrial fib. Was placed on Eliquis as a precaution around the time of his TAVR but has never had issues with atrial fib that he is aware of. No stroke sx. No bleeding on the Eliquis. Is a fall risk due to deformity of his right ankle and L BKA with prosthesis. He ambulates without a cane or walker.   Pacemaker downloads reviewed.  From 2023 - 2024 notation made of <1% atrial fib with total time in atrial fib for the year being <0.1. Total of 4 documented instances during that year with total time in atrial fib for each occurrence being less than 1 minute.

## 2025-04-07 ENCOUNTER — LAB (OUTPATIENT)
Dept: LAB | Age: 65
End: 2025-04-07

## 2025-04-07 DIAGNOSIS — N18.32 STAGE 3B CHRONIC KIDNEY DISEASE (HCC): ICD-10-CM

## 2025-04-07 DIAGNOSIS — I10 ESSENTIAL HYPERTENSION: ICD-10-CM

## 2025-04-07 DIAGNOSIS — E11.21 DIABETIC NEPHROPATHY WITH PROTEINURIA (HCC): ICD-10-CM

## 2025-04-07 LAB
25(OH)D3 SERPL-MCNC: 20 NG/ML (ref 30–100)
ANION GAP SERPL CALC-SCNC: 12 MEQ/L (ref 8–16)
BUN SERPL-MCNC: 49 MG/DL (ref 8–23)
CALCIUM SERPL-MCNC: 9.2 MG/DL (ref 8.8–10.2)
CHLORIDE SERPL-SCNC: 99 MEQ/L (ref 98–111)
CO2 SERPL-SCNC: 24 MEQ/L (ref 22–29)
CREAT SERPL-MCNC: 2.4 MG/DL (ref 0.7–1.2)
DEPRECATED RDW RBC AUTO: 54.4 FL (ref 35–45)
ERYTHROCYTE [DISTWIDTH] IN BLOOD BY AUTOMATED COUNT: 17.5 % (ref 11.5–14.5)
GFR SERPL CREATININE-BSD FRML MDRD: 29 ML/MIN/1.73M2
GLUCOSE SERPL-MCNC: 313 MG/DL (ref 74–109)
HCT VFR BLD AUTO: 35.1 % (ref 42–52)
HGB BLD-MCNC: 11.7 GM/DL (ref 14–18)
MCH RBC QN AUTO: 29.3 PG (ref 26–33)
MCHC RBC AUTO-ENTMCNC: 33.3 GM/DL (ref 32.2–35.5)
MCV RBC AUTO: 87.8 FL (ref 80–94)
PHOSPHATE SERPL-MCNC: 4.3 MG/DL (ref 2.5–4.5)
PLATELET # BLD AUTO: 225 THOU/MM3 (ref 130–400)
PMV BLD AUTO: 11.5 FL (ref 9.4–12.4)
POTASSIUM SERPL-SCNC: 5 MEQ/L (ref 3.5–5.2)
PTH-INTACT SERPL-MCNC: 73 PG/ML (ref 15–65)
RBC # BLD AUTO: 4 MILL/MM3 (ref 4.7–6.1)
SODIUM SERPL-SCNC: 135 MEQ/L (ref 135–145)
WBC # BLD AUTO: 7 THOU/MM3 (ref 4.8–10.8)

## 2025-04-09 ENCOUNTER — OFFICE VISIT (OUTPATIENT)
Dept: FAMILY MEDICINE CLINIC | Age: 65
End: 2025-04-09
Payer: COMMERCIAL

## 2025-04-09 VITALS — DIASTOLIC BLOOD PRESSURE: 88 MMHG | OXYGEN SATURATION: 95 % | HEART RATE: 81 BPM | SYSTOLIC BLOOD PRESSURE: 130 MMHG

## 2025-04-09 DIAGNOSIS — N18.4 CKD (CHRONIC KIDNEY DISEASE) STAGE 4, GFR 15-29 ML/MIN (HCC): ICD-10-CM

## 2025-04-09 DIAGNOSIS — R73.09 HEMOGLOBIN A1C GREATER THAN 9%, INDICATING POOR DIABETIC CONTROL: ICD-10-CM

## 2025-04-09 DIAGNOSIS — E11.9 TYPE 2 DIABETES MELLITUS WITH INSULIN THERAPY (HCC): Primary | ICD-10-CM

## 2025-04-09 DIAGNOSIS — Z79.4 TYPE 2 DIABETES MELLITUS WITH INSULIN THERAPY (HCC): Primary | ICD-10-CM

## 2025-04-09 DIAGNOSIS — Z95.2 HISTORY OF TRANSCATHETER AORTIC VALVE REPLACEMENT (TAVR): ICD-10-CM

## 2025-04-09 LAB
BACTERIA: ABNORMAL
BILIRUB UR QL STRIP: NEGATIVE
CASTS #/AREA URNS LPF: ABNORMAL /LPF
CASTS #/AREA URNS LPF: ABNORMAL /LPF
CHARACTER UR: CLEAR
CHARCOAL URNS QL MICRO: ABNORMAL
COLOR UR: YELLOW
CREAT UR-MCNC: 26.7 MG/DL
CRYSTALS URNS QL MICRO: ABNORMAL
EPITHELIAL CELLS, UA: ABNORMAL /HPF
GLUCOSE UR QL STRIP.AUTO: >= 1000 MG/DL
HGB UR QL STRIP.AUTO: NEGATIVE
KETONES UR QL STRIP.AUTO: NEGATIVE
LEUKOCYTE ESTERASE UR QL STRIP.AUTO: NEGATIVE
NITRITE UR QL STRIP.AUTO: NEGATIVE
PH UR STRIP.AUTO: 6 [PH] (ref 5–9)
PROT UR STRIP.AUTO-MCNC: 30 MG/DL
PROT UR-MCNC: 29 MG/DL
RBC #/AREA URNS HPF: ABNORMAL /HPF
RENAL EPI CELLS #/AREA URNS HPF: ABNORMAL /[HPF]
SPECIFIC GRAVITY UA: 1.01 (ref 1–1.03)
UROBILINOGEN, URINE: 0.2 EU/DL (ref 0–1)
WBC #/AREA URNS HPF: ABNORMAL /HPF
YEAST LIKE FUNGI URNS QL MICRO: ABNORMAL

## 2025-04-09 PROCEDURE — 3017F COLORECTAL CA SCREEN DOC REV: CPT | Performed by: NURSE PRACTITIONER

## 2025-04-09 PROCEDURE — 3046F HEMOGLOBIN A1C LEVEL >9.0%: CPT | Performed by: NURSE PRACTITIONER

## 2025-04-09 PROCEDURE — 2022F DILAT RTA XM EVC RTNOPTHY: CPT | Performed by: NURSE PRACTITIONER

## 2025-04-09 PROCEDURE — 99214 OFFICE O/P EST MOD 30 MIN: CPT | Performed by: NURSE PRACTITIONER

## 2025-04-09 PROCEDURE — 3079F DIAST BP 80-89 MM HG: CPT | Performed by: NURSE PRACTITIONER

## 2025-04-09 PROCEDURE — 1036F TOBACCO NON-USER: CPT | Performed by: NURSE PRACTITIONER

## 2025-04-09 PROCEDURE — 3075F SYST BP GE 130 - 139MM HG: CPT | Performed by: NURSE PRACTITIONER

## 2025-04-09 PROCEDURE — G8417 CALC BMI ABV UP PARAM F/U: HCPCS | Performed by: NURSE PRACTITIONER

## 2025-04-09 PROCEDURE — G8427 DOCREV CUR MEDS BY ELIG CLIN: HCPCS | Performed by: NURSE PRACTITIONER

## 2025-04-09 RX ORDER — ACYCLOVIR 400 MG/1
1 TABLET ORAL WEEKLY
Qty: 1 EACH | Refills: 3 | Status: SHIPPED | OUTPATIENT
Start: 2025-04-09

## 2025-04-09 RX ORDER — ACYCLOVIR 400 MG/1
1 TABLET ORAL ONCE
Qty: 1 EACH | Refills: 0 | Status: SHIPPED | OUTPATIENT
Start: 2025-04-09 | End: 2025-04-09

## 2025-04-09 ASSESSMENT — ENCOUNTER SYMPTOMS
ABDOMINAL DISTENTION: 0
NAUSEA: 0
COUGH: 0
SHORTNESS OF BREATH: 0
VOMITING: 0
CHEST TIGHTNESS: 0
COLOR CHANGE: 0
ABDOMINAL PAIN: 0
DIARRHEA: 0

## 2025-04-09 ASSESSMENT — PATIENT HEALTH QUESTIONNAIRE - PHQ9
1. LITTLE INTEREST OR PLEASURE IN DOING THINGS: NOT AT ALL
SUM OF ALL RESPONSES TO PHQ QUESTIONS 1-9: 0
2. FEELING DOWN, DEPRESSED OR HOPELESS: NOT AT ALL
SUM OF ALL RESPONSES TO PHQ QUESTIONS 1-9: 0

## 2025-04-09 NOTE — PROGRESS NOTES
[facility]  [address]  [facility] [address]  Dept: [phone]  Loc: [phone]    25      Ashwin Liriano (:  1960) is a 64 y.o. male here for evaluation of the following chief complaint(s):  Follow-up       HPI  Patient here for follow-up and evaluation, no labs patient with history of CHF, status post TAVR.  History of diabetes with left lower leg amputation now.  He denies any significant changes since previous visit.  Notes weight gain since previous visit.  Previously 325 now 345.  Notes he has been eating too much food.  Denies any increase in swelling.  Does report for period of time he had bought a slushy machine and was drinking these daily.  Notes that it was not sugar-free.  He also reports recent fall when walking out of a restaurant.  Denies any specific injury associated with this.  Does report that he did stop taking his Eliquis, notes that he had pacemaker interrogated which showed less than 1% A-fib.  He reports having lab work completed 2 days ago for follow-up appointment with nephrology tomorrow.  He is unaware of lab work results.  He notes that he is occasionally missing his medications specifically his blood pressure medicine.  Assessment & Plan  Type 2 diabetes mellitus with insulin therapy (HCC)   Chronic, not at goal (unstable), changes made today: Ordering continuous glucose monitor and Zepbound.  Already taking Tresiba 35 units twice daily.  May need to increase to 45 units twice daily.  Will continue to monitor.  A1c ordered for whenever he has lab work drawn again.    Orders:    Hemoglobin A1C; Future    tirzepatide-weight management (ZEPBOUND) 2.5 MG/0.5ML SOAJ subCUTAneous auto-injector pen; Inject 2.5 mg into the skin every 7 days    Continuous Glucose  (DEXCOM G7 ) MINOR; 1 kit by Does not apply route 1 time for 1 dose    Continuous Glucose Sensor (DEXCOM G7 SENSOR)

## 2025-04-09 NOTE — ASSESSMENT & PLAN NOTE
Chronic, not at goal (unstable), changes made today: Ordering continuous glucose monitor and Zepbound.  Already takingTresiba 35 units twice daily.  May need to increase to 45 units twice daily.  Will continue to monitor.  A1c ordered for whenever he has lab work drawn again.    Orders:    Hemoglobin A1C; Future    tirzepatide-weight management (ZEPBOUND) 2.5 MG/0.5ML SOAJ subCUTAneous auto-injector pen; Inject 2.5 mg into the skin every 7 days    Continuous Glucose  (DEXCOM G7 ) MINOR; 1 kit by Does not apply route 1 time for 1 dose    Continuous Glucose Sensor (DEXCOM G7 SENSOR) MISC; 1 Application by Does not apply route once a week

## 2025-04-09 NOTE — ASSESSMENT & PLAN NOTE
See above    Orders:    Hemoglobin A1C; Future    tirzepatide-weight management (ZEPBOUND) 2.5 MG/0.5ML SOAJ subCUTAneous auto-injector pen; Inject 2.5 mg into the skin every 7 days    Continuous Glucose  (DEXCOM G7 ) MINOR; 1 kit by Does not apply route 1 time for 1 dose    Continuous Glucose Sensor (DEXCOM G7 SENSOR) MISC; 1 Application by Does not apply route once a week

## 2025-04-14 ENCOUNTER — CARE COORDINATION (OUTPATIENT)
Dept: CARE COORDINATION | Age: 65
End: 2025-04-14

## 2025-04-23 ENCOUNTER — RESULTS FOLLOW-UP (OUTPATIENT)
Dept: CARDIOLOGY CLINIC | Age: 65
End: 2025-04-23

## 2025-04-23 ENCOUNTER — LAB (OUTPATIENT)
Dept: LAB | Age: 65
End: 2025-04-23

## 2025-04-23 ENCOUNTER — OFFICE VISIT (OUTPATIENT)
Dept: CARDIOLOGY CLINIC | Age: 65
End: 2025-04-23
Payer: COMMERCIAL

## 2025-04-23 VITALS
DIASTOLIC BLOOD PRESSURE: 62 MMHG | WEIGHT: 315 LBS | OXYGEN SATURATION: 95 % | BODY MASS INDEX: 40.43 KG/M2 | SYSTOLIC BLOOD PRESSURE: 132 MMHG | HEIGHT: 74 IN | HEART RATE: 80 BPM

## 2025-04-23 DIAGNOSIS — I50.23 ACUTE ON CHRONIC SYSTOLIC CONGESTIVE HEART FAILURE, NYHA CLASS 2 (HCC): Primary | ICD-10-CM

## 2025-04-23 DIAGNOSIS — Z91.89 AT RISK FOR FLUID VOLUME OVERLOAD: ICD-10-CM

## 2025-04-23 DIAGNOSIS — I10 ESSENTIAL HYPERTENSION: ICD-10-CM

## 2025-04-23 DIAGNOSIS — I50.23 ACUTE ON CHRONIC SYSTOLIC CONGESTIVE HEART FAILURE, NYHA CLASS 2 (HCC): ICD-10-CM

## 2025-04-23 DIAGNOSIS — Z95.2 S/P TAVR (TRANSCATHETER AORTIC VALVE REPLACEMENT): Primary | ICD-10-CM

## 2025-04-23 DIAGNOSIS — I48.0 PAROXYSMAL ATRIAL FIBRILLATION (HCC): ICD-10-CM

## 2025-04-23 LAB
ANION GAP SERPL CALC-SCNC: 14 MEQ/L (ref 8–16)
BUN SERPL-MCNC: 74 MG/DL (ref 8–23)
CALCIUM SERPL-MCNC: 9.6 MG/DL (ref 8.8–10.2)
CHLORIDE SERPL-SCNC: 99 MEQ/L (ref 98–111)
CO2 SERPL-SCNC: 21 MEQ/L (ref 22–29)
CREAT SERPL-MCNC: 2.8 MG/DL (ref 0.7–1.2)
GFR SERPL CREATININE-BSD FRML MDRD: 25 ML/MIN/1.73M2
GLUCOSE SERPL-MCNC: 318 MG/DL (ref 74–109)
MAGNESIUM SERPL-MCNC: 2.8 MG/DL (ref 1.6–2.6)
NT-PROBNP SERPL IA-MCNC: 614 PG/ML (ref 0–124)
POTASSIUM SERPL-SCNC: 5.3 MEQ/L (ref 3.5–5.2)
SODIUM SERPL-SCNC: 134 MEQ/L (ref 135–145)

## 2025-04-23 PROCEDURE — G8417 CALC BMI ABV UP PARAM F/U: HCPCS | Performed by: NURSE PRACTITIONER

## 2025-04-23 PROCEDURE — 3075F SYST BP GE 130 - 139MM HG: CPT | Performed by: NURSE PRACTITIONER

## 2025-04-23 PROCEDURE — 99214 OFFICE O/P EST MOD 30 MIN: CPT | Performed by: NURSE PRACTITIONER

## 2025-04-23 PROCEDURE — 3017F COLORECTAL CA SCREEN DOC REV: CPT | Performed by: NURSE PRACTITIONER

## 2025-04-23 PROCEDURE — 3078F DIAST BP <80 MM HG: CPT | Performed by: NURSE PRACTITIONER

## 2025-04-23 PROCEDURE — 1036F TOBACCO NON-USER: CPT | Performed by: NURSE PRACTITIONER

## 2025-04-23 PROCEDURE — G8427 DOCREV CUR MEDS BY ELIG CLIN: HCPCS | Performed by: NURSE PRACTITIONER

## 2025-04-23 RX ORDER — GABAPENTIN 600 MG/1
600 TABLET ORAL 2 TIMES DAILY
Qty: 360 TABLET | Refills: 0 | Status: SHIPPED | OUTPATIENT
Start: 2025-04-23 | End: 2025-10-20

## 2025-04-23 ASSESSMENT — ENCOUNTER SYMPTOMS
ABDOMINAL DISTENTION: 0
VOMITING: 0
SHORTNESS OF BREATH: 1
NAUSEA: 0
COUGH: 0

## 2025-04-23 NOTE — PATIENT INSTRUCTIONS
You may receive a survey regarding the care you received during your visit.  Your input is valuable to us.  We encourage you to complete and return your survey.  We hope you will choose us in the future for your healthcare needs.    Your nurses today were Katt.  Office hours:   Mon-Thurs 8-4:30  Friday 8-12  Phone: [phone]    Continue:  Continue current medications  Daily weights and record  Fluid restriction of 2 Liters per day  Limit sodium in diet to around 6723-4551 mg/day  Monitor BP  Activity as tolerated     Call the Heart Failure Clinic for any of the following symptoms:   Weight gain of -3 pounds in 1 day or 5 pounds in 1 week  Increased shortness of breath  Shortness of breath while laying down  Cough  Chest pain  Swelling in feet, ankles or legs  Bloating in abdomen  Fatigue      30 day event monitor - checking afib burden    Blood work today    Continue diet/fluid adherence  Continue daily wts.  F/U w/ Cardiology  F/U in clinic in November.

## 2025-04-23 NOTE — PROGRESS NOTES
Pt denies any concerns at this time.   
      Patient follows:      Hospitalization:      6/2024 11/2024          Past Medical History:   Diagnosis Date    Acute left-sided low back pain with bilateral sciatica     Aneurysm     Arthritis     CAD (coronary artery disease)     CKD (chronic kidney disease), stage II     COPD (chronic obstructive pulmonary disease) (HCC)     Diabetes mellitus (HCC)     Hyperlipidemia     Hypertension     Liver disease     HEPITITIS AS A CHILD    Mitral valve prolapse     S/P transmetatarsal amputation of foot, left (HCC)     Wound dehiscence, surgical, initial encounter      Past Surgical History:   Procedure Laterality Date    CARDIAC PROCEDURE N/A 10/8/2024    Left and right heart cath / coronary angiography w grafts performed by Sapphire Swift MD at [facility] CARDIAC CATH LAB    CARDIAC PROCEDURE N/A 11/5/2024    Transcatheter aortic valve replacement performed by Freedom Bah MD at [facility] CARDIAC CATH LAB    CARDIAC SURGERY  11/2019    triple bypass    CYST REMOVAL      RIGHT ANKLE     FOOT AMPUTATION Left 1/10/2023    LEFT BELOW KNEE AMPUTATION performed by Mert Rubio DPM at [facility] OR    FOOT DEBRIDEMENT Left 4/20/2020    LEFT TRANSMETATARSAL AMPUTATION  FOOT INCISION AND DRAINAGE performed by Mert Rubio DPM at [facility] OR    FOOT DEBRIDEMENT Left 7/20/2020    LEFT WOUND DEBRIDEMENT WITH WOUND VAC APPLICATION performed by Mert Rubio DPM at [facility] OR    FOOT DEBRIDEMENT Left 7/1/2022    LEFT FOOT EXCISONAL WOUND DEBRIDEMENT, APPLICATION SKIN SUBSTITUTE GRAFT, APPLICATION KCI WOUND VAC performed by Mert Rubio DPM at [facility] OR    FOOT SURGERY Right     CYST REMOVED    FOOT SURGERY Left 8/14/2020    LEFT FOOT PREPARATION GRAFT SITE, HAVEST SPLIT THICKNESS SKIN GRAFT WITH APPLICATION, APPLICATION KCI WOUND VAC performed by Mert Rubio DPM at [facility] OR    FOOT SURGERY      OIO    TOE AMPUTATION Left 3/3/2020    I&D LEFT FOOT, TRANSMETATARSAL AMPUTATION performed by Mert Rubio DPM at [facility] OR

## 2025-04-23 NOTE — TELEPHONE ENCOUNTER
Our office received a fax requesting a refill on the patient's gabapentin 600 mg, takes one tablet twice daily from  Pharmacy in [place], [place].

## 2025-04-28 ENCOUNTER — LAB (OUTPATIENT)
Dept: LAB | Age: 65
End: 2025-04-28

## 2025-04-28 ENCOUNTER — RESULTS FOLLOW-UP (OUTPATIENT)
Dept: CARDIOLOGY CLINIC | Age: 65
End: 2025-04-28

## 2025-04-28 ENCOUNTER — TELEPHONE (OUTPATIENT)
Dept: FAMILY MEDICINE CLINIC | Age: 65
End: 2025-04-28

## 2025-04-28 DIAGNOSIS — I50.23 ACUTE ON CHRONIC SYSTOLIC CONGESTIVE HEART FAILURE, NYHA CLASS 2 (HCC): Primary | ICD-10-CM

## 2025-04-28 DIAGNOSIS — I50.23 ACUTE ON CHRONIC SYSTOLIC CONGESTIVE HEART FAILURE, NYHA CLASS 2 (HCC): ICD-10-CM

## 2025-04-28 LAB
ANION GAP SERPL CALC-SCNC: 12 MEQ/L (ref 8–16)
BUN SERPL-MCNC: 48 MG/DL (ref 8–23)
CALCIUM SERPL-MCNC: 9.9 MG/DL (ref 8.8–10.2)
CHLORIDE SERPL-SCNC: 103 MEQ/L (ref 98–111)
CO2 SERPL-SCNC: 23 MEQ/L (ref 22–29)
CREAT SERPL-MCNC: 2.2 MG/DL (ref 0.7–1.2)
GFR SERPL CREATININE-BSD FRML MDRD: 33 ML/MIN/1.73M2
GLUCOSE SERPL-MCNC: 227 MG/DL (ref 74–109)
POTASSIUM SERPL-SCNC: 5.8 MEQ/L (ref 3.5–5.2)
SODIUM SERPL-SCNC: 138 MEQ/L (ref 135–145)

## 2025-04-28 NOTE — TELEPHONE ENCOUNTER
Ashwin called. He said that he picked up his Semaglutide and his Dexcom G7 sensor. He stated he didn't receive any patches to attach the sensor to his arm. Is this something that should've been included or does a script need to be sent in for them?

## 2025-04-28 NOTE — TELEPHONE ENCOUNTER
Called and spoke with Ashwin. He is going to stop into the office and bring the G7 sensor. I believe there is slight confusion in what he has/is suppose to have.

## 2025-04-29 ENCOUNTER — TELEPHONE (OUTPATIENT)
Dept: CARDIOLOGY CLINIC | Age: 65
End: 2025-04-29

## 2025-04-29 NOTE — TELEPHONE ENCOUNTER
Patient called in and wanted you to know he has a huge bill and can not afford things right now   He went back on Eliquis and canceled heart monitor

## 2025-04-30 LAB — POTASSIUM SERPL-SCNC: 4.7 MEQ/L (ref 3.5–5.2)

## 2025-05-07 RX ORDER — INSULIN DEGLUDEC 200 U/ML
INJECTION, SOLUTION SUBCUTANEOUS
Qty: 9 ML | Refills: 2 | Status: SHIPPED | OUTPATIENT
Start: 2025-05-07

## 2025-05-07 NOTE — TELEPHONE ENCOUNTER
Ashwin Liriano needs refill of   Requested Prescriptions     Pending Prescriptions Disp Refills    Insulin Degludec FlexTouch 200 UNIT/ML SOPN [Pharmacy Med Name: Insulin Degludec FlexTouch 200 UNIT/ML Subcutaneous Solution Pen-injector] 9 mL 0     Sig: INJECT 35 UNITS SUBCUTANEOUSLY IN THE MORNING AND AT BEDTIME       Last Filled on:  12/16/2024 #9mL R-2 sent to [facility] in [facility] by Wyatt Gastelum CNP.     Last Visit Date:  4/9/2025    Next Visit Date:  Visit date not found

## 2025-05-19 ENCOUNTER — PATIENT MESSAGE (OUTPATIENT)
Dept: FAMILY MEDICINE CLINIC | Age: 65
End: 2025-05-19

## 2025-05-19 DIAGNOSIS — N18.32 STAGE 3B CHRONIC KIDNEY DISEASE (HCC): ICD-10-CM

## 2025-05-19 DIAGNOSIS — R73.09 HEMOGLOBIN A1C GREATER THAN 9%, INDICATING POOR DIABETIC CONTROL: ICD-10-CM

## 2025-05-19 DIAGNOSIS — E11.9 TYPE 2 DIABETES MELLITUS WITH INSULIN THERAPY (HCC): Primary | ICD-10-CM

## 2025-05-19 DIAGNOSIS — I10 ESSENTIAL HYPERTENSION: Primary | ICD-10-CM

## 2025-05-19 DIAGNOSIS — E11.21 DIABETIC NEPHROPATHY WITH PROTEINURIA (HCC): ICD-10-CM

## 2025-05-19 DIAGNOSIS — Z79.4 TYPE 2 DIABETES MELLITUS WITH INSULIN THERAPY (HCC): Primary | ICD-10-CM

## 2025-05-19 RX ORDER — DULAGLUTIDE 1.5 MG/.5ML
1.5 INJECTION, SOLUTION SUBCUTANEOUS WEEKLY
Qty: 2 ML | Refills: 0 | Status: SHIPPED | OUTPATIENT
Start: 2025-05-19 | End: 2025-05-21

## 2025-05-19 NOTE — TELEPHONE ENCOUNTER
Ashwin called stating the Semaglutide was way too expensive for him so he did not get this from the pharmacy, was told it was over 200 dollars. Would like to know if there is a cheaper alternative that can be sent in to [facility] in [facility]. Ashwin's phone number is [phone]

## 2025-05-20 ENCOUNTER — LAB (OUTPATIENT)
Dept: LAB | Age: 65
End: 2025-05-20

## 2025-05-20 DIAGNOSIS — N18.32 STAGE 3B CHRONIC KIDNEY DISEASE (HCC): ICD-10-CM

## 2025-05-20 DIAGNOSIS — I10 ESSENTIAL HYPERTENSION: ICD-10-CM

## 2025-05-20 DIAGNOSIS — E11.21 DIABETIC NEPHROPATHY WITH PROTEINURIA (HCC): ICD-10-CM

## 2025-05-20 LAB
ANION GAP SERPL CALC-SCNC: 13 MEQ/L (ref 8–16)
BACTERIA: ABNORMAL
BILIRUB UR QL STRIP: NEGATIVE
BUN SERPL-MCNC: 57 MG/DL (ref 8–23)
CALCIUM SERPL-MCNC: 9.2 MG/DL (ref 8.8–10.2)
CASTS #/AREA URNS LPF: ABNORMAL /LPF
CASTS #/AREA URNS LPF: ABNORMAL /LPF
CHARACTER UR: CLEAR
CHARCOAL URNS QL MICRO: ABNORMAL
CHLORIDE SERPL-SCNC: 100 MEQ/L (ref 98–111)
CO2 SERPL-SCNC: 23 MEQ/L (ref 22–29)
COLOR UR: YELLOW
CREAT SERPL-MCNC: 2.7 MG/DL (ref 0.7–1.2)
CREAT UR-MCNC: 45.5 MG/DL
CRYSTALS URNS QL MICRO: ABNORMAL
DEPRECATED RDW RBC AUTO: 51.1 FL (ref 35–45)
EPITHELIAL CELLS, UA: ABNORMAL /HPF
ERYTHROCYTE [DISTWIDTH] IN BLOOD BY AUTOMATED COUNT: 15.7 % (ref 11.5–14.5)
GFR SERPL CREATININE-BSD FRML MDRD: 26 ML/MIN/1.73M2
GLUCOSE SERPL-MCNC: 222 MG/DL (ref 74–109)
GLUCOSE UR QL STRIP.AUTO: >= 1000 MG/DL
HCT VFR BLD AUTO: 39.3 % (ref 42–52)
HGB BLD-MCNC: 12.9 GM/DL (ref 14–18)
HGB UR QL STRIP.AUTO: NEGATIVE
KETONES UR QL STRIP.AUTO: NEGATIVE
LEUKOCYTE ESTERASE UR QL STRIP.AUTO: NEGATIVE
MCH RBC QN AUTO: 29.2 PG (ref 26–33)
MCHC RBC AUTO-ENTMCNC: 32.8 GM/DL (ref 32.2–35.5)
MCV RBC AUTO: 88.9 FL (ref 80–94)
MICROALBUMIN UR-MCNC: 16 MG/DL
MICROALBUMIN/CREAT RATIO PNL UR: 352 MG/G (ref 0–30)
NITRITE UR QL STRIP.AUTO: NEGATIVE
PH UR STRIP.AUTO: 5.5 [PH] (ref 5–9)
PHOSPHATE SERPL-MCNC: 4.8 MG/DL (ref 2.5–4.5)
PLATELET # BLD AUTO: 184 THOU/MM3 (ref 130–400)
PMV BLD AUTO: 11.8 FL (ref 9.4–12.4)
POTASSIUM SERPL-SCNC: 4.3 MEQ/L (ref 3.5–5.2)
PROT UR STRIP.AUTO-MCNC: 30 MG/DL
PROT UR-MCNC: 32.3 MG/DL
PTH-INTACT SERPL-MCNC: 98 PG/ML (ref 15–65)
RBC # BLD AUTO: 4.42 MILL/MM3 (ref 4.7–6.1)
RBC #/AREA URNS HPF: ABNORMAL /HPF
RENAL EPI CELLS #/AREA URNS HPF: ABNORMAL /[HPF]
SODIUM SERPL-SCNC: 136 MEQ/L (ref 135–145)
SPECIFIC GRAVITY UA: 1.01 (ref 1–1.03)
UROBILINOGEN, URINE: 0.2 EU/DL (ref 0–1)
WBC # BLD AUTO: 8 THOU/MM3 (ref 4.8–10.8)
WBC #/AREA URNS HPF: ABNORMAL /HPF
YEAST LIKE FUNGI URNS QL MICRO: ABNORMAL

## 2025-05-21 ENCOUNTER — OFFICE VISIT (OUTPATIENT)
Dept: NEPHROLOGY | Age: 65
End: 2025-05-21
Payer: COMMERCIAL

## 2025-05-21 VITALS
DIASTOLIC BLOOD PRESSURE: 80 MMHG | SYSTOLIC BLOOD PRESSURE: 134 MMHG | OXYGEN SATURATION: 95 % | WEIGHT: 315 LBS | HEART RATE: 78 BPM | BODY MASS INDEX: 43.27 KG/M2

## 2025-05-21 DIAGNOSIS — E11.21 DIABETIC NEPHROPATHY WITH PROTEINURIA (HCC): ICD-10-CM

## 2025-05-21 DIAGNOSIS — N18.32 STAGE 3B CHRONIC KIDNEY DISEASE (HCC): Primary | ICD-10-CM

## 2025-05-21 DIAGNOSIS — N17.9 ACUTE RENAL FAILURE, UNSPECIFIED ACUTE RENAL FAILURE TYPE: ICD-10-CM

## 2025-05-21 DIAGNOSIS — I10 ESSENTIAL HYPERTENSION: ICD-10-CM

## 2025-05-21 PROCEDURE — G8427 DOCREV CUR MEDS BY ELIG CLIN: HCPCS | Performed by: INTERNAL MEDICINE

## 2025-05-21 PROCEDURE — 99214 OFFICE O/P EST MOD 30 MIN: CPT | Performed by: INTERNAL MEDICINE

## 2025-05-21 PROCEDURE — 3079F DIAST BP 80-89 MM HG: CPT | Performed by: INTERNAL MEDICINE

## 2025-05-21 PROCEDURE — 3046F HEMOGLOBIN A1C LEVEL >9.0%: CPT | Performed by: INTERNAL MEDICINE

## 2025-05-21 PROCEDURE — G2211 COMPLEX E/M VISIT ADD ON: HCPCS | Performed by: INTERNAL MEDICINE

## 2025-05-21 PROCEDURE — 2022F DILAT RTA XM EVC RTNOPTHY: CPT | Performed by: INTERNAL MEDICINE

## 2025-05-21 PROCEDURE — 1036F TOBACCO NON-USER: CPT | Performed by: INTERNAL MEDICINE

## 2025-05-21 PROCEDURE — G8417 CALC BMI ABV UP PARAM F/U: HCPCS | Performed by: INTERNAL MEDICINE

## 2025-05-21 PROCEDURE — 3017F COLORECTAL CA SCREEN DOC REV: CPT | Performed by: INTERNAL MEDICINE

## 2025-05-21 PROCEDURE — 3075F SYST BP GE 130 - 139MM HG: CPT | Performed by: INTERNAL MEDICINE

## 2025-05-21 NOTE — PROGRESS NOTES
[facility]        Outpatient Follow-Up note         5/21/2025 11:35 AM    Patient Name:   Ashwin Liriano  YOB: 1960  Primary Care Physician:  Wyatt Gastelum, EYAD - CNP   [facility]  [facility]  [address]  [address]  [facility] [address]  Dept: [phone]  Loc: [phone]     Chief Complaint / Reason for follow-up : Follow Up of CKD     Interval History :  Patient seen and examined by me.   Overall feels ok  No hospitalizations  Potassium d/c  Ozempic started        Past History :  Past Medical History:   Diagnosis Date    Acute left-sided low back pain with bilateral sciatica     Aneurysm     Arthritis     CAD (coronary artery disease)     CKD (chronic kidney disease), stage II     COPD (chronic obstructive pulmonary disease) (HCC)     Diabetes mellitus (HCC)     Hyperlipidemia     Hypertension     Liver disease     HEPITITIS AS A CHILD    Mitral valve prolapse     S/P transmetatarsal amputation of foot, left (HCC)     Wound dehiscence, surgical, initial encounter      Past Surgical History:   Procedure Laterality Date    CARDIAC PROCEDURE N/A 10/8/2024    Left and right heart cath / coronary angiography w grafts performed by Sapphire Swift MD at [facility] CARDIAC CATH LAB    CARDIAC PROCEDURE N/A 11/5/2024    Transcatheter aortic valve replacement performed by Freedom Bah MD at [facility] CARDIAC CATH LAB    CARDIAC SURGERY  11/2019    triple bypass    CYST REMOVAL      RIGHT ANKLE     FOOT AMPUTATION Left 1/10/2023    LEFT BELOW KNEE AMPUTATION performed by Mert Rubio DPM at [facility] OR    FOOT DEBRIDEMENT Left 4/20/2020    LEFT TRANSMETATARSAL AMPUTATION  FOOT INCISION AND DRAINAGE performed by Mert Rubio DPM at [facility] OR    FOOT DEBRIDEMENT Left 7/20/2020    LEFT WOUND DEBRIDEMENT WITH WOUND VAC APPLICATION performed by Mert Rubio DPM at [facility] OR    FOOT DEBRIDEMENT Left 7/1/2022

## 2025-05-29 ENCOUNTER — LAB (OUTPATIENT)
Dept: LAB | Age: 65
End: 2025-05-29

## 2025-05-29 DIAGNOSIS — N17.9 ACUTE RENAL FAILURE, UNSPECIFIED ACUTE RENAL FAILURE TYPE: ICD-10-CM

## 2025-05-29 DIAGNOSIS — N18.32 STAGE 3B CHRONIC KIDNEY DISEASE (HCC): ICD-10-CM

## 2025-05-29 DIAGNOSIS — I10 ESSENTIAL HYPERTENSION: ICD-10-CM

## 2025-05-29 DIAGNOSIS — E11.21 DIABETIC NEPHROPATHY WITH PROTEINURIA (HCC): ICD-10-CM

## 2025-05-29 LAB
BUN SERPL-MCNC: 40 MG/DL (ref 8–23)
CREAT SERPL-MCNC: 2.3 MG/DL (ref 0.7–1.2)
GFR SERPL CREATININE-BSD FRML MDRD: 31 ML/MIN/1.73M2
POTASSIUM SERPL-SCNC: 4.8 MEQ/L (ref 3.5–5.2)

## 2025-06-09 RX ORDER — ATORVASTATIN CALCIUM 80 MG/1
80 TABLET, FILM COATED ORAL DAILY
Qty: 90 TABLET | Refills: 0 | Status: SHIPPED | OUTPATIENT
Start: 2025-06-09

## 2025-06-09 NOTE — TELEPHONE ENCOUNTER
Ashwin Liriano needs refill of   Requested Prescriptions     Pending Prescriptions Disp Refills    atorvastatin (LIPITOR) 80 MG tablet [Pharmacy Med Name: Atorvastatin Calcium 80 MG Oral Tablet] 90 tablet 0     Sig: Take 1 tablet by mouth once daily       Last Filled on:  12/09/2024 #90 R-1 sent to [facility] in [facility] by Wyatt Gastelum CNP.     Last Visit Date:  4/9/2025    Next Visit Date:  Visit date not found

## 2025-07-09 RX ORDER — CLOPIDOGREL BISULFATE 75 MG/1
75 TABLET ORAL DAILY
Qty: 90 TABLET | Refills: 1 | Status: SHIPPED | OUTPATIENT
Start: 2025-07-09

## 2025-07-17 DIAGNOSIS — Z79.4 TYPE 2 DIABETES MELLITUS WITH INSULIN THERAPY (HCC): Primary | ICD-10-CM

## 2025-07-17 DIAGNOSIS — E11.9 TYPE 2 DIABETES MELLITUS WITH INSULIN THERAPY (HCC): Primary | ICD-10-CM

## 2025-07-17 RX ORDER — INSULIN LISPRO 100 [IU]/ML
INJECTION, SOLUTION INTRAVENOUS; SUBCUTANEOUS
Qty: 7 ADJUSTABLE DOSE PRE-FILLED PEN SYRINGE | Refills: 3 | Status: SHIPPED | OUTPATIENT
Start: 2025-07-17

## 2025-07-17 RX ORDER — INSULIN DEGLUDEC 200 U/ML
35 INJECTION, SOLUTION SUBCUTANEOUS 2 TIMES DAILY
Qty: 9 ML | Refills: 2 | Status: SHIPPED | OUTPATIENT
Start: 2025-07-17

## 2025-07-17 RX ORDER — INSULIN DEGLUDEC 200 U/ML
INJECTION, SOLUTION SUBCUTANEOUS
Qty: 9 ML | Refills: 0 | Status: SHIPPED | OUTPATIENT
Start: 2025-07-17 | End: 2025-07-17

## 2025-07-17 NOTE — TELEPHONE ENCOUNTER
Ashwin Liriano needs refill of   Requested Prescriptions     Pending Prescriptions Disp Refills    TRESIBA FLEXTOUCH 200 UNIT/ML SOPN [Pharmacy Med Name: Tresiba FlexTouch 200 UNIT/ML Subcutaneous Solution Pen-injector] 9 mL 0     Sig: INJECT 35 UNITS SUBCUTANEOUSLY IN THE MORNING AND AT BEDTIME       Last Filled on:  5/7/2025    Last Visit Date:  4/9/2025    Next Visit Date:  Visit date not found

## 2025-07-17 NOTE — TELEPHONE ENCOUNTER
Spoke to patient informed to get labs done. He will have done at the [facility] here. Patient does also need his lispro refilled.

## 2025-07-18 ENCOUNTER — LAB (OUTPATIENT)
Dept: LAB | Age: 65
End: 2025-07-18

## 2025-07-18 DIAGNOSIS — Z79.4 TYPE 2 DIABETES MELLITUS WITH INSULIN THERAPY (HCC): ICD-10-CM

## 2025-07-18 DIAGNOSIS — E11.9 TYPE 2 DIABETES MELLITUS WITH INSULIN THERAPY (HCC): ICD-10-CM

## 2025-07-18 LAB
ALBUMIN SERPL BCG-MCNC: 4.1 G/DL (ref 3.4–4.9)
ALP SERPL-CCNC: 86 U/L (ref 40–129)
ALT SERPL W/O P-5'-P-CCNC: 25 U/L (ref 10–50)
ANION GAP SERPL CALC-SCNC: 16 MEQ/L (ref 8–16)
AST SERPL-CCNC: 23 U/L (ref 10–50)
BILIRUB SERPL-MCNC: 0.4 MG/DL (ref 0.3–1.2)
BUN SERPL-MCNC: 51 MG/DL (ref 8–23)
CALCIUM SERPL-MCNC: 10.1 MG/DL (ref 8.8–10.2)
CHLORIDE SERPL-SCNC: 99 MEQ/L (ref 98–111)
CO2 SERPL-SCNC: 23 MEQ/L (ref 22–29)
CREAT SERPL-MCNC: 2.2 MG/DL (ref 0.7–1.2)
DEPRECATED MEAN GLUCOSE BLD GHB EST-ACNC: 246 MG/DL (ref 70–126)
GFR SERPL CREATININE-BSD FRML MDRD: 33 ML/MIN/1.73M2
GLUCOSE SERPL-MCNC: 279 MG/DL (ref 74–109)
HBA1C MFR BLD HPLC: 10.2 % (ref 4–6)
POTASSIUM SERPL-SCNC: 4.6 MEQ/L (ref 3.5–5.2)
PROT SERPL-MCNC: 7.3 G/DL (ref 6.4–8.3)
SODIUM SERPL-SCNC: 138 MEQ/L (ref 135–145)

## 2025-08-21 ENCOUNTER — LAB (OUTPATIENT)
Dept: LAB | Age: 65
End: 2025-08-21

## 2025-08-21 ENCOUNTER — OFFICE VISIT (OUTPATIENT)
Dept: NEPHROLOGY | Age: 65
End: 2025-08-21
Payer: COMMERCIAL

## 2025-08-21 VITALS — OXYGEN SATURATION: 95 % | SYSTOLIC BLOOD PRESSURE: 142 MMHG | HEART RATE: 75 BPM | DIASTOLIC BLOOD PRESSURE: 84 MMHG

## 2025-08-21 DIAGNOSIS — N17.9 ACUTE RENAL FAILURE, UNSPECIFIED ACUTE RENAL FAILURE TYPE: ICD-10-CM

## 2025-08-21 DIAGNOSIS — N18.32 STAGE 3B CHRONIC KIDNEY DISEASE (HCC): ICD-10-CM

## 2025-08-21 DIAGNOSIS — I10 ESSENTIAL HYPERTENSION: Primary | ICD-10-CM

## 2025-08-21 DIAGNOSIS — E11.21 DIABETIC NEPHROPATHY WITH PROTEINURIA (HCC): ICD-10-CM

## 2025-08-21 DIAGNOSIS — I10 ESSENTIAL HYPERTENSION: ICD-10-CM

## 2025-08-21 LAB
25(OH)D3 SERPL-MCNC: 26 NG/ML (ref 30–100)
ALBUMIN SERPL BCG-MCNC: 4 G/DL (ref 3.4–4.9)
ALP SERPL-CCNC: 92 U/L (ref 40–129)
ALT SERPL W/O P-5'-P-CCNC: 25 U/L (ref 10–50)
ANION GAP SERPL CALC-SCNC: 13 MEQ/L (ref 8–16)
AST SERPL-CCNC: 20 U/L (ref 10–50)
BILIRUB SERPL-MCNC: 0.4 MG/DL (ref 0.3–1.2)
BUN SERPL-MCNC: 48 MG/DL (ref 8–23)
CALCIUM SERPL-MCNC: 9.3 MG/DL (ref 8.5–10.5)
CHLORIDE SERPL-SCNC: 98 MEQ/L (ref 98–111)
CO2 SERPL-SCNC: 24 MEQ/L (ref 22–29)
CREAT SERPL-MCNC: 2.4 MG/DL (ref 0.7–1.2)
DEPRECATED RDW RBC AUTO: 50.9 FL (ref 35–45)
ERYTHROCYTE [DISTWIDTH] IN BLOOD BY AUTOMATED COUNT: 15.2 % (ref 11.5–14.5)
GFR SERPL CREATININE-BSD FRML MDRD: 29 ML/MIN/1.73M2
GLUCOSE SERPL-MCNC: 334 MG/DL (ref 74–109)
HCT VFR BLD AUTO: 40 % (ref 42–52)
HGB BLD-MCNC: 13.5 GM/DL (ref 14–18)
MCH RBC QN AUTO: 31.4 PG (ref 26–33)
MCHC RBC AUTO-ENTMCNC: 33.8 GM/DL (ref 32.2–35.5)
MCV RBC AUTO: 93 FL (ref 80–94)
PHOSPHATE SERPL-MCNC: 3.1 MG/DL (ref 2.5–4.5)
PLATELET # BLD AUTO: 170 THOU/MM3 (ref 130–400)
PMV BLD AUTO: 12.1 FL (ref 9.4–12.4)
POTASSIUM SERPL-SCNC: 4.1 MEQ/L (ref 3.5–5.2)
PROT SERPL-MCNC: 7.2 G/DL (ref 6.4–8.3)
PTH-INTACT SERPL-MCNC: 71 PG/ML (ref 15–65)
RBC # BLD AUTO: 4.3 MILL/MM3 (ref 4.7–6.1)
SODIUM SERPL-SCNC: 135 MEQ/L (ref 135–145)
WBC # BLD AUTO: 6.8 THOU/MM3 (ref 4.8–10.8)

## 2025-08-21 PROCEDURE — 3077F SYST BP >= 140 MM HG: CPT | Performed by: INTERNAL MEDICINE

## 2025-08-21 PROCEDURE — 3046F HEMOGLOBIN A1C LEVEL >9.0%: CPT | Performed by: INTERNAL MEDICINE

## 2025-08-21 PROCEDURE — 3079F DIAST BP 80-89 MM HG: CPT | Performed by: INTERNAL MEDICINE

## 2025-08-21 PROCEDURE — 99213 OFFICE O/P EST LOW 20 MIN: CPT | Performed by: INTERNAL MEDICINE

## 2025-08-21 PROCEDURE — G2211 COMPLEX E/M VISIT ADD ON: HCPCS | Performed by: INTERNAL MEDICINE

## (undated) DEVICE — GLOVE ORANGE PI 8   MSG9080

## (undated) DEVICE — FAN SPRAY KIT: Brand: PULSAVAC®

## (undated) DEVICE — SPONGE LAP W18XL18IN WHT COT 4 PLY FLD STRUNG RADPQ DISP ST

## (undated) DEVICE — CATHETER ANGIO AMPLATZ LT 1 6 FRX100 CM INFIN

## (undated) DEVICE — BANDAGE COMPR M W6INXL10YD WHT BGE VELC E MTRX HK AND LOOP

## (undated) DEVICE — ZIMMER SKIN GRAFT CARRIER 16 INCH  LENGTH: Brand: DERMACARRIERS

## (undated) DEVICE — Device

## (undated) DEVICE — GUIDEWIRE VASC L150CM DIA0.035IN FLX TIP L7CM PTFE STR FIX

## (undated) DEVICE — SYRINGE MED 10ML LUERLOCK TIP W/O SFTY DISP

## (undated) DEVICE — PROVE COVER: Brand: UNBRANDED

## (undated) DEVICE — SOLUTION SURG PREP POV IOD 7.5% 4 OZ

## (undated) DEVICE — AGENT HEMSTAT W2XL14IN OXIDIZED REGENERATED CELOS ABSRB FOR

## (undated) DEVICE — COVER ARMBRD W13XL28.5IN IMPERV BLU FOR OP RM

## (undated) DEVICE — PINNACLE INTRODUCER SHEATH: Brand: PINNACLE

## (undated) DEVICE — PADDING CAST W6INXL4YD COT LO LINTING WYTEX

## (undated) DEVICE — DRAPE,U/SHT,SPLIT,FILM,60X84,STERILE: Brand: MEDLINE

## (undated) DEVICE — GOWN,SIRUS,NONRNF,SETINSLV,XL,20/CS: Brand: MEDLINE

## (undated) DEVICE — TAPE ADH W2INXL10YD PLAS TRNSPAR H2O RESIST HYPOALRG CURAD

## (undated) DEVICE — PADDING CAST W6INXL4YD POLY POR SPUN DACRON SYN VERSATILE

## (undated) DEVICE — THIN OFFSET (13.3 X 0.38 X 42.0MM)

## (undated) DEVICE — BANDAGE COMPR W4INXL12FT E DISP ESMARCH EVEN

## (undated) DEVICE — TETRA-FLEX CF WOVEN LATEX FREE ELASTIC BANDAGE 6" X 5.5 YD: Brand: TETRA-FLEX™CF

## (undated) DEVICE — THIN OFFSET (9.0 X 0.38 X 25.0MM)

## (undated) DEVICE — CONTAINER VACUTAINER ANAER CULTURE SWAB

## (undated) DEVICE — CATHETER ANGIO 5FR L65CM DIA0.052IN GWIRE 0.038IN SHEP FLSH

## (undated) DEVICE — DRESSING WND VAC MED GRANUFOAM SENSATRAC

## (undated) DEVICE — COVER,LIGHT HANDLE,FLX,2/PK: Brand: MEDLINE INDUSTRIES, INC.

## (undated) DEVICE — SUTURE MCRYL SZ 3-0 L27IN ABSRB UD L24MM PS-1 3/8 CIR PRIM Y936H

## (undated) DEVICE — DRAPE,EXTREMITY,89X128,STERILE: Brand: MEDLINE

## (undated) DEVICE — 1016 S-DRAPE IRRIG POUCH 10/BOX: Brand: STERI-DRAPE™

## (undated) DEVICE — BANDAGE,GAUZE,4.5"X4.1YD,STERILE,LF: Brand: MEDLINE

## (undated) DEVICE — CATHETER DIAG AD 6FR L110CM 145DEG COR GRN HYDRPHLC NYL

## (undated) DEVICE — DELIV SYS D-EVOLUTFX-34: Brand: EVOLUT™ FX

## (undated) DEVICE — SPONGE GZ W4XL4IN COT 12 PLY TYP VII WVN C FLD DSGN

## (undated) DEVICE — GLOVE ORANGE PI 7   MSG9070

## (undated) DEVICE — HI-TORQUE SUPRA CORE .035 PERIPHERAL GUIDE WIRE .035 X 190 CM: Brand: HI-TORQUE SUPRA CORE

## (undated) DEVICE — CONTAINER,SPECIMEN,PNEU TUBE,4OZ,OR STRL: Brand: MEDLINE

## (undated) DEVICE — GLOVE SURG SZ 65 THK91MIL LTX FREE SYN POLYISOPRENE

## (undated) DEVICE — IMPREGNATED GAUZE DRESSING: Brand: CUTICERIN 7.5X7.5CM CTN 50

## (undated) DEVICE — SHEATH INTRO L12CM DIA6FR W/ LUERLOCK HUB HEMSTAS VLV

## (undated) DEVICE — 4-PORT MANIFOLD: Brand: NEPTUNE 2

## (undated) DEVICE — SHEATH INTRO 18FR L33CM OD6.7MM ID6MM HYDRPHLC KINK

## (undated) DEVICE — SPLINT ORTH W4XL30IN WHT FBRGLS 1 SIDE FELT PD CNFRM LO

## (undated) DEVICE — ANGIO-SEAL VIP VASCULAR CLOSURE DEVICE: Brand: ANGIO-SEAL

## (undated) DEVICE — 4F (1.0MM ID) X 9CM STIFF4F (1.0 MICRO-STICK®INTRODUCER SE WITH NITINOL GUIDEWIREWITH NITIN WITH RADIOPAQUE TIPWITH RADIOPAQ: Brand: MICRO-STICK SETMICRO-STICK SET

## (undated) DEVICE — GLOVE ORANGE PI 8 1/2   MSG9085

## (undated) DEVICE — DERMATOME BLADES: Brand: DERMATOME

## (undated) DEVICE — GLOVE ORANGE PI 7 1/2   MSG9075

## (undated) DEVICE — BASIC SINGLE BASIN BTC-LF: Brand: MEDLINE INDUSTRIES, INC.

## (undated) DEVICE — PADDING,UNDERCAST,COTTON, 4"X4YD STERILE: Brand: MEDLINE

## (undated) DEVICE — BANDAGE COMPR E SGL LAYERED CLSR BGE W/ CLP W4INXL15FT

## (undated) DEVICE — Z DISCONTINUED BY MEDLINE USE 2711682 TRAY SKIN PREP DRY W/ PREM GLV

## (undated) DEVICE — VERSAJET II HYDROSURGERY SYSTEM HANDSET, 45DEG 14MM EXACT: Brand: VERSAJET

## (undated) DEVICE — Device: Brand: MEDEX

## (undated) DEVICE — SOLUTION IV IRRIG WATER 1000ML POUR BRL 2F7114

## (undated) DEVICE — MARKER,SKIN,WI/RULER AND LABELS: Brand: MEDLINE

## (undated) DEVICE — TUBING, SUCTION, 1/4" X 20', STRAIGHT: Brand: MEDLINE INDUSTRIES, INC.

## (undated) DEVICE — SUTURE VCRL SZ 0 L27IN ABSRB UD L36MM CT-1 1/2 CIR J260H

## (undated) DEVICE — PADDING CAST W4INXL4YD SPUN DACRON POLY POR SYN VERSATILE

## (undated) DEVICE — RADIFOCUS GLIDEWIRE ADVANTAGE GUIDEWIRE: Brand: GLIDEWIRE ADVANTAGE

## (undated) DEVICE — ROYAL SILK SURGICAL GOWN, XXL: Brand: CONVERTORS

## (undated) DEVICE — KIT MFLD ISOLATN NACL CNTRST PRT TBNG SPIK W/ PRSS TRNSDUC

## (undated) DEVICE — PAD,ABDOMINAL,5"X9",ST,LF,25/BX: Brand: MEDLINE INDUSTRIES, INC.

## (undated) DEVICE — GOWN,SIRUS,NON REINFRCD,LARGE,SET IN SL: Brand: MEDLINE

## (undated) DEVICE — ARMADA 35 PTA CATHETER 8 MM X 40 MM X 80 CM / OVER-THE-WIRE: Brand: ARMADA

## (undated) DEVICE — ELECTRODE PT RET AD L9FT HI MOIST COND ADH HYDRGEL CORDED

## (undated) DEVICE — DEVICE INFL 20ML 30ATM POLYCARB BRL ABS PLUNG DGT DISPLAY

## (undated) DEVICE — LOADING SYS L-EVOLUTFX-34: Brand: EVOLUT™ FX

## (undated) DEVICE — PREP SOL PVP IODINE 4%  4 OZ/BTL

## (undated) DEVICE — GUIDEWIRE VASC 0.035 INX275 CM X SM CRV LUBRIGREEN SS SAFARI

## (undated) DEVICE — Z INACTIVE USE 2854097 SPONGE GZ W4XL4IN COT 12 PLY TYP VII WVN C FLD DSGN

## (undated) DEVICE — GOWN,AURORA,NON-REINFORCED,2XL: Brand: MEDLINE

## (undated) DEVICE — FLEXOR, CHECK-FLO, INTRODUCER ANSEL 1 MODIFICATION - WITH HIGH-FLEX DILATOR AND HYDROPHILIC COATING: Brand: FLEXOR

## (undated) DEVICE — CATHETER ANGIO (ORDER IN MULTIPLES OF 5 EACHS) PIG INFINITI 5FR 110CM 0.038IN STRAIGHT

## (undated) DEVICE — SYRINGE IRRIG 60ML SFT PLIABLE BLB EZ TO GRP 1 HND USE W/

## (undated) DEVICE — IMPREGNATED GAUZE DRESSING: Brand: CUTICERIN 7.5X20CM CTN 50

## (undated) DEVICE — DRESSING FOAM W6XH1.9XL17CM BRDG GRANUFOAM VAC

## (undated) DEVICE — APPLICATOR MEDICATED 26 CC SOLUTION HI LT ORNG CHLORAPREP

## (undated) DEVICE — INTENDED FOR TISSUE SEPARATION, AND OTHER PROCEDURES THAT REQUIRE A SHARP SURGICAL BLADE TO PUNCTURE OR CUT.: Brand: BARD-PARKER ® CARBON RIB-BACK BLADES

## (undated) DEVICE — KIT ANGIO W/ AT P65 PREM HND CTRL FOR CNTRST DEL ANGIOTOUCH

## (undated) DEVICE — ELECTRODE PACE S STL BPLR BLLN TEMP CATH

## (undated) DEVICE — BIOGUARD A/W CLEANING ADAPTER

## (undated) DEVICE — DRAPE KIT RAMAPR RADIATION SHIELD

## (undated) DEVICE — CHECK-FLO PERFORMER INTRODUCER: Brand: PERFORMER

## (undated) DEVICE — PERCLOSE™ PROSTYLE™ SUTURE-MEDIATED CLOSURE AND REPAIR SYSTEM: Brand: PERCLOSE™ PROSTYLE™

## (undated) DEVICE — SOLUTION IV 1000ML 0.9% SOD CHL PH 5 INJ USP VIAFLX PLAS

## (undated) DEVICE — GUIDEWIRE VASC L150CM DIA0.035IN FLX END L7CM J 3MM PTFE

## (undated) DEVICE — GUIDEWIRE VASC L260CM DIA0.035IN RAD 3MM J TIP L7CM PTFE

## (undated) DEVICE — ZIMMER SKIN GRAFT CARRIER 8 INCH LENGTH: Brand: DERMACARRIERS

## (undated) DEVICE — PACK-MAJOR

## (undated) DEVICE — CHECK-FLO PERFORMER INTRODUCER: Brand: CHECK-FLO

## (undated) DEVICE — THERMOGARD PLUS ABC DUAL DISPERSIVE ELECTRODE: Brand: THERMOGARD